# Patient Record
Sex: MALE | Race: WHITE | NOT HISPANIC OR LATINO | Employment: OTHER | URBAN - METROPOLITAN AREA
[De-identification: names, ages, dates, MRNs, and addresses within clinical notes are randomized per-mention and may not be internally consistent; named-entity substitution may affect disease eponyms.]

---

## 2017-01-03 ENCOUNTER — APPOINTMENT (OUTPATIENT)
Dept: PHYSICAL THERAPY | Facility: REHABILITATION | Age: 77
End: 2017-01-03
Payer: MEDICARE

## 2017-01-03 PROCEDURE — 97140 MANUAL THERAPY 1/> REGIONS: CPT

## 2017-01-03 PROCEDURE — 97110 THERAPEUTIC EXERCISES: CPT

## 2017-01-05 ENCOUNTER — APPOINTMENT (OUTPATIENT)
Dept: PHYSICAL THERAPY | Facility: REHABILITATION | Age: 77
End: 2017-01-05
Payer: MEDICARE

## 2017-01-05 PROCEDURE — 97110 THERAPEUTIC EXERCISES: CPT

## 2017-01-05 PROCEDURE — 97140 MANUAL THERAPY 1/> REGIONS: CPT

## 2017-01-10 ENCOUNTER — APPOINTMENT (OUTPATIENT)
Dept: PHYSICAL THERAPY | Facility: REHABILITATION | Age: 77
End: 2017-01-10
Payer: MEDICARE

## 2017-01-10 PROCEDURE — 97110 THERAPEUTIC EXERCISES: CPT

## 2017-01-10 PROCEDURE — 97140 MANUAL THERAPY 1/> REGIONS: CPT

## 2017-01-12 ENCOUNTER — APPOINTMENT (OUTPATIENT)
Dept: PHYSICAL THERAPY | Facility: REHABILITATION | Age: 77
End: 2017-01-12
Payer: MEDICARE

## 2017-01-12 PROCEDURE — 97110 THERAPEUTIC EXERCISES: CPT

## 2017-01-12 PROCEDURE — 97140 MANUAL THERAPY 1/> REGIONS: CPT

## 2017-01-17 ENCOUNTER — APPOINTMENT (OUTPATIENT)
Dept: PHYSICAL THERAPY | Facility: REHABILITATION | Age: 77
End: 2017-01-17
Payer: MEDICARE

## 2017-01-17 PROCEDURE — 97140 MANUAL THERAPY 1/> REGIONS: CPT

## 2017-01-17 PROCEDURE — 97110 THERAPEUTIC EXERCISES: CPT

## 2017-01-19 ENCOUNTER — APPOINTMENT (OUTPATIENT)
Dept: PHYSICAL THERAPY | Facility: REHABILITATION | Age: 77
End: 2017-01-19
Payer: MEDICARE

## 2017-01-19 PROCEDURE — 97140 MANUAL THERAPY 1/> REGIONS: CPT

## 2017-01-19 PROCEDURE — 97110 THERAPEUTIC EXERCISES: CPT

## 2017-01-23 ENCOUNTER — ALLSCRIPTS OFFICE VISIT (OUTPATIENT)
Dept: OTHER | Facility: OTHER | Age: 77
End: 2017-01-23

## 2017-01-23 LAB
CLARITY UR: NORMAL
COLOR UR: YELLOW
GLUCOSE (HISTORICAL): NORMAL
HGB UR QL STRIP.AUTO: NORMAL
KETONES UR STRIP-MCNC: NORMAL MG/DL
LEUKOCYTE ESTERASE UR QL STRIP: NORMAL
NITRITE UR QL STRIP: NORMAL
PH UR STRIP.AUTO: 5 [PH]
PROT UR STRIP-MCNC: NORMAL MG/DL
SP GR UR STRIP.AUTO: 1.03

## 2017-01-24 ENCOUNTER — GENERIC CONVERSION - ENCOUNTER (OUTPATIENT)
Dept: OTHER | Facility: OTHER | Age: 77
End: 2017-01-24

## 2017-01-24 ENCOUNTER — APPOINTMENT (OUTPATIENT)
Dept: PHYSICAL THERAPY | Facility: REHABILITATION | Age: 77
End: 2017-01-24
Payer: MEDICARE

## 2017-01-24 PROCEDURE — G8978 MOBILITY CURRENT STATUS: HCPCS

## 2017-01-24 PROCEDURE — G8979 MOBILITY GOAL STATUS: HCPCS

## 2017-01-24 PROCEDURE — 97140 MANUAL THERAPY 1/> REGIONS: CPT

## 2017-01-24 PROCEDURE — 97110 THERAPEUTIC EXERCISES: CPT

## 2017-01-26 ENCOUNTER — APPOINTMENT (OUTPATIENT)
Dept: PHYSICAL THERAPY | Facility: REHABILITATION | Age: 77
End: 2017-01-26
Payer: MEDICARE

## 2017-01-26 ENCOUNTER — ALLSCRIPTS OFFICE VISIT (OUTPATIENT)
Dept: OTHER | Facility: OTHER | Age: 77
End: 2017-01-26

## 2017-01-26 ENCOUNTER — HOSPITAL ENCOUNTER (OUTPATIENT)
Dept: RADIOLOGY | Facility: HOSPITAL | Age: 77
Discharge: HOME | End: 2017-01-26
Attending: ORTHOPAEDIC SURGERY
Payer: MEDICARE

## 2017-01-26 DIAGNOSIS — M54.50 LOW BACK PAIN: ICD-10-CM

## 2017-01-26 DIAGNOSIS — Z98.1 ARTHRODESIS STATUS: ICD-10-CM

## 2017-01-26 PROCEDURE — 72100 X-RAY EXAM L-S SPINE 2/3 VWS: CPT

## 2017-01-26 PROCEDURE — 97110 THERAPEUTIC EXERCISES: CPT

## 2017-01-26 PROCEDURE — 97140 MANUAL THERAPY 1/> REGIONS: CPT

## 2017-01-31 ENCOUNTER — APPOINTMENT (OUTPATIENT)
Dept: PHYSICAL THERAPY | Facility: REHABILITATION | Age: 77
End: 2017-01-31
Payer: MEDICARE

## 2017-01-31 ENCOUNTER — GENERIC CONVERSION - ENCOUNTER (OUTPATIENT)
Dept: OTHER | Facility: OTHER | Age: 77
End: 2017-01-31

## 2017-01-31 PROCEDURE — 97140 MANUAL THERAPY 1/> REGIONS: CPT

## 2017-01-31 PROCEDURE — 97110 THERAPEUTIC EXERCISES: CPT

## 2017-02-28 ENCOUNTER — GENERIC CONVERSION - ENCOUNTER (OUTPATIENT)
Dept: OTHER | Facility: OTHER | Age: 77
End: 2017-02-28

## 2017-03-21 ENCOUNTER — ALLSCRIPTS OFFICE VISIT (OUTPATIENT)
Dept: OTHER | Facility: OTHER | Age: 77
End: 2017-03-21

## 2017-04-24 ENCOUNTER — HOSPITAL ENCOUNTER (OUTPATIENT)
Dept: RADIOLOGY | Facility: HOSPITAL | Age: 77
Discharge: HOME | End: 2017-04-24
Attending: ORTHOPAEDIC SURGERY
Payer: MEDICARE

## 2017-04-24 ENCOUNTER — ALLSCRIPTS OFFICE VISIT (OUTPATIENT)
Dept: OTHER | Facility: OTHER | Age: 77
End: 2017-04-24

## 2017-04-24 DIAGNOSIS — M62.838 OTHER MUSCLE SPASM: ICD-10-CM

## 2017-04-24 DIAGNOSIS — M54.50 LOW BACK PAIN: ICD-10-CM

## 2017-04-24 DIAGNOSIS — Z98.1 ARTHRODESIS STATUS: ICD-10-CM

## 2017-04-24 PROCEDURE — 72100 X-RAY EXAM L-S SPINE 2/3 VWS: CPT

## 2017-05-05 ENCOUNTER — ALLSCRIPTS OFFICE VISIT (OUTPATIENT)
Dept: OTHER | Facility: OTHER | Age: 77
End: 2017-05-05

## 2017-05-06 ENCOUNTER — TRANSCRIBE ORDERS (OUTPATIENT)
Dept: LAB | Facility: CLINIC | Age: 77
End: 2017-05-06

## 2017-05-06 ENCOUNTER — APPOINTMENT (OUTPATIENT)
Dept: LAB | Facility: CLINIC | Age: 77
End: 2017-05-06
Payer: MEDICARE

## 2017-05-06 DIAGNOSIS — M62.838 OTHER MUSCLE SPASM: ICD-10-CM

## 2017-05-06 LAB
ALBUMIN SERPL BCP-MCNC: 3.3 G/DL (ref 3.5–5)
ALP SERPL-CCNC: 99 U/L (ref 46–116)
ALT SERPL W P-5'-P-CCNC: 25 U/L (ref 12–78)
ANION GAP SERPL CALCULATED.3IONS-SCNC: 9 MMOL/L (ref 4–13)
AST SERPL W P-5'-P-CCNC: 24 U/L (ref 5–45)
BILIRUB SERPL-MCNC: 0.4 MG/DL (ref 0.2–1)
BUN SERPL-MCNC: 15 MG/DL (ref 5–25)
CALCIUM SERPL-MCNC: 8.6 MG/DL (ref 8.3–10.1)
CHLORIDE SERPL-SCNC: 104 MMOL/L (ref 100–108)
CK SERPL-CCNC: 68 U/L (ref 39–308)
CO2 SERPL-SCNC: 29 MMOL/L (ref 21–32)
CREAT SERPL-MCNC: 1.06 MG/DL (ref 0.6–1.3)
GFR SERPL CREATININE-BSD FRML MDRD: >60 ML/MIN/1.73SQ M
GLUCOSE SERPL-MCNC: 94 MG/DL (ref 65–140)
POTASSIUM SERPL-SCNC: 4.7 MMOL/L (ref 3.5–5.3)
PROT SERPL-MCNC: 7.4 G/DL (ref 6.4–8.2)
SODIUM SERPL-SCNC: 142 MMOL/L (ref 136–145)
TSH SERPL DL<=0.05 MIU/L-ACNC: 1.74 UIU/ML (ref 0.36–3.74)
VIT B12 SERPL-MCNC: 433 PG/ML (ref 100–900)

## 2017-05-06 PROCEDURE — 84443 ASSAY THYROID STIM HORMONE: CPT

## 2017-05-06 PROCEDURE — 82607 VITAMIN B-12: CPT

## 2017-05-06 PROCEDURE — 80053 COMPREHEN METABOLIC PANEL: CPT

## 2017-05-06 PROCEDURE — 82550 ASSAY OF CK (CPK): CPT

## 2017-05-06 PROCEDURE — 36415 COLL VENOUS BLD VENIPUNCTURE: CPT

## 2017-05-09 ENCOUNTER — GENERIC CONVERSION - ENCOUNTER (OUTPATIENT)
Dept: OTHER | Facility: OTHER | Age: 77
End: 2017-05-09

## 2017-06-07 ENCOUNTER — ALLSCRIPTS OFFICE VISIT (OUTPATIENT)
Dept: OTHER | Facility: OTHER | Age: 77
End: 2017-06-07

## 2017-06-07 LAB — S PYO AG THROAT QL: NEGATIVE

## 2017-06-26 ENCOUNTER — ALLSCRIPTS OFFICE VISIT (OUTPATIENT)
Dept: OTHER | Facility: OTHER | Age: 77
End: 2017-06-26

## 2017-09-11 ENCOUNTER — ALLSCRIPTS OFFICE VISIT (OUTPATIENT)
Dept: OTHER | Facility: OTHER | Age: 77
End: 2017-09-11

## 2017-09-11 DIAGNOSIS — Z82.49 FAMILY HISTORY OF ISCHEMIC HEART DISEASE AND OTHER DISEASES OF THE CIRCULATORY SYSTEM: ICD-10-CM

## 2017-09-11 DIAGNOSIS — R03.0 ELEVATED BLOOD PRESSURE READING WITHOUT DIAGNOSIS OF HYPERTENSION: ICD-10-CM

## 2017-09-11 DIAGNOSIS — R25.2 CRAMP AND SPASM: ICD-10-CM

## 2017-09-11 DIAGNOSIS — Z13.1 ENCOUNTER FOR SCREENING FOR DIABETES MELLITUS: ICD-10-CM

## 2017-09-11 DIAGNOSIS — R79.89 OTHER SPECIFIED ABNORMAL FINDINGS OF BLOOD CHEMISTRY: ICD-10-CM

## 2017-09-11 DIAGNOSIS — Z13.220 ENCOUNTER FOR SCREENING FOR LIPOID DISORDERS: ICD-10-CM

## 2017-09-13 ENCOUNTER — GENERIC CONVERSION - ENCOUNTER (OUTPATIENT)
Dept: OTHER | Facility: OTHER | Age: 77
End: 2017-09-13

## 2017-09-14 ENCOUNTER — APPOINTMENT (OUTPATIENT)
Dept: LAB | Facility: CLINIC | Age: 77
End: 2017-09-14
Payer: MEDICARE

## 2017-09-14 ENCOUNTER — TRANSCRIBE ORDERS (OUTPATIENT)
Dept: LAB | Facility: CLINIC | Age: 77
End: 2017-09-14

## 2017-09-14 DIAGNOSIS — R25.2 CRAMP AND SPASM: ICD-10-CM

## 2017-09-14 DIAGNOSIS — Z13.220 ENCOUNTER FOR SCREENING FOR LIPOID DISORDERS: ICD-10-CM

## 2017-09-14 DIAGNOSIS — R03.0 ELEVATED BLOOD PRESSURE READING WITHOUT DIAGNOSIS OF HYPERTENSION: ICD-10-CM

## 2017-09-14 DIAGNOSIS — R79.89 OTHER SPECIFIED ABNORMAL FINDINGS OF BLOOD CHEMISTRY: ICD-10-CM

## 2017-09-14 DIAGNOSIS — Z13.1 ENCOUNTER FOR SCREENING FOR DIABETES MELLITUS: ICD-10-CM

## 2017-09-14 LAB
ALBUMIN SERPL BCP-MCNC: 3.2 G/DL (ref 3.5–5)
ALP SERPL-CCNC: 93 U/L (ref 46–116)
ALT SERPL W P-5'-P-CCNC: 38 U/L (ref 12–78)
ANION GAP SERPL CALCULATED.3IONS-SCNC: 12 MMOL/L (ref 4–13)
AST SERPL W P-5'-P-CCNC: 27 U/L (ref 5–45)
BASOPHILS # BLD AUTO: 0.03 THOUSANDS/ΜL (ref 0–0.1)
BASOPHILS NFR BLD AUTO: 1 % (ref 0–1)
BILIRUB SERPL-MCNC: 0.3 MG/DL (ref 0.2–1)
BUN SERPL-MCNC: 26 MG/DL (ref 5–25)
CALCIUM SERPL-MCNC: 8.5 MG/DL (ref 8.3–10.1)
CHLORIDE SERPL-SCNC: 106 MMOL/L (ref 100–108)
CHOLEST SERPL-MCNC: 211 MG/DL (ref 50–200)
CO2 SERPL-SCNC: 25 MMOL/L (ref 21–32)
CREAT SERPL-MCNC: 1.06 MG/DL (ref 0.6–1.3)
EOSINOPHIL # BLD AUTO: 0.19 THOUSAND/ΜL (ref 0–0.61)
EOSINOPHIL NFR BLD AUTO: 4 % (ref 0–6)
ERYTHROCYTE [DISTWIDTH] IN BLOOD BY AUTOMATED COUNT: 14.8 % (ref 11.6–15.1)
EST. AVERAGE GLUCOSE BLD GHB EST-MCNC: 105 MG/DL
GFR SERPL CREATININE-BSD FRML MDRD: 67 ML/MIN/1.73SQ M
GLUCOSE P FAST SERPL-MCNC: 91 MG/DL (ref 65–99)
HBA1C MFR BLD: 5.3 % (ref 4.2–6.3)
HCT VFR BLD AUTO: 35.2 % (ref 36.5–49.3)
HDLC SERPL-MCNC: 89 MG/DL (ref 40–60)
HGB BLD-MCNC: 11.5 G/DL (ref 12–17)
LDLC SERPL CALC-MCNC: 105 MG/DL (ref 0–100)
LYMPHOCYTES # BLD AUTO: 2.04 THOUSANDS/ΜL (ref 0.6–4.47)
LYMPHOCYTES NFR BLD AUTO: 39 % (ref 14–44)
MCH RBC QN AUTO: 31.7 PG (ref 26.8–34.3)
MCHC RBC AUTO-ENTMCNC: 32.7 G/DL (ref 31.4–37.4)
MCV RBC AUTO: 97 FL (ref 82–98)
MONOCYTES # BLD AUTO: 0.53 THOUSAND/ΜL (ref 0.17–1.22)
MONOCYTES NFR BLD AUTO: 10 % (ref 4–12)
NEUTROPHILS # BLD AUTO: 2.43 THOUSANDS/ΜL (ref 1.85–7.62)
NEUTS SEG NFR BLD AUTO: 46 % (ref 43–75)
PLATELET # BLD AUTO: 180 THOUSANDS/UL (ref 149–390)
PMV BLD AUTO: 9.9 FL (ref 8.9–12.7)
POTASSIUM SERPL-SCNC: 4.2 MMOL/L (ref 3.5–5.3)
PROT SERPL-MCNC: 7.8 G/DL (ref 6.4–8.2)
RBC # BLD AUTO: 3.63 MILLION/UL (ref 3.88–5.62)
SODIUM SERPL-SCNC: 143 MMOL/L (ref 136–145)
TRIGL SERPL-MCNC: 85 MG/DL
TSH SERPL DL<=0.05 MIU/L-ACNC: 1.23 UIU/ML (ref 0.36–3.74)
WBC # BLD AUTO: 5.22 THOUSAND/UL (ref 4.31–10.16)

## 2017-09-14 PROCEDURE — 85025 COMPLETE CBC W/AUTO DIFF WBC: CPT

## 2017-09-14 PROCEDURE — 80061 LIPID PANEL: CPT

## 2017-09-14 PROCEDURE — 80053 COMPREHEN METABOLIC PANEL: CPT

## 2017-09-14 PROCEDURE — 84443 ASSAY THYROID STIM HORMONE: CPT

## 2017-09-14 PROCEDURE — 83036 HEMOGLOBIN GLYCOSYLATED A1C: CPT

## 2017-09-14 PROCEDURE — 36415 COLL VENOUS BLD VENIPUNCTURE: CPT

## 2017-09-15 ENCOUNTER — GENERIC CONVERSION - ENCOUNTER (OUTPATIENT)
Dept: OTHER | Facility: OTHER | Age: 77
End: 2017-09-15

## 2017-09-19 ENCOUNTER — GENERIC CONVERSION - ENCOUNTER (OUTPATIENT)
Dept: OTHER | Facility: OTHER | Age: 77
End: 2017-09-19

## 2017-09-20 ENCOUNTER — HOSPITAL ENCOUNTER (OUTPATIENT)
Dept: NON INVASIVE DIAGNOSTICS | Facility: CLINIC | Age: 77
Discharge: HOME | End: 2017-09-20
Payer: MEDICARE

## 2017-09-20 DIAGNOSIS — R25.2 CRAMP AND SPASM: ICD-10-CM

## 2017-09-20 PROCEDURE — 93923 UPR/LXTR ART STDY 3+ LVLS: CPT

## 2017-09-20 PROCEDURE — 93925 LOWER EXTREMITY STUDY: CPT

## 2017-09-21 ENCOUNTER — LAB CONVERSION - ENCOUNTER (OUTPATIENT)
Dept: OTHER | Facility: OTHER | Age: 77
End: 2017-09-21

## 2017-10-05 ENCOUNTER — GENERIC CONVERSION - ENCOUNTER (OUTPATIENT)
Dept: OTHER | Facility: OTHER | Age: 77
End: 2017-10-05

## 2017-10-11 ENCOUNTER — HOSPITAL ENCOUNTER (OUTPATIENT)
Dept: NON INVASIVE DIAGNOSTICS | Facility: CLINIC | Age: 77
Discharge: HOME | End: 2017-10-11
Payer: MEDICARE

## 2017-10-11 DIAGNOSIS — Z82.49 FAMILY HISTORY OF ISCHEMIC HEART DISEASE AND OTHER DISEASES OF THE CIRCULATORY SYSTEM: ICD-10-CM

## 2017-10-11 LAB
CHEST PAIN STATEMENT: NORMAL
MAX DIASTOLIC BP: 62 MMHG
MAX HEART RATE: 134 BPM
MAX PREDICTED HEART RATE: 143 BPM
MAX. SYSTOLIC BP: 146 MMHG
PROTOCOL NAME: NORMAL
TARGET HR FORMULA: NORMAL
TEST INDICATION: NORMAL
TIME IN EXERCISE PHASE: 64 S

## 2017-10-11 PROCEDURE — 93017 CV STRESS TEST TRACING ONLY: CPT

## 2017-10-11 PROCEDURE — 93306 TTE W/DOPPLER COMPLETE: CPT

## 2017-10-13 ENCOUNTER — GENERIC CONVERSION - ENCOUNTER (OUTPATIENT)
Dept: OTHER | Facility: OTHER | Age: 77
End: 2017-10-13

## 2017-10-19 ENCOUNTER — ALLSCRIPTS OFFICE VISIT (OUTPATIENT)
Dept: OTHER | Facility: OTHER | Age: 77
End: 2017-10-19

## 2017-10-20 NOTE — CONSULTS
Assessment  1  Diarrhea (787 91) (R19 7)   2  Anemia, mild (285 9) (D64 9)   3  GERD (gastroesophageal reflux disease) (530 81) (K21 9)    Plan  Anemia, mild, Diarrhea, GERD (gastroesophageal reflux disease)    · Suprep Bowel Prep Kit 17 5-3 13-1 6 GM/180ML Oral Solution; USE AS DIRECTED   Rx By: Nancy Hawley; Dispense: 0 Days ; #:1 X 177 ML Bottle (2 Bottles); Refill: 0;For: Anemia, mild, Diarrhea, GERD (gastroesophageal reflux disease); HARRIET = N; Verified Transmission to [id number] Last Updated By: System, [employer]; 10/19/2017 9:49:38 AM   · COLONOSCOPY (GI, SURG); Status:Hold For - Scheduling; Requested OTL:[id number];    Perform:[facility]; Due:19Oct2018; Ordered; For:Anemia, mild, Diarrhea, GERD (gastroesophageal reflux disease); Ordered By:Mariano Godinez;  GERD (gastroesophageal reflux disease)    · EGD; Status:Hold For - Scheduling; Requested BEE:[id number];    Perform:[facility]; TFW:68SOH2560;QRRCGXG; For:GERD (gastroesophageal reflux disease); Ordered By:Mariano Godinez;    Discussion/Summary  Discussion Summary:   Pleasant 75-year-old gentleman who had major back surgery 1 year ago, since then he has had some change in bowel movements with associated diarrhea, found to have some anemia also noted to have reflux symptoms and occasional pill dysphagia  Exercise-induced reflux and occasional pill dysphagia: Appears to be reflux mediated, the patient to PPI therapy but has stopped taking this and with dietary modification is doing betterhis history of anemia new onset of symptoms we will proceed with an upper endoscopy  Change in bowel movements, urgency and loose stools at timeshas never had a colonoscopyin light of his anemia we will proceed with colonoscopy the same time to exclude underlying GI malignancy  discussed with him the risks of the procedures including bleeding, surgery, perforation, missed polyp detection rate        Chief Complaint  Chief Complaint Free Text Note Form: Evaluation for upper endoscopy and colonoscopy      History of Present Illness  HPI: As you know this is a very pleasant 27-year-old gentleman with no significant cardiac history, last November he had major back surgery with fusion and rods placed, he reports that since then he has had progressive weakness in lower extremities in particular  He has tried to go to physical therapy and rehab he notes that if he eats about an hour before that he will have heartburn symptoms  He has stopped eating and does symptoms have been improved, he is able to tolerate his exercise  He is able to walk about 1 to 2 flights of stairs before he becomes somewhat short of breath, he had a stress test which she failed because of his lower extremity weakness, he had an echocardiogram which was relatively unremarkable  denies any chest pain per se  He is a long-time smoker but quit many years ago  notes that over the past 1 year after his surgeries had change in bowel movements with occasional loose stools, occasional urgency as well  His weight has been stable  Denies any romario melena or rectal bleeding  He has never had a colonoscopy  Recent laboratory studies demonstrated mild anemia with a hemoglobin of 11 5, the remainder of his laboratory studies were normal   any abdominal pain, nausea, vomiting, typical heartburn symptoms  Reports occasional pill associated dysphagia  is retired from [employer], no family history of GI or associated malignancies  Review of Systems  Complete-Male GI Adult: Other Symptoms: The remainder of the ten ROS was negative  Active Problems  1  Abdominal pain (789 00) (R10 9)   2  Allergic rhinitis (477 9) (J30 9)   3  Anemia, mild (285 9) (D64 9)   4  Benign non-nodular prostatic hyperplasia with lower urinary tract symptoms (600 91)   (N40 1)   5  Degenerative lumbar spinal stenosis (724 02) (M48 061)   6  Diabetes mellitus screening (V77 1) (Z13 1)   7  Diarrhea (787 91) (R19 7)   8   Dyspnea on exertion (786 09) (R06 09)   9  Elevated BP without diagnosis of hypertension (796 2) (R03 0)   10  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)   11  GERD (gastroesophageal reflux disease) (530 81) (K21 9)   12  GERD without esophagitis (530 81) (K21 9)   13  History of Hand paresthesia (782 0) (R20 2)   14  History of dysuria (V13 00) (Z87 898)   15  History of urinary frequency (V13 09) (Z87 898)   16  Leg cramps (729 82) (R25 2)   17  Lipid screening (V77 91) (Z13 220)   18  Medicare annual wellness visit, subsequent (V70 0) (Z00 00)   19  Muscle spasticity (728 85) (M62 838)   20  Need for influenza vaccination (V04 81) (Z23)   21  Need for vaccination with 13-polyvalent pneumococcal conjugate vaccine (V03 82) (Z23)   22  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) (L30 0)   23  Peripheral arterial disease (443 9) (I73 9)   24  S/P lumbar spinal fusion (V45 4) (Z98 1)   25  Screen for colon cancer (V76 51) (Z12 11)   26  Screening for depression (V79 0) (Z13 89)   27  Screening for genitourinary condition (V81 6) (Z13 89)   28  Screening for neurological condition (V80 09) (Z13 89)   29  Spondylolisthesis of lumbar region (738 4) (M43 16)   30  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 3) (M47 816)   31  History of Testicular pain, right (608 9) (N50 811)   32  Trouble swallowing (787 20) (R13 10)    Past Medical History  1  History of Abnormal liver function test (790 6) (R79 89)   2  History of Acute maxillary sinusitis, recurrence not specified (461 0) (J01 00)   3  History of Acute non-recurrent frontal sinusitis (461 1) (J01 10)   4  Acute sinusitis (461 9) (J01 90)   5  History of Acute upper respiratory infection (465 9) (J06 9)   6  Anemia, mild (285 9) (D64 9)   7  History of Cellulitis of trunk, unspecified site of trunk (682 2) (L03 319)   8  History of Change in bowel habits (787 99) (R19 4)   9  Diabetes mellitus screening (V77 1) (Z13 1)   10  Dyspnea on exertion (786 09) (R06 09)   11  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)   12  History of Greater trochanteric bursitis, right (726 5) (M70 61)   13  History of Hand paresthesia (782 0) (R20 2)   14  History of Hip pain, right (719 45) (M25 551)   15  History of acute sinusitis (V12 69) (Z87 09)   16  History of dysuria (V13 00) (Z87 898)   17  History of inflammation of sacroiliac joint (V13 4) (Z87 39)   18  History of low back pain (V13 59) (Z87 39)   19  History of tinea corporis (V12 09) (Z86 19)   20  History of urinary frequency (V13 09) (Z87 898)   21  History of urticaria (V13 3) (Z87 2)   22  Leg cramps (729 82) (R25 2)   23  Lipid screening (V77 91) (Z13 220)   24  Muscle spasticity (728 85) (M62 838)   25  Need for vaccination with 13-polyvalent pneumococcal conjugate vaccine (V03 82) (Z23)   26  Peripheral arterial disease (443 9) (I73 9)   27  History of Preoperative examination (V72 84) (Z01 818)   28  History of Testicular pain, right (608 9) (N50 811)  Active Problems And Past Medical History Reviewed: The active problems and past medical history were reviewed and updated today  Surgical History  1  History of Back Surgery   2  History of Septoplasty   3  History of Tonsillectomy  Surgical History Reviewed: The surgical history was reviewed and updated today  Family History  Mother    1  Denied: Family history of Crohn's disease without complication, unspecified   gastrointestinal tract location   2  Denied: Family history of colon cancer   3  Family history of emphysema (V17 6) (Z82 5)   4  Denied: Family history of liver disease  Father    11  Denied: Family history of Crohn's disease without complication, unspecified   gastrointestinal tract location   6  Family history of cerebrovascular accident (V17 1) (Z82 3)   7  Denied: Family history of colon cancer   8  Family history of coronary artery disease (V17 3) (Z82 49)   9  Family history of hypertension (V17 49) (Z82 49)   10   Denied: Family history of liver disease  Brother    6  Family history of coronary artery disease (V17 3) (Z82 49)  Family History    12  Family history of Back problem   13  Family history of Cerebrovascular accident (CVA), unspecified   15  Family history of emphysema (V17 6) (Z82 5)   15  Family history of Hypertension, benign  Family History Reviewed: The family history was reviewed and updated today  Social History   · Alcohol use (V49 89) (Z78 9)   · Daily Coffee Consumption (___ Cups/Day)   · Education Level: Some college   · Former smoker (X61 05) (L47 636)   · No drug use   · Occupation   · Retired   · Social alcohol use (Z78 9)   ·   Social History Reviewed: The social history was reviewed and updated today  Current Meds   1  Align Oral Capsule; USE AS DIRECTED; Therapy: 77YSO0654 to (Last SO:59LCC7322)  Requested for: 26Jun2017 Ordered   2  Aspirin 81 MG CAPS; Therapy: (Recorded:05Oct2017) to Recorded   3  Atorvastatin Calcium 20 MG Oral Tablet; TAKE 1 TABLET AT BEDTIME; Therapy: 41WMF0692 to (Evaluate:90Nws6306)  Requested for: 71YJY3440; Last   Rx:05Oct2017 Ordered   4  Daily Multiple Vitamins TABS; Therapy: (Ayesha Lemme) to Recorded   5  Vitamin B Complex CAPS; Therapy: (Recorded:05Oct2017) to Recorded   6  Vitamin D 1000 UNIT CAPS; Therapy: (Recorded:05Oct2017) to Recorded  Medication List Reviewed: The medication list was reviewed and updated today  Allergies  1   Penicillins    Vitals  Vital Signs    Recorded: 37JZE7130 09:30AM   Temperature 96 4 F   Heart Rate 78   Respiration 16   Systolic 671   Diastolic 82   Height 5 ft 7 in   Weight 175 lb 2 oz   BMI Calculated 27 43   BSA Calculated 1 91   O2 Saturation 98     Physical Exam  Gen: wn/wd, NAD  HEENT: anicteric, MMM, no cervical LAD  CVS: RRR, no m/r/g  CHEST: CTA b/l  ABD: +BS, soft, NT,ND, no hepatosplenomegaly  EXT: no c/c/e, weak lower extremity pulses but present  NEURO: aaox3  SKIN: NO rashes         Future Appointments    Date/Time Provider Specialty Site   03/13/2018 10:00 AM Karina Matson, [facility]   10/23/2017 10:20 AM LOS King   Orthopedic Surgery [address]     Signatures   Electronically signed by : LOS Hardin ; Oct 19 2017  9:53AM EST                       (Author)

## 2017-10-23 ENCOUNTER — HOSPITAL ENCOUNTER (OUTPATIENT)
Dept: RADIOLOGY | Facility: HOSPITAL | Age: 77
Discharge: HOME | End: 2017-10-23
Attending: ORTHOPAEDIC SURGERY
Payer: MEDICARE

## 2017-10-23 ENCOUNTER — ALLSCRIPTS OFFICE VISIT (OUTPATIENT)
Dept: OTHER | Facility: OTHER | Age: 77
End: 2017-10-23

## 2017-10-23 DIAGNOSIS — Z98.1 ARTHRODESIS STATUS: ICD-10-CM

## 2017-10-23 DIAGNOSIS — R06.02 SHORTNESS OF BREATH: ICD-10-CM

## 2017-10-23 DIAGNOSIS — J30.9 ALLERGIC RHINITIS: ICD-10-CM

## 2017-10-23 DIAGNOSIS — I73.9 PERIPHERAL VASCULAR DISEASE (HCC): ICD-10-CM

## 2017-10-23 PROCEDURE — 72100 X-RAY EXAM L-S SPINE 2/3 VWS: CPT

## 2017-10-24 NOTE — PROGRESS NOTES
Assessment  1  S/P lumbar spinal fusion (V45 4) (Z98 1)    Plan  S/P lumbar spinal fusion    · Follow-up PRN Evaluation and Treatment  Follow-up  Status: Complete  Done:  80FRW6171    Discussion/Summary    Patient seen and examined by Dr Al Segura and myself  He is about 1 year s/p posterior lumbar decompression and fusion L2-S1  He is doing well with minimal LBP and improving paresthesias of both great toes  X-rays in office appear stable  From an orthopedic standpoint he is doing very well  He can follow-up with us now PRN  Of note he is seeing a cardiologist this week and states he also follows with a vascular team for peripheral vascular disease  Chief Complaint  1  Back Pain  follow-up LBP      History of Present Illness  HPI: The patient is a 68year old male who presents for a follow-up appointment  He is approximately 1 year s/p L2-S1 posterior lumbar fusion and decompression for spinal stenosis/radiculopathy/spondylolisthesis  Today he states he has been having difficulty walking long distances as he experiences leg pain and ROBLES  He states he follows with a vascular surgeon and cardiologist  He reports minimal LBP and improving numbness/tingling of both great toes  No new LE weakness, paresthesias, denies bowel or bladder incontinence  Back Pain: Alisha Marques presents with complaints of back pain  Associated symptoms include stiffness,-- leg numbness-- and-- leg weakness, but-- no fever,-- no abdominal pain,-- no arm weakness,-- no urinary incontinence,-- no fecal incontinence-- and-- no urinary retention  Review of Systems    Constitutional: as noted in HPI,-- no fever-- and-- no chills  Cardiovascular: intermittent leg claudication, but-- as noted in HPI,-- no chest pain-- and-- no lower extremity edema  Respiratory: as noted in HPI  Gastrointestinal: No complaints of abdominal pain, no constipation, no nausea or vomiting, no diarrhea or bloody stools     Genitourinary: as noted in HPI-- and-- no incontinence  Musculoskeletal: arthralgias, but-- as noted in HPI-- and-- no limb pain  Integumentary: No complaints of skin rash or lesion, no itching or dry skin, no skin wounds  Neurological: numbness-- and-- tingling, but-- as noted in HPI  ROS reviewed  Active Problems  1  Abdominal pain (789 00) (R10 9)   2  Allergic rhinitis (477 9) (J30 9)   3  Anemia, mild (285 9) (D64 9)   4  Benign non-nodular prostatic hyperplasia with lower urinary tract symptoms (600 91)   (N40 1)   5  Degenerative lumbar spinal stenosis (724 02) (M48 061)   6  Diabetes mellitus screening (V77 1) (Z13 1)   7  Diarrhea (787 91) (R19 7)   8  Dyspnea on exertion (786 09) (R06 09)   9  Elevated BP without diagnosis of hypertension (796 2) (R03 0)   10  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)   11  GERD (gastroesophageal reflux disease) (530 81) (K21 9)   12  GERD without esophagitis (530 81) (K21 9)   13  History of Hand paresthesia (782 0) (R20 2)   14  History of dysuria (V13 00) (Z87 898)   15  History of urinary frequency (V13 09) (Z87 898)   16  Leg cramps (729 82) (R25 2)   17  Lipid screening (V77 91) (Z13 220)   18  Medicare annual wellness visit, subsequent (V70 0) (Z00 00)   19  Muscle spasticity (728 85) (M62 838)   20  Need for influenza vaccination (V04 81) (Z23)   21  Need for vaccination with 13-polyvalent pneumococcal conjugate vaccine (V03 82) (Z23)   22  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) (L30 0)   23  Peripheral arterial disease (443 9) (I73 9)   24  S/P lumbar spinal fusion (V45 4) (Z98 1)   25  Screen for colon cancer (V76 51) (Z12 11)   26  Screening for depression (V79 0) (Z13 89)   27  Screening for genitourinary condition (V81 6) (Z13 89)   28  Screening for neurological condition (V80 09) (Z13 89)   29  Spondylolisthesis of lumbar region (738 4) (M43 16)   30  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 3) (M47 816)   31   History of Testicular pain, right (608 9) (N50 811)   32  Trouble swallowing (787 20) (R13 10)    Past Medical History   · History of Abnormal liver function test (790 6) (R79 89)   · History of Acute maxillary sinusitis, recurrence not specified (461 0) (J01 00)   · History of Acute non-recurrent frontal sinusitis (461 1) (J01 10)   · Acute sinusitis (461 9) (J01 90)   · History of Acute upper respiratory infection (465 9) (J06 9)   · Anemia, mild (285 9) (D64 9)   · History of Cellulitis of trunk, unspecified site of trunk (682 2) (L03 319)   · History of Change in bowel habits (787 99) (R19 4)   · Diabetes mellitus screening (V77 1) (Z13 1)   · Dyspnea on exertion (786 09) (R06 09)   · 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)   · History of Greater trochanteric bursitis, right (726 5) (M70 61)   · History of Hand paresthesia (782 0) (R20 2)   · History of Hip pain, right (719 45) (M25 551)   · History of acute sinusitis (V12 69) (Z87 09)   · History of dysuria (V13 00) (O61 584)   · History of inflammation of sacroiliac joint (V13 4) (Z87 39)   · History of low back pain (V13 59) (Z87 39)   · History of tinea corporis (V12 09) (Z86 19)   · History of urinary frequency (V13 09) (Z87 898)   · History of urticaria (V13 3) (Z87 2)   · Leg cramps (729 82) (R25 2)   · Lipid screening (V77 91) (Z13 220)   · Muscle spasticity (728 85) (C26 616)   · Need for vaccination with 13-polyvalent pneumococcal conjugate vaccine (V03 82) (Z23)   · Peripheral arterial disease (443 9) (I73 9)   · History of Preoperative examination (V72 84) (Z01 818)   · History of Testicular pain, right (608 9) (N50 811)    The active problems and past medical history were reviewed and updated today  Surgical History   · History of Back Surgery   · History of Septoplasty   · History of Tonsillectomy    The surgical history was reviewed and updated today         Family History  Mother    · Denied: Family history of Crohn's disease without complication, unspecified  gastrointestinal tract location   · Denied: Family history of colon cancer   · Family history of emphysema (V17 6) (Z82 5)   · Denied: Family history of liver disease  Father    · Denied: Family history of Crohn's disease without complication, unspecified  gastrointestinal tract location   · Family history of cerebrovascular accident (V17 1) (Z82 3)   · Denied: Family history of colon cancer   · Family history of coronary artery disease (V17 3) (Z82 49)   · Family history of hypertension (V17 49) (Z82 49)   · Denied: Family history of liver disease  Brother    · Family history of coronary artery disease (V17 3) (Z82 49)  Family History    · Family history of Back problem   · Family history of Cerebrovascular accident (CVA), unspecified   · Family history of emphysema (V17 6) (Z82 5)   · Family history of Hypertension, benign    The family history was reviewed and updated today  Social History   · Alcohol use (V49 89) (Z78 9)   · Daily Coffee Consumption (___ Cups/Day)   · Education Level: Some college   · Former smoker (E75 43) (I46 503)   · No drug use   · Occupation   · Retired   [employer]   · Social alcohol use (Z78 9)   ·   The social history was reviewed and updated today  Current Meds   1  Align Oral Capsule; USE AS DIRECTED; Therapy: 02FZT7577 to (Last JA:48YCQ1183)  Requested for: 26Jun2017 Ordered   2  Aspirin 81 MG CAPS; Therapy: (Recorded:05Oct2017) to Recorded   3  Atorvastatin Calcium 20 MG Oral Tablet; TAKE 1 TABLET AT BEDTIME; Therapy: 12HPW2668 to (Evaluate:28Aiv2637)  Requested for: 64ANM2037; Last   Rx:05Oct2017 Ordered   4  Daily Multiple Vitamins TABS; Therapy: (Mihai Ojeda) to Recorded   5  Suprep Bowel Prep Kit 17 5-3 13-1 6 GM/180ML Oral Solution; USE AS DIRECTED; Therapy: 82IWJ3507 to (Last Rx:19Oct2017)  Requested for: 19Oct2017 Ordered   6  Vitamin B Complex CAPS; Therapy: (Recorded:05Oct2017) to Recorded   7  Vitamin D 1000 UNIT CAPS;    Therapy: (Recorded:05Oct2017) to Recorded    The medication list was reviewed and updated today  Allergies  1  Penicillins    Vitals   Recorded: 39LYH7133 10:06AM   Heart Rate 514   Systolic 973   Diastolic 77   Weight 454 lb    BMI Calculated 27 25   BSA Calculated 1 91     Physical Exam    Constitutional - General appearance: Normal    Musculoskeletal - Gait and station: Normal -- Inspection/palpation of joints, bones, and muscles: Normal -- Muscle strength/tone: Normal -- Lower extremity compartments: Normal    Cardiovascular - Pulses: Normal    Respiratory - Lungs - Clear to auscultation bilaterally, no rales, no rhonci, no wheezes  Abdomen - Abdomen - Soft, nontender, nondistended  Skin - Skin and subcutaneous tissue: Normal    Neurologic - Lower extremity peripheral neuro exam: Normal    Psychiatric - Orientation to person, place, and time: Normal    Free Text - Overall NAD  Gait is normal  No obvious ROBLES or SOB  Inspection reveals well healed lumbar incision, no dehiscence  Negative straight leg raise bilaterally  Lumbosacral region is NTTP  Strength 5/5 BL LE, L2-S1  Sensation is equal and intact  No pitting edema, no calf tenderness  Feet are warm and well perfused  Results/Data  I personally reviewed the films/images/results in the office today  My interpretation follows  X-ray Review Lumbar spine x-ray demonstrates stable instrumentation/fusion, L2-S1  Attending Note  Collaborating Physician Note: Collaborating Physician: I interviewed and examined the patient,-- I discussed the case with the Advanced Practitioner and reviewed the note,-- I supervised the Advanced Practitioner-- and-- I agree with the Advanced Practitioner note  Attending Note [facility]: Level of Participation: I was present in clinic and examined the patient  Patient's History: Patient is one year status post lumbar decompression and fusion, L2-S1, history of severe spinal stenosis   On today's visit, he reports he fatigability, particularly with ambulation  He also has been found to have dyspnea on exertion, as well as peripheral arterial disease, and partial occlusion of the right femoral artery  Key Parts of the Exam: Awake alert, and oriented x3is appropriate  He does have independently without assistive devices  In a seated position he demonstrates 5 out of 5 strength to to S1 bilaterally  spine x-ray demonstrates stable instrumentation L2-S1  Diagnosis and Plan: Status post lumbar decompression and fusion, L2-S1  Continue with conservative management  Followup when necessary  Future Appointments    Date/Time Provider Specialty Site   03/13/2018 10:00 AM Efrain Medina, [address] [place] [address]   10/27/2017 03:20 PM LOS Malik  Cardiology [facility] CARDIOLOG[facility]   11/15/2017 01:30 PM LOS Gould   Gastroenterology Adult [address]     Signatures   Electronically signed by : Geoffrey Whitaker, [facility]; Oct 23 2017 10:46AM EST                       (Author)    Electronically signed by : LOS Graham ; Oct 23 2017  1:01PM EST                       (Author)

## 2017-10-25 ENCOUNTER — ANESTHESIA EVENT (OUTPATIENT)
Dept: PERIOP | Facility: AMBULARY SURGERY CENTER | Age: 77
End: 2017-10-25
Payer: MEDICARE

## 2017-10-27 ENCOUNTER — ALLSCRIPTS OFFICE VISIT (OUTPATIENT)
Dept: OTHER | Facility: OTHER | Age: 77
End: 2017-10-27

## 2017-10-28 NOTE — CONSULTS
Assessment  1  Shortness of breath on exertion (786 05) (R06 02)   2  Peripheral arterial disease (443 9) (I73 9)   3  Hyperlipidemia (272 4) (E78 5)    Plan  Dyspnea on exertion, Health Maintenance    · EKG/ECG- POC; Status:Complete;   Done: [id number]   Perform: In Office; 69 [phone] 83 42; Last Updated [place]; 10/27/2017 3:15:57 PM;Ordered; For:Dyspnea on exertion, Health Maintenance; Ordered By:Nile Nash;  Peripheral arterial disease, Shortness of breath on exertion    · Follow-up visit in 6 months Evaluation and Treatment  Follow-up  Status: Complete   Done: 27Oct2017   Ordered; For: Peripheral arterial disease, Shortness of breath on exertion; Ordered By: Keegan Nieves Performed:  Due: 25OQL9921; Last Updated By: Jack Osorio; 10/27/2017 4:04:26 PM   · NM MYOCARDIAL PERFUSION SPECT (RX STRESS AND/OR REST); Status:Active; Requested IOA:[id number];    Perform:[facility]; MQP:[id number]; Last Updated Rylan Albert; 10/27/2017 4:04:26 PM;Ordered; For:Peripheral arterial disease, Shortness of breath on exertion; Ordered By:Nile Nash;    Discussion/Summary    I had the pleasure of seeing Elza Huff for further evaluation of shortness of breath  He is a pleasant 70-year-old gentleman with a history of hypertension, mild dyslipidemia, peripheral vascular disease-right superficial femoral artery stenosis for which he is being medically managed for questionable claudication symptoms  He also has a 50 pack year smoking history-quit about 7 years ago  He presents for further evaluation of increasing dyspnea with exertion for the last few months, mostly succeeding his lumbar spine surgery  Functional capacity has slowly been improving after HR the gym but not to his satisfaction  ECG demonstrates normal sinus rhythm   A recent exercise stress test showed markedly decreased functional capacity - 1 minutes on the Joo protocol only -limited by shortness of breath and leg fatigue  Plan:    Shortness of breath: Risk factors include mild dyslipidemia, significant tobacco use in the past  And hypertension  Check Lexiscan nuclear perfusion study  Hypertension: Home log shows good blood pressure control on average    dyslipidemia: High HDL-close to 90, I do not think it could be considered to be protective and likely nonfunctional  LDL mildly elevated at 120 and non HDL in the 140-150 range  Agree with need for statin considering his peripheral vascular disease  await results of the [facility] nuclear perfusion study  If negative, follow-up in 6 months  The patient was counseled regarding diagnostic results,-- instructions for management,-- risk factor reductions,-- prognosis,-- patient and family education,-- impressions,-- risks and benefits of treatment options,-- importance of compliance with treatment  total time of encounter was 42 minutes-- and-- 30 minutes was spent counseling  Chief Complaint  Patient is here today for Cardiac consult  Patient c/o SOb with exertion and 2x fainting spells  EKG today  History of Present Illness  Cardiology HPI Free Text Note Form [facility]: I had the pleasure of seeing Nitesh Timmons for further evaluation of shortness of breath  He is a pleasant 63-year-old gentleman with a history of hypertension, mild dyslipidemia, peripheral vascular disease-right superficial femoral artery stenosis for which he is being medically managed for questionable claudication symptoms  He also has a 50 pack year smoking history-quit about 7 years ago  He presents for further evaluation of increasing dyspnea with exertion for the last few months, mostly succeeding his lumbar spine surgery  Functional capacity has slowly been improving after HR the gym but not to his satisfaction  ECG demonstrates normal sinus rhythm   A recent exercise stress test showed markedly decreased functional capacity - 1 minutes on the Joo protocol only -limited by shortness of breath and leg fatigue  Review of Systems      Cardiac: no chest pain,-- no rhythm problems,-- no fainting/blackouts,-- no heart murmur present,-- no signs of swelling-- and-- no palpitations present  Skin: No complaints of nonhealing sores or skin rash  Genitourinary: No complaints of recurrent urinary tract infections, frequent urination at night, difficult urination, blood in urine, kidney stones, loss of bladder control, no kidney or prostate problems, no erectile dysfunction  Psychological: No complaints of feeling depressed, anxiety, panic attacks, or difficulty concentrating  General: No complaints of trouble sleeping, lack of energy, fatigue, appetite changes, weight changes, fever, frequent infections, or night sweats  Respiratory: shortness of breath, but-- no cough/sputum,-- no wheezing,-- no phlegm-- and-- no hemoptysis  HEENT: No complaints of serious problems, hearing problems, nose problems, throat problems, or snoring  Gastrointestinal: No complaints of liver problems, nausea, vomiting, heartburn, constipation, bloody stools, diarrhea, problems swallowing, adbominal pain, or rectal bleeding  Hematologic: No complaints of bleeding disorders, anemia, blood clots, or excessive brusing  Neurological: No complaints of numbness, tingling, dizziness, weakness, seizures, headaches, syncope or fainting, AM fatigue, daytime sleepiness, no witnessed apnea episodes  Musculoskeletal: arthritis-- and-- back pain, but-- no swelling/pain     ROS reviewed  Active Problems  1  Abdominal pain (789 00) (R10 9)   2  Allergic rhinitis (477 9) (J30 9)   3  Anemia, mild (285 9) (D64 9)   4  Benign non-nodular prostatic hyperplasia with lower urinary tract symptoms (600 91)   (N40 1)   5  Degenerative lumbar spinal stenosis (724 02) (M48 061)   6  Diabetes mellitus screening (V77 1) (Z13 1)   7  Diarrhea (787 91) (R19 7)   8  Dyspnea on exertion (786 09) (R06 09)   9   Elevated BP without diagnosis of hypertension (796 2) (R03 0)   10  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)   11  GERD (gastroesophageal reflux disease) (530 81) (K21 9)   12  GERD without esophagitis (530 81) (K21 9)   13  History of Hand paresthesia (782 0) (R20 2)   14  History of dysuria (V13 00) (Z87 898)   15  History of urinary frequency (V13 09) (Z87 898)   16  Leg cramps (729 82) (R25 2)   17  Lipid screening (V77 91) (Z13 220)   18  Medicare annual wellness visit, subsequent (V70 0) (Z00 00)   19  Muscle spasticity (728 85) (M62 838)   20  Need for influenza vaccination (V04 81) (Z23)   21  Need for vaccination with 13-polyvalent pneumococcal conjugate vaccine (V03 82) (Z23)   22  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) (L30 0)   23  Peripheral arterial disease (443 9) (I73 9)   24  S/P lumbar spinal fusion (V45 4) (Z98 1)   25  Screen for colon cancer (V76 51) (Z12 11)   26  Screening for depression (V79 0) (Z13 89)   27  Screening for genitourinary condition (V81 6) (Z13 89)   28  Screening for neurological condition (V80 09) (Z13 89)   29  Spondylolisthesis of lumbar region (738 4) (M43 16)   30  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 3) (M47 816)   31  History of Testicular pain, right (608 9) (N50 811)   32   Trouble swallowing (787 20) (R13 10)    Past Medical History   · History of Abnormal liver function test (790 6) (R79 89)   · History of Acute maxillary sinusitis, recurrence not specified (461 0) (J01 00)   · History of Acute non-recurrent frontal sinusitis (461 1) (J01 10)   · Acute sinusitis (461 9) (J01 90)   · History of Acute upper respiratory infection (465 9) (J06 9)   · Anemia, mild (285 9) (D64 9)   · History of Cellulitis of trunk, unspecified site of trunk (682 2) (L03 319)   · History of Change in bowel habits (787 99) (R19 4)   · Diabetes mellitus screening (V77 1) (Z13 1)   · Dyspnea on exertion (786 09) (R06 09)   · 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)   · History of Greater trochanteric bursitis, right (726 5) (M70 61)   · History of Hand paresthesia (782 0) (R20 2)   · History of Hip pain, right (719 45) (M25 551)   · History of acute sinusitis (V12 69) (Z87 09)   · History of dysuria (V13 00) (S08 348)   · History of inflammation of sacroiliac joint (V13 4) (Z87 39)   · History of low back pain (V13 59) (Z87 39)   · History of tinea corporis (V12 09) (Z86 19)   · History of urinary frequency (V13 09) (N66 323)   · History of urticaria (V13 3) (Z87 2)   · Leg cramps (729 82) (R25 2)   · Lipid screening (V77 91) (Z13 220)   · Muscle spasticity (728 85) (E44 080)   · Need for vaccination with 13-polyvalent pneumococcal conjugate vaccine (V03 82) (Z23)   · Peripheral arterial disease (443 9) (I73 9)   · History of Preoperative examination (V72 84) (Z01 818)   · History of Testicular pain, right (608 9) (N50 811)    The active problems and past medical history were reviewed and updated today  Surgical History   · History of Back Surgery   · History of Septoplasty   · History of Tonsillectomy    The surgical history was reviewed and updated today         Family History  Mother    · Denied: Family history of Crohn's disease without complication, unspecified  gastrointestinal tract location   · Denied: Family history of colon cancer   · Family history of emphysema (V17 6) (Z82 5)   · Denied: Family history of liver disease  Father    · Denied: Family history of Crohn's disease without complication, unspecified  gastrointestinal tract location   · Family history of cerebrovascular accident (V17 1) (Z82 3)   · Denied: Family history of colon cancer   · Family history of coronary artery disease (V17 3) (Z82 49)   · Family history of hypertension (V17 49) (Z82 49)   · Denied: Family history of liver disease  Brother    · Family history of coronary artery disease (V17 3) (Z82 49)  Family History    · Family history of Back problem   · Family history of Cerebrovascular accident (CVA), unspecified   · Family history of emphysema (V17 6) (Z82 5)   · Family history of Hypertension, benign  Family History Reviewed: The family history was reviewed and updated today  Social History   · Alcohol use (V49 89) (Z78 9)   · Daily Coffee Consumption (___ Cups/Day)   · Education Level: Some college   · Former smoker (G33 09) (J21 973)   · No drug use   · Occupation   · Retired   [email or web address]   · Social alcohol use (Z78 9)   ·   The social history was reviewed and updated today  The social history was reviewed and is unchanged  Current Meds   1  Align Oral Capsule; USE AS DIRECTED; Therapy: 56FDH5831 to (Last GR:84XJB0681)  Requested for: 26Jun2017 Ordered   2  Aspirin 81 MG CAPS; Therapy: (Recorded:05Oct2017) to Recorded   3  Atorvastatin Calcium 20 MG Oral Tablet; TAKE 1 TABLET AT BEDTIME; Therapy: 50TJW9995 to (Evaluate:76Ado9307)  Requested for: 21OFJ4624; Last   Rx:05Oct2017 Ordered   4  Daily Multiple Vitamins TABS; Therapy: (Aysha Dash) to Recorded   5  Probiotic CAPS; Therapy: (HQGEARTK:01FZD6690) to Recorded   6  Suprep Bowel Prep Kit 17 5-3 13-1 6 GM/180ML Oral Solution; USE AS DIRECTED; Therapy: 74NEA0094 to (Last Rx:19Oct2017)  Requested for: 19Oct2017 Ordered   7  Vitamin B Complex CAPS; Therapy: (Recorded:05Oct2017) to Recorded   8  Vitamin C CHEW;   Therapy: (SLNAIHCX:46QYL3773) to Recorded   9  Vitamin D 1000 UNIT CAPS; Therapy: (Recorded:05Oct2017) to Recorded    The medication list was reviewed and updated today  Allergies  1  Penicillins    Vitals  Signs   Systolic: 588  Diastolic: 80  Heart Rate: 90, Apical  Systolic: 646, LUE, Sitting  Diastolic: 66, LUE, Sitting  Height: 5 ft 7 in  Weight: 173 lb 5 oz  BMI Calculated: 27 14  BSA Calculated: 1 9  O2 Saturation: 97    Physical Exam    Constitutional   General appearance: No acute distress, well appearing and well nourished     Eyes   Conjunctiva and Sclera examination: Conjunctiva pink, sclera anicteric  Ears, Nose, Mouth, and Throat - Oropharynx: Clear, nares are clear, mucous membranes are moist    Neck   Neck and thyroid: Normal, supple, trachea midline, no thyromegaly  Pulmonary   Respiratory effort: No increased work of breathing or signs of respiratory distress  Auscultation of lungs: Clear to auscultation, no rales, no rhonchi, no wheezing, good air movement  Cardiovascular   Auscultation of heart: Normal rate and rhythm, normal S1 and S2, no murmurs  Carotid pulses: Normal, 2+ bilaterally  Peripheral vascular exam: Normal pulses throughout, no tenderness, erythema or swelling  Pedal pulses: Normal, 2+ bilaterally  Examination of extremities for edema and/or varicosities: Normal     Abdomen   Abdomen: Non-tender and no distention  Liver and spleen: No hepatomegaly or splenomegaly  Musculoskeletal Gait and station: Normal gait  -- Digits and nails: Normal without clubbing or cyanosis  -- Inspection/palpation of joints, bones, and muscles: Normal, ROM normal     Skin - Skin and subcutaneous tissue: Normal without rashes or lesions  Skin is warm and well perfused, normal turgor  Neurologic - Cranial nerves: II - XII intact  -- Speech: Normal     Psychiatric - Orientation to person, place, and time: Normal -- Mood and affect: Normal       Results/Data  A 12 lead ECG was performed and was normal    Rhythm and rate:  normal sinus rhythm  Future Appointments    Date/Time Provider Specialty Site   03/13/2018 10:00 AM Teresa Speaker, [address] [facility]   11/15/2017 01:30 PM [facility]  [facility]     End of Encounter Meds  1  Suprep Bowel Prep Kit 17 5-3 13-1 6 GM/180ML Oral Solution; USE AS DIRECTED; Therapy: 97VEW7317 to (Last Rx:19Oct2017)  Requested for: 19Oct2017 Ordered  2  Atorvastatin Calcium 20 MG Oral Tablet; TAKE 1 TABLET AT BEDTIME;    Therapy: 72YRH1364 to (Evaluate:42Ffd1562) Requested for: 60ZZI3489; Last   Rx:05Oct2017 Ordered  3  Align Oral Capsule; USE AS DIRECTED; Therapy: 30NWQ1234 to (Last HK:45MMI8665)  Requested for: 26Jun2017 Ordered  4  Daily Multiple Vitamins TABS; Therapy: (Lilia Eliza) to Recorded  5  Aspirin 81 MG CAPS; Therapy: (Recorded:05Oct2017) to Recorded   6  Probiotic CAPS; Therapy: (NJVHHTBW:78AZG5628) to Recorded   7  Vitamin B Complex CAPS; Therapy: (Recorded:05Oct2017) to Recorded   8  Vitamin C CHEW;   Therapy: (MOPERQUH:59AYI7538) to Recorded   9  Vitamin D 1000 UNIT CAPS; Therapy: ([phone] 15) to Recorded    Signatures   Electronically signed by :  LOS Dong ; Oct 27 2017  4:06PM EST                       (Author)

## 2017-11-15 ENCOUNTER — HOSPITAL ENCOUNTER (OUTPATIENT)
Facility: AMBULARY SURGERY CENTER | Age: 77
Setting detail: OUTPATIENT SURGERY
Discharge: HOME | End: 2017-11-15
Attending: INTERNAL MEDICINE | Admitting: INTERNAL MEDICINE
Payer: MEDICARE

## 2017-11-15 ENCOUNTER — GENERIC CONVERSION - ENCOUNTER (OUTPATIENT)
Dept: OTHER | Facility: OTHER | Age: 77
End: 2017-11-15

## 2017-11-15 ENCOUNTER — ANESTHESIA (OUTPATIENT)
Dept: PERIOP | Facility: AMBULARY SURGERY CENTER | Age: 77
End: 2017-11-15
Payer: MEDICARE

## 2017-11-15 ENCOUNTER — GENERIC CONVERSION - ENCOUNTER (OUTPATIENT)
Dept: GASTROENTEROLOGY | Facility: CLINIC | Age: 77
End: 2017-11-15

## 2017-11-15 VITALS
HEIGHT: 67 IN | TEMPERATURE: 97.6 F | WEIGHT: 166 LBS | HEART RATE: 87 BPM | SYSTOLIC BLOOD PRESSURE: 115 MMHG | DIASTOLIC BLOOD PRESSURE: 58 MMHG | OXYGEN SATURATION: 94 % | BODY MASS INDEX: 26.06 KG/M2 | RESPIRATION RATE: 18 BRPM

## 2017-11-15 DIAGNOSIS — K21.9 GERD (GASTROESOPHAGEAL REFLUX DISEASE): ICD-10-CM

## 2017-11-15 DIAGNOSIS — D64.9 ANEMIA, MILD: ICD-10-CM

## 2017-11-15 DIAGNOSIS — R19.7 DIARRHEA: ICD-10-CM

## 2017-11-15 PROCEDURE — 88342 IMHCHEM/IMCYTCHM 1ST ANTB: CPT | Performed by: INTERNAL MEDICINE

## 2017-11-15 PROCEDURE — 88305 TISSUE EXAM BY PATHOLOGIST: CPT | Performed by: INTERNAL MEDICINE

## 2017-11-15 RX ORDER — ONDANSETRON 2 MG/ML
4 INJECTION INTRAMUSCULAR; INTRAVENOUS ONCE AS NEEDED
Status: CANCELLED | OUTPATIENT
Start: 2017-11-15

## 2017-11-15 RX ORDER — MULTIVITAMIN
1 CAPSULE ORAL DAILY
COMMUNITY

## 2017-11-15 RX ORDER — ATORVASTATIN CALCIUM 20 MG/1
20 TABLET, FILM COATED ORAL DAILY
COMMUNITY
End: 2017-12-20

## 2017-11-15 RX ORDER — PROPOFOL 10 MG/ML
INJECTION, EMULSION INTRAVENOUS AS NEEDED
Status: DISCONTINUED | OUTPATIENT
Start: 2017-11-15 | End: 2017-11-15 | Stop reason: SURG

## 2017-11-15 RX ORDER — ASPIRIN 81 MG/1
81 TABLET ORAL DAILY
COMMUNITY
End: 2018-01-23 | Stop reason: HOSPADM

## 2017-11-15 RX ORDER — MELATONIN
2000 DAILY
COMMUNITY

## 2017-11-15 RX ORDER — MEPERIDINE HYDROCHLORIDE 25 MG/ML
12.5 INJECTION INTRAMUSCULAR; INTRAVENOUS; SUBCUTANEOUS AS NEEDED
Status: CANCELLED | OUTPATIENT
Start: 2017-11-15

## 2017-11-15 RX ORDER — SODIUM CHLORIDE, SODIUM LACTATE, POTASSIUM CHLORIDE, CALCIUM CHLORIDE 600; 310; 30; 20 MG/100ML; MG/100ML; MG/100ML; MG/100ML
125 INJECTION, SOLUTION INTRAVENOUS CONTINUOUS
Status: DISCONTINUED | OUTPATIENT
Start: 2017-11-15 | End: 2017-11-15 | Stop reason: HOSPADM

## 2017-11-15 RX ORDER — VITAMIN B COMPLEX
1 CAPSULE ORAL DAILY
COMMUNITY
End: 2018-01-16 | Stop reason: ALTCHOICE

## 2017-11-15 RX ORDER — ALBUTEROL SULFATE 2.5 MG/3ML
2.5 SOLUTION RESPIRATORY (INHALATION) ONCE AS NEEDED
Status: CANCELLED | OUTPATIENT
Start: 2017-11-15

## 2017-11-15 RX ADMIN — PROPOFOL 50 MG: 10 INJECTION, EMULSION INTRAVENOUS at 10:32

## 2017-11-15 RX ADMIN — PROPOFOL 100 MG: 10 INJECTION, EMULSION INTRAVENOUS at 10:17

## 2017-11-15 RX ADMIN — SODIUM CHLORIDE, SODIUM LACTATE, POTASSIUM CHLORIDE, AND CALCIUM CHLORIDE: .6; .31; .03; .02 INJECTION, SOLUTION INTRAVENOUS at 10:10

## 2017-11-15 RX ADMIN — PROPOFOL 50 MG: 10 INJECTION, EMULSION INTRAVENOUS at 10:27

## 2017-11-15 RX ADMIN — PROPOFOL 50 MG: 10 INJECTION, EMULSION INTRAVENOUS at 10:24

## 2017-11-15 RX ADMIN — PROPOFOL 50 MG: 10 INJECTION, EMULSION INTRAVENOUS at 10:36

## 2017-11-15 RX ADMIN — PROPOFOL 50 MG: 10 INJECTION, EMULSION INTRAVENOUS at 10:20

## 2017-11-15 NOTE — OP NOTE
COLONOSCOPY    PROCEDURE: Colonoscopy/ Biopsy    INDICATIONS: Anemia, Iron Deficiency, Diarrhea, Chronic    POST-OP DIAGNOSIS: See the impression below    SEDATION: Monitored anesthesia care, check anesthesia records    PHYSICAL EXAM:    /63   Pulse 81   Temp (!) 96 7 °F (35 9 °C) (Temporal)   Resp 20   Ht 5' 7" (1 702 m)   Wt 75 3 kg (166 lb)   SpO2 100%   BMI 26 00 kg/m²   Body mass index is 26 kg/m²  General: NAD  Heart: S1 & S2 normal, RRR  Lungs: CTA, No rales or rhonchi  Abdomen: Soft, nontender, nondistended, good bowel sounds    CONSENT:  Informed consent was obtained for the procedure, including sedation after explaining the risks and benefits of the procedure  Risks including but not limited to bleeding, perforation, infection, aspiration were discussed in detail  Also explained about less than 100%$ sensitivity with the exam and other alternatives  PREPARATION:   EKG tracing, pulse oximetry, blood pressure were monitored throughout the procedure  Patient was identified by myself both verbally and by visual inspection of ID band  DESCRIPTION:   Patient was placed in the left lateral decubitus position and was sedated with the above medication  Digital rectal examination was performed  The colonoscope was introduced in to the anal canal and advanced up to terminal ileum  A careful inspection was made as the colonoscope was withdrawn, including a retroflexed view of the rectum; findings and interventions are described below  Appropriate photodocumentation was obtained  The quality of the colonic preparation was fair      FINDINGS:    Normal terminal ileum  Normal colonoscopy with a fair prep, no large polyps or lesions was seen, no obvious inflammatory changes  Descending and sigmoid diverticulosis  Random biopsies taken  Moderate internal hemorrhoids on retroflexion         IMPRESSIONS:      Normal examination to the terminal ileum  Random biopsies taken  Diverticulosis  Internal external hemorrhoids    RECOMMENDATIONS:    Discharge home  Resume regular diet  Resume home medications  Follow up biopsy results  Call with any abdominal pain, bleeding, fevers  Repeat colonoscopy as clinically indicated    COMPLICATIONS:  None; patient tolerated the procedure well      DISPOSITION: PACU           CONDITION: Stable

## 2017-11-15 NOTE — H&P
History and Physical - [facility]  Leatha Altman 68 y o  male MRN: [id number]    HPI: Leatha Altman is a 68y o  year old male who presents with anemia, diarrhea, and dysphagia  Review of Systems    Historical Information   Past Medical History:   Diagnosis Date    Arthritis     Chronic pain     back    Hyperlipidemia      Past Surgical History:   Procedure Laterality Date    BACK SURGERY      COSMETIC SURGERY      injury to the nose/ fracture with surgical repair    [place] ARTHRODESIS POSTERIOR/POSTEROLATERAL LUMBAR N/A 11/3/2016    Procedure: L2-S1 POSTERIOR LUMBAR FUSION AND DECOMPRESSION (Impulse), Posterior lateral fixation; dural repair ;  Surgeon: Kayla Tong MD;  Location: [facility];  Service: Orthopedics    TONSILECTOMY AND ADNOIDECTOMY      TONSILLECTOMY       Social History   History   Alcohol Use    Yes     Comment: beer, wine, scotch every other day     History   Drug Use No     History   Smoking Status    Former Smoker    Quit date: 2011   Smokeless Tobacco    Never Used     History reviewed  No pertinent family history  Meds/Allergies     Prescriptions Prior to Admission   Medication    Ascorbic Acid (VITAMIN C) 1000 MG tablet    aspirin (ECOTRIN LOW STRENGTH) 81 mg EC tablet    atorvastatin (LIPITOR) 20 mg tablet    b complex vitamins capsule    cholecalciferol (VITAMIN D3) 1,000 units tablet    Multiple Vitamin (MULTIVITAMIN) capsule    Probiotic Product (ALIGN PO)    mometasone (ELOCON) 0 1 % cream       Allergies   Allergen Reactions    Penicillins Other (See Comments)     Hallucinations; Patient reported that he was seeing visual disturbances         Objective     Blood pressure 150/73, pulse 101, temperature (!) 96 7 °F (35 9 °C), temperature source Temporal, resp  rate 18, height 5' 7" (1 702 m), weight 75 3 kg (166 lb), SpO2 100 %        PHYSICAL EXAM    Gen: NAD  CV: RRR  CHEST: Clear  ABD: soft, NT/ND  EXT: no edema  Neuro: AAO      ASSESSMENT/PLAN:  This is a 68y o  year old male here for anemia, diarrhea, and dysphagia       PLAN:   Procedure: egd/colonosocpy

## 2017-11-15 NOTE — ANESTHESIA POSTPROCEDURE EVALUATION
Post-Op Assessment Note      CV Status:  Stable    Mental Status:  Alert and awake    Hydration Status:  Stable and euvolemic    PONV Controlled:  Controlled    Airway Patency:  Patent and adequate    Post Op Vitals Reviewed: Yes          Staff: CRNA           /63 (11/15/17 1038)    Temp     Pulse 81 (11/15/17 1038)   Resp 20 (11/15/17 1038)    SpO2 100 % (11/15/17 1038)

## 2017-11-15 NOTE — DISCHARGE INSTRUCTIONS
Discharge home  Resume regular diet  Resume home medications  Avoid aspirin and NSAIDs  Daily PPI therapy  Follow up biopsy results  Call with any abdominal pain, bleeding, fevers

## 2017-11-15 NOTE — ANESTHESIA PREPROCEDURE EVALUATION
Review of Systems/Medical History  Patient summary reviewed        Cardiovascular  Negative cardio ROS Hyperlipidemia,    Pulmonary  Negative pulmonary ROS ,        GI/Hepatic  Negative GI/hepatic ROS          Negative  ROS        Endo/Other  Negative endo/other ROS      GYN  Negative gynecology ROS          Hematology  Negative hematology ROS      Musculoskeletal  Negative musculoskeletal ROS        Neurology  Negative neurology ROS      Psychology   Negative psychology ROS            Physical Exam    Airway    Mallampati score: II  TM Distance: >3 FB  Neck ROM: full     Dental   No notable dental hx     Cardiovascular  Comment: Negative ROS,     Pulmonary      Other Findings        Anesthesia Plan  ASA Score- 2       Anesthesia Type- IV sedation with anesthesia with ASA Monitors  Additional Monitors:   Airway Plan:     Comment: I have seen the patient and reviewed the history  Patient to receive IV sedation with full ASA monitors  Risks discussed with the patient, consent signed          Induction- intravenous  Informed Consent- Anesthetic plan and risks discussed with patient  I personally reviewed this patient with the CRNA  Discussed and agreed on the Anesthesia Plan with the CRNA  Anthony Rausch

## 2017-11-15 NOTE — OP NOTE
ESOPHAGOGASTRODUODENOSCOPY    PROCEDURE: EGD/ Biopsy    INDICATIONS: Diarrhea and GERD    POST-OP DIAGNOSIS: See the impression below    SEDATION: Monitored anesthesia care, check anesthesia records    PHYSICAL EXAM:    Vitals:    11/15/17 1038   BP: 112/63   Pulse: 81   Resp: 20   Temp:    SpO2: 100%    Body mass index is 26 kg/m²  General: NAD  Heart: S1 & S2 normal, RRR  Lungs: CTA, No rales or rhonchi  Abdomen: Soft, nontender, nondistended, good bowel sounds    CONSENT:  Informed consent was obtained for the procedure, including sedation after explaining the risks and benefits of the procedure  Risks including but not limited to bleeding, perforation, infection, aspiration were discussed in detail  Also explained about less than 100% sensitivity with the exam and other alternatives  PREPARATION:   EKG tracing, pulse oximetry, blood pressure were monitored throughout the procedure  Patient was identified by myself both verbally and by visual inspection of ID band  DESCRIPTION:   Patient was placed in the left lateral decubitus position and was sedated with the above medication  The gastroscope was introduced in to the oropharynx and the esophagus was intubated under direct visualization  Scope was passed down the esophagus up to 2nd part of the duodenum  A careful inspection was made as the gastroscope was withdrawn, including a retroflexed view of the stomach; findings and interventions are described below  FINDINGS:    #1  Esophagus and GEJ-mild grade a esophagitis    #2  Stomach-mild gastritis, biopsies taken  Normal retroflexion  There was some bleeding after biopsy, 1 clip was placed with good hemostasis    #3   Duodenum-moderate duodenitis with several duodenal ulcers in the bulb  Normal 2nd portion of the duodenum         IMPRESSIONS:      Duodenitis with duodenal ulcers  Gastritis, biopsied  Normal retroflexion  Mild esophagitis otherwise normal examination    RECOMMENDATIONS: Discharge home  Resume regular diet  Resume home medications  Avoid aspirin and NSAIDs  Daily PPI therapy  Follow up biopsy results  Call with any abdominal pain, bleeding, fevers      COMPLICATIONS:  None; patient tolerated the procedure well            DISPOSITION: PACU           CONDITION: Stable

## 2017-11-20 DIAGNOSIS — R09.89 OTHER SPECIFIED SYMPTOMS AND SIGNS INVOLVING THE CIRCULATORY AND RESPIRATORY SYSTEMS: ICD-10-CM

## 2017-11-20 DIAGNOSIS — D64.9 ANEMIA: ICD-10-CM

## 2017-11-21 ENCOUNTER — APPOINTMENT (OUTPATIENT)
Dept: LAB | Facility: CLINIC | Age: 77
End: 2017-11-21
Payer: MEDICARE

## 2017-11-21 DIAGNOSIS — D64.9 ANEMIA: ICD-10-CM

## 2017-11-21 LAB
BASOPHILS # BLD AUTO: 0.03 THOUSANDS/ΜL (ref 0–0.1)
BASOPHILS NFR BLD AUTO: 1 % (ref 0–1)
EOSINOPHIL # BLD AUTO: 0.26 THOUSAND/ΜL (ref 0–0.61)
EOSINOPHIL NFR BLD AUTO: 5 % (ref 0–6)
ERYTHROCYTE [DISTWIDTH] IN BLOOD BY AUTOMATED COUNT: 13.8 % (ref 11.6–15.1)
FERRITIN SERPL-MCNC: 312 NG/ML (ref 8–388)
FOLATE SERPL-MCNC: >20 NG/ML (ref 3.1–17.5)
HCT VFR BLD AUTO: 34.2 % (ref 36.5–49.3)
HGB BLD-MCNC: 11.2 G/DL (ref 12–17)
HGB RETIC QN AUTO: 35.3 PG (ref 30–38.3)
IMM RETICS NFR: 8.4 % (ref 0–14)
IRON SATN MFR SERPL: 24 %
IRON SERPL-MCNC: 65 UG/DL (ref 65–175)
LYMPHOCYTES # BLD AUTO: 2 THOUSANDS/ΜL (ref 0.6–4.47)
LYMPHOCYTES NFR BLD AUTO: 40 % (ref 14–44)
MCH RBC QN AUTO: 32.1 PG (ref 26.8–34.3)
MCHC RBC AUTO-ENTMCNC: 32.7 G/DL (ref 31.4–37.4)
MCV RBC AUTO: 98 FL (ref 82–98)
MONOCYTES # BLD AUTO: 0.45 THOUSAND/ΜL (ref 0.17–1.22)
MONOCYTES NFR BLD AUTO: 9 % (ref 4–12)
NEUTROPHILS # BLD AUTO: 2.27 THOUSANDS/ΜL (ref 1.85–7.62)
NEUTS SEG NFR BLD AUTO: 45 % (ref 43–75)
PLATELET # BLD AUTO: 178 THOUSANDS/UL (ref 149–390)
PMV BLD AUTO: 10.4 FL (ref 8.9–12.7)
RBC # BLD AUTO: 3.49 MILLION/UL (ref 3.88–5.62)
RETICS # AUTO: NORMAL 10*3/UL (ref 14356–105094)
RETICS # CALC: 0.85 % (ref 0.37–1.87)
TIBC SERPL-MCNC: 266 UG/DL (ref 250–450)
VIT B12 SERPL-MCNC: 695 PG/ML (ref 100–900)
WBC # BLD AUTO: 5.01 THOUSAND/UL (ref 4.31–10.16)

## 2017-11-21 PROCEDURE — 85046 RETICYTE/HGB CONCENTRATE: CPT

## 2017-11-21 PROCEDURE — 82746 ASSAY OF FOLIC ACID SERUM: CPT

## 2017-11-21 PROCEDURE — 83550 IRON BINDING TEST: CPT

## 2017-11-21 PROCEDURE — 82607 VITAMIN B-12: CPT

## 2017-11-21 PROCEDURE — 82728 ASSAY OF FERRITIN: CPT

## 2017-11-21 PROCEDURE — 85025 COMPLETE CBC W/AUTO DIFF WBC: CPT

## 2017-11-21 PROCEDURE — 83540 ASSAY OF IRON: CPT

## 2017-11-21 PROCEDURE — 36415 COLL VENOUS BLD VENIPUNCTURE: CPT

## 2017-11-24 ENCOUNTER — LAB CONVERSION - ENCOUNTER (OUTPATIENT)
Dept: OTHER | Facility: OTHER | Age: 77
End: 2017-11-24

## 2017-11-26 ENCOUNTER — GENERIC CONVERSION - ENCOUNTER (OUTPATIENT)
Dept: OTHER | Facility: OTHER | Age: 77
End: 2017-11-26

## 2017-12-01 ENCOUNTER — HOSPITAL ENCOUNTER (OUTPATIENT)
Dept: NON INVASIVE DIAGNOSTICS | Facility: CLINIC | Age: 77
Discharge: HOME | End: 2017-12-01
Payer: MEDICARE

## 2017-12-01 ENCOUNTER — GENERIC CONVERSION - ENCOUNTER (OUTPATIENT)
Dept: CARDIOLOGY CLINIC | Facility: HOSPITAL | Age: 77
End: 2017-12-01

## 2017-12-01 DIAGNOSIS — R06.02 SHORTNESS OF BREATH: ICD-10-CM

## 2017-12-01 DIAGNOSIS — I73.9 PERIPHERAL VASCULAR DISEASE (HCC): ICD-10-CM

## 2017-12-01 PROCEDURE — 93017 CV STRESS TEST TRACING ONLY: CPT

## 2017-12-01 PROCEDURE — A9502 TC99M TETROFOSMIN: HCPCS

## 2017-12-01 PROCEDURE — 78452 HT MUSCLE IMAGE SPECT MULT: CPT

## 2017-12-01 RX ADMIN — REGADENOSON 0.4 MG: 0.08 INJECTION, SOLUTION INTRAVENOUS at 13:01

## 2017-12-04 LAB
CHEST PAIN STATEMENT: NORMAL
ECG INTERP DURING EX: NORMAL
MAX DIASTOLIC BP: 92 MMHG
MAX HEART RATE: 133 BPM
MAX PREDICTED HEART RATE: 143 BPM
MAX. SYSTOLIC BP: 178 MMHG
PROTOCOL NAME: NORMAL
REASON FOR TERMINATION: NORMAL
TARGET HR FORMULA: NORMAL
TEST INDICATION: NORMAL
TIME IN EXERCISE PHASE: NORMAL

## 2017-12-05 ENCOUNTER — GENERIC CONVERSION - ENCOUNTER (OUTPATIENT)
Dept: OTHER | Facility: OTHER | Age: 77
End: 2017-12-05

## 2017-12-12 ENCOUNTER — ALLSCRIPTS OFFICE VISIT (OUTPATIENT)
Dept: OTHER | Facility: OTHER | Age: 77
End: 2017-12-12

## 2017-12-13 ENCOUNTER — APPOINTMENT (OUTPATIENT)
Dept: RADIOLOGY | Facility: CLINIC | Age: 77
End: 2017-12-13
Payer: MEDICARE

## 2017-12-13 DIAGNOSIS — R09.89 OTHER SPECIFIED SYMPTOMS AND SIGNS INVOLVING THE CIRCULATORY AND RESPIRATORY SYSTEMS: ICD-10-CM

## 2017-12-13 PROCEDURE — 71020 HB CHEST X-RAY 2VW FRONTAL&LATL: CPT

## 2017-12-13 NOTE — PROGRESS NOTES
Assessment    1  Acute upper respiratory infection (465 9) (J06 9)   2  Lung crackles (786 7) (R09 89)    Plan  Acute upper respiratory infection    · Call [phone] if: The cough is not gone in 10 days ; Status:Complete;   Done:19Enl9960   · Call [phone] if: The symptoms are not better in 7 days ; Status:Complete;   Done:16Zpx2289   · Call [phone] if: The symptoms seem worse ; Status:Complete;   Done:17Oco1284   · Call [phone] if: You have pain in your ear ; Status:Complete;   Done: 69ZCE8643   · Call [phone] if: Your temperature is higher than 101F ; Status:Complete;   Done:96Rvz7731   · Seek Immediate Medical Attention if: After using a fever reducing medication for 24hours the temperature is still higher than 103 5 degrees Fahrenheit ; Status:Complete;  Done: 82TWB8128   · Seek Immediate Medical Attention if: Breathing starts to have a wheeze or whistlingsound ; Status:Complete;   Done: 08WRD4768   · Seek Immediate Medical Attention if: You have pain in the chest that gets worse withdeep breathing or coughing ; Status:Complete;   Done: 92TSG0512   · Avoid exposure to cigarette smoke ; Status:Complete;   Done: 21TJE4308   · Drink at least 6 glasses of water or juice a day ; Status:Complete;   Done: 36CYE0539   · Good hand washing is one of the best ways to control the spread of germs  ;Status:Complete;   Done: 81GDS9251   · Use a cough medicine to help you get adequate rest ; Status:Complete;   Done:14Xkl5594  Lung crackles    · * XR CHEST PA & LATERAL; Status:Active; Requested for:44Ewh9625;     Discussion/Summary    treatment advised: rest, fluids, OTC expectorant, saline nasal spray, steroid nasal spray  Given faint RLL crackles on exam + some increase in subjective dyspnea, will obtain CXR r/o early PNA  Antibiotic if needed, pending results  The treatment plan was reviewed with the patient/guardian   The patient/guardian understands and agrees with the treatment plan Chief Complaint  Pt here c/o cough and chest congestion      History of Present Illness  HPI: x 1 week  Nasal/head congestion, rhinorrhea, mildly productive cough  Mild sweats/chills at times, no fever  No sore throat  Ears feel congested  Somewhat more winded than normal  No wheezing, CP  Normal appetite  No N/V/D  Grandson recently sick with similar  Had flu shot + pneumonia vaccine  Taking [place] Plus  Review of Systems   Constitutional: as noted in HPI   ENT: as noted in HPI  Cardiovascular: as noted in HPI  Respiratory: as noted in HPI  Gastrointestinal: as noted in HPI  Musculoskeletal: as noted in HPI  Neurological: as noted in HPI  Active Problems    1  Abdominal pain (789 00) (R10 9)   2  Abnormal stress test (794 39) (R94 39)   3  Allergic rhinitis (477 9) (J30 9)   4  Anemia, mild (285 9) (D64 9)   5  Benign non-nodular prostatic hyperplasia with lower urinary tract symptoms (600 91) (N40 1)   6  Degenerative lumbar spinal stenosis (724 02) (M48 061)   7  Diabetes mellitus screening (V77 1) (Z13 1)   8  Diarrhea (787 91) (R19 7)   9  Dyspnea on exertion (786 09) (R06 09)   10  Elevated BP without diagnosis of hypertension (796 2) (R03 0)   11  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)   12  GERD (gastroesophageal reflux disease) (530 81) (K21 9)   13  GERD without esophagitis (530 81) (K21 9)   14  History of Hand paresthesia (782 0) (R20 2)   15  History of dysuria (V13 00) (Z87 898)   16  History of urinary frequency (V13 09) (Z87 898)   17  Hyperlipidemia (272 4) (E78 5)   18  Leg cramps (729 82) (R25 2)   19  Lipid screening (V77 91) (Z13 220)   20  Medicare annual wellness visit, subsequent (V70 0) (Z00 00)   21  Muscle spasticity (728 85) (M62 838)   22  Need for influenza vaccination (V04 81) (Z23)   23  Need for vaccination with 13-polyvalent pneumococcal conjugate vaccine (V03 82) (Z23)   24  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) (L30 0)   25   Peripheral arterial disease (443 9) (I73 9)   26  S/P lumbar spinal fusion (V45 4) (Z98 1)   27  Screen for colon cancer (V76 51) (Z12 11)   28  Screening for depression (V79 0) (Z13 89)   29  Screening for genitourinary condition (V81 6) (Z13 89)   30  Screening for neurological condition (V80 09) (Z13 89)   31  Shortness of breath on exertion (786 05) (R06 02)   32  Spondylolisthesis of lumbar region (738 4) (M43 16)   33  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 3) (M47 816)   34  History of Testicular pain, right (608 9) (N50 811)   35  Trouble swallowing (787 20) (R13 10)    Past Medical History    1  History of Abnormal liver function test (790 6) (R79 89)   2  History of Acute maxillary sinusitis, recurrence not specified (461 0) (J01 00)   3  History of Acute non-recurrent frontal sinusitis (461 1) (J01 10)   4  Acute sinusitis (461 9) (J01 90)   5  History of Acute upper respiratory infection (465 9) (J06 9)   6  Anemia, mild (285 9) (D64 9)   7  History of Cellulitis of trunk, unspecified site of trunk (682 2) (L03 319)   8  History of Change in bowel habits (787 99) (R19 4)   9  Diabetes mellitus screening (V77 1) (Z13 1)   10  Dyspnea on exertion (786 09) (R06 09)   11  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)   12  History of Greater trochanteric bursitis, right (726 5) (M70 61)   13  History of Hand paresthesia (782 0) (R20 2)   14  History of Hip pain, right (719 45) (M25 551)   15  History of acute sinusitis (V12 69) (Z87 09)   16  History of dysuria (V13 00) (Z87 898)   17  History of inflammation of sacroiliac joint (V13 4) (Z87 39)   18  History of low back pain (V13 59) (Z87 39)   19  History of tinea corporis (V12 09) (Z86 19)   20  History of urinary frequency (V13 09) (Z87 898)   21  History of urticaria (V13 3) (Z87 2)   22  Leg cramps (729 82) (R25 2)   23  Lipid screening (V77 91) (Z13 220)   24  Lung crackles (786 7) (R09 89)   25  Muscle spasticity (728 85) (M62 838)   26   Need for vaccination with 13-polyvalent pneumococcal conjugate vaccine (V03 82) (Z23)   27  Peripheral arterial disease (443 9) (I73 9)   28  History of Preoperative examination (V72 84) (Z01 818)   29  History of Testicular pain, right (608 9) (N50 811)  Active Problems And Past Medical History Reviewed: The active problems and past medical history were reviewed and updated today  Family History  Mother    1  Denied: Family history of Crohn's disease without complication, unspecified gastrointestinal tract location   2  Denied: Family history of colon cancer   3  Family history of emphysema (V17 6) (Z82 5)   4  Denied: Family history of liver disease  Father    11  Denied: Family history of Crohn's disease without complication, unspecified gastrointestinal tract location   6  Family history of cerebrovascular accident (V17 1) (Z82 3)   7  Denied: Family history of colon cancer   8  Family history of coronary artery disease (V17 3) (Z82 49)   9  Family history of hypertension (V17 49) (Z82 49)   10  Denied: Family history of liver disease  Brother    6  Family history of coronary artery disease (V17 3) (Z82 49)  Family History    12  Family history of Back problem   13  Family history of Cerebrovascular accident (CVA), unspecified   15  Family history of emphysema (V17 6) (Z82 5)   15  Family history of Hypertension, benign    Social History   · Alcohol use (V49 89) (Z78 9)   · Daily Coffee Consumption (___ Cups/Day)   · Education Level: Some college   · Former smoker (V15 82) (M52 289)   · No drug use   · Occupation   · Retired   [employer]   · Social alcohol use (Z78 9)   ·     Surgical History    1  History of Back Surgery   2  History of Septoplasty   3  History of Tonsillectomy    Current Meds   1  Aspirin 81 MG CAPS; Therapy: (Recorded:05Oct2017) to Recorded   2  Atorvastatin Calcium 20 MG Oral Tablet; TAKE 1 TABLET AT BEDTIME;  Therapy: 97IZT7209 to 076 2193)  Requested for: 29JQK3415; Last [phone] Ordered   3  Clopidogrel Bisulfate 300 MG Oral Tablet; TAKE 1 TABLET ONCE; Therapy: 63MGU8901 to (Evaluate:64Gsi2224)  Requested for: 12LIV0381; Last Rx:91Tmr8659 Ordered   4  Daily Multiple Vitamins TABS; Therapy: (Nikolai Goncalves) to Recorded   5  Omega 3 CAPS; Therapy: (Recorded:96Wjb8774) to Recorded   6  Pantoprazole Sodium 40 MG Oral Tablet Delayed Release; take 1 tablet 30 minutes before breakfast daily; Therapy: 92NOD4262 to (Evaluate:20Ebh6378)  Requested for: 66JAP0284; Last Rx:70Roy2181 Ordered   7  Probiotic CAPS; Therapy: (WDHSUONF:08FAM7233) to Recorded   8  Suprep Bowel Prep Kit 17 5-3 13-1 6 GM/180ML Oral Solution; USE AS DIRECTED; Therapy: 08PVD0914 to (Last Rx:93Vaa4575)  Requested for: 37Cet9171 Ordered   9  Vitamin B Complex CAPS; Therapy: (Recorded:38Kiw7544) to Recorded   10  Vitamin C CHEW;  Therapy: (Recorded:62Fhy5233) to Recorded   11  Vitamin D 1000 UNIT CAPS; Therapy: (Recorded:04Ejs7996) to Recorded    The medication list was reviewed and updated today  Allergies  1  Penicillins    Vitals   Recorded: 12Dec2017 04:33PM Recorded: 12Dec2017 03:53PM   Temperature  97 2 F, Tympanic   Heart Rate  98   Respiration 18    Systolic  528   Diastolic  82   Weight  740 lb 4 oz   BMI Calculated  27 45   BSA Calculated  1 91   O2 Saturation  98       Physical Exam   Constitutional  General appearance: No acute distress, well appearing and well nourished  -- Appears comfortable  Head and Face  Head and face: Normal    Eyes  Conjunctiva and lids: No erythema, swelling or discharge  Pupils and irises: Equal, round, reactive to light  Ears, Nose, Mouth, and Throat  External inspection of ears and nose: Normal    Otoscopic examination: Tympanic membranes translucent with normal light reflex  Canals patent without erythema  Nasal mucosa, septum, and turbinates: Abnormal  -- Turbinates swollen, erythematous  Oropharynx: Normal with no erythema, edema, exudate or lesions     Neck  Neck: Supple, symmetric, trachea midline, no masses  Pulmonary  Respiratory effort: No increased work of breathing or signs of respiratory distress  -- Breathing non-labored at rest  RR=18  No cough noted at this time  Auscultation of lungs: Abnormal  -- Faint RLL crackles on exam   Cardiovascular  Auscultation of heart: Normal rate and rhythm, normal S1 and S2, no murmurs  Lymphatic  Palpation of lymph nodes in neck: No lymphadenopathy     Musculoskeletal  Gait and station: Normal    Psychiatric  Orientation to person, place and time: Normal        Future Appointments    Date/Time Provider Specialty Site   03/13/2018 10:00 AM Shadi Counter, [facility]       Signatures   Electronically signed by : KEVIN Jesus; Dec 12 2017  4:36PM EST                       (Author)    Electronically signed by : Amanda Marquez DO; Dec 12 2017  5:26PM EST                       (Author)

## 2017-12-14 ENCOUNTER — LAB CONVERSION - ENCOUNTER (OUTPATIENT)
Dept: OTHER | Facility: OTHER | Age: 77
End: 2017-12-14

## 2017-12-19 DIAGNOSIS — I25.10 ATHEROSCLEROTIC HEART DISEASE OF NATIVE CORONARY ARTERY WITHOUT ANGINA PECTORIS: ICD-10-CM

## 2017-12-19 DIAGNOSIS — R06.02 SHORTNESS OF BREATH: ICD-10-CM

## 2017-12-19 RX ORDER — ASPIRIN 81 MG/1
324 TABLET, CHEWABLE ORAL ONCE
Status: CANCELLED | OUTPATIENT
Start: 2017-12-19 | End: 2017-12-19

## 2017-12-19 RX ORDER — SODIUM CHLORIDE 9 MG/ML
100 INJECTION, SOLUTION INTRAVENOUS CONTINUOUS
Status: CANCELLED | OUTPATIENT
Start: 2017-12-20

## 2017-12-20 ENCOUNTER — HOSPITAL ENCOUNTER (OUTPATIENT)
Dept: NON INVASIVE DIAGNOSTICS | Facility: HOSPITAL | Age: 77
Discharge: HOME | End: 2017-12-20
Attending: INTERNAL MEDICINE
Payer: MEDICARE

## 2017-12-20 ENCOUNTER — GENERIC CONVERSION - ENCOUNTER (OUTPATIENT)
Dept: CARDIOLOGY CLINIC | Facility: CLINIC | Age: 77
End: 2017-12-20

## 2017-12-20 ENCOUNTER — GENERIC CONVERSION - ENCOUNTER (OUTPATIENT)
Dept: OTHER | Facility: OTHER | Age: 77
End: 2017-12-20

## 2017-12-20 VITALS
BODY MASS INDEX: 26.52 KG/M2 | DIASTOLIC BLOOD PRESSURE: 77 MMHG | SYSTOLIC BLOOD PRESSURE: 147 MMHG | HEIGHT: 68 IN | OXYGEN SATURATION: 99 % | HEART RATE: 88 BPM | RESPIRATION RATE: 18 BRPM | WEIGHT: 175 LBS | TEMPERATURE: 97.5 F

## 2017-12-20 DIAGNOSIS — I25.118 CORONARY ARTERY DISEASE OF NATIVE ARTERY OF NATIVE HEART WITH STABLE ANGINA PECTORIS (HCC): Primary | ICD-10-CM

## 2017-12-20 DIAGNOSIS — R94.39 ABNORMAL STRESS TEST: ICD-10-CM

## 2017-12-20 PROBLEM — I25.10 CAD (CORONARY ARTERY DISEASE): Status: ACTIVE | Noted: 2017-12-20

## 2017-12-20 LAB
ANION GAP SERPL CALCULATED.3IONS-SCNC: 11 MMOL/L (ref 4–13)
ATRIAL RATE: 88 BPM
BASOPHILS # BLD AUTO: 0.02 THOUSANDS/ΜL (ref 0–0.1)
BASOPHILS NFR BLD AUTO: 0 % (ref 0–1)
BUN SERPL-MCNC: 27 MG/DL (ref 5–25)
CALCIUM SERPL-MCNC: 9.3 MG/DL (ref 8.3–10.1)
CHLORIDE SERPL-SCNC: 100 MMOL/L (ref 100–108)
CO2 SERPL-SCNC: 29 MMOL/L (ref 21–32)
CREAT SERPL-MCNC: 1.04 MG/DL (ref 0.6–1.3)
EOSINOPHIL # BLD AUTO: 0.29 THOUSAND/ΜL (ref 0–0.61)
EOSINOPHIL NFR BLD AUTO: 4 % (ref 0–6)
ERYTHROCYTE [DISTWIDTH] IN BLOOD BY AUTOMATED COUNT: 14.8 % (ref 11.6–15.1)
GFR SERPL CREATININE-BSD FRML MDRD: 69 ML/MIN/1.73SQ M
GLUCOSE P FAST SERPL-MCNC: 93 MG/DL (ref 65–99)
GLUCOSE SERPL-MCNC: 93 MG/DL (ref 65–140)
HCT VFR BLD AUTO: 36 % (ref 36.5–49.3)
HGB BLD-MCNC: 11.8 G/DL (ref 12–17)
INR PPP: 0.88 (ref 0.86–1.16)
LYMPHOCYTES # BLD AUTO: 1.84 THOUSANDS/ΜL (ref 0.6–4.47)
LYMPHOCYTES NFR BLD AUTO: 24 % (ref 14–44)
MCH RBC QN AUTO: 32.2 PG (ref 26.8–34.3)
MCHC RBC AUTO-ENTMCNC: 32.8 G/DL (ref 31.4–37.4)
MCV RBC AUTO: 98 FL (ref 82–98)
MONOCYTES # BLD AUTO: 0.88 THOUSAND/ΜL (ref 0.17–1.22)
MONOCYTES NFR BLD AUTO: 12 % (ref 4–12)
NEUTROPHILS # BLD AUTO: 4.56 THOUSANDS/ΜL (ref 1.85–7.62)
NEUTS SEG NFR BLD AUTO: 60 % (ref 43–75)
P AXIS: 63 DEGREES
PLATELET # BLD AUTO: 174 THOUSANDS/UL (ref 149–390)
PMV BLD AUTO: 9.8 FL (ref 8.9–12.7)
POTASSIUM SERPL-SCNC: 4.1 MMOL/L (ref 3.5–5.3)
PR INTERVAL: 148 MS
PROTHROMBIN TIME: 12.2 SECONDS (ref 12.1–14.4)
QRS AXIS: 41 DEGREES
QRSD INTERVAL: 92 MS
QT INTERVAL: 390 MS
QTC INTERVAL: 467 MS
RBC # BLD AUTO: 3.67 MILLION/UL (ref 3.88–5.62)
SODIUM SERPL-SCNC: 140 MMOL/L (ref 136–145)
T WAVE AXIS: 56 DEGREES
VENTRICULAR RATE: 86 BPM
WBC # BLD AUTO: 7.59 THOUSAND/UL (ref 4.31–10.16)

## 2017-12-20 PROCEDURE — 85025 COMPLETE CBC W/AUTO DIFF WBC: CPT | Performed by: NURSE PRACTITIONER

## 2017-12-20 PROCEDURE — 93005 ELECTROCARDIOGRAM TRACING: CPT | Performed by: NURSE PRACTITIONER

## 2017-12-20 PROCEDURE — 99152 MOD SED SAME PHYS/QHP 5/>YRS: CPT | Performed by: INTERNAL MEDICINE

## 2017-12-20 PROCEDURE — 80048 BASIC METABOLIC PNL TOTAL CA: CPT | Performed by: NURSE PRACTITIONER

## 2017-12-20 PROCEDURE — C1769 GUIDE WIRE: HCPCS | Performed by: INTERNAL MEDICINE

## 2017-12-20 PROCEDURE — 93458 L HRT ARTERY/VENTRICLE ANGIO: CPT | Performed by: INTERNAL MEDICINE

## 2017-12-20 PROCEDURE — 99153 MOD SED SAME PHYS/QHP EA: CPT | Performed by: INTERNAL MEDICINE

## 2017-12-20 PROCEDURE — C1894 INTRO/SHEATH, NON-LASER: HCPCS | Performed by: INTERNAL MEDICINE

## 2017-12-20 PROCEDURE — 85610 PROTHROMBIN TIME: CPT | Performed by: NURSE PRACTITIONER

## 2017-12-20 RX ORDER — ATORVASTATIN CALCIUM 20 MG/1
40 TABLET, FILM COATED ORAL DAILY
Qty: 30 TABLET | Refills: 0 | Status: SHIPPED | OUTPATIENT
Start: 2017-12-20 | End: 2018-01-16 | Stop reason: ALTCHOICE

## 2017-12-20 RX ORDER — MIDAZOLAM HYDROCHLORIDE 1 MG/ML
INJECTION INTRAMUSCULAR; INTRAVENOUS CODE/TRAUMA/SEDATION MEDICATION
Status: COMPLETED | OUTPATIENT
Start: 2017-12-20 | End: 2017-12-20

## 2017-12-20 RX ORDER — PANTOPRAZOLE SODIUM 40 MG/1
40 TABLET, DELAYED RELEASE ORAL DAILY
Status: ON HOLD | COMMUNITY
End: 2018-01-23

## 2017-12-20 RX ORDER — ASPIRIN 81 MG/1
324 TABLET, CHEWABLE ORAL ONCE
Status: COMPLETED | OUTPATIENT
Start: 2017-12-20 | End: 2017-12-20

## 2017-12-20 RX ORDER — OMEGA-3-ACID ETHYL ESTERS 1 G/1
2 CAPSULE, LIQUID FILLED ORAL 2 TIMES DAILY
COMMUNITY
End: 2017-12-20 | Stop reason: HOSPADM

## 2017-12-20 RX ORDER — SODIUM CHLORIDE 9 MG/ML
125 INJECTION, SOLUTION INTRAVENOUS CONTINUOUS
Status: DISPENSED | OUTPATIENT
Start: 2017-12-20 | End: 2017-12-20

## 2017-12-20 RX ORDER — NITROGLYCERIN 20 MG/100ML
INJECTION INTRAVENOUS CODE/TRAUMA/SEDATION MEDICATION
Status: COMPLETED | OUTPATIENT
Start: 2017-12-20 | End: 2017-12-20

## 2017-12-20 RX ORDER — VERAPAMIL HYDROCHLORIDE 2.5 MG/ML
INJECTION, SOLUTION INTRAVENOUS CODE/TRAUMA/SEDATION MEDICATION
Status: COMPLETED | OUTPATIENT
Start: 2017-12-20 | End: 2017-12-20

## 2017-12-20 RX ORDER — CLOPIDOGREL 300 MG/1
300 TABLET, FILM COATED ORAL ONCE
COMMUNITY
End: 2017-12-20 | Stop reason: HOSPADM

## 2017-12-20 RX ORDER — LANOLIN ALCOHOL/MO/W.PET/CERES
1000 CREAM (GRAM) TOPICAL DAILY
COMMUNITY

## 2017-12-20 RX ORDER — GABAPENTIN 400 MG/1
100 CAPSULE ORAL 3 TIMES DAILY
COMMUNITY
End: 2018-01-16 | Stop reason: ALTCHOICE

## 2017-12-20 RX ORDER — METOPROLOL TARTRATE 50 MG/1
25 TABLET, FILM COATED ORAL EVERY 12 HOURS SCHEDULED
Status: DISCONTINUED | OUTPATIENT
Start: 2017-12-20 | End: 2017-12-21 | Stop reason: HOSPADM

## 2017-12-20 RX ORDER — FENTANYL CITRATE 50 UG/ML
INJECTION, SOLUTION INTRAMUSCULAR; INTRAVENOUS CODE/TRAUMA/SEDATION MEDICATION
Status: COMPLETED | OUTPATIENT
Start: 2017-12-20 | End: 2017-12-20

## 2017-12-20 RX ORDER — SODIUM CHLORIDE 9 MG/ML
100 INJECTION, SOLUTION INTRAVENOUS CONTINUOUS
Status: DISCONTINUED | OUTPATIENT
Start: 2017-12-20 | End: 2017-12-21 | Stop reason: HOSPADM

## 2017-12-20 RX ORDER — HEPARIN SODIUM 1000 [USP'U]/ML
INJECTION, SOLUTION INTRAVENOUS; SUBCUTANEOUS CODE/TRAUMA/SEDATION MEDICATION
Status: COMPLETED | OUTPATIENT
Start: 2017-12-20 | End: 2017-12-20

## 2017-12-20 RX ORDER — ISOSORBIDE MONONITRATE 30 MG/1
30 TABLET, EXTENDED RELEASE ORAL DAILY
Qty: 30 TABLET | Refills: 0 | Status: SHIPPED | OUTPATIENT
Start: 2017-12-20 | End: 2018-01-23 | Stop reason: HOSPADM

## 2017-12-20 RX ADMIN — FENTANYL CITRATE 50 MCG: 50 INJECTION INTRAMUSCULAR; INTRAVENOUS at 09:34

## 2017-12-20 RX ADMIN — SODIUM CHLORIDE 100 ML/HR: 0.9 INJECTION, SOLUTION INTRAVENOUS at 08:42

## 2017-12-20 RX ADMIN — MIDAZOLAM HYDROCHLORIDE 1 MG: 1 INJECTION, SOLUTION INTRAMUSCULAR; INTRAVENOUS at 09:35

## 2017-12-20 RX ADMIN — ASPIRIN 81 MG 324 MG: 81 TABLET ORAL at 08:44

## 2017-12-20 RX ADMIN — HEPARIN SODIUM 4000 UNITS: 1000 INJECTION INTRAVENOUS; SUBCUTANEOUS at 09:32

## 2017-12-20 RX ADMIN — FENTANYL CITRATE 50 MCG: 50 INJECTION INTRAMUSCULAR; INTRAVENOUS at 09:30

## 2017-12-20 RX ADMIN — VERAPAMIL HYDROCHLORIDE 2.5 MG: 2.5 INJECTION, SOLUTION INTRAVENOUS at 09:32

## 2017-12-20 RX ADMIN — IOHEXOL 126 ML: 350 INJECTION, SOLUTION INTRAVENOUS at 09:59

## 2017-12-20 RX ADMIN — MIDAZOLAM HYDROCHLORIDE 2 MG: 1 INJECTION, SOLUTION INTRAMUSCULAR; INTRAVENOUS at 09:30

## 2017-12-20 RX ADMIN — NITROGLYCERIN 200 MCG: 20 INJECTION INTRAVENOUS at 09:33

## 2017-12-20 NOTE — DISCHARGE INSTRUCTIONS
1  Please see the post cardiac catheterization/ angioseal closure device instructions  No heavy lifting, greater than 10 lbs  or strenuous  activity for 48 hrs  See the post cardiac catheterization/ angioseal closure device instructions  2 Remove band aid tomorrow  Shower and wash area- writs gently with soap and water- beginning tomorrow  Rinse and pat dry  Apply new water seal band aid  Repeat this process for 5 days  No powders, creams lotions or antibiotic ointments  for 5 days  No tub baths, hot tubs or swimming for 5 days  3 No driving for 3 days            Coronary Artery Disease   WHAT YOU NEED TO KNOW:   What is coronary artery disease? Coronary artery disease (CAD) is narrowing of the arteries to your heart caused by a buildup of plaque  Plaque is made up of cholesterol and other substances  The narrowing in your arteries decreases the amount of blood that can flow to your heart  This causes your heart to get less oxygen  What increases my risk for CAD? · Age 36 years or older     · A family history of CAD     · Smoking or regular exposure to secondhand smoke     · A medical condition, such as high blood pressure, high cholesterol, or diabetes     · Obesity or lack of exercise  What are the signs and symptoms of CAD? You may not have any symptoms of CAD  The most common symptom is chest pain, also called angina  Angina may feel like burning, squeezing, or crushing tightness in your chest  The pain may spread to your neck, jaw, or shoulder blade  You may have other symptoms along with chest pain  These include nausea, vomiting, sweating, fainting, and hands and feet that are cold to the touch  How is CAD diagnosed? Your healthcare provider will ask you if you have a family history of CAD  He will also ask about your symptoms and about the medicines you are taking   You may need any of the following:  · Blood tests  will check for high cholesterol or other medical conditions that may have led to CAD  · An EKG  records the electrical activity of your heart  It is used to check your heart rhythm, and it may show if there is damage to your heart  · An echocardiogram  is a type of ultrasound  Sound waves are used to show the structure, movement, and blood vessels of your heart  · An exercise stress test  helps healthcare providers see the changes that take place in your heart during exercise  An EKG is done while you ride an exercise bike or walk on a treadmill  Healthcare providers will ask if you have chest pain or trouble breathing during the test  An exercise test may be combined with an echocardiogram or nuclear isotope imaging  Nuclear isotopes are very small amounts of radioactive material that are injected into your bloodstream  Images show areas of your heart that have decreased blood flow  · A stress test with medicine  may be done if you cannot do the exercise stress test  You are given medicine that causes your heart to work harder  You will be connected to a stress test machine  This test is combined with the echocardiogram or nuclear isotope imaging  · An angiography, CT scan, or MRI  may be done to take pictures of your blood vessels and arteries  The pictures may show how narrow or blocked your blood vessels are  You may be given contrast liquid to help healthcare providers see the pictures better  Tell the healthcare provider if you have ever had an allergic reaction to contrast liquid  Which medicines are used to treat CAD? · Blood pressure medicines  are given to lower your blood pressure  These medicines may include ACE inhibitors and beta-blockers  ACE inhibitors help keep your blood vessels relaxed and open  This helps keep blood flowing into your heart  Beta-blockers keep your heart pumping strongly and regularly  This helps keep your heart from working too hard to get oxygen       · Cholesterol medicines  help lower blood cholesterol levels  · Nitrates , such as nitroglycerin, relax the arteries of your heart so it gets more oxygen  They help to relieve your chest pain  · Antiplatelets , such as aspirin, help prevent blood clots  Take your antiplatelet medicine exactly as directed  These medicines make it more likely for you to bleed or bruise  If you are told to take aspirin, do not take acetaminophen or ibuprofen instead  · Blood thinners  keep clots from forming in your blood  Clots may cause heart attacks, strokes, or death  This medicine makes it more likely for you to bleed or bruise  · Do not take certain medicines without asking your healthcare provider first   These include NSAIDs, herbal or vitamin supplements, or hormones (estrogen or progestin)  Which procedures are used to treat CAD? · An angioplasty  may be done to open an artery blocked by plaque  A tube with a balloon on the end is threaded into the blocked artery  Once the tube is in the artery, the balloon is inflated  As the balloon inflates, it presses the plaque against the artery wall to open the artery  A stent may be placed in your artery to keep it open  · Coronary artery bypass surgery (CABG)  is open heart surgery  Healthcare providers take arteries or veins from other areas in your body and use them to bypass or go around the blocked arteries of your heart  What is cardiac rehabilitation? Your healthcare provider may recommend that you attend cardiac rehabilitation (rehab)  This is a program run by specialists who will help you safely strengthen your heart and reduce the risk for more heart disease  The plan includes exercise, relaxation, stress management, and heart-healthy nutrition  Healthcare providers will also check to make sure any medicines you are taking are working  What can I do to manage CAD? · Do not smoke  Nicotine and other chemicals in cigarettes and cigars can cause heart and lung damage   Ask your healthcare provider for information if you currently smoke and need help to quit  E-cigarettes or smokeless tobacco still contain nicotine  Talk to your healthcare provider before you use these products  · Exercise regularly  Exercise at least 30 minutes each day, on most days of the week  Exercise helps to lower high cholesterol and high blood pressure  It can also help you maintain a healthy weight  Ask your healthcare provider about the kind of exercise you should do and how to get started  · Maintain a healthy weight  If you are overweight, talk to your healthcare provider about how to lose weight  A weight loss of 10% can improve your heart health  · Eat heart-healthy foods  Include fresh fruits and vegetables in your meal plan  Choose low-fat foods, such as skim or 1% fat milk, low-fat cheese and yogurt, fish, chicken (without skin), and lean meats  Eat two 4-ounce servings of fish high in omega-3 fats each week, such as salmon, fresh tuna, and herring  Do not eat foods that are high in sodium, such as canned foods, potato chips, salty snacks, and cold cuts  Put less table salt on your food  · Limit or do not drink alcohol  A drink of alcohol is 12 ounces of beer, 5 ounces of wine, or 1½ ounces of liquor  · Manage other health conditions  Follow your healthcare provider's advice on how to manage other conditions that can affect your heart health  These include diabetes, high blood pressure, and high cholesterol  You may need to take medicines for these conditions and make other lifestyle changes  · Ask if you should have a flu vaccine  The flu can be dangerous for a person who has CAD  The flu vaccine is available every year in the fall         Call 911 for any of the following:   · You have any of the following signs of a heart attack:      ¨ Squeezing, pressure, or pain in your chest that lasts longer than 5 minutes or returns    ¨ Discomfort or pain in your back, neck, jaw, stomach, or arm     ¨ Trouble breathing    ¨ Nausea or vomiting    ¨ Lightheadedness or a sudden cold sweat, especially with chest pain or trouble breathing    When should I contact my healthcare provider? · You have chest pain that is more frequent, or you have chest pain at rest      · You have questions or concerns about your condition or care  CARE AGREEMENT:   You have the right to help plan your care  Learn about your health condition and how it may be treated  Discuss treatment options with your caregivers to decide what care you want to receive  You always have the right to refuse treatment  The above information is an  only  It is not intended as medical advice for individual conditions or treatments  Talk to your doctor, nurse or pharmacist before following any medical regimen to see if it is safe and effective for you  © 2017 [address] [facility] Information is for End User's use only and may not be sold, redistributed or otherwise used for commercial purposes  All illustrations and images included in CareNotes® are the copyrighted property of [employer]  or [employer]ainer Cul[employer]  Before Coronary Artery Bypass Graft   AMBULATORY CARE:   Coronary artery bypass graft (CABG)  is open heart surgery to clear blocked arteries in your heart  CABG surgery improves blood flow to your heart by bypassing (sending blood around) the blocked part of an artery  This restores blood flow to your heart and helps prevent a heart attack  Contact your healthcare provider if:   · You have a fever or symptoms of the flu the day before, or the day of, your surgery  · You have questions or concerns about your condition or care  Tests  may be needed several days before or on the day of surgery   You may need blood tests, an electrocardiogram (EKG), or an echocardiogram  You may also need a cardiac catheterization or a coronary angiogram  These tests will help your healthcare provider see where the blood flow is blocked and help him plan your surgery  Eating and drinking before surgery: Your healthcare provider may tell you not to eat or drink anything after midnight on the day of your surgery  Medicines before surgery: Your healthcare provider will tell you what medicines to take or not take on the day of your surgery  Talk to your healthcare provider several days before your surgery about any medicines that you take regularly:  · You may need to stop taking blood thinner, aspirin, or NSAID medicine several days before the surgery  This may prevent bleeding during and after your surgery  · You may need to stop taking certain vitamins or herbal supplements several days before the surgery  Some vitamins and herbal supplements may increase your risk for bleeding and other complications  · If you have diabetes, ask about your insulin  On the morning of your surgery, you may need to skip your dose or take a smaller dose  This will prevent your blood sugar level from going too low  Do not take your oral diabetic medicine on the morning of your surgery  · Ask your healthcare provider if you should take your blood pressure or heart medicine before your surgery  Do not stop your medicine without talking to your healthcare provider  · Take any medicine you were told to take with a sip of water on the morning of your surgery  Blood donation before surgery: You may be able to donate your own blood before surgery  This is called autologous blood donation  You may also ask a family member or friend with the same blood type to donate blood for you  This is called directed blood donation  Weight loss before surgery: You may need to lose weight before your surgery  This may decrease the stress on your heart and your risk for complications during or after surgery  Talk to your healthcare provider if you need help to lose weight     Quit smoking before surgery:  Nicotine and other chemicals in cigarettes and cigars can damage your heart and lungs  Smoking may prevent healing and increase your risk for complications during or after your surgery  Ask your healthcare provider for information if you currently smoke and need help to quit  E-cigarettes or smokeless tobacco still contain nicotine  Talk to your healthcare provider before you use these products  What to expect after surgery:   · You may spend 1 to 3 days in an intensive care unit (ICU)  Healthcare providers will closely monitor your breathing, blood pressure, oxygen levels, and heart rate  They will also watch for any complications  You may need several medicines to control your blood pressure and heart rate, and to prevent blood clots  You may also need a blood transfusion  After you leave the ICU, you may need to spend 3 to 5 days in the hospital before you can go home  · You may wake up from surgery with an endotracheal tube (ET tube)  An ET tube is inserted through your mouth and into your lungs  It is attached to a ventilator  A ventilator is a machine that gives you oxygen and breathes for you when you cannot breathe well on your own  Healthcare providers will remove the ET tube when you are awake, you can breathe on your own, and your heart is working correctly  · When your ET tube is removed, it is important to cough, and deep breathe  This will decrease your risk for a lung infection  It is also important to walk around as soon as healthcare providers tell you it is okay  This will help prevent blood clots  · You may have a drain in your bladder to empty your urine, and a drain in your chest to prevent blood from forming around your heart  You may have several IVs in your arm, neck, or chest  You may also have a temporary pacemaker to control your heartbeat  · You may have pain in your chest after surgery  Healthcare providers will give you pain medicine  They will also show you how to use a splint when you need to cough and turn in bed   A splint is a pillow or soft object that you can press lightly against your chest  This will decrease pain when you move or cough  · You may not be able to eat right away  You will be given ice chips and then clear liquids  If you do okay with those, you will be able to eat solid foods  Cardiac rehabilitation (rehab): You will need to go to cardiac rehab after surgery  Rehab is a program run by specialists who will help you safely strengthen your heart and prevent more heart disease  Follow up with your healthcare provider as directed:  Write down your questions so you remember to ask them during your visits  © 2017 [address] [employer]  Information is for End User's use only and may not be sold, redistributed or otherwise used for commercial purposes  All illustrations and images included in CareNotes® are the copyrighted property of A [employer] M , Inc  or Rainer Culver  The above information is an  only  It is not intended as medical advice for individual conditions or treatments  Talk to your doctor, nurse or pharmacist before following any medical regimen to see if it is safe and effective for you

## 2018-01-03 ENCOUNTER — TRANSCRIBE ORDERS (OUTPATIENT)
Dept: ADMINISTRATIVE | Facility: HOSPITAL | Age: 78
End: 2018-01-03

## 2018-01-03 ENCOUNTER — GENERIC CONVERSION - ENCOUNTER (OUTPATIENT)
Dept: OTHER | Facility: OTHER | Age: 78
End: 2018-01-03

## 2018-01-03 DIAGNOSIS — I25.10 ATHEROSCLEROSIS OF NATIVE CORONARY ARTERY OF NATIVE HEART WITHOUT ANGINA PECTORIS: Primary | ICD-10-CM

## 2018-01-09 NOTE — RESULT NOTES
Discussion/Summary      SUMMARY OF RESULTS:   --Pretty good cholesterol numbers, improved from previous  Keep up the good work!   --Only issue is new onset MILD anemia (decreased red blood concentration)  In addition to getting the colonoscopy and upper endoscopy done (you are due anyhow), we will want to recheck this in 2-3 months to make sure it is not dropping any further  Will mail lab slip  Let me know if you have any questions--Bennie      Verified Results  (1) CBC/PLT/DIFF [id number] 07:52AM Rito Morejon   TW Order Number: [id number]     Test Name Result Flag Reference   WBC COUNT 5 22 Thousand/uL  4 31-10 16   RBC COUNT 3 63 Million/uL L 3 88-5 62   HEMOGLOBIN 11 5 g/dL L 12 0-17 0   HEMATOCRIT 35 2 % L 36 5-49 3   MCV 97 fL  82-98   MCH 31 7 pg  26 8-34 3   MCHC 32 7 g/dL  31 4-37 4   RDW 14 8 %  11 6-15 1   MPV 9 9 fL  8 9-12 7   PLATELET COUNT 741 Thousands/uL  149-390   NEUTROPHILS RELATIVE PERCENT 46 %  43-75   LYMPHOCYTES RELATIVE PERCENT 39 %  14-44   MONOCYTES RELATIVE PERCENT 10 %  4-12   EOSINOPHILS RELATIVE PERCENT 4 %  0-6   BASOPHILS RELATIVE PERCENT 1 %  0-1   NEUTROPHILS ABSOLUTE COUNT 2 43 Thousands/? ??L  1 85-7 62   LYMPHOCYTES ABSOLUTE COUNT 2 04 Thousands/? ??L  0 60-4 47   MONOCYTES ABSOLUTE COUNT 0 53 Thousand/? ??L  0 17-1 22   EOSINOPHILS ABSOLUTE COUNT 0 19 Thousand/? ??L  0 00-0 61   BASOPHILS ABSOLUTE COUNT 0 03 Thousands/? ??L  0 00-0 10     (1) COMPREHENSIVE METABOLIC PANEL [id number] 49:45YL Rito Morejon    Order Number: [id number]     Test Name Result Flag Reference   SODIUM 143 mmol/L  136-145   POTASSIUM 4 2 mmol/L  3 5-5 3   CHLORIDE 106 mmol/L  100-108   CARBON DIOXIDE 25 mmol/L  21-32   ANION GAP (CALC) 12 mmol/L  4-13   BLOOD UREA NITROGEN 26 mg/dL H 5-25   CREATININE 1 06 mg/dL  0 60-1 30   Standardized to IDMS reference method   CALCIUM 8 5 mg/dL  8 3-10 1   BILI, TOTAL 0 30 mg/dL  0 20-1 00   ALK PHOSPHATAS 93 U/L     ALT (SGPT) 38 U/L 12-78   Specimen collection should occur prior to Sulfasalazine administration due to the potential for falsely depressed results  AST(SGOT) 27 U/L  5-45   Specimen collection should occur prior to Sulfasalazine administration due to the potential for falsely depressed results  ALBUMIN 3 2 g/dL L 3 5-5 0   TOTAL PROTEIN 7 8 g/dL  6 4-8 2   eGFR 67 ml/min/1 73sq m     [facility] Disease Education Program recommendations are as follows:  GFR calculation is accurate only with a steady state creatinine  Chronic Kidney disease less than 60 ml/min/1 73 sq  meters  Kidney failure less than 15 ml/min/1 73 sq  meters  GLUCOSE FASTING 91 mg/dL  65-99   Specimen collection should occur prior to Sulfasalazine administration due to the potential for falsely depressed results  Specimen collection should occur prior to Sulfapyridine administration due to the potential for falsely elevated results  (1) HEMOGLOBIN [facility]1C 14Sep2017 07:52AM [facility]   TW Order Number: [id number]     Test Name Result Flag Reference   HEMOGLOBIN A1C 5 3 %  4 2-6 3   EST  AVG  GLUCOSE 105 mg/dl       (1) LIPID PANEL FASTING W DIRECT LDL REFLEX 14Sep2017 07:52AM [facility]    Order Number: [id number]     Test Name Result Flag Reference   CHOLESTEROL 211 mg/dL H    LDL CHOLESTEROL CALCULATED 105 mg/dL H 0-100   Triglyceride:        Normal <150 mg/dl   Borderline High 150-199 mg/dl   High 200-499 mg/dl   Very High >499 mg/dl      Cholesterol:       Desirable <200 mg/dl    Borderline High 200-239 mg/dl    High >239 mg/dl      HDL Cholesterol:       High>59 mg/dL    Low <41 mg/dL      HDL Cholesterol:       High>59 mg/dL    Low <41 mg/dL      This screening LDL is a calculated result  It does not have the accuracy of the Direct Measured LDL in the monitoring of patients with hyperlipidemia and/or statin therapy  Direct Measure LDL (VEP990) must be ordered separately in these patients     TRIGLYCERIDES 85 mg/dL <=150   Specimen collection should occur prior to N-Acetylcysteine or Metamizole administration due to the potential for falsely depressed results  HDL,DIRECT 89 mg/dL H 40-60   Specimen collection should occur prior to Metamizole administration due to the potential for falsley depressed results  (1) TSH WITH FT4 REFLEX 14Sep2017 07:52AM Brittany Diaz   TW Order Number: [id number]     Test Name Result Flag Reference   TSH 1 233 uIU/mL  0 358-3 740   Patients undergoing fluorescein dye angiography may retain small amounts of fluorescein in the body for 48-72 hours post procedure  Samples containing fluorescein can produce falsely depressed TSH values  If the patient had this procedure,a specimen should be resubmitted post fluorescein clearance  Plan  Anemia, mild    · (1) CBC/PLT/DIFF; Status:Active; Requested for:20Nov2017;    · (1) FERRITIN; Status:Active; Requested for:20Nov2017;    · (1) FOLATE; Status:Active; Requested for:20Nov2017;    · (1) IRON PANEL; Status:Active; Requested for:20Nov2017;    · (1) RETIC WITH RETICULOCYTE HGB; Status:Active; Requested for:20Nov2017;    · (1) VITAMIN B12; Status:Active;  Requested for:20Nov2017;

## 2018-01-09 NOTE — RESULT NOTES
Discussion/Summary   SUMMARY OF RESULTS: Still showing mild anemia, with red count decresed just slightly from previous, other labs including iron stores, B12, etc, normal   We'll see what your colonoscopy and upper endoscopy results show, to make sure there is no cause of the anemia there  Will mail slip to recheck your red count in 2 months or so  Call if questions--Bennie      Verified Results  (1) CBC/PLT/DIFF [id number] 08:10AM [facility]     Test Name Result Flag Reference   WBC COUNT 5 01 Thousand/uL  4 31-10 16   RBC COUNT 3 49 Million/uL L 3 88-5 62   HEMOGLOBIN 11 2 g/dL L 12 0-17 0   HEMATOCRIT 34 2 % L 36 5-49 3   MCV 98 fL  82-98   MCH 32 1 pg  26 8-34 3   MCHC 32 7 g/dL  31 4-37 4   RDW 13 8 %  11 6-15 1   MPV 10 4 fL  8 9-12 7   PLATELET COUNT 867 Thousands/uL  149-390   NEUTROPHILS RELATIVE PERCENT 45 %  43-75   LYMPHOCYTES RELATIVE PERCENT 40 %  14-44   MONOCYTES RELATIVE PERCENT 9 %  4-12   EOSINOPHILS RELATIVE PERCENT 5 %  0-6   BASOPHILS RELATIVE PERCENT 1 %  0-1   NEUTROPHILS ABSOLUTE COUNT 2 27 Thousands/? ??L  1 85-7 62   LYMPHOCYTES ABSOLUTE COUNT 2 00 Thousands/? ??L  0 60-4 47   MONOCYTES ABSOLUTE COUNT 0 45 Thousand/? ??L  0 17-1 22   EOSINOPHILS ABSOLUTE COUNT 0 26 Thousand/? ??L  0 00-0 61   BASOPHILS ABSOLUTE COUNT 0 03 Thousands/? ??L  0 00-0 10     (1) IRON PANEL [id number] 08:10AM [facility]     Test Name Result Flag Reference   IRON 65 ug/dL     Patients treated with metal-binding drugs (ie  Deferoxamine) may have depressed iron values     FERRITIN 312 ng/mL  8-388   TOTAL IRON BINDING CAPACITY 266 ug/dL  250-450   IRON SATURATION 24 %       (1) VITAMIN B12 [id number] 08:10AM [facility]     Test Name Result Flag Reference   VITAMIN B12 695 pg/mL  100-900     (1) FOLATE [id number] 08:10AM [facility]     Test Name Result Flag Reference   FOLATE >20 0 ng/mL H 3 1-17 5     (1) RETIC WITH RETICULOCYTE HGB [id number] 08:10AM [facility]     Test Name Result Flag Reference   IRF 8 4 %  0 0-14 0   RETIC % 0 85 %  0 37-1 87   RETIC ABS # [id number]   RETHE 35 3 pg  30 0-38 3       Plan  Anemia, mild    · (1) CBC/PLT/DIFF; Status:Active;  Requested TOMMY:[id number];

## 2018-01-09 NOTE — RESULT NOTES
Discussion/Summary      Patient notified of test results: 1) non-diagnostic stress test d/t inability to achieve max exercise rate; 2) echo showing mild MR, DD, low normal EF  Recommend seeing cardiologist for consultation to determine if additional testing (nuclear stress, stress echo, etc) would be helpful at this point  Referral information provided  Verified Results  STRESS TEST ONLY, EXERCISE [id number] 09:42AM Pat Ramirez    Order Number: [id number]    - Patient Instructions: To schedule this appointment, please contact Central Scheduling at [phone]  Test Name Result Flag Reference   STRESS TEST ONLY, EXERCISE (Report)     Katja [address] [place] [address]   [phone]     Stress Electrocardiography during Exercise     Name: Deana Milton   MR #: [id number]   Account #: [de-identified]   Study date: 10/11/2017   : 1940   Age: 68 years   Gender: Male   Height: 67 in   Weight: 173 lb   BSA: 1 9 m squared     Allergies: PENICILLINS     Diagnosis: R06 00 - Dyspnea, unspecified, V17 4 - FAM HX-CARDIOVAS DIS NEC     Technician: Aníbal Paget   GROUP: [facility]   Interpreting Physician: Vimal Goldsmith DO   Referring Physician: KEVIN Galvin   Primary Physician: KEVIN Galvin   RN: Trevin Hunter RN   Report Prepared By[de-identified] Trevin Hunter RN     CLINICAL QUESTION: Detection of coronary artery disease  HISTORY: Lumbar fusion  The patient is a 68year old  male  Chest pain status: no chest pain  Other symptoms: dyspnea  Coronary artery disease risk factors: dyslipidemia, hypertension,former smoker, and family history of   premature coronary artery disease  Cardiovascular history: peripheral vascular occlusive disease-RIGHT SFA high grade stenosis Medications: a lipid lowering agent  PHYSICAL EXAM: Baseline physical exam screening: no wheezes audible  REST ECG: Normal sinus rhythm  Nonspecific ST and T wave abnormalities were present  PROCEDURE: Treadmill exercise testing was performed, using the Aiden protocol  Stress electrocardiographic evaluation was performed  Attempted to walk pt on treadmill,within one minute,pt c/o leg fatigue,dyspnea and unable to continue   walking as legs feel they willgive out  AIDEN PROTOCOL:   HR bpm SBP mmHg DBP mmHg Symptoms   Baseline 92 138 68 none   Stage 1 131 -- -- mild dyspnea, moderate fatigue, leg pain   Recovery 1 127 146 62 subsiding   Recovery 2 90 122 68 none   No medications or fluids given  STRESS SUMMARY: Resting po 99%,peak exercise po 96%  Duration of exercise was 1 min and 4 sec  The patient exercised to protocol stage 1  Maximal work rate was 3 2 METs  Maximal heart rate during stress was 134 bpm ( 94 % of maximal   predicted heart rate)  The heart rate response to stress was exaggerated  There was normal resting blood pressure with an appropriate response to stress  The rate-pressure product for the peak heart rate and blood pressure was 66718  There   was no chest pain during stress  The stress test was terminated due to leg pain, mild dyspnea, and moderate fatigue  The stress ECG was negative for ischemia  Arrhythmia during stress: isolated premature ventricular beats  SUMMARY:   - Stress results: Duration of exercise was 1 min and 4 sec  Target heart rate was achieved  There was no chest pain during stress  - ECG conclusions: The stress ECG was negative for ischemia    - Impressions and recommendations: Non-diagnostic study after maximal exercise with reproduction of symptoms  No ECG evidence of ischemia at very low workload  Recommend repeat pharmacologic nuclear stress test to fully assess for   presence of ischemia if clinically indicated  IMPRESSIONS: Non-diagnostic study after maximal exercise with reproduction of symptoms  No ECG evidence of ischemia at very low workload   Recommend repeat pharmacologic nuclear stress test to fully assess for presence of ischemia if   clinically indicated  Prepared and signed by     Raad Marte DO   Signed 10/11/2017 12:13:53     ECHO COMPLETE WITH CONTRAST IF INDICATED 2017 09:40AM Shadi Counter   TW Order Number: [id number]    - Patient Instructions: To schedule this appointment, please contact Central Scheduling at [phone]  Test Name Result Flag Reference   ECHO COMPLETE WITH CONTRAST IF INDICATED (Report)     [facility]ja [address]   [phone]     Transthoracic Echocardiogram   2D, M-mode, Doppler, and Color Doppler     Study date: 11-Oct-2017     Patient: Madison Bee   MR number: [id number]   Account number: [de-identified]   : 1940   Age: 68 years   Gender: Male   Status: Outpatient   Location: [facility]   Height: 67 in   Weight: 166 5 lb   BP: 142/ 88 mmHg     Indications: Family history of ischemic heart disease     Diagnoses: Z82 49 - Family history of ischemic heart disease and other diseases of the circulatory system     Sonographer: SHERRY Ferrer, RDCS   Referring Physician: KEVIN Jesus   Group: [facility]   Interpreting Physician: Raad Marte DO     SUMMARY     LEFT VENTRICLE:   Systolic function was at the lower limits of normal  Ejection fraction was estimated to be 50 %  There were no regional wall motion abnormalities  Wall thickness was at the upper limits of normal    Doppler parameters were consistent with abnormal left ventricular relaxation (grade 1 diastolic dysfunction)  RIGHT VENTRICLE:   The size was normal    Systolic function was normal      MITRAL VALVE:   There was mild regurgitation  HISTORY: PRIOR HISTORY: Dyspnea on exertion; Elevated BP; GERD; Former smoker     PROCEDURE: The study was performed in the [facility] and [facility]  This was a routine study  The transthoracic approach was used   The study included complete 2D imaging, M-mode, complete spectral Doppler, and color Doppler  The   heart rate was 74 bpm, at the start of the study  Images were obtained from the parasternal, apical, subcostal, and suprasternal notch acoustic windows  Image quality was adequate  LEFT VENTRICLE: Size was normal  Systolic function was at the lower limits of normal  Ejection fraction was estimated to be 50 %  There were no regional wall motion abnormalities  Wall thickness was at the upper limits of normal  DOPPLER:   Doppler parameters were consistent with abnormal left ventricular relaxation (grade 1 diastolic dysfunction)  There was no evidence of elevated ventricular filling pressure by Doppler parameters  RIGHT VENTRICLE: The size was normal  Systolic function was normal  Wall thickness was normal      LEFT ATRIUM: Size was normal      RIGHT ATRIUM: Size was normal      MITRAL VALVE: Valve structure was normal  There was normal leaflet separation  DOPPLER: The transmitral velocity was within the normal range  There was no evidence for stenosis  There was mild regurgitation  AORTIC VALVE: The valve was trileaflet  Leaflets exhibited normal cuspal separation and sclerosis  DOPPLER: Transaortic velocity was within the normal range  There was no evidence for stenosis  There was no regurgitation  TRICUSPID VALVE: The valve structure was normal  There was normal leaflet separation  DOPPLER: The transtricuspid velocity was within the normal range  There was no evidence for stenosis  There was trace regurgitation  The tricuspid jet   envelope definition was inadequate for estimation of RV systolic pressure  There are no indirect findings (abnormal RV volume or geometry, altered pulmonary flow velocity profile, or leftward septal displacement) which would suggest   moderate or severe pulmonary hypertension  PULMONIC VALVE: Leaflets exhibited normal thickness, no calcification, and normal cuspal separation  DOPPLER: The transpulmonic velocity was within the normal range  There was trace regurgitation  PERICARDIUM: There was no pericardial effusion  The pericardium was normal in appearance  AORTA: The root exhibited normal size  SYSTEMIC VEINS: IVC: The inferior vena cava was normal in size and course   Respirophasic changes were normal      SYSTEM MEASUREMENT TABLES     2D   %FS: 22 03 %   AV Diam: 3 27 cm   EDV(Teich): 102 94 ml   EF Biplane: 43 88 %   EF(Cube): 52 6 %   EF(Teich): 44 5 %   ESV(Cube): 49 58 ml   ESV(Teich): 57 14 ml   IVSd: 0 99 cm   LA Area: 10 03 cm2   LA Diam: 3 17 cm   LVEDV MOD A2C: 81 38 ml   LVEDV MOD A4C: 78 19 ml   LVEDV MOD BP: 83 88 ml   LVEF MOD A2C: 44 41 %   LVEF MOD A4C: 40 63 %   LVESV MOD A2C: 45 24 ml   LVESV MOD A4C: 46 42 ml   LVESV MOD BP: 47 07 ml   LVIDd: 4 71 cm   LVIDs: 3 67 cm   LVLd A2C: 8 17 cm   LVLd A4C: 7 29 cm   LVLs A2C: 6 69 cm   LVLs A4C: 6 32 cm   LVPWd: 1 02 cm   RA Area: 11 57 cm2   RV Diam: 3 23 cm   SI(Cube): 29 41 ml/m2   SI(Teich): 24 49 ml/m2   SV MOD A2C: 36 14 ml   SV MOD A4C: 31 77 ml   SV(Cube): 55 ml   SV(Teich): 45 8 ml     MM   TAPSE: 1 53 cm     PW   AVC: 356 55 ms   E': 0 05 m/s   E/E': 8 7   MV A Tim: 0 68 m/s   MV Dec [place]: 1 52 m/s2   MV DecT: 266 78 ms   MV E Tim: 0 41 m/s   MV E/A Ratio: 0 6     [employer] Commission Accredited Echocardiography Laboratory     Prepared and electronically signed by     Loree Gunter DO   Signed 11-Oct-2017 11:58:41       Plan  Dyspnea on exertion    · *1 - [facility] (CARDIOLOGY ) Co-Management  *  Status: Active   Requested for: [id number]  Care Summary provided  : Yes

## 2018-01-10 NOTE — MISCELLANEOUS
Message   Recorded as Task   Date: 01/18/2016 07:29 AM, Created By: Terri Anguiano   Task Name: Follow Up   Assigned To: Jannet Bowen procedure,Team   Regarding Patient: Chandu Thao, Status: Active   Comment:    Linda Aviles - 18 Jan 2016 7:29 AM     TASK CREATED  Pt is S/P RIGHT L5-S1 TFESI on 1/12/16 by Dr Brianda Lamb at [facility]  POVS 2/15/16 w/ FQ  Linda Aviles - 20 Jan 2016 12:34 PM     TASK EDITED  Spoke with pt who reports no relief from injection  Current level of pain 4-5/10, not constant  Pt was adivsed to give steroid more time and that we would f/u at [id number] [place] [address] - 20 Jan 2016 1:53 PM     TASK REPLIED TO: Previously Assigned To [employer]  md aware        Active Problems    1  Degenerative lumbar spinal stenosis (724 02) (M48 06)   2  Elevated blood pressure reading without diagnosis of hypertension (796 2) (R03 0)   3  Encounter for screening for other nervous system disorder (V80 09) (Z13 858)   4  Encounter for special screening examination for genitourinary disorder (V81 6) (Z13 89)   5  Hip pain, right (719 45) (M25 551)   6  Low back pain (724 2) (M54 5)   7  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) (L30 0)   8  Sacroiliitis (720 2) (M46 1)   9  Screen for colon cancer (V76 51) (Z12 11)   10  Screening for prostate cancer (V76 44) (Z12 5)   11  Spondylolisthesis of lumbar region (738 4) (M43 16)   12  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 3) (M47 816)   13  Weight gain (783 1) (R63 5)    Current Meds   1  Triamcinolone Acetonide 0 1 % External Cream; APPLY 2-3 TIMES DAILY TO   AFFECTED AREA(S)  as needed; Therapy: 86YUR5205 to (Evaluate:36Xsj8608)  Requested for: 53WFV6970 Recorded    Allergies    1   Penicillins    Signatures   Electronically signed by : Mina Urbina, ; Jan 20 2016  2:12PM EST                       (Author)

## 2018-01-10 NOTE — RESULT NOTES
Verified Results  (1) CBC/PLT/DIFF [id number] 07:22AM Diana Crain Order Number: [id number]   Order Number: [id number]     Test Name Result Flag Reference   WBC COUNT 7 58 Thousand/uL  4 31-10 16   RBC COUNT 3 87 Million/uL L 3 88-5 62   HEMOGLOBIN 13 0 g/dL  12 0-17 0   HEMATOCRIT 39 4 %  36 5-49 3    fL H 82-98   MCH 33 6 pg  26 8-34 3   MCHC 33 0 g/dL  31 4-37 4   RDW 13 6 %  11 6-15 1   MPV 10 7 fL  8 9-12 7   PLATELET COUNT 449 Thousands/uL  149-390   NEUTROPHILS RELATIVE PERCENT 63 %  43-75   LYMPHOCYTES RELATIVE PERCENT 24 %  14-44   MONOCYTES RELATIVE PERCENT 10 %  4-12   EOSINOPHILS RELATIVE PERCENT 3 %  0-6   BASOPHILS RELATIVE PERCENT 0 %  0-1   NEUTROPHILS ABSOLUTE COUNT 4 81 Thousands/?L  1 85-7 62   LYMPHOCYTES ABSOLUTE COUNT 1 78 Thousands/?L  0 60-4 47   MONOCYTES ABSOLUTE COUNT 0 72 Thousand/?L  0 17-1 22   EOSINOPHILS ABSOLUTE COUNT 0 25 Thousand/?L  0 00-0 61   BASOPHILS ABSOLUTE COUNT 0 02 Thousands/?L  0 00-0 10     (1) COMPREHENSIVE METABOLIC PANEL [id number] 36:43YV iDana Crain Order Number: [id number]   Order Number: [id number]     Test Name Result Flag Reference   GLUCOSE,RANDM 86 mg/dL     If the patient is fasting, the ADA then defines impaired fasting glucose as > 100 mg/dL and diabetes as > or equal to 123 mg/dL     SODIUM 136 mmol/L  136-145   POTASSIUM 4 1 mmol/L  3 5-5 3   CHLORIDE 100 mmol/L  100-108   CARBON DIOXIDE 28 mmol/L  21-32   ANION GAP (CALC) 8 mmol/L  4-13   BLOOD UREA NITROGEN 19 mg/dL  5-25   CREATININE 1 10 mg/dL  0 60-1 30   Standardized to IDMS reference method   CALCIUM 8 3 mg/dL  8 3-10 1   BILI, TOTAL 0 60 mg/dL  0 20-1 00   ALK PHOSPHATAS 86 U/L     ALT (SGPT) 71 U/L  12-78   AST(SGOT) 91 U/L H 5-45   ALBUMIN 3 4 g/dL L 3 5-5 0   TOTAL PROTEIN 7 5 g/dL  6 4-8 2   eGFR Non-African American      >60 0 ml/min/1 [address] [facility] [facility] [employer] Disease Education Program recommendations are as follows:  GFR calculation is accurate only with a steady state creatinine  Chronic Kidney disease less than 60 ml/min/1 73 sq  meters  Kidney failure less than 15 ml/min/1 73 sq  meters  (1) URINALYSIS (will reflex a microscopy if leukocytes, occult blood, protein or nitrites are not within normal limits) [id number] 07:22AM Shruthi Steven Order Number: [id number]     Test Name Result Flag Reference   COLOR Yellow     CLARITY Clear     SPECIFIC GRAVITY UA 1 025  1 003-1 030   PH UA 5 5  4 5-8 0   LEUKOCYTE ESTERASE UA Negative  Negative   NITRITE UA Negative  Negative   PROTEIN UA Negative mg/dl  Negative   GLUCOSE UA Negative mg/dl  Negative   KETONES UA Negative mg/dl  Negative   UROBILINOGEN UA 0 2 E U /dl  0 2, 1 0 E U /dl   BILIRUBIN UA Negative  Negative   BLOOD UA Negative  Negative       Plan  Abnormal liver function test    · (1) HEPATIC FUNCTION PANEL; Status:Active; Requested for:22Cle8001;     Discussion/Summary   Here is a copy of the labs  As we discussed on the phone you need to cut back on the alcohol use  I have enclosed a lab order for repeat liver tests to do in 2 months at [place] [address]   Dr Sisto Brunner

## 2018-01-11 NOTE — RESULT NOTES
Verified Results  (1) URINE CULTURE 02Byw0929 03:22PM Juliana Go   TW Order Number: [id number]     Test Name Result Flag Reference   CLINICAL REPORT (Report)     Test:        Urine culture  Specimen Type:   Urine  Specimen Date:   12/12/2016 3:22 PM  Result Date:    12/13/2016 2:16 PM  Result Status:   Final result  Resulting Lab:   [address] [address]            Tel: [phone]      CULTURE                                       ------------------                                   No Growth <1000 cfu/mL       Discussion/Summary   Patient notified of test results (left message on voicemail)  Informed that he still may have prostate infection despite negative urine culture results, and recommended staying on antibiotic for at least a week, while waiting to get in to urologist, to see if this brings about any improvement

## 2018-01-11 NOTE — RESULT NOTES
Discussion/Summary   Patient notified of test results significant for bilateral LE PAD, >75% stenosis right distal femoral artery  Recommend f/u with [facility]  Referral information given  Verified Results  VAS LOWER LIMB ARTERIAL DUPLEX, COMPLETE BILATERAL/GRAFTS [id number] 08:41AM Shadi Counter   TW Order Number: [id number]    - Patient Instructions: To schedule this appointment, please contact Central Scheduling at [phone]  Test Name Result Flag Reference   VAS LOWER LIMB ARTERIAL DUPLEX, COMPLETE BILATERAL/GRAFTS (Report)     [facility] REPORT   CLINICAL:   Indications: Patient complains of pain in both legs when walking  Operative History   Back Surgery   Brachial Blood Pressure: Right Brachial Pressure: 182/ mm Hg, Left Brachial   Pressure: 188/ mm Hg  FINDINGS:      Segment        Right      Left                        Impression PSV PSV    Common Femoral Artery       139  85    Prox Profunda           132 136    Prox SFA              98 102    Mid SFA              124 156    Dist SFA        >75%    527  69    Proximal Pop            46  80    Distal Pop             40  70    Dist Post Tibial          17  38    Dist  Ant  Tibial          74  99    Dist Peroneal            24  86             CONCLUSION:   Impression:   RIGHT LOWER LIMB:   Diffuse atherosclerotic disease throughout the femoral and popliteal arteries   with a > 75% stenosis in the distal superficial femoral artery  There is tibioperoneal disease  Ankle/Brachial index is unreliable due to non compressible vessels   PVR/ PPG tracings are normal    Metatarsal pressure 171 mm Hg   Great toe pressure 97 mm Hg, within the healing range      LEFT LOWER LIMB:   Diffuse atherosclerotic disease throughout the femoral and popliteal arteries   with no evidence of a significant stenosis  There is tibioperoneal disease     Ankle/Brachial index: 1 06, normal range   PVR/ PPG tracings are normal    Metatarsal pressure 156 mm Hg   Great toe pressure 100 mm Hg, within the healing range      SIGNATURE:   Electronically Signed by: Mera Merida on 2017-09-20 03:38:44 PM       Plan  Femoral artery stenosis, Peripheral arterial disease    · *1 -  [facility]  *  Status: Active  Requested for:  [id number]  Care Summary provided  : Yes

## 2018-01-11 NOTE — RESULT NOTES
Discussion/Summary   Patient notified of normal blood work results  Verified Results  (1) COMPREHENSIVE METABOLIC PANEL [id number] 11:31VP [facility] Order Number: [id number]     Test Name Result Flag Reference   GLUCOSE,RANDM 94 mg/dL     If the patient is fasting, the ADA then defines impaired fasting glucose as > 100 mg/dL and diabetes as > or equal to 123 mg/dL  SODIUM 142 mmol/L  136-145   POTASSIUM 4 7 mmol/L  3 5-5 3   CHLORIDE 104 mmol/L  100-108   CARBON DIOXIDE 29 mmol/L  21-32   ANION GAP (CALC) 9 mmol/L  4-13   BLOOD UREA NITROGEN 15 mg/dL  5-25   CREATININE 1 06 mg/dL  0 60-1 30   Standardized to IDMS reference method   CALCIUM 8 6 mg/dL  8 3-10 1   BILI, TOTAL 0 40 mg/dL  0 20-1 00   ALK PHOSPHATAS 99 U/L     ALT (SGPT) 25 U/L  12-78   AST(SGOT) 24 U/L  5-45   ALBUMIN 3 3 g/dL L 3 5-5 0   TOTAL PROTEIN 7 4 g/dL  6 4-8 2   eGFR Non-African American      >60 0 ml/min/1 73sq [facility] Disease Education Program recommendations are as follows:  GFR calculation is accurate only with a steady state creatinine  Chronic Kidney disease less than 60 ml/min/1 73 sq  meters  Kidney failure less than 15 ml/min/1 73 sq  meters  (1) CK (CPK) [id number] 07:05AM [facility] Order Number: [id number]     Test Name Result Flag Reference   CK (CPK) 68 U/L       (1) TSH WITH FT4 REFLEX [id number] 07:05AM [facility] Order Number: [id number]     Test Name Result Flag Reference   TSH 1 739 uIU/mL  0 358-3 740   Patients undergoing fluorescein dye angiography may retain small amounts of fluorescein in the body for 48-72 hours post procedure  Samples containing fluorescein can produce falsely depressed TSH values  If the patient had this procedure,a specimen should be resubmitted post fluorescein clearance       (1) VITAMIN B12 [id number] 07:05AM [facility] Order Number: [id number]     Test Name Result Flag Reference   VITAMIN B12 433 pg/mL  100-900       Plan  Low back pain, Muscle spasticity    · Gabapentin 300 MG Oral Capsule; TAKE 1 CAPSULE DAILY at bedtime

## 2018-01-11 NOTE — PROGRESS NOTES
Assessment    1  Encounter for preventive health examination (V70 0) (Z00 00)    Plan  Elevated blood pressure reading without diagnosis of hypertension, Health Maintenance,  Low back pain    · (1) CBC/PLT/DIFF; Status:Active; Requested for:10May2016;    · (1) COMPREHENSIVE METABOLIC PANEL; Status:Active; Requested for:10May2016;    · (1) URINALYSIS (will reflex a microscopy if leukocytes, occult blood, protein or nitrites  are not within normal limits); Status:Active; Requested for:10May2016; Health Maintenance    · Alcohol misuse can be a problem with advancing age ; Status:Complete;   Done:  29NOB7942   · Begin a limited exercise program ; Status:Complete;   Done: 98FKM5843   · Eat a normal well-balanced diet ; Status:Complete;   Done: 27RET7932   · Stretch and warm up your muscles during the first 10 minutes , then cool down your  muscles for the last 10 minutes of exercise ; Status:Complete;   Done: 15UXW3488   · There are many exercise options for seniors ; Status:Complete;   Done: 64XNH2552   · There ways to avoid falling ; Status:Complete;   Done: 10BAI6291   · These are things you can do to prevent falls in and around the home ; Status:Complete;    Done: 93KKR1209   · Use a sun block product with an SPF of 15 or more ; Status:Complete;   Done:  71XPU4866   · Call [phone] if: You have any warning signs of skin cancer ; Status:Complete;    Done: 08EEL4580   · Seek Immediate Medical Attention if: You experience a new kind of chest pain (angina)  or pressure ; Status:Complete;   Done: 83MAX1410           Derm list given  Check with insurance on Prevnar-13, Adacel, Zostavax  Refuses a colonoscopy  Discussion/Summary  Impression: Initial Annual Wellness Visit  Cardiovascular screening and counseling: the risks and benefits of screening were discussed, screening is current, counseling was given on maintaining a healthy diet and counseling was given on maintaining a healthy weight     Diabetes screening and counseling: the risks and benefits of screening were discussed, screening is current, counseling was given on maintaining a healthy diet and counseling was given on maintaining a healthy weight  Colorectal cancer screening and counseling: the patient declines screening  Prostate cancer screening and counseling: the patient declines screening and The last two years have both been 0 3  Osteoporosis screening and counseling: the patient declines screening  Abdominal aortic aneurysm screening and counseling: the risks and benefits of screening were discussed  Glaucoma screening and counseling: screening is current  HIV screening and counseling: the patient declines screening  Immunizations: influenza vaccine is up to date this year, the lifetime pneumococcal vaccine has been completed, Discussed Prevnar-13 , hepatitis B vaccination series is not indicated at this time due to the patient's low risk of jacek the disease, the patient declines the Zostavax vaccine and the risks and benefits of the Tdap vaccine were discussed with the patient  Advance Directive Planning: complete and up to date  Advice and education were given regarding alcohol use, fall risk reduction, seat belt use, skin self-exam and sunscreen use  He was referred to dermatology  Medical Equipment/Suppliers: None used  Patient Discussion: plan discussed with the patient  Chief Complaint  Medicare physical      History of Present Illness  HPI: He is here for his Medicare physical  He sees Dr Jose Ramon Kahn for his chronic low back pain and trochanteric bursitis  He also had three epidurals without much help  He plans to see Dr Shekhar Howell again to discuss surgery  He would like to see a dermatologist for his eczema  He doesn't want Zostavax  He checks his BP at home and usually his BP is often in the 110-120/70s  Occasionally in the 130-140/70s  Rarely (about three times a month) in the 160s/   It was elevated this morning at home before coming here  In the past he was on medication which dropped his BP significantly and made him dizzy so he is not interested in Rx  His meter has good correlation here in the past   He wants lab tests  Last year his HDL was 99 so won't check lipids this year  His PSA was 0 3 in the last two years  Welcome to [employer] and Wellness Visits: The patient is being seen for the initial annual wellness visit  Medicare Screening and Risk Factors   Hospitalizations: no previous hospitalizations  Medicare Screening Tests Risk Questions   Abdominal aortic aneurysm risk assessment: over 72years of age  Osteoporosis risk assessment: none indicated  HIV risk assessment: none indicated  Drug and Alcohol Use: The patient is a former cigarette smoker  The patient reports frequent alcohol use and drinking 2 drinks per day  He has never used illicit drugs  Diet and Physical Activity: Current diet includes well balanced meals, low salt food choices, 1 servings of fruit per day, 1 servings of meat per day, 1 servings of whole grains per day and 1 cups of coffee per day  He exercises 3 times per week  Exercise: strength training 45 minutes per week  Mood Disorder and Cognitive Impairment Screening: He denies feeling down, depressed, or hopeless over the past two weeks  He denies feeling little interest or pleasure in doing things over the past two weeks  Cognitive impairment screening: denies difficulty learning/retaining new information, denies difficulty handling complex tasks, denies difficulty with reasoning, denies difficulty with spatial ability and orientation, denies difficulty with language and denies difficulty with behavior  Functional Ability/Level of Safety: Hearing is slightly decreased and a hearing aid is not used   The patient is currently able to do activities of daily living without limitations, able to participate in social activities without limitations and able to drive without limitations  Activities of daily living details: does not need help using the phone, no transportation help needed, does not need help shopping, no meal preparation help needed, does not need help doing housework, does not need help doing laundry, does not need help managing medications and does not need help managing money  Fall risk factors:  alcohol use, but The patient fell 0 times in the past 12 months , no polypharmacy, no mobility impairment, no antidepressant use, no deconditioning, no postural hypotension, no sedative use, no visual impairment, no urinary incontinence, no antihypertensive use, no cognitive impairment and no previous fall  Home safety risk factors:  no unfamiliar surroundings, no loose rugs, no poor household lighting, no uneven floors, no household clutter, grab bars in the bathroom and handrails on the stairs  Advance Directives: Advance directives: living will and advance directives  Co-Managers and Medical Equipment/Suppliers: See Patient Care Team      Patient Care Team    Care Team Member Role Specialty Office Number   Jennifer Taylor MD  Orthopedic Surgery [phone]   Agueda Malin MD  Pain Management [phone]   Claus CONNELL  Family Medicine [phone]     Review of Systems    Constitutional: no malaise and no fatigue  Eyes: new glasses  ENT: no nasal congestion and no nasal discharge  Cardiovascular: no chest pain, no palpitations and no lower extremity edema  Respiratory: no shortness of breath, no wheezing and not sleeping upright or with extra pillows  Gastrointestinal: no abdominal pain, no vomiting and no diarrhea  Genitourinary: no dysuria and no urinary frequency    The patient presents with complaints of nocturia (0-2)  Musculoskeletal: as noted in HPI  Integumentary and Breasts: as noted in HPI  Psychiatric: no anxiety and no depression     Hematologic and Lymphatic: no tendency for easy bleeding and no tendency for easy bruising  Active Problems    1  Degenerative lumbar spinal stenosis (724 02) (M48 06)   2  Elevated blood pressure reading without diagnosis of hypertension (796 2) (R03 0)   3  Encounter for screening for other nervous system disorder (V80 09) (Z13 858)   4  Encounter for special screening examination for genitourinary disorder (V81 6) (Z13 89)   5  Greater trochanteric bursitis, right (726 5) (M70 61)   6  Hip pain, right (719 45) (M25 551)   7  Low back pain (724 2) (M54 5)   8  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) (L30 0)   9  Sacroiliitis (720 2) (M46 1)   10  Screen for colon cancer (V76 51) (Z12 11)   11  Screening for prostate cancer (V76 44) (Z12 5)   12  Spondylolisthesis of lumbar region (738 4) (M43 16)   13  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 3) (M47 816)   14   Weight gain (783 1) (R63 5)    Past Medical History    · History of Acute maxillary sinusitis, recurrence not specified (461 0) (J01 00)   · History of acute sinusitis (V12 69) (Z87 09)   · History of tinea corporis (V12 09) (Z86 19)   · History of urticaria (V13 3) (Z87 2)    Surgical History    · History of Septoplasty   · History of Tonsillectomy    Family History  Mother    · Family history of emphysema (V17 6) (Z82 5)  Father    · Family history of cerebrovascular accident (V17 1) (Z82 3)   · Family history of coronary artery disease (V17 3) (Z82 49)   · Family history of hypertension (V17 49) (Z82 49)  Brother    · Family history of coronary artery disease (V17 3) (Z82 49)  Family History    · Family history of Back problem   · Family history of Cerebrovascular accident (CVA), unspecified   · Family history of emphysema (V17 6) (Z82 5)   · Family history of Hypertension, benign    Social History    · Alcohol use (V49 89) (Z78 9)   · Daily Coffee Consumption (___ Cups/Day)   · Education Level: Some college   · Former smoker (V15 82) (D69 875)   · No drug use   · Occupation   · Retired   [employer]   · Social alcohol use (Z78 9)   ·     Current Meds   1  Triamcinolone Acetonide 0 1 % External Cream; APPLY 2-3 TIMES DAILY TO   AFFECTED AREA(S)  as needed; Therapy: 01HMH0642 to (Evaluate:39Ghu4011)  Requested for: 58GAO0231 Recorded    Allergies    1  Penicillins    Immunizations   1 2 3    Influenza  89VTI1280 2014 [facility] 71Mmv5951    Pneumococcal  4/7/11       Vitals  Signs [Data Includes: Current Encounter]    Systolic: 498, LUE, Sitting  Diastolic: 86, LUE, Sitting   Temperature: 97 6 F  Heart Rate: 88  Respiration: 16  Systolic: 131  Diastolic: 98  Height: 5 ft 7 5 in  Weight: 179 lb 8 96 oz  BMI Calculated: 27 71  BSA Calculated: 1 94  O2 Saturation: 99    Physical Exam    Constitutional   General appearance: No acute distress, well appearing and well nourished  Head and Face   Head and face: Normal     Eyes   Conjunctiva and lids: No erythema, swelling or discharge  Pupils and irises: Equal, round, reactive to light  Ears, Nose, Mouth, and Throat   External inspection of ears and nose: Normal     Otoscopic examination: Tympanic membranes translucent with normal light reflex  Canals patent without erythema  Hearing: Normal     Nasal mucosa, septum, and turbinates: Normal without edema or erythema  Lips, teeth, and gums: Normal, good dentition  Oropharynx: Normal with no erythema, edema, exudate or lesions  Neck   Neck: Supple, symmetric, trachea midline, no masses  Thyroid: Normal, no thyromegaly  Pulmonary   Respiratory effort: No increased work of breathing or signs of respiratory distress  Auscultation of lungs: Clear to auscultation  Cardiovascular   Palpation of heart: Normal PMI, no thrills  Auscultation of heart: Normal rate and rhythm, normal S1 and S2, no murmurs  Carotid pulses: 2+ bilaterally  no bruit heard over the right carotid and no bruit heard over the left carotid  Abdominal aorta: Normal     Femoral pulses: 2+ bilaterally      Examination of extremities for edema and/or varicosities: Normal     Abdomen   Abdomen: Non-tender, no masses  Liver and spleen: No hepatomegaly or splenomegaly  Examination for hernias: No hernias appreciated  Anus, perineum, and rectum: Normal sphincter tone, no masses, no prolapse  Genitourinary   Scrotal contents: Normal testes, no masses  Penis: Normal, no lesions  Examination of the uncircumcised penis showed  Digital rectal exam of prostate: Normal size, no masses  Lymphatic   Palpation of lymph nodes in neck: No lymphadenopathy  Palpation of lymph nodes in groin: No lymphadenopathy  Musculoskeletal   Gait and station: Normal     Inspection/palpation of digits and nails: Normal without clubbing or cyanosis  Inspection/palpation of joints, bones, and muscles: Normal     Neurologic   Cranial nerves: Cranial nerves 2-12 intact  Cortical function: Normal mental status  Psychiatric   Judgment and insight: Normal     Orientation to person, place and time: Normal     Recent and remote memory: Intact  Mood and affect: Normal        Procedure    Procedure: Visual Acuity Test    Indication: routine screening  Results: 20/30 in both eyes with corrective device, 20/30 in the right eye with corrective device, 20/25 in the left eye with corrective device      Future Appointments    Date/Time Provider Specialty Site   05/10/2016 09:00 AM LOS Ghosh   Family Medicine [facility]     Signatures   Electronically signed by : LOS Neal ; May 10 2016  9:41AM EST                       (Author)

## 2018-01-12 VITALS
DIASTOLIC BLOOD PRESSURE: 80 MMHG | TEMPERATURE: 98.1 F | HEART RATE: 87 BPM | OXYGEN SATURATION: 97 % | SYSTOLIC BLOOD PRESSURE: 134 MMHG | RESPIRATION RATE: 16 BRPM | BODY MASS INDEX: 26.84 KG/M2 | HEIGHT: 67 IN | WEIGHT: 171 LBS

## 2018-01-12 VITALS
WEIGHT: 171.5 LBS | BODY MASS INDEX: 26.92 KG/M2 | HEIGHT: 67 IN | SYSTOLIC BLOOD PRESSURE: 148 MMHG | HEART RATE: 90 BPM | DIASTOLIC BLOOD PRESSURE: 90 MMHG

## 2018-01-12 NOTE — MISCELLANEOUS
Message  Return to work or school:   Frida Ny is under my professional care  He was seen in my office on 01/26/2017       Patient has history of a lumbar fusion surgery performed on 11/03/2016  This type of surgery DOES NOT require any pre-procedural antibiotics  You may call our office with any questions or concerns          Signatures   Electronically signed by : Raymundo Deluca, [facility]; Feb 28 2017  4:24PM EST                       (Author)

## 2018-01-13 VITALS
DIASTOLIC BLOOD PRESSURE: 80 MMHG | WEIGHT: 173.31 LBS | HEIGHT: 67 IN | BODY MASS INDEX: 27.2 KG/M2 | SYSTOLIC BLOOD PRESSURE: 160 MMHG | OXYGEN SATURATION: 97 % | HEART RATE: 90 BPM

## 2018-01-14 VITALS
TEMPERATURE: 96.4 F | HEIGHT: 67 IN | SYSTOLIC BLOOD PRESSURE: 148 MMHG | HEART RATE: 78 BPM | BODY MASS INDEX: 27.49 KG/M2 | RESPIRATION RATE: 16 BRPM | OXYGEN SATURATION: 98 % | WEIGHT: 175.13 LBS | DIASTOLIC BLOOD PRESSURE: 82 MMHG

## 2018-01-14 VITALS
BODY MASS INDEX: 26.37 KG/M2 | WEIGHT: 168 LBS | DIASTOLIC BLOOD PRESSURE: 80 MMHG | SYSTOLIC BLOOD PRESSURE: 124 MMHG | HEART RATE: 88 BPM | HEIGHT: 67 IN

## 2018-01-14 VITALS
HEART RATE: 96 BPM | BODY MASS INDEX: 26.86 KG/M2 | DIASTOLIC BLOOD PRESSURE: 88 MMHG | SYSTOLIC BLOOD PRESSURE: 138 MMHG | WEIGHT: 171.5 LBS

## 2018-01-14 VITALS
OXYGEN SATURATION: 98 % | DIASTOLIC BLOOD PRESSURE: 78 MMHG | HEART RATE: 85 BPM | SYSTOLIC BLOOD PRESSURE: 132 MMHG | BODY MASS INDEX: 27.25 KG/M2 | TEMPERATURE: 97.7 F | WEIGHT: 174 LBS

## 2018-01-14 VITALS
OXYGEN SATURATION: 98 % | TEMPERATURE: 98.2 F | SYSTOLIC BLOOD PRESSURE: 142 MMHG | HEART RATE: 74 BPM | HEIGHT: 67 IN | DIASTOLIC BLOOD PRESSURE: 88 MMHG | WEIGHT: 173 LBS | BODY MASS INDEX: 27.15 KG/M2

## 2018-01-14 VITALS
BODY MASS INDEX: 27 KG/M2 | WEIGHT: 172 LBS | SYSTOLIC BLOOD PRESSURE: 144 MMHG | HEIGHT: 67 IN | HEART RATE: 83 BPM | DIASTOLIC BLOOD PRESSURE: 81 MMHG

## 2018-01-14 VITALS
SYSTOLIC BLOOD PRESSURE: 110 MMHG | TEMPERATURE: 98.5 F | OXYGEN SATURATION: 98 % | HEART RATE: 68 BPM | DIASTOLIC BLOOD PRESSURE: 68 MMHG | BODY MASS INDEX: 26.94 KG/M2 | WEIGHT: 172 LBS

## 2018-01-14 VITALS
SYSTOLIC BLOOD PRESSURE: 126 MMHG | WEIGHT: 174 LBS | BODY MASS INDEX: 27.25 KG/M2 | DIASTOLIC BLOOD PRESSURE: 77 MMHG | HEART RATE: 106 BPM

## 2018-01-14 NOTE — PROGRESS NOTES
Assessment    1  Abnormal liver function test (790 6) (R79 89)   2  Medicare annual wellness visit, subsequent (V70 0) (Z00 00)   3  Allergic rhinitis (477 9) (J30 9)   4  Muscle spasticity (728 85) (M62 838)   5  GERD without esophagitis (530 81) (K21 9)   6  S/P lumbar spinal fusion (V45 4) (Z98 1)   7  Elevated BP without diagnosis of hypertension (796 2) (R03 0)   8  Dyspnea on exertion (786 09) (R06 09)   9  Leg cramps (729 82) (R25 2)   10  Lipid screening (V77 91) (Z13 220)   11  Diabetes mellitus screening (V77 1) (Z13 1)   12  Need for vaccination with 13-polyvalent pneumococcal conjugate vaccine (V03 82) (Z23)   13  Need for influenza vaccination (V04 81) (Z23)   14  Benign non-nodular prostatic hyperplasia with lower urinary tract symptoms (600 91)    (N40 1)    Plan  Abnormal liver function test, Diabetes mellitus screening, Elevated BP without diagnosis  of hypertension, Leg cramps, Lipid screening    · (1) CBC/PLT/DIFF; Status:Active; Requested for:99Anx4965;    · (1) COMPREHENSIVE METABOLIC PANEL; Status:Active; Requested for:31Wzv0379;    · (1) HEMOGLOBIN A1C; Status:Active; Requested for:41Opd6658;    · (1) LIPID PANEL FASTING W DIRECT LDL REFLEX; Status:Active; Requested  for:21Gck3112;    · (1) TSH WITH FT4 REFLEX; Status:Active; Requested for:49Ebu0086;   FamHx: Family history of coronary artery disease    · ECHO COMPLETE WITH CONTRAST IF INDICATED; Status:Active; Requested  for:41Jho1701;    · STRESS TEST ONLY, EXERCISE; Status:Active; Requested for:80Tvv0183;   Leg cramps    · VAS LOWER LIMB ARTERIAL DUPLEX, COMPLETE BILATERAL/GRAFTS;  SIDE:Bilateral; Status:Hold For - Scheduling; Requested for:56Tsj6069;   Medicare annual wellness visit, subsequent    · Medicare Annual Wellness Visit; Status:Complete - Retrospective By Protocol  Authorization;   Done: 41ELL9889 09:50AM   · Call [phone] if:  You have any warning signs of skin cancer ; Status:Complete;    Done: 73XKO2382   · Seek Immediate Medical Attention if: You experience a new kind of chest pain (angina)  or pressure ; Status:Complete;   Done: 32ZPA0153   · Alcohol misuse can be a problem with advancing age ; Status:Complete;   Done:  45Fee3838   · Always use a seat belt and shoulder strap when riding or driving a motor vehicle ;  Status:Complete;   Done: 83AYK0958   · Brush your teeth {freq1} and floss at least once a day ; Status:Complete;   Done:  46HMY0604   · Keep a diary of when and what you eat ; Status:Complete;   Done: 02NOC0626   · Regular aerobic exercise can help reduce stress ; Status:Complete;   Done: 31NUR9112   · Stretch and warm up your muscles during the first 10 minutes , then cool down your  muscles for the last 10 minutes of exercise ; Status:Complete;   Done: 21ZBE5874   · There are many exercise options for seniors ; Status:Complete;   Done: 52ZFX6750   · There ways to avoid falling ; Status:Complete;   Done: 20BNO4738   · These are things you can do to prevent falls in and around the home ; Status:Complete;    Done: 58YHK5831   · Use a sun block product with an SPF of 15 or more ; Status:Complete;   Done:  83NAX0634   · We recommend routine visits to a dentist ; Status:Complete;   Done: 06YWQ7675   · We recommend that you follow these steps to lower your risk of osteoporosis  ;  Status:Complete;   Done: 36IUJ1206  Need for influenza vaccination    · Fluzone High-Dose 0 5 ML Intramuscular Suspension Prefilled Syringe  Need for vaccination with 13-polyvalent pneumococcal conjugate vaccine    · Prevnar 13 Intramuscular Suspension  PMH: History of low back pain, Muscle spasticity    · Gabapentin 400 MG Oral Capsule; TAKE 1 CAPSULE DAILY at bedtime  Screening for depression    · *VB-Depression Screening; Status:Complete - Retrospective By Protocol Authorization;    Done: 53JFG5120 09:54AM  Screening for genitourinary condition    · *VB - Urinary Incontinence Screen (Dx Z13 89 Screen for UI);  Status:Complete -  Retrospective By Protocol Authorization;   Done: 03NEL1462 09:53AM  Screening for neurological condition    · *VB - Fall Risk Assessment  (Dx Z13 89 Screen for Neurologic Disorder);  Status:Complete - Retrospective By Protocol Authorization;   Done: 05ASF5016 09:52AM    Discussion/Summary      SEE SEPARATE FOLLOW-UP NOTE for details of preventative care + problem list    Impression: Initial Annual Wellness Visit, with preventive exam as well as age and risk appropriate counseling completed  Cardiovascular screening and counseling: screening is current, counseling was given on maintaining a healthy diet and counseling was given on maintaining a healthy weight  Diabetes screening and counseling: counseling was given on maintaining a healthy diet and counseling was given on maintaining a healthy weight  Colorectal cancer screening and counseling: the risks and benefits of screening were discussed and due for a colonoscopy (low risk)  Osteoporosis screening and counseling: screening not indicated  Abdominal aortic aneurysm screening and counseling: screening not indicated  Glaucoma screening and counseling: screening is current  HIV screening and counseling: screening not indicated  Advance Directive Planning: complete and up to date  Patient Discussion: follow-up visit needed in 6 months  Chief Complaint  Medicare wellness visit      History of Present Illness  HPI:   Here for routine follow-up + Medicare AWV  SEE SEPARATE FOLLOW-UP NOTE for details of preventative care + problem list      Quit smoking 6 years ago  Welcome to [employer] and Wellness Visits: The patient is being seen for the subsequent annual wellness visit  Medicare Screening and Risk Factors   Hospitalizations: he has been previously hospitalizied and 11/2016  Medicare Screening Tests Risk Questions   Drug and Alcohol Use: The patient has never smoked cigarettes   The patient reports drinking 3 drinks per day and drinking 20 drinks per week  Alcohol concern:   The patient has no concerns about alcohol abuse  He has never used illicit drugs  Diet and Physical Activity: Current diet includes well balanced meals, 1 cups of coffee per day, 0 cups of tea per day and 0 cans of regular soda per day  He exercises 3 times per week  Exercise: strength training 3 hours per week  Mood Disorder and Cognitive Impairment Screening: He denies feeling down, depressed, or hopeless over the past two weeks  He denies feeling little interest or pleasure in doing things over the past two weeks  Cognitive impairment screening: denies difficulty learning/retaining new information, denies difficulty handling complex tasks, denies difficulty with reasoning and denies difficulty with language  Functional Ability/Level of Safety: He reports hearing difficulties  He does not use a hearing aid  Activities of daily living details: does not need help using the phone, no transportation help needed, does not need help shopping, no meal preparation help needed, does not need help doing housework, does not need help doing laundry, does not need help managing medications and does not need help managing money  Home safety risk factors:  no loose rugs, no poor household lighting, no uneven floors and no household clutter  Advance Directives: Advance directives: living will and advance directives  Co-Managers and Medical Equipment/Suppliers: See Patient Care Team   Preventive Quality Program 65 and Older: Falls Risk: The patient fell 0 times in the past 12 months   Urinary Incontinence Symptoms includes: urinary incontinence        Patient Care Team    Care Team Member Role Specialty Office Number   Gunjan Ballard MD Specialist Orthopedic Surgery [phone]   Анна MORALES MD  Pain Management [phone], [address] [facility],[address] DO  [facility] Medicine [phone]   Yony Piper   [phone]   Carlita Carrizales MD  Urology [phone]     Review of Systems    Constitutional: no fever  Gastrointestinal: no abdominal pain  Neurological: no headache and no dizziness  Active Problems    1  Abnormal liver function test (790 6) (R79 89)   2  Allergic rhinitis (477 9) (J30 9)   3  Benign non-nodular prostatic hyperplasia with lower urinary tract symptoms (600 91)   (N40 1)   4  Degenerative lumbar spinal stenosis (724 02) (M48 06)   5  Elevated BP without diagnosis of hypertension (796 2) (R03 0)   6  GERD without esophagitis (530 81) (K21 9)   7  History of Hand paresthesia (782 0) (R20 2)   8  History of dysuria (V13 00) (Z87 898)   9  History of urinary frequency (V13 09) (Z87 898)   10  Muscle spasticity (728 85) (M62 838)   11  Need for influenza vaccination (V04 81) (Z23)   12  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) (L30 0)   13  S/P lumbar spinal fusion (V45 4) (Z98 1)   14  Screen for colon cancer (V76 51) (Z12 11)   15  Spondylolisthesis of lumbar region (738 4) (M43 16)   16  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 3) (M47 816)   17  History of Testicular pain, right (608 9) (N50 811)    Past Medical History    · History of Acute maxillary sinusitis, recurrence not specified (461 0) (J01 00)   · Acute sinusitis (461 9) (J01 90)   · History of Hand paresthesia (782 0) (R20 2)   · History of acute sinusitis (V12 69) (Z87 09)   · History of dysuria (V13 00) (X36 004)   · History of tinea corporis (V12 09) (Z86 19)   · History of urinary frequency (V13 09) (Y69 362)   · History of urticaria (V13 3) (Z87 2)   · Muscle spasticity (728 85) (H98 155)   · History of Testicular pain, right (608 9) (N50 811)    The active problems and past medical history were reviewed and updated today  Surgical History    · History of Back Surgery   · History of Septoplasty   · History of Tonsillectomy    The surgical history was reviewed and updated today         Family History  Mother    · Family history of emphysema (V17 6) (Z82 5)  Father    · Family history of cerebrovascular accident (V17 1) (Z82 3)   · Family history of coronary artery disease (V17 3) (Z82 49)   · Family history of hypertension (V17 49) (Z82 49)  Brother    · Family history of coronary artery disease (V17 3) (Z82 49)  Family History    · Family history of Back problem   · Family history of Cerebrovascular accident (CVA), unspecified   · Family history of emphysema (V17 6) (Z82 5)   · Family history of Hypertension, benign    The family history was reviewed and updated today  Social History    · Alcohol use (V49 89) (Z78 9)   · Daily Coffee Consumption (___ Cups/Day)   · Education Level: Some college   · Former smoker (A28 68) (L16 359)   · No drug use   · Occupation   · Retired   [employer]   · Social alcohol use (Z78 9)   ·   The social history was reviewed and updated today  The social history was reviewed and is unchanged  Current Meds   1  Align Oral Capsule; USE AS DIRECTED; Therapy: 34FDX9031 to (Last AO:34HMV4850)  Requested for: 26Jun2017 Ordered   2  Daily Multiple Vitamins TABS; Therapy: (Recorded:21Mar2017) to Recorded   3  Gabapentin 400 MG Oral Capsule; TAKE 1 CAPSULE DAILY at bedtime; Therapy: 37UFI3649 to (Last Rx:05Jun2017)  Requested for: 02BVW7446 Ordered   4  Omeprazole 20 MG Oral Capsule Delayed Release; take 1 capsule by mouth once daily; Therapy: 78QAU9011 to (03 17 74 30 53)  Requested for: 26Jun2017; Last   Rx:26Jun2017 Ordered    The medication list was reviewed and updated today  Allergies    1   Penicillins    Immunizations   1 2 3 4    Influenza  09-Oct-2013 2014 [facility] 27-Oct-2015 22-Sep-2016    PPSV  07-Apr-2011        Vitals  Signs    Systolic: 934  Diastolic: 88   Temperature: 98 2 F, Tympanic  Heart Rate: 74  Systolic: 302  Diastolic: 357  Height: 5 ft 7 in  Weight: 173 lb   BMI Calculated: 27 1  BSA Calculated: 1 9  O2 Saturation: 98    Physical Exam    Constitutional   General appearance: No acute distress, well appearing and well nourished  Ears, Nose, Mouth, and Throat   Oropharynx: Normal with no erythema, edema, exudate or lesions  Pulmonary   Respiratory effort: No increased work of breathing or signs of respiratory distress  Auscultation of lungs: Clear to auscultation  Cardiovascular   Auscultation of heart: Normal rate and rhythm, normal S1 and S2, no murmurs  Examination of extremities for edema and/or varicosities: Normal     Abdomen   Abdomen: Non-tender, no masses  Musculoskeletal   Gait and station: Normal     Psychiatric   Orientation to person, place and time: Normal        Results/Data  *VB-Depression Screening 75Czn7723 09:54AM Saurav Earthly     Test Name Result Flag Reference   Depression Scale Result      Depression Screen - Negative For Symptoms     *VB - Urinary Incontinence Screen (Dx Z13 89 Screen for UI) 62GPO7957 09:53AM Saurav Earthly     Test Name Result Flag Reference   Urinary Incontinence Assessment 46Ihx2658       *VB - Fall Risk Assessment  (Dx Z13 89 Screen for Neurologic Disorder) 69Grm5898 09:52AM Saurav Earthly     Test Name Result Flag Reference   Falls Risk      No falls in the past year     Medicare Annual Wellness Visit 63XVH0865 09:50AM Saurav Earthly     Test Name Result Flag Reference   MEDICARE [place] VISIT [id number]         Future Appointments    Date/Time Provider Specialty Site   03/13/2018 10:00 AM Saurva Haro, [address] [facility]   10/19/2017 09:00 AM LOS Sanchez  Gastroenterology Adult [facility] GASTROENTEROLOGY [place]   03/22/2018 09:15 AM Supa [facility] Jake Song   10/23/2017 10:20 AM LOS Bridges   Orthopedic Surgery [facility]     Signatures   Electronically signed by : Graciela Byrd; Sep 11 2017 11:04AM EST                       (Author)    Electronically signed by : Jena Naranjo DO; Sep 11 2017 11:52AM EST                       (Author)

## 2018-01-14 NOTE — RESULT NOTES
Discussion/Summary   Please inform the patient that biopsies from the stomach were negative for H pylori  Biopsies from colon were normal as well  Please have the patient follow up in the office as needed, call with any questions or concerns  Please make sure that he takes daily PPI therapy  Schedule follow-up in 6 months  Verified Results  (1) TISSUE EXAM 28ADY9984 10:19AM Aishwarya Alvarez     Test Name Result Flag Reference   LAB AP CASE REPORT (Report)     Surgical Pathology Report             Case: [id number]                   Authorizing Provider: Tamia Abreu MD      Collected:      11/15/2017 1019        Ordering Location:   [facility]    Received:      11/15/2017 [facility]                            Pathologist:      Zoey Kaba MD                                Specimens:  A) - Stomach, stomach body - gastritis - r/o h pylori                         B) - Colon, random - r/o microscopic colitis   LAB AP FINAL DIAGNOSIS (Report)     A  Stomach, stomach body - gastritis - r/o h pylori biopsy:   -Benign gastric-oxyntoantral type mucosa with mild chronic inflammation   and reactive surface foveolar epithelial changes consistent with reactive/   chemical gastritis   -No evidence of ulceration   -No evidence of dysplasia or malignancy   -No H Pylori organisms identified on immunohistochemical stained slide  Control reacted appropriately  B  Colon, random - r/o microscopic colitis Biopsy:  -Benign colonic mucosa with no significant histopathological changes   -No evidence of active colitis   -No evidence of lymphocytic or collagenous colitis   -No evidence of dysplasia or malignancy seen      Electronically signed by Zoey Kaba MD on 11/17/2017 at 2:08 PM   LAB AP NOTE      Interpretation performed at [facility], [facility]   LAB AP SURGICAL ADDITIONAL INFORMATION (Report)     All controls performed with the immunohistochemical stains reported above   reacted appropriately  These tests were developed and their performance   characteristics determined by [facility] or   [facility]  They may not be cleared or approved by the [place]  Food and Drug Administration  The FDA has determined that such clearance   or approval is not necessary  These tests are used for clinical purposes  They should not be regarded as investigational or for research  This   laboratory has been approved by [place] [address], designated as a high-complexity   laboratory and is qualified to perform these tests  LAB AP GROSS DESCRIPTION (Report)     A  The specimen is received in formalin, labeled with the patient's name   and hospital number, and is designated stomach body  The specimen   consists of 5 tan-pink soft tissue fragments ranging from 0 2 cm to 0 5 cm   in greatest dimension  Entirely submitted  One cassette  B  The specimen is received in formalin, labeled with the patient's name   and hospital number, and is designated Random colon biopsy  The   specimen consists of 5 tan-pink soft tissue fragments ranging from 0 3 cm   to 0 5 cm in greatest dimension  Entirely submitted  One cassette  Note: The estimated total formalin fixation time based upon information   provided by the submitting clinician and the standard processing schedule   is under 72 hours    Agnes Martins

## 2018-01-15 ENCOUNTER — APPOINTMENT (OUTPATIENT)
Dept: LAB | Facility: CLINIC | Age: 78
End: 2018-01-15
Payer: MEDICARE

## 2018-01-15 ENCOUNTER — LAB REQUISITION (OUTPATIENT)
Dept: LAB | Facility: HOSPITAL | Age: 78
End: 2018-01-15
Payer: MEDICARE

## 2018-01-15 ENCOUNTER — TRANSCRIBE ORDERS (OUTPATIENT)
Dept: LAB | Facility: CLINIC | Age: 78
End: 2018-01-15

## 2018-01-15 DIAGNOSIS — I25.10 ATHEROSCLEROTIC HEART DISEASE OF NATIVE CORONARY ARTERY WITHOUT ANGINA PECTORIS: ICD-10-CM

## 2018-01-15 DIAGNOSIS — I25.10 ATHEROSCLEROSIS OF NATIVE CORONARY ARTERY, ANGINA PRESENCE UNSPECIFIED, UNSPECIFIED WHETHER NATIVE OR TRANSPLANTED HEART: Primary | ICD-10-CM

## 2018-01-15 DIAGNOSIS — I25.10 ATHEROSCLEROSIS OF NATIVE CORONARY ARTERY, ANGINA PRESENCE UNSPECIFIED, UNSPECIFIED WHETHER NATIVE OR TRANSPLANTED HEART: ICD-10-CM

## 2018-01-15 LAB
ABO GROUP BLD: NORMAL
BACTERIA UR QL AUTO: ABNORMAL /HPF
BILIRUB UR QL STRIP: NEGATIVE
BLD GP AB SCN SERPL QL: NEGATIVE
CHOLEST SERPL-MCNC: 144 MG/DL (ref 50–200)
CLARITY UR: CLEAR
COLOR UR: YELLOW
GLUCOSE UR STRIP-MCNC: NEGATIVE MG/DL
HDLC SERPL-MCNC: 83 MG/DL (ref 40–60)
HGB UR QL STRIP.AUTO: NEGATIVE
KETONES UR STRIP-MCNC: NEGATIVE MG/DL
LDLC SERPL CALC-MCNC: 52 MG/DL (ref 0–100)
LEUKOCYTE ESTERASE UR QL STRIP: ABNORMAL
NITRITE UR QL STRIP: NEGATIVE
NON-SQ EPI CELLS URNS QL MICRO: ABNORMAL /HPF
PH UR STRIP.AUTO: 5.5 [PH] (ref 4.5–8)
PROT UR STRIP-MCNC: NEGATIVE MG/DL
RBC #/AREA URNS AUTO: ABNORMAL /HPF
RH BLD: POSITIVE
SP GR UR STRIP.AUTO: 1.02 (ref 1–1.03)
SPECIMEN EXPIRATION DATE: NORMAL
TRIGL SERPL-MCNC: 47 MG/DL
UROBILINOGEN UR QL STRIP.AUTO: 0.2 E.U./DL
WBC #/AREA URNS AUTO: ABNORMAL /HPF

## 2018-01-15 PROCEDURE — 80061 LIPID PANEL: CPT

## 2018-01-15 PROCEDURE — 86900 BLOOD TYPING SEROLOGIC ABO: CPT | Performed by: PHYSICIAN ASSISTANT

## 2018-01-15 PROCEDURE — 86850 RBC ANTIBODY SCREEN: CPT | Performed by: PHYSICIAN ASSISTANT

## 2018-01-15 PROCEDURE — 81001 URINALYSIS AUTO W/SCOPE: CPT

## 2018-01-15 PROCEDURE — 36415 COLL VENOUS BLD VENIPUNCTURE: CPT

## 2018-01-15 PROCEDURE — 86901 BLOOD TYPING SEROLOGIC RH(D): CPT | Performed by: PHYSICIAN ASSISTANT

## 2018-01-15 PROCEDURE — 87081 CULTURE SCREEN ONLY: CPT

## 2018-01-15 NOTE — RESULT NOTES
Verified Results  (1) HEPATIC FUNCTION PANEL 71Kau5721 07:47AM Nova Segura Order Number: [id number]     Test Name Result Flag Reference   ALBUMIN 3 7 g/dL  3 5-5 0   ALK PHOSPHATAS 87 U/L     ALT (SGPT) 41 U/L  12-78   AST(SGOT) 37 U/L  5-45   BILI, DIRECT 0 17 mg/dL  0 00-0 20   BILI, TOTAL 0 60 mg/dL  0 20-1 00   TOTAL PROTEIN 7 7 g/dL  6 4-8 2       Discussion/Summary   Liver tests are all normal now  Watch the alcohol use   Dr Wallace Organ

## 2018-01-15 NOTE — MISCELLANEOUS
History of Present Illness  TCM Communication [facility]: The patient is being contacted for follow-up after hospitalization and pt will call back to schedule  He was hospitalized at [facility]  The dates of hospitalization: 11/7/2016, date of discharge: 11/19/2017  Diagnosis: spondylolisthesis  He was discharged to home  He did not schedule a follow up appointment  The patient is currently asymptomatic  Counseling was provided to patient's family  spioke with jovanny  Communication performed and completed by kj      Active Problems     1  Abnormal liver function test (790 6) (R79 89)   2  Acute non-recurrent frontal sinusitis (461 1) (J01 10)   3  Allergic rhinitis (477 9) (J30 9)   4  Degenerative lumbar spinal stenosis (724 02) (M48 06)   5  Elevated blood pressure reading without diagnosis of hypertension (796 2) (R03 0)   6  Greater trochanteric bursitis, right (726 5) (M70 61)   7  Hand paresthesia (782 0) (R20 2)   8  Hip pain, right (719 45) (M25 551)   9  Need for influenza vaccination (V04 81) (Z23)   10  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) (L30 0)   11  Preoperative examination (V72 84) (Z01 818)   12  Sacroiliitis (720 2) (M46 1)   13  Screen for colon cancer (V76 51) (Z12 11)   14  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 3) (M47 816)   15  Weight gain (783 1) (R63 5)    Low back pain (724 2) (M54 5)       Spondylolisthesis of lumbar region (738 4) (M43 16)          Past Medical History    1  History of Acute maxillary sinusitis, recurrence not specified (461 0) (J01 00)   2  Acute sinusitis (461 9) (J01 90)   3  Hand paresthesia (782 0) (R20 2)   4  History of acute sinusitis (V12 69) (Z87 09)   5  History of tinea corporis (V12 09) (Z86 19)   6  History of urticaria (V13 3) (Z87 2)    Surgical History    1  History of Septoplasty   2  History of Tonsillectomy    Family History  Mother    1  Family history of emphysema (V17 6) (Z82 5)  Father    2   Family history of cerebrovascular accident (V17 1) (Z82 3)   3  Family history of coronary artery disease (V17 3) (Z82 49)   4  Family history of hypertension (V17 49) (Z82 49)  Brother    5  Family history of coronary artery disease (V17 3) (Z82 49)  Family History    6  Family history of Back problem   7  Family history of Cerebrovascular accident (CVA), unspecified   8  Family history of emphysema (V17 6) (Z82 5)   9  Family history of Hypertension, benign    Social History    · Alcohol use (V49 89) (Z78 9)   · Daily Coffee Consumption (___ Cups/Day)   · Education Level: Some college   · Former smoker (V15 82) (I68 209)   · No drug use   · Occupation   · Retired   · Social alcohol use (Z78 9)   ·     Current Meds   1  Mometasone Furoate 0 1 % External Cream; APPLY TOPICALLY BID AS NEEDED; Therapy: 09CAL8367 to Recorded    Allergies    1  Penicillins    Message   Recorded as Task   Date: 11/19/2016 03:22 PM, Created By: System   Task Name: Hospital [facility]   Assigned To: Mikaela Duenas   Regarding Patient: Sapphire Francisco, Status: Active   Comment:    System - 19 Nov 2016 3:22 PM     Patient discharged from hospital   Patient Name: Debbie Sloan  Patient YOB: 1940  Discharge Date: 11/19/2016  Facility: [facility]     [facility] Appointments    Date/Time Provider Specialty Site   09/05/2017 09:00 AM Lionel Aguilar, [address] [place] [address]   03/22/2018 09:15 AM Bev Cowart Urology [facility] FOR UROLOGY [address] [place] [address]   10/23/2017 10:20 AM Sharyle Schlichter, M D   Orthopedic Surgery [address]     Signatures   Electronically signed by : KEVIN Allen; May 25 2017  7:33PM EST                       (Author)    Electronically signed by : Dedra Lou DO; May 26 2017  7:46AM EST                       (Author)

## 2018-01-16 LAB — MRSA NOSE QL CULT: NORMAL

## 2018-01-16 RX ORDER — ATORVASTATIN CALCIUM 20 MG/1
20 TABLET, FILM COATED ORAL 2 TIMES DAILY
COMMUNITY
End: 2018-01-23 | Stop reason: HOSPADM

## 2018-01-16 NOTE — PRE-PROCEDURE INSTRUCTIONS
Pre-Surgery Instructions:   Medication Instructions    Ascorbic Acid (VITAMIN C) 1000 MG tablet Instructed patient per Anesthesia Guidelines   aspirin (ECOTRIN LOW STRENGTH) 81 mg EC tablet Instructed patient per Anesthesia Guidelines   atorvastatin (LIPITOR) 20 mg tablet Instructed patient per Anesthesia Guidelines   cholecalciferol (VITAMIN D3) 1,000 units tablet Instructed patient per Anesthesia Guidelines   cyanocobalamin (VITAMIN B-12) 1,000 mcg tablet Instructed patient per Anesthesia Guidelines   isosorbide mononitrate (IMDUR) 30 mg 24 hr tablet Instructed patient per Anesthesia Guidelines   metoprolol tartrate (LOPRESSOR) 25 mg tablet Instructed patient per Anesthesia Guidelines   mometasone (ELOCON) 0 1 % cream Instructed patient per Anesthesia Guidelines   Multiple Vitamin (MULTIVITAMIN) capsule Instructed patient per Anesthesia Guidelines   pantoprazole (PROTONIX) 40 mg tablet Instructed patient per Anesthesia Guidelines   Probiotic Product (ALIGN PO) Instructed patient per Anesthesia Guidelines

## 2018-01-16 NOTE — MISCELLANEOUS
Message   Recorded as Task   Date: 03/07/2016 08:45 AM, Created By: Berhane Sung   Task Name: Follow Up   Assigned To: Kierra Fernandez procedure,Team   Regarding Patient: Sol Ravi, Status: Active   Comment:    Linda Aviles - 07 Mar 2016 8:45 AM     TASK CREATED  Pt is S/P (R) TROCHANTERIC BURSA INJ/US GUIDANCE on 3/1/16 by Dr Laura Dai  No pain diary scanned in  No f/u scheduled   Tara Vides - 08 Mar 2016 1:44 PM     TASK EDITED  1st attempt-lmom for f/u after injection   Linda Aviles - 08 Mar 2016 3:42 PM     TASK EDITED  Pt LM on procedure line to say his inj was sucessful and the has disappeared  Emma Carreon - 08 Mar 2016 3:47 PM     TASK REPLIED TO: Previously Assigned To Emma Carreon md aware        Active Problems    1  Degenerative lumbar spinal stenosis (724 02) (M48 06)   2  Elevated blood pressure reading without diagnosis of hypertension (796 2) (R03 0)   3  Encounter for screening for other nervous system disorder (V80 09) (Z13 858)   4  Encounter for special screening examination for genitourinary disorder (V81 6) (Z13 89)   5  Greater trochanteric bursitis, right (726 5) (M70 61)   6  Hip pain, right (719 45) (M25 551)   7  Low back pain (724 2) (M54 5)   8  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) (L30 0)   9  Sacroiliitis (720 2) (M46 1)   10  Screen for colon cancer (V76 51) (Z12 11)   11  Screening for prostate cancer (V76 44) (Z12 5)   12  Spondylolisthesis of lumbar region (738 4) (M43 16)   13  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 3) (M47 816)   14  Weight gain (783 1) (R63 5)    Current Meds   1  Triamcinolone Acetonide 0 1 % External Cream; APPLY 2-3 TIMES DAILY TO   AFFECTED AREA(S)  as needed; Therapy: 26BRE0073 to (Evaluate:57Tfg3817)  Requested for: 75URR4524 Recorded    Allergies    1   Penicillins    Signatures   Electronically signed by : Rashid Cobos, ; Mar  8 2016  4:11PM EST                       (Author)

## 2018-01-16 NOTE — CONSULTS
Assessment    1  Urinary frequency (788 41) (R35 0)   2  Testicular pain, right (608 9) (N50 811)   3  Benign non-nodular prostatic hyperplasia with lower urinary tract symptoms (600 91)   (N40 1)    Plan  Benign non-nodular prostatic hyperplasia with lower urinary tract symptoms    · Tamsulosin HCl - 0 4 MG Oral Capsule; TAKE 1 CAPSULE Daily   Rx By: Ruthie Hancock; Dispense: 90 Days ; #:90 Capsule; Refill: 3; For: Benign non-nodular prostatic hyperplasia with lower urinary tract symptoms; HARRIET = Y; Verified Transmission to [facility] Last Updated By: System, Rewarder; 1/23/2017 10:22:07 AM   · Uroflow w/ Post Void Residual - POC; Status:Active - Perform Order; Requested  JKD:[id number];    Perform: In Office; XIZ:[id number]; Ordered; For:Benign non-nodular prostatic hyperplasia with lower urinary tract symptoms; Ordered By:Mariel Metcalf;   · Follow-up visit in 2 months Evaluation and Treatment  Follow-up  Status: Hold For -  Scheduling  Requested for: 00AAM7370   Ordered; For: Benign non-nodular prostatic hyperplasia with lower urinary tract symptoms; Ordered By: Ruthie Hancock Performed:  Due: 78FOL9575  Testicular pain, right    · Urine Dip Non-Automated- POC; Status:Complete - Retrospective By Protocol  Authorization;   Done: 60JSI6360 09:29AM   Performed: In Office; TZP:90AMX6275; Last Updated By:Vika Castillo; 1/23/2017 9:30:26 AM;Ordered; For:Testicular pain, right; Ordered By:Marile Metcalf; Discussion/Summary  Discussion Summary:   My impression is lower urinary tract symptoms, nocturia him of BPH  Although his prostate feels relatively normal in size on examination, his symptoms are most consistent with BPH  I recommended trial of tamsulosin 0 4 mg daily at bedtime  Follow-up will be in the next 8 weeks with a post void residual assessment and uroflow evaluation to assess the efficacy of the tamsulosin  I would hold on any additional antibiotics as there are no signs of infection   I would discontinue prostate cancer screening based on the patient's most recent PSA of 0 3 from 2015 and a prostate examination which shows no evidence of mass or nodularity  Chief Complaint  Chief Complaint Free Text Note Form: Urinary frequency; right testicular pain      History of Present Illness  HPI: Bryan Bojorquez is a 69-year-old male who recently underwent lumbar spine surgery with [facility] orthopedics  Postoperatively he complains of urgency and frequency along with a weak urinary stream as well as nocturia Ã3+ episodes per night  He was seen by his family physician and prescribed 2 rounds of antibiotics  First he was given Keflex and then was given Levaquin for possible prostatitis  With a negative urine culture and no signs of fever, I feel the prostatitis is a very unlikely diagnosis  His symptoms are most consistent with BPH  He also complains of mild right-sided testicular pain which has improved  Past medical and surgical history is remarkable for spine surgery  Medical, surgical, family, and social histories were reviewed in [employer]  Review of Systems  Complete-Male Urology:   Constitutional: No fever or chills, feels well, no tiredness, no recent weight gain or weight loss  Respiratory: No complaints of shortness of breath, no wheezing, no cough, no SOB on exertion, no orthopnea or PND  Cardiovascular: No complaints of slow heart rate, no fast heart rate, no chest pain, no palpitations, no leg claudication, no lower extremity  Gastrointestinal: No complaints of abdominal pain, no constipation, no nausea or vomiting, no diarrhea or bloody stools  Genitourinary: Empty sensation, incontinence (post dribble/no pads), stream quality poor, urinary stream starts and stops, but as noted in HPI, no hematuria and no feelings of urinary urgency    The patient presents with complaints of dysuria (small  )     The patient presents with complaints of urinary hesitancy (occ)   The patient presents with complaints of nocturia (3x)   Musculoskeletal: No complaints of arthralgia, no myalgias, no joint swelling or stiffness, no limb pain or swelling  Integumentary: No complaints of skin rash or skin lesions, no itching, no skin wound, no dry skin  Hematologic/Lymphatic: No complaints of swollen glands, no swollen glands in the neck, does not bleed easily, no easy bruising  Neurological: No compliants of headache, no confusion, no convulsions, no numbness or tingling, no dizziness or fainting, no limb weakness, no difficulty walking  ROS Reviewed:   ROS reviewed  Active Problems    1  Abnormal liver function test (790 6) (R79 89)   2  Acute non-recurrent frontal sinusitis (461 1) (J01 10)   3  Allergic rhinitis (477 9) (J30 9)   4  Cellulitis of trunk, unspecified site of trunk (682 2) (L03 319)   5  Degenerative lumbar spinal stenosis (724 02) (M48 06)   6  Dysuria (788 1) (R30 0)   7  Elevated blood pressure reading without diagnosis of hypertension (796 2) (R03 0)   8  Greater trochanteric bursitis, right (726 5) (M70 61)   9  Hand paresthesia (782 0) (R20 2)   10  Hip pain, right (719 45) (M25 551)   11  Low back pain (724 2) (M54 5)   12  Need for influenza vaccination (V04 81) (Z23)   13  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) (L30 0)   14  Preoperative examination (V72 84) (Z01 818)   15  S/P lumbar spinal fusion (V45 4) (Z98 1)   16  Sacroiliitis (720 2) (M46 1)   17  Screen for colon cancer (V76 51) (Z12 11)   18  Spondylolisthesis of lumbar region (738 4) (M43 16)   19  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 3) (M47 816)   20  Testicular pain, right (608 9) (N50 811)   21  Urinary frequency (788 41) (R35 0)   22  Weight gain (783 1) (R63 5)    Past Medical History    1  History of Acute maxillary sinusitis, recurrence not specified (461 0) (J01 00)   2  Acute sinusitis (461 9) (J01 90)   3  Dysuria (788 1) (R30 0)   4  Hand paresthesia (782 0) (R20 2)   5  History of acute sinusitis (V12 69) (Z87 09)   6  History of tinea corporis (V12 09) (Z86 19)   7  History of urticaria (V13 3) (Z87 2)   8  Testicular pain, right (608 9) (N50 811)   9  Urinary frequency (788 41) (R35 0)  Active Problems And Past Medical History Reviewed: The active problems and past medical history were reviewed and updated today  Surgical History    1  History of Septoplasty   2  History of Tonsillectomy  Surgical History Reviewed: The surgical history was reviewed and updated today  Family History  Mother    1  Family history of emphysema (V17 6) (Z82 5)  Father    2  Family history of cerebrovascular accident (V17 1) (Z82 3)   3  Family history of coronary artery disease (V17 3) (Z82 49)   4  Family history of hypertension (V17 49) (Z82 49)  Brother    5  Family history of coronary artery disease (V17 3) (Z82 49)  Family History    6  Family history of Back problem   7  Family history of Cerebrovascular accident (CVA), unspecified   8  Family history of emphysema (V17 6) (Z82 5)   9  Family history of Hypertension, benign  Family History Reviewed: The family history was reviewed and updated today  Social History    · Alcohol use (V49 89) (Z78 9)   · Daily Coffee Consumption (___ Cups/Day)   · Education Level: Some college   · Former smoker (H46 35) (U39 285)   · No drug use   · Occupation   · Retired   · Social alcohol use (Z78 9)   ·   Social History Reviewed: The social history was reviewed and updated today  The social history was reviewed and is unchanged  Current Meds   1  LevoFLOXacin 500 MG Oral Tablet; TAKE 1 TABLET DAILY X 7 DAYS; Therapy: 13Nwa5834 to (Evaluate:43Oag3422)  Requested for: 40Yfw4084; Last   Rx:17Rsu9328 Ordered   2  Methocarbamol 750 MG Oral Tablet; TAKE 1 TABLET AT BEDTIME AS NEEDED; Therapy: 76Tbp8535 to (Evaluate:11Jan2017)  Requested for: 87Sqr4715; Last   Rx:09Vse6580 Ordered   3   Methocarbamol 750 MG Oral Tablet; TAKE 1 TABLET EVERY 8 HOURS AS NEEDED   MUSCLE SPASMS; Therapy: 97FLQ2711 to (Last Rx:22Nov2016)  Requested for: 22Nov2016 Ordered   4  Mometasone Furoate 0 1 % External Cream; APPLY TOPICALLY BID AS NEEDED; Therapy: 12FJV0080 to Recorded   5  OxyCODONE HCl - 5 MG Oral Capsule; TAKE 1 CAPSULE EVERY 3 TO 4 HOURS AS   NEEDED FOR PAIN;   Therapy: 19AXT8102 to (Evaluate:08Jhb1971); Last Rx:12Dec2016 Ordered   6  OxyCODONE HCl - 5 MG Oral Tablet; TAKE 1 TABLET EVERY 4 HOURS AS NEEDED   FOR PAIN;   Therapy: 80TXL0764 to (Evaluate:62Kee7973); Last Rx:22Nov2016 Ordered   7  Pantoprazole Sodium 40 MG Oral Tablet Delayed Release; take 1 tablet daily before   breakfast;   Therapy: 86KOO2354 to (Evaluate:22Dec2016)  Requested for: 22Nov2016; Last   Rx:22Nov2016 Ordered    Allergies    1  Penicillins    Vitals  Vital Signs    Recorded: 95EQD9820 09:21AM   Heart Rate 90   Systolic 921   Diastolic 90   Height 5 ft 7 in   Weight 171 lb 8 0 oz   BMI Calculated 26 86   BSA Calculated 1 89     Physical Exam    Additional Exam:  On examination he is in no acute distress  His abdomen is soft nontender and nondistended   examination reveals a normal uncircumcised phallus  Both testes are normal and properly descended into the scrotum without mass  There is no inguinal hernia  Digital rectal examination reveals a 30-35 g prostate which is smooth, firm, and flat  Skin is warm  Extremities without edema  Neurologic is grossly intact and nonfocal  Gait is slow with the assistance of a cane   Affect normal       Results/Data  Urine Dip Non-Automated- POC 55MEW8316 09:29AM Ruthie Hancock     Test Name Result Flag Reference   Color Yellow     Clarity Transparent     Leukocytes -     Nitrite -     Blood -     Protein -     Ph 5 0     Specific Gravity 1 030     Ketone -     Glucose -       (1) PSA (SCREEN) (Dx V76 44 Screen for Prostate Cancer) 89YBL0754 08:49AM Stevphen Tremont Order Number: [id number]     Test Name Result Flag Reference PSA 0 3 ng/mL   0-4 0   PSA values from one laboratory may not be comparable to those from  another because of the various testing technologies employed  Additionally, PSA results can not be interpreted as absolute evidence of  the presence or absence of disease  This PSA value was obtained by [email or web address]  Future Appointments    Date/Time Provider Specialty Site   01/26/2017 08:40 AM [facility]   Orthopedic [facility]     Signatures   Electronically signed by : Kevin Mcguire MD; Jan 23 2017 10:24AM EST                       (Author)

## 2018-01-17 ENCOUNTER — GENERIC CONVERSION - ENCOUNTER (OUTPATIENT)
Dept: OTHER | Facility: OTHER | Age: 78
End: 2018-01-17

## 2018-01-17 ENCOUNTER — HOSPITAL ENCOUNTER (OUTPATIENT)
Dept: PULMONOLOGY | Facility: HOSPITAL | Age: 78
Discharge: HOME | End: 2018-01-17
Payer: MEDICARE

## 2018-01-17 ENCOUNTER — HOSPITAL ENCOUNTER (OUTPATIENT)
Dept: NON INVASIVE DIAGNOSTICS | Facility: CLINIC | Age: 78
Discharge: HOME | End: 2018-01-17
Payer: MEDICARE

## 2018-01-17 DIAGNOSIS — R06.02 SHORTNESS OF BREATH: ICD-10-CM

## 2018-01-17 DIAGNOSIS — I25.10 ATHEROSCLEROTIC HEART DISEASE OF NATIVE CORONARY ARTERY WITHOUT ANGINA PECTORIS: ICD-10-CM

## 2018-01-17 DIAGNOSIS — I25.10 ATHEROSCLEROSIS OF NATIVE CORONARY ARTERY OF NATIVE HEART WITHOUT ANGINA PECTORIS: ICD-10-CM

## 2018-01-17 PROCEDURE — 93880 EXTRACRANIAL BILAT STUDY: CPT

## 2018-01-17 PROCEDURE — 94060 EVALUATION OF WHEEZING: CPT

## 2018-01-17 PROCEDURE — 93971 EXTREMITY STUDY: CPT

## 2018-01-17 PROCEDURE — 94726 PLETHYSMOGRAPHY LUNG VOLUMES: CPT

## 2018-01-17 PROCEDURE — 94760 N-INVAS EAR/PLS OXIMETRY 1: CPT

## 2018-01-17 PROCEDURE — 94729 DIFFUSING CAPACITY: CPT

## 2018-01-17 RX ORDER — ALBUTEROL SULFATE 2.5 MG/3ML
2.5 SOLUTION RESPIRATORY (INHALATION) ONCE
Status: COMPLETED | OUTPATIENT
Start: 2018-01-17 | End: 2018-01-17

## 2018-01-17 RX ADMIN — ALBUTEROL SULFATE 2.5 MG: 2.5 SOLUTION RESPIRATORY (INHALATION) at 14:41

## 2018-01-18 ENCOUNTER — ANESTHESIA EVENT (OUTPATIENT)
Dept: PERIOP | Facility: HOSPITAL | Age: 78
DRG: 236 | End: 2018-01-18
Payer: MEDICARE

## 2018-01-18 NOTE — MISCELLANEOUS
Assessment    1  Hip pain, right (719 45) (M25 551)   2  Low back pain (724 2) (M54 5)   3  Sacroiliitis (720 2) (M46 1)   4  Spondylolisthesis of lumbar region (738 4) (M43 16)   5  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 3) (M47 816)   6  Weight gain (783 1) (R63 5)   7  Abnormal liver function test (790 6) (R79 89)   8  Greater trochanteric bursitis, right (726 5) (M70 61)   9  Hand paresthesia (782 0) (R20 2)    History of Present Illness  TCM Communication [facility]: The patient is being contacted for follow-up after hospitalization and [facility] [address]  He was hospitalized at [facility]  The dates of hospitalization: 4, date of admission: 11/3/2016, date of discharge: 11/4/2016  Diagnosis: spondylolisthesis of lumbar region, spinal stenosis of lumbar region, spondylosis of lumbar region without myelopathy or radiculopathy  He was discharged to a rehabilitation center, [facility]  Medications were not reviewed today  He did not schedule a follow up appointment  Follow-up appointments with other specialists: Alma Delia Palma ([facility]) STATES THEY WILL CALL AFTER HER DAD IS DISCHARGED FROM REHAB  Counseling was provided to patient's family  Jean Pierre Figueroa  Communication performed and completed by CORNTEY COY      Active Problems    1  Abnormal liver function test (790 6) (R79 89)   2  Acute non-recurrent frontal sinusitis (461 1) (J01 10)   3  Allergic rhinitis (477 9) (J30 9)   4  Degenerative lumbar spinal stenosis (724 02) (M48 06)   5  Elevated blood pressure reading without diagnosis of hypertension (796 2) (R03 0)   6  Greater trochanteric bursitis, right (726 5) (M70 61)   7  Hand paresthesia (782 0) (R20 2)   8  Hip pain, right (719 45) (M25 551)   9  Low back pain (724 2) (M54 5)   10  Need for influenza vaccination (V04 81) (Z23)   11  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) (L30 0)   12   Preoperative examination (V72 84) (Z01 818)   13  Sacroiliitis (720 2) (M46 1)   14  Screen for colon cancer (V76 51) (Z12 11)   15  Spondylolisthesis of lumbar region (738 4) (M43 16)   16  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 3) (M47 816)   17  Weight gain (783 1) (R63 5)    Past Medical History    1  History of Acute maxillary sinusitis, recurrence not specified (461 0) (J01 00)   2  Acute sinusitis (461 9) (J01 90)   3  Hand paresthesia (782 0) (R20 2)   4  History of acute sinusitis (V12 69) (Z87 09)   5  History of tinea corporis (V12 09) (Z86 19)   6  History of urticaria (V13 3) (Z87 2)    Surgical History    1  History of Septoplasty   2  History of Tonsillectomy    Family History  Mother    1  Family history of emphysema (V17 6) (Z82 5)  Father    2  Family history of cerebrovascular accident (V17 1) (Z82 3)   3  Family history of coronary artery disease (V17 3) (Z82 49)   4  Family history of hypertension (V17 49) (Z82 49)  Brother    5  Family history of coronary artery disease (V17 3) (Z82 49)  Family History    6  Family history of Back problem   7  Family history of Cerebrovascular accident (CVA), unspecified   8  Family history of emphysema (V17 6) (Z82 5)   9  Family history of Hypertension, benign    Social History    · Alcohol use (V49 89) (Z78 9)   · Daily Coffee Consumption (___ Cups/Day)   · Education Level: Some college   · Former smoker (V15 82) (W29 641)   · No drug use   · Occupation   · Retired   · Social alcohol use (Z78 9)   ·     Current Meds   1  Mometasone Furoate 0 1 % External Cream; APPLY TOPICALLY BID AS NEEDED; Therapy: 86DMX7653 to Recorded    Allergies    1  Penicillins    Message   Recorded as Task   Date: 11/07/2016 12:02 PM, Created By: System   Task Name: Hospital [facility]RISTINE   Assigned To:  Mikaela Duenas   Regarding Patient: Berenice Haas, Status: Active   Comment:    System - 07 Nov 2016 12:02 PM     Patient discharged from hospital   Patient Name: Bandar Berman [facility]  Patient YOB: 1940  Discharge Date: 11/7/2016  Facility: [facility] - 08 Nov 2016 2:38 PM     TASK EDITED       one attempt made to contact miguelina sainz to leave message will try again later     Future Appointments    Date/Time Provider Specialty Site   11/21/2016 10:20 AM [facility]   Orthopedic Surgery [address]     Signatures   Electronically signed by : Rexine Gaucher, DO; May  4 2017  2:52PM EST                       (Author)

## 2018-01-19 ENCOUNTER — APPOINTMENT (INPATIENT)
Dept: RADIOLOGY | Facility: HOSPITAL | Age: 78
DRG: 236 | End: 2018-01-19
Payer: MEDICARE

## 2018-01-19 ENCOUNTER — HOSPITAL ENCOUNTER (INPATIENT)
Facility: HOSPITAL | Age: 78
LOS: 4 days | Discharge: HOME HEALTH | DRG: 236 | End: 2018-01-23
Attending: THORACIC SURGERY (CARDIOTHORACIC VASCULAR SURGERY) | Admitting: THORACIC SURGERY (CARDIOTHORACIC VASCULAR SURGERY)
Payer: MEDICARE

## 2018-01-19 ENCOUNTER — APPOINTMENT (OUTPATIENT)
Dept: NON INVASIVE DIAGNOSTICS | Facility: HOSPITAL | Age: 78
DRG: 236 | End: 2018-01-19
Attending: THORACIC SURGERY (CARDIOTHORACIC VASCULAR SURGERY)
Payer: MEDICARE

## 2018-01-19 ENCOUNTER — ANESTHESIA (OUTPATIENT)
Dept: PERIOP | Facility: HOSPITAL | Age: 78
DRG: 236 | End: 2018-01-19
Payer: MEDICARE

## 2018-01-19 DIAGNOSIS — I25.10 CORONARY ARTERIOSCLEROSIS: ICD-10-CM

## 2018-01-19 DIAGNOSIS — I25.10 CORONARY ARTERY DISEASE INVOLVING NATIVE CORONARY ARTERY OF NATIVE HEART WITHOUT ANGINA PECTORIS: ICD-10-CM

## 2018-01-19 DIAGNOSIS — Z95.1 S/P CABG X 4: Primary | ICD-10-CM

## 2018-01-19 LAB
ANION GAP SERPL CALCULATED.3IONS-SCNC: 7 MMOL/L (ref 4–13)
ATRIAL RATE: 70 BPM
BASE EXCESS BLDA CALC-SCNC: -1 MMOL/L (ref -2–3)
BASE EXCESS BLDA CALC-SCNC: -1 MMOL/L (ref -2–3)
BASE EXCESS BLDA CALC-SCNC: -2 MMOL/L (ref -2–3)
BASE EXCESS BLDA CALC-SCNC: -5 MMOL/L (ref -2–3)
BASE EXCESS BLDA CALC-SCNC: 0 MMOL/L (ref -2–3)
BASE EXCESS BLDA CALC-SCNC: 1 MMOL/L (ref -2–3)
BUN SERPL-MCNC: 18 MG/DL (ref 5–25)
CA-I BLD-SCNC: 0.98 MMOL/L (ref 1.12–1.32)
CA-I BLD-SCNC: 1.04 MMOL/L (ref 1.12–1.32)
CA-I BLD-SCNC: 1.04 MMOL/L (ref 1.12–1.32)
CA-I BLD-SCNC: 1.1 MMOL/L (ref 1.12–1.32)
CA-I BLD-SCNC: 1.13 MMOL/L (ref 1.12–1.32)
CA-I BLD-SCNC: 1.23 MMOL/L (ref 1.12–1.32)
CALCIUM SERPL-MCNC: 7.8 MG/DL (ref 8.3–10.1)
CHLORIDE SERPL-SCNC: 109 MMOL/L (ref 100–108)
CO2 SERPL-SCNC: 24 MMOL/L (ref 21–32)
CREAT SERPL-MCNC: 0.88 MG/DL (ref 0.6–1.3)
DS:DELIVERY SYSTEM: ABNORMAL
FIO2 GAS DIL.REBREATH: 44 L
GFR SERPL CREATININE-BSD FRML MDRD: 83 ML/MIN/1.73SQ M
GLUCOSE SERPL-MCNC: 110 MG/DL (ref 65–140)
GLUCOSE SERPL-MCNC: 110 MG/DL (ref 65–140)
GLUCOSE SERPL-MCNC: 111 MG/DL (ref 65–140)
GLUCOSE SERPL-MCNC: 111 MG/DL (ref 65–140)
GLUCOSE SERPL-MCNC: 115 MG/DL (ref 65–140)
GLUCOSE SERPL-MCNC: 115 MG/DL (ref 65–140)
GLUCOSE SERPL-MCNC: 119 MG/DL (ref 65–140)
GLUCOSE SERPL-MCNC: 129 MG/DL (ref 65–140)
GLUCOSE SERPL-MCNC: 133 MG/DL (ref 65–140)
GLUCOSE SERPL-MCNC: 140 MG/DL (ref 65–140)
GLUCOSE SERPL-MCNC: 152 MG/DL (ref 65–140)
GLUCOSE SERPL-MCNC: 91 MG/DL (ref 65–140)
GLUCOSE SERPL-MCNC: 99 MG/DL (ref 65–140)
HCO3 BLDA-SCNC: 22.7 MMOL/L (ref 22–28)
HCO3 BLDA-SCNC: 23 MMOL/L (ref 22–28)
HCO3 BLDA-SCNC: 23.7 MMOL/L (ref 24–30)
HCO3 BLDA-SCNC: 24.4 MMOL/L (ref 22–28)
HCO3 BLDA-SCNC: 24.9 MMOL/L (ref 22–28)
HCO3 BLDA-SCNC: 25.6 MMOL/L (ref 22–28)
HCT VFR BLD AUTO: 30.2 % (ref 36.5–49.3)
HCT VFR BLD CALC: 22 % (ref 36.5–49.3)
HCT VFR BLD CALC: 23 % (ref 36.5–49.3)
HCT VFR BLD CALC: 23 % (ref 36.5–49.3)
HCT VFR BLD CALC: 25 % (ref 36.5–49.3)
HCT VFR BLD CALC: 27 % (ref 36.5–49.3)
HCT VFR BLD CALC: 32 % (ref 36.5–49.3)
HGB BLD-MCNC: 10 G/DL (ref 12–17)
HGB BLD-MCNC: 8.3 G/DL (ref 12–17)
HGB BLDA-MCNC: 10.9 G/DL (ref 12–17)
HGB BLDA-MCNC: 7.5 G/DL (ref 12–17)
HGB BLDA-MCNC: 7.8 G/DL (ref 12–17)
HGB BLDA-MCNC: 7.8 G/DL (ref 12–17)
HGB BLDA-MCNC: 8.5 G/DL (ref 12–17)
HGB BLDA-MCNC: 9.2 G/DL (ref 12–17)
KCT BLD-ACNC: 132 SEC (ref 89–137)
KCT BLD-ACNC: 143 SEC (ref 89–137)
KCT BLD-ACNC: 496 SEC (ref 89–137)
KCT BLD-ACNC: 571 SEC (ref 89–137)
KCT BLD-ACNC: 577 SEC (ref 89–137)
KCT BLD-ACNC: 749 SEC (ref 89–137)
P AXIS: 54 DEGREES
PCO2 BLD: 24 MMOL/L (ref 21–32)
PCO2 BLD: 24 MMOL/L (ref 21–32)
PCO2 BLD: 25 MMOL/L (ref 21–32)
PCO2 BLD: 25 MMOL/L (ref 21–32)
PCO2 BLD: 26 MMOL/L (ref 21–32)
PCO2 BLD: 27 MMOL/L (ref 21–32)
PCO2 BLD: 31.9 MM HG (ref 36–44)
PCO2 BLD: 33.8 MM HG (ref 36–44)
PCO2 BLD: 44.3 MM HG (ref 36–44)
PCO2 BLD: 44.7 MM HG (ref 42–50)
PCO2 BLD: 45.3 MM HG (ref 36–44)
PCO2 BLD: 51.6 MM HG (ref 36–44)
PH BLD: 7.25 [PH] (ref 7.35–7.45)
PH BLD: 7.33 [PH] (ref 7.3–7.4)
PH BLD: 7.35 [PH] (ref 7.35–7.45)
PH BLD: 7.37 [PH] (ref 7.35–7.45)
PH BLD: 7.44 [PH] (ref 7.35–7.45)
PH BLD: 7.49 [PH] (ref 7.35–7.45)
PLATELET # BLD AUTO: 123 THOUSANDS/UL (ref 149–390)
PMV BLD AUTO: 10.3 FL (ref 8.9–12.7)
PO2 BLD: 104 MM HG (ref 75–129)
PO2 BLD: 274 MM HG (ref 75–129)
PO2 BLD: 326 MM HG (ref 75–129)
PO2 BLD: 41 MM HG (ref 35–45)
PO2 BLD: 411 MM HG (ref 75–129)
PO2 BLD: 48 MM HG (ref 75–129)
POTASSIUM BLD-SCNC: 3.7 MMOL/L (ref 3.5–5.3)
POTASSIUM BLD-SCNC: 3.8 MMOL/L (ref 3.5–5.3)
POTASSIUM BLD-SCNC: 4.3 MMOL/L (ref 3.5–5.3)
POTASSIUM BLD-SCNC: 4.5 MMOL/L (ref 3.5–5.3)
POTASSIUM BLD-SCNC: 4.6 MMOL/L (ref 3.5–5.3)
POTASSIUM BLD-SCNC: 4.6 MMOL/L (ref 3.5–5.3)
POTASSIUM SERPL-SCNC: 4.1 MMOL/L (ref 3.5–5.3)
POTASSIUM SERPL-SCNC: 4.2 MMOL/L (ref 3.5–5.3)
POTASSIUM SERPL-SCNC: 4.4 MMOL/L (ref 3.5–5.3)
PR INTERVAL: 217 MS
QRS AXIS: 64 DEGREES
QRSD INTERVAL: 88 MS
QT INTERVAL: 438 MS
QTC INTERVAL: 473 MS
SAO2 % BLD FROM PO2: 100 % (ref 95–98)
SAO2 % BLD FROM PO2: 73 % (ref 95–98)
SAO2 % BLD FROM PO2: 82 % (ref 95–98)
SAO2 % BLD FROM PO2: 97 % (ref 95–98)
SODIUM BLD-SCNC: 137 MMOL/L (ref 136–145)
SODIUM BLD-SCNC: 137 MMOL/L (ref 136–145)
SODIUM BLD-SCNC: 138 MMOL/L (ref 136–145)
SODIUM BLD-SCNC: 140 MMOL/L (ref 136–145)
SODIUM BLD-SCNC: 141 MMOL/L (ref 136–145)
SODIUM BLD-SCNC: 141 MMOL/L (ref 136–145)
SODIUM SERPL-SCNC: 140 MMOL/L (ref 136–145)
SPECIMEN SOURCE: ABNORMAL
SPECIMEN SOURCE: NORMAL
T WAVE AXIS: 73 DEGREES
VENTRICULAR RATE: 70 BPM

## 2018-01-19 PROCEDURE — 85018 HEMOGLOBIN: CPT | Performed by: PHYSICIAN ASSISTANT

## 2018-01-19 PROCEDURE — 93005 ELECTROCARDIOGRAM TRACING: CPT | Performed by: PHYSICIAN ASSISTANT

## 2018-01-19 PROCEDURE — 93312 ECHO TRANSESOPHAGEAL: CPT

## 2018-01-19 PROCEDURE — 84132 ASSAY OF SERUM POTASSIUM: CPT

## 2018-01-19 PROCEDURE — 06BQ4ZZ EXCISION OF LEFT SAPHENOUS VEIN, PERCUTANEOUS ENDOSCOPIC APPROACH: ICD-10-PCS | Performed by: THORACIC SURGERY (CARDIOTHORACIC VASCULAR SURGERY)

## 2018-01-19 PROCEDURE — 02HV33Z INSERTION OF INFUSION DEVICE INTO SUPERIOR VENA CAVA, PERCUTANEOUS APPROACH: ICD-10-PCS | Performed by: THORACIC SURGERY (CARDIOTHORACIC VASCULAR SURGERY)

## 2018-01-19 PROCEDURE — 85014 HEMATOCRIT: CPT

## 2018-01-19 PROCEDURE — 82803 BLOOD GASES ANY COMBINATION: CPT

## 2018-01-19 PROCEDURE — 86923 COMPATIBILITY TEST ELECTRIC: CPT

## 2018-01-19 PROCEDURE — 82330 ASSAY OF CALCIUM: CPT

## 2018-01-19 PROCEDURE — 021209W BYPASS CORONARY ARTERY, THREE ARTERIES FROM AORTA WITH AUTOLOGOUS VENOUS TISSUE, OPEN APPROACH: ICD-10-PCS | Performed by: THORACIC SURGERY (CARDIOTHORACIC VASCULAR SURGERY)

## 2018-01-19 PROCEDURE — 71045 X-RAY EXAM CHEST 1 VIEW: CPT

## 2018-01-19 PROCEDURE — 84132 ASSAY OF SERUM POTASSIUM: CPT | Performed by: PHYSICIAN ASSISTANT

## 2018-01-19 PROCEDURE — C1768 GRAFT, VASCULAR: HCPCS | Performed by: THORACIC SURGERY (CARDIOTHORACIC VASCULAR SURGERY)

## 2018-01-19 PROCEDURE — 85347 COAGULATION TIME ACTIVATED: CPT

## 2018-01-19 PROCEDURE — 5A1223Z PERFORMANCE OF CARDIAC PACING, CONTINUOUS: ICD-10-PCS | Performed by: THORACIC SURGERY (CARDIOTHORACIC VASCULAR SURGERY)

## 2018-01-19 PROCEDURE — 84295 ASSAY OF SERUM SODIUM: CPT

## 2018-01-19 PROCEDURE — 30233N0 TRANSFUSION OF AUTOLOGOUS RED BLOOD CELLS INTO PERIPHERAL VEIN, PERCUTANEOUS APPROACH: ICD-10-PCS | Performed by: THORACIC SURGERY (CARDIOTHORACIC VASCULAR SURGERY)

## 2018-01-19 PROCEDURE — 80048 BASIC METABOLIC PNL TOTAL CA: CPT | Performed by: PHYSICIAN ASSISTANT

## 2018-01-19 PROCEDURE — 82947 ASSAY GLUCOSE BLOOD QUANT: CPT

## 2018-01-19 PROCEDURE — 5A1221Z PERFORMANCE OF CARDIAC OUTPUT, CONTINUOUS: ICD-10-PCS | Performed by: THORACIC SURGERY (CARDIOTHORACIC VASCULAR SURGERY)

## 2018-01-19 PROCEDURE — 85014 HEMATOCRIT: CPT | Performed by: PHYSICIAN ASSISTANT

## 2018-01-19 PROCEDURE — 85049 AUTOMATED PLATELET COUNT: CPT | Performed by: PHYSICIAN ASSISTANT

## 2018-01-19 PROCEDURE — 02100Z9 BYPASS CORONARY ARTERY, ONE ARTERY FROM LEFT INTERNAL MAMMARY, OPEN APPROACH: ICD-10-PCS | Performed by: THORACIC SURGERY (CARDIOTHORACIC VASCULAR SURGERY)

## 2018-01-19 PROCEDURE — 82948 REAGENT STRIP/BLOOD GLUCOSE: CPT

## 2018-01-19 PROCEDURE — 85018 HEMOGLOBIN: CPT | Performed by: THORACIC SURGERY (CARDIOTHORACIC VASCULAR SURGERY)

## 2018-01-19 DEVICE — MARKER CORONARY BYPASS VOSS GRAFT: Type: IMPLANTABLE DEVICE | Site: AORTA | Status: FUNCTIONAL

## 2018-01-19 RX ORDER — HEPARIN SODIUM 1000 [USP'U]/ML
INJECTION, SOLUTION INTRAVENOUS; SUBCUTANEOUS AS NEEDED
Status: DISCONTINUED | OUTPATIENT
Start: 2018-01-19 | End: 2018-01-19 | Stop reason: SURG

## 2018-01-19 RX ORDER — POTASSIUM CHLORIDE 14.9 MG/ML
20 INJECTION INTRAVENOUS
Status: DISCONTINUED | OUTPATIENT
Start: 2018-01-19 | End: 2018-01-21

## 2018-01-19 RX ORDER — POLYETHYLENE GLYCOL 3350 17 G/17G
17 POWDER, FOR SOLUTION ORAL DAILY
Status: DISCONTINUED | OUTPATIENT
Start: 2018-01-20 | End: 2018-01-23 | Stop reason: HOSPADM

## 2018-01-19 RX ORDER — ROCURONIUM BROMIDE 10 MG/ML
INJECTION, SOLUTION INTRAVENOUS AS NEEDED
Status: DISCONTINUED | OUTPATIENT
Start: 2018-01-19 | End: 2018-01-19 | Stop reason: SURG

## 2018-01-19 RX ORDER — ASPIRIN 325 MG
325 TABLET ORAL DAILY
Status: DISCONTINUED | OUTPATIENT
Start: 2018-01-19 | End: 2018-01-23 | Stop reason: HOSPADM

## 2018-01-19 RX ORDER — ALBUMIN, HUMAN INJ 5% 5 %
SOLUTION INTRAVENOUS
Status: COMPLETED
Start: 2018-01-19 | End: 2018-01-19

## 2018-01-19 RX ORDER — OXYCODONE HYDROCHLORIDE AND ACETAMINOPHEN 5; 325 MG/1; MG/1
2 TABLET ORAL EVERY 6 HOURS PRN
Status: DISCONTINUED | OUTPATIENT
Start: 2018-01-19 | End: 2018-01-23 | Stop reason: HOSPADM

## 2018-01-19 RX ORDER — ALBUMIN, HUMAN INJ 5% 5 %
SOLUTION INTRAVENOUS CONTINUOUS PRN
Status: DISCONTINUED | OUTPATIENT
Start: 2018-01-19 | End: 2018-01-19 | Stop reason: SURG

## 2018-01-19 RX ORDER — METOPROLOL TARTRATE 5 MG/5ML
2.5 INJECTION INTRAVENOUS EVERY 6 HOURS PRN
Status: DISCONTINUED | OUTPATIENT
Start: 2018-01-19 | End: 2018-01-21

## 2018-01-19 RX ORDER — MAGNESIUM HYDROXIDE 1200 MG/15ML
LIQUID ORAL AS NEEDED
Status: DISCONTINUED | OUTPATIENT
Start: 2018-01-19 | End: 2018-01-19 | Stop reason: HOSPADM

## 2018-01-19 RX ORDER — AMIODARONE HYDROCHLORIDE 200 MG/1
200 TABLET ORAL EVERY 8 HOURS SCHEDULED
Status: DISCONTINUED | OUTPATIENT
Start: 2018-01-19 | End: 2018-01-23 | Stop reason: HOSPADM

## 2018-01-19 RX ORDER — CHLORHEXIDINE GLUCONATE 0.12 MG/ML
15 RINSE ORAL ONCE
Status: COMPLETED | OUTPATIENT
Start: 2018-01-19 | End: 2018-01-19

## 2018-01-19 RX ORDER — FENTANYL CITRATE 50 UG/ML
INJECTION, SOLUTION INTRAMUSCULAR; INTRAVENOUS AS NEEDED
Status: DISCONTINUED | OUTPATIENT
Start: 2018-01-19 | End: 2018-01-19 | Stop reason: SURG

## 2018-01-19 RX ORDER — BISACODYL 10 MG
10 SUPPOSITORY, RECTAL RECTAL DAILY PRN
Status: DISCONTINUED | OUTPATIENT
Start: 2018-01-19 | End: 2018-01-23 | Stop reason: HOSPADM

## 2018-01-19 RX ORDER — PANTOPRAZOLE SODIUM 40 MG/1
40 TABLET, DELAYED RELEASE ORAL
Status: DISCONTINUED | OUTPATIENT
Start: 2018-01-20 | End: 2018-01-21

## 2018-01-19 RX ORDER — CHLORHEXIDINE GLUCONATE 0.12 MG/ML
15 RINSE ORAL EVERY 12 HOURS SCHEDULED
Status: DISCONTINUED | OUTPATIENT
Start: 2018-01-19 | End: 2018-01-21

## 2018-01-19 RX ORDER — GLYCOPYRROLATE 0.2 MG/ML
INJECTION INTRAMUSCULAR; INTRAVENOUS AS NEEDED
Status: DISCONTINUED | OUTPATIENT
Start: 2018-01-19 | End: 2018-01-19 | Stop reason: SURG

## 2018-01-19 RX ORDER — MAGNESIUM SULFATE HEPTAHYDRATE 40 MG/ML
2 INJECTION, SOLUTION INTRAVENOUS ONCE
Status: COMPLETED | OUTPATIENT
Start: 2018-01-19 | End: 2018-01-19

## 2018-01-19 RX ORDER — PANTOPRAZOLE SODIUM 40 MG/1
40 TABLET, DELAYED RELEASE ORAL
Status: DISCONTINUED | OUTPATIENT
Start: 2018-01-19 | End: 2018-01-19

## 2018-01-19 RX ORDER — FENTANYL CITRATE 50 UG/ML
50 INJECTION, SOLUTION INTRAMUSCULAR; INTRAVENOUS
Status: DISCONTINUED | OUTPATIENT
Start: 2018-01-19 | End: 2018-01-21

## 2018-01-19 RX ORDER — FUROSEMIDE 10 MG/ML
40 INJECTION INTRAMUSCULAR; INTRAVENOUS EVERY 6 HOURS PRN
Status: DISCONTINUED | OUTPATIENT
Start: 2018-01-19 | End: 2018-01-21

## 2018-01-19 RX ORDER — ACETAMINOPHEN 325 MG/1
650 TABLET ORAL EVERY 4 HOURS PRN
Status: DISCONTINUED | OUTPATIENT
Start: 2018-01-19 | End: 2018-01-21

## 2018-01-19 RX ORDER — FENTANYL CITRATE 50 UG/ML
50 INJECTION, SOLUTION INTRAMUSCULAR; INTRAVENOUS ONCE
Status: COMPLETED | OUTPATIENT
Start: 2018-01-19 | End: 2018-01-19

## 2018-01-19 RX ORDER — CALCIUM CHLORIDE 100 MG/ML
1 INJECTION INTRAVENOUS; INTRAVENTRICULAR ONCE
Status: DISCONTINUED | OUTPATIENT
Start: 2018-01-19 | End: 2018-01-21

## 2018-01-19 RX ORDER — KETAMINE HYDROCHLORIDE 100 MG/ML
INJECTION, SOLUTION INTRAMUSCULAR; INTRAVENOUS AS NEEDED
Status: DISCONTINUED | OUTPATIENT
Start: 2018-01-19 | End: 2018-01-19 | Stop reason: SURG

## 2018-01-19 RX ORDER — FONDAPARINUX SODIUM 2.5 MG/.5ML
2.5 INJECTION SUBCUTANEOUS DAILY
Status: DISCONTINUED | OUTPATIENT
Start: 2018-01-20 | End: 2018-01-21

## 2018-01-19 RX ORDER — ALBUMIN, HUMAN INJ 5% 5 %
12.5 SOLUTION INTRAVENOUS ONCE
Status: COMPLETED | OUTPATIENT
Start: 2018-01-19 | End: 2018-01-19

## 2018-01-19 RX ORDER — ESMOLOL HYDROCHLORIDE 10 MG/ML
INJECTION INTRAVENOUS AS NEEDED
Status: DISCONTINUED | OUTPATIENT
Start: 2018-01-19 | End: 2018-01-19 | Stop reason: SURG

## 2018-01-19 RX ORDER — SODIUM CHLORIDE 450 MG/100ML
20 INJECTION, SOLUTION INTRAVENOUS CONTINUOUS
Status: DISCONTINUED | OUTPATIENT
Start: 2018-01-19 | End: 2018-01-21

## 2018-01-19 RX ORDER — ASCORBIC ACID 500 MG
500 TABLET ORAL 2 TIMES DAILY
Status: DISCONTINUED | OUTPATIENT
Start: 2018-01-19 | End: 2018-01-23 | Stop reason: HOSPADM

## 2018-01-19 RX ORDER — PROTAMINE SULFATE 10 MG/ML
INJECTION, SOLUTION INTRAVENOUS AS NEEDED
Status: DISCONTINUED | OUTPATIENT
Start: 2018-01-19 | End: 2018-01-19 | Stop reason: SURG

## 2018-01-19 RX ORDER — ATORVASTATIN CALCIUM 80 MG/1
80 TABLET, FILM COATED ORAL
Status: DISCONTINUED | OUTPATIENT
Start: 2018-01-19 | End: 2018-01-23 | Stop reason: HOSPADM

## 2018-01-19 RX ORDER — PROPOFOL 10 MG/ML
INJECTION, EMULSION INTRAVENOUS AS NEEDED
Status: DISCONTINUED | OUTPATIENT
Start: 2018-01-19 | End: 2018-01-19 | Stop reason: SURG

## 2018-01-19 RX ORDER — MELATONIN
1000 DAILY
Status: DISCONTINUED | OUTPATIENT
Start: 2018-01-20 | End: 2018-01-23 | Stop reason: HOSPADM

## 2018-01-19 RX ORDER — ONDANSETRON 2 MG/ML
4 INJECTION INTRAMUSCULAR; INTRAVENOUS EVERY 6 HOURS PRN
Status: DISCONTINUED | OUTPATIENT
Start: 2018-01-19 | End: 2018-01-23 | Stop reason: HOSPADM

## 2018-01-19 RX ORDER — SODIUM CHLORIDE 9 MG/ML
INJECTION, SOLUTION INTRAVENOUS CONTINUOUS PRN
Status: DISCONTINUED | OUTPATIENT
Start: 2018-01-19 | End: 2018-01-19 | Stop reason: SURG

## 2018-01-19 RX ORDER — SODIUM CHLORIDE, SODIUM LACTATE, POTASSIUM CHLORIDE, CALCIUM CHLORIDE 600; 310; 30; 20 MG/100ML; MG/100ML; MG/100ML; MG/100ML
INJECTION, SOLUTION INTRAVENOUS CONTINUOUS PRN
Status: DISCONTINUED | OUTPATIENT
Start: 2018-01-19 | End: 2018-01-19 | Stop reason: SURG

## 2018-01-19 RX ORDER — POTASSIUM CHLORIDE 14.9 MG/ML
20 INJECTION INTRAVENOUS ONCE AS NEEDED
Status: DISCONTINUED | OUTPATIENT
Start: 2018-01-19 | End: 2018-01-21

## 2018-01-19 RX ORDER — OXYCODONE HYDROCHLORIDE AND ACETAMINOPHEN 5; 325 MG/1; MG/1
1 TABLET ORAL EVERY 4 HOURS PRN
Status: DISCONTINUED | OUTPATIENT
Start: 2018-01-19 | End: 2018-01-23 | Stop reason: HOSPADM

## 2018-01-19 RX ADMIN — HEPARIN SODIUM 5000 UNITS: 1000 INJECTION INTRAVENOUS; SUBCUTANEOUS at 08:37

## 2018-01-19 RX ADMIN — AMINOCAPROIC ACID 1 MG/HR: 250 INJECTION, SOLUTION INTRAVENOUS at 07:55

## 2018-01-19 RX ADMIN — CHLORHEXIDINE GLUCONATE 15 ML: 1.2 RINSE ORAL at 06:21

## 2018-01-19 RX ADMIN — METOPROLOL TARTRATE 12.5 MG: 25 TABLET ORAL at 06:20

## 2018-01-19 RX ADMIN — Medication 1 UNITS/HR: at 13:20

## 2018-01-19 RX ADMIN — CEFAZOLIN SODIUM 2000 MG: 2 SOLUTION INTRAVENOUS at 10:33

## 2018-01-19 RX ADMIN — OXYCODONE HYDROCHLORIDE AND ACETAMINOPHEN 500 MG: 500 TABLET ORAL at 17:57

## 2018-01-19 RX ADMIN — NEOSTIGMINE METHYLSULFATE 5 MG: 1 INJECTION, SOLUTION INTRAMUSCULAR; INTRAVENOUS; SUBCUTANEOUS at 10:45

## 2018-01-19 RX ADMIN — MUPIROCIN 1 APPLICATION: 20 OINTMENT TOPICAL at 06:20

## 2018-01-19 RX ADMIN — SODIUM CHLORIDE, SODIUM LACTATE, POTASSIUM CHLORIDE, AND CALCIUM CHLORIDE 1000 ML: .6; .31; .03; .02 INJECTION, SOLUTION INTRAVENOUS at 12:15

## 2018-01-19 RX ADMIN — Medication 0.7 MCG/KG/HR: at 09:00

## 2018-01-19 RX ADMIN — HYDROMORPHONE HYDROCHLORIDE 0.5 MG: 1 INJECTION, SOLUTION INTRAMUSCULAR; INTRAVENOUS; SUBCUTANEOUS at 17:56

## 2018-01-19 RX ADMIN — ALBUMIN HUMAN 12.5 G: 0.05 INJECTION, SOLUTION INTRAVENOUS at 12:45

## 2018-01-19 RX ADMIN — SODIUM CHLORIDE 20 ML/HR: 0.45 INJECTION, SOLUTION INTRAVENOUS at 12:50

## 2018-01-19 RX ADMIN — PROTAMINE SULFATE 240 MG: 10 INJECTION, SOLUTION INTRAVENOUS at 10:24

## 2018-01-19 RX ADMIN — SODIUM CHLORIDE, SODIUM LACTATE, POTASSIUM CHLORIDE, AND CALCIUM CHLORIDE: .6; .31; .03; .02 INJECTION, SOLUTION INTRAVENOUS at 07:51

## 2018-01-19 RX ADMIN — AMIODARONE HYDROCHLORIDE 200 MG: 200 TABLET ORAL at 17:57

## 2018-01-19 RX ADMIN — LIDOCAINE HYDROCHLORIDE 100 MG: 20 INJECTION, SOLUTION INTRAVENOUS at 07:30

## 2018-01-19 RX ADMIN — Medication 5 G: at 07:55

## 2018-01-19 RX ADMIN — GLYCOPYRROLATE 0.8 MG: 0.2 INJECTION, SOLUTION INTRAMUSCULAR; INTRAVENOUS at 10:45

## 2018-01-19 RX ADMIN — OXYCODONE HYDROCHLORIDE AND ACETAMINOPHEN 2 TABLET: 5; 325 TABLET ORAL at 16:26

## 2018-01-19 RX ADMIN — ALBUMIN, HUMAN INJ 5% 12.5 G: 5 SOLUTION at 19:14

## 2018-01-19 RX ADMIN — ALBUMIN HUMAN: 0.05 INJECTION, SOLUTION INTRAVENOUS at 10:33

## 2018-01-19 RX ADMIN — ALBUMIN HUMAN: 0.05 INJECTION, SOLUTION INTRAVENOUS at 10:39

## 2018-01-19 RX ADMIN — FENTANYL CITRATE 1000 MCG: 50 INJECTION, SOLUTION INTRAMUSCULAR; INTRAVENOUS at 08:19

## 2018-01-19 RX ADMIN — MAGNESIUM SULFATE HEPTAHYDRATE 2 G: 40 INJECTION, SOLUTION INTRAVENOUS at 12:37

## 2018-01-19 RX ADMIN — ESMOLOL HYDROCHLORIDE 100 MG: 100 INJECTION, SOLUTION INTRAVENOUS at 07:35

## 2018-01-19 RX ADMIN — ALBUMIN, HUMAN INJ 5% 12.5 G: 5 SOLUTION at 12:22

## 2018-01-19 RX ADMIN — ONDANSETRON 4 MG: 2 INJECTION INTRAMUSCULAR; INTRAVENOUS at 21:58

## 2018-01-19 RX ADMIN — KETAMINE HYDROCHLORIDE 100 MG: 100 INJECTION, SOLUTION INTRAMUSCULAR; INTRAVENOUS at 08:19

## 2018-01-19 RX ADMIN — FENTANYL CITRATE 50 MCG: 50 INJECTION INTRAMUSCULAR; INTRAVENOUS at 12:39

## 2018-01-19 RX ADMIN — PROTAMINE SULFATE 10 MG: 10 INJECTION, SOLUTION INTRAVENOUS at 10:23

## 2018-01-19 RX ADMIN — ROCURONIUM BROMIDE 80 MG: 10 INJECTION INTRAVENOUS at 07:30

## 2018-01-19 RX ADMIN — CEFAZOLIN SODIUM 2000 MG: 2 SOLUTION INTRAVENOUS at 17:57

## 2018-01-19 RX ADMIN — PROPOFOL 150 MG: 10 INJECTION, EMULSION INTRAVENOUS at 07:30

## 2018-01-19 RX ADMIN — ALBUMIN HUMAN 12.5 G: 0.05 INJECTION, SOLUTION INTRAVENOUS at 12:22

## 2018-01-19 RX ADMIN — ASPIRIN 325 MG: 325 TABLET ORAL at 17:57

## 2018-01-19 RX ADMIN — AMIODARONE HYDROCHLORIDE 200 MG: 200 TABLET ORAL at 21:33

## 2018-01-19 RX ADMIN — ALBUMIN, HUMAN INJ 5% 12.5 G: 5 SOLUTION at 12:45

## 2018-01-19 RX ADMIN — HEPARIN SODIUM 26000 UNITS: 1000 INJECTION INTRAVENOUS; SUBCUTANEOUS at 08:53

## 2018-01-19 RX ADMIN — MUPIROCIN 1 APPLICATION: 20 OINTMENT TOPICAL at 21:32

## 2018-01-19 RX ADMIN — ALBUMIN HUMAN 12.5 G: 0.05 INJECTION, SOLUTION INTRAVENOUS at 19:14

## 2018-01-19 RX ADMIN — HYDROMORPHONE HYDROCHLORIDE 0.5 MG: 1 INJECTION, SOLUTION INTRAMUSCULAR; INTRAVENOUS; SUBCUTANEOUS at 13:21

## 2018-01-19 RX ADMIN — ATORVASTATIN CALCIUM 80 MG: 80 TABLET, FILM COATED ORAL at 17:57

## 2018-01-19 RX ADMIN — SODIUM CHLORIDE: 0.9 INJECTION, SOLUTION INTRAVENOUS at 07:23

## 2018-01-19 RX ADMIN — ALBUMIN HUMAN 12.5 G: 0.05 INJECTION, SOLUTION INTRAVENOUS at 16:00

## 2018-01-19 RX ADMIN — CEFAZOLIN SODIUM 2000 MG: 2 SOLUTION INTRAVENOUS at 07:55

## 2018-01-19 RX ADMIN — CHLORHEXIDINE GLUCONATE 15 ML: 1.2 RINSE ORAL at 21:32

## 2018-01-19 RX ADMIN — PHENYLEPHRINE HYDROCHLORIDE 20 MCG/MIN: 10 INJECTION INTRAVENOUS at 10:40

## 2018-01-19 NOTE — ANESTHESIA PROCEDURE NOTES
Procedure Performed: MATTHEW Anesthesia  Start Time:  1/19/2018 8:05 AM  End Time:   1/19/2018 10:52 AM      Preanesthesia Checklist    Patient identified, IV assessed, risks and benefits discussed, monitors and equipment assessed, procedure being performed at surgeon's request and anesthesia consent obtained  Procedure    Diagnostic Indications for MATTHEW:  hemodynamic monitoring  Type of MATTHEW: complete MATTHEW with interpretation  Images Saved: ultrasound permanent image saved  Physician Requesting Echo: Lucas Garcia  Location performed: OR  Intubated  Bite block not placed  Heart visualized  Insertion of MATTHEW Probe:  Atraumatic  Probe Type:  Epiaortic and multiplane  Modalities:  2D only, color flow mapping, continuous wave Doppler and pulse wave Doppler  Echocardiographic and Doppler Measurements    PREPROCEDURE    LEFT VENTRICLE:  Systolic Function: normal  Ejection Fraction: 65%  Cavity size: normal      RIGHT VENTRICLE:  Systolic Function: normal   Cavity size normal  No hypertrophy              AORTIC VALVE:  Leaflets: normal  Leaflet motions normal and normal  Stenosis: none  Regurgitation: none  MITRAL VALVE:  Leaflets: normal  Leaflet Motions: normal  Regurgitation: trace  Stenosis: none  TRICUSPID VALVE:  Leaflets: normal  Leaflet Motions: normal  Stenosis: none  Regurgitation: mild  PULMONIC VALVE:  Leaflets: normal  Regurgitation: none  ASCENDING AORTA:  Size:  normal      AORTIC ARCH:  Size:  normal  Grade 1: normal to mild intimal thickening  DESCENDING AORTA:  Size: normal  Dissection not present  Grade 4: atheroma protruding => 0 5 cm into lumen  RIGHT ATRIUM:  Size:  normal      LEFT ATRIUM:  Size: normal      LEFT ATRIAL APPENDAGE:  Size: normal          ATRIAL SEPTUM:  Intra-atrial septal morphology: normal          VENTRICULAR SEPTUM:  Intra-ventricular septum morphology: normal          EPIAORTIC:  Plaque Thickness: 0-5 mm      OTHER FINDINGS:  Pericardium: normal  Pleural Effusion:  none  POSTPROCEDURE    LEFT VENTRICLE: Unchanged   RIGHT VENTRICLE: Unchanged   AORTIC VALVE: Unchanged   MITRAL VALVE: Unchanged   TRICUSPID VALVE: Unchanged   PULMONIC VALVE: Unchanged           ATRIA: Unchanged   AORTA: Unchanged   REMOVAL:  Probe Removal: atraumatic

## 2018-01-19 NOTE — ANESTHESIA PROCEDURE NOTES
Central Line Insertion  Performed by: Francisca Tompkins by: Josafat Green   Date/Time: 1/19/2018 7:51 AM  Catheter Type:  triple lumen  Indications: vascular access and central pressure monitoring  Location details: right internal jugular  Patient position: Trendelenburg    Sedation:  Patient sedated: yes  Assessment: blood return through all ports and free fluid flow  Preparation: skin prepped with ChloraPrep  Skin prep agent dried: skin prep agent completely dried prior to procedure  Sterile barriers: all five maximum sterile barriers used - cap, mask, sterile gown, sterile gloves, and large sterile sheet  Hand hygiene: hand hygiene performed prior to central venous catheter insertion  Ultrasound guidance: no  sterile gel and probe cover not used in ultrasound-guided central venous catheter insertionConsent: Written consent obtained  Risks and benefits: risks, benefits and alternatives were discussed  Consent given by: patient  Patient understanding: patient states understanding of the procedure being performed  Patient consent: the patient's understanding of the procedure matches consent given  Procedure consent: procedure consent matches procedure scheduled  Required items: required blood products, implants, devices, and special equipment available  Patient identity confirmed: arm band  Time out: Immediately prior to procedure a "time out" was called to verify the correct patient, procedure, equipment, support staff and site/side marked as required    Pre-procedure: landmarks identified  Number of attempts: 1  Successful placement: yes  Post-procedure: chlorhexidine patch applied,  dressing applied and line sutured  Patient tolerance: Patient tolerated the procedure well with no immediate complications  Comments: Procedure start 0741  Procedure end [id number]

## 2018-01-19 NOTE — ANESTHESIA PROCEDURE NOTES
Arterial Line Insertion  Date/Time: 1/19/2018 7:16 AM  Performed by: Pretty Cano by: Doug Carpenter   Consent: Written consent obtained  Risks and benefits: risks, benefits and alternatives were discussed  Consent given by: patient  Patient understanding: patient states understanding of the procedure being performed  Patient consent: the patient's understanding of the procedure matches consent given  Procedure consent: procedure consent matches procedure scheduled  Required items: required blood products, implants, devices, and special equipment available  Patient identity confirmed: arm band  Time out: Immediately prior to procedure a "time out" was called to verify the correct patient, procedure, equipment, support staff and site/side marked as required  Preparation: Patient was prepped and draped in the usual sterile fashion    Indications: multiple ABGs and hemodynamic monitoring  Orientation:  RightLocation: radial artery  Anesthesia: local infiltration    Sedation:  Patient sedated: no  Needle gauge: 20  Number of attempts: 1  Post-procedure: dressing applied  Post-procedure CNS: normal  Patient tolerance: Patient tolerated the procedure well with no immediate complications  Comments: Procedure start 0713  Procedure end 0716

## 2018-01-19 NOTE — OP NOTE
OPERATIVE REPORT  PATIENT NAME: Heather Rivera    :    MRN: [id number]  Pt Location: [facility] OR ROOM 16    SURGERY DATE: 2018    SURGEON: My Echavarria DO    ASSISTANT: Heath Albrecht PA-C    ADDITIONAL ASSISTANT: Junaid Corcoran PA-C    PREOPERATIVE DIAGNOSIS:  Multivessel coronary artery disease    POSTOPERATIVE DIAGNOSIS:  Multivessel coronary artery disease    NYHA: 2  CCS: 2    PROCEDURE:   Coronary artery bypass grafting x 4 with left internal mammary artery to left anterior descending artery, saphenous vein graft to obtuse marginal 2, saphenous vein graft to right coronary artery and saphenous vein graft to diagonal 1    CARDIOPULMONARY BYPASS TIME: 69 minutes  CROSSCLAMP TIME: 65 minutes  ANESTHESIA: General endotracheal anesthesia with transesophageal echocardiogram  guidance, Dr Alexandru Sebastian    INDICATIONS:  The patient is a 68y o  year-old male diagnosed with Multivessel coronary artery disease  FINDINGS:  1  Intraoperative transesophageal echocardiogram revealed normal biventricular function  2  Epiaortic ultrasound showed No plaque or atheroma  3  Coronary anatomy as described in coronary angiography  4  Final transesophageal echocardiogram demonstrated normal biventricular function  OPERATIVE TECHNIQUE:    The patient was taken to the operating room, properly identified and placed supine on the operating table  Following the satisfactory induction of general anesthesia and placement of monitoring lines, the patient was prepped and draped in the usual sterile fashion  A time-out procedure was performed  The patient underwent median sternotomy, pericardiotomy, left internal mammary artery harvest with the standard technique and endoscopic left greater saphenous vein harvest, systemic heparinization and conduit preparation  Epiaortic ultrasound showed No plaque or atheroma   Cannulation for cardiopulmonary bypass was performed with an arterial dispersion cannula, double stage venous cannula and a vented antegrade cardioplegia cannula  Once the ACT was greater than 480 seconds we placed the patient on cardiopulmonary bypass, the ascending aorta was crossclamped and cardiac arrest was obtained without complications with [place] cardioplegia  The following grafts were completed, left internal mamamry artery to left anterior descending artery with excellent flow, saphenous vein graft to diagonal 1 with flow of 90 ml/min, saphenous vein graft to obtuse marginal 2 with flow of 110 ml/min and saphenous vein graft to right coronary artery with flow of 120 ml/min  All the distal anastomoses were performed in end-to-side fashion with 7-0 Prolene  A total of 3 proximal anastomoses were completed on the ascending aorta in end-to-side fashion using running 6-0 Prolene suture  The patient was placed in the Trendelenburg position, the heart was de-aired, a hotshot was given and the crossclamp was removed  Atrial and ventricular pacing wires were placed  Following a period of reperfusion, the patient was weaned from cardiopulmonary bypass and decannulated  Protamine was administered with normalization of the ACT  Hemostasis was confirmed in all fields  Thoracostomy tubes were placed  The sternum was closed with stainless steel wires  The fascia, subdermis and skin were closed with multiple layers of running absorbable suture  COMPLICATIONS: None  PACKS/TUBES/DRAINS: Chest tubes x 3  EBL: 250mL  TRANSFUSION: None  SPECIMENS: None  As the attending surgeon, I was present and scrubbed for all critical portions of this procedure  There was no qualified surgical resident available  Sponge, needle and instrument counts were reported as correct by the nursing staff   The assistance of a PA was required to complete this case, specifically for assistance with endoscopic saphenous vein harvesting, cannulation, decannulation, and construction of the bypass grafts             SIGNATURE: Talia Garcia DO  DATE: January 19, 2018  TIME: 10:56 AM

## 2018-01-19 NOTE — CASE MANAGEMENT
Initial Clinical Review    Age/Sex: 68 y o  male    Surgery Date: 01/19/2018    Procedure: Coronary artery bypass grafting x 4 with left internal mammary artery to left anterior descending artery, saphenous vein graft to obtuse marginal 2, saphenous vein graft to right coronary artery and saphenous vein graft to diagonal 1    Anesthesia:General endotracheal anesthesia with transesophageal echocardiogram  guidance, Dr Jha [facility]     Admission Orders: Date/Time/Statement: 1/19/18 @ 0656   Orders Placed This Encounter   Procedures    Inpatient Admission     Standing Status:   Standing     Number of Occurrences:   1     Order Specific Question:   Admitting Physician     Answer:   Twan Go [[id number]]     Order Specific Question:   Level of Care     Answer:   Critical Care [15]     Order Specific Question:   Bed request comments     Answer:   sandor     Order Specific Question:   Estimated length of stay     Answer:   More than 2 Midnights     Order Specific Question:   Certification     Answer:   I certify that inpatient services are medically necessary for this patient for a duration of greater than two midnights  See H&P and MD Progress Notes for additional information about the patient's course of treatment  Vital Signs: /71   Pulse 76   Temp 99 2 °F (37 3 °C) (Tympanic Core)   Resp (!) 9   Ht 5' 6 5" (1 689 m)   Wt 78 kg (172 lb)   SpO2 98%   BMI 27 35 kg/m²     Diet:        Diet Orders            Start     Ordered    01/20/18 0000  Diet Clear Liquid  Diet effective 0500     Question Answer Comment   Diet Type Clear Liquid    RD to adjust diet per protocol? Yes        01/19/18 1132    01/19/18 1132  Diet NPO; Sips with meds  Diet effective now     Question Answer Comment   Diet Type NPO    NPO Except: Sips with meds    RD to adjust diet per protocol? Yes        01/19/18 1132      A  Line right radial / Triple CVC / Introducer  Chest Tubes #1,2,3  (-20cm)  Epicardial pacing wires A/V    Mobility: Up with assistance / Up as tolerated / POD#1 OOB to chair and for all meals    DVT Prophylaxis: marcella SCDs    Pain Control:   Pain Medications             aspirin (ECOTRIN LOW STRENGTH) 81 mg EC tablet Take 81 mg by mouth daily        Scheduled Meds:  amiodarone 200 mg Oral Q8H [address]   vitamin C 500 mg Oral BID   aspirin 325 mg Oral Daily   atorvastatin 80 mg Oral Daily With Dinner   calcium chloride 1 g Intravenous Once   cefazolin 2,000 mg Intravenous Q8H   chlorhexidine 15 mL Swish & Spit Q12H [address]   [START ON 1/20/2018] cholecalciferol 1,000 Units Oral Daily   fentanyl citrate (PF) 50 mcg Intravenous Once   [START ON 1/20/2018] fondaparinux 2 5 mg Subcutaneous Daily   lactated ringers 1,000 mL Intravenous Once   magnesium sulfate 2 g Intravenous Once   mupirocin 1 application Nasal P96M [address]   [START ON 1/20/2018] pantoprazole 40 mg Oral Early Morning   [START ON 1/20/2018] polyethylene glycol 17 g Oral Daily     Continuous Infusions:  insulin regular (HumuLIN R,NovoLIN R) infusion 0 3-21 Units/hr   niCARdipine 2 5-15 mg/hr   phenylephine  mcg/min   sodium chloride 20 mL/hr     PRN Meds:   acetaminophen    acetaminophen    bisacodyl    fentanyl citrate (PF)    furosemide    HYDROmorphone    HYDROmorphone    lidocaine (cardiac)    metoprolol    ondansetron    oxyCODONE-acetaminophen    oxyCODONE-acetaminophen    potassium chloride    potassium chloride    potassium chloride

## 2018-01-19 NOTE — ANESTHESIA PREPROCEDURE EVALUATION
Review of Systems/Medical History          Cardiovascular  Hyperlipidemia, Hypertension , CAD, ,    Pulmonary       GI/Hepatic    GERD ,             Endo/Other     GYN       Hematology  Anemia ,     Musculoskeletal    Arthritis     Neurology   Psychology           Physical Exam    Airway    Mallampati score: II         Dental   No notable dental hx     Cardiovascular      Pulmonary      Other Findings        Anesthesia Plan  ASA Score- 4     Anesthesia Type- general with ASA Monitors  Additional Monitors:   Airway Plan: ETT  Comment: I, Dr Perri Archibald, the attending physician, have personally seen and evaluated the patient prior to anesthetic care  I have reviewed the pre-anesthetic record, and other medical records if appropriate to the anesthetic care  If a CRNA is involved in the case, I have reviewed the CRNA assessment, if present, and agree  The patient is in a suitable condition to proceed with my formulated anesthetic plan        Plan Factors-    Induction- intravenous  Postoperative Plan-     Informed Consent- Anesthetic plan and risks discussed with patient  I personally reviewed this patient with the CRNA  Discussed and agreed on the Anesthesia Plan with the JELLY Napier

## 2018-01-19 NOTE — ANESTHESIA PROCEDURE NOTES
Introducer/Rowena-Marshall  Performed by: Pretty Cano by: Doug Carpenter   Date/Time: 1/19/2018 7:51 AM  Indications: vascular access and central pressure monitoring  Location details: right internal jugular  Patient position: Trendelenburg    Sedation:  Patient sedated: yes  Assessment: blood return through all ports and free fluid flow  Preparation: skin prepped with ChloraPrep  Skin prep agent dried: skin prep agent completely dried prior to procedure  Sterile barriers: all five maximum sterile barriers used - cap, mask, sterile gown, sterile gloves, and large sterile sheet  Hand hygiene: hand hygiene performed prior to central venous catheter insertion  Ultrasound guidance: no  Consent: Written consent obtained  Risks and benefits: risks, benefits and alternatives were discussed  Consent given by: patient  Patient understanding: patient states understanding of the procedure being performed  Patient consent: the patient's understanding of the procedure matches consent given  Procedure consent: procedure consent matches procedure scheduled  Required items: required blood products, implants, devices, and special equipment available  Patient identity confirmed: arm band  Time out: Immediately prior to procedure a "time out" was called to verify the correct patient, procedure, equipment, support staff and site/side marked as required    Pre-procedure: landmarks identified  Number of attempts: 1  Successful placement: yes  Post-procedure: line sutured and dressing applied  Patient tolerance: Patient tolerated the procedure well with no immediate complications  Comments: Procedure start 0741  Procedure end [id number]

## 2018-01-19 NOTE — CONSULTS
Consult - [address] [facility] [address] 68 y o  male MRN: [id number]  Unit/Bed#: [facility] 416-01 Encounter: [id number]      Physician Requesting Consult: Hola Norwood DO    Reason for Consult / Principal Problem: Coronary artery disease    HPI: Vaishali Reno is a 68 y o  male who presents to [facility] with MVCAD  Pt now arrives to ICU s/p cabg x 4 -LIMA to LAD, SVG to OM2, SVG to RCA, and SVG to diagonal 1  Hx taken from chart due to pt being sedated  NS 1L  LR 1L  Alb 500ml  Cell saver 500ml  uop 400  ebl 500      PMH:   Past Medical History:   Diagnosis Date    Allergic rhinitis     Anemia     Arthritis     BPH (benign prostatic hyperplasia)     Coronary artery disease     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[address] )     Former tobacco use     GERD (gastroesophageal reflux disease)     Hyperlipidemia     Hypertension     Lumbar stenosis     Peripheral artery disease (HCC)        PSH:   Past Surgical History:   Procedure Laterality Date    COLONOSCOPY      LUMBAR FUSION      NH ARTHRODESIS POSTERIOR/POSTEROLATERAL LUMBAR N/A 11/3/2016    Procedure: L2-S1 POSTERIOR LUMBAR FUSION AND DECOMPRESSION (Impulse), Posterior lateral fixation; dural repair ;  Surgeon: Janna Kennedy MD;  Location: [facility] MAIN OR;  Service: Orthopedics    NH COLONOSCOPY FLX DX W/COLLJ SPEC WHEN PFRMD N/A 11/15/2017    Procedure: EGD AND COLONOSCOPY;  Surgeon: Etelvina Garibay MD;  Location: [facility]  GI LAB; Service: Gastroenterology    SEPTOPLASTY      TONSILECTOMY AND ADNOIDECTOMY         Family History: History reviewed  No pertinent family history  Social History:   History   Smoking Status    Former Smoker    Quit date: 2011   Smokeless Tobacco    Never Used    History   Alcohol Use    0 6 oz/week    1 Shots of liquor per week     Comment: beer, wine, scotch every other day      Marital Status:     ROS: ROS unable to be obtained secondary to intubation and sedation  Allergies:    Allergies   Allergen Reactions    Penicillins Other (See Comments)     Hallucinations; Patient reported that he was seeing visual disturbances         Home Medications:   Prior to Admission medications    Medication Sig Start Date End Date Taking?  Authorizing Provider   Ascorbic Acid (VITAMIN C) 1000 MG tablet Take 500 mg by mouth 2 (two) times a day   Yes Historical Provider, MD   aspirin (ECOTRIN LOW STRENGTH) 81 mg EC tablet Take 81 mg by mouth daily   Yes Historical Provider, MD   atorvastatin (LIPITOR) 20 mg tablet Take 20 mg by mouth 2 (two) times a day   Yes Historical Provider, MD   cholecalciferol (VITAMIN D3) 1,000 units tablet Take 1,000 Units by mouth daily   Yes Historical Provider, MD   cyanocobalamin (VITAMIN B-12) 1,000 mcg tablet Take 1,000 mcg by mouth daily     Yes Historical Provider, MD   isosorbide mononitrate (IMDUR) 30 mg 24 hr tablet Take 1 tablet by mouth daily 12/20/17  Yes KEVIN Webb   metoprolol tartrate (LOPRESSOR) 25 mg tablet Take 1 tablet by mouth every 12 (twelve) hours 12/20/17  Yes KEVIN Webb   mometasone (ELOCON) 0 1 % cream Apply 1 application topically as needed     Yes Historical Provider, MD   Multiple Vitamin (MULTIVITAMIN) capsule Take 1 capsule by mouth daily   Yes Historical Provider, MD   pantoprazole (PROTONIX) 40 mg tablet Take 40 mg by mouth daily   Yes Historical Provider, MD   Probiotic Product (ALIGN PO) Take 1 tablet by mouth daily     Yes Historical Provider, MD       Inpatient Medications:  Scheduled Meds:  albumin human 12 5 g Intravenous Once   amiodarone 200 mg Oral Q8H [address]   vitamin C 500 mg Oral BID   aspirin 325 mg Oral Daily   atorvastatin 80 mg Oral Daily With Dinner   calcium chloride 1 g Intravenous Once   cefazolin 2,000 mg Intravenous Q8H   chlorhexidine 15 mL Swish & Spit Q12H [address]   [START ON 1/20/2018] cholecalciferol 1,000 Units Oral Daily   fentanyl citrate (PF) 50 mcg Intravenous Once   [START ON 1/20/2018] fondaparinux 2 5 mg Subcutaneous Daily lactated ringers 1,000 mL Intravenous Once   magnesium sulfate 2 g Intravenous Once   mupirocin 1 application Nasal C63A [address]   [START ON 1/20/2018] pantoprazole 40 mg Oral Early Morning   [START ON 1/20/2018] polyethylene glycol 17 g Oral Daily     Continuous Infusions:  insulin regular (HumuLIN R,NovoLIN R) infusion 0 3-21 Units/hr   niCARdipine 2 5-15 mg/hr   phenylephine  mcg/min   sodium chloride 20 mL/hr     PRN Meds:    acetaminophen 650 mg Q4H PRN   acetaminophen 650 mg Q4H PRN   bisacodyl 10 mg Daily PRN   fentanyl citrate (PF) 50 mcg Q1H PRN   furosemide 40 mg Q6H PRN   HYDROmorphone 0 5 mg Q1H PRN   HYDROmorphone 1 mg Q1H PRN   lidocaine (cardiac) 100 mg Q30 Min PRN   metoprolol 2 5 mg Q6H PRN   ondansetron 4 mg Q6H PRN   oxyCODONE-acetaminophen 1 tablet Q4H PRN   oxyCODONE-acetaminophen 2 tablet Q6H PRN   potassium chloride 20 mEq Once PRN   potassium chloride 20 mEq Q1H PRN   potassium chloride 20 mEq Q30 Min PRN       VTE Pharmacologic Prophylaxis: Fondaparinux (Arixtra)  VTE Mechanical Prophylaxis: sequential compression device    Invasive lines and devices: Invasive Devices     Central Venous Catheter Line            CVC Central Lines 01/19/18 Triple less than 1 day    Introducer 01/19/18 less than 1 day          Peripheral Intravenous Line            Peripheral IV 01/19/18 Left Hand less than 1 day          Arterial Line            Arterial Line 01/19/18 Right Radial less than 1 day          Line            Pacer Wires less than 1 day    Pacer Wires less than 1 day          Drain            Chest Tube 1 Left Pleural 32 Fr  less than 1 day    Chest Tube 2 Posterior Mediastinal 32 Fr  less than 1 day    Chest Tube 3 Anterior Mediastinal 32 Fr  less than 1 day    NG/OG/Enteral Tube Orogastric 18 Fr Center mouth less than 1 day    Urethral Catheter Non-latex; Temperature probe 16 Fr  less than 1 day                Vitals:   Vitals:    01/16/18 0916 01/19/18 0609 01/19/18 1144   BP:  167/71 Pulse:  84 76   Resp:  16 (!) 9   Temp:  99 2 °F (37 3 °C)    TempSrc:  Tympanic Core    SpO2:  99% 98%   Weight: 79 4 kg (175 lb) 78 kg (172 lb)    Height: 5' 7" (1 702 m) 5' 6 5" (1 689 m)      Arterial Line BP: 130/60  Arterial Line MAP (mmHg): 86 mmHg    Temperature: Temp (24hrs), Av 2 °F (37 3 °C), Min:99 2 °F (37 3 °C), Max:99 2 °F (37 3 °C)  Current: Temperature: 99 2 °F (37 3 °C)    Weights: IBW: 64 95 kg  Body mass index is 27 35 kg/m²  Hemodynamic Monitoring:  PAP: PAP: 30/15, CVP: CVP (mean): 10 mmHg, CO: CO (L/min): 3 5 L/min, CI: CI (L/min/m2): 1 9 L/min/m2, SVR: SVR (dyne*sec)/cm5: 1576 (dyne*sec)/cm5    Pt was extubated pta in ICU  Physical Exam:    Constitutional: Appears comfortable  HENT: Atraumatic  extubated  Face mask in place  Neck: Neck supple  Patient intubated  Cardiovascular:  Reg rate and reg rhythm  no murmur heard  Exam reveals + rub  Pulmonary/Chest: Breath sounds cta bilaterally  Abdominal: Soft  No distension  +bs  Musculoskeletal: 1+ edema  Neurological: Patient is sedated  Skin: Skin is warm and dry  Labs/Imaging:     Results from last 7 days  Lab Units 18  1145 18  1008 18  0943   HEMOGLOBIN g/dL 10 0*  --   --    I STAT HEMOGLOBIN g/dl  --  7 8* 7 8*   HEMATOCRIT % 30 2*  --   --    PLATELETS Thousands/uL 123*  --   --        Results from last 7 days  Lab Units 18  1145 18  1008 18  0943   SODIUM mmol/L 140  --   --    POTASSIUM mmol/L 4 4  --   --    CHLORIDE mmol/L 109*  --   --    CO2 mmol/L 24  --   --    BUN mg/dL 18  --   --    CREATININE mg/dL 0 88  --   --    CALCIUM mg/dL 7 8*  --   --    GLUCOSE RANDOM mg/dL 133  --   --    GLUCOSE, ISTAT mg/dl  --  129 115               Post-op /51 6/104 0/22 7/97%  Post-op CXR: lines and tubes in good position  No ptx noted  I have personally reviewed pertinent films in PACS  Post-op EKG: nsr  This was personally reviewed by myself       Impression:  Principal Problem:    Coronary artery disease  Active Problems:    Former tobacco use    Hyperlipidemia    Hypertension      Plan:    Neuro: D/C sedation  PRN dilaudid and percocet for pain  Trend neuro exam  Regulate sleep/wake cycle  Delirium precautions  CV: MAP goal >65  SBP goal <90  CI>2 2  Post-op medications: Neosynephrine, 50 mcg/min  Wean off as able  Volume resuscitation as needed  Monitor rhythm  Epicardial pacing wires in place  Will pace as needed for bradycardia or heart block  Intra-op MATTHEW LVEF %  65    Lung: Check STAT post-op ABG and CXR  Monitor sats  Encourage IS    GI: GI prophylaxis with PPI  Bowel regimen  FEN: NPO  Replete K >4 0, Mag >2 0 and calcium >7 0  : Check STAT post-op BMP  [place] in place  Monitor UOP with goal >0 5cc/kg/hour  Lasix prn  Volume resuscitate as needed  ID: Prophylactic post-op abx  Maintain normothermia  Heme: Check STAT post-op H/H and platelets  Monitor incision site, invasive lines, and chest tube outputs for bleeding  Send PTT, INR and fibrinogen if CT outputs high  Endo: Insulin gtt for blood sugar <180  Disposition: ICU Care    Counseling / Coordination of Care  Total Critical Care time spent 21 minutes excluding procedures, teaching and family updates          SIGNATURE: Neema Prabhakar PA-C  DATE: January 19, 2018  TIME: 12:26 PM

## 2018-01-19 NOTE — RESPIRATORY THERAPY NOTE
RT Protocol Note  Tahir Merchant 68 y o  male MRN: [id number]  Unit/Bed#: [facility] 416-01 Encounter: [id number]    Assessment    Principal Problem:    Coronary artery disease  Active Problems:    Former tobacco use    Hyperlipidemia    Hypertension      Home Pulmonary Medications:  none    Past Medical History:   Diagnosis Date    Allergic rhinitis     Anemia     Arthritis     BPH (benign prostatic hyperplasia)     Coronary artery disease     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[facility] )     Former tobacco use     GERD (gastroesophageal reflux disease)     Hyperlipidemia     Hypertension     Lumbar stenosis     Peripheral artery disease ([facility] )      Social History     Social History    Marital status:      Spouse name: N/A    Number of children: N/A    Years of education: N/A     Social History Main Topics    Smoking status: Former Smoker     Quit date: 2011    Smokeless tobacco: Never Used    Alcohol use 0 6 oz/week     1 Shots of liquor per week      Comment: beer, wine, scotch every other day    Drug use: No    Sexual activity: Not Currently     Other Topics Concern    None     Social History Narrative    None       Subjective         Objective    Physical Exam:        Vitals:  Blood pressure 167/71, pulse 80, temperature (!) 97 3 °F (36 3 °C), temperature source Probe, resp  rate 15, height 5' 6 5" (1 689 m), weight 78 kg (172 lb), SpO2 100 %  Imaging and other studies: I have personally reviewed pertinent reports  Plan    Respiratory Plan: (P) No distress/Pulmonary history  Airway Clearance Plan: (P) Incentive Spirometer     Resp Comments: (P) Pt  with no pulm  hx  No meds at home  BS are clear  S/P CABG   Will continue IS

## 2018-01-19 NOTE — ANESTHESIA POSTPROCEDURE EVALUATION
Post-Op Assessment Note      CV Status:  Stable    Mental Status:  Lethargic and awake    Hydration Status:  Stable    PONV Controlled:  None    Airway Patency:  Patent    Post Op Vitals Reviewed: Yes          Staff: Anesthesiologist           BP      Temp      Pulse     Resp      SpO2

## 2018-01-20 ENCOUNTER — APPOINTMENT (INPATIENT)
Dept: RADIOLOGY | Facility: HOSPITAL | Age: 78
DRG: 236 | End: 2018-01-20
Payer: MEDICARE

## 2018-01-20 PROBLEM — I95.9 HYPOTENSION: Status: ACTIVE | Noted: 2018-01-20

## 2018-01-20 LAB
ANION GAP SERPL CALCULATED.3IONS-SCNC: 10 MMOL/L (ref 4–13)
BUN SERPL-MCNC: 20 MG/DL (ref 5–25)
CALCIUM SERPL-MCNC: 7.5 MG/DL (ref 8.3–10.1)
CHLORIDE SERPL-SCNC: 108 MMOL/L (ref 100–108)
CO2 SERPL-SCNC: 22 MMOL/L (ref 21–32)
CREAT SERPL-MCNC: 0.99 MG/DL (ref 0.6–1.3)
ERYTHROCYTE [DISTWIDTH] IN BLOOD BY AUTOMATED COUNT: 15.1 % (ref 11.6–15.1)
GFR SERPL CREATININE-BSD FRML MDRD: 73 ML/MIN/1.73SQ M
GLUCOSE SERPL-MCNC: 100 MG/DL (ref 65–140)
GLUCOSE SERPL-MCNC: 104 MG/DL (ref 65–140)
GLUCOSE SERPL-MCNC: 120 MG/DL (ref 65–140)
GLUCOSE SERPL-MCNC: 127 MG/DL (ref 65–140)
GLUCOSE SERPL-MCNC: 128 MG/DL (ref 65–140)
GLUCOSE SERPL-MCNC: 134 MG/DL (ref 65–140)
GLUCOSE SERPL-MCNC: 138 MG/DL (ref 65–140)
GLUCOSE SERPL-MCNC: 146 MG/DL (ref 65–140)
GLUCOSE SERPL-MCNC: 161 MG/DL (ref 65–140)
GLUCOSE SERPL-MCNC: 161 MG/DL (ref 65–140)
GLUCOSE SERPL-MCNC: 90 MG/DL (ref 65–140)
HCT VFR BLD AUTO: 24.8 % (ref 36.5–49.3)
HGB BLD-MCNC: 8.2 G/DL (ref 12–17)
MAGNESIUM SERPL-MCNC: 2.9 MG/DL (ref 1.6–2.6)
MCH RBC QN AUTO: 33.6 PG (ref 26.8–34.3)
MCHC RBC AUTO-ENTMCNC: 33.1 G/DL (ref 31.4–37.4)
MCV RBC AUTO: 102 FL (ref 82–98)
PLATELET # BLD AUTO: 116 THOUSANDS/UL (ref 149–390)
PMV BLD AUTO: 10.7 FL (ref 8.9–12.7)
POTASSIUM SERPL-SCNC: 4 MMOL/L (ref 3.5–5.3)
POTASSIUM SERPL-SCNC: 4.1 MMOL/L (ref 3.5–5.3)
RBC # BLD AUTO: 2.44 MILLION/UL (ref 3.88–5.62)
SODIUM SERPL-SCNC: 140 MMOL/L (ref 136–145)
WBC # BLD AUTO: 9.5 THOUSAND/UL (ref 4.31–10.16)

## 2018-01-20 PROCEDURE — 80048 BASIC METABOLIC PNL TOTAL CA: CPT | Performed by: THORACIC SURGERY (CARDIOTHORACIC VASCULAR SURGERY)

## 2018-01-20 PROCEDURE — 85027 COMPLETE CBC AUTOMATED: CPT | Performed by: THORACIC SURGERY (CARDIOTHORACIC VASCULAR SURGERY)

## 2018-01-20 PROCEDURE — 71045 X-RAY EXAM CHEST 1 VIEW: CPT

## 2018-01-20 PROCEDURE — 84132 ASSAY OF SERUM POTASSIUM: CPT | Performed by: THORACIC SURGERY (CARDIOTHORACIC VASCULAR SURGERY)

## 2018-01-20 PROCEDURE — 93005 ELECTROCARDIOGRAM TRACING: CPT

## 2018-01-20 PROCEDURE — 83735 ASSAY OF MAGNESIUM: CPT | Performed by: THORACIC SURGERY (CARDIOTHORACIC VASCULAR SURGERY)

## 2018-01-20 PROCEDURE — 82948 REAGENT STRIP/BLOOD GLUCOSE: CPT

## 2018-01-20 PROCEDURE — 94760 N-INVAS EAR/PLS OXIMETRY 1: CPT

## 2018-01-20 RX ORDER — ALBUMIN, HUMAN INJ 5% 5 %
12.5 SOLUTION INTRAVENOUS ONCE
Status: COMPLETED | OUTPATIENT
Start: 2018-01-20 | End: 2018-01-20

## 2018-01-20 RX ORDER — ALBUMIN, HUMAN INJ 5% 5 %
12.5 SOLUTION INTRAVENOUS ONCE
Status: DISCONTINUED | OUTPATIENT
Start: 2018-01-20 | End: 2018-01-23 | Stop reason: HOSPADM

## 2018-01-20 RX ORDER — MILRINONE LACTATE 0.2 MG/ML
0.25 INJECTION, SOLUTION INTRAVENOUS CONTINUOUS
Status: DISCONTINUED | OUTPATIENT
Start: 2018-01-20 | End: 2018-01-21

## 2018-01-20 RX ORDER — ALBUMIN, HUMAN INJ 5% 5 %
SOLUTION INTRAVENOUS
Status: COMPLETED
Start: 2018-01-20 | End: 2018-01-20

## 2018-01-20 RX ADMIN — CHLORHEXIDINE GLUCONATE 15 ML: 1.2 RINSE ORAL at 08:12

## 2018-01-20 RX ADMIN — PHENYLEPHRINE HYDROCHLORIDE 50 MCG/MIN: 10 INJECTION INTRAVENOUS at 15:29

## 2018-01-20 RX ADMIN — AMIODARONE HYDROCHLORIDE 200 MG: 200 TABLET ORAL at 05:15

## 2018-01-20 RX ADMIN — AMIODARONE HYDROCHLORIDE 200 MG: 200 TABLET ORAL at 23:08

## 2018-01-20 RX ADMIN — MILRINONE LACTATE IN DEXTROSE 0.13 MCG/KG/MIN: 200 INJECTION, SOLUTION INTRAVENOUS at 15:32

## 2018-01-20 RX ADMIN — VITAMIN D, TAB 1000IU (100/BT) 1000 UNITS: 25 TAB at 08:12

## 2018-01-20 RX ADMIN — CEFAZOLIN SODIUM 2000 MG: 2 SOLUTION INTRAVENOUS at 02:34

## 2018-01-20 RX ADMIN — MUPIROCIN 1 APPLICATION: 20 OINTMENT TOPICAL at 08:11

## 2018-01-20 RX ADMIN — PHENYLEPHRINE HYDROCHLORIDE 70 MCG/MIN: 10 INJECTION INTRAVENOUS at 04:51

## 2018-01-20 RX ADMIN — ASPIRIN 325 MG: 325 TABLET ORAL at 08:11

## 2018-01-20 RX ADMIN — ALBUMIN HUMAN 12.5 G: 0.05 INJECTION, SOLUTION INTRAVENOUS at 08:09

## 2018-01-20 RX ADMIN — FUROSEMIDE 20 MG: 10 INJECTION, SOLUTION INTRAMUSCULAR; INTRAVENOUS at 03:24

## 2018-01-20 RX ADMIN — OXYCODONE HYDROCHLORIDE AND ACETAMINOPHEN 1 TABLET: 5; 325 TABLET ORAL at 18:57

## 2018-01-20 RX ADMIN — POLYETHYLENE GLYCOL 3350 17 G: 17 POWDER, FOR SOLUTION ORAL at 08:12

## 2018-01-20 RX ADMIN — MUPIROCIN 1 APPLICATION: 20 OINTMENT TOPICAL at 20:17

## 2018-01-20 RX ADMIN — OXYCODONE HYDROCHLORIDE AND ACETAMINOPHEN 1 TABLET: 5; 325 TABLET ORAL at 09:56

## 2018-01-20 RX ADMIN — CEFAZOLIN SODIUM 2000 MG: 2 SOLUTION INTRAVENOUS at 09:57

## 2018-01-20 RX ADMIN — FUROSEMIDE 20 MG: 10 INJECTION, SOLUTION INTRAMUSCULAR; INTRAVENOUS at 17:14

## 2018-01-20 RX ADMIN — ALBUMIN (HUMAN) 12.5 G: 12.5 SOLUTION INTRAVENOUS at 15:30

## 2018-01-20 RX ADMIN — ATORVASTATIN CALCIUM 80 MG: 80 TABLET, FILM COATED ORAL at 16:02

## 2018-01-20 RX ADMIN — CHLORHEXIDINE GLUCONATE 15 ML: 1.2 RINSE ORAL at 20:17

## 2018-01-20 RX ADMIN — FONDAPARINUX SODIUM 2.5 MG: 2.5 INJECTION, SOLUTION SUBCUTANEOUS at 08:11

## 2018-01-20 RX ADMIN — ALBUMIN, HUMAN INJ 5% 12.5 G: 5 SOLUTION at 08:09

## 2018-01-20 RX ADMIN — OXYCODONE HYDROCHLORIDE AND ACETAMINOPHEN 1 TABLET: 5; 325 TABLET ORAL at 23:08

## 2018-01-20 RX ADMIN — OXYCODONE HYDROCHLORIDE AND ACETAMINOPHEN 500 MG: 500 TABLET ORAL at 18:57

## 2018-01-20 RX ADMIN — OXYCODONE HYDROCHLORIDE AND ACETAMINOPHEN 2 TABLET: 5; 325 TABLET ORAL at 04:02

## 2018-01-20 RX ADMIN — ALBUMIN HUMAN 12.5 G: 0.05 INJECTION, SOLUTION INTRAVENOUS at 12:19

## 2018-01-20 RX ADMIN — OXYCODONE HYDROCHLORIDE AND ACETAMINOPHEN 500 MG: 500 TABLET ORAL at 08:11

## 2018-01-20 RX ADMIN — MILRINONE LACTATE IN DEXTROSE 0.25 MCG/KG/MIN: 200 INJECTION, SOLUTION INTRAVENOUS at 00:19

## 2018-01-20 RX ADMIN — AMIODARONE HYDROCHLORIDE 200 MG: 200 TABLET ORAL at 16:02

## 2018-01-20 RX ADMIN — PANTOPRAZOLE SODIUM 40 MG: 40 TABLET, DELAYED RELEASE ORAL at 05:15

## 2018-01-20 NOTE — PROGRESS NOTES
Progress Note - Critical Care   Segun Torres 68 y o  male MRN: [id number]  Unit/Bed#: [facility] 416-01 Encounter: [id number]      Attending Physician: Keara Brenner DO    24 Hour Events: POD # 1 s/p cabg x 4  Pt was given 1 L LR and 1L alb post op for low map and filliing pressures  Then milrinone started for low CI and mara increased to 70  Pt given lasix 20 for low uop  Allergies: Allergies   Allergen Reactions    Penicillins Other (See Comments)     Hallucinations;   Patient reported that he was seeing visual disturbances         Medications:   Scheduled Meds:  amiodarone 200 mg Oral Q8H [address]   vitamin C 500 mg Oral BID   aspirin 325 mg Oral Daily   atorvastatin 80 mg Oral Daily With Dinner   calcium chloride 1 g Intravenous Once   cefazolin 2,000 mg Intravenous Q8H   chlorhexidine 15 mL Swish & Spit Q12H [address]   cholecalciferol 1,000 Units Oral Daily   fondaparinux 2 5 mg Subcutaneous Daily   mupirocin 1 application Nasal F46M KAMRAN   pantoprazole 40 mg Oral Early Morning   polyethylene glycol 17 g Oral Daily       VTE Pharmacologic Prophylaxis: Fondaparinux (Arixtra)  VTE Mechanical Prophylaxis: sequential compression device    Continuous Infusions:  insulin regular (HumuLIN R,NovoLIN R) infusion 0 3-21 Units/hr Last Rate: Stopped (01/20/18 0000)   milrinone (PRIMACOR) infusion 0 25 mcg/kg/min Last Rate: 0 25 mcg/kg/min (01/20/18 0019)   niCARdipine 2 5-15 mg/hr    phenylephine  mcg/min Last Rate: 70 mcg/min (01/20/18 0451)   sodium chloride 20 mL/hr Last Rate: 20 mL/hr (01/19/18 1250)     PRN Meds:    acetaminophen 650 mg Q4H PRN   acetaminophen 650 mg Q4H PRN   bisacodyl 10 mg Daily PRN   fentanyl citrate (PF) 50 mcg Q1H PRN   furosemide 40 mg Q6H PRN   HYDROmorphone 0 5 mg Q1H PRN   HYDROmorphone 1 mg Q1H PRN   lidocaine (cardiac) 100 mg Q30 Min PRN   metoprolol 2 5 mg Q6H PRN   ondansetron 4 mg Q6H PRN   oxyCODONE-acetaminophen 1 tablet Q4H PRN   oxyCODONE-acetaminophen 2 tablet Q6H PRN potassium chloride 20 mEq Once PRN   potassium chloride 20 mEq Q1H PRN   potassium chloride 20 mEq Q30 Min PRN       Home Medications:   Prior to Admission medications    Medication Sig Start Date End Date Taking? Authorizing Provider   Ascorbic Acid (VITAMIN C) 1000 MG tablet Take 500 mg by mouth 2 (two) times a day   Yes Historical Provider, MD   aspirin (ECOTRIN LOW STRENGTH) 81 mg EC tablet Take 81 mg by mouth daily   Yes Historical Provider, MD   atorvastatin (LIPITOR) 20 mg tablet Take 20 mg by mouth 2 (two) times a day   Yes Historical Provider, MD   cholecalciferol (VITAMIN D3) 1,000 units tablet Take 1,000 Units by mouth daily   Yes Historical Provider, MD   cyanocobalamin (VITAMIN B-12) 1,000 mcg tablet Take 1,000 mcg by mouth daily     Yes Historical Provider, MD   isosorbide mononitrate (IMDUR) 30 mg 24 hr tablet Take 1 tablet by mouth daily 12/20/17  Yes KEVIN Mayo   metoprolol tartrate (LOPRESSOR) 25 mg tablet Take 1 tablet by mouth every 12 (twelve) hours 12/20/17  Yes KEVIN Mayo   mometasone (ELOCON) 0 1 % cream Apply 1 application topically as needed     Yes Historical Provider, MD   Multiple Vitamin (MULTIVITAMIN) capsule Take 1 capsule by mouth daily   Yes Historical Provider, MD   pantoprazole (PROTONIX) 40 mg tablet Take 40 mg by mouth daily   Yes Historical Provider, MD   Probiotic Product (ALIGN PO) Take 1 tablet by mouth daily     Yes Historical Provider, MD       Vitals:   Vitals:    01/20/18 0200 01/20/18 0300 01/20/18 0400 01/20/18 0500   BP:       Pulse: 84 84 84 84   Resp: 14 13 22 15   Temp: 99 °F (37 2 °C) 99 1 °F (37 3 °C) 99 1 °F (37 3 °C) 99 5 °F (37 5 °C)   TempSrc: Probe Probe Probe Probe   SpO2: 100% 100% 100% 100%   Weight:       Height:         Arterial Line BP: 126/45  Arterial Line MAP (mmHg): 66 mmHg    Tele Rhythm: NSR This was personally reviewed by myself      Respiratory:  SpO2: SpO2: 100 %  O2 Flow Rate (L/min): 2 L/min    Temperature: Temp (24hrs), Av °F (36 7 °C), Min:96 4 °F (35 8 °C), Max:99 5 °F (37 5 °C)  Current: Temperature: 99 5 °F (37 5 °C)    Weights:   Weight (last 2 days)     Date/Time   Weight    18 0609  78 (172)            IBW: 64 95 kg  Body mass index is 27 35 kg/m²  Hemodynamic Monitoring:  CVP: CVP (mean): 13 mmHg, SVR: SVR (dyne*sec)/cm5: 916 (dyne*sec)/cm5  PAP: (27-44)/(13-24) 29/16  CO:  [3 2 L/min-4 6 L/min] 4 1 L/min  CI:  [1 7 L/min/m2-2 4 L/min/m2] 2 2 L/min/m2    Intake and Outputs:  Intake/Output Summary (Last 24 hours) at 18 0631  Last data filed at 18 0500   Gross per 24 hour   Intake          6244 57 ml   Output             2510 ml   Net          3734 57 ml     I/O last 24 hours: In: 6244 6 [P O :240; I V :2854 6; IV TOXWNESIY:2249]  Out: 2510 [Urine:1400; Blood:500; Chest Tube:610]    UOP: 25-65ml/hour     Chest tube Output:  Mediastinal tubes: 100 mL/8 hours  275 mL/24 hours   Pleural tubes: 130 mL/8 hours  335 mL/24 hours     Labs:    Results from last 7 days  Lab Units 18  1256 18  1145   WBC Thousand/uL 9 50  --   --   --    HEMOGLOBIN g/dL 8 2* 8 3*  --  10 0*   I STAT HEMOGLOBIN g/dl  --   --  8 5*  --    HEMATOCRIT % 24 8*  --   --  30 2*   PLATELETS Thousands/uL 116*  --   --  123*       Results from last 7 days  Lab Units 18  03518  0009 18  1256 18  1145   SODIUM mmol/L 140  --   --   --   --  140   POTASSIUM mmol/L 4 0 4 1 4 2  < >  --  4 4   CHLORIDE mmol/L 108  --   --   --   --  109*   CO2 mmol/L 22  --   --   --   --  24   BUN mg/dL 20  --   --   --   --  18   CREATININE mg/dL 0 99  --   --   --   --  0 88   CALCIUM mg/dL 7 5*  --   --   --   --  7 8*   GLUCOSE RANDOM mg/dL 100  --   --   --   --  133   GLUCOSE, ISTAT mg/dl  --   --   --   --  140  --    < > = values in this interval not displayed      Baseline creat 1 0      Results from last 7 days  Lab Units 18  0358   MAGNESIUM mg/dL 2 9*             CXR: 1/20 post op changes  Lines and tubes in good position  This was personally reviewed by myself in PACS  EKG: nsr with 1st degree block  This was personally reviewed by myself  Physical Exam:     Neck:  Supple  Cardiac: reg rate and reg rhythm  no rubs  no murmur  Pulmonary:  Breath sounds cta b/l  Abdomen:  Soft   nondistension   +bs  Incisions: Sternum is stable  Incision dressed with Acticoat  No erythema or drainage  Lower extremities: 1+ edema B/L  Neuro: Alert  Follows commands  Screening neurologic exam reveals no obvious deficits  Skin: Warm and dry  Invasive lines and devices: Invasive Devices     Central Venous Catheter Line            CVC Central Lines 01/19/18 Triple less than 1 day    Introducer 01/19/18 less than 1 day          Peripheral Intravenous Line            Peripheral IV 01/19/18 Left Hand less than 1 day          Arterial Line            Arterial Line 01/19/18 Right Radial less than 1 day          Line            Pacer Wires less than 1 day    Pacer Wires less than 1 day          Drain            Chest Tube 1 Left Pleural 32 Fr  less than 1 day    Chest Tube 2 Posterior Mediastinal 32 Fr  less than 1 day    Chest Tube 3 Anterior Mediastinal 32 Fr  less than 1 day    Urethral Catheter Non-latex; Temperature probe 16 Fr  less than 1 day                Assessment:  Principal Problem:    Coronary artery disease  Active Problems:    Former tobacco use    Hyperlipidemia    Hypertension      Plan:    Cardiac:   Cardiac infusions: Primacor, 0 25 mcg/kg/min, Neosynephrine, 90 mcg/min   Low filling pressures  Will give volume for now and wean primacor    Hemodynamic goals:   CI > 2 2 and MAP >65   continue Alfred-Marshall catheter   continue arterial line  NSR; Hypotensive  Hold lopressor 2/2 hypotension  Continue ASA, PO amiodarone, and statin therapy  Maintain epicardial pacing wires for now  Continue DVT prophylaxis  Consult cardiology for postoperative medical management    Pulmonary:   Extubated  Chest tube output remains persistently high; Continue chest tubes to suction today  Acute post-op respiratory insufficiency:  Continue incentive spirometry/coughing/deep breathing exercises  Wean supplemental oxygen as tolerated  Renal:   Discontinue IV potassium replacement scales  Post-op volume overload:    Hold lasix for now  Potassium replacement prn  cont pillai catheter     Neuro:  Neurologically intact with no active issues  Incisional pain well-controlled  continue IV narcotic therapy and continue with PO pain regimen    GI:  Initiate TLC 2 3 gm sodium diet with 1800 mL fluid restriction  Continue bowel regimen  Continue GI prophylaxis with PPI          Endo: No history of diabetes: Glucose well-controlled  continuous insulin infusion for now    Hematology:   Post-operative acute blood loss anemia:   Trend hemoglobin and platelets  Transfuse as needed  Disposition:   Cont icu care for now        Collaborative bedside rounds performed with cardiac surgery attending and bedside RN      SIGNATURE: Robert Becker PA-C  DATE: January 20, 2018  TIME: 6:31 AM

## 2018-01-20 NOTE — PROGRESS NOTES
Progress Note - Cardiothoracic Surgery   More [place] 68 y o  male MRN: [id number]  Unit/Bed#: [facility] 416-01 Encounter: [id number]      POD # 1 s/p CABG    Pt seen/examined  Interval history and data reviewed with critical care team   Pt doing well  No specific complaints  Milrinone gtt and mara gtt overnight for low CO/CI and hypotension  Low filling pressures this AM       Medications:   Scheduled Meds:  albumin human 12 5 g Intravenous Once   amiodarone 200 mg Oral Q8H [address]   vitamin C 500 mg Oral BID   aspirin 325 mg Oral Daily   atorvastatin 80 mg Oral Daily With Dinner   calcium chloride 1 g Intravenous Once   cefazolin 2,000 mg Intravenous Q8H   chlorhexidine 15 mL Swish & Spit Q12H [address]   cholecalciferol 1,000 Units Oral Daily   fondaparinux 2 5 mg Subcutaneous Daily   mupirocin 1 application Nasal O16X KAMRAN   pantoprazole 40 mg Oral Early Morning   polyethylene glycol 17 g Oral Daily     Continuous Infusions:  insulin regular (HumuLIN R,NovoLIN R) infusion 0 3-21 Units/hr Last Rate: 0 5 Units/hr (01/20/18 0755)   milrinone (PRIMACOR) infusion 0 25 mcg/kg/min Last Rate: 0 25 mcg/kg/min (01/20/18 0019)   niCARdipine 2 5-15 mg/hr    phenylephine  mcg/min Last Rate: 110 mcg/min (01/20/18 0705)   sodium chloride 20 mL/hr Last Rate: 20 mL/hr (01/19/18 1250)     PRN Meds:   acetaminophen    acetaminophen    bisacodyl    fentanyl citrate (PF)    furosemide    HYDROmorphone    HYDROmorphone    lidocaine (cardiac)    metoprolol    ondansetron    oxyCODONE-acetaminophen    oxyCODONE-acetaminophen    potassium chloride    potassium chloride    potassium chloride    Vitals: Blood pressure (!) 102/45, pulse 88, temperature 98 4 °F (36 9 °C), temperature source Probe, resp  rate 20, height 5' 6 5" (1 689 m), weight 76 6 kg (168 lb 14 oz), SpO2 99 %  ,Body mass index is 26 85 kg/m²  I/O last 24 hours: In: 6301 5 [P O :240;  I V :2911 5; IV Piggyback:2650]  Out: 5600 [Urine:1520; Blood:500; Chest Tube:770]  Invasive Devices     Central Venous Catheter Line            CVC Central Lines 01/19/18 Triple 1 day    Introducer 01/19/18 1 day          Peripheral Intravenous Line            Peripheral IV 01/19/18 Left Hand 1 day          Arterial Line            Arterial Line 01/19/18 Right Radial 1 day          Line            Pacer Wires less than 1 day    Pacer Wires less than 1 day          Drain            Urethral Catheter Non-latex; Temperature probe 16 Fr  1 day    Chest Tube 1 Left Pleural 32 Fr  less than 1 day    Chest Tube 2 Posterior Mediastinal 32 Fr  less than 1 day    Chest Tube 3 Anterior Mediastinal 32 Fr  less than 1 day                  Lab, Imaging and other studies:     Results from last 7 days  Lab Units 01/20/18  0358 01/19/18  1953 01/19/18  1256 01/19/18  1145   WBC Thousand/uL 9 50  --   --   --    HEMOGLOBIN g/dL 8 2* 8 3*  --  10 0*   I STAT HEMOGLOBIN g/dl  --   --  8 5*  --    HEMATOCRIT % 24 8*  --   --  30 2*   PLATELETS Thousands/uL 116*  --   --  123*       Results from last 7 days  Lab Units 01/20/18  0358 01/20/18  0009 01/19/18 1953 01/19/18  1145   SODIUM mmol/L 140  --   --   --  140   POTASSIUM mmol/L 4 0 4 1 4 2  < > 4 4   CHLORIDE mmol/L 108  --   --   --  109*   CO2 mmol/L 22  --   --   --  24   BUN mg/dL 20  --   --   --  18   CREATININE mg/dL 0 99  --   --   --  0 88   GLUCOSE RANDOM mg/dL 100  --   --   --  133   GLUCOSE, ISTAT   --   --   --   < >  --    CALCIUM mg/dL 7 5*  --   --   --  7 8*   < > = values in this interval not displayed  No results for input(s): PHART, KPJ3ATT, PO2ART, QMX4RMC, F3BLXULC, BEART in the last 72 hours  Plan:    Volume challenge this morning  Should be able to wean Milrinone and Frank gtts to off  Otherwise routine POD 1 care  Cont [place] until stabilized off vasoactive gtts  Continue chest tubes, pacing wires  Ambulate later today  Incentive spirometry  No diuresis yet    PO ASA/Statin/B blocker          Leana Vidales MD  DATE: January 20, 2018  TIME: 9:57 AM

## 2018-01-20 NOTE — PROGRESS NOTES
Nurse spoke with Joo AMOR regarding pt's hemodynamic status  New order was received to wean primacor to   13 mcgs

## 2018-01-21 LAB
ANION GAP SERPL CALCULATED.3IONS-SCNC: 6 MMOL/L (ref 4–13)
BUN SERPL-MCNC: 25 MG/DL (ref 5–25)
CALCIUM SERPL-MCNC: 7.5 MG/DL (ref 8.3–10.1)
CHLORIDE SERPL-SCNC: 105 MMOL/L (ref 100–108)
CO2 SERPL-SCNC: 26 MMOL/L (ref 21–32)
CREAT SERPL-MCNC: 1.37 MG/DL (ref 0.6–1.3)
ERYTHROCYTE [DISTWIDTH] IN BLOOD BY AUTOMATED COUNT: 15.4 % (ref 11.6–15.1)
GFR SERPL CREATININE-BSD FRML MDRD: 49 ML/MIN/1.73SQ M
GLUCOSE SERPL-MCNC: 112 MG/DL (ref 65–140)
GLUCOSE SERPL-MCNC: 113 MG/DL (ref 65–140)
GLUCOSE SERPL-MCNC: 117 MG/DL (ref 65–140)
GLUCOSE SERPL-MCNC: 120 MG/DL (ref 65–140)
GLUCOSE SERPL-MCNC: 125 MG/DL (ref 65–140)
GLUCOSE SERPL-MCNC: 126 MG/DL (ref 65–140)
GLUCOSE SERPL-MCNC: 127 MG/DL (ref 65–140)
GLUCOSE SERPL-MCNC: 133 MG/DL (ref 65–140)
GLUCOSE SERPL-MCNC: 137 MG/DL (ref 65–140)
GLUCOSE SERPL-MCNC: 138 MG/DL (ref 65–140)
GLUCOSE SERPL-MCNC: 150 MG/DL (ref 65–140)
GLUCOSE SERPL-MCNC: 166 MG/DL (ref 65–140)
HCT VFR BLD AUTO: 21.1 % (ref 36.5–49.3)
HGB BLD-MCNC: 7 G/DL (ref 12–17)
MAGNESIUM SERPL-MCNC: 2.6 MG/DL (ref 1.6–2.6)
MCH RBC QN AUTO: 33.5 PG (ref 26.8–34.3)
MCHC RBC AUTO-ENTMCNC: 33.2 G/DL (ref 31.4–37.4)
MCV RBC AUTO: 101 FL (ref 82–98)
PHOSPHATE SERPL-MCNC: 2.2 MG/DL (ref 2.3–4.1)
PLATELET # BLD AUTO: 86 THOUSANDS/UL (ref 149–390)
PMV BLD AUTO: 10.5 FL (ref 8.9–12.7)
POTASSIUM SERPL-SCNC: 3.4 MMOL/L (ref 3.5–5.3)
RBC # BLD AUTO: 2.09 MILLION/UL (ref 3.88–5.62)
SODIUM SERPL-SCNC: 137 MMOL/L (ref 136–145)
WBC # BLD AUTO: 9.05 THOUSAND/UL (ref 4.31–10.16)

## 2018-01-21 PROCEDURE — 94760 N-INVAS EAR/PLS OXIMETRY 1: CPT

## 2018-01-21 PROCEDURE — 80048 BASIC METABOLIC PNL TOTAL CA: CPT | Performed by: THORACIC SURGERY (CARDIOTHORACIC VASCULAR SURGERY)

## 2018-01-21 PROCEDURE — 84100 ASSAY OF PHOSPHORUS: CPT | Performed by: THORACIC SURGERY (CARDIOTHORACIC VASCULAR SURGERY)

## 2018-01-21 PROCEDURE — 82948 REAGENT STRIP/BLOOD GLUCOSE: CPT

## 2018-01-21 PROCEDURE — 85027 COMPLETE CBC AUTOMATED: CPT | Performed by: THORACIC SURGERY (CARDIOTHORACIC VASCULAR SURGERY)

## 2018-01-21 PROCEDURE — 83735 ASSAY OF MAGNESIUM: CPT | Performed by: THORACIC SURGERY (CARDIOTHORACIC VASCULAR SURGERY)

## 2018-01-21 RX ORDER — FUROSEMIDE 10 MG/ML
40 INJECTION INTRAMUSCULAR; INTRAVENOUS ONCE
Status: COMPLETED | OUTPATIENT
Start: 2018-01-21 | End: 2018-01-21

## 2018-01-21 RX ORDER — ALBUMIN, HUMAN INJ 5% 5 %
25 SOLUTION INTRAVENOUS ONCE
Status: COMPLETED | OUTPATIENT
Start: 2018-01-21 | End: 2018-01-21

## 2018-01-21 RX ORDER — DOCUSATE SODIUM 100 MG/1
100 CAPSULE, LIQUID FILLED ORAL 2 TIMES DAILY
Status: DISCONTINUED | OUTPATIENT
Start: 2018-01-21 | End: 2018-01-23 | Stop reason: HOSPADM

## 2018-01-21 RX ORDER — FUROSEMIDE 10 MG/ML
40 INJECTION INTRAMUSCULAR; INTRAVENOUS ONCE
Status: DISCONTINUED | OUTPATIENT
Start: 2018-01-21 | End: 2018-01-22

## 2018-01-21 RX ORDER — ACETAMINOPHEN 325 MG/1
650 TABLET ORAL EVERY 6 HOURS PRN
Status: DISCONTINUED | OUTPATIENT
Start: 2018-01-21 | End: 2018-01-23 | Stop reason: HOSPADM

## 2018-01-21 RX ORDER — ALBUMIN, HUMAN INJ 5% 5 %
12.5 SOLUTION INTRAVENOUS ONCE
Status: COMPLETED | OUTPATIENT
Start: 2018-01-21 | End: 2018-01-21

## 2018-01-21 RX ORDER — PANTOPRAZOLE SODIUM 40 MG/1
40 TABLET, DELAYED RELEASE ORAL DAILY
Status: DISCONTINUED | OUTPATIENT
Start: 2018-01-22 | End: 2018-01-23 | Stop reason: HOSPADM

## 2018-01-21 RX ORDER — FONDAPARINUX SODIUM 2.5 MG/.5ML
2.5 INJECTION SUBCUTANEOUS DAILY
Status: DISCONTINUED | OUTPATIENT
Start: 2018-01-22 | End: 2018-01-23 | Stop reason: HOSPADM

## 2018-01-21 RX ADMIN — PANTOPRAZOLE SODIUM 40 MG: 40 TABLET, DELAYED RELEASE ORAL at 05:13

## 2018-01-21 RX ADMIN — ASPIRIN 325 MG: 325 TABLET ORAL at 09:00

## 2018-01-21 RX ADMIN — POTASSIUM PHOSPHATE, MONOBASIC AND POTASSIUM PHOSPHATE, DIBASIC 21 MMOL: 224; 236 INJECTION, SOLUTION INTRAVENOUS at 06:30

## 2018-01-21 RX ADMIN — OXYCODONE HYDROCHLORIDE AND ACETAMINOPHEN 1 TABLET: 5; 325 TABLET ORAL at 05:13

## 2018-01-21 RX ADMIN — MUPIROCIN 1 APPLICATION: 20 OINTMENT TOPICAL at 22:10

## 2018-01-21 RX ADMIN — ALBUMIN HUMAN 12.5 G: 0.05 INJECTION, SOLUTION INTRAVENOUS at 01:18

## 2018-01-21 RX ADMIN — OXYCODONE HYDROCHLORIDE AND ACETAMINOPHEN 500 MG: 500 TABLET ORAL at 09:00

## 2018-01-21 RX ADMIN — POLYETHYLENE GLYCOL 3350 17 G: 17 POWDER, FOR SOLUTION ORAL at 09:00

## 2018-01-21 RX ADMIN — OXYCODONE HYDROCHLORIDE AND ACETAMINOPHEN 1 TABLET: 5; 325 TABLET ORAL at 17:25

## 2018-01-21 RX ADMIN — FUROSEMIDE 40 MG: 10 INJECTION, SOLUTION INTRAMUSCULAR; INTRAVENOUS at 11:55

## 2018-01-21 RX ADMIN — AMIODARONE HYDROCHLORIDE 200 MG: 200 TABLET ORAL at 05:13

## 2018-01-21 RX ADMIN — FONDAPARINUX SODIUM 2.5 MG: 2.5 INJECTION, SOLUTION SUBCUTANEOUS at 09:00

## 2018-01-21 RX ADMIN — OXYCODONE HYDROCHLORIDE AND ACETAMINOPHEN 500 MG: 500 TABLET ORAL at 17:24

## 2018-01-21 RX ADMIN — AMIODARONE HYDROCHLORIDE 200 MG: 200 TABLET ORAL at 22:09

## 2018-01-21 RX ADMIN — ALBUMIN HUMAN 12.5 G: 0.05 INJECTION, SOLUTION INTRAVENOUS at 10:47

## 2018-01-21 RX ADMIN — AMIODARONE HYDROCHLORIDE 200 MG: 200 TABLET ORAL at 15:04

## 2018-01-21 RX ADMIN — CHLORHEXIDINE GLUCONATE 15 ML: 1.2 RINSE ORAL at 09:00

## 2018-01-21 RX ADMIN — FUROSEMIDE 40 MG: 10 INJECTION, SOLUTION INTRAMUSCULAR; INTRAVENOUS at 02:14

## 2018-01-21 RX ADMIN — ATORVASTATIN CALCIUM 80 MG: 80 TABLET, FILM COATED ORAL at 17:24

## 2018-01-21 RX ADMIN — MUPIROCIN 1 APPLICATION: 20 OINTMENT TOPICAL at 09:00

## 2018-01-21 RX ADMIN — VITAMIN D, TAB 1000IU (100/BT) 1000 UNITS: 25 TAB at 09:00

## 2018-01-21 NOTE — PROGRESS NOTES
Progress Note - Cardiothoracic Surgery   Simi Talbert 68 y o  male MRN: [id number]  Unit/Bed#: [facility] 416-01 Encounter: [id number]      POD # 2 s/p CABG    Pt seen/examined  Interval history and data reviewed with critical care team   Pt doing well  No specific complaints  Milrinone gtt 0 125    Frank gtt 40      Medications:   Scheduled Meds:    albumin human 12 5 g Intravenous Once   amiodarone 200 mg Oral Q8H [address]   vitamin C 500 mg Oral BID   aspirin 325 mg Oral Daily   atorvastatin 80 mg Oral Daily With Dinner   calcium chloride 1 g Intravenous Once   chlorhexidine 15 mL Swish & Spit Q12H [address]   cholecalciferol 1,000 Units Oral Daily   fondaparinux 2 5 mg Subcutaneous Daily   furosemide 40 mg Intravenous Once   furosemide 40 mg Intravenous Once   mupirocin 1 application Nasal W28M KAMRAN   pantoprazole 40 mg Oral Early Morning   polyethylene glycol 17 g Oral Daily     Continuous Infusions:    insulin regular (HumuLIN R,NovoLIN R) infusion 0 3-21 Units/hr Last Rate: 0 5 Units/hr (01/21/18 0800)   phenylephine  mcg/min Last Rate: 30 mcg/min (01/21/18 1000)   sodium chloride 20 mL/hr Last Rate: 20 mL/hr (01/21/18 0800)     PRN Meds:   acetaminophen    acetaminophen    bisacodyl    fentanyl citrate (PF)    furosemide    HYDROmorphone    HYDROmorphone    lidocaine (cardiac)    metoprolol    ondansetron    oxyCODONE-acetaminophen    oxyCODONE-acetaminophen    potassium chloride    potassium chloride    potassium chloride    Vitals: Blood pressure 102/58, pulse 84, temperature 100 °F (37 8 °C), temperature source Probe, resp  rate 18, height 5' 6 5" (1 689 m), weight 79 1 kg (174 lb 6 1 oz), SpO2 96 %  ,Body mass index is 27 72 kg/m²  I/O last 24 hours: In: 3505 3 [P O :750; I V :1455 3; IV Piggyback:1300]  Out: 8623 [Urine:1160;  Chest Tube:450]  Invasive Devices     Central Venous Catheter Line            CVC Central Lines 01/19/18 Triple 2 days    Introducer 01/19/18 2 days          Peripheral Intravenous Line            Peripheral IV 01/19/18 Left Hand 2 days          Arterial Line            Arterial Line 01/19/18 Right Radial 2 days          Line            Pacer Wires 2 days    Pacer Wires 2 days          Drain            Chest Tube 1 Left Pleural 32 Fr  2 days    Chest Tube 2 Posterior Mediastinal 32 Fr  2 days    Chest Tube 3 Anterior Mediastinal 32 Fr  2 days    Urethral Catheter Non-latex; Temperature probe 16 Fr  2 days                  Lab, Imaging and other studies:     Results from last 7 days  Lab Units 01/21/18  0414 01/20/18  0358 01/19/18  1953  01/19/18  1145   WBC Thousand/uL 9 05 9 50  --   --   --    HEMOGLOBIN g/dL 7 0* 8 2* 8 3*  --  10 0*   I STAT HEMOGLOBIN   --   --   --   < >  --    HEMATOCRIT % 21 1* 24 8*  --   --  30 2*   PLATELETS Thousands/uL 86* 116*  --   --  123*   < > = values in this interval not displayed  Results from last 7 days  Lab Units 01/21/18  0414 01/20/18  0358 01/20/18  0009  01/19/18  1145   SODIUM mmol/L 137 140  --   --  140   POTASSIUM mmol/L 3 4* 4 0 4 1  < > 4 4   CHLORIDE mmol/L 105 108  --   --  109*   CO2 mmol/L 26 22  --   --  24   BUN mg/dL 25 20  --   --  18   CREATININE mg/dL 1 37* 0 99  --   --  0 88   GLUCOSE RANDOM mg/dL 113 100  --   --  133   GLUCOSE, ISTAT   --   --   --   < >  --    CALCIUM mg/dL 7 5* 7 5*  --   --  7 8*   < > = values in this interval not displayed  No results for input(s): PHART, LKR2CXT, PO2ART, ZDT6TFL, N7SKHQUN, BEART in the last 72 hours  Plan:    DC Milrinone  DC [place]ordis/[place]  Wean Frank to off  DC chest tubes, pacing wires  Ambulate later today  Incentive spirometry  Gentle diuresis  PO ASA/Statin/B blocker  Tx to floor        Ligia Haider MD  DATE: January 21, 2018  TIME: 11:34 AM

## 2018-01-21 NOTE — PROGRESS NOTES
Progress Note - Critical Care   Tahir Merchant 68 y o  male MRN: [id number]  Unit/Bed#: [facility] 416-01 Encounter: [id number]    Attending Physician: Pa Manzo DO    24 Hour Events: POD # 2 s/p cabg x4  Pt was given 500 alb for low uop  Then 40 lasix with good response  bp dropped when attempted to wean mara  Allergies: Allergies   Allergen Reactions    Penicillins Other (See Comments)     Hallucinations;   Patient reported that he was seeing visual disturbances         Medications:   Scheduled Meds:  albumin human 12 5 g Intravenous Once   amiodarone 200 mg Oral Q8H [facility]   vitamin C 500 mg Oral BID   aspirin 325 mg Oral Daily   atorvastatin 80 mg Oral Daily With Dinner   calcium chloride 1 g Intravenous Once   chlorhexidine 15 mL Swish & Spit Q12H [facility]   cholecalciferol 1,000 Units Oral Daily   fondaparinux 2 5 mg Subcutaneous Daily   furosemide 40 mg Intravenous Once   mupirocin 1 application Nasal H24F KAMRAN   pantoprazole 40 mg Oral Early Morning   polyethylene glycol 17 g Oral Daily   potassium phosphate 21 mmol Intravenous Once       VTE Pharmacologic Prophylaxis: Fondaparinux (Arixtra)  VTE Mechanical Prophylaxis: sequential compression device    Continuous Infusions:  insulin regular (HumuLIN R,NovoLIN R) infusion 0 3-21 Units/hr Last Rate: 0 5 Units/hr (01/21/18 0400)   milrinone (PRIMACOR) infusion 0 25 mcg/kg/min Last Rate: 0 13 mcg/kg/min (01/21/18 0400)   phenylephine  mcg/min Last Rate: 60 mcg/min (01/21/18 0400)   sodium chloride 20 mL/hr Last Rate: 20 mL/hr (01/21/18 0400)     PRN Meds:    acetaminophen 650 mg Q4H PRN   acetaminophen 650 mg Q4H PRN   bisacodyl 10 mg Daily PRN   fentanyl citrate (PF) 50 mcg Q1H PRN   furosemide 40 mg Q6H PRN   HYDROmorphone 0 5 mg Q1H PRN   HYDROmorphone 1 mg Q1H PRN   lidocaine (cardiac) 100 mg Q30 Min PRN   metoprolol 2 5 mg Q6H PRN   ondansetron 4 mg Q6H PRN   oxyCODONE-acetaminophen 1 tablet Q4H PRN   oxyCODONE-acetaminophen 2 tablet Q6H PRN potassium chloride 20 mEq Once PRN   potassium chloride 20 mEq Q1H PRN   potassium chloride 20 mEq Q30 Min PRN       Home Medications:   Prior to Admission medications    Medication Sig Start Date End Date Taking? Authorizing Provider   Ascorbic Acid (VITAMIN C) 1000 MG tablet Take 500 mg by mouth 2 (two) times a day   Yes Historical Provider, MD   aspirin (ECOTRIN LOW STRENGTH) 81 mg EC tablet Take 81 mg by mouth daily   Yes Historical Provider, MD   atorvastatin (LIPITOR) 20 mg tablet Take 20 mg by mouth 2 (two) times a day   Yes Historical Provider, MD   cholecalciferol (VITAMIN D3) 1,000 units tablet Take 1,000 Units by mouth daily   Yes Historical Provider, MD   cyanocobalamin (VITAMIN B-12) 1,000 mcg tablet Take 1,000 mcg by mouth daily     Yes Historical Provider, MD   isosorbide mononitrate (IMDUR) 30 mg 24 hr tablet Take 1 tablet by mouth daily 12/20/17  Yes KEVIN Graves   metoprolol tartrate (LOPRESSOR) 25 mg tablet Take 1 tablet by mouth every 12 (twelve) hours 12/20/17  Yes KEVIN Graves   mometasone (ELOCON) 0 1 % cream Apply 1 application topically as needed     Yes Historical Provider, MD   Multiple Vitamin (MULTIVITAMIN) capsule Take 1 capsule by mouth daily   Yes Historical Provider, MD   pantoprazole (PROTONIX) 40 mg tablet Take 40 mg by mouth daily   Yes Historical Provider, MD   Probiotic Product (ALIGN PO) Take 1 tablet by mouth daily     Yes Historical Provider, MD       Vitals:   Vitals:    01/21/18 0200 01/21/18 0400 01/21/18 0600 01/21/18 0750   BP: 124/52 124/50 129/54    Pulse: 80 82 82    Resp: 18 16 16    Temp: 100 °F (37 8 °C) 100 2 °F (37 9 °C) 100 4 °F (38 °C)    TempSrc: Probe Probe Probe    SpO2: 98% 95% 100% 97%   Weight:   79 1 kg (174 lb 6 1 oz)    Height:         Arterial Line BP: 154/50  Arterial Line MAP (mmHg): 78 mmHg    Tele Rhythm: NSR This was personally reviewed by myself      Respiratory:  SpO2: SpO2: 97 %  O2 Flow Rate (L/min): 1 L/min    Temperature: Temp (24hrs), Av 1 °F (37 8 °C), Min:99 3 °F (37 4 °C), Max:100 6 °F (38 1 °C)  Current: Temperature: 100 4 °F (38 °C)    Weights:   Weight (last 2 days)     Date/Time   Weight    18 0600  79 1 (174 38)    18 0600  76 6 (168 87)    18 0609  78 (172)            IBW: 64 95 kg  Body mass index is 27 72 kg/m²  Hemodynamic Monitoring:  CVP: CVP (mean): 10 mmHg, SVR: SVR (dyne*sec)/cm5: 1042 (dyne*sec)/cm5  PAP: (18-45)/(10-23) 27/13  CO:  [3 7 L/min-5 3 L/min] 4 6 L/min  CI:  [2 L/min/m2-2 8 L/min/m2] 2 4 L/min/m2    Intake and Outputs:  Intake/Output Summary (Last 24 hours) at 18 0809  Last data filed at 18 0600   Gross per 24 hour   Intake          2902 33 ml   Output             1325 ml   Net          1577 33 ml     I/O last 24 hours: In: 3152 3 [P O :500; I V :1352 3; IV Piggyback:1300]  Out: 1505 [Urine:1085; Chest Tube:420]    25ml/hr most of night and now  UOP: 110ml/hour     Chest tube Output:  Mediastinal tubes: 60 mL/8 hours  220 mL/24 hours   Pleural tubes: 70 mL/8 hours  200 mL/24 hours       Labs:    Results from last 7 days  Lab Units 18  1145   WBC Thousand/uL 9 05 9 50  --   --   --    HEMOGLOBIN g/dL 7 0* 8 2* 8 3*  --  10 0*   I STAT HEMOGLOBIN   --   --   --   < >  --    HEMATOCRIT % 21 1* 24 8*  --   --  30 2*   PLATELETS Thousands/uL 86* 116*  --   --  123*   < > = values in this interval not displayed      Results from last 7 days  Lab Units 18 18  0009  18  1256 18  1145   SODIUM mmol/L 137 140  --   --   --  140   POTASSIUM mmol/L 3 4* 4 0 4 1  < >  --  4 4   CHLORIDE mmol/L 105 108  --   --   --  109*   CO2 mmol/L 26 22  --   --   --  24   BUN mg/dL 25 20  --   --   --  18   CREATININE mg/dL 1 37* 0 99  --   --   --  0 88   CALCIUM mg/dL 7 5* 7 5*  --   --   --  7 8*   GLUCOSE RANDOM mg/dL 113 100  --   --   --  133   GLUCOSE, ISTAT mg/dl  -- --   --   --  140  --    < > = values in this interval not displayed  Results from last 7 days  Lab Units 01/21/18  0414 01/20/18  0358   MAGNESIUM mg/dL 2 6 2 9*       Results from last 7 days  Lab Units 01/21/18  0414   PHOSPHORUS mg/dL 2 2*                        Micro:   Blood Culture: No results found for: BLOODCX  Urine Culture:   Lab Results   Component Value Date    URINECX No Growth <1000 cfu/mL 12/12/2016     Sputum Culture: No components found for: SPUTUMCX  Wound Culure: No results found for: WOUNDCULT    Results from last 7 days  Lab Units 01/15/18  0750   MRSA CULTURE ONLY  No Methicillin Resistant Staphlyococcus aureus (MRSA) isolated       Imaging  CXR: 1/20 cxr - normal post op changes This was personally reviewed by myself in PACS  EKG: nsr  ECHO: ef normal    Nutrition:        Diet Orders            Start     Ordered    01/20/18 1033  Diet Cardiac; Cardiac TLC 2 3 GM NA; Fluid Restriction 1800 ML  Diet effective now     Question Answer Comment   Diet Type Cardiac    Cardiac Cardiac TLC 2 3 GM NA    Other Restriction(s): Fluid Restriction 1800 ML    RD to adjust diet per protocol? Yes        01/20/18 1033          Physical Exam:     General: nad, sitting in chair     Neck:  Supple  Cardiac: reg rate and reg rhythm  no murmur  no rub  Pulmonary:  Breath sounds with b/l wheeze L>R     Abdomen:  Soft  non distended  +bs     Incisions: Sternum is stable  Incision is clean, dry, and intact           Lower extremities:  Trace edema B/L  Neuro: intact, no gross deficit     Skin: warm dry, no rash    Invasive lines and devices:   Invasive Devices     Central Venous Catheter Line            CVC Central Lines 01/19/18 Triple 2 days    Introducer 01/19/18 2 days          Peripheral Intravenous Line            Peripheral IV 01/19/18 Left Hand 2 days          Arterial Line            Arterial Line 01/19/18 Right Radial 2 days          Line            Pacer Wires 1 day    Pacer Wires 1 day          Drain            Urethral Catheter Non-latex; Temperature probe 16 Fr  2 days    Chest Tube 1 Left Pleural 32 Fr  1 day    Chest Tube 2 Posterior Mediastinal 32 Fr  1 day    Chest Tube 3 Anterior Mediastinal 32 Fr  1 day                Assessment:  Principal Problem:    Coronary artery disease  Active Problems:    Former tobacco use    Hyperlipidemia    Hypertension    Hypotension      Plan:    Neuro:   · Pain controlled with: percocet  · Regulate sleep/wake cycle  · Trend neuro exam  CV:   · Cardiac infusions: Primacor, 0 13 mcg/kg/min, Neosynephrine, 40 mcg/min  · Wean primacor off and then mara as able  · MAP goal > 65  · D/c [facility] catheter when off mara  · D/c Arterial line  · Rhythm: NSR  · Follow rhythm on telemetry  · Hold lopressor for now 2/2 mara  · Epicardial pacing wires no longer required  Remove today  · Continue PO amio, statin, and ASA therapy  Lung:   · Pulmonary toileting and IS  · Encourage deep breathing, coughing, and incentive spirometry  · Wean NC as tolerated  · SpO2 goal >92%  · Chest tube drainage diminished; D/C today  GI:   · Continue PPI for stress ulcer prophylaxis  · Continue bowel regimen  · Tolerates po diet  FEN:   · Diuretic plan: Lasix 40 mg IV prn  · K-dur 20 mEQ PO q prn  · Goal 24 hour fluid balance: negative  · Replete electrolytes with goals: K >4 0, Mag >2 0, and Phos >3 0  :   · Indwelling Ramsey present: yes   · D/c Ramsey  · Trend UOP and BUN/creat  · Strict I and O  Heme:   · Trend hgb and plts  · Transfuse as needed for goal hgb >7  · T/c check hgb  If needs volume would give blood  Endo:   · Glycemic control plan: No history of diabetes: Glucose well-controlled   Discontinue continuous insulin infusion and add Insulin sliding scale coverage  MSK/Skin:  · Mobility goal: oob walking  · PT consult: no  · OT consult: no  · Frequent turning and pressure off-loading  · Local wound care as needed  Disposition:  Transfer to telemetry    Code Status: Level 1 - Full Code    Collaborative bedside rounds performed with cardiac surgery attending and bedside RN      SIGNATURE: Deisy Nicholson PA-C  DATE: January 21, 2018  TIME: 8:09 AM

## 2018-01-21 NOTE — PROGRESS NOTES
01/21/18    Procedure: Chest tube removal    Chest tubes removed in routine fashion without incident  Insertion site dressed with Acticoat  Marcell Neri tolerated the procedure well  Nurse notified  Procedure: Epicardial Pacing Wire removal    Marcell Neri was returned to bed and informed of mandatory one hour post-procedure bed rest   The assigned nurse was notified  Epicardial pacing wires removed in routine fashion, without incident  The patient tolerated the procedure well  Vital signs ordered  q 15 minutes for one hour, as per protocol      SIGNATURE: Issac Simms PA-C  DATE: January 21, 2018  TIME: 12:24 PM

## 2018-01-22 VITALS
RESPIRATION RATE: 18 BRPM | SYSTOLIC BLOOD PRESSURE: 134 MMHG | DIASTOLIC BLOOD PRESSURE: 80 MMHG | HEART RATE: 84 BPM | HEIGHT: 67 IN

## 2018-01-22 PROBLEM — I95.9 HYPOTENSION: Status: RESOLVED | Noted: 2018-01-20 | Resolved: 2018-01-22

## 2018-01-22 PROBLEM — Z95.1 S/P CABG X 4: Status: ACTIVE | Noted: 2018-01-22

## 2018-01-22 PROBLEM — J95.89 ACUTE RESPIRATORY INSUFFICIENCY, POSTOPERATIVE: Status: ACTIVE | Noted: 2018-01-22

## 2018-01-22 LAB
ABO GROUP BLD BPU: NORMAL
ANION GAP SERPL CALCULATED.3IONS-SCNC: 6 MMOL/L (ref 4–13)
ATRIAL RATE: 84 BPM
BASE EXCESS BLDA CALC-SCNC: -2 MMOL/L (ref -2–3)
BPU ID: NORMAL
BUN SERPL-MCNC: 23 MG/DL (ref 5–25)
CA-I BLD-SCNC: 1.17 MMOL/L (ref 1.12–1.32)
CALCIUM SERPL-MCNC: 7.7 MG/DL (ref 8.3–10.1)
CHLORIDE SERPL-SCNC: 105 MMOL/L (ref 100–108)
CO2 SERPL-SCNC: 27 MMOL/L (ref 21–32)
CREAT SERPL-MCNC: 1.23 MG/DL (ref 0.6–1.3)
ERYTHROCYTE [DISTWIDTH] IN BLOOD BY AUTOMATED COUNT: 15.5 % (ref 11.6–15.1)
GFR SERPL CREATININE-BSD FRML MDRD: 56 ML/MIN/1.73SQ M
GLUCOSE SERPL-MCNC: 109 MG/DL (ref 65–140)
GLUCOSE SERPL-MCNC: 113 MG/DL (ref 65–140)
GLUCOSE SERPL-MCNC: 116 MG/DL (ref 65–140)
GLUCOSE SERPL-MCNC: 122 MG/DL (ref 65–140)
GLUCOSE SERPL-MCNC: 124 MG/DL (ref 65–140)
GLUCOSE SERPL-MCNC: 138 MG/DL (ref 65–140)
GLUCOSE SERPL-MCNC: 138 MG/DL (ref 65–140)
GLUCOSE SERPL-MCNC: 144 MG/DL (ref 65–140)
HCO3 BLDA-SCNC: 23.2 MMOL/L (ref 22–28)
HCT VFR BLD AUTO: 21.5 % (ref 36.5–49.3)
HCT VFR BLD CALC: 25 % (ref 36.5–49.3)
HGB BLD-MCNC: 7 G/DL (ref 12–17)
HGB BLDA-MCNC: 8.5 G/DL (ref 12–17)
MAGNESIUM SERPL-MCNC: 2.3 MG/DL (ref 1.6–2.6)
MCH RBC QN AUTO: 32.9 PG (ref 26.8–34.3)
MCHC RBC AUTO-ENTMCNC: 32.6 G/DL (ref 31.4–37.4)
MCV RBC AUTO: 101 FL (ref 82–98)
P AXIS: 52 DEGREES
PCO2 BLD: 24 MMOL/L (ref 21–32)
PCO2 BLD: 40 MM HG (ref 36–44)
PH BLD: 7.37 [PH] (ref 7.35–7.45)
PHOSPHATE SERPL-MCNC: 2 MG/DL (ref 2.3–4.1)
PLATELET # BLD AUTO: 101 THOUSANDS/UL (ref 149–390)
PMV BLD AUTO: 11.2 FL (ref 8.9–12.7)
PO2 BLD: 440 MM HG (ref 75–129)
POTASSIUM BLD-SCNC: 4.2 MMOL/L (ref 3.5–5.3)
POTASSIUM SERPL-SCNC: 3.6 MMOL/L (ref 3.5–5.3)
PR INTERVAL: 138 MS
QRS AXIS: 57 DEGREES
QRSD INTERVAL: 88 MS
QT INTERVAL: 400 MS
QTC INTERVAL: 473 MS
RBC # BLD AUTO: 2.13 MILLION/UL (ref 3.88–5.62)
SAO2 % BLD FROM PO2: 100 % (ref 95–98)
SODIUM BLD-SCNC: 139 MMOL/L (ref 136–145)
SODIUM SERPL-SCNC: 138 MMOL/L (ref 136–145)
SPECIMEN SOURCE: ABNORMAL
T WAVE AXIS: 34 DEGREES
UNIT DISPENSE STATUS: NORMAL
UNIT PRODUCT CODE: NORMAL
UNIT RH: NORMAL
VENTRICULAR RATE: 84 BPM
WBC # BLD AUTO: 11.03 THOUSAND/UL (ref 4.31–10.16)

## 2018-01-22 PROCEDURE — 84100 ASSAY OF PHOSPHORUS: CPT | Performed by: PHYSICIAN ASSISTANT

## 2018-01-22 PROCEDURE — 97530 THERAPEUTIC ACTIVITIES: CPT

## 2018-01-22 PROCEDURE — 85027 COMPLETE CBC AUTOMATED: CPT | Performed by: PHYSICIAN ASSISTANT

## 2018-01-22 PROCEDURE — 80048 BASIC METABOLIC PNL TOTAL CA: CPT | Performed by: PHYSICIAN ASSISTANT

## 2018-01-22 PROCEDURE — G8987 SELF CARE CURRENT STATUS: HCPCS

## 2018-01-22 PROCEDURE — 97166 OT EVAL MOD COMPLEX 45 MIN: CPT

## 2018-01-22 PROCEDURE — G8988 SELF CARE GOAL STATUS: HCPCS

## 2018-01-22 PROCEDURE — 82948 REAGENT STRIP/BLOOD GLUCOSE: CPT

## 2018-01-22 PROCEDURE — 94760 N-INVAS EAR/PLS OXIMETRY 1: CPT

## 2018-01-22 PROCEDURE — 83735 ASSAY OF MAGNESIUM: CPT | Performed by: PHYSICIAN ASSISTANT

## 2018-01-22 RX ORDER — FUROSEMIDE 10 MG/ML
40 INJECTION INTRAMUSCULAR; INTRAVENOUS
Status: DISCONTINUED | OUTPATIENT
Start: 2018-01-22 | End: 2018-01-22

## 2018-01-22 RX ORDER — POTASSIUM CHLORIDE 20 MEQ/1
20 TABLET, EXTENDED RELEASE ORAL ONCE
Status: COMPLETED | OUTPATIENT
Start: 2018-01-22 | End: 2018-01-22

## 2018-01-22 RX ORDER — FUROSEMIDE 10 MG/ML
40 INJECTION INTRAMUSCULAR; INTRAVENOUS
Status: DISCONTINUED | OUTPATIENT
Start: 2018-01-22 | End: 2018-01-23 | Stop reason: HOSPADM

## 2018-01-22 RX ORDER — POTASSIUM CHLORIDE 20 MEQ/1
20 TABLET, EXTENDED RELEASE ORAL 2 TIMES DAILY
Status: DISCONTINUED | OUTPATIENT
Start: 2018-01-22 | End: 2018-01-23 | Stop reason: HOSPADM

## 2018-01-22 RX ORDER — FERROUS SULFATE 325(65) MG
325 TABLET ORAL
Status: DISCONTINUED | OUTPATIENT
Start: 2018-01-22 | End: 2018-01-23 | Stop reason: HOSPADM

## 2018-01-22 RX ORDER — FUROSEMIDE 10 MG/ML
40 INJECTION INTRAMUSCULAR; INTRAVENOUS ONCE
Status: COMPLETED | OUTPATIENT
Start: 2018-01-22 | End: 2018-01-22

## 2018-01-22 RX ADMIN — ASPIRIN 325 MG: 325 TABLET ORAL at 09:07

## 2018-01-22 RX ADMIN — OXYCODONE HYDROCHLORIDE AND ACETAMINOPHEN 500 MG: 500 TABLET ORAL at 17:17

## 2018-01-22 RX ADMIN — POTASSIUM CHLORIDE 20 MEQ: 1500 TABLET, EXTENDED RELEASE ORAL at 09:09

## 2018-01-22 RX ADMIN — BISACODYL 10 MG: 10 SUPPOSITORY RECTAL at 04:47

## 2018-01-22 RX ADMIN — MUPIROCIN 1 APPLICATION: 20 OINTMENT TOPICAL at 09:07

## 2018-01-22 RX ADMIN — FUROSEMIDE 40 MG: 10 INJECTION, SOLUTION INTRAMUSCULAR; INTRAVENOUS at 04:47

## 2018-01-22 RX ADMIN — ATORVASTATIN CALCIUM 80 MG: 80 TABLET, FILM COATED ORAL at 16:49

## 2018-01-22 RX ADMIN — AMIODARONE HYDROCHLORIDE 200 MG: 200 TABLET ORAL at 14:51

## 2018-01-22 RX ADMIN — PANTOPRAZOLE SODIUM 40 MG: 40 TABLET, DELAYED RELEASE ORAL at 09:48

## 2018-01-22 RX ADMIN — OXYCODONE HYDROCHLORIDE AND ACETAMINOPHEN 500 MG: 500 TABLET ORAL at 09:07

## 2018-01-22 RX ADMIN — POTASSIUM CHLORIDE 20 MEQ: 1500 TABLET, EXTENDED RELEASE ORAL at 07:07

## 2018-01-22 RX ADMIN — POTASSIUM PHOSPHATE, MONOBASIC AND POTASSIUM PHOSPHATE, DIBASIC 21 MMOL: 224; 236 INJECTION, SOLUTION INTRAVENOUS at 03:20

## 2018-01-22 RX ADMIN — FUROSEMIDE 40 MG: 10 INJECTION, SOLUTION INTRAMUSCULAR; INTRAVENOUS at 16:46

## 2018-01-22 RX ADMIN — MUPIROCIN 1 APPLICATION: 20 OINTMENT TOPICAL at 21:36

## 2018-01-22 RX ADMIN — AMIODARONE HYDROCHLORIDE 200 MG: 200 TABLET ORAL at 06:53

## 2018-01-22 RX ADMIN — VITAMIN D, TAB 1000IU (100/BT) 1000 UNITS: 25 TAB at 09:07

## 2018-01-22 RX ADMIN — AMIODARONE HYDROCHLORIDE 200 MG: 200 TABLET ORAL at 21:35

## 2018-01-22 RX ADMIN — POTASSIUM CHLORIDE 20 MEQ: 1500 TABLET, EXTENDED RELEASE ORAL at 17:17

## 2018-01-22 RX ADMIN — FONDAPARINUX SODIUM 2.5 MG: 2.5 INJECTION, SOLUTION SUBCUTANEOUS at 09:46

## 2018-01-22 RX ADMIN — DOCUSATE SODIUM 100 MG: 100 CAPSULE, LIQUID FILLED ORAL at 09:08

## 2018-01-22 RX ADMIN — Medication 325 MG: at 09:25

## 2018-01-22 RX ADMIN — DOCUSATE SODIUM 100 MG: 100 CAPSULE, LIQUID FILLED ORAL at 17:17

## 2018-01-22 NOTE — RESTORATIVE TECHNICIAN NOTE
Restorative Specialist Mobility Note       Activity: Ambulate in [place], Chair     Assistive Device: Front wheel walker

## 2018-01-22 NOTE — SOCIAL WORK
68yo male is s/p CABG x4  He is alert and oriented, independent ADLs, drives  He is recently   [place] [facility] [place] in 3 story home with 13 steps in from garage and 13 steps to bedroom  He has bathroom on each floor  He has 3 daughters who live locally  Contact is daughter Bran Snider at [phone]  Also has daughter Marya Kiser, [phone], and a younger daughter Eloise Irving who is a nurse  He had back surgery one year ago and has cane, RW and shower chair at home  He also had inpt rehab at [facility]  He uses [facility] on [address]  [place] [facility] [place]  Pt  [facility]ose  [facility] but they are not accepting new patients at this time  Pt  Chose [facility] and referral was sent  Daughter will transport pt home  Tentative discharge home tomorrow  CM reviewed d/c planning process including the following: identifying help at home, patient preference for d/c planning needs, Discharge Lounge, [facility] Meds to Bed program, availability of treatment team to discuss questions or concerns patient and/or family may have regarding understanding medications and recognizing signs and symptoms once discharged  CM also encouraged patient to follow up with all recommended appointments after discharge  Patient advised of importance for patient and family to participate in managing patients medical well being

## 2018-01-22 NOTE — OCCUPATIONAL THERAPY NOTE
[address] Evaluation     Patient Name: Cy SAUCEDA Date: 1/22/2018  Problem List  Patient Active Problem List   Diagnosis    Abnormal liver function tests    Acute frontal sinusitis    Acute sinusitis    Degenerative lumbar spinal stenosis    Atopic rhinitis    Spondylolisthesis of lumbar region    CAD (coronary artery disease)    Coronary artery disease    Former tobacco use    Hyperlipidemia    Hypertension    S/P CABG x 4    Acute respiratory insufficiency, postoperative     Past Medical History  Past Medical History:   Diagnosis Date    Allergic rhinitis     Anemia     Arthritis     BPH (benign prostatic hyperplasia)     Coronary artery disease     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 [address] [facility]     Former tobacco use     GERD (gastroesophageal reflux disease)     Hyperlipidemia     Hypertension     Lumbar stenosis     Peripheral artery disease ([facility] [facility] )      Past Surgical History  Past Surgical History:   Procedure Laterality Date    COLONOSCOPY      LUMBAR FUSION      FL ARTHRODESIS POSTERIOR/POSTEROLATERAL LUMBAR N/A 11/3/2016    Procedure: L2-S1 POSTERIOR LUMBAR FUSION AND DECOMPRESSION (Impulse), Posterior lateral fixation; dural repair ;  Surgeon: Henrietta Lyons MD;  [facility]: [facility];  Service: Orthopedics    FL CABG, ARTERIAL, SINGLE N/A 1/19/2018    Procedure: CABG X4 with LIMA - LAD, SVG - RCA, OM2, & Diagonal ; Left Leg EVH; MATTHEW;  Surgeon: Kylah Bolivar DO;  [facility];  Service: Cardiac Surgery    FL COLONOSCOPY FLX DX W/COLLJ SPEC WHEN PFRMD N/A 11/15/2017    Procedure: EGD AND COLONOSCOPY;  Surgeon: Kristin Richmond MD;  Location: [facility]; Service: Gastroenterology    SEPTOPLASTY      TONSILECTOMY AND ADNOIDECTOMY             01/22/18 1005   Note Type   Note type Eval/Treat   Restrictions/Precautions   Weight Bearing Precautions Per Order No   Other Precautions Cardiac/sternal;Multiple lines;Telemetry; Fall Risk;Pain   Pain Assessment   Pain Assessment No/denies pain   Pain Score No Pain   Home Living   Type of Home House   Home Layout Two level; Able to live on main level with bedroom/bathroom; Bed/bath upstairs   Bathroom Shower/Tub Walk-in shower   Bathroom Toilet Raised   Bathroom Equipment Grab bars in shower; Shower chair;Commode   Bathroom Accessibility Accessible   Home Equipment Walker;Cane;Reacher;Sock aid;Long-handled shoehorn   Additional Comments Pt reports living in 2 story home w/ ramp in front  Through garage, pt can enter into basement and has flight of stairs to 1st level  Pt can live on main level w/ half bath but primary bedroom / bathroom are upstairs on 2nd level w/ 13 steps up  Pt lives alone  Prior Function   Level of [place] Independent with ADLs and functional mobility   Lives With Alone   Receives Help From Family   ADL Assistance Independent   IADLs Needs assistance   Vocational Retired   Comments Pt reports being I w/ ADLS, A for some IADLS (cleaning lady comes every 2 week), I w/ Transfer and functional mobility PTA   Lifestyle   Autonomy Pt reports being I w/ ADLS, A for some IADLS (cleaning lady comes every 2 week), I w/ Transfer and functional mobility PTA   Reciprocal Relationships Pt lives alone  Pt has 3 daughters that live nearby and are supportive  Daughters plan to help pt w/ cooking and household chores after d/c   Service to Others Pt is a retired worker, previously doing IT  Pt now reports he cares for his grandson after school, picks him up and babysits for approx 3 hrs daily   Intrinsic Gratification Pt enjoys golfing and would like to get back to it   Pt also enjoys crosswords   Psychosocial   Psychosocial (WDL) WDL   ADL   Where Assessed Chair   Eating Assistance 5  Supervision/Setup   Grooming Assistance 5  Supervision/Setup   UB Bathing Assistance 4  Minimal Assistance   LB Bathing Assistance 4  Minimal Assistance   [address] 4  Minimal Assistance   Toileting Assistance  5  Supervision/Setup   Functional Assistance 5  Supervision/Setup   Bed Mobility   Additional Comments Unable to assess, pt seated in chair prior to and end of session   Transfers   Sit to Stand 5  Supervision   Additional items Verbal cues; Increased time required;Armrests;Assist x 1   Stand to Sit 5  Supervision   Additional items Verbal cues; Assist x 1; Armrests   Stand pivot 5  Supervision   Additional items Verbal cues; Increased time required;Assist x 1   Toilet transfer 5  Supervision   Additional items Verbal cues; Commode; Increased time required;Assist x 1   Additional Comments Pt completed sit/stand transfer from chair, then SPT to commode  Pt required VC for correct hand placement and standing technique to abide by cardiac precautions  Pt educated and demonstrated proper technique when standing from commode and performing stand to sit back to chair  S for transfers for safety and use of RW   Functional Mobility   Functional Mobility 5  Supervision   Additional Comments Pt took few small steps in room w/ S for safety   Additional items Rolling walker   Balance   Static Sitting Fair +   Dynamic Sitting Fair -   Static Standing Fair   Ambulatory Fair   Activity Tolerance   Activity Tolerance Patient tolerated treatment well   Nurse Made Aware Iris, yes   RUE Assessment   RUE Assessment WFL   LUE Assessment   LUE Assessment WFL   Hand Function   Gross Motor Coordination Functional   Fine Motor Coordination Functional   Cognition   Overall Cognitive Status WFL   Arousal/Participation Alert; Responsive; Cooperative   Attention Within functional limits   Orientation Level Oriented X4   Memory Within functional limits   Following Commands Follows one step commands without difficulty   Comments Pt is pleasant and cooperative w/ therapy   Assessment   Limitation Decreased ADL status; Decreased UE strength;Decreased Safe judgement during ADL;Decreased endurance;Decreased self-care trans;Decreased high-level ADLs   Prognosis Fair   Assessment Pt is a 69 y/o male seen for OT eval s/p adm to [facility] w/ [facility]AD, pt is now s/p CABG x4 dx'd w/ CAD  Comorbidities include a h/o allergic rhinitis, anemia, arthritis, BPH, CAD, femoral artery stenosis, GERD, HLD, HTN, lumbar stenosis, and PAD  Pt with active OT orders and ambulate pt  Pt lives alone in 2 story home w/ ramp entrance in front and full flight of stairs to 1st floor if enter through garage at basement level  Pt was I w/  ADLS, required A for IADLS (cleaning lady comes 1 every 2 weeks), drove, & required use of DME PTA, including shower chair, commode, and LHAE  Pt also owns walker and cane but was not using PTA  Pt is currently demonstrating the following occupational deficits: Min A UB and LB ADLS, S for transfers and functional mobility w/ RW  Pt with deficits and limitations in all baseline areas of occupation 2* cardiac precautions, decreased endurance, fatigue, decreased activity tolerance, decreased functional mobility, generalized weakness, and decreased I w/ ADLS and IADLS compared to previous level of functioning  The following Occupational Performance Areas to address include: grooming, bathing/shower, toilet hygiene, dressing, medication management, socialization, health maintenance, functional mobility, community mobility, clothing management, money management, household maintenance and social participation  Pt scored overall 60/100 on the Barthel Index  Based on the aforementioned OT evaluation, functional performance deficits, and assessments, pt has been identified as a moderate complexity evaluation  Recommend pt d/c home w/ increased family support once medically stable   Pt to continue to benefit from acute immediate OT services to address the following goals 3-5x/wk to  w/in 7-10 days:   Goals   LTG Time Frame 7-10   Long Term Goal #1 see below listed goals   Plan   Treatment Interventions ADL retraining;Functional transfer training; Endurance training;Patient/family training;Equipment evaluation/education; Compensatory technique education;Continued evaluation;Cardiac education; Energy conservation; Activityengagement   Goal Expiration Date 02/01/18   OT Frequency 3-5x/wk   Additional Treatment Session   Start Time 0945   End Time 1005   Treatment Assessment Pt participated in additional OT session focusing on cardiac education  Provided pt with Recovering After Cardiac Surgery packet and educated pt regarding; sternal precautions, cardiac precautions, lifting restrictions, safe activity engagement, energy conservation, lifestyle modifications, stress management and cardiac rehabilitation programs  Pt's questions were addressed after discussion of the packet  Provided Pt with contact information for OT department if questions arrise  Pt continues to benefit from inpatient skilled OT services  Will continue to follow and address previously stated goals     Additional Treatment Day 1   Recommendation   OT Discharge Recommendation Home with family support   OT - OK to Discharge Yes  (when medically stable)   Barthel Index   Feeding 10   Bathing 0   Grooming Score 5   Dressing Score 5   Bladder Score 10   Bowels Score 10   Toilet Use Score 10   Transfers (Bed/Chair) Score 10   Mobility (Level Surface) Score 0   Stairs Score 0   Barthel Index Score 60   Modified [place] Scale   Modified [place] Scale 3     GOALS    1) Pt will improve activity tolerance to G for min 30 min txment sessions  2) Pt will complete ADLs/self care w/ mod I using adaptive device and DME as needed  3) Pt will complete toileting w/ mod I w/ G hygiene/thoroughness using DME as needed  4) Pt will improve functional transfers on/off all surfaces using DME as needed w/ G balance/safety including w/ mod I  5) Pt will improve functional mobility during ADL/IADL/leisure tasks using DME as needed w/ G balance/safety w/ mod I  6) Pt will engage in ongoing cognitive assessment w/ G participation w/ mod I to assist w/ safe d/c planning/recommendations  7) Pt will demonstrate G carryover of pt/caregiver education and training as appropriate w/ mod I w/o cues w/ good tolerance  8) Pt will demonstrate 100% carryover of energy conservation techniques w/ mod I t/o functional I/ADL/leisure tasks w/o cues s/p skilled education  9) Pt will engage in cardiac education using the Recovering After Cardiac Rehabilitation packet w/ G participation and G carryover    10) Pt will demonstrate 100% carryover of precautions s/p review w/o cues w/ mod I w/ G tolerance/participation t/o functional ADL/IADL/leisure tasks      [facility], OTR/L

## 2018-01-22 NOTE — PLAN OF CARE
Present in room with Dr Diana Guzmán to discuss plan of care for today to wean off oxygen, use incentive spirometer hourly when awake, and discharge home tomorrow  Questions and concerns addressed from patient at this time

## 2018-01-22 NOTE — PLAN OF CARE
Problem: OCCUPATIONAL THERAPY ADULT  Goal: Performs self-care activities at highest level of function for planned discharge setting  See evaluation for individualized goals  Treatment Interventions: ADL retraining, Functional transfer training, Endurance training, Patient/family training, Equipment evaluation/education, Compensatory technique education, Continued evaluation, Cardiac education, Energy conservation, Activityengagement          See flowsheet documentation for full assessment, interventions and recommendations  Limitation: Decreased ADL status, Decreased UE strength, Decreased Safe judgement during ADL, Decreased endurance, Decreased self-care trans, Decreased high-level ADLs  Prognosis: Fair  Assessment: Pt is a 69 y/o male seen for OT eval s/p adm to [facility] w/ MVCAD, pt is now s/p CABG x4 dx'd w/ CAD  Comorbidities include a h/o allergic rhinitis, anemia, arthritis, BPH, CAD, femoral artery stenosis, GERD, HLD, HTN, lumbar stenosis, and PAD  Pt with active OT orders and ambulate pt  Pt lives alone in 2 story home w/ ramp entrance in front and full flight of stairs to 1st floor if enter through garage at basement level  Pt was I w/  ADLS, required A for IADLS (cleaning lady comes 1 every 2 weeks), drove, & required use of DME PTA, including shower chair, commode, and LHAE  Pt also owns walker and cane but was not using PTA  Pt is currently demonstrating the following occupational deficits: Min A UB and LB ADLS, S for transfers and functional mobility w/ RW  Pt with deficits and limitations in all baseline areas of occupation 2* cardiac precautions, decreased endurance, fatigue, decreased activity tolerance, decreased functional mobility, generalized weakness, and decreased I w/ ADLS and IADLS compared to previous level of functioning   The following Occupational Performance Areas to address include: grooming, bathing/shower, toilet hygiene, dressing, medication management, socialization, health maintenance, functional mobility, community mobility, clothing management, money management, household maintenance and social participation  Pt scored overall 60/100 on the Barthel Index  Based on the aforementioned OT evaluation, functional performance deficits, and assessments, pt has been identified as a moderate complexity evaluation  Recommend pt d/c home w/ increased family support once medically stable   Pt to continue to benefit from acute immediate OT services to address the following goals 3-5x/wk to  w/in 7-10 days:     OT Discharge Recommendation: Home with family support  OT - OK to Discharge: Yes (when medically stable)      Comments: Jayne MEJIA, OTR/L

## 2018-01-22 NOTE — CONSULTS
Consultation - Cardiology Team One  Grant Melton 68 y o  male MRN: [id number]  Unit/Bed#: [facility] 416-01 Encounter: [id number]    Inpatient consult to Cardiology  Consult performed by: Spencer Roa ordered by: Waldo Perez      Physician Requesting Consult: Mia Gunn DO  Reason for Consult / Principal Problem: CAD s/p CABG x 4     History of Present Illness   HPI: Grant Melton is a 68y o  year old male who has a history of hypertension, hyperlipidemia, peripheral vascular disease, former smoker and GERD  He follows with cardiologist Dr Stephan Yap  He presents to [facility]/19[facility]18 for elective CABG  He originally presented to Dr Stephan Yap with complaint of dyspnea with exertion  Cardiac workup revealed multivessel CAD  Cardiac cath 12/20/17 showed proximal LAD 60% stenosis, mid LAD 90% stenosis, 1st diagonal discrete 90% stenosis, proximal circumflex 90% stenosis, OM2 50% stenosis and mid RCA 75% stenosis  He had a CABG x4 by Dr Katharina Kolb, LIMA top LAD, SVG to OM2, SVG to RCA and SVG to D1  No complications  POD #3, patient is OOB in chair  He reports pain overall well controlled  Echocardiogram 10/11/2017 showed EF 50%, no regional wall motion abnormalities, grade 1 diastolic dysfunction or significant valvular disease  Final transesophageal echocardiogram postoperatively demonstrated normal biventricular function  Review of Systems   Constitution: Positive for malaise/fatigue  Negative for chills and fever  Cardiovascular: Positive for dyspnea on exertion  Negative for chest pain, leg swelling, orthopnea and palpitations  Respiratory: Negative for shortness of breath  Musculoskeletal: Negative for falls  Gastrointestinal: Negative for nausea  Neurological: Negative for dizziness and light-headedness  Psychiatric/Behavioral: Negative for altered mental status       Historical Information   Past Medical History:   Diagnosis Date    Allergic rhinitis     Anemia     Arthritis     BPH (benign prostatic hyperplasia)     Coronary artery disease     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[address] )     Former tobacco use     GERD (gastroesophageal reflux disease)     Hyperlipidemia     Hypertension     Lumbar stenosis     Peripheral artery disease [address] [address]      Past Surgical History:   Procedure Laterality Date    COLONOSCOPY      LUMBAR FUSION      NM ARTHRODESIS POSTERIOR/POSTEROLATERAL LUMBAR N/A 11/3/2016    Procedure: L2-S1 POSTERIOR LUMBAR FUSION AND DECOMPRESSION (Impulse), Posterior lateral fixation; dural repair ;  Surgeon: Linh Grove MD;  Location: [facility];  Service: Orthopedics    NM COLONOSCOPY FLX DX W/COLLJ SPEC WHEN PFRMD N/A 11/15/2017    Procedure: EGD AND COLONOSCOPY;  Surgeon: Michele Aleman MD;  Location: [facility]; Service: Gastroenterology    SEPTOPLASTY      TONSILECTOMY AND ADNOIDECTOMY       History   Alcohol Use    0 6 oz/week    1 Shots of liquor per week     Comment: beer, wine, scotch every other day     History   Drug Use No     History   Smoking Status    Former Smoker    Quit date: 2011   Smokeless Tobacco    Never Used     Family History: History reviewed  No pertinent family history      Meds/Allergies   all current active meds have been reviewed and current meds:   Current Facility-Administered Medications   Medication Dose Route Frequency    acetaminophen (TYLENOL) tablet 650 mg  650 mg Oral Q6H PRN    albumin human (FLEXBUMIN) 5 % injection 12 5 g  12 5 g Intravenous Once    amiodarone tablet 200 mg  200 mg Oral Q8H [facility]    ascorbic acid (VITAMIN C) tablet 500 mg  500 mg Oral BID    aspirin tablet 325 mg  325 mg Oral Daily    atorvastatin (LIPITOR) tablet 80 mg  80 mg Oral Daily With Dinner    bisacodyl (DULCOLAX) rectal suppository 10 mg  10 mg Rectal Daily PRN    cholecalciferol (VITAMIN D3) tablet 1,000 Units  1,000 Units Oral Daily    docusate sodium (COLACE) capsule 100 mg  100 mg Oral BID    ferrous sulfate tablet 325 mg  325 mg Oral Daily With Breakfast    fondaparinux (ARIXTRA) subcutaneous injection 2 5 mg  2 5 mg Subcutaneous Daily    furosemide (LASIX) injection 40 mg  40 mg Intravenous BID (diuretic)    insulin lispro (HumaLOG) 100 units/mL subcutaneous injection 1-6 Units  1-6 Units Subcutaneous TID AC    mupirocin (BACTROBAN) 2 % nasal ointment 1 application  1 application Nasal B87S [facility]    ondansetron (ZOFRAN) injection 4 mg  4 mg Intravenous Q6H PRN    oxyCODONE-acetaminophen (PERCOCET) 5-325 mg per tablet 1 tablet  1 tablet Oral Q4H PRN    oxyCODONE-acetaminophen (PERCOCET) 5-325 mg per tablet 2 tablet  2 tablet Oral Q6H PRN    pantoprazole (PROTONIX) EC tablet 40 mg  40 mg Oral Daily    polyethylene glycol (MIRALAX) packet 17 g  17 g Oral Daily    potassium chloride (K-DUR,KLOR-CON) CR tablet 20 mEq  20 mEq Oral BID          Allergies   Allergen Reactions    Penicillins Other (See Comments)     Hallucinations; Patient reported that he was seeing visual disturbances         Objective   Vitals: Blood pressure 127/56, pulse 82, temperature 98 3 °F (36 8 °C), temperature source Oral, resp  rate 21, height 5' 6 5" (1 689 m), weight 86 1 kg (189 lb 13 1 oz), SpO2 100 %  ,     Body mass index is 30 18 kg/m²  ,     Systolic (57GEX), MCZ:556 , Min:96 , THAIS:022     Diastolic (66QAG), CXQ:79, Min:41, Max:61      Intake/Output Summary (Last 24 hours) at 01/22/18 0843  Last data filed at 01/22/18 0640   Gross per 24 hour   Intake           986 92 ml   Output              750 ml   Net           236 92 ml     Weight (last 2 days)     Date/Time   Weight    01/22/18 0600  86 1 (189 82)    01/21/18 0600  79 1 (174 38)    01/20/18 0600  76 6 (168 87)            Invasive Devices     Central Venous Catheter Line            CVC Central Lines 01/19/18 Triple Right Internal jugular 3 days              Physical Exam   Constitutional: He is oriented to person, place, and time  No distress  Neck: Neck supple  Cardiovascular: Normal rate, regular rhythm, normal heart sounds and intact distal pulses  Pulmonary/Chest: Effort normal  No respiratory distress  He has no wheezes  Decreased  No crackles  RA    Abdominal: Soft  Bowel sounds are normal    Musculoskeletal: He exhibits no edema  Neurological: He is alert and oriented to person, place, and time  Skin: Skin is warm and dry  He is not diaphoretic  Psychiatric: He has a normal mood and affect   His behavior is normal  Judgment and thought content normal          LABORATORY RESULTS:      CBC with diff:   Results from last 7 days  Lab Units 01/22/18  0437 01/21/18  0414 01/20/18  0358 01/19/18  1953 01/19/18  1256 01/19/18  1145 01/19/18  1008   WBC Thousand/uL 11 03* 9 05 9 50  --   --   --   --    HEMOGLOBIN g/dL 7 0* 7 0* 8 2* 8 3*  --  10 0*  --    I STAT HEMOGLOBIN g/dl  --   --   --   --  8 5*  --  7 8*   HEMATOCRIT % 21 5* 21 1* 24 8*  --   --  30 2*  --    MCV fL 101* 101* 102*  --   --   --   --    PLATELETS Thousands/uL 101* 86* 116*  --   --  123*  --    MCH pg 32 9 33 5 33 6  --   --   --   --    MCHC g/dL 32 6 33 2 33 1  --   --   --   --    RDW % 15 5* 15 4* 15 1  --   --   --   --    MPV fL 11 2 10 5 10 7  --   --  10 3  --        CMP:  Results from last 7 days  Lab Units 01/22/18  0301 01/21/18  0414 01/20/18  0358 01/20/18  0009 01/19/18  1953 01/19/18  1609 01/19/18  1256 01/19/18  1145 01/19/18  1008 01/19/18  0943   SODIUM mmol/L 138 137 140  --   --   --   --  140  --   --    POTASSIUM mmol/L 3 6 3 4* 4 0 4 1 4 2 4 1  --  4 4  --   --    CHLORIDE mmol/L 105 105 108  --   --   --   --  109*  --   --    CO2 mmol/L 27 26 22  --   --   --   --  24  --   --    ANION GAP mmol/L 6 6 10  --   --   --   --  7  --   --    BUN mg/dL 23 25 20  --   --   --   --  18  --   --    CREATININE mg/dL 1 23 1 37* 0 99  --   --   --   --  0 88  --   --    GLUCOSE RANDOM mg/dL 113 113 100  --   --   --   --  133  --   --    GLUCOSE, ISTAT mg/dl  --   --   -- --   --   --  140  --  129 115   CALCIUM mg/dL 7 7* 7 5* 7 5*  --   --   --   --  7 8*  --   --    EGFR ml/min/1 73sq m 56 49 73  --   --   --   --  83  --   --        BMP:  Results from last 7 days  Lab Units 18  0301 18  0414 18  0358 18  0009 18  1953 18  1609  18  1145   SODIUM mmol/L 138 137 140  --   --   --   --  140   POTASSIUM mmol/L 3 6 3 4* 4 0 4 1 4 2 4 1  --  4 4   CHLORIDE mmol/L 105 105 108  --   --   --   --  109*   CO2 mmol/L 27 26 22  --   --   --   --  24   BUN mg/dL 23 25 20  --   --   --   --  18   CREATININE mg/dL 1 23 1 37* 0 99  --   --   --   --  0 88   GLUCOSE RANDOM mg/dL 113 113 100  --   --   --   --  133   GLUCOSE, ISTAT   --   --   --   --   --   --   < >  --    CALCIUM mg/dL 7 7* 7 5* 7 5*  --   --   --   --  7 8*   < > = values in this interval not displayed         No results found for: NTBNP         Results from last 7 days  Lab Units 18  0301 18  0414 18  0358   MAGNESIUM mg/dL 2 3 2 6 2 9*           Lipid Profile:   Lab Results   Component Value Date    CHOL 144 01/15/2018    CHOL 211 (H) 2017    CHOL 238 06/10/2015     Lab Results   Component Value Date    HDL 83 (H) 01/15/2018    HDL 89 (H) 2017    HDL 99 06/10/2015     Lab Results   Component Value Date    LDLCALC 52 01/15/2018    LDLCALC 105 (H) 2017    LDLCALC 128 (H) 06/10/2015     Lab Results   Component Value Date    TRIG 47 01/15/2018    TRIG 85 2017    TRIG 56 06/10/2015     Cardiac testing:   Results for orders placed during the hospital encounter of 10/11/17   Echo complete with contrast if indicated    Narrative [address] [place] [address], [address] [place] [address]  [phone]    Transthoracic Echocardiogram  2D, M-mode, Doppler, and Color Doppler    Study date:  11-Oct-2017    Patient: Fauzia Arias  MR number: [id number]  Account number: [de-identified]  : 1940  Age: 68 years  Gender: Male  Status: Outpatient  [facility]: [facility]  Height: 67 in  Weight: 166 5 lb  BP: 142/ 88 mmHg    Indications: Family history of ischemic heart disease    Diagnoses: Z82 49 - Family history of ischemic heart disease and other diseases of the circulatory system    Sonographer:  SHERRY Ferrera, RDCS  Referring Physician:  KEVIN Palumbo  Group:  [facility]  Interpreting Physician:  DO MANNIE Gee    LEFT VENTRICLE:  Systolic function was at the lower limits of normal  Ejection fraction was estimated to be 50 %  There were no regional wall motion abnormalities  Wall thickness was at the upper limits of normal   Doppler parameters were consistent with abnormal left ventricular relaxation (grade 1 diastolic dysfunction)  RIGHT VENTRICLE:  The size was normal   Systolic function was normal     MITRAL VALVE:  There was mild regurgitation  HISTORY: PRIOR HISTORY: Dyspnea on exertion; Elevated BP; GERD; Former smoker    PROCEDURE: The study was performed in the [facility] and [facility]  This was a routine study  The transthoracic approach was used  The study included complete 2D imaging, M-mode, complete spectral Doppler, and color Doppler  The  heart rate was 74 bpm, at the start of the study  Images were obtained from the parasternal, apical, subcostal, and suprasternal notch acoustic windows  Image quality was adequate  LEFT VENTRICLE: Size was normal  Systolic function was at the lower limits of normal  Ejection fraction was estimated to be 50 %  There were no regional wall motion abnormalities  Wall thickness was at the upper limits of normal  DOPPLER:  Doppler parameters were consistent with abnormal left ventricular relaxation (grade 1 diastolic dysfunction)  There was no evidence of elevated ventricular filling pressure by Doppler parameters      RIGHT VENTRICLE: The size was normal  Systolic function was normal  Wall thickness was normal     LEFT ATRIUM: Size was normal     RIGHT ATRIUM: Size was normal     MITRAL VALVE: Valve structure was normal  There was normal leaflet separation  DOPPLER: The transmitral velocity was within the normal range  There was no evidence for stenosis  There was mild regurgitation  AORTIC VALVE: The valve was trileaflet  Leaflets exhibited normal cuspal separation and sclerosis  DOPPLER: Transaortic velocity was within the normal range  There was no evidence for stenosis  There was no regurgitation  TRICUSPID VALVE: The valve structure was normal  There was normal leaflet separation  DOPPLER: The transtricuspid velocity was within the normal range  There was no evidence for stenosis  There was trace regurgitation  The tricuspid jet  envelope definition was inadequate for estimation of RV systolic pressure  There are no indirect findings (abnormal RV volume or geometry, altered pulmonary flow velocity profile, or leftward septal displacement) which would suggest  moderate or severe pulmonary hypertension  PULMONIC VALVE: Leaflets exhibited normal thickness, no calcification, and normal cuspal separation  DOPPLER: The transpulmonic velocity was within the normal range  There was trace regurgitation  PERICARDIUM: There was no pericardial effusion  The pericardium was normal in appearance  AORTA: The root exhibited normal size  SYSTEMIC VEINS: IVC: The inferior vena cava was normal in size and course   Respirophasic changes were normal     SYSTEM MEASUREMENT TABLES    2D  %FS: 22 03 %  AV Diam: 3 27 cm  EDV(Teich): 102 94 ml  EF Biplane: 43 88 %  EF(Cube): 52 6 %  EF(Teich): 44 5 %  ESV(Cube): 49 58 ml  ESV(Teich): 57 14 ml  IVSd: 0 99 cm  LA Area: 10 03 cm2  LA Diam: 3 17 cm  LVEDV MOD A2C: 81 38 ml  LVEDV MOD A4C: 78 19 ml  LVEDV MOD BP: 83 88 ml  LVEF MOD A2C: 44 41 %  LVEF MOD A4C: 40 63 %  LVESV MOD A2C: 45 24 ml  LVESV MOD A4C: 46 42 ml  LVESV MOD BP: 47 07 ml  LVIDd: 4 71 cm  LVIDs: 3 67 cm  LVLd A2C: 8 17 cm  LVLd A4C: 7 29 cm  LVLs A2C: 6 69 cm  LVLs A4C: 6 32 cm  LVPWd: 1 02 cm  RA Area: 11 57 cm2  RV Diam: 3 23 cm  SI(Cube): 29 41 ml/m2  SI(Teich): 24 49 ml/m2  SV MOD A2C: 36 14 ml  SV MOD A4C: 31 77 ml  SV(Cube): 55 ml  SV(Teich): 45 8 ml    MM  TAPSE: 1 53 cm    PW  AVC: 356 55 ms  E': 0 05 m/s  E/E': 8 7  MV A Tim: 0 68 m/s  MV Dec [place]: 1 52 m/s2  MV DecT: 266 78 ms  MV E Tim: 0 41 m/s  MV E/A Ratio: 0 6    [employer] Accredited Echocardiography Laboratory    Prepared and electronically signed by    Lencho Zuleta DO  Signed 11-Oct-2017 11:58:41       Imaging: I have personally reviewed pertinent reports  Xr Chest Portable    Result Date: 1/20/2018  Narrative: CHEST INDICATION:  Status post open heart surgery  COMPARISON:  1 day prior VIEWS:   AP frontal IMAGES:  1 FINDINGS:  Status post median sternotomy with right IJ catheter, right IJ Doyle, mediastinal drains in place from recent CABG  Cardiomediastinal silhouette appears unremarkable  No pneumothorax  Mild interstitial prominence  Visualized osseous structures appear within normal limits for the patient's age  Impression: No pneumothorax status post median sternotomy  Mild interstitial prominence noted  Workstation performed: [id number]     EKG reviewed personally:  Normal sinus rhythm with 1st degree AV block  Ventricular rate 70 bpm  IN interval 217 ms  QRSD interval 88 ms  QT interval 438 ms  QTc interval 473 ms    Telemetry reviewed personally:   Normal sinus rhythm HR 70-80s    Assessment    1  CAD s/p CABG x 4  2  Hypertension   3  Hyperlipidemia LDL 52  4  PVD  5  Former tobacco abuse   6  Acute blood loss anemia hemoglobin 7 0    Plan    POD # 3 s/p CABG x 4 LIMA top LAD, SVG to OM2, SVG to RCA and SVG to D1  Reviewed ECG and telemetry showing NSR   Continue aspirin and statin   On amiodarone  Recommend adding BB  BP average: 113/52   Diuretics per primary team  I/Os + 5 8 L   Currently on furosemide 40 mg IV BID    Will continue to monitor     Thank you for allowing us to participate in this patient's care  This pt will follow up with Dr Kate Thomas once discharged  Counseling / Coordination of Care  Total floor / unit time spent today 45 minutes  Greater than 50% of total time was spent with the patient and / or family counseling and / or coordination of care  A description of the counseling / coordination of care: Review of history, current assessment, development of a plan  Code Status: Level 1 - Full Code    ** Please Note: Dragon 360 Dictation voice to text software may have been used in the creation of this document   **

## 2018-01-22 NOTE — PROGRESS NOTES
Progress Note - Critical Care   Jigar Lind 68 y o  male MRN: [id number]  Unit/Bed#: [facility] 416-01 Encounter: [id number]      Attending Physician: Myrna Castro DO    24 Hour Events: POD # 3 s/p CABG x 4  Given 1 extra dose of lasix overnight  Allergies: Allergies   Allergen Reactions    Penicillins Other (See Comments)     Hallucinations; Patient reported that he was seeing visual disturbances         Medications:   Scheduled Meds:  albumin human 12 5 g Intravenous Once   amiodarone 200 mg Oral Q8H [address]   vitamin C 500 mg Oral BID   aspirin 325 mg Oral Daily   atorvastatin 80 mg Oral Daily With Dinner   cholecalciferol 1,000 Units Oral Daily   docusate sodium 100 mg Oral BID   fondaparinux 2 5 mg Subcutaneous Daily   furosemide 40 mg Intravenous Once   insulin lispro 1-6 Units Subcutaneous TID AC   mupirocin 1 application Nasal M97Y KAMRAN   pantoprazole 40 mg Oral Daily   polyethylene glycol 17 g Oral Daily   potassium phosphate 21 mmol Intravenous Once       VTE Pharmacologic Prophylaxis: Fondaparinux (Arixtra)  VTE Mechanical Prophylaxis: sequential compression device    Continuous Infusions:   PRN Meds:    acetaminophen 650 mg Q6H PRN   bisacodyl 10 mg Daily PRN   ondansetron 4 mg Q6H PRN   oxyCODONE-acetaminophen 1 tablet Q4H PRN   oxyCODONE-acetaminophen 2 tablet Q6H PRN       Home Medications:   Prior to Admission medications    Medication Sig Start Date End Date Taking?  Authorizing Provider   Ascorbic Acid (VITAMIN C) 1000 MG tablet Take 500 mg by mouth 2 (two) times a day   Yes Historical Provider, MD   aspirin (ECOTRIN LOW STRENGTH) 81 mg EC tablet Take 81 mg by mouth daily   Yes Historical Provider, MD   atorvastatin (LIPITOR) 20 mg tablet Take 20 mg by mouth 2 (two) times a day   Yes Historical Provider, MD   cholecalciferol (VITAMIN D3) 1,000 units tablet Take 1,000 Units by mouth daily   Yes Historical Provider, MD   cyanocobalamin (VITAMIN B-12) 1,000 mcg tablet Take 1,000 mcg by mouth daily     Yes Historical Provider, MD   isosorbide mononitrate (IMDUR) 30 mg 24 hr tablet Take 1 tablet by mouth daily 17  Yes Saima MagnoliaKEVIN ryan   metoprolol tartrate (LOPRESSOR) 25 mg tablet Take 1 tablet by mouth every 12 (twelve) hours 17  Yes Saima MagnoliaKEVIN   mometasone (ELOCON) 0 1 % cream Apply 1 application topically as needed     Yes Historical Provider, MD   Multiple Vitamin (MULTIVITAMIN) capsule Take 1 capsule by mouth daily   Yes Historical Provider, MD   pantoprazole (PROTONIX) 40 mg tablet Take 40 mg by mouth daily   Yes Historical Provider, MD   Probiotic Product (ALIGN PO) Take 1 tablet by mouth daily     Yes Historical Provider, MD       Vitals:   Vitals:    18 0100 18 0200 18 0400 18 0500   BP: 129/56 117/55 117/53 127/56   Pulse: 80 76 80 82   Resp: (!)    Temp:   98 3 °F (36 8 °C)    TempSrc:   Oral    SpO2: 100% 100% 100% 100%   Weight:       Height:         Arterial Line BP: 134/38  Arterial Line MAP (mmHg): 62 mmHg    Tele Rhythm: NSR This was personally reviewed by myself  Respiratory:  SpO2: SpO2: 100 %, SpO2 Activity: SpO2 Activity: At Rest, SpO2 Device: O2 Device: Nasal cannula  O2 Flow Rate (L/min): 1 L/min    Temperature: Temp (24hrs), Av 4 °F (37 4 °C), Min:98 3 °F (36 8 °C), Max:100 °F (37 8 °C)  Current: Temperature: 98 3 °F (36 8 °C)    Weights:   Weight (last 2 days)     Date/Time   Weight    18 0600  79 1 (174 38)    18 0600  76 6 (168 87)            IBW: 64 95 kg  Body mass index is 27 72 kg/m²  Hemodynamic Monitoring:  N/A    Intake and Outputs:  Intake/Output Summary (Last 24 hours) at 18 0646  Last data filed at 18 0640   Gross per 24 hour   Intake          1339 92 ml   Output              855 ml   Net           484 92 ml     I/O last 24 hours: In: 2177 3 [P O :350; I V :1077 3; IV Piggyback:750]  Out: 6238 [Urine:1610;  Chest Tube:230]    UOP: 67/hour     Labs:    Results from last 7 days  Lab Units 01/21/18  0414 01/20/18  0358 01/19/18  1953  01/19/18  1145   WBC Thousand/uL 9 05 9 50  --   --   --    HEMOGLOBIN g/dL 7 0* 8 2* 8 3*  --  10 0*   I STAT HEMOGLOBIN   --   --   --   < >  --    HEMATOCRIT % 21 1* 24 8*  --   --  30 2*   PLATELETS Thousands/uL 86* 116*  --   --  123*   < > = values in this interval not displayed  Results from last 7 days  Lab Units 01/22/18  0301 01/21/18  0414 01/20/18  0358   SODIUM mmol/L 138 137 140   POTASSIUM mmol/L 3 6 3 4* 4 0   CHLORIDE mmol/L 105 105 108   CO2 mmol/L 27 26 22   BUN mg/dL 23 25 20   CREATININE mg/dL 1 23 1 37* 0 99   CALCIUM mg/dL 7 7* 7 5* 7 5*   GLUCOSE RANDOM mg/dL 113 113 100       Baseline creat 0 9      Results from last 7 days  Lab Units 01/22/18  0301 01/21/18 0414 01/20/18  0358   MAGNESIUM mg/dL 2 3 2 6 2 9*         No new imaging    Physical Exam:     Neck:  Supple  Cardiac: Regular rate and rhythm  No rubs  No murmur  Pulmonary:  Breath sounds clear b/l  Abdomen:  Soft  No distension  Incisions: Sternum is stable  Incision dressed with Acticoat  No erythema or drainage  Lower extremities: Trace edema B/L  Neuro: Alert  Follows commands  Screening neurologic exam reveals no obvious deficits  Skin: Warm and dry  Invasive lines and devices:   Invasive Devices     Central Venous Catheter Line            CVC Central Lines 01/19/18 Triple Right Internal jugular 2 days                Assessment:  Principal Problem:    Coronary artery disease  Active Problems:    S/P CABG x 4    Acute respiratory insufficiency, postoperative    Hyperlipidemia    Hypertension    Former tobacco use      Plan:    Cardiac:   Cardiac infusions: None  Hemodynamic goals: MAP >65  NSR; HR/BP well-controlled  Lopressor 12 5 BID  Continue ASA, PO amiodarone, and statin therapy  Epicardial pacing wires out  Continue DVT prophylaxis  Consult cardiology for postoperative medical management    Pulmonary:   Extubated  Chest tubes have been discontinued  Acute post-op respiratory insufficiency:  Continue incentive spirometry/coughing/deep breathing exercises  Wean supplemental oxygen as tolerated  Renal:   Discontinue IV potassium replacement scales  Post-op volume overload: Add Lasix 40 mg IV q 12 hours  Add K-dur 20 mEq q hours  No pillai catheter     Neuro:  Neurologically intact with no active issues  Incisional pain well-controlled  Discontinue IV narcotic therapy and continue with PO pain regimen    GI:  Initiate TLC 2 3 gm sodium diet with 1800 mL fluid restriction  Continue bowel regimen  Continue GI prophylaxis with PPI          Endo: No history of diabetes: Glucose well-controlled  Insulin sliding scale coverage  Hematology:   Post-operative acute blood loss anemia:   Trend hemoglobin and platelets  Transfuse as needed  Consider 1 unit PRBC? Disposition:   Telemetry today    Collaborative bedside rounds performed with cardiac surgery attending and bedside RN      SIGNATURE: Sukhwinder Thornton PA-C  DATE: January 22, 2018  TIME: 6:46 AM

## 2018-01-22 NOTE — PROGRESS NOTES
Progress Note - Cardiothoracic Surgery   Simi Talbert 68 y o  male MRN: [id number]  Unit/Bed#: [facility] 416-01 Encounter: [id number]      POD # 3 s/p CABG    Pt seen/examined  Interval history and data reviewed with critical care team   Pt doing well  No specific complaints  Milrinone and Frank weaned off      Medications:   Scheduled Meds:    albumin human 12 5 g Intravenous Once   amiodarone 200 mg Oral Q8H [facility] & half-way   vitamin C 500 mg Oral BID   aspirin 325 mg Oral Daily   atorvastatin 80 mg Oral Daily With Dinner   cholecalciferol 1,000 Units Oral Daily   docusate sodium 100 mg Oral BID   ferrous sulfate 325 mg Oral Daily With Breakfast   fondaparinux 2 5 mg Subcutaneous Daily   furosemide 40 mg Intravenous BID (diuretic)   insulin lispro 1-6 Units Subcutaneous TID AC   mupirocin 1 application Nasal C36R KAMRAN   pantoprazole 40 mg Oral Daily   polyethylene glycol 17 g Oral Daily   potassium chloride 20 mEq Oral BID     Continuous Infusions:     PRN Meds:   acetaminophen    bisacodyl    ondansetron    oxyCODONE-acetaminophen    oxyCODONE-acetaminophen    Vitals: Blood pressure 127/56, pulse 82, temperature 98 3 °F (36 8 °C), temperature source Oral, resp  rate 21, height 5' 6 5" (1 689 m), weight 86 1 kg (189 lb 13 1 oz), SpO2 100 %  ,Body mass index is 30 18 kg/m²  I/O last 24 hours: In: 1339 9 [P O :350;  I V :489 9; IV Piggyback:500]  Out: 855 [Urine:785; Chest Tube:70]  Invasive Devices     Central Venous Catheter Line            CVC Central Lines 01/19/18 Triple Right Internal jugular 2 days                  Lab, Imaging and other studies:     Results from last 7 days  Lab Units 01/22/18  0437 01/21/18  0414 01/20/18  0358   WBC Thousand/uL 11 03* 9 05 9 50   HEMOGLOBIN g/dL 7 0* 7 0* 8 2*   HEMATOCRIT % 21 5* 21 1* 24 8*   PLATELETS Thousands/uL 101* 86* 116*       Results from last 7 days  Lab Units 01/22/18  0301 01/21/18  0414 01/20/18  0358   SODIUM mmol/L 138 137 140   POTASSIUM mmol/L 3 6 3 4* 4 0 CHLORIDE mmol/L 105 105 108   CO2 mmol/L 27 26 22   BUN mg/dL 23 25 20   CREATININE mg/dL 1 23 1 37* 0 99   GLUCOSE RANDOM mg/dL 113 113 100   CALCIUM mg/dL 7 7* 7 5* 7 5*         No results for input(s): PHART, JQK3TRX, PO2ART, FGV8WYC, L3XGTFIO, BEART in the last 72 hours  Plan:    Ambulate  Incentive spirometry  PO ASA/Statin/B blocker  Tx to floor    Home in AM    SIGNATURE: Gwendolyn Mock DO  DATE: January 22, 2018  TIME: 7:44 AM

## 2018-01-23 VITALS
RESPIRATION RATE: 18 BRPM | TEMPERATURE: 98.4 F | SYSTOLIC BLOOD PRESSURE: 108 MMHG | BODY MASS INDEX: 29.41 KG/M2 | OXYGEN SATURATION: 97 % | HEIGHT: 67 IN | HEART RATE: 85 BPM | WEIGHT: 187.39 LBS | DIASTOLIC BLOOD PRESSURE: 53 MMHG

## 2018-01-23 VITALS
BODY MASS INDEX: 27.45 KG/M2 | TEMPERATURE: 97.2 F | RESPIRATION RATE: 18 BRPM | WEIGHT: 175.25 LBS | OXYGEN SATURATION: 98 % | HEART RATE: 98 BPM | SYSTOLIC BLOOD PRESSURE: 138 MMHG | DIASTOLIC BLOOD PRESSURE: 82 MMHG

## 2018-01-23 PROBLEM — D62 ACUTE BLOOD LOSS AS CAUSE OF POSTOPERATIVE ANEMIA: Status: ACTIVE | Noted: 2018-01-23

## 2018-01-23 PROBLEM — D64.9 CHRONIC ANEMIA: Chronic | Status: ACTIVE | Noted: 2018-01-23

## 2018-01-23 LAB
ANION GAP SERPL CALCULATED.3IONS-SCNC: 7 MMOL/L (ref 4–13)
BUN SERPL-MCNC: 24 MG/DL (ref 5–25)
CALCIUM SERPL-MCNC: 8.2 MG/DL (ref 8.3–10.1)
CHLORIDE SERPL-SCNC: 106 MMOL/L (ref 100–108)
CO2 SERPL-SCNC: 28 MMOL/L (ref 21–32)
CREAT SERPL-MCNC: 1.17 MG/DL (ref 0.6–1.3)
ERYTHROCYTE [DISTWIDTH] IN BLOOD BY AUTOMATED COUNT: 15.5 % (ref 11.6–15.1)
GFR SERPL CREATININE-BSD FRML MDRD: 60 ML/MIN/1.73SQ M
GLUCOSE SERPL-MCNC: 103 MG/DL (ref 65–140)
GLUCOSE SERPL-MCNC: 108 MG/DL (ref 65–140)
GLUCOSE SERPL-MCNC: 142 MG/DL (ref 65–140)
HCT VFR BLD AUTO: 20.6 % (ref 36.5–49.3)
HGB BLD-MCNC: 6.8 G/DL (ref 12–17)
MCH RBC QN AUTO: 33.3 PG (ref 26.8–34.3)
MCHC RBC AUTO-ENTMCNC: 33 G/DL (ref 31.4–37.4)
MCV RBC AUTO: 101 FL (ref 82–98)
PLATELET # BLD AUTO: 127 THOUSANDS/UL (ref 149–390)
PMV BLD AUTO: 10.6 FL (ref 8.9–12.7)
POTASSIUM SERPL-SCNC: 4.1 MMOL/L (ref 3.5–5.3)
RBC # BLD AUTO: 2.04 MILLION/UL (ref 3.88–5.62)
SODIUM SERPL-SCNC: 141 MMOL/L (ref 136–145)
WBC # BLD AUTO: 8.13 THOUSAND/UL (ref 4.31–10.16)

## 2018-01-23 PROCEDURE — 80048 BASIC METABOLIC PNL TOTAL CA: CPT | Performed by: PHYSICIAN ASSISTANT

## 2018-01-23 PROCEDURE — G8978 MOBILITY CURRENT STATUS: HCPCS

## 2018-01-23 PROCEDURE — 97163 PT EVAL HIGH COMPLEX 45 MIN: CPT

## 2018-01-23 PROCEDURE — 85027 COMPLETE CBC AUTOMATED: CPT | Performed by: PHYSICIAN ASSISTANT

## 2018-01-23 PROCEDURE — G8979 MOBILITY GOAL STATUS: HCPCS

## 2018-01-23 PROCEDURE — 82948 REAGENT STRIP/BLOOD GLUCOSE: CPT

## 2018-01-23 RX ORDER — DOCUSATE SODIUM 100 MG/1
100 CAPSULE, LIQUID FILLED ORAL 2 TIMES DAILY
Qty: 60 CAPSULE | Refills: 0 | Status: SHIPPED | OUTPATIENT
Start: 2018-01-23 | End: 2021-07-09

## 2018-01-23 RX ORDER — MULTIVIT WITH MINERALS/LUTEIN
500 TABLET ORAL 2 TIMES DAILY
Qty: 180 TABLET | Refills: 0 | Status: SHIPPED | OUTPATIENT
Start: 2018-01-23 | End: 2018-09-07 | Stop reason: SDUPTHER

## 2018-01-23 RX ORDER — MULTIVIT WITH MINERALS/LUTEIN
500 TABLET ORAL 2 TIMES DAILY
Qty: 60 TABLET | Refills: 0 | Status: SHIPPED | OUTPATIENT
Start: 2018-01-23 | End: 2018-01-23

## 2018-01-23 RX ORDER — OXYCODONE HYDROCHLORIDE AND ACETAMINOPHEN 5; 325 MG/1; MG/1
TABLET ORAL
Qty: 60 TABLET | Refills: 0
Start: 2018-01-23 | End: 2018-02-19 | Stop reason: ALTCHOICE

## 2018-01-23 RX ORDER — POLYETHYLENE GLYCOL 3350 17 G/17G
17 POWDER, FOR SOLUTION ORAL DAILY PRN
Qty: 14 EACH | Refills: 0 | Status: SHIPPED | OUTPATIENT
Start: 2018-01-23 | End: 2018-02-19 | Stop reason: ALTCHOICE

## 2018-01-23 RX ORDER — ACETAMINOPHEN 325 MG/1
650 TABLET ORAL EVERY 6 HOURS PRN
Qty: 30 TABLET | Refills: 0 | COMMUNITY
Start: 2018-01-23 | End: 2018-02-19 | Stop reason: ALTCHOICE

## 2018-01-23 RX ORDER — POTASSIUM CHLORIDE 20 MEQ/1
20 TABLET, EXTENDED RELEASE ORAL DAILY
Qty: 14 TABLET | Refills: 1 | Status: SHIPPED | OUTPATIENT
Start: 2018-01-23 | End: 2018-02-19 | Stop reason: ALTCHOICE

## 2018-01-23 RX ORDER — FERROUS SULFATE 325(65) MG
325 TABLET ORAL 2 TIMES DAILY WITH MEALS
Qty: 60 TABLET | Refills: 0 | Status: SHIPPED | OUTPATIENT
Start: 2018-01-23 | End: 2018-01-23

## 2018-01-23 RX ORDER — FERROUS SULFATE 325(65) MG
325 TABLET ORAL 2 TIMES DAILY WITH MEALS
Qty: 180 TABLET | Refills: 0 | Status: SHIPPED | OUTPATIENT
Start: 2018-01-23 | End: 2018-03-16 | Stop reason: ALTCHOICE

## 2018-01-23 RX ORDER — ATORVASTATIN CALCIUM 80 MG/1
80 TABLET, FILM COATED ORAL
Qty: 30 TABLET | Refills: 2 | Status: SHIPPED | OUTPATIENT
Start: 2018-01-23 | End: 2018-05-14 | Stop reason: SDUPTHER

## 2018-01-23 RX ORDER — ASPIRIN 325 MG
325 TABLET ORAL DAILY
Qty: 100 TABLET | Refills: 0 | Status: SHIPPED | OUTPATIENT
Start: 2018-01-24 | End: 2018-09-07 | Stop reason: SDUPTHER

## 2018-01-23 RX ORDER — PANTOPRAZOLE SODIUM 40 MG/1
40 TABLET, DELAYED RELEASE ORAL DAILY
Qty: 30 TABLET | Refills: 0 | Status: SHIPPED | OUTPATIENT
Start: 2018-01-23 | End: 2018-08-13 | Stop reason: SDUPTHER

## 2018-01-23 RX ORDER — TORSEMIDE 20 MG/1
20 TABLET ORAL DAILY
Qty: 14 TABLET | Refills: 1 | Status: SHIPPED | OUTPATIENT
Start: 2018-01-23 | End: 2018-02-19 | Stop reason: ALTCHOICE

## 2018-01-23 RX ADMIN — OXYCODONE HYDROCHLORIDE AND ACETAMINOPHEN 500 MG: 500 TABLET ORAL at 08:18

## 2018-01-23 RX ADMIN — PANTOPRAZOLE SODIUM 40 MG: 40 TABLET, DELAYED RELEASE ORAL at 08:17

## 2018-01-23 RX ADMIN — AMIODARONE HYDROCHLORIDE 200 MG: 200 TABLET ORAL at 06:06

## 2018-01-23 RX ADMIN — Medication 325 MG: at 08:18

## 2018-01-23 RX ADMIN — DOCUSATE SODIUM 100 MG: 100 CAPSULE, LIQUID FILLED ORAL at 08:18

## 2018-01-23 RX ADMIN — METOPROLOL TARTRATE 12.5 MG: 25 TABLET ORAL at 11:50

## 2018-01-23 RX ADMIN — POTASSIUM CHLORIDE 20 MEQ: 1500 TABLET, EXTENDED RELEASE ORAL at 08:18

## 2018-01-23 RX ADMIN — ASPIRIN 325 MG: 325 TABLET ORAL at 08:18

## 2018-01-23 RX ADMIN — POLYETHYLENE GLYCOL 3350 17 G: 17 POWDER, FOR SOLUTION ORAL at 08:16

## 2018-01-23 RX ADMIN — VITAMIN D, TAB 1000IU (100/BT) 1000 UNITS: 25 TAB at 08:18

## 2018-01-23 RX ADMIN — MUPIROCIN 1 APPLICATION: 20 OINTMENT TOPICAL at 08:17

## 2018-01-23 RX ADMIN — FONDAPARINUX SODIUM 2.5 MG: 2.5 INJECTION, SOLUTION SUBCUTANEOUS at 08:17

## 2018-01-23 RX ADMIN — FUROSEMIDE 40 MG: 10 INJECTION, SOLUTION INTRAMUSCULAR; INTRAVENOUS at 08:18

## 2018-01-23 NOTE — PHYSICAL THERAPY NOTE
Physical Therapy Evaluation    Patient's Name: Rigo Magaña    Admitting Diagnosis  Atherosclerotic heart disease of native coronary artery without angina pectoris [I25 10]    Problem List  Patient Active Problem List   Diagnosis    Abnormal liver function tests    Acute frontal sinusitis    Acute sinusitis    Degenerative lumbar spinal stenosis    Atopic rhinitis    Spondylolisthesis of lumbar region    CAD (coronary artery disease)    Coronary artery disease    Former tobacco use    Hyperlipidemia    Hypertension    S/P CABG x 4    Acute respiratory insufficiency, postoperative    Acute blood loss as cause of postoperative anemia    Chronic anemia       Past Medical History  Past Medical History:   Diagnosis Date    Allergic rhinitis     Anemia     Arthritis     BPH (benign prostatic hyperplasia)     Coronary artery disease     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[facility] [address] )     Former tobacco use     GERD (gastroesophageal reflux disease)     Hyperlipidemia     Hypertension     Lumbar stenosis     Peripheral artery disease ([facility] [address] )        Past Surgical History  Past Surgical History:   Procedure Laterality Date    COLONOSCOPY      LUMBAR FUSION      MA ARTHRODESIS POSTERIOR/POSTEROLATERAL LUMBAR N/A 11/3/2016    Procedure: L2-S1 POSTERIOR LUMBAR FUSION AND DECOMPRESSION (Impulse), Posterior lateral fixation; dural repair ;  Surgeon: Jose Cruz Brennan MD;  [facility];  Service: Orthopedics    MA CABG, ARTERIAL, SINGLE N/A 1/19/2018    Procedure: CABG X4 with LIMA - LAD, SVG - RCA, OM2, & Diagonal ; Left Leg EVH; MATTHEW;  Surgeon: Megha John DO;  Location[facility];  Service: Cardiac Surgery    MA COLONOSCOPY FLX DX W/COLLJ SPEC WHEN PFRMD N/A 11/15/2017    Procedure: EGD AND COLONOSCOPY;  Surgeon: Tamia Abreu MD;  [facility];   Service: Gastroenterology    SEPTOPLASTY      TONSILECTOMY AND ADNOIDECTOMY            01/23/18 1100   Note Type   Note type Eval only   Pain Assessment   Pain Assessment No/denies pain   Home Living   Type of Home House   Home Layout Multi-level; Able to live on main level with bedroom/bathroom  (13 EL w/ hand rail; 13 steps to the 2nd floor)   Home Equipment Walker;Cane;Other (Comment)  (BSC; shower chair)   Prior Function   Level of Patrick Independent with ADLs and functional mobility  (amb w/o AD)   Lives With Alone  (reports family will stay w/ him at night)   Receives Help From Family   Restrictions/Precautions   Braces or Orthoses (denies at baseline)   Other Precautions Cardiac/sternal   General   Additional Pertinent History cleared for assessment (spoke to nsg); pt is reportedly for D/C home today   Cognition   Overall Cognitive Status WFL   Arousal/Participation Alert   Attention Within functional limits   Orientation Level Oriented to person;Oriented to place;Oriented to situation   Memory Within functional limits   Following Commands Follows one step commands without difficulty   Comments Pt is observed in the chair; agreeable to demonstrate mobility skills; reports he will be leaving home today   RUE Assessment   RUE Assessment WFL  (AROM w/in sternal precautions)   LUE Assessment   LUE Assessment WFL  (AROM w/in sternal precautions)   RLE Assessment   RLE Assessment WFL  (AROM)   Strength RLE   RLE Overall Strength 4-/5   LLE Assessment   LLE Assessment WFL  (AROM)   Strength LLE   LLE Overall Strength 3+/5   Bed Mobility   Sit to Supine 6  Modified independent   Transfers   Sit to Stand 6  Modified independent  (3 trials)   Additional items Verbal cues  (reviewed appropriate technique)   Stand to Sit 6  Modified independent  (3 trials)   Additional items (reviewed appropriate technique)   Ambulation/Elevation   Gait pattern Short stride; Inconsistent karen  (No overt uncorrected LOB, gross knee buckling, or swaying )   Gait Assistance 6  Modified independent   Assistive Device Rolling walker   Distance 70 ft + 2 x 15 ft + 70 ft w/ sitted rest periods and steps negotiation in between; post amb and stairs, O2 sat = 96 %   Stair Management Assistance 5  Supervision   Additional items Assist x 1;Verbal cues; Tactile cues   Stair Management Technique One rail L;Step to pattern;Nonreciprocal   Number of Stairs 7  (reviewed rw set-up w/caregiver (A) prior to steps at home)   Balance   Static Sitting Fair +   Static Standing Fair  (supported)   Ambulatory Fair  (w/ rw)   Activity Tolerance   Activity Tolerance Patient tolerated treatment well   Nurse Made Aware spoke to Tresa Peraza, [place]   Assessment   Prognosis Good   Problem List Decreased endurance   Assessment Pt is 68 y o  admitted with Dx of Coronary artery disease and underwent CABG x 4 on 1/19/2018  Pt 's comorbidities affecting POC include: anemia, arthritis, femoral artery stenosis, HTN, PAD, L-spine stenosis s/p sx 11/3/2016 and personal factors of: living alone, EL and steps in the house  Pt's clinical presentation is currently unpredictable which is evident in ongoing telem monitoring and abn lab values earlier (Hgb 6 8 in AM)  Pt presents w/ min generalized weakness, decreased functional endurance, inconsistent gait patterns, and inconsistent carryover of sequencing instructions on the steps  Overall, pt was able to demonstrate mod (I)/(I) level w/ mobility skills on level surfaces and (S)/close guarding on the steps --> anticipate pt will return home w/ available family support upon D/C from PT/mobility stand point; home PT follow up is recommended; will cont to follow until then to max endurance and overall tolerance to mobilization  Goals   Patient Goals to go home   STG Expiration Date 01/28/18   Short Term Goal #1 3-5 days  Pt will amb 300 ft w/ rw, mod (I)  Pt will negotiate 13 steps w/ hand rail, mod (I)  Pt will demonstrate good standing supported balance  Plan   Treatment/Interventions LE strengthening/ROM; Elevations; Therapeutic exercise; Endurance training;Spoke to nursing;Spoke to case management;OT   PT Frequency 5x/wk   Recommendation   Recommendation Home PT; Home with family support  (1st floor set-up at least initially upon D/C; )   Equipment Recommended Walker  ((S) on the steps (pt is aware))   Modified Mani Scale   Modified Burnside Scale 3   Barthel Index   Feeding 10   Bathing 0   Grooming Score 5   Dressing Score 5   Bladder Score 10   Bowels Score 10   Toilet Use Score 10   Transfers (Bed/Chair) Score 15   Mobility (Level Surface) Score 15   Stairs Score 5   Barthel Index Score 85       David Roberts, PT

## 2018-01-23 NOTE — RESULT NOTES
Verified Results  NM MYOCARDIAL PERFUSION SPECT (RX STRESS AND/OR REST) [id number] 11:35AM Ana Alvarez Order Number: [id number]    - Patient Instructions: To schedule this appointment, please contact Central Scheduling at [phone]  Test Name Result Flag Reference   NM MYOCARDIAL PERFUSION SPECT (RX STRESS AND/OR REST) (Report)     [facility]   [address] [place] [address], [address] [place] [address]   [phone]     Rest/Stress Gated SPECT Myocardial Perfusion Imaging After Regadenoson     Patient: Amber Pérez   MR number: [id number]   Account number: [de-identified]   : 1940   Age: 68 years   Gender: Male   Status: Outpatient   Location: [address] [place] [address]   Height: 67 in   Weight: 166 lb   BP: 148/ 96 mmHg     Allergies: PENICILLINS     Diagnosis: I73 9 - Peripheral vascular disease, unspecified, R06 00 - Dyspnea, unspecified, R53 83 - Other fatigue     Primary Physician: KEVIN Lowe   RN: West Marshall RN BSN   Referring Physician: Barber Butts MD   Technician: Ale Neal   Group: [facility]   Report Prepared By[de-identified] West Marshall RN BSN   Interpreting Physician: Barber Butts MD     INDICATIONS: eval for CAD dyspnea     HISTORY: Back surgery/hip pain   prev stress i min Joo   ROBLES and leg fatigue   Quit smoking 6 years ago The patient is a 68year old  male  Chest pain status: no chest pain  Other symptoms: dyspnea and decreased effort tolerance  Coronary artery disease risk factors: dyslipidemia, hypertension, and family   history of premature coronary artery disease  Cardiovascular history: peripheral vascular occlusive disease  Co-morbidity: history of lung disease  PHYSICAL EXAM: Baseline physical exam screening: no wheezes audible  REST ECG: Normal sinus rhythm  PROCEDURE: The study was performed at the [address] [place] [address]  A regadenoson infusion pharmacologic stress test was performed   Gated SPECT myocardial perfusion imaging was performed after stress and at rest  Systolic blood   pressure was 148 mmHg, at the start of the study  Diastolic blood pressure was 96 mmHg, at the start of the study  The heart rate was 81 bpm, at the start of the study  IV double checked  Regadenoson protocol:   HR bpm SBP mmHg DBP mmHg Symptoms ST change   Baseline 80 148 96 none --   Immediate -- -- -- leg pain --   2 min 133 128 84 dizziness downsloping depression   3 min -- -- -- same as above --   4 min -- -- -- same as above same as above   5 min 111 178 92 subsiding --   7 min 104 168 90 none --   Walked three minutes post Regadenoson The patient also performed low level exercise  STRESS SUMMARY: Duration of pharmacologic stress was Functional capacity could not be adequately assessed  Maximal heart rate during stress was 133 bpm  The heart rate response to stress was normal  There was normal resting blood pressure   with an appropriate response to stress  The rate-pressure product for the peak heart rate and blood pressure was 54382  There was no chest pain during stress  The stress test was terminated due to protocol completion  Pre oxygen   saturation: 98 %  Peak oxygen saturation: 98 %  The stress ECG was consistent with myocardial ischemia with downsloping ST depression in the Inferior leads  There were no stress arrhythmias or conduction abnormalities  ISOTOPE ADMINISTRATION:   Resting isotope administration Stress isotope administration   Agent Tetrofosmin Tetrofosmin   Dose 10 4 mCi 32 4 mCi   Date 12/01/2017 12/01/2017   Injection time 11:50 12:53   Injection-image interval 30 min 40 min     The radiopharmaceutical was injected at the peak effect of pharmacologic stress  MYOCARDIAL PERFUSION IMAGING:   The image quality was poor especially in the inferior wall  Rotating projection images reveal marked subdiaphragmatic activity on rest images   Left ventricular size was normal  The TID ratio was 1 13      PERFUSION DEFECTS:   - There was a moderate-sized, moderately severe, partially reversible myocardial perfusion defect of the basal to mid inferior wall  This likely represents a prior Inferior MI with superimposed moderate amount of ischemia  GATED SPECT:   The calculated left ventricular ejection fraction was 38 % (decreased)  There was moderately reduced myocardial thickening and motion of the inferior wall of the left ventricle  SUMMARY:   - Stress results: There was no chest pain during stress  - ECG conclusions: The stress ECG was consistent with myocardial ischemia with downsloping ST depression in the Inferior leads    - Perfusion imaging: There was a moderate-sized, moderately severe, partially reversible myocardial perfusion defect of the basal to mid inferior wall  This likely represents a prior Inferior MI with superimposed moderate amount of   ischemia    - Gated SPECT: The calculated left ventricular ejection fraction was 38 % (decreased)  There was moderately reduced myocardial thickening and motion of the inferior wall of the left ventricle  - Impressions and recommendations: There was mild to moderate amount of ischemia in the inferior region  The quality of the study was sub-optimal      IMPRESSIONS: Abnormal study after pharmacologic vasodilation without reproduction of symptoms due to the likely presence of an inferior MI with ischemia as well as LV systolic dysfunction  There was mild to moderate amount of ischemia in   the inferior region     The quality of the study was sub-optimal      Prepared and signed by     Glenny Brown MD   Signed 12/01/2017 17:55:45       Plan  Abnormal stress test    · Clopidogrel Bisulfate 300 MG Oral Tablet (Plavix); TAKE 1 TABLET ONCE

## 2018-01-23 NOTE — PLAN OF CARE
Problem: PHYSICAL THERAPY ADULT  Goal: Performs mobility at highest level of function for planned discharge setting  See evaluation for individualized goals  Treatment/Interventions: LE strengthening/ROM, Elevations, Therapeutic exercise, Endurance training, Spoke to nursing, Spoke to case management, OT  Equipment Recommended: You Montiel ((S) on the steps (pt is aware))       See flowsheet documentation for full assessment, interventions and recommendations  Prognosis: Good  Problem List: Decreased endurance  Assessment: Pt is 68 y o  admitted with Dx of Coronary artery disease and underwent CABG x 4 on 1/19/2018  Pt 's comorbidities affecting POC include: anemia, arthritis, femoral artery stenosis, HTN, PAD, L-spine stenosis s/p sx 11/3/2016 and personal factors of: living alone, EL and steps in the house  Pt's clinical presentation is currently unpredictable which is evident in ongoing telem monitoring and abn lab values earlier (Hgb 6 8 in AM)  Pt presents w/ min generalized weakness, decreased functional endurance, inconsistent gait patterns, and inconsistent carryover of sequencing instructions on the steps  Overall, pt was able to demonstrate mod (I)/(I) level w/ mobility skills on level surfaces and (S)/close guarding on the steps --> anticipate pt will return home w/ available family support upon D/C from PT/mobility stand point; home PT follow up is recommended; will cont to follow until then to max endurance and overall tolerance to mobilization  Recommendation: Home PT, Home with family support (1st floor set-up at least initially upon D/C; )          See flowsheet documentation for full assessment

## 2018-01-23 NOTE — PROGRESS NOTES
Pt  For d/c today  Pt  And family given d/c instructions and they were reviewed  Aware how to properly cleanse and dress incisions  CHG  Soap/gauze/tape was provided to pt  IJ removed  CT sites dressed this AM, pt   Bathed this AM

## 2018-01-23 NOTE — SOCIAL WORK
Pt is cleared for d/c  Pt is accepted for services by [facility] for his aftercare  The pt and his daughter Alberto Smith were both informed of d/c  Daughter will transport pt home later this day, pickup time TBD  IMM signed  Medicare Bundle Letter given  No chart copy required  CM to follow

## 2018-01-23 NOTE — PROGRESS NOTES
Pt  Hgb 6 8  Vs stable  BP-123/58  HR-83  Pt asymptomatoc  Notified CC Pa  No interventions ordered at this time  Day team will evaluate  Will continue to monitor

## 2018-01-23 NOTE — PROGRESS NOTES
Progress Note - Cardiothoracic Surgery   Devon Kebede 68 y o  male MRN: [id number]  Unit/Bed#: [facility] 407-01 Encounter: [id number]  Coronary artery disease  S/P coronary artery bypass grafting; POD # 4      24 Hour Events: Transferred to telemetry yesterday  Feels tired, but denies CP or SOB  Wants to go home today  Hg 6 8 this morning - denies fatigue, lightheadedness, dizziness  Pt reports history of chronic anemia preop  Medications:   Scheduled Meds:  albumin human 12 5 g Intravenous Once   amiodarone 200 mg Oral Q8H [facility]   vitamin C 500 mg Oral BID   aspirin 325 mg Oral Daily   atorvastatin 80 mg Oral Daily With Dinner   cholecalciferol 1,000 Units Oral Daily   docusate sodium 100 mg Oral BID   ferrous sulfate 325 mg Oral Daily With Breakfast   fondaparinux 2 5 mg Subcutaneous Daily   furosemide 40 mg Intravenous BID (diuretic)   insulin lispro 1-6 Units Subcutaneous TID AC   mupirocin 1 application Nasal R20G [facility]   pantoprazole 40 mg Oral Daily   polyethylene glycol 17 g Oral Daily   potassium chloride 20 mEq Oral BID     Continuous Infusions:   PRN Meds:   acetaminophen    bisacodyl    ondansetron    oxyCODONE-acetaminophen    oxyCODONE-acetaminophen    Vitals:   Vitals:    01/23/18 0040 01/23/18 0304 01/23/18 0552 01/23/18 0700   BP: 118/67 126/59 123/58    BP Location: Right arm Right arm Right arm    Pulse: 80 80 83    Resp: 18 18     Temp: 99 6 °F (37 6 °C) 98 6 °F (37 °C)     TempSrc: Oral Oral     SpO2: 95% 93%     Weight:    85 kg (187 lb 6 3 oz)   Height:           Telemetry: NSR; Heart Rate: 87    Respiratory:   SpO2: SpO2: 93 %; Room Air    Intake/Output:   I/O       01/21 0701 - 01/22 0700 01/22 0701 - 01/23 0700 01/23 0701 - 01/24 0700    P  O  350 620     I V  (mL/kg) 489 9 (5 7)      IV Piggyback 500 250     Total Intake(mL/kg) 1339 9 (15 6) 870 (10 2)     Urine (mL/kg/hr) 785 (0 4) 1800 (0 9)     Stool 0 (0)      Chest Tube 70 (0)      Total Output 855 1800      Net +484 9 -930 Unmeasured Stool Occurrence 1 x              Weights:   Weight (last 2 days)     Date/Time   Weight    01/23/18 0700  85 (187 39)    01/22/18 0600  86 1 (189 82)    01/21/18 0600  79 1 (174 38)            Admit weight: 79 4 kg    Results:     Results from last 7 days  Lab Units 01/23/18  0446 01/22/18  0437 01/21/18  0414   WBC Thousand/uL 8 13 11 03* 9 05   HEMOGLOBIN g/dL 6 8* 7 0* 7 0*   HEMATOCRIT % 20 6* 21 5* 21 1*   PLATELETS Thousands/uL 127* 101* 86*       Results from last 7 days  Lab Units 01/23/18  0446 01/22/18  0301 01/21/18  0414   SODIUM mmol/L 141 138 137   POTASSIUM mmol/L 4 1 3 6 3 4*   CHLORIDE mmol/L 106 105 105   CO2 mmol/L 28 27 26   BUN mg/dL 24 23 25   CREATININE mg/dL 1 17 1 23 1 37*   GLUCOSE RANDOM mg/dL 103 113 113   CALCIUM mg/dL 8 2* 7 7* 7 5*         Point of care glucose:  - 124    Studies:  No studies past 24 hrs    Invasive Lines/Tubes:  Invasive Devices     Central Venous Catheter Line            CVC Central Lines 01/19/18 Triple Right Internal jugular 3 days                Physical Exam:    HEENT/NECK:  PERRLA  No jugular venous distention  Cardiac: Regular rate and rhythm  No rubs/murmurs/gallops  Pulmonary:  Breath sounds slightly diminished at the bases bilaterally  Abdomen:  Non-tender, Non-distended  Positive bowel sounds  Incisions: Sternum is stable  Incision is clean, dry, and intact  Saphenectomy incision is clean, dry, and intact  Lower extremities: Extremities warm/dry  Radial/PT/DP pulses 2+ bilaterally  1+ edema B/L  Neuro: Alert and oriented X 3  Sensation is grossly intact  No focal deficits  Skin: Warm/Dry, without rashes or lesions      Assessment:  Patient Active Problem List   Diagnosis    Abnormal liver function tests    Acute frontal sinusitis    Acute sinusitis    Degenerative lumbar spinal stenosis    Atopic rhinitis    Spondylolisthesis of lumbar region    CAD (coronary artery disease)    Coronary artery disease    Former tobacco use    Hyperlipidemia    Hypertension    S/P CABG x 4    Acute respiratory insufficiency, postoperative       Coronary artery disease  S/P coronary artery bypass grafting; POD # 4    Plan:    1  Cardiac:   NSR; HR/BP well-controlled  Start Lopressor 12 5 mg PO BID  Continue ASA and Statin therapy  Epicardial pacing wires out  Central IV access no longer required; Remove central venous catheter today  Continue DVT prophylaxis    2  Pulmonary:   Good Room air oxygen saturation: Continue incentive spirometry/Coughing/Deep breathing exercises  Chest tubes have been discontinued    3  Renal:   Intake/Output net: -930 mL/24 hours  Continue diuresis   Lasix 40 mg IV BID  Potassium Chloride 20 mEq PO BID  Post op Creatinine stable; Follow up labs prn    4  Neuro:  Neurologically intact; No active issues  Incisional pain well-controlled; Continue prn Percocet    5  GI:  Tolerating TLC 2 3 gm sodium diet  Maintain 1800 mL daily fluid restriction   Continue stool softeners and prn suppository  Continue GI prophylaxis    6  Endo:    No history of diabetes; Glucose well-controlled with sliding scale coverage    7  Hematology:   Post-operative acute blood loss anemia; Hemoglobin and hematocrit stable; trend prn  On Iron and Vit C  No transfusion  PO Leukocytosis - improving  Continue to monitor  8  Disposition:  Anticipate discharge to home today or tomorrow  OT recommends home with family support       VTE Pharmacologic Prophylaxis: Fondaparinux (Arixtra)  VTE Mechanical Prophylaxis: sequential compression device    Collaborative rounds completed with KO Harkins , and Abel Jean RN    SIGNATURE: Yeni Guzman PA-C  DATE: January 23, 2018  TIME: 7:44 AM

## 2018-01-23 NOTE — DISCHARGE INSTRUCTIONS
Coronary Artery Bypass Graft   WHAT YOU SHOULD KNOW:   A coronary artery bypass graft (CABG) is open heart surgery to clear blocked arteries in your heart  CABG surgery improves blood flow to your heart by bypassing (sending blood around) the blocked part of an artery  This restores blood flow to your heart and helps prevent a heart attack  AFTER YOU LEAVE:   Medicines: You may need any of the following:  · Prescription pain medicine  may be given  Ask your primary healthcare provider [facility]) how to take this medicine safely  · Antiplatelets , such as aspirin, help prevent blood clots  Take your antiplatelet medicine exactly as directed  These medicines make it more likely for you to bleed or bruise  If you are told to take aspirin, do not take acetaminophen or ibuprofen instead  · Antibiotics  help prevent an infection caused by bacteria  · Heart medicine  helps strengthen and regulate your heartbeat  · Stool softeners  make it easier for you to have a bowel movement and help prevent constipation  · Take your medicine as directed  Contact your PHP if you think your medicine is not helping or if you have side effects  Tell him if you are allergic to any medicine  Keep a list of the medicines, vitamins, and herbs you take  Include the amounts, and when and why you take them  Bring the list or the pill bottles to follow-up visits  Carry your medicine list with you in case of an emergency  Follow up with your PHP or cardiologist as directed:  Write down your questions so you remember to ask them during your visits  Cardiac rehabilitation:  Cardiac rehabilitation (rehab) is a program run by specialists who will help you safely strengthen your heart and prevent more heart disease  This plan includes exercise, relaxation, stress management, and heart-healthy nutrition  Caregivers will also check to make sure any medicines you are taking are working   The plan may also include instructions for when you can drive, return to work, and do other normal daily activities  Wound care:  Care for your wound as directed  Carefully wash the wound with soap and water  If you do not have a bandage, gently pat the incision dry with a clean towel  If you have a bandage, dry the area and put on a new, clean bandage  Change your bandage if it gets wet or dirty  Prevent another blocked artery:   · Eat heart healthy foods  You may need to eat foods that are low in salt, fat, or cholesterol  Ask your PHP for more information about a heart healthy diet  · Drink liquids as directed  Ask your PHP how much liquid to drink each day and which liquids are best for you  Liquids will help soften your bowel movements and prevent constipation  · Do not smoke  If you smoke, it is never too late to quit  Smoking will damage your heart  Ask your PHP for information if you need help quitting  · Maintain a healthy weight  Ask your PHP how much you should weigh  Ask him to help you create a weight loss plan if you are overweight  Extra weight can increase the stress on your heart  Contact your PHP or cardiologist if:   · You have a fever higher than 101°F (38 4°C)  · Your signs and symptoms return  · You have gained 2 pounds in 24 hours  · Your wound is red, swollen, or draining pus  · You feel depressed  · You have questions or concerns about your condition or care  Seek care immediately or call 911 if:   · You have a severe headache  · You have a fast heartbeat that flutters  · You have numbness or tingling in your arms or legs  · You feel like you are going to faint  · Your arm or leg feels warm, tender, and painful  It may look swollen and red  · You feel lightheaded, short of breath, and have chest pain  · You cough up blood    © 2014 [address] [employer] is for End User's use only and may not be sold, redistributed or otherwise used for commercial purposes  All illustrations and images included in CareNotes® are the copyrighted property of [employer]W MAD[employer]  or [facility]  The above information is an  only  It is not intended as medical advice for individual conditions or treatments  Talk to your doctor, nurse or pharmacist before following any medical regimen to see if it is safe and effective for you  Sternal Precautions   WHAT YOU NEED TO KNOW:   What are sternal precautions? Sternal precautions are used to help protect your sternum (breastbone) after open chest surgery  Wires are placed during surgery to hold the sternum together as it heals  Sternal precautions help prevent the wires from cutting through the sternum  The precautions also help prevent the sternum from coming apart from an injury, and prevent pain and bleeding  You may need to use the precautions for up to 12 weeks after surgery  Your surgeon will give you specific instructions based on the type of surgery you had  It is important to follow the instructions carefully  An injury to the healing sternum can be life-threatening  What are some general sternal precautions? Start slowly and do more as you get stronger  Pain medicine might make it harder for you to know when to slow down or be careful  Stop immediately if you hear a crunch or pop in your sternum  · Protect your sternum  Hug a pillow to your chest or cross your arms over your chest when you laugh, sneeze, or cough  · Be careful when you get into or out of a chair or bed  Hug a pillow or cross your arms when you stand or sit  Do not twist as you move  Use only your legs to sit and stand  You may need to use a raised toilet seat if you have trouble standing up without using your arms  Your healthcare provider may teach you to use your elbow for support as you move from lying to sitting  · Ask when you may take a bath or shower    You may need to use a bath chair if you have trouble getting into or out of the tub  Do not use a grab bar  · Do not lift or carry anything heavier than 10 pounds  For example, a gallon of milk weighs 8 pounds  · Do not push or pull anything  Examples include a car door or a vacuum   · Do not drive while you are healing  Your surgeon will tell you when it is safe for you to start driving again  How do I care for my surgery wound? Always wash your hands before you care for your wound  Wash your wound as directed  Do not rub the wound as you wash or dry the area  Pat the area dry with a clean towel  Change the bandages as directed and when they get wet or dirty  Do not smoke:  Nicotine can damage blood vessels and make it more difficult to heal  Do not use e-cigarettes or smokeless tobacco in place of cigarettes or to help you quit  They still contain nicotine  Ask your healthcare provider for information if you currently smoke and need help quitting  Call 911 for any of the following:   · You have sudden pain in your sternum and hear a crunch or pop  · You have bleeding that does not stop even after you apply pressure for 5 minutes  When should I seek immediate care? · You hear crunching or grinding in your sternum  · You have signs of an infection, such as a fever, red or warm skin, or pus in the surgery wound  When should I contact my healthcare provider? · You continue to feel pain, even after you take your pain medicine  · You have new or worsening pain, or any pain with movement  · You have questions or concerns about your condition or care  CARE AGREEMENT:   You have the right to help plan your care  Learn about your health condition and how it may be treated  Discuss treatment options with your caregivers to decide what care you want to receive  You always have the right to refuse treatment  The above information is an  only  It is not intended as medical advice for individual conditions or treatments   Talk to your doctor, nurse or pharmacist before following any medical regimen to see if it is safe and effective for you  © 2017 [address] [employer] Information is for End User's use only and may not be sold, redistributed or otherwise used for commercial purposes  All illustrations and images included in CareNotes® are the copyrighted property of [employer]  or [facility]  Heart Healthy Diet   WHAT YOU NEED TO KNOW:   A heart healthy diet is an eating plan low in total fat, unhealthy fats, and sodium (salt)  A heart healthy diet helps decrease your risk for heart disease and stroke  Limit the amount of fat you eat to 25% to 35% of your total daily calories  Limit sodium to less than 2,300 mg each day  DISCHARGE INSTRUCTIONS:   Healthy fats:  Healthy fats can help improve cholesterol levels  The risk for heart disease is decreased when cholesterol levels are normal  Choose healthy fats, such as the following:  · Unsaturated fat  is found in foods such as soybean, canola, olive, corn, and safflower oils  It is also found in soft tub margarine that is made with liquid vegetable oil  · Omega-3 fat  is found in certain fish, such as salmon, tuna, and trout, and in walnuts and flaxseed  Unhealthy fats:  Unhealthy fats can cause unhealthy cholesterol levels in your blood and increase your risk of heart disease  Limit unhealthy fats, such as the following:  · Cholesterol  is found in animal foods, such as eggs and lobster, and in dairy products made from whole milk  Limit cholesterol to less than 300 milligrams (mg) each day  You may need to limit cholesterol to 200 mg each day if you have heart disease  · Saturated fat  is found in meats, such as cruz and hamburger  It is also found in chicken or turkey skin, whole milk, and butter  Limit saturated fat to less than 7% of your total daily calories   Limit saturated fat to less than 6% if you have heart disease or are at increased risk for it      · Trans fat  is found in packaged foods, such as potato chips and cookies  It is also in hard margarine, some fried foods, and shortening  Avoid trans fats as much as possible    Heart healthy foods and drinks to include:  Ask your dietitian or healthcare provider how many servings to have from each of the following food groups:  · Grains:      ¨ Whole-wheat breads, cereals, and pastas, and brown rice    ¨ Low-fat, low-sodium crackers and chips    · Vegetables:      ¨ Broccoli, green beans, green peas, and spinach    ¨ Collards, kale, and lima beans    ¨ Carrots, sweet potatoes, tomatoes, and peppers    ¨ Canned vegetables with no salt added    · Fruits:      ¨ Bananas, peaches, pears, and pineapple    ¨ Grapes, raisins, and dates    ¨ Oranges, tangerines, grapefruit, orange juice, and grapefruit juice    ¨ Apricots, mangoes, melons, and papaya    ¨ Raspberries and strawberries    ¨ Canned fruit with no added sugar    · Low-fat dairy products:      ¨ Nonfat (skim) milk, 1% milk, and low-fat almond, cashew, or soy milks fortified with calcium    ¨ Low-fat cheese, regular or frozen yogurt, and cottage cheese    · Meats and proteins , such as lean cuts of beef and pork (loin, leg, round), skinless chicken and turkey, legumes, soy products, egg whites, and nuts  Foods and drinks to limit or avoid:  Ask your dietitian or healthcare provider about these and other foods that are high in unhealthy fat, sodium, and sugar:  · Snack or packaged foods , such as frozen dinners, cookies, macaroni and cheese, and cereals with more than 300 mg of sodium per serving    · Canned or dry mixes  for cakes, soups, sauces, or gravies    · Vegetables with added sodium , such as instant potatoes, vegetables with added sauces, or regular canned vegetables    · Other foods high in sodium , such as ketchup, barbecue sauce, salad dressing, pickles, olives, soy sauce, and miso    · High-fat dairy foods  such as whole or 2% milk, cream cheese, or sour cream, and cheeses     · High-fat protein foods  such as high-fat cuts of beef (T-bone steaks, ribs), chicken or turkey with skin, and organ meats, such as liver    · Cured or smoked meats , such as hot dogs, cruz, and sausage    · Unhealthy fats and oils , such as butter, stick margarine, shortening, and cooking oils such as coconut or palm oil    · Food and drinks high in sugar , such as soft drinks (soda), sports drinks, sweetened tea, candy, cake, cookies, pies, and doughnuts  Other diet guidelines to follow:   · Eat more foods containing omega-3 fats  Eat fish high in omega-3 fats at least 2 times a week  · Limit alcohol  Too much alcohol can damage your heart and raise your blood pressure  Women should limit alcohol to 1 drink a day  Men should limit alcohol to 2 drinks a day  A drink of alcohol is 12 ounces of beer, 5 ounces of wine, or 1½ ounces of liquor  · Choose low-sodium foods  High-sodium foods can lead to high blood pressure  Add little or no salt to food you prepare  Use herbs and spices in place of salt  · Eat more fiber  to help lower cholesterol levels  Eat at least 5 servings of fruits and vegetables each day  Eat 3 ounces of whole-grain foods each day  Legumes (beans) are also a good source of fiber  Lifestyle guidelines:   · Do not smoke  Nicotine and other chemicals in cigarettes and cigars can cause lung and heart damage  Ask your healthcare provider for information if you currently smoke and need help to quit  E-cigarettes or smokeless tobacco still contain nicotine  Talk to your healthcare provider before you use these products  · Exercise regularly  to help you maintain a healthy weight and improve your blood pressure and cholesterol levels  Ask your healthcare provider about the best exercise plan for you  Do not start an exercise program without asking your healthcare provider     Follow up with your healthcare provider as directed:  Write down your questions so you remember to ask them during your visits  © 2017 [address] [employer] Information is for End User's use only and may not be sold, redistributed or otherwise used for commercial purposes  All illustrations and images included in CareNotes® are the copyrighted property of [employer]  or Rainer Culver  The above information is an  only  It is not intended as medical advice for individual conditions or treatments  Talk to your doctor, nurse or pharmacist before following any medical regimen to see if it is safe and effective for you

## 2018-01-23 NOTE — RESULT NOTES
Discussion/Summary   Patient notified of normal CXR results  He notes some improvement in symptoms  No antibiotic warranted at this time  Verified Results  * XR CHEST PA & LATERAL [id number] 11:20AM Neema Masimoto    Order Number: [id number]     Test Name Result Flag Reference   XR CHEST PA & LATERAL (Report)     CHEST - DUAL ENERGY     INDICATION: 49-year-old male, abnormal auscultation   COMPARISON: 10/19/2016 chest x-ray     VIEWS: PA (including soft tissue/bone algorithms) and lateral projections; 4 images     FINDINGS:     The lungs are clear  No pleural effusions  The cardiomediastinal silhouette is unremarkable  Bony thorax is unremarkable     Partially visualized lumbar pedicle screw and brenna posterior fixation        IMPRESSION:     No acute cardiopulmonary disease, stable except for multilevel lumbar fixation        Workstation performed: [id number]     Signed by:   Venus Augustin MD   12/13/17

## 2018-01-24 ENCOUNTER — TRANSITIONAL CARE MANAGEMENT (OUTPATIENT)
Dept: FAMILY MEDICINE CLINIC | Facility: OTHER | Age: 78
End: 2018-01-24

## 2018-01-24 VITALS
DIASTOLIC BLOOD PRESSURE: 72 MMHG | OXYGEN SATURATION: 97 % | RESPIRATION RATE: 14 BRPM | HEART RATE: 72 BPM | BODY MASS INDEX: 27.94 KG/M2 | SYSTOLIC BLOOD PRESSURE: 134 MMHG | WEIGHT: 178 LBS | TEMPERATURE: 97.7 F | HEIGHT: 67 IN

## 2018-01-24 VITALS — SYSTOLIC BLOOD PRESSURE: 130 MMHG | DIASTOLIC BLOOD PRESSURE: 64 MMHG

## 2018-01-26 ENCOUNTER — TELEPHONE (OUTPATIENT)
Dept: CARDIAC SURGERY | Facility: CLINIC | Age: 78
End: 2018-01-26

## 2018-01-29 DIAGNOSIS — D64.9 ANEMIA: ICD-10-CM

## 2018-01-30 ENCOUNTER — TELEPHONE (OUTPATIENT)
Dept: CARDIAC SURGERY | Facility: CLINIC | Age: 78
End: 2018-01-30

## 2018-01-30 NOTE — TELEPHONE ENCOUNTER
Call placed to patient to follow-up after discharge from hospital, post-op  Spoke to: Patient   Procedure & Date: CABG x 4 on 01/19/2018  Surgeon: Vincent Coyle    Temp  BP Stable    HR  O2 Sat    Pre-op wt: 172 lb   D/C wt:    187 lb   Current wt:        172 lb    Fever/chills: No     Lightheadedness/dizziness: No    Angina : No    SOB : Only with activity  Pain : Well controlled    Edema : Resolved since hospital d/c    Incision : Incisions stable    GI : Appetite stable, BM stable    Activity : Tolerating activity within the home     Follow-up APPTs    Comments: Patient states he is doing well at this time  Education:  1  Daily AM weight, call for weight gain of 2 lbs or more in 24 hours  2  Take frequent short walks, increase activity as tolerated  3  Maintain sternal precautions: No strenuous activity; no lifting more than 10 lbs;  no driving  4  Continue to use Incentive Spirometer frequently throughout the day  5  Shower daily; Cleanse incisions with antibacterial soap and water  6  No ointments, powder or lotions on surgical incisions  7   Call [address] [facility] office with questions or concerns

## 2018-02-05 ENCOUNTER — TELEPHONE (OUTPATIENT)
Dept: FAMILY MEDICINE CLINIC | Facility: OTHER | Age: 78
End: 2018-02-05

## 2018-02-05 NOTE — TELEPHONE ENCOUNTER
According to his discharge instructions, they sent him home on high dose (80 mg) atorvastatin, which makes sense, based on the procedure he had done  The discharge instructions recommended following up with the cardiologist in 2 weeks, at which point he can address this further, if he still has questions about dosage    Thanks

## 2018-02-05 NOTE — TELEPHONE ENCOUNTER
Pt just wanted to make sure he was taking the right one  He stated he has an appointment with Cardio the ending of this month  He cannot make it before because he can drive

## 2018-02-19 ENCOUNTER — OFFICE VISIT (OUTPATIENT)
Dept: CARDIOLOGY CLINIC | Facility: CLINIC | Age: 78
End: 2018-02-19
Payer: MEDICARE

## 2018-02-19 VITALS
SYSTOLIC BLOOD PRESSURE: 160 MMHG | HEIGHT: 68 IN | HEART RATE: 84 BPM | OXYGEN SATURATION: 98 % | WEIGHT: 172 LBS | BODY MASS INDEX: 26.07 KG/M2 | DIASTOLIC BLOOD PRESSURE: 78 MMHG

## 2018-02-19 DIAGNOSIS — Z95.1 S/P CABG X 4: ICD-10-CM

## 2018-02-19 DIAGNOSIS — I10 HYPERTENSION, UNSPECIFIED TYPE: ICD-10-CM

## 2018-02-19 DIAGNOSIS — I25.10 CORONARY ARTERY DISEASE INVOLVING NATIVE CORONARY ARTERY OF NATIVE HEART WITHOUT ANGINA PECTORIS: Primary | ICD-10-CM

## 2018-02-19 DIAGNOSIS — M79.605 LEG PAIN, POSTERIOR, LEFT: ICD-10-CM

## 2018-02-19 DIAGNOSIS — E78.5 HYPERLIPIDEMIA, UNSPECIFIED HYPERLIPIDEMIA TYPE: ICD-10-CM

## 2018-02-19 PROCEDURE — 99214 OFFICE O/P EST MOD 30 MIN: CPT | Performed by: INTERNAL MEDICINE

## 2018-02-19 NOTE — PROGRESS NOTES
Cardiology Follow Up    Nathen Almaraz  [birth year]  [id number]  [address] [facility]  [address]    1  Coronary artery disease involving native coronary artery of native heart without angina pectoris     2  Hypertension, unspecified type     3  Hyperlipidemia, unspecified hyperlipidemia type     4  S/P CABG x 4         I had the pleasure of seeing Nathen Almaraz for a follow up visit  Interval History: Status post CABG    History of the presenting illness, Discussion/Summary and My Plan are as follows:            I had the pleasure of seeing Nathen Almaraz for a follow up visit       He is a pleasant 29-year-old gentleman with a history of hypertension, mild dyslipidemia, peripheral vascular disease-right superficial femoral artery stenosis for which he is being medically managed for questionable claudication symptoms  He also has a 50 pack year smoking history-quit about 7 years ago      He presents for further evaluation of increasing dyspnea with exertion for the last few months, mostly succeeding his lumbar spine surgery  Functional capacity has slowly been improving after HR the gym but not to his satisfaction  ECG demonstrates normal sinus rhythm   A recent exercise stress test showed markedly decreased functional capacity - 1 minutes on the Joo protocol only -limited by shortness of breath and leg fatigue      Plan:     CAD:  Positive stress test-triple-vessel coronary artery disease, status post Coronary artery bypass grafting x 4 with left internal mammary artery to left anterior descending artery, saphenous vein graft to obtuse marginal 2, saphenous vein graft to right coronary artery and saphenous vein graft to diagonal 1  Start cardiac rehabilitation     Hypertension:  controlled, 120/70     dyslipidemia: Jan 2018  , TG 47, HDL 83, LDL 52 -now better  High HDL-close to 90, I do not think it could be considered to be protective and likely nonfunctional  LDL mildly elevated at 120 and non HDL in the 140-150 range  Continue statin     Left leg pain with tenderness on palpation:  Rule out DVT, check Doppler    Follow-up in 4 months    Follow up in     Patient Active Problem List   Diagnosis    Abnormal liver function tests    Acute frontal sinusitis    Acute sinusitis    Degenerative lumbar spinal stenosis    Atopic rhinitis    Spondylolisthesis of lumbar region    CAD (coronary artery disease)    Coronary artery disease    Former tobacco use    Hyperlipidemia    Hypertension    S/P CABG x 4    Acute respiratory insufficiency, postoperative    Acute blood loss as cause of postoperative anemia    Chronic anemia    Leg pain, posterior, left     Past Medical History:   Diagnosis Date    Allergic rhinitis     Anemia     Arthritis     BPH (benign prostatic hyperplasia)     CAD (coronary artery disease)     Coronary artery disease     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[address] )     Former tobacco use     GERD (gastroesophageal reflux disease)     Hyperlipidemia     Hypertension     Lumbar stenosis     Peripheral artery disease (HCC)      Social History     Social History    Marital status:      Spouse name: N/A    Number of children: N/A    Years of education: N/A     Occupational History    Not on file       Social History Main Topics    Smoking status: Former Smoker     Quit date: 2011    Smokeless tobacco: Never Used    Alcohol use 0 6 oz/week     1 Shots of liquor per week      Comment: beer, wine, scotch every other day    Drug use: No    Sexual activity: Not Currently     Other Topics Concern    Not on file     Social History Narrative    No narrative on file      Family History   Problem Relation Age of Onset    Colon cancer Mother     Emphysema Mother     Liver disease Mother     Colon cancer Father     Coronary artery disease Father     Hypertension Father     Liver disease Father     Heart failure Father     Coronary artery disease Brother     Heart disease Brother      younger brother by pass and other brother 4 stents placed     Past Surgical History:   Procedure Laterality Date    COLONOSCOPY      LUMBAR FUSION      AZ ARTHRODESIS POSTERIOR/POSTEROLATERAL LUMBAR N/A 11/3/2016    Procedure: L2-S1 POSTERIOR LUMBAR FUSION AND DECOMPRESSION (Impulse), Posterior lateral fixation; dural repair ;  Surgeon: Ralph Arrington MD;  Location: [facility];  Service: Orthopedics    AZ CABG, ARTERIAL, SINGLE N/A 1/19/2018    Procedure: CABG X4 with LIMA - LAD, SVG - RCA, OM2, & Diagonal ; Left Leg EVH; MATTHEW;  Surgeon: Marvin Oakley DO;  Location[facility];  Service: Cardiac Surgery    AZ COLONOSCOPY FLX DX W/COLLJ SPEC WHEN PFRMD N/A 11/15/2017    Procedure: EGD AND COLONOSCOPY;  Surgeon: Garrett Delvalle MD;  [facility]; Service: Gastroenterology    SEPTOPLASTY      TONSILECTOMY AND ADNOIDECTOMY         Current Outpatient Prescriptions:     Ascorbic Acid (VITAMIN C) 1000 MG tablet, Take 0 5 tablets by mouth 2 (two) times a day for 90 days, Disp: 180 tablet, Rfl: 0    aspirin 325 mg tablet, Take 1 tablet by mouth daily, Disp: 100 tablet, Rfl: 0    atorvastatin (LIPITOR) 80 mg tablet, Take 1 tablet by mouth daily with dinner, Disp: 30 tablet, Rfl: 2    cholecalciferol (VITAMIN D3) 1,000 units tablet, Take 1,000 Units by mouth daily, Disp: , Rfl:     cyanocobalamin (VITAMIN B-12) 1,000 mcg tablet, Take 1,000 mcg by mouth daily  , Disp: , Rfl:     docusate sodium (COLACE) 100 mg capsule, Take 1 capsule by mouth 2 (two) times a day for 30 days Hold for loose stools  , Disp: 60 capsule, Rfl: 0    ferrous sulfate 325 (65 Fe) mg tablet, Take 1 tablet by mouth 2 (two) times a day with meals for 90 days, Disp: 180 tablet, Rfl: 0    metoprolol tartrate (LOPRESSOR) 25 mg tablet, Take 0 5 tablets by mouth every 12 (twelve) hours, Disp: 60 tablet, Rfl: 2    mometasone (ELOCON) 0 1 % cream, Apply 1 application topically as needed  , Disp: , Rfl:     Multiple Vitamin (MULTIVITAMIN) capsule, Take 1 capsule by mouth daily, Disp: , Rfl:     pantoprazole (PROTONIX) 40 mg tablet, Take 1 tablet by mouth daily, Disp: 30 tablet, Rfl: 0    Probiotic Product (ALIGN PO), Take 1 tablet by mouth daily  , Disp: , Rfl:   Allergies   Allergen Reactions    Penicillins Other (See Comments)     Hallucinations; Patient reported that he was seeing visual disturbances         Labs:  Admission on 01/19/2018, Discharged on 01/23/2018   No results displayed because visit has over 200 results        Hospital Outpatient Visit on 01/17/2018   Component Date Value    Ventricular Rate 01/20/2018 84     Atrial Rate 01/20/2018 84     MD Interval 01/20/2018 138     QRSD Interval 01/20/2018 88     QT Interval 01/20/2018 400     QTC Interval 01/20/2018 473     P Axis 01/20/2018 52     QRS Axis 01/20/2018 57     T Wave Axis 01/20/2018 34    Lab Requisition on 01/15/2018   Component Date Value    ABO Grouping 01/15/2018 O     Rh Factor 01/15/2018 Positive     Antibody Screen 01/15/2018 Negative     Specimen Expiration Date 01/15/2018 [id number]    Appointment on 01/15/2018   Component Date Value    Cholesterol 01/15/2018 144     Triglycerides 01/15/2018 47     HDL, Direct 01/15/2018 83*    LDL Calculated 01/15/2018 52     MRSA Culture Only 01/15/2018 No Methicillin Resistant Staphlyococcus aureus (MRSA) isolated     Color, UA 01/15/2018 Yellow     Clarity, UA 01/15/2018 Clear     Specific Gravity, UA 01/15/2018 1 025     pH, UA 01/15/2018 5 5     Leukocytes, UA 01/15/2018 Moderate*    Nitrite, UA 01/15/2018 Negative     Protein, UA 01/15/2018 Negative     Glucose, UA 01/15/2018 Negative     Ketones, UA 01/15/2018 Negative     Urobilinogen, UA 01/15/2018 0 2     Bilirubin, UA 01/15/2018 Negative     Blood, UA 01/15/2018 Negative     RBC, UA 01/15/2018 None Seen     WBC, UA 01/15/2018 4-10*    Epithelial Cells 01/15/2018 Occasional     Bacteria, UA 01/15/2018 None Seen    Hospital Outpatient Visit on 12/20/2017   Component Date Value    Ventricular Rate 12/20/2017 86     Atrial Rate 12/20/2017 88     CO Interval 12/20/2017 148     QRSD Interval 12/20/2017 92     QT Interval 12/20/2017 390     QTC Interval 12/20/2017 467     P Axis 12/20/2017 63     QRS Axis 12/20/2017 41     T Wave Axis 12/20/2017 56     Sodium 12/20/2017 140     Potassium 12/20/2017 4 1     Chloride 12/20/2017 100     CO2 12/20/2017 29     Anion Gap 12/20/2017 11     BUN 12/20/2017 27*    Creatinine 12/20/2017 1 04     Glucose 12/20/2017 93     Glucose, Fasting 12/20/2017 93     Calcium 12/20/2017 9 3     eGFR 12/20/2017 69     WBC 12/20/2017 7 59     RBC 12/20/2017 3 67*    Hemoglobin 12/20/2017 11 8*    Hematocrit 12/20/2017 36 0*    MCV 12/20/2017 98     MCH 12/20/2017 32 2     MCHC 12/20/2017 32 8     RDW 12/20/2017 14 8     MPV 12/20/2017 9 8     Platelets 71/21/5289 174     Neutrophils Relative 12/20/2017 60     Lymphocytes Relative 12/20/2017 24     Monocytes Relative 12/20/2017 12     Eosinophils Relative 12/20/2017 4     Basophils Relative 12/20/2017 0     Neutrophils Absolute 12/20/2017 4 56     Lymphocytes Absolute 12/20/2017 1 84     Monocytes Absolute 12/20/2017 0 88     Eosinophils Absolute 12/20/2017 0 29     Basophils Absolute 12/20/2017 0 02     Protime 12/20/2017 12 2     INR 12/20/2017 0 88      Imaging: No results found  Review of Systems:  Review of Systems   Constitutional: Positive for fatigue  Negative for activity change, appetite change, chills, diaphoresis and fever  HENT: Negative  Eyes: Negative  Respiratory: Negative  Cardiovascular: Negative  Gastrointestinal: Negative  Endocrine: Negative  Genitourinary: Negative  Musculoskeletal: Positive for arthralgias, back pain and gait problem   Negative for joint swelling, myalgias and neck pain  Allergic/Immunologic: Negative  Hematological: Negative  Psychiatric/Behavioral: Negative  Physical Exam:  Physical Exam   Constitutional: He is oriented to person, place, and time  He appears well-developed  HENT:   Head: Normocephalic and atraumatic  Right Ear: External ear normal    Left Ear: External ear normal    Eyes: Conjunctivae and EOM are normal  Pupils are equal, round, and reactive to light  Neck: Neck supple  No JVD present  No tracheal deviation present  No thyromegaly present  Cardiovascular: Normal rate, regular rhythm and normal heart sounds  Exam reveals no friction rub  No murmur heard  Pulmonary/Chest: Breath sounds normal  No stridor  No respiratory distress  He has no wheezes  He has no rales  Abdominal: Soft  He exhibits no distension  There is no tenderness  There is no rebound  Musculoskeletal: Normal range of motion  He exhibits no edema, tenderness or deformity  Chest incisions have healed well   Neurological: He is alert and oriented to person, place, and time  He has normal reflexes  No cranial nerve deficit  Coordination normal    Skin: Skin is warm  No rash noted  No erythema  No pallor

## 2018-02-20 NOTE — PROGRESS NOTES
Procedure: S/P coronary artery bypass grafting x4 [LIMA-LAD, SVG-RCA, SVG-OM2, SVG-Diag], performed on 1/19/2018    History: Remigio Dixon is a 68y o  year old male who presents to our office today for routine follow up care from coronary artery bypass grafting  He states he is still experiencing some ROBLES  He denies weight gain, edema, PND or orthopnea  His discharge H & H was 6 8 & 20 6 and he was discharged on Ferrous Sulfate and Vitamin C  He was given a script for a repeat CBC which he did not get drawn  He is active, walking and denies lightheadedness or angina  He denies fever or chills and reports his incisions have healed well  He is experiencing left calf pain without redness, swelling, warmth or inability to weight bear  Vital Signs:   Vitals:    02/21/18 1300 02/21/18 1321   BP: 128/68 116/62   BP Location: Left arm Right arm   Patient Position:  Sitting   Cuff Size: Adult Adult   Pulse: 98    Resp: 14    Temp: 97 9 °F (36 6 °C)    TempSrc: Oral    SpO2: 98%    Weight: 77 6 kg (171 lb)    Height: 5' 7 5" (1 715 m)        Home Medications:   Prior to Admission medications    Medication Sig Start Date End Date Taking? Authorizing Provider   Ascorbic Acid (VITAMIN C) 1000 MG tablet Take 0 5 tablets by mouth 2 (two) times a day for 90 days 1/23/18 4/23/18  Garfield Schmitz DO   aspirin 325 mg tablet Take 1 tablet by mouth daily 1/24/18   Garfield Schmitz DO   atorvastatin (LIPITOR) 80 mg tablet Take 1 tablet by mouth daily with dinner 1/23/18   Garfield Schmitz DO   cholecalciferol (VITAMIN D3) 1,000 units tablet Take 1,000 Units by mouth daily    Historical Provider, MD   cyanocobalamin (VITAMIN B-12) 1,000 mcg tablet Take 1,000 mcg by mouth daily      Historical Provider, MD   docusate sodium (COLACE) 100 mg capsule Take 1 capsule by mouth 2 (two) times a day for 30 days Hold for loose stools   1/23/18 2/22/18  Garfield Schmitz DO   ferrous sulfate 325 (65 Fe) mg tablet Take 1 tablet by mouth 2 (two) times a day with meals for 90 days 1/23/18 4/23/18  Vincent Bradley DO   metoprolol tartrate (LOPRESSOR) 25 mg tablet Take 0 5 tablets by mouth every 12 (twelve) hours 1/23/18   Vincent Bradley DO   mometasone (ELOCON) 0 1 % cream Apply 1 application topically as needed      Historical Provider, MD   Multiple Vitamin (MULTIVITAMIN) capsule Take 1 capsule by mouth daily    Historical Provider, MD   pantoprazole (PROTONIX) 40 mg tablet Take 1 tablet by mouth daily 1/23/18   Vincent Bradley DO   Probiotic Product (ALIGN PO) Take 1 tablet by mouth daily      Historical Provider, MD       Physical Exam:  General: well developed, no acute distress  HEENT/NECK:  PERRLA  No jugular venous distention  Cardiac:Regular rate and rhythm  No murmurs, rubs or gallops  Pulmonary:Breath sounds clear bilaterally  Abdomen:  Non-tender, Non-distended  Positive bowel sounds  Lower extremities: Extremities warm/dry  Radial/PT/DP pules 2+ bilaterally  No edema B/L  Incisions: Sternum is stable  Incision is clean, dry, and intact  Saphenectomy incision is clean, dry, and intact  Neuro: Alert and oriented X 3  Sensation is grossly intact  No focal deficits  Skin: Warm/Dry, without rashes or lesions  Lab Results:                 Lab Results   Component Value Date    HGBA1C 5 3 09/14/2017     Lab Results   Component Value Date    CKTOTAL 68 05/06/2017       I have personally reviewed pertinent reports  Assessment: Coronary artery disease  S/P coronary artery bypass grafting;    Plan:     Keenan Dixon continues to make good progress in his recovery following coronary artery bypass grafting  He is now 5 weeks post op  Incisions are well-healed and his sternum is stable  Weight and VS are stable  His SOB is most likely related to post op acute blood loss anemia and deconditioning  I asked him to go for a CBC and his PCP can address if he needs continued Iron supplementation   In regards to the calf pain, what he describes is a common post op discomfort  His cardiologist gave him a script for LE venous duplex  I discussed the benefits of participating in cardiac rehab and have cleared him to begin the outpatient  program  He may resume driving and I reviewed his lifting restriction of no more than 25 lbs for an additional 2 months, until he reaches 12 weeks post-op  Sabina Godoy has already been evaluated by his primary care physician & cardiologist for ongoing medical care  At this point I have not scheduled him for routine followup care with our office  I have advised him to call with any new concerns that may arise  Sabina Godoy was comfortable with our recommendations and his questions were answered to his satisfaction      Cayla Jackson, KEVIN  @[facility]@

## 2018-02-21 ENCOUNTER — OFFICE VISIT (OUTPATIENT)
Dept: CARDIAC SURGERY | Facility: CLINIC | Age: 78
End: 2018-02-21

## 2018-02-21 VITALS
SYSTOLIC BLOOD PRESSURE: 116 MMHG | HEART RATE: 98 BPM | BODY MASS INDEX: 25.91 KG/M2 | TEMPERATURE: 97.9 F | HEIGHT: 68 IN | OXYGEN SATURATION: 98 % | WEIGHT: 171 LBS | RESPIRATION RATE: 14 BRPM | DIASTOLIC BLOOD PRESSURE: 62 MMHG

## 2018-02-21 DIAGNOSIS — Z48.89 ENCOUNTER FOR POSTOPERATIVE CARE: ICD-10-CM

## 2018-02-21 DIAGNOSIS — I25.10 CORONARY ARTERY DISEASE INVOLVING NATIVE CORONARY ARTERY OF NATIVE HEART WITHOUT ANGINA PECTORIS: Primary | ICD-10-CM

## 2018-02-21 DIAGNOSIS — Z95.1 S/P CABG X 4: ICD-10-CM

## 2018-02-21 PROCEDURE — 99024 POSTOP FOLLOW-UP VISIT: CPT | Performed by: THORACIC SURGERY (CARDIOTHORACIC VASCULAR SURGERY)

## 2018-02-21 NOTE — LETTER
February 21, 2018     Maik Tirado MD  [facility]  [address] [address]    Patient: Lauri Dorman   YOB: 1940   Date of Visit: 2/21/2018       Dear Dr Audra Bonner: Thank you for referring Lauri Dorman to me for evaluation  Below are my notes for this consultation  If you have questions, please do not hesitate to call me  I look forward to following your patient along with you  Sincerely,        Dorothy Devries DO        CC: DO Valentino Mcnamara CRNP  2/21/2018  2:07 PM  Attested  Procedure: S/P coronary artery bypass grafting x4 [LIMA-LAD, SVG-RCA, SVG-OM2, SVG-Diag], performed on 1/19/2018    History: Lauri Dorman is a 68y o  year old male who presents to our office today for routine follow up care from coronary artery bypass grafting  He states he is still experiencing some ROBLES  He denies weight gain, edema, PND or orthopnea  His discharge H & H was 6 8 & 20 6 and he was discharged on Ferrous Sulfate and Vitamin C  He was given a script for a repeat CBC which he did not get drawn  He is active, walking and denies lightheadedness or angina  He denies fever or chills and reports his incisions have healed well  He is experiencing left calf pain without redness, swelling, warmth or inability to weight bear  Vital Signs:   Vitals:    02/21/18 1300 02/21/18 1321   BP: 128/68 116/62   BP Location: Left arm Right arm   Patient Position:  Sitting   Cuff Size: Adult Adult   Pulse: 98    Resp: 14    Temp: 97 9 °F (36 6 °C)    TempSrc: Oral    SpO2: 98%    Weight: 77 6 kg (171 lb)    Height: 5' 7 5" (1 715 m)        Home Medications:   Prior to Admission medications    Medication Sig Start Date End Date Taking?  Authorizing Provider   Ascorbic Acid (VITAMIN C) 1000 MG tablet Take 0 5 tablets by mouth 2 (two) times a day for 90 days 1/23/18 4/23/18  Dorothy Devries DO   aspirin 325 mg tablet Take 1 tablet by mouth daily 1/24/18   Dorothy Devries DO   atorvastatin (LIPITOR) 80 mg tablet Take 1 tablet by mouth daily with dinner 1/23/18   Mihai Rose DO   cholecalciferol (VITAMIN D3) 1,000 units tablet Take 1,000 Units by mouth daily    Historical Provider, MD   cyanocobalamin (VITAMIN B-12) 1,000 mcg tablet Take 1,000 mcg by mouth daily      Historical Provider, MD   docusate sodium (COLACE) 100 mg capsule Take 1 capsule by mouth 2 (two) times a day for 30 days Hold for loose stools  1/23/18 2/22/18  Mihai Rose,    ferrous sulfate 325 (65 Fe) mg tablet Take 1 tablet by mouth 2 (two) times a day with meals for 90 days 1/23/18 4/23/18  Mihai Rose, DO   metoprolol tartrate (LOPRESSOR) 25 mg tablet Take 0 5 tablets by mouth every 12 (twelve) hours 1/23/18   Mihai Rose, DO   mometasone (ELOCON) 0 1 % cream Apply 1 application topically as needed      Historical Provider, MD   Multiple Vitamin (MULTIVITAMIN) capsule Take 1 capsule by mouth daily    Historical Provider, MD   pantoprazole (PROTONIX) 40 mg tablet Take 1 tablet by mouth daily 1/23/18   Mihai Rose DO   Probiotic Product (ALIGN PO) Take 1 tablet by mouth daily      Historical Provider, MD       Physical Exam:  General: well developed, no acute distress  HEENT/NECK:  PERRLA  No jugular venous distention  Cardiac:Regular rate and rhythm  No murmurs, rubs or gallops  Pulmonary:Breath sounds clear bilaterally  Abdomen:  Non-tender, Non-distended  Positive bowel sounds  Lower extremities: Extremities warm/dry  Radial/PT/DP pules 2+ bilaterally  No edema B/L  Incisions: Sternum is stable  Incision is clean, dry, and intact  Saphenectomy incision is clean, dry, and intact  Neuro: Alert and oriented X 3  Sensation is grossly intact  No focal deficits  Skin: Warm/Dry, without rashes or lesions      Lab Results:                 Lab Results   Component Value Date    HGBA1C 5 3 09/14/2017     Lab Results   Component Value Date    CKTOTAL 68 05/06/2017       I have personally reviewed pertinent reports  Assessment: Coronary artery disease  S/P coronary artery bypass grafting;    Plan:     Domonique Perez continues to make good progress in his recovery following coronary artery bypass grafting  He is now 5 weeks post op  Incisions are well-healed and his sternum is stable  Weight and VS are stable  His SOB is most likely related to post op acute blood loss anemia and deconditioning  I asked him to go for a CBC and his PCP can address if he needs continued Iron supplementation  In regards to the calf pain, what he describes is a common post op discomfort  His cardiologist gave him a script for LE venous duplex  I discussed the benefits of participating in cardiac rehab and have cleared him to begin the outpatient  program  He may resume driving and I reviewed his lifting restriction of no more than 25 lbs for an additional 2 months, until he reaches 12 weeks post-op  Domonique Perez has already been evaluated by his primary care physician & cardiologist for ongoing medical care  At this point I have not scheduled him for routine followup care with our office  I have advised him to call with any new concerns that may arise  Domonique Perez was comfortable with our recommendations and his questions were answered to his satisfaction      KEVIN Valenzuela  [unfilled]  Attestation signed by Talia Garcia DO at 2/21/2018  2:13 PM:  The patient was seen and examined, and I agree with the midlevel's history, physical exam, assessment and plan

## 2018-02-23 ENCOUNTER — APPOINTMENT (OUTPATIENT)
Dept: LAB | Facility: CLINIC | Age: 78
End: 2018-02-23
Payer: MEDICARE

## 2018-02-23 ENCOUNTER — TRANSCRIBE ORDERS (OUTPATIENT)
Dept: LAB | Facility: CLINIC | Age: 78
End: 2018-02-23

## 2018-02-23 DIAGNOSIS — D64.9 CHRONIC ANEMIA: ICD-10-CM

## 2018-02-23 DIAGNOSIS — D62 ACUTE BLOOD LOSS ANEMIA: ICD-10-CM

## 2018-02-23 DIAGNOSIS — D64.9 CHRONIC ANEMIA: Primary | ICD-10-CM

## 2018-02-23 LAB
ERYTHROCYTE [DISTWIDTH] IN BLOOD BY AUTOMATED COUNT: 15.2 % (ref 11.6–15.1)
HCT VFR BLD AUTO: 32.3 % (ref 36.5–49.3)
HGB BLD-MCNC: 10.2 G/DL (ref 12–17)
MCH RBC QN AUTO: 32.1 PG (ref 26.8–34.3)
MCHC RBC AUTO-ENTMCNC: 31.6 G/DL (ref 31.4–37.4)
MCV RBC AUTO: 102 FL (ref 82–98)
PLATELET # BLD AUTO: 210 THOUSANDS/UL (ref 149–390)
PMV BLD AUTO: 9.8 FL (ref 8.9–12.7)
RBC # BLD AUTO: 3.18 MILLION/UL (ref 3.88–5.62)
WBC # BLD AUTO: 8.22 THOUSAND/UL (ref 4.31–10.16)

## 2018-02-23 PROCEDURE — 36415 COLL VENOUS BLD VENIPUNCTURE: CPT

## 2018-02-23 PROCEDURE — 85027 COMPLETE CBC AUTOMATED: CPT

## 2018-03-01 ENCOUNTER — HOSPITAL ENCOUNTER (OUTPATIENT)
Dept: NON INVASIVE DIAGNOSTICS | Facility: CLINIC | Age: 78
Discharge: HOME | End: 2018-03-01
Payer: MEDICARE

## 2018-03-01 DIAGNOSIS — M79.605 LEG PAIN, POSTERIOR, LEFT: ICD-10-CM

## 2018-03-01 PROCEDURE — 93971 EXTREMITY STUDY: CPT

## 2018-03-01 PROCEDURE — 93971 EXTREMITY STUDY: CPT | Performed by: SURGERY

## 2018-03-05 RX ORDER — FERROUS SULFATE 325(65) MG
325 TABLET ORAL 2 TIMES DAILY WITH MEALS
Qty: 60 TABLET | Refills: 0 | OUTPATIENT
Start: 2018-03-05 | End: 2018-04-04

## 2018-03-16 ENCOUNTER — OFFICE VISIT (OUTPATIENT)
Dept: FAMILY MEDICINE CLINIC | Facility: OTHER | Age: 78
End: 2018-03-16
Payer: MEDICARE

## 2018-03-16 VITALS
OXYGEN SATURATION: 98 % | HEART RATE: 98 BPM | TEMPERATURE: 98.3 F | DIASTOLIC BLOOD PRESSURE: 78 MMHG | BODY MASS INDEX: 26.6 KG/M2 | SYSTOLIC BLOOD PRESSURE: 132 MMHG | WEIGHT: 172.4 LBS

## 2018-03-16 DIAGNOSIS — D64.9 CHRONIC ANEMIA: Chronic | ICD-10-CM

## 2018-03-16 DIAGNOSIS — Z95.1 S/P CABG X 4: Primary | ICD-10-CM

## 2018-03-16 DIAGNOSIS — K21.9 GASTROESOPHAGEAL REFLUX DISEASE, ESOPHAGITIS PRESENCE NOT SPECIFIED: ICD-10-CM

## 2018-03-16 DIAGNOSIS — R79.89 ABNORMAL LIVER FUNCTION TESTS: ICD-10-CM

## 2018-03-16 DIAGNOSIS — M48.061 DEGENERATIVE LUMBAR SPINAL STENOSIS: ICD-10-CM

## 2018-03-16 DIAGNOSIS — I10 ESSENTIAL HYPERTENSION: ICD-10-CM

## 2018-03-16 DIAGNOSIS — J30.0 CHRONIC VASOMOTOR RHINITIS: ICD-10-CM

## 2018-03-16 DIAGNOSIS — I25.10 CORONARY ARTERY DISEASE INVOLVING NATIVE CORONARY ARTERY OF NATIVE HEART WITHOUT ANGINA PECTORIS: ICD-10-CM

## 2018-03-16 DIAGNOSIS — I73.9 PERIPHERAL ARTERY DISEASE (HCC): ICD-10-CM

## 2018-03-16 DIAGNOSIS — E78.2 MIXED HYPERLIPIDEMIA: ICD-10-CM

## 2018-03-16 PROCEDURE — 99215 OFFICE O/P EST HI 40 MIN: CPT | Performed by: NURSE PRACTITIONER

## 2018-03-16 RX ORDER — IPRATROPIUM BROMIDE 21 UG/1
2 SPRAY, METERED NASAL EVERY 12 HOURS
Qty: 30 ML | Refills: 0 | Status: SHIPPED | OUTPATIENT
Start: 2018-03-16 | End: 2018-04-13 | Stop reason: SDUPTHER

## 2018-03-16 NOTE — PROGRESS NOTES
Assessment/Plan:       Diagnoses and all orders for this visit:    Coronary artery disease + S/P CABG x 4  --Gradually improving stamina  Remains on aspirin, B-blocker, statin  --Slated to start cardiac rehab soon  --F/u with cardiology + cardiothoracic as scheduled  Mixed hyperlipidemia  --On high dose statin per cardiology    Essential hypertension  --Acceptable control on B-blocker  HR a bit high today  --Comprehensive metabolic panel; Future    Peripheral artery disease (HCC)  --VAS doppler last fall  No recent worsening of symptoms  On aspirin, statin  Degenerative lumbar spinal stenosis  --Tylenol prn  Call for continued worsening    Chronic anemia  --Had EGD + colonoscopy last fall  --Repeat CBC and differential prior to next visit  Gastroesophageal reflux disease + GIB prophylaxis  --Largely asymptomatic at this time, on Protonix  Seen by GI last fall  UTD with EGD, colonoscopy  Abnormal liver function tests  --Monitor  Repeat CMP prior to next visit  Chronic vasomotor rhinitis  --Ipratropium (ATROVENT) 0 03 % nasal spray; 2 sprays into each nostril every 12 (twelve) hours  Potential AE's discussed  Advise to call if no better  RTO 6 months    Time oriented visit: Greater than 50% of total time 40 min spent face-to-face        Subjective:      Patient ID: Claudean Clas is a 68 y o  male  Here for routine follow-up  Slowly recovering from his CABG 2 months ago  Breathing improved  Incision sites (chest, abdomen, left leg) well healed  No recent CP, palpitations, dizziness  Not walking much, but when he does, his stamina seems a bit better than it was  Slated to start cardiac rehab 3/16  Some lower back discomfort over the past 2-3 weeks, but otherwise no significant pain issues  Has oxycodone at home, but hasn't needed to take this  Decent appetite  Moods OK  Energy slowly improving  Still tends to sleep a lot--12 hours per night       No recent fevers, cold symptoms other than occasional cough  No recent heartburn, abdominal pain on Protonix  No further exertional reflux  Had EGD + colonoscopy last fall  Left leg cramps about the same--no worse  No recent balance issues, falls  Baseline nocturia (3 times a night)  No PND, orthopnea, edema  Normal bowel movements--no recent constipation  Takes stool softener prn  Nose continues to run chronically  Tried nasal steroid in the past, but didn't seem to help much  The following portions of the patient's history were reviewed and updated as appropriate: allergies, current medications, past family history, past medical history, past social history, past surgical history and problem list     Review of Systems   Constitutional: Negative for fever  HENT: Negative for sore throat  Respiratory:        Per HPI   Cardiovascular: Negative for chest pain and palpitations  Gastrointestinal: Negative for abdominal pain, blood in stool, constipation, diarrhea, nausea and vomiting  Genitourinary: Negative for difficulty urinating and dysuria  Musculoskeletal:        Per HPI   Skin:        Per HPI   Neurological: Negative for dizziness and headaches  Psychiatric/Behavioral: Negative  Objective:      /78 (BP Location: Right arm, Patient Position: Sitting, Cuff Size: Adult)   Pulse 98   Temp 98 3 °F (36 8 °C) (Tympanic)   Wt 78 2 kg (172 lb 6 4 oz)   SpO2 98%   BMI 26 60 kg/m²          Physical Exam   Constitutional: He is oriented to person, place, and time  He appears well-developed and well-nourished  HENT:   Head: Normocephalic and atraumatic  Right Ear: External ear normal    Left Ear: External ear normal    Mouth/Throat: Oropharynx is clear and moist    Turbinates pink, minimally swollen, with scant clear rhinorrhea  Eyes: Conjunctivae are normal  Pupils are equal, round, and reactive to light  Neck: Normal range of motion  Neck supple   No thyromegaly present  Cardiovascular: Normal rate, regular rhythm and normal heart sounds  Pulmonary/Chest: Effort normal and breath sounds normal  No respiratory distress  He has no rales  He exhibits no tenderness  Well healed, benign appearing, midsternal incision  Abdominal: Soft  Bowel sounds are normal  There is no tenderness  Well healed, benign appearing laparoscopic incisions   Musculoskeletal: He exhibits no edema  Left leg with well healed, benign appearing incisions   Neurological: He is alert and oriented to person, place, and time  He has normal reflexes  Coordination normal    Skin: Skin is warm and dry  Psychiatric: He has a normal mood and affect

## 2018-03-26 ENCOUNTER — CLINICAL SUPPORT (OUTPATIENT)
Dept: CARDIAC REHAB | Facility: CLINIC | Age: 78
End: 2018-03-26
Payer: MEDICARE

## 2018-03-26 VITALS — WEIGHT: 172 LBS | BODY MASS INDEX: 26.07 KG/M2 | HEIGHT: 68 IN

## 2018-03-26 DIAGNOSIS — I25.10 CORONARY ARTERY DISEASE INVOLVING NATIVE CORONARY ARTERY OF NATIVE HEART WITHOUT ANGINA PECTORIS: ICD-10-CM

## 2018-03-26 DIAGNOSIS — Z95.1 S/P CABG X 4: ICD-10-CM

## 2018-03-26 PROCEDURE — 93797 PHYS/QHP OP CAR RHAB WO ECG: CPT

## 2018-03-26 NOTE — PROGRESS NOTES
Cardiac/Pulmonary Rehabilitation Plan of Care   Care Plan       Today's date: 3/26/2018   Visits: 1  Patient name: Vaishali Reno      :   Age: 68 y o  MRN: [id number]  Referring Physician: Piper Lindsay MD  Provider:   Clinician: Felicitas Velasquez MS, CEP     Dx:   Encounter Diagnoses   Name Primary?  Coronary artery disease involving native coronary artery of native heart without angina pectoris     S/P CABG x 4      Date of onset: 18    COMMENTS:  Today was Suhail's initial interview with Cardiac Rehab  [facility] completed a 6 minute walk test walking 900 ft at 2 3 METs  [facility] reports to have no cardiac complications during exercise  [facility] will start cardiac rehab on 3/29  Medication compliance: Yes   Comments:   Fall Risk: Moderate   Comments:     EXERCISE/ACTIVITY    Cardiovascular:   Min: 30-40   METS: 2-4   HR:    RPE: 5-6   O2 sat: >90%    Modalities: Treadmill, UBE, Lifecycle, Eliptical , NuStep and Recumbent bike  Strength training:  Will be added following at least 8 weeks post surgery and 8-10 monitored sessions   Modalities: Leg Press, Chest Press, Pull Downs, Arm Curl and Seated Row  EKG changes: NSR  Home activity: Currently not doing any exercise at home  Goals: 10% improvement in functional capacity, home exercise 2 days opposite CR, improved DASI score and Improved 6MWT distance  Education: RPE scale and class: Risk Factors for Heart Disease   Plan:home exercise 30+ mins 2 days opposite CR  Readiness to change: Contemplation    NUTRITION    Weight control:    Starting weight: 172 lbs   Current weight: Weight - Scale: 78 kg (172 lb)   Waist circumference:    Startin 5 in   Current: Waist circumference (inches): 39 5 Inches  Diabetes: N/A  Lipid management: 1/15/18 CHOL: 144 TR HDL: 83 LDL: 52 A1C%: 5 3 Fasting BG : 103  Goals:follow a 20% low fat diet, BMI <25, LDL <100, HDL >40, TRG <150, CHOL <200 and Improved Rate Your Plate score  Education:class: Heart Healthy Eating  class:  Label Reading  Plan: Follow the 20% Low-FAt diet  Readiness to change: Contemplation    PSYCHOSOCIAL    Emotional: PHQ-9 Total Score: 1  Self-reported stress level: 1   Social support: Reports to have excellent social support from friends and family  Goals: Manage/reduce stress  Education: class:  Stress and Your Health  and class:  Relaxation  Plan: Continue to have social support  Readiness to change: Contemplation    OTHER CORE COMPONENTS     Tobacco:   History   Smoking Status    Former Smoker    Quit date:    Smokeless Tobacco    Former User     Blood pressure:    Restin/70   Exercise: 194/80  Goals: consistent BP < 130/80, reduced dietary sodium <2000mg and consistent exercise   Education:  Education class: Understanding Heart Disease and Education class:  Common Heart Medications  Plan: Monitor BP daily  Readiness to change: Contemplation

## 2018-03-26 NOTE — PROGRESS NOTES
[facility]   1940     Risk: Moderate     Pre Post % Change Goal   Date:  3/26/18      Physical       Sub Max ETT (mets)    10% increase   6MWT (feet) 900 Total Distance (feet): 900 feet  10% increase   UCSD Dyspnea Score     5 pt decrease   Supplemental O2 use (L)        [facility] (est peak O2) 16 2 Total Score: 16 2     Peak exercise CR/SC (mets)    40% increase   Emotional       PHQ9 (> 10 refer to MD) 1 PHQ-9 Total Score: 1    4 pt decrease   [place] (lower score = improvement)       Total 27 Total: 27  < 27   Feelings 2 Feelings: Slightly  < 3   Physical Fitness 4 Physical Fitness: Light  < 3   Social Support 1 Social Support: Yes, as much as I needed  < 3   Daily Activities 4 Daily Activity: Much difficulty  < 3   Social Activities 4 Social Activities: Quite a bit  < 3   Pain 4 Pain: oderate pain  < 3   Overall Health 4 Overall Health: Fair  < 3   Quality of Life 2 Quality of Life: Pretty good  < 3   Change in Health 2 Change in Health: A little better  < 3   Dietary       Rate your plate 58    > 58   Measurements       Weight 172 Weight - Scale: 78 kg (172 lb)    2 5 - 5%   BMI 26 5    19 - 25   Waist Circ  39 5 Waist circumference (inches): 39 5 Inches  < 40 M / < 35 F   % Body fat 33 9 Body Fat %: 33 9 %  < 25 M / < 33 F   BP left arm               (systolic) 090   < 393   (diastolic) 70   < 90   Smoking #/day  (if applicable) Quit   0   Lipids/Glucose (Date) 1/15/18      Total cholesterol 144   50 - 200   Triglycerides 47   < 150   HDL 83   40 - 60   LDL 52   < 100   A1C 5 3   4 0 - 5 6%   Fasting

## 2018-03-26 NOTE — PROGRESS NOTES
CARDIAC/PULMONARY REHAB ASSESSMENT    Today's date: 3/26/2018   Patient name: Cy Wade      :        MRN: [id number]  PCP: Ayaka Munoz DO  Cardiologist: Phi Posadas   Surgeon: Kylah Bolivar  Dx:   Encounter Diagnoses   Name Primary?  Coronary artery disease involving native coronary artery of native heart without angina pectoris     S/P CABG x 4      Date of onset: 18  Cultural needs:     Height: Height: 5' 7 5" (171 5 cm)   Weight:  Weight - Scale: 78 kg (172 lb)   Medical History:   Past Medical History:   Diagnosis Date    Abnormal liver function test     RESOLVED: [id number]    Allergic rhinitis     Anemia     LAST ASSESSED: [id number]    Arthritis     BPH (benign prostatic hyperplasia)     CAD (coronary artery disease)     Coronary artery disease     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HCC)     LAST ASSESSED: [id number]    Former tobacco use     GERD (gastroesophageal reflux disease)     Hand paresthesia     RESOLVED: [id number]    Hyperlipidemia     Hypertension     Lumbar stenosis     Peripheral artery disease (HCC)     LAST ASSESSED: 2017       Physical Limitations: back pain (lower)    Risk Factors   Cholesterol: yes  Smoking: Quit 7 years ago 1ppd  HTN: yes  DM: no  Obesity: yes   Inactivity: yes  Family History:   Family History   Problem Relation Age of Onset    Colon cancer Mother     Emphysema Mother     Liver disease Mother     Colon cancer Father     Coronary artery disease Father     Hypertension Father     Liver disease Father     Heart failure Father     Stroke Father      CVA     Coronary artery disease Brother     Heart disease Brother      younger brother by pass and other brother 4 stents placed    Other Family      BACK PROBLEM     Stroke Family      CVA    Emphysema Family     Hypertension Family      BENIGN     Allergies: Allergies   Allergen Reactions    Penicillins Other (See Comments)     Hallucinations;   Patient reported that he was seeing visual disturbances       Other:     Current Medications:   Current Outpatient Prescriptions   Medication Sig Dispense Refill    Ascorbic Acid (VITAMIN C) 1000 MG tablet Take 0 5 tablets by mouth 2 (two) times a day for 90 days 180 tablet 0    aspirin 325 mg tablet Take 1 tablet by mouth daily 100 tablet 0    atorvastatin (LIPITOR) 80 mg tablet Take 1 tablet by mouth daily with dinner 30 tablet 2    cholecalciferol (VITAMIN D3) 1,000 units tablet Take 1,000 Units by mouth daily      cyanocobalamin (VITAMIN B-12) 1,000 mcg tablet Take 1,000 mcg by mouth daily        docusate sodium (COLACE) 100 mg capsule Take 1 capsule by mouth 2 (two) times a day for 30 days Hold for loose stools  (Patient taking differently: Take 100 mg by mouth Hold for loose stools  ) 60 capsule 0    ipratropium (ATROVENT) 0 03 % nasal spray 2 sprays into each nostril every 12 (twelve) hours 30 mL 0    metoprolol tartrate (LOPRESSOR) 25 mg tablet Take 0 5 tablets by mouth every 12 (twelve) hours (Patient taking differently: Take 25 mg by mouth every 12 (twelve) hours Take half tablet twice daily ) 60 tablet 2    mometasone (ELOCON) 0 1 % cream Apply 1 application topically as needed        Multiple Vitamin (MULTIVITAMIN) capsule Take 1 capsule by mouth daily      pantoprazole (PROTONIX) 40 mg tablet Take 1 tablet by mouth daily 30 tablet 0    Probiotic Product (ALIGN PO) Take 1 tablet by mouth daily         No current facility-administered medications for this visit          Functional Status Prior to Diagnosis for Treatment   Occupation: retired 16 years ago  Recreation: golf 2x/wk up to 4 years ago  ADLs: cooking, laundry   [place]: yes  Exercise: none  Other:     Current Functional Status  Occupation: retired  Recreation: wants to get back to [employer]  ADLs: cooking, laundry  [place]: yes  Exercise: none  Other:     Short Term Program Goals: get back to golf by the spring time, be able to walk a mile by the end of 12wks, be able to do his own yard work     Long Term Goals: Increase Functional Capacity by 10% by the end of 12wks, Decrease SOB by being able to walk a mile at the end of 12wks    Ability to reach goals/rehabilitation potential: good    Projected return to function: 12wks  Objective tests: 6 minutes walk test        Nutritional   Fats/Oils: smart balance, olive oil  Sodium: salt replacement  Sweets/ETOH/Caffeine: 1 cup black, water with wyatt everyday, 2 pints of beer at firehouse once a week, no sweets   Dairy/Eggs: 2% milk, 1egg/wk  Meats:    Beef: 2-3 x/wk   Fish: 2x/wk  Chicken/Turkey: 4x/wk  Pork/Ham: couple of slices of ham a week  Processed Meats: cruz 2-3x/wk, skyler jenniffer sausage 2x/wk  Fruits: canned fruit daily  Vegetables: no not a lot  Grains/Beans: rye, wheat  Supplements: light hearn  Social: Cook ex/wk and Dining out 1x/ weel    Clinical Implications: Decrease processed meats, Increase whole foods and whole grains    Goals: Decrease milk percentage    Emotional/Social  Marital status:   Rate 1-5:    Marriage: 1 Values/Choices no added stress   Family: 1 Values/Choices no added stress   Financial: 1 Values/Choices no added stress   Relationships: 1 Values/Choices no added stress   Spirituality: 1 Values/Choices no added stress   Intellectual: 1 Values/Choices no added stress    Life Stressors: no major life stressors    Goals:  Increase sense of well-being    Domestic Violence Screening: No    Comments:

## 2018-03-29 ENCOUNTER — CLINICAL SUPPORT (OUTPATIENT)
Dept: CARDIAC REHAB | Facility: CLINIC | Age: 78
End: 2018-03-29
Payer: MEDICARE

## 2018-03-29 DIAGNOSIS — Z95.1 S/P CABG X 4: ICD-10-CM

## 2018-03-29 PROCEDURE — 93798 PHYS/QHP OP CAR RHAB W/ECG: CPT

## 2018-03-30 ENCOUNTER — CLINICAL SUPPORT (OUTPATIENT)
Dept: CARDIAC REHAB | Facility: CLINIC | Age: 78
End: 2018-03-30
Payer: MEDICARE

## 2018-03-30 VITALS — WEIGHT: 173.4 LBS | BODY MASS INDEX: 26.76 KG/M2

## 2018-03-30 DIAGNOSIS — Z95.1 S/P CABG X 4: ICD-10-CM

## 2018-03-30 PROCEDURE — 93798 PHYS/QHP OP CAR RHAB W/ECG: CPT

## 2018-04-02 ENCOUNTER — PATIENT OUTREACH (OUTPATIENT)
Dept: FAMILY MEDICINE CLINIC | Facility: OTHER | Age: 78
End: 2018-04-02

## 2018-04-03 ENCOUNTER — CLINICAL SUPPORT (OUTPATIENT)
Dept: CARDIAC REHAB | Facility: CLINIC | Age: 78
End: 2018-04-03
Payer: MEDICARE

## 2018-04-03 ENCOUNTER — PATIENT OUTREACH (OUTPATIENT)
Dept: FAMILY MEDICINE CLINIC | Facility: OTHER | Age: 78
End: 2018-04-03

## 2018-04-03 DIAGNOSIS — Z95.1 S/P CABG X 4: ICD-10-CM

## 2018-04-03 PROCEDURE — 93798 PHYS/QHP OP CAR RHAB W/ECG: CPT

## 2018-04-03 NOTE — PROGRESS NOTES
Doing good  Lives alone, however daughter comes daily  Denies any current home services  Going to cardiac rehab three times weekly  Denies complaints of dizziness, cough, fatigue or pain  Complains of occasional dyspnea on exertion and weakness, but feels it is slowly improving  Incisions mostly healed with exception of scattered scabs  Denies any falls  All prescriptions filled  Denies any needs or concerns  Verified patient has my contact information and encouraged to call if needed

## 2018-04-05 ENCOUNTER — CLINICAL SUPPORT (OUTPATIENT)
Dept: CARDIAC REHAB | Facility: CLINIC | Age: 78
End: 2018-04-05
Payer: MEDICARE

## 2018-04-05 DIAGNOSIS — I70.209 ATHEROSCLEROSIS OF NATIVE ARTERY OF EXTREMITY (HCC): ICD-10-CM

## 2018-04-05 DIAGNOSIS — Z95.1 S/P CABG X 4: ICD-10-CM

## 2018-04-05 PROCEDURE — 93798 PHYS/QHP OP CAR RHAB W/ECG: CPT

## 2018-04-06 ENCOUNTER — CLINICAL SUPPORT (OUTPATIENT)
Dept: CARDIAC REHAB | Facility: CLINIC | Age: 78
End: 2018-04-06
Payer: MEDICARE

## 2018-04-06 VITALS — WEIGHT: 174 LBS | BODY MASS INDEX: 26.85 KG/M2

## 2018-04-06 DIAGNOSIS — Z95.1 S/P CABG X 4: ICD-10-CM

## 2018-04-06 PROCEDURE — 93798 PHYS/QHP OP CAR RHAB W/ECG: CPT

## 2018-04-10 ENCOUNTER — CLINICAL SUPPORT (OUTPATIENT)
Dept: CARDIAC REHAB | Facility: CLINIC | Age: 78
End: 2018-04-10
Payer: MEDICARE

## 2018-04-10 DIAGNOSIS — Z95.1 S/P CABG X 4: ICD-10-CM

## 2018-04-10 PROCEDURE — 93798 PHYS/QHP OP CAR RHAB W/ECG: CPT

## 2018-04-12 ENCOUNTER — CLINICAL SUPPORT (OUTPATIENT)
Dept: CARDIAC REHAB | Facility: CLINIC | Age: 78
End: 2018-04-12
Payer: MEDICARE

## 2018-04-12 DIAGNOSIS — Z95.1 S/P CABG X 4: ICD-10-CM

## 2018-04-12 PROCEDURE — 93798 PHYS/QHP OP CAR RHAB W/ECG: CPT

## 2018-04-13 ENCOUNTER — CLINICAL SUPPORT (OUTPATIENT)
Dept: CARDIAC REHAB | Facility: CLINIC | Age: 78
End: 2018-04-13
Payer: MEDICARE

## 2018-04-13 VITALS — BODY MASS INDEX: 26.63 KG/M2 | WEIGHT: 172.6 LBS

## 2018-04-13 DIAGNOSIS — J30.0 CHRONIC VASOMOTOR RHINITIS: ICD-10-CM

## 2018-04-13 DIAGNOSIS — Z95.1 S/P CABG X 4: ICD-10-CM

## 2018-04-13 PROCEDURE — 93798 PHYS/QHP OP CAR RHAB W/ECG: CPT

## 2018-04-13 RX ORDER — IPRATROPIUM BROMIDE 21 UG/1
SPRAY, METERED NASAL
Qty: 5 ML | Refills: 3 | Status: SHIPPED | OUTPATIENT
Start: 2018-04-13 | End: 2021-07-09

## 2018-04-13 RX ORDER — ATORVASTATIN CALCIUM 80 MG/1
80 TABLET, FILM COATED ORAL
Qty: 30 TABLET | Refills: 2 | OUTPATIENT
Start: 2018-04-13

## 2018-04-15 RX ORDER — ASPIRIN 325 MG
325 TABLET ORAL DAILY
Qty: 100 TABLET | Refills: 0 | OUTPATIENT
Start: 2018-04-15

## 2018-04-17 ENCOUNTER — HOSPITAL ENCOUNTER (OUTPATIENT)
Dept: NON INVASIVE DIAGNOSTICS | Facility: CLINIC | Age: 78
Discharge: HOME | End: 2018-04-17
Payer: MEDICARE

## 2018-04-17 ENCOUNTER — CLINICAL SUPPORT (OUTPATIENT)
Dept: CARDIAC REHAB | Facility: CLINIC | Age: 78
End: 2018-04-17
Payer: MEDICARE

## 2018-04-17 DIAGNOSIS — I70.209 ATHEROSCLEROSIS OF NATIVE ARTERY OF EXTREMITY (HCC): ICD-10-CM

## 2018-04-17 DIAGNOSIS — Z95.1 S/P CABG X 4: ICD-10-CM

## 2018-04-17 PROCEDURE — 93923 UPR/LXTR ART STDY 3+ LVLS: CPT

## 2018-04-17 PROCEDURE — 93925 LOWER EXTREMITY STUDY: CPT | Performed by: SURGERY

## 2018-04-17 PROCEDURE — 93798 PHYS/QHP OP CAR RHAB W/ECG: CPT

## 2018-04-17 PROCEDURE — 93925 LOWER EXTREMITY STUDY: CPT

## 2018-04-17 PROCEDURE — 93922 UPR/L XTREMITY ART 2 LEVELS: CPT | Performed by: SURGERY

## 2018-04-19 ENCOUNTER — CLINICAL SUPPORT (OUTPATIENT)
Dept: CARDIAC REHAB | Facility: CLINIC | Age: 78
End: 2018-04-19
Payer: MEDICARE

## 2018-04-19 DIAGNOSIS — Z95.1 S/P CABG X 4: ICD-10-CM

## 2018-04-19 PROCEDURE — 93798 PHYS/QHP OP CAR RHAB W/ECG: CPT

## 2018-04-20 ENCOUNTER — CLINICAL SUPPORT (OUTPATIENT)
Dept: CARDIAC REHAB | Facility: CLINIC | Age: 78
End: 2018-04-20
Payer: MEDICARE

## 2018-04-20 VITALS — BODY MASS INDEX: 26.94 KG/M2 | WEIGHT: 174.6 LBS

## 2018-04-20 DIAGNOSIS — Z95.1 S/P CABG X 4: ICD-10-CM

## 2018-04-20 PROCEDURE — 93798 PHYS/QHP OP CAR RHAB W/ECG: CPT

## 2018-04-24 ENCOUNTER — CLINICAL SUPPORT (OUTPATIENT)
Dept: CARDIAC REHAB | Facility: CLINIC | Age: 78
End: 2018-04-24
Payer: MEDICARE

## 2018-04-24 DIAGNOSIS — Z95.1 S/P CABG X 4: ICD-10-CM

## 2018-04-24 PROCEDURE — 93798 PHYS/QHP OP CAR RHAB W/ECG: CPT

## 2018-04-26 ENCOUNTER — CLINICAL SUPPORT (OUTPATIENT)
Dept: CARDIAC REHAB | Facility: CLINIC | Age: 78
End: 2018-04-26
Payer: MEDICARE

## 2018-04-26 DIAGNOSIS — Z95.1 S/P CABG X 4: ICD-10-CM

## 2018-04-26 PROCEDURE — 93798 PHYS/QHP OP CAR RHAB W/ECG: CPT

## 2018-04-27 ENCOUNTER — CLINICAL SUPPORT (OUTPATIENT)
Dept: CARDIAC REHAB | Facility: CLINIC | Age: 78
End: 2018-04-27
Payer: MEDICARE

## 2018-04-27 ENCOUNTER — TELEPHONE (OUTPATIENT)
Dept: CARDIOLOGY CLINIC | Facility: CLINIC | Age: 78
End: 2018-04-27

## 2018-04-27 VITALS — BODY MASS INDEX: 26.66 KG/M2 | WEIGHT: 172.8 LBS

## 2018-04-27 DIAGNOSIS — Z95.1 S/P CABG X 4: ICD-10-CM

## 2018-04-27 PROCEDURE — 93798 PHYS/QHP OP CAR RHAB W/ECG: CPT

## 2018-04-27 NOTE — TELEPHONE ENCOUNTER
Patient would like to know if he should continue taking Aspirin at all? and if so at what dose?   He is currently taking 325m,  but thinks that he was supposed to be put back on the low dose after last hospitalization

## 2018-04-27 NOTE — PROGRESS NOTES
Cardiac/Pulmonary Rehabilitation Plan of Care   30 day       Today's date: 2018   Visits: 15  Patient name: Kalyan Leon      :   Age: 68 y o  MRN: [id number]  Referring Physician: Fredy Mason MD  Provider:   Clinician: Em Schaeffer MS, CEP     Dx: CABG x4  Date of onset: 18    COMMENTS:  Today is Suhail's 30 day note for cardiac rehab  Josephine Aguero has progressed to 40 minutes of cardio at 2-3 METs plus weight training  Jodytravis Aguero states that it is extremely difficult to make food for one person so he tends to find himself at [facility] [address], [facility] a lot  I gave Josephine Aguero some suggestions of how making food for one person can be pretty easy  Josephine Aguero still has a lot of improvements to make with his diet  Josephine Aguero is also not exercising on opposite days of Rehab, I expressed to him that he is more then welcome to walk on opposite days  Jodytravis Aguero states his understanding  Overall, Josephine Ageuro states to be feeling so much better and hopes to keep progressing in rehab  Medication compliance: Yes   Comments:   Fall Risk: Low   Comments:     EXERCISE/ACTIVITY    Cardiovascular:   Min: 30-40   METS: 2-3   HR:    RPE: 5-6   O2 sat: >90%    Modalities: Treadmill, UBE and Recumbent bike  Strength trainin-3 days / week, 12-15 repitations  and 1-2 sets per modality    Modalities: Leg Press, Chest Press, Pull Downs, Arm Curl and Seated Row  EKG changes: NSR  Home activity: Currently not doing any exercise at home     Goals: 10% improvement in functional capacity, home exercise 2 days opposite CR, improved DASI score, Exercise >150 mins/wk and Improved 6MWT distance  Education: Benefit of exercise for CAD risk factors, home exercise guidelines, signs and sxs, RPE scale and class: Risk Factors for Heart Disease   Plan:home exercise 30+ mins 2 days opposite CR  Readiness to change: Preparation    NUTRITION    Weight control:    Starting weight: 172 lbs   Current weight: Weight - Scale: 78 4 kg (172 lb 12 8 oz)   Waist circumference:    Startin 5   Current:    Diabetes: N/A  Lipid management: N/A  Goals:follow a 20% low fat diet, BMI <25, LDL <100, HDL >40, TRG <150, CHOL <200, decreased body fat% and Improved Rate Your Plate score  Education:low sodium diet  20% low fat diet/DASH diet  wt  loss  healthy choices while dining out  portion control  class: Heart Healthy Eating  class:  Label Reading  Plan: Follow the 20% Low-Fat diet  Readiness to change: Preparation    PSYCHOSOCIAL    Emotional: denies any depression and anxiety    Self-reported stress level: 1   Social support: Reports to have excellent social support from friends and family   Goals: Manage/reduce stress  Education: class:  Stress and Your Health  and class:  Relaxation  Plan: Continue to have social interaction with friends and family  Readiness to change: Preparation    OTHER CORE COMPONENTS     Tobacco:   History   Smoking Status    Former Smoker    Quit date:    Smokeless Tobacco    Former User     Blood pressure:    Resting: Resting BP: 122/80   Exercise: 172/62  Goals: consistent BP < 130/80, reduced dietary sodium <2000mg and consistent exercise   Education:  Education class: Understanding Heart Disease and Education class:  Common Heart Medications  Plan: Monitor BP daily  Readiness to change: Preparation

## 2018-04-30 RX ORDER — ATORVASTATIN CALCIUM 80 MG/1
80 TABLET, FILM COATED ORAL
Qty: 30 TABLET | Refills: 2 | OUTPATIENT
Start: 2018-04-30

## 2018-05-01 ENCOUNTER — CLINICAL SUPPORT (OUTPATIENT)
Dept: CARDIAC REHAB | Facility: CLINIC | Age: 78
End: 2018-05-01
Payer: MEDICARE

## 2018-05-01 DIAGNOSIS — Z95.1 S/P CABG X 4: ICD-10-CM

## 2018-05-01 PROCEDURE — 93798 PHYS/QHP OP CAR RHAB W/ECG: CPT

## 2018-05-03 ENCOUNTER — CLINICAL SUPPORT (OUTPATIENT)
Dept: CARDIAC REHAB | Facility: CLINIC | Age: 78
End: 2018-05-03
Payer: MEDICARE

## 2018-05-03 DIAGNOSIS — Z95.1 S/P CABG X 4: ICD-10-CM

## 2018-05-03 PROCEDURE — 93798 PHYS/QHP OP CAR RHAB W/ECG: CPT

## 2018-05-04 ENCOUNTER — CLINICAL SUPPORT (OUTPATIENT)
Dept: CARDIAC REHAB | Facility: CLINIC | Age: 78
End: 2018-05-04
Payer: MEDICARE

## 2018-05-04 VITALS — BODY MASS INDEX: 26.42 KG/M2 | WEIGHT: 171.2 LBS

## 2018-05-04 DIAGNOSIS — Z95.1 S/P CABG X 4: ICD-10-CM

## 2018-05-04 PROCEDURE — 93798 PHYS/QHP OP CAR RHAB W/ECG: CPT

## 2018-05-07 RX ORDER — ATORVASTATIN CALCIUM 80 MG/1
80 TABLET, FILM COATED ORAL
Qty: 30 TABLET | Refills: 2 | OUTPATIENT
Start: 2018-05-07

## 2018-05-08 ENCOUNTER — CLINICAL SUPPORT (OUTPATIENT)
Dept: CARDIAC REHAB | Facility: CLINIC | Age: 78
End: 2018-05-08
Payer: MEDICARE

## 2018-05-08 DIAGNOSIS — Z95.1 S/P CABG X 4: ICD-10-CM

## 2018-05-08 PROCEDURE — 93798 PHYS/QHP OP CAR RHAB W/ECG: CPT

## 2018-05-10 ENCOUNTER — CLINICAL SUPPORT (OUTPATIENT)
Dept: CARDIAC REHAB | Facility: CLINIC | Age: 78
End: 2018-05-10
Payer: MEDICARE

## 2018-05-10 DIAGNOSIS — Z95.1 S/P CABG X 4: ICD-10-CM

## 2018-05-10 PROCEDURE — 93798 PHYS/QHP OP CAR RHAB W/ECG: CPT

## 2018-05-11 ENCOUNTER — CLINICAL SUPPORT (OUTPATIENT)
Dept: CARDIAC REHAB | Facility: CLINIC | Age: 78
End: 2018-05-11
Payer: MEDICARE

## 2018-05-11 DIAGNOSIS — Z95.1 S/P CABG X 4: ICD-10-CM

## 2018-05-11 PROCEDURE — 93798 PHYS/QHP OP CAR RHAB W/ECG: CPT

## 2018-05-13 RX ORDER — ATORVASTATIN CALCIUM 80 MG/1
80 TABLET, FILM COATED ORAL
Qty: 30 TABLET | Refills: 2 | OUTPATIENT
Start: 2018-05-13

## 2018-05-14 DIAGNOSIS — E78.5 HYPERLIPIDEMIA, UNSPECIFIED HYPERLIPIDEMIA TYPE: ICD-10-CM

## 2018-05-14 DIAGNOSIS — E83.52 HYPERCALCEMIA: Primary | ICD-10-CM

## 2018-05-15 ENCOUNTER — CLINICAL SUPPORT (OUTPATIENT)
Dept: CARDIAC REHAB | Facility: CLINIC | Age: 78
End: 2018-05-15
Payer: MEDICARE

## 2018-05-15 DIAGNOSIS — Z95.1 S/P CABG X 4: ICD-10-CM

## 2018-05-15 PROCEDURE — 93798 PHYS/QHP OP CAR RHAB W/ECG: CPT

## 2018-05-15 RX ORDER — ATORVASTATIN CALCIUM 80 MG/1
80 TABLET, FILM COATED ORAL
Qty: 90 TABLET | Refills: 0 | Status: SHIPPED | OUTPATIENT
Start: 2018-05-15 | End: 2018-08-13 | Stop reason: SDUPTHER

## 2018-05-17 ENCOUNTER — CLINICAL SUPPORT (OUTPATIENT)
Dept: CARDIAC REHAB | Facility: CLINIC | Age: 78
End: 2018-05-17
Payer: MEDICARE

## 2018-05-17 DIAGNOSIS — Z95.1 S/P CABG X 4: ICD-10-CM

## 2018-05-17 PROCEDURE — 93798 PHYS/QHP OP CAR RHAB W/ECG: CPT

## 2018-05-18 ENCOUNTER — CLINICAL SUPPORT (OUTPATIENT)
Dept: CARDIAC REHAB | Facility: CLINIC | Age: 78
End: 2018-05-18
Payer: MEDICARE

## 2018-05-18 VITALS — WEIGHT: 173 LBS | BODY MASS INDEX: 26.7 KG/M2

## 2018-05-18 DIAGNOSIS — Z95.1 S/P CABG X 4: ICD-10-CM

## 2018-05-18 PROCEDURE — 93798 PHYS/QHP OP CAR RHAB W/ECG: CPT

## 2018-05-22 ENCOUNTER — CLINICAL SUPPORT (OUTPATIENT)
Dept: CARDIAC REHAB | Facility: CLINIC | Age: 78
End: 2018-05-22
Payer: MEDICARE

## 2018-05-22 DIAGNOSIS — Z95.1 S/P CABG X 4: Primary | ICD-10-CM

## 2018-05-22 PROCEDURE — 93798 PHYS/QHP OP CAR RHAB W/ECG: CPT

## 2018-05-22 NOTE — PROGRESS NOTES
Cardiac/Pulmonary Rehabilitation Plan of Care   60 day      Today's date: 2018   Visits: 25  Patient name: Brandon Dorantes      :   Age: 68 y o  MRN: [id number]  Referring Physician: Ivan Saravia MD  Provider: [address] [place] [address]  Clinician: Berl Claude [place], MS, CEP     Dx:   Encounter Diagnosis   Name Primary?  S/P CABG x 4 Yes     Date of onset: 2018    COMMENTS:  Today is Suhail's 60 day note for cardiac rehab  Solis Andrews has progressed to 40 minutes at 2-3 METs plus weight training  Solis Andrews still goes out to eat several times a week, mostly takes his grandchildren out after baseball  I expressed the importance of making healthier choices when he goes out to eat  Solis Andrews states his understanding  Solis Andrews is starting to experience lower back pain again, I expressed the importance of trying to stand up straight when he walks because he tends to hunch over  I also told him to try heat before exercise and ice after exercise to see if that will help  Solis Andrews still is not doing any exercise at home, I expressed the importance of getting into a routine with exercise  Solis Andrews states his understanding and is excited to keep exercising         Medication compliance: Yes   Comments:   Fall Risk: Low   Comments:     EXERCISE/ACTIVITY    Cardiovascular:   Min: 30-40   METS: 2-3   HR:    RPE: 5-6   O2 sat: >90%    Modalities: Treadmill, UBE, NuStep and Recumbent bike  Strength trainin-3 days / week, 12-15 repitations  and 1-2 sets per modality    Modalities: Leg Press, Chest Press, Arm Curl and Seated Row  EKG changes: NSR  Home activity: Currently not doing any exercise at home  Goals: 10% improvement in functional capacity, improved DASI score by 10%, Increase in peak CR METs by 40%, Improved 6MWT distance by 10% and Exercise 5 days/wk, >150mins/wk  Education: Benefit of exercise for CAD risk factors, home exercise guidelines, signs and sxs, RPE scale and class: Risk Factors for Heart Disease   Plan:home exercise 30+ mins 2 days opposite CR  Readiness to change: Action    NUTRITION    Weight control:    Starting weight: 172     Current weight: 173   Waist circumference:    Startin 5   Current:    Diabetes: N/A  Lipid management: Discussed diet and lipid management  Goals:reduced BMI to < 25, LDL <100, HDL >40, TRG <150, CHOL <200, decreased body fat % <33%, reduced waist circumference <40 inches, Improved Rate Your Plate score  >93 and 2 5-5%  wt loss  Education:low sodium diet  20% low fat diet/DASH diet  wt  loss  healthy choices while dining out  portion control  class: Heart Healthy Eating  class:  Label Reading  Plan: Follow a 20% Low Fat Diet, Reduce added sugars <25g/day, Increase whole grains, increase fruits/vegs, eliminate processed meats, reduce red meat 1x/wk and swtich to low fat dairy  Readiness to change: Action    PSYCHOSOCIAL    Emotional: denies any depression or anxiety   Self-reported stress level: 1   Social support: Excellent  Goals: Reduce perceived stress to 1-3/10, improved [facility] QOL < 27 and PHQ-9 - 4 point decrease  Education: class:  Stress and Your Health  and class:  Relaxation  Plan: Practice relaxation techniques  Readiness to change: Action    OTHER CORE COMPONENTS     Tobacco:   History   Smoking Status    Former Smoker    Quit date:    Smokeless Tobacco    Former User     Blood pressure:    Restin/90   Exercise: 122/56  Goals: consistent BP < 130/80, reduced dietary sodium <2300mg and consistent exercise >150 mins/wk  Education:  understanding HTN and CAD, low sodium diet and HTN, Education class:  Common Heart Medications and Education class: Understanding Heart Disease  Plan: Monitor BP daily  Readiness to change: Action

## 2018-05-24 ENCOUNTER — CLINICAL SUPPORT (OUTPATIENT)
Dept: CARDIAC REHAB | Facility: CLINIC | Age: 78
End: 2018-05-24
Payer: MEDICARE

## 2018-05-24 DIAGNOSIS — Z95.1 S/P CABG X 4: ICD-10-CM

## 2018-05-24 PROCEDURE — 93798 PHYS/QHP OP CAR RHAB W/ECG: CPT

## 2018-05-25 ENCOUNTER — CLINICAL SUPPORT (OUTPATIENT)
Dept: CARDIAC REHAB | Facility: CLINIC | Age: 78
End: 2018-05-25
Payer: MEDICARE

## 2018-05-25 DIAGNOSIS — Z95.1 S/P CABG X 4: ICD-10-CM

## 2018-05-25 PROCEDURE — 93798 PHYS/QHP OP CAR RHAB W/ECG: CPT

## 2018-05-29 ENCOUNTER — CLINICAL SUPPORT (OUTPATIENT)
Dept: CARDIAC REHAB | Facility: CLINIC | Age: 78
End: 2018-05-29
Payer: MEDICARE

## 2018-05-29 DIAGNOSIS — Z95.1 S/P CABG X 4: ICD-10-CM

## 2018-05-29 PROCEDURE — 93798 PHYS/QHP OP CAR RHAB W/ECG: CPT

## 2018-05-31 ENCOUNTER — CLINICAL SUPPORT (OUTPATIENT)
Dept: CARDIAC REHAB | Facility: CLINIC | Age: 78
End: 2018-05-31
Payer: MEDICARE

## 2018-05-31 DIAGNOSIS — Z95.1 S/P CABG X 4: ICD-10-CM

## 2018-05-31 PROCEDURE — 93798 PHYS/QHP OP CAR RHAB W/ECG: CPT

## 2018-06-01 ENCOUNTER — CLINICAL SUPPORT (OUTPATIENT)
Dept: CARDIAC REHAB | Facility: CLINIC | Age: 78
End: 2018-06-01
Payer: MEDICARE

## 2018-06-01 VITALS — WEIGHT: 170 LBS | BODY MASS INDEX: 26.23 KG/M2

## 2018-06-01 DIAGNOSIS — Z95.1 S/P CABG X 4: ICD-10-CM

## 2018-06-01 PROCEDURE — 93798 PHYS/QHP OP CAR RHAB W/ECG: CPT

## 2018-06-05 ENCOUNTER — CLINICAL SUPPORT (OUTPATIENT)
Dept: CARDIAC REHAB | Facility: CLINIC | Age: 78
End: 2018-06-05
Payer: MEDICARE

## 2018-06-05 DIAGNOSIS — Z95.1 S/P CABG X 4: ICD-10-CM

## 2018-06-05 PROCEDURE — 93798 PHYS/QHP OP CAR RHAB W/ECG: CPT

## 2018-06-07 ENCOUNTER — CLINICAL SUPPORT (OUTPATIENT)
Dept: CARDIAC REHAB | Facility: CLINIC | Age: 78
End: 2018-06-07
Payer: MEDICARE

## 2018-06-07 DIAGNOSIS — Z95.1 S/P CABG X 4: ICD-10-CM

## 2018-06-07 PROCEDURE — 93798 PHYS/QHP OP CAR RHAB W/ECG: CPT

## 2018-06-08 ENCOUNTER — CLINICAL SUPPORT (OUTPATIENT)
Dept: CARDIAC REHAB | Facility: CLINIC | Age: 78
End: 2018-06-08
Payer: MEDICARE

## 2018-06-08 VITALS — WEIGHT: 169.4 LBS | BODY MASS INDEX: 26.14 KG/M2

## 2018-06-08 DIAGNOSIS — Z95.1 S/P CABG X 4: ICD-10-CM

## 2018-06-08 PROCEDURE — 93798 PHYS/QHP OP CAR RHAB W/ECG: CPT

## 2018-06-11 DIAGNOSIS — I10 HYPERTENSION, UNSPECIFIED TYPE: Primary | ICD-10-CM

## 2018-06-12 ENCOUNTER — CLINICAL SUPPORT (OUTPATIENT)
Dept: CARDIAC REHAB | Facility: CLINIC | Age: 78
End: 2018-06-12
Payer: MEDICARE

## 2018-06-12 DIAGNOSIS — Z95.1 S/P CABG X 4: ICD-10-CM

## 2018-06-12 PROCEDURE — 93798 PHYS/QHP OP CAR RHAB W/ECG: CPT

## 2018-06-14 ENCOUNTER — CLINICAL SUPPORT (OUTPATIENT)
Dept: CARDIAC REHAB | Facility: CLINIC | Age: 78
End: 2018-06-14
Payer: MEDICARE

## 2018-06-14 VITALS — WEIGHT: 171.4 LBS | BODY MASS INDEX: 25.98 KG/M2 | HEIGHT: 68 IN

## 2018-06-14 DIAGNOSIS — Z95.1 S/P CABG X 4: Primary | ICD-10-CM

## 2018-06-14 PROCEDURE — 93798 PHYS/QHP OP CAR RHAB W/ECG: CPT

## 2018-06-14 NOTE — PROGRESS NOTES
Berry Brain   1940     Risk: Moderate     Pre Post % Change Goal   Date:  3/26/18  6/14/2018     Physical       Sub Max ETT (mets)    10% increase   6MWT (feet) 900 Total Distance (feet): 885 feet -1 7% decrease 10% increase   UCSD Dyspnea Score     5 pt decrease   Supplemental O2 use (L)        [facility] (est peak O2) 16 2 Total Score: 16 2 0%    Peak exercise CR/TX (mets)    40% increase   Emotional       PHQ9 (> 10 refer to MD) 1 PHQ-9 Total Score: 2   100% increase 4 pt decrease   [place] (lower score = improvement)       Total 27 Total: 22 -18 5% decrease < 27   Feelings 2 Feelings: Not at all -50% decrease < 3   Physical Fitness 4 Physical Fitness:  Moderate -25% decrease < 3   Social Support 1 Social Support: Yes, as much as I needed 0% < 3   Daily Activities 4 Daily Activity: Much difficulty 0% < 3   Social Activities 4 Social Activities: Moderately -25% decrease < 3   Pain 4 Pain: Mild pain -25% decrease < 3   Overall Health 4 Overall Health: Good -25% decrease < 3   Quality of Life 2 Quality of Life: Pretty good 0%  < 3   Change in Health 2 Change in Health: A little better 0% < 3   Dietary       Rate your plate 58 55 -9 7% decrease > 58   Measurements       Weight 172 Weight - Scale: 77 7 kg (171 lb 6 4 oz)   -0 3% decrease 2 5 - 5%   BMI 26 5  26 4 -0 4% decrease 19 - 25   Waist Circ  39 5 Waist circumference (inches): 40 Inches 1 3% increase < 40 M / < 35 F   % Body fat 33 9 Body Fat %: 34 9 % 2 9% incredase < 25 M / < 33 F   BP left arm               (systolic) 458 529 -9 1% dcrease < 503   (diastolic) 70 78 40 7% increase < 90   Smoking #/day  (if applicable) Quit Quit  0   Lipids/Glucose (Date) 1/15/18 N/A     Total cholesterol 144   50 - 200   Triglycerides 47   < 150   HDL 83   40 - 60   LDL 52   < 100   A1C 5 3   4 0 - 5 6%   Fasting

## 2018-06-14 NOTE — PROGRESS NOTES
Cardiac/Pulmonary Rehabilitation Plan of Care   Discharge Note      Today's date: 2018   Visits: 36  Patient name: Markell Dennis      :   Age: 68 y o  MRN: [id number]  Referring Physician: Topher Quiros MD  Provider: [address] [place] [address] Cardiopulmonary Rehab   Clinician: Mohan Rivas MS, CEP     Dx:   Encounter Diagnosis   Name Primary?  S/P CABG x 4 Yes     Date of onset: 2018    COMMENTS:  Today is Suhail's final note for cardiac rehab  Malka Adorno completed the same 6 minutes walk test as the first day and had a 1 7% decrease in Functional Capacity  Malka Adorno is experiencing severe hip and back pain and is not able to walk very long but states that when he does the recumbent bike his hip and back do not hurt  Malka Adorno has progressed to 40 minutes at 2-3 METs plus weight training  Malka Adorno is still struggling with changing his diet and still goes out to eat several times a week, mostly takes his grandchildren out after baseball  I expressed the importance of making healthier choices when he goes out to eat  Malka Adorno states his understanding  Malka Adorno states to have decrease his wyatt from 4 glass a day to 2 glasses a day  Malka Adorno still is not doing any exercise at home, I expressed the importance of getting into a routine with exercise  Malka Adorno states that he is going to start going to the gym at [facility] as an outpatient when he is done here  At first Malka Adorno was going to wait until July to start but I expressed to him that once he stops it will be harder to get back  Malka Adorno expressed his understanding and states that he will start next week          Medication compliance: Yes   Comments:   Fall Risk: Low   Comments:     EXERCISE/ACTIVITY    Cardiovascular:   Min: 30-40   METS: 2-3   HR:    RPE: 5-6   O2 sat: >90%    Modalities: Treadmill, UBE, NuStep and Recumbent bike  Strength trainin-3 days / week, 12-15 repitations  and 1-2 sets per modality Modalities: Leg Press, Chest Press, Arm Curl and Seated Row  EKG changes: NSR  Home activity: Currently not doing any exercise at home  Goals: Exercise 5 days/wk, >150mins/wk  Education: Benefit of exercise for CAD risk factors, home exercise guidelines, signs and sxs, RPE scale, class: Risk Factors for Heart Disease and Exercise instructions/guidelines for discharge    Plan:Go back to the gym 3-5x/wk exercing >150 mins/wk of Cardio plus weight training  Readiness to change: Maintenance    NUTRITION    Weight control:    Starting weight: 172     Current weight: 171 4;bs   Waist circumference:    Startin 5   Current: Waist circumference (inches): 40 Inches  Diabetes: N/A  Lipid management: Discussed diet and lipid management  Goals:reduced BMI to < 25, LDL <100, HDL >40, TRG <150, CHOL <200, decreased body fat % <33%, reduced waist circumference <40 inches, Improved Rate Your Plate score  >12 and 2 5-5%  wt loss  Education:low sodium diet  20% low fat diet/DASH diet  wt  loss  healthy choices while dining out  portion control  class: Heart Healthy Eating  class:  Label Reading  Plan: Follow a 20% Low Fat Diet, Reduce added sugars <25g/day, Increase whole grains, increase fruits/vegs, eliminate processed meats, reduce red meat 1x/wk and swtich to low fat dairy  Readiness to change: Maintenance    PSYCHOSOCIAL    Emotional: denies any depression or anxiety   Self-reported stress level: 1   Social support: Excellent  Goals: Reduce perceived stress to 1-3/10, improved [facility] QOL < 27 and PHQ-9 - 4 point decrease  Education: class:  Stress and Your Health  and class:  Relaxation  Plan: Practice relaxation techniques  Readiness to change: Maintenance    OTHER CORE COMPONENTS     Tobacco:   History   Smoking Status    Former Smoker    Quit date:    Smokeless Tobacco    Former User     Blood pressure:    Restin/78   Exercise: 158/80  Goals: consistent BP < 130/80, reduced dietary sodium <2300mg and consistent exercise >150 mins/wk  Education:  understanding HTN and CAD, low sodium diet and HTN, Education class:  Common Heart Medications and Education class: Understanding Heart Disease  Plan: Monitor BP daily  Readiness to change: Maintenance

## 2018-06-15 ENCOUNTER — CLINICAL SUPPORT (OUTPATIENT)
Dept: CARDIAC REHAB | Facility: CLINIC | Age: 78
End: 2018-06-15
Payer: MEDICARE

## 2018-06-15 VITALS — BODY MASS INDEX: 26.33 KG/M2 | WEIGHT: 170.6 LBS

## 2018-06-15 DIAGNOSIS — Z95.1 S/P CABG X 4: ICD-10-CM

## 2018-06-15 PROCEDURE — 93798 PHYS/QHP OP CAR RHAB W/ECG: CPT

## 2018-08-13 DIAGNOSIS — K21.9 GASTROESOPHAGEAL REFLUX DISEASE WITHOUT ESOPHAGITIS: Primary | ICD-10-CM

## 2018-08-13 DIAGNOSIS — E78.5 HYPERLIPIDEMIA, UNSPECIFIED HYPERLIPIDEMIA TYPE: ICD-10-CM

## 2018-08-13 RX ORDER — ATORVASTATIN CALCIUM 80 MG/1
80 TABLET, FILM COATED ORAL
Qty: 90 TABLET | Refills: 0 | Status: SHIPPED | OUTPATIENT
Start: 2018-08-13 | End: 2018-11-10 | Stop reason: SDUPTHER

## 2018-08-13 NOTE — TELEPHONE ENCOUNTER
Patient called in for a refill for Protonix explained his ulcer is acting up?  Aneudy Gonzáles for refill

## 2018-08-14 RX ORDER — PANTOPRAZOLE SODIUM 40 MG/1
40 TABLET, DELAYED RELEASE ORAL DAILY
Qty: 30 TABLET | Refills: 10 | Status: SHIPPED | OUTPATIENT
Start: 2018-08-14 | End: 2018-11-06 | Stop reason: SDUPTHER

## 2018-09-07 ENCOUNTER — OFFICE VISIT (OUTPATIENT)
Dept: CARDIOLOGY CLINIC | Facility: CLINIC | Age: 78
End: 2018-09-07
Payer: MEDICARE

## 2018-09-07 VITALS
HEART RATE: 104 BPM | DIASTOLIC BLOOD PRESSURE: 80 MMHG | HEIGHT: 68 IN | OXYGEN SATURATION: 96 % | SYSTOLIC BLOOD PRESSURE: 144 MMHG | BODY MASS INDEX: 26.4 KG/M2 | WEIGHT: 174.2 LBS

## 2018-09-07 DIAGNOSIS — Z95.1 S/P CABG X 4: ICD-10-CM

## 2018-09-07 DIAGNOSIS — E78.2 MIXED HYPERLIPIDEMIA: ICD-10-CM

## 2018-09-07 DIAGNOSIS — I10 ESSENTIAL HYPERTENSION: ICD-10-CM

## 2018-09-07 DIAGNOSIS — I25.119 CORONARY ARTERY DISEASE WITH ANGINA PECTORIS, UNSPECIFIED VESSEL OR LESION TYPE, UNSPECIFIED WHETHER NATIVE OR TRANSPLANTED HEART (HCC): Primary | ICD-10-CM

## 2018-09-07 DIAGNOSIS — I25.10 CORONARY ARTERY DISEASE INVOLVING NATIVE CORONARY ARTERY OF NATIVE HEART WITHOUT ANGINA PECTORIS: ICD-10-CM

## 2018-09-07 DIAGNOSIS — I73.9 PERIPHERAL ARTERY DISEASE (HCC): ICD-10-CM

## 2018-09-07 PROCEDURE — 93000 ELECTROCARDIOGRAM COMPLETE: CPT | Performed by: INTERNAL MEDICINE

## 2018-09-07 PROCEDURE — 99214 OFFICE O/P EST MOD 30 MIN: CPT | Performed by: INTERNAL MEDICINE

## 2018-09-07 RX ORDER — ASPIRIN 81 MG/1
81 TABLET ORAL DAILY
COMMUNITY

## 2018-09-07 RX ORDER — ASCORBIC ACID 500 MG
500 POWDER IN PACKET (EA) ORAL DAILY
COMMUNITY

## 2018-09-07 NOTE — PROGRESS NOTES
Cardiology Follow Up    Nicci López  [birth year]  [id number]  [address] [facility]  [address]    1  Coronary artery disease with angina pectoris, unspecified vessel or lesion type, unspecified whether native or transplanted heart [facility])  POCT ECG       I had the pleasure of seeing Nicci López for a follow up visit  Interval History: Status post CABG    History of the presenting illness, Discussion/Summary and My Plan are as follows:    He is a pleasant 41-year-old gentleman with a history of hypertension, mild dyslipidemia, CAD, CABG, peripheral vascular disease-right superficial femoral artery stenosis for which he is being medically managed for questionable claudication symptoms  He also has a 50 pack year smoking history-quit about 7 years ago      He presented for further evaluation of increasing dyspnea with exertion, with evaluation demonstrated multivessel CAD, underwent LIMA-LAD, SVG-RCA, SVG-OM2, SVG-Diag in January 2018  ECG demonstrates normal sinus rhythm       Plan:     CAD:  Positive stress test-triple-vessel coronary artery disease, status post Coronary artery bypass grafting x 4 with left internal mammary artery to left anterior descending artery, saphenous vein graft to obtuse marginal 2, saphenous vein graft to right coronary artery and saphenous vein graft to diagonal 1  Completed cardiac rehabilitation  Making slow but steady progress with exercising by himself now     Hypertension: increase Metoprolol to 25 bid     Dyslipidemia: Jan 2018  , TG 47, HDL 83, LDL 52 -now better  High HDL-close to 90, I do not think it could be considered to be protective and likely nonfunctional  LDL mildly elevated at 120 and non HDL in the 140-150 range    Continue statin     Left leg pain with tenderness on palpation:  Rule out DVT, check Doppler    Peripheral vascular disease: 50-75% distal SFA stenosis  No change from September 2017    Follow-up in 6 months       Patient Active Problem List   Diagnosis    Abnormal liver function tests    Acute sinusitis    Degenerative lumbar spinal stenosis    Spondylolisthesis of lumbar region    CAD (coronary artery disease)    Coronary artery disease    Former tobacco use    Hyperlipidemia    Hypertension    S/P CABG x 4    Acute respiratory insufficiency, postoperative    Acute blood loss as cause of postoperative anemia    Chronic anemia    Leg pain, posterior, left    Peripheral artery disease (HCC)    GERD (gastroesophageal reflux disease)    Vasomotor rhinitis     Past Medical History:   Diagnosis Date    Abnormal liver function test     RESOLVED: [id number]    Allergic rhinitis     Anemia     LAST ASSESSED: [id number]    Arthritis     BPH (benign prostatic hyperplasia)     CAD (coronary artery disease)     Coronary artery disease     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HCC)     LAST ASSESSED: [id number]    Former tobacco use     GERD (gastroesophageal reflux disease)     Hand paresthesia     RESOLVED: [id number]    Hyperlipidemia     Hypertension     Lumbar stenosis     Peripheral artery disease ([address] )     LAST ASSESSED: [id number]     Social History     Social History    Marital status:       Spouse name: N/A    Number of children: N/A    Years of education: some college      Occupational History    Insurance      Retired      Social History Main Topics    Smoking status: Former Smoker     Quit date: 2011    Smokeless tobacco: Never Used    Alcohol use 0 6 oz/week     1 Shots of liquor per week      Comment: beer, wine, scotch every other day; SOCIAL AS PER ALL SCRIPTS     Drug use: No    Sexual activity: Not Currently     Other Topics Concern    Not on file     Social History Narrative    Daily coffee consumption       Family History   Problem Relation Age of Onset    Colon cancer Mother     Emphysema Mother  Liver disease Mother     Colon cancer Father     Coronary artery disease Father     Hypertension Father     Liver disease Father     Heart failure Father     Stroke Father         CVA     Heart attack Father     Coronary artery disease Brother     Heart disease Brother         younger brother by pass and other brother 3 stents placed    Other Family         BACK PROBLEM     Stroke Family         CVA    Emphysema Family     Hypertension Family         BENIGN     Past Surgical History:   Procedure Laterality Date    BACK SURGERY      COLONOSCOPY      LUMBAR FUSION      CA ARTHRODESIS POSTERIOR/POSTEROLATERAL LUMBAR N/A 11/3/2016    Procedure: L2-S1 POSTERIOR LUMBAR FUSION AND DECOMPRESSION (Impulse), Posterior lateral fixation; dural repair ;  Surgeon: Ekta Steward MD;  [facility];  Service: Orthopedics    CA CABG, ARTERIAL, SINGLE N/A 1/19/2018    Procedure: CABG X4 with LIMA - LAD, SVG - RCA, OM2, & Diagonal ; Left Leg EVH; MATTHEW;  Surgeon: Alessandro Lopez DO;  [facility];  Service: Cardiac Surgery    CA COLONOSCOPY FLX DX W/COLLJ SPEC WHEN PFRMD N/A 11/15/2017    Procedure: EGD AND COLONOSCOPY;  Surgeon: Chela Pandya MD;  [facility];   Service: Gastroenterology    SEPTOPLASTY      LAST ASSESSED; [id number]    TONSILECTOMY AND ADNOIDECTOMY      LAST ASSESSED: [id number]       Current Outpatient Prescriptions:     ascorbic acid (VITAMIN C) 500 mg tablet, Take 500 mg by mouth daily, Disp: , Rfl:     aspirin (ECOTRIN LOW STRENGTH) 81 mg EC tablet, Take 81 mg by mouth daily, Disp: , Rfl:     atorvastatin (LIPITOR) 80 mg tablet, TAKE 1 TABLET (80 MG TOTAL) BY MOUTH DAILY WITH DINNER, Disp: 90 tablet, Rfl: 0    cholecalciferol (VITAMIN D3) 1,000 units tablet, Take 1,000 Units by mouth daily, Disp: , Rfl:     cyanocobalamin (VITAMIN B-12) 1,000 mcg tablet, Take 1,000 mcg by mouth daily  , Disp: , Rfl:     docusate sodium (COLACE) 100 mg capsule, Take 1 capsule by mouth 2 (two) times a day for 30 days Hold for loose stools  (Patient taking differently: Take 100 mg by mouth daily as needed Hold for loose stools  ), Disp: 60 capsule, Rfl: 0    ipratropium (ATROVENT) 0 03 % nasal spray, INHALE 2 SPRAYS INTO EACH NOSTRILS EVERY 12 HRS (Patient taking differently: INHALE 2 SPRAYS INTO EACH NOSTRILS PRN), Disp: 5 mL, Rfl: 3    Multiple Vitamin (MULTIVITAMIN) capsule, Take 1 capsule by mouth daily, Disp: , Rfl:     Omega-3 Fatty Acids (OMEGA-3 FISH OIL PO), Take 3 tablets by mouth daily, Disp: , Rfl:     pantoprazole (PROTONIX) 40 mg tablet, Take 1 tablet (40 mg total) by mouth daily, Disp: 30 tablet, Rfl: 10    Probiotic Product (ALIGN PO), Take 1 tablet by mouth daily  , Disp: , Rfl:     metoprolol tartrate (LOPRESSOR) 25 mg tablet, Take 1 tablet (25 mg total) by mouth every 12 (twelve) hours Take half tablet twice daily (Patient taking differently: Take 25 mg by mouth daily Take half tablet twice daily ), Disp: 180 tablet, Rfl: 3  Allergies   Allergen Reactions    Penicillins Other (See Comments)     Hallucinations; Patient reported that he was seeing visual disturbances         Labs:  No visits with results within 2 Month(s) from this visit  Latest known visit with results is:   Appointment on 02/23/2018   Component Date Value    WBC 02/23/2018 8 22     RBC 02/23/2018 3 18*    Hemoglobin 02/23/2018 10 2*    Hematocrit 02/23/2018 32 3*    MCV 02/23/2018 102*    MCH 02/23/2018 32 1     MCHC 02/23/2018 31 6     RDW 02/23/2018 15 2*    Platelets 75/52/2520 210     MPV 02/23/2018 9 8      Imaging: No results found  Review of Systems:  Review of Systems   Constitutional: Positive for fatigue  Negative for activity change, appetite change, chills, diaphoresis and fever  HENT: Negative  Eyes: Negative  Respiratory: Negative  Cardiovascular: Negative  Gastrointestinal: Negative  Endocrine: Negative  Genitourinary: Negative  Musculoskeletal: Positive for arthralgias, back pain and gait problem  Negative for joint swelling, myalgias and neck pain  Allergic/Immunologic: Negative  Hematological: Negative  Psychiatric/Behavioral: Negative  Physical Exam:  Physical Exam   Constitutional: He is oriented to person, place, and time  He appears well-developed  HENT:   Head: Normocephalic and atraumatic  Right Ear: External ear normal    Left Ear: External ear normal    Eyes: Conjunctivae and EOM are normal  Pupils are equal, round, and reactive to light  Neck: Neck supple  No JVD present  No tracheal deviation present  No thyromegaly present  Cardiovascular: Normal rate, regular rhythm and normal heart sounds  Exam reveals no friction rub  No murmur heard  Pulmonary/Chest: Breath sounds normal  No stridor  No respiratory distress  He has no wheezes  He has no rales  Abdominal: Soft  He exhibits no distension  There is no tenderness  There is no rebound  Musculoskeletal: Normal range of motion  He exhibits no edema, tenderness or deformity  Chest incisions have healed well   Neurological: He is alert and oriented to person, place, and time  He has normal reflexes  No cranial nerve deficit  Coordination normal    Skin: Skin is warm  No rash noted  No erythema  No pallor

## 2018-09-14 ENCOUNTER — APPOINTMENT (OUTPATIENT)
Dept: LAB | Facility: CLINIC | Age: 78
End: 2018-09-14
Payer: MEDICARE

## 2018-09-14 ENCOUNTER — TRANSCRIBE ORDERS (OUTPATIENT)
Dept: LAB | Facility: CLINIC | Age: 78
End: 2018-09-14

## 2018-09-14 ENCOUNTER — TELEPHONE (OUTPATIENT)
Dept: FAMILY MEDICINE CLINIC | Facility: OTHER | Age: 78
End: 2018-09-14

## 2018-09-14 DIAGNOSIS — I10 ESSENTIAL HYPERTENSION: ICD-10-CM

## 2018-09-14 DIAGNOSIS — D64.9 CHRONIC ANEMIA: Chronic | ICD-10-CM

## 2018-09-14 LAB
ALBUMIN SERPL BCP-MCNC: 3.8 G/DL (ref 3.5–5)
ALP SERPL-CCNC: 94 U/L (ref 46–116)
ALT SERPL W P-5'-P-CCNC: 37 U/L (ref 12–78)
ANION GAP SERPL CALCULATED.3IONS-SCNC: 9 MMOL/L (ref 4–13)
AST SERPL W P-5'-P-CCNC: 37 U/L (ref 5–45)
BASOPHILS # BLD AUTO: 0.04 THOUSANDS/ΜL (ref 0–0.1)
BASOPHILS NFR BLD AUTO: 1 % (ref 0–1)
BILIRUB SERPL-MCNC: 0.8 MG/DL (ref 0.2–1)
BUN SERPL-MCNC: 26 MG/DL (ref 5–25)
CALCIUM SERPL-MCNC: 9.3 MG/DL (ref 8.3–10.1)
CHLORIDE SERPL-SCNC: 105 MMOL/L (ref 100–108)
CO2 SERPL-SCNC: 29 MMOL/L (ref 21–32)
CREAT SERPL-MCNC: 1.27 MG/DL (ref 0.6–1.3)
EOSINOPHIL # BLD AUTO: 0.14 THOUSAND/ΜL (ref 0–0.61)
EOSINOPHIL NFR BLD AUTO: 2 % (ref 0–6)
ERYTHROCYTE [DISTWIDTH] IN BLOOD BY AUTOMATED COUNT: 14.6 % (ref 11.6–15.1)
GFR SERPL CREATININE-BSD FRML MDRD: 54 ML/MIN/1.73SQ M
GLUCOSE SERPL-MCNC: 106 MG/DL (ref 65–140)
HCT VFR BLD AUTO: 37 % (ref 36.5–49.3)
HGB BLD-MCNC: 12 G/DL (ref 12–17)
IMM GRANULOCYTES # BLD AUTO: 0.03 THOUSAND/UL (ref 0–0.2)
IMM GRANULOCYTES NFR BLD AUTO: 0 % (ref 0–2)
LYMPHOCYTES # BLD AUTO: 1.86 THOUSANDS/ΜL (ref 0.6–4.47)
LYMPHOCYTES NFR BLD AUTO: 23 % (ref 14–44)
MCH RBC QN AUTO: 32.5 PG (ref 26.8–34.3)
MCHC RBC AUTO-ENTMCNC: 32.4 G/DL (ref 31.4–37.4)
MCV RBC AUTO: 100 FL (ref 82–98)
MONOCYTES # BLD AUTO: 0.65 THOUSAND/ΜL (ref 0.17–1.22)
MONOCYTES NFR BLD AUTO: 8 % (ref 4–12)
NEUTROPHILS # BLD AUTO: 5.38 THOUSANDS/ΜL (ref 1.85–7.62)
NEUTS SEG NFR BLD AUTO: 66 % (ref 43–75)
NRBC BLD AUTO-RTO: 0 /100 WBCS
PLATELET # BLD AUTO: 167 THOUSANDS/UL (ref 149–390)
PMV BLD AUTO: 10.2 FL (ref 8.9–12.7)
POTASSIUM SERPL-SCNC: 4.9 MMOL/L (ref 3.5–5.3)
PROT SERPL-MCNC: 8.4 G/DL (ref 6.4–8.2)
RBC # BLD AUTO: 3.69 MILLION/UL (ref 3.88–5.62)
SODIUM SERPL-SCNC: 143 MMOL/L (ref 136–145)
WBC # BLD AUTO: 8.1 THOUSAND/UL (ref 4.31–10.16)

## 2018-09-14 PROCEDURE — 85025 COMPLETE CBC W/AUTO DIFF WBC: CPT

## 2018-09-14 PROCEDURE — 36415 COLL VENOUS BLD VENIPUNCTURE: CPT

## 2018-09-14 PROCEDURE — 80053 COMPREHEN METABOLIC PANEL: CPT

## 2018-09-14 NOTE — TELEPHONE ENCOUNTER
Pt notified   ----- Message from Nivia Layne, [address] sent at 9/14/2018  3:08 PM EDT -----  Can we let Krissy Bravo know that his blood work results came back normal   Thanks

## 2018-09-20 ENCOUNTER — OFFICE VISIT (OUTPATIENT)
Dept: FAMILY MEDICINE CLINIC | Facility: OTHER | Age: 78
End: 2018-09-20
Payer: MEDICARE

## 2018-09-20 VITALS
BODY MASS INDEX: 26.12 KG/M2 | DIASTOLIC BLOOD PRESSURE: 84 MMHG | OXYGEN SATURATION: 96 % | SYSTOLIC BLOOD PRESSURE: 130 MMHG | HEART RATE: 102 BPM | HEIGHT: 68 IN | WEIGHT: 172.31 LBS | TEMPERATURE: 97.6 F

## 2018-09-20 DIAGNOSIS — R10.31 RIGHT INGUINAL PAIN: ICD-10-CM

## 2018-09-20 DIAGNOSIS — M43.16 SPONDYLOLISTHESIS OF LUMBAR REGION: ICD-10-CM

## 2018-09-20 DIAGNOSIS — M79.651 RIGHT THIGH PAIN: Primary | ICD-10-CM

## 2018-09-20 DIAGNOSIS — M48.061 SPINAL STENOSIS OF LUMBAR REGION, UNSPECIFIED WHETHER NEUROGENIC CLAUDICATION PRESENT: ICD-10-CM

## 2018-09-20 DIAGNOSIS — I73.9 PERIPHERAL ARTERY DISEASE (HCC): ICD-10-CM

## 2018-09-20 PROCEDURE — 99214 OFFICE O/P EST MOD 30 MIN: CPT | Performed by: NURSE PRACTITIONER

## 2018-09-20 NOTE — PROGRESS NOTES
Assessment/Plan:         Problem List Items Addressed This Visit     Chronic intermittent right inguinal + thigh pain in patient with history of lumbar spondylolisthesis/stenosis/DDD/fusion + PAD  --Symptoms most consistent with meralgia parasthetica  Less likely causes including worsening of lumbar radiculopathy, LE peripheral arterial disease, hip pathology etc   No evidence of groin strain, hernia on exam    --Handout given  Advise moist heat, avoidance of potentially exacerbating activities    --PT/ortho referral     --Will hold on imaging at this time, but may warrant repeat lumbar, LE, hip imaging if symptoms continue or worsening-->patient advised to call  Relevant Orders    Ambulatory referral to Orthopedic Surgery    Ambulatory referral to Physical Therapy            Subjective:      Patient ID: Nicci López is a 66 y o  male  Appointment was scheduled as a routine follow-up, but he instead wants to address a specific issues  He notes chronic (3-6 months) intermittent right groin pain radiating to right anterior thigh  Felt mainly with bending forward, not with straining/exertion, ambulation  Pain described as sharp, shooting  No radiation below knee  No associated fever/chills, rash, bulge, mass  No known precipitating injury  Seems to be worse when he is wearing tight underwear  No increase in his baseline chronic bilateral lower back pain or left leg cramping  Does note some increase in baseline leg weakness over the past few months  No numbness/tingling, swelling  No bowel/bladder changes  Lumbar films 2017, MRI 2016  Hx spinal fusion  LE arterial doppler 4/2018-->showed continued diffuse atherosclerotic disease of both LE's throughout femoral and popliteal arteries, with 50-75% stenosis of right distal superficial femoral artery-->no change from previous  Rates overall pain 1/10 at present  No analgesics, ice/heat  Completed cardiac rehab in June   Does exercises, including lower extremity, on his own  Vick Feather a week ago  Abrasion right forehead, bruised right anterior rib, now better  No LOC  No falls since  No regular use of cane, other assistive device  The following portions of the patient's history were reviewed and updated as appropriate: allergies, current medications, past family history, past medical history, past social history, past surgical history and problem list     Review of Systems   Constitutional: Negative for fever  Cardiovascular: Negative for chest pain and leg swelling  Gastrointestinal: Negative for abdominal pain, nausea and vomiting  Genitourinary: Negative for difficulty urinating and dysuria  Musculoskeletal:        Per HPI   Skin: Negative for rash  Neurological: Positive for weakness  Negative for dizziness and numbness  Psychiatric/Behavioral: Negative for confusion  Objective:      /84 (BP Location: Left arm, Patient Position: Sitting, Cuff Size: Adult)   Pulse 102   Temp 97 6 °F (36 4 °C) (Tympanic)   Ht 5' 7 5" (1 715 m)   Wt 78 2 kg (172 lb 5 oz)   SpO2 96%   BMI 26 59 kg/m²          Physical Exam   Constitutional: He is oriented to person, place, and time  He appears well-developed  No distress  HENT:   Head: Normocephalic  Superficial abrasion covered by small bandaid, right forehead  Cardiovascular: Normal rate and normal heart sounds  Pulmonary/Chest: Effort normal and breath sounds normal    Right lower anterior chest with small (3cm) area of superficial bruising without underlying tenderness or palpable abnormality  Remainder of ribs non-tender, with normal appearance  Abdominal: Hernia confirmed negative in the right inguinal area and confirmed negative in the left inguinal area  Genitourinary: Testes normal    Musculoskeletal: Normal range of motion  He exhibits tenderness and deformity  He exhibits no edema     Area of symptomatology (right mid/lower inguinal crease + anterior thigh) with mild tenderness of crease only, no visualized or palpable abnormality  No mass/bulge felt at rest or with Valsalva  Largely normal hip ROM, with mild reproduction of symptoms at limits of internal rotation  No reproduction of pain with resisted left leg flexion  Lumbar spine with benign appearing scar, non-tender  Negative SLR  Neurological: He is alert and oriented to person, place, and time  He has normal reflexes  He displays normal reflexes  He exhibits normal muscle tone  Coordination normal    5/5 :E strength bilateral (quad, plantar, great toe)  Skin: Rash noted  He is not diaphoretic  Psychiatric: He has a normal mood and affect

## 2018-10-03 ENCOUNTER — EVALUATION (OUTPATIENT)
Dept: PHYSICAL THERAPY | Facility: REHABILITATION | Age: 78
End: 2018-10-03
Payer: MEDICARE

## 2018-10-03 DIAGNOSIS — M79.651 RIGHT THIGH PAIN: Primary | ICD-10-CM

## 2018-10-03 DIAGNOSIS — R10.31 RIGHT INGUINAL PAIN: ICD-10-CM

## 2018-10-03 PROCEDURE — G8978 MOBILITY CURRENT STATUS: HCPCS | Performed by: PHYSICAL THERAPIST

## 2018-10-03 PROCEDURE — 97110 THERAPEUTIC EXERCISES: CPT | Performed by: PHYSICAL THERAPIST

## 2018-10-03 PROCEDURE — G8979 MOBILITY GOAL STATUS: HCPCS | Performed by: PHYSICAL THERAPIST

## 2018-10-03 PROCEDURE — 97161 PT EVAL LOW COMPLEX 20 MIN: CPT | Performed by: PHYSICAL THERAPIST

## 2018-10-03 NOTE — PROGRESS NOTES
PT Evaluation     Today's date: 10/3/2018  Patient name: Milagro Cloud  : [birth year]  MRN: [id number]  Referring provider: KEVIN Valdez  Dx:   Encounter Diagnosis     ICD-10-CM    1  Right thigh pain M79 651    2  Right inguinal pain R10 31        Start Time: 815  Stop Time: 445  Total time in clinic (min): 50 minutes    Assessment  Impairments: abnormal muscle firing, abnormal or restricted ROM, activity intolerance, impaired physical strength, lacks appropriate home exercise program and pain with function    Assessment details: Milagro Cloud is a 66 y o  male present with:   Right thigh pain  (primary encounter diagnosis)  Right inguinal pain    Suhail's presentation consistent with femoral N impingement under inguinal ligament, given provocation of symptoms as well as current presentation  Will benefit from stretching of surrounding musculature to reduce pressure upon the nerve, after today's session had pt repeat his seated forward flexion, reported no pain with motion  Will benefit from additional therapy to address the above listed impairments and will benefit from skilled Physical Therapist management to improve deficits to return to prior level of function     Understanding of Dx/Px/POC: good   Prognosis: good    Goals  Impairment Goals  - Decrease pain 0/10  - Improve ROM to 100 degrees knee flexion from 90* flexion during ely's test  - Increase strength to 5/5 throughout    Functional Goals  - Return to Prior Level of Function  - Increase Functional Status Measure to: 64  - Patient will be independent with HEP  -Patient will be able to return to regular gym routine without pain    Plan  Patient would benefit from: skilled PT  Planned therapy interventions: joint mobilization, manual therapy, patient education, postural training, activity modification, abdominal trunk stabilization, body mechanics training, flexibility, functional ROM exercises, graded exercise, home exercise program, neuromuscular re-education, strengthening, stretching, therapeutic activities and therapeutic exercise  Frequency: 2x week  Duration in weeks: 6  Treatment plan discussed with: patient  Plan details: Thank you for opportunity to participate in the care of More Matos  Subjective Evaluation    History of Present Illness  Mechanism of injury: More Maots is a 66 y o  male presenting with right thigh pain starting 2 years ago after his back surgery notes 75% occlusion femoral artery noted on right "there not doing anything for it other than medications"  Pt reports pain whenever bending forward while sitting  Notes the pain comes and goes "instantaneously"  Notes when his briefs are tight "the leg hole" can cause some discomfort in groin, rarely in the thigh, "only if I bend forward to tie my shoes"  In the groin it's a 5, in the thigh it's an 11  L2-S1 fused, one of them was protruding 75% able to correct to 50% anteriorlisthesis  Pain  Current pain ratin  At worst pain rating: 10  Location: R groin and anteriolateral thigh  Quality: burning  Relieving factors: rest  Progression: no change    Social Support    Employment status: not working  Patient Goals  Patient goals for therapy: decreased pain, increased motion and increased strength  Patient goal: I want to walk and stand longer than I have  Objective     Special Questions  Negative for bladder dysfunction, bowel dysfunction and saddle (S4) numbness    Palpation   Left   Hypertonic in the external abdominal oblique  Tenderness of the external abdominal oblique  Right   Hypertonic in the external abdominal oblique  Tenderness of the external abdominal oblique       Additional Palpation Details  Exquisite tenderness noted over bilateral internal oblique ligaments    Active Range of Motion     Additional Active Range of Motion Details  50% rotation L and R    Forward flexion 5" with in touch ground, majority of motion from hips, no curve reversal within lumbar spine due to fusion  Strength/Myotome Testing     Left Hip   Planes of Motion   Flexion: 4+  Adduction: 4    Right Hip   Planes of Motion   Flexion: 5  Adduction: 4    Left Knee   Extension: 5    Right Knee   Extension: 5    Left Ankle/Foot   Plantar flexion: 5    Right Ankle/Foot   Plantar flexion: 5    Tests     Lumbar     Left   Positive femoral stretch  Negative sural bias and tibial bias  Right   Positive femoral stretch  Negative sural bias and tibial bias  Left Hip   Positive Ely's, JUSTO and femoral nerve tension  90/90 SLR: Negative  Right Hip   Positive Ely's, JUSTO and femoral nerve tension  90/90 SLR: Negative        Flowsheet Rows      Most Recent Value   PT/OT G-Codes   Current Score  50   Projected Score  61   FOTO information reviewed  Yes   Assessment Type  Evaluation   G code set  Mobility: Walking & Moving Around   Mobility: Walking and Moving Around Current Status ()  CK   Mobility: Walking and Moving Around Goal Status ()  CJ          Precautions: CAD, HTN, PAD, 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    Daily Treatment Diary     Manual  10/3            TPR external oblique 8'            Passive QS nv            Passive Adductor stretch nv                                          Exercise Diary  10/3            SL quad stretch 5x10"            Standing adductor stretch 5x10"            Standing hip flexor stretch 5x10"                                                                                                                                     Modalities

## 2018-10-05 ENCOUNTER — OFFICE VISIT (OUTPATIENT)
Dept: PHYSICAL THERAPY | Facility: REHABILITATION | Age: 78
End: 2018-10-05
Payer: MEDICARE

## 2018-10-05 DIAGNOSIS — R10.31 RIGHT INGUINAL PAIN: ICD-10-CM

## 2018-10-05 DIAGNOSIS — M79.651 RIGHT THIGH PAIN: Primary | ICD-10-CM

## 2018-10-05 PROCEDURE — 97140 MANUAL THERAPY 1/> REGIONS: CPT | Performed by: PHYSICAL THERAPIST

## 2018-10-05 PROCEDURE — 97110 THERAPEUTIC EXERCISES: CPT | Performed by: PHYSICAL THERAPIST

## 2018-10-05 NOTE — PROGRESS NOTES
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    Daily Treatment Diary     Manual  10/3 10/5           TPR external oblique 8' 12' total           Passive QS nv Done           Passive Adductor stretch nv Done                                         Exercise Diary  10/3 10/5           SL quad stretch 5x10" 5x10''           Standing adductor stretch 5x10" 5x10''           Standing hip flexor stretch 5x10" 5x10''           Bridges with PPT  3x10, :05                                                                                                                       Modalities                                                             Assessment: Tolerated treatment well  Patient demonstrated fatigue post treatment, exhibited good technique with therapeutic exercises and would benefit from continued PT Patient denies any increased pain with any TE performed with patient stating "the gentle stretching helps " LOB noted 1x after completion of standing hip flexor stretch, assistance from therapist required  Patient denies increased dizziness with patient stating "i keep losing my balance, it's my darn legs " Patient reports falling three weeks ago secondary to tripping over something  Plan: Continue per plan of care  Progress treatment as tolerated

## 2018-10-08 ENCOUNTER — OFFICE VISIT (OUTPATIENT)
Dept: PHYSICAL THERAPY | Facility: REHABILITATION | Age: 78
End: 2018-10-08
Payer: MEDICARE

## 2018-10-08 DIAGNOSIS — M79.651 RIGHT THIGH PAIN: Primary | ICD-10-CM

## 2018-10-08 DIAGNOSIS — R10.31 RIGHT INGUINAL PAIN: ICD-10-CM

## 2018-10-08 PROCEDURE — 97140 MANUAL THERAPY 1/> REGIONS: CPT

## 2018-10-08 PROCEDURE — 97110 THERAPEUTIC EXERCISES: CPT

## 2018-10-08 NOTE — PROGRESS NOTES
Daily Note     Today's date: 10/8/2018  Patient name: Segun Torres  :   MRN: [id number]  Referring provider: KEVIN Tyson  Dx:   Encounter Diagnosis     ICD-10-CM    1  Right thigh pain M79 651    2  Right inguinal pain R10 31        Start Time: 845  Stop Time: 920  Total time in clinic (min): 35 minutes    Subjective: Patient reports that no pain since last session  Patient reports occasional pain when bending over to tie his shoe  Patient states, "My lower back tends to hurt because of all the hardware in it and the 5 vertebrae fused "    Objective: See treatment diary below  Patient did require a standing break after table stretches and half way through manuals secondary to LBP  Precautions: CAD, HTN, PAD, 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    Daily Treatment Diary     Manual  10/3 10/5 10/8          TPR external oblique 8' 12' total 12'          Passive QS nv Done Done          Passive Adductor stretch nv Done Done                                      Exercise Diary  10/3 10/5 10/8          SL quad stretch 5x10" 5x10'' 5x10"          Standing adductor stretch 5x10" 5x10'' 5x10''          Standing hip flexor stretch 5x10" 5x10'' 5x10"          Bridges with PPT  3x10, :05 3x10  :05                                                                                                                      Modalities                                                       Assessment: Tolerated treatment well  Patient demonstrated fatigue post treatment, exhibited good technique with therapeutic exercises and would benefit from continued PT    Plan: Continue per plan of care  Progress treatment as tolerated

## 2018-10-09 ENCOUNTER — APPOINTMENT (OUTPATIENT)
Dept: PHYSICAL THERAPY | Facility: REHABILITATION | Age: 78
End: 2018-10-09
Payer: MEDICARE

## 2018-10-11 ENCOUNTER — OFFICE VISIT (OUTPATIENT)
Dept: PHYSICAL THERAPY | Facility: REHABILITATION | Age: 78
End: 2018-10-11
Payer: MEDICARE

## 2018-10-11 DIAGNOSIS — M79.651 RIGHT THIGH PAIN: Primary | ICD-10-CM

## 2018-10-11 DIAGNOSIS — R10.31 RIGHT INGUINAL PAIN: ICD-10-CM

## 2018-10-11 PROCEDURE — 97110 THERAPEUTIC EXERCISES: CPT

## 2018-10-11 PROCEDURE — 97140 MANUAL THERAPY 1/> REGIONS: CPT

## 2018-10-11 NOTE — PROGRESS NOTES
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    Daily Treatment Diary     Manual  10/3 10/5 10/8 10/11         TPR external oblique 8' 12' total 12' 12' total         Passive QS nv Done Done Done         Passive Adductor stretch nv Done Done Done                                     Exercise Diary  10/3 10/5 10/8 10/11         SL quad stretch 5x10" 5x10'' 5x10" 5x10''         Standing adductor stretch 5x10" 5x10'' 5x10'' 5x10''         Standing hip flexor stretch 5x10" 5x10'' 5x10" 5x10''         Bridges with PPT  3x10, :05 3x10  :05 3x10 :05         TG squats     L16 3x10                                                                                                        Modalities                                                           Assessment: Tolerated treatment well  Patient demonstrated fatigue post treatment, exhibited good technique with therapeutic exercises and would benefit from continued PT Patient reports "feeling better" after session today  Occasional cues required for "gentle" stretching  Instability noted with completion of hip flexor stretch, cues required for UE for support for balance  Plan: Continue per plan of care  Progress treatment as tolerated

## 2018-10-16 ENCOUNTER — OFFICE VISIT (OUTPATIENT)
Dept: PHYSICAL THERAPY | Facility: REHABILITATION | Age: 78
End: 2018-10-16
Payer: MEDICARE

## 2018-10-16 DIAGNOSIS — R10.31 RIGHT INGUINAL PAIN: ICD-10-CM

## 2018-10-16 DIAGNOSIS — M79.651 RIGHT THIGH PAIN: Primary | ICD-10-CM

## 2018-10-16 PROCEDURE — 97110 THERAPEUTIC EXERCISES: CPT | Performed by: PHYSICAL THERAPIST

## 2018-10-16 PROCEDURE — 97140 MANUAL THERAPY 1/> REGIONS: CPT | Performed by: PHYSICAL THERAPIST

## 2018-10-16 NOTE — PROGRESS NOTES
Daily Note     Today's date: 10/16/2018  Patient name: Lois Bone  :   MRN: [id number]  Referring provider: KEVIN Almaguer  Dx:   Encounter Diagnosis     ICD-10-CM    1  Right thigh pain M79 651    2  Right inguinal pain R10 31                   Subjective: Sole Barrientos reports that his hip feels about the same  Objective: See treatment diary below      Assessment: Tolerated treatment well  Patient demonstrated fatigue post treatment, exhibited good technique with therapeutic exercises and would benefit from continued PT  Patient educated on importance of purchasing a different style/brand of underwear to avoid compression from waste band  Instructed to avoid forward ball roll stretches at home  Plan: Continue per plan of care        Precautions: CAD, HTN, PAD, 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    Daily Treatment Diary     Manual  10/3 10/5 10/8 10/11 10/16        TPR external oblique 8' 12' total 12' 12' total 12'        Passive QS nv Done Done Done done        Passive Adductor stretch nv Done Done Done done                                    Exercise Diary  10/3 10/5 10/8 10/11 10/16        Bike      5'        SL quad stretch 5x10" 5x10'' 5x10" 5x10''         Standing adductor stretch 5x10" 5x10'' 5x10'' 5x10'' 5x10"        Standing hip flexor stretch 5x10" 5x10'' 5x10" 5x10'' 5x10"        Bridges with PPT  3x10, :05 3x10  :05 3x10 :05 3x15        TG squats     L16 3x10 L 18 3x12                                                                                                       Modalities

## 2018-10-19 ENCOUNTER — OFFICE VISIT (OUTPATIENT)
Dept: PHYSICAL THERAPY | Facility: REHABILITATION | Age: 78
End: 2018-10-19
Payer: MEDICARE

## 2018-10-19 DIAGNOSIS — M79.651 RIGHT THIGH PAIN: Primary | ICD-10-CM

## 2018-10-19 DIAGNOSIS — R10.31 RIGHT INGUINAL PAIN: ICD-10-CM

## 2018-10-19 PROCEDURE — 97140 MANUAL THERAPY 1/> REGIONS: CPT | Performed by: PHYSICAL THERAPIST

## 2018-10-19 PROCEDURE — G8979 MOBILITY GOAL STATUS: HCPCS | Performed by: PHYSICAL THERAPIST

## 2018-10-19 PROCEDURE — 97112 NEUROMUSCULAR REEDUCATION: CPT | Performed by: PHYSICAL THERAPIST

## 2018-10-19 PROCEDURE — G8980 MOBILITY D/C STATUS: HCPCS | Performed by: PHYSICAL THERAPIST

## 2018-10-19 PROCEDURE — 97110 THERAPEUTIC EXERCISES: CPT | Performed by: PHYSICAL THERAPIST

## 2018-10-19 NOTE — PROGRESS NOTES
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    Daily Treatment Diary     Manual  10/3 10/5 10/8 10/11 10/16 10/19       TPR external oblique 8' 12' total 12' 12' total 12'        Passive QS nv Done Done Done done        Passive Adductor stretch nv Done Done Done done                                    Exercise Diary  10/3 10/5 10/8 10/11 10/16 10/19       Bike      5' 5'       SL quad stretch 5x10" 5x10'' 5x10" 5x10''         Standing adductor stretch 5x10" 5x10'' 5x10'' 5x10'' 5x10" 5x10''       Standing hip flexor stretch 5x10" 5x10'' 5x10" 5x10'' 5x10" 5x10''       Bridges with PPT  3x10, :05 3x10  :05 3x10 :05 3x15 3x15       TG squats     L16 3x10 L 18 3x12 L18 3x10                                                                                                      Modalities                                                           Assessment: Tolerated treatment {Tolerated treatment :}   Patient {assessment:}      Plan: {PLAN:[id number]}

## 2018-10-19 NOTE — PROGRESS NOTES
PT Discharge    Today's date: 10/19/2018  Patient name: Edinson Bautista  :   MRN: [id number]  Referring provider: KEVIN Vidales  Dx:   Encounter Diagnosis     ICD-10-CM    1  Right thigh pain M79 651    2  Right inguinal pain R10 31        Start Time: 830  Stop Time: 912  Total time in clinic (min): 42 minutes    Assessment  Impairments: abnormal muscle firing, abnormal or restricted ROM, activity intolerance, impaired physical strength, lacks appropriate home exercise program and pain with function    Assessment details: Edinson Bautista is a 66 y o  male present with:   Right thigh pain  (primary encounter diagnosis)  Right inguinal pain    Suhail's overall presentation is still the same  He has made improvements in objective testing and subjective reporting  However he still continues to be able to reproduce the same intensity of pain with forward flexion  Patient has just ordered new underwear with a lower pressure from elastic band on his waist   At this time patient is independent in his home exercise programing and feels confident in independent progression, therefore, Bryan Bray will be discharged from PT at this time       Understanding of Dx/Px/POC: good   Prognosis: good    Goals  Impairment Goals  - Decrease pain 0/10 met at rest  - Improve ROM to 100 degrees knee flexion from 90* flexion during ely's test not met 95* currently  - Increase strength to 5/5 throughout not met    Functional Goals  - Return to Prior Level of Function  - Increase Functional Status Measure to: 64 not met  - Patient will be independent with HEP met  -Patient will be able to return to regular gym routine without pain met    Plan  Patient would benefit from: skilled PT  Planned therapy interventions: joint mobilization, manual therapy, patient education, postural training, activity modification, abdominal trunk stabilization, body mechanics training, flexibility, functional ROM exercises, graded exercise, home exercise program, neuromuscular re-education, strengthening, stretching, therapeutic activities and therapeutic exercise  Treatment plan discussed with: patient  Plan details: Thank you for opportunity to participate in the care of Grant Melton  Subjective Evaluation    History of Present Illness  Mechanism of injury: Bryan Bojorquez has been seen for total of 5 visits for OP P   Patient rates overall improvement since beginning PT 25%  Patient's global rating of change is " A little bit better (2) " Patient reports improvements with stretching  Patient reports most difficulty with bending forward to  Tie shoes, reports the pain is instantaneous with the motion  "goes away right away too" the stretching has definitely helped  I have ordered new underwear that should be here today "   Pain  Current pain ratin  At worst pain rating: 10  Location: R groin and anteriolateral thigh  Quality: burning  Relieving factors: rest  Progression: no change    Social Support    Employment status: not working  Patient Goals  Patient goals for therapy: decreased pain, increased motion and increased strength  Patient goal: I want to walk and stand longer than I have  Objective     Special Questions  Negative for bladder dysfunction, bowel dysfunction and saddle (S4) numbness    Palpation   Left   Hypertonic in the external abdominal oblique  Right   Hypertonic in the external abdominal oblique  Additional Palpation Details  Exquisite tenderness noted over bilateral external oblique ligaments    No tenderness noted over either oblique ligament this session  10/19/18     Active Range of Motion     Additional Active Range of Motion Details  50% rotation L and R    Forward flexion 5" with in touch ground, majority of motion from hips, no curve reversal within lumbar spine due to fusion       Strength/Myotome Testing     Left Hip   Planes of Motion   Flexion: 5  Adduction: 4    Right Hip   Planes of Motion   Flexion: 5  Adduction: 4    Left Knee   Extension: 5    Right Knee   Extension: 5    Left Ankle/Foot   Plantar flexion: 5    Right Ankle/Foot   Plantar flexion: 5    Tests     Lumbar     Left   Positive femoral stretch  Negative sural bias and tibial bias  Right   Positive femoral stretch  Negative sural bias and tibial bias  Left Hip   Positive Ely's and JUSTO  Negative femoral nerve tension  90/90 SLR: Negative  Right Hip   Positive Ely's and JUSTO  Negative femoral nerve tension  90/90 SLR: Negative      Additional Tests Details  No pain with femoral tensioning 10/19/18       Flowsheet Rows      Most Recent Value   PT/OT G-Codes   Current Score  48   Projected Score  61   FOTO information reviewed  Yes   Assessment Type  Discharge   G code set  Mobility: Walking & Moving Around   Mobility: Walking and Moving Around Goal Status ()  CJ   Mobility: Walking and Moving Around Discharge Status ()  CK        Precautions: CAD, HTN, PAD, 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     Daily Treatment Diary      Manual  10/3 10/5 10/8 10/11 10/16 10/19           TPR external oblique 8' 12' total 12' 12' total 12'  8'           Passive QS nv Done Done Done done  done           Passive Adductor stretch nv Done Done Done done  done                                                              Exercise Diary  10/3 10/5 10/8 10/11 10/16 10/19           Bike          5' 5'           SL quad stretch 5x10" 5x10'' 5x10" 5x10''               Standing adductor stretch 5x10" 5x10'' 5x10'' 5x10'' 5x10" 5x10''           Standing hip flexor stretch 5x10" 5x10'' 5x10" 5x10'' 5x10" 5x10''           Bridges with PPT   3x10, :05 3x10  :05 3x10 :05 3x15 3x15           TG squats        L16 3x10 L 18 3x12 L18 3x10                                                                                                                                                                                         Modalities

## 2018-10-25 ENCOUNTER — OFFICE VISIT (OUTPATIENT)
Dept: FAMILY MEDICINE CLINIC | Facility: OTHER | Age: 78
End: 2018-10-25
Payer: MEDICARE

## 2018-10-25 VITALS
DIASTOLIC BLOOD PRESSURE: 82 MMHG | TEMPERATURE: 97.7 F | HEIGHT: 67 IN | HEART RATE: 84 BPM | OXYGEN SATURATION: 97 % | WEIGHT: 174 LBS | SYSTOLIC BLOOD PRESSURE: 122 MMHG | BODY MASS INDEX: 27.31 KG/M2

## 2018-10-25 DIAGNOSIS — Z23 NEED FOR VACCINATION: ICD-10-CM

## 2018-10-25 DIAGNOSIS — Z95.1 S/P CABG X 4: ICD-10-CM

## 2018-10-25 DIAGNOSIS — M48.061 SPINAL STENOSIS OF LUMBAR REGION, UNSPECIFIED WHETHER NEUROGENIC CLAUDICATION PRESENT: ICD-10-CM

## 2018-10-25 DIAGNOSIS — M43.16 SPONDYLOLISTHESIS OF LUMBAR REGION: ICD-10-CM

## 2018-10-25 DIAGNOSIS — I73.9 PERIPHERAL ARTERY DISEASE (HCC): ICD-10-CM

## 2018-10-25 DIAGNOSIS — K21.9 GASTROESOPHAGEAL REFLUX DISEASE, ESOPHAGITIS PRESENCE NOT SPECIFIED: ICD-10-CM

## 2018-10-25 DIAGNOSIS — I10 ESSENTIAL HYPERTENSION: ICD-10-CM

## 2018-10-25 DIAGNOSIS — Z00.00 MEDICARE ANNUAL WELLNESS VISIT, SUBSEQUENT: Primary | ICD-10-CM

## 2018-10-25 DIAGNOSIS — J30.0 VASOMOTOR RHINITIS: ICD-10-CM

## 2018-10-25 DIAGNOSIS — E78.2 MIXED HYPERLIPIDEMIA: ICD-10-CM

## 2018-10-25 DIAGNOSIS — Z13.1 SCREENING FOR DIABETES MELLITUS: ICD-10-CM

## 2018-10-25 DIAGNOSIS — I25.10 CORONARY ARTERY DISEASE INVOLVING NATIVE CORONARY ARTERY OF NATIVE HEART WITHOUT ANGINA PECTORIS: ICD-10-CM

## 2018-10-25 DIAGNOSIS — M48.061 DEGENERATIVE LUMBAR SPINAL STENOSIS: ICD-10-CM

## 2018-10-25 DIAGNOSIS — M79.651 RIGHT THIGH PAIN: ICD-10-CM

## 2018-10-25 DIAGNOSIS — Z23 IMMUNIZATION DUE: ICD-10-CM

## 2018-10-25 DIAGNOSIS — Z23 NEED FOR TDAP VACCINATION: ICD-10-CM

## 2018-10-25 PROBLEM — D62 ACUTE BLOOD LOSS AS CAUSE OF POSTOPERATIVE ANEMIA: Status: RESOLVED | Noted: 2018-01-23 | Resolved: 2018-10-25

## 2018-10-25 PROBLEM — J95.89 ACUTE RESPIRATORY INSUFFICIENCY, POSTOPERATIVE: Status: RESOLVED | Noted: 2018-01-22 | Resolved: 2018-10-25

## 2018-10-25 PROBLEM — D64.9 CHRONIC ANEMIA: Chronic | Status: RESOLVED | Noted: 2018-01-23 | Resolved: 2018-10-25

## 2018-10-25 PROCEDURE — 90715 TDAP VACCINE 7 YRS/> IM: CPT

## 2018-10-25 PROCEDURE — 99214 OFFICE O/P EST MOD 30 MIN: CPT | Performed by: NURSE PRACTITIONER

## 2018-10-25 PROCEDURE — G0439 PPPS, SUBSEQ VISIT: HCPCS | Performed by: NURSE PRACTITIONER

## 2018-10-25 PROCEDURE — 90471 IMMUNIZATION ADMIN: CPT

## 2018-10-25 PROCEDURE — G0008 ADMIN INFLUENZA VIRUS VAC: HCPCS

## 2018-10-25 PROCEDURE — 90662 IIV NO PRSV INCREASED AG IM: CPT

## 2018-10-25 NOTE — PATIENT INSTRUCTIONS
Wellness Visit for Adults   WHAT YOU NEED TO KNOW:   What is a wellness visit? A wellness visit is when you see your healthcare provider to get screened for health problems  You can also get advice on how to stay healthy  Write down your questions so you remember to ask them  Ask your healthcare provider how often you should have a wellness visit  What happens at a wellness visit? Your healthcare provider will ask about your health, and your family history of health problems  This includes high blood pressure, heart disease, and cancer  He or she will ask if you have symptoms that concern you, if you smoke, and about your mood  You may also be asked about your intake of medicines, supplements, food, and alcohol  Any of the following may be done:  · Your weight  will be checked  Your height may also be checked so your body mass index (BMI) can be calculated  Your BMI shows if you are at a healthy weight  · Your blood pressure  and heart rate will be checked  Your temperature may also be checked  · Blood and urine tests  may be done  Blood tests may be done to check your cholesterol levels  Abnormal cholesterol levels increase your risk for heart disease and stroke  You may also need a blood or urine test to check for diabetes if you are at increased risk  Urine tests may be done to look for signs of an infection or kidney disease  · A physical exam  includes checking your heartbeat and lungs with a stethoscope  Your healthcare provider may also check your skin to look for sun damage  · Screening tests  may be recommended  A screening test is done to check for diseases that may not cause symptoms  The screening tests you may need depend on your age, gender, family history, and lifestyle habits  For example, colorectal screening may be recommended if you are 48years old or older  What screening tests do I need if I am a woman? · A Pap smear  is used to screen for cervical cancer   Pap smears are usually done every 3 to 5 years depending on your age  You may need them more often if you have had abnormal Pap smear test results in the past  Ask your healthcare provider how often you should have a Pap smear  · A mammogram  is an x-ray of your breasts to screen for breast cancer  Experts recommend mammograms every 2 years starting at age 48 years  You may need a mammogram at age 52 years or younger if you have an increased risk for breast cancer  Talk to your healthcare provider about when you should start having mammograms and how often you need them  What vaccines might I need? · Get an influenza vaccine  every year  The influenza vaccine protects you from the flu  Several types of viruses cause the flu  The viruses change over time, so new vaccines are made each year  · Get a tetanus-diphtheria (Td) booster vaccine  every 10 years  This vaccine protects you against tetanus and diphtheria  Tetanus is a severe infection that may cause painful muscle spasms and lockjaw  Diphtheria is a severe bacterial infection that causes a thick covering in the back of your mouth and throat  · Get a human papillomavirus (HPV) vaccine  if you are female and aged 23 to 32 or male 23 to 24 and never received it  This vaccine protects you from HPV infection  HPV is the most common infection spread by sexual contact  HPV may also cause vaginal, penile, and anal cancers  · Get a pneumococcal vaccine  if you are aged 72 years or older  The pneumococcal vaccine is an injection given to protect you from pneumococcal disease  Pneumococcal disease is an infection caused by pneumococcal bacteria  The infection may cause pneumonia, meningitis, or an ear infection  · Get a shingles vaccine  if you are aged 61 or older, even if you have had shingles before  The shingles vaccine is an injection to protect you from the varicella-zoster virus  This is the same virus that causes chickenpox   Shingles is a painful rash that develops in people who had chickenpox or have been exposed to the virus  How can I eat healthy? My Plate is a model for planning healthy meals  It shows the types and amounts of foods that should go on your plate  Fruits and vegetables make up about half of your plate, and grains and protein make up the other half  A serving of dairy is included on the side of your plate  The amount of calories and serving sizes you need depends on your age, gender, weight, and height  Examples of healthy foods are listed below:  · Eat a variety of vegetables  such as dark green, red, and orange vegetables  You can also include canned vegetables low in sodium (salt) and frozen vegetables without added butter or sauces  · Eat a variety of fresh fruits , canned fruit in 100% juice, frozen fruit, and dried fruit  · Include whole grains  At least half of the grains you eat should be whole grains  Examples include whole-wheat bread, wheat pasta, brown rice, and whole-grain cereals such as oatmeal     · Eat a variety of protein foods such as seafood (fish and shellfish), lean meat, and poultry without skin (turkey and chicken)  Examples of lean meats include pork leg, shoulder, or tenderloin, and beef round, sirloin, tenderloin, and extra lean ground beef  Other protein foods include eggs and egg substitutes, beans, peas, soy products, nuts, and seeds  · Choose low-fat dairy products such as skim or 1% milk or low-fat yogurt, cheese, and cottage cheese  · Limit unhealthy fats  such as butter, hard margarine, and shortening  How much exercise do I need? Exercise at least 30 minutes per day on most days of the week  Some examples of exercise include walking, biking, dancing, and swimming  You can also fit in more physical activity by taking the stairs instead of the elevator or parking farther away from stores  Include muscle strengthening activities 2 days each week  Regular exercise provides many health benefits  It helps you manage your weight, and decreases your risk for type 2 diabetes, heart disease, stroke, and high blood pressure  Exercise can also help improve your mood  Ask your healthcare provider about the best exercise plan for you  What are some general health and safety guidelines I should follow? · Do not smoke  Nicotine and other chemicals in cigarettes and cigars can cause lung damage  Ask your healthcare provider for information if you currently smoke and need help to quit  E-cigarettes or smokeless tobacco still contain nicotine  Talk to your healthcare provider before you use these products  · Limit alcohol  A drink of alcohol is 12 ounces of beer, 5 ounces of wine, or 1½ ounces of liquor  · Lose weight, if needed  Being overweight increases your risk of certain health conditions  These include heart disease, high blood pressure, type 2 diabetes, and certain types of cancer  · Protect your skin  Do not sunbathe or use tanning beds  Use sunscreen with a SPF 15 or higher  Apply sunscreen at least 15 minutes before you go outside  Reapply sunscreen every 2 hours  Wear protective clothing, hats, and sunglasses when you are outside  · Drive safely  Always wear your seatbelt  Make sure everyone in your car wears a seatbelt  A seatbelt can save your life if you are in an accident  Do not use your cell phone when you are driving  This could distract you and cause an accident  Pull over if you need to make a call or send a text message  · Practice safe sex  Use latex condoms if are sexually active and have more than one partner  Your healthcare provider may recommend screening tests for sexually transmitted infections (STIs)  · Wear helmets, lifejackets, and protective gear  Always wear a helmet when you ride a bike or motorcycle, go skiing, or play sports that could cause a head injury  Wear protective equipment when you play sports   Wear a lifejacket when you are on a boat or doing water sports  CARE AGREEMENT:   You have the right to help plan your care  Learn about your health condition and how it may be treated  Discuss treatment options with your caregivers to decide what care you want to receive  You always have the right to refuse treatment  The above information is an  only  It is not intended as medical advice for individual conditions or treatments  Talk to your doctor, nurse or pharmacist before following any medical regimen to see if it is safe and effective for you  © 2017 [address] [employer] Information is for End User's use only and may not be sold, redistributed or otherwise used for commercial purposes  All illustrations and images included in CareNotes® are the copyrighted property of A D A M [employer] Inc  or Rainer Culver

## 2018-10-25 NOTE — PROGRESS NOTES
Assessment and Plan:    Problem List Items Addressed This Visit     None        Health Maintenance Due   Topic Date Due    Medicare Annual Wellness Visit (AWV)  1940    DTaP,Tdap,and Td Vaccines (1 - Tdap) 08/16/1961    INFLUENZA VACCINE  07/01/2018         HPI:  Edinson Bautista is a 66 y o  male here for his Subsequent Wellness Visit            Patient Active Problem List   Diagnosis    Abnormal liver function tests    Acute sinusitis    Degenerative lumbar spinal stenosis    Spondylolisthesis of lumbar region    CAD (coronary artery disease)    Coronary artery disease    Former tobacco use    Hyperlipidemia    Hypertension    S/P CABG x 4    Acute respiratory insufficiency, postoperative    Acute blood loss as cause of postoperative anemia    Chronic anemia    Leg pain, posterior, left    Peripheral artery disease (HCC)    GERD (gastroesophageal reflux disease)    Vasomotor rhinitis    Lumbar stenosis    Right inguinal pain    Right thigh pain     Past Medical History:   Diagnosis Date    Abnormal liver function test     RESOLVED: [id number]    Allergic rhinitis     Anemia     LAST ASSESSED: [id number]    Arthritis     BPH (benign prostatic hyperplasia)     CAD (coronary artery disease)     Coronary artery disease     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HCC)     LAST ASSESSED: [id number]    Former tobacco use     GERD (gastroesophageal reflux disease)     Hand paresthesia     RESOLVED: [id number]    Hyperlipidemia     Hypertension     Lumbar stenosis     Peripheral artery disease (HCC)     LAST ASSESSED: [id number]     Past Surgical History:   Procedure Laterality Date    BACK SURGERY      COLONOSCOPY      LUMBAR FUSION      NM ARTHRODESIS POSTERIOR/POSTEROLATERAL LUMBAR N/A 11/3/2016    Procedure: L2-S1 POSTERIOR LUMBAR FUSION AND DECOMPRESSION (Impulse), Posterior lateral fixation; dural repair ;  Surgeon: Camden Villanueva MD;  [facility];  Service: Orthopedics    NM CABG, ARTERIAL, SINGLE N/A 1/19/2018    Procedure: CABG X4 with LIMA - LAD, SVG - RCA, OM2, & Diagonal ; Left Leg EVH; MATTHEW;  Surgeon: Maranda Song DO;  [facility];  Service: Cardiac Surgery    MS COLONOSCOPY FLX DX W/COLLJ SPEC WHEN PFRMD N/A 11/15/2017    Procedure: EGD AND COLONOSCOPY;  Surgeon: Keegan Parish MD;  [facility];   Service: Gastroenterology    SEPTOPLASTY      LAST ASSESSED; [id number]    TONSILECTOMY AND ADNOIDECTOMY      LAST ASSESSED: [id number]     Family History   Problem Relation Age of Onset    Colon cancer Mother     Emphysema Mother     Liver disease Mother     Colon cancer Father     Coronary artery disease Father     Hypertension Father     Liver disease Father     Heart failure Father     Stroke Father         CVA     Heart attack Father     Coronary artery disease Brother     Heart disease Brother         younger brother by pass and other brother 3 stents placed    Other Family         BACK PROBLEM     Stroke Family         CVA    Emphysema Family     Hypertension Family         BENIGN     History   Smoking Status    Former Smoker    Quit date: 2011   Smokeless Tobacco    Never Used     History   Alcohol Use    0 6 oz/week    1 Shots of liquor per week     Comment: beer, wine, scotch every other day; SOCIAL AS PER ALL SCRIPTS       History   Drug Use No       Current Outpatient Prescriptions   Medication Sig Dispense Refill    ascorbic acid (VITAMIN C) 500 mg tablet Take 500 mg by mouth daily      aspirin (ECOTRIN LOW STRENGTH) 81 mg EC tablet Take 81 mg by mouth daily      atorvastatin (LIPITOR) 80 mg tablet TAKE 1 TABLET (80 MG TOTAL) BY MOUTH DAILY WITH DINNER 90 tablet 0    cholecalciferol (VITAMIN D3) 1,000 units tablet Take 1,000 Units by mouth daily      cyanocobalamin (VITAMIN B-12) 1,000 mcg tablet Take 1,000 mcg by mouth daily        docusate sodium (COLACE) 100 mg capsule Take 1 capsule by mouth 2 (two) times a day for 30 days Hold for loose stools  (Patient taking differently: Take 100 mg by mouth daily as needed Hold for loose stools  ) 60 capsule 0    ipratropium (ATROVENT) 0 03 % nasal spray INHALE 2 SPRAYS INTO EACH NOSTRILS EVERY 12 HRS (Patient taking differently: INHALE 2 SPRAYS INTO EACH NOSTRILS PRN) 5 mL 3    metoprolol tartrate (LOPRESSOR) 25 mg tablet Take 1 tablet (25 mg total) by mouth every 12 (twelve) hours Take half tablet twice daily (Patient taking differently: Take 25 mg by mouth daily Take half tablet twice daily ) 180 tablet 3    Multiple Vitamin (MULTIVITAMIN) capsule Take 1 capsule by mouth daily      Omega-3 Fatty Acids (OMEGA-3 FISH OIL PO) Take 3 tablets by mouth daily      pantoprazole (PROTONIX) 40 mg tablet Take 1 tablet (40 mg total) by mouth daily 30 tablet 10    Probiotic Product (ALIGN PO) Take 1 tablet by mouth daily         No current facility-administered medications for this visit  Allergies   Allergen Reactions    Penicillins Other (See Comments)     Hallucinations; Patient reported that he was seeing visual disturbances       Immunization History   Administered Date(s) Administered    Influenza 12/20/2006    Influenza Split High Dose Preservative Free IM 10/09/2013, 10/27/2015, 09/22/2016, 09/11/2017    Influenza TIV (IM) 01/01/2014    Pneumococcal Conjugate 13-Valent 09/11/2017    Pneumococcal Polysaccharide PPV23 04/07/2011       Patient Care Team:  Biju Adam DO as PCP - General  MD Megha Rae, MD Ruby Dickson MD Jerral Aas, SEYMOUR Brennan MD    Medicare Screening Tests and Risk Assessments:  Karoline Alex is here for his Subsequent Wellness visit  Last Medicare Wellness visit information reviewed, patient interviewed and updates made to the record today  Health Risk Assessment:  Patient rates overall health as fair  Patient feels that their physical health rating is Same   Eyesight was rated as Same  Hearing was rated as Same  Patient feels that their emotional and mental health rating is Same  Pain experienced by patient in the last 7 days has been Some  Patient's pain rating has been 4/10  Emotional/Mental Health:  Patient has been feeling nervous/anxious  PHQ-9 Depression Screening:    Frequency of the following problems over the past two weeks:      2  Feeling down, depressed, or hopeless: 0 - not at all  PHQ-2 Score: 0          Broken Bones/Falls: Fall Risk Assessment:    In the past year, patient has experienced: History of falling in past year     Number of falls: 1          Bladder/Bowel:  Patient has not leaked urine accidently in the last six months  Patient reports no loss of bowel control  Immunizations:  Patient has not had a flu vaccination within the last year  Patient has received a pneumonia shot  Patient has not received a shingles shot  Patient has not received tetanus/diphtheria shot  Home Safety:  Patient has trouble with stairs inside or outside of their home  Patient currently reports that there are no safety hazards present in home, working smoke alarms, working carbon monoxide detectors  Preventative Screenings:   no colon cancer screen completed, no cholesterol screen completed, glaucoma eye exam completed, 10/25/2015      Nutrition:  Current diet: Regular and No Added Salt with servings of the following:    Medications:  Patient is currently taking over-the-counter supplements  Patient is able to manage medications  Lifestyle Choices:  Patient reports no tobacco use  Patient has not smoked or used tobacco in the past   Patient reports alcohol use  Alcohol use per week: 14  Patient drives a vehicle  Patient wears seat belt          Activities of Daily Living:  Can get out of bed by his or her self, able to dress self, able to make own meals, able to do own shopping, able to bathe self, unable to do laundry/housekeeping, can manage own money, pay bills and track expenses    Previous Hospitalizations:  Hospitalization or ED visit in past 12 months  Number of hospitalizations within the last year: 1-2  Additional Comments: Bypass surgery    Advanced Directives:  Patient has decided on a power of   Patient has not spoken to designated power of   Patient has completed advanced directive  Social Support: Shari Hernadez    Preventative Screening/Counseling:      Cardiovascular:      General: Screening Current      Counseling: Healthy Diet and Healthy Weight     Due for Labs/Analytes/Optional EKG: Lipid Panel          Diabetes:      General: Screening Current      Counseling: Healthy Diet and Healthy Weight          Colorectal Cancer:      General: Screening Current      Counseling: high fiber diet          Prostate Cancer:      General: Screening Not Indicated and Risks and Benefits Discussed          Osteoporosis:      General: Screening Not Indicated      Counseling: Regular Weightbearing Exercise          AAA:      General: Screening Not Indicated          Glaucoma:      Referrals: Ophthalmology          HIV:      General: Screening Not Indicated          Hepatitis C:      General: Screening Current        Advanced Directives:   Patient has living will for healthcare, patient has an advanced directive  Information on ACP and/or AD provided  End of life assessment reviewed with patient       Immunizations:      Influenza: Risks & Benefits Discussed and Influenza Due Today      Shingrix: Risks & Benefits Discussed and Shingrix Vaccine Needed Today      TDAP: Tdap Vaccine Needed Today and Risks & Benefits Discussed

## 2018-10-25 NOTE — PROGRESS NOTES
Assessment/Plan:         Problem List Items Addressed This Visit     CAD (coronary artery disease) + S/P CABG (1/2018) + hyperlipidemia    --Remains with mild ROBLES, other asymptomatic on aspirin, B-blocker, statin  --Followed by cardiology with recent visit    Relevant Orders    Lipid Panel with Direct LDL reflex    Hypertension  --Controlled on current regimen    Relevant Orders    CBC and differential    Comprehensive metabolic panel    TSH, 3rd generation with Free T4 reflex    Magnesium, RBC    Degenerative lumbar spinal stenosis + spondylolisthesis + DDD + hx fusion  --Baseline mild pain, LE weakness      Peripheral artery disease ([facility] [address] )  --LE doppler earlier this year showing no change from previous  --Continue aspirin, statin, B-blocker, good BP control per above      GERD (gastroesophageal reflux disease)  --Remains on PPI for this along with GIB prophylaxis  Followed by GI with EGD last fall  Vasomotor rhinitis  --Controlled on Atrovent nasal spray      BPH  --No recent worsening of symptoms  Previously received minimal benefit from Flomax  No further PSA's needed per urology      Right thigh pain  --Suspected meralgia paresthetica  See previous visit for details/interventions  Minimal benefit from PT  Advised f/u with ortho (previously referred)    Relevant Orders    Magnesium, RBC    Vitamin D 25 hydroxy      Other Visit Diagnoses     Medicare annual wellness visit, subsequent    -  Primary  --Had advanced directive/health care proxy  --UTD with eye exam, colonoscopy  --Continued fall precautions including use cane PRN, discussed/encouraged  No problems with ADL's, driving  --Flu shot, Tdap, Shingrix per below  UTD with Prevnar, Pneumovax         Need for vaccination        Relevant Orders    influenza vaccine, [id number], high-dose, PF 0 5 mL, for patients 65 yr+ (FLUZONE HIGH-DOSE) (Completed)      Immunization due        Relevant Medications    Zoster Vac Recomb Adjuvanted ([address]) 50 MCG SUSR    Screening for diabetes mellitus        Relevant Orders    Hemoglobin A1C    Need for Tdap vaccination        Relevant Orders    TDAP VACCINE GREATER THAN OR EQUAL TO 8YO IM (Completed)          RTO 6 months, with labs a week prior    Subjective:      Patient ID: Vaishali Reno is a 66 y o  male  Here for Medicare AWV  See separate note for details  Tolerating meds without AE's  Right thigh pain about the same (see previous note for details)  Couple sessions of PT  Not sure that it has helped  Baseline LE weakness + lower back pain, no worse  Maidna Locust a month ago (lost balance)  No LOC or significant injury  No falls since  Home is safe  Uses cane on occasional    Feels like he is still able to safely drive  Baseline ROBLES, nocturia (no worse)  No recent CP, palpitations, edema  Just saw cardiologist      [employer] [employer] to go to gym 3 times a week on average  Mix of weights, mild cardio  Tries to eat healthy  Adequate fiber  ETOH: 3 wyatt's per week  Quit smoking 7 years ago  Previous 50 pack years  Normal CBC, CMP last month  Favorable lipids 1/2018  Colonoscopy + EGD fall 2017  No recent GI issues  Remains on Protonix  Eye exam last fall  Baseline mild hearing loss (certain tones only)  Denies need for aides at this time  Had advanced directive/health care proxy  The following portions of the patient's history were reviewed and updated as appropriate: current medications, past family history, past medical history, past social history, past surgical history and problem list     Review of Systems   Constitutional: Negative for fatigue and fever  HENT: Negative for sore throat  Eyes: Negative for visual disturbance  Respiratory: Negative for cough  Cardiovascular: Negative for chest pain and palpitations  Gastrointestinal: Negative for abdominal pain, constipation, diarrhea and vomiting     Genitourinary: Negative for difficulty urinating and dysuria  Musculoskeletal: Positive for back pain and myalgias  Negative for arthralgias  Skin: Negative for rash  Neurological: Negative for dizziness and headaches  Psychiatric/Behavioral: Negative for dysphoric mood  Objective:      /82 (BP Location: Left arm, Patient Position: Sitting, Cuff Size: Adult)   Pulse 84   Temp 97 7 °F (36 5 °C) (Tympanic)   Ht 5' 7" (1 702 m)   Wt 78 9 kg (174 lb)   SpO2 97%   BMI 27 25 kg/m²          Physical Exam   Constitutional: He is oriented to person, place, and time  He appears well-developed and well-nourished  HENT:   Head: Normocephalic and atraumatic  Right Ear: External ear normal    Left Ear: External ear normal    Nose: Nose normal    Mouth/Throat: Oropharynx is clear and moist    Eyes: Pupils are equal, round, and reactive to light  Conjunctivae are normal    Neck: Normal range of motion  Neck supple  No thyromegaly present  Cardiovascular: Normal rate, regular rhythm, normal heart sounds and intact distal pulses  Pulmonary/Chest: Effort normal and breath sounds normal    Abdominal: Soft  Bowel sounds are normal  There is no tenderness  Musculoskeletal: He exhibits no edema  Lymphadenopathy:     He has cervical adenopathy  Neurological: He is alert and oriented to person, place, and time  He has normal reflexes  Skin: Skin is warm and dry  Psychiatric: He has a normal mood and affect   His behavior is normal  Judgment and thought content normal

## 2018-11-06 DIAGNOSIS — K21.9 GASTROESOPHAGEAL REFLUX DISEASE WITHOUT ESOPHAGITIS: ICD-10-CM

## 2018-11-07 RX ORDER — PANTOPRAZOLE SODIUM 40 MG/1
40 TABLET, DELAYED RELEASE ORAL DAILY
Qty: 90 TABLET | Refills: 1 | Status: SHIPPED | OUTPATIENT
Start: 2018-11-07 | End: 2019-10-27 | Stop reason: SDUPTHER

## 2018-11-10 DIAGNOSIS — E78.5 HYPERLIPIDEMIA, UNSPECIFIED HYPERLIPIDEMIA TYPE: ICD-10-CM

## 2018-11-15 RX ORDER — ATORVASTATIN CALCIUM 80 MG/1
80 TABLET, FILM COATED ORAL
Qty: 90 TABLET | Refills: 0 | Status: SHIPPED | OUTPATIENT
Start: 2018-11-15 | End: 2019-02-13 | Stop reason: SDUPTHER

## 2018-12-26 ENCOUNTER — OFFICE VISIT (OUTPATIENT)
Dept: OBGYN CLINIC | Facility: CLINIC | Age: 78
End: 2018-12-26
Payer: MEDICARE

## 2018-12-26 ENCOUNTER — APPOINTMENT (OUTPATIENT)
Dept: RADIOLOGY | Facility: CLINIC | Age: 78
End: 2018-12-26
Payer: MEDICARE

## 2018-12-26 ENCOUNTER — TELEPHONE (OUTPATIENT)
Dept: OBGYN CLINIC | Facility: HOSPITAL | Age: 78
End: 2018-12-26

## 2018-12-26 VITALS
HEART RATE: 101 BPM | BODY MASS INDEX: 27.31 KG/M2 | WEIGHT: 174 LBS | SYSTOLIC BLOOD PRESSURE: 133 MMHG | DIASTOLIC BLOOD PRESSURE: 77 MMHG | HEIGHT: 67 IN

## 2018-12-26 DIAGNOSIS — Z98.1 S/P LUMBAR FUSION: ICD-10-CM

## 2018-12-26 DIAGNOSIS — M48.061 DEGENERATIVE LUMBAR SPINAL STENOSIS: ICD-10-CM

## 2018-12-26 DIAGNOSIS — S22.080A T12 COMPRESSION FRACTURE (HCC): ICD-10-CM

## 2018-12-26 DIAGNOSIS — M54.40 LOW BACK PAIN WITH SCIATICA, SCIATICA LATERALITY UNSPECIFIED, UNSPECIFIED BACK PAIN LATERALITY, UNSPECIFIED CHRONICITY: Primary | ICD-10-CM

## 2018-12-26 DIAGNOSIS — M54.40 LOW BACK PAIN WITH SCIATICA, SCIATICA LATERALITY UNSPECIFIED, UNSPECIFIED BACK PAIN LATERALITY, UNSPECIFIED CHRONICITY: ICD-10-CM

## 2018-12-26 PROCEDURE — 99214 OFFICE O/P EST MOD 30 MIN: CPT | Performed by: ORTHOPAEDIC SURGERY

## 2018-12-26 PROCEDURE — 72100 X-RAY EXAM L-S SPINE 2/3 VWS: CPT

## 2018-12-26 NOTE — TELEPHONE ENCOUNTER
Please be aware Radiology called with significant findings on xray please see results in EPIC asap  I tried calling office with results no answer

## 2018-12-26 NOTE — PROGRESS NOTES
Assessment/Plan:        Patient seen and examined with Dr Joe Garcia  He is over 2 years s/p lumbar decompression and fusion, L2-S1  and presents today for four week history sharp shooting low back pain without radiation distally to the lower extremities  X-rays in the office today demonstrates stable hardware and good alignment  There is a compression deformity at T12  Which was not apparent last year on x-ray  Given this,  We will send the patient for nuclear medicine bone scan to evaluate acuity  Follow-up in one week for imaging review  Further treatment options will be reviewed at that time  Problem List Items Addressed This Visit     Degenerative lumbar spinal stenosis    S/P lumbar fusion      Other Visit Diagnoses     Low back pain with sciatica, sciatica laterality unspecified, unspecified back pain laterality, unspecified chronicity    -  Primary    Relevant Orders    XR spine lumbar 2 or 3 views injury            Subjective:      Patient ID: Cy Wade is a 66 y o  male  HPI     Patient is a 72-year-old male presents for follow-up appointment  He is over two years status post lumbar decompression fusion L2-S1  Patient was doing well till about four weeks ago when he noticed sharp shooting pain in his lower back without radiation distally to lower extremities  He does state that he fell face forward about 6 to 8 weeks ago however he had no lower back pain at that time  He states pain has been severe especially when he does activities around the house  The following portions of the patient's history were reviewed and updated as appropriate: allergies, current medications, past family history, past medical history, past social history, past surgical history and problem list     Review of Systems   Constitutional: Negative for chills, diaphoresis, fatigue and fever  Respiratory: Negative  Cardiovascular: Negative      Genitourinary: Negative for decreased urine volume and difficulty urinating  Musculoskeletal: Positive for arthralgias and back pain  Skin: Negative  Neurological: Negative for weakness and numbness  Objective:      /77   Pulse 101   Ht 5' 7" (1 702 m)   Wt 78 9 kg (174 lb)   BMI 27 25 kg/m²          Physical Exam   Constitutional: He is oriented to person, place, and time  He appears well-developed and well-nourished  No distress  HENT:   Head: Normocephalic and atraumatic  Pulmonary/Chest: Effort normal    Musculoskeletal: He exhibits tenderness  Neurological: He is alert and oriented to person, place, and time  Skin: Skin is warm and dry  He is not diaphoretic  Psychiatric: He has a normal mood and affect  Nursing note and vitals reviewed  No acute distress  Gait is assisted with a walker  Lumbar incision is well healed  There is mild tenderness to palpation thoracolumbar region  Negative modified straight leg raise bilaterally  Strength is 5/5 L2-S1 bilaterally, sensation is equal intact  Feet are warm well perfused there is no calf tenderness or pitting edema

## 2018-12-31 ENCOUNTER — HOSPITAL ENCOUNTER (OUTPATIENT)
Dept: NON INVASIVE DIAGNOSTICS | Facility: CLINIC | Age: 78
Discharge: HOME | End: 2018-12-31
Payer: MEDICARE

## 2018-12-31 DIAGNOSIS — S22.080A T12 COMPRESSION FRACTURE (HCC): ICD-10-CM

## 2018-12-31 PROCEDURE — A9503 TC99M MEDRONATE: HCPCS

## 2018-12-31 PROCEDURE — 78320 HB BONE IMAGING (3D): CPT

## 2019-01-02 ENCOUNTER — OFFICE VISIT (OUTPATIENT)
Dept: OBGYN CLINIC | Facility: CLINIC | Age: 79
End: 2019-01-02
Payer: MEDICARE

## 2019-01-02 VITALS
SYSTOLIC BLOOD PRESSURE: 145 MMHG | HEIGHT: 67 IN | HEART RATE: 93 BPM | BODY MASS INDEX: 27.31 KG/M2 | DIASTOLIC BLOOD PRESSURE: 84 MMHG | WEIGHT: 174 LBS

## 2019-01-02 DIAGNOSIS — S22.080A T12 COMPRESSION FRACTURE (HCC): ICD-10-CM

## 2019-01-02 DIAGNOSIS — Z98.1 S/P LUMBAR FUSION: ICD-10-CM

## 2019-01-02 DIAGNOSIS — M54.40 LOW BACK PAIN WITH SCIATICA, SCIATICA LATERALITY UNSPECIFIED, UNSPECIFIED BACK PAIN LATERALITY, UNSPECIFIED CHRONICITY: Primary | ICD-10-CM

## 2019-01-02 PROCEDURE — 99213 OFFICE O/P EST LOW 20 MIN: CPT | Performed by: ORTHOPAEDIC SURGERY

## 2019-01-02 NOTE — ASSESSMENT & PLAN NOTE
Patient is seen in follow-up with complaints of improving lower back pain status post a fall  Imaging studies demonstrated T12 compression deformity  He obtained a bone scan to confirm acuity  He is here to review the results  Minimal tenderness to palpation lumbosacral spine  Neurologically intact L2-S1 bilaterally  Nuclear medicine SPECT scan demonstrates increased uptake in the region of T12 suggesting acuity    Assessment and plan  Subacute T12 compression fracture  LSO brace is recommended  Avoidance of high impact activities is also recommended including lifting twisting the repetitive bending  Follow-up in 4-6 weeks for re-evaluation    If no improvement we will consider kyphoplasty

## 2019-01-02 NOTE — PROGRESS NOTES
Assessment/Plan:    T12 compression fracture ([facility] )  Patient is seen in follow-up with complaints of improving lower back pain status post a fall  Imaging studies demonstrated T12 compression deformity  He obtained a bone scan to confirm acuity  He is here to review the results  Minimal tenderness to palpation lumbosacral spine  Neurologically intact L2-S1 bilaterally  Nuclear medicine SPECT scan demonstrates increased uptake in the region of T12 suggesting acuity    Assessment and plan  Subacute T12 compression fracture  LSO brace is recommended  Avoidance of high impact activities is also recommended including lifting twisting the repetitive bending  Follow-up in 4-6 weeks for re-evaluation  If no improvement we will consider kyphoplasty    · T12 compression fracture confirmed with Bone scan of 12/31/18  · Patient fell 5 weeks ago  · Kyphoplasty discussed in detail with the patient today  · Conservative treatment preferred by patient   · Patient allowed to walk for exercise  · Avoid strenuous exercise at the moment  · Continue to wear LSO brace  · Follow up in 4-6 weeks  · Patient can cancel if he has no pain     Problem List Items Addressed This Visit     S/P lumbar fusion    T12 compression fracture ([facility] )     Patient is seen in follow-up with complaints of improving lower back pain status post a fall  Imaging studies demonstrated T12 compression deformity  He obtained a bone scan to confirm acuity  He is here to review the results  Minimal tenderness to palpation lumbosacral spine  Neurologically intact L2-S1 bilaterally  Nuclear medicine SPECT scan demonstrates increased uptake in the region of T12 suggesting acuity    Assessment and plan  Subacute T12 compression fracture  LSO brace is recommended  Avoidance of high impact activities is also recommended including lifting twisting the repetitive bending  Follow-up in 4-6 weeks for re-evaluation    If no improvement we will consider kyphoplasty           Other Visit Diagnoses     Low back pain with sciatica, sciatica laterality unspecified, unspecified back pain laterality, unspecified chronicity    -  Primary            Subjective:      Patient ID: Stephanie Nichole is a 66 y o  male  HPI   The patient presents for follow up of suspected T12 compression fracture  He had fallen about 5 weeks ago  He is s/p L2-S1 decompression fusion, 2016  Today he complains of improving thoracic pain  He did have positive NM bone scan spect for T12 compression fracture  He continues to wer an LSO brace  The following portions of the patient's history were reviewed and updated as appropriate: current medications, past family history, past medical history, past social history, past surgical history and problem list     Review of Systems   Constitutional: Negative for chills, fever and unexpected weight change  HENT: Negative for hearing loss, nosebleeds and sore throat  Eyes: Negative for pain, redness and visual disturbance  Respiratory: Negative for cough, shortness of breath and wheezing  Cardiovascular: Negative for chest pain, palpitations and leg swelling  Gastrointestinal: Negative for abdominal pain, nausea and vomiting  Endocrine: Negative for polydipsia and polyuria  Genitourinary: Negative for difficulty urinating and hematuria  Skin: Negative for rash and wound  Psychiatric/Behavioral: Negative for decreased concentration and suicidal ideas  The patient is not nervous/anxious  Objective:      /84   Pulse 93   Ht 5' 7" (1 702 m)   Wt 78 9 kg (174 lb)   BMI 27 25 kg/m²          Physical Exam   Constitutional: He is oriented to person, place, and time  He appears well-developed and well-nourished  HENT:   Right Ear: External ear normal    Left Ear: External ear normal    Nose: Nose normal    Eyes: Pupils are equal, round, and reactive to light   Conjunctivae and EOM are normal    Neck: Normal range of motion  Pulmonary/Chest: Effort normal    Neurological: He is alert and oriented to person, place, and time  Skin: Skin is warm and dry  Psychiatric: He has a normal mood and affect  His behavior is normal  Judgment and thought content normal        Patient ambulates with use of cane  Tender to palpation thoracolumbar region  Modified straight leg raise negative bilaterally  Strength L2-S1 5/5 bilaterally  Sensation L2-S1 intact bilaterally      Imagin18 NM bone scan spect reviewed with patient today  Radiologist's  IMPRESSION:  Increased activity at T12 which corresponds to the compression deformity seen on recent x-rays  This is consistent with a recent fracture      Scribe Attestation    I,:   Ying Murphy am acting as a scribe while in the presence of the attending physician :        I,:   Sergio Benjamin MD personally performed the services described in this documentation    as scribed in my presence :

## 2019-02-10 DIAGNOSIS — E78.5 HYPERLIPIDEMIA, UNSPECIFIED HYPERLIPIDEMIA TYPE: ICD-10-CM

## 2019-02-13 DIAGNOSIS — E78.5 HYPERLIPIDEMIA, UNSPECIFIED HYPERLIPIDEMIA TYPE: ICD-10-CM

## 2019-02-14 RX ORDER — ATORVASTATIN CALCIUM 80 MG/1
80 TABLET, FILM COATED ORAL
Qty: 90 TABLET | Refills: 0 | Status: SHIPPED | OUTPATIENT
Start: 2019-02-14 | End: 2019-10-27 | Stop reason: SDUPTHER

## 2019-02-14 RX ORDER — ATORVASTATIN CALCIUM 80 MG/1
80 TABLET, FILM COATED ORAL
Qty: 90 TABLET | Refills: 2 | OUTPATIENT
Start: 2019-02-14 | End: 2019-02-14 | Stop reason: SDUPTHER

## 2019-03-08 ENCOUNTER — OFFICE VISIT (OUTPATIENT)
Dept: CARDIOLOGY CLINIC | Facility: CLINIC | Age: 79
End: 2019-03-08
Payer: MEDICARE

## 2019-03-08 VITALS
WEIGHT: 173.4 LBS | BODY MASS INDEX: 27.21 KG/M2 | SYSTOLIC BLOOD PRESSURE: 130 MMHG | HEIGHT: 67 IN | DIASTOLIC BLOOD PRESSURE: 64 MMHG | HEART RATE: 88 BPM | OXYGEN SATURATION: 98 %

## 2019-03-08 DIAGNOSIS — I25.10 CORONARY ARTERY DISEASE INVOLVING NATIVE CORONARY ARTERY OF NATIVE HEART WITHOUT ANGINA PECTORIS: Primary | ICD-10-CM

## 2019-03-08 DIAGNOSIS — I73.9 PERIPHERAL ARTERY DISEASE (HCC): ICD-10-CM

## 2019-03-08 DIAGNOSIS — E78.2 MIXED HYPERLIPIDEMIA: ICD-10-CM

## 2019-03-08 DIAGNOSIS — I10 ESSENTIAL HYPERTENSION: ICD-10-CM

## 2019-03-08 PROCEDURE — 99214 OFFICE O/P EST MOD 30 MIN: CPT | Performed by: INTERNAL MEDICINE

## 2019-03-08 RX ORDER — FOLIC ACID 0.8 MG
500 TABLET ORAL DAILY
COMMUNITY
End: 2021-08-30 | Stop reason: ALTCHOICE

## 2019-03-08 NOTE — PROGRESS NOTES
Cardiology Follow Up    Marcell Neri  [birth year]  [id number]  [address] [facility] Celeste Garcia [address] [facility]  [phone]    1  Coronary artery disease involving native coronary artery of native heart without angina pectoris     2  Peripheral artery disease ([address] )     3  Mixed hyperlipidemia     4  Essential hypertension         Diagnoses and all orders for this visit:    Coronary artery disease involving native coronary artery of native heart without angina pectoris    Peripheral artery disease ([address] )    Mixed hyperlipidemia    Essential hypertension    Other orders  -     Magnesium 500 MG CAPS; Take 500 mg by mouth daily      I had the pleasure of seeing Marcell Neri for a follow up visit  Interval History: none    History of the presenting illness, Discussion/Summary and My Plan are as follows:    He is a pleasant 59-year-old gentleman with a history of hypertension, mild dyslipidemia, CAD, CABG, peripheral vascular disease-right superficial femoral artery stenosis for which he is being medically managed for questionable claudication symptoms  He also has a 50 pack year smoking history-quit about 7 years ago      He presented for further evaluation of increasing dyspnea with exertion, with evaluation demonstrated multivessel CAD, underwent LIMA-LAD, SVG-RCA, SVG-OM2, SVG-Diag in January 2018      From a cardiac standpoint he is asymptomatic, occasionally has a twinge of chest pain that is noncardiac  He does have claudication symptoms that are stable      Recently had a spine fracture discovered and is currently quite inactive      Plan:     CAD:  Positive stress test-triple-vessel coronary artery disease, status post Coronary artery bypass grafting x 4 with left internal mammary artery to left anterior descending artery, saphenous vein graft to obtuse marginal 2, saphenous vein graft to right coronary artery and saphenous vein graft to diagonal 1-January 2018  Completed cardiac rehabilitation  Was making slow progress with exercising although now limited by back pain  Will increase when cleared to exercise more     Hypertension:  Controlled     Dyslipidemia: Jan 2018  , TG 47, HDL 83, LDL 52   High HDL-close to 90, I do not think it could be considered to be protective and likely nonfunctional   Continue statin  Will recheck prior to the next visit     Peripheral vascular disease:  50-75% distal SFA stenosis-April 2018  No change from September 2017, continue medical management, encouraged walking as much as possible      Follow-up in 6 months    Results for Vero Torres (MRN [id number]) as of 3/8/2019 09:21   Ref  Range 9/14/2017 07:52 1/15/2018 07:50   Cholesterol Latest Ref Range: 50 - 200 mg/dL 211 (H) 144   Triglycerides Latest Ref Range: <=150 mg/dL 85 47   HDL Latest Ref Range: 40 - 60 mg/dL 89 (H) 83 (H)   LDL Direct Latest Ref Range: 0 - 100 mg/dL 105 (H) 52     Results for Vero Torres (MRN [id number]) as of 3/8/2019 09:21   Ref   Range 9/14/2017 07:52   Hemoglobin A1C Latest Ref Range: 4 2 - 6 3 % 5 3       Patient Active Problem List   Diagnosis    Degenerative lumbar spinal stenosis    Spondylolisthesis of lumbar region    CAD (coronary artery disease)    Coronary artery disease    Former tobacco use    Hyperlipidemia    Hypertension    S/P CABG x 4    Leg pain, posterior, left    Peripheral artery disease (HCC)    GERD (gastroesophageal reflux disease)    Vasomotor rhinitis    Lumbar stenosis    Right inguinal pain    Right thigh pain    S/P lumbar fusion    T12 compression fracture (HCC)     Past Medical History:   Diagnosis Date    Abnormal liver function test     RESOLVED: [id number]    Allergic rhinitis     Anemia     LAST ASSESSED: [id number]    Arthritis     BPH (benign prostatic hyperplasia)     CAD (coronary artery disease)     Coronary artery disease  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HCC)     LAST ASSESSED:     Former tobacco use     GERD (gastroesophageal reflux disease)     Hand paresthesia     RESOLVED: [id number]    Hyperlipidemia     Hypertension     Lumbar stenosis     Peripheral artery disease ([facility] )     LAST ASSESSED: 2017     Social History     Socioeconomic History    Marital status:      Spouse name: Not on file    Number of children: Not on file    Years of education: some college     Highest education level: Not on file   Occupational History    Occupation: Insurance     Comment: Retired    Social Needs    Financial resource strain: Not on file    Food insecurity:     Worry: Not on file     Inability: Not on file   Ziliko needs:     Medical: Not on file     Non-medical: Not on file   Tobacco Use    Smoking status: Former Smoker     Last attempt to quit: 2011     Years since quittin 1    Smokeless tobacco: Never Used   Substance and Sexual Activity    Alcohol use:  Yes     Alcohol/week: 0 6 oz     Types: 1 Shots of liquor per week     Comment: beer, wine, scotch every other day; SOCIAL AS PER ALL SCRIPTS     Drug use: No    Sexual activity: Not Currently   Lifestyle    Physical activity:     Days per week: Not on file     Minutes per session: Not on file    Stress: Not on file   Relationships    Social connections:     Talks on phone: Not on file     Gets together: Not on file     Attends Yazidi service: Not on file     Active member of club or organization: Not on file     Attends meetings of clubs or organizations: Not on file     Relationship status: Not on file    Intimate partner violence:     Fear of current or ex partner: Not on file     Emotionally abused: Not on file     Physically abused: Not on file     Forced sexual activity: Not on file   Other Topics Concern    Not on file   Social History Narrative    Daily coffee consumption       Family History   Problem Relation Age of Onset    Colon cancer Mother     Emphysema Mother     Liver disease Mother     Colon cancer Father     Coronary artery disease Father     Hypertension Father     Liver disease Father     Heart failure Father     Stroke Father         CVA     Heart attack Father     Coronary artery disease Brother     Heart disease Brother         younger brother by pass and other brother 3 stents placed    Other Family         BACK PROBLEM     Stroke Family         CVA    Emphysema Family     Hypertension Family         BENIGN     Past Surgical History:   Procedure Laterality Date    BACK SURGERY      COLONOSCOPY      LUMBAR FUSION      OK ARTHRODESIS POSTERIOR/POSTEROLATERAL LUMBAR N/A 11/3/2016    Procedure: L2-S1 POSTERIOR LUMBAR FUSION AND DECOMPRESSION (Impulse), Posterior lateral fixation; dural repair ;  Surgeon: Gareth Khan MD;  [facility]: [facility];  Service: Orthopedics    OK CABG, ARTERIAL, SINGLE N/A 1/19/2018    Procedure: CABG X4 with LIMA - LAD, SVG - RCA, OM2, & Diagonal ; Left Leg EVH; MATTHEW;  Surgeon: Rafael Earl DO;  [facility];  Service: Cardiac Surgery    OK COLONOSCOPY FLX DX W/COLLJ SPEC WHEN PFRMD N/A 11/15/2017    Procedure: EGD AND COLONOSCOPY;  Surgeon: Terrell Dinero MD;  [facility];   Service: Gastroenterology    SEPTOPLASTY      LAST ASSESSED; [id number]    TONSILECTOMY AND ADNOIDECTOMY      LAST ASSESSED: [id number]       Current Outpatient Medications:     Ascorbic Acid (VITAMIN C) 1000 MG tablet, Take 1,000 mg by mouth daily , Disp: , Rfl:     aspirin (ECOTRIN LOW STRENGTH) 81 mg EC tablet, Take 81 mg by mouth daily, Disp: , Rfl:     atorvastatin (LIPITOR) 80 mg tablet, TAKE 1 TABLET (80 MG TOTAL) BY MOUTH DAILY WITH DINNER, Disp: 90 tablet, Rfl: 0    cholecalciferol (VITAMIN D3) 1,000 units tablet, Take 1,000 Units by mouth daily, Disp: , Rfl:     cyanocobalamin (VITAMIN B-12) 1,000 mcg tablet, Take 1,000 mcg by mouth daily  , Disp: , Rfl:     docusate sodium (COLACE) 100 mg capsule, Take 1 capsule by mouth 2 (two) times a day for 30 days Hold for loose stools  (Patient taking differently: Take 100 mg by mouth daily as needed Hold for loose stools  ), Disp: 60 capsule, Rfl: 0    Magnesium 500 MG CAPS, Take 500 mg by mouth daily, Disp: , Rfl:     metoprolol tartrate (LOPRESSOR) 25 mg tablet, Take 1 tablet (25 mg total) by mouth every 12 (twelve) hours Take half tablet twice daily, Disp: 180 tablet, Rfl: 3    Multiple Vitamin (MULTIVITAMIN) capsule, Take 1 capsule by mouth daily, Disp: , Rfl:     Omega-3 Fatty Acids (OMEGA-3 FISH OIL PO), Take 3 tablets by mouth daily Relief factor daily, Disp: , Rfl:     pantoprazole (PROTONIX) 40 mg tablet, Take 1 tablet (40 mg total) by mouth daily, Disp: 90 tablet, Rfl: 1    Probiotic Product (ALIGN PO), Take 1 tablet by mouth daily  , Disp: , Rfl:     ipratropium (ATROVENT) 0 03 % nasal spray, INHALE 2 SPRAYS INTO EACH NOSTRILS EVERY 12 HRS (Patient not taking: Reported on 3/8/2019), Disp: 5 mL, Rfl: 3  Allergies   Allergen Reactions    Penicillins Other (See Comments)     Hallucinations; Patient reported that he was seeing visual disturbances         Labs:not applicable  Imaging: No results found  Review of Systems:  Review of Systems   Constitutional: Negative  HENT: Negative  Eyes: Negative  Respiratory: Negative  Cardiovascular: Negative  Gastrointestinal: Negative  Endocrine: Negative  Genitourinary: Negative  Musculoskeletal: Positive for arthralgias and back pain  Negative for gait problem, joint swelling and myalgias  Allergic/Immunologic: Negative  Neurological: Positive for numbness  Negative for dizziness, tremors, seizures, syncope, facial asymmetry, speech difficulty, weakness, light-headedness and headaches  Hematological: Negative          Physical Exam:  /64 (BP Location: Left arm, Patient Position: Sitting, Cuff Size: Adult)   Pulse 88   Ht 5' 7" (1 702 m)   Wt 78 7 kg (173 lb 6 4 oz)   SpO2 98%   BMI 27 16 kg/m²   Physical Exam   Constitutional: He appears well-developed and well-nourished  Non-toxic appearance  He does not appear ill  HENT:   Head: Normocephalic  Neck: Normal range of motion  Neck supple  No JVD present  No tracheal deviation present  No thyromegaly present  Cardiovascular: Normal rate and regular rhythm  Exam reveals no S3, no S4 and no distant heart sounds  No murmur heard  Pulmonary/Chest: Effort normal  No accessory muscle usage or stridor  No tachypnea  No respiratory distress  He has no decreased breath sounds  He has no wheezes  He has no rhonchi  He has no rales  Abdominal: Soft  He exhibits no distension  There is no tenderness  Musculoskeletal:        Right lower leg: Normal  He exhibits no tenderness and no edema  Left lower leg: He exhibits no tenderness and no edema  Neurological: He is alert  Skin: Skin is warm  No rash noted  No erythema

## 2019-03-14 ENCOUNTER — TELEPHONE (OUTPATIENT)
Dept: OBGYN CLINIC | Facility: HOSPITAL | Age: 79
End: 2019-03-14

## 2019-03-14 DIAGNOSIS — S22.080A T12 COMPRESSION FRACTURE (HCC): Primary | ICD-10-CM

## 2019-03-14 NOTE — TELEPHONE ENCOUNTER
Caller: Patient   C/B # [phone]  Dr Analy Guevara    Patient is having difficulty with climbing his steps and is asking if we can right up a script for a chair lift  He also is asking if we can mail this to him and would like a call once this is done    Thanks

## 2019-03-29 DIAGNOSIS — S22.080A T12 COMPRESSION FRACTURE (HCC): Primary | ICD-10-CM

## 2019-03-29 NOTE — TELEPHONE ENCOUNTER
Gosia Watkins needs the prescription requested to specifically state it is for a "stair lift"  Please call him once ready so he can pick it up at the [address]  Best contact # [phone]    Thank you

## 2019-04-08 ENCOUNTER — APPOINTMENT (OUTPATIENT)
Dept: LAB | Facility: CLINIC | Age: 79
End: 2019-04-08
Payer: MEDICARE

## 2019-04-08 ENCOUNTER — TRANSCRIBE ORDERS (OUTPATIENT)
Dept: LAB | Facility: CLINIC | Age: 79
End: 2019-04-08

## 2019-04-08 DIAGNOSIS — I10 ESSENTIAL HYPERTENSION: Primary | ICD-10-CM

## 2019-04-08 DIAGNOSIS — Z13.1 SCREENING FOR DIABETES MELLITUS: ICD-10-CM

## 2019-04-08 DIAGNOSIS — M79.651 RIGHT THIGH PAIN: ICD-10-CM

## 2019-04-08 DIAGNOSIS — E78.2 MIXED HYPERLIPIDEMIA: ICD-10-CM

## 2019-04-08 DIAGNOSIS — I10 ESSENTIAL HYPERTENSION: ICD-10-CM

## 2019-04-08 LAB
25(OH)D3 SERPL-MCNC: 31.4 NG/ML (ref 30–100)
ALBUMIN SERPL BCP-MCNC: 3.6 G/DL (ref 3.5–5)
ALP SERPL-CCNC: 86 U/L (ref 46–116)
ALT SERPL W P-5'-P-CCNC: 38 U/L (ref 12–78)
ANION GAP SERPL CALCULATED.3IONS-SCNC: 10 MMOL/L (ref 4–13)
AST SERPL W P-5'-P-CCNC: 37 U/L (ref 5–45)
BASOPHILS # BLD AUTO: 0.04 THOUSANDS/ΜL (ref 0–0.1)
BASOPHILS NFR BLD AUTO: 1 % (ref 0–1)
BILIRUB SERPL-MCNC: 0.5 MG/DL (ref 0.2–1)
BUN SERPL-MCNC: 23 MG/DL (ref 5–25)
CALCIUM SERPL-MCNC: 9.3 MG/DL (ref 8.3–10.1)
CHLORIDE SERPL-SCNC: 106 MMOL/L (ref 100–108)
CHOLEST SERPL-MCNC: 157 MG/DL (ref 50–200)
CO2 SERPL-SCNC: 29 MMOL/L (ref 21–32)
CREAT SERPL-MCNC: 1.29 MG/DL (ref 0.6–1.3)
EOSINOPHIL # BLD AUTO: 0.18 THOUSAND/ΜL (ref 0–0.61)
EOSINOPHIL NFR BLD AUTO: 2 % (ref 0–6)
ERYTHROCYTE [DISTWIDTH] IN BLOOD BY AUTOMATED COUNT: 13.9 % (ref 11.6–15.1)
EST. AVERAGE GLUCOSE BLD GHB EST-MCNC: 105 MG/DL
GFR SERPL CREATININE-BSD FRML MDRD: 53 ML/MIN/1.73SQ M
GLUCOSE P FAST SERPL-MCNC: 94 MG/DL (ref 65–99)
HBA1C MFR BLD: 5.3 % (ref 4.2–6.3)
HCT VFR BLD AUTO: 35 % (ref 36.5–49.3)
HDLC SERPL-MCNC: 92 MG/DL (ref 40–60)
HGB BLD-MCNC: 11.8 G/DL (ref 12–17)
IMM GRANULOCYTES # BLD AUTO: 0.03 THOUSAND/UL (ref 0–0.2)
IMM GRANULOCYTES NFR BLD AUTO: 0 % (ref 0–2)
LDLC SERPL CALC-MCNC: 48 MG/DL (ref 0–100)
LYMPHOCYTES # BLD AUTO: 1.94 THOUSANDS/ΜL (ref 0.6–4.47)
LYMPHOCYTES NFR BLD AUTO: 25 % (ref 14–44)
MCH RBC QN AUTO: 33.5 PG (ref 26.8–34.3)
MCHC RBC AUTO-ENTMCNC: 33.7 G/DL (ref 31.4–37.4)
MCV RBC AUTO: 99 FL (ref 82–98)
MONOCYTES # BLD AUTO: 0.5 THOUSAND/ΜL (ref 0.17–1.22)
MONOCYTES NFR BLD AUTO: 7 % (ref 4–12)
NEUTROPHILS # BLD AUTO: 4.96 THOUSANDS/ΜL (ref 1.85–7.62)
NEUTS SEG NFR BLD AUTO: 65 % (ref 43–75)
NRBC BLD AUTO-RTO: 0 /100 WBCS
PLATELET # BLD AUTO: 163 THOUSANDS/UL (ref 149–390)
PMV BLD AUTO: 10.4 FL (ref 8.9–12.7)
POTASSIUM SERPL-SCNC: 4.8 MMOL/L (ref 3.5–5.3)
PROT SERPL-MCNC: 7.9 G/DL (ref 6.4–8.2)
RBC # BLD AUTO: 3.52 MILLION/UL (ref 3.88–5.62)
SODIUM SERPL-SCNC: 145 MMOL/L (ref 136–145)
TRIGL SERPL-MCNC: 83 MG/DL
TSH SERPL DL<=0.05 MIU/L-ACNC: 2.16 UIU/ML (ref 0.36–3.74)
WBC # BLD AUTO: 7.65 THOUSAND/UL (ref 4.31–10.16)

## 2019-04-08 PROCEDURE — 84443 ASSAY THYROID STIM HORMONE: CPT

## 2019-04-08 PROCEDURE — 36415 COLL VENOUS BLD VENIPUNCTURE: CPT

## 2019-04-08 PROCEDURE — 82306 VITAMIN D 25 HYDROXY: CPT

## 2019-04-08 PROCEDURE — 85025 COMPLETE CBC W/AUTO DIFF WBC: CPT

## 2019-04-08 PROCEDURE — 80061 LIPID PANEL: CPT

## 2019-04-08 PROCEDURE — 80053 COMPREHEN METABOLIC PANEL: CPT

## 2019-04-08 PROCEDURE — 83036 HEMOGLOBIN GLYCOSYLATED A1C: CPT

## 2019-04-10 ENCOUNTER — TELEPHONE (OUTPATIENT)
Dept: FAMILY MEDICINE CLINIC | Facility: OTHER | Age: 79
End: 2019-04-10

## 2019-04-11 ENCOUNTER — APPOINTMENT (OUTPATIENT)
Dept: LAB | Facility: CLINIC | Age: 79
End: 2019-04-11
Payer: MEDICARE

## 2019-04-11 DIAGNOSIS — I10 ESSENTIAL HYPERTENSION: ICD-10-CM

## 2019-04-11 DIAGNOSIS — M79.651 RIGHT THIGH PAIN: ICD-10-CM

## 2019-04-11 PROCEDURE — 36415 COLL VENOUS BLD VENIPUNCTURE: CPT

## 2019-04-11 PROCEDURE — 83735 ASSAY OF MAGNESIUM: CPT

## 2019-04-13 LAB — MAGNESIUM RBC-MCNC: 6.5 MG/DL (ref 4.2–6.8)

## 2019-04-16 ENCOUNTER — OFFICE VISIT (OUTPATIENT)
Dept: FAMILY MEDICINE CLINIC | Facility: OTHER | Age: 79
End: 2019-04-16
Payer: MEDICARE

## 2019-04-16 VITALS
WEIGHT: 170.5 LBS | SYSTOLIC BLOOD PRESSURE: 124 MMHG | HEART RATE: 101 BPM | OXYGEN SATURATION: 99 % | HEIGHT: 67 IN | BODY MASS INDEX: 26.76 KG/M2 | TEMPERATURE: 97.5 F | DIASTOLIC BLOOD PRESSURE: 86 MMHG

## 2019-04-16 DIAGNOSIS — M48.061 DEGENERATIVE LUMBAR SPINAL STENOSIS: ICD-10-CM

## 2019-04-16 DIAGNOSIS — Z98.1 S/P LUMBAR FUSION: ICD-10-CM

## 2019-04-16 DIAGNOSIS — M43.16 SPONDYLOLISTHESIS OF LUMBAR REGION: ICD-10-CM

## 2019-04-16 DIAGNOSIS — M48.061 SPINAL STENOSIS OF LUMBAR REGION, UNSPECIFIED WHETHER NEUROGENIC CLAUDICATION PRESENT: ICD-10-CM

## 2019-04-16 DIAGNOSIS — I73.9 PERIPHERAL ARTERY DISEASE (HCC): ICD-10-CM

## 2019-04-16 DIAGNOSIS — K21.9 GASTROESOPHAGEAL REFLUX DISEASE, ESOPHAGITIS PRESENCE NOT SPECIFIED: Primary | ICD-10-CM

## 2019-04-16 DIAGNOSIS — E78.2 MIXED HYPERLIPIDEMIA: ICD-10-CM

## 2019-04-16 DIAGNOSIS — I25.10 CORONARY ARTERY DISEASE INVOLVING NATIVE CORONARY ARTERY OF NATIVE HEART WITHOUT ANGINA PECTORIS: ICD-10-CM

## 2019-04-16 DIAGNOSIS — S22.080A T12 COMPRESSION FRACTURE (HCC): ICD-10-CM

## 2019-04-16 DIAGNOSIS — I10 ESSENTIAL HYPERTENSION: ICD-10-CM

## 2019-04-16 PROBLEM — M79.651 RIGHT THIGH PAIN: Status: RESOLVED | Noted: 2018-09-20 | Resolved: 2019-04-16

## 2019-04-16 PROBLEM — R10.31 RIGHT INGUINAL PAIN: Status: RESOLVED | Noted: 2018-09-20 | Resolved: 2019-04-16

## 2019-04-16 PROCEDURE — 99214 OFFICE O/P EST MOD 30 MIN: CPT | Performed by: NURSE PRACTITIONER

## 2019-04-16 RX ORDER — PREDNISONE 10 MG/1
TABLET ORAL
Qty: 24 TABLET | Refills: 0 | Status: SHIPPED | OUTPATIENT
Start: 2019-04-16 | End: 2019-06-10

## 2019-05-06 ENCOUNTER — CONSULT (OUTPATIENT)
Dept: PAIN MEDICINE | Facility: CLINIC | Age: 79
End: 2019-05-06
Payer: MEDICARE

## 2019-05-06 VITALS — TEMPERATURE: 98.4 F | HEART RATE: 86 BPM | SYSTOLIC BLOOD PRESSURE: 128 MMHG | DIASTOLIC BLOOD PRESSURE: 72 MMHG

## 2019-05-06 DIAGNOSIS — S22.080A T12 COMPRESSION FRACTURE (HCC): ICD-10-CM

## 2019-05-06 DIAGNOSIS — M48.061 DEGENERATIVE LUMBAR SPINAL STENOSIS: ICD-10-CM

## 2019-05-06 DIAGNOSIS — G89.4 CHRONIC PAIN SYNDROME: Primary | ICD-10-CM

## 2019-05-06 DIAGNOSIS — Z98.1 S/P LUMBAR FUSION: ICD-10-CM

## 2019-05-06 DIAGNOSIS — M96.1 POSTLAMINECTOMY SYNDROME OF LUMBAR REGION: ICD-10-CM

## 2019-05-06 PROCEDURE — 99204 OFFICE O/P NEW MOD 45 MIN: CPT | Performed by: ANESTHESIOLOGY

## 2019-05-15 ENCOUNTER — HOSPITAL ENCOUNTER (OUTPATIENT)
Dept: NON INVASIVE DIAGNOSTICS | Facility: CLINIC | Age: 79
Discharge: HOME | End: 2019-05-15
Payer: MEDICARE

## 2019-05-15 DIAGNOSIS — I73.9 PERIPHERAL ARTERY DISEASE (HCC): ICD-10-CM

## 2019-05-15 PROCEDURE — 93923 UPR/LXTR ART STDY 3+ LVLS: CPT

## 2019-05-15 PROCEDURE — 93925 LOWER EXTREMITY STUDY: CPT

## 2019-05-16 PROCEDURE — 93922 UPR/L XTREMITY ART 2 LEVELS: CPT | Performed by: SURGERY

## 2019-05-16 PROCEDURE — 93925 LOWER EXTREMITY STUDY: CPT | Performed by: SURGERY

## 2019-05-30 ENCOUNTER — HOSPITAL ENCOUNTER (OUTPATIENT)
Dept: MRI IMAGING | Facility: HOSPITAL | Age: 79
Discharge: HOME | End: 2019-05-30
Attending: ANESTHESIOLOGY
Payer: MEDICARE

## 2019-05-30 DIAGNOSIS — G89.4 CHRONIC PAIN SYNDROME: ICD-10-CM

## 2019-05-30 DIAGNOSIS — M96.1 POSTLAMINECTOMY SYNDROME OF LUMBAR REGION: ICD-10-CM

## 2019-05-30 PROCEDURE — A9585 GADOBUTROL INJECTION: HCPCS | Performed by: ANESTHESIOLOGY

## 2019-05-30 PROCEDURE — 72158 MRI LUMBAR SPINE W/O & W/DYE: CPT

## 2019-05-30 RX ADMIN — GADOBUTROL 7 ML: 604.72 INJECTION INTRAVENOUS at 08:23

## 2019-06-06 ENCOUNTER — TELEPHONE (OUTPATIENT)
Dept: RADIOLOGY | Facility: CLINIC | Age: 79
End: 2019-06-06

## 2019-06-10 ENCOUNTER — OFFICE VISIT (OUTPATIENT)
Dept: PAIN MEDICINE | Facility: CLINIC | Age: 79
End: 2019-06-10
Payer: MEDICARE

## 2019-06-10 VITALS — DIASTOLIC BLOOD PRESSURE: 76 MMHG | TEMPERATURE: 98.1 F | HEART RATE: 84 BPM | SYSTOLIC BLOOD PRESSURE: 132 MMHG

## 2019-06-10 DIAGNOSIS — M48.061 DEGENERATIVE LUMBAR SPINAL STENOSIS: ICD-10-CM

## 2019-06-10 DIAGNOSIS — I73.9 PERIPHERAL ARTERY DISEASE (HCC): ICD-10-CM

## 2019-06-10 DIAGNOSIS — M48.07 FORAMINAL STENOSIS OF LUMBOSACRAL REGION: ICD-10-CM

## 2019-06-10 DIAGNOSIS — G89.4 CHRONIC PAIN SYNDROME: Primary | ICD-10-CM

## 2019-06-10 DIAGNOSIS — S22.080A T12 COMPRESSION FRACTURE (HCC): ICD-10-CM

## 2019-06-10 PROCEDURE — 99214 OFFICE O/P EST MOD 30 MIN: CPT | Performed by: ANESTHESIOLOGY

## 2019-06-26 ENCOUNTER — CONSULT (OUTPATIENT)
Dept: VASCULAR SURGERY | Facility: CLINIC | Age: 79
End: 2019-06-26
Payer: MEDICARE

## 2019-06-26 VITALS
HEART RATE: 82 BPM | WEIGHT: 165 LBS | DIASTOLIC BLOOD PRESSURE: 64 MMHG | TEMPERATURE: 97.3 F | HEIGHT: 67 IN | BODY MASS INDEX: 25.9 KG/M2 | SYSTOLIC BLOOD PRESSURE: 108 MMHG

## 2019-06-26 DIAGNOSIS — M43.16 SPONDYLOLISTHESIS OF LUMBAR REGION: Primary | ICD-10-CM

## 2019-06-26 DIAGNOSIS — Z87.891 FORMER TOBACCO USE: ICD-10-CM

## 2019-06-26 DIAGNOSIS — I73.9 PERIPHERAL ARTERY DISEASE (HCC): ICD-10-CM

## 2019-06-26 DIAGNOSIS — I10 ESSENTIAL HYPERTENSION: ICD-10-CM

## 2019-06-26 DIAGNOSIS — E78.2 MIXED HYPERLIPIDEMIA: ICD-10-CM

## 2019-06-26 PROBLEM — I70.213 ATHEROSCLEROSIS OF NATIVE ARTERIES OF EXTREMITIES WITH INTERMITTENT CLAUDICATION, BILATERAL LEGS (HCC): Status: ACTIVE | Noted: 2019-06-26

## 2019-06-26 PROCEDURE — 99214 OFFICE O/P EST MOD 30 MIN: CPT | Performed by: NURSE PRACTITIONER

## 2019-07-02 ENCOUNTER — TELEPHONE (OUTPATIENT)
Dept: PAIN MEDICINE | Facility: MEDICAL CENTER | Age: 79
End: 2019-07-02

## 2019-07-02 NOTE — TELEPHONE ENCOUNTER
Pt left a vm today requesting a call back  No additional info was provided   Pt can be reached at  [phone]

## 2019-07-02 NOTE — TELEPHONE ENCOUNTER
Pt is calling stating he had his CON with Vascular and have decided to not to do anything so he would like to move forward with an injection      Pt can be reached at [phone]

## 2019-07-03 DIAGNOSIS — M54.16 LUMBAR RADICULOPATHY: ICD-10-CM

## 2019-07-03 DIAGNOSIS — M96.1 POSTLAMINECTOMY SYNDROME OF LUMBAR REGION: Primary | ICD-10-CM

## 2019-07-25 ENCOUNTER — HOSPITAL ENCOUNTER (OUTPATIENT)
Dept: RADIOLOGY | Facility: CLINIC | Age: 79
Discharge: HOME | End: 2019-07-25
Attending: ANESTHESIOLOGY
Payer: MEDICARE

## 2019-07-25 VITALS
TEMPERATURE: 97.9 F | HEART RATE: 77 BPM | OXYGEN SATURATION: 100 % | RESPIRATION RATE: 18 BRPM | DIASTOLIC BLOOD PRESSURE: 82 MMHG | SYSTOLIC BLOOD PRESSURE: 167 MMHG

## 2019-07-25 DIAGNOSIS — M54.16 LUMBAR RADICULOPATHY: ICD-10-CM

## 2019-07-25 DIAGNOSIS — M96.1 POSTLAMINECTOMY SYNDROME OF LUMBAR REGION: ICD-10-CM

## 2019-07-25 PROCEDURE — 62323 NJX INTERLAMINAR LMBR/SAC: CPT | Performed by: ANESTHESIOLOGY

## 2019-07-25 RX ORDER — BUPIVACAINE HCL/PF 2.5 MG/ML
10 VIAL (ML) INJECTION ONCE
Status: COMPLETED | OUTPATIENT
Start: 2019-07-25 | End: 2019-07-25

## 2019-07-25 RX ORDER — METHYLPREDNISOLONE ACETATE 80 MG/ML
80 INJECTION, SUSPENSION INTRA-ARTICULAR; INTRALESIONAL; INTRAMUSCULAR; PARENTERAL; SOFT TISSUE ONCE
Status: COMPLETED | OUTPATIENT
Start: 2019-07-25 | End: 2019-07-25

## 2019-07-25 RX ORDER — LIDOCAINE HYDROCHLORIDE 10 MG/ML
5 INJECTION, SOLUTION EPIDURAL; INFILTRATION; INTRACAUDAL; PERINEURAL ONCE
Status: COMPLETED | OUTPATIENT
Start: 2019-07-25 | End: 2019-07-25

## 2019-07-25 RX ADMIN — LIDOCAINE HYDROCHLORIDE 5 ML: 10 INJECTION, SOLUTION EPIDURAL; INFILTRATION; INTRACAUDAL; PERINEURAL at 14:30

## 2019-07-25 RX ADMIN — BUPIVACAINE HYDROCHLORIDE 2 ML: 2.5 INJECTION, SOLUTION EPIDURAL; INFILTRATION; INTRACAUDAL at 14:33

## 2019-07-25 RX ADMIN — METHYLPREDNISOLONE ACETATE 80 MG: 80 INJECTION, SUSPENSION INTRA-ARTICULAR; INTRALESIONAL; INTRAMUSCULAR; PARENTERAL; SOFT TISSUE at 14:33

## 2019-07-25 RX ADMIN — IOHEXOL 1 ML: 300 INJECTION, SOLUTION INTRAVENOUS at 14:33

## 2019-07-25 NOTE — H&P
History of Present Illness: The patient is a 66 y o  male who presents with complaints of lower back and leg pain secondary to lumbar post-laminectomy syndrome and is here today for caudal epidural steroid injection      Patient Active Problem List   Diagnosis    Degenerative lumbar spinal stenosis    Spondylolisthesis of lumbar region    CAD (coronary artery disease)    Coronary artery disease    Former tobacco use    Hyperlipidemia    Hypertension    S/P CABG x 4    Leg pain, posterior, left    Peripheral artery disease (HCC)    GERD (gastroesophageal reflux disease)    Vasomotor rhinitis    Lumbar stenosis    S/P lumbar fusion    T12 compression fracture ([address] )    Atherosclerosis of native arteries of extremities with intermittent claudication, bilateral legs (HCC)       Past Medical History:   Diagnosis Date    Abnormal liver function test     RESOLVED: [id number]    Allergic rhinitis     Anemia     LAST ASSESSED: [id number]    Arthritis     BPH (benign prostatic hyperplasia)     CAD (coronary artery disease)     Coronary artery disease     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HCC)     LAST ASSESSED: [id number]    Former tobacco use     GERD (gastroesophageal reflux disease)     Hand paresthesia     RESOLVED: [id number]    Hyperlipidemia     Hypertension     Lumbar stenosis     Peripheral artery disease ([facility] [address] )     LAST ASSESSED: [id number]       Past Surgical History:   Procedure Laterality Date    BACK SURGERY      COLONOSCOPY      LUMBAR FUSION      [place] ARTHRODESIS POSTERIOR/POSTEROLATERAL LUMBAR N/A 11/3/2016    Procedure: L2-S1 POSTERIOR LUMBAR FUSION AND DECOMPRESSION (Impulse), Posterior lateral fixation; dural repair ;  Surgeon: Vannessa Tran MD;  [facility];  Service: Orthopedics    [place] CABG, ARTERIAL, SINGLE N/A 1/19/2018    Procedure: CABG X4 with LIMA - LAD, SVG - RCA, OM2, & Diagonal ; Left Leg EVH; MATTHEW;  Surgeon: Anselmo Schreiber DO;  [facility];  Service: Cardiac Surgery    [place] COLONOSCOPY FLX DX W/COLLJ SPEC WHEN PFRMD N/A 11/15/2017    Procedure: EGD AND COLONOSCOPY;  Surgeon: Marco Nguyen MD;  [facility]; Service: Gastroenterology    SEPTOPLASTY      LAST ASSESSED; [id number]    TONSILECTOMY AND ADNOIDECTOMY      LAST ASSESSED: [id number]         Current Outpatient Medications:     Ascorbic Acid (VITAMIN C) 1000 MG tablet, Take 1,000 mg by mouth daily , Disp: , Rfl:     aspirin (ECOTRIN LOW STRENGTH) 81 mg EC tablet, Take 81 mg by mouth daily, Disp: , Rfl:     atorvastatin (LIPITOR) 80 mg tablet, TAKE 1 TABLET (80 MG TOTAL) BY MOUTH DAILY WITH DINNER, Disp: 90 tablet, Rfl: 0    cholecalciferol (VITAMIN D3) 1,000 units tablet, Take 1,000 Units by mouth daily, Disp: , Rfl:     cyanocobalamin (VITAMIN B-12) 1,000 mcg tablet, Take 1,000 mcg by mouth daily  , Disp: , Rfl:     docusate sodium (COLACE) 100 mg capsule, Take 1 capsule by mouth 2 (two) times a day for 30 days Hold for loose stools   (Patient taking differently: Take 100 mg by mouth daily as needed Hold for loose stools  ), Disp: 60 capsule, Rfl: 0    ipratropium (ATROVENT) 0 03 % nasal spray, INHALE 2 SPRAYS INTO EACH NOSTRILS EVERY 12 HRS, Disp: 5 mL, Rfl: 3    Magnesium 500 MG CAPS, Take 500 mg by mouth daily, Disp: , Rfl:     metoprolol tartrate (LOPRESSOR) 25 mg tablet, Take 1 tablet (25 mg total) by mouth every 12 (twelve) hours Take half tablet twice daily, Disp: 180 tablet, Rfl: 3    Multiple Vitamin (MULTIVITAMIN) capsule, Take 1 capsule by mouth daily, Disp: , Rfl:     Omega-3 Fatty Acids (OMEGA-3 FISH OIL PO), Take 3 tablets by mouth daily Relief factor daily, Disp: , Rfl:     pantoprazole (PROTONIX) 40 mg tablet, Take 1 tablet (40 mg total) by mouth daily, Disp: 90 tablet, Rfl: 1    Probiotic Product (ALIGN PO), Take 1 tablet by mouth daily  , Disp: , Rfl:     Current Facility-Administered Medications:     bupivacaine (PF) (MARCAINE) 0 25 % injection 10 mL, 10 mL, Epidural, Once, Duc Thomas MD    iohexol (OMNIPAQUE) 300 mg/mL injection 50 mL, 50 mL, Epidural, Once, Duc Thomas MD    lidocaine (PF) (XYLOCAINE-MPF) 1 % injection 5 mL, 5 mL, Epidural, Once, Duc Thomas MD    methylPREDNISolone acetate (DEPO-MEDROL) injection 80 mg, 80 mg, Epidural, Once, Duc Thomas MD    Allergies   Allergen Reactions    Penicillins Other (See Comments)     Hallucinations; Patient reported that he was seeing visual disturbances         Physical Exam:   Vitals:    07/25/19 1417   BP: 162/77   Pulse: 73   Resp: 18   Temp: 97 9 °F (36 6 °C)   SpO2: 100%     General: Awake, Alert, Oriented x 3, Mood and affect appropriate  Respiratory: Respirations even and unlabored  Cardiovascular: Peripheral pulses intact; no edema  Musculoskeletal Exam:   Lower back tenderness    ASA Score: 3    Patient/Chart Verification  Patient ID Verified: Verbal  Consents Confirmed: To be obtained in the Pre-Procedure area  H&P( within 30 days) Verified: To be obtained in the Pre-Procedure area  Allergies Reviewed: Yes  Anticoag/NSAID held?: No(per FQ no need to hold ASA)  Currently on antibiotics?: No    Assessment:   1  Postlaminectomy syndrome of lumbar region    2   Lumbar radiculopathy        Plan: Caudal GEORGE

## 2019-07-25 NOTE — DISCHARGE INSTR - LAB
Epidural Steroid Injection   WHAT YOU NEED TO KNOW:   An epidural steroid injection (GEORGE) is a procedure to inject steroid medicine into the epidural space  The epidural space is between your spinal cord and vertebrae  Steroids reduce inflammation and fluid buildup in your spine that may be causing pain  You may be given pain medicine along with the steroids  ACTIVITY  · Do not drive or operate machinery today  · No strenuous activity today - bending, lifting, etc   · You may resume normal activites starting tomorrow - start slowly and as tolerated  · You may shower today, but no tub baths or hot tubs  · You may have numbness for several hours from the local anesthetic  Please use caution and common sense, especially with weight-bearing activities  CARE OF THE INJECTION SITE  · If you have soreness or pain, apply ice to the area today (20 minutes on/20 minutes off)  · Starting tomorrow, you may use warm, moist heat or ice if needed  · You may have an increase or change in your discomfort for 36-48 hours after your treatment  · Apply ice and continue with any pain medication you have been prescribed  · Notify the [facility] if you have any of the following: redness, drainage, swelling, headache, stiff neck or fever above 100°F     SPECIAL INSTRUCTIONS  · Our office will contact you in approximately 7 days for a progress report  MEDICATIONS  · Continue to take all routine medications  · Our office may have instructed you to hold some medications  If you have a problem specifically related to your procedure, please call our office at [phone]  Problems not related to your procedure should be directed to your primary care physician

## 2019-08-02 ENCOUNTER — TELEPHONE (OUTPATIENT)
Dept: RADIOLOGY | Facility: CLINIC | Age: 79
End: 2019-08-02

## 2019-08-02 NOTE — TELEPHONE ENCOUNTER
Patient states that he has no improvement  Patient stands that the pain is worse when he stands or walks for long period of time       Pain scale - 10/10 with 5% relief

## 2019-08-03 DIAGNOSIS — I10 HYPERTENSION, UNSPECIFIED TYPE: ICD-10-CM

## 2019-08-14 NOTE — TELEPHONE ENCOUNTER
Is he interested in moving forward with spinal cord stimulation, which was discussed with him and Dr Sumner Greek last office visit (he was given a brochure)  If yes, let me know and I can start process

## 2019-09-10 ENCOUNTER — TRANSCRIBE ORDERS (OUTPATIENT)
Dept: LAB | Facility: CLINIC | Age: 79
End: 2019-09-10

## 2019-09-10 ENCOUNTER — APPOINTMENT (OUTPATIENT)
Dept: LAB | Facility: CLINIC | Age: 79
End: 2019-09-10
Payer: MEDICARE

## 2019-09-10 DIAGNOSIS — E78.2 MIXED HYPERLIPIDEMIA: ICD-10-CM

## 2019-09-10 LAB
CHOLEST SERPL-MCNC: 185 MG/DL (ref 50–200)
HDLC SERPL-MCNC: 91 MG/DL (ref 40–60)
LDLC SERPL CALC-MCNC: 76 MG/DL (ref 0–100)
TRIGL SERPL-MCNC: 89 MG/DL

## 2019-09-10 PROCEDURE — 36415 COLL VENOUS BLD VENIPUNCTURE: CPT

## 2019-09-10 PROCEDURE — 80061 LIPID PANEL: CPT

## 2019-09-13 ENCOUNTER — TELEPHONE (OUTPATIENT)
Dept: CARDIOLOGY CLINIC | Facility: CLINIC | Age: 79
End: 2019-09-13

## 2019-09-13 NOTE — TELEPHONE ENCOUNTER
----- Message from Leslye Rao MD sent at 9/12/2019  2:05 PM EDT -----  Please let the pt know that cholesterol levels were good, no changes at this time

## 2019-10-08 ENCOUNTER — OFFICE VISIT (OUTPATIENT)
Dept: CARDIOLOGY CLINIC | Facility: CLINIC | Age: 79
End: 2019-10-08
Payer: MEDICARE

## 2019-10-08 VITALS
DIASTOLIC BLOOD PRESSURE: 58 MMHG | OXYGEN SATURATION: 94 % | BODY MASS INDEX: 27 KG/M2 | SYSTOLIC BLOOD PRESSURE: 106 MMHG | HEIGHT: 67 IN | HEART RATE: 98 BPM | WEIGHT: 172 LBS

## 2019-10-08 DIAGNOSIS — I73.9 PERIPHERAL ARTERY DISEASE (HCC): ICD-10-CM

## 2019-10-08 DIAGNOSIS — I25.10 CORONARY ARTERY DISEASE INVOLVING NATIVE CORONARY ARTERY OF NATIVE HEART WITHOUT ANGINA PECTORIS: Primary | ICD-10-CM

## 2019-10-08 DIAGNOSIS — I10 ESSENTIAL HYPERTENSION: ICD-10-CM

## 2019-10-08 PROCEDURE — 93000 ELECTROCARDIOGRAM COMPLETE: CPT | Performed by: INTERNAL MEDICINE

## 2019-10-08 PROCEDURE — 99214 OFFICE O/P EST MOD 30 MIN: CPT | Performed by: INTERNAL MEDICINE

## 2019-10-08 NOTE — PROGRESS NOTES
Cardiology Follow Up    Sabrina Vuong  1940  [id number]  [address]  [place] [address]  [phone]    1  Coronary artery disease involving native coronary artery of native heart without angina pectoris  POCT ECG   2  Essential hypertension  POCT ECG   3  Peripheral artery disease ([address] )         Diagnoses and all orders for this visit:    Coronary artery disease involving native coronary artery of native heart without angina pectoris  -     POCT ECG    Essential hypertension  -     POCT ECG    Peripheral artery disease ([address] )      I had the pleasure of seeing Sabrina Vuong for a follow up visit  INTERVAL HISTORY: none    History of the presenting illness, Discussion/Summary and My Plan are as follows:::    He is a pleasant 70-year-old gentleman with a history of hypertension, mild dyslipidemia, CAD, CABG, peripheral vascular disease-right superficial femoral artery stenosis-medically managed  He also has a 50 pack year smoking history-quit around 2012      He presented for further evaluation of increasing dyspnea with exertion, with evaluation demonstrated multivessel CAD, underwent LIMA-LAD, SVG-RCA, SVG-OM2, SVG-Diag in January 2018      From a cardiac standpoint he is asymptomatic, occasionally has a twinge of chest pain that is noncardiac and also a point sized tenderness in the left chest that is reproducible to palpation, neither of which is precipitated by physical exertion  [place]   He does have claudication symptoms that are stable      Also has chronic back pain from a T12 fracture that limits activity      Plan:     CAD:  Positive stress test-triple-vessel coronary artery disease, status post Coronary artery bypass grafting x 4 with left internal mammary artery to left anterior descending artery, saphenous vein graft to obtuse marginal 2, saphenous vein graft to right coronary artery and saphenous vein graft to diagonal 1-January 2018  Completed cardiac rehabilitation  Going to the gym-45 minutes each session -3 times a week without any cardiac symptoms      Hypertension:  Controlled    Short carotid bruit:  Bilateral less than 50% disease -2018  Medical management alone at this time     Dyslipidemia:  September 2019:   Total cholesterol 185, triglycerides 89, HDL 91, LDL 76  High HDL- I do not think it could be considered to be protective and likely nonfunctional   Continue statin     Peripheral vascular disease:  50-75% distal SFA stenosis-May 2019-No change since September 2017, continue medical management, encouraged walking as much as possible      Follow-up in 6 months    Patient Active Problem List   Diagnosis    Degenerative lumbar spinal stenosis    Spondylolisthesis of lumbar region    CAD (coronary artery disease)    Coronary artery disease    Former tobacco use    Hyperlipidemia    Hypertension    S/P CABG x 4    Leg pain, posterior, left    Peripheral artery disease (HCC)    GERD (gastroesophageal reflux disease)    Vasomotor rhinitis    Lumbar stenosis    S/P lumbar fusion    T12 compression fracture (HCC)    Atherosclerosis of native arteries of extremities with intermittent claudication, bilateral legs (HCC)    Postlaminectomy syndrome of lumbar region    Lumbar radiculopathy     Past Medical History:   Diagnosis Date    Abnormal liver function test     RESOLVED: [id number]    Allergic rhinitis     Anemia     LAST ASSESSED: [id number]    Arthritis     BPH (benign prostatic hyperplasia)     CAD (coronary artery disease)     Coronary artery disease     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HCC)     LAST ASSESSED: [id number]    Former tobacco use     GERD (gastroesophageal reflux disease)     Hand paresthesia     RESOLVED: [id number]    Hyperlipidemia     Hypertension     Lumbar stenosis     Peripheral artery disease ([facility] [address] )     LAST ASSESSED: [id number]     Social History     Socioeconomic History    Marital status:      Spouse name: Not on file    Number of children: Not on file    Years of education: some college     Highest education level: Not on file   Occupational History    Occupation: Insurance     Comment: Retired    Social Needs    Financial resource strain: Not on file    Food insecurity:     Worry: Not on file     Inability: Not on file   Arch[employer] needs:     Medical: Not on file     Non-medical: Not on file   Tobacco Use    Smoking status: Former Smoker     Last attempt to quit:      Years since quittin 7    Smokeless tobacco: Never Used   Substance and Sexual Activity    Alcohol use:  Yes     Alcohol/week: 1 0 standard drinks     Types: 1 Shots of liquor per week     Comment: beer, wine, scotch every other day; SOCIAL AS PER ALL SCRIPTS     Drug use: No    Sexual activity: Not Currently   Lifestyle    Physical activity:     Days per week: Not on file     Minutes per session: Not on file    Stress: Not on file   Relationships    Social connections:     Talks on phone: Not on file     Gets together: Not on file     Attends [employer] service: Not on file     Active member of club or organization: Not on file     Attends meetings of clubs or organizations: Not on file     Relationship status: Not on file    Intimate partner violence:     Fear of current or ex partner: Not on file     Emotionally abused: Not on file     Physically abused: Not on file     Forced sexual activity: Not on file   Other Topics Concern    Not on file   Social History Narrative    Daily coffee consumption       Family History   Problem Relation Age of Onset    Colon cancer Mother     Emphysema Mother     Liver disease Mother     Colon cancer Father     Coronary artery disease Father     Hypertension Father     Liver disease Father     Heart failure Father     Stroke Father         CVA     Heart attack Father     Coronary artery disease Brother  Heart disease Brother         younger brother by pass and other brother 4 stents placed    Other Family         BACK PROBLEM     Stroke Family         CVA    Emphysema Family     Hypertension Family         BENIGN     Past Surgical History:   Procedure Laterality Date    BACK SURGERY      COLONOSCOPY      LUMBAR FUSION      FL ARTHRODESIS POSTERIOR/POSTEROLATERAL LUMBAR N/A 11/3/2016    Procedure: L2-S1 POSTERIOR LUMBAR FUSION AND DECOMPRESSION (Impulse), Posterior lateral fixation; dural repair ;  Surgeon: Ca Harmon MD;  [facility];  Service: Orthopedics    FL CABG, ARTERIAL, SINGLE N/A 1/19/2018    Procedure: CABG X4 with LIMA - LAD, SVG - RCA, OM2, & Diagonal ; Left Leg EVH; MATTHEW;  Surgeon: Liliya Stroud DO;  [facility];  Service: Cardiac Surgery    FL COLONOSCOPY FLX DX W/COLLJ SPEC WHEN PFRMD N/A 11/15/2017    Procedure: EGD AND COLONOSCOPY;  Surgeon: Bucky Posada MD;  [facility]; Service: Gastroenterology    SEPTOPLASTY      LAST ASSESSED; [id number]    TONSILECTOMY AND ADNOIDECTOMY      LAST ASSESSED: [id number]       Current Outpatient Medications:     Ascorbic Acid (VITAMIN C) 1000 MG tablet, Take 1,000 mg by mouth daily , Disp: , Rfl:     aspirin (ECOTRIN LOW STRENGTH) 81 mg EC tablet, Take 81 mg by mouth daily, Disp: , Rfl:     atorvastatin (LIPITOR) 80 mg tablet, TAKE 1 TABLET (80 MG TOTAL) BY MOUTH DAILY WITH DINNER, Disp: 90 tablet, Rfl: 0    cholecalciferol (VITAMIN D3) 1,000 units tablet, Take 1,000 Units by mouth daily , Disp: , Rfl:     cyanocobalamin (VITAMIN B-12) 1,000 mcg tablet, Take 1,000 mcg by mouth daily  , Disp: , Rfl:     docusate sodium (COLACE) 100 mg capsule, Take 1 capsule by mouth 2 (two) times a day for 30 days Hold for loose stools   (Patient taking differently: Take 100 mg by mouth daily as needed Hold for loose stools  ), Disp: 60 capsule, Rfl: 0    ipratropium (ATROVENT) 0 03 % nasal spray, INHALE 2 SPRAYS INTO EACH NOSTRILS EVERY 12 HRS, Disp: 5 mL, Rfl: 3    Magnesium 500 MG CAPS, Take 500 mg by mouth daily, Disp: , Rfl:     metoprolol tartrate (LOPRESSOR) 25 mg tablet, Take 1 tablet (25 mg total) by mouth every 12 (twelve) hours, Disp: 180 tablet, Rfl: 2    Multiple Vitamin (MULTIVITAMIN) capsule, Take 1 capsule by mouth daily, Disp: , Rfl:     Omega-3 Fatty Acids (OMEGA-3 FISH OIL PO), Take 3 tablets by mouth daily Relief factor daily, Disp: , Rfl:     pantoprazole (PROTONIX) 40 mg tablet, Take 1 tablet (40 mg total) by mouth daily, Disp: 90 tablet, Rfl: 1    Probiotic Product (ALIGN PO), Take 1 tablet by mouth daily  , Disp: , Rfl:   Allergies   Allergen Reactions    Penicillins Other (See Comments)     Hallucinations; Patient reported that he was seeing visual disturbances         Imaging: No results found  Review of Systems:  Review of Systems   Constitutional: Negative  HENT: Negative  Eyes: Negative  Respiratory: Negative  Cardiovascular: Negative  Musculoskeletal: Positive for back pain  Negative for arthralgias, gait problem and joint swelling  Neurological: Negative  Physical Exam:  /58 (BP Location: Left arm, Patient Position: Sitting, Cuff Size: Standard)   Pulse 98   Ht 5' 7" (1 702 m)   Wt 78 kg (172 lb)   SpO2 94%   BMI 26 94 kg/m²   Physical Exam   Constitutional: He appears well-developed and well-nourished  Non-toxic appearance  He does not appear ill  HENT:   Head: Normocephalic  Soft, short carotid bruit bilaterally   Eyes: Pupils are equal, round, and reactive to light  Neck: JVD present  No hepatojugular reflux present  No tracheal deviation present  No thyromegaly present  Cardiovascular: Normal pulses  An irregularly irregular rhythm present  Bradycardia present  Exam reveals no S3 and no S4    Pulmonary/Chest: Effort normal and breath sounds normal  No accessory muscle usage or stridor  No tachypnea  No respiratory distress   He has no decreased breath sounds  He has no wheezes  He has no rales  Abdominal: Soft  Musculoskeletal: Normal range of motion  Right lower leg: Normal  He exhibits no edema  Left lower leg: He exhibits no edema  Skin: Skin is warm  No rash noted  He is not diaphoretic  No erythema  No pallor

## 2019-10-27 DIAGNOSIS — E78.5 HYPERLIPIDEMIA, UNSPECIFIED HYPERLIPIDEMIA TYPE: ICD-10-CM

## 2019-10-27 DIAGNOSIS — K21.9 GASTROESOPHAGEAL REFLUX DISEASE WITHOUT ESOPHAGITIS: ICD-10-CM

## 2019-10-29 RX ORDER — PANTOPRAZOLE SODIUM 40 MG/1
TABLET, DELAYED RELEASE ORAL
Qty: 90 TABLET | Refills: 1 | Status: SHIPPED | OUTPATIENT
Start: 2019-10-29 | End: 2020-07-15

## 2019-10-29 RX ORDER — ATORVASTATIN CALCIUM 80 MG/1
80 TABLET, FILM COATED ORAL
Qty: 90 TABLET | Refills: 2 | Status: SHIPPED | OUTPATIENT
Start: 2019-10-29 | End: 2020-08-12

## 2019-10-31 ENCOUNTER — OFFICE VISIT (OUTPATIENT)
Dept: FAMILY MEDICINE CLINIC | Facility: OTHER | Age: 79
End: 2019-10-31
Payer: MEDICARE

## 2019-10-31 VITALS
DIASTOLIC BLOOD PRESSURE: 68 MMHG | WEIGHT: 172.5 LBS | HEIGHT: 67 IN | HEART RATE: 89 BPM | SYSTOLIC BLOOD PRESSURE: 118 MMHG | TEMPERATURE: 97.9 F | BODY MASS INDEX: 27.07 KG/M2 | OXYGEN SATURATION: 96 %

## 2019-10-31 DIAGNOSIS — Z23 ENCOUNTER FOR IMMUNIZATION: ICD-10-CM

## 2019-10-31 DIAGNOSIS — I10 ESSENTIAL HYPERTENSION: ICD-10-CM

## 2019-10-31 DIAGNOSIS — L98.9 SKIN LESION OF LEFT LEG: ICD-10-CM

## 2019-10-31 DIAGNOSIS — M48.061 DEGENERATIVE LUMBAR SPINAL STENOSIS: ICD-10-CM

## 2019-10-31 DIAGNOSIS — Z13.1 SCREENING FOR DIABETES MELLITUS: ICD-10-CM

## 2019-10-31 DIAGNOSIS — K21.9 GASTROESOPHAGEAL REFLUX DISEASE, ESOPHAGITIS PRESENCE NOT SPECIFIED: ICD-10-CM

## 2019-10-31 DIAGNOSIS — Z12.5 SCREENING FOR PROSTATE CANCER: ICD-10-CM

## 2019-10-31 DIAGNOSIS — I25.10 CORONARY ARTERY DISEASE INVOLVING NATIVE CORONARY ARTERY OF NATIVE HEART WITHOUT ANGINA PECTORIS: ICD-10-CM

## 2019-10-31 DIAGNOSIS — Z13.6 SCREENING FOR AAA (ABDOMINAL AORTIC ANEURYSM): ICD-10-CM

## 2019-10-31 DIAGNOSIS — Z12.11 SCREENING FOR COLON CANCER: ICD-10-CM

## 2019-10-31 DIAGNOSIS — E78.2 MIXED HYPERLIPIDEMIA: ICD-10-CM

## 2019-10-31 DIAGNOSIS — Z00.00 MEDICARE ANNUAL WELLNESS VISIT, SUBSEQUENT: Primary | ICD-10-CM

## 2019-10-31 DIAGNOSIS — I73.9 PERIPHERAL ARTERY DISEASE (HCC): ICD-10-CM

## 2019-10-31 DIAGNOSIS — Z95.1 S/P CABG X 4: ICD-10-CM

## 2019-10-31 PROCEDURE — G0439 PPPS, SUBSEQ VISIT: HCPCS

## 2019-10-31 PROCEDURE — 99214 OFFICE O/P EST MOD 30 MIN: CPT

## 2019-10-31 PROCEDURE — 90662 IIV NO PRSV INCREASED AG IM: CPT

## 2019-10-31 PROCEDURE — G0008 ADMIN INFLUENZA VIRUS VAC: HCPCS

## 2019-10-31 NOTE — PROGRESS NOTES
Assessment/Plan:         Problem List Items Addressed This Visit     Coronary artery disease, S/P CABG x 4  Hyperlipidemia       Hypertension  --Asymptomatic, normotensive on aspirin, B-blocker, statin  --Followed by cardiology with recent favorable lipid profile    Relevant Orders    CBC and differential    Comprehensive metabolic panel    TSH, 3rd generation with Free T4 reflex    Urinalysis with reflex to microscopic    Peripheral artery disease ([facility] [address] )  --Largely asymptomatic with this  Followed by  vascular  UTD with imaging  --AAA screen ordered per below (none found in records)    Degenerative lumbar spinal stenosis  Hx T12 compression fractures  --Ongoing pain with ambulation primarily  Some relief from Tylenol, CBD oil  --Followed by  pain management  No improvement with GEORGE x 2  --Continued physical therapy encouraged  Fall precautions  Uses cane on occasion  GERD (gastroesophageal reflux disease)  --Asymptomatic on daily PPI  Potential long-term AE's discussed  Suggested trial of tapering to qod or PRN use  Instructions given  --Continue dietary, lifestyle measures  --UTD with EGD (2017)       Skin lesion of left leg    Relevant Orders    Ambulatory referral to Dermatology      Other Visit Diagnoses     Medicare annual wellness visit, subsequent    -  Primary  --UTD with eye exam  --UTD with colonoscopy  Yearly FIT test given  --Flu shot given  UTD with other immunizations except Shingrix, which he declines  --Has advanced directives         Encounter for immunization        Relevant Orders    influenza vaccine, [id number], high-dose, PF 0 5 mL (FLUZONE HIGH-DOSE)    Screening for AAA (abdominal aortic aneurysm)        Relevant Orders     abdominal aorta    Screening for colon cancer        Relevant Orders    Occult Blood, Fecal Immunochemical    Screening for prostate cancer        Relevant Orders    PSA, Total Screen    Screening for diabetes mellitus        Relevant Orders Hemoglobin A1C          RTO 6 months with labs a week prior      Subjective:      Patient ID: Dulce Lazaro is a 78 y o  male  Here for AWV  See separate note for details  Ongoing lower back pain, mainly when he walks  Has seen pain management  No relief from GEORGE x 2  Tylenol and CBD oil somewhat helpful  Continues to go to PT and do exercises 3 times a week  Baseline LE weakness, intermittent left leg pains which he attributes to scarring from his CABG  Saw vascular 6/2019, with doppler U/S 5/2019 showing stenosis of right leg only  No recent falls  Uses cane on occasion  Home safety measures reviewed and adequate  Able to drive without issues  Occasional left chest "pinching", with associated tenderness to touch  Mentioned to cardiologist  Vitor Garcia to be non-cardiac in nature (musculo-cartiliginous)  Baseline ROBLES  No palpitations, dizziness, edema  Fairly healthy diet  Admits to occasional indulgences  Tries to avoid sodium  Weight up 10 pounds over the past year  Recent (9/2019) favorable lipids  2 glasses of wine per day  No other alcohol  Quit smoking 10 years ago  Denies depression  Getting over a cold the past 2 weeks  Improved cough, nasal congestion  No fever  Remains on daily PPI  Hasn't tried tapering down/off  No recent heartburn, dysphagia, stool changes including C/D, melena, hematochezia  Colonoscopy and EGD 2017  Persistent patch on left knee x 2 years  Minimally itchy, sore  Has tried numerous OTC creams without relief  Saw dermatologist years ago (no recent)    No prior AAA screen  Prevnar 2017, PVX 2011, Tdap 2018  Needs flu shot  Declines Shingrix  Eye exam in August   Glasses UTD  No hearing issues  Has advanced directive         The following portions of the patient's history were reviewed and updated as appropriate: current medications, past family history, past medical history, past social history, past surgical history and problem list     Review of Systems   Constitutional: Negative for fever  HENT: Negative for sore throat  Respiratory:        Per HPI   Cardiovascular: Negative for palpitations  Gastrointestinal: Negative for abdominal pain, blood in stool, constipation, nausea and vomiting  Genitourinary: Negative for difficulty urinating  Musculoskeletal:        Per HPI   Skin:        Per HPI   Neurological: Negative for dizziness  Psychiatric/Behavioral: Negative for dysphoric mood  Objective:      /68 (BP Location: Left arm, Patient Position: Sitting, Cuff Size: Large)   Pulse 89   Temp 97 9 °F (36 6 °C) (Tympanic)   Ht 5' 7" (1 702 m)   Wt 78 2 kg (172 lb 8 oz)   SpO2 96%   BMI 27 02 kg/m²          Physical Exam   Constitutional: He is oriented to person, place, and time  He appears well-developed and well-nourished  HENT:   Head: Normocephalic and atraumatic  Right Ear: External ear normal    Left Ear: External ear normal    Nose: Nose normal    Mouth/Throat: Oropharynx is clear and moist    Eyes: Pupils are equal, round, and reactive to light  Conjunctivae are normal    Neck: Normal range of motion  Neck supple  Cardiovascular: Normal rate, regular rhythm, normal heart sounds and intact distal pulses  Pulmonary/Chest: Effort normal and breath sounds normal    Abdominal: Soft  Bowel sounds are normal  There is no tenderness  Lymphadenopathy:     He has no cervical adenopathy  Neurological: He is alert and oriented to person, place, and time  He has normal reflexes  Skin: Skin is warm and dry  Left anterolateral knee with 1 5 cm faintly erythematous, scaly, round patch with indistinct borders  Psychiatric: He has a normal mood and affect

## 2019-10-31 NOTE — PATIENT INSTRUCTIONS
Medicare Preventive Visit Patient Instructions  Thank you for completing your Welcome to Medicare Visit or Medicare Annual Wellness Visit today  Your next wellness visit will be due in one year (10/31/2020)  The screening/preventive services that you may require over the next 5-10 years are detailed below  Some tests may not apply to you based off risk factors and/or age  Screening tests ordered at today's visit but not completed yet may show as past due  Also, please note that scanned in results may not display below  Preventive Screenings:  Service Recommendations Previous Testing/Comments   Colorectal Cancer Screening  · Colonoscopy    · Fecal Occult Blood Test (FOBT)/Fecal Immunochemical Test (FIT)  · Fecal DNA/Cologuard Test  · Flexible Sigmoidoscopy Age: 54-65 years old   Colonoscopy: every 10 years (May be performed more frequently if at higher risk)  OR  FOBT/FIT: every 1 year  OR  Cologuard: every 3 years  OR  Sigmoidoscopy: every 5 years  Screening may be recommended earlier than age 48 if at higher risk for colorectal cancer  Also, an individualized decision between you and your healthcare provider will decide whether screening between the ages of 74-80 would be appropriate   Colonoscopy: 11/15/2017  FOBT/FIT: Not on file  Cologuard: Not on file  Sigmoidoscopy: Not on file    Screening Current     Prostate Cancer Screening Individualized decision between patient and health care provider in men between ages of 53-78   Medicare will cover every 12 months beginning on the day after your 50th birthday PSA: 0 3 ng/mL     Screening Not Indicated     Hepatitis C Screening Once for adults born between 1945 and 1965  More frequently in patients at high risk for Hepatitis C Hep C Antibody: Not on file       Diabetes Screening 1-2 times per year if you're at risk for diabetes or have pre-diabetes Fasting glucose: 94 mg/dL   A1C: 5 3 %    Screening Current   Cholesterol Screening Once every 5 years if you don't have a lipid disorder  May order more often based on risk factors  Lipid panel: 09/10/2019    Screening Not Indicated  History Lipid Disorder      Other Preventive Screenings Covered by Medicare:  1  Abdominal Aortic Aneurysm (AAA) Screening: covered once if your at risk  You're considered to be at risk if you have a family history of AAA or a male between the age of 73-68 who smoking at least 100 cigarettes in your lifetime  2  Lung Cancer Screening: covers low dose CT scan once per year if you meet all of the following conditions: (1) Age 50-69; (2) No signs or symptoms of lung cancer; (3) Current smoker or have quit smoking within the last 15 years; (4) You have a tobacco smoking history of at least 30 pack years (packs per day x number of years you smoked); (5) You get a written order from a healthcare provider  3  Glaucoma Screening: covered annually if you're considered high risk: (1) You have diabetes OR (2) Family history of glaucoma OR (3)  aged 48 and older OR (3)  American aged 72 and older  3  Osteoporosis Screening: covered every 2 years if you meet one of the following conditions: (1) Have a vertebral abnormality; (2) On glucocorticoid therapy for more than 3 months; (3) Have primary hyperparathyroidism; (4) On osteoporosis medications and need to assess response to drug therapy  5  HIV Screening: covered annually if you're between the age of 12-76  Also covered annually if you are younger than 13 and older than 72 with risk factors for HIV infection  For pregnant patients, it is covered up to 3 times per pregnancy      Immunizations:  Immunization Recommendations   Influenza Vaccine Annual influenza vaccination during flu season is recommended for all persons aged >= 6 months who do not have contraindications   Pneumococcal Vaccine (Prevnar and Pneumovax)  * Prevnar = PCV13  * Pneumovax = PPSV23 Adults 25-60 years old: 1-3 doses may be recommended based on certain risk factors  Adults 72 years old: Prevnar (PCV13) vaccine recommended followed by Pneumovax (PPSV23) vaccine  If already received PPSV23 since turning 65, then PCV13 recommended at least one year after PPSV23 dose  Hepatitis B Vaccine 3 dose series if at intermediate or high risk (ex: diabetes, end stage renal disease, liver disease)   Tetanus (Td) Vaccine - COST NOT COVERED BY MEDICARE PART B Following completion of primary series, a booster dose should be given every 10 years to maintain immunity against tetanus  Td may also be given as tetanus wound prophylaxis  Tdap Vaccine - COST NOT COVERED BY MEDICARE PART B Recommended at least once for all adults  For pregnant patients, recommended with each pregnancy  Shingles Vaccine (Shingrix) - COST NOT COVERED BY MEDICARE PART B  2 shot series recommended in those aged 48 and above     Health Maintenance Due:      Topic Date Due    CRC Screening: Colonoscopy  11/15/2027     Immunizations Due:      Topic Date Due    INFLUENZA VACCINE  07/01/2019     Advance Directives   What are advance directives? Advance directives are legal documents that state your wishes and plans for medical care  These plans are made ahead of time in case you lose your ability to make decisions for yourself  Advance directives can apply to any medical decision, such as the treatments you want, and if you want to donate organs  What are the types of advance directives? There are many types of advance directives, and each state has rules about how to use them  You may choose a combination of any of the following:  · Living will: This is a written record of the treatment you want  You can also choose which treatments you do not want, which to limit, and which to stop at a certain time  This includes surgery, medicine, IV fluid, and tube feedings  · Durable power of  for healthcare [facility]):   This is a written record that states who you want to make healthcare choices for you when you are unable to make them for yourself  This person, called a proxy, is usually a family member or a friend  You may choose more than 1 proxy  · Do not resuscitate (DNR) order:  A DNR order is used in case your heart stops beating or you stop breathing  It is a request not to have certain forms of treatment, such as CPR  A DNR order may be included in other types of advance directives  · Medical directive: This covers the care that you want if you are in a coma, near death, or unable to make decisions for yourself  You can list the treatments you want for each condition  Treatment may include pain medicine, surgery, blood transfusions, dialysis, IV or tube feedings, and a ventilator (breathing machine)  · Values history: This document has questions about your views, beliefs, and how you feel and think about life  This information can help others choose the care that you would choose  Why are advance directives important? An advance directive helps you control your care  Although spoken wishes may be used, it is better to have your wishes written down  Spoken wishes can be misunderstood, or not followed  Treatments may be given even if you do not want them  An advance directive may make it easier for your family to make difficult choices about your care  Fall Prevention    Fall prevention  includes ways to make your home and other areas safer  It also includes ways you can move more carefully to prevent a fall  Health conditions that cause changes in your blood pressure, vision, or muscle strength and coordination may increase your risk for falls  Medicines may also increase your risk for falls if they make you dizzy, weak, or sleepy  Fall prevention tips:   · Stand or sit up slowly  · Use assistive devices as directed  · Wear shoes that fit well and have soles that   · Wear a personal alarm  · Stay active  · Manage your medical conditions      Home Safety Tips:  · Add items to prevent falls in the bathroom  · Keep paths clear  · Install bright lights in your home  · Keep items you use often on shelves within reach  · Paint or place reflective tape on the edges of your stairs  Weight Management   Why it is important to manage your weight:  Being overweight increases your risk of health conditions such as heart disease, high blood pressure, type 2 diabetes, and certain types of cancer  It can also increase your risk for osteoarthritis, sleep apnea, and other respiratory problems  Aim for a slow, steady weight loss  Even a small amount of weight loss can lower your risk of health problems  How to lose weight safely:  A safe and healthy way to lose weight is to eat fewer calories and get regular exercise  You can lose up about 1 pound a week by decreasing the number of calories you eat by 500 calories each day  Healthy meal plan for weight management:  A healthy meal plan includes a variety of foods, contains fewer calories, and helps you stay healthy  A healthy meal plan includes the following:  · Eat whole-grain foods more often  A healthy meal plan should contain fiber  Fiber is the part of grains, fruits, and vegetables that is not broken down by your body  Whole-grain foods are healthy and provide extra fiber in your diet  Some examples of whole-grain foods are whole-wheat breads and pastas, oatmeal, brown rice, and bulgur  · Eat a variety of vegetables every day  Include dark, leafy greens such as spinach, kale, grzegorz greens, and mustard greens  Eat yellow and orange vegetables such as carrots, sweet potatoes, and winter squash  · Eat a variety of fruits every day  Choose fresh or canned fruit (canned in its own juice or light syrup) instead of juice  Fruit juice has very little or no fiber  · Eat low-fat dairy foods  Drink fat-free (skim) milk or 1% milk  Eat fat-free yogurt and low-fat cottage cheese   Try low-fat cheeses such as mozzarella and other reduced-fat cheeses  · Choose meat and other protein foods that are low in fat  Choose beans or other legumes such as split peas or lentils  Choose fish, skinless poultry (chicken or turkey), or lean cuts of red meat (beef or pork)  Before you cook meat or poultry, cut off any visible fat  · Use less fat and oil  Try baking foods instead of frying them  Add less fat, such as margarine, sour cream, regular salad dressing and mayonnaise to foods  Eat fewer high-fat foods  Some examples of high-fat foods include french fries, doughnuts, ice cream, and cakes  · Eat fewer sweets  Limit foods and drinks that are high in sugar  This includes candy, cookies, regular soda, and sweetened drinks  Exercise:  Exercise at least 30 minutes per day on most days of the week  Some examples of exercise include walking, biking, dancing, and swimming  You can also fit in more physical activity by taking the stairs instead of the elevator or parking farther away from stores  Ask your healthcare provider about the best exercise plan for you  © Copyright [employer] 2018 Information is for End User's use only and may not be sold, redistributed or otherwise used for commercial purposes   All illustrations and images included in CareNotes® are the copyrighted property of A D A M , Inc  or [address] [place] [address] [employer]

## 2019-10-31 NOTE — PROGRESS NOTES
Assessment and Plan:     Problem List Items Addressed This Visit     None          Falls Plan of Care: referral to physical therapy  Medications that increase falls were reviewed  Home safety education provided  Preventive health issues were discussed with patient, and age appropriate screening tests were ordered as noted in patient's After Visit Summary  Personalized health advice and appropriate referrals for health education or preventive services given if needed, as noted in patient's After Visit Summary  History of Present Illness:     Patient presents for Medicare Annual Wellness visit    See separate problem visit note for details of chronic conditions, preventative care       Patient Care Team:  Kathryn Peres DO as PCP - General  MD Samira Bran DO Edwena Lapidus, MD Grier Fitz, MD Ravi Powell, MD Marcelo Edouard MD as Endoscopist  Madalynn Cranker Fritz Lone (Vascular Surgery)     Problem List:     Patient Active Problem List   Diagnosis    Degenerative lumbar spinal stenosis    Spondylolisthesis of lumbar region    CAD (coronary artery disease)    Coronary artery disease    Former tobacco use    Hyperlipidemia    Hypertension    S/P CABG x 4    Leg pain, posterior, left    Peripheral artery disease ([address] )    GERD (gastroesophageal reflux disease)    Vasomotor rhinitis    Lumbar stenosis    S/P lumbar fusion    T12 compression fracture ([address] )    Atherosclerosis of native arteries of extremities with intermittent claudication, bilateral legs ([address] )    Postlaminectomy syndrome of lumbar region    Lumbar radiculopathy      Past Medical and Surgical History:     Past Medical History:   Diagnosis Date    Abnormal liver function test     RESOLVED: [id number]    Allergic rhinitis     Anemia     LAST ASSESSED: [id number]    Arthritis     BPH (benign prostatic hyperplasia)     CAD (coronary artery disease)     Coronary artery disease     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HCC)     LAST ASSESSED: [id number]    Former tobacco use     GERD (gastroesophageal reflux disease)     Hand paresthesia     RESOLVED: [id number]    Hyperlipidemia     Hypertension     Lumbar stenosis     Peripheral artery disease ([facility] [address] )     LAST ASSESSED: [id number]     Past Surgical History:   Procedure Laterality Date    BACK SURGERY      COLONOSCOPY      LUMBAR FUSION      IN ARTHRODESIS POSTERIOR/POSTEROLATERAL LUMBAR N/A 11/3/2016    Procedure: L2-S1 POSTERIOR LUMBAR FUSION AND DECOMPRESSION (Impulse), Posterior lateral fixation; dural repair ;  Surgeon: Sloan Todd MD;  [facility];  Service: Orthopedics    IN CABG, ARTERIAL, SINGLE N/A 1/19/2018    Procedure: CABG X4 with LIMA - LAD, SVG - RCA, OM2, & Diagonal ; Left Leg EVH; MATTHEW;  Surgeon: Ca Smith DO;  [facility];  Service: Cardiac Surgery    IN COLONOSCOPY FLX DX W/COLLJ SPEC WHEN PFRMD N/A 11/15/2017    Procedure: EGD AND COLONOSCOPY;  Surgeon: Stefania Singh MD;  [facility]; Service: Gastroenterology    SEPTOPLASTY      LAST ASSESSED; [id number]    TONSILECTOMY AND ADNOIDECTOMY      LAST ASSESSED: [id number]      Family History:     Family History   Problem Relation Age of Onset    Colon cancer Mother     Emphysema Mother     Liver disease Mother     Colon cancer Father     Coronary artery disease Father     Hypertension Father     Liver disease Father     Heart failure Father     Stroke Father         CVA     Heart attack Father     Coronary artery disease Brother     Heart disease Brother         younger brother by pass and other brother 4 stents placed    Other Family         BACK PROBLEM     Stroke Family         CVA    Emphysema Family     Hypertension Family         BENIGN      Social History:     Social History     Socioeconomic History    Marital status:       Spouse name: Not on file    Number of children: Not on file    Years of education: some college     Highest education level: Not on file   Occupational History    Occupation: Insurance     Comment: Retired    Social Needs    Financial resource strain: Not on file    Food insecurity:     Worry: Not on file     Inability: Not on file   [place] needs:     Medical: Not on file     Non-medical: Not on file   Tobacco Use    Smoking status: Former Smoker     Last attempt to quit:      Years since quittin 8    Smokeless tobacco: Never Used   Substance and Sexual Activity    Alcohol use:  Yes     Alcohol/week: 1 0 standard drinks     Types: 1 Shots of liquor per week     Comment: beer, wine, scotch every other day; SOCIAL AS PER ALL SCRIPTS     Drug use: No    Sexual activity: Not Currently   Lifestyle    Physical activity:     Days per week: Not on file     Minutes per session: Not on file    Stress: Not on file   Relationships    Social connections:     Talks on phone: Not on file     Gets together: Not on file     Attends Denominational service: Not on file     Active member of club or organization: Not on file     Attends meetings of clubs or organizations: Not on file     Relationship status: Not on file    Intimate partner violence:     Fear of current or ex partner: Not on file     Emotionally abused: Not on file     Physically abused: Not on file     Forced sexual activity: Not on file   Other Topics Concern    Not on file   Social History Narrative    Daily coffee consumption        Medications and Allergies:     Current Outpatient Medications   Medication Sig Dispense Refill    Ascorbic Acid (VITAMIN C) 1000 MG tablet Take 1,000 mg by mouth daily       aspirin (ECOTRIN LOW STRENGTH) 81 mg EC tablet Take 81 mg by mouth daily      atorvastatin (LIPITOR) 80 mg tablet TAKE 1 TABLET (80 MG TOTAL) BY MOUTH DAILY WITH DINNER 90 tablet 2    cholecalciferol (VITAMIN D3) 1,000 units tablet Take 1,000 Units by mouth daily       cyanocobalamin (VITAMIN B-12) 1,000 mcg tablet Take 1,000 mcg by mouth daily        docusate sodium (COLACE) 100 mg capsule Take 1 capsule by mouth 2 (two) times a day for 30 days Hold for loose stools  (Patient taking differently: Take 100 mg by mouth daily as needed Hold for loose stools  ) 60 capsule 0    ipratropium (ATROVENT) 0 03 % nasal spray INHALE 2 SPRAYS INTO EACH NOSTRILS EVERY 12 HRS 5 mL 3    Magnesium 500 MG CAPS Take 500 mg by mouth daily      metoprolol tartrate (LOPRESSOR) 25 mg tablet Take 1 tablet (25 mg total) by mouth every 12 (twelve) hours 180 tablet 2    Multiple Vitamin (MULTIVITAMIN) capsule Take 1 capsule by mouth daily      Omega-3 Fatty Acids (OMEGA-3 FISH OIL PO) Take 3 tablets by mouth daily Relief factor daily      pantoprazole (PROTONIX) 40 mg tablet TAKE 1 TABLET BY MOUTH EVERY DAY 90 tablet 1    Probiotic Product (ALIGN PO) Take 1 tablet by mouth daily         No current facility-administered medications for this visit  Allergies   Allergen Reactions    Penicillins Other (See Comments)     Hallucinations; Patient reported that he was seeing visual disturbances        Immunizations:     Immunization History   Administered Date(s) Administered    INFLUENZA 12/20/2006    Influenza Split High Dose Preservative Free IM 10/09/2013, 10/27/2015, 09/22/2016, 09/11/2017    Influenza TIV (IM) 01/01/2014    Influenza, high dose seasonal 0 5 mL 10/25/2018    Pneumococcal Conjugate 13-Valent 09/11/2017    Pneumococcal Polysaccharide PPV23 04/07/2011    Tdap 10/25/2018      Health Maintenance:         Topic Date Due    CRC Screening: Colonoscopy  11/15/2027         Topic Date Due    INFLUENZA VACCINE  07/01/2019      Medicare Health Risk Assessment:     There were no vitals taken for this visit  Zohaib Guzman is here for his Subsequent Wellness visit  Last Medicare Wellness visit information reviewed, patient interviewed and updates made to the record today        Health Risk Assessment: Patient rates overall health as fair  Patient feels that their physical health rating is same  Eyesight was rated as slightly worse  Hearing was rated as same  Patient feels that their emotional and mental health rating is same  Pain experienced in the last 7 days has been some  Patient's pain rating has been 1/10  Depression Screening:   PHQ-2 Score: 0      Fall Risk Screening: In the past year, patient has experienced: history of falling in past year    Number of falls: 2 or more    Home Safety:  Patient has trouble with stairs inside or outside of their home  Patient has working smoke alarms and has working carbon monoxide detector  Nutrition:   Current diet is Regular and Limited junk food  Medications:   Patient is currently taking over-the-counter supplements  OTC medications include: see medication list  Patient is able to manage medications  Activities of Daily Living (ADLs)/Instrumental Activities of Daily Living (IADLs):   Walk and transfer into and out of bed and chair?: Yes  Dress and groom yourself?: Yes    Bathe or shower yourself?: Yes    Feed yourself?  Yes  Do your laundry/housekeeping?: Yes  Manage your money, pay your bills and track your expenses?: Yes  Make your own meals?: Yes    Do your own shopping?: Yes    Previous Hospitalizations:   Any hospitalizations or ED visits within the last 12 months?: No      Advance Care Planning:   Living will: Yes    Advanced directive: Yes      PREVENTIVE SCREENINGS      Cardiovascular Screening:    General: Screening Not Indicated and History Lipid Disorder      Diabetes Screening:     General: Screening Current      Colorectal Cancer Screening:     General: Screening Current      Prostate Cancer Screening:    General: Screening Not Indicated      Abdominal Aortic Aneurysm (AAA) Screening:    Risk factors include: tobacco use        KEVIN Overton

## 2019-11-05 ENCOUNTER — APPOINTMENT (OUTPATIENT)
Dept: LAB | Facility: CLINIC | Age: 79
End: 2019-11-05
Payer: MEDICARE

## 2019-11-05 DIAGNOSIS — Z12.11 SCREENING FOR COLON CANCER: ICD-10-CM

## 2019-11-05 LAB — HEMOCCULT STL QL IA: NEGATIVE

## 2019-11-05 PROCEDURE — G0328 FECAL BLOOD SCRN IMMUNOASSAY: HCPCS

## 2019-11-07 ENCOUNTER — TELEPHONE (OUTPATIENT)
Dept: FAMILY MEDICINE CLINIC | Facility: OTHER | Age: 79
End: 2019-11-07

## 2019-11-07 NOTE — TELEPHONE ENCOUNTER
Pt is aware    ----- Message from [id number] sent at 11/7/2019  2:00 PM EST -----  Can we let Shilpi Carroll know that his stool test came back negative for blood    Thanks

## 2019-11-14 ENCOUNTER — TELEPHONE (OUTPATIENT)
Dept: FAMILY MEDICINE CLINIC | Facility: OTHER | Age: 79
End: 2019-11-14

## 2019-11-14 ENCOUNTER — HOSPITAL ENCOUNTER (OUTPATIENT)
Dept: ULTRASOUND IMAGING | Facility: HOSPITAL | Age: 79
Discharge: HOME | End: 2019-11-14
Attending: NURSE PRACTITIONER
Payer: MEDICARE

## 2019-11-14 DIAGNOSIS — Z13.6 SCREENING FOR AAA (ABDOMINAL AORTIC ANEURYSM): ICD-10-CM

## 2019-11-14 PROCEDURE — 76775 US EXAM ABDO BACK WALL LIM: CPT

## 2019-11-14 NOTE — TELEPHONE ENCOUNTER
Pt aware        ----- Message from Loretta Hendrickson, [address] sent at 11/14/2019  3:00 PM EST -----  Can we let Baylee Spain know that his abdominal ultrasound came back negative for an aneurysm     Thanks

## 2019-11-29 ENCOUNTER — OFFICE VISIT (OUTPATIENT)
Dept: FAMILY MEDICINE CLINIC | Facility: OTHER | Age: 79
End: 2019-11-29
Payer: MEDICARE

## 2019-11-29 VITALS
BODY MASS INDEX: 27.31 KG/M2 | WEIGHT: 174 LBS | OXYGEN SATURATION: 98 % | SYSTOLIC BLOOD PRESSURE: 122 MMHG | TEMPERATURE: 97.6 F | HEART RATE: 76 BPM | HEIGHT: 67 IN | DIASTOLIC BLOOD PRESSURE: 78 MMHG

## 2019-11-29 DIAGNOSIS — J30.9 ALLERGIC RHINITIS, UNSPECIFIED SEASONALITY, UNSPECIFIED TRIGGER: ICD-10-CM

## 2019-11-29 DIAGNOSIS — J20.9 ACUTE BRONCHITIS, UNSPECIFIED ORGANISM: Primary | ICD-10-CM

## 2019-11-29 PROCEDURE — 99214 OFFICE O/P EST MOD 30 MIN: CPT | Performed by: NURSE PRACTITIONER

## 2019-11-29 RX ORDER — AZITHROMYCIN 250 MG/1
TABLET, FILM COATED ORAL
Qty: 6 TABLET | Refills: 0 | Status: SHIPPED | OUTPATIENT
Start: 2019-11-29 | End: 2019-12-04

## 2019-11-29 NOTE — PROGRESS NOTES
Assessment/Plan:         Problem List Items Addressed This Visit     None      Visit Diagnoses     Acute bronchitis  Allergic rhinitis  --OTC Claritin/Allegra, nasal steroid, expectorant  --Requesting Z-pack which has been helpful in the past    --Faint RLL crackles on exam, former smoker-->slip given for CXR which he was advised to get done if symptoms no better/worse over the next 48-72 hours  --UTD with immunizations    Relevant Medications    azithromycin (ZITHROMAX) 250 mg tablet    Other Relevant Orders    XR chest pa & lateral            Subjective:      Patient ID: Sabrina Vuong is a 78 y o  male  Here with complaints of persistent nasal congestion, cough, sneezing over the past two months  Cough mildly productive of clear/yellow sputum at times  Increased in the past couple days  No associated wheezing, chest tightness  Some ear and sinus congestion/headaches  No eye drainage, itching  No fevers/sweats  Normal appetite  No N/V/D, abdominal pain  No increased heartburn above baseline  No OTC meds taken  No known sick contacts  Had flu shot and PVX  Nots history of spring allergies, but not typically this time of year  No known environmental triggers, pets, etc    Former smoker  The following portions of the patient's history were reviewed and updated as appropriate: current medications, past family history, past medical history, past social history, past surgical history and problem list     Review of Systems   Constitutional: Negative for fever  HENT: Positive for postnasal drip, rhinorrhea and sinus pressure  Negative for sore throat  Eyes: Negative for discharge and visual disturbance  Respiratory: Positive for cough  Negative for wheezing  Cardiovascular: Negative for chest pain  Gastrointestinal: Negative for abdominal pain, nausea and vomiting  Musculoskeletal: Negative for myalgias  Neurological: Positive for headaches  Objective:      /78 (BP Location: Left arm, Patient Position: Sitting, Cuff Size: Large)   Pulse 76   Temp 97 6 °F (36 4 °C) (Tympanic)   Ht 5' 7" (1 702 m)   Wt 78 9 kg (174 lb)   SpO2 98%   BMI 27 25 kg/m²          Physical Exam   Constitutional: He is oriented to person, place, and time  He appears well-developed and well-nourished  No distress  HENT:   Head: Normocephalic and atraumatic  Right Ear: External ear normal    Left Ear: External ear normal    Mouth/Throat: Oropharynx is clear and moist  No oropharyngeal exudate  Nasal turbinates pink/pale swollen, with deviated septum noted on left  No sinus tenderness  Eyes: Pupils are equal, round, and reactive to light  Conjunctivae are normal  Right eye exhibits no discharge  Left eye exhibits no discharge  Neck: Normal range of motion  Neck supple  Cardiovascular: Normal rate, regular rhythm and normal heart sounds  Pulmonary/Chest: Effort normal  No respiratory distress  He has no wheezes  Breathing easy, RR=16, with no coughing noted at this time  Faint RLL crackles  BS clear otherwise  Abdominal: Soft  Bowel sounds are normal  There is no tenderness  Lymphadenopathy:     He has no cervical adenopathy  Neurological: He is alert and oriented to person, place, and time  Skin: Skin is warm and dry  No rash noted  He is not diaphoretic  Psychiatric: He has a normal mood and affect

## 2019-11-29 NOTE — PATIENT INSTRUCTIONS
Acute Bronchitis   WHAT YOU NEED TO KNOW:   Acute bronchitis is swelling and irritation in the air passages of your lungs  This irritation may cause you to cough or have other breathing problems  Acute bronchitis often starts because of another illness, such as a cold or the flu  The illness spreads from your nose and throat to your windpipe and airways  Bronchitis is often called a chest cold  Acute bronchitis lasts about 3 to 6 weeks and is usually not a serious illness  Your cough can last for several weeks  DISCHARGE INSTRUCTIONS:   Return to the emergency department if:   · You cough up blood  · Your lips or fingernails turn blue  · You feel like you are not getting enough air when you breathe  Contact your healthcare provider if:   · You have a fever  · Your breathing problems do not go away or get worse  · Your cough does not get better within 4 weeks  · You have questions or concerns about your condition or care  Self-care:   · Get more rest   Rest helps your body to heal  Slowly start to do more each day  Rest when you feel it is needed  · Avoid irritants in the air  Avoid chemicals, fumes, and dust  Wear a face mask if you must work around dust or fumes  Stay inside on days when air pollution levels are high  If you have allergies, stay inside when pollen counts are high  Do not use aerosol products, such as spray-on deodorant, bug spray, and hair spray  · Do not smoke or be around others who smoke  Nicotine and other chemicals in cigarettes and cigars damages the cilia that move mucus out of your lungs  Ask your healthcare provider for information if you currently smoke and need help to quit  E-cigarettes or smokeless tobacco still contain nicotine  Talk to your healthcare provider before you use these products  · Drink liquids as directed  Liquids help keep your air passages moist and help you cough up mucus   You may need to drink more liquids when you have acute bronchitis  Ask how much liquid to drink each day and which liquids are best for you  · Use a humidifier or vaporizer  Use a cool mist humidifier or a vaporizer to increase air moisture in your home  This may make it easier for you to breathe and help decrease your cough  Decrease risk for acute bronchitis:   · Get the vaccinations you need  Ask your healthcare provider if you should get vaccinated against the flu or pneumonia  · Prevent the spread of germs  You can decrease your risk of acute bronchitis and other illnesses by doing the following:     [facility] your hands often with soap and water  Carry germ-killing hand lotion or gel with you  You can use the lotion or gel to clean your hands when soap and water are not available  ¨ Do not touch your eyes, nose, or mouth unless you have washed your hands first     ¨ Always cover your mouth when you cough to prevent the spread of germs  It is best to cough into a tissue or your shirt sleeve instead of into your hand  Ask those around you cover their mouths when they cough  ¨ Try to avoid people who have a cold or the flu  If you are sick, stay away from others as much as possible  Medicines: Your healthcare provider may  give you any of the following:  · Ibuprofen or acetaminophen  are medicines that help lower your fever  They are available without a doctor's order  Ask your healthcare provider which medicine is right for you  Ask how much to take and how often to take it  Follow directions  These medicines can cause stomach bleeding if not taken correctly  Ibuprofen can cause kidney damage  Do not take ibuprofen if you have kidney disease, an ulcer, or allergies to aspirin  Acetaminophen can cause liver damage  Do not take more than 4,000 milligrams in 24 hours  · Decongestants  help loosen mucus in your lungs and make it easier to cough up  This can help you breathe easier  · Cough suppressants  decrease your urge to cough   If your cough produces mucus, do not take a cough suppressant unless your healthcare provider tells you to  Your healthcare provider may suggest that you take a cough suppressant at night so you can rest     · Inhalers  may be given  Your healthcare provider may give you one or more inhalers to help you breathe easier and cough less  An inhaler gives your medicine to open your airways  Ask your healthcare provider to show you how to use your inhaler correctly  · Take your medicine as directed  Contact your healthcare provider if you think your medicine is not helping or if you have side effects  Tell him of her if you are allergic to any medicine  Keep a list of the medicines, vitamins, and herbs you take  Include the amounts, and when and why you take them  Bring the list or the pill bottles to follow-up visits  Carry your medicine list with you in case of an emergency  Follow up with your healthcare provider as directed:  Write down questions you have so you will remember to ask them during your follow-up visits  © 2017 [employer] [employer] Information is for End User's use only and may not be sold, redistributed or otherwise used for commercial purposes  All illustrations and images included in CareNotes® are the copyrighted property of [employer]  or [employer]  The above information is an  only  It is not intended as medical advice for individual conditions or treatments  Talk to your doctor, nurse or pharmacist before following any medical regimen to see if it is safe and effective for you

## 2020-05-19 DIAGNOSIS — I10 HYPERTENSION, UNSPECIFIED TYPE: ICD-10-CM

## 2020-05-30 ENCOUNTER — TELEPHONE (OUTPATIENT)
Dept: OTHER | Facility: OTHER | Age: 80
End: 2020-05-30

## 2020-06-05 ENCOUNTER — OFFICE VISIT (OUTPATIENT)
Dept: UROLOGY | Facility: CLINIC | Age: 80
End: 2020-06-05
Payer: MEDICARE

## 2020-06-05 ENCOUNTER — TRANSCRIBE ORDERS (OUTPATIENT)
Dept: ADMINISTRATIVE | Facility: HOSPITAL | Age: 80
End: 2020-06-05

## 2020-06-05 VITALS
WEIGHT: 176 LBS | BODY MASS INDEX: 27.62 KG/M2 | DIASTOLIC BLOOD PRESSURE: 80 MMHG | HEIGHT: 67 IN | HEART RATE: 92 BPM | TEMPERATURE: 97.5 F | SYSTOLIC BLOOD PRESSURE: 150 MMHG

## 2020-06-05 DIAGNOSIS — R30.0 DYSURIA: Primary | ICD-10-CM

## 2020-06-05 DIAGNOSIS — B37.42 CANDIDAL BALANITIS: ICD-10-CM

## 2020-06-05 LAB — POST-VOID RESIDUAL VOLUME, ML POC: 93 ML

## 2020-06-05 PROCEDURE — 3008F BODY MASS INDEX DOCD: CPT | Performed by: PHYSICIAN ASSISTANT

## 2020-06-05 PROCEDURE — 3079F DIAST BP 80-89 MM HG: CPT | Performed by: PHYSICIAN ASSISTANT

## 2020-06-05 PROCEDURE — 51798 US URINE CAPACITY MEASURE: CPT | Performed by: PHYSICIAN ASSISTANT

## 2020-06-05 PROCEDURE — 1160F RVW MEDS BY RX/DR IN RCRD: CPT | Performed by: PHYSICIAN ASSISTANT

## 2020-06-05 PROCEDURE — 99203 OFFICE O/P NEW LOW 30 MIN: CPT | Performed by: PHYSICIAN ASSISTANT

## 2020-06-05 PROCEDURE — 4040F PNEUMOC VAC/ADMIN/RCVD: CPT | Performed by: PHYSICIAN ASSISTANT

## 2020-06-05 PROCEDURE — 1036F TOBACCO NON-USER: CPT | Performed by: PHYSICIAN ASSISTANT

## 2020-06-05 PROCEDURE — 3077F SYST BP >= 140 MM HG: CPT | Performed by: PHYSICIAN ASSISTANT

## 2020-06-05 RX ORDER — CLOTRIMAZOLE AND BETAMETHASONE DIPROPIONATE 10; .64 MG/G; MG/G
CREAM TOPICAL 2 TIMES DAILY
Qty: 30 G | Refills: 0 | Status: SHIPPED | OUTPATIENT
Start: 2020-06-05 | End: 2020-08-13 | Stop reason: ALTCHOICE

## 2020-06-05 RX ORDER — FLUCONAZOLE 150 MG/1
150 TABLET ORAL ONCE
Qty: 1 TABLET | Refills: 0 | Status: SHIPPED | OUTPATIENT
Start: 2020-06-05 | End: 2020-06-05

## 2020-06-08 ENCOUNTER — APPOINTMENT (OUTPATIENT)
Dept: LAB | Facility: CLINIC | Age: 80
End: 2020-06-08
Payer: MEDICARE

## 2020-06-08 DIAGNOSIS — R30.0 DYSURIA: ICD-10-CM

## 2020-06-08 LAB
BACTERIA UR QL AUTO: ABNORMAL /HPF
BILIRUB UR QL STRIP: NEGATIVE
CLARITY UR: CLEAR
COLOR UR: YELLOW
GLUCOSE UR STRIP-MCNC: NEGATIVE MG/DL
HGB UR QL STRIP.AUTO: ABNORMAL
KETONES UR STRIP-MCNC: NEGATIVE MG/DL
LEUKOCYTE ESTERASE UR QL STRIP: ABNORMAL
NITRITE UR QL STRIP: NEGATIVE
NON-SQ EPI CELLS URNS QL MICRO: ABNORMAL /HPF
PH UR STRIP.AUTO: 5.5 [PH]
PROT UR STRIP-MCNC: NEGATIVE MG/DL
RBC #/AREA URNS AUTO: ABNORMAL /HPF
SP GR UR STRIP.AUTO: 1.02 (ref 1–1.03)
UROBILINOGEN UR QL STRIP.AUTO: 0.2 E.U./DL
WBC #/AREA URNS AUTO: ABNORMAL /HPF

## 2020-06-08 PROCEDURE — 87086 URINE CULTURE/COLONY COUNT: CPT

## 2020-06-08 PROCEDURE — 81001 URINALYSIS AUTO W/SCOPE: CPT | Performed by: PHYSICIAN ASSISTANT

## 2020-06-10 LAB — BACTERIA UR CULT: NORMAL

## 2020-06-22 ENCOUNTER — HOSPITAL ENCOUNTER (OUTPATIENT)
Dept: NON INVASIVE DIAGNOSTICS | Facility: CLINIC | Age: 80
Discharge: HOME | End: 2020-06-22
Payer: MEDICARE

## 2020-06-22 DIAGNOSIS — I73.9 PERIPHERAL ARTERY DISEASE (HCC): ICD-10-CM

## 2020-06-22 PROCEDURE — 93923 UPR/LXTR ART STDY 3+ LVLS: CPT

## 2020-06-22 PROCEDURE — 93925 LOWER EXTREMITY STUDY: CPT | Performed by: SURGERY

## 2020-06-22 PROCEDURE — 93922 UPR/L XTREMITY ART 2 LEVELS: CPT | Performed by: SURGERY

## 2020-06-22 PROCEDURE — 93925 LOWER EXTREMITY STUDY: CPT

## 2020-06-29 ENCOUNTER — TELEPHONE (OUTPATIENT)
Dept: ADMINISTRATIVE | Facility: HOSPITAL | Age: 80
End: 2020-06-29

## 2020-06-29 ENCOUNTER — APPOINTMENT (OUTPATIENT)
Dept: LAB | Facility: CLINIC | Age: 80
End: 2020-06-29
Payer: MEDICARE

## 2020-06-29 ENCOUNTER — TRANSCRIBE ORDERS (OUTPATIENT)
Dept: LAB | Facility: CLINIC | Age: 80
End: 2020-06-29

## 2020-06-29 DIAGNOSIS — Z12.5 SCREENING FOR PROSTATE CANCER: ICD-10-CM

## 2020-06-29 DIAGNOSIS — Z13.1 SCREENING FOR DIABETES MELLITUS: ICD-10-CM

## 2020-06-29 DIAGNOSIS — I10 ESSENTIAL HYPERTENSION: ICD-10-CM

## 2020-06-29 LAB
ALBUMIN SERPL BCP-MCNC: 3.5 G/DL (ref 3.5–5)
ALP SERPL-CCNC: 108 U/L (ref 46–116)
ALT SERPL W P-5'-P-CCNC: 41 U/L (ref 12–78)
ANION GAP SERPL CALCULATED.3IONS-SCNC: 12 MMOL/L (ref 4–13)
AST SERPL W P-5'-P-CCNC: 34 U/L (ref 5–45)
BACTERIA UR QL AUTO: ABNORMAL /HPF
BASOPHILS # BLD AUTO: 0.03 THOUSANDS/ΜL (ref 0–0.1)
BASOPHILS NFR BLD AUTO: 0 % (ref 0–1)
BILIRUB SERPL-MCNC: 0.47 MG/DL (ref 0.2–1)
BILIRUB UR QL STRIP: NEGATIVE
BUN SERPL-MCNC: 25 MG/DL (ref 5–25)
CALCIUM SERPL-MCNC: 8.8 MG/DL (ref 8.3–10.1)
CHLORIDE SERPL-SCNC: 103 MMOL/L (ref 100–108)
CLARITY UR: ABNORMAL
CO2 SERPL-SCNC: 24 MMOL/L (ref 21–32)
COLOR UR: YELLOW
CREAT SERPL-MCNC: 1.24 MG/DL (ref 0.6–1.3)
EOSINOPHIL # BLD AUTO: 0.13 THOUSAND/ΜL (ref 0–0.61)
EOSINOPHIL NFR BLD AUTO: 2 % (ref 0–6)
ERYTHROCYTE [DISTWIDTH] IN BLOOD BY AUTOMATED COUNT: 13.5 % (ref 11.6–15.1)
EST. AVERAGE GLUCOSE BLD GHB EST-MCNC: 103 MG/DL
GFR SERPL CREATININE-BSD FRML MDRD: 55 ML/MIN/1.73SQ M
GLUCOSE SERPL-MCNC: 99 MG/DL (ref 65–140)
GLUCOSE UR STRIP-MCNC: NEGATIVE MG/DL
HBA1C MFR BLD: 5.2 %
HCT VFR BLD AUTO: 35.1 % (ref 36.5–49.3)
HGB BLD-MCNC: 11.4 G/DL (ref 12–17)
HGB UR QL STRIP.AUTO: ABNORMAL
IMM GRANULOCYTES # BLD AUTO: 0.03 THOUSAND/UL (ref 0–0.2)
IMM GRANULOCYTES NFR BLD AUTO: 0 % (ref 0–2)
KETONES UR STRIP-MCNC: NEGATIVE MG/DL
LEUKOCYTE ESTERASE UR QL STRIP: ABNORMAL
LYMPHOCYTES # BLD AUTO: 2.17 THOUSANDS/ΜL (ref 0.6–4.47)
LYMPHOCYTES NFR BLD AUTO: 26 % (ref 14–44)
MCH RBC QN AUTO: 34.1 PG (ref 26.8–34.3)
MCHC RBC AUTO-ENTMCNC: 32.5 G/DL (ref 31.4–37.4)
MCV RBC AUTO: 105 FL (ref 82–98)
MONOCYTES # BLD AUTO: 0.69 THOUSAND/ΜL (ref 0.17–1.22)
MONOCYTES NFR BLD AUTO: 8 % (ref 4–12)
NEUTROPHILS # BLD AUTO: 5.38 THOUSANDS/ΜL (ref 1.85–7.62)
NEUTS SEG NFR BLD AUTO: 64 % (ref 43–75)
NITRITE UR QL STRIP: NEGATIVE
NON-SQ EPI CELLS URNS QL MICRO: ABNORMAL /HPF
NRBC BLD AUTO-RTO: 0 /100 WBCS
PH UR STRIP.AUTO: 6 [PH]
PLATELET # BLD AUTO: 185 THOUSANDS/UL (ref 149–390)
PMV BLD AUTO: 10.3 FL (ref 8.9–12.7)
POTASSIUM SERPL-SCNC: 4.3 MMOL/L (ref 3.5–5.3)
PROT SERPL-MCNC: 7.5 G/DL (ref 6.4–8.2)
PROT UR STRIP-MCNC: ABNORMAL MG/DL
PSA SERPL-MCNC: <0.1 NG/ML (ref 0–4)
RBC # BLD AUTO: 3.34 MILLION/UL (ref 3.88–5.62)
RBC #/AREA URNS AUTO: ABNORMAL /HPF
SODIUM SERPL-SCNC: 139 MMOL/L (ref 136–145)
SP GR UR STRIP.AUTO: >=1.03 (ref 1–1.03)
TSH SERPL DL<=0.05 MIU/L-ACNC: 1.39 UIU/ML (ref 0.36–3.74)
UROBILINOGEN UR QL STRIP.AUTO: 0.2 E.U./DL
WBC # BLD AUTO: 8.43 THOUSAND/UL (ref 4.31–10.16)
WBC #/AREA URNS AUTO: ABNORMAL /HPF

## 2020-06-29 PROCEDURE — 84443 ASSAY THYROID STIM HORMONE: CPT

## 2020-06-29 PROCEDURE — 81001 URINALYSIS AUTO W/SCOPE: CPT

## 2020-06-29 PROCEDURE — G0103 PSA SCREENING: HCPCS

## 2020-06-29 PROCEDURE — 85025 COMPLETE CBC W/AUTO DIFF WBC: CPT

## 2020-06-29 PROCEDURE — 36415 COLL VENOUS BLD VENIPUNCTURE: CPT

## 2020-06-29 PROCEDURE — 83036 HEMOGLOBIN GLYCOSYLATED A1C: CPT

## 2020-06-29 PROCEDURE — 80053 COMPREHEN METABOLIC PANEL: CPT

## 2020-06-30 ENCOUNTER — OFFICE VISIT (OUTPATIENT)
Dept: VASCULAR SURGERY | Facility: CLINIC | Age: 80
End: 2020-06-30
Payer: MEDICARE

## 2020-06-30 VITALS
WEIGHT: 177.4 LBS | SYSTOLIC BLOOD PRESSURE: 128 MMHG | TEMPERATURE: 97.7 F | HEIGHT: 67 IN | HEART RATE: 75 BPM | BODY MASS INDEX: 27.84 KG/M2 | DIASTOLIC BLOOD PRESSURE: 68 MMHG

## 2020-06-30 DIAGNOSIS — I25.10 CORONARY ARTERY DISEASE INVOLVING NATIVE CORONARY ARTERY OF NATIVE HEART WITHOUT ANGINA PECTORIS: ICD-10-CM

## 2020-06-30 DIAGNOSIS — I73.9 PERIPHERAL ARTERY DISEASE (HCC): Primary | ICD-10-CM

## 2020-06-30 DIAGNOSIS — Z87.891 FORMER TOBACCO USE: ICD-10-CM

## 2020-06-30 DIAGNOSIS — I10 ESSENTIAL HYPERTENSION: ICD-10-CM

## 2020-06-30 DIAGNOSIS — R09.89 BRUIT OF LEFT CAROTID ARTERY: ICD-10-CM

## 2020-06-30 DIAGNOSIS — E78.2 MIXED HYPERLIPIDEMIA: ICD-10-CM

## 2020-06-30 PROCEDURE — 99214 OFFICE O/P EST MOD 30 MIN: CPT | Performed by: NURSE PRACTITIONER

## 2020-06-30 PROCEDURE — 3078F DIAST BP <80 MM HG: CPT | Performed by: NURSE PRACTITIONER

## 2020-06-30 PROCEDURE — 4040F PNEUMOC VAC/ADMIN/RCVD: CPT | Performed by: NURSE PRACTITIONER

## 2020-06-30 PROCEDURE — 3074F SYST BP LT 130 MM HG: CPT | Performed by: NURSE PRACTITIONER

## 2020-06-30 PROCEDURE — 3008F BODY MASS INDEX DOCD: CPT | Performed by: NURSE PRACTITIONER

## 2020-06-30 PROCEDURE — 1036F TOBACCO NON-USER: CPT | Performed by: NURSE PRACTITIONER

## 2020-06-30 PROCEDURE — 1160F RVW MEDS BY RX/DR IN RCRD: CPT | Performed by: NURSE PRACTITIONER

## 2020-07-13 ENCOUNTER — OFFICE VISIT (OUTPATIENT)
Dept: FAMILY MEDICINE CLINIC | Facility: OTHER | Age: 80
End: 2020-07-13
Payer: MEDICARE

## 2020-07-13 VITALS
WEIGHT: 174.38 LBS | TEMPERATURE: 97.6 F | OXYGEN SATURATION: 94 % | BODY MASS INDEX: 27.37 KG/M2 | HEIGHT: 67 IN | HEART RATE: 81 BPM | DIASTOLIC BLOOD PRESSURE: 64 MMHG | SYSTOLIC BLOOD PRESSURE: 112 MMHG

## 2020-07-13 DIAGNOSIS — I10 ESSENTIAL HYPERTENSION: Primary | ICD-10-CM

## 2020-07-13 DIAGNOSIS — K21.9 GASTROESOPHAGEAL REFLUX DISEASE, ESOPHAGITIS PRESENCE NOT SPECIFIED: ICD-10-CM

## 2020-07-13 DIAGNOSIS — I25.10 CORONARY ARTERY DISEASE INVOLVING NATIVE CORONARY ARTERY OF NATIVE HEART WITHOUT ANGINA PECTORIS: ICD-10-CM

## 2020-07-13 DIAGNOSIS — E78.2 MIXED HYPERLIPIDEMIA: ICD-10-CM

## 2020-07-13 DIAGNOSIS — M79.605 LEG PAIN, POSTERIOR, LEFT: ICD-10-CM

## 2020-07-13 DIAGNOSIS — S22.080S COMPRESSION FRACTURE OF T12 VERTEBRA, SEQUELA: ICD-10-CM

## 2020-07-13 DIAGNOSIS — Z95.1 S/P CABG X 4: ICD-10-CM

## 2020-07-13 DIAGNOSIS — D64.9 CHRONIC ANEMIA: Chronic | ICD-10-CM

## 2020-07-13 DIAGNOSIS — I73.9 PERIPHERAL ARTERY DISEASE (HCC): ICD-10-CM

## 2020-07-13 PROCEDURE — 4040F PNEUMOC VAC/ADMIN/RCVD: CPT | Performed by: NURSE PRACTITIONER

## 2020-07-13 PROCEDURE — 1160F RVW MEDS BY RX/DR IN RCRD: CPT | Performed by: NURSE PRACTITIONER

## 2020-07-13 PROCEDURE — 1036F TOBACCO NON-USER: CPT | Performed by: NURSE PRACTITIONER

## 2020-07-13 PROCEDURE — 3008F BODY MASS INDEX DOCD: CPT | Performed by: NURSE PRACTITIONER

## 2020-07-13 PROCEDURE — 99214 OFFICE O/P EST MOD 30 MIN: CPT | Performed by: NURSE PRACTITIONER

## 2020-07-13 PROCEDURE — 3074F SYST BP LT 130 MM HG: CPT | Performed by: NURSE PRACTITIONER

## 2020-07-13 PROCEDURE — 3078F DIAST BP <80 MM HG: CPT | Performed by: NURSE PRACTITIONER

## 2020-07-13 NOTE — PROGRESS NOTES
Assessment/Plan:         Problem List Items Addressed This Visit     CAD (coronary artery disease)  S/P CABG x 4  Hyperlipidemia    Hypertension   --Asymptomatic, normotensive on aspirin, B-blocker, statin  --Followed by cardiology with recent favorable lipid profile      Peripheral artery disease ([facility] [address] )  --Largely asymptomatic with this  Followed by  vascular  UTD with imaging       Degenerative lumbar spinal stenosis  T12 compression fracture (HCC)  --Ongoing pain with ambulation primarily  Some relief from Tylenol   --Followed by  pain management  No improvement with GEORGE x 3  --Continued physical therapy encouraged  Fall precautions  Uses cane on occasion  GERD (gastroesophageal reflux disease)  --Asymptomatic on daily PPI  Potential long-term AE's discussed  Suggested trial of tapering to qod or PRN use  Instructions given  --Continue dietary, lifestyle measures  --UTD with EGD (2017)      Chronic anemia (mild)  --Repeat labs in 2 months to ensure stability   --UTD with colonoscopy, FIT test    Relevant Orders    CBC and differential    Vitamin B12    Ferritin    Folate          RTO 6 months        Subjective:      Patient ID: Nathen Almaraz is a 78 y o  male  Here for routine follow-up       Ongoing lower back pain, mainly when he walks  Has seen pain management  Had GEORGE a couple months ago, but didn't help  Awaiting for PT to reopen  Doing home exercises as much as he can  Takes Tylenol three times a day which helps some  Baseline LE weakness, intermittent left leg pains which he attributes to scarring from his CABG  Saw vascular 6/2019, with doppler U/S 5/2019 showing stenosis of right leg only  No recent falls  Uses cane on occasion  Recent (6/29/20) lab results  Tolerating meds without AE's  Gets occasional sharp sensation in chest which he has had on/off x years  Mentioned to cardiologist    [place]    No palpitations, dizziness, edema       Recent (6/29/20) labs reviewed with patient  Normal except for continued mild macrocytic anemia (Hgb 11 4)  Denies recent melena, hematochezia  Negative FIT test last fall  Favorable lipids 9/2019      Remains on daily PPI  Tried tapering down/off, but wasn't successful  No recent heartburn, dysphagia, stool changes including C/D, melena, hematochezia  Colonoscopy and EGD 2017              The following portions of the patient's history were reviewed and updated as appropriate: current medications, past family history, past medical history, past social history, past surgical history and problem list     Review of Systems   Constitutional: Negative for fever  HENT: Negative for sore throat  Respiratory: Negative for cough and shortness of breath  Cardiovascular: Negative for palpitations  Gastrointestinal: Negative for abdominal pain  Musculoskeletal:        Per HPI   Neurological: Negative for dizziness and headaches  Psychiatric/Behavioral: Negative for confusion  Objective: There were no vitals taken for this visit  Physical Exam   Constitutional: He is oriented to person, place, and time  He appears well-developed and well-nourished  HENT:   Head: Normocephalic and atraumatic  Right Ear: External ear normal    Left Ear: External ear normal    Neck: Neck supple  Cardiovascular: Normal rate, regular rhythm, normal heart sounds and intact distal pulses  Pulmonary/Chest: Effort normal and breath sounds normal    Abdominal: Soft  Bowel sounds are normal  There is no tenderness  Lymphadenopathy:     He has no cervical adenopathy  Neurological: He is alert and oriented to person, place, and time  He has normal reflexes  Skin: Skin is warm and dry  Psychiatric: He has a normal mood and affect

## 2020-07-13 NOTE — PROGRESS NOTES
BMI Counseling: Body mass index is 27 31 kg/m²  The BMI is above normal  Nutrition recommendations include reducing portion sizes, decreasing overall calorie intake, 3-5 servings of fruits/vegetables daily, reducing fast food intake, consuming healthier snacks, decreasing soda and/or juice intake and moderation in carbohydrate intake  Exercise recommendations include exercising 3-5 times per week

## 2020-07-15 DIAGNOSIS — K21.9 GASTROESOPHAGEAL REFLUX DISEASE WITHOUT ESOPHAGITIS: ICD-10-CM

## 2020-07-15 RX ORDER — PANTOPRAZOLE SODIUM 40 MG/1
TABLET, DELAYED RELEASE ORAL
Qty: 90 TABLET | Refills: 1 | Status: SHIPPED | OUTPATIENT
Start: 2020-07-15 | End: 2021-09-19

## 2020-07-17 ENCOUNTER — OFFICE VISIT (OUTPATIENT)
Dept: UROLOGY | Facility: CLINIC | Age: 80
End: 2020-07-17
Payer: MEDICARE

## 2020-07-17 VITALS
WEIGHT: 172 LBS | HEIGHT: 67 IN | HEART RATE: 87 BPM | TEMPERATURE: 97.2 F | DIASTOLIC BLOOD PRESSURE: 78 MMHG | BODY MASS INDEX: 27 KG/M2 | SYSTOLIC BLOOD PRESSURE: 132 MMHG

## 2020-07-17 DIAGNOSIS — N47.1 PHIMOSIS: Primary | ICD-10-CM

## 2020-07-17 PROCEDURE — 3078F DIAST BP <80 MM HG: CPT | Performed by: PHYSICIAN ASSISTANT

## 2020-07-17 PROCEDURE — 4040F PNEUMOC VAC/ADMIN/RCVD: CPT | Performed by: PHYSICIAN ASSISTANT

## 2020-07-17 PROCEDURE — 1160F RVW MEDS BY RX/DR IN RCRD: CPT | Performed by: PHYSICIAN ASSISTANT

## 2020-07-17 PROCEDURE — 3075F SYST BP GE 130 - 139MM HG: CPT | Performed by: PHYSICIAN ASSISTANT

## 2020-07-17 PROCEDURE — 99213 OFFICE O/P EST LOW 20 MIN: CPT | Performed by: PHYSICIAN ASSISTANT

## 2020-07-17 PROCEDURE — 3008F BODY MASS INDEX DOCD: CPT | Performed by: PHYSICIAN ASSISTANT

## 2020-07-17 PROCEDURE — 1036F TOBACCO NON-USER: CPT | Performed by: PHYSICIAN ASSISTANT

## 2020-07-17 NOTE — PROGRESS NOTES
1  Phimosis         Assessment and plan:       1  Phimosis    Patient had improvement of candidal balanitis with the Lotrisone cream   We reviewed that due to his tight phimosis, this may recur  We had a long discussion about dorsal slit and circumcision  Patient is very opposed any surgical in her mention at this time wishes to continue with conservative measures  We will follow with Urology on as-needed, however encouraged to contact us in the future should he have any issues  All questions answered  Jossy Tadeo PA-C      Chief Complaint     Phimosis    History of Present Illness     Marcell Neri is a 78 y o  male presenting today as a new patient for lower urinary tract symptoms  Patient recently seen in our office for phimosis and candidal balanitis  His last appointment patient was started on Lotrisone cream and also provided with Diflucan given the difficulty of applying the ointment  He has had significant improvement over the past 4 weeks  Unfortunately still unable to expose glans penis, however the infection has resolved and he has no pain at this time  Patient denies any concern for sexually transmitted diseases as he states he has not had intercourse over many years  Denies any previous urologic history  Denies any  surgical manipulation  Patient's last PSA was <0 1 (2019)    Laboratory     Lab Results   Component Value Date    CREATININE 1 24 06/29/2020       Lab Results   Component Value Date    PSA <0 1 06/29/2020    PSA 0 3 06/10/2015    PSA 0 3 05/27/2014       Review of Systems     Review of Systems   Constitutional: Negative for activity change, appetite change, chills, diaphoresis, fatigue, fever and unexpected weight change  Respiratory: Negative for chest tightness and shortness of breath  Cardiovascular: Negative for chest pain, palpitations and leg swelling     Gastrointestinal: Negative for abdominal distention, abdominal pain, constipation, diarrhea, nausea and vomiting  Genitourinary: Negative for decreased urine volume, difficulty urinating, dysuria, enuresis, flank pain, frequency, genital sores, hematuria and urgency  Musculoskeletal: Negative for back pain, gait problem and myalgias  Skin: Negative for color change, pallor, rash and wound  Psychiatric/Behavioral: Negative for behavioral problems  The patient is not nervous/anxious  Allergies     Allergies   Allergen Reactions    Penicillins Other (See Comments)     Hallucinations; Patient reported that he was seeing visual disturbances         Physical Exam     Physical Exam   Constitutional: He is oriented to person, place, and time  He appears well-developed and well-nourished  No distress  HENT:   Head: Normocephalic and atraumatic  Right Ear: External ear normal    Left Ear: External ear normal    Nose: Nose normal    Eyes: Conjunctivae are normal  Right eye exhibits no discharge  Left eye exhibits no discharge  Neck: Normal range of motion  No tracheal deviation present  Pulmonary/Chest: Effort normal  No respiratory distress  Genitourinary: Right testis shows no mass, no swelling and no tenderness  Left testis shows no mass, no swelling and no tenderness  Phimosis present  Genitourinary Comments: Tight phimosis unable to retract  No erythema or drainage noted   Musculoskeletal: He exhibits no edema or deformity  Ambulates with cane assistance   Neurological: He is alert and oriented to person, place, and time  Skin: No rash noted  He is not diaphoretic  No erythema  Psychiatric: He has a normal mood and affect   His behavior is normal          Vital Signs     Vitals:    07/17/20 1018   BP: 132/78   BP Location: Left arm   Patient Position: Sitting   Cuff Size: Standard   Pulse: 87   Temp: (!) 97 2 °F (36 2 °C)   Weight: 78 kg (172 lb)   Height: 5' 7" (1 702 m)         Current Medications       Current Outpatient Medications:     Ascorbic Acid (VITAMIN C) 1000 MG tablet, Take 1,000 mg by mouth daily , Disp: , Rfl:     aspirin (ECOTRIN LOW STRENGTH) 81 mg EC tablet, Take 81 mg by mouth daily, Disp: , Rfl:     atorvastatin (LIPITOR) 80 mg tablet, TAKE 1 TABLET (80 MG TOTAL) BY MOUTH DAILY WITH DINNER, Disp: 90 tablet, Rfl: 2    cholecalciferol (VITAMIN D3) 1,000 units tablet, Take 1,000 Units by mouth daily , Disp: , Rfl:     clotrimazole-betamethasone (LOTRISONE) 1-0 05 % cream, Apply topically 2 (two) times a day, Disp: 30 g, Rfl: 0    cyanocobalamin (VITAMIN B-12) 1,000 mcg tablet, Take 1,000 mcg by mouth daily  , Disp: , Rfl:     Magnesium 500 MG CAPS, Take 500 mg by mouth daily, Disp: , Rfl:     metoprolol tartrate (LOPRESSOR) 25 mg tablet, TAKE 1 TABLET BY MOUTH TWICE A DAY, Disp: 180 tablet, Rfl: 2    Multiple Vitamin (MULTIVITAMIN) capsule, Take 1 capsule by mouth daily, Disp: , Rfl:     Omega-3 Fatty Acids (OMEGA-3 FISH OIL PO), Take 3 tablets by mouth daily Relief factor daily, Disp: , Rfl:     pantoprazole (PROTONIX) 40 mg tablet, TAKE 1 TABLET BY MOUTH EVERY DAY, Disp: 90 tablet, Rfl: 1    Probiotic Product (ALIGN PO), Take 1 tablet by mouth daily  , Disp: , Rfl:     docusate sodium (COLACE) 100 mg capsule, Take 1 capsule by mouth 2 (two) times a day for 30 days Hold for loose stools   (Patient taking differently: Take 100 mg by mouth daily as needed Hold for loose stools  ), Disp: 60 capsule, Rfl: 0    ipratropium (ATROVENT) 0 03 % nasal spray, INHALE 2 SPRAYS INTO EACH NOSTRILS EVERY 12 HRS (Patient not taking: Reported on 11/29/2019), Disp: 5 mL, Rfl: 3      Active Problems     Patient Active Problem List   Diagnosis    Degenerative lumbar spinal stenosis    Spondylolisthesis of lumbar region    CAD (coronary artery disease)    Coronary artery disease    Former tobacco use    Hyperlipidemia    Hypertension    S/P CABG x 4    Chronic anemia    Leg pain, posterior, left    Peripheral artery disease (HCC)    GERD (gastroesophageal reflux disease)    Vasomotor rhinitis    Lumbar stenosis    S/P lumbar fusion    T12 compression fracture (HCC)    Atherosclerosis of native arteries of extremities with intermittent claudication, bilateral legs (HCC)    Postlaminectomy syndrome of lumbar region    Lumbar radiculopathy    Skin lesion of left leg    Bruit of left carotid artery    Phimosis         Past Medical History     Past Medical History:   Diagnosis Date    Abnormal liver function test     RESOLVED: [id number]    Allergic rhinitis     Anemia     LAST ASSESSED: [id number]    Arthritis     BPH (benign prostatic hyperplasia)     CAD (coronary artery disease)     Coronary artery disease     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HCC)     LAST ASSESSED: [id number]    Former tobacco use     GERD (gastroesophageal reflux disease)     Hand paresthesia     RESOLVED: [id number]    Hyperlipidemia     Hypertension     Lumbar stenosis     Peripheral artery disease (HCC)     LAST ASSESSED: [id number]         Surgical History     Past Surgical History:   Procedure Laterality Date    BACK SURGERY      COLONOSCOPY      LUMBAR FUSION      WA ARTHRODESIS POSTERIOR/POSTEROLATERAL LUMBAR N/A 11/3/2016    Procedure: L2-S1 POSTERIOR LUMBAR FUSION AND DECOMPRESSION (Impulse), Posterior lateral fixation; dural repair ;  Surgeon: Fidelia Mccain MD;  Location: [facility];  Service: Orthopedics    WA CABG, ARTERIAL, SINGLE N/A 1/19/2018    Procedure: CABG X4 with LIMA - LAD, SVG - RCA, OM2, & Diagonal ; Left Leg EVH; MATTHEW;  Surgeon: Mely Núñez DO;  Location: [facility];  Service: Cardiac Surgery    WA COLONOSCOPY FLX DX W/COLLJ SPEC WHEN PFRMD N/A 11/15/2017    Procedure: EGD AND COLONOSCOPY;  Surgeon: Godfrey Duarte MD;  [facility];   Service: Gastroenterology    SEPTOPLASTY      LAST ASSESSED; [id number]    TONSILECTOMY AND ADNOIDECTOMY      LAST ASSESSED: [id number]         Family History     Family History   Problem Relation Age of Onset    Colon cancer Mother     Emphysema Mother     Liver disease Mother     Colon cancer Father     Coronary artery disease Father     Hypertension Father     Liver disease Father     Heart failure Father     Stroke Father         CVA     Heart attack Father     Coronary artery disease Brother     Heart disease Brother         younger brother by pass and other brother 3 stents placed    Other Family         BACK PROBLEM     Stroke Family         CVA    Emphysema Family     Hypertension Family         BENIGN         Social History     Social History       Radiology

## 2020-07-27 ENCOUNTER — HOSPITAL ENCOUNTER (OUTPATIENT)
Dept: NON INVASIVE DIAGNOSTICS | Facility: CLINIC | Age: 80
Discharge: HOME | End: 2020-07-27
Payer: MEDICARE

## 2020-07-27 DIAGNOSIS — R09.89 BRUIT OF LEFT CAROTID ARTERY: ICD-10-CM

## 2020-07-27 PROCEDURE — 93880 EXTRACRANIAL BILAT STUDY: CPT

## 2020-07-27 PROCEDURE — 93880 EXTRACRANIAL BILAT STUDY: CPT | Performed by: SURGERY

## 2020-07-29 ENCOUNTER — TELEPHONE (OUTPATIENT)
Dept: UROLOGY | Facility: MEDICAL CENTER | Age: 80
End: 2020-07-29

## 2020-07-29 NOTE — TELEPHONE ENCOUNTER
Patient called and had questions about appointments and I saw he had none scheduled with us  I told him if he has any problems to please call us and we can schedule him

## 2020-08-12 ENCOUNTER — TELEPHONE (OUTPATIENT)
Dept: VASCULAR SURGERY | Facility: CLINIC | Age: 80
End: 2020-08-12

## 2020-08-12 DIAGNOSIS — E78.5 HYPERLIPIDEMIA, UNSPECIFIED HYPERLIPIDEMIA TYPE: ICD-10-CM

## 2020-08-12 RX ORDER — ATORVASTATIN CALCIUM 80 MG/1
80 TABLET, FILM COATED ORAL
Qty: 90 TABLET | Refills: 2 | Status: SHIPPED | OUTPATIENT
Start: 2020-08-12 | End: 2021-06-01

## 2020-08-13 ENCOUNTER — OFFICE VISIT (OUTPATIENT)
Dept: VASCULAR SURGERY | Facility: CLINIC | Age: 80
End: 2020-08-13
Payer: MEDICARE

## 2020-08-13 VITALS
BODY MASS INDEX: 27.47 KG/M2 | DIASTOLIC BLOOD PRESSURE: 78 MMHG | HEIGHT: 67 IN | WEIGHT: 175 LBS | HEART RATE: 91 BPM | TEMPERATURE: 97.8 F | RESPIRATION RATE: 18 BRPM | SYSTOLIC BLOOD PRESSURE: 140 MMHG

## 2020-08-13 DIAGNOSIS — I73.9 PERIPHERAL ARTERY DISEASE (HCC): ICD-10-CM

## 2020-08-13 DIAGNOSIS — Z87.891 FORMER TOBACCO USE: ICD-10-CM

## 2020-08-13 DIAGNOSIS — M43.16 SPONDYLOLISTHESIS OF LUMBAR REGION: ICD-10-CM

## 2020-08-13 DIAGNOSIS — I65.23 CAROTID STENOSIS, ASYMPTOMATIC, BILATERAL: ICD-10-CM

## 2020-08-13 DIAGNOSIS — E78.2 MIXED HYPERLIPIDEMIA: ICD-10-CM

## 2020-08-13 DIAGNOSIS — M54.16 LUMBAR RADICULOPATHY: ICD-10-CM

## 2020-08-13 DIAGNOSIS — I65.23 ASYMPTOMATIC BILATERAL CAROTID ARTERY STENOSIS: Primary | ICD-10-CM

## 2020-08-13 PROCEDURE — 1036F TOBACCO NON-USER: CPT | Performed by: SURGERY

## 2020-08-13 PROCEDURE — 3077F SYST BP >= 140 MM HG: CPT | Performed by: SURGERY

## 2020-08-13 PROCEDURE — 1160F RVW MEDS BY RX/DR IN RCRD: CPT | Performed by: SURGERY

## 2020-08-13 PROCEDURE — 99214 OFFICE O/P EST MOD 30 MIN: CPT | Performed by: SURGERY

## 2020-08-13 PROCEDURE — 3078F DIAST BP <80 MM HG: CPT | Performed by: SURGERY

## 2020-08-13 PROCEDURE — 4040F PNEUMOC VAC/ADMIN/RCVD: CPT | Performed by: SURGERY

## 2020-08-13 PROCEDURE — 3008F BODY MASS INDEX DOCD: CPT | Performed by: SURGERY

## 2020-08-13 NOTE — PATIENT INSTRUCTIONS
Peripheral artery disease (HCC)  Peripheral arterial disease with non-compressible vessels, >75% right SFA stenosis with MTP 92/GTP64, left MTP93/GTP50  Denies significant claudication  Has bilateral lower extremity fatigue ever since he had back surgery and cannot walk long distances  He does not noticed any significant difference between extremities  Likely limited more by neurogenic claudication then arterial insufficiency  Continue routine surveillance, repeat lower extremity arterial duplex study already ordered at previous visit      Carotid stenosis, asymptomatic, bilateral  Asymptomatic bilateral carotid artery stenosis, last study 2018, now with repeat study which reveals bilateral <50% ICA stenosis with left CCA stenosis PSV//62, which the study characterizes as a >75% stenosis  He has no TIA/CVA symptoms       -We discussed the pathophysiology of carotid atherosclerotic disease, available treatment options and indications for treatment  Discussed the carotid duplex study in detail  Significant increase in PSV between 2018 and recent study in a focal distal common carotid artery segment with normal EDV  Unable to adequately characterize degree of stenosis anatomically, would need CTA head/neck   -Discussed further imaging with CTA head/neck, which would require pre-hydration due to CKD with latest Cr 1 24  He is not interested in obtaining a CTA at this time  He also states that he would unlikely want to proceed with any carotid surgery in the future    -Will continue surveillance with repeat carotid duplex in 6 months  -Continue medical management with ASA and lipitor        Lumbar radiculopathy  Severe lumbar radiculopathy with history of lumbar spinal fusion and ongoing back pain with bilateral lower extremity weakness  Limits his ability to ambulate far due to neurogenic claudication  Has had steroid injections in the past  Follows with pain management      Hyperlipidemia  HLD on lipitor  Continue current management

## 2020-08-13 NOTE — ASSESSMENT & PLAN NOTE
Severe lumbar radiculopathy with history of lumbar spinal fusion and ongoing back pain with bilateral lower extremity weakness  Limits his ability to ambulate far due to neurogenic claudication  Has had steroid injections in the past  Follows with pain management

## 2020-08-13 NOTE — LETTER
August 13, 2020     Nikita Vásquez,   [id number]    Patient: More Matos   YOB: 1940   Date of Visit: 8/13/2020       Dear Dr Ulises Smith:    Thank you for referring More Matos to me for evaluation  Below are the relevant portions of my assessment and plan of care  Diagnoses and all orders for this visit:    Peripheral artery disease ([address] )  Peripheral arterial disease with non-compressible vessels, >75% right SFA stenosis with MTP 92/GTP64, left MTP93/GTP50  Denies significant claudication  Has bilateral lower extremity fatigue ever since he had back surgery and cannot walk long distances  He does not noticed any significant difference between extremities  Likely limited more by neurogenic claudication then arterial insufficiency  Continue routine surveillance, repeat lower extremity arterial duplex study already ordered at previous visit      Carotid stenosis, asymptomatic, bilateral  Asymptomatic bilateral carotid artery stenosis, last study 2018, now with repeat study which reveals bilateral <50% ICA stenosis with left CCA stenosis PSV//62, which the study characterizes as a >75% stenosis  He has no TIA/CVA symptoms       -We discussed the pathophysiology of carotid atherosclerotic disease, available treatment options and indications for treatment  Discussed the carotid duplex study in detail  Significant increase in PSV between 2018 and recent study in a focal distal common carotid artery segment with normal EDV  Unable to adequately characterize degree of stenosis anatomically, would need CTA head/neck   -Discussed further imaging with CTA head/neck, which would require pre-hydration due to CKD with latest Cr 1 24  He is not interested in obtaining a CTA at this time   He also states that he would unlikely want to proceed with any carotid surgery in the future    -Will continue surveillance with repeat carotid duplex in 6 months  -Continue medical management with ASA and lipitor        Lumbar radiculopathy  Severe lumbar radiculopathy with history of lumbar spinal fusion and ongoing back pain with bilateral lower extremity weakness  Limits his ability to ambulate far due to neurogenic claudication  Has had steroid injections in the past  Follows with pain management      Hyperlipidemia  HLD on lipitor  Continue current management  If you have questions, please do not hesitate to call me  I look forward to following Savannahmaría Theodore along with you           Sincerely,        Radha Leiva MD        CC: No Recipients

## 2020-08-13 NOTE — ASSESSMENT & PLAN NOTE
Asymptomatic bilateral carotid artery stenosis, last study 2018, now with repeat study which reveals bilateral <50% ICA stenosis with left CCA stenosis PSV//62, which the study characterizes as a >75% stenosis  He has no TIA/CVA symptoms      -We discussed the pathophysiology of carotid atherosclerotic disease, available treatment options and indications for treatment  Discussed the carotid duplex study in detail  Significant increase in PSV between 2018 and recent study in a focal distal common carotid artery segment with normal EDV  Unable to adequately characterize degree of stenosis anatomically, would need CTA head/neck   -Discussed further imaging with CTA head/neck, which would require pre-hydration due to CKD with latest Cr 1 24  He is not interested in obtaining a CTA at this time   He also states that he would unlikely want to proceed with any carotid surgery in the future    -Will continue surveillance with repeat carotid duplex in 6 months  -Continue medical management with ASA and lipitor

## 2020-08-13 NOTE — ASSESSMENT & PLAN NOTE
Peripheral arterial disease with non-compressible vessels, >75% right SFA stenosis with MTP 92/GTP64, left MTP93/GTP50  Denies significant claudication  Has bilateral lower extremity fatigue ever since he had back surgery and cannot walk long distances  He does not noticed any significant difference between extremities  Likely limited more by neurogenic claudication then arterial insufficiency  Continue routine surveillance, repeat lower extremity arterial duplex study already ordered at previous visit

## 2020-08-13 NOTE — PROGRESS NOTES
Assessment/Plan:    Peripheral artery disease ([facility] [address] )  Peripheral arterial disease with non-compressible vessels, >75% right SFA stenosis with MTP 92/GTP64, left MTP93/GTP50  Denies significant claudication  Has bilateral lower extremity fatigue ever since he had back surgery and cannot walk long distances  He does not noticed any significant difference between extremities  Likely limited more by neurogenic claudication then arterial insufficiency  Continue routine surveillance, repeat lower extremity arterial duplex study already ordered at previous visit  Carotid stenosis, asymptomatic, bilateral  Asymptomatic bilateral carotid artery stenosis, last study 2018, now with repeat study which reveals bilateral <50% ICA stenosis with left CCA stenosis PSV//62, which the study characterizes as a >75% stenosis  He has no TIA/CVA symptoms      -We discussed the pathophysiology of carotid atherosclerotic disease, available treatment options and indications for treatment  Discussed the carotid duplex study in detail  Significant increase in PSV between 2018 and recent study in a focal distal common carotid artery segment with normal EDV  Unable to adequately characterize degree of stenosis anatomically, would need CTA head/neck   -Discussed further imaging with CTA head/neck, which would require pre-hydration due to CKD with latest Cr 1 24  He is not interested in obtaining a CTA at this time  He also states that he would unlikely want to proceed with any carotid surgery in the future    -Will continue surveillance with repeat carotid duplex in 6 months  -Continue medical management with ASA and lipitor      Lumbar radiculopathy  Severe lumbar radiculopathy with history of lumbar spinal fusion and ongoing back pain with bilateral lower extremity weakness  Limits his ability to ambulate far due to neurogenic claudication  Has had steroid injections in the past  Follows with pain management      Hyperlipidemia  HLD on lipitor  Continue current management  Diagnoses and all orders for this visit:    Asymptomatic bilateral carotid artery stenosis  -     VAS carotid complete study; Future    Spondylolisthesis of lumbar region    Mixed hyperlipidemia    Former tobacco use    Peripheral artery disease (HCC)    Carotid stenosis, asymptomatic, bilateral    Lumbar radiculopathy        I have spent 25 minutes with Patient  today in which greater than 50% of this time was spent in counseling/coordination of care regarding Intructions for management, Importance of tx compliance and Impressions  Subjective:      Patient ID: Blela De La Torre is a 78 y o  male  Patient presents in office to review the results of his CV done on 7/27  Patient denies any TIA/CVA type symptoms  Patient denies any fever or chills  Patient is taking aspirin and atorvastatin  HPI  Mr Samra Cantu is a 71yo male with HTN, HLD, PAD, lumbar radiculopathy, asymptomatic bilateral carotid artery stenosis, GERD who presents for follow-up of PAD and carotid artery stenosis  He denies any history of TIA/CVA symptoms  He has no vision or speech disturbances  He is currently functioning at his baseline  His ambulation is limited by neurogenic claudication and lumbar radiculopathy since he had lumbar spinal fusion surgery  He denies any significant calf claudication, rest pain or tissue loss  The following portions of the patient's history were reviewed and updated as appropriate: allergies, current medications, past family history, past medical history, past social history, past surgical history and problem list     Review of Systems   Constitutional: Negative  Negative for chills and fever  HENT: Negative  Negative for sore throat and trouble swallowing  Eyes: Negative  Negative for visual disturbance  Respiratory: Negative  Negative for chest tightness and shortness of breath  Cardiovascular: Negative  Negative for palpitations  Gastrointestinal: Negative  Negative for diarrhea, nausea and vomiting  Endocrine: Negative  Genitourinary: Negative  Musculoskeletal: Negative  Skin: Negative  Negative for wound  Allergic/Immunologic: Negative  Neurological: Negative  Negative for speech difficulty, weakness and numbness  Hematological: Negative  Psychiatric/Behavioral: Negative  Negative for confusion and self-injury  I have personally reviewed the ROS entered by MA and agree as documented  Objective:      /78 (BP Location: Right arm, Patient Position: Sitting, Cuff Size: Adult)   Pulse 91   Temp 97 8 °F (36 6 °C) (Tympanic)   Resp 18   Ht 5' 7" (1 702 m)   Wt 79 4 kg (175 lb)   BMI 27 41 kg/m²          Physical Exam  Vitals signs and nursing note reviewed  Constitutional:       Appearance: He is well-developed  HENT:      Head: Normocephalic and atraumatic  Nose: Nose normal       Mouth/Throat:      Mouth: Mucous membranes are moist    Neck:      Musculoskeletal: Normal range of motion and neck supple  Cardiovascular:      Rate and Rhythm: Normal rate and regular rhythm  Pulses:           Radial pulses are 1+ on the right side and 1+ on the left side  Dorsalis pedis pulses are 0 on the right side and 0 on the left side  Posterior tibial pulses are 0 on the right side and 0 on the left side  Pulmonary:      Effort: Pulmonary effort is normal    Abdominal:      Palpations: Abdomen is soft  Musculoskeletal: Normal range of motion  Skin:     General: Skin is warm and dry  Capillary Refill: Capillary refill takes less than 2 seconds  Neurological:      Mental Status: He is alert and oriented to person, place, and time  Psychiatric:         Behavior: Behavior normal          Thought Content:  Thought content normal          Judgment: Judgment normal

## 2020-09-18 ENCOUNTER — LAB (OUTPATIENT)
Dept: LAB | Facility: CLINIC | Age: 80
End: 2020-09-18
Payer: MEDICARE

## 2020-09-18 ENCOUNTER — TRANSCRIBE ORDERS (OUTPATIENT)
Dept: LAB | Facility: CLINIC | Age: 80
End: 2020-09-18

## 2020-09-18 DIAGNOSIS — D64.9 CHRONIC ANEMIA: Chronic | ICD-10-CM

## 2020-09-18 LAB
BASOPHILS # BLD AUTO: 0.04 THOUSANDS/ΜL (ref 0–0.1)
BASOPHILS NFR BLD AUTO: 0 % (ref 0–1)
EOSINOPHIL # BLD AUTO: 0.11 THOUSAND/ΜL (ref 0–0.61)
EOSINOPHIL NFR BLD AUTO: 1 % (ref 0–6)
ERYTHROCYTE [DISTWIDTH] IN BLOOD BY AUTOMATED COUNT: 14.6 % (ref 11.6–15.1)
FERRITIN SERPL-MCNC: 393 NG/ML (ref 8–388)
FOLATE SERPL-MCNC: 17.5 NG/ML (ref 3.1–17.5)
HCT VFR BLD AUTO: 37 % (ref 36.5–49.3)
HGB BLD-MCNC: 12.1 G/DL (ref 12–17)
IMM GRANULOCYTES # BLD AUTO: 0.03 THOUSAND/UL (ref 0–0.2)
IMM GRANULOCYTES NFR BLD AUTO: 0 % (ref 0–2)
LYMPHOCYTES # BLD AUTO: 2.79 THOUSANDS/ΜL (ref 0.6–4.47)
LYMPHOCYTES NFR BLD AUTO: 29 % (ref 14–44)
MCH RBC QN AUTO: 34.7 PG (ref 26.8–34.3)
MCHC RBC AUTO-ENTMCNC: 32.7 G/DL (ref 31.4–37.4)
MCV RBC AUTO: 106 FL (ref 82–98)
MONOCYTES # BLD AUTO: 0.73 THOUSAND/ΜL (ref 0.17–1.22)
MONOCYTES NFR BLD AUTO: 8 % (ref 4–12)
NEUTROPHILS # BLD AUTO: 5.95 THOUSANDS/ΜL (ref 1.85–7.62)
NEUTS SEG NFR BLD AUTO: 62 % (ref 43–75)
NRBC BLD AUTO-RTO: 0 /100 WBCS
PLATELET # BLD AUTO: 181 THOUSANDS/UL (ref 149–390)
PMV BLD AUTO: 10.5 FL (ref 8.9–12.7)
RBC # BLD AUTO: 3.49 MILLION/UL (ref 3.88–5.62)
VIT B12 SERPL-MCNC: 1297 PG/ML (ref 100–900)
WBC # BLD AUTO: 9.65 THOUSAND/UL (ref 4.31–10.16)

## 2020-09-18 PROCEDURE — 82728 ASSAY OF FERRITIN: CPT

## 2020-09-18 PROCEDURE — 36415 COLL VENOUS BLD VENIPUNCTURE: CPT

## 2020-09-18 PROCEDURE — 82746 ASSAY OF FOLIC ACID SERUM: CPT

## 2020-09-18 PROCEDURE — 85025 COMPLETE CBC W/AUTO DIFF WBC: CPT

## 2020-09-18 PROCEDURE — 82607 VITAMIN B-12: CPT

## 2020-09-22 NOTE — RESULT ENCOUNTER NOTE
Please inform pt that labs are stable  Continue current medications and continue to engage in healthy lifestyle (diet, exercise)  Thanks!   Justin Uribe, DO

## 2020-10-15 ENCOUNTER — IMMUNIZATIONS (OUTPATIENT)
Dept: FAMILY MEDICINE CLINIC | Facility: OTHER | Age: 80
End: 2020-10-15
Payer: MEDICARE

## 2020-10-15 DIAGNOSIS — Z23 ENCOUNTER FOR IMMUNIZATION: ICD-10-CM

## 2020-10-15 PROCEDURE — 90662 IIV NO PRSV INCREASED AG IM: CPT

## 2020-10-15 PROCEDURE — G0008 ADMIN INFLUENZA VIRUS VAC: HCPCS

## 2021-01-06 ENCOUNTER — OFFICE VISIT (OUTPATIENT)
Dept: FAMILY MEDICINE CLINIC | Facility: OTHER | Age: 81
End: 2021-01-06
Payer: COMMERCIAL

## 2021-01-06 VITALS
HEIGHT: 67 IN | SYSTOLIC BLOOD PRESSURE: 130 MMHG | RESPIRATION RATE: 18 BRPM | BODY MASS INDEX: 27.47 KG/M2 | HEART RATE: 89 BPM | OXYGEN SATURATION: 98 % | TEMPERATURE: 97.8 F | WEIGHT: 175 LBS | DIASTOLIC BLOOD PRESSURE: 90 MMHG

## 2021-01-06 DIAGNOSIS — Z00.00 MEDICARE ANNUAL WELLNESS VISIT, SUBSEQUENT: Primary | ICD-10-CM

## 2021-01-06 DIAGNOSIS — E78.2 MIXED HYPERLIPIDEMIA: ICD-10-CM

## 2021-01-06 DIAGNOSIS — Z11.59 ENCOUNTER FOR HCV SCREENING TEST FOR LOW RISK PATIENT: ICD-10-CM

## 2021-01-06 DIAGNOSIS — I10 ESSENTIAL HYPERTENSION: ICD-10-CM

## 2021-01-06 DIAGNOSIS — D64.9 CHRONIC ANEMIA: Chronic | ICD-10-CM

## 2021-01-06 PROCEDURE — 1170F FXNL STATUS ASSESSED: CPT | Performed by: FAMILY MEDICINE

## 2021-01-06 PROCEDURE — 3288F FALL RISK ASSESSMENT DOCD: CPT | Performed by: FAMILY MEDICINE

## 2021-01-06 PROCEDURE — 1125F AMNT PAIN NOTED PAIN PRSNT: CPT | Performed by: FAMILY MEDICINE

## 2021-01-06 PROCEDURE — 3725F SCREEN DEPRESSION PERFORMED: CPT | Performed by: FAMILY MEDICINE

## 2021-01-06 PROCEDURE — G0439 PPPS, SUBSEQ VISIT: HCPCS | Performed by: FAMILY MEDICINE

## 2021-01-06 RX ORDER — DOCUSATE SODIUM 100 MG/1
100 CAPSULE, LIQUID FILLED ORAL 2 TIMES DAILY
Qty: 60 CAPSULE | Refills: 0 | Status: CANCELLED | OUTPATIENT
Start: 2021-01-06 | End: 2021-02-05

## 2021-01-06 NOTE — PATIENT INSTRUCTIONS

## 2021-01-06 NOTE — PROGRESS NOTES
Assessment and Plan:     Problem List Items Addressed This Visit        Cardiovascular and Mediastinum    Hypertension     Diastolic pressure mildly elevated  Will check labs and reevaluate next visit  Relevant Orders    Comprehensive metabolic panel       Other    Chronic anemia (Chronic)    Relevant Orders    CBC and differential    Hyperlipidemia    Relevant Orders    Lipid panel      Other Visit Diagnoses     Medicare annual wellness visit, subsequent    -  Primary    Encounter for HCV screening test for low risk patient        Relevant Orders    Hepatitis C antibody        BMI Counseling: Body mass index is 27 41 kg/m²  The BMI is above normal  Nutrition recommendations include encouraging healthy choices of fruits and vegetables and limiting drinks that contain sugar  Exercise recommendations include exercising 3-5 times per week  No pharmacotherapy was ordered  Preventive health issues were discussed with patient, and age appropriate screening tests were ordered as noted in patient's After Visit Summary  Personalized health advice and appropriate referrals for health education or preventive services given if needed, as noted in patient's After Visit Summary  History of Present Illness:     Patient presents for Medicare Annual Wellness visit  He reports some LLE shooting pain and cramps since his CABG surgery 4 years ago that improves with Theraworx and certain positions  He denies leg swelling and skin changes  He reports chronic lower back pain with standing/walking that is improved by sitting down  He has seen pain management but did not desire to progress to a nerve stimulator  He states that he takes Tylenol and is able to cope with the pain  He also reports cutting his L arm on a tool that fell in his garage 4 days ago  He is concerned that it may be infected  He endorses some redness but denies any fevers, chills, swelling, or drainage      Patient Care Team:  Porfirio Host, DO as PCP - General  MD Moon Cade, MD Andie Villarreal, MD Ghazala Diaz PA-C Westley Quint, MD Mayme Ade, MD as Endoscopist  [employer] (Vascular Surgery)     Problem List:     Patient Active Problem List   Diagnosis    Degenerative lumbar spinal stenosis    Spondylolisthesis of lumbar region    CAD (coronary artery disease)    Coronary artery disease    Former tobacco use    Hyperlipidemia    Hypertension    S/P CABG x 4    Chronic anemia    Leg pain, posterior, left    Peripheral artery disease ([address] )    GERD (gastroesophageal reflux disease)    Vasomotor rhinitis    Lumbar stenosis    S/P lumbar fusion    T12 compression fracture ([facility] [address] )    Atherosclerosis of native arteries of extremities with intermittent claudication, bilateral legs (HCC)    Postlaminectomy syndrome of lumbar region    Lumbar radiculopathy    Skin lesion of left leg    Bruit of left carotid artery    Phimosis    Carotid stenosis, asymptomatic, bilateral      Past Medical and Surgical History:     Past Medical History:   Diagnosis Date    Abnormal liver function test     RESOLVED: [id number]    Allergic rhinitis     Anemia     LAST ASSESSED: [id number]    Arthritis     BPH (benign prostatic hyperplasia)     CAD (coronary artery disease)     Coronary artery disease     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[facility] [address] )     LAST ASSESSED: [id number]    Former tobacco use     GERD (gastroesophageal reflux disease)     Hand paresthesia     RESOLVED: [id number]    Hyperlipidemia     Hypertension     Lumbar stenosis     Peripheral artery disease ([facility] [address] )     LAST ASSESSED: [id number]     Past Surgical History:   Procedure Laterality Date    BACK SURGERY      COLONOSCOPY      LUMBAR FUSION      GA ARTHRODESIS POSTERIOR/POSTEROLATERAL LUMBAR N/A 11/3/2016    Procedure: L2-S1 POSTERIOR LUMBAR FUSION AND DECOMPRESSION (Impulse), Posterior lateral fixation; dural repair ;  Surgeon: Everardo Jones MD;  [facility]: [facility];  Service: Orthopedics    VA CABG, ARTERIAL, SINGLE N/A 1/19/2018    Procedure: CABG X4 with LIMA - LAD, SVG - RCA, OM2, & Diagonal ; Left Leg EVH; MATTHEW;  Surgeon: Homer Bentley DO;  Location: [facility];  Service: Cardiac Surgery    VA COLONOSCOPY FLX DX W/COLLJ SPEC WHEN PFRMD N/A 11/15/2017    Procedure: EGD AND COLONOSCOPY;  Surgeon: Bettye Canada MD;  [facility]; Service: Gastroenterology    SEPTOPLASTY      LAST ASSESSED; [id number]    TONSILECTOMY AND ADNOIDECTOMY      LAST ASSESSED: [id number]      Family History:     Family History   Problem Relation Age of Onset    Colon cancer Mother     Emphysema Mother     Liver disease Mother     Colon cancer Father     Coronary artery disease Father     Hypertension Father     Liver disease Father     Heart failure Father     Stroke Father         CVA     Heart attack Father     Coronary artery disease Brother     Heart disease Brother         younger brother by pass and other brother 4 stents placed    Other Family         BACK PROBLEM     Stroke Family         CVA    Emphysema Family     Hypertension Family         BENIGN      Social History:     E-Cigarette/Vaping    E-Cigarette Use Never User      E-Cigarette/Vaping Substances    Nicotine No     THC No     CBD No     Flavoring No     Other No     Unknown No      Social History     Socioeconomic History    Marital status:       Spouse name: None    Number of children: None    Years of education: some college     Highest education level: None   Occupational History    Occupation: Insurance     Comment: Retired    Social Needs    Financial resource strain: None    Food insecurity     Worry: None     Inability: None    Transportation needs     Medical: None     Non-medical: None   Tobacco Use    Smoking status: Former Smoker     Quit date: 2011     Years since quitting: 10 0    Smokeless tobacco: Never Used   Substance and Sexual Activity    Alcohol use:  Yes     Alcohol/week: 1 0 standard drinks     Types: 1 Shots of liquor per week     Comment: beer, wine, scotch every other day; SOCIAL AS PER ALL SCRIPTS     Drug use: No    Sexual activity: Not Currently   Lifestyle    Physical activity     Days per week: None     Minutes per session: None    Stress: None   Relationships    Social connections     Talks on phone: None     Gets together: None     Attends Synagogue service: None     Active member of club or organization: None     Attends meetings of clubs or organizations: None     Relationship status: None    Intimate partner violence     Fear of current or ex partner: None     Emotionally abused: None     Physically abused: None     Forced sexual activity: None   Other Topics Concern    None   Social History Narrative    Daily coffee consumption       Medications and Allergies:     Current Outpatient Medications   Medication Sig Dispense Refill    Ascorbic Acid (VITAMIN C) 1000 MG tablet Take 1,000 mg by mouth daily       aspirin (ECOTRIN LOW STRENGTH) 81 mg EC tablet Take 81 mg by mouth daily      atorvastatin (LIPITOR) 80 mg tablet TAKE 1 TABLET (80 MG TOTAL) BY MOUTH DAILY WITH DINNER 90 tablet 2    cholecalciferol (VITAMIN D3) 1,000 units tablet Take 1,000 Units by mouth daily       ipratropium (ATROVENT) 0 03 % nasal spray INHALE 2 SPRAYS INTO EACH NOSTRILS EVERY 12 HRS (Patient taking differently: as needed ) 5 mL 3    metoprolol tartrate (LOPRESSOR) 25 mg tablet TAKE 1 TABLET BY MOUTH TWICE A  tablet 2    Multiple Vitamin (MULTIVITAMIN) capsule Take 1 capsule by mouth daily      pantoprazole (PROTONIX) 40 mg tablet TAKE 1 TABLET BY MOUTH EVERY DAY (Patient taking differently: 3 (three) times a week ) 90 tablet 1    Probiotic Product (ALIGN PO) Take 1 tablet by mouth daily        cyanocobalamin (VITAMIN B-12) 1,000 mcg tablet Take 1,000 mcg by mouth daily        docusate sodium (COLACE) 100 mg capsule Take 1 capsule by mouth 2 (two) times a day for 30 days Hold for loose stools  (Patient taking differently: Take 100 mg by mouth daily as needed Hold for loose stools  ) 60 capsule 0    Magnesium 500 MG CAPS Take 500 mg by mouth daily      Omega-3 Fatty Acids (OMEGA-3 FISH OIL PO) Take 3 tablets by mouth daily Relief factor daily       No current facility-administered medications for this visit  Allergies   Allergen Reactions    Penicillins Other (See Comments)     Hallucinations; Patient reported that he was seeing visual disturbances        Immunizations:     Immunization History   Administered Date(s) Administered    INFLUENZA 12/20/2006    Influenza Split High Dose Preservative Free IM 10/09/2013, 10/27/2015, 09/22/2016, 09/11/2017    Influenza, high dose seasonal 0 7 mL 10/25/2018, 10/31/2019, 10/15/2020    Influenza, seasonal, injectable 01/01/2014    Pneumococcal Conjugate 13-Valent 09/11/2017    Pneumococcal Polysaccharide PPV23 04/07/2011    Tdap 10/25/2018      Health Maintenance: There are no preventive care reminders to display for this patient  There are no preventive care reminders to display for this patient  Medicare Health Risk Assessment:     /90 (BP Location: Left arm, Patient Position: Sitting, Cuff Size: Standard)   Pulse 89   Temp 97 8 °F (36 6 °C) (Temporal)   Resp 18   Ht 5' 7" (1 702 m)   Wt 79 4 kg (175 lb)   SpO2 98%   BMI 27 41 kg/m²      Marimar Real is here for his Subsequent Wellness visit  Health Risk Assessment:   Patient rates overall health as good  Patient feels that their physical health rating is same  Eyesight was rated as same  Hearing was rated as same  Patient feels that their emotional and mental health rating is same  Pain experienced in the last 7 days has been some  Patient's pain rating has been 9/10  Patient states that he has experienced no weight loss or gain in last 6 months       Depression Screening: PHQ-2 Score: 0      Fall Risk Screening: In the past year, patient has experienced: no history of falling in past year      Home Safety:  Patient has trouble with stairs inside or outside of their home  Patient has working smoke alarms and has working carbon monoxide detector  Home safety hazards include: none  Pt reports using a stair lift between 1st and 2nd floors but does not one between the garage and 1st floor  Nutrition:   Current diet is Regular and No Added Salt  Medications:   Patient is currently taking over-the-counter supplements  OTC medications include: see medication list  Patient is able to manage medications  Activities of Daily Living (ADLs)/Instrumental Activities of Daily Living (IADLs):   Walk and transfer into and out of bed and chair?: Yes  Dress and groom yourself?: Yes    Bathe or shower yourself?: Yes    Feed yourself? Yes  Do your laundry/housekeeping?: Yes  Manage your money, pay your bills and track your expenses?: Yes  Make your own meals?: Yes    Do your own shopping?: Yes    Previous Hospitalizations:   Any hospitalizations or ED visits within the last 12 months?: No      Advance Care Planning:   Living will: Yes    Durable POA for healthcare:  Yes    Advanced directive: Yes      Cognitive Screening:   Provider or family/friend/caregiver concerned regarding cognition?: No    PREVENTIVE SCREENINGS      Cardiovascular Screening:    General: Screening Not Indicated and History Lipid Disorder      Diabetes Screening:     General: Screening Current      Colorectal Cancer Screening:     General: Screening Not Indicated      Prostate Cancer Screening:    General: Screening Not Indicated      Osteoporosis Screening:    General: Screening Not Indicated      Abdominal Aortic Aneurysm (AAA) Screening:    Risk factors include: tobacco use        General: Screening Not Indicated      Lung Cancer Screening:     General: Screening Not Indicated      Hepatitis C Screening:      Hep C Screening Accepted: Yes      Physical Exam  Vitals signs reviewed  Constitutional:       General: He is not in acute distress  Appearance: He is well-developed  He is not diaphoretic  HENT:      Head: Normocephalic and atraumatic  Mouth/Throat:      Mouth: Mucous membranes are moist       Pharynx: Oropharynx is clear  Eyes:      General: No scleral icterus  Extraocular Movements: Extraocular movements intact  Pupils: Pupils are equal, round, and reactive to light  Neck:      Musculoskeletal: Normal range of motion and neck supple  Cardiovascular:      Rate and Rhythm: Normal rate and regular rhythm  Pulses: Normal pulses  Heart sounds: Normal heart sounds  No murmur  No friction rub  No gallop  Pulmonary:      Effort: Pulmonary effort is normal  No respiratory distress  Breath sounds: Normal breath sounds  No wheezing, rhonchi or rales  Abdominal:      General: Bowel sounds are normal  There is no distension  Palpations: Abdomen is soft  There is no mass  Tenderness: There is no abdominal tenderness  There is no right CVA tenderness, left CVA tenderness or guarding  Musculoskeletal: Normal range of motion  General: No swelling or tenderness  Right lower leg: No edema  Left lower leg: No edema  Lymphadenopathy:      Cervical: No cervical adenopathy  Skin:     General: Skin is warm and dry  Findings: No rash  Comments: No skin changes to LLE  Healing 3 cm linear laceration to the distal forearm without significant erythema, edema, tenderness, or drainage  Neurological:      General: No focal deficit present  Mental Status: He is alert and oriented to person, place, and time  Sensory: No sensory deficit     Psychiatric:         Mood and Affect: Mood normal          Behavior: Behavior normal          Mariana Santana MD

## 2021-01-18 ENCOUNTER — OFFICE VISIT (OUTPATIENT)
Dept: CARDIOLOGY CLINIC | Facility: CLINIC | Age: 81
End: 2021-01-18
Payer: COMMERCIAL

## 2021-01-18 VITALS
HEART RATE: 103 BPM | BODY MASS INDEX: 26.34 KG/M2 | HEIGHT: 67 IN | SYSTOLIC BLOOD PRESSURE: 160 MMHG | DIASTOLIC BLOOD PRESSURE: 80 MMHG | WEIGHT: 167.8 LBS | OXYGEN SATURATION: 96 %

## 2021-01-18 DIAGNOSIS — I25.10 CORONARY ARTERY DISEASE INVOLVING NATIVE CORONARY ARTERY OF NATIVE HEART WITHOUT ANGINA PECTORIS: ICD-10-CM

## 2021-01-18 DIAGNOSIS — E78.2 MIXED HYPERLIPIDEMIA: ICD-10-CM

## 2021-01-18 DIAGNOSIS — I10 ESSENTIAL HYPERTENSION: Primary | ICD-10-CM

## 2021-01-18 DIAGNOSIS — I73.9 PERIPHERAL ARTERY DISEASE (HCC): ICD-10-CM

## 2021-01-18 PROCEDURE — 1160F RVW MEDS BY RX/DR IN RCRD: CPT | Performed by: INTERNAL MEDICINE

## 2021-01-18 PROCEDURE — 99214 OFFICE O/P EST MOD 30 MIN: CPT | Performed by: INTERNAL MEDICINE

## 2021-01-18 PROCEDURE — 93000 ELECTROCARDIOGRAM COMPLETE: CPT | Performed by: INTERNAL MEDICINE

## 2021-01-18 PROCEDURE — 1036F TOBACCO NON-USER: CPT | Performed by: INTERNAL MEDICINE

## 2021-01-18 PROCEDURE — 3077F SYST BP >= 140 MM HG: CPT | Performed by: INTERNAL MEDICINE

## 2021-01-18 PROCEDURE — 3079F DIAST BP 80-89 MM HG: CPT | Performed by: INTERNAL MEDICINE

## 2021-01-18 NOTE — PROGRESS NOTES
Cardiology Follow Up    Luis Alberto Beatty  1940  [id number]  [address]  [facility] [address] [address]  [phone]    1  Essential hypertension  POCT ECG    Lipid Panel with Direct LDL reflex   2  Coronary artery disease involving native coronary artery of native heart without angina pectoris     3  Mixed hyperlipidemia  Lipid Panel with Direct LDL reflex   4  Peripheral artery disease ([address] )         Diagnoses and all orders for this visit:    Essential hypertension  -     POCT ECG  -     Lipid Panel with Direct LDL reflex; Future    Coronary artery disease involving native coronary artery of native heart without angina pectoris    Mixed hyperlipidemia  -     Lipid Panel with Direct LDL reflex; Future    Peripheral artery disease ([address] )      I had the pleasure of seeing Luis Alberto Beatty for a follow up visit  INTERVAL HISTORY: none    History of the presenting illness, Discussion/Summary and My Plan are as follows:::    He is a pleasant 72-year-old gentleman with a history of hypertension, mild dyslipidemia, CAD, CABG, peripheral vascular disease-right superficial femoral artery stenosis-medically managed  He also has a 50 pack year smoking history-quit around 2012      He presented for further evaluation of increasing dyspnea with exertion, with evaluation demonstrated multivessel CAD, underwent LIMA-LAD, SVG-RCA, SVG-OM2, SVG-Diag in January 2018      From a cardiac standpoint he is asymptomatic, occasionally has a twinge of chest pain that is noncardiac and also a point sized tenderness in the left chest that is reproducible to palpation, neither of which is precipitated by physical exertion - no change from prior  He does have claudication symptoms that are stable      Also has chronic back pain from a T12 fracture that limits activity    Although in squats and pushups, symptoms     Plan:     CAD: Positive stress test done for ROBLES-triple-vessel coronary artery disease, status post Coronary artery bypass grafting x 4 with left internal mammary artery to left anterior descending artery, saphenous vein graft to obtuse marginal 2, saphenous vein graft to right coronary artery and saphenous vein graft to diagonal 1-January 2018    No longer due to COVID but was going to the gym-45 minutes each session -3 times a week without any cardiac symptoms  Reasonably active without symptoms    Hypertension: Elevated even on 2nd reading 160/80, he thinks is better controlled at home, told him to take his blood pressure machine to his next doctor's visit to check accuracy and if accurate, can follow his home readings    Short carotid bruit:  Bilateral less than 50% disease -2020  Medical management alone at this time     Dyslipidemia:  September 2019: Total cholesterol 185, triglycerides 89, HDL 91, LDL 76  High HDL- I do not think it could be considered to be protective and likely nonfunctional   Continue statin, recheck lipids     Peripheral vascular disease:  has distal SFA stenosis-June 2020-, continue medical management, encouraged walking as much as possible  Follows with Dr Haylie Romano     Follow-up in 6 months    Results for Brian Naranjo (MRN [id number]) as of 1/18/2021 10:05   Ref   Range 9/10/2019 07:48   Cholesterol Latest Ref Range: 50 - 200 mg/dL 185   Triglycerides Latest Ref Range: <=150 mg/dL 89   HDL Latest Ref Range: 40 - 60 mg/dL 91 (H)   LDL Calculated Latest Ref Range: 0 - 100 mg/dL 76       Patient Active Problem List   Diagnosis    Degenerative lumbar spinal stenosis    Spondylolisthesis of lumbar region    CAD (coronary artery disease)    Coronary artery disease    Former tobacco use    Hyperlipidemia    Hypertension    S/P CABG x 4    Chronic anemia    Leg pain, posterior, left    Peripheral artery disease (HCC)    GERD (gastroesophageal reflux disease)    Vasomotor rhinitis    Lumbar stenosis    S/P lumbar fusion    T12 compression fracture (HCC)    Atherosclerosis of native arteries of extremities with intermittent claudication, bilateral legs (HCC)    Postlaminectomy syndrome of lumbar region    Lumbar radiculopathy    Skin lesion of left leg    Bruit of left carotid artery    Phimosis    Carotid stenosis, asymptomatic, bilateral     Past Medical History:   Diagnosis Date    Abnormal liver function test     RESOLVED: [id number]    Allergic rhinitis     Anemia     LAST ASSESSED: [id number]    Arthritis     BPH (benign prostatic hyperplasia)     CAD (coronary artery disease)     Coronary artery disease     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[facility])     LAST ASSESSED: [id number]    Former tobacco use     GERD (gastroesophageal reflux disease)     Hand paresthesia     RESOLVED: [id number]    Hyperlipidemia     Hypertension     Lumbar stenosis     Peripheral artery disease [address] [address] )     LAST ASSESSED: 27OCT2017     Social History     Socioeconomic History    Marital status:      Spouse name: Not on file    Number of children: Not on file    Years of education: some college     Highest education level: Not on file   Occupational History    Occupation: Insurance     Comment: Retired    Social Needs    Financial resource strain: Not on file    Food insecurity     Worry: Not on file     Inability: Not on file   [employer] needs     Medical: Not on file     Non-medical: Not on file   Tobacco Use    Smoking status: Former Smoker     Quit date: 2011     Years since quitting: 10 0    Smokeless tobacco: Never Used   Substance and Sexual Activity    Alcohol use:  Yes     Alcohol/week: 1 0 standard drinks     Types: 1 Shots of liquor per week     Comment: beer, wine, scotch every other day; SOCIAL AS PER ALL SCRIPTS     Drug use: No    Sexual activity: Not Currently   Lifestyle    Physical activity     Days per week: Not on file     Minutes per session: Not on file    Stress: Not on file   Relationships    Social connections     Talks on phone: Not on file     Gets together: Not on file     Attends [employer] service: Not on file     Active member of club or organization: Not on file     Attends meetings of clubs or organizations: Not on file     Relationship status: Not on file    Intimate partner violence     Fear of current or ex partner: Not on file     Emotionally abused: Not on file     Physically abused: Not on file     Forced sexual activity: Not on file   Other Topics Concern    Not on file   Social History Narrative    Daily coffee consumption       Family History   Problem Relation Age of Onset    Colon cancer Mother     Emphysema Mother     Liver disease Mother     Colon cancer Father     Coronary artery disease Father     Hypertension Father     Liver disease Father     Heart failure Father     Stroke Father         CVA     Heart attack Father     Coronary artery disease Brother     Heart disease Brother         younger brother by pass and other brother 4 stents placed    Other Family         BACK PROBLEM     Stroke Family         CVA    Emphysema Family     Hypertension Family         BENIGN     Past Surgical History:   Procedure Laterality Date    BACK SURGERY      COLONOSCOPY      LUMBAR FUSION      MI ARTHRODESIS POSTERIOR/POSTEROLATERAL LUMBAR N/A 11/3/2016    Procedure: L2-S1 POSTERIOR LUMBAR FUSION AND DECOMPRESSION (Impulse), Posterior lateral fixation; dural repair ;  Surgeon: Neela Mena MD;  [facility];  Service: Orthopedics    MI CABG, ARTERIAL, SINGLE N/A 1/19/2018    Procedure: CABG X4 with LIMA - LAD, SVG - RCA, OM2, & Diagonal ; Left Leg EVH; MATTHEW;  Surgeon: Oziel Shannon DO;  [facility];  Service: Cardiac Surgery    MI COLONOSCOPY FLX DX W/COLLJ SPEC WHEN PFRMD N/A 11/15/2017    Procedure: EGD AND COLONOSCOPY;  Surgeon: Guy Fox MD;  [facility];   Service: Gastroenterology   [facility] SEPTOPLASTY      LAST ASSESSED; [id number]    TONSILECTOMY AND ADNOIDECTOMY      LAST ASSESSED: [id number]       Current Outpatient Medications:     Ascorbic Acid (VITAMIN C) 1000 MG tablet, Take 1,000 mg by mouth daily , Disp: , Rfl:     aspirin (ECOTRIN LOW STRENGTH) 81 mg EC tablet, Take 81 mg by mouth daily, Disp: , Rfl:     atorvastatin (LIPITOR) 80 mg tablet, TAKE 1 TABLET (80 MG TOTAL) BY MOUTH DAILY WITH DINNER, Disp: 90 tablet, Rfl: 2    cholecalciferol (VITAMIN D3) 1,000 units tablet, Take 1,000 Units by mouth daily , Disp: , Rfl:     cyanocobalamin (VITAMIN B-12) 1,000 mcg tablet, Take 1,000 mcg by mouth daily  , Disp: , Rfl:     metoprolol tartrate (LOPRESSOR) 25 mg tablet, TAKE 1 TABLET BY MOUTH TWICE A DAY, Disp: 180 tablet, Rfl: 2    Multiple Vitamin (MULTIVITAMIN) capsule, Take 1 capsule by mouth daily, Disp: , Rfl:     pantoprazole (PROTONIX) 40 mg tablet, TAKE 1 TABLET BY MOUTH EVERY DAY (Patient taking differently: 3 (three) times a week ), Disp: 90 tablet, Rfl: 1    Probiotic Product (ALIGN PO), Take 1 tablet by mouth daily  , Disp: , Rfl:     docusate sodium (COLACE) 100 mg capsule, Take 1 capsule by mouth 2 (two) times a day for 30 days Hold for loose stools  (Patient taking differently: Take 100 mg by mouth daily as needed Hold for loose stools  ), Disp: 60 capsule, Rfl: 0    ipratropium (ATROVENT) 0 03 % nasal spray, INHALE 2 SPRAYS INTO EACH NOSTRILS EVERY 12 HRS (Patient not taking: No sig reported), Disp: 5 mL, Rfl: 3    Magnesium 500 MG CAPS, Take 500 mg by mouth daily, Disp: , Rfl:     Omega-3 Fatty Acids (OMEGA-3 FISH OIL PO), Take 3 tablets by mouth daily Relief factor daily, Disp: , Rfl:   Allergies   Allergen Reactions    Penicillins Other (See Comments)     Hallucinations; Patient reported that he was seeing visual disturbances         Imaging: No results found  Review of Systems:  Review of Systems   Constitutional: Negative  HENT: Negative  Eyes: Negative  Respiratory: Negative  Cardiovascular: Negative  Musculoskeletal: Positive for back pain  Negative for arthralgias, gait problem and joint swelling  Neurological: Negative  Physical Exam:  /88 (BP Location: Left arm, Patient Position: Sitting, Cuff Size: Large)   Pulse 103   Ht 5' 7" (1 702 m)   Wt 76 1 kg (167 lb 12 8 oz)   SpO2 96%   BMI 26 28 kg/m²   Physical Exam  Constitutional:       Appearance: He is well-developed  He is not ill-appearing, toxic-appearing or diaphoretic  HENT:      Head: Normocephalic  Eyes:      Pupils: Pupils are equal, round, and reactive to light  Neck:      Thyroid: No thyromegaly  Vascular: JVD present  No hepatojugular reflux  Trachea: No tracheal deviation  Cardiovascular:      Rate and Rhythm: Bradycardia present  Rhythm irregularly irregular  Pulses: Normal pulses  Heart sounds: No S3 or S4 sounds  Pulmonary:      Effort: Pulmonary effort is normal  No tachypnea, accessory muscle usage or respiratory distress  Breath sounds: Normal breath sounds  No stridor  No decreased breath sounds, wheezing or rales  Abdominal:      Palpations: Abdomen is soft  Musculoskeletal: Normal range of motion  Right lower leg: Normal  No edema  Left lower leg: No edema  Skin:     General: Skin is warm  Coloration: Skin is not pale  Findings: No erythema or rash

## 2021-01-20 DIAGNOSIS — Z23 ENCOUNTER FOR IMMUNIZATION: ICD-10-CM

## 2021-01-25 ENCOUNTER — TRANSCRIBE ORDERS (OUTPATIENT)
Dept: LAB | Facility: CLINIC | Age: 81
End: 2021-01-25

## 2021-01-25 ENCOUNTER — APPOINTMENT (OUTPATIENT)
Dept: LAB | Facility: CLINIC | Age: 81
End: 2021-01-25
Payer: COMMERCIAL

## 2021-01-25 DIAGNOSIS — E78.2 MIXED HYPERLIPIDEMIA: ICD-10-CM

## 2021-01-25 DIAGNOSIS — I10 ESSENTIAL HYPERTENSION: ICD-10-CM

## 2021-01-25 LAB
CHOLEST SERPL-MCNC: 177 MG/DL (ref 50–200)
HDLC SERPL-MCNC: 100 MG/DL
LDLC SERPL CALC-MCNC: 55 MG/DL (ref 0–100)
TRIGL SERPL-MCNC: 108 MG/DL

## 2021-01-25 PROCEDURE — 36415 COLL VENOUS BLD VENIPUNCTURE: CPT

## 2021-01-25 PROCEDURE — 80061 LIPID PANEL: CPT

## 2021-01-26 ENCOUNTER — TELEPHONE (OUTPATIENT)
Dept: CARDIOLOGY CLINIC | Facility: CLINIC | Age: 81
End: 2021-01-26

## 2021-01-26 NOTE — TELEPHONE ENCOUNTER
----- Message from Luan Chen MD sent at 1/25/2021 10:40 AM EST -----  Cholesterol levels look good, please let him know

## 2021-01-27 ENCOUNTER — IMMUNIZATIONS (OUTPATIENT)
Dept: FAMILY MEDICINE CLINIC | Facility: HOSPITAL | Age: 81
End: 2021-01-27

## 2021-01-27 DIAGNOSIS — Z23 ENCOUNTER FOR IMMUNIZATION: Primary | ICD-10-CM

## 2021-01-27 PROCEDURE — 91301 SARS-COV-2 / COVID-19 MRNA VACCINE (MODERNA) 100 MCG: CPT

## 2021-01-27 PROCEDURE — 0011A SARS-COV-2 / COVID-19 MRNA VACCINE (MODERNA) 100 MCG: CPT

## 2021-02-15 ENCOUNTER — HOSPITAL ENCOUNTER (OUTPATIENT)
Dept: NON INVASIVE DIAGNOSTICS | Facility: CLINIC | Age: 81
Discharge: HOME | End: 2021-02-15
Payer: COMMERCIAL

## 2021-02-15 DIAGNOSIS — I65.23 ASYMPTOMATIC BILATERAL CAROTID ARTERY STENOSIS: ICD-10-CM

## 2021-02-15 PROCEDURE — 93880 EXTRACRANIAL BILAT STUDY: CPT

## 2021-02-15 PROCEDURE — 93880 EXTRACRANIAL BILAT STUDY: CPT | Performed by: SURGERY

## 2021-02-24 ENCOUNTER — IMMUNIZATIONS (OUTPATIENT)
Dept: FAMILY MEDICINE CLINIC | Facility: HOSPITAL | Age: 81
End: 2021-02-24

## 2021-02-24 DIAGNOSIS — Z23 ENCOUNTER FOR IMMUNIZATION: Primary | ICD-10-CM

## 2021-02-24 PROCEDURE — 91301 SARS-COV-2 / COVID-19 MRNA VACCINE (MODERNA) 100 MCG: CPT

## 2021-02-24 PROCEDURE — 0012A SARS-COV-2 / COVID-19 MRNA VACCINE (MODERNA) 100 MCG: CPT

## 2021-02-28 DIAGNOSIS — I10 HYPERTENSION, UNSPECIFIED TYPE: ICD-10-CM

## 2021-05-30 DIAGNOSIS — E78.5 HYPERLIPIDEMIA, UNSPECIFIED HYPERLIPIDEMIA TYPE: ICD-10-CM

## 2021-06-01 RX ORDER — ATORVASTATIN CALCIUM 80 MG/1
80 TABLET, FILM COATED ORAL
Qty: 90 TABLET | Refills: 2 | Status: SHIPPED | OUTPATIENT
Start: 2021-06-01 | End: 2022-02-14

## 2021-06-15 ENCOUNTER — OFFICE VISIT (OUTPATIENT)
Dept: PAIN MEDICINE | Facility: CLINIC | Age: 81
End: 2021-06-15
Payer: COMMERCIAL

## 2021-06-15 ENCOUNTER — TRANSCRIBE ORDERS (OUTPATIENT)
Dept: PAIN MEDICINE | Facility: CLINIC | Age: 81
End: 2021-06-15

## 2021-06-15 VITALS
HEIGHT: 67 IN | SYSTOLIC BLOOD PRESSURE: 183 MMHG | RESPIRATION RATE: 18 BRPM | BODY MASS INDEX: 27.31 KG/M2 | WEIGHT: 174 LBS | HEART RATE: 89 BPM | DIASTOLIC BLOOD PRESSURE: 99 MMHG

## 2021-06-15 DIAGNOSIS — M48.061 DEGENERATIVE LUMBAR SPINAL STENOSIS: ICD-10-CM

## 2021-06-15 DIAGNOSIS — M54.16 LUMBAR RADICULOPATHY: ICD-10-CM

## 2021-06-15 DIAGNOSIS — M43.16 SPONDYLOLISTHESIS OF LUMBAR REGION: ICD-10-CM

## 2021-06-15 DIAGNOSIS — M96.1 POSTLAMINECTOMY SYNDROME OF LUMBAR REGION: ICD-10-CM

## 2021-06-15 DIAGNOSIS — G89.4 CHRONIC PAIN SYNDROME: Primary | ICD-10-CM

## 2021-06-15 PROCEDURE — 3077F SYST BP >= 140 MM HG: CPT | Performed by: NURSE PRACTITIONER

## 2021-06-15 PROCEDURE — 3080F DIAST BP >= 90 MM HG: CPT | Performed by: NURSE PRACTITIONER

## 2021-06-15 PROCEDURE — 1036F TOBACCO NON-USER: CPT | Performed by: NURSE PRACTITIONER

## 2021-06-15 PROCEDURE — 99213 OFFICE O/P EST LOW 20 MIN: CPT | Performed by: NURSE PRACTITIONER

## 2021-06-15 PROCEDURE — 1160F RVW MEDS BY RX/DR IN RCRD: CPT | Performed by: NURSE PRACTITIONER

## 2021-06-15 NOTE — PROGRESS NOTES
Assessment:  1  Chronic pain syndrome    2  Lumbar radiculopathy    3  Spondylolisthesis of lumbar region    4  Degenerative lumbar spinal stenosis    5  Postlaminectomy syndrome of lumbar region        Plan:  Keenan Dixon is a [de-identified] y o  male who was last seen 7/25/2019 presents for a follow up office visit in regards to chronic pain syndrome secondary to lumbar radiculopathy, lumbar spondylolisthesis, lumbar spinal stenosis, and lumbar post-laminectomy syndrome  The patient is interested in pursuing the use of medical marijuana  The patient was provided with information for providers in the area  The patient reports he did not take his medication this morning because he did not eat breakfast  Blood pressure readings were provided for the patient as well as the recommendation to f/u with PCP if BP does not return back to normal range after taking his medication  The patient will follow-up as needed or if symptoms worsen  The patient was agreeable and verbalized understanding  My impressions and treatment recommendations were discussed in detail with the patient who verbalized understanding and had no further questions  Discharge instructions were provided  I personally saw and examined the patient and I agree with the above discussed plan of care  No orders of the defined types were placed in this encounter  No orders of the defined types were placed in this encounter  History of Present Illness:  Keenan Dixon is a [de-identified] y o  male who was last seen 7/25/2019 presents for a follow up office visit in regards to chronic pain syndrome secondary to lumbar radiculopathy, lumbar spondylolisthesis, lumbar spinal stenosis, and lumbar post-laminectomy syndrome  The patients current symptoms include Back Pain  The patient currently reports ongoing low back pain that is worse since the last visit    At the last visit, the patient underwent caudal epidural steroid injection, which he reports provided 5% relief of his pain symptoms  After that time, the patient expressed interest in spinal cord stimulator; however, the patient is no longer interested in this device  The patient is interested in the use of medical marijuana  The patient describes his pain as intermittent, burning pain is worse in the morning and in the evening  He reports difficulty with going up and down stairs and increased pain with prolonged standing and walking  The pain is alleviated with sitting or lying down  Patient also reports he uses 2000 mg of Tylenol daily to help with his pain symptoms  The patient denies muscle weakness reports no bowel or bladder dysfunction, and is able to complete ADLs with minimal difficulty  The patient currently rates his pain as 0-10/10 on the numeric rating scale  Of note: The patient had elevated BP at this visit  The patient reports he did not take his medication this morning because he did not eat breakfast  Blood pressure readings were provided for the patient as well as the recommendation to f/u with PCP if BP does not return back to normal range after taking his medication  I have personally reviewed and/or updated the patient's past medical history, past surgical history, family history, social history, current medications, allergies, and vital signs today  Review of Systems   Respiratory: Positive for shortness of breath  Cardiovascular: Negative for chest pain  Gastrointestinal: Negative for constipation, diarrhea, nausea and vomiting  Musculoskeletal: Positive for back pain and gait problem  Negative for arthralgias, joint swelling and myalgias  Skin: Negative for rash  Neurological: Negative for dizziness, seizures and weakness  All other systems reviewed and are negative        Patient Active Problem List   Diagnosis    Degenerative lumbar spinal stenosis    Spondylolisthesis of lumbar region    CAD (coronary artery disease)    Coronary artery disease    Former tobacco use    Hyperlipidemia    Hypertension    S/P CABG x 4    Chronic anemia    Leg pain, posterior, left    Peripheral artery disease (HCC)    GERD (gastroesophageal reflux disease)    Vasomotor rhinitis    Lumbar stenosis    S/P lumbar fusion    T12 compression fracture (HCC)    Atherosclerosis of native arteries of extremities with intermittent claudication, bilateral legs (HCC)    Postlaminectomy syndrome of lumbar region    Lumbar radiculopathy    Skin lesion of left leg    Bruit of left carotid artery    Phimosis    Carotid stenosis, asymptomatic, bilateral       Past Medical History:   Diagnosis Date    Abnormal liver function test     RESOLVED: [id number]    Allergic rhinitis     Anemia     LAST ASSESSED: [id number]    Arthritis     BPH (benign prostatic hyperplasia)     CAD (coronary artery disease)     Coronary artery disease     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HCC)     LAST ASSESSED: [id number]    Former tobacco use     GERD (gastroesophageal reflux disease)     Hand paresthesia     RESOLVED: [id number]    Hyperlipidemia     Hypertension     Lumbar stenosis     Peripheral artery disease (HCC)     LAST ASSESSED: [id number]       Past Surgical History:   Procedure Laterality Date    BACK SURGERY      COLONOSCOPY      LUMBAR FUSION      GA ARTHRODESIS POSTERIOR/POSTEROLATERAL LUMBAR N/A 11/3/2016    Procedure: L2-S1 POSTERIOR LUMBAR FUSION AND DECOMPRESSION (Impulse), Posterior lateral fixation; dural repair ;  Surgeon: Amanda Ballard MD;  [facility];  Service: Orthopedics    GA CABG, ARTERIAL, SINGLE N/A 1/19/2018    Procedure: CABG X4 with LIMA - LAD, SVG - RCA, OM2, & Diagonal ; Left Leg EVH; MATTHEW;  Surgeon: Leon Gomez DO;  [facility];  Service: Cardiac Surgery    GA COLONOSCOPY FLX DX W/COLLJ SPEC WHEN PFRMD N/A 11/15/2017    Procedure: EGD AND COLONOSCOPY;  Surgeon: Krishna Vuong MD;  [facility];   Service: Gastroenterology   [facility] SEPTOPLASTY      LAST ASSESSED; [id number]    TONSILECTOMY AND ADNOIDECTOMY      LAST ASSESSED: [id number]       Family History   Problem Relation Age of Onset    Colon cancer Mother     Emphysema Mother     Liver disease Mother     Colon cancer Father     Coronary artery disease Father     Hypertension Father     Liver disease Father     Heart failure Father     Stroke Father         CVA     Heart attack Father     Coronary artery disease Brother     Heart disease Brother         younger brother by pass and other brother 3 stents placed    Other Family         BACK PROBLEM     Stroke Family         CVA    Emphysema Family     Hypertension Family         BENIGN       Social History     Occupational History    Occupation: Insurance     Comment: Retired    Tobacco Use    Smoking status: Former Smoker     Quit date: 2011     Years since quitting: 10 4    Smokeless tobacco: Never Used   Vaping Use    Vaping Use: Never used   Substance and Sexual Activity    Alcohol use: Yes     Alcohol/week: 1 0 standard drinks     Types: 1 Shots of liquor per week     Comment: beer, wine, scotch every other day; SOCIAL AS PER ALL SCRIPTS     Drug use: No    Sexual activity: Not Currently       Current Outpatient Medications on File Prior to Visit   Medication Sig    Ascorbic Acid (VITAMIN C) 1000 MG tablet Take 1,000 mg by mouth daily     aspirin (ECOTRIN LOW STRENGTH) 81 mg EC tablet Take 81 mg by mouth daily    atorvastatin (LIPITOR) 80 mg tablet TAKE 1 TABLET (80 MG TOTAL) BY MOUTH DAILY WITH DINNER    cholecalciferol (VITAMIN D3) 1,000 units tablet Take 1,000 Units by mouth daily     cyanocobalamin (VITAMIN B-12) 1,000 mcg tablet Take 1,000 mcg by mouth daily      docusate sodium (COLACE) 100 mg capsule Take 1 capsule by mouth 2 (two) times a day for 30 days Hold for loose stools   (Patient taking differently: Take 100 mg by mouth daily as needed Hold for loose stools  )    metoprolol tartrate (LOPRESSOR) 25 mg tablet TAKE 1 TABLET BY MOUTH TWICE A DAY    Multiple Vitamin (MULTIVITAMIN) capsule Take 1 capsule by mouth daily    Omega-3 Fatty Acids (OMEGA-3 FISH OIL PO) Take 3 tablets by mouth daily Relief factor daily    pantoprazole (PROTONIX) 40 mg tablet TAKE 1 TABLET BY MOUTH EVERY DAY (Patient taking differently: 3 (three) times a week )    Probiotic Product (ALIGN PO) Take 1 tablet by mouth daily      ipratropium (ATROVENT) 0 03 % nasal spray INHALE 2 SPRAYS INTO EACH NOSTRILS EVERY 12 HRS (Patient not taking: No sig reported)    Magnesium 500 MG CAPS Take 500 mg by mouth daily     No current facility-administered medications on file prior to visit  Allergies   Allergen Reactions    Penicillins Other (See Comments)     Hallucinations; Patient reported that he was seeing visual disturbances         Physical Exam:    BP (!) 194/100   Pulse 97   Resp 18   Ht 5' 7" (1 702 m)   Wt 78 9 kg (174 lb)   BMI 27 25 kg/m²     Constitutional:normal, well developed, well nourished, alert, in no distress and non-toxic and no overt pain behavior    Eyes:anicteric  HEENT:grossly intact  Neck:supple, symmetric, trachea midline and no masses   Pulmonary:even and unlabored  Cardiovascular:No edema or pitting edema present  Skin:Normal without rashes or lesions and well hydrated  Psychiatric:Mood and affect appropriate  Neurologic:Cranial Nerves II-XII grossly intact  Musculoskeletal:ambulates with cane    Imaging

## 2021-06-24 ENCOUNTER — HOSPITAL ENCOUNTER (OUTPATIENT)
Dept: NON INVASIVE DIAGNOSTICS | Facility: CLINIC | Age: 81
Discharge: HOME | End: 2021-06-24
Payer: COMMERCIAL

## 2021-06-24 DIAGNOSIS — I73.9 PERIPHERAL ARTERY DISEASE (HCC): ICD-10-CM

## 2021-06-24 PROCEDURE — 93925 LOWER EXTREMITY STUDY: CPT

## 2021-06-24 PROCEDURE — 93923 UPR/LXTR ART STDY 3+ LVLS: CPT

## 2021-06-24 PROCEDURE — 93922 UPR/L XTREMITY ART 2 LEVELS: CPT | Performed by: SURGERY

## 2021-06-24 PROCEDURE — 93925 LOWER EXTREMITY STUDY: CPT | Performed by: SURGERY

## 2021-07-02 ENCOUNTER — APPOINTMENT (OUTPATIENT)
Dept: LAB | Facility: CLINIC | Age: 81
End: 2021-07-02
Payer: COMMERCIAL

## 2021-07-02 DIAGNOSIS — Z11.59 ENCOUNTER FOR HCV SCREENING TEST FOR LOW RISK PATIENT: ICD-10-CM

## 2021-07-02 DIAGNOSIS — D64.9 CHRONIC ANEMIA: Chronic | ICD-10-CM

## 2021-07-02 DIAGNOSIS — I10 ESSENTIAL HYPERTENSION: ICD-10-CM

## 2021-07-02 DIAGNOSIS — E78.2 MIXED HYPERLIPIDEMIA: ICD-10-CM

## 2021-07-02 LAB
ALBUMIN SERPL BCP-MCNC: 3.7 G/DL (ref 3.5–5)
ALP SERPL-CCNC: 129 U/L (ref 46–116)
ALT SERPL W P-5'-P-CCNC: 39 U/L (ref 12–78)
ANION GAP SERPL CALCULATED.3IONS-SCNC: 13 MMOL/L (ref 4–13)
AST SERPL W P-5'-P-CCNC: 43 U/L (ref 5–45)
BASOPHILS # BLD AUTO: 0.04 THOUSANDS/ΜL (ref 0–0.1)
BASOPHILS NFR BLD AUTO: 1 % (ref 0–1)
BILIRUB SERPL-MCNC: 0.37 MG/DL (ref 0.2–1)
BUN SERPL-MCNC: 18 MG/DL (ref 5–25)
CALCIUM SERPL-MCNC: 9 MG/DL (ref 8.3–10.1)
CHLORIDE SERPL-SCNC: 107 MMOL/L (ref 100–108)
CHOLEST SERPL-MCNC: 151 MG/DL (ref 50–200)
CO2 SERPL-SCNC: 25 MMOL/L (ref 21–32)
CREAT SERPL-MCNC: 1.13 MG/DL (ref 0.6–1.3)
EOSINOPHIL # BLD AUTO: 0.21 THOUSAND/ΜL (ref 0–0.61)
EOSINOPHIL NFR BLD AUTO: 3 % (ref 0–6)
ERYTHROCYTE [DISTWIDTH] IN BLOOD BY AUTOMATED COUNT: 13.2 % (ref 11.6–15.1)
GFR SERPL CREATININE-BSD FRML MDRD: 61 ML/MIN/1.73SQ M
GLUCOSE P FAST SERPL-MCNC: 85 MG/DL (ref 65–99)
HCT VFR BLD AUTO: 35.5 % (ref 36.5–49.3)
HCV AB SER QL: NORMAL
HDLC SERPL-MCNC: 96 MG/DL
HGB BLD-MCNC: 11.8 G/DL (ref 12–17)
IMM GRANULOCYTES # BLD AUTO: 0.04 THOUSAND/UL (ref 0–0.2)
IMM GRANULOCYTES NFR BLD AUTO: 1 % (ref 0–2)
LDLC SERPL CALC-MCNC: 37 MG/DL (ref 0–100)
LYMPHOCYTES # BLD AUTO: 2.15 THOUSANDS/ΜL (ref 0.6–4.47)
LYMPHOCYTES NFR BLD AUTO: 27 % (ref 14–44)
MCH RBC QN AUTO: 35.3 PG (ref 26.8–34.3)
MCHC RBC AUTO-ENTMCNC: 33.2 G/DL (ref 31.4–37.4)
MCV RBC AUTO: 106 FL (ref 82–98)
MONOCYTES # BLD AUTO: 0.65 THOUSAND/ΜL (ref 0.17–1.22)
MONOCYTES NFR BLD AUTO: 8 % (ref 4–12)
NEUTROPHILS # BLD AUTO: 4.94 THOUSANDS/ΜL (ref 1.85–7.62)
NEUTS SEG NFR BLD AUTO: 60 % (ref 43–75)
NONHDLC SERPL-MCNC: 55 MG/DL
NRBC BLD AUTO-RTO: 0 /100 WBCS
PLATELET # BLD AUTO: 185 THOUSANDS/UL (ref 149–390)
PMV BLD AUTO: 10.7 FL (ref 8.9–12.7)
POTASSIUM SERPL-SCNC: 4.4 MMOL/L (ref 3.5–5.3)
PROT SERPL-MCNC: 8.2 G/DL (ref 6.4–8.2)
RBC # BLD AUTO: 3.34 MILLION/UL (ref 3.88–5.62)
SODIUM SERPL-SCNC: 145 MMOL/L (ref 136–145)
TRIGL SERPL-MCNC: 89 MG/DL
WBC # BLD AUTO: 8.03 THOUSAND/UL (ref 4.31–10.16)

## 2021-07-02 PROCEDURE — 85025 COMPLETE CBC W/AUTO DIFF WBC: CPT

## 2021-07-02 PROCEDURE — 80061 LIPID PANEL: CPT

## 2021-07-02 PROCEDURE — 36415 COLL VENOUS BLD VENIPUNCTURE: CPT

## 2021-07-02 PROCEDURE — 80053 COMPREHEN METABOLIC PANEL: CPT

## 2021-07-02 PROCEDURE — 86803 HEPATITIS C AB TEST: CPT

## 2021-07-06 ENCOUNTER — RA CDI HCC (OUTPATIENT)
Dept: OTHER | Facility: HOSPITAL | Age: 81
End: 2021-07-06

## 2021-07-07 NOTE — PROGRESS NOTES
Assessment & Plan:     Enhanced Visit       Subjective:     Patient ID: Rigoberto Ball is a [de-identified] y o  male     No chief complaint on file  HPI    Review of Systems    Objective: There were no vitals taken for this visit  Problem List Items Addressed This Visit     None          Physical Exam   No Chains  coding opportunities             Chart reviewed, (number of) suggestions sent to provider: 1     Problem listed updated   Provider Accepted, (number of) suggestions accepted: 1         Number of suggestions actually used: 1         Patients insurance company: [facility] [employer]     Visit status: Patient arrived for their scheduled appointment     Provider never responded to No Chains  coding request     No Chains  coding opportunities        7/9     Chart reviewed, (number of) suggestions sent to provider: 1    [id number]  Chronic kidney disease stage 3a - patient's eGFR values have been in the CKD 3a range going back to 2018                  [facility]

## 2021-07-09 ENCOUNTER — OFFICE VISIT (OUTPATIENT)
Dept: FAMILY MEDICINE CLINIC | Facility: OTHER | Age: 81
End: 2021-07-09
Payer: COMMERCIAL

## 2021-07-09 VITALS
WEIGHT: 170 LBS | RESPIRATION RATE: 16 BRPM | HEIGHT: 67 IN | TEMPERATURE: 97.6 F | OXYGEN SATURATION: 96 % | BODY MASS INDEX: 26.68 KG/M2 | HEART RATE: 89 BPM | SYSTOLIC BLOOD PRESSURE: 130 MMHG | DIASTOLIC BLOOD PRESSURE: 82 MMHG

## 2021-07-09 DIAGNOSIS — M54.2 NECK PAIN ON LEFT SIDE: ICD-10-CM

## 2021-07-09 DIAGNOSIS — R06.02 SOB (SHORTNESS OF BREATH): ICD-10-CM

## 2021-07-09 DIAGNOSIS — N18.31 STAGE 3A CHRONIC KIDNEY DISEASE (HCC): ICD-10-CM

## 2021-07-09 DIAGNOSIS — I10 ESSENTIAL HYPERTENSION: Primary | ICD-10-CM

## 2021-07-09 DIAGNOSIS — K59.00 CONSTIPATION, UNSPECIFIED CONSTIPATION TYPE: ICD-10-CM

## 2021-07-09 DIAGNOSIS — D64.9 CHRONIC ANEMIA: ICD-10-CM

## 2021-07-09 DIAGNOSIS — J30.0 CHRONIC VASOMOTOR RHINITIS: ICD-10-CM

## 2021-07-09 PROCEDURE — 1160F RVW MEDS BY RX/DR IN RCRD: CPT | Performed by: FAMILY MEDICINE

## 2021-07-09 PROCEDURE — 99213 OFFICE O/P EST LOW 20 MIN: CPT | Performed by: FAMILY MEDICINE

## 2021-07-09 PROCEDURE — 3075F SYST BP GE 130 - 139MM HG: CPT | Performed by: FAMILY MEDICINE

## 2021-07-09 PROCEDURE — 3725F SCREEN DEPRESSION PERFORMED: CPT | Performed by: FAMILY MEDICINE

## 2021-07-09 PROCEDURE — 3079F DIAST BP 80-89 MM HG: CPT | Performed by: FAMILY MEDICINE

## 2021-07-09 PROCEDURE — 1036F TOBACCO NON-USER: CPT | Performed by: FAMILY MEDICINE

## 2021-07-09 RX ORDER — DOCUSATE SODIUM 100 MG/1
100 CAPSULE, LIQUID FILLED ORAL 2 TIMES DAILY PRN
Qty: 60 CAPSULE | Refills: 0
Start: 2021-07-09 | End: 2022-03-04

## 2021-07-09 RX ORDER — IPRATROPIUM BROMIDE 21 UG/1
2 SPRAY, METERED NASAL EVERY 12 HOURS
Qty: 5 ML | Refills: 3
Start: 2021-07-09 | End: 2021-12-30

## 2021-07-09 NOTE — PROGRESS NOTES
Assessment/Plan:    Hypertension  BP at goal today  No symptoms or lab abnormalities  Continue current treatment  SOB (shortness of breath)  SOB for last year with reported cough and chest congestion  Faint bibasilar rales on exam  No other signs/sx of CHF  Recent URI  · CXR and PFTs  · Check BNP    Chronic anemia  Mild macrocytic anemic  Pt is on vitamin B-12 supplement  · Will recheck B-12 and folate levels    Neck pain on left side  Appears to be muscular pain  Pt does endorse lifting some heavy groceries prior to pain onset  Advised to continue Advil as needed for now  Diagnoses and all orders for this visit:    Essential hypertension    SOB (shortness of breath)  -     XR chest pa & lateral; Future  -     Complete PFT with post bronchodilator; Future  -     NT-BNP PRO; Future    Chronic anemia  -     Vitamin B12; Future  -     Folate; Future    Neck pain on left side    Stage 3a chronic kidney disease (HCC)    Chronic vasomotor rhinitis  -     ipratropium (ATROVENT) 0 03 % nasal spray; 2 sprays into each nostril every 12 (twelve) hours    Constipation, unspecified constipation type  -     docusate sodium (COLACE) 100 mg capsule; Take 1 capsule (100 mg total) by mouth 2 (two) times a day as needed for constipation Hold for loose stools  Subjective:      Patient ID: Fernando Chowdhury is a [de-identified] y o  male  Pt presents for BP follow up  He denies headaches, vision change, chest pain, and leg swelling but reports some recent L neck soreness improved by Advil, recent chills and diarrhea that are improving and a feeling of mucous in the chest with occasional coughing every since he had bronchitis 2 years ago  He does endorse some SOB over the last year or so but denies orthopnea, PND, and weight gain  Review of Systems   Constitutional: Negative for chills, fever and unexpected weight change  HENT: Positive for rhinorrhea  Negative for congestion and sore throat      Eyes: Negative for visual disturbance  Respiratory: Positive for cough and shortness of breath  Negative for wheezing  Cardiovascular: Positive for palpitations (possibly, "shivering" inside at night)  Negative for chest pain and leg swelling  Gastrointestinal: Positive for diarrhea (improving)  Negative for abdominal pain, blood in stool, constipation, nausea and vomiting  Genitourinary: Positive for enuresis  Negative for dysuria and hematuria  Musculoskeletal: Positive for neck pain  Negative for arthralgias and myalgias  Skin: Negative for rash  Neurological: Negative for dizziness, light-headedness and headaches  Psychiatric/Behavioral: Negative for dysphoric mood  All other systems reviewed and are negative  Objective:      /82   Pulse 89   Temp 97 6 °F (36 4 °C)   Resp 16   Ht 5' 7" (1 702 m)   Wt 77 1 kg (170 lb)   SpO2 96%   BMI 26 63 kg/m²          Physical Exam  Vitals reviewed  Constitutional:       General: He is not in acute distress  Appearance: He is well-developed  He is not diaphoretic  HENT:      Head: Normocephalic and atraumatic  Mouth/Throat:      Mouth: Mucous membranes are moist       Pharynx: Oropharynx is clear  Eyes:      Extraocular Movements: Extraocular movements intact  Conjunctiva/sclera: Conjunctivae normal       Pupils: Pupils are equal, round, and reactive to light  Cardiovascular:      Rate and Rhythm: Normal rate and regular rhythm  Pulses: Normal pulses  Heart sounds: Normal heart sounds  No murmur heard  No friction rub  No gallop  Pulmonary:      Effort: Pulmonary effort is normal  No respiratory distress  Breath sounds: No stridor  Rales (faint, bibasilar) present  No wheezing or rhonchi  Abdominal:      General: Bowel sounds are normal  There is no distension  Palpations: Abdomen is soft  There is no mass  Tenderness: There is no abdominal tenderness   There is no right CVA tenderness, left CVA tenderness or guarding  Musculoskeletal:         General: Normal range of motion  Cervical back: Normal range of motion and neck supple  No tenderness  Right lower leg: No edema  Left lower leg: No edema  Lymphadenopathy:      Cervical: No cervical adenopathy  Skin:     General: Skin is warm and dry  Findings: No rash  Neurological:      General: No focal deficit present  Mental Status: He is alert and oriented to person, place, and time     Psychiatric:         Mood and Affect: Mood normal          Behavior: Behavior normal

## 2021-07-10 PROBLEM — R06.02 SOB (SHORTNESS OF BREATH): Status: ACTIVE | Noted: 2021-07-10

## 2021-07-10 PROBLEM — M54.2 NECK PAIN ON LEFT SIDE: Status: ACTIVE | Noted: 2021-07-10

## 2021-07-10 NOTE — ASSESSMENT & PLAN NOTE
Appears to be muscular pain  Pt does endorse lifting some heavy groceries prior to pain onset  Advised to continue Advil as needed for now

## 2021-07-10 NOTE — ASSESSMENT & PLAN NOTE
SOB for last year with reported cough and chest congestion  Faint bibasilar rales on exam  No other signs/sx of CHF  Recent URI    · CXR and PFTs  · Check BNP

## 2021-07-11 PROBLEM — N18.31 STAGE 3A CHRONIC KIDNEY DISEASE (HCC): Status: ACTIVE | Noted: 2021-07-11

## 2021-07-13 ENCOUNTER — TELEPHONE (OUTPATIENT)
Dept: FAMILY MEDICINE CLINIC | Facility: OTHER | Age: 81
End: 2021-07-13

## 2021-07-13 ENCOUNTER — HOSPITAL ENCOUNTER (OUTPATIENT)
Dept: RADIOLOGY | Facility: HOSPITAL | Age: 81
Discharge: HOME | End: 2021-07-13
Payer: COMMERCIAL

## 2021-07-13 DIAGNOSIS — R06.02 SOB (SHORTNESS OF BREATH): ICD-10-CM

## 2021-07-13 PROCEDURE — 71046 X-RAY EXAM CHEST 2 VIEWS: CPT

## 2021-07-13 NOTE — TELEPHONE ENCOUNTER
Patient daughter left message on office machine wondering if the Vitamin b12 was OTC or if there was a rx he needed to  at pharmacy?    Please advise   Patient would like a call back regarding the answer  Thank you

## 2021-07-14 ENCOUNTER — TELEPHONE (OUTPATIENT)
Dept: FAMILY MEDICINE CLINIC | Facility: OTHER | Age: 81
End: 2021-07-14

## 2021-07-14 NOTE — TELEPHONE ENCOUNTER
Spoke with pt to clarify that the B12 order along with the folate and BNP were for blood work  He expressed understanding and agreed to have this done

## 2021-07-19 ENCOUNTER — LAB (OUTPATIENT)
Dept: LAB | Facility: CLINIC | Age: 81
End: 2021-07-19
Payer: COMMERCIAL

## 2021-07-19 DIAGNOSIS — D64.9 CHRONIC ANEMIA: ICD-10-CM

## 2021-07-19 DIAGNOSIS — R06.02 SOB (SHORTNESS OF BREATH): ICD-10-CM

## 2021-07-19 LAB
FOLATE SERPL-MCNC: >20 NG/ML (ref 3.1–17.5)
NT-PROBNP SERPL-MCNC: 285 PG/ML
VIT B12 SERPL-MCNC: 1598 PG/ML (ref 100–900)

## 2021-07-19 PROCEDURE — 36415 COLL VENOUS BLD VENIPUNCTURE: CPT

## 2021-07-19 PROCEDURE — 83880 ASSAY OF NATRIURETIC PEPTIDE: CPT

## 2021-07-19 PROCEDURE — 82607 VITAMIN B-12: CPT

## 2021-07-19 PROCEDURE — 82746 ASSAY OF FOLIC ACID SERUM: CPT

## 2021-07-27 DIAGNOSIS — R06.02 SOB (SHORTNESS OF BREATH): Primary | ICD-10-CM

## 2021-07-27 NOTE — RESULT ENCOUNTER NOTE
Pt notified of results  Elevated vitamin B-12 and folate show that anemia is not megaloblastic  Normal BNP is evidence against heart failure

## 2021-08-06 ENCOUNTER — APPOINTMENT (EMERGENCY)
Dept: RADIOLOGY | Facility: HOSPITAL | Age: 81
End: 2021-08-06
Payer: COMMERCIAL

## 2021-08-06 ENCOUNTER — HOSPITAL ENCOUNTER (OUTPATIENT)
Facility: HOSPITAL | Age: 81
Setting detail: OBSERVATION
Discharge: HOME | End: 2021-08-07
Attending: EMERGENCY MEDICINE | Admitting: INTERNAL MEDICINE
Payer: COMMERCIAL

## 2021-08-06 DIAGNOSIS — R55 SYNCOPE: Primary | ICD-10-CM

## 2021-08-06 DIAGNOSIS — I65.23 CAROTID STENOSIS, ASYMPTOMATIC, BILATERAL: ICD-10-CM

## 2021-08-06 DIAGNOSIS — I25.10 CORONARY ARTERY DISEASE INVOLVING NATIVE CORONARY ARTERY OF NATIVE HEART WITHOUT ANGINA PECTORIS: ICD-10-CM

## 2021-08-06 LAB
ALBUMIN SERPL BCP-MCNC: 2.9 G/DL (ref 3.5–5)
ALP SERPL-CCNC: 95 U/L (ref 46–116)
ALT SERPL W P-5'-P-CCNC: 28 U/L (ref 12–78)
ANION GAP SERPL CALCULATED.3IONS-SCNC: 11 MMOL/L (ref 4–13)
AST SERPL W P-5'-P-CCNC: 35 U/L (ref 5–45)
BACTERIA UR QL AUTO: ABNORMAL /HPF
BASOPHILS # BLD AUTO: 0.04 THOUSANDS/ΜL (ref 0–0.1)
BASOPHILS NFR BLD AUTO: 0 % (ref 0–1)
BILIRUB SERPL-MCNC: 0.37 MG/DL (ref 0.2–1)
BILIRUB UR QL STRIP: NEGATIVE
BUN SERPL-MCNC: 26 MG/DL (ref 5–25)
CALCIUM ALBUM COR SERPL-MCNC: 9.6 MG/DL (ref 8.3–10.1)
CALCIUM SERPL-MCNC: 8.7 MG/DL (ref 8.3–10.1)
CHLORIDE SERPL-SCNC: 107 MMOL/L (ref 100–108)
CLARITY UR: CLEAR
CO2 SERPL-SCNC: 25 MMOL/L (ref 21–32)
COLOR UR: YELLOW
CREAT SERPL-MCNC: 1.22 MG/DL (ref 0.6–1.3)
EOSINOPHIL # BLD AUTO: 0.3 THOUSAND/ΜL (ref 0–0.61)
EOSINOPHIL NFR BLD AUTO: 3 % (ref 0–6)
ERYTHROCYTE [DISTWIDTH] IN BLOOD BY AUTOMATED COUNT: 12.3 % (ref 11.6–15.1)
GFR SERPL CREATININE-BSD FRML MDRD: 56 ML/MIN/1.73SQ M
GLUCOSE SERPL-MCNC: 90 MG/DL (ref 65–140)
GLUCOSE UR STRIP-MCNC: NEGATIVE MG/DL
HCT VFR BLD AUTO: 32.4 % (ref 36.5–49.3)
HGB BLD-MCNC: 10.4 G/DL (ref 12–17)
HGB UR QL STRIP.AUTO: NEGATIVE
IMM GRANULOCYTES # BLD AUTO: 0.02 THOUSAND/UL (ref 0–0.2)
IMM GRANULOCYTES NFR BLD AUTO: 0 % (ref 0–2)
KETONES UR STRIP-MCNC: NEGATIVE MG/DL
LEUKOCYTE ESTERASE UR QL STRIP: ABNORMAL
LYMPHOCYTES # BLD AUTO: 1.86 THOUSANDS/ΜL (ref 0.6–4.47)
LYMPHOCYTES NFR BLD AUTO: 20 % (ref 14–44)
MCH RBC QN AUTO: 34.3 PG (ref 26.8–34.3)
MCHC RBC AUTO-ENTMCNC: 32.1 G/DL (ref 31.4–37.4)
MCV RBC AUTO: 107 FL (ref 82–98)
MONOCYTES # BLD AUTO: 0.88 THOUSAND/ΜL (ref 0.17–1.22)
MONOCYTES NFR BLD AUTO: 10 % (ref 4–12)
NEUTROPHILS # BLD AUTO: 6.14 THOUSANDS/ΜL (ref 1.85–7.62)
NEUTS SEG NFR BLD AUTO: 67 % (ref 43–75)
NITRITE UR QL STRIP: NEGATIVE
NON-SQ EPI CELLS URNS QL MICRO: ABNORMAL /HPF
NRBC BLD AUTO-RTO: 0 /100 WBCS
PH UR STRIP.AUTO: 6 [PH]
PLATELET # BLD AUTO: 182 THOUSANDS/UL (ref 149–390)
PMV BLD AUTO: 11 FL (ref 8.9–12.7)
POTASSIUM SERPL-SCNC: 4 MMOL/L (ref 3.5–5.3)
PROT SERPL-MCNC: 7 G/DL (ref 6.4–8.2)
PROT UR STRIP-MCNC: NEGATIVE MG/DL
RBC # BLD AUTO: 3.03 MILLION/UL (ref 3.88–5.62)
RBC #/AREA URNS AUTO: ABNORMAL /HPF
SODIUM SERPL-SCNC: 143 MMOL/L (ref 136–145)
SP GR UR STRIP.AUTO: 1.01 (ref 1–1.03)
TROPONIN I SERPL-MCNC: <0.02 NG/ML
TROPONIN I SERPL-MCNC: <0.02 NG/ML
TSH SERPL DL<=0.05 MIU/L-ACNC: 1.07 UIU/ML (ref 0.36–3.74)
UROBILINOGEN UR QL STRIP.AUTO: 0.2 E.U./DL
WBC # BLD AUTO: 9.24 THOUSAND/UL (ref 4.31–10.16)
WBC #/AREA URNS AUTO: ABNORMAL /HPF

## 2021-08-06 PROCEDURE — 81001 URINALYSIS AUTO W/SCOPE: CPT | Performed by: EMERGENCY MEDICINE

## 2021-08-06 PROCEDURE — 99285 EMERGENCY DEPT VISIT HI MDM: CPT

## 2021-08-06 PROCEDURE — 99220 PR INITIAL OBSERVATION CARE/DAY 70 MINUTES: CPT | Performed by: INTERNAL MEDICINE

## 2021-08-06 PROCEDURE — 71046 X-RAY EXAM CHEST 2 VIEWS: CPT

## 2021-08-06 PROCEDURE — 84443 ASSAY THYROID STIM HORMONE: CPT

## 2021-08-06 PROCEDURE — 84484 ASSAY OF TROPONIN QUANT: CPT

## 2021-08-06 PROCEDURE — 85025 COMPLETE CBC W/AUTO DIFF WBC: CPT | Performed by: EMERGENCY MEDICINE

## 2021-08-06 PROCEDURE — 99285 EMERGENCY DEPT VISIT HI MDM: CPT | Performed by: EMERGENCY MEDICINE

## 2021-08-06 PROCEDURE — 80053 COMPREHEN METABOLIC PANEL: CPT | Performed by: EMERGENCY MEDICINE

## 2021-08-06 PROCEDURE — 84484 ASSAY OF TROPONIN QUANT: CPT | Performed by: EMERGENCY MEDICINE

## 2021-08-06 PROCEDURE — 93005 ELECTROCARDIOGRAM TRACING: CPT

## 2021-08-06 PROCEDURE — 36415 COLL VENOUS BLD VENIPUNCTURE: CPT | Performed by: EMERGENCY MEDICINE

## 2021-08-06 RX ORDER — PANTOPRAZOLE SODIUM 40 MG/1
40 TABLET, DELAYED RELEASE ORAL 3 TIMES WEEKLY
Status: DISCONTINUED | OUTPATIENT
Start: 2021-08-09 | End: 2021-08-07 | Stop reason: HOSPADM

## 2021-08-06 RX ORDER — ACETAMINOPHEN 325 MG/1
650 TABLET ORAL EVERY 6 HOURS PRN
Status: DISCONTINUED | OUTPATIENT
Start: 2021-08-06 | End: 2021-08-07 | Stop reason: HOSPADM

## 2021-08-06 RX ORDER — SODIUM CHLORIDE 9 MG/ML
75 INJECTION, SOLUTION INTRAVENOUS CONTINUOUS
Status: DISPENSED | OUTPATIENT
Start: 2021-08-06 | End: 2021-08-07

## 2021-08-06 RX ORDER — DOCUSATE SODIUM 100 MG/1
100 CAPSULE, LIQUID FILLED ORAL 2 TIMES DAILY PRN
Status: DISCONTINUED | OUTPATIENT
Start: 2021-08-06 | End: 2021-08-07 | Stop reason: HOSPADM

## 2021-08-06 RX ORDER — ASPIRIN 81 MG/1
81 TABLET ORAL DAILY
Status: DISCONTINUED | OUTPATIENT
Start: 2021-08-07 | End: 2021-08-07 | Stop reason: HOSPADM

## 2021-08-06 RX ORDER — ATORVASTATIN CALCIUM 40 MG/1
80 TABLET, FILM COATED ORAL
Status: DISCONTINUED | OUTPATIENT
Start: 2021-08-07 | End: 2021-08-07 | Stop reason: HOSPADM

## 2021-08-06 RX ADMIN — SODIUM CHLORIDE 75 ML/HR: 0.9 INJECTION, SOLUTION INTRAVENOUS at 20:36

## 2021-08-06 NOTE — ED PROVIDER NOTES
History  Chief Complaint   Patient presents with    Syncope     PER EMS patient has a syncopal episode at lunch  EMS reports BP 60/40  patient alert and oriented at bedside     Patient is an [de-identified] male with a history of coronary artery disease status post CABG who presents with syncope  Patient states that he was at lunch and developed lightheadedness  He assumes that he was walking when he lost consciousness because he was told that he fell  He only remembers waking up in the ambulance  He reportedly had a blood pressure of about 60/40 when EMS arrived  He received IV fluids pre-hospital and return to baseline according to patient  According to daughter who is now in the room, he still appears a bit pale and not quite as alert as she is used to  Patient has no complaints at this time  He denies any chest pain preceding the episode or chest pain currently  He denies any associated shortness of breath, palpitations, diaphoresis, vomiting or other concerns  He does not have a history of similar episodes  History provided by:  Patient  Syncope  Episode history:  Single  Most recent episode: Today  Timing:  Constant  Progression:  Resolved  Chronicity:  New  Associated symptoms: no chest pain, no diaphoresis, no difficulty breathing, no dizziness, no fever, no focal weakness, no headaches, no nausea, no palpitations, no shortness of breath and no vomiting        Prior to Admission Medications   Prescriptions Last Dose Informant Patient Reported? Taking?    Ascorbic Acid (VITAMIN C) 1000 MG tablet 8/6/2021 at 0700 Self Yes Yes   Sig: Take 1,000 mg by mouth daily    Magnesium 500 MG CAPS  Self Yes No   Sig: Take 500 mg by mouth daily   Multiple Vitamin (MULTIVITAMIN) capsule 8/6/2021 at 1100 Self Yes Yes   Sig: Take 1 capsule by mouth daily   Probiotic Product (ALIGN PO) 8/6/2021 at 1100 Self Yes Yes   Sig: Take 1 tablet by mouth daily     aspirin (ECOTRIN LOW STRENGTH) 81 mg EC tablet 8/6/2021 at 0700 Self Yes Yes   Sig: Take 81 mg by mouth daily   atorvastatin (LIPITOR) 80 mg tablet 2021 at 0700 Self No Yes   Sig: TAKE 1 TABLET (80 MG TOTAL) BY MOUTH DAILY WITH DINNER   cholecalciferol (VITAMIN D3) 1,000 units tablet 2021 at 1100 Self Yes Yes   Sig: Take 1,000 Units by mouth daily    cyanocobalamin (VITAMIN B-12) 1,000 mcg tablet 2021 at 1100 Self Yes Yes   Sig: Take 1,000 mcg by mouth daily     docusate sodium (COLACE) 100 mg capsule Past Week at Unknown time  No Yes   Sig: Take 1 capsule (100 mg total) by mouth 2 (two) times a day as needed for constipation Hold for loose stools     ipratropium (ATROVENT) 0 03 % nasal spray More than a month at Unknown time  No No   Si sprays into each nostril every 12 (twelve) hours   metoprolol tartrate (LOPRESSOR) 25 mg tablet 2021 at 0700 Self No Yes   Sig: TAKE 1 TABLET BY MOUTH TWICE A DAY   pantoprazole (PROTONIX) 40 mg tablet 2021 at 0700 Self No Yes   Sig: TAKE 1 TABLET BY MOUTH EVERY DAY   Patient taking differently: 3 (three) times a week       Facility-Administered Medications: None       Past Medical History:   Diagnosis Date    Abnormal liver function test     RESOLVED:     Allergic rhinitis     Anemia     LAST ASSESSED:     Arthritis     BPH (benign prostatic hyperplasia)     CAD (coronary artery disease)     Coronary artery disease     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HCC)     LAST ASSESSED: [id number]    Former tobacco use     GERD (gastroesophageal reflux disease)     Hand paresthesia     RESOLVED: [id number]    Hyperlipidemia     Hypertension     Lumbar stenosis     Peripheral artery disease (HCC)     LAST ASSESSED: 2017    Stage 3a chronic kidney disease ([facility] [address] ) 2021       Past Surgical History:   Procedure Laterality Date    BACK SURGERY      COLONOSCOPY      LUMBAR FUSION      IN ARTHRODESIS POSTERIOR/POSTEROLATERAL LUMBAR N/A 11/3/2016    Procedure: L2-S1 POSTERIOR LUMBAR FUSION AND DECOMPRESSION (Impulse), Posterior lateral fixation; dural repair ;  Surgeon: Shelby Morgan MD;  [facility];  Service: Orthopedics    [place] CABG, ARTERIAL, SINGLE N/A 1/19/2018    Procedure: CABG X4 with LIMA - LAD, SVG - RCA, OM2, & Diagonal ; Left Leg EVH; MATTHEW;  Surgeon: Yamile Chambers DO;  [facility];  Service: Cardiac Surgery    [place] COLONOSCOPY FLX DX W/COLLJ SPEC WHEN PFRMD N/A 11/15/2017    Procedure: EGD AND COLONOSCOPY;  Surgeon: Allison Elizabeth MD;  [facility]; Service: Gastroenterology    SEPTOPLASTY      LAST ASSESSED; [id number]    TONSILECTOMY AND ADNOIDECTOMY      LAST ASSESSED: [id number]       Family History   Problem Relation Age of Onset    Colon cancer Mother     Emphysema Mother     Liver disease Mother     Colon cancer Father     Coronary artery disease Father     Hypertension Father     Liver disease Father     Heart failure Father     Stroke Father         CVA     Heart attack Father     Coronary artery disease Brother     Heart disease Brother         younger brother by pass and other brother 3 stents placed    Other Family         BACK PROBLEM     Stroke Family         CVA    Emphysema Family     Hypertension Family         BENIGN     I have reviewed and agree with the history as documented  E-Cigarette/Vaping    E-Cigarette Use Never User      E-Cigarette/Vaping Substances    Nicotine No     THC Yes     CBD No     Flavoring No     Other No     Unknown No      Social History     Tobacco Use    Smoking status: Former Smoker     Quit date: 2011     Years since quitting: 10 6    Smokeless tobacco: Never Used   Vaping Use    Vaping Use: Never used   Substance Use Topics    Alcohol use:  Yes     Alcohol/week: 1 0 standard drinks     Types: 1 Shots of liquor per week     Comment: beer, wine, scotch every other day; SOCIAL AS PER ALL SCRIPTS     Drug use: No       Review of Systems   Constitutional: Negative for chills, diaphoresis and fever  HENT: Negative for nosebleeds, sore throat and trouble swallowing  Eyes: Negative for photophobia, pain and visual disturbance  Respiratory: Negative for cough, chest tightness and shortness of breath  Cardiovascular: Positive for syncope  Negative for chest pain, palpitations and leg swelling  Gastrointestinal: Negative for abdominal pain, constipation, diarrhea, nausea and vomiting  Endocrine: Negative for polydipsia and polyuria  Genitourinary: Negative for difficulty urinating, dysuria and hematuria  Musculoskeletal: Negative for back pain, neck pain and neck stiffness  Skin: Negative for pallor and rash  Neurological: Positive for syncope  Negative for dizziness, focal weakness, light-headedness and headaches  All other systems reviewed and are negative  Physical Exam  Physical Exam  Vitals and nursing note reviewed  Constitutional:       General: He is not in acute distress  Appearance: He is well-developed  HENT:      Head: Normocephalic and atraumatic  Eyes:      Pupils: Pupils are equal, round, and reactive to light  Cardiovascular:      Rate and Rhythm: Normal rate and regular rhythm  Pulses: Normal pulses  Heart sounds: Normal heart sounds  Pulmonary:      Effort: Pulmonary effort is normal  No respiratory distress  Breath sounds: Normal breath sounds  Chest:       Abdominal:      General: There is no distension  Palpations: Abdomen is soft  Abdomen is not rigid  Tenderness: There is no abdominal tenderness  There is no guarding or rebound  Musculoskeletal:         General: No tenderness  Normal range of motion  Cervical back: Normal range of motion and neck supple  Lymphadenopathy:      Cervical: No cervical adenopathy  Skin:     General: Skin is warm and dry  Capillary Refill: Capillary refill takes less than 2 seconds  Neurological:      Mental Status: He is alert and oriented to person, place, and time  Cranial Nerves: No cranial nerve deficit  Sensory: No sensory deficit  Motor: Motor function is intact  Comments: Speech fluent  Visual fields intact    Cerebellar exam normal          Vital Signs  ED Triage Vitals   Temperature Pulse Respirations Blood Pressure SpO2   08/06/21 1507 08/06/21 1443 08/06/21 1445 08/06/21 1445 08/06/21 1445   98 6 °F (37 °C) 81 18 122/64 97 %      Temp Source Heart Rate Source Patient Position - Orthostatic VS BP Location FiO2 (%)   08/06/21 1507 08/06/21 1443 08/06/21 1446 08/06/21 1446 --   Oral Monitor Lying Right arm       Pain Score       08/06/21 1446       No Pain           Vitals:    08/07/21 1115 08/07/21 1145 08/07/21 1215 08/07/21 1230   BP: 138/64  125/66 136/58   Pulse: 72 72 70 72   Patient Position - Orthostatic VS:             Visual Acuity  Visual Acuity      Most Recent Value   L Pupil Size (mm)  3   R Pupil Size (mm)  3   L Pupil Shape  Round   R Pupil Shape  Round          ED Medications  Medications   sodium chloride 0 9 % infusion (0 mL/hr Intravenous Stopped 8/7/21 0951)       Diagnostic Studies  Results Reviewed     Procedure Component Value Units Date/Time    Comprehensive metabolic panel [[id number]]  (Abnormal) Collected: 08/07/21 0636    Lab Status: Final result Specimen: Blood from Arm, Left Updated: 08/07/21 0718     Sodium 143 mmol/L      Potassium 4 3 mmol/L      Chloride 108 mmol/L      CO2 28 mmol/L      ANION GAP 7 mmol/L      BUN 23 mg/dL      Creatinine 1 01 mg/dL      Glucose 104 mg/dL      Glucose, Fasting 104 mg/dL      Calcium 8 8 mg/dL      Corrected Calcium 9 6 mg/dL      AST 27 U/L      ALT 27 U/L      Alkaline Phosphatase 101 U/L      Total Protein 7 2 g/dL      Albumin 3 0 g/dL      Total Bilirubin 0 39 mg/dL      eGFR 70 ml/min/1 73sq m     Narrative:      [place] guidelines for Chronic Kidney Disease (CKD):     Stage 1 with normal or high GFR (GFR > 90 mL/min/1 73 square meters)    Stage 2 Mild CKD (GFR = 60-89 mL/min/1 73 square meters)    Stage 3A Moderate CKD (GFR = 45-59 mL/min/1 73 square meters)    Stage 3B Moderate CKD (GFR = 30-44 mL/min/1 73 square meters)    Stage 4 Severe CKD (GFR = 15-29 mL/min/1 73 square meters)    Stage 5 End Stage CKD (GFR <15 mL/min/1 73 square meters)  Note: GFR calculation is accurate only with a steady state creatinine    CBC [[id number]]  (Abnormal) Collected: 08/07/21 0636    Lab Status: Final result Specimen: Blood from Arm, Left Updated: 08/07/21 0658     WBC 10 11 Thousand/uL      RBC 3 27 Million/uL      Hemoglobin 11 1 g/dL      Hematocrit 34 1 %       fL      MCH 33 9 pg      MCHC 32 6 g/dL      RDW 12 1 %      Platelets 544 Thousands/uL      MPV 11 3 fL     Troponin I [[id number]]  (Normal) Collected: 08/06/21 2359    Lab Status: Final result Specimen: Blood from Arm, Left Updated: 08/07/21 0029     Troponin I <0 02 ng/mL     TSH, 3rd generation [[id number]]  (Normal) Collected: 08/06/21 1500    Lab Status: Final result Specimen: Blood from Arm, Left Updated: 08/06/21 2109     TSH 3RD GENERATON 1 068 uIU/mL     Narrative:      Patients undergoing fluorescein dye angiography may retain small amounts of fluorescein in the body for 48-72 hours post procedure  Samples containing fluorescein can produce falsely depressed TSH values  If the patient had this procedure,a specimen should be resubmitted post fluorescein clearance        Troponin I [[id number]]  (Normal) Collected: 08/06/21 2037    Lab Status: Final result Specimen: Blood from Arm, Left Updated: 08/06/21 2104     Troponin I <0 02 ng/mL     Urine Microscopic [[id number]]  (Abnormal) Collected: 08/06/21 1618    Lab Status: Final result Specimen: Urine, Clean Catch Updated: 08/06/21 1655     RBC, UA 0-5 /hpf      WBC, UA 30-50 /hpf      Epithelial Cells Occasional /hpf      Bacteria, UA None Seen /hpf     UA (URINE) with reflex to Scope [[id number]]  (Abnormal) Collected: 08/06/21 1618    Lab Status: Final result Specimen: Urine, Clean Catch Updated: 08/06/21 1639     Color, UA Yellow     Clarity, UA Clear     Specific Gravity, UA 1 015     pH, UA 6 0     Leukocytes, UA Large     Nitrite, UA Negative     Protein, UA Negative mg/dl      Glucose, UA Negative mg/dl      Ketones, UA Negative mg/dl      Urobilinogen, UA 0 2 E U /dl      Bilirubin, UA Negative     Blood, UA Negative    Troponin I [[id number]]  (Normal) Collected: 08/06/21 1500    Lab Status: Final result Specimen: Blood from Arm, Left Updated: 08/06/21 1546     Troponin I <0 02 ng/mL     Comprehensive metabolic panel [[id number]]  (Abnormal) Collected: 08/06/21 1500    Lab Status: Final result Specimen: Blood from Arm, Left Updated: 08/06/21 1543     Sodium 143 mmol/L      Potassium 4 0 mmol/L      Chloride 107 mmol/L      CO2 25 mmol/L      ANION GAP 11 mmol/L      BUN 26 mg/dL      Creatinine 1 22 mg/dL      Glucose 90 mg/dL      Calcium 8 7 mg/dL      Corrected Calcium 9 6 mg/dL      AST 35 U/L      ALT 28 U/L      Alkaline Phosphatase 95 U/L      Total Protein 7 0 g/dL      Albumin 2 9 g/dL      Total Bilirubin 0 37 mg/dL      eGFR 56 ml/min/1 73sq m     Narrative:      [place] guidelines for Chronic Kidney Disease (CKD):     Stage 1 with normal or high GFR (GFR > 90 mL/min/1 73 square meters)    Stage 2 Mild CKD (GFR = 60-89 mL/min/1 73 square meters)    Stage 3A Moderate CKD (GFR = 45-59 mL/min/1 73 square meters)    Stage 3B Moderate CKD (GFR = 30-44 mL/min/1 73 square meters)    Stage 4 Severe CKD (GFR = 15-29 mL/min/1 73 square meters)    Stage 5 End Stage CKD (GFR <15 mL/min/1 73 square meters)  Note: GFR calculation is accurate only with a steady state creatinine    CBC and differential [[id number]]  (Abnormal) Collected: 08/06/21 1500    Lab Status: Final result Specimen: Blood from Arm, Left Updated: 08/06/21 1516     WBC 9 24 Thousand/uL      RBC 3 03 Million/uL      Hemoglobin 10 4 g/dL Hematocrit 32 4 %       fL      MCH 34 3 pg      MCHC 32 1 g/dL      RDW 12 3 %      MPV 11 0 fL      Platelets 054 Thousands/uL      nRBC 0 /100 WBCs      Neutrophils Relative 67 %      Immat GRANS % 0 %      Lymphocytes Relative 20 %      Monocytes Relative 10 %      Eosinophils Relative 3 %      Basophils Relative 0 %      Neutrophils Absolute 6 14 Thousands/µL      Immature Grans Absolute 0 02 Thousand/uL      Lymphocytes Absolute 1 86 Thousands/µL      Monocytes Absolute 0 88 Thousand/µL      Eosinophils Absolute 0 30 Thousand/µL      Basophils Absolute 0 04 Thousands/µL                  XR chest 2 views   ED Interpretation by Brian Barrera DO (08/06 1511)   Right basilar opacity  Final Result by Rom Montanez MD (08/06 1551)   Status post CABG      No acute cardiopulmonary disease  Workstation performed: [id number]         VAS carotid complete study    (Results Pending)              Procedures  ECG 12 Lead Documentation Only    Date/Time: 8/6/2021 2:54 PM  Performed by: Brian Barrera DO  Authorized by: Brian Barrera DO     ECG reviewed by me, the ED Provider: yes    Patient location:  ED  Previous ECG:     Previous ECG:  Compared to current    Similarity:  No change    Comparison to cardiac monitor: Yes    Comments:      Normal sinus rhythm at a rate of 80 beats per minute  Normal intervals  Normal axis  Normal QRS  No ST T wave abnormalities  Similar to previous from 01/20/2018  ED Course                             SBIRT 20yo+      Most Recent Value   SBIRT (24 yo +)   In order to provide better care to our patients, we are screening all of our patients for alcohol and drug use  Would it be okay to ask you these screening questions? No Filed at: 08/06/2021 1451                    MDM  Number of Diagnoses or Management Options  Syncope: new and requires workup  Diagnosis management comments: Patient presents after an episode of syncope    He denies preceding symptoms which raises the concern for cardiac syncope  He also has a history of coronary artery disease  He is medium risk by [place] syncope rules  Will hospitalize for cardiac monitoring and serial troponins  There is no evidence of acute ischemia at this time  Portions of the above record have been created with voice recognition software   Occasional wrong word or "sound alike" substitutions may have occurred due to the inherent limitations of voice recognition software   Read the chart carefully and recognize, using context, where substitutions may have occurred  Amount and/or Complexity of Data Reviewed  Clinical lab tests: ordered and reviewed  Tests in the radiology section of CPT®: ordered and reviewed  Tests in the medicine section of CPT®: ordered and reviewed  Review and summarize past medical records: yes  Discuss the patient with other providers: yes  Independent visualization of images, tracings, or specimens: yes    Risk of Complications, Morbidity, and/or Mortality  Presenting problems: high  Diagnostic procedures: moderate  Management options: moderate    Patient Progress  Patient progress: stable      Disposition  Final diagnoses:   Syncope     Time reflects when diagnosis was documented in both MDM as applicable and the Disposition within this note     Time User Action Codes Description Comment    8/6/2021  5:11 PM [place] Child Add [R55] Syncope     8/7/2021 10:28 AM Jacinto Brunner Add [I65 23] Carotid stenosis, asymptomatic, bilateral     8/7/2021 10:28 AM Jacinto Brunner Add [I25 10] Coronary artery disease involving native coronary artery of native heart without angina pectoris       ED Disposition     ED Disposition Condition Date/Time Comment    Admit Stable Fri Aug 6, 2021  5:11 PM Case was discussed with DIOGENES and the patient's admission status was agreed to be Admission Status: observation status to the service of Dr Analisa Benjamin           Follow-up Information Follow up With Specialties Details Why [address] [place] [address] Drive, DO Family Medicine Schedule an appointment as soon as possible for a visit in 1 week(s)  [address]      Konrad Levine MD Cardiology, Multidisciplinary Schedule an appointment as soon as possible for a visit in 1 week(s)  [address]   [phone]            Discharge Medication List as of 8/7/2021 12:27 PM      CONTINUE these medications which have NOT CHANGED    Details   Ascorbic Acid (VITAMIN C) 1000 MG tablet Take 1,000 mg by mouth daily , Historical Med      aspirin (ECOTRIN LOW STRENGTH) 81 mg EC tablet Take 81 mg by mouth daily, Historical Med      atorvastatin (LIPITOR) 80 mg tablet TAKE 1 TABLET (80 MG TOTAL) BY MOUTH DAILY WITH DINNER, Starting Tue 6/1/2021, Normal      cholecalciferol (VITAMIN D3) 1,000 units tablet Take 1,000 Units by mouth daily , Historical Med      cyanocobalamin (VITAMIN B-12) 1,000 mcg tablet Take 1,000 mcg by mouth daily  , Historical Med      docusate sodium (COLACE) 100 mg capsule Take 1 capsule (100 mg total) by mouth 2 (two) times a day as needed for constipation Hold for loose stools  , Starting Fri 7/9/2021, Until Sun 8/8/2021 at 2359, No Print      ipratropium (ATROVENT) 0 03 % nasal spray 2 sprays into each nostril every 12 (twelve) hours, Starting Fri 7/9/2021, No Print      Magnesium 500 MG CAPS Take 500 mg by mouth daily, Historical Med      metoprolol tartrate (LOPRESSOR) 25 mg tablet TAKE 1 TABLET BY MOUTH TWICE A DAY, Normal      Multiple Vitamin (MULTIVITAMIN) capsule Take 1 capsule by mouth daily, Historical Med      pantoprazole (PROTONIX) 40 mg tablet TAKE 1 TABLET BY MOUTH EVERY DAY, Normal      Probiotic Product (ALIGN PO) Take 1 tablet by mouth daily  , Historical Med           Outpatient Discharge Orders   Ambulatory referral to Cardiology   Standing Status: Future Standing Exp   Date: 08/07/22      Discharge Diet Activity as tolerated       PDMP Review     None          ED Provider  Electronically Signed by           Corbin Carranza DO  08/07/21 5546

## 2021-08-07 ENCOUNTER — APPOINTMENT (OUTPATIENT)
Dept: NON INVASIVE DIAGNOSTICS | Facility: HOSPITAL | Age: 81
End: 2021-08-07
Payer: COMMERCIAL

## 2021-08-07 ENCOUNTER — APPOINTMENT (OUTPATIENT)
Dept: VASCULAR ULTRASOUND | Facility: HOSPITAL | Age: 81
End: 2021-08-07
Payer: COMMERCIAL

## 2021-08-07 VITALS
WEIGHT: 169.97 LBS | OXYGEN SATURATION: 98 % | HEART RATE: 72 BPM | TEMPERATURE: 97.9 F | DIASTOLIC BLOOD PRESSURE: 58 MMHG | BODY MASS INDEX: 26.68 KG/M2 | HEIGHT: 67 IN | RESPIRATION RATE: 16 BRPM | SYSTOLIC BLOOD PRESSURE: 136 MMHG

## 2021-08-07 PROBLEM — R55 SYNCOPE: Status: RESOLVED | Noted: 2021-08-06 | Resolved: 2021-08-07

## 2021-08-07 LAB
ALBUMIN SERPL BCP-MCNC: 3 G/DL (ref 3.5–5)
ALP SERPL-CCNC: 101 U/L (ref 46–116)
ALT SERPL W P-5'-P-CCNC: 27 U/L (ref 12–78)
ANION GAP SERPL CALCULATED.3IONS-SCNC: 7 MMOL/L (ref 4–13)
AST SERPL W P-5'-P-CCNC: 27 U/L (ref 5–45)
BILIRUB SERPL-MCNC: 0.39 MG/DL (ref 0.2–1)
BUN SERPL-MCNC: 23 MG/DL (ref 5–25)
CALCIUM ALBUM COR SERPL-MCNC: 9.6 MG/DL (ref 8.3–10.1)
CALCIUM SERPL-MCNC: 8.8 MG/DL (ref 8.3–10.1)
CHLORIDE SERPL-SCNC: 108 MMOL/L (ref 100–108)
CO2 SERPL-SCNC: 28 MMOL/L (ref 21–32)
CREAT SERPL-MCNC: 1.01 MG/DL (ref 0.6–1.3)
ERYTHROCYTE [DISTWIDTH] IN BLOOD BY AUTOMATED COUNT: 12.1 % (ref 11.6–15.1)
GFR SERPL CREATININE-BSD FRML MDRD: 70 ML/MIN/1.73SQ M
GLUCOSE P FAST SERPL-MCNC: 104 MG/DL (ref 65–99)
GLUCOSE SERPL-MCNC: 104 MG/DL (ref 65–140)
HCT VFR BLD AUTO: 34.1 % (ref 36.5–49.3)
HGB BLD-MCNC: 11.1 G/DL (ref 12–17)
MCH RBC QN AUTO: 33.9 PG (ref 26.8–34.3)
MCHC RBC AUTO-ENTMCNC: 32.6 G/DL (ref 31.4–37.4)
MCV RBC AUTO: 104 FL (ref 82–98)
PLATELET # BLD AUTO: 180 THOUSANDS/UL (ref 149–390)
PMV BLD AUTO: 11.3 FL (ref 8.9–12.7)
POTASSIUM SERPL-SCNC: 4.3 MMOL/L (ref 3.5–5.3)
PROT SERPL-MCNC: 7.2 G/DL (ref 6.4–8.2)
RBC # BLD AUTO: 3.27 MILLION/UL (ref 3.88–5.62)
SODIUM SERPL-SCNC: 143 MMOL/L (ref 136–145)
TROPONIN I SERPL-MCNC: <0.02 NG/ML
WBC # BLD AUTO: 10.11 THOUSAND/UL (ref 4.31–10.16)

## 2021-08-07 PROCEDURE — 93880 EXTRACRANIAL BILAT STUDY: CPT

## 2021-08-07 PROCEDURE — 36415 COLL VENOUS BLD VENIPUNCTURE: CPT

## 2021-08-07 PROCEDURE — 93306 TTE W/DOPPLER COMPLETE: CPT

## 2021-08-07 PROCEDURE — 80053 COMPREHEN METABOLIC PANEL: CPT

## 2021-08-07 PROCEDURE — 99217 PR OBSERVATION CARE DISCHARGE MANAGEMENT: CPT | Performed by: INTERNAL MEDICINE

## 2021-08-07 PROCEDURE — 85027 COMPLETE CBC AUTOMATED: CPT | Performed by: INTERNAL MEDICINE

## 2021-08-07 RX ADMIN — METOPROLOL TARTRATE 25 MG: 25 TABLET, FILM COATED ORAL at 08:33

## 2021-08-07 RX ADMIN — ASPIRIN 81 MG: 81 TABLET, COATED ORAL at 08:33

## 2021-08-07 RX ADMIN — ENOXAPARIN SODIUM 40 MG: 40 INJECTION SUBCUTANEOUS at 08:33

## 2021-08-07 NOTE — ASSESSMENT & PLAN NOTE
History of carotid stenosis which is being followed outpatient with ultrasound every 6 months  Will check bilateral carotid ultrasound due to syncopal episode and patient is due for 6 month check

## 2021-08-07 NOTE — H&P
[facility]  H&P- Sofía Ontiveros 1940, [de-identified] y o  male MRN: [id number]  Unit/Bed#: FT 02 Encounter: [id number]  Primary Care Provider: Malachi Stokes DO   Date and time admitted to hospital: 8/6/2021  2:41 PM    * Syncope  Assessment & Plan  Patient presenting with syncopal episode x1  No prior history of syncope, no history of seizure disorder  Patient was sitting when he lost consciousness  He did not attempt to get up or fall  He was not dizzy and only recalls waking up in the ambulance  Patient admitted to work up syncopal episode due to significant cardiac history and carotid stenosis which is being followed up patient every 6 months  Plan  · Monitor on telemetry  · Serial troponin x3  · Check orthostatic vital signs  · Check echocardiogram  · Check carotid ultrasound - patient with history of bilateral stenosis which is being monitored outpatient every 6 months and could be worsening  Patient is due for six-month checkup  CAD (coronary artery disease)  Assessment & Plan  History of CAD status post CABG  Patient denies chest pain, denies heart palpitations  Troponin negative in the ED  EKG showing normal sinus rhythm  Plan  · Continue home aspirin  · Continue home atorvastatin 80 mg daily  Chronic anemia  Assessment & Plan  History of chronic anemia    Plan  · Hemoglobin at baseline will continue to monitor  Hyperlipidemia  Assessment & Plan  History of hyperlipidemia  Plan  - atorvastatin 80 mg daily  Hypertension  Assessment & Plan  History of hypertension  Patient with syncopal episode noted with hypotension by EMS  B/P stable in ED  Will continue home antihypertensive  S/P lumbar fusion  Assessment & Plan  History of lumbar stenosis with lumbar fusion surgery done  Reports chronic history of low back pain  Plan  · P r n  Tylenol for pain      Carotid stenosis, asymptomatic, bilateral  Assessment & Plan  History of carotid stenosis which is being followed outpatient with ultrasound every 6 months  Will check bilateral carotid ultrasound due to syncopal episode and patient is due for 6 month check  VTE Prophylaxis: Enoxaparin (Lovenox)  / sequential compression device   Code Status:  Level 3  POLST: There is no POLST form on file for this patient (pre-hospital)    Anticipated Length of Stay:  Patient will be admitted on an Observation basis with an anticipated length of stay of  less than 2 midnights  Justification for Hospital Stay:  Syncopal episode significant history of cardiac disease  Chief Complaint:   Syncope    History of Present Illness:    Corrinne Maizes is a [de-identified] y o  male PHM CAD s/p CABG (2018), hypertension, hyperlipidemia, GERD, PAD, anemia, BPH, lumbar stenosis status post lumbar fusion who presents with syncopal episode x1  Patient was out for lunch where he had [place] food, wine and beer before losing consciousness while sitting at the lunch table  He did not have dizziness, and only remembers waking up in the ambulance  Per daughter who was at bedside, patient lost consciousness while sitting, he did not make an attempt to stand and he did not fall  He slumped in the chair which prompted his friends to call EMS  EMS found the patient hypotensive with B/P 60/40  Bolus IV fluids were given enroute to ED  In the ED he had recovered and his blood pressure was within normal range  This is the 1st time patient he has had a syncopal episode  He denies chest pain, denies palpitations, shortness of breath  Patient denies headache, denies visual changes, denies speech difficulties  Of note patient started using medical marijuana about 2 weeks ago for chronic lumbar pain  Initial dose was low and he has been titrating up to get to therapeutic level  He reports using medical marijuana prior to going to the restaurant  ED evaluation with EKG showing normal sinus rhythm without ischemic changes  Negative troponin  Electrolytes within normal range  Urinalysis unremarkable  Review of Systems:    Review of Systems   Constitutional: Negative for activity change and appetite change  HENT: Negative for congestion and nosebleeds  Eyes: Negative for photophobia and visual disturbance  Respiratory: Negative for chest tightness and shortness of breath  Cardiovascular: Negative for chest pain, palpitations and leg swelling  Gastrointestinal: Negative for blood in stool, constipation, diarrhea, nausea and vomiting  Genitourinary: Negative for difficulty urinating and dysuria  Musculoskeletal: Positive for back pain  Negative for gait problem and joint swelling  Neurological: Positive for syncope and speech difficulty  Negative for dizziness  Psychiatric/Behavioral: Negative for agitation and behavioral problems         Past Medical and Surgical History:     Past Medical History:   Diagnosis Date    Abnormal liver function test     RESOLVED: [id number]    Allergic rhinitis     Anemia     LAST ASSESSED: [id number]    Arthritis     BPH (benign prostatic hyperplasia)     CAD (coronary artery disease)     Coronary artery disease     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HCC)     LAST ASSESSED: [id number]    Former tobacco use     GERD (gastroesophageal reflux disease)     Hand paresthesia     RESOLVED: [id number]    Hyperlipidemia     Hypertension     Lumbar stenosis     Peripheral artery disease (HCC)     LAST ASSESSED: [id number]    Stage 3a chronic kidney disease ([facility] [address] ) 7/11/2021       Past Surgical History:   Procedure Laterality Date    BACK SURGERY      COLONOSCOPY      LUMBAR FUSION      WA ARTHRODESIS POSTERIOR/POSTEROLATERAL LUMBAR N/A 11/3/2016    Procedure: L2-S1 POSTERIOR LUMBAR FUSION AND DECOMPRESSION (Impulse), Posterior lateral fixation; dural repair ;  Surgeon: Marlin Recinos MD;  Location: [facility];  Service: Orthopedics    WA CABG, ARTERIAL, SINGLE N/A 1/19/2018    Procedure: CABG X4 with LIMA - LAD, SVG - RCA, OM2, & Diagonal ; Left Leg EVH; MATTHEW;  Surgeon: Alpa Elena DO;  Location: [facility];  Service: Cardiac Surgery    AK COLONOSCOPY FLX DX W/COLLJ SPEC WHEN PFRMD N/A 11/15/2017    Procedure: EGD AND COLONOSCOPY;  Surgeon: Donta Vasquez MD;  [facility]; Service: Gastroenterology    SEPTOPLASTY      Maxine Garcia; [id number]    TONSILECTOMY AND ADNOIDECTOMY      LAST ASSESSED: [id number]       Meds/Allergies:    Prior to Admission medications    Medication Sig Start Date End Date Taking? Authorizing Provider   Ascorbic Acid (VITAMIN C) 1000 MG tablet Take 1,000 mg by mouth daily    Yes Historical Provider, MD   aspirin (ECOTRIN LOW STRENGTH) 81 mg EC tablet Take 81 mg by mouth daily   Yes Historical Provider, MD   atorvastatin (LIPITOR) 80 mg tablet TAKE 1 TABLET (80 MG TOTAL) BY MOUTH DAILY WITH DINNER 6/1/21  Yes Pablo Garcia MD   cholecalciferol (VITAMIN D3) 1,000 units tablet Take 1,000 Units by mouth daily    Yes Historical Provider, MD   cyanocobalamin (VITAMIN B-12) 1,000 mcg tablet Take 1,000 mcg by mouth daily     Yes Historical Provider, MD   docusate sodium (COLACE) 100 mg capsule Take 1 capsule (100 mg total) by mouth 2 (two) times a day as needed for constipation Hold for loose stools   7/9/21 8/8/21 Yes Steve Tirado MD   metoprolol tartrate (LOPRESSOR) 25 mg tablet TAKE 1 TABLET BY MOUTH TWICE A DAY 3/1/21  Yes Pablo Garcia MD   Multiple Vitamin (MULTIVITAMIN) capsule Take 1 capsule by mouth daily   Yes Historical Provider, MD   pantoprazole (PROTONIX) 40 mg tablet TAKE 1 TABLET BY MOUTH EVERY DAY  Patient taking differently: 3 (three) times a week  7/15/20  Yes Pablo Garcia MD   Probiotic Product (ALIGN PO) Take 1 tablet by mouth daily     Yes Historical Provider, MD   ipratropium (ATROVENT) 0 03 % nasal spray 2 sprays into each nostril every 12 (twelve) hours 7/9/21   Steve Tirado MD   Magnesium 500 MG CAPS Take 500 mg by mouth daily    Historical Provider, MD Omega-3 Fatty Acids (OMEGA-3 FISH OIL PO) Take 3 tablets by mouth daily Relief factor daily  Patient not taking: Reported on 7/9/2021 8/6/21  Historical Provider, MD     I have reviewed home medications with patient personally  Allergies: Allergies   Allergen Reactions    Penicillins Other (See Comments)     Hallucinations; Patient reported that he was seeing visual disturbances         Social History:     Marital Status:    Occupation:  Retired    Patient Pre-hospital Living Situation:  Home  Patient Pre-hospital Level of Mobility:  Independent  Patient Pre-hospital Diet Restrictions:  None  Substance Use History:   Social History     Substance and Sexual Activity   Alcohol Use Yes    Alcohol/week: 1 0 standard drinks    Types: 1 Shots of liquor per week    Comment: beer, wine, scotch every other day; SOCIAL AS PER ALL SCRIPTS      Social History     Tobacco Use   Smoking Status Former Smoker    Quit date: 2011    Years since quitting: 10 6   Smokeless Tobacco Never Used     Social History     Substance and Sexual Activity   Drug Use No       Family History:    Family History   Problem Relation Age of Onset    Colon cancer Mother     Emphysema Mother     Liver disease Mother     Colon cancer Father     Coronary artery disease Father     Hypertension Father     Liver disease Father     Heart failure Father     Stroke Father         CVA     Heart attack Father     Coronary artery disease Brother     Heart disease Brother         younger brother by pass and other brother 4 stents placed    Other Family         BACK PROBLEM     Stroke Family         CVA    Emphysema Family     Hypertension Family         BENIGN       Physical Exam:     Vitals:   Blood Pressure: 136/60 (08/06/21 1936)  Pulse: 88 (08/06/21 1936)  Temperature: 98 6 °F (37 °C) (08/06/21 1507)  Temp Source: Oral (08/06/21 1507)  Respirations: 18 (08/06/21 1936)  SpO2: 96 % (08/06/21 1933)    Physical Exam  Constitutional: Appearance: Normal appearance  HENT:      Head: Normocephalic and atraumatic  Eyes:      Extraocular Movements: Extraocular movements intact  Pupils: Pupils are equal, round, and reactive to light  Comments: Visual fields intact   Cardiovascular:      Rate and Rhythm: Normal rate and regular rhythm  Pulses: Normal pulses  Heart sounds: No murmur heard  No gallop  Pulmonary:      Effort: Pulmonary effort is normal       Breath sounds: Normal breath sounds  No wheezing, rhonchi or rales  Abdominal:      General: Bowel sounds are normal       Palpations: Abdomen is soft  Tenderness: There is no abdominal tenderness  There is no guarding or rebound  Musculoskeletal:      Right lower leg: No edema  Left lower leg: No edema  Skin:     General: Skin is warm and dry  Capillary Refill: Capillary refill takes less than 2 seconds  Neurological:      General: No focal deficit present  Mental Status: He is alert and oriented to person, place, and time  Motor: No weakness  Comments: Speech fluent   Psychiatric:         Mood and Affect: Mood normal          Behavior: Behavior normal          Additional Data:     Lab Results: I have personally reviewed pertinent reports  Results from last 7 days   Lab Units 08/06/21  1500   WBC Thousand/uL 9 24   HEMOGLOBIN g/dL 10 4*   HEMATOCRIT % 32 4*   PLATELETS Thousands/uL 182   NEUTROS PCT % 67   LYMPHS PCT % 20   MONOS PCT % 10   EOS PCT % 3     Results from last 7 days   Lab Units 08/06/21  1500   POTASSIUM mmol/L 4 0   CHLORIDE mmol/L 107   CO2 mmol/L 25   BUN mg/dL 26*   CREATININE mg/dL 1 22   CALCIUM mg/dL 8 7   ALK PHOS U/L 95   ALT U/L 28   AST U/L 35           Imaging: I have personally reviewed pertinent reports  XR chest 2 views    Result Date: 8/6/2021  Narrative: CHEST INDICATION:   Syncope   COMPARISON:  7/13/2021 EXAM PERFORMED/VIEWS:  XR CHEST PA & LATERAL Images: 2 FINDINGS: Sternal wires again evident Cardiomediastinal silhouette has become mildly enlarged  The lungs are clear  No pneumothorax or pleural effusion  Osseous structures appear within normal limits for patient age  Partially visualized lumbar spine fusion hardware     Impression: Status post CABG No acute cardiopulmonary disease  Workstation performed: [id number]     XR chest pa & lateral    Result Date: 7/17/2021  Narrative: CHEST INDICATION:   R06 02: Shortness of breath COMPARISON: Chest radiograph from 1/20/2018  EXAM PERFORMED/VIEWS:  XR CHEST PA & LATERAL  DUAL ENERGY SUBTRACTION  FINDINGS: Normal heart size  CABG  Moderate bilateral peripheral reticulation  No consolidation  No effusion or pneumothorax  Clip in the stomach  Osseous structures normal for age  Partially imaged lumbar fusion hardware  Impression: No acute cardiopulmonary disease  Moderate bilateral peripheral reticulation due to pulmonary fibrosis, likely contributing to the patient's shortness of breath  Further evaluation could be obtained with high-resolution chest CT if management would be altered  This study was marked for significant notification and follow-up  Workstation performed: [id number]       EKG, Pathology, and Other Studies Reviewed on Admission:   · EKG:  Normal sinus rhythm, no ischemic changes  Epic / Care Everywhere Records Reviewed: Yes     ** Please Note: This note has been constructed using a voice recognition system   **

## 2021-08-07 NOTE — DISCHARGE INSTRUCTIONS
Syncope   WHAT YOU NEED TO KNOW:   Syncope is also called fainting or passing out  Syncope is a sudden, temporary loss of consciousness, followed by a fall from a standing or sitting position  Syncope ranges from not serious to a sign of a more serious condition that needs to be treated  You can control some health conditions that cause syncope  Your healthcare providers can help you create a plan to manage syncope and prevent episodes  DISCHARGE INSTRUCTIONS:   Seek care immediately if:   · You are bleeding because you hit your head when you fainted  · You suddenly have double vision, difficulty speaking, numbness, and cannot move your arms or legs  · You have chest pain and trouble breathing  · You vomit blood or material that looks like coffee grounds  · You see blood in your bowel movement  Contact your healthcare provider if:   · You have new or worsening symptoms  · You have another syncope episode  · You have a headache, fast heartbeat, or feel too dizzy to stand up  · You have questions or concerns about your condition or care  Medicines:   · Medicines  may be needed to help your heart pump strongly and regularly  Your healthcare provider may also make changes to any medicines that are causing syncope  · Take your medicine as directed  Contact your healthcare provider if you think your medicine is not helping or if you have side effects  Tell him or her if you are allergic to any medicine  Keep a list of the medicines, vitamins, and herbs you take  Include the amounts, and when and why you take them  Bring the list or the pill bottles to follow-up visits  Carry your medicine list with you in case of an emergency  Follow up with your healthcare provider as directed:  Write down your questions so you remember to ask them during your visits  Manage syncope:   · Keep a record of your syncope episodes    Include your symptoms and your activity before and after the episode  The record can help your healthcare provider find the cause of your syncope and help you manage episodes  · Sit or lie down when needed  This includes when you feel dizzy, your throat is getting tight, and your vision changes  Raise your legs above the level of your heart  · Take slow, deep breaths if you start to breathe faster with anxiety or fear  This can help decrease dizziness and the feeling that you might faint  · Check your blood pressure often  This is important if you take medicine to lower your blood pressure  Check your blood pressure when you are lying down and when you are standing  Ask how often to check during the day  Keep a record of your blood pressure numbers  Your healthcare provider may use the record to help plan your treatment  Prevent a syncope episode:   · Move slowly and let yourself get used to one position before you move to another position  This is very important when you change from a lying or sitting position to a standing position  Take some deep breaths before you stand up from a lying position  Stand up slowly  Sudden movements may cause a fainting spell  Sit on the side of the bed or couch for a few minutes before you stand up  If you are on bedrest, try to be upright for about 2 hours each day, or as directed  Do not lock your legs if you are standing for a long period of time  Move your legs and bend your knees to keep blood flowing  · Follow your healthcare provider's recommendations  Your provider may  recommend that you drink more liquids to prevent dehydration  You may also need to have more salt to keep your blood pressure from dropping too low and causing syncope  Your provider will tell you how much liquid and sodium to have each day  He or she will also tell you how much physical activity is safe for you  This will depend on what is causing your syncope  · Watch for signs of low blood sugar    These include hunger, nervousness, sweating, and fast or fluttery heartbeats  Talk with your healthcare provider about ways to keep your blood sugar level steady  · Do not strain if you are constipated  You may faint if you strain to have a bowel movement  Walking is the best way to get your bowels moving  Eat foods high in fiber to make it easier to have a bowel movement  Good examples are high-fiber cereals, beans, vegetables, and whole-grain breads  Prune juice may help make bowel movements softer  · Be careful in hot weather  Heat can cause a syncope episode  Limit activity done outside on hot days  Physical activity in hot weather can lead to dehydration  This can cause an episode  © Copyright "[employer]" 2021 Information is for End User's use only and may not be sold, redistributed or otherwise used for commercial purposes  All illustrations and images included in CareNotes® are the copyrighted property of A D A M , Inc  or [facility] Manuel Weiner   The above information is an  only  It is not intended as medical advice for individual conditions or treatments  Talk to your doctor, nurse or pharmacist before following any medical regimen to see if it is safe and effective for you  Carotid Artery Disease   WHAT YOU NEED TO KNOW:   Carotid artery disease (CAD) means the major blood vessels in your neck are narrowed or becoming blocked  These 2 major blood vessels are called the carotid arteries  They supply your brain with blood  The narrow or blocked blood vessels increase your risk for a stroke  CAD is also called carotid artery stenosis  DISCHARGE INSTRUCTIONS:   Have someone call your local emergency number (911 in the [address] [place] [address],[address]) if:   · You have any of the following signs of a stroke:      ? Numbness or drooping on one side of your face     ? Weakness in an arm or leg    ? Confusion or difficulty speaking    ?  Dizziness, a severe headache, or vision loss    · You have any of the following signs of a heart attack: ? Squeezing, pressure, or pain in your chest    ? You may  also have any of the following:     § Discomfort or pain in your back, neck, jaw, stomach, or arm    § Shortness of breath    § Nausea or vomiting    § Lightheadedness or a sudden cold sweat    Call your doctor if:   · You have questions or concerns about your condition or care  Medicines: You may  need any of the following:  · Aspirin,  or other blood thinner, may be recommended  These will help prevent blood clots from forming in your carotid arteries  If your healthcare provider wants you to take aspirin, do not take acetaminophen or ibuprofen instead  · Cholesterol medicine  lowers your cholesterol level  · Blood pressure medicine  lowers or helps control your blood pressure  · Take your medicine as directed  Contact your healthcare provider if you think your medicine is not helping or if you have side effects  Tell him or her if you are allergic to any medicine  Keep a list of the medicines, vitamins, and herbs you take  Include the amounts, and when and why you take them  Bring the list or the pill bottles to follow-up visits  Carry your medicine list with you in case of an emergency  Warning signs of a stroke: The words BE FAST can help you remember and recognize warning signs of a stroke:  · B = Balance:  Sudden loss of balance    · E = Eyes:  Loss of vision in one or both eyes    · F = Face:  Face droops on one side    · A = Arms:  Arm drops when both arms are raised    · S = Speech:  Speech is slurred or sounds different    · T = Time:  Time to get help immediately     Prevent a stroke:   · Do not smoke, and avoid secondhand smoke  Nicotine and other chemicals in cigarettes and cigars increase your risk for a stroke  Ask your healthcare provider for information if you currently smoke and need help to quit  E-cigarettes or smokeless tobacco still contain nicotine   Talk to your healthcare provider before you use these products  · Eat a variety of healthy foods  Healthy foods include fruit, vegetables, whole-grain breads, low-fat dairy products, chicken, and fish  Choose fish that are high in omega-3 fatty acids, such as salmon and fresh tuna  Ask your healthcare provider for more information on a heart healthy diet and the DASH eating plan  · Limit sodium (salt)  Sodium may increase your blood pressure  Add less table salt to your foods  Read food labels and choose foods that are low in sodium  Your healthcare provider may suggest you follow a low sodium diet  · Reach or maintain a healthy weight  Extra weight makes your heart work harder  Ask your healthcare provider what a healthy weight is for you  He or she can help you create a safe weight loss plan  Even a weight loss of 10% of your extra body weight can help your heart function better  · Exercise regularly  Exercise helps improve heart function and can help you manage your weight  Exercise can also help lower your cholesterol and blood sugar levels  Try to get at least 30 minutes of exercise 5 times each week  Try to be physically active every day  This may include walking, riding a bicycle, or swimming  Your healthcare provider can help you create an exercise plan that works best for you  · Limit alcohol  Alcohol can increase your blood pressure and triglyceride levels  A drink of alcohol is 12 ounces of beer, 5 ounces of wine, or 1½ ounces of liquor  Follow up with your doctor as directed:  Write down your questions so you remember to ask them during your visits  © [employer] 2021 Information is for End User's use only and may not be sold, redistributed or otherwise used for commercial purposes  All illustrations and images included in CareNotes® are the copyrighted property of A D A M , Inc  or [facility] Manuel Weiner   The above information is an  only   It is not intended as medical advice for individual conditions or treatments  Talk to your doctor, nurse or pharmacist before following any medical regimen to see if it is safe and effective for you

## 2021-08-07 NOTE — UTILIZATION REVIEW
Initial Clinical Review    Admission: Date/Time/Statement:   8/6/2021 1711 Observation   Admission Orders (From admission, onward)     Ordered        08/06/21 1711  Place in Observation  Once                   Orders Placed This Encounter   Procedures    Place in Observation     Standing Status:   Standing     Number of Occurrences:   1     Order Specific Question:   Level of Care     Answer:   Med Surg [16]     ED Arrival Information     Expected Arrival Acuity    - 8/6/2021 14:33 Emergent         Means of arrival Escorted by Service Admission type    Ambulance [facility] Emergency         Arrival complaint    AMBULANCE        Chief Complaint   Patient presents with    Syncope     PER EMS patient has a syncopal episode at lunch  EMS reports BP 60/40  patient alert and oriented at bedside       Initial Presentation: this is a [de-identified]year old male from home to ED via ems admitted to observation due to syncope with episode of hypotension  Presented due to lightheadedness, lost consciousness with walking just prior to arrival   Per ems BP 60/40 and given IVF  On arrival per daughter not as alert  On exam alert  Plan is telemetry, IVF, trend troponin  Check echo, carotid US  Continue home medications       ED Triage Vitals   Temperature Pulse Respirations Blood Pressure SpO2   08/06/21 1507 08/06/21 1443 08/06/21 1445 08/06/21 1445 08/06/21 1445   98 6 °F (37 °C) 81 18 122/64 97 %      Temp Source Heart Rate Source Patient Position - Orthostatic VS BP Location FiO2 (%)   08/06/21 1507 08/06/21 1443 08/06/21 1446 08/06/21 1446 --   Oral Monitor Lying Right arm       Pain Score       08/06/21 1446       No Pain          Wt Readings from Last 1 Encounters:   08/07/21 77 1 kg (169 lb 15 6 oz)     Additional Vital Signs:   08/07/21 0630  --  81  16  188/83Abnormal   --  98 %  None (Room air)  Lying   08/07/21 0315  97 9 °F (36 6 °C)  87  16  145/75  --  95 %  None (Room air)  Sitting 08/06/21 2355  --  84  16  152/72  --  98 %  None (Room air)  --   08/06/21 1936  --  88  18  136/60  --  --  --  Standing for 3 minutes - Orthostatic VS   08/06/21 1935  --  90  18  124/54  --  --  --  Standing - Orthostatic VS   08/06/21 1934  --  88  18  142/65  --  --  --  Sitting - Orthostatic VS   08/06/21 1933  --  91  18  162/74  --  96 %  None (Room air)  Lying - Orthostatic VS       Pertinent Labs/Diagnostic Test Results:   8/6/2021 CxR - Status post CABG  No acute cardiopulmonary disease  8/6/2021 ECG  - Normal sinus rhythm at a rate of 80 beats per minute  Normal intervals  Normal axis  Normal QRS  No ST T wave abnormalities  Similar to previous from 01/20/2018    Results from last 7 days   Lab Units 08/07/21 0636 08/06/21  1500   WBC Thousand/uL 10 11 9 24   HEMOGLOBIN g/dL 11 1* 10 4*   HEMATOCRIT % 34 1* 32 4*   PLATELETS Thousands/uL 180 182   NEUTROS ABS Thousands/µL  --  6 14     Results from last 7 days   Lab Units 08/07/21 0636 08/06/21  1500   SODIUM mmol/L 143 143   POTASSIUM mmol/L 4 3 4 0   CHLORIDE mmol/L 108 107   CO2 mmol/L 28 25   ANION GAP mmol/L 7 11   BUN mg/dL 23 26*   CREATININE mg/dL 1 01 1 22   EGFR ml/min/1 73sq m 70 56   CALCIUM mg/dL 8 8 8 7     Results from last 7 days   Lab Units 08/07/21 0636 08/06/21  1500   AST U/L 27 35   ALT U/L 27 28   ALK PHOS U/L 101 95   TOTAL PROTEIN g/dL 7 2 7 0   ALBUMIN g/dL 3 0* 2 9*   TOTAL BILIRUBIN mg/dL 0 39 0 37     Results from last 7 days   Lab Units 08/07/21 0636 08/06/21  1500   GLUCOSE RANDOM mg/dL 104 90     Results from last 7 days   Lab Units 08/06/21 2359 08/06/21 2037 08/06/21  1500   TROPONIN I ng/mL <0 02 <0 02 <0 02     Results from last 7 days   Lab Units 08/06/21  1500   TSH 3RD GENERATON uIU/mL 1 068     Results from last 7 days   Lab Units 08/06/21  1618   CLARITY UA  Clear   COLOR UA  Yellow   SPEC GRAV UA  1 015   PH UA  6 0   GLUCOSE UA mg/dl Negative   KETONES UA mg/dl Negative   BLOOD UA  Negative PROTEIN UA mg/dl Negative   NITRITE UA  Negative   BILIRUBIN UA  Negative   UROBILINOGEN UA E U /dl 0 2   LEUKOCYTES UA  Large*   WBC UA /hpf 30-50*   RBC UA /hpf 0-5   BACTERIA UA /hpf None Seen   EPITHELIAL CELLS WET PREP /hpf Occasional       ED Treatment:   Medication Administration from 08/06/2021 1433 to 08/07/2021 0757       Date/Time Order Dose Route Action Comments     08/06/2021 2036 sodium chloride 0 9 % infusion 75 mL/hr Intravenous New Bag         Past Medical History:   Diagnosis Date    Abnormal liver function test     RESOLVED: [id number]    Allergic rhinitis     Anemia     LAST ASSESSED: [id number]    Arthritis     BPH (benign prostatic hyperplasia)     CAD (coronary artery disease)     Coronary artery disease     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HCC)     LAST ASSESSED: [id number]    Former tobacco use     GERD (gastroesophageal reflux disease)     Hand paresthesia     RESOLVED: [id number]    Hyperlipidemia     Hypertension     Lumbar stenosis     Peripheral artery disease (HCC)     LAST ASSESSED: [id number]    Stage 3a chronic kidney disease ([facility] [address] ) 7/11/2021     Present on Admission:   CAD (coronary artery disease)   Hypertension   Hyperlipidemia   Carotid stenosis, asymptomatic, bilateral   Chronic anemia      Admitting Diagnosis: Syncope [R55]  Age/Sex: [de-identified] y o  male  Admission Orders:  Scheduled Medications:  aspirin, 81 mg, Oral, Daily  atorvastatin, 80 mg, Oral, Daily With Dinner  enoxaparin, 40 mg, Subcutaneous, Daily  metoprolol tartrate, 25 mg, Oral, BID  [START ON 8/9/2021] pantoprazole, 40 mg, Oral, Once per day on Mon Wed Fri      Continuous IV Infusions:  sodium chloride, 75 mL/hr, Intravenous, Continuous      PRN Meds: not used   acetaminophen, 650 mg, Oral, Q6H PRN  docusate sodium, 100 mg, Oral, BID PRN    Telemetry       Network Utilization Review Department  ATTENTION: Please call with any questions or concerns to [phone] and carefully listen to the prompts so that you are directed to the right person  All voicemails are confidential   Monica Jose A all requests for admission clinical reviews, approved or denied determinations and any other requests to dedicated fax number below belonging to the campus where the patient is receiving treatment   List of dedicated fax numbers for the Facilities:  [address] DENIALS (Administrative/Medical Necessity) [phone]   [address] (Maternity/NICU/Pediatrics) [phone] [address] [place] [phone] [place] [address] [address] [facility] [place] [address] [phone]

## 2021-08-07 NOTE — ASSESSMENT & PLAN NOTE
History of hypertension  Patient with syncopal episode noted with hypotension by EMS    We will hold home antihypertensives and monitor B/P

## 2021-08-07 NOTE — ASSESSMENT & PLAN NOTE
History of lumbar stenosis with lumbar fusion surgery done  Reports chronic history of low back pain  Plan  · P r n  Tylenol for pain

## 2021-08-07 NOTE — DISCHARGE SUMMARY
[facility]  Discharge- Laya Majestic 1940, [de-identified] y o  male MRN: [id number]  Unit/Bed#: FT 02 Encounter: [id number]  Primary Care Provider: Carola Morales DO   Date and time admitted to hospital: 8/6/2021  2:41 PM    * Syncope-resolved as of 8/7/2021  Assessment & Plan  Patient presenting with syncopal episode x1  No prior history of syncope, no history of seizure disorder  Patient was sitting when he lost consciousness  He did not attempt to get up or fall  He was not dizzy and only recalls waking up in the ambulance  Patient admitted to work up syncopal episode due to significant cardiac history and carotid stenosis which is being followed up patient every 6 months  Troponin negative x3  Positive for orthostatic hypotension  Carotid ultrasound showing increased stenosis  Plan  · Echocardiogram outpatient with cardiology follow-up  CAD (coronary artery disease)  Assessment & Plan  Continue home statin therapy  Continue low-dose aspirin  Chronic anemia  Assessment & Plan  Hemoglobin appears stable though slightly below baseline  Continue to monitor outpatient  Hyperlipidemia  Assessment & Plan  Continue home statin  Hypertension  Assessment & Plan  Continue home antihypertensives  S/P lumbar fusion  Assessment & Plan  Continue following with pain management  Carotid stenosis, asymptomatic, bilateral  Assessment & Plan  Carotid ultrasound completed today showing mildly increased stenosis  Follow-up outpatient  Discharging Resident Physician: Demetrius Allred DO  Attending: Alexandr Carr MD  PCP: Carola Morales DO  Admission Date: 8/6/2021  Discharge Date: 08/07/21    Disposition:     Home    Reason for Admission:  Transient loss of consciousness/syncope  Consultations During Hospital Stay:  · None  Procedures Performed:     · None  Significant Findings / Test Results:     · Orthostatic hypotension      Incidental Findings: · None  Test Results Pending at Discharge (will require follow up): · None     Outpatient Tests Requested:  · None  Complications:  None  Hospital Course:     Corrinne Maizes is a [de-identified] y o  male patient who originally presented to the hospital on 8/6/2021 due to transient loss of consciousness/syncope  No prior history of syncope, no history of seizure disorder  Patient was sitting in a restaurant after lunch when he lost consciousness and only remembers waking up in the ambulance  He did not attempt to get up and did not fall  EMS was called by his friends  Per EMS patient was hypotensive with a blood pressure of 60/40 IV fluids were administered on route to ED  In ED hypotension had resolved and patient offered no complaints  His review of systems was negative  ED workup significant for orthostatic hypotension and BUN creatinine ratio greater than 20:1  Due to patient's extensive cardiac history he was admitted to rule out other causes of syncope  EKG was unremarkable  Troponin negative x3  Carotid ultrasound showing mild increased stenosis from baseline  Cardiac echo preliminary result unremarkable  Of note patient recently started taking medical marijuana for lumbar pain  Patient has been titrating med, 1 dose to reach therapeutic level and he took 1 dose prior to going to [facility]  Discussed with patient that the syncopal episode likely due to hypotension in the setting dehydration and vasodilation from alcohol/medical marijuana  Discharge plan discussed with patient  Patient agrees with discharge plan and is stable for discharge  Condition at Discharge: stable     Discharge Day Visit / Exam:     Subjective:  No acute events overnight       Vitals: Blood Pressure: 137/64 (08/07/21 1000)  Pulse: 70 (08/07/21 1000)  Temperature: 97 9 °F (36 6 °C) (08/07/21 0315)  Temp Source: Oral (08/07/21 0315)  Respirations: 16 (08/07/21 1000)  Height: 5' 7" (170 2 cm) (08/07/21 0331)  Weight - Scale: 77 1 kg (169 lb 15 6 oz) (08/07/21 0331)  SpO2: 97 % (08/07/21 1000)  Exam:   Physical Exam  Constitutional:       Appearance: Normal appearance  HENT:      Head: Normocephalic and atraumatic  Mouth/Throat:      Mouth: Mucous membranes are moist    Eyes:      Conjunctiva/sclera: Conjunctivae normal    Cardiovascular:      Rate and Rhythm: Regular rhythm  Pulses: Normal pulses  Heart sounds: No murmur heard  No gallop  Pulmonary:      Effort: Pulmonary effort is normal       Breath sounds: Normal breath sounds  No wheezing, rhonchi or rales  Abdominal:      General: Bowel sounds are normal       Palpations: Abdomen is soft  Tenderness: There is no abdominal tenderness  There is no guarding  Hernia: No hernia is present  Musculoskeletal:      Right lower leg: No edema  Left lower leg: No edema  Skin:     General: Skin is warm and dry  Capillary Refill: Capillary refill takes less than 2 seconds  Neurological:      General: No focal deficit present  Mental Status: He is alert and oriented to person, place, and time  Psychiatric:         Mood and Affect: Mood normal          Behavior: Behavior normal            Discharge instructions/Information to patient and family:   See after visit summary for information provided to patient and family  Provisions for Follow-Up Care:  See after visit summary for information related to follow-up care and any pertinent home health orders  Planned Readmission: None  Discharge Medications:  See after visit summary for reconciled discharge medications provided to patient and family        ** Please Note: This note has been constructed using a voice recognition system **

## 2021-08-07 NOTE — ASSESSMENT & PLAN NOTE
Patient presenting with syncopal episode x1  No prior history of syncope, no history of seizure disorder  Patient was sitting when he lost consciousness  He did not attempt to get up or fall  He was not dizzy and only recalls waking up in the ambulance  Patient admitted to work up syncopal episode due to significant cardiac history and carotid stenosis which is being followed up patient every 6 months  Troponin negative x3  Positive for orthostatic hypotension  Carotid ultrasound showing increased stenosis  Plan  · Echocardiogram outpatient with cardiology follow-up

## 2021-08-07 NOTE — ASSESSMENT & PLAN NOTE
Lab Results   Component Value Date    EGFR 70 08/07/2021    EGFR 56 08/06/2021    EGFR 61 07/02/2021    CREATININE 1 01 08/07/2021    CREATININE 1 22 08/06/2021    CREATININE 1 13 07/02/2021     Creatinine stable at baseline  Continue to monitor

## 2021-08-07 NOTE — ED NOTES
Orthostatic VS:     LAYING FLAT: P-72 O2-98 BP-135/64 R-15  SITTING: P-80 O2- 97 BP-125/66 R-15  STANDING P-79 O2 99 /58 R-15     Nhan Goncalves RN  08/07/21 0118

## 2021-08-07 NOTE — ASSESSMENT & PLAN NOTE
History of CAD status post CABG  Patient denies chest pain, denies heart palpitations  Troponin negative in the ED  EKG showing normal sinus rhythm  Plan  · Continue home aspirin  · Continue home atorvastatin 80 mg daily

## 2021-08-07 NOTE — ASSESSMENT & PLAN NOTE
History of hypertension  Patient with syncopal episode noted with hypotension by EMS  B/P stable in ED  Will continue home antihypertensive

## 2021-08-07 NOTE — ASSESSMENT & PLAN NOTE
Patient presenting with syncopal episode x1  No prior history of syncope, no history of seizure disorder  Patient was sitting when he lost consciousness  He did not attempt to get up or fall  He was not dizzy and only recalls waking up in the ambulance  Patient admitted to work up syncopal episode due to significant cardiac history and carotid stenosis which is being followed up patient every 6 months  Plan  · Monitor on telemetry  · Serial troponin x3  · Check orthostatic vital signs  · Check echocardiogram  · Check carotid ultrasound - patient with history of bilateral stenosis which is being monitored outpatient every 6 months and could be worsening  Patient is due for six-month checkup

## 2021-08-07 NOTE — DISCHARGE INSTR - AVS FIRST PAGE
Dear Victoria Aguilar,     It was our pleasure to care for you here at [facility]  It is our hope that we were always able to exceed the expected standards for your care during your stay  You were hospitalized due to transient loss of consciousness(syncope)  You were cared for in the emergency department by Kevin Stinson DO under the service of [address] MD [place] with the [facility] who covers for your primary care physician (PCP), Baylee Taveras DO, while you were hospitalized  If you have any questions or concerns related to this hospitalization, you may contact us at [phone]  For follow up as well as any medication refills, we recommend that you follow up with your primary care physician  A registered nurse will reach out to you by phone within a few days after your discharge to answer any additional questions that you may have after going home  However, at this time we provide for you here, the most important instructions / recommendations at discharge:     · Notable Medication Adjustments -   · None  Continue taking your home routine medications  · Testing Required after Discharge -   · None  · Important follow up information -   · Follow-up with your cardiologist within 1 week  · Follow-up with your family doctor within 1 week  · Other Instructions -   · See complete discharge instructions  · Please review this entire after visit summary as additional general instructions including medication list, appointments, activity, diet, any pertinent wound care, and other additional recommendations from your care team that may be provided for you        Sincerely,     Kevin Stinson DO

## 2021-08-09 ENCOUNTER — TRANSITIONAL CARE MANAGEMENT (OUTPATIENT)
Dept: FAMILY MEDICINE CLINIC | Facility: OTHER | Age: 81
End: 2021-08-09

## 2021-08-09 LAB
ATRIAL RATE: 83 BPM
P AXIS: 64 DEGREES
PR INTERVAL: 168 MS
QRS AXIS: 37 DEGREES
QRSD INTERVAL: 92 MS
QT INTERVAL: 350 MS
QTC INTERVAL: 404 MS
T WAVE AXIS: 68 DEGREES
VENTRICULAR RATE: 80 BPM

## 2021-08-09 PROCEDURE — 93306 TTE W/DOPPLER COMPLETE: CPT | Performed by: INTERNAL MEDICINE

## 2021-08-09 PROCEDURE — 93010 ELECTROCARDIOGRAM REPORT: CPT | Performed by: INTERNAL MEDICINE

## 2021-08-10 ENCOUNTER — TELEPHONE (OUTPATIENT)
Dept: CARDIOLOGY CLINIC | Facility: CLINIC | Age: 81
End: 2021-08-10

## 2021-08-10 NOTE — TELEPHONE ENCOUNTER
Pt went to Er over the weekend  He had syncopal episode and when EMS got to him Bp 60/40  Does have f/u appt for OCT  Did you want to do any other testing? He wants to know if that appt in OCt is necessary?       Please advise

## 2021-08-11 PROCEDURE — 93880 EXTRACRANIAL BILAT STUDY: CPT | Performed by: SURGERY

## 2021-08-12 ENCOUNTER — TELEPHONE (OUTPATIENT)
Dept: VASCULAR SURGERY | Facility: CLINIC | Age: 81
End: 2021-08-12

## 2021-08-24 ENCOUNTER — HOSPITAL ENCOUNTER (OUTPATIENT)
Dept: CT IMAGING | Facility: HOSPITAL | Age: 81
Discharge: HOME | End: 2021-08-24
Payer: COMMERCIAL

## 2021-08-24 DIAGNOSIS — R06.02 SOB (SHORTNESS OF BREATH): ICD-10-CM

## 2021-08-24 PROCEDURE — G1004 CDSM NDSC: HCPCS

## 2021-08-24 PROCEDURE — 71250 CT THORAX DX C-: CPT

## 2021-08-29 NOTE — ASSESSMENT & PLAN NOTE
54-year-old male former smoker with HTN, HLD, GERD, CAD, s/p CABG x 4 w/L GSV harvest, CKD 3, lumbar spinal stenosis/spondylolisthesis w/radiculopathy, s/p L2-S1 fusion '16, asymptomatic bilateral carotid artery stenosis L>R and PAD presents for review of surveillance imaging and risk factor modification  -JAYASHREE 6/24/2021 reviewed and unchanged with >75% distal R SFA stenosis, diffuse L femoropopliteal disease and bilateral tibioperoneal disease  R LIBERTY NC/69/60, L LIBERTY NC/119/64        -ambulation limited secondary to dyspnea and lumbar spinal disease with known neurogenic claudication    No true arterial claudication, rest pain or tissue loss   -continue conservative medical management and surveillance with JAYASHREE in 1 year   -continue ASA and statin   -return to office in 1 year with JAYASHREE and carotid duplex for RFM  -instructed to contact the office with new symptoms, concerns, rest pain or tissue loss

## 2021-08-29 NOTE — ASSESSMENT & PLAN NOTE
Hx lumbar spinal stenosis, s/p L2-S1 fusion '16  -ambulation limited secondary to lumbar spinal disease  -+neurogenic claudication and chronic pain   -started medical marijuana 1 month ago without significant improvement  -continue management per primary medical service

## 2021-08-29 NOTE — ASSESSMENT & PLAN NOTE
-carotid duplex 8/7/2021 reviewed and unchanged with <50% R ICA stenosis, velocity 64/13, ratio 0 65 and >70% L ICA stenosis, velocity 233/77, ratio 2 08   L CCA velocity 293/53 and improved  -asymptomatic    No history TIA/CVA   -continue surveillance with carotid duplex q 6 months   -continue ASA and statin   -educated in signs/symptoms of TIA/CVA and instructed to call 911 immediately   -see plan as outlined above

## 2021-08-29 NOTE — PATIENT INSTRUCTIONS
-lower extremity arterial ultrasound 6/24/2021 reviewed and unchanged with >75% narrowing of the right femoral artery and disease of the arteries below the knee  Despite this, there is adequate blood flow to her feet that remains above healing level if you would sustain a wound    -ambulation limited secondary to your spinal disease  No symptoms related to your arterial blockage    -continue conservative medical management and surveillance with repeat lower extremity arterial ultrasound in 1 year      -carotid ultrasound on 8/7/2021 also reviewed and stable with slight improvement and degree of narrowing of the left carotid artery  No vascular intervention indicated  Recommend continuing surveillance with carotid ultrasound every 6 months   -continue ASA and atorvastatin   -return to office in 1 year with lower extremity arterial ultrasound and carotid duplex for routine follow  -please contact the office with new symptoms, concerns, new leg pain, nonhealing leg wounds, or signs/symptoms of TIA/CVA as discussed today

## 2021-08-30 ENCOUNTER — OFFICE VISIT (OUTPATIENT)
Dept: VASCULAR SURGERY | Facility: CLINIC | Age: 81
End: 2021-08-30
Payer: COMMERCIAL

## 2021-08-30 VITALS
TEMPERATURE: 96.5 F | BODY MASS INDEX: 27.31 KG/M2 | DIASTOLIC BLOOD PRESSURE: 76 MMHG | HEIGHT: 67 IN | SYSTOLIC BLOOD PRESSURE: 130 MMHG | HEART RATE: 76 BPM | WEIGHT: 174 LBS

## 2021-08-30 DIAGNOSIS — I65.23 CAROTID STENOSIS, ASYMPTOMATIC, BILATERAL: ICD-10-CM

## 2021-08-30 DIAGNOSIS — M43.16 SPONDYLOLISTHESIS OF LUMBAR REGION: ICD-10-CM

## 2021-08-30 DIAGNOSIS — E78.2 MIXED HYPERLIPIDEMIA: ICD-10-CM

## 2021-08-30 DIAGNOSIS — I10 ESSENTIAL HYPERTENSION: ICD-10-CM

## 2021-08-30 DIAGNOSIS — I73.9 PERIPHERAL ARTERY DISEASE (HCC): Primary | ICD-10-CM

## 2021-08-30 PROCEDURE — 3075F SYST BP GE 130 - 139MM HG: CPT | Performed by: PHYSICIAN ASSISTANT

## 2021-08-30 PROCEDURE — 99214 OFFICE O/P EST MOD 30 MIN: CPT | Performed by: PHYSICIAN ASSISTANT

## 2021-08-30 PROCEDURE — 1111F DSCHRG MED/CURRENT MED MERGE: CPT | Performed by: PHYSICIAN ASSISTANT

## 2021-08-30 PROCEDURE — 1036F TOBACCO NON-USER: CPT | Performed by: PHYSICIAN ASSISTANT

## 2021-08-30 PROCEDURE — 1160F RVW MEDS BY RX/DR IN RCRD: CPT | Performed by: PHYSICIAN ASSISTANT

## 2021-08-30 PROCEDURE — 3078F DIAST BP <80 MM HG: CPT | Performed by: PHYSICIAN ASSISTANT

## 2021-08-30 NOTE — PROGRESS NOTES
Assessment/Plan:    Peripheral artery disease [facility])  40-year-old male former smoker with HTN, HLD, GERD, CAD, s/p CABG x 4 w/L GSV harvest, CKD 3, lumbar spinal stenosis/spondylolisthesis w/radiculopathy, s/p L2-S1 fusion '16, asymptomatic bilateral carotid artery stenosis L>R and PAD presents for review of surveillance imaging and risk factor modification  -JAYASHREE 6/24/2021 reviewed and unchanged with >75% distal R SFA stenosis, diffuse L femoropopliteal disease and bilateral tibioperoneal disease  R LIBERTY NC/69/60, L LIBERTY NC/119/64        -ambulation limited secondary to dyspnea and lumbar spinal disease with known neurogenic claudication  No true arterial claudication, rest pain or tissue loss   -continue conservative medical management and surveillance with JAYASHREE in 1 year   -continue ASA and statin   -return to office in 1 year with JAYASHREE and carotid duplex for RFM  -instructed to contact the office with new symptoms, concerns, rest pain or tissue loss    Carotid stenosis, asymptomatic, bilateral  -carotid duplex 8/7/2021 reviewed and unchanged with <50% R ICA stenosis, velocity 64/13, ratio 0 65 and >70% L ICA stenosis, velocity 233/77, ratio 2 08   L CCA velocity 293/53 and improved  -asymptomatic    No history TIA/CVA   -continue surveillance with carotid duplex q 6 months   -continue ASA and statin   -educated in signs/symptoms of TIA/CVA and instructed to call 911 immediately   -see plan as outlined above    Spondylolisthesis of lumbar region  Hx lumbar spinal stenosis, s/p L2-S1 fusion '16  -ambulation limited secondary to lumbar spinal disease  -+neurogenic claudication and chronic pain   -started medical marijuana 1 month ago without significant improvement  -continue management per primary medical service    Hypertension  -BP well controlled  -continue current medical regimen  -management per PCP    Hyperlipidemia  -stable  -continue statin therapy  -management per PCP       Diagnoses and all orders for this visit:    Peripheral artery disease (HCC)  -     VAS lower limb arterial duplex, complete bilateral; Future    Carotid stenosis, asymptomatic, bilateral  -     VAS carotid complete study; Future  -     VAS carotid complete study; Future    Essential hypertension    Spondylolisthesis of lumbar region    Mixed hyperlipidemia          Subjective:      Patient ID: Kirti Thurman is a 80 y o  male  Patient is here to review study of JAYASHREE done 6/24/21 and CV done 8/9/21  Patient denies stroke/TIA  Symptoms  Patient is taking medical Kathlee Boop for leg pain and report no relieve so far  Patient is currently taking aspirin and atorvastatin  70-year-old male former smoker with HTN, HLD, GERD, CAD, s/p CABG x 4 w/L GSV harvest, CKD 3, lumbar spinal stenosis/spondylolisthesis w/radiculopathy, s/p L2-S1 fusion '16, asymptomatic bilateral carotid artery stenosis L>R and PAD presents for review of surveillance imaging and risk factor modification  Patient last seen in the office by Dr Clinton Matta on 8/13/2020  CTA head and neck considered at that time but patient refused  Patient's lower extremity symptoms thought to be more neuropathic in nature related to his lumbar spinal disease  Recent JAYASHREE 6/24/2021 reviewed and unchanged with >75% distal R SFA stenosis, diffuse L femoropopliteal disease and bilateral tibioperoneal disease  R LIBERTY NC/69/60, L LIBERTY NC/119/64  Carotid duplex 8/7/2021 reviewed and unchanged with <50% R ICA stenosis, velocity 64/13, ratio 0 65 and >70% L ICA stenosis, velocity 233/77, ratio 2 08   L CCA velocity 293/53 and improved  Patient's lower extremity symptoms remain unchanged and consist of bilateral lower extremity heaviness and fatigue with ambulation  No true claudication, rest pain or tissue loss  Patient also denies aphasia, dysarthria, ataxia, lateralizing extremity weakness or amaurosis fugax  No history TIA/CVA    Patient recently evaluated in the ER for dizziness secondary to dehydration  Symptoms resolved  Patient remains on aspirin and statin  The following portions of the patient's history were reviewed and updated as appropriate: allergies, current medications, past family history, past medical history, past social history, past surgical history and problem list     Review of Systems   Constitutional: Negative  HENT: Negative  Eyes: Negative  Respiratory: Negative  Cardiovascular: Negative  Gastrointestinal: Negative  Endocrine: Negative  Genitourinary: Negative  Musculoskeletal: Negative  Skin: Negative  Allergic/Immunologic: Negative  Neurological: Negative  Hematological: Negative  Psychiatric/Behavioral: Negative  I have reviewed and made appropriate changes to the review of systems input by the medical assistant      Vitals:    08/30/21 1037   BP: 130/76   BP Location: Left arm   Patient Position: Sitting   Cuff Size: Standard   Pulse: 76   Temp: (!) 96 5 °F (35 8 °C)   TempSrc: Tympanic   Weight: 78 9 kg (174 lb)   Height: 5' 7" (1 702 m)       Patient Active Problem List   Diagnosis    Degenerative lumbar spinal stenosis    Spondylolisthesis of lumbar region    CAD (coronary artery disease)    Coronary artery disease    Former tobacco use    Hyperlipidemia    Hypertension    S/P CABG x 4    Chronic anemia    Leg pain, posterior, left    Peripheral artery disease (HCC)    GERD (gastroesophageal reflux disease)    Vasomotor rhinitis    Lumbar stenosis    S/P lumbar fusion    T12 compression fracture (HCC)    Postlaminectomy syndrome of lumbar region    Lumbar radiculopathy    Skin lesion of left leg    Bruit of left carotid artery    Phimosis    Carotid stenosis, asymptomatic, bilateral    SOB (shortness of breath)    Neck pain on left side    Stage 3a chronic kidney disease (HCC)       Past Surgical History:   Procedure Laterality Date    BACK SURGERY      COLONOSCOPY      LUMBAR FUSION      ND ARTHRODESIS POSTERIOR/POSTEROLATERAL LUMBAR N/A 11/3/2016    Procedure: L2-S1 POSTERIOR LUMBAR FUSION AND DECOMPRESSION (Impulse), Posterior lateral fixation; dural repair ;  Surgeon: Mari Jackson MD;  [facility];  Service: Orthopedics    DE CABG, ARTERIAL, SINGLE N/A 1/19/2018    Procedure: CABG X4 with LIMA - LAD, SVG - RCA, OM2, & Diagonal ; Left Leg EVH; MATTHEW;  Surgeon: Ciarra Solano DO;  [facility];  Service: Cardiac Surgery    DE COLONOSCOPY FLX DX W/COLLJ SPEC WHEN PFRMD N/A 11/15/2017    Procedure: EGD AND COLONOSCOPY;  Surgeon: Katarina Peoples MD;  [facility]; Service: Gastroenterology    SEPTOPLASTY      LAST ASSESSED; [id number]    TONSILECTOMY AND ADNOIDECTOMY      LAST ASSESSED: [id number]       Family History   Problem Relation Age of Onset    Colon cancer Mother     Emphysema Mother     Liver disease Mother     Colon cancer Father     Coronary artery disease Father     Hypertension Father     Liver disease Father     Heart failure Father     Stroke Father         CVA     Heart attack Father     Coronary artery disease Brother     Heart disease Brother         younger brother by pass and other brother 3 stents placed    Other Family         BACK PROBLEM     Stroke Family         CVA    Emphysema Family     Hypertension Family         BENIGN       Social History     Socioeconomic History    Marital status:      Spouse name: Not on file    Number of children: Not on file    Years of education: some college     Highest education level: Not on file   Occupational History    Occupation: Insurance     Comment: Retired    Tobacco Use    Smoking status: Former Smoker     Quit date: 2011     Years since quitting: 10 6    Smokeless tobacco: Never Used   Vaping Use    Vaping Use: Never used   Substance and Sexual Activity    Alcohol use:  Yes     Alcohol/week: 1 0 standard drinks     Types: 1 Shots of liquor per week     Comment: beer, wine, scotch every other day; SOCIAL AS PER ALL SCRIPTS     Drug use: No    Sexual activity: Not Currently   Other Topics Concern    Not on file   Social History Narrative    Daily coffee consumption      Social Determinants of Health     Financial Resource Strain:     Difficulty of Paying Living Expenses:    Food Insecurity:     Worried About Running Out of Food in the Last Year:     Ran Out of Food in the Last Year:    Transportation Needs:     Lack of Transportation (Medical):  Lack of Transportation (Non-Medical):    Physical Activity:     Days of Exercise per Week:     Minutes of Exercise per Session:    Stress:     Feeling of Stress :    Social Connections:     Frequency of Communication with Friends and Family:     Frequency of Social Gatherings with Friends and Family:     Attends Taoist Services:     Active Member of Clubs or Organizations:     Attends Club or Organization Meetings:     Marital Status:    Intimate Partner Violence:     Fear of Current or Ex-Partner:     Emotionally Abused:     Physically Abused:     Sexually Abused: Allergies   Allergen Reactions    Penicillins Other (See Comments)     Hallucinations; Patient reported that he was seeing visual disturbances           Current Outpatient Medications:     Ascorbic Acid (VITAMIN C) 1000 MG tablet, Take 1,000 mg by mouth daily , Disp: , Rfl:     aspirin (ECOTRIN LOW STRENGTH) 81 mg EC tablet, Take 81 mg by mouth daily, Disp: , Rfl:     atorvastatin (LIPITOR) 80 mg tablet, TAKE 1 TABLET (80 MG TOTAL) BY MOUTH DAILY WITH DINNER, Disp: 90 tablet, Rfl: 2    cholecalciferol (VITAMIN D3) 1,000 units tablet, Take 1,000 Units by mouth daily , Disp: , Rfl:     cyanocobalamin (VITAMIN B-12) 1,000 mcg tablet, Take 1,000 mcg by mouth daily  , Disp: , Rfl:     docusate sodium (COLACE) 100 mg capsule, Take 1 capsule (100 mg total) by mouth 2 (two) times a day as needed for constipation Hold for loose stools  , Disp: 60 capsule, Rfl: 0    ipratropium (ATROVENT) 0 03 % nasal spray, 2 sprays into each nostril every 12 (twelve) hours, Disp: 5 mL, Rfl: 3    metoprolol tartrate (LOPRESSOR) 25 mg tablet, TAKE 1 TABLET BY MOUTH TWICE A DAY, Disp: 180 tablet, Rfl: 2    Multiple Vitamin (MULTIVITAMIN) capsule, Take 1 capsule by mouth daily, Disp: , Rfl:     pantoprazole (PROTONIX) 40 mg tablet, TAKE 1 TABLET BY MOUTH EVERY DAY (Patient taking differently: 3 (three) times a week ), Disp: 90 tablet, Rfl: 1    Probiotic Product (ALIGN PO), Take 1 tablet by mouth daily  , Disp: , Rfl:     Objective:  Imaging study:  Carotid duplex 8/7/2021:   Imaging study reviewed and as described above  See full report below:  Right        Impression  PSV  EDV (cm/s)  Direction of Flow  Ratio    Dist  ICA                 79          27                      0 81    Mid  ICA                  80          32                      0 82    Prox  ICA    1 - 49%      64          13                      0 65    Dist CCA                 112          28                              Mid CCA                   98          21                      1 20    Prox CCA                  82           8                              Ext Carotid               68           0                      0 69    Prox Vert                 60          18  Antegrade                   Subclavian                57           0                                 Left         Impression  PSV  EDV (cm/s)  Direction of Flow  Ratio    Dist  ICA                 70          28                      0 62    Mid  ICA                 101          28                      0 90    Prox   ICA    70%+        233          77                      2 08    Dist CCA                 293          53                              Mid CCA                  112          27                      1 66    Prox CCA                  67          16                              Ext Carotid               55          12 0 49    Prox Vert                 52          17  Antegrade                   Subclavian               116           8                                       CONCLUSION:     Impression  RIGHT:  There is <50% stenosis noted in the internal carotid artery  Plaque is  heterogenous and irregular  Vertebral artery flow is antegrade  There is no significant subclavian artery  disease  LEFT:  There is >70% stenosis in the internal carotid artery and a moderate stenosis  in the mid common carotid artery  Plaque is heterogenous and irregular  Vertebral artery flow is antegrade  There is no significant subclavian artery  disease  JAYASHREE 6/24/2021:   Imaging study reviewed and as described above  See full report below:   Segment                Right                   Left                                            Impression  PSV (cm/s)  Impression  PSV (cm/s)    Common Femoral Artery                     149                     102    Prox Profunda                              77                     175    Prox SFA                                   84                      79    Mid SFA                                   123                     150    Dist SFA               >75%               433                      37    Proximal Pop                               42                      42    Distal Pop                                 29                      39    Tibioperoneal                              31                      44    Dist Post Tibial       Occluded            23  Stenosis            20    Dist  Ant  Tibial                          50  Stenosis            27    Dist Peroneal                              22                      63             CONCLUSION:  Impression:  RIGHT LOWER LIMB:  Diffuse atherosclerotic disease throughout the femoral and popliteal arteries  with >75% stenosis in the distal superficial femoral artery  There is tibio-peroneal disease    Ankle/Brachial index is unreliable due to non compressible vessels  Prior  unreliable  PVR/ PPG tracings are normal   Metatarsal pressure: 69 mm Hg,  Great toe pressure: 60 mm Hg, within the healing range (Prior 64 mm Hg)     LEFT LOWER LIMB:  Diffuse atherosclerotic disease throughout the femoral and popliteal arteries  with no evidence of a significant stenosis  There is evidence of tibio-peroneal  disease  Ankle/Brachial index is unreliable due to non compressible vessels  Prior  unreliable  PVR/ PPG tracings are normal   Metatarsal pressure: 119 mm Hg,  Great toe pressure: 64 mm Hg, within the healing range (Prior 60 mm Hg)     In comparison to the study of 6/22/2020, there is no significant change in the  disease process  /76 (BP Location: Left arm, Patient Position: Sitting, Cuff Size: Standard)   Pulse 76   Temp (!) 96 5 °F (35 8 °C) (Tympanic)   Ht 5' 7" (1 702 m)   Wt 78 9 kg (174 lb)   BMI 27 25 kg/m²          Physical Exam  Vitals and nursing note reviewed  Constitutional:       General: He is not in acute distress  Appearance: He is well-developed  HENT:      Head: Normocephalic and atraumatic  Eyes:      General: No scleral icterus  Conjunctiva/sclera: Conjunctivae normal       Pupils: Pupils are equal, round, and reactive to light  Neck:      Thyroid: No thyromegaly  Vascular: Carotid bruit (Bilateral) present  No JVD  Trachea: No tracheal deviation  Cardiovascular:      Rate and Rhythm: Normal rate and regular rhythm  Pulses:           Carotid pulses are 2+ on the right side with bruit and 2+ on the left side with bruit  Radial pulses are 2+ on the right side and 2+ on the left side  Dorsalis pedis pulses are detected w/ Doppler on the right side and detected w/ Doppler on the left side  Posterior tibial pulses are detected w/ Doppler on the right side and detected w/ Doppler on the left side  Heart sounds: S1 normal and S2 normal  No murmur heard  No friction rub  No gallop  No S3 sounds  Comments: Bilateral lower extremities warm, pink, motor and sensory intact and well perfused without cyanosis, pallor, rubor or tissue loss  Doppler signals:  Right:  Monophasic PT, peroneal   Biphasic AT, DP  Left:  Biphasic PT, AT, DP and peroneal   Pulmonary:      Effort: No respiratory distress  Breath sounds: Normal breath sounds  No stridor  No wheezing, rhonchi or rales  Abdominal:      General: Bowel sounds are normal  There is no distension or abdominal bruit  Palpations: Abdomen is soft  There is no mass or pulsatile mass  Tenderness: There is no abdominal tenderness  There is no rebound  Musculoskeletal:         General: Normal range of motion  Cervical back: Normal range of motion and neck supple  Right lower leg: No edema  Left lower leg: No edema  Skin:     General: Skin is warm and dry  Coloration: Skin is not pale  Findings: No erythema or lesion  Neurological:      General: No focal deficit present  Mental Status: He is alert and oriented to person, place, and time  Cranial Nerves: No dysarthria or facial asymmetry  Sensory: Sensation is intact  No sensory deficit  Motor: Motor function is intact  No weakness  Comments: Tongue midline  Smile symmetrical   Muscle strength 5/5 bilaterally in upper and lower extremities and equal   Psychiatric:         Mood and Affect: Mood normal          Thought Content:  Thought content normal

## 2021-09-16 ENCOUNTER — OFFICE VISIT (OUTPATIENT)
Dept: FAMILY MEDICINE CLINIC | Facility: OTHER | Age: 81
End: 2021-09-16
Payer: COMMERCIAL

## 2021-09-16 VITALS
TEMPERATURE: 97.8 F | SYSTOLIC BLOOD PRESSURE: 132 MMHG | HEIGHT: 67 IN | WEIGHT: 172.4 LBS | BODY MASS INDEX: 27.06 KG/M2 | HEART RATE: 77 BPM | DIASTOLIC BLOOD PRESSURE: 78 MMHG | RESPIRATION RATE: 18 BRPM | OXYGEN SATURATION: 98 %

## 2021-09-16 DIAGNOSIS — R55 SYNCOPE, UNSPECIFIED SYNCOPE TYPE: Primary | ICD-10-CM

## 2021-09-16 DIAGNOSIS — J84.10 PULMONARY FIBROSIS DETERMINED BY HIGH RESOLUTION COMPUTED TOMOGRAPHY (HCC): ICD-10-CM

## 2021-09-16 DIAGNOSIS — Z23 ENCOUNTER FOR IMMUNIZATION: ICD-10-CM

## 2021-09-16 PROCEDURE — 3075F SYST BP GE 130 - 139MM HG: CPT | Performed by: FAMILY MEDICINE

## 2021-09-16 PROCEDURE — 99213 OFFICE O/P EST LOW 20 MIN: CPT | Performed by: FAMILY MEDICINE

## 2021-09-16 PROCEDURE — 1160F RVW MEDS BY RX/DR IN RCRD: CPT | Performed by: FAMILY MEDICINE

## 2021-09-16 PROCEDURE — 3078F DIAST BP <80 MM HG: CPT | Performed by: FAMILY MEDICINE

## 2021-09-16 PROCEDURE — 1036F TOBACCO NON-USER: CPT | Performed by: FAMILY MEDICINE

## 2021-09-16 PROCEDURE — G0008 ADMIN INFLUENZA VIRUS VAC: HCPCS

## 2021-09-16 PROCEDURE — 90662 IIV NO PRSV INCREASED AG IM: CPT

## 2021-09-16 NOTE — PROGRESS NOTES
Assessment/Plan:    Syncope  · Follow up with cardiology  · Continued periodic monitoring of carotid stenosis  · Encourage increased PO hydration    Pulmonary fibrosis determined by high resolution computed tomography (HCC)  UIP pattern on high res CT chest due to SOB at last visit  · Advised pt to schedule PFTs  · Pulmonology referral for possible anti-fibrotic treatment       Diagnoses and all orders for this visit:    Syncope, unspecified syncope type    Pulmonary fibrosis determined by high resolution computed tomography [facility])  -     Ambulatory referral to Pulmonology; Future    Encounter for immunization  -     influenza vaccine, high-dose, PF 0 7 mL (FLUZONE HIGH-DOSE)          Subjective:      Patient ID: Keegan Sim is a 80 y o  male  The patient presents for follow up after he was taken to the ED for an episode of possible syncope and hypotension with a BP 60s/40s  Workup including EKG and troponins was most consistent with hypovolemia  Pt reports he had not been drinking much  He does note starting use of medical marijuana 1 week prior but states that he has continued to use this without any other events  He states that this is the first time something like this has happened and it has not happened again since discharge  He has been drinking more to stay well hydrated  An echo showed grade 1 diastolic dysfunction and mild mitral/tricuspid regurgitation but no significant abnormalities  The patient continues to report some ROBLES with mild improvement since his last visit  He has not had PFTs performed yet following his CT scan  He also endorses dribbling following urination  He has seen a urologist before for another issue but says he will contact them about this problem  Review of Systems   Constitutional: Negative for fever  Respiratory: Positive for cough and shortness of breath  Negative for wheezing  Cardiovascular: Negative for chest pain and palpitations     Gastrointestinal: Negative for constipation and diarrhea  Genitourinary: Positive for frequency  Neurological: Negative for dizziness, seizures, syncope, light-headedness and headaches  All other systems reviewed and are negative  Objective:      /78   Pulse 77   Temp 97 8 °F (36 6 °C)   Resp 18   Ht 5' 7" (1 702 m)   Wt 78 2 kg (172 lb 6 4 oz)   SpO2 98%   BMI 27 00 kg/m²          Physical Exam  Vitals reviewed  Constitutional:       General: He is not in acute distress  Appearance: Normal appearance  He is well-developed  He is not diaphoretic  HENT:      Head: Normocephalic and atraumatic  Right Ear: External ear normal       Left Ear: External ear normal       Nose: Nose normal       Mouth/Throat:      Mouth: Mucous membranes are moist       Pharynx: Oropharynx is clear  Eyes:      Extraocular Movements: Extraocular movements intact  Conjunctiva/sclera: Conjunctivae normal       Pupils: Pupils are equal, round, and reactive to light  Cardiovascular:      Rate and Rhythm: Normal rate and regular rhythm  Pulses: Normal pulses  Heart sounds: Normal heart sounds  No murmur heard  No friction rub  No gallop  Pulmonary:      Effort: Pulmonary effort is normal  No respiratory distress  Breath sounds: No stridor  No wheezing, rhonchi or rales  Comments: Coarse breath sounds at lung bases  Abdominal:      General: Abdomen is flat  Bowel sounds are normal  There is no distension  Palpations: Abdomen is soft  There is no mass  Tenderness: There is no abdominal tenderness  There is no right CVA tenderness, left CVA tenderness or guarding  Musculoskeletal:         General: Normal range of motion  Cervical back: Normal range of motion and neck supple  Right lower leg: No edema  Left lower leg: No edema  Lymphadenopathy:      Cervical: No cervical adenopathy  Skin:     General: Skin is warm and dry  Findings: No rash     Neurological: General: No focal deficit present  Mental Status: He is alert and oriented to person, place, and time     Psychiatric:         Mood and Affect: Mood normal          Behavior: Behavior normal

## 2021-09-16 NOTE — DISCHARGE SUMMARY
Called and spoke with patient to get more information. Patient is overcoming covid and is having issues with anxiety about work related to dealing with covid patients. 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 Patient is audibly stressed and struggling with work duties. States her manager is not giving her appropriate breaks and not listening to concerns about work. Patient is very overwhelmed and very anxious. States per work she is allowed off until 20th, would need doctor's note to extend. Possibly needing work restriction to not work with covid patients. Please advise. Discharge Summary - Cardiothoracic Surgery   Carlita Langford 68 y o  male MRN: [id number]  Unit/Bed#: [facility] 407-01 Encounter: [id number]    Admission Date: 1/19/2018     Discharge Date: 01/23/18    Admitting Diagnosis: Atherosclerotic heart disease of native coronary artery without angina pectoris [I25 10]    Primary Discharge Diagnosis:   Coronary artery disease  S/P coronary artery bypass grafting;    Secondary Discharge Diagnosis:   allergic rhinitis  anemia, BPH, CAD, lumbar stenosis s/p lumbar fusion, HTN, HL, GERD, PAD, former tobacco use    Attending: KO Bansal  Consulting Physician(s):   Cardiology  Medical/Critical Care      Procedures Performed:   No orders of the defined types were placed in this encounter  Hospital Course:   1/19: CABGx4  Patient tolerated procedure well and was transferred to the ICU hemodynamically stable on Frank at 50  Not bleeding  Extubated in the OR  Gave 1 L Lr and 750 Albumin  1/20: Frank running at 110; Primacor started overnight for low CO at 0 25, with good response  Hold Lasix; Albumin 250 IV now  Wean primacor off  Wean Frank as tolerated  Maintain ICU level of care  1/21: Albumin 500 mL IV for low urine output  Primacor 0 13 and Frank 40; D/C today  Creatinine 1 37  Chest tubes and pacing wires removed  D/C swan, pillai,  and arterial line  Hgb 7; check H/H tomorrow  Transferred from ICU to telemetry  1/22: Weaned off milrinone & frank, maintain TLC today, start Lopressor 12 5mg BID  Given Lasix 40mg IV x1 for low UOP w/ response, increase Lasix to 40mg IV BID  On 1LNC, wean as tolerated  Hb down to 7 0 from 8 2, start iron/vitamin C supplementation  Cr trending down to 1 23 from 1 30  Transfer to telem  1/23: Transferred to telem yesterday  Doing well with no events  Hg 6 8, asymptomatic  Continue to monitor  Start Metoprolol 12 5mg BID this morning  Deemed stable for discharge home today      Condition at Discharge:   good     Discharge Physical Exam:  HEENT/NECK:  PERRLA  No jugular venous distention  Cardiac: Regular rate and rhythm  No rubs/murmurs/gallops  Pulmonary:  Breath sounds slightly diminished at the bases bilaterally  Abdomen:  Non-tender, Non-distended  Positive bowel sounds  Incisions: Sternum is stable  Incision is clean, dry, and intact  Saphenectomy incision is clean, dry, and intact  Lower extremities: Extremities warm/dry  Radial/PT/DP pulses 2+ bilaterally  1+ edema B/L  Neuro: Alert and oriented X 3  Sensation is grossly intact  No focal deficits  Skin: Warm/Dry, without rashes or lesions  Discharge Data:    Results from last 7 days  Lab Units 01/23/18  0446 01/22/18  0437 01/21/18  0414   WBC Thousand/uL 8 13 11 03* 9 05   HEMOGLOBIN g/dL 6 8* 7 0* 7 0*   HEMATOCRIT % 20 6* 21 5* 21 1*   PLATELETS Thousands/uL 127* 101* 86*       Results from last 7 days  Lab Units 01/23/18  0446 01/22/18  0301 01/21/18  0414   SODIUM mmol/L 141 138 137   POTASSIUM mmol/L 4 1 3 6 3 4*   CHLORIDE mmol/L 106 105 105   CO2 mmol/L 28 27 26   BUN mg/dL 24 23 25   CREATININE mg/dL 1 17 1 23 1 37*   GLUCOSE RANDOM mg/dL 103 113 113   CALCIUM mg/dL 8 2* 7 7* 7 5*           Discharge instructions/Information to patient and family:   See after visit summary for information provided to patient and family  Max Blanton was educated on restrictions regarding driving and lifting, and techniques of proper incisional care  They were specifically counselled on signs and symptoms of a sternal wound infection, and advised to contact our service immediately should they develop fevers, sweats, chill, redness or drainage at the site of any incisions  Provisions for Follow-Up Care:  See after visit summary for information related to follow-up care and any pertinent home health orders        Disposition:  Home    Planned Readmission:   No    Discharge Medications:  See after visit summary for reconciled discharge medications provided to patient and family  Gosia Watkins was provided contact information and scheduled a follow up appointment with KO Faria  Additionally, they were advised to schedule follow up appointments to be seen by their primary care physician within 7-10 days, and their cardiologist within 2-3 weeks  Contact information was provided  The patient was discharged on ongoing diuretic therapy with Torsemide 20 mg, PO QD and Potassium Chloride 20 mEq, PO QD  They were advised to continue these medications for 14 days, unless otherwise directed  He has a history of chronic anemia, in addition to postop acute blood loss anemia  His Hg is stable  He has been discharged on Iron and Vit C therapy  He will have a repeat CBC in 2 weeks following his discharge  Narcotic pain medication was prescribed in the form of Percocet  Prior to prescribing, their prescription profile was reviewed on the [facility] of health prescription drug monitoring program     The patient was informed that following their postoperative surgical evaluation, they will be referred to outpatient cardiac rehabilitation  They were counseled that this program is run by specialists who will help them safely strengthen their heart and prevent more heart disease  Cardiac rehabilitation will include exercise, relaxation, stress management, and heart-healthy nutrition  Caregivers will also check to make sure their medication regimen is working  I spent 30 minutes discharging the patient  This time was spent on the day of discharge  I had direct contact with the patient on the day of discharge  Additional documentation is required if more than 30 minutes were spent on discharge       SIGNATURE: Susan Woo PA-C  DATE: January 23, 2018  TIME: 10:19 AM

## 2021-09-16 NOTE — PATIENT INSTRUCTIONS
Syncope   AMBULATORY CARE:   Syncope  is also called fainting or passing out  Syncope is a sudden, temporary loss of consciousness, followed by a fall from a standing or sitting position  Syncope ranges from not serious to a sign of a more serious condition that needs to be treated  You can control some health conditions that cause syncope  Your healthcare providers can help you create a plan to manage syncope and prevent episodes  Signs and symptoms that may occur before syncope include the following:   · Cold, clammy, and sweaty skin     · Fast breathing and a racing, pounding heartbeat     · Feeling more tired than usual     · Nausea, a warm feeling, and sweating    · A headache, or feeling lightheaded or dizzy    · Tingling sensation or numbness     · Spots in front of your eyes, blurred vision, or double vision    Seek care immediately if:   · You are bleeding because you hit your head when you fainted  · You suddenly have double vision, difficulty speaking, numbness, and cannot move your arms or legs  · You have chest pain and trouble breathing  · You vomit blood or material that looks like coffee grounds  · You see blood in your bowel movement  Contact your healthcare provider if:   · You have new or worsening symptoms  · You have another syncope episode  · You have a headache, fast heartbeat, or feel too dizzy to stand up  · You have questions or concerns about your condition or care  Treatment  depends on the cause of your syncope  To prevent syncope from happening again, you may  need any of the following:  · Medicines  may be needed to treat any medical conditions that are causing your syncope  These may include medicines to help your heart pump strongly and regularly  Your healthcare provider may also make changes to any medicines that are causing syncope  · Tilt training  involves training yourself to stand for 10 to 30 minutes each day against a wall   This helps your body decrease the effects of posture changes and reduces the number of fainting spells  Manage syncope:   · Keep a record of your syncope episodes  Include your symptoms and your activity before and after the episode  The record can help your healthcare provider find the cause of your syncope and help you manage episodes  · Sit or lie down when needed  This includes when you feel dizzy, your throat is getting tight, and your vision changes  Raise your legs above the level of your heart  · Take slow, deep breaths if you start to breathe faster with anxiety or fear  This can help decrease dizziness and the feeling that you might faint  · Check your blood pressure often  This is important if you take medicine to lower your blood pressure  Check your blood pressure when you are lying down and when you are standing  Ask how often to check during the day  Keep a record of your blood pressure numbers  Your healthcare provider may use the record to help plan your treatment  Prevent a syncope episode:   · Move slowly and let yourself get used to one position before you move to another position  This is very important when you change from a lying or sitting position to a standing position  Take some deep breaths before you stand up from a lying position  Stand up slowly  Sudden movements may cause a fainting spell  Sit on the side of the bed or couch for a few minutes before you stand up  If you are on bedrest, try to be upright for about 2 hours each day, or as directed  Do not lock your legs if you are standing for a long period of time  Move your legs and bend your knees to keep blood flowing  · Follow your healthcare provider's recommendations  Your provider may  recommend that you drink more liquids to prevent dehydration  You may also need to have more salt to keep your blood pressure from dropping too low and causing syncope   Your provider will tell you how much liquid and sodium to have each day  He or she will also tell you how much physical activity is safe for you  This will depend on what is causing your syncope  · Watch for signs of low blood sugar  These include hunger, nervousness, sweating, and fast or fluttery heartbeats  Talk with your healthcare provider about ways to keep your blood sugar level steady  · Do not strain if you are constipated  You may faint if you strain to have a bowel movement  Walking is the best way to get your bowels moving  Eat foods high in fiber to make it easier to have a bowel movement  Good examples are high-fiber cereals, beans, vegetables, and whole-grain breads  Prune juice may help make bowel movements softer  · Be careful in hot weather  Heat can cause a syncope episode  Limit activity done outside on hot days  Physical activity in hot weather can lead to dehydration  This can cause an episode  Follow up with your healthcare provider as directed:  Write down your questions so you remember to ask them during your visits  © Copyright [employer] 2021 Information is for End User's use only and may not be sold, redistributed or otherwise used for commercial purposes  All illustrations and images included in CareNotes® are the copyrighted property of [employer] A [employer] Inc  or [facility] [place]  The above information is an  only  It is not intended as medical advice for individual conditions or treatments  Talk to your doctor, nurse or pharmacist before following any medical regimen to see if it is safe and effective for you  Pulmonary Fibrosis   AMBULATORY CARE:   Pulmonary fibrosis  is the scarring of your lung tissues over time  It is also called interstitial lung disease  The air sacs and tissues in your lungs swell, scars form, and the tissues become thick and stiff  This affects how much oxygen you get and makes it hard to breathe       Common symptoms include the following:   · Trouble breathing that gets worse over time    · Dry cough    · Fast heartbeat    · Clubbing of fingertips and nails (become large, blunt, and rounded)    · Tiredness and weakness    Call your local emergency number (911 in the [address]) if:   · You have sudden severe trouble breathing  · You have a fast heartbeat or chest pain  · Your shortness of breath gets worse  · You feel so dizzy that you cannot stand up  Call your doctor or pulmonologist if:   · You have a fever  · You cough up bloody mucus  · You are wheezing  · Your lips or fingernails turn blue  · You have swelling in your legs or feet  · You need more oxygen than you used to  · You have questions or concerns about your condition or care  Medicines:   · Medicines  may be given to decrease lung tissue swelling and help prevent scarring  · Take your medicine as directed  Contact your healthcare provider if you think your medicine is not helping or if you have side effects  Tell him of her if you are allergic to any medicine  Keep a list of the medicines, vitamins, and herbs you take  Include the amounts, and when and why you take them  Bring the list or the pill bottles to follow-up visits  Carry your medicine list with you in case of an emergency  Manage pulmonary fibrosis:   · Use oxygen at home as directed  Oxygen is usually given through a nasal cannula  This is a pair of short, thin tubes that rest just inside your nose  Tell your healthcare provider if your nose gets dry or if the skin gets red or sore  Never smoke or let anyone else smoke in the same room while your oxygen is on  · Do breathing exercises as directed  Breathe out with pursed or puckered lips  Use your diaphragm to breathe  Put one hand on your abdomen and breathe in, causing your hand to move outward or upward  This helps make more room so your lungs can take in more air  · Go to pulmonary rehab as directed    Specialists will help you safely strengthen your lungs and prevent more tissue damage  The plan includes education about your condition, exercise, breathing strategies, and ways to conserve energy  Pulmonary rehab may help decrease your breathing problems and help you function better in your daily activities  · Get the flu vaccine  The flu can become serious in anyone who has a lung condition  Get the flu vaccine as soon as recommended each year, usually in September or October  You may also need the pneumococcal vaccine to prevent pneumonia  · Do not smoke  Nicotine and other chemicals in cigarettes and cigars can increase the damage to your lung tissue  Ask your healthcare provider for information if you currently smoke and need help to quit  E-cigarettes or smokeless tobacco still contain nicotine  Talk to your healthcare provider before you use these products  Follow up with your doctor or pulmonologist as directed: You may need to return for more CT scans or pulmonary function tests to monitor your condition  Write down your questions so you remember to ask them during your visits  © [employer] 2021 Information is for End User's use only and may not be sold, redistributed or otherwise used for commercial purposes  All illustrations and images included in CareNotes® are the copyrighted property of [employer]  or [address] [place], [place] [address]   The above information is an  only  It is not intended as medical advice for individual conditions or treatments  Talk to your doctor, nurse or pharmacist before following any medical regimen to see if it is safe and effective for you

## 2021-09-18 DIAGNOSIS — K21.9 GASTROESOPHAGEAL REFLUX DISEASE WITHOUT ESOPHAGITIS: ICD-10-CM

## 2021-09-18 PROBLEM — J84.10 PULMONARY FIBROSIS DETERMINED BY HIGH RESOLUTION COMPUTED TOMOGRAPHY (HCC): Status: ACTIVE | Noted: 2021-09-18

## 2021-09-18 NOTE — ASSESSMENT & PLAN NOTE
UIP pattern on high res CT chest due to SOB at last visit    · Advised pt to schedule PFTs  · Pulmonology referral for possible anti-fibrotic treatment

## 2021-09-18 NOTE — ASSESSMENT & PLAN NOTE
· Follow up with cardiology  · Continued periodic monitoring of carotid stenosis  · Encourage increased PO hydration

## 2021-09-19 RX ORDER — PANTOPRAZOLE SODIUM 40 MG/1
TABLET, DELAYED RELEASE ORAL
Qty: 90 TABLET | Refills: 1 | Status: SHIPPED | OUTPATIENT
Start: 2021-09-19 | End: 2022-01-11

## 2021-10-11 ENCOUNTER — OFFICE VISIT (OUTPATIENT)
Dept: CARDIOLOGY CLINIC | Facility: CLINIC | Age: 81
End: 2021-10-11
Payer: COMMERCIAL

## 2021-10-11 VITALS
HEART RATE: 68 BPM | DIASTOLIC BLOOD PRESSURE: 64 MMHG | BODY MASS INDEX: 26.74 KG/M2 | SYSTOLIC BLOOD PRESSURE: 134 MMHG | WEIGHT: 170.4 LBS | HEIGHT: 67 IN | OXYGEN SATURATION: 97 %

## 2021-10-11 DIAGNOSIS — E78.2 MIXED HYPERLIPIDEMIA: Primary | ICD-10-CM

## 2021-10-11 DIAGNOSIS — R06.02 SOB (SHORTNESS OF BREATH): ICD-10-CM

## 2021-10-11 DIAGNOSIS — I65.23 CAROTID STENOSIS, ASYMPTOMATIC, BILATERAL: ICD-10-CM

## 2021-10-11 DIAGNOSIS — I25.10 CORONARY ARTERY DISEASE INVOLVING NATIVE CORONARY ARTERY OF NATIVE HEART WITHOUT ANGINA PECTORIS: ICD-10-CM

## 2021-10-11 DIAGNOSIS — Z95.1 S/P CABG X 4: ICD-10-CM

## 2021-10-11 DIAGNOSIS — R55 SYNCOPE: ICD-10-CM

## 2021-10-11 PROCEDURE — 99214 OFFICE O/P EST MOD 30 MIN: CPT | Performed by: INTERNAL MEDICINE

## 2021-10-11 PROCEDURE — 1036F TOBACCO NON-USER: CPT | Performed by: INTERNAL MEDICINE

## 2021-10-11 PROCEDURE — 1160F RVW MEDS BY RX/DR IN RCRD: CPT | Performed by: INTERNAL MEDICINE

## 2021-10-13 ENCOUNTER — HOSPITAL ENCOUNTER (OUTPATIENT)
Dept: PULMONOLOGY | Facility: HOSPITAL | Age: 81
Discharge: HOME | End: 2021-10-13
Payer: COMMERCIAL

## 2021-10-13 DIAGNOSIS — R06.02 SOB (SHORTNESS OF BREATH): ICD-10-CM

## 2021-10-13 PROCEDURE — 94060 EVALUATION OF WHEEZING: CPT | Performed by: INTERNAL MEDICINE

## 2021-10-13 PROCEDURE — 94729 DIFFUSING CAPACITY: CPT

## 2021-10-13 PROCEDURE — 94060 EVALUATION OF WHEEZING: CPT

## 2021-10-13 PROCEDURE — 94726 PLETHYSMOGRAPHY LUNG VOLUMES: CPT | Performed by: INTERNAL MEDICINE

## 2021-10-13 PROCEDURE — 94726 PLETHYSMOGRAPHY LUNG VOLUMES: CPT

## 2021-10-13 PROCEDURE — 94760 N-INVAS EAR/PLS OXIMETRY 1: CPT

## 2021-10-13 RX ORDER — ALBUTEROL SULFATE 2.5 MG/3ML
2.5 SOLUTION RESPIRATORY (INHALATION) ONCE
Status: COMPLETED | OUTPATIENT
Start: 2021-10-13 | End: 2021-10-13

## 2021-10-13 RX ADMIN — ALBUTEROL SULFATE 2.5 MG: 2.5 SOLUTION RESPIRATORY (INHALATION) at 08:33

## 2021-10-16 ENCOUNTER — APPOINTMENT (EMERGENCY)
Dept: RADIOLOGY | Facility: HOSPITAL | Age: 81
DRG: 641 | End: 2021-10-16
Payer: COMMERCIAL

## 2021-10-16 ENCOUNTER — HOSPITAL ENCOUNTER (INPATIENT)
Facility: HOSPITAL | Age: 81
LOS: 1 days | Discharge: HOME | DRG: 641 | End: 2021-10-18
Attending: EMERGENCY MEDICINE | Admitting: FAMILY MEDICINE
Payer: COMMERCIAL

## 2021-10-16 ENCOUNTER — APPOINTMENT (EMERGENCY)
Dept: CT IMAGING | Facility: HOSPITAL | Age: 81
DRG: 641 | End: 2021-10-16
Payer: COMMERCIAL

## 2021-10-16 DIAGNOSIS — R07.9 CHEST PAIN: ICD-10-CM

## 2021-10-16 DIAGNOSIS — R26.2 AMBULATORY DYSFUNCTION: ICD-10-CM

## 2021-10-16 DIAGNOSIS — R55 NEAR SYNCOPE: Primary | ICD-10-CM

## 2021-10-16 LAB
ALBUMIN SERPL BCP-MCNC: 3.7 G/DL (ref 3.5–5)
ALP SERPL-CCNC: 111 U/L (ref 46–116)
ALT SERPL W P-5'-P-CCNC: 29 U/L (ref 12–78)
ANION GAP SERPL CALCULATED.3IONS-SCNC: 12 MMOL/L (ref 4–13)
AST SERPL W P-5'-P-CCNC: 33 U/L (ref 5–45)
BACTERIA UR QL AUTO: ABNORMAL /HPF
BASOPHILS # BLD AUTO: 0.03 THOUSANDS/ΜL (ref 0–0.1)
BASOPHILS NFR BLD AUTO: 0 % (ref 0–1)
BILIRUB SERPL-MCNC: 0.58 MG/DL (ref 0.2–1)
BILIRUB UR QL STRIP: NEGATIVE
BUN SERPL-MCNC: 21 MG/DL (ref 5–25)
CALCIUM SERPL-MCNC: 8.4 MG/DL (ref 8.3–10.1)
CHLORIDE SERPL-SCNC: 101 MMOL/L (ref 100–108)
CLARITY UR: CLEAR
CO2 SERPL-SCNC: 28 MMOL/L (ref 21–32)
COLOR UR: YELLOW
CREAT SERPL-MCNC: 1.31 MG/DL (ref 0.6–1.3)
D DIMER PPP FEU-MCNC: 0.84 UG/ML FEU
EOSINOPHIL # BLD AUTO: 0.09 THOUSAND/ΜL (ref 0–0.61)
EOSINOPHIL NFR BLD AUTO: 1 % (ref 0–6)
ERYTHROCYTE [DISTWIDTH] IN BLOOD BY AUTOMATED COUNT: 14.5 % (ref 11.6–15.1)
GFR SERPL CREATININE-BSD FRML MDRD: 51 ML/MIN/1.73SQ M
GLUCOSE SERPL-MCNC: 139 MG/DL (ref 65–140)
GLUCOSE UR STRIP-MCNC: NEGATIVE MG/DL
HCT VFR BLD AUTO: 36.6 % (ref 36.5–49.3)
HGB BLD-MCNC: 11.9 G/DL (ref 12–17)
HGB UR QL STRIP.AUTO: NEGATIVE
IMM GRANULOCYTES # BLD AUTO: 0.05 THOUSAND/UL (ref 0–0.2)
IMM GRANULOCYTES NFR BLD AUTO: 1 % (ref 0–2)
KETONES UR STRIP-MCNC: NEGATIVE MG/DL
LEUKOCYTE ESTERASE UR QL STRIP: ABNORMAL
LYMPHOCYTES # BLD AUTO: 1.2 THOUSANDS/ΜL (ref 0.6–4.47)
LYMPHOCYTES NFR BLD AUTO: 14 % (ref 14–44)
MCH RBC QN AUTO: 33.5 PG (ref 26.8–34.3)
MCHC RBC AUTO-ENTMCNC: 32.5 G/DL (ref 31.4–37.4)
MCV RBC AUTO: 103 FL (ref 82–98)
MONOCYTES # BLD AUTO: 0.44 THOUSAND/ΜL (ref 0.17–1.22)
MONOCYTES NFR BLD AUTO: 5 % (ref 4–12)
NEUTROPHILS # BLD AUTO: 7.05 THOUSANDS/ΜL (ref 1.85–7.62)
NEUTS SEG NFR BLD AUTO: 79 % (ref 43–75)
NITRITE UR QL STRIP: NEGATIVE
NON-SQ EPI CELLS URNS QL MICRO: ABNORMAL /HPF
NRBC BLD AUTO-RTO: 0 /100 WBCS
NT-PROBNP SERPL-MCNC: 516 PG/ML
PH UR STRIP.AUTO: 6 [PH] (ref 4.5–8)
PLATELET # BLD AUTO: 170 THOUSANDS/UL (ref 149–390)
PMV BLD AUTO: 10.5 FL (ref 8.9–12.7)
POTASSIUM SERPL-SCNC: 3.9 MMOL/L (ref 3.5–5.3)
PROT SERPL-MCNC: 8.3 G/DL (ref 6.4–8.2)
PROT UR STRIP-MCNC: NEGATIVE MG/DL
RBC # BLD AUTO: 3.55 MILLION/UL (ref 3.88–5.62)
RBC #/AREA URNS AUTO: ABNORMAL /HPF
SODIUM SERPL-SCNC: 141 MMOL/L (ref 136–145)
SP GR UR STRIP.AUTO: 1.01 (ref 1–1.03)
TROPONIN I SERPL-MCNC: <0.02 NG/ML
UROBILINOGEN UR QL STRIP.AUTO: 0.2 E.U./DL
WBC # BLD AUTO: 8.86 THOUSAND/UL (ref 4.31–10.16)
WBC #/AREA URNS AUTO: ABNORMAL /HPF

## 2021-10-16 PROCEDURE — 80053 COMPREHEN METABOLIC PANEL: CPT | Performed by: EMERGENCY MEDICINE

## 2021-10-16 PROCEDURE — 99285 EMERGENCY DEPT VISIT HI MDM: CPT

## 2021-10-16 PROCEDURE — G1004 CDSM NDSC: HCPCS

## 2021-10-16 PROCEDURE — 83880 ASSAY OF NATRIURETIC PEPTIDE: CPT | Performed by: PHYSICIAN ASSISTANT

## 2021-10-16 PROCEDURE — 71045 X-RAY EXAM CHEST 1 VIEW: CPT

## 2021-10-16 PROCEDURE — 85025 COMPLETE CBC W/AUTO DIFF WBC: CPT | Performed by: EMERGENCY MEDICINE

## 2021-10-16 PROCEDURE — 70450 CT HEAD/BRAIN W/O DYE: CPT

## 2021-10-16 PROCEDURE — 93005 ELECTROCARDIOGRAM TRACING: CPT

## 2021-10-16 PROCEDURE — 96361 HYDRATE IV INFUSION ADD-ON: CPT

## 2021-10-16 PROCEDURE — 36415 COLL VENOUS BLD VENIPUNCTURE: CPT

## 2021-10-16 PROCEDURE — 99285 EMERGENCY DEPT VISIT HI MDM: CPT | Performed by: PHYSICIAN ASSISTANT

## 2021-10-16 PROCEDURE — 71275 CT ANGIOGRAPHY CHEST: CPT

## 2021-10-16 PROCEDURE — 81001 URINALYSIS AUTO W/SCOPE: CPT

## 2021-10-16 PROCEDURE — 96360 HYDRATION IV INFUSION INIT: CPT

## 2021-10-16 PROCEDURE — 85379 FIBRIN DEGRADATION QUANT: CPT | Performed by: PHYSICIAN ASSISTANT

## 2021-10-16 PROCEDURE — 87086 URINE CULTURE/COLONY COUNT: CPT

## 2021-10-16 PROCEDURE — 84484 ASSAY OF TROPONIN QUANT: CPT | Performed by: EMERGENCY MEDICINE

## 2021-10-16 RX ORDER — SODIUM CHLORIDE 9 MG/ML
75 INJECTION, SOLUTION INTRAVENOUS CONTINUOUS
Status: DISCONTINUED | OUTPATIENT
Start: 2021-10-16 | End: 2021-10-18 | Stop reason: HOSPADM

## 2021-10-16 RX ADMIN — IOHEXOL 85 ML: 350 INJECTION, SOLUTION INTRAVENOUS at 20:55

## 2021-10-16 RX ADMIN — SODIUM CHLORIDE 125 ML/HR: 0.9 INJECTION, SOLUTION INTRAVENOUS at 21:11

## 2021-10-16 RX ADMIN — SODIUM CHLORIDE 1000 ML: 0.9 INJECTION, SOLUTION INTRAVENOUS at 19:28

## 2021-10-17 LAB
ANION GAP SERPL CALCULATED.3IONS-SCNC: 10 MMOL/L (ref 4–13)
ATRIAL RATE: 86 BPM
BUN SERPL-MCNC: 17 MG/DL (ref 5–25)
CALCIUM SERPL-MCNC: 8.4 MG/DL (ref 8.3–10.1)
CHLORIDE SERPL-SCNC: 107 MMOL/L (ref 100–108)
CO2 SERPL-SCNC: 25 MMOL/L (ref 21–32)
CREAT SERPL-MCNC: 0.98 MG/DL (ref 0.6–1.3)
ERYTHROCYTE [DISTWIDTH] IN BLOOD BY AUTOMATED COUNT: 14.5 % (ref 11.6–15.1)
GFR SERPL CREATININE-BSD FRML MDRD: 72 ML/MIN/1.73SQ M
GLUCOSE P FAST SERPL-MCNC: 84 MG/DL (ref 65–99)
GLUCOSE SERPL-MCNC: 84 MG/DL (ref 65–140)
HCT VFR BLD AUTO: 35.2 % (ref 36.5–49.3)
HGB BLD-MCNC: 11.5 G/DL (ref 12–17)
MAGNESIUM SERPL-MCNC: 1.9 MG/DL (ref 1.6–2.6)
MCH RBC QN AUTO: 33.1 PG (ref 26.8–34.3)
MCHC RBC AUTO-ENTMCNC: 32.7 G/DL (ref 31.4–37.4)
MCV RBC AUTO: 101 FL (ref 82–98)
P AXIS: 72 DEGREES
PLATELET # BLD AUTO: 161 THOUSANDS/UL (ref 149–390)
PMV BLD AUTO: 11.4 FL (ref 8.9–12.7)
POTASSIUM SERPL-SCNC: 3.8 MMOL/L (ref 3.5–5.3)
PR INTERVAL: 160 MS
QRS AXIS: 19 DEGREES
QRSD INTERVAL: 88 MS
QT INTERVAL: 340 MS
QTC INTERVAL: 402 MS
RBC # BLD AUTO: 3.47 MILLION/UL (ref 3.88–5.62)
SODIUM SERPL-SCNC: 142 MMOL/L (ref 136–145)
T WAVE AXIS: 99 DEGREES
TROPONIN I SERPL-MCNC: <0.02 NG/ML
TSH SERPL DL<=0.05 MIU/L-ACNC: 1.5 UIU/ML (ref 0.36–3.74)
VENTRICULAR RATE: 84 BPM
WBC # BLD AUTO: 8.05 THOUSAND/UL (ref 4.31–10.16)

## 2021-10-17 PROCEDURE — 99221 1ST HOSP IP/OBS SF/LOW 40: CPT | Performed by: FAMILY MEDICINE

## 2021-10-17 PROCEDURE — 80048 BASIC METABOLIC PNL TOTAL CA: CPT | Performed by: INTERNAL MEDICINE

## 2021-10-17 PROCEDURE — 83735 ASSAY OF MAGNESIUM: CPT | Performed by: INTERNAL MEDICINE

## 2021-10-17 PROCEDURE — 36415 COLL VENOUS BLD VENIPUNCTURE: CPT | Performed by: INTERNAL MEDICINE

## 2021-10-17 PROCEDURE — 84484 ASSAY OF TROPONIN QUANT: CPT | Performed by: INTERNAL MEDICINE

## 2021-10-17 PROCEDURE — 99222 1ST HOSP IP/OBS MODERATE 55: CPT | Performed by: SURGERY

## 2021-10-17 PROCEDURE — 97163 PT EVAL HIGH COMPLEX 45 MIN: CPT

## 2021-10-17 PROCEDURE — NC001 PR NO CHARGE: Performed by: FAMILY MEDICINE

## 2021-10-17 PROCEDURE — 84443 ASSAY THYROID STIM HORMONE: CPT | Performed by: INTERNAL MEDICINE

## 2021-10-17 PROCEDURE — 85027 COMPLETE CBC AUTOMATED: CPT | Performed by: INTERNAL MEDICINE

## 2021-10-17 PROCEDURE — 93010 ELECTROCARDIOGRAM REPORT: CPT | Performed by: INTERNAL MEDICINE

## 2021-10-17 RX ORDER — HYDRALAZINE HYDROCHLORIDE 20 MG/ML
5 INJECTION INTRAMUSCULAR; INTRAVENOUS EVERY 6 HOURS PRN
Status: DISCONTINUED | OUTPATIENT
Start: 2021-10-17 | End: 2021-10-18 | Stop reason: HOSPADM

## 2021-10-17 RX ORDER — ASCORBIC ACID 500 MG
1000 TABLET ORAL DAILY
Status: DISCONTINUED | OUTPATIENT
Start: 2021-10-17 | End: 2021-10-18 | Stop reason: HOSPADM

## 2021-10-17 RX ORDER — ASPIRIN 81 MG/1
81 TABLET ORAL DAILY
Status: DISCONTINUED | OUTPATIENT
Start: 2021-10-17 | End: 2021-10-18 | Stop reason: HOSPADM

## 2021-10-17 RX ORDER — HYDROMORPHONE HCL/PF 1 MG/ML
0.2 SYRINGE (ML) INJECTION EVERY 6 HOURS PRN
Status: DISCONTINUED | OUTPATIENT
Start: 2021-10-17 | End: 2021-10-18 | Stop reason: HOSPADM

## 2021-10-17 RX ORDER — PANTOPRAZOLE SODIUM 40 MG/1
40 TABLET, DELAYED RELEASE ORAL DAILY
Status: DISCONTINUED | OUTPATIENT
Start: 2021-10-17 | End: 2021-10-18 | Stop reason: HOSPADM

## 2021-10-17 RX ORDER — ATORVASTATIN CALCIUM 40 MG/1
80 TABLET, FILM COATED ORAL EVERY MORNING
Status: DISCONTINUED | OUTPATIENT
Start: 2021-10-17 | End: 2021-10-18 | Stop reason: HOSPADM

## 2021-10-17 RX ORDER — OXYCODONE HYDROCHLORIDE 5 MG/1
5 TABLET ORAL EVERY 6 HOURS PRN
Status: DISCONTINUED | OUTPATIENT
Start: 2021-10-17 | End: 2021-10-18 | Stop reason: HOSPADM

## 2021-10-17 RX ORDER — IPRATROPIUM BROMIDE 21 UG/1
2 SPRAY, METERED NASAL EVERY 12 HOURS
Status: DISCONTINUED | OUTPATIENT
Start: 2021-10-17 | End: 2021-10-18 | Stop reason: HOSPADM

## 2021-10-17 RX ORDER — MELATONIN
1000 DAILY
Status: DISCONTINUED | OUTPATIENT
Start: 2021-10-17 | End: 2021-10-18 | Stop reason: HOSPADM

## 2021-10-17 RX ORDER — OXYCODONE HYDROCHLORIDE 5 MG/1
2.5 TABLET ORAL EVERY 6 HOURS PRN
Status: DISCONTINUED | OUTPATIENT
Start: 2021-10-17 | End: 2021-10-18 | Stop reason: HOSPADM

## 2021-10-17 RX ORDER — ACETAMINOPHEN 160 MG/5ML
650 SUSPENSION, ORAL (FINAL DOSE FORM) ORAL EVERY 6 HOURS PRN
Status: DISCONTINUED | OUTPATIENT
Start: 2021-10-17 | End: 2021-10-18 | Stop reason: HOSPADM

## 2021-10-17 RX ADMIN — PANTOPRAZOLE SODIUM 40 MG: 40 TABLET, DELAYED RELEASE ORAL at 10:48

## 2021-10-17 RX ADMIN — METOPROLOL TARTRATE 25 MG: 25 TABLET, FILM COATED ORAL at 20:29

## 2021-10-17 RX ADMIN — METOPROLOL TARTRATE 25 MG: 25 TABLET, FILM COATED ORAL at 10:48

## 2021-10-17 RX ADMIN — HYDRALAZINE HYDROCHLORIDE 5 MG: 20 INJECTION, SOLUTION INTRAMUSCULAR; INTRAVENOUS at 22:37

## 2021-10-17 RX ADMIN — DESMOPRESSIN ACETATE 80 MG: 0.2 TABLET ORAL at 10:48

## 2021-10-17 RX ADMIN — CYANOCOBALAMIN TAB 500 MCG 1000 MCG: 500 TAB at 10:47

## 2021-10-17 RX ADMIN — ENOXAPARIN SODIUM 40 MG: 40 INJECTION SUBCUTANEOUS at 10:47

## 2021-10-17 RX ADMIN — ASPIRIN 81 MG: 81 TABLET ORAL at 10:47

## 2021-10-17 RX ADMIN — Medication 1000 UNITS: at 10:48

## 2021-10-17 RX ADMIN — OXYCODONE HYDROCHLORIDE AND ACETAMINOPHEN 1000 MG: 500 TABLET ORAL at 10:46

## 2021-10-18 ENCOUNTER — TELEPHONE (OUTPATIENT)
Dept: VASCULAR SURGERY | Facility: CLINIC | Age: 81
End: 2021-10-18

## 2021-10-18 ENCOUNTER — HOSPITAL ENCOUNTER (INPATIENT)
Dept: VASCULAR ULTRASOUND | Facility: HOSPITAL | Age: 81
Discharge: HOME | DRG: 641 | End: 2021-10-18
Payer: COMMERCIAL

## 2021-10-18 VITALS
TEMPERATURE: 97.9 F | OXYGEN SATURATION: 97 % | RESPIRATION RATE: 18 BRPM | DIASTOLIC BLOOD PRESSURE: 76 MMHG | HEART RATE: 84 BPM | SYSTOLIC BLOOD PRESSURE: 140 MMHG

## 2021-10-18 LAB
ALBUMIN SERPL BCP-MCNC: 3.3 G/DL (ref 3.5–5)
ALP SERPL-CCNC: 102 U/L (ref 46–116)
ALT SERPL W P-5'-P-CCNC: 24 U/L (ref 12–78)
ANION GAP SERPL CALCULATED.3IONS-SCNC: 14 MMOL/L (ref 4–13)
AST SERPL W P-5'-P-CCNC: 28 U/L (ref 5–45)
BACTERIA UR CULT: NORMAL
BASOPHILS # BLD AUTO: 0.04 THOUSANDS/ΜL (ref 0–0.1)
BASOPHILS NFR BLD AUTO: 1 % (ref 0–1)
BILIRUB SERPL-MCNC: 0.57 MG/DL (ref 0.2–1)
BUN SERPL-MCNC: 15 MG/DL (ref 5–25)
CALCIUM ALBUM COR SERPL-MCNC: 8.6 MG/DL (ref 8.3–10.1)
CALCIUM SERPL-MCNC: 8 MG/DL (ref 8.3–10.1)
CHLORIDE SERPL-SCNC: 106 MMOL/L (ref 100–108)
CO2 SERPL-SCNC: 26 MMOL/L (ref 21–32)
CREAT SERPL-MCNC: 1.04 MG/DL (ref 0.6–1.3)
EOSINOPHIL # BLD AUTO: 0.22 THOUSAND/ΜL (ref 0–0.61)
EOSINOPHIL NFR BLD AUTO: 3 % (ref 0–6)
ERYTHROCYTE [DISTWIDTH] IN BLOOD BY AUTOMATED COUNT: 14.6 % (ref 11.6–15.1)
GFR SERPL CREATININE-BSD FRML MDRD: 67 ML/MIN/1.73SQ M
GLUCOSE SERPL-MCNC: 97 MG/DL (ref 65–140)
HCT VFR BLD AUTO: 35.8 % (ref 36.5–49.3)
HGB BLD-MCNC: 11.7 G/DL (ref 12–17)
IMM GRANULOCYTES # BLD AUTO: 0.04 THOUSAND/UL (ref 0–0.2)
IMM GRANULOCYTES NFR BLD AUTO: 1 % (ref 0–2)
LYMPHOCYTES # BLD AUTO: 1.54 THOUSANDS/ΜL (ref 0.6–4.47)
LYMPHOCYTES NFR BLD AUTO: 19 % (ref 14–44)
MCH RBC QN AUTO: 33.5 PG (ref 26.8–34.3)
MCHC RBC AUTO-ENTMCNC: 32.7 G/DL (ref 31.4–37.4)
MCV RBC AUTO: 103 FL (ref 82–98)
MONOCYTES # BLD AUTO: 0.57 THOUSAND/ΜL (ref 0.17–1.22)
MONOCYTES NFR BLD AUTO: 7 % (ref 4–12)
NEUTROPHILS # BLD AUTO: 5.76 THOUSANDS/ΜL (ref 1.85–7.62)
NEUTS SEG NFR BLD AUTO: 69 % (ref 43–75)
NRBC BLD AUTO-RTO: 0 /100 WBCS
PLATELET # BLD AUTO: 166 THOUSANDS/UL (ref 149–390)
PMV BLD AUTO: 10.4 FL (ref 8.9–12.7)
POTASSIUM SERPL-SCNC: 3.4 MMOL/L (ref 3.5–5.3)
PROT SERPL-MCNC: 7.6 G/DL (ref 6.4–8.2)
RBC # BLD AUTO: 3.49 MILLION/UL (ref 3.88–5.62)
SODIUM SERPL-SCNC: 146 MMOL/L (ref 136–145)
WBC # BLD AUTO: 8.17 THOUSAND/UL (ref 4.31–10.16)

## 2021-10-18 PROCEDURE — 93880 EXTRACRANIAL BILAT STUDY: CPT

## 2021-10-18 PROCEDURE — 99238 HOSP IP/OBS DSCHRG MGMT 30/<: CPT | Performed by: FAMILY MEDICINE

## 2021-10-18 PROCEDURE — 93880 EXTRACRANIAL BILAT STUDY: CPT | Performed by: SURGERY

## 2021-10-18 PROCEDURE — 80053 COMPREHEN METABOLIC PANEL: CPT | Performed by: CHIROPRACTOR

## 2021-10-18 PROCEDURE — 85025 COMPLETE CBC W/AUTO DIFF WBC: CPT | Performed by: CHIROPRACTOR

## 2021-10-18 RX ORDER — POTASSIUM CHLORIDE 20 MEQ/1
40 TABLET, EXTENDED RELEASE ORAL ONCE
Status: COMPLETED | OUTPATIENT
Start: 2021-10-18 | End: 2021-10-18

## 2021-10-18 RX ADMIN — ASPIRIN 81 MG: 81 TABLET ORAL at 09:28

## 2021-10-18 RX ADMIN — PANTOPRAZOLE SODIUM 40 MG: 40 TABLET, DELAYED RELEASE ORAL at 09:28

## 2021-10-18 RX ADMIN — METOPROLOL TARTRATE 25 MG: 25 TABLET, FILM COATED ORAL at 09:28

## 2021-10-18 RX ADMIN — DESMOPRESSIN ACETATE 80 MG: 0.2 TABLET ORAL at 09:28

## 2021-10-18 RX ADMIN — OXYCODONE HYDROCHLORIDE AND ACETAMINOPHEN 1000 MG: 500 TABLET ORAL at 09:28

## 2021-10-18 RX ADMIN — SODIUM CHLORIDE 75 ML/HR: 0.9 INJECTION, SOLUTION INTRAVENOUS at 14:31

## 2021-10-18 RX ADMIN — Medication 1000 UNITS: at 09:28

## 2021-10-18 RX ADMIN — ENOXAPARIN SODIUM 40 MG: 40 INJECTION SUBCUTANEOUS at 09:28

## 2021-10-18 RX ADMIN — POTASSIUM CHLORIDE 40 MEQ: 1500 TABLET, EXTENDED RELEASE ORAL at 09:28

## 2021-10-18 RX ADMIN — CYANOCOBALAMIN TAB 500 MCG 1000 MCG: 500 TAB at 09:28

## 2021-10-20 ENCOUNTER — TRANSITIONAL CARE MANAGEMENT (OUTPATIENT)
Dept: FAMILY MEDICINE CLINIC | Facility: OTHER | Age: 81
End: 2021-10-20

## 2021-10-29 ENCOUNTER — OFFICE VISIT (OUTPATIENT)
Dept: FAMILY MEDICINE CLINIC | Facility: OTHER | Age: 81
End: 2021-10-29
Payer: COMMERCIAL

## 2021-10-29 VITALS
WEIGHT: 169.8 LBS | OXYGEN SATURATION: 98 % | HEIGHT: 67 IN | SYSTOLIC BLOOD PRESSURE: 114 MMHG | BODY MASS INDEX: 26.65 KG/M2 | DIASTOLIC BLOOD PRESSURE: 78 MMHG | HEART RATE: 90 BPM | RESPIRATION RATE: 18 BRPM | TEMPERATURE: 97.8 F

## 2021-10-29 DIAGNOSIS — R55 NEAR SYNCOPE: Primary | ICD-10-CM

## 2021-10-29 DIAGNOSIS — G62.9 NEUROPATHY: ICD-10-CM

## 2021-10-29 DIAGNOSIS — K21.9 GASTROESOPHAGEAL REFLUX DISEASE, UNSPECIFIED WHETHER ESOPHAGITIS PRESENT: ICD-10-CM

## 2021-10-29 PROCEDURE — 99495 TRANSJ CARE MGMT MOD F2F 14D: CPT | Performed by: FAMILY MEDICINE

## 2021-10-29 RX ORDER — GABAPENTIN 100 MG/1
100 CAPSULE ORAL
Qty: 30 CAPSULE | Refills: 1 | Status: SHIPPED | OUTPATIENT
Start: 2021-10-29 | End: 2021-12-30

## 2021-11-23 ENCOUNTER — CONSULT (OUTPATIENT)
Dept: PULMONOLOGY | Facility: CLINIC | Age: 81
End: 2021-11-23
Payer: COMMERCIAL

## 2021-11-23 ENCOUNTER — APPOINTMENT (OUTPATIENT)
Dept: LAB | Facility: CLINIC | Age: 81
End: 2021-11-23
Payer: COMMERCIAL

## 2021-11-23 VITALS
TEMPERATURE: 96.3 F | SYSTOLIC BLOOD PRESSURE: 142 MMHG | HEIGHT: 68 IN | OXYGEN SATURATION: 98 % | BODY MASS INDEX: 25.76 KG/M2 | WEIGHT: 170 LBS | HEART RATE: 85 BPM | DIASTOLIC BLOOD PRESSURE: 92 MMHG

## 2021-11-23 DIAGNOSIS — J84.10 PULMONARY FIBROSIS DETERMINED BY HIGH RESOLUTION COMPUTED TOMOGRAPHY (HCC): ICD-10-CM

## 2021-11-23 PROBLEM — N18.31 STAGE 3A CHRONIC KIDNEY DISEASE (HCC): Status: RESOLVED | Noted: 2021-07-11 | Resolved: 2021-11-23

## 2021-11-23 PROCEDURE — 86225 DNA ANTIBODY NATIVE: CPT

## 2021-11-23 PROCEDURE — 86431 RHEUMATOID FACTOR QUANT: CPT

## 2021-11-23 PROCEDURE — 36415 COLL VENOUS BLD VENIPUNCTURE: CPT

## 2021-11-23 PROCEDURE — 86235 NUCLEAR ANTIGEN ANTIBODY: CPT

## 2021-11-23 PROCEDURE — 99214 OFFICE O/P EST MOD 30 MIN: CPT | Performed by: INTERNAL MEDICINE

## 2021-11-23 PROCEDURE — 3080F DIAST BP >= 90 MM HG: CPT | Performed by: INTERNAL MEDICINE

## 2021-11-23 PROCEDURE — 86430 RHEUMATOID FACTOR TEST QUAL: CPT

## 2021-11-23 PROCEDURE — 86200 CCP ANTIBODY: CPT

## 2021-11-23 PROCEDURE — 83516 IMMUNOASSAY NONANTIBODY: CPT

## 2021-11-23 PROCEDURE — 3077F SYST BP >= 140 MM HG: CPT | Performed by: INTERNAL MEDICINE

## 2021-11-23 PROCEDURE — 86038 ANTINUCLEAR ANTIBODIES: CPT

## 2021-11-23 PROCEDURE — 1036F TOBACCO NON-USER: CPT | Performed by: INTERNAL MEDICINE

## 2021-11-24 LAB
CRYOGLOB RF SER-ACNC: ABNORMAL [IU]/ML
ENA SCL70 AB SER-ACNC: <0.2 AI (ref 0–0.9)
ENA SS-A AB SER-ACNC: <0.2 AI (ref 0–0.9)
ENA SS-B AB SER-ACNC: <0.2 AI (ref 0–0.9)
RHEUMATOID FACT SER QL LA: POSITIVE
RYE IGE QN: NEGATIVE

## 2021-11-26 LAB — CCP AB SER IA-ACNC: 5.2

## 2021-11-30 LAB — MISCELLANEOUS LAB TEST RESULT: NORMAL

## 2021-12-10 LAB — MISCELLANEOUS LAB TEST RESULT: NORMAL

## 2021-12-22 ENCOUNTER — OFFICE VISIT (OUTPATIENT)
Dept: CARDIOLOGY CLINIC | Facility: CLINIC | Age: 81
End: 2021-12-22
Payer: COMMERCIAL

## 2021-12-22 VITALS
SYSTOLIC BLOOD PRESSURE: 140 MMHG | DIASTOLIC BLOOD PRESSURE: 72 MMHG | HEIGHT: 67 IN | BODY MASS INDEX: 26.43 KG/M2 | HEART RATE: 77 BPM | OXYGEN SATURATION: 97 % | RESPIRATION RATE: 18 BRPM | WEIGHT: 168.38 LBS

## 2021-12-22 DIAGNOSIS — Z95.1 S/P CABG X 4: ICD-10-CM

## 2021-12-22 DIAGNOSIS — J84.10 PULMONARY FIBROSIS DETERMINED BY HIGH RESOLUTION COMPUTED TOMOGRAPHY (HCC): ICD-10-CM

## 2021-12-22 DIAGNOSIS — I25.10 CORONARY ARTERY DISEASE INVOLVING NATIVE CORONARY ARTERY OF NATIVE HEART WITHOUT ANGINA PECTORIS: ICD-10-CM

## 2021-12-22 DIAGNOSIS — I10 PRIMARY HYPERTENSION: ICD-10-CM

## 2021-12-22 DIAGNOSIS — R06.02 SOB (SHORTNESS OF BREATH): Primary | ICD-10-CM

## 2021-12-22 DIAGNOSIS — E78.2 MIXED HYPERLIPIDEMIA: ICD-10-CM

## 2021-12-22 DIAGNOSIS — I73.9 PERIPHERAL ARTERY DISEASE (HCC): ICD-10-CM

## 2021-12-22 DIAGNOSIS — Z87.891 FORMER TOBACCO USE: ICD-10-CM

## 2021-12-22 PROCEDURE — 99214 OFFICE O/P EST MOD 30 MIN: CPT | Performed by: NURSE PRACTITIONER

## 2021-12-22 PROCEDURE — 1160F RVW MEDS BY RX/DR IN RCRD: CPT | Performed by: NURSE PRACTITIONER

## 2021-12-22 PROCEDURE — 3077F SYST BP >= 140 MM HG: CPT | Performed by: NURSE PRACTITIONER

## 2021-12-22 PROCEDURE — 3078F DIAST BP <80 MM HG: CPT | Performed by: NURSE PRACTITIONER

## 2021-12-22 PROCEDURE — 1036F TOBACCO NON-USER: CPT | Performed by: NURSE PRACTITIONER

## 2021-12-22 RX ORDER — CLOTRIMAZOLE AND BETAMETHASONE DIPROPIONATE 10; .64 MG/G; MG/G
CREAM TOPICAL 2 TIMES DAILY
COMMUNITY
End: 2022-04-11

## 2021-12-22 RX ORDER — TRIAMCINOLONE ACETONIDE 0.25 MG/G
CREAM TOPICAL 2 TIMES DAILY
COMMUNITY
End: 2021-12-30

## 2021-12-22 RX ORDER — SENNOSIDES 8.6 MG
650 CAPSULE ORAL EVERY 8 HOURS PRN
COMMUNITY

## 2021-12-30 ENCOUNTER — OFFICE VISIT (OUTPATIENT)
Dept: PULMONOLOGY | Facility: CLINIC | Age: 81
End: 2021-12-30
Payer: COMMERCIAL

## 2021-12-30 VITALS
BODY MASS INDEX: 25.28 KG/M2 | SYSTOLIC BLOOD PRESSURE: 146 MMHG | WEIGHT: 166.8 LBS | TEMPERATURE: 95.6 F | OXYGEN SATURATION: 93 % | DIASTOLIC BLOOD PRESSURE: 78 MMHG | HEIGHT: 68 IN | HEART RATE: 84 BPM

## 2021-12-30 DIAGNOSIS — J84.10 PULMONARY FIBROSIS DETERMINED BY HIGH RESOLUTION COMPUTED TOMOGRAPHY (HCC): Primary | ICD-10-CM

## 2021-12-30 DIAGNOSIS — J30.0 VASOMOTOR RHINITIS: ICD-10-CM

## 2021-12-30 PROCEDURE — 99214 OFFICE O/P EST MOD 30 MIN: CPT | Performed by: INTERNAL MEDICINE

## 2021-12-31 DIAGNOSIS — I10 HYPERTENSION, UNSPECIFIED TYPE: ICD-10-CM

## 2022-01-11 ENCOUNTER — OFFICE VISIT (OUTPATIENT)
Dept: FAMILY MEDICINE CLINIC | Facility: OTHER | Age: 82
End: 2022-01-11
Payer: COMMERCIAL

## 2022-01-11 VITALS
TEMPERATURE: 98 F | HEART RATE: 78 BPM | DIASTOLIC BLOOD PRESSURE: 80 MMHG | RESPIRATION RATE: 18 BRPM | WEIGHT: 169.4 LBS | OXYGEN SATURATION: 98 % | HEIGHT: 68 IN | BODY MASS INDEX: 25.67 KG/M2 | SYSTOLIC BLOOD PRESSURE: 132 MMHG

## 2022-01-11 DIAGNOSIS — K21.9 GASTROESOPHAGEAL REFLUX DISEASE, UNSPECIFIED WHETHER ESOPHAGITIS PRESENT: ICD-10-CM

## 2022-01-11 DIAGNOSIS — Z00.00 MEDICARE ANNUAL WELLNESS VISIT, SUBSEQUENT: Primary | ICD-10-CM

## 2022-01-11 DIAGNOSIS — I10 PRIMARY HYPERTENSION: ICD-10-CM

## 2022-01-11 PROCEDURE — 3079F DIAST BP 80-89 MM HG: CPT | Performed by: FAMILY MEDICINE

## 2022-01-11 PROCEDURE — G0439 PPPS, SUBSEQ VISIT: HCPCS | Performed by: FAMILY MEDICINE

## 2022-01-11 PROCEDURE — 3288F FALL RISK ASSESSMENT DOCD: CPT | Performed by: FAMILY MEDICINE

## 2022-01-11 PROCEDURE — 1160F RVW MEDS BY RX/DR IN RCRD: CPT | Performed by: FAMILY MEDICINE

## 2022-01-11 PROCEDURE — 1125F AMNT PAIN NOTED PAIN PRSNT: CPT | Performed by: FAMILY MEDICINE

## 2022-01-11 PROCEDURE — 1170F FXNL STATUS ASSESSED: CPT | Performed by: FAMILY MEDICINE

## 2022-01-11 PROCEDURE — 3725F SCREEN DEPRESSION PERFORMED: CPT | Performed by: FAMILY MEDICINE

## 2022-01-11 PROCEDURE — 3075F SYST BP GE 130 - 139MM HG: CPT | Performed by: FAMILY MEDICINE

## 2022-01-11 PROCEDURE — 1036F TOBACCO NON-USER: CPT | Performed by: FAMILY MEDICINE

## 2022-01-11 RX ORDER — FAMOTIDINE 20 MG/1
20 TABLET, FILM COATED ORAL 2 TIMES DAILY
Qty: 60 TABLET | Refills: 0 | Status: SHIPPED | OUTPATIENT
Start: 2022-01-11 | End: 2022-03-07 | Stop reason: SDUPTHER

## 2022-01-11 NOTE — PROGRESS NOTES
Assessment and Plan:     Problem List Items Addressed This Visit        Digestive    GERD (gastroesophageal reflux disease)    Relevant Medications    famotidine (PEPCID) 20 mg tablet       Cardiovascular and Mediastinum    Hypertension    Relevant Orders    Comprehensive metabolic panel      Other Visit Diagnoses     Medicare annual wellness visit, subsequent    -  Primary        BMI Counseling: Body mass index is 26 14 kg/m²  The BMI is above normal  Nutrition recommendations include encouraging healthy choices of fruits and vegetables  No pharmacotherapy was ordered  Rationale for BMI follow-up plan is due to patient being overweight or obese  Depression Screening and Follow-up Plan: Patient was screened for depression during today's encounter  They screened negative with a PHQ-2 score of 0  Emotional and Mental Well-being, Sleep, Connectedness Assessment and Intervention:    Depression and anxiety screening performed and reviewed      Tobacco and Toxic Substance Assessment and Intervention:     Tobacco use screening performed    Alcohol and drug use screening performed    Brief intervention performed for tobacco, alcohol, or drug use      Therapeutic Lifestyle Change Visit:     One-on-one comprehensive counseling, coaching, and health behavior change visit completed        Preventive health issues were discussed with patient, and age appropriate screening tests were ordered as noted in patient's After Visit Summary  Personalized health advice and appropriate referrals for health education or preventive services given if needed, as noted in patient's After Visit Summary       History of Present Illness:     Patient presents for Medicare Annual Wellness visit    Patient Care Team:  Kevin Bateman DO as PCP - General  MD Vincent Chapa DO Theador Heater, MD Elwyn Redder, DO Roark Lied, MD Sedrick Bame, PA-C Harvy Rua, MD Roark Lied, MD as Endoscopist  Ciarra Hardin (Vascular Surgery)     Problem List:     Patient Active Problem List   Diagnosis    Degenerative lumbar spinal stenosis    Spondylolisthesis of lumbar region    CAD (coronary artery disease)    Former tobacco use    Hyperlipidemia    Hypertension    S/P CABG x 4    Chronic anemia    Leg pain, posterior, left    Peripheral artery disease (HCC)    GERD (gastroesophageal reflux disease)    Vasomotor rhinitis    Lumbar stenosis    S/P lumbar fusion    T12 compression fracture (HCC)    Postlaminectomy syndrome of lumbar region    Lumbar radiculopathy    Skin lesion of left leg    Bruit of left carotid artery    Phimosis    Carotid stenosis, asymptomatic, bilateral    SOB (shortness of breath)    Neck pain on left side    Near syncope    Pulmonary fibrosis determined by high resolution computed tomography (HCC)    Neuropathy      Past Medical and Surgical History:     Past Medical History:   Diagnosis Date    Abnormal liver function test     RESOLVED: [id number]    Allergic rhinitis     Anemia     LAST ASSESSED: [id number]    Arthritis     BPH (benign prostatic hyperplasia)     CAD (coronary artery disease)     Coronary artery disease     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HCC)     LAST ASSESSED: [id number]    Former tobacco use     GERD (gastroesophageal reflux disease)     Hand paresthesia     RESOLVED: [id number]    Hyperlipidemia     Hypertension     Lumbar stenosis     Peripheral artery disease (HCC)     LAST ASSESSED: [id number]    Stage 3a chronic kidney disease ([facility] [address] ) 7/11/2021     Past Surgical History:   Procedure Laterality Date    BACK SURGERY      COLONOSCOPY      LUMBAR FUSION      TX ARTHRODESIS POSTERIOR/POSTEROLATERAL LUMBAR N/A 11/3/2016    Procedure: L2-S1 POSTERIOR LUMBAR FUSION AND DECOMPRESSION (Impulse), Posterior lateral fixation; dural repair ;  Surgeon: Jessica Tejada MD;  [facility];  Service: Orthopedics    TX CABG, ARTERIAL, SINGLE N/A 2018    Procedure: CABG X4 with LIMA - LAD, SVG - RCA, OM2, & Diagonal ; Left Leg EVH; MATTHEW;  Surgeon: Chano Goins DO;  Location[facility];  Service: Cardiac Surgery    IL COLONOSCOPY FLX DX W/COLLJ SPEC WHEN PFRMD N/A 11/15/2017    Procedure: EGD AND COLONOSCOPY;  Surgeon: Cherylene Needy, MD;  [facility]; Service: Gastroenterology    SEPTOPLASTY      LAST ASSESSED; [id number]    TONSILECTOMY AND ADNOIDECTOMY      LAST ASSESSED: [id number]      Family History:     Family History   Problem Relation Age of Onset    Emphysema Mother     Liver disease Mother     Coronary artery disease Father     Hypertension Father     Liver disease Father     Heart failure Father     Stroke Father         CVA     Heart attack Father     Coronary artery disease Brother     Heart disease Brother         younger brother by pass and other brother 3 stents placed    Other Family         BACK PROBLEM     Stroke Family         CVA    Emphysema Family     Hypertension Family         BENIGN      Social History:     Social History     Socioeconomic History    Marital status:      Spouse name: None    Number of children: None    Years of education: some college     Highest education level: None   Occupational History    Occupation: Insurance     Comment: Retired    Tobacco Use    Smoking status: Former Smoker     Packs/day: 1 00     Years: 50 00     Pack years: 50 00     Types: Cigarettes     Quit date:      Years since quittin 0    Smokeless tobacco: Never Used   Vaping Use    Vaping Use: Never used   Substance and Sexual Activity    Alcohol use:  Yes     Alcohol/week: 1 0 standard drink     Types: 1 Shots of liquor per week     Comment: beer, wine, scotch every day; SOCIAL AS PER ALL SCRIPTS     Drug use: Not Currently     Types: Marijuana     Comment: medical majiuana    Sexual activity: Not Currently   Other Topics Concern    None   Social History Narrative Daily coffee consumption      Social Determinants of Health     Financial Resource Strain: Not on file   Food Insecurity: Not on file   Transportation Needs: Not on file   Physical Activity: Not on file   Stress: Not on file   Social Connections: Not on file   Intimate Partner Violence: Not on file   Housing Stability: Not on file      Medications and Allergies:     Current Outpatient Medications   Medication Sig Dispense Refill    acetaminophen (Tylenol 8 Hour) 650 mg CR tablet Take 650 mg by mouth every 8 (eight) hours as needed for mild pain      Ascorbic Acid (Vitamin C) 500 MG PACK Take 500 mg by mouth daily        aspirin (ECOTRIN LOW STRENGTH) 81 mg EC tablet Take 81 mg by mouth daily      atorvastatin (LIPITOR) 80 mg tablet TAKE 1 TABLET (80 MG TOTAL) BY MOUTH DAILY WITH DINNER (Patient taking differently: Take 80 mg by mouth every morning ) 90 tablet 2    cholecalciferol (VITAMIN D3) 1,000 units tablet Take 2,000 Units by mouth daily       clotrimazole-betamethasone (LOTRISONE) 1-0 05 % cream Apply topically 2 (two) times a day      cyanocobalamin (VITAMIN B-12) 1,000 mcg tablet Take 1,000 mcg by mouth daily        GABAPENTIN PO Take 100 mg by mouth daily as needed        metoprolol tartrate (LOPRESSOR) 25 mg tablet TAKE 1 TABLET BY MOUTH TWICE A  tablet 2    Multiple Vitamin (MULTIVITAMIN) capsule Take 1 capsule by mouth daily      Probiotic Product (ALIGN PO) Take 1 tablet by mouth daily        docusate sodium (COLACE) 100 mg capsule Take 1 capsule (100 mg total) by mouth 2 (two) times a day as needed for constipation Hold for loose stools  60 capsule 0    famotidine (PEPCID) 20 mg tablet Take 1 tablet (20 mg total) by mouth 2 (two) times a day 60 tablet 0     No current facility-administered medications for this visit  Allergies   Allergen Reactions    Penicillins Other (See Comments)     Hallucinations;   Patient reported that he was seeing visual disturbances Immunizations:     Immunization History   Administered Date(s) Administered    COVID-19 MODERNA VACC 0 5 ML IM 01/27/2021, 02/24/2021    INFLUENZA 12/20/2006    Influenza Split High Dose Preservative Free IM 10/09/2013, 10/27/2015, 09/22/2016, 09/11/2017    Influenza, high dose seasonal 0 7 mL 10/25/2018, 10/31/2019, 10/15/2020, 09/16/2021    Influenza, seasonal, injectable 01/01/2014    Pneumococcal Conjugate 13-Valent 09/11/2017    Pneumococcal Polysaccharide PPV23 04/07/2011    Tdap 10/25/2018      Health Maintenance:         Topic Date Due    Hepatitis C Screening  Completed         Topic Date Due    COVID-19 Vaccine (3 - Booster for Moderna series) 08/24/2021      Medicare Health Risk Assessment:     /80   Pulse 78   Temp 98 °F (36 7 °C)   Resp 18   Ht 5' 7 5" (1 715 m)   Wt 76 8 kg (169 lb 6 4 oz)   SpO2 98%   BMI 26 14 kg/m²      Fran Ramirez is here for his Subsequent Wellness visit  Last Medicare Wellness visit information reviewed, patient interviewed and updates made to the record today  Health Risk Assessment:   Patient rates overall health as good  Patient feels that their physical health rating is same  Patient is very satisfied with their life  Eyesight was rated as same  Hearing was rated as same  Patient feels that their emotional and mental health rating is same  Patients states they are never, rarely angry  Patient states they are sometimes unusually tired/fatigued  Pain experienced in the last 7 days has been some  Patient's pain rating has been 6/10  Patient states that he has experienced no weight loss or gain in last 6 months  Depression Screening:   PHQ-2 Score: 0      Fall Risk Screening: In the past year, patient has experienced: no history of falling in past year      Home Safety:  Patient has trouble with stairs inside or outside of their home  Patient has working smoke alarms and has working carbon monoxide detector  Home safety hazards include: none  Nutrition:   Current diet is Limited junk food  Medications:   Patient is currently taking over-the-counter supplements  OTC medications include: One a Day vitamin  Patient is able to manage medications  Activities of Daily Living (ADLs)/Instrumental Activities of Daily Living (IADLs):   Walk and transfer into and out of bed and chair?: Yes  Dress and groom yourself?: Yes    Bathe or shower yourself?: Yes    Feed yourself? Yes  Do your laundry/housekeeping?: Yes  Manage your money, pay your bills and track your expenses?: Yes  Make your own meals?: Yes    Do your own shopping?: Yes    Durable Medical Equipment Suppliers  None    Previous Hospitalizations:   Any hospitalizations or ED visits within the last 12 months?: Yes    How many hospitalizations have you had in the last year?: 1-2    Advance Care Planning:   Living will: Yes    Durable POA for healthcare: Yes    Advanced directive: Yes      Cognitive Screening:   Provider or family/friend/caregiver concerned regarding cognition?: No    PREVENTIVE SCREENINGS      Cardiovascular Screening:    General: Screening Not Indicated and History Lipid Disorder      Diabetes Screening:     General: Screening Current      Colorectal Cancer Screening:     General: Screening Not Indicated      Prostate Cancer Screening:    General: Screening Not Indicated      Osteoporosis Screening:    General: Screening Not Indicated      Abdominal Aortic Aneurysm (AAA) Screening:    Risk factors include: tobacco use        General: Screening Current      Lung Cancer Screening:     General: Screening Current      Hepatitis C Screening:    General: Screening Current    Screening, Brief Intervention, and Referral to Treatment (SBIRT)    Screening  Typical number of drinks in a day: 3  Typical number of drinks in a week: 21  Interpretation: Low risk drinking behavior      AUDIT-C Screenin) How often did you have a drink containing alcohol in the past year? 4 or more times a week  2) How many drinks did you have on a typical day when you were drinking in the past year? 3 to 4  3) How often did you have 6 or more drinks on one occasion in the past year? never    AUDIT-C Score: 4  Interpretation: Score 4-12 (male): POSITIVE screen for alcohol misuse    AUDIT Screenin) How often during the last year have you found that you were not able to stop drinking once you had started? 0 - never  5) How often during the last year have you failed to do what was normally expected from you because of drinking? 0 - never  6) How often during the last year have you needed a first drink in the morning to get yourself going after a heavy drinking session? 0 - never  7) How often during the last year have you had a feeling of guilt or remorse after drinking? 0 - never  8) How often during the last year have you been unable to remember what happened the night before because you had been drinking? 0 - never  9) Have you or someone else been injured as a result of your drinking? 0 - no  10) Has a relative or friend or a doctor or another health worker been concerned about your drinking or suggested you cut down? 0 - no    AUDIT Score: 4  Interpretation: Low risk alcohol consumption    Single Item Drug Screening:  How often have you used an illegal drug (including marijuana) or a prescription medication for non-medical reasons in the past year? daily or almost daily    Single Item Drug Screen Score: 4  Interpretation: POSITIVE screen for possible drug use disorder    Drug Abuse Screening Test (DAST-10):  1) Have you used drugs other than those required for medical reasons? No  2) Do you abuse more than one drug at a time? No  3) Are you always able to stop using drugs when you want to? Yes  4) Have you had "blackouts" or "flashbacks" as a result of drug use? No  5) Do you ever feel bad or guilty about your drug use? No  6) Does your spouse (or parents) ever complain about your involvement with drugs?  No  7) Have you neglected your family because of your use of drugs? No  8) Have you engaged in illegal activities in order to obtain drugs? No  9) Have you ever experienced withdrawal symptoms (felt sick) when you stopped taking drugs? No  10) Have you had medical problems as a result of your drug use (e g , memory loss, hepatitis, convulsions, bleeding, etc )? No    DAST-10 Score: 0  Interpretation: No problems reported    Brief Intervention  Healthy alcohol use/limits discussed  Other Counseling Topics:   Car/seat belt/driving safety, skin self-exam, sunscreen and regular weightbearing exercise and calcium and vitamin D intake  Physical Exam  Vitals reviewed  Constitutional:       General: He is not in acute distress  Appearance: Normal appearance  He is well-developed  He is not diaphoretic  HENT:      Head: Normocephalic and atraumatic  Right Ear: External ear normal       Left Ear: External ear normal       Nose: Nose normal       Mouth/Throat:      Mouth: Mucous membranes are moist       Pharynx: Oropharynx is clear  Eyes:      Extraocular Movements: Extraocular movements intact  Conjunctiva/sclera: Conjunctivae normal       Pupils: Pupils are equal, round, and reactive to light  Cardiovascular:      Rate and Rhythm: Normal rate and regular rhythm  Pulses: Normal pulses  Heart sounds: Normal heart sounds  No murmur heard  No friction rub  No gallop  Pulmonary:      Effort: Pulmonary effort is normal  No respiratory distress  Breath sounds: Normal breath sounds  No stridor  No wheezing, rhonchi or rales  Abdominal:      General: Abdomen is flat  Bowel sounds are normal  There is no distension  Palpations: Abdomen is soft  There is no mass  Tenderness: There is no abdominal tenderness  There is no right CVA tenderness, left CVA tenderness or guarding  Musculoskeletal:         General: Normal range of motion        Cervical back: Normal range of motion and neck supple  Right lower leg: No edema  Left lower leg: No edema  Lymphadenopathy:      Cervical: No cervical adenopathy  Skin:     General: Skin is warm and dry  Findings: No rash  Neurological:      General: No focal deficit present  Mental Status: He is alert and oriented to person, place, and time     Psychiatric:         Mood and Affect: Mood normal          Behavior: Behavior normal          Alina Greenberg MD

## 2022-01-11 NOTE — PATIENT INSTRUCTIONS
Try to decrease alcohol intake  1 "drink" is about 1 5 ounces of Cecille  Your goal should be no more than 7 drinks in a week  At-Risk Alcohol Use   AMBULATORY CARE:   At-risk alcohol use  means you drink more than recommended daily or weekly limits  For men 21 to 64 years, the limit is 4 drinks in a day or 14 in a week  For women and for men 72 or older, it is 3 drinks in a day or 7 in a week  A drink is 12 ounces of beer, 5 ounces of wine, or 1½ ounces of liquor  Your healthcare provider may recommend lower limits for you if you have a health condition  No amount is safe for a woman who is pregnant  Call your local emergency number (911 in the [address] [place] [address],[address]) for any of the following:   · You feel like hurting yourself or someone else  · You have trouble breathing, chest pain, or a fast heartbeat  · You have a seizure  Call your doctor if:   · You need help to stop drinking alcohol  · You have new symptoms since your last visit  · You have questions or concerns about your condition or care  Problems that at-risk alcohol use can cause:   · Alcohol use disorder (intense, uncontrollable craving for alcohol, and physical or emotional problems without it)    · Accidents at home or work, or while driving    · Health problems, such as high blood pressure, liver or brain damage, cancer, or pancreatitis    · Health problems in newborns whose mothers drank alcohol during pregnancy    · Relationship or mood problems    Screening  means healthcare providers ask about alcohol use during medical appointments  Your healthcare provider will ask you how much and how often you drink  This includes drinking regularly or drinking large amounts in a short period of time (binge drinking)  Your provider may also want to know if alcoholism or other substance abuse disorders run in your family  He or she may ask how you are doing in school or at work   A rating scale tells your healthcare provider how much you drink in a day or week   Treatment:  Your healthcare provider may admit you to the hospital to help you withdraw from alcohol safely  At the hospital, you may need any of the following:  · Detoxification (detox)  is a program used to flush alcohol from your body  During detox, medicines are given to help prevent withdrawal symptoms when you stop drinking alcohol  · In brief intervention therapy,  a healthcare provider helps you think about your alcohol use differently  He or she helps you set goals to decrease the amount of alcohol you drink  Therapy may continue after you leave the hospital      · Vitamin supplements  such as B1 may be needed  Alcohol can make it hard for your body to absorb enough vitamin B1  You may be given vitamin B1 if you have low levels  It is also given to prevent brain damage from alcohol use  What you can do to manage your alcohol use:   · Decrease the amount you drink  This can help prevent health problems such as brain, heart, and liver damage, high blood pressure, diabetes, and cancer  If you cannot stop completely, healthcare providers can help you set goals to decrease the amount you drink  · Plan weekly alcohol use  You will be less likely to drink more than the recommended limit if you plan ahead  · Have food when you drink alcohol  Food will prevent alcohol from getting into your system too quickly  Eat before you have your first alcohol drink  · Time your drinks carefully  Have no more than 1 drink in an hour  Have a liquid such as water, coffee, or a soft drink between alcohol drinks  · Do not drive if you have had alcohol  Make sure someone who has not been drinking can help you get home  · Do not drink alcohol if you are taking medicine  Alcohol is dangerous when you combine it with certain medicines, such as acetaminophen or blood pressure medicine  Talk to your healthcare provider about all the medicines you currently take      Follow up with your doctor as directed: Write down your questions so you remember to ask them during your visits  For support and more information:   · Alcoholics Anonymous  Web Address: http://www vargas [employer]/    · Substance Abuse and [address] , [address] [place]  Web Address: https://[employer]/    © [facility] 2021 Information is for End User's use only and may not be sold, redistributed or otherwise used for commercial purposes  All illustrations and images included in CareNotes® are the copyrighted property of A D A M , Inc  or [facility] Manuel Weiner   The above information is an  only  It is not intended as medical advice for individual conditions or treatments  Talk to your doctor, nurse or pharmacist before following any medical regimen to see if it is safe and effective for you  Medicare Preventive Visit Patient Instructions  Thank you for completing your Welcome to Medicare Visit or Medicare Annual Wellness Visit today  Your next wellness visit will be due in one year (1/12/2023)  The screening/preventive services that you may require over the next 5-10 years are detailed below  Some tests may not apply to you based off risk factors and/or age  Screening tests ordered at today's visit but not completed yet may show as past due  Also, please note that scanned in results may not display below  Preventive Screenings:  Service Recommendations Previous Testing/Comments   Colorectal Cancer Screening  · Colonoscopy    · Fecal Occult Blood Test (FOBT)/Fecal Immunochemical Test (FIT)  · Fecal DNA/Cologuard Test  · Flexible Sigmoidoscopy Age: 54-65 years old   Colonoscopy: every 10 years (May be performed more frequently if at higher risk)  OR  FOBT/FIT: every 1 year  OR  Cologuard: every 3 years  OR  Sigmoidoscopy: every 5 years  Screening may be recommended earlier than age 48 if at higher risk for colorectal cancer   Also, an individualized decision between you and your healthcare provider will decide whether screening between the ages of 74-80 would be appropriate  Colonoscopy: Not on file  FOBT/FIT: 11/05/2019  Cologuard: Not on file  Sigmoidoscopy: Not on file          Prostate Cancer Screening Individualized decision between patient and health care provider in men between ages of 53-78   Medicare will cover every 12 months beginning on the day after your 50th birthday PSA: <0 1 ng/mL     Screening Not Indicated     Hepatitis C Screening Once for adults born between 1945 and 1965  More frequently in patients at high risk for Hepatitis C Hep C Antibody: 07/02/2021    Screening Current   Diabetes Screening 1-2 times per year if you're at risk for diabetes or have pre-diabetes Fasting glucose: 84 mg/dL   A1C: 5 2 %    Screening Current   Cholesterol Screening Once every 5 years if you don't have a lipid disorder  May order more often based on risk factors  Lipid panel: 07/02/2021    Screening Not Indicated  History Lipid Disorder      Other Preventive Screenings Covered by Medicare:  1  Abdominal Aortic Aneurysm (AAA) Screening: covered once if your at risk  You're considered to be at risk if you have a family history of AAA or a male between the age of 73-68 who smoking at least 100 cigarettes in your lifetime  2  Lung Cancer Screening: covers low dose CT scan once per year if you meet all of the following conditions: (1) Age 50-69; (2) No signs or symptoms of lung cancer; (3) Current smoker or have quit smoking within the last 15 years; (4) You have a tobacco smoking history of at least 30 pack years (packs per day x number of years you smoked); (5) You get a written order from a healthcare provider  3  Glaucoma Screening: covered annually if you're considered high risk: (1) You have diabetes OR (2) Family history of glaucoma OR (3)  aged 48 and older OR (3)  American aged 72 and older  3   Osteoporosis Screening: covered every 2 years if you meet one of the following conditions: (1) Have a vertebral abnormality; (2) On glucocorticoid therapy for more than 3 months; (3) Have primary hyperparathyroidism; (4) On osteoporosis medications and need to assess response to drug therapy  5  HIV Screening: covered annually if you're between the age of 12-76  Also covered annually if you are younger than 13 and older than 72 with risk factors for HIV infection  For pregnant patients, it is covered up to 3 times per pregnancy  Immunizations:  Immunization Recommendations   Influenza Vaccine Annual influenza vaccination during flu season is recommended for all persons aged >= 6 months who do not have contraindications   Pneumococcal Vaccine (Prevnar and Pneumovax)  * Prevnar = PCV13  * Pneumovax = PPSV23 Adults 25-60 years old: 1-3 doses may be recommended based on certain risk factors  Adults 72 years old: Prevnar (PCV13) vaccine recommended followed by Pneumovax (PPSV23) vaccine  If already received PPSV23 since turning 65, then PCV13 recommended at least one year after PPSV23 dose  Hepatitis B Vaccine 3 dose series if at intermediate or high risk (ex: diabetes, end stage renal disease, liver disease)   Tetanus (Td) Vaccine - COST NOT COVERED BY MEDICARE PART B Following completion of primary series, a booster dose should be given every 10 years to maintain immunity against tetanus  Td may also be given as tetanus wound prophylaxis  Tdap Vaccine - COST NOT COVERED BY MEDICARE PART B Recommended at least once for all adults  For pregnant patients, recommended with each pregnancy  Shingles Vaccine (Shingrix) - COST NOT COVERED BY MEDICARE PART B  2 shot series recommended in those aged 48 and above     Health Maintenance Due:      Topic Date Due    Hepatitis C Screening  Completed     Immunizations Due:      Topic Date Due    COVID-19 Vaccine (3 - Booster for Moderna series) 08/24/2021     Advance Directives   What are advance directives?   Advance directives are legal documents that state your wishes and plans for medical care  These plans are made ahead of time in case you lose your ability to make decisions for yourself  Advance directives can apply to any medical decision, such as the treatments you want, and if you want to donate organs  What are the types of advance directives? There are many types of advance directives, and each state has rules about how to use them  You may choose a combination of any of the following:  · Living will: This is a written record of the treatment you want  You can also choose which treatments you do not want, which to limit, and which to stop at a certain time  This includes surgery, medicine, IV fluid, and tube feedings  · Durable power of  for healthcare [facility]): This is a written record that states who you want to make healthcare choices for you when you are unable to make them for yourself  This person, called a proxy, is usually a family member or a friend  You may choose more than 1 proxy  · Do not resuscitate (DNR) order:  A DNR order is used in case your heart stops beating or you stop breathing  It is a request not to have certain forms of treatment, such as CPR  A DNR order may be included in other types of advance directives  · Medical directive: This covers the care that you want if you are in a coma, near death, or unable to make decisions for yourself  You can list the treatments you want for each condition  Treatment may include pain medicine, surgery, blood transfusions, dialysis, IV or tube feedings, and a ventilator (breathing machine)  · Values history: This document has questions about your views, beliefs, and how you feel and think about life  This information can help others choose the care that you would choose  Why are advance directives important? An advance directive helps you control your care  Although spoken wishes may be used, it is better to have your wishes written down   Spoken wishes can be misunderstood, or not followed  Treatments may be given even if you do not want them  An advance directive may make it easier for your family to make difficult choices about your care  Weight Management   Why it is important to manage your weight:  Being overweight increases your risk of health conditions such as heart disease, high blood pressure, type 2 diabetes, and certain types of cancer  It can also increase your risk for osteoarthritis, sleep apnea, and other respiratory problems  Aim for a slow, steady weight loss  Even a small amount of weight loss can lower your risk of health problems  How to lose weight safely:  A safe and healthy way to lose weight is to eat fewer calories and get regular exercise  You can lose up about 1 pound a week by decreasing the number of calories you eat by 500 calories each day  Healthy meal plan for weight management:  A healthy meal plan includes a variety of foods, contains fewer calories, and helps you stay healthy  A healthy meal plan includes the following:  · Eat whole-grain foods more often  A healthy meal plan should contain fiber  Fiber is the part of grains, fruits, and vegetables that is not broken down by your body  Whole-grain foods are healthy and provide extra fiber in your diet  Some examples of whole-grain foods are whole-wheat breads and pastas, oatmeal, brown rice, and bulgur  · Eat a variety of vegetables every day  Include dark, leafy greens such as spinach, kale, grzegorz greens, and mustard greens  Eat yellow and orange vegetables such as carrots, sweet potatoes, and winter squash  · Eat a variety of fruits every day  Choose fresh or canned fruit (canned in its own juice or light syrup) instead of juice  Fruit juice has very little or no fiber  · Eat low-fat dairy foods  Drink fat-free (skim) milk or 1% milk  Eat fat-free yogurt and low-fat cottage cheese   Try low-fat cheeses such as mozzarella and other reduced-fat cheeses  · Choose meat and other protein foods that are low in fat  Choose beans or other legumes such as split peas or lentils  Choose fish, skinless poultry (chicken or turkey), or lean cuts of red meat (beef or pork)  Before you cook meat or poultry, cut off any visible fat  · Use less fat and oil  Try baking foods instead of frying them  Add less fat, such as margarine, sour cream, regular salad dressing and mayonnaise to foods  Eat fewer high-fat foods  Some examples of high-fat foods include french fries, doughnuts, ice cream, and cakes  · Eat fewer sweets  Limit foods and drinks that are high in sugar  This includes candy, cookies, regular soda, and sweetened drinks  Exercise:  Exercise at least 30 minutes per day on most days of the week  Some examples of exercise include walking, biking, dancing, and swimming  You can also fit in more physical activity by taking the stairs instead of the elevator or parking farther away from stores  Ask your healthcare provider about the best exercise plan for you  Alcohol Use and Your Health    Drinking too much can harm your health  Excessive alcohol use leads to about 88,000 death in the [place] each year, and shortens the life of those who diet by almost 30 years  Further, excessive drinking cost the economy $249 billion in 2010  Most excessive drinkers are not alcohol dependent  Excessive alcohol use has immediate effects that increase the risk of many harmful health conditions  These are most often the result of binge drinking  Over time, excessive alcohol use can lead to the development of chronic diseases and other series health problems  What is considered a "drink"? Excessive alcohol use includes:  · Binge Drinking: For women, 4 or more drinks consumed on one occasion  For men, 5 or more drinks consumed on one occasion  · Heavy Drinking: For women, 8 or more drinks per week   For men, 15 or more drinks per week  · Any alcohol used by pregnant women  · Any alcohol used by those under the age of 24 years    If you choose to drink, do so in moderation:  · Do not drink at all if you are under the age of 24, or if you are or may be pregnant, or have health problems that could be made worse by drinking  · For women, up to 1 drink per day  · For men, up to 2 drinks a day    No one should begin drinking or drink more frequently based on potential health benefits    Short-Term Health Risks:  · Injuries: motor vehicle crashes, falls, drownings, burns  · Violence: homicide, suicide, sexual assault, intimate partner violence  · Alcohol poisoning  · Reproductive health: risky sexual behaviors, unintended prengnacy, sexually transmitted diseases, miscarriage, stillbirth, fetal alcohol syndrome    Long-Term Health Risks:  · Chronic diseases: high blood pressure, heart disease, stroke, liver disease, digestive problems  · Cancers: breast, mouth and throat, liver, colon  · Learning and memory problems: dementia, poor school performance  · Mental health: depression, anxiety, insomnia  · Social problems: lost productivity, family problems, unemployment  · Alcohol dependence    For support and more information:  · Substance Abuse and [facility] , [address] [place]  Web Address: https://[employer]/    · Alcoholics Anonymous        Web Address: http://www vargas info/    https://www cdc go[employer]/alcohol/fact-sheets/alcohol-use htm     © Copyright [employer] 2018 Information is for End User's use only and may not be sold, redistributed or otherwise used for commercial purposes   All illustrations and images included in CareNotes® are the copyrighted property of A D A M , Inc  or [address] [place], [place] [address]

## 2022-02-13 DIAGNOSIS — E78.5 HYPERLIPIDEMIA, UNSPECIFIED HYPERLIPIDEMIA TYPE: ICD-10-CM

## 2022-02-14 RX ORDER — ATORVASTATIN CALCIUM 80 MG/1
80 TABLET, FILM COATED ORAL EVERY MORNING
Qty: 90 TABLET | Refills: 3 | Status: SHIPPED | OUTPATIENT
Start: 2022-02-14

## 2022-02-18 ENCOUNTER — HOSPITAL ENCOUNTER (OUTPATIENT)
Dept: NON INVASIVE DIAGNOSTICS | Facility: CLINIC | Age: 82
Discharge: HOME | End: 2022-02-18
Payer: COMMERCIAL

## 2022-02-18 DIAGNOSIS — I25.10 CORONARY ARTERY DISEASE INVOLVING NATIVE CORONARY ARTERY OF NATIVE HEART WITHOUT ANGINA PECTORIS: ICD-10-CM

## 2022-02-18 DIAGNOSIS — R06.02 SOB (SHORTNESS OF BREATH): ICD-10-CM

## 2022-02-18 LAB
BASELINE ST DEPRESSION: 0 MM
NUC STRESS EJECTION FRACTION: 44 %
RATE PRESSURE PRODUCT: NORMAL
SL CV REST NUCLEAR ISOTOPE DOSE: 10.6 MCI
SL CV STRESS NUCLEAR ISOTOPE DOSE: 31.3 MCI
SL CV STRESS RECOVERY BP: NORMAL MMHG
SL CV STRESS RECOVERY HR: 89 BPM
STRESS ANGINA INDEX: 0
STRESS BASELINE BP: NORMAL MMHG
STRESS BASELINE HR: 73 BPM
STRESS O2 SAT REST: 100 %
STRESS PEAK HR: 97 BPM
STRESS POST O2 SAT PEAK: 99 %
STRESS POST PEAK BP: 127 MMHG
STRESS ST DEPRESSION: 0 MM
STRESS/REST PERFUSION RATIO: 1.09

## 2022-02-18 PROCEDURE — A9502 TC99M TETROFOSMIN: HCPCS

## 2022-02-18 PROCEDURE — 78452 HT MUSCLE IMAGE SPECT MULT: CPT | Performed by: INTERNAL MEDICINE

## 2022-02-18 PROCEDURE — 78452 HT MUSCLE IMAGE SPECT MULT: CPT

## 2022-02-18 PROCEDURE — 93017 CV STRESS TEST TRACING ONLY: CPT

## 2022-02-18 PROCEDURE — 93016 CV STRESS TEST SUPVJ ONLY: CPT | Performed by: INTERNAL MEDICINE

## 2022-02-18 PROCEDURE — 93018 CV STRESS TEST I&R ONLY: CPT | Performed by: INTERNAL MEDICINE

## 2022-02-18 RX ADMIN — REGADENOSON 0.4 MG: 0.08 INJECTION, SOLUTION INTRAVENOUS at 13:05

## 2022-02-22 DIAGNOSIS — I10 HYPERTENSION, UNSPECIFIED TYPE: ICD-10-CM

## 2022-02-22 RX ORDER — METOPROLOL TARTRATE 50 MG/1
50 TABLET, FILM COATED ORAL 2 TIMES DAILY
Qty: 60 TABLET | Refills: 3 | Status: SHIPPED | OUTPATIENT
Start: 2022-02-22 | End: 2022-03-04 | Stop reason: SDUPTHER

## 2022-02-22 NOTE — PROGRESS NOTES
Will increase metoprolol to 50 b i d -attempting to medically manage his recently positive stress test which showed mild amount of basal to mid inferior ischemia of some of which could be diaphragmatic attenuation, in the setting of his pulmonary fibrosis which could entirely explain his recent shortness of breath without any chest pains, would opt for medical management  Will see him in the office

## 2022-02-24 LAB
CHEST PAIN STATEMENT: NORMAL
MAX DIASTOLIC BP: 79 MMHG
MAX HEART RATE: 97 BPM
MAX PREDICTED HEART RATE: 139 BPM
MAX. SYSTOLIC BP: 139 MMHG
PROTOCOL NAME: NORMAL
REASON FOR TERMINATION: NORMAL
TARGET HR FORMULA: NORMAL
TEST INDICATION: NORMAL
TIME IN EXERCISE PHASE: NORMAL

## 2022-02-28 ENCOUNTER — HOSPITAL ENCOUNTER (OUTPATIENT)
Dept: NON INVASIVE DIAGNOSTICS | Facility: CLINIC | Age: 82
Discharge: HOME | End: 2022-02-28
Payer: COMMERCIAL

## 2022-02-28 DIAGNOSIS — I65.23 CAROTID STENOSIS, ASYMPTOMATIC, BILATERAL: ICD-10-CM

## 2022-02-28 PROCEDURE — 93880 EXTRACRANIAL BILAT STUDY: CPT

## 2022-02-28 PROCEDURE — 93880 EXTRACRANIAL BILAT STUDY: CPT | Performed by: SURGERY

## 2022-03-04 ENCOUNTER — OFFICE VISIT (OUTPATIENT)
Dept: CARDIOLOGY CLINIC | Facility: CLINIC | Age: 82
End: 2022-03-04
Payer: COMMERCIAL

## 2022-03-04 VITALS
DIASTOLIC BLOOD PRESSURE: 74 MMHG | BODY MASS INDEX: 25.69 KG/M2 | SYSTOLIC BLOOD PRESSURE: 146 MMHG | HEIGHT: 68 IN | WEIGHT: 169.5 LBS | HEART RATE: 74 BPM | OXYGEN SATURATION: 98 %

## 2022-03-04 DIAGNOSIS — I25.10 CORONARY ARTERY DISEASE INVOLVING NATIVE CORONARY ARTERY OF NATIVE HEART WITHOUT ANGINA PECTORIS: Primary | ICD-10-CM

## 2022-03-04 DIAGNOSIS — I73.9 PERIPHERAL ARTERY DISEASE (HCC): ICD-10-CM

## 2022-03-04 DIAGNOSIS — I10 HYPERTENSION, UNSPECIFIED TYPE: ICD-10-CM

## 2022-03-04 DIAGNOSIS — Z95.1 S/P CABG X 4: ICD-10-CM

## 2022-03-04 DIAGNOSIS — I65.23 CAROTID STENOSIS, ASYMPTOMATIC, BILATERAL: ICD-10-CM

## 2022-03-04 DIAGNOSIS — I10 PRIMARY HYPERTENSION: ICD-10-CM

## 2022-03-04 DIAGNOSIS — I74.3 EMBOLISM AND THROMBOSIS OF ARTERIES OF THE LOWER EXTREMITIES (HCC): ICD-10-CM

## 2022-03-04 PROCEDURE — 99214 OFFICE O/P EST MOD 30 MIN: CPT | Performed by: INTERNAL MEDICINE

## 2022-03-04 PROCEDURE — 3077F SYST BP >= 140 MM HG: CPT | Performed by: INTERNAL MEDICINE

## 2022-03-04 PROCEDURE — 1160F RVW MEDS BY RX/DR IN RCRD: CPT | Performed by: INTERNAL MEDICINE

## 2022-03-04 PROCEDURE — 3078F DIAST BP <80 MM HG: CPT | Performed by: INTERNAL MEDICINE

## 2022-03-04 PROCEDURE — 1036F TOBACCO NON-USER: CPT | Performed by: INTERNAL MEDICINE

## 2022-03-04 RX ORDER — METOPROLOL TARTRATE 50 MG/1
75 TABLET, FILM COATED ORAL EVERY 12 HOURS SCHEDULED
Qty: 60 TABLET | Refills: 3 | Status: SHIPPED | OUTPATIENT
Start: 2022-03-04 | End: 2022-03-07 | Stop reason: SDUPTHER

## 2022-03-04 NOTE — PROGRESS NOTES
Cardiology Follow Up    Sabina Godoy  1940  [id number]  [facility] [place]  [address] [facility]  [phone]    No diagnosis found  There are no diagnoses linked to this encounter  I had the pleasure of seeing Sabina Godoy for a follow up visit  INTERVAL HISTORY: none    History of the presenting illness, Discussion/Summary and My Plan are as follows:::    He is a pleasant 59-year-old gentleman with a history of hypertension, mild dyslipidemia, CAD, CABG, peripheral vascular disease-right superficial femoral artery stenosis-medically managed  He also has a 50 pack year smoking history-quit around 2012      He presented for further evaluation of increasing dyspnea with exertion, with evaluation demonstrated multivessel CAD, underwent LIMA-LAD, SVG-RCA, SVG-OM2, SVG-Diag in January 2018      From a cardiac standpoint he is asymptomatic, remains physically active exercising at home daily without any chest pains but had noticed that he was more short of breath and subsequently diagnosed with pulmonary fibrosis  He does have claudication symptoms that are stable      Also has chronic back pain from a T12 fracture that limits activity  Although doing squats and pushups, without symptoms      Plan:    Shortness of breath with exertion:  Stable, likely from pulmonary fibrosis, pharmacologic stress test with small amount of basal to mid inferior ischemia which could be from diaphragmatic attenuation  Considering advanced age, comorbidities and alternative diagnosis, will not proceed with any invasive evaluation, metoprolol dose has been increased to the current dose of 50 b i d  Recent episode of syncope in the setting of positive orthostatics, hypotension, negative ECG and enzymes    Likely from dehydration, Creatinine of 1 22     CAD:  Positive stress test done for ROLBES-triple-vessel coronary artery disease, status post Coronary artery bypass grafting x 4 with left internal mammary artery to left anterior descending artery, saphenous vein graft to obtuse marginal 2, saphenous vein graft to right coronary artery and saphenous vein graft to diagonal 1-January 2018  See above for stress testing    Exercises daily without any cardiac symptoms  Reasonably active without symptoms    Hypertension: Remains elevated on 2nd reading, advised to increase metoprolol to 100 b i d , he would like to try 75 b i d  Will need good control in the setting of progressive vascular disease    Carotid bruit:  Last Doppler less than 50 right 70+ left, stable from last check     Dyslipidemia:  High HDL- I do not think it could be considered to be protective and likely nonfunctional   Continue statin, controlled overall     Peripheral vascular disease:  has distal SFA stenosis, otherwise diffuse disease-June 2021, continue medical management, encouraged walking as much as possible  Follows with Dr Haylie Ortega Fearing     Follow-up in 6 months    Results for Bobbi Grant (MRN [id number]) as of 10/11/2021 10:08   Ref  Range 7/2/2021 09:24   Cholesterol Latest Ref Range: 50 - 200 mg/dL 151   Triglycerides Latest Ref Range: <=150 mg/dL 89   HDL Latest Ref Range: >=40 mg/dL 96   Non-HDL Cholesterol Latest Units: mg/dl 55   LDL Calculated Latest Ref Range: 0 - 100 mg/dL 37     Results for Bobbi Grant (MRN [id number]) as of 10/11/2021 10:08   Ref   Range 6/29/2020 10:40   Hemoglobin A1C  5 2         Patient Active Problem List   Diagnosis    Degenerative lumbar spinal stenosis    Spondylolisthesis of lumbar region    CAD (coronary artery disease)    Former tobacco use    Hyperlipidemia    Hypertension    S/P CABG x 4    Chronic anemia    Leg pain, posterior, left    Peripheral artery disease (HCC)    GERD (gastroesophageal reflux disease)    Vasomotor rhinitis    Lumbar stenosis    S/P lumbar fusion    [facility]12 compression fracture ([facility] )    Postlaminectomy syndrome of lumbar region    Lumbar radiculopathy    Skin lesion of left leg    Bruit of left carotid artery    Phimosis    Carotid stenosis, asymptomatic, bilateral    SOB (shortness of breath)    Neck pain on left side    Near syncope    Pulmonary fibrosis determined by high resolution computed tomography (HCC)    Neuropathy     Past Medical History:   Diagnosis Date    Abnormal liver function test     RESOLVED: [id number]    Allergic rhinitis     Anemia     LAST ASSESSED: [id number]    Arthritis     BPH (benign prostatic hyperplasia)     CAD (coronary artery disease)     Coronary artery disease     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[facility])     LAST ASSESSED: [id number]    Former tobacco use     GERD (gastroesophageal reflux disease)     Hand paresthesia     RESOLVED: [id number]    Hyperlipidemia     Hypertension     Lumbar stenosis     Peripheral artery disease ([facility])     LAST ASSESSED: [id number]    Stage 3a chronic kidney disease ([place] [address] ) 2021     Social History     Socioeconomic History    Marital status:      Spouse name: Not on file    Number of children: Not on file    Years of education: some college     Highest education level: Not on file   Occupational History    Occupation: Insurance     Comment: Retired    Tobacco Use    Smoking status: Former Smoker     Packs/day: 1 00     Years: 50 00     Pack years: 50 00     Types: Cigarettes     Quit date:      Years since quittin 1    Smokeless tobacco: Never Used   Vaping Use    Vaping Use: Never used   Substance and Sexual Activity    Alcohol use:  Yes     Alcohol/week: 1 0 standard drink     Types: 1 Shots of liquor per week     Comment: beer, wine, scotch every day; SOCIAL AS PER ALL SCRIPTS     Drug use: Not Currently     Types: Marijuana     Comment: medical majiuana    Sexual activity: Not Currently   Other Topics Concern    Not on file   Social History Narrative    Daily coffee consumption      Social Determinants of Health     Financial Resource Strain: Not on file   Food Insecurity: Not on file   Transportation Needs: Not on file   Physical Activity: Not on file   Stress: Not on file   Social Connections: Not on file   Intimate Partner Violence: Not on file   Housing Stability: Not on file      Family History   Problem Relation Age of Onset    Emphysema Mother     Liver disease Mother     Coronary artery disease Father     Hypertension Father     Liver disease Father     Heart failure Father     Stroke Father         CVA     Heart attack Father     Coronary artery disease Brother     Heart disease Brother         younger brother by pass and other brother 4 stents placed    Other Family         BACK PROBLEM     Stroke Family         CVA    Emphysema Family     Hypertension Family         BENIGN     Past Surgical History:   Procedure Laterality Date    BACK SURGERY      COLONOSCOPY      LUMBAR FUSION      IA ARTHRODESIS POSTERIOR/POSTEROLATERAL LUMBAR N/A 11/3/2016    Procedure: L2-S1 POSTERIOR LUMBAR FUSION AND DECOMPRESSION (Impulse), Posterior lateral fixation; dural repair ;  Surgeon: Jose Cruz Brennan MD;  [facility];  Service: Orthopedics    IA CABG, ARTERIAL, SINGLE N/A 1/19/2018    Procedure: CABG X4 with LIMA - LAD, SVG - RCA, OM2, & Diagonal ; Left Leg EVH; MATTHEW;  Surgeon: Megha John DO;  [facility];  Service: Cardiac Surgery    IA COLONOSCOPY FLX DX W/COLLJ SPEC WHEN PFRMD N/A 11/15/2017    Procedure: EGD AND COLONOSCOPY;  Surgeon: Tamia Abreu MD;  [facility];   Service: Gastroenterology    SEPTOPLASTY      LAST ASSESSED; [id number]    TONSILECTOMY AND ADNOIDECTOMY      LAST ASSESSED: [id number]       Current Outpatient Medications:     acetaminophen (Tylenol 8 Hour) 650 mg CR tablet, Take 650 mg by mouth every 8 (eight) hours as needed for mild pain, Disp: , Rfl:     Ascorbic Acid (Vitamin C) 500 MG PACK, Take 500 mg by mouth daily  , Disp: , Rfl:     aspirin (ECOTRIN LOW STRENGTH) 81 mg EC tablet, Take 81 mg by mouth daily, Disp: , Rfl:     atorvastatin (LIPITOR) 80 mg tablet, Take 1 tablet (80 mg total) by mouth every morning, Disp: 90 tablet, Rfl: 3    cholecalciferol (VITAMIN D3) 1,000 units tablet, Take 2,000 Units by mouth daily , Disp: , Rfl:     cyanocobalamin (VITAMIN B-12) 1,000 mcg tablet, Take 1,000 mcg by mouth daily  , Disp: , Rfl:     docusate sodium (COLACE) 100 mg capsule, Take 1 capsule (100 mg total) by mouth 2 (two) times a day as needed for constipation Hold for loose stools  , Disp: 60 capsule, Rfl: 0    famotidine (PEPCID) 20 mg tablet, Take 1 tablet (20 mg total) by mouth 2 (two) times a day, Disp: 60 tablet, Rfl: 0    metoprolol tartrate (LOPRESSOR) 50 mg tablet, Take 1 tablet (50 mg total) by mouth 2 (two) times a day, Disp: 60 tablet, Rfl: 3    Multiple Vitamin (MULTIVITAMIN) capsule, Take 1 capsule by mouth daily, Disp: , Rfl:     clotrimazole-betamethasone (LOTRISONE) 1-0 05 % cream, Apply topically 2 (two) times a day (Patient not taking: Reported on 3/4/2022 ), Disp: , Rfl:   Allergies   Allergen Reactions    Penicillins Other (See Comments)     Hallucinations; Patient reported that he was seeing visual disturbances         Imaging: No results found  Review of Systems:  Review of Systems   Constitutional: Negative  HENT: Negative  Eyes: Negative  Respiratory: Negative  Cardiovascular: Negative  Musculoskeletal: Positive for back pain  Negative for arthralgias, gait problem and joint swelling  Neurological: Negative  Physical Exam:  /74 (BP Location: Left arm, Patient Position: Sitting, Cuff Size: Large)   Pulse 74   Ht 5' 7 5" (1 715 m)   Wt 76 9 kg (169 lb 8 oz)   SpO2 98%   BMI 26 16 kg/m²   Physical Exam  Constitutional:       Appearance: He is well-developed  He is not ill-appearing, toxic-appearing or diaphoretic     HENT:      Head: Normocephalic  Eyes:      Pupils: Pupils are equal, round, and reactive to light  Neck:      Thyroid: No thyromegaly  Vascular: JVD present  No hepatojugular reflux  Trachea: No tracheal deviation  Cardiovascular:      Rate and Rhythm: Bradycardia present  Rhythm irregularly irregular  Pulses: Normal pulses  Heart sounds: No S3 or S4 sounds  Pulmonary:      Effort: Pulmonary effort is normal  No tachypnea, accessory muscle usage or respiratory distress  Breath sounds: Normal breath sounds  No stridor  No decreased breath sounds, wheezing or rales  Abdominal:      Palpations: Abdomen is soft  Musculoskeletal:         General: Normal range of motion  Right lower leg: Normal  No edema  Left lower leg: No edema  Skin:     General: Skin is warm  Coloration: Skin is not pale  Findings: No erythema or rash

## 2022-03-07 DIAGNOSIS — K21.9 GASTROESOPHAGEAL REFLUX DISEASE, UNSPECIFIED WHETHER ESOPHAGITIS PRESENT: ICD-10-CM

## 2022-03-07 DIAGNOSIS — I10 HYPERTENSION, UNSPECIFIED TYPE: ICD-10-CM

## 2022-03-07 RX ORDER — FAMOTIDINE 20 MG/1
20 TABLET, FILM COATED ORAL 2 TIMES DAILY
Qty: 90 TABLET | Refills: 0 | Status: SHIPPED | OUTPATIENT
Start: 2022-03-07 | End: 2022-04-19 | Stop reason: SDUPTHER

## 2022-03-07 RX ORDER — METOPROLOL TARTRATE 50 MG/1
75 TABLET, FILM COATED ORAL EVERY 12 HOURS SCHEDULED
Qty: 90 TABLET | Refills: 11 | OUTPATIENT
Start: 2022-03-07 | End: 2022-05-24

## 2022-04-01 ENCOUNTER — HOSPITAL ENCOUNTER (OUTPATIENT)
Dept: PULMONOLOGY | Facility: HOSPITAL | Age: 82
Discharge: HOME | End: 2022-04-01
Attending: INTERNAL MEDICINE
Payer: COMMERCIAL

## 2022-04-01 DIAGNOSIS — J84.10 PULMONARY FIBROSIS DETERMINED BY HIGH RESOLUTION COMPUTED TOMOGRAPHY (HCC): ICD-10-CM

## 2022-04-01 PROCEDURE — 94729 DIFFUSING CAPACITY: CPT | Performed by: INTERNAL MEDICINE

## 2022-04-01 PROCEDURE — 94760 N-INVAS EAR/PLS OXIMETRY 1: CPT

## 2022-04-01 PROCEDURE — 94726 PLETHYSMOGRAPHY LUNG VOLUMES: CPT

## 2022-04-01 PROCEDURE — 94729 DIFFUSING CAPACITY: CPT

## 2022-04-01 PROCEDURE — 94726 PLETHYSMOGRAPHY LUNG VOLUMES: CPT | Performed by: INTERNAL MEDICINE

## 2022-04-01 PROCEDURE — 94060 EVALUATION OF WHEEZING: CPT | Performed by: INTERNAL MEDICINE

## 2022-04-01 PROCEDURE — 94060 EVALUATION OF WHEEZING: CPT

## 2022-04-01 RX ORDER — ALBUTEROL SULFATE 2.5 MG/3ML
2.5 SOLUTION RESPIRATORY (INHALATION) ONCE
Status: COMPLETED | OUTPATIENT
Start: 2022-04-01 | End: 2022-04-01

## 2022-04-01 RX ADMIN — ALBUTEROL SULFATE 2.5 MG: 2.5 SOLUTION RESPIRATORY (INHALATION) at 08:53

## 2022-04-11 ENCOUNTER — OFFICE VISIT (OUTPATIENT)
Dept: PULMONOLOGY | Facility: CLINIC | Age: 82
End: 2022-04-11
Payer: COMMERCIAL

## 2022-04-11 VITALS
DIASTOLIC BLOOD PRESSURE: 74 MMHG | WEIGHT: 174.8 LBS | SYSTOLIC BLOOD PRESSURE: 142 MMHG | OXYGEN SATURATION: 95 % | RESPIRATION RATE: 16 BRPM | BODY MASS INDEX: 26.49 KG/M2 | HEART RATE: 66 BPM | TEMPERATURE: 97.1 F | HEIGHT: 68 IN

## 2022-04-11 DIAGNOSIS — J84.10 PULMONARY FIBROSIS DETERMINED BY HIGH RESOLUTION COMPUTED TOMOGRAPHY (HCC): Primary | ICD-10-CM

## 2022-04-11 PROCEDURE — 3077F SYST BP >= 140 MM HG: CPT | Performed by: INTERNAL MEDICINE

## 2022-04-11 PROCEDURE — 1036F TOBACCO NON-USER: CPT | Performed by: INTERNAL MEDICINE

## 2022-04-11 PROCEDURE — 3078F DIAST BP <80 MM HG: CPT | Performed by: INTERNAL MEDICINE

## 2022-04-11 PROCEDURE — 1160F RVW MEDS BY RX/DR IN RCRD: CPT | Performed by: INTERNAL MEDICINE

## 2022-04-11 PROCEDURE — 99213 OFFICE O/P EST LOW 20 MIN: CPT | Performed by: INTERNAL MEDICINE

## 2022-04-11 NOTE — ASSESSMENT & PLAN NOTE
I think the results of Suhail's pulmonary function tests are probably unreliable, and he reports that he had episodes where he could not maintain seal on the mouthpiece during testing which were not reported  Clinically, he remains the same and he exerts himself daily  We will instead watch clinically  Consider CT scan down the line, but there does not appear to be need for that without functional limitation

## 2022-04-11 NOTE — PROGRESS NOTES
Pulmonary Follow Up Note   Walter Borden 80 y o  male MRN: [id number]  4/11/2022    Assessment:    Pulmonary fibrosis determined by high resolution computed tomography [facility])  I think the results of Suhail's pulmonary function tests are probably unreliable, and he reports that he had episodes where he could not maintain seal on the mouthpiece during testing which were not reported  Clinically, he remains the same and he exerts himself daily  We will instead watch clinically  Consider CT scan down the line, but there does not appear to be need for that without functional limitation  Plan:    Diagnoses and all orders for this visit:    Pulmonary fibrosis determined by high resolution computed tomography [facility])    Return in about 6 months (around 10/11/2022)  History of Present Illness   HPI:  Walter Borden is a 80 y o  male who presents for routine follow-up  He reports no acute issues since his last appointment  He states that he continues to do squats and pushups daily and has noticed no change in his breathing with these exercises over the past four months  Repeat pulmonary function tests were performed and showed a relatively dramatic decrease in total lung capacity which appears to be more related to testing air than anything else, as he reports that he lost the seal on his mouth piece during testing  He also reports that he encounter significant cost and performing that test as well as and performing imaging tests, which make him hesitant to perform any additional testing  He has a stable baseline cough, he says he coughs about 4-5 times per day in short spurts  He does have some phlegm production with this  He notes no sleep issues  He notes no subjective acid reflux  Review of Systems   Respiratory: Positive for cough  Negative for shortness of breath  All other systems reviewed and are negative      Historical Information   Past Medical History:   Diagnosis Date    Abnormal liver function test     RESOLVED: [id number]    Allergic rhinitis     Anemia     LAST ASSESSED: [id number]    Arthritis     BPH (benign prostatic hyperplasia)     CAD (coronary artery disease)     Coronary artery disease     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HCC)     LAST ASSESSED: [id number]    Former tobacco use     GERD (gastroesophageal reflux disease)     Hand paresthesia     RESOLVED: [id number]    Hyperlipidemia     Hypertension     Lumbar stenosis     Peripheral artery disease (HCC)     LAST ASSESSED: [id number]    Stage 3a chronic kidney disease ([facility] ) 7/11/2021     Past Surgical History:   Procedure Laterality Date    BACK SURGERY      COLONOSCOPY      LUMBAR FUSION      GA ARTHRODESIS POSTERIOR/POSTEROLATERAL LUMBAR N/A 11/3/2016    Procedure: L2-S1 POSTERIOR LUMBAR FUSION AND DECOMPRESSION (Impulse), Posterior lateral fixation; dural repair ;  Surgeon: Yi Mcfadden MD;  [facility];  Service: Orthopedics    GA CABG, ARTERIAL, SINGLE N/A 1/19/2018    Procedure: CABG X4 with LIMA - LAD, SVG - RCA, OM2, & Diagonal ; Left Leg EVH; MATTHEW;  Surgeon: Stana Kussmaul, DO;  [facility];  Service: Cardiac Surgery    GA COLONOSCOPY FLX DX W/COLLJ SPEC WHEN PFRMD N/A 11/15/2017    Procedure: EGD AND COLONOSCOPY;  Surgeon: Jaime Goff MD;  [facility];   Service: Gastroenterology    SEPTOPLASTY      LAST ASSESSED; [id number]    TONSILECTOMY AND ADNOIDECTOMY      LAST ASSESSED: [id number]     Family History   Problem Relation Age of Onset    Emphysema Mother     Liver disease Mother     Coronary artery disease Father     Hypertension Father     Liver disease Father     Heart failure Father     Stroke Father         CVA     Heart attack Father     Coronary artery disease Brother     Heart disease Brother         younger brother by pass and other brother 3 stents placed    Other Family         BACK PROBLEM     Stroke Family         CVA    Emphysema Family     Hypertension Family         BENIGN       Meds/Allergies     Current Outpatient Medications:     acetaminophen (Tylenol 8 Hour) 650 mg CR tablet, Take 650 mg by mouth every 8 (eight) hours as needed for mild pain, Disp: , Rfl:     Ascorbic Acid (Vitamin C) 500 MG PACK, Take 500 mg by mouth daily  , Disp: , Rfl:     aspirin (ECOTRIN LOW STRENGTH) 81 mg EC tablet, Take 81 mg by mouth daily, Disp: , Rfl:     atorvastatin (LIPITOR) 80 mg tablet, Take 1 tablet (80 mg total) by mouth every morning, Disp: 90 tablet, Rfl: 3    cholecalciferol (VITAMIN D3) 1,000 units tablet, Take 2,000 Units by mouth daily , Disp: , Rfl:     cyanocobalamin (VITAMIN B-12) 1,000 mcg tablet, Take 1,000 mcg by mouth daily  , Disp: , Rfl:     famotidine (PEPCID) 20 mg tablet, Take 1 tablet (20 mg total) by mouth 2 (two) times a day, Disp: 90 tablet, Rfl: 0    metoprolol tartrate (LOPRESSOR) 50 mg tablet, Take 1 5 tablets (75 mg total) by mouth every 12 (twelve) hours, Disp: 90 tablet, Rfl: 11    Multiple Vitamin (MULTIVITAMIN) capsule, Take 1 capsule by mouth daily, Disp: , Rfl:     docusate sodium (COLACE) 100 mg capsule, Take 1 capsule (100 mg total) by mouth 2 (two) times a day as needed for constipation Hold for loose stools  , Disp: 60 capsule, Rfl: 0  Allergies   Allergen Reactions    Penicillins Other (See Comments)     Hallucinations; Patient reported that he was seeing visual disturbances         Vitals: Blood pressure 142/74, pulse 66, temperature (!) 97 1 °F (36 2 °C), temperature source Tympanic, resp  rate 16, height 5' 7 5" (1 715 m), weight 79 3 kg (174 lb 12 8 oz), SpO2 95 %  Body mass index is 26 97 kg/m²  Oxygen Therapy  SpO2: 95 %  Oxygen Therapy: None (Room air)    Physical Exam  Physical Exam  Vitals reviewed  Constitutional:       General: He is not in acute distress  Appearance: Normal appearance  He is well-developed  He is not ill-appearing  HENT:      Head: Normocephalic and atraumatic     Eyes:      General: No scleral icterus  Conjunctiva/sclera: Conjunctivae normal    Neck:      Thyroid: No thyromegaly  Vascular: No JVD  Cardiovascular:      Rate and Rhythm: Normal rate and regular rhythm  Heart sounds: Normal heart sounds  No murmur heard  No friction rub  No gallop  Pulmonary:      Effort: Pulmonary effort is normal  No respiratory distress  Breath sounds: Normal breath sounds  No wheezing or rales  Comments: No crackles heard on today's exam   Musculoskeletal:      Cervical back: Neck supple  Right lower leg: No edema  Left lower leg: No edema  Skin:     General: Skin is warm and dry  Findings: No rash  Neurological:      General: No focal deficit present  Mental Status: He is alert and oriented to person, place, and time  Mental status is at baseline  Psychiatric:         Mood and Affect: Mood normal          Behavior: Behavior normal      Labs: I have personally reviewed pertinent lab results  Lab Results   Component Value Date    WBC 8 17 10/18/2021    HGB 11 7 (L) 10/18/2021    HCT 35 8 (L) 10/18/2021     (H) 10/18/2021     10/18/2021     Lab Results   Component Value Date    GLUCOSE 140 01/19/2018    CALCIUM 8 0 (L) 10/18/2021     06/10/2015    K 3 4 (L) 10/18/2021    CO2 26 10/18/2021     10/18/2021    BUN 15 10/18/2021    CREATININE 1 04 10/18/2021     No results found for: IGE  Lab Results   Component Value Date    ALT 24 10/18/2021    AST 28 10/18/2021    ALKPHOS 102 10/18/2021    BILITOT 0 46 06/10/2015     Imaging and other studies: I have personally reviewed pertinent films in PACS    EKG, Pathology, and Other Studies: I have personally reviewed pertinent reports  [facility]

## 2022-04-19 DIAGNOSIS — K21.9 GASTROESOPHAGEAL REFLUX DISEASE, UNSPECIFIED WHETHER ESOPHAGITIS PRESENT: ICD-10-CM

## 2022-04-19 RX ORDER — FAMOTIDINE 20 MG/1
20 TABLET, FILM COATED ORAL 2 TIMES DAILY
Qty: 90 TABLET | Refills: 0 | Status: SHIPPED | OUTPATIENT
Start: 2022-04-19 | End: 2022-05-31

## 2022-05-24 DIAGNOSIS — I10 HYPERTENSION, UNSPECIFIED TYPE: ICD-10-CM

## 2022-05-24 RX ORDER — METOPROLOL TARTRATE 50 MG/1
TABLET, FILM COATED ORAL
Qty: 90 TABLET | Refills: 11 | Status: SHIPPED | OUTPATIENT
Start: 2022-05-24

## 2022-05-28 DIAGNOSIS — K21.9 GASTROESOPHAGEAL REFLUX DISEASE, UNSPECIFIED WHETHER ESOPHAGITIS PRESENT: ICD-10-CM

## 2022-05-31 RX ORDER — FAMOTIDINE 20 MG/1
TABLET, FILM COATED ORAL
Qty: 180 TABLET | Refills: 1 | Status: SHIPPED | OUTPATIENT
Start: 2022-05-31

## 2022-06-29 ENCOUNTER — LAB (OUTPATIENT)
Dept: LAB | Facility: CLINIC | Age: 82
End: 2022-06-29
Payer: COMMERCIAL

## 2022-06-29 DIAGNOSIS — I10 PRIMARY HYPERTENSION: ICD-10-CM

## 2022-06-29 LAB
ALBUMIN SERPL BCP-MCNC: 4.2 G/DL (ref 3.5–5)
ALP SERPL-CCNC: 87 U/L (ref 34–104)
ALT SERPL W P-5'-P-CCNC: 30 U/L (ref 7–52)
ANION GAP SERPL CALCULATED.3IONS-SCNC: 8 MMOL/L (ref 4–13)
AST SERPL W P-5'-P-CCNC: 45 U/L (ref 13–39)
BILIRUB SERPL-MCNC: 0.56 MG/DL (ref 0.2–1)
BUN SERPL-MCNC: 29 MG/DL (ref 5–25)
CALCIUM SERPL-MCNC: 9.4 MG/DL (ref 8.4–10.2)
CHLORIDE SERPL-SCNC: 103 MMOL/L (ref 96–108)
CO2 SERPL-SCNC: 28 MMOL/L (ref 21–32)
CREAT SERPL-MCNC: 1.12 MG/DL (ref 0.6–1.3)
GFR SERPL CREATININE-BSD FRML MDRD: 61 ML/MIN/1.73SQ M
GLUCOSE P FAST SERPL-MCNC: 90 MG/DL (ref 65–99)
POTASSIUM SERPL-SCNC: 4.9 MMOL/L (ref 3.5–5.3)
PROT SERPL-MCNC: 8.3 G/DL (ref 6.4–8.4)
SODIUM SERPL-SCNC: 139 MMOL/L (ref 135–147)

## 2022-06-29 PROCEDURE — 36415 COLL VENOUS BLD VENIPUNCTURE: CPT

## 2022-06-29 PROCEDURE — 80053 COMPREHEN METABOLIC PANEL: CPT

## 2022-06-30 NOTE — RESULT ENCOUNTER NOTE
Metabolic panel within normal limits other than a slightly elevated BUN and AST which may be due to slight dehydration

## 2022-07-05 ENCOUNTER — RA CDI HCC (OUTPATIENT)
Dept: OTHER | Facility: HOSPITAL | Age: 82
End: 2022-07-05

## 2022-07-05 NOTE — PROGRESS NOTES
[facility]  coding opportunities       Chart reviewed, no opportunity found:   [address]        Patients Insurance     Medicare Insurance: [employer]

## 2022-07-11 ENCOUNTER — OFFICE VISIT (OUTPATIENT)
Dept: FAMILY MEDICINE CLINIC | Facility: OTHER | Age: 82
End: 2022-07-11
Payer: COMMERCIAL

## 2022-07-11 VITALS
DIASTOLIC BLOOD PRESSURE: 86 MMHG | WEIGHT: 168.4 LBS | RESPIRATION RATE: 18 BRPM | SYSTOLIC BLOOD PRESSURE: 132 MMHG | BODY MASS INDEX: 25.52 KG/M2 | HEART RATE: 76 BPM | TEMPERATURE: 98.4 F | HEIGHT: 68 IN | OXYGEN SATURATION: 98 %

## 2022-07-11 DIAGNOSIS — I10 PRIMARY HYPERTENSION: Primary | ICD-10-CM

## 2022-07-11 DIAGNOSIS — E78.2 MIXED HYPERLIPIDEMIA: ICD-10-CM

## 2022-07-11 DIAGNOSIS — D64.9 CHRONIC ANEMIA: Chronic | ICD-10-CM

## 2022-07-11 PROCEDURE — 1160F RVW MEDS BY RX/DR IN RCRD: CPT | Performed by: FAMILY MEDICINE

## 2022-07-11 PROCEDURE — 3079F DIAST BP 80-89 MM HG: CPT | Performed by: FAMILY MEDICINE

## 2022-07-11 PROCEDURE — 99213 OFFICE O/P EST LOW 20 MIN: CPT | Performed by: FAMILY MEDICINE

## 2022-07-11 PROCEDURE — 3725F SCREEN DEPRESSION PERFORMED: CPT | Performed by: FAMILY MEDICINE

## 2022-07-11 PROCEDURE — 3075F SYST BP GE 130 - 139MM HG: CPT | Performed by: FAMILY MEDICINE

## 2022-07-11 NOTE — PROGRESS NOTES
Assessment/Plan:    No problem-specific Assessment & Plan notes found for this encounter  Diagnoses and all orders for this visit:    Primary hypertension  Comments:  BP goal <150/90 (ideally below 140/90)  Followed by Cardiology   Continue BB and LM's  Check renal function  Orders:  -     Comprehensive metabolic panel; Future    Mixed hyperlipidemia  Comments:  LDL goal <70  Check fasting lipids  Continue atorvastatin 80 mg daily  Orders:  -     Lipid Panel with Direct LDL reflex; Future    Chronic anemia  Comments:  Likely 2/2 chronic disease  Check CBC with next lab draw  Orders:  -     CBC and differential; Future      BMI Counseling: Body mass index is 25 99 kg/m²  The BMI is above normal  Nutrition recommendations include decreasing portion sizes, encouraging healthy choices of fruits and vegetables, decreasing fast food intake, consuming healthier snacks, limiting drinks that contain sugar, moderation in carbohydrate intake, increasing intake of lean protein, reducing intake of saturated and trans fat and reducing intake of cholesterol  Exercise recommendations include exercising 3-5 times per week  Rationale for BMI follow-up plan is due to patient being overweight or obese  Depression Screening and Follow-up Plan: Patient was screened for depression during today's encounter  They screened negative with a PHQ-2 score of 0  Return in about 6 months (around 1/11/2023) for AWV  The patient indicates understanding of these issues and agrees with the plan  Subjective:      Patient ID: Devon Kebede is a 80 y o  male  Hypertension  This is a chronic problem  The current episode started more than 1 year ago  The problem has been waxing and waning since onset  The problem is controlled  Pertinent negatives include no anxiety, blurred vision, chest pain, headaches, malaise/fatigue, neck pain, orthopnea, palpitations, peripheral edema, PND, shortness of breath or sweats   There are no associated agents to hypertension  Risk factors for coronary artery disease include dyslipidemia, family history, male gender and sedentary lifestyle  Past treatments include beta blockers and lifestyle changes  The current treatment provides moderate improvement  Compliance problems include diet, exercise and psychosocial issues  Hypertensive end-organ damage includes CAD/MI and PVD  There is no history of kidney disease, CVA, heart failure, left ventricular hypertrophy or retinopathy  There is no history of chronic renal disease, a hypertension causing med or a thyroid problem  Hyperlipidemia  This is a chronic problem  The current episode started more than 1 year ago  He has no history of chronic renal disease, diabetes, hypothyroidism, liver disease, obesity or nephrotic syndrome  There are no known factors aggravating his hyperlipidemia  Pertinent negatives include no chest pain, focal sensory loss, focal weakness, leg pain, myalgias or shortness of breath  Current antihyperlipidemic treatment includes statins  The current treatment provides moderate improvement of lipids  Compliance problems include adherence to diet, adherence to exercise and psychosocial issues  Risk factors for coronary artery disease include a sedentary lifestyle, male sex, hypertension, dyslipidemia and family history         The following portions of the patient's history were reviewed and updated as appropriate: allergies, current medications, past family history, past medical history, past social history, past surgical history and problem list       Current Outpatient Medications:     acetaminophen (TYLENOL) 650 mg CR tablet, Take 650 mg by mouth every 8 (eight) hours as needed for mild pain, Disp: , Rfl:     Ascorbic Acid (Vitamin C) 500 MG PACK, Take 500 mg by mouth daily  , Disp: , Rfl:     aspirin (ECOTRIN LOW STRENGTH) 81 mg EC tablet, Take 81 mg by mouth daily, Disp: , Rfl:     atorvastatin (LIPITOR) 80 mg tablet, Take 1 tablet (80 mg total) by mouth every morning, Disp: 90 tablet, Rfl: 3    cholecalciferol (VITAMIN D3) 1,000 units tablet, Take 2,000 Units by mouth daily , Disp: , Rfl:     cyanocobalamin (VITAMIN B-12) 1,000 mcg tablet, Take 1,000 mcg by mouth daily  , Disp: , Rfl:     famotidine (PEPCID) 20 mg tablet, TAKE 1 TABLET BY MOUTH TWICE A DAY, Disp: 180 tablet, Rfl: 1    metoprolol tartrate (LOPRESSOR) 50 mg tablet, TAKE 1 AND 1/2 TABLETS BY MOUTH EVERY 12 HOURS, Disp: 90 tablet, Rfl: 11    Multiple Vitamin (MULTIVITAMIN) capsule, Take 1 capsule by mouth daily, Disp: , Rfl:     docusate sodium (COLACE) 100 mg capsule, Take 1 capsule (100 mg total) by mouth 2 (two) times a day as needed for constipation Hold for loose stools  , Disp: 60 capsule, Rfl: 0      Review of Systems   Constitutional: Negative for activity change, fatigue, fever and malaise/fatigue  HENT: Negative for congestion, ear pain, sinus pain and sore throat  Eyes: Negative for blurred vision, pain, itching and visual disturbance  Respiratory: Negative for cough and shortness of breath  Cardiovascular: Negative for chest pain, palpitations, orthopnea, leg swelling and PND  Gastrointestinal: Negative for abdominal pain, constipation, diarrhea, nausea and vomiting  Endocrine: Negative for cold intolerance and heat intolerance  Genitourinary: Negative for dysuria  Musculoskeletal: Negative for myalgias and neck pain  Skin: Negative for color change and rash  Neurological: Negative for dizziness, focal weakness, syncope and headaches  Hematological: Negative for adenopathy  Psychiatric/Behavioral: Negative for dysphoric mood  The patient is not nervous/anxious  Objective:      /86   Pulse 76   Temp 98 4 °F (36 9 °C)   Resp 18   Ht 5' 7 5" (1 715 m)   Wt 76 4 kg (168 lb 6 4 oz)   SpO2 98%   BMI 25 99 kg/m²          Physical Exam  Vitals and nursing note reviewed     Constitutional:       General: He is not in acute distress  Appearance: Normal appearance  He is well-developed  He is not ill-appearing  Comments: Body mass index is 25 99 kg/m²  HENT:      Head: Normocephalic and atraumatic  Eyes:      General: No scleral icterus  Conjunctiva/sclera: Conjunctivae normal       Pupils: Pupils are equal, round, and reactive to light  Cardiovascular:      Rate and Rhythm: Normal rate and regular rhythm  Heart sounds: Normal heart sounds  No murmur heard  Pulmonary:      Effort: Pulmonary effort is normal  No respiratory distress  Breath sounds: Normal breath sounds  Abdominal:      General: Bowel sounds are normal  There is no distension  Palpations: Abdomen is soft  Tenderness: There is no abdominal tenderness  Musculoskeletal:         General: Normal range of motion  Cervical back: Normal range of motion and neck supple  Lymphadenopathy:      Cervical: No cervical adenopathy  Skin:     General: Skin is warm and dry  Findings: No rash  Neurological:      Mental Status: He is alert and oriented to person, place, and time     Psychiatric:         Behavior: Behavior normal

## 2022-07-12 PROBLEM — M47.817 OSTEOARTHRITIS OF LUMBOSACRAL SPINE WITHOUT MYELOPATHY: Status: ACTIVE | Noted: 2022-07-12

## 2022-07-12 PROBLEM — M70.60 TROCHANTERIC BURSITIS: Status: ACTIVE | Noted: 2022-07-12

## 2022-08-17 ENCOUNTER — OFFICE VISIT (OUTPATIENT)
Dept: FAMILY MEDICINE CLINIC | Facility: OTHER | Age: 82
End: 2022-08-17
Payer: COMMERCIAL

## 2022-08-17 VITALS
WEIGHT: 168.8 LBS | DIASTOLIC BLOOD PRESSURE: 76 MMHG | SYSTOLIC BLOOD PRESSURE: 132 MMHG | RESPIRATION RATE: 18 BRPM | HEART RATE: 77 BPM | TEMPERATURE: 98 F | BODY MASS INDEX: 25.58 KG/M2 | HEIGHT: 68 IN | OXYGEN SATURATION: 98 %

## 2022-08-17 DIAGNOSIS — M54.50 LEFT-SIDED LOW BACK PAIN WITHOUT SCIATICA, UNSPECIFIED CHRONICITY: Primary | ICD-10-CM

## 2022-08-17 PROCEDURE — 99213 OFFICE O/P EST LOW 20 MIN: CPT

## 2022-08-17 RX ORDER — IBUPROFEN 800 MG/1
800 TABLET ORAL EVERY 6 HOURS PRN
Qty: 60 TABLET | Refills: 0 | Status: SHIPPED | OUTPATIENT
Start: 2022-08-17

## 2022-08-17 NOTE — PROGRESS NOTES
Assessment/Plan:    Patient started having severe back pain 10 days ago which was not relieved with Tylenol and Aleve  He stated he switched to taking ibuprofen 800mg yesterday and had significant relief of pain throughout the day today  He would like to continue trying the ibuprofen for several days at the same strength  If he is still having pain on Monday (in 5 days), he will call the office and we can send a medrol dose pack in for him to try  I believe his pain is likely due to nerve inflammation at this time given the dermatomal distribution without presence of rash or lesions (therefore decreasing the likelihood of shingles)  However, if this pain is muscular, he may also have relief given this course of treatment  I do not believe imaging at this time would change the course of treatment  Diagnoses and all orders for this visit:    Left-sided low back pain without sciatica, unspecified chronicity  -     ibuprofen (MOTRIN) 800 mg tablet; Take 1 tablet (800 mg total) by mouth every 6 (six) hours as needed for moderate pain    Return in about 2 weeks (around 8/31/2022), or if symptoms worsen or fail to improve, for Recheck back pain  Subjective:      Patient ID: Jigar Lind is a 80 y o  male  [facility] states that 10 days ago he started having severe back pain, significant enough to bring him to his knees  He was getting out of bed when he developed pain which is made worse with walking  When he is laying on his back or side in bed, there is no problem  Seated he is having no pain as long as he is able to pad his lower back with a pillow  He has been taking Aleve and Tylenol, then switched to ibuprofen 800mg starting yesterday  He does have a history of two minor duodenal ulcers  The pain is left of center in his back and radiates around to the front in a horizontal plane  He states the pain was an 11 of 10 when he was having it, but he is not currently having any pain   He has a family history of spinal stenosis and spondylolisthesis  Back Pain  This is a new problem  The current episode started 1 to 4 weeks ago (10 days ago)  The problem occurs constantly (from last monday until this morning)  The problem has been waxing and waning since onset  The pain is present in the lumbar spine  Radiates to: anterior abdomen  The pain is at a severity of 10/10  The pain is severe  The pain is worse during the day  The symptoms are aggravated by bending, standing, twisting and position  Associated symptoms include abdominal pain, chest pain (hx of left-sided chest pain which is due to cartilage) and numbness (chronic, started after spinal surgery years ago)  Pertinent negatives include no bladder incontinence, bowel incontinence, dysuria, fever, leg pain, perianal numbness or weakness  He has tried NSAIDs and bed rest for the symptoms  The treatment provided significant relief  The following portions of the patient's history were reviewed and updated as appropriate: allergies, current medications, past family history, past medical history, past social history, past surgical history and problem list     Review of Systems   Constitutional: Negative for fever  HENT: Negative for congestion, rhinorrhea and sore throat  Respiratory: Positive for shortness of breath (chronic)  Cardiovascular: Positive for chest pain (hx of left-sided chest pain which is due to cartilage)  Gastrointestinal: Positive for abdominal pain and blood in stool (some red on toilet paper)  Negative for bowel incontinence, constipation and diarrhea  Genitourinary: Negative for bladder incontinence, difficulty urinating, dysuria, frequency and hematuria  Musculoskeletal: Positive for back pain  Skin: Negative for rash  Neurological: Positive for numbness (chronic, started after spinal surgery years ago)  Negative for weakness           Objective:      /76   Pulse 77   Temp 98 °F (36 7 °C)   Resp 18   Ht 5' 7 5" (1 715 m)   Wt 76 6 kg (168 lb 12 8 oz)   SpO2 98%   BMI 26 05 kg/m²       Current Outpatient Medications:     acetaminophen (TYLENOL) 650 mg CR tablet, Take 650 mg by mouth every 8 (eight) hours as needed for mild pain, Disp: , Rfl:     Ascorbic Acid (Vitamin C) 500 MG PACK, Take 500 mg by mouth daily  , Disp: , Rfl:     aspirin (ECOTRIN LOW STRENGTH) 81 mg EC tablet, Take 81 mg by mouth daily, Disp: , Rfl:     atorvastatin (LIPITOR) 80 mg tablet, Take 1 tablet (80 mg total) by mouth every morning, Disp: 90 tablet, Rfl: 3    cholecalciferol (VITAMIN D3) 1,000 units tablet, Take 2,000 Units by mouth daily , Disp: , Rfl:     cyanocobalamin (VITAMIN B-12) 1,000 mcg tablet, Take 1,000 mcg by mouth daily  , Disp: , Rfl:     famotidine (PEPCID) 20 mg tablet, TAKE 1 TABLET BY MOUTH TWICE A DAY, Disp: 180 tablet, Rfl: 1    ibuprofen (MOTRIN) 800 mg tablet, Take 1 tablet (800 mg total) by mouth every 6 (six) hours as needed for moderate pain, Disp: 60 tablet, Rfl: 0    metoprolol tartrate (LOPRESSOR) 50 mg tablet, TAKE 1 AND 1/2 TABLETS BY MOUTH EVERY 12 HOURS, Disp: 90 tablet, Rfl: 11    Multiple Vitamin (MULTIVITAMIN) capsule, Take 1 capsule by mouth daily, Disp: , Rfl:     docusate sodium (COLACE) 100 mg capsule, Take 1 capsule (100 mg total) by mouth 2 (two) times a day as needed for constipation Hold for loose stools  , Disp: 60 capsule, Rfl: 0       Physical Exam  Vitals reviewed  Constitutional:       General: He is not in acute distress  Appearance: He is not ill-appearing  Comments: Body mass index is 26 05 kg/m²  Eyes:      Extraocular Movements: Extraocular movements intact  Conjunctiva/sclera: Conjunctivae normal    Cardiovascular:      Rate and Rhythm: Normal rate and regular rhythm  Pulses: Normal pulses  Heart sounds: Normal heart sounds  Pulmonary:      Effort: Pulmonary effort is normal  No respiratory distress  Breath sounds: Normal breath sounds  No stridor   No wheezing, rhonchi or rales  Abdominal:      General: Bowel sounds are normal       Palpations: Abdomen is soft  Musculoskeletal:      Right lower leg: No edema  Left lower leg: No edema  Neurological:      General: No focal deficit present  Mental Status: He is alert and oriented to person, place, and time     Psychiatric:         Mood and Affect: Mood normal          Behavior: Behavior normal

## 2022-08-23 ENCOUNTER — OFFICE VISIT (OUTPATIENT)
Dept: GASTROENTEROLOGY | Facility: CLINIC | Age: 82
End: 2022-08-23
Payer: COMMERCIAL

## 2022-08-23 VITALS
HEIGHT: 68 IN | SYSTOLIC BLOOD PRESSURE: 155 MMHG | BODY MASS INDEX: 25.76 KG/M2 | WEIGHT: 170 LBS | HEART RATE: 75 BPM | DIASTOLIC BLOOD PRESSURE: 80 MMHG

## 2022-08-23 DIAGNOSIS — K62.5 RECTAL BLEEDING: Primary | ICD-10-CM

## 2022-08-23 DIAGNOSIS — K21.9 GASTROESOPHAGEAL REFLUX DISEASE, UNSPECIFIED WHETHER ESOPHAGITIS PRESENT: ICD-10-CM

## 2022-08-23 DIAGNOSIS — K64.8 INTERNAL HEMORRHOIDS: ICD-10-CM

## 2022-08-23 DIAGNOSIS — K57.90 DIVERTICULOSIS: ICD-10-CM

## 2022-08-23 DIAGNOSIS — R74.01 ELEVATED AST (SGOT): ICD-10-CM

## 2022-08-23 PROCEDURE — 3079F DIAST BP 80-89 MM HG: CPT | Performed by: NURSE PRACTITIONER

## 2022-08-23 PROCEDURE — 1160F RVW MEDS BY RX/DR IN RCRD: CPT | Performed by: NURSE PRACTITIONER

## 2022-08-23 PROCEDURE — 3077F SYST BP >= 140 MM HG: CPT | Performed by: NURSE PRACTITIONER

## 2022-08-23 PROCEDURE — 99204 OFFICE O/P NEW MOD 45 MIN: CPT | Performed by: NURSE PRACTITIONER

## 2022-08-23 NOTE — PROGRESS NOTES
[facility] - Outpatient Consultation  Simi Talbert 80 y o  male MRN: [id number]  Encounter: [id number]          ASSESSMENT AND PLAN:      1  Rectal bleeding  2  Internal/external hemorrhoids   3  Diverticulosis  Patient reports 1 episode of rectal bleeding noted with wiping 2 weeks ago, no further episodes since  He does have history of internal/external hemorrhoids and diverticula seen on last colonoscopy in 2017  No colon polyps noted at that time  Denies family history of colon cancer, unintended weight loss  He reports recent constipation/bloating but this has now resolved and is taking colace PRN  Hemoglobin has been stable around 10-11 since 2018  Patient and daughter would like to defer colonoscopy for now in favor of conservative approach   -Check CBC, occult stool  -Increase dietary fiber to 25-30g daily or start Metamucil 1 tbsp daily  -Consider colonoscopy if drop in Hgb, positive occult stool, further rectal bleeding  -     Occult Blood, Fecal Immunochemical; Future  -Increase fiber    4  GERD  5  History of duodenal ulcer  Last EGD in November 2017 showed esophagitis, gastritis, duodenitis with duodenal ulcers  Biopsy taken from stomach body was negative for H pylori  He is now maintained on Pepcid 20 mg daily, denies any reflux, nausea/vomiting, abdominal pain  Currently taking NSAIDs for back pain   -Advised patient to reduce NSAID use possible and take Tylenol instead  -If he has to take NSAIDs, take on a full stomach  -Continue Pepcid   ______________________________________________________________________    HPI:  Simi Talbert is a 80 y o  male with history of pulmonary fibrosis, CAD, PAD, carotid stenosis, chronic anemia,  CABG x4 who presents with daughter for evaluation of rectal bleeding/anemia  He noticed blood when he wiped about 2 weeks ago, hasn't noticed any rectal bleeding since that time   He was constipated after noticing the rectal bleeding and took senna and colace which resolved his constipation and now only takes colace PRN  Denies having any rectal pain or discomfort  Last EGD/coloscopy was with Dr Wilfredo Frazier in 2017 which showed duodenitis with duodenal ulcers, gastritis, mild esophagitis, normal terminal ileum, diverticulosis, internal/external hemorrhoids  Reports abdominal bloating and discomfort  No reflux, nausea/vomiting, dysphagia/odynophagia  He reports black stool when he drinks red wine  He's recently been taking Ibuprofen since last week for back pain which has been ongoing for the last couple weeks mostly in lower back and L hip area  Takes Pepcid 20 mg daily  Drinks 2-3 wyatt/water daily  3 ounces of wyatt in each drink  Hasn't had any alcohol in 3 weeks  Former tobacco use (quit 10y ago)  Denies drug use  Denies family history colon cancer  REVIEW OF SYSTEMS:    CONSTITUTIONAL: Denies any fever, chills, rigors, and weight loss  HEENT: No earache or tinnitus  CARDIOVASCULAR: No chest pain or palpitations  RESPIRATORY: Denies any cough, hemoptysis, shortness of breath or dyspnea on exertion  GASTROINTESTINAL: As noted in the History of Present Illness  GENITOURINARY: Denies any hematuria or dysuria  NEUROLOGIC: No dizziness or vertigo  MUSCULOSKELETAL: Denies any joint swellings  SKIN: Denies skin rashes or itching  ENDOCRINE: Denies excessive thirst  Denies intolerance to heat or cold  PSYCHOSOCIAL: Denies depression or anxiety  Denies any recent memory loss         Historical Information   Past Medical History:   Diagnosis Date    Abnormal liver function test     RESOLVED: [id number]    Allergic rhinitis     Anemia     LAST ASSESSED: [id number]    Arthritis     BPH (benign prostatic hyperplasia)     CAD (coronary artery disease)     Coronary artery disease     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HCC)     LAST ASSESSED: [id number]    Former tobacco use     GERD (gastroesophageal reflux disease)     Hand paresthesia RESOLVED: [id number]    Hyperlipidemia     Hypertension     Lumbar stenosis     Peripheral artery disease (HCC)     LAST ASSESSED: 2017    Stage 3a chronic kidney disease ([facility] [address] ) 2021     Past Surgical History:   Procedure Laterality Date    BACK SURGERY      COLONOSCOPY      LUMBAR FUSION      MN ARTHRODESIS POSTERIOR/POSTEROLATERAL LUMBAR N/A 2016    Procedure: L2-S1 POSTERIOR LUMBAR FUSION AND DECOMPRESSION (Impulse), Posterior lateral fixation; dural repair ;  Surgeon: Carlin Hunter MD;  [facility];  Service: Orthopedics    MN CABG, ARTERIAL, SINGLE N/A 2018    Procedure: CABG X4 with LIMA - LAD, SVG - RCA, OM2, & Diagonal ; Left Leg EVH; MATTHEW;  Surgeon: Francies Homans, DO;  [facility];  Service: Cardiac Surgery    MN COLONOSCOPY FLX DX W/COLLJ SPEC WHEN PFRMD N/A 11/15/2017    Procedure: EGD AND COLONOSCOPY;  Surgeon: Tina Ba MD;  [facility];   Service: Gastroenterology    SEPTOPLASTY      LAST ASSESSED; [id number]    TONSILECTOMY AND ADNOIDECTOMY      LAST ASSESSED: [id number]    UPPER GASTROINTESTINAL ENDOSCOPY       Social History   Social History     Substance and Sexual Activity   Alcohol Use Yes    Alcohol/week: 1 0 standard drink    Types: 1 Shots of liquor per week    Comment: beer, wine, scotch every day; SOCIAL AS PER ALL SCRIPTS      Social History     Substance and Sexual Activity   Drug Use Not Currently    Types: Marijuana    Comment: medical clarke     Social History     Tobacco Use   Smoking Status Former Smoker    Packs/day: 1 00    Years: 50 00    Pack years: 50 00    Types: Cigarettes    Quit date:     Years since quittin 6   Smokeless Tobacco Never Used     Family History   Problem Relation Age of Onset    Emphysema Mother     Liver disease Mother     Coronary artery disease Father     Hypertension Father     Liver disease Father     Heart failure Father     Stroke Father         CVA     Heart attack Father     Coronary artery disease Brother     Heart disease Brother         younger brother by pass and other brother 4 stents placed    Other Family         BACK PROBLEM     Stroke Family         CVA    Emphysema Family     Hypertension Family         BENIGN       Meds/Allergies       Current Outpatient Medications:     Ascorbic Acid (Vitamin C) 500 MG PACK    aspirin (ECOTRIN LOW STRENGTH) 81 mg EC tablet    atorvastatin (LIPITOR) 80 mg tablet    cholecalciferol (VITAMIN D3) 1,000 units tablet    cyanocobalamin (VITAMIN B-12) 1,000 mcg tablet    famotidine (PEPCID) 20 mg tablet    ibuprofen (MOTRIN) 800 mg tablet    metoprolol tartrate (LOPRESSOR) 50 mg tablet    Multiple Vitamin (MULTIVITAMIN) capsule    acetaminophen (TYLENOL) 650 mg CR tablet    docusate sodium (COLACE) 100 mg capsule    Allergies   Allergen Reactions    Penicillins Other (See Comments)     Hallucinations; Patient reported that he was seeing visual disturbances             Objective     Blood pressure 155/80, pulse 75, height 5' 7 5" (1 715 m), weight 77 1 kg (170 lb)  Body mass index is 26 23 kg/m²  PHYSICAL EXAM:      General Appearance:   Alert, cooperative, no distress   HEENT:   Normocephalic, atraumatic, anicteric  Neck:  Supple, symmetrical, trachea midline   Lungs:   Clear to auscultation bilaterally; no rales, rhonchi or wheezing; respirations unlabored    Heart[de-identified]   Regular rate and rhythm; no murmur  Abdomen:   Soft, non-tender, non-distended; normal bowel sounds; no masses, no organomegaly    Genitalia:   Deferred    Rectal:   Deferred    Extremities:  No cyanosis, clubbing or edema    Skin:  No jaundice, rashes, or lesions    Lymph nodes:  No palpable cervical lymphadenopathy        Lab Results:   No visits with results within 1 Day(s) from this visit     Latest known visit with results is:   Lab on 06/29/2022   Component Date Value    Sodium 06/29/2022 139     Potassium 06/29/2022 4 9     Chloride 06/29/2022 103     CO2 06/29/2022 28     ANION GAP 06/29/2022 8     BUN 06/29/2022 29 (A)    Creatinine 06/29/2022 1 12     Glucose, Fasting 06/29/2022 90     Calcium 06/29/2022 9 4     AST 06/29/2022 45 (A)    ALT 06/29/2022 30     Alkaline Phosphatase 06/29/2022 87     Total Protein 06/29/2022 8 3     Albumin 06/29/2022 4 2     Total Bilirubin 06/29/2022 0 56     eGFR 06/29/2022 61          Radiology Results:   No results found

## 2022-08-23 NOTE — PATIENT INSTRUCTIONS
High Fiber Diet   WHAT YOU NEED TO KNOW:   A high-fiber diet includes foods that have a high amount of fiber  Fiber is the part of fruits, vegetables, and grains that is not broken down by your body  Fiber keeps your bowel movements regular  Fiber can also help lower your cholesterol level, control blood sugar in people with diabetes, and relieve constipation  Fiber can also help you control your weight because it helps you feel full faster  Most adults should eat 25 to 35 grams of fiber each day  Talk to your dietitian or healthcare provider about the amount of fiber you need  DISCHARGE INSTRUCTIONS:   Good sources of fiber:       Foods with at least 4 grams of fiber per serving:      ? to ½ cup of high-fiber cereal (check the nutrition label on the box)    ½ cup of blackberries or raspberries    4 dried prunes    1 cooked artichoke    ½ cup of cooked legumes, such as lentils, or red, kidney, and isaac beans    Foods with 1 to 3 grams of fiber per servin slice of whole-wheat, pumpernickel, or rye bread    ½ cup of cooked brown rice    4 whole-wheat crackers    1 cup of oatmeal    ½ cup of cereal with 1 to 3 grams of fiber per serving (check the nutrition label on the box)    1 small piece of fruit, such as an apple, banana, pear, kiwi, or orange    3 dates    ½ cup of canned apricots, fruit cocktail, peaches, or pears    ½ cup of raw or cooked vegetables, such as carrots, cauliflower, cabbage, spinach, squash, or corn    Ways that you can increase fiber in your diet:   Choose brown or wild rice instead of white rice  Use whole wheat flour in recipes instead of white or all-purpose flour  Add beans and peas to casseroles or soups  Choose fresh fruit and vegetables with peels or skins on instead of juices  Other diet guidelines to follow: Add fiber to your diet slowly  You may have abdominal discomfort, bloating, and gas if you add fiber to your diet too quickly       Drink plenty of liquids as you add fiber to your diet  You may have nausea or develop constipation if you do not drink enough water  Ask how much liquid to drink each day and which liquids are best for you  © Copyright [employer] 2022 Information is for End User's use only and may not be sold, redistributed or otherwise used for commercial purposes  All illustrations and images included in CareNotes® are the copyrighted property of A D A M , Inc  or [facility] Manuel Weiner   The above information is an  only  It is not intended as medical advice for individual conditions or treatments  Talk to your doctor, nurse or pharmacist before following any medical regimen to see if it is safe and effective for you

## 2022-08-24 ENCOUNTER — LAB (OUTPATIENT)
Dept: LAB | Facility: CLINIC | Age: 82
End: 2022-08-24
Payer: COMMERCIAL

## 2022-08-24 DIAGNOSIS — I10 PRIMARY HYPERTENSION: ICD-10-CM

## 2022-08-24 DIAGNOSIS — D64.9 CHRONIC ANEMIA: Chronic | ICD-10-CM

## 2022-08-24 DIAGNOSIS — K62.5 RECTAL BLEEDING: ICD-10-CM

## 2022-08-24 DIAGNOSIS — E78.2 MIXED HYPERLIPIDEMIA: ICD-10-CM

## 2022-08-24 LAB
ALBUMIN SERPL BCP-MCNC: 3.9 G/DL (ref 3.5–5)
ALP SERPL-CCNC: 97 U/L (ref 34–104)
ALT SERPL W P-5'-P-CCNC: 14 U/L (ref 7–52)
ANION GAP SERPL CALCULATED.3IONS-SCNC: 13 MMOL/L (ref 4–13)
AST SERPL W P-5'-P-CCNC: 20 U/L (ref 13–39)
BASOPHILS # BLD AUTO: 0.06 THOUSANDS/ΜL (ref 0–0.1)
BASOPHILS NFR BLD AUTO: 1 % (ref 0–1)
BILIRUB SERPL-MCNC: 0.32 MG/DL (ref 0.2–1)
BUN SERPL-MCNC: 40 MG/DL (ref 5–25)
CALCIUM SERPL-MCNC: 9.6 MG/DL (ref 8.4–10.2)
CHLORIDE SERPL-SCNC: 109 MMOL/L (ref 96–108)
CHOLEST SERPL-MCNC: 112 MG/DL
CO2 SERPL-SCNC: 22 MMOL/L (ref 21–32)
CREAT SERPL-MCNC: 2.19 MG/DL (ref 0.6–1.3)
EOSINOPHIL # BLD AUTO: 0.29 THOUSAND/ΜL (ref 0–0.61)
EOSINOPHIL NFR BLD AUTO: 3 % (ref 0–6)
ERYTHROCYTE [DISTWIDTH] IN BLOOD BY AUTOMATED COUNT: 13.7 % (ref 11.6–15.1)
GFR SERPL CREATININE-BSD FRML MDRD: 27 ML/MIN/1.73SQ M
GLUCOSE P FAST SERPL-MCNC: 91 MG/DL (ref 65–99)
HCT VFR BLD AUTO: 35.1 % (ref 36.5–49.3)
HDLC SERPL-MCNC: 52 MG/DL
HEMOCCULT STL QL IA: NEGATIVE
HGB BLD-MCNC: 11.6 G/DL (ref 12–17)
IMM GRANULOCYTES # BLD AUTO: 0.05 THOUSAND/UL (ref 0–0.2)
IMM GRANULOCYTES NFR BLD AUTO: 1 % (ref 0–2)
LDLC SERPL CALC-MCNC: 41 MG/DL (ref 0–100)
LYMPHOCYTES # BLD AUTO: 2.65 THOUSANDS/ΜL (ref 0.6–4.47)
LYMPHOCYTES NFR BLD AUTO: 25 % (ref 14–44)
MCH RBC QN AUTO: 34.7 PG (ref 26.8–34.3)
MCHC RBC AUTO-ENTMCNC: 33 G/DL (ref 31.4–37.4)
MCV RBC AUTO: 105 FL (ref 82–98)
MONOCYTES # BLD AUTO: 0.98 THOUSAND/ΜL (ref 0.17–1.22)
MONOCYTES NFR BLD AUTO: 9 % (ref 4–12)
NEUTROPHILS # BLD AUTO: 6.73 THOUSANDS/ΜL (ref 1.85–7.62)
NEUTS SEG NFR BLD AUTO: 61 % (ref 43–75)
NRBC BLD AUTO-RTO: 0 /100 WBCS
PLATELET # BLD AUTO: 225 THOUSANDS/UL (ref 149–390)
PMV BLD AUTO: 11.4 FL (ref 8.9–12.7)
POTASSIUM SERPL-SCNC: 4.2 MMOL/L (ref 3.5–5.3)
PROT SERPL-MCNC: 8 G/DL (ref 6.4–8.4)
RBC # BLD AUTO: 3.34 MILLION/UL (ref 3.88–5.62)
SODIUM SERPL-SCNC: 144 MMOL/L (ref 135–147)
TRIGL SERPL-MCNC: 95 MG/DL
WBC # BLD AUTO: 10.76 THOUSAND/UL (ref 4.31–10.16)

## 2022-08-24 PROCEDURE — 85025 COMPLETE CBC W/AUTO DIFF WBC: CPT

## 2022-08-24 PROCEDURE — 80053 COMPREHEN METABOLIC PANEL: CPT

## 2022-08-24 PROCEDURE — G0328 FECAL BLOOD SCRN IMMUNOASSAY: HCPCS

## 2022-08-24 PROCEDURE — 80061 LIPID PANEL: CPT

## 2022-08-24 PROCEDURE — 36415 COLL VENOUS BLD VENIPUNCTURE: CPT

## 2022-08-29 ENCOUNTER — HOSPITAL ENCOUNTER (OUTPATIENT)
Dept: NON INVASIVE DIAGNOSTICS | Facility: CLINIC | Age: 82
Discharge: HOME | End: 2022-08-29
Payer: COMMERCIAL

## 2022-08-29 DIAGNOSIS — I73.9 PERIPHERAL ARTERY DISEASE (HCC): ICD-10-CM

## 2022-08-29 DIAGNOSIS — I65.23 CAROTID STENOSIS, ASYMPTOMATIC, BILATERAL: ICD-10-CM

## 2022-08-29 PROCEDURE — 93922 UPR/L XTREMITY ART 2 LEVELS: CPT | Performed by: SURGERY

## 2022-08-29 PROCEDURE — 93880 EXTRACRANIAL BILAT STUDY: CPT | Performed by: SURGERY

## 2022-08-29 PROCEDURE — 93923 UPR/LXTR ART STDY 3+ LVLS: CPT

## 2022-08-29 PROCEDURE — 93880 EXTRACRANIAL BILAT STUDY: CPT

## 2022-08-29 PROCEDURE — 93925 LOWER EXTREMITY STUDY: CPT

## 2022-08-29 PROCEDURE — 93925 LOWER EXTREMITY STUDY: CPT | Performed by: SURGERY

## 2022-08-30 ENCOUNTER — TELEPHONE (OUTPATIENT)
Dept: VASCULAR SURGERY | Facility: CLINIC | Age: 82
End: 2022-08-30

## 2022-08-30 ENCOUNTER — TELEPHONE (OUTPATIENT)
Dept: FAMILY MEDICINE CLINIC | Facility: OTHER | Age: 82
End: 2022-08-30

## 2022-08-30 DIAGNOSIS — N17.9 ACUTE RENAL FAILURE, UNSPECIFIED ACUTE RENAL FAILURE TYPE (HCC): Primary | ICD-10-CM

## 2022-08-30 NOTE — TELEPHONE ENCOUNTER
Patient called left message on office phone said he can't find the result that shows his kidney function deteriorating, he was not happy that the PCP did not call him with the results even though we gave patient exactly what PCP advised   He would like a call explaining the results and what the next step is with his pain in his lower back  His number is [phone]  Please advise   Thank you

## 2022-08-30 NOTE — RESULT ENCOUNTER NOTE
Please inform pt that kidney function has deteriorated (it is unclear why at this time)  He should significantly increase his water intake over the next 5-7 days and have his BMP rechecked on 9/6  Thanks!   Biju Adam, DO

## 2022-08-30 NOTE — TELEPHONE ENCOUNTER
OV scheduled 9/14/22 @ 11:30 am     ----- Message from Jamie Dolan sent at 8/30/2022  9:28 AM EDT -----  Call patient for OV

## 2022-08-31 NOTE — TELEPHONE ENCOUNTER
Returned phone call to patient  Message with labs was reiterated -- explained in detail that BUN/Cr and eGFR have changed significantly since June  Explained that this could be due to dehydration, NSAIDs, or combo -- my advice (as outlined by staff 8/30) is to drink more water and recheck labs in 1 wk  He has already decreased his ibuprofen intake secondary to GI issue  He is scheduled to see Dr Amanda Garnica tomorrow (9/1) regarding his back pain  The plan from his last visit 8/17 was to let us know if he still had significant pain the week of 8/22 as we would try a medrol dosepak  Pt says he has been trying to reach us, but the only documented calls from him were in the last 24 hrs      Nahomi Brennan, DO

## 2022-08-31 NOTE — TELEPHONE ENCOUNTER
The patient left another voice message regarding the message below and would like you to call him explaining the results  He is very upset and frustrated    Thank you!

## 2022-09-01 ENCOUNTER — TELEPHONE (OUTPATIENT)
Dept: FAMILY MEDICINE CLINIC | Facility: OTHER | Age: 82
End: 2022-09-01

## 2022-09-01 ENCOUNTER — OFFICE VISIT (OUTPATIENT)
Dept: FAMILY MEDICINE CLINIC | Facility: OTHER | Age: 82
End: 2022-09-01
Payer: COMMERCIAL

## 2022-09-01 VITALS
HEIGHT: 68 IN | TEMPERATURE: 97.8 F | HEART RATE: 64 BPM | RESPIRATION RATE: 18 BRPM | SYSTOLIC BLOOD PRESSURE: 108 MMHG | OXYGEN SATURATION: 98 % | WEIGHT: 169.2 LBS | BODY MASS INDEX: 25.64 KG/M2 | DIASTOLIC BLOOD PRESSURE: 70 MMHG

## 2022-09-01 DIAGNOSIS — M54.50 ACUTE LOW BACK PAIN, UNSPECIFIED BACK PAIN LATERALITY, UNSPECIFIED WHETHER SCIATICA PRESENT: Primary | ICD-10-CM

## 2022-09-01 DIAGNOSIS — R07.81 RIB PAIN ON LEFT SIDE: ICD-10-CM

## 2022-09-01 LAB
SL AMB  POCT GLUCOSE, UA: NORMAL
SL AMB LEUKOCYTE ESTERASE,UA: 70
SL AMB POCT BILIRUBIN,UA: NORMAL
SL AMB POCT BLOOD,UA: NORMAL
SL AMB POCT CLARITY,UA: CLEAR
SL AMB POCT COLOR,UA: YELLOW
SL AMB POCT KETONES,UA: NORMAL
SL AMB POCT NITRITE,UA: NORMAL
SL AMB POCT PH,UA: 6
SL AMB POCT SPECIFIC GRAVITY,UA: 1.02
SL AMB POCT URINE PROTEIN: 15
SL AMB POCT UROBILINOGEN: 0.2

## 2022-09-01 PROCEDURE — 81002 URINALYSIS NONAUTO W/O SCOPE: CPT | Performed by: FAMILY MEDICINE

## 2022-09-01 PROCEDURE — 99214 OFFICE O/P EST MOD 30 MIN: CPT | Performed by: FAMILY MEDICINE

## 2022-09-01 PROCEDURE — 87086 URINE CULTURE/COLONY COUNT: CPT | Performed by: FAMILY MEDICINE

## 2022-09-01 RX ORDER — ACETAMINOPHEN AND CODEINE PHOSPHATE 300; 30 MG/1; MG/1
1 TABLET ORAL DAILY PRN
Qty: 20 TABLET | Refills: 0 | Status: SHIPPED | OUTPATIENT
Start: 2022-09-01 | End: 2022-09-23

## 2022-09-01 NOTE — PROGRESS NOTES
Assessment/Plan:    No problem-specific Assessment & Plan notes found for this encounter  Problem List Items Addressed This Visit    None     Visit Diagnoses     Acute low back pain, unspecified back pain laterality, unspecified whether sciatica present    -  Primary    Relevant Medications    acetaminophen-codeine (TYLENOL #3) 300-30 mg per tablet    Other Relevant Orders    POCT urine dip    XR spine lumbar minimum 4 views non injury    UA w Reflex to Microscopic w Reflex to Culture -Lab Collect    Rib pain on left side        Relevant Medications    acetaminophen-codeine (TYLENOL #3) 300-30 mg per tablet    Other Relevant Orders    XR ribs 2 vw left       Recommend he stop taking NSAID's due to decline in renal function  Repeat Bmet per PCP and ensure adequate hydration  FU with PCP on results  Back/Rib pain:  Take Tylenol with codeine as needed with precautions  Fall precautions advised as well and discussed the Risks associated with the medication intake  Check back and rib xray to rule out acute bone abnormalities  If the imaging is normal may consider medrol dose pack as an option  Urine dip in office shows LE and some protein and negative for Nitrite or blood  Less likely renal calculi as the cause  Will send urine sample for culture  Will be treating according to results  FU visit encouraged however, per daughter it's not convenient for patient and he would like to have call back with results and further recommendations  He is also interested in having virtual visits in future is appropriate  Subjective:      Patient ID: More Matos is a 80 y o  male  Patient presents today for recheck on back/rib pain that started more than 2 weeks ago  He denies having any falls or injury prior to the episode  Denies any fever or chills associated    He was seen on 8/17and advised to take Iburpofen 800 mg as needed for the discomfort as per patient this regimen was helpful  He states the pain has been persistent since  It improves with the pain medication but he notices the pain getting worse in the evening hours  At the time the discomfort is mild to none but worse with pressure on the lower left rib area or when he stands up and walks  He was also evaluated with labs which has shown acute renal function decline as well as a mild increase in wbc  He was advised to have repeat BMET and to stay hydrated with water  Of note he has been continuiing to take iburprofen for pain as tylenol is not helping  Patient and daughter Jose Chambers is here today to address patient symptoms          The following portions of the patient's history were reviewed and updated as appropriate:   Current Outpatient Medications   Medication Sig Dispense Refill    acetaminophen-codeine (TYLENOL #3) 300-30 mg per tablet Take 1 tablet by mouth daily as needed for moderate pain 20 tablet 0    Ascorbic Acid (Vitamin C) 500 MG PACK Take 500 mg by mouth daily        aspirin (ECOTRIN LOW STRENGTH) 81 mg EC tablet Take 81 mg by mouth daily      atorvastatin (LIPITOR) 80 mg tablet Take 1 tablet (80 mg total) by mouth every morning 90 tablet 3    cholecalciferol (VITAMIN D3) 1,000 units tablet Take 2,000 Units by mouth daily       cyanocobalamin (VITAMIN B-12) 1,000 mcg tablet Take 1,000 mcg by mouth daily        famotidine (PEPCID) 20 mg tablet TAKE 1 TABLET BY MOUTH TWICE A  tablet 1    ibuprofen (MOTRIN) 800 mg tablet Take 1 tablet (800 mg total) by mouth every 6 (six) hours as needed for moderate pain 60 tablet 0    metoprolol tartrate (LOPRESSOR) 50 mg tablet TAKE 1 AND 1/2 TABLETS BY MOUTH EVERY 12 HOURS 90 tablet 11    Multiple Vitamin (MULTIVITAMIN) capsule Take 1 capsule by mouth daily      acetaminophen (TYLENOL) 650 mg CR tablet Take 650 mg by mouth every 8 (eight) hours as needed for mild pain (Patient not taking: No sig reported)      docusate sodium (COLACE) 100 mg capsule Take 1 capsule (100 mg total) by mouth 2 (two) times a day as needed for constipation Hold for loose stools  60 capsule 0     No current facility-administered medications for this visit  He is allergic to penicillins       Review of Systems   Constitutional: Negative for appetite change, chills, fatigue, fever and unexpected weight change  HENT: Negative for congestion  Respiratory: Negative for cough and shortness of breath  Cardiovascular: Negative for chest pain and leg swelling  Gastrointestinal: Negative for abdominal pain, nausea and vomiting  Genitourinary: Negative for dysuria and hematuria  Musculoskeletal: Positive for arthralgias, back pain and myalgias  Skin: Negative for pallor  Neurological: Negative for dizziness  Objective:      /70   Pulse 64   Temp 97 8 °F (36 6 °C)   Resp 18   Ht 5' 7 5" (1 715 m)   Wt 76 7 kg (169 lb 3 2 oz)   SpO2 98%   BMI 26 11 kg/m²          Physical Exam  Vitals and nursing note reviewed  Constitutional:       General: He is not in acute distress  Appearance: Normal appearance  He is well-developed  He is not diaphoretic  HENT:      Head: Normocephalic and atraumatic  Eyes:      General: No scleral icterus  Right eye: No discharge  Left eye: No discharge  Cardiovascular:      Rate and Rhythm: Normal rate and regular rhythm  Heart sounds: Normal heart sounds  No murmur heard  Pulmonary:      Effort: Pulmonary effort is normal  No respiratory distress  Breath sounds: Normal breath sounds  No wheezing  Abdominal:      General: Bowel sounds are normal       Palpations: Abdomen is soft  Tenderness: There is no abdominal tenderness  There is no right CVA tenderness or left CVA tenderness  Musculoskeletal:         General: Tenderness present  Cervical back: Normal range of motion and neck supple  No rigidity  Right lower leg: No edema  Left lower leg: No edema        Comments: Tenderness noted on palpation of the left lower lateral ribs with deep palpation  No skin rash or bruise or open wounds noted  Skin:     Coloration: Skin is not pale  Findings: No rash  Neurological:      General: No focal deficit present  Mental Status: He is alert and oriented to person, place, and time  Psychiatric:         Thought Content:  Thought content normal              POCT urine dip      Component 9/1/22  1:28 PM    LEUKOCYTE ESTERASE,UA 70    NITRITE,UA neg    SL AMB POCT UROBILINOGEN 0 2    POCT URINE PROTEIN 15     PH,UA 6 0    BLOOD,UA neg    SPECIFIC GRAVITY,UA 1 020    KETONES,UA neg    BILIRUBIN,UA neg    GLUCOSE, UA neg     COLOR,UA yellow    CLARITY,UA clear               Specimen Collected: 09/01/22  1:28 PM Last Resulted: 09/01/22  1:28 PM              Lab Results   Component Value Date    WBC 10 76 (H) 08/24/2022    HGB 11 6 (L) 08/24/2022    HCT 35 1 (L) 08/24/2022     08/24/2022    CHOL 238 06/10/2015    TRIG 95 08/24/2022    HDL 52 08/24/2022    ALT 14 08/24/2022    AST 20 08/24/2022     06/10/2015    K 4 2 08/24/2022     (H) 08/24/2022    CREATININE 2 19 (H) 08/24/2022    BUN 40 (H) 08/24/2022    CO2 22 08/24/2022    PSA <0 1 06/29/2020    INR 0 88 12/20/2017    GLUF 91 08/24/2022    HGBA1C 5 2 06/29/2020     Urine culture:  Pending results

## 2022-09-01 NOTE — TELEPHONE ENCOUNTER
Daughter Chaitanya Bray called back and left message on office machine she said the dr called about patients UA     Please call daughter back @ [phone]    Thank you

## 2022-09-02 ENCOUNTER — TELEPHONE (OUTPATIENT)
Dept: FAMILY MEDICINE CLINIC | Facility: OTHER | Age: 82
End: 2022-09-02

## 2022-09-02 ENCOUNTER — HOSPITAL ENCOUNTER (OUTPATIENT)
Dept: RADIOLOGY | Facility: HOSPITAL | Age: 82
Discharge: HOME | End: 2022-09-02
Attending: FAMILY MEDICINE
Payer: COMMERCIAL

## 2022-09-02 DIAGNOSIS — R07.81 RIB PAIN ON LEFT SIDE: ICD-10-CM

## 2022-09-02 DIAGNOSIS — S32.010A CLOSED COMPRESSION FRACTURE OF BODY OF L1 VERTEBRA (HCC): Primary | ICD-10-CM

## 2022-09-02 DIAGNOSIS — M54.50 ACUTE LOW BACK PAIN, UNSPECIFIED BACK PAIN LATERALITY, UNSPECIFIED WHETHER SCIATICA PRESENT: ICD-10-CM

## 2022-09-02 LAB — BACTERIA UR CULT: NORMAL

## 2022-09-02 PROCEDURE — 71100 X-RAY EXAM RIBS UNI 2 VIEWS: CPT

## 2022-09-02 PROCEDURE — 72110 X-RAY EXAM L-2 SPINE 4/>VWS: CPT

## 2022-09-02 NOTE — TELEPHONE ENCOUNTER
Spoke to pt's daughter Dillon Minar) via telephone regarding Lumbar XR results showing L1 compression fx  Explained that next step would be visit with IR to determine his eligibility for kyphoplasty vs vertebroplasty  Referral made, will try for appointment ASAP  She states that pain is controlled with Tylenol #3 and no other pain medication is needed/requested at this time      Anita Betancourt,

## 2022-09-04 RX ORDER — SENNOSIDES 8.6 MG
650 CAPSULE ORAL EVERY 8 HOURS PRN
Qty: 30 TABLET | Refills: 0 | Status: CANCELLED | OUTPATIENT
Start: 2022-09-04

## 2022-09-04 NOTE — TELEPHONE ENCOUNTER
Medication Refill Request     acetaminophen-codeine (TYLENOL #3) 300-30 mg per tablet 1 tablet, Daily PRN 0 ordered          Summary: Take 1 tablet by mouth daily as needed for moderate pain, Starting u 9/1/2022          Verified pharmacy   [x]  Verified ordering Provider   [x]  Does patient have enough for the next 3 days?  Yes [] No [x]

## 2022-09-06 ENCOUNTER — TELEMEDICINE (OUTPATIENT)
Dept: INTERVENTIONAL RADIOLOGY/VASCULAR | Facility: CLINIC | Age: 82
End: 2022-09-06
Payer: COMMERCIAL

## 2022-09-06 ENCOUNTER — TELEPHONE (OUTPATIENT)
Dept: VASCULAR SURGERY | Facility: CLINIC | Age: 82
End: 2022-09-06

## 2022-09-06 ENCOUNTER — TELEPHONE (OUTPATIENT)
Dept: FAMILY MEDICINE CLINIC | Facility: OTHER | Age: 82
End: 2022-09-06

## 2022-09-06 DIAGNOSIS — M80.08XA AGE-RELATED OSTEOPOROSIS WITH CURRENT PATHOLOGICAL FRACTURE, VERTEBRA(E), INITIAL ENCOUNTER FOR FRACTURE (HCC): Primary | ICD-10-CM

## 2022-09-06 DIAGNOSIS — S32.010A CLOSED COMPRESSION FRACTURE OF BODY OF L1 VERTEBRA (HCC): ICD-10-CM

## 2022-09-06 PROCEDURE — 99204 OFFICE O/P NEW MOD 45 MIN: CPT | Performed by: RADIOLOGY

## 2022-09-06 RX ORDER — SODIUM CHLORIDE 9 MG/ML
75 INJECTION, SOLUTION INTRAVENOUS CONTINUOUS
OUTPATIENT
Start: 2022-09-06

## 2022-09-06 NOTE — PROGRESS NOTES
Virtual Regular Visit    Verification of patient location:    Patient is located in the following state in which I hold an active license [place]      Assessment/Plan:    Problem List Items Addressed This Visit    None     Visit Diagnoses     Closed compression fracture of body of L1 vertebra ([facility] [address] )                   Reason for visit is   Chief Complaint   Patient presents with    Virtual Regular Visit        Encounter provider Axel Coronel MD    Provider located at [address] [place] [address] [id number] [facility] [id number] [phone]      Recent Visits  Date Type Provider Dept   09/02/22 Telephone DO Jesus Tolliver   Showing recent visits within past 7 days and meeting all other requirements  Future Appointments  No visits were found meeting these conditions  Showing future appointments within next 150 days and meeting all other requirements       The patient was identified by name and date of birth  Marcell Neri was informed that this is a telemedicine visit and that the visit is being conducted through [facility] and patient was informed this is a secure, HIPAA-complaint platform  He agrees to proceed     My office door was closed  No one else was in the room  He acknowledged consent and understanding of privacy and security of the video platform  The patient has agreed to participate and understands they can discontinue the visit at any time  Patient is aware this is a billable service  Subjective  Marcell eNri is a 80 y o  male with lower back pain for 6 weeks  He states that the pain started while getting out of bed 5 weeks ago and is severe  He rates is 10/10 and it limits the activities of daily living  The pain has not improved with conservative medical management of rest, ibuprofen or tylenol with codeine  The patient has difficulty getting out of bed, showering or even standing or walking    His lumbar x-ray from 9/2 shows an L1 superior endplate compression deformity with no retropulsion  It was not present on the MRI of 5/30/19  I will order an MRI of the lumbar spine ASAP after pre-authorization  We will then get the patient scheduled ASAP for kyphoplasty due to the severity of his pain  I told the family that I have to schedule him with one of my partners since I will be away for 2 weeks and do not want to delay the procedure  The patient underwent L2-S1 fixation due to spinal stenosis by Dr Yordy Cai 6 years ago  I explained the procedure of kyphoplasty or vertebroplasty to the patient and daughter as well as the risks of benefits of the procedure in great detail and they wish to proceed      HPI     Past Medical History:   Diagnosis Date    Abnormal liver function test     RESOLVED: [id number]    Allergic rhinitis     Anemia     LAST ASSESSED: [id number]    Arthritis     BPH (benign prostatic hyperplasia)     CAD (coronary artery disease)     Coronary artery disease     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HCC)     LAST ASSESSED: [id number]    Former tobacco use     GERD (gastroesophageal reflux disease)     Hand paresthesia     RESOLVED: [id number]    Hyperlipidemia     Hypertension     Lumbar stenosis     Peripheral artery disease (HCC)     LAST ASSESSED: [id number]    Stage 3a chronic kidney disease ([facility] [address] ) 7/11/2021       Past Surgical History:   Procedure Laterality Date    BACK SURGERY      COLONOSCOPY      LUMBAR FUSION      CA ARTHRODESIS POSTERIOR/POSTEROLATERAL LUMBAR N/A 11/03/2016    Procedure: L2-S1 POSTERIOR LUMBAR FUSION AND DECOMPRESSION (Impulse), Posterior lateral fixation; dural repair ;  Surgeon: Carlin Hunter MD;  Location: [facility];  Service: Orthopedics    CA CABG, ARTERIAL, SINGLE N/A 01/19/2018    Procedure: CABG X4 with LIMA - LAD, SVG - RCA, OM2, & Diagonal ; Left Leg EVH; MATTHEW;  Surgeon: Francies Homans, DO;  Location: [facility];  Service: Cardiac Surgery    CA COLONOSCOPY FLX DX W/DAVIE SPEC WHEN PFRMD N/A 11/15/2017    Procedure: EGD AND COLONOSCOPY;  Surgeon: Treasure Archibald MD;  Location: [facility]; Service: Gastroenterology    SEPTOPLASTY      LAST ASSESSED; [id number]    TONSILECTOMY AND ADNOIDECTOMY      LAST ASSESSED: [id number]    UPPER GASTROINTESTINAL ENDOSCOPY         Current Outpatient Medications   Medication Sig Dispense Refill    acetaminophen (TYLENOL) 650 mg CR tablet Take 650 mg by mouth every 8 (eight) hours as needed for mild pain (Patient not taking: No sig reported)      acetaminophen-codeine (TYLENOL #3) 300-30 mg per tablet Take 1 tablet by mouth daily as needed for moderate pain 20 tablet 0    Ascorbic Acid (Vitamin C) 500 MG PACK Take 500 mg by mouth daily        aspirin (ECOTRIN LOW STRENGTH) 81 mg EC tablet Take 81 mg by mouth daily      atorvastatin (LIPITOR) 80 mg tablet Take 1 tablet (80 mg total) by mouth every morning 90 tablet 3    cholecalciferol (VITAMIN D3) 1,000 units tablet Take 2,000 Units by mouth daily       cyanocobalamin (VITAMIN B-12) 1,000 mcg tablet Take 1,000 mcg by mouth daily        docusate sodium (COLACE) 100 mg capsule Take 1 capsule (100 mg total) by mouth 2 (two) times a day as needed for constipation Hold for loose stools  60 capsule 0    famotidine (PEPCID) 20 mg tablet TAKE 1 TABLET BY MOUTH TWICE A  tablet 1    ibuprofen (MOTRIN) 800 mg tablet Take 1 tablet (800 mg total) by mouth every 6 (six) hours as needed for moderate pain 60 tablet 0    metoprolol tartrate (LOPRESSOR) 50 mg tablet TAKE 1 AND 1/2 TABLETS BY MOUTH EVERY 12 HOURS 90 tablet 11    Multiple Vitamin (MULTIVITAMIN) capsule Take 1 capsule by mouth daily       No current facility-administered medications for this visit  Allergies   Allergen Reactions    Penicillins Other (See Comments)     Hallucinations;   Patient reported that he was seeing visual disturbances         Review of Systems    Video Exam    There were no vitals filed for this visit     Physical Exam     I spent 60 minutes with patient today in which greater than 50% of the time was spent in counseling/coordination of care regarding his back pain and L1 compression fracture

## 2022-09-06 NOTE — TELEPHONE ENCOUNTER
Daughter [address] called and was not happy this morning said she has not heard back from anyone re: setting appointment up with IR for her dad (patient)  She said that PCP (dr Елена Morrison was going to call to set up)     I did call IR this morning to try to schedule patient but was put on hold and put on a call back list   When I hear back from IR I will try to get information on scheduling for them or give daughter info on how to go about scheduling

## 2022-09-06 NOTE — TELEPHONE ENCOUNTER
We tried to set up appt with IR on Friday, but they would not allow us to schedule for the patient (they have their own scheduling process that requires at least a virtual visit with IR physician before procedure)  Mary Mayorga spoke with IR and relayed this message to Yordy Sary on Friday afternoon  IR  should be getting in touch with them as soon as they are able  Ann, would you be able to see if the IR  can call family today? Thanks!   Glo Ewing, DO

## 2022-09-06 NOTE — LETTER
September 6, 2022     Patrick [id number]    Patient: Ashu Riddle   YOB: 1940   Date of Visit: 9/6/2022       Dear Dr Ino Ames:    Thank you for referring Ashu Riddle to me for evaluation  Below are my notes for this consultation  If you have questions, please do not hesitate to call me  I look forward to following your patient along with you  Sincerely,        Kimber Mo MD        CC: MD Kimber Arceo MD  9/6/2022  1:01 PM  Sign when Signing Visit  Virtual Regular Visit    Verification of patient location:    Patient is located in the following state in which I hold an active license [place]      Assessment/Plan:    Problem List Items Addressed This Visit    None     Visit Diagnoses     Closed compression fracture of body of L1 vertebra ([facility] [address] )                   Reason for visit is   Chief Complaint   Patient presents with    Virtual Regular Visit        Encounter provider Kimber Mo MD    Provider located at [address] [place], [place] [id number] [place] [id number] [phone]      Recent Visits  Date Type Provider Dept   09/02/22 Telephone DO Jesus Womack   Showing recent visits within past 7 days and meeting all other requirements  Future Appointments  No visits were found meeting these conditions  Showing future appointments within next 150 days and meeting all other requirements       The patient was identified by name and date of birth  Ashu Riddle was informed that this is a telemedicine visit and that the visit is being conducted through [facility] and patient was informed this is a secure, HIPAA-complaint platform  He agrees to proceed     My office door was closed  No one else was in the room  He acknowledged consent and understanding of privacy and security of the video platform   The patient has agreed to participate and understands they can discontinue the visit at any time  Patient is aware this is a billable service  Subjective  Odilia Avendaño is a 80 y o  male with lower back pain for 6 weeks  He states that the pain started while getting out of bed 5 weeks ago and is severe  He rates is 10/10 and it limits the activities of daily living  The pain has not improved with conservative medical management of rest, ibuprofen or tylenol with codeine  The patient has difficulty getting out of bed, showering or even standing or walking  His lumbar x-ray from 9/2 shows an L1 superior endplate compression deformity with no retropulsion  It was not present on the MRI of 5/30/19  I will order an MRI of the lumbar spine ASAP after pre-authorization  We will then get the patient scheduled ASAP due to the severity of his pain  I told the family that I have to schedule him with one of my partners since I will be away for 2 weeks and do not want to delay the procedure  The patient underwent L2-S1 fixation due to spinal stenosis by Dr Osei Parra 6 years ago        HPI     Past Medical History:   Diagnosis Date    Abnormal liver function test     RESOLVED: [id number]    Allergic rhinitis     Anemia     LAST ASSESSED: [id number]    Arthritis     BPH (benign prostatic hyperplasia)     CAD (coronary artery disease)     Coronary artery disease     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HCC)     LAST ASSESSED: [id number]    Former tobacco use     GERD (gastroesophageal reflux disease)     Hand paresthesia     RESOLVED: [id number]    Hyperlipidemia     Hypertension     Lumbar stenosis     Peripheral artery disease (HCC)     LAST ASSESSED: [id number]    Stage 3a chronic kidney disease ([facility] [address] ) 7/11/2021       Past Surgical History:   Procedure Laterality Date    BACK SURGERY      COLONOSCOPY      LUMBAR FUSION      VA ARTHRODESIS POSTERIOR/POSTEROLATERAL LUMBAR N/A 11/03/2016    Procedure: L2-S1 POSTERIOR LUMBAR FUSION AND DECOMPRESSION (Impulse), Posterior lateral fixation; dural repair ;  Surgeon: Gerri Zamarripa MD;  Location: [facility];  Service: Orthopedics    ID CABG, ARTERIAL, SINGLE N/A 01/19/2018    Procedure: CABG X4 with LIMA - LAD, SVG - RCA, OM2, & Diagonal ; Left Leg EVH; MATTHEW;  Surgeon: Mihai Rose DO;  Location: [facility];  Service: Cardiac Surgery    ID COLONOSCOPY FLX DX W/COLLJ SPEC WHEN PFRMD N/A 11/15/2017    Procedure: EGD AND COLONOSCOPY;  Surgeon: Fly Mendez MD;  [facility]; Service: Gastroenterology    SEPTOPLASTY      LAST ASSESSED; [id number]    TONSILECTOMY AND ADNOIDECTOMY      LAST ASSESSED: [id number]    UPPER GASTROINTESTINAL ENDOSCOPY         Current Outpatient Medications   Medication Sig Dispense Refill    acetaminophen (TYLENOL) 650 mg CR tablet Take 650 mg by mouth every 8 (eight) hours as needed for mild pain (Patient not taking: No sig reported)      acetaminophen-codeine (TYLENOL #3) 300-30 mg per tablet Take 1 tablet by mouth daily as needed for moderate pain 20 tablet 0    Ascorbic Acid (Vitamin C) 500 MG PACK Take 500 mg by mouth daily        aspirin (ECOTRIN LOW STRENGTH) 81 mg EC tablet Take 81 mg by mouth daily      atorvastatin (LIPITOR) 80 mg tablet Take 1 tablet (80 mg total) by mouth every morning 90 tablet 3    cholecalciferol (VITAMIN D3) 1,000 units tablet Take 2,000 Units by mouth daily       cyanocobalamin (VITAMIN B-12) 1,000 mcg tablet Take 1,000 mcg by mouth daily        docusate sodium (COLACE) 100 mg capsule Take 1 capsule (100 mg total) by mouth 2 (two) times a day as needed for constipation Hold for loose stools   60 capsule 0    famotidine (PEPCID) 20 mg tablet TAKE 1 TABLET BY MOUTH TWICE A  tablet 1    ibuprofen (MOTRIN) 800 mg tablet Take 1 tablet (800 mg total) by mouth every 6 (six) hours as needed for moderate pain 60 tablet 0    metoprolol tartrate (LOPRESSOR) 50 mg tablet TAKE 1 AND 1/2 TABLETS BY MOUTH EVERY 12 HOURS 90 tablet 11    Multiple Vitamin (MULTIVITAMIN) capsule Take 1 capsule by mouth daily       No current facility-administered medications for this visit  Allergies   Allergen Reactions    Penicillins Other (See Comments)     Hallucinations; Patient reported that he was seeing visual disturbances         Review of Systems    Video Exam    There were no vitals filed for this visit  Physical Exam     I spent 60 minutes with patient today in which greater than 50% of the time was spent in counseling/coordination of care regarding his back pain and L1 compression fracture

## 2022-09-07 ENCOUNTER — TELEPHONE (OUTPATIENT)
Dept: FAMILY MEDICINE CLINIC | Facility: OTHER | Age: 82
End: 2022-09-07

## 2022-09-07 ENCOUNTER — LAB (OUTPATIENT)
Dept: LAB | Facility: CLINIC | Age: 82
DRG: 872 | End: 2022-09-07
Payer: COMMERCIAL

## 2022-09-07 DIAGNOSIS — N17.9 ACUTE RENAL FAILURE, UNSPECIFIED ACUTE RENAL FAILURE TYPE (HCC): Primary | ICD-10-CM

## 2022-09-07 DIAGNOSIS — M54.50 ACUTE LOW BACK PAIN, UNSPECIFIED BACK PAIN LATERALITY, UNSPECIFIED WHETHER SCIATICA PRESENT: ICD-10-CM

## 2022-09-07 DIAGNOSIS — N18.31 STAGE 3A CHRONIC KIDNEY DISEASE (HCC): ICD-10-CM

## 2022-09-07 DIAGNOSIS — N17.9 ACUTE RENAL FAILURE, UNSPECIFIED ACUTE RENAL FAILURE TYPE (HCC): ICD-10-CM

## 2022-09-07 LAB
ANION GAP SERPL CALCULATED.3IONS-SCNC: 6 MMOL/L (ref 4–13)
BUN SERPL-MCNC: 25 MG/DL (ref 5–25)
CALCIUM SERPL-MCNC: 9.5 MG/DL (ref 8.4–10.2)
CHLORIDE SERPL-SCNC: 109 MMOL/L (ref 96–108)
CO2 SERPL-SCNC: 26 MMOL/L (ref 21–32)
CREAT SERPL-MCNC: 1.4 MG/DL (ref 0.6–1.3)
GFR SERPL CREATININE-BSD FRML MDRD: 46 ML/MIN/1.73SQ M
GLUCOSE P FAST SERPL-MCNC: 98 MG/DL (ref 65–99)
POTASSIUM SERPL-SCNC: 4.5 MMOL/L (ref 3.5–5.3)
SODIUM SERPL-SCNC: 141 MMOL/L (ref 135–147)

## 2022-09-07 PROCEDURE — 80048 BASIC METABOLIC PNL TOTAL CA: CPT

## 2022-09-07 PROCEDURE — 36415 COLL VENOUS BLD VENIPUNCTURE: CPT

## 2022-09-07 NOTE — TELEPHONE ENCOUNTER
Please ask patient to disregard the second UA as it was ordered in case he couldn't come to office with his sample    He doesn't need any additional urine test

## 2022-09-07 NOTE — RESULT ENCOUNTER NOTE
Please call pt ASAP to inform him that his kidney function is improving  He should maintain good oral hydration and avoid NSAIDs as much as possible  I have also sent this info in a [employer]  Thanks!   Justin Uribe, DO

## 2022-09-07 NOTE — TELEPHONE ENCOUNTER
The patient left a message this morning asking why you had ordered a 2nd UA? He said he completed one in the office last week and asked if he needs to do this 2nd one? He did not get it done when he went for his blood work    Please advise    Thank you!

## 2022-09-08 ENCOUNTER — HOSPITAL ENCOUNTER (INPATIENT)
Facility: HOSPITAL | Age: 82
LOS: 3 days | Discharge: HOME | DRG: 872 | End: 2022-09-11
Attending: EMERGENCY MEDICINE | Admitting: INTERNAL MEDICINE
Payer: COMMERCIAL

## 2022-09-08 ENCOUNTER — APPOINTMENT (EMERGENCY)
Dept: CT IMAGING | Facility: HOSPITAL | Age: 82
DRG: 872 | End: 2022-09-08
Payer: COMMERCIAL

## 2022-09-08 DIAGNOSIS — N39.0 UTI (URINARY TRACT INFECTION): ICD-10-CM

## 2022-09-08 DIAGNOSIS — E83.42 HYPOMAGNESEMIA: ICD-10-CM

## 2022-09-08 DIAGNOSIS — K57.92 ACUTE DIVERTICULITIS: Primary | ICD-10-CM

## 2022-09-08 DIAGNOSIS — R68.89 RIGORS: ICD-10-CM

## 2022-09-08 DIAGNOSIS — D64.9 ANEMIA: ICD-10-CM

## 2022-09-08 DIAGNOSIS — K59.00 CONSTIPATION: ICD-10-CM

## 2022-09-08 PROBLEM — N18.30 STAGE 3 CHRONIC KIDNEY DISEASE (HCC): Status: ACTIVE | Noted: 2022-09-08

## 2022-09-08 LAB
2HR DELTA HS TROPONIN: -1 NG/L
ALBUMIN SERPL BCP-MCNC: 3.6 G/DL (ref 3.5–5)
ALP SERPL-CCNC: 78 U/L (ref 34–104)
ALT SERPL W P-5'-P-CCNC: 11 U/L (ref 7–52)
ANION GAP SERPL CALCULATED.3IONS-SCNC: 8 MMOL/L (ref 4–13)
APTT PPP: 35 SECONDS (ref 23–37)
AST SERPL W P-5'-P-CCNC: 18 U/L (ref 13–39)
BACTERIA UR QL AUTO: ABNORMAL /HPF
BASOPHILS # BLD AUTO: 0.02 THOUSANDS/ΜL (ref 0–0.1)
BASOPHILS NFR BLD AUTO: 0 % (ref 0–1)
BILIRUB SERPL-MCNC: 0.37 MG/DL (ref 0.2–1)
BILIRUB UR QL STRIP: NEGATIVE
BUN SERPL-MCNC: 20 MG/DL (ref 5–25)
CALCIUM SERPL-MCNC: 9 MG/DL (ref 8.4–10.2)
CARDIAC TROPONIN I PNL SERPL HS: 4 NG/L
CARDIAC TROPONIN I PNL SERPL HS: 5 NG/L
CHLORIDE SERPL-SCNC: 105 MMOL/L (ref 96–108)
CLARITY UR: CLEAR
CO2 SERPL-SCNC: 24 MMOL/L (ref 21–32)
COLOR UR: ABNORMAL
CREAT SERPL-MCNC: 1.37 MG/DL (ref 0.6–1.3)
EOSINOPHIL # BLD AUTO: 0.13 THOUSAND/ΜL (ref 0–0.61)
EOSINOPHIL NFR BLD AUTO: 1 % (ref 0–6)
ERYTHROCYTE [DISTWIDTH] IN BLOOD BY AUTOMATED COUNT: 12.8 % (ref 11.6–15.1)
GFR SERPL CREATININE-BSD FRML MDRD: 47 ML/MIN/1.73SQ M
GLUCOSE SERPL-MCNC: 137 MG/DL (ref 65–140)
GLUCOSE UR STRIP-MCNC: NEGATIVE MG/DL
HCT VFR BLD AUTO: 31.6 % (ref 36.5–49.3)
HGB BLD-MCNC: 10.1 G/DL (ref 12–17)
HGB UR QL STRIP.AUTO: NEGATIVE
IMM GRANULOCYTES # BLD AUTO: 0.07 THOUSAND/UL (ref 0–0.2)
IMM GRANULOCYTES NFR BLD AUTO: 1 % (ref 0–2)
INR PPP: 1.08 (ref 0.84–1.19)
KETONES UR STRIP-MCNC: NEGATIVE MG/DL
LACTATE SERPL-SCNC: 1.8 MMOL/L (ref 0.5–2)
LEUKOCYTE ESTERASE UR QL STRIP: ABNORMAL
LIPASE SERPL-CCNC: 9 U/L (ref 11–82)
LYMPHOCYTES # BLD AUTO: 1.3 THOUSANDS/ΜL (ref 0.6–4.47)
LYMPHOCYTES NFR BLD AUTO: 10 % (ref 14–44)
MAGNESIUM SERPL-MCNC: 1.4 MG/DL (ref 1.9–2.7)
MCH RBC QN AUTO: 33.3 PG (ref 26.8–34.3)
MCHC RBC AUTO-ENTMCNC: 32 G/DL (ref 31.4–37.4)
MCV RBC AUTO: 104 FL (ref 82–98)
MONOCYTES # BLD AUTO: 0.85 THOUSAND/ΜL (ref 0.17–1.22)
MONOCYTES NFR BLD AUTO: 7 % (ref 4–12)
MUCOUS THREADS UR QL AUTO: ABNORMAL
NEUTROPHILS # BLD AUTO: 10.25 THOUSANDS/ΜL (ref 1.85–7.62)
NEUTS SEG NFR BLD AUTO: 81 % (ref 43–75)
NITRITE UR QL STRIP: NEGATIVE
NON-SQ EPI CELLS URNS QL MICRO: ABNORMAL /HPF
NRBC BLD AUTO-RTO: 0 /100 WBCS
PH UR STRIP.AUTO: 5 [PH]
PLATELET # BLD AUTO: 188 THOUSANDS/UL (ref 149–390)
PMV BLD AUTO: 11 FL (ref 8.9–12.7)
POTASSIUM SERPL-SCNC: 3.7 MMOL/L (ref 3.5–5.3)
PROCALCITONIN SERPL-MCNC: 0.08 NG/ML
PROT SERPL-MCNC: 7.3 G/DL (ref 6.4–8.4)
PROT UR STRIP-MCNC: NEGATIVE MG/DL
PROTHROMBIN TIME: 14.2 SECONDS (ref 11.6–14.5)
RBC # BLD AUTO: 3.03 MILLION/UL (ref 3.88–5.62)
RBC #/AREA URNS AUTO: ABNORMAL /HPF
SODIUM SERPL-SCNC: 137 MMOL/L (ref 135–147)
SP GR UR STRIP.AUTO: 1.01 (ref 1–1.03)
UROBILINOGEN UR STRIP-ACNC: <2 MG/DL
WBC # BLD AUTO: 12.62 THOUSAND/UL (ref 4.31–10.16)
WBC #/AREA URNS AUTO: ABNORMAL /HPF

## 2022-09-08 PROCEDURE — 81001 URINALYSIS AUTO W/SCOPE: CPT | Performed by: PHYSICIAN ASSISTANT

## 2022-09-08 PROCEDURE — 85730 THROMBOPLASTIN TIME PARTIAL: CPT | Performed by: PHYSICIAN ASSISTANT

## 2022-09-08 PROCEDURE — 83735 ASSAY OF MAGNESIUM: CPT | Performed by: PHYSICIAN ASSISTANT

## 2022-09-08 PROCEDURE — 93005 ELECTROCARDIOGRAM TRACING: CPT

## 2022-09-08 PROCEDURE — 85610 PROTHROMBIN TIME: CPT | Performed by: PHYSICIAN ASSISTANT

## 2022-09-08 PROCEDURE — 83605 ASSAY OF LACTIC ACID: CPT | Performed by: PHYSICIAN ASSISTANT

## 2022-09-08 PROCEDURE — 87040 BLOOD CULTURE FOR BACTERIA: CPT | Performed by: PHYSICIAN ASSISTANT

## 2022-09-08 PROCEDURE — 85025 COMPLETE CBC W/AUTO DIFF WBC: CPT | Performed by: PHYSICIAN ASSISTANT

## 2022-09-08 PROCEDURE — 99285 EMERGENCY DEPT VISIT HI MDM: CPT

## 2022-09-08 PROCEDURE — 84145 PROCALCITONIN (PCT): CPT | Performed by: PHYSICIAN ASSISTANT

## 2022-09-08 PROCEDURE — 96365 THER/PROPH/DIAG IV INF INIT: CPT

## 2022-09-08 PROCEDURE — 96375 TX/PRO/DX INJ NEW DRUG ADDON: CPT

## 2022-09-08 PROCEDURE — 96361 HYDRATE IV INFUSION ADD-ON: CPT

## 2022-09-08 PROCEDURE — 96366 THER/PROPH/DIAG IV INF ADDON: CPT

## 2022-09-08 PROCEDURE — 74177 CT ABD & PELVIS W/CONTRAST: CPT

## 2022-09-08 PROCEDURE — 99223 1ST HOSP IP/OBS HIGH 75: CPT | Performed by: INTERNAL MEDICINE

## 2022-09-08 PROCEDURE — 84484 ASSAY OF TROPONIN QUANT: CPT | Performed by: PHYSICIAN ASSISTANT

## 2022-09-08 PROCEDURE — 87086 URINE CULTURE/COLONY COUNT: CPT | Performed by: PHYSICIAN ASSISTANT

## 2022-09-08 PROCEDURE — 80053 COMPREHEN METABOLIC PANEL: CPT | Performed by: PHYSICIAN ASSISTANT

## 2022-09-08 PROCEDURE — 99285 EMERGENCY DEPT VISIT HI MDM: CPT | Performed by: PHYSICIAN ASSISTANT

## 2022-09-08 PROCEDURE — 36415 COLL VENOUS BLD VENIPUNCTURE: CPT | Performed by: PHYSICIAN ASSISTANT

## 2022-09-08 PROCEDURE — 83690 ASSAY OF LIPASE: CPT | Performed by: PHYSICIAN ASSISTANT

## 2022-09-08 RX ORDER — HYDROMORPHONE HCL IN WATER/PF 6 MG/30 ML
0.2 PATIENT CONTROLLED ANALGESIA SYRINGE INTRAVENOUS EVERY 4 HOURS PRN
Status: DISCONTINUED | OUTPATIENT
Start: 2022-09-08 | End: 2022-09-11 | Stop reason: HOSPADM

## 2022-09-08 RX ORDER — METHOCARBAMOL 500 MG/1
500 TABLET, FILM COATED ORAL EVERY 6 HOURS PRN
Status: DISCONTINUED | OUTPATIENT
Start: 2022-09-08 | End: 2022-09-11 | Stop reason: HOSPADM

## 2022-09-08 RX ORDER — ASPIRIN 81 MG/1
81 TABLET ORAL DAILY
Status: DISCONTINUED | OUTPATIENT
Start: 2022-09-08 | End: 2022-09-11 | Stop reason: HOSPADM

## 2022-09-08 RX ORDER — ASCORBIC ACID 500 MG
500 TABLET ORAL DAILY
Status: DISCONTINUED | OUTPATIENT
Start: 2022-09-08 | End: 2022-09-09

## 2022-09-08 RX ORDER — IPRATROPIUM BROMIDE 21 UG/1
2 SPRAY, METERED NASAL EVERY 12 HOURS
Status: DISCONTINUED | OUTPATIENT
Start: 2022-09-08 | End: 2022-09-08 | Stop reason: RX

## 2022-09-08 RX ORDER — ACETAMINOPHEN 325 MG/1
975 TABLET ORAL EVERY 8 HOURS SCHEDULED
Status: DISCONTINUED | OUTPATIENT
Start: 2022-09-08 | End: 2022-09-11 | Stop reason: HOSPADM

## 2022-09-08 RX ORDER — MELATONIN
2000 DAILY
Status: DISCONTINUED | OUTPATIENT
Start: 2022-09-08 | End: 2022-09-11 | Stop reason: HOSPADM

## 2022-09-08 RX ORDER — TRIAMCINOLONE ACETONIDE 0.25 MG/G
1 CREAM TOPICAL 2 TIMES DAILY
COMMUNITY
End: 2022-09-23

## 2022-09-08 RX ORDER — MAGNESIUM SULFATE HEPTAHYDRATE 40 MG/ML
2 INJECTION, SOLUTION INTRAVENOUS ONCE
Status: DISCONTINUED | OUTPATIENT
Start: 2022-09-08 | End: 2022-09-08

## 2022-09-08 RX ORDER — GABAPENTIN 100 MG/1
100 CAPSULE ORAL DAILY
Status: DISCONTINUED | OUTPATIENT
Start: 2022-09-08 | End: 2022-09-08

## 2022-09-08 RX ORDER — CEFTRIAXONE 1 G/50ML
1000 INJECTION, SOLUTION INTRAVENOUS EVERY 24 HOURS
Status: DISCONTINUED | OUTPATIENT
Start: 2022-09-09 | End: 2022-09-11 | Stop reason: HOSPADM

## 2022-09-08 RX ORDER — ATORVASTATIN CALCIUM 40 MG/1
80 TABLET, FILM COATED ORAL EVERY MORNING
Status: DISCONTINUED | OUTPATIENT
Start: 2022-09-09 | End: 2022-09-11 | Stop reason: HOSPADM

## 2022-09-08 RX ORDER — CEFTRIAXONE 1 G/50ML
1000 INJECTION, SOLUTION INTRAVENOUS ONCE
Status: COMPLETED | OUTPATIENT
Start: 2022-09-08 | End: 2022-09-08

## 2022-09-08 RX ORDER — GABAPENTIN 100 MG/1
100 CAPSULE ORAL 2 TIMES DAILY
Status: DISCONTINUED | OUTPATIENT
Start: 2022-09-08 | End: 2022-09-11 | Stop reason: HOSPADM

## 2022-09-08 RX ORDER — MAGNESIUM SULFATE HEPTAHYDRATE 40 MG/ML
2 INJECTION, SOLUTION INTRAVENOUS ONCE
Status: COMPLETED | OUTPATIENT
Start: 2022-09-08 | End: 2022-09-08

## 2022-09-08 RX ORDER — ONDANSETRON 2 MG/ML
4 INJECTION INTRAMUSCULAR; INTRAVENOUS EVERY 4 HOURS PRN
Status: DISCONTINUED | OUTPATIENT
Start: 2022-09-08 | End: 2022-09-11 | Stop reason: HOSPADM

## 2022-09-08 RX ORDER — LIDOCAINE 50 MG/G
1 PATCH TOPICAL DAILY
Status: DISCONTINUED | OUTPATIENT
Start: 2022-09-08 | End: 2022-09-11 | Stop reason: HOSPADM

## 2022-09-08 RX ORDER — POLYETHYLENE GLYCOL 3350 17 G/17G
17 POWDER, FOR SOLUTION ORAL 2 TIMES DAILY
Status: DISCONTINUED | OUTPATIENT
Start: 2022-09-08 | End: 2022-09-09

## 2022-09-08 RX ORDER — SODIUM CHLORIDE 9 MG/ML
100 INJECTION, SOLUTION INTRAVENOUS CONTINUOUS
Status: DISCONTINUED | OUTPATIENT
Start: 2022-09-08 | End: 2022-09-11 | Stop reason: HOSPADM

## 2022-09-08 RX ORDER — FAMOTIDINE 20 MG/1
20 TABLET, FILM COATED ORAL DAILY
Status: DISCONTINUED | OUTPATIENT
Start: 2022-09-08 | End: 2022-09-11 | Stop reason: HOSPADM

## 2022-09-08 RX ORDER — METRONIDAZOLE 500 MG/1
500 TABLET ORAL EVERY 8 HOURS SCHEDULED
Status: DISCONTINUED | OUTPATIENT
Start: 2022-09-08 | End: 2022-09-11 | Stop reason: HOSPADM

## 2022-09-08 RX ORDER — GABAPENTIN 100 MG/1
100 CAPSULE ORAL DAILY
COMMUNITY
End: 2022-09-27 | Stop reason: SDUPTHER

## 2022-09-08 RX ORDER — IPRATROPIUM BROMIDE 21 UG/1
2 SPRAY, METERED NASAL EVERY 12 HOURS
COMMUNITY

## 2022-09-08 RX ORDER — METRONIDAZOLE 500 MG/100ML
500 INJECTION, SOLUTION INTRAVENOUS EVERY 8 HOURS
Status: DISCONTINUED | OUTPATIENT
Start: 2022-09-08 | End: 2022-09-08 | Stop reason: SDUPTHER

## 2022-09-08 RX ORDER — LABETALOL HYDROCHLORIDE 5 MG/ML
10 INJECTION, SOLUTION INTRAVENOUS EVERY 6 HOURS PRN
Status: DISCONTINUED | OUTPATIENT
Start: 2022-09-08 | End: 2022-09-11 | Stop reason: HOSPADM

## 2022-09-08 RX ADMIN — METOPROLOL TARTRATE 75 MG: 50 TABLET, FILM COATED ORAL at 15:31

## 2022-09-08 RX ADMIN — Medication 2000 UNITS: at 15:29

## 2022-09-08 RX ADMIN — GABAPENTIN 100 MG: 300 CAPSULE ORAL at 17:56

## 2022-09-08 RX ADMIN — FAMOTIDINE 20 MG: 20 TABLET ORAL at 15:31

## 2022-09-08 RX ADMIN — ACETAMINOPHEN 975 MG: 325 TABLET, FILM COATED ORAL at 21:39

## 2022-09-08 RX ADMIN — METRONIDAZOLE 500 MG: 500 INJECTION, SOLUTION INTRAVENOUS at 13:20

## 2022-09-08 RX ADMIN — OXYCODONE HYDROCHLORIDE AND ACETAMINOPHEN 500 MG: 500 TABLET ORAL at 15:30

## 2022-09-08 RX ADMIN — METRONIDAZOLE 500 MG: 500 TABLET ORAL at 21:39

## 2022-09-08 RX ADMIN — SODIUM CHLORIDE 1000 ML: 0.9 INJECTION, SOLUTION INTRAVENOUS at 12:43

## 2022-09-08 RX ADMIN — CYANOCOBALAMIN TAB 500 MCG 1000 MCG: 500 TAB at 15:30

## 2022-09-08 RX ADMIN — SODIUM CHLORIDE 1000 ML: 0.9 INJECTION, SOLUTION INTRAVENOUS at 09:17

## 2022-09-08 RX ADMIN — MAGNESIUM SULFATE HEPTAHYDRATE 2 G: 40 INJECTION, SOLUTION INTRAVENOUS at 10:15

## 2022-09-08 RX ADMIN — MULTIPLE VITAMINS W/ MINERALS TAB 1 TABLET: TAB ORAL at 15:32

## 2022-09-08 RX ADMIN — IOHEXOL 85 ML: 350 INJECTION, SOLUTION INTRAVENOUS at 11:49

## 2022-09-08 RX ADMIN — ASPIRIN 81 MG: 81 TABLET, COATED ORAL at 15:31

## 2022-09-08 RX ADMIN — SODIUM CHLORIDE 100 ML/HR: 0.9 INJECTION, SOLUTION INTRAVENOUS at 15:20

## 2022-09-08 RX ADMIN — ACETAMINOPHEN 975 MG: 325 TABLET, FILM COATED ORAL at 15:31

## 2022-09-08 RX ADMIN — CEFTRIAXONE 1000 MG: 1 INJECTION, SOLUTION INTRAVENOUS at 12:43

## 2022-09-08 NOTE — ASSESSMENT & PLAN NOTE
Lab Results   Component Value Date    EGFR 47 09/08/2022    EGFR 46 09/07/2022    EGFR 27 08/24/2022    CREATININE 1 37 (H) 09/08/2022    CREATININE 1 40 (H) 09/07/2022    CREATININE 2 19 (H) 08/24/2022   · Creatinine within baseline today 1 0-1 4; of note he recently had ELIZABETH 2 weeks ago with creatinine 2 19  · Trend BMP especially in light of contrast provided for CT scan today  · Avoid nephrotoxic agents (stop further use of NSAIDS)

## 2022-09-08 NOTE — ED PROVIDER NOTES
History  Chief Complaint   Patient presents with    Diarrhea     Pt had a few episodes of diarrhea last night  Also complains of fatigue & abd cramping  Feels dehydrated  Patient is an 80-year-old male with a PMHx of anemia, BPH, CAD, CKD 3, GERD, HTN, HLD and peripheral arterial disease, presenting to the ED for evaluation of abdominal pain and constipation  Patient states that his last bowel movement was about 2 days ago and was normal for him  He has not had a bowel movement since that time which is unusual for him  He took a stool softener last night and passed a small amount of soft stool but this did not provide him significant relief  He also admits to cramping abdominal pain throughout the abdomen  He denies any fevers, nausea, vomiting, melena, hematochezia or urinary symptoms  Patient also admits to feeling very fatigued  This morning patient's daughter came over to see him and found him awake but breathing very heavily and says that he seemed out of it  She took his blood pressure and states that the "bottom number was 47" but does not recall the top number  Patient states he recalls this entire episode and was just feeling very lightheaded and out of it  He says this has occurred in the past when his blood pressure is low  He denies any headaches, dysarthria, numbness/weakness or ataxia  Patient says these symptoms have resolved and family members state he is acting at baseline  He denies any headaches, visual disturbances, dizziness, chest pain, SOB or palpitations  He denies a history of abdominal surgeries  He had a colonoscopy 7 years ago that was reported to be normal            Prior to Admission Medications   Prescriptions Last Dose Informant Patient Reported? Taking?    Ascorbic Acid (Vitamin C) 500 MG PACK 9/7/2022 at Unknown time Self Yes Yes   Sig: Take 500 mg by mouth daily     Multiple Vitamin (MULTIVITAMIN) capsule 9/7/2022 at Unknown time Self Yes Yes   Sig: Take 1 capsule by mouth daily   acetaminophen (TYLENOL) 650 mg CR tablet Not Taking at Unknown time  Yes No   Sig: Take 650 mg by mouth every 8 (eight) hours as needed for mild pain   Patient not taking: No sig reported   acetaminophen-codeine (TYLENOL #3) 300-30 mg per tablet Past Month at Unknown time  No Yes   Sig: Take 1 tablet by mouth daily as needed for moderate pain   aspirin (ECOTRIN LOW STRENGTH) 81 mg EC tablet 2022 at Unknown time Self Yes Yes   Sig: Take 81 mg by mouth daily   atorvastatin (LIPITOR) 80 mg tablet 2022 at Unknown time Self No Yes   Sig: Take 1 tablet (80 mg total) by mouth every morning   cholecalciferol (VITAMIN D3) 1,000 units tablet Not Taking at Unknown time Self Yes No   Sig: Take 2,000 Units by mouth daily    Patient not taking: Reported on 2022   cyanocobalamin (VITAMIN B-12) 1,000 mcg tablet 2022 at Unknown time Self Yes Yes   Sig: Take 1,000 mcg by mouth daily     docusate sodium (COLACE) 100 mg capsule  Self No No   Sig: Take 1 capsule (100 mg total) by mouth 2 (two) times a day as needed for constipation Hold for loose stools     famotidine (PEPCID) 20 mg tablet 2022 at Unknown time Self No Yes   Sig: TAKE 1 TABLET BY MOUTH TWICE A DAY   gabapentin (NEURONTIN) 100 mg capsule 2022 at Unknown time  Yes Yes   Sig: Take 100 mg by mouth daily   ibuprofen (MOTRIN) 800 mg tablet Past Month at Unknown time Self No Yes   Sig: Take 1 tablet (800 mg total) by mouth every 6 (six) hours as needed for moderate pain   ipratropium (ATROVENT) 0 03 % nasal spray Past Month at Unknown time  Yes Yes   Si sprays into each nostril every 12 (twelve) hours   metoprolol tartrate (LOPRESSOR) 50 mg tablet 2022 at Unknown time Self No Yes   Sig: TAKE 1 AND 1/2 TABLETS BY MOUTH EVERY 12 HOURS   Patient taking differently: 75 mg every 12 (twelve) hours   triamcinolone (KENALOG) 0 025 % cream Past Month at Unknown time  Yes Yes   Sig: Apply 1 application topically 2 (two) times a day Facility-Administered Medications: None       Past Medical History:   Diagnosis Date    Abnormal liver function test     RESOLVED: [id number]    Allergic rhinitis     Anemia     LAST ASSESSED: [id number]    Arthritis     BPH (benign prostatic hyperplasia)     CAD (coronary artery disease)     Coronary artery disease     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HCC)     LAST ASSESSED: [id number]    Former tobacco use     GERD (gastroesophageal reflux disease)     Hand paresthesia     RESOLVED: [id number]    Hyperlipidemia     Hypertension     Lumbar stenosis     Peripheral artery disease (HCC)     LAST ASSESSED: [id number]    Stage 3a chronic kidney disease ([facility] [address] ) 7/11/2021       Past Surgical History:   Procedure Laterality Date    BACK SURGERY      COLONOSCOPY      LUMBAR FUSION      [place] ARTHRODESIS POSTERIOR/POSTEROLATERAL LUMBAR N/A 11/03/2016    Procedure: L2-S1 POSTERIOR LUMBAR FUSION AND DECOMPRESSION (Impulse), Posterior lateral fixation; dural repair ;  Surgeon: Amanda Ballard MD;  [facility];  Service: Orthopedics    [place] CABG, ARTERIAL, SINGLE N/A 01/19/2018    Procedure: CABG X4 with LIMA - LAD, SVG - RCA, OM2, & Diagonal ; Left Leg EVH; MATTHEW;  Surgeon: Leon Gomez DO;  [facility];  Service: Cardiac Surgery    [place] COLONOSCOPY FLX DX W/COLLJ SPEC WHEN PFRMD N/A 11/15/2017    Procedure: EGD AND COLONOSCOPY;  Surgeon: Krishna Vuong MD;  [facility];   Service: Gastroenterology    SEPTOPLASTY      LAST ASSESSED; [id number]    TONSILECTOMY AND ADNOIDECTOMY      LAST ASSESSED: [id number]    UPPER GASTROINTESTINAL ENDOSCOPY         Family History   Problem Relation Age of Onset    Emphysema Mother     Liver disease Mother     Coronary artery disease Father     Hypertension Father     Liver disease Father     Heart failure Father     Stroke Father         CVA     Heart attack Father     Coronary artery disease Brother     Heart disease Brother         younger brother by pass and other brother 4 stents placed    Other Family         BACK PROBLEM     Stroke Family         CVA    Emphysema Family     Hypertension Family         BENIGN     I have reviewed and agree with the history as documented  E-Cigarette/Vaping    E-Cigarette Use Never User      E-Cigarette/Vaping Substances    Nicotine No     THC Yes     CBD No     Flavoring No     Other No     Unknown No      Social History     Tobacco Use    Smoking status: Former Smoker     Packs/day: 1 00     Years: 50 00     Pack years: 50 00     Types: Cigarettes     Quit date:      Years since quittin     Smokeless tobacco: Never Used   Vaping Use    Vaping Use: Never used   Substance Use Topics    Alcohol use: Yes     Alcohol/week: 1 0 standard drink     Types: 1 Shots of liquor per week     Comment: beer, wine, scotch every day; SOCIAL AS PER ALL SCRIPTS     Drug use: Not Currently     Types: Marijuana     Comment: medical majiuana       Review of Systems   Constitutional: Positive for fatigue  Negative for chills, diaphoresis and fever  HENT: Negative for congestion, ear pain, mouth sores, rhinorrhea, sinus pain, sore throat and trouble swallowing  Eyes: Negative for photophobia and visual disturbance  Respiratory: Negative for cough, chest tightness, shortness of breath and wheezing  Cardiovascular: Negative for chest pain, palpitations and leg swelling  Gastrointestinal: Positive for abdominal pain and constipation  Negative for blood in stool, diarrhea, nausea and vomiting  Genitourinary: Negative for dysuria, flank pain, frequency, hematuria and urgency  Musculoskeletal: Negative for arthralgias, back pain, gait problem, joint swelling, myalgias and neck pain  Skin: Negative for color change, pallor and rash  Neurological: Positive for light-headedness  Negative for dizziness, syncope, speech difficulty, weakness, numbness and headaches     Psychiatric/Behavioral: Negative for confusion and sleep disturbance  All other systems reviewed and are negative  Physical Exam  Physical Exam  Vitals and nursing note reviewed  Constitutional:       General: He is awake  He is not in acute distress  Appearance: Normal appearance  He is well-developed  He is not ill-appearing or diaphoretic  HENT:      Head: Normocephalic and atraumatic  Right Ear: External ear normal       Left Ear: External ear normal       Nose: Nose normal       Mouth/Throat:      Lips: Pink  Mouth: Mucous membranes are moist    Eyes:      General: Lids are normal  No scleral icterus  Conjunctiva/sclera: Conjunctivae normal       Pupils: Pupils are equal, round, and reactive to light  Cardiovascular:      Rate and Rhythm: Normal rate and regular rhythm  Pulses: Normal pulses  Radial pulses are 2+ on the right side and 2+ on the left side  Heart sounds: Normal heart sounds, S1 normal and S2 normal    Pulmonary:      Effort: Pulmonary effort is normal  No accessory muscle usage  Breath sounds: Normal breath sounds  No stridor  No decreased breath sounds, wheezing, rhonchi or rales  Abdominal:      General: Abdomen is flat  Bowel sounds are normal  There is no distension  Palpations: Abdomen is soft  Tenderness: There is abdominal tenderness in the right upper quadrant and left lower quadrant  There is no right CVA tenderness, left CVA tenderness, guarding or rebound  Musculoskeletal:      Cervical back: Full passive range of motion without pain, normal range of motion and neck supple  No signs of trauma  No pain with movement  Normal range of motion  Right lower leg: No edema  Left lower leg: No edema  Lymphadenopathy:      Cervical: No cervical adenopathy  Skin:     General: Skin is warm and dry  Capillary Refill: Capillary refill takes less than 2 seconds  Coloration: Skin is not cyanotic, jaundiced or pale     Neurological:      Mental Status: He is alert and oriented to person, place, and time  GCS: GCS eye subscore is 4  GCS verbal subscore is 5  GCS motor subscore is 6  Cranial Nerves: No dysarthria or facial asymmetry  Motor: No pronator drift  Coordination: Coordination normal       Gait: Gait normal       Comments: Patient is awake, alert and oriented x3  He is answering questions appropriately  No focal neurological deficits  No dysarthria, facial asymmetry or visual field deficits  Sensation equal/intact bilaterally  Strength 5/5 in bilateral upper/lower extremities  Negative pronator drift  Psychiatric:         Attention and Perception: Attention normal          Mood and Affect: Mood normal          Speech: Speech normal          Behavior: Behavior normal  Behavior is cooperative           Vital Signs  ED Triage Vitals [09/08/22 0834]   Temperature Pulse Respirations Blood Pressure SpO2   97 7 °F (36 5 °C) 94 16 128/58 98 %      Temp Source Heart Rate Source Patient Position - Orthostatic VS BP Location FiO2 (%)   Oral Monitor Sitting Right arm --      Pain Score       No Pain           Vitals:    09/08/22 1352 09/08/22 1517 09/08/22 1531 09/08/22 1536   BP: 170/92  169/92 169/92   Pulse: 105 98 95 98   Patient Position - Orthostatic VS:             Visual Acuity      ED Medications  Medications   labetalol (NORMODYNE) injection 10 mg (has no administration in time range)   atorvastatin (LIPITOR) tablet 80 mg (has no administration in time range)   aspirin (ECOTRIN LOW STRENGTH) EC tablet 81 mg (81 mg Oral Given 9/8/22 1531)   cholecalciferol (VITAMIN D3) tablet 2,000 Units (2,000 Units Oral Given 9/8/22 1529)   ascorbic acid (VITAMIN C) tablet 500 mg (500 mg Oral Given 9/8/22 1530)   cyanocobalamin (VITAMIN B-12) tablet 1,000 mcg (1,000 mcg Oral Given 9/8/22 1530)   multivitamin-minerals (CENTRUM) tablet 1 tablet (1 tablet Oral Given 9/8/22 1532)   famotidine (PEPCID) tablet 20 mg (20 mg Oral Given 9/8/22 1531) metoprolol tartrate (LOPRESSOR) tablet 75 mg (75 mg Oral Given 9/8/22 1531)   sodium chloride 0 9 % infusion (100 mL/hr Intravenous New Bag 9/8/22 1520)   ondansetron (ZOFRAN) injection 4 mg (has no administration in time range)   polyethylene glycol (MIRALAX) packet 17 g (17 g Oral Refused 9/8/22 1435)   cefTRIAXone (ROCEPHIN) IVPB (premix in dextrose) 1,000 mg 50 mL (has no administration in time range)   metroNIDAZOLE (FLAGYL) tablet 500 mg (has no administration in time range)   acetaminophen (TYLENOL) tablet 975 mg (975 mg Oral Given 9/8/22 1531)   lidocaine (LIDODERM) 5 % patch 1 patch (1 patch Topical Not Given 9/8/22 1540)   methocarbamol (ROBAXIN) tablet 500 mg (has no administration in time range)   HYDROmorphone HCl (DILAUDID) injection 0 2 mg (has no administration in time range)   gabapentin (NEURONTIN) capsule 100 mg (has no administration in time range)   sodium chloride 0 9 % bolus 1,000 mL (0 mL Intravenous Stopped 9/8/22 1100)   magnesium sulfate 2 g/50 mL IVPB (premix) 2 g (0 g Intravenous Stopped 9/8/22 1230)   iohexol (OMNIPAQUE) 350 MG/ML injection (MULTI-DOSE) 85 mL (85 mL Intravenous Given 9/8/22 1149)   cefTRIAXone (ROCEPHIN) IVPB (premix in dextrose) 1,000 mg 50 mL (0 mg Intravenous Stopped 9/8/22 1315)   sodium chloride 0 9 % bolus 1,000 mL (1,000 mL Intravenous New Bag 9/8/22 1243)       Diagnostic Studies  Results Reviewed     Procedure Component Value Units Date/Time    Blood culture #1 [[id number]] Collected: 09/08/22 1238    Lab Status: Preliminary result Specimen: Blood from Arm, Right Updated: 09/08/22 1603     Blood Culture Received in Microbiology Lab  Culture in Progress  Blood culture #2 [[id number]] Collected: 09/08/22 1238    Lab Status: Preliminary result Specimen: Blood from Arm, Left Updated: 09/08/22 1603     Blood Culture Received in Microbiology Lab  Culture in Progress      Procalcitonin [[id number]]  (Normal) Collected: 09/08/22 5410    Lab Status: Final result Specimen: Blood from Arm, Left Updated: 09/08/22 1321     Procalcitonin 0 08 ng/ml     Protime-INR [[id number]]  (Normal) Collected: 09/08/22 1238    Lab Status: Final result Specimen: Blood from Arm, Left Updated: 09/08/22 1308     Protime 14 2 seconds      INR 1 08    APTT [[id number]]  (Normal) Collected: 09/08/22 1238    Lab Status: Final result Specimen: Blood from Arm, Left Updated: 09/08/22 1308     PTT 35 seconds     HS Troponin I 2hr [[id number]]  (Normal) Collected: 09/08/22 1131    Lab Status: Final result Specimen: Blood from Arm, Right Updated: 09/08/22 1203     hs TnI 2hr 4 ng/L      Delta 2hr hsTnI -1 ng/L     Urine Microscopic [[id number]]  (Abnormal) Collected: 09/08/22 1059    Lab Status: Final result Specimen: Urine, Clean Catch Updated: 09/08/22 1104     RBC, UA 1-2 /hpf      WBC, UA 30-50 /hpf      Epithelial Cells Occasional /hpf      Bacteria, UA None Seen /hpf      MUCUS THREADS Occasional    Urine culture [[id number]] Collected: 09/08/22 1059    Lab Status:  In process Specimen: Urine, Clean Catch Updated: 09/08/22 1104    UA w Reflex to Microscopic w Reflex to Culture [[id number]]  (Abnormal) Collected: 09/08/22 1059    Lab Status: Final result Specimen: Urine, Clean Catch Updated: 09/08/22 1103     Color, UA Light Yellow     Clarity, UA Clear     Specific Gravity, UA 1 009     pH, UA 5 0     Leukocytes, UA Moderate     Nitrite, UA Negative     Protein, UA Negative mg/dl      Glucose, UA Negative mg/dl      Ketones, UA Negative mg/dl      Urobilinogen, UA <2 0 mg/dl      Bilirubin, UA Negative     Occult Blood, UA Negative    HS Troponin 0hr (reflex protocol) [[id number]]  (Normal) Collected: 09/08/22 0916    Lab Status: Final result Specimen: Blood from Arm, Right Updated: 09/08/22 0954     hs TnI 0hr 5 ng/L     Comprehensive metabolic panel [[id number]]  (Abnormal) Collected: 09/08/22 0916    Lab Status: Final result Specimen: Blood from Arm, Right Updated: 09/08/22 0946     Sodium 137 mmol/L Potassium 3 7 mmol/L      Chloride 105 mmol/L      CO2 24 mmol/L      ANION GAP 8 mmol/L      BUN 20 mg/dL      Creatinine 1 37 mg/dL      Glucose 137 mg/dL      Calcium 9 0 mg/dL      AST 18 U/L      ALT 11 U/L      Alkaline Phosphatase 78 U/L      Total Protein 7 3 g/dL      Albumin 3 6 g/dL      Total Bilirubin 0 37 mg/dL      eGFR 47 ml/min/1 73sq m     Narrative:      [place] guidelines for Chronic Kidney Disease (CKD):     Stage 1 with normal or high GFR (GFR > 90 mL/min/1 73 square meters)    Stage 2 Mild CKD (GFR = 60-89 mL/min/1 73 square meters)    Stage 3A Moderate CKD (GFR = 45-59 mL/min/1 73 square meters)    Stage 3B Moderate CKD (GFR = 30-44 mL/min/1 73 square meters)    Stage 4 Severe CKD (GFR = 15-29 mL/min/1 73 square meters)    Stage 5 End Stage CKD (GFR <15 mL/min/1 73 square meters)  Note: GFR calculation is accurate only with a steady state creatinine    Lipase [[id number]]  (Abnormal) Collected: 09/08/22 0916    Lab Status: Final result Specimen: Blood from Arm, Right Updated: 09/08/22 0946     Lipase 9 u/L     Magnesium [[id number]]  (Abnormal) Collected: 09/08/22 0916    Lab Status: Final result Specimen: Blood from Arm, Right Updated: 09/08/22 0946     Magnesium 1 4 mg/dL     Lactic acid [[id number]]  (Normal) Collected: 09/08/22 0916    Lab Status: Final result Specimen: Blood from Arm, Right Updated: 09/08/22 0943     LACTIC ACID 1 8 mmol/L     Narrative:      Result may be elevated if tourniquet was used during collection      CBC and differential [[id number]]  (Abnormal) Collected: 09/08/22 0916    Lab Status: Final result Specimen: Blood from Arm, Right Updated: 09/08/22 0926     WBC 12 62 Thousand/uL      RBC 3 03 Million/uL      Hemoglobin 10 1 g/dL      Hematocrit 31 6 %       fL      MCH 33 3 pg      MCHC 32 0 g/dL      RDW 12 8 %      MPV 11 0 fL      Platelets 804 Thousands/uL      nRBC 0 /100 WBCs      Neutrophils Relative 81 %      Immat GRANS % 1 %      Lymphocytes Relative 10 %      Monocytes Relative 7 %      Eosinophils Relative 1 %      Basophils Relative 0 %      Neutrophils Absolute 10 25 Thousands/µL      Immature Grans Absolute 0 07 Thousand/uL      Lymphocytes Absolute 1 30 Thousands/µL      Monocytes Absolute 0 85 Thousand/µL      Eosinophils Absolute 0 13 Thousand/µL      Basophils Absolute 0 02 Thousands/µL                  CT abdomen pelvis with contrast   Final Result by Diogo Anna MD (09/08 1221)      Possible acute diverticulitis changes in the distal descending colon  Stool-filled rectal region        Workstation performed: [id number]                    Procedures  ECG 12 Lead Documentation Only    Date/Time: 9/8/2022 8:45 AM  Performed by: Lieutenant Thomas PA-C  Authorized by: Lieutenant Thomas PA-C     Indications / Diagnosis:  Fatigue, abdominal pain  ECG reviewed by me, the ED Provider: yes    Patient location:  ED  Previous ECG:     Previous ECG:  Compared to current    Comparison ECG info:  10/16/21    Similarity:  Changes noted (QTc prolonged at 462 (was 402))    Comparison to cardiac monitor: Yes    Interpretation:     Interpretation: non-specific    Rate:     ECG rate:  93    ECG rate assessment: normal    Rhythm:     Rhythm: sinus rhythm    Ectopy:     Ectopy: none    QRS:     QRS axis:  Normal    QRS intervals:  Normal  Conduction:     Conduction: normal    ST segments:     ST segments:  Normal  T waves:     T waves: normal    Other findings:     Other findings: prolonged qTc interval    Comments:      No STEMI  QT/QTc 372/462             ED Course  ED Course as of 09/08/22 1746   Thu Sep 08, 2022   0930 WBC(!): 12 62   0930 Absolute Neutrophils(!): 10 25   0930 Hemoglobin(!): 10 1  11 6 2 weeks ago   0956 Magnesium(!): 1 4   0956 Creatinine(!): 1 37  Improved from priors (1 4 > 2 19)             HEART Risk Score    Flowsheet Row Most Recent Value   Heart Score Risk Calculator    History 0 Filed at: 09/08/2022 1743   ECG 1 Filed at: 09/08/2022 1743   Age 2 Filed at: 09/08/2022 1743   Risk Factors 2 Filed at: 09/08/2022 1743   Troponin 0 Filed at: 09/08/2022 1743   HEART Score 5 Filed at: 09/08/2022 1743                        SBIRT 20yo+    Flowsheet Row Most Recent Value   SBIRT (25 yo +)    In order to provide better care to our patients, we are screening all of our patients for alcohol and drug use  Would it be okay to ask you these screening questions? Unable to answer at this time Filed at: 09/08/2022 0847                    MDM  Number of Diagnoses or Management Options  Acute diverticulitis  Anemia  Constipation  Hypomagnesemia  Rigors  UTI (urinary tract infection)  Diagnosis management comments: Patient is an 80-year-old male with a PMHx of anemia, BPH, CAD, CKD 3, GERD, HTN, HLD and peripheral arterial disease, presenting to the ED for evaluation of abdominal pain and constipation  Labs notable for a mild leukocytosis and low magnesium  Urine also notable for a sterile pyuria  CT shows evidence of acute diverticulitis  Patient has persistent pain, nausea and rigors in the ED  He was given IV antibiotics and admitted to medicine for further workup and evaluation          Amount and/or Complexity of Data Reviewed  Clinical lab tests: ordered and reviewed  Tests in the radiology section of CPT®: ordered and reviewed  Discuss the patient with other providers: yes    Patient Progress  Patient progress: stable      Disposition  Final diagnoses:   Acute diverticulitis   Constipation   UTI (urinary tract infection)   Rigors   Hypomagnesemia   Anemia     Time reflects when diagnosis was documented in both MDM as applicable and the Disposition within this note     Time User Action Codes Description Comment    9/8/2022 12:44 PM Alejandro Baca Add [K57 92] Acute diverticulitis     9/8/2022 12:44 PM Mikaela Bryant Add [K59 00] Constipation     9/8/2022 12:44 PM Mikaela Bryant Add [N39 0] UTI (urinary tract infection)     9/8/2022 12:44 PM Natividad Simpler Add [R68 89] Rigors     9/8/2022 12:44 PM MannyMikaela Add [E83 42] Hypomagnesemia     9/8/2022 12:44 PM Natividad Simpler Add [D64 9] Anemia       ED Disposition     ED Disposition   Admit    Condition   Stable    Date/Time   Thu Sep 8, 2022 12:44 PM    Comment   Case was discussed with DIOGENES and the patient's admission status was agreed to be Admission Status: inpatient status to the service of Dr Rangel Amezquita             Follow-up Information    None         Current Discharge Medication List      CONTINUE these medications which have NOT CHANGED    Details   acetaminophen-codeine (TYLENOL #3) 300-30 mg per tablet Take 1 tablet by mouth daily as needed for moderate pain  Qty: 20 tablet, Refills: 0    Associated Diagnoses: Acute low back pain, unspecified back pain laterality, unspecified whether sciatica present; Rib pain on left side      Ascorbic Acid (Vitamin C) 500 MG PACK Take 500 mg by mouth daily        aspirin (ECOTRIN LOW STRENGTH) 81 mg EC tablet Take 81 mg by mouth daily      atorvastatin (LIPITOR) 80 mg tablet Take 1 tablet (80 mg total) by mouth every morning  Qty: 90 tablet, Refills: 3    Associated Diagnoses: Hyperlipidemia, unspecified hyperlipidemia type      cyanocobalamin (VITAMIN B-12) 1,000 mcg tablet Take 1,000 mcg by mouth daily        famotidine (PEPCID) 20 mg tablet TAKE 1 TABLET BY MOUTH TWICE A DAY  Qty: 180 tablet, Refills: 1    Associated Diagnoses: Gastroesophageal reflux disease, unspecified whether esophagitis present      gabapentin (NEURONTIN) 100 mg capsule Take 100 mg by mouth daily      ibuprofen (MOTRIN) 800 mg tablet Take 1 tablet (800 mg total) by mouth every 6 (six) hours as needed for moderate pain  Qty: 60 tablet, Refills: 0    Associated Diagnoses: Left-sided low back pain without sciatica, unspecified chronicity      ipratropium (ATROVENT) 0 03 % nasal spray 2 sprays into each nostril every 12 (twelve) hours metoprolol tartrate (LOPRESSOR) 50 mg tablet TAKE 1 AND 1/2 TABLETS BY MOUTH EVERY 12 HOURS  Qty: 90 tablet, Refills: 11    Associated Diagnoses: Hypertension, unspecified type      Multiple Vitamin (MULTIVITAMIN) capsule Take 1 capsule by mouth daily      triamcinolone (KENALOG) 0 025 % cream Apply 1 application topically 2 (two) times a day      acetaminophen (TYLENOL) 650 mg CR tablet Take 650 mg by mouth every 8 (eight) hours as needed for mild pain      cholecalciferol (VITAMIN D3) 1,000 units tablet Take 2,000 Units by mouth daily       docusate sodium (COLACE) 100 mg capsule Take 1 capsule (100 mg total) by mouth 2 (two) times a day as needed for constipation Hold for loose stools  Qty: 60 capsule, Refills: 0    Associated Diagnoses: Constipation, unspecified constipation type             No discharge procedures on file      PDMP Review       Value Time User    PDMP Reviewed  Yes 9/6/2022 11:06 AM Albert Siu DO          ED Provider  Electronically Signed by           Ramesh Brumfield PA-C  09/08/22 [id number]

## 2022-09-08 NOTE — ED ATTENDING ATTESTATION
9/8/2022  I, Jorden Chavez DO, saw and evaluated the patient  I have discussed the patient with the resident/non-physician practitioner and agree with the resident's/non-physician practitioner's findings, Plan of Care, and MDM as documented in the resident's/non-physician practitioner's note, except where noted  All available labs and Radiology studies were reviewed  I was present for key portions of any procedure(s) performed by the resident/non-physician practitioner and I was immediately available to provide assistance  At this point I agree with the current assessment done in the Emergency Department  I have conducted an independent evaluation of this patient a history and physical is as follows:          81yo male, weakness, LLQ abd pain on my exam, I was called into room as he having chills  CT personally reviewed    appearts to have diverticultis, now with systemic sxs, will admit for IV abx                ED Course         Critical Care Time  Procedures

## 2022-09-08 NOTE — PLAN OF CARE
Problem: PAIN - ADULT  Goal: Verbalizes/displays adequate comfort level or baseline comfort level  Description: Interventions:  - Encourage patient to monitor pain and request assistance  - Assess pain using appropriate pain scale  - Administer analgesics based on type and severity of pain and evaluate response  - Implement non-pharmacological measures as appropriate and evaluate response  - Consider cultural and social influences on pain and pain management  - Notify physician/advanced practitioner if interventions unsuccessful or patient reports new pain  Outcome: Progressing     Problem: INFECTION - ADULT  Goal: Absence or prevention of progression during hospitalization  Description: INTERVENTIONS:  - Assess and monitor for signs and symptoms of infection  - Monitor lab/diagnostic results  - Monitor all insertion sites, i e  indwelling lines, tubes, and drains  - Monitor endotracheal if appropriate and nasal secretions for changes in amount and color  - [place] appropriate cooling/warming therapies per order  - Administer medications as ordered  - Instruct and encourage patient and family to use good hand hygiene technique  - Identify and instruct in appropriate isolation precautions for identified infection/condition  Outcome: Progressing     Problem: DISCHARGE PLANNING  Goal: Discharge to home or other facility with appropriate resources  Description: INTERVENTIONS:  - Identify barriers to discharge w/patient and caregiver  - Arrange for needed discharge resources and transportation as appropriate  - Identify discharge learning needs (meds, wound care, etc )  - Arrange for interpretive services to assist at discharge as needed  - Refer to Case Management Department for coordinating discharge planning if the patient needs post-hospital services based on physician/advanced practitioner order or complex needs related to functional status, cognitive ability, or social support system  Outcome: Progressing Problem: Knowledge Deficit  Goal: Patient/family/caregiver demonstrates understanding of disease process, treatment plan, medications, and discharge instructions  Description: Complete learning assessment and assess knowledge base    Interventions:  - Provide teaching at level of understanding  - Provide teaching via preferred learning methods  Outcome: Progressing

## 2022-09-08 NOTE — APP STUDENT NOTE
JESS STUDENT  Inpatient H&P Exam for TRAINING ONLY  Not Part of Legal Medical Record       H&P Exam - Max Blanton 80 y o  male MRN: [id number]  Unit/Bed#: W -01 Encounter: [id number]    Diverticulitis  Assessment & Plan  · Patient presented with abdominal cramping and constipation  · CT A/P with contrast--evidence of acute uncomplicated diverticulitis  · Leukocytosis noted on admission  · Continue IV ceftriaxone and metronidazole, IV fluids, pain control  · Start miralax for constipation      Stage 3 chronic kidney disease [facility])  Assessment & Plan  Lab Results   Component Value Date    EGFR 47 09/08/2022    EGFR 46 09/07/2022    EGFR 27 08/24/2022    CREATININE 1 37 (H) 09/08/2022    CREATININE 1 40 (H) 09/07/2022    CREATININE 2 19 (H) 08/24/2022   · Creatinine within baseline today 1 0-1 4; of note he recently had ELIZABETH 2 weeks ago with creatinine 2 19  · Continue to monitor, daily BMP  · Stop NSAIDS, avoid nephrotoxic agents    Hypomagnesemia  Assessment & Plan  · Mag 1 4, given IV magnesium sulfate  · Continue to monitor magnesium    Hypertension  Assessment & Plan  · Blood pressure elevated at 180/89  · Continue lopressor    Lumbar compression fracture  Assessment & Plan  · Low back pain managed outpatient, had plan for MRI  · Give lidocaine patch and tylenol for pain management  · Discontinue NSAIDS         VTE Prophylaxis: VTE: 2   Code Status: Level 3 : DNAR and DNI  POLST: There is no POLST form on file for this patient (pre-hospital)  Discussion with family: discussed with daughter at bedside    Anticipated Length of Stay:  Patient will be admitted on an Inpatient basis with an anticipated length of stay of  > 2 midnights  Justification for Hospital Stay: acute diverticulitis    Total Time for Visit, including Counseling / Coordination of Care: 30 minutes  Greater than 50% of this total time spent on direct patient counseling and coordination of care      Chief Complaint:   Abdominal pain x 7 days    History of Present Illness:    Marcell Neri is a 80 y o  male who presents with abdominal pain for 7 days  Past medical history of CKD, hypertension, and hyperlipidemia  Patient denies nausea, vomiting, fever  He has chills, constipation for the past week, and noticed blood in the stool today  His appetite has been low for the past week and has been dehydrated with poor fluid intake  His constipation has been bothering him and constantly feels like he has to go  He took NSAIDs for the pain  The pain is constant in the lower abdominal area  He has been urinating and sleeping okay  Review of Systems:    Review of Systems   Constitutional: Positive for chills  Negative for fever  HENT: Negative for congestion, postnasal drip, rhinorrhea and sore throat  Eyes: Negative for visual disturbance  Respiratory: Negative for cough, chest tightness, shortness of breath and wheezing  Cardiovascular: Negative for chest pain  Gastrointestinal: Positive for abdominal pain, blood in stool and constipation  Negative for abdominal distention, diarrhea, nausea and vomiting  Endocrine: Negative for polydipsia and polyuria  Genitourinary: Negative for difficulty urinating, dysuria, frequency, hematuria and urgency  Musculoskeletal: Positive for back pain  Neurological: Negative for dizziness and light-headedness         Past Medical and Surgical History:     Past Medical History:   Diagnosis Date    Abnormal liver function test     RESOLVED: [id number]    Allergic rhinitis     Anemia     LAST ASSESSED: [id number]    Arthritis     BPH (benign prostatic hyperplasia)     CAD (coronary artery disease)     Coronary artery disease     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HCC)     LAST ASSESSED: [id number]    Former tobacco use     GERD (gastroesophageal reflux disease)     Hand paresthesia     RESOLVED: [id number]    Hyperlipidemia     Hypertension     Lumbar stenosis     Peripheral artery disease (HCC)     LAST ASSESSED: [id number]    Stage 3a chronic kidney disease ([facility] [address] ) 7/11/2021       Past Surgical History:   Procedure Laterality Date    BACK SURGERY      COLONOSCOPY      LUMBAR FUSION      ID ARTHRODESIS POSTERIOR/POSTEROLATERAL LUMBAR N/A 11/03/2016    Procedure: L2-S1 POSTERIOR LUMBAR FUSION AND DECOMPRESSION (Impulse), Posterior lateral fixation; dural repair ;  Surgeon: Selene Eckert MD;  Location[facility];  Service: Orthopedics    ID CABG, ARTERIAL, SINGLE N/A 01/19/2018    Procedure: CABG X4 with LIMA - LAD, SVG - RCA, OM2, & Diagonal ; Left Leg EVH; MATTHEW;  Surgeon: Brit North DO;  Location: [facility];  Service: Cardiac Surgery    ID COLONOSCOPY FLX DX W/COLLJ SPEC WHEN PFRMD N/A 11/15/2017    Procedure: EGD AND COLONOSCOPY;  Surgeon: Danica Morrissey MD;  [facility]; Service: Gastroenterology    SEPTOPLASTY      LAST ASSESSED; [id number]    TONSILECTOMY AND ADNOIDECTOMY      LAST ASSESSED: [id number]    UPPER GASTROINTESTINAL ENDOSCOPY         Meds/Allergies:    Prior to Admission medications    Medication Sig Start Date End Date Taking?  Authorizing Provider   acetaminophen-codeine (TYLENOL #3) 300-30 mg per tablet Take 1 tablet by mouth daily as needed for moderate pain 9/1/22  Yes Samra Flores MD   Ascorbic Acid (Vitamin C) 500 MG PACK Take 500 mg by mouth daily     Yes Historical Provider, MD   aspirin (ECOTRIN LOW STRENGTH) 81 mg EC tablet Take 81 mg by mouth daily   Yes Historical Provider, MD   atorvastatin (LIPITOR) 80 mg tablet Take 1 tablet (80 mg total) by mouth every morning 2/14/22  Yes Calvin Cyr MD   cyanocobalamin (VITAMIN B-12) 1,000 mcg tablet Take 1,000 mcg by mouth daily     Yes Historical Provider, MD   famotidine (PEPCID) 20 mg tablet TAKE 1 TABLET BY MOUTH TWICE A DAY 5/31/22  Yes Mikaela Duenas DO   gabapentin (NEURONTIN) 100 mg capsule Take 100 mg by mouth daily   Yes Historical Provider, MD   ibuprofen (MOTRIN) 800 mg tablet Take 1 tablet (800 mg total) by mouth every 6 (six) hours as needed for moderate pain 8/17/22  Yes More Mcdermott,    ipratropium (ATROVENT) 0 03 % nasal spray 2 sprays into each nostril every 12 (twelve) hours   Yes Historical Provider, MD   metoprolol tartrate (LOPRESSOR) 50 mg tablet TAKE 1 AND 1/2 TABLETS BY MOUTH EVERY 12 HOURS  Patient taking differently: 75 mg every 12 (twelve) hours 5/24/22  Yes Meryle Peacemaker, MD   Multiple Vitamin (MULTIVITAMIN) capsule Take 1 capsule by mouth daily   Yes Historical Provider, MD   triamcinolone (KENALOG) 0 025 % cream Apply 1 application topically 2 (two) times a day   Yes Historical Provider, MD   acetaminophen (TYLENOL) 650 mg CR tablet Take 650 mg by mouth every 8 (eight) hours as needed for mild pain  Patient not taking: No sig reported    Historical Provider, MD   cholecalciferol (VITAMIN D3) 1,000 units tablet Take 2,000 Units by mouth daily   Patient not taking: Reported on 9/8/2022    Historical Provider, MD   docusate sodium (COLACE) 100 mg capsule Take 1 capsule (100 mg total) by mouth 2 (two) times a day as needed for constipation Hold for loose stools  7/9/21 3/4/22  Lora Doll MD     I have reviewed home medications using allscripts  Allergies: Allergies   Allergen Reactions    Penicillins Other (See Comments)     Hallucinations; Patient reported that he was seeing visual disturbances         Social History:     Marital Status:     Occupation:   Patient Pre-hospital Living Situation: home   Patient Pre-hospital Level of Mobility: able to walk  Patient Pre-hospital Diet Restrictions: none  Substance Use History:   Social History     Substance and Sexual Activity   Alcohol Use Yes    Alcohol/week: 1 0 standard drink    Types: 1 Shots of liquor per week    Comment: beer, wine, scotch every day; SOCIAL AS PER ALL SCRIPTS      Social History     Tobacco Use   Smoking Status Former Smoker    Packs/day: 1 00    Years: 50 00    Pack years: 50 00    Types: Cigarettes    Quit date:     Years since quittin 6   Smokeless Tobacco Never Used     Social History     Substance and Sexual Activity   Drug Use Not Currently    Types: Marijuana    Comment: medical majiyulianaa       Family History:    non-contributory    Physical Exam:     Vitals:   Blood Pressure: 170/92 (22 1352)  Pulse: 105 (22 1352)  Temperature: 97 6 °F (36 4 °C) (22 1352)  Temp Source: Oral (22)  Respirations: 18 (22 1321)  Height: 5' 7" (170 2 cm) (22 08)  Weight - Scale: 79 2 kg (174 lb 9 7 oz) (22)  SpO2: 97 % (22 1435)    Physical Exam  Constitutional:       General: He is not in acute distress  HENT:      Mouth/Throat:      Mouth: Mucous membranes are moist    Cardiovascular:      Rate and Rhythm: Regular rhythm  Tachycardia present  Pulses: Normal pulses  Heart sounds: Normal heart sounds  Pulmonary:      Effort: Pulmonary effort is normal       Breath sounds: Normal breath sounds  Abdominal:      General: Abdomen is flat  Bowel sounds are normal       Tenderness: There is abdominal tenderness  There is no guarding or rebound  Skin:     General: Skin is warm  Neurological:      General: No focal deficit present  Mental Status: He is alert  Psychiatric:         Mood and Affect: Mood normal          Behavior: Behavior normal            Additional Data:     Lab Results: I have personally reviewed pertinent reports        Results from last 7 days   Lab Units 22  0916   WBC Thousand/uL 12 62*   HEMOGLOBIN g/dL 10 1*   HEMATOCRIT % 31 6*   PLATELETS Thousands/uL 188   NEUTROS PCT % 81*   LYMPHS PCT % 10*   MONOS PCT % 7   EOS PCT % 1     Results from last 7 days   Lab Units 22  0916   SODIUM mmol/L 137   POTASSIUM mmol/L 3 7   CHLORIDE mmol/L 105   CO2 mmol/L 24   BUN mg/dL 20   CREATININE mg/dL 1 37*   ANION GAP mmol/L 8   CALCIUM mg/dL 9 0   ALBUMIN g/dL 3 6   TOTAL BILIRUBIN mg/dL 0 37   ALK PHOS U/L 78   ALT U/L 11   AST U/L 18   GLUCOSE RANDOM mg/dL 137     Results from last 7 days   Lab Units 09/08/22  1238   INR  1 08             Results from last 7 days   Lab Units 09/08/22  1238 09/08/22  0916   LACTIC ACID mmol/L  --  1 8   PROCALCITONIN ng/ml 0 08  --        Imaging: I have personally reviewed pertinent reports  CT abdomen pelvis with contrast   Final Result by Vonda Ruvalcaba MD (09/08 1221)      Possible acute diverticulitis changes in the distal descending colon  Stool-filled rectal region  Workstation performed: [id number]             EKG, Pathology, and Other Studies Reviewed on Admission:   · EKG: normal sinus rhythm    Allscripts / Epic Records Reviewed: Yes     ** Please Note: This note has been constructed using a voice recognition system   **

## 2022-09-08 NOTE — ASSESSMENT & PLAN NOTE
· Patient presented with left lower abdominal pain and constipation as well as chills  · CT A/P with contrast--evidence of acute uncomplicated diverticulitis, known history of diverticulosis but first event of diverticulitis  · Leukocytosis and mild tachycardia noted on admission  · Recommend supportive care with IV fluids, pain medication, antibiotics (CTX and flagyl), diet restriction of clears for now  · GI follow up outpatient;  Last colonoscopy was 2017, recommend repeat in 6-8 weeks

## 2022-09-08 NOTE — H&P
[facility]  H&P- Lon Cellar 1940, 80 y o  male MRN: [id number]  Unit/Bed#: W -01 Encounter: [id number]  Primary Care Provider: Nahomi Brennan DO   Date and time admitted to hospital: 9/8/2022  8:31 AM    * Diverticulitis  Assessment & Plan  · Patient presented with left lower abdominal pain and constipation as well as chills  · CT A/P with contrast--evidence of acute uncomplicated diverticulitis, known history of diverticulosis but first event of diverticulitis  · Leukocytosis and mild tachycardia noted on admission  · Recommend supportive care with IV fluids, pain medication, antibiotics (CTX and flagyl), diet restriction of clears for now  · GI follow up outpatient; Last colonoscopy was 2017, recommend repeat in 6-8 weeks    Stage 3 chronic kidney disease [facility])  Assessment & Plan  Lab Results   Component Value Date    EGFR 47 09/08/2022    EGFR 46 09/07/2022    EGFR 27 08/24/2022    CREATININE 1 37 (H) 09/08/2022    CREATININE 1 40 (H) 09/07/2022    CREATININE 2 19 (H) 08/24/2022   · Creatinine within baseline today 1 0-1 4; of note he recently had ELIZABETH 2 weeks ago with creatinine 2 19  · Trend BMP especially in light of contrast provided for CT scan today  · Avoid nephrotoxic agents (stop further use of NSAIDS)    Hypomagnesemia  Assessment & Plan  · Mag 1 4-replete and recheck in am    Hypertension  Assessment & Plan  · Blood pressure elevated at 180/89  · Continue lopressor  · Prn IV labetalol    Lumbar compression fracture (HCC)  Assessment & Plan  · ongoing low back pain being managed as an outpatient with plans for upcoming MRI and  Kyphoplasty   Patient to reschedule  · Would not recommend additional NSAIDs due to chronic kidney disease and would hold off on routine narcotics in the setting of severe constipation  · Can try Tylenol around the clock, muscle relaxants and topical agents    VTE Pharmacologic Prophylaxis: VTE Score: 2 ambulate  Code Status: Level 3 - DNAR and DNI per discussion with patient  Discussion with family: Updated  (daughter) at bedside  Anticipated Length of Stay: Patient will be admitted on an inpatient basis with an anticipated length of stay of greater than 2 midnights secondary to Diverticulitis  Total Time for Visit, including Counseling / Coordination of Care: 60 minutes Greater than 50% of this total time spent on direct patient counseling and coordination of care  Chief Complaint:  Abdominal pain and constipation    History of Present Illness:  Domonique Perez is a 80 y o  male with a PMH of CKD, hypertension, coronary artery disease who presents with left lower quadrant pain, abdominal cramping and constipation for 1 week  He did note small amount of blood in his today  He was also reporting chills and says he feels like he is trying to get a fever"  Patient subsequently took stool softeners and had some loose stool but overall still feels constipated and states that he has been "forcing it " He feels dehydrated and states that his appetite has been decreased but has been making good urine  He does admit that he was placed on ibuprofen and Tylenol with codeine recently for his back pain which is worse with standing  Review of Systems:  Review of Systems   Constitutional: Positive for appetite change and chills  Negative for activity change, diaphoresis, fatigue, fever and unexpected weight change  HENT: Negative for sore throat and trouble swallowing  Eyes: Negative for visual disturbance  Respiratory: Negative for cough and shortness of breath  Cardiovascular: Negative for chest pain, palpitations and leg swelling  Gastrointestinal: Positive for abdominal pain, blood in stool and constipation  Negative for abdominal distention, nausea and vomiting  Genitourinary: Negative for decreased urine volume, difficulty urinating and dysuria  Musculoskeletal: Positive for back pain     Skin: Negative for color change, pallor, rash and wound  Neurological: Negative for dizziness, tremors, weakness, light-headedness and headaches  Psychiatric/Behavioral: Negative for confusion  Past Medical and Surgical History:   Past Medical History:   Diagnosis Date    Abnormal liver function test     RESOLVED: [id number]    Allergic rhinitis     Anemia     LAST ASSESSED: [id number]    Arthritis     BPH (benign prostatic hyperplasia)     CAD (coronary artery disease)     Coronary artery disease     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HCC)     LAST ASSESSED: [id number]    Former tobacco use     GERD (gastroesophageal reflux disease)     Hand paresthesia     RESOLVED: [id number]    Hyperlipidemia     Hypertension     Lumbar stenosis     Peripheral artery disease (HCC)     LAST ASSESSED: 27OCT2017    Stage 3a chronic kidney disease ([facility] [address] ) 7/11/2021   He reports history of both high blood pressure and low blood pressure with an event of syncope last year  No prior history of GI cancer    Past Surgical History:   Procedure Laterality Date    BACK SURGERY      COLONOSCOPY      LUMBAR FUSION      NM ARTHRODESIS POSTERIOR/POSTEROLATERAL LUMBAR N/A 11/03/2016    Procedure: L2-S1 POSTERIOR LUMBAR FUSION AND DECOMPRESSION (Impulse), Posterior lateral fixation; dural repair ;  Surgeon: Gareth Khan MD;  Location[facility];  Service: Orthopedics    NM CABG, ARTERIAL, SINGLE N/A 01/19/2018    Procedure: CABG X4 with LIMA - LAD, SVG - RCA, OM2, & Diagonal ; Left Leg EVH; MATTHEW;  Surgeon: Rafael Earl DO;  Location: [facility];  Service: Cardiac Surgery    NM COLONOSCOPY FLX DX W/COLLJ SPEC WHEN PFRMD N/A 11/15/2017    Procedure: EGD AND COLONOSCOPY;  Surgeon: Terrell Dinero MD;  [facility];   Service: Gastroenterology    SEPTOPLASTY      LAST ASSESSED; [id number]    TONSILECTOMY AND ADNOIDECTOMY      LAST ASSESSED: [id number]    UPPER GASTROINTESTINAL ENDOSCOPY         Meds/Allergies:  Prior to Admission medications    Medication Sig Start Date End Date Taking?  Authorizing Provider   acetaminophen-codeine (TYLENOL #3) 300-30 mg per tablet Take 1 tablet by mouth daily as needed for moderate pain 9/1/22  Yes Shadi Robin MD   Ascorbic Acid (Vitamin C) 500 MG PACK Take 500 mg by mouth daily     Yes Historical Provider, MD   aspirin (ECOTRIN LOW STRENGTH) 81 mg EC tablet Take 81 mg by mouth daily   Yes Historical Provider, MD   atorvastatin (LIPITOR) 80 mg tablet Take 1 tablet (80 mg total) by mouth every morning 2/14/22  Yes Layla Terrell MD   cyanocobalamin (VITAMIN B-12) 1,000 mcg tablet Take 1,000 mcg by mouth daily     Yes Historical Provider, MD   famotidine (PEPCID) 20 mg tablet TAKE 1 TABLET BY MOUTH TWICE A DAY 5/31/22  Yes Mikaela Duenas, DO   gabapentin (NEURONTIN) 100 mg capsule Take 100 mg by mouth daily   Yes Historical Provider, MD   ibuprofen (MOTRIN) 800 mg tablet Take 1 tablet (800 mg total) by mouth every 6 (six) hours as needed for moderate pain 8/17/22  Yes More Mcdermtot, DO   ipratropium (ATROVENT) 0 03 % nasal spray 2 sprays into each nostril every 12 (twelve) hours   Yes Historical Provider, MD   metoprolol tartrate (LOPRESSOR) 50 mg tablet TAKE 1 AND 1/2 TABLETS BY MOUTH EVERY 12 HOURS  Patient taking differently: 75 mg every 12 (twelve) hours 5/24/22  Yes Layla Terrell MD   Multiple Vitamin (MULTIVITAMIN) capsule Take 1 capsule by mouth daily   Yes Historical Provider, MD   triamcinolone (KENALOG) 0 025 % cream Apply 1 application topically 2 (two) times a day   Yes Historical Provider, MD   acetaminophen (TYLENOL) 650 mg CR tablet Take 650 mg by mouth every 8 (eight) hours as needed for mild pain  Patient not taking: No sig reported    Historical Provider, MD   cholecalciferol (VITAMIN D3) 1,000 units tablet Take 2,000 Units by mouth daily   Patient not taking: Reported on 9/8/2022    Historical Provider, MD   docusate sodium (COLACE) 100 mg capsule Take 1 capsule (100 mg total) by mouth 2 (two) times a day as needed for constipation Hold for loose stools  7/9/21 3/4/22  Jamil Tinoco MD     I have reviewed home medications with a medical source (PCP, Pharmacy, other)  Allergies: Allergies   Allergen Reactions    Penicillins Other (See Comments)     Hallucinations; Patient reported that he was seeing visual disturbances         Social History:  Marital Status:    Occupation:   Patient Pre-hospital Living Situation: Home  Patient Pre-hospital Level of Mobility: walks  Patient Pre-hospital Diet Restrictions: none  Substance Use History:   Social History     Substance and Sexual Activity   Alcohol Use Yes    Alcohol/week: 1 0 standard drink    Types: 1 Shots of liquor per week    Comment: beer, wine, scotch every day; SOCIAL AS PER ALL SCRIPTS      Social History     Tobacco Use   Smoking Status Former Smoker    Packs/day: 1 00    Years: 50 00    Pack years: 50 00    Types: Cigarettes    Quit date:     Years since quittin 6   Smokeless Tobacco Never Used     Social History     Substance and Sexual Activity   Drug Use Not Currently    Types: Marijuana    Comment: medical clarke       Family History:  No history of gastric cancer    Physical Exam:     Vitals:   Blood Pressure: 170/92 (22 1352)  Pulse: 105 (22 1352)  Temperature: 97 6 °F (36 4 °C) (22 1352)  Temp Source: Oral (22 0834)  Respirations: 18 (22 1321)  Height: 5' 7" (170 2 cm) (22 0834)  Weight - Scale: 79 2 kg (174 lb 9 7 oz) (22 0834)  SpO2: 97 % (22 1435)    Physical Exam  Vitals reviewed  Constitutional:       General: He is not in acute distress  Appearance: Normal appearance  He is not ill-appearing, toxic-appearing or diaphoretic  Comments: Shaking chills noted initially   HENT:      Head: Normocephalic and atraumatic  Nose: No congestion or rhinorrhea  Eyes:      General: No scleral icterus  Right eye: No discharge  Left eye: No discharge  Conjunctiva/sclera: Conjunctivae normal    Cardiovascular:      Rate and Rhythm: Regular rhythm  Tachycardia present  Heart sounds: No murmur heard  Comments:   Pulmonary:      Effort: No respiratory distress  Abdominal:      General: There is no distension  Palpations: Abdomen is soft  Tenderness: There is no guarding or rebound  Comments: Very mild left lower quadrant tenderness to palpation  Decreased bowel sounds   Musculoskeletal:         General: No swelling, tenderness, deformity or signs of injury  Right lower leg: No edema  Left lower leg: No edema  Skin:     General: Skin is warm and dry  Coloration: Skin is not jaundiced or pale  Findings: No bruising, erythema, lesion or rash  Neurological:      General: No focal deficit present  Mental Status: He is alert  Psychiatric:         Mood and Affect: Mood normal          Thought Content:  Thought content normal           Additional Data:     Lab Results:  Results from last 7 days   Lab Units 09/08/22  0916   WBC Thousand/uL 12 62*   HEMOGLOBIN g/dL 10 1*   HEMATOCRIT % 31 6*   PLATELETS Thousands/uL 188   NEUTROS PCT % 81*   LYMPHS PCT % 10*   MONOS PCT % 7   EOS PCT % 1     Results from last 7 days   Lab Units 09/08/22  0916   SODIUM mmol/L 137   POTASSIUM mmol/L 3 7   CHLORIDE mmol/L 105   CO2 mmol/L 24   BUN mg/dL 20   CREATININE mg/dL 1 37*   ANION GAP mmol/L 8   CALCIUM mg/dL 9 0   ALBUMIN g/dL 3 6   TOTAL BILIRUBIN mg/dL 0 37   ALK PHOS U/L 78   ALT U/L 11   AST U/L 18   GLUCOSE RANDOM mg/dL 137     Results from last 7 days   Lab Units 09/08/22  1238   INR  1 08             Results from last 7 days   Lab Units 09/08/22  1238 09/08/22  0916   LACTIC ACID mmol/L  --  1 8   PROCALCITONIN ng/ml 0 08  --        Imaging: Reviewed radiology reports from this admission including: abdominal/pelvic CT  CT abdomen pelvis with contrast   Final Result by Gillian Haywood Escobar Mccoy MD (09/08 1221)      Possible acute diverticulitis changes in the distal descending colon  Stool-filled rectal region  Workstation performed: [id number]             EKG and Other Studies Reviewed on Admission:   · EKG    ** Please Note: This note has been constructed using a voice recognition system   **

## 2022-09-08 NOTE — ASSESSMENT & PLAN NOTE
· ongoing low back pain being managed as an outpatient with plans for upcoming MRI and  Kyphoplasty   Patient to reschedule  · Would not recommend additional NSAIDs due to chronic kidney disease and would hold off on routine narcotics in the setting of severe constipation  · Can try Tylenol around the clock, muscle relaxants and topical agents

## 2022-09-09 PROBLEM — A41.9 SEPSIS (HCC): Status: ACTIVE | Noted: 2022-09-09

## 2022-09-09 LAB
ANION GAP SERPL CALCULATED.3IONS-SCNC: 8 MMOL/L (ref 4–13)
ATRIAL RATE: 93 BPM
BACTERIA UR CULT: NORMAL
BASOPHILS # BLD AUTO: 0.03 THOUSANDS/ΜL (ref 0–0.1)
BASOPHILS NFR BLD AUTO: 0 % (ref 0–1)
BUN SERPL-MCNC: 13 MG/DL (ref 5–25)
CALCIUM SERPL-MCNC: 8.1 MG/DL (ref 8.4–10.2)
CHLORIDE SERPL-SCNC: 111 MMOL/L (ref 96–108)
CO2 SERPL-SCNC: 23 MMOL/L (ref 21–32)
CREAT SERPL-MCNC: 1.19 MG/DL (ref 0.6–1.3)
EOSINOPHIL # BLD AUTO: 0.36 THOUSAND/ΜL (ref 0–0.61)
EOSINOPHIL NFR BLD AUTO: 4 % (ref 0–6)
ERYTHROCYTE [DISTWIDTH] IN BLOOD BY AUTOMATED COUNT: 12.8 % (ref 11.6–15.1)
GFR SERPL CREATININE-BSD FRML MDRD: 56 ML/MIN/1.73SQ M
GLUCOSE SERPL-MCNC: 110 MG/DL (ref 65–140)
HCT VFR BLD AUTO: 29.5 % (ref 36.5–49.3)
HGB BLD-MCNC: 9.5 G/DL (ref 12–17)
IMM GRANULOCYTES # BLD AUTO: 0.05 THOUSAND/UL (ref 0–0.2)
IMM GRANULOCYTES NFR BLD AUTO: 1 % (ref 0–2)
LYMPHOCYTES # BLD AUTO: 1.58 THOUSANDS/ΜL (ref 0.6–4.47)
LYMPHOCYTES NFR BLD AUTO: 16 % (ref 14–44)
MAGNESIUM SERPL-MCNC: 1.6 MG/DL (ref 1.9–2.7)
MCH RBC QN AUTO: 33.2 PG (ref 26.8–34.3)
MCHC RBC AUTO-ENTMCNC: 32.2 G/DL (ref 31.4–37.4)
MCV RBC AUTO: 103 FL (ref 82–98)
MONOCYTES # BLD AUTO: 0.76 THOUSAND/ΜL (ref 0.17–1.22)
MONOCYTES NFR BLD AUTO: 8 % (ref 4–12)
NEUTROPHILS # BLD AUTO: 7.27 THOUSANDS/ΜL (ref 1.85–7.62)
NEUTS SEG NFR BLD AUTO: 71 % (ref 43–75)
NRBC BLD AUTO-RTO: 0 /100 WBCS
P AXIS: 28 DEGREES
PLATELET # BLD AUTO: 184 THOUSANDS/UL (ref 149–390)
PMV BLD AUTO: 11.1 FL (ref 8.9–12.7)
POTASSIUM SERPL-SCNC: 4 MMOL/L (ref 3.5–5.3)
PR INTERVAL: 156 MS
QRS AXIS: 33 DEGREES
QRSD INTERVAL: 92 MS
QT INTERVAL: 372 MS
QTC INTERVAL: 462 MS
RBC # BLD AUTO: 2.86 MILLION/UL (ref 3.88–5.62)
SODIUM SERPL-SCNC: 142 MMOL/L (ref 135–147)
T WAVE AXIS: 86 DEGREES
VENTRICULAR RATE: 93 BPM
WBC # BLD AUTO: 10.05 THOUSAND/UL (ref 4.31–10.16)

## 2022-09-09 PROCEDURE — 93010 ELECTROCARDIOGRAM REPORT: CPT | Performed by: INTERNAL MEDICINE

## 2022-09-09 PROCEDURE — 85025 COMPLETE CBC W/AUTO DIFF WBC: CPT | Performed by: PHYSICIAN ASSISTANT

## 2022-09-09 PROCEDURE — 99232 SBSQ HOSP IP/OBS MODERATE 35: CPT | Performed by: PHYSICIAN ASSISTANT

## 2022-09-09 PROCEDURE — 83735 ASSAY OF MAGNESIUM: CPT | Performed by: PHYSICIAN ASSISTANT

## 2022-09-09 PROCEDURE — 80048 BASIC METABOLIC PNL TOTAL CA: CPT | Performed by: PHYSICIAN ASSISTANT

## 2022-09-09 RX ORDER — MAGNESIUM SULFATE HEPTAHYDRATE 40 MG/ML
2 INJECTION, SOLUTION INTRAVENOUS ONCE
Status: COMPLETED | OUTPATIENT
Start: 2022-09-09 | End: 2022-09-09

## 2022-09-09 RX ADMIN — LIDOCAINE 5% 1 PATCH: 700 PATCH TOPICAL at 10:00

## 2022-09-09 RX ADMIN — ASPIRIN 81 MG: 81 TABLET, COATED ORAL at 10:13

## 2022-09-09 RX ADMIN — SODIUM CHLORIDE 100 ML/HR: 0.9 INJECTION, SOLUTION INTRAVENOUS at 02:32

## 2022-09-09 RX ADMIN — OXYCODONE HYDROCHLORIDE AND ACETAMINOPHEN 500 MG: 500 TABLET ORAL at 10:00

## 2022-09-09 RX ADMIN — ACETAMINOPHEN 975 MG: 325 TABLET, FILM COATED ORAL at 06:09

## 2022-09-09 RX ADMIN — MULTIPLE VITAMINS W/ MINERALS TAB 1 TABLET: TAB ORAL at 10:00

## 2022-09-09 RX ADMIN — METOPROLOL TARTRATE 75 MG: 50 TABLET, FILM COATED ORAL at 10:12

## 2022-09-09 RX ADMIN — SODIUM CHLORIDE 100 ML/HR: 0.9 INJECTION, SOLUTION INTRAVENOUS at 12:46

## 2022-09-09 RX ADMIN — METRONIDAZOLE 500 MG: 500 TABLET ORAL at 14:44

## 2022-09-09 RX ADMIN — METRONIDAZOLE 500 MG: 500 TABLET ORAL at 21:01

## 2022-09-09 RX ADMIN — CYANOCOBALAMIN TAB 500 MCG 1000 MCG: 500 TAB at 10:07

## 2022-09-09 RX ADMIN — CEFTRIAXONE 1000 MG: 1 INJECTION, SOLUTION INTRAVENOUS at 12:46

## 2022-09-09 RX ADMIN — ACETAMINOPHEN 975 MG: 325 TABLET, FILM COATED ORAL at 21:01

## 2022-09-09 RX ADMIN — Medication 2000 UNITS: at 10:12

## 2022-09-09 RX ADMIN — ATORVASTATIN CALCIUM 80 MG: 40 TABLET, FILM COATED ORAL at 10:13

## 2022-09-09 RX ADMIN — POLYETHYLENE GLYCOL 3350 17 G: 17 POWDER, FOR SOLUTION ORAL at 10:00

## 2022-09-09 RX ADMIN — METOPROLOL TARTRATE 75 MG: 50 TABLET, FILM COATED ORAL at 21:01

## 2022-09-09 RX ADMIN — ACETAMINOPHEN 975 MG: 325 TABLET, FILM COATED ORAL at 14:44

## 2022-09-09 RX ADMIN — FAMOTIDINE 20 MG: 20 TABLET ORAL at 10:13

## 2022-09-09 RX ADMIN — MAGNESIUM SULFATE HEPTAHYDRATE 2 G: 40 INJECTION, SOLUTION INTRAVENOUS at 16:24

## 2022-09-09 RX ADMIN — METRONIDAZOLE 500 MG: 500 TABLET ORAL at 06:09

## 2022-09-09 RX ADMIN — GABAPENTIN 100 MG: 300 CAPSULE ORAL at 18:26

## 2022-09-09 RX ADMIN — GABAPENTIN 100 MG: 300 CAPSULE ORAL at 10:00

## 2022-09-09 NOTE — UTILIZATION REVIEW
Inpatient Admission Authorization Request   NOTIFICATION OF INPATIENT ADMISSION/INPATIENT AUTHORIZATION REQUEST   SERVICING FACILITY:   [facility] [address] [place] [address]  Tax ID: [id number]  NPI: [id number]  Place of Service: Inpatient [address]  Place of Service Code: 24     ATTENDING PROVIDER:  Attending Name and NPI#: Barbara Landon Md [[id number]]  Address: [facility], [address] [place] [address]  Phone: [phone]     UTILIZATION REVIEW CONTACT:  Dalia Eckert Utilization   Network Utilization Review Department  Phone: [phone]  Fax: [phone]  Email: [email or web address]     PHYSICIAN ADVISORY SERVICES:  FOR [facility] REVIEW - MEDICAL NECESSITY DENIAL  Phone: [phone]  Fax: [phone]  Email: [email or web address]     TYPE OF REQUEST:  Inpatient Status     ADMISSION INFORMATION:  ADMISSION DATE/TIME: 9/8/22 12:45 PM  PATIENT DIAGNOSIS CODE/DESCRIPTION:  Diarrhea [R19 7]  Hypomagnesemia [E83 42]  Rigors [R68 89]  UTI (urinary tract infection) [N39 0]  Anemia [D64 9]  Constipation [K59 00]  Acute diverticulitis [K57 92]  DISCHARGE DATE/TIME: No discharge date for patient encounter  IMPORTANT INFORMATION:  Please contact Dalia Eckert directly with any questions or concerns regarding this request  Department voicemails are confidential     Send requests for admission clinical reviews, concurrent reviews, approvals, and administrative denials due to lack of clinical to fax [phone]

## 2022-09-09 NOTE — ASSESSMENT & PLAN NOTE
· Patient presented with left lower abdominal pain and constipation as well as chills  Patient reports he is doing much better at this time (about 80% better)  · CT A/P with contrast--evidence of acute uncomplicated diverticulitis, known history of diverticulosis but first event of diverticulitis  · Recommend supportive care with IV fluids, pain medication, antibiotics (CTX and flagyl day #2)  · Provided clears, can advance diet to low-fiber low residue  · Patient was severely constipated, initially had good bowel movement but now having diarrhea after repeated doses of MiraLax  · GI follow up outpatient;  Last colonoscopy was 2017, recommend repeat in 6-8 weeks

## 2022-09-09 NOTE — ASSESSMENT & PLAN NOTE
· Leukocytosis and tachycardia present on admission was source of diverticulitis  · Await repeat labs  · Blood cultures in process

## 2022-09-09 NOTE — PLAN OF CARE
Problem: PAIN - ADULT  Goal: Verbalizes/displays adequate comfort level or baseline comfort level  Description: Interventions:  - Encourage patient to monitor pain and request assistance  - Assess pain using appropriate pain scale  - Administer analgesics based on type and severity of pain and evaluate response  - Implement non-pharmacological measures as appropriate and evaluate response  - Consider cultural and social influences on pain and pain management  - Notify physician/advanced practitioner if interventions unsuccessful or patient reports new pain  Outcome: Progressing     Problem: Knowledge Deficit  Goal: Patient/family/caregiver demonstrates understanding of disease process, treatment plan, medications, and discharge instructions  Description: Complete learning assessment and assess knowledge base    Interventions:  - Provide teaching at level of understanding  - Provide teaching via preferred learning methods  Outcome: Progressing     Problem: MOBILITY - ADULT  Goal: Maintain or return to baseline ADL function  Description: INTERVENTIONS:  -  Assess patient's ability to carry out ADLs; assess patient's baseline for ADL function and identify physical deficits which impact ability to perform ADLs (bathing, care of mouth/teeth, toileting, grooming, dressing, etc )  - Assess/evaluate cause of self-care deficits   - Assess range of motion  - Assess patient's mobility; develop plan if impaired  - Assess patient's need for assistive devices and provide as appropriate  - Encourage maximum independence but intervene and supervise when necessary  - Involve family in performance of ADLs  - Assess for home care needs following discharge   - Consider OT consult to assist with ADL evaluation and planning for discharge  - Provide patient education as appropriate  Outcome: Progressing

## 2022-09-09 NOTE — UTILIZATION REVIEW
Initial Clinical Review    Admission: Date/Time/Statement:   Admission Orders (From admission, onward)     Ordered        09/08/22 1245  [facility] [facility] [address]  Once                      Orders Placed This Encounter   Procedures    INPATIENT ADMISSION     Standing Status:   Standing     Number of Occurrences:   1     Order Specific Question:   Level of Care     Answer:   Med Surg [16]     Order Specific Question:   Estimated length of stay     Answer:   More than 2 Midnights     Order Specific Question:   Certification     Answer:   I certify that inpatient services are medically necessary for this patient for a duration of greater than two midnights  See H&P and MD Progress Notes for additional information about the patient's course of treatment  ED Arrival Information     Expected   -    Arrival   9/8/2022 08:30    Acuity   Urgent            Means of arrival   Ambulance    Escorted by   [facility]    Service   Hospitalist    Admission type   Urgent            Arrival complaint   lethargic            Chief Complaint   Patient presents with    Diarrhea     Pt had a few episodes of diarrhea last night  Also complains of fatigue & abd cramping  Feels dehydrated  Initial Presentation: 80 y o  male PMH of CKD, HTN, CAD , lumbar compression fx, diverticulosis who presents with LLQ pain, abdominal cramping and constipation for 1 week  He did note small amount of blood in his stool today  Associated chills  Pt took stool softeners at home had some loose stool but overall still feels constipated and states that he has been "forcing it " He feels dehydrated and states that his appetite has been decreased   On exam, mild LLQ tenderness to palpation  Decreased bowel sounds , abdomen soft  Shaking chills noted initially  Labs - WBC 12 62, creat 1 37withy baseline 1 0-1 4, Mag 1 4  CT A/P shows acute uncomplicated diverticulitis, shows stool filled rectal region   Pt given IVF, dual IV abx, IV Mag in ED   Pt admitted as inpatient with diverticulitis(first episode )  Plan - IVF, IV abx- Flagyl and ceftriaxone, CL for now, pain control  Trend BMP -had CT contrast   Mag in am   Monitor BP  PRN IV Labetalol  Date:9/9 Day 2:   Pt feeling better overall, had good bm yesterday and feels cleaned out  Reports a few drops of blood yesterday with straining to have bm   Pt had urgency and additional diarrhea today  Denies abdominal pain today, no N/V  Tolerating CL diet and feels ready to have diet advanced   Diet advanced to lo fiber lo residue   OOB ambulating   BP improved today  Continues po Lopressor   No creat yet today , multiple attempt unsuccessful w/ am labs  Venous access consult placed   Continue IV abx - ceftriaxone, po Flagyl   F/U blood cultures   IVF continues        ED Triage Vitals [09/08/22 0834]   Temperature Pulse Respirations Blood Pressure SpO2   97 7 °F (36 5 °C) 94 16 128/58 98 %      Temp Source Heart Rate Source Patient Position - Orthostatic VS BP Location FiO2 (%)   Oral Monitor Sitting Right arm --      Pain Score       No Pain          Wt Readings from Last 1 Encounters:   09/08/22 79 2 kg (174 lb 9 7 oz)     Additional Vital Signs:   Date/Time Temp Pulse Resp BP MAP (mmHg) SpO2 Patient Position - Orthostatic VS   09/09/22 0721 98 °F (36 7 °C) 67 -- 151/74 100 100 % --   09/08/22 20:54:18 97 8 °F (36 6 °C) 76 16 138/75 96 95 % --   09/08/22 15:36:27 97 3 °F (36 3 °C) Abnormal  98 -- 169/92 118 95 % --   09/08/22 1531 -- 95 -- 169/92 -- -- --   09/08/22 15:17:58 97 6 °F (36 4 °C) 98 -- -- -- 97 % --   09/08/22 1435 -- -- -- -- -- 97 % --   09/08/22 13:52:30 97 6 °F (36 4 °C) 105 -- 170/92 118 96 % --   09/08/22 1321 -- 98 18 180/89 Abnormal  -- 100 % Sitting   09/08/22 1100 -- 87 16 146/68 98 97 % Lying   09/08/22 1029 -- 101 18 141/64 -- -- Standing for 3 minutes - Orthostatic VS   09/08/22 1026 -- 83 18 176/76 Abnormal  -- -- Sitting - Orthostatic VS   09/08/22 1023 -- 82 17 150/70 -- -- Lying - Orthostatic VS       Pertinent Labs/Diagnostic Test Results:   CT abdomen pelvis with contrast   Final Result by Neville Patel MD (09/08 1221)      Possible acute diverticulitis changes in the distal descending colon  Stool-filled rectal region        Workstation performed: [id number]         9/8 ECG-ECG rate:  93     ECG rate assessment: normal     Rhythm:     Rhythm: sinus rhythm     Ectopy:     Ectopy: none     QRS:     QRS axis:  Normal     QRS intervals:  Normal   Conduction:     Conduction: normal     ST segments:     ST segments:  Normal   T waves:     T waves: normal     Other findings:     Other findings: prolonged qTc interval     Comments:      No STEMI   QT/QTc 372/462        Results from last 7 days   Lab Units 09/08/22  0916   WBC Thousand/uL 12 62*   HEMOGLOBIN g/dL 10 1*   HEMATOCRIT % 31 6*   PLATELETS Thousands/uL 188   NEUTROS ABS Thousands/µL 10 25*         Results from last 7 days   Lab Units 09/08/22  0916 09/07/22  0757   SODIUM mmol/L 137 141   POTASSIUM mmol/L 3 7 4 5   CHLORIDE mmol/L 105 109*   CO2 mmol/L 24 26   ANION GAP mmol/L 8 6   BUN mg/dL 20 25   CREATININE mg/dL 1 37* 1 40*   EGFR ml/min/1 73sq m 47 46   CALCIUM mg/dL 9 0 9 5   MAGNESIUM mg/dL 1 4*  --      Results from last 7 days   Lab Units 09/08/22  0916   AST U/L 18   ALT U/L 11   ALK PHOS U/L 78   TOTAL PROTEIN g/dL 7 3   ALBUMIN g/dL 3 6   TOTAL BILIRUBIN mg/dL 0 37         Results from last 7 days   Lab Units 09/08/22  0916   GLUCOSE RANDOM mg/dL 137               Results from last 7 days   Lab Units 09/08/22  1131 09/08/22  0916   HS TNI 0HR ng/L  --  5   HS TNI 2HR ng/L 4  --    HSTNI D2 ng/L -1  --          Results from last 7 days   Lab Units 09/08/22  1238   PROTIME seconds 14 2   INR  1 08   PTT seconds 35         Results from last 7 days   Lab Units 09/08/22  1238   PROCALCITONIN ng/ml 0 08     Results from last 7 days   Lab Units 09/08/22  0916   LACTIC ACID mmol/L 1 8                         Results from last 7 days   Lab Units 09/08/22  0916   LIPASE u/L 9*                 Results from last 7 days   Lab Units 09/08/22  1059   CLARITY UA  Clear   COLOR UA  Light Yellow   SPEC GRAV UA  1 009   PH UA  5 0   GLUCOSE UA mg/dl Negative   KETONES UA mg/dl Negative   BLOOD UA  Negative   PROTEIN UA mg/dl Negative   NITRITE UA  Negative   BILIRUBIN UA  Negative   UROBILINOGEN UA (BE) mg/dl <2 0   LEUKOCYTES UA  Moderate*   WBC UA /hpf 30-50*   RBC UA /hpf 1-2   BACTERIA UA /hpf None Seen   EPITHELIAL CELLS WET PREP /hpf Occasional   MUCUS THREADS  Occasional*                                 Results from last 7 days   Lab Units 09/08/22  1238 09/08/22  1059   BLOOD CULTURE  Received in Microbiology Lab  Culture in Progress  Received in Microbiology Lab  Culture in Progress    --    URINE CULTURE   --  No Growth <1000 cfu/mL               ED Treatment:   Medication Administration from 09/08/2022 0829 to 09/08/2022 1333       Date/Time Order Dose Route Action     09/08/2022 0917 sodium chloride 0 9 % bolus 1,000 mL 1,000 mL Intravenous New Bag     09/08/2022 1015 magnesium sulfate 2 g/50 mL IVPB (premix) 2 g 2 g Intravenous New Bag     09/08/2022 1149 iohexol (OMNIPAQUE) 350 MG/ML injection (MULTI-DOSE) 85 mL 85 mL Intravenous Given     09/08/2022 1243 cefTRIAXone (ROCEPHIN) IVPB (premix in dextrose) 1,000 mg 50 mL 1,000 mg Intravenous New Bag     09/08/2022 1243 sodium chloride 0 9 % bolus 1,000 mL 1,000 mL Intravenous New Bag     09/08/2022 1320 metroNIDAZOLE (FLAGYL) IVPB (premix) 500 mg 100 mL 500 mg Intravenous New Bag        Past Medical History:   Diagnosis Date    Abnormal liver function test     RESOLVED: [id number]    Allergic rhinitis     Anemia     LAST ASSESSED: [id number]    Arthritis     BPH (benign prostatic hyperplasia)     CAD (coronary artery disease)     Coronary artery disease     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HCC)     LAST ASSESSED: [id number]    Former tobacco use     GERD (gastroesophageal reflux disease)     Hand paresthesia     RESOLVED: [id number]    Hyperlipidemia     Hypertension     Lumbar stenosis     Peripheral artery disease (HCC)     LAST ASSESSED: [id number]    Stage 3a chronic kidney disease ([facility] [address] ) 7/11/2021     Present on Admission:   Hypertension   Lumbar compression fracture (HCC)      Admitting Diagnosis: Diarrhea [R19 7]  Hypomagnesemia [E83 42]  Rigors [R68 89]  UTI (urinary tract infection) [N39 0]  Anemia [D64 9]  Constipation [K59 00]  Acute diverticulitis [K57 92]  Age/Sex: 80 y o  male  Admission Orders:  Scheduled Medications:  acetaminophen, 975 mg, Oral, Q8H [facility]  ascorbic acid, 500 mg, Oral, Daily  aspirin, 81 mg, Oral, Daily  atorvastatin, 80 mg, Oral, QAM  cefTRIAXone, 1,000 mg, Intravenous, Q24H  cholecalciferol, 2,000 Units, Oral, Daily  vitamin B-12, 1,000 mcg, Oral, Daily  famotidine, 20 mg, Oral, Daily  gabapentin, 100 mg, Oral, BID  lidocaine, 1 patch, Topical, Daily  metoprolol tartrate, 75 mg, Oral, Q12H KAMRAN  metroNIDAZOLE, 500 mg, Oral, Q8H [facility]  multivitamin-minerals, 1 tablet, Oral, Daily  polyethylene glycol, 17 g, Oral, BID    metroNIDAZOLE (FLAGYL) IVPB (premix) 500 mg 100 mL  Dose: 500 mg  Freq: Every 8 hours Route: IV  Last Dose: 500 mg (09/08/22 1320)  Start: 09/08/22 1245 End: 09/08/22 1449  Continuous IV Infusions:  sodium chloride, 100 mL/hr, Intravenous, Continuous      PRN Meds:  HYDROmorphone, 0 2 mg, Intravenous, Q4H PRN  labetalol, 10 mg, Intravenous, Q6H PRN  methocarbamol, 500 mg, Oral, Q6H PRN  ondansetron, 4 mg, Intravenous, Q4H PRN      GI lo fiber lo residue diet effective 9/9 @1058   ambulate QID  OOb as aron    Network Utilization Review Department  ATTENTION: Please call with any questions or concerns to [phone] and carefully listen to the prompts so that you are directed to the right person   All voicemails are confidential   Ty Limb all requests for admission clinical reviews, approved or denied determinations and any other requests to dedicated fax number below belonging to the campus where the patient is receiving treatment   List of dedicated fax numbers for the Facilities:  [address] [place] [address] DENIALS (Administrative/Medical Necessity) [phone]   [address] (Maternity/NICU/Pediatrics) [phone]   [address] [place] [address] [place] [address] [facility] [place] [address] [place] [address] [address]fa [phone] [facility] [phone]

## 2022-09-09 NOTE — ASSESSMENT & PLAN NOTE
Lab Results   Component Value Date    EGFR 47 09/08/2022    EGFR 46 09/07/2022    EGFR 27 08/24/2022    CREATININE 1 37 (H) 09/08/2022    CREATININE 1 40 (H) 09/07/2022    CREATININE 2 19 (H) 08/24/2022   · Creatinine within baseline today 1 0-1 4; of note he recently had ELIZABETH 2 weeks ago with creatinine 2 19  · Trend BMP especially in light of contrast provided for CT scan today--still awaiting results of labs today  · Avoid nephrotoxic agents (stop further use of NSAIDS)

## 2022-09-09 NOTE — ASSESSMENT & PLAN NOTE
· Blood pressure elevated at 180/89 initially, now improved  · Continue lopressor p o   · Prn IV labetalol

## 2022-09-09 NOTE — PLAN OF CARE
Problem: PAIN - ADULT  Goal: Verbalizes/displays adequate comfort level or baseline comfort level  Description: Interventions:  - Encourage patient to monitor pain and request assistance  - Assess pain using appropriate pain scale  - Administer analgesics based on type and severity of pain and evaluate response  - Implement non-pharmacological measures as appropriate and evaluate response  - Consider cultural and social influences on pain and pain management  - Notify physician/advanced practitioner if interventions unsuccessful or patient reports new pain  Outcome: Progressing     Problem: INFECTION - ADULT  Goal: Absence or prevention of progression during hospitalization  Description: INTERVENTIONS:  - Assess and monitor for signs and symptoms of infection  - Monitor lab/diagnostic results  - Monitor all insertion sites, i e  indwelling lines, tubes, and drains  - Monitor endotracheal if appropriate and nasal secretions for changes in amount and color  - [place] appropriate cooling/warming therapies per order  - Administer medications as ordered  - Instruct and encourage patient and family to use good hand hygiene technique  - Identify and instruct in appropriate isolation precautions for identified infection/condition  Outcome: Progressing     Problem: DISCHARGE PLANNING  Goal: Discharge to home or other facility with appropriate resources  Description: INTERVENTIONS:  - Identify barriers to discharge w/patient and caregiver  - Arrange for needed discharge resources and transportation as appropriate  - Identify discharge learning needs (meds, wound care, etc )  - Arrange for interpretive services to assist at discharge as needed  - Refer to Case Management Department for coordinating discharge planning if the patient needs post-hospital services based on physician/advanced practitioner order or complex needs related to functional status, cognitive ability, or social support system  Outcome: Progressing Problem: Knowledge Deficit  Goal: Patient/family/caregiver demonstrates understanding of disease process, treatment plan, medications, and discharge instructions  Description: Complete learning assessment and assess knowledge base  Interventions:  - Provide teaching at level of understanding  - Provide teaching via preferred learning methods  Outcome: Progressing     Problem: MOBILITY - ADULT  Goal: Maintain or return to baseline ADL function  Description: INTERVENTIONS:  -  Assess patient's ability to carry out ADLs; assess patient's baseline for ADL function and identify physical deficits which impact ability to perform ADLs (bathing, care of mouth/teeth, toileting, grooming, dressing, etc )  - Assess/evaluate cause of self-care deficits   - Assess range of motion  - Assess patient's mobility; develop plan if impaired  - Assess patient's need for assistive devices and provide as appropriate  - Encourage maximum independence but intervene and supervise when necessary  - Involve family in performance of ADLs  - Assess for home care needs following discharge   - Consider OT consult to assist with ADL evaluation and planning for discharge  - Provide patient education as appropriate  Outcome: Progressing  Goal: Maintains/Returns to pre admission functional level  Description: INTERVENTIONS:  - Perform BMAT or MOVE assessment daily    - Set and communicate daily mobility goal to care team and patient/family/caregiver  - Collaborate with rehabilitation services on mobility goals if consulted  - Perform Range of Motion  times a day  - Reposition patient every  hours    - Dangle patient  times a day  - Stand patient  times a day  - Ambulate patient  times a day  - Out of bed to chair  times a day   - Out of bed for meals  times a day  - Out of bed for toileting  - Record patient progress and toleration of activity level   Outcome: Progressing     Problem: Potential for Falls  Goal: Patient will remain free of falls  Description: INTERVENTIONS:  - Educate patient/family on patient safety including physical limitations  - Instruct patient to call for assistance with activity   - Consult OT/PT to assist with strengthening/mobility   - Keep Call bell within reach  - Keep bed low and locked with side rails adjusted as appropriate  - Keep care items and personal belongings within reach  - Initiate and maintain comfort rounds  - Make Fall Risk Sign visible to staff  - Offer Toileting every Hours, in advance of need  - Initiate/Maintain alarm  - Obtain necessary fall risk management equipment:  Apply yellow socks and bracelet for high fall risk patients  - Consider moving patient to room near nurses station  Outcome: Progressing

## 2022-09-09 NOTE — APP STUDENT NOTE
JESS STUDENT  Inpatient Progress Note for TRAINING ONLY  Not Part of Legal Medical Record     Progress Note - Nathen Almaraz 80 y o  male MRN: [id number]  Unit/Bed#: W -01 Encounter: [id number]      Diverticulitis  Assessment & Plan  · Patient presented with abdominal cramping and constipation  · CT A/P with contrast--evidence of acute uncomplicated diverticulitis  · Leukocytosis noted on admission  · Was put on IV ceftriaxone and metronidazole, IV fluids, pain control, clear fluid diet  · Patient on low fiber/low residue diet, ate today and had no complaints   · Discontinue miralax due to diarrhea this morning  · Continue IV antibiotics, fluids, pain control  · GI follow up outpatient, last colonoscopy 2017        Stage 3 chronic kidney disease [facility])  Assessment & Plan        Lab Results   Component Value Date     EGFR 47 09/08/2022     EGFR 46 09/07/2022     EGFR 27 08/24/2022     CREATININE 1 37 (H) 09/08/2022     CREATININE 1 40 (H) 09/07/2022     CREATININE 2 19 (H) 08/24/2022   · Creatinine within baseline 1 0-1 4; of note he recently had ELIZABETH 2 weeks ago with creatinine 2 19  · Continue to monitor, daily BMP, awaiting AM labs  · Stop NSAIDS, avoid nephrotoxic agents     Hypomagnesemia  Assessment & Plan  · Mag 1 4 on admission, given IV magnesium sulfate  · Continue to monitor magnesium, waiting on AM labs     Hypertension  Assessment & Plan  · Blood pressure stable at 151/74, 180/89 on admission  · Continue lopressor     Lumbar compression fracture  Assessment & Plan  · Low back pain managed outpatient, had plan for MRI  · Patient has no complaints of pain, continue pain management  · Discontinued NSAIDS       VTE Pharmacologic Prophylaxis: VTE: 2   Mechanical VTE Prophylaxis in Place: No    Patient Centered Rounds: I have performed bedside rounds with nursing staff today      Discussions with Specialists or Other Care Team Provider: nursing    Education and Discussions with Family / Patient: discussed with patient    Time Spent for Care: 30 minutes  More than 50% of total time spent on counseling and coordination of care as described above  Current Length of Stay: 1 day(s)    Current Patient Status: Inpatient   Certification Statement: The patient will continue to require additional inpatient hospital stay due to diarrhea    Discharge Plan: 24 hours depending on diarrhea  Code Status: Level 3 - DNAR and DNI    Subjective:   Patient feels much better today  Denies pain, nausea, vomiting, fever or chills  Has had diarrhea since this morning, has gone to the bathroom about 4 times today  He was able to eat today and has no pain after eating  Objective:     Vitals:   Temp (24hrs), Av 7 °F (36 5 °C), Min:97 3 °F (36 3 °C), Max:98 °F (36 7 °C)    Temp:  [97 3 °F (36 3 °C)-98 °F (36 7 °C)] 98 °F (36 7 °C)  HR:  [] 67  Resp:  [16] 16  BP: (138-170)/(74-92) 151/74  SpO2:  [95 %-100 %] 100 %  Body mass index is 27 35 kg/m²  Input and Output Summary (last 24 hours): Intake/Output Summary (Last 24 hours) at 2022 1325  Last data filed at 2022 0601  Gross per 24 hour   Intake 1588 34 ml   Output 800 ml   Net 788 34 ml       Physical Exam:     Physical Exam  Constitutional:       General: He is not in acute distress  Appearance: He is not ill-appearing  HENT:      Mouth/Throat:      Mouth: Mucous membranes are moist    Cardiovascular:      Rate and Rhythm: Normal rate and regular rhythm  Pulses: Normal pulses  Heart sounds: Normal heart sounds  Pulmonary:      Effort: Pulmonary effort is normal       Breath sounds: Normal breath sounds  No wheezing, rhonchi or rales  Abdominal:      General: Abdomen is flat  Bowel sounds are increased  Tenderness: There is no abdominal tenderness  There is no guarding or rebound  Skin:     General: Skin is warm  Neurological:      General: No focal deficit present        Mental Status: He is alert and oriented to person, place, and time  Psychiatric:         Mood and Affect: Mood normal          Behavior: Behavior normal            Historical Information   Past Medical History:   Diagnosis Date    Abnormal liver function test     RESOLVED: [id number]    Allergic rhinitis     Anemia     LAST ASSESSED: [id number]    Arthritis     BPH (benign prostatic hyperplasia)     CAD (coronary artery disease)     Coronary artery disease     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HCC)     LAST ASSESSED: [id number]    Former tobacco use     GERD (gastroesophageal reflux disease)     Hand paresthesia     RESOLVED: [id number]    Hyperlipidemia     Hypertension     Lumbar stenosis     Peripheral artery disease (HCC)     LAST ASSESSED: [id number]    Stage 3a chronic kidney disease ([facility] [address] ) 7/11/2021     Past Surgical History:   Procedure Laterality Date    BACK SURGERY      COLONOSCOPY      LUMBAR FUSION      UT ARTHRODESIS POSTERIOR/POSTEROLATERAL LUMBAR N/A 11/03/2016    Procedure: L2-S1 POSTERIOR LUMBAR FUSION AND DECOMPRESSION (Impulse), Posterior lateral fixation; dural repair ;  Surgeon: Camden Villanueva MD;  Location: [facility];  Service: Orthopedics    UT CABG, ARTERIAL, SINGLE N/A 01/19/2018    Procedure: CABG X4 with LIMA - LAD, SVG - RCA, OM2, & Diagonal ; Left Leg EVH; MATTHEW;  Surgeon: Bianca Samuel DO;  Location: [facility];  Service: Cardiac Surgery    UT COLONOSCOPY FLX DX W/COLLJ SPEC WHEN PFRMD N/A 11/15/2017    Procedure: EGD AND COLONOSCOPY;  Surgeon: Eran Way MD;  Location: [facility];   Service: Gastroenterology    SEPTOPLASTY      LAST ASSESSED; [id number]    TONSILECTOMY AND ADNOIDECTOMY      LAST ASSESSED: [id number]    UPPER GASTROINTESTINAL ENDOSCOPY       Social History   Social History     Substance and Sexual Activity   Alcohol Use Yes    Alcohol/week: 1 0 standard drink    Types: 1 Shots of liquor per week    Comment: beer, wine, scotch every day; SOCIAL AS PER ALL SCRIPTS      Social History     Substance and Sexual Activity   Drug Use Not Currently    Types: Marijuana    Comment: medical clarke     Social History     Tobacco Use   Smoking Status Former Smoker    Packs/day: 1 00    Years: 50 00    Pack years: 50 00    Types: Cigarettes    Quit date:     Years since quittin 6   Smokeless Tobacco Never Used     Family History: non-contributory    Meds/Allergies   all medications and allergies reviewed  Allergies   Allergen Reactions    Penicillins Other (See Comments)     Hallucinations; Patient reported that he was seeing visual disturbances         Additional Data:     Labs:    Results from last 7 days   Lab Units 22  0916   WBC Thousand/uL 12 62*   HEMOGLOBIN g/dL 10 1*   HEMATOCRIT % 31 6*   PLATELETS Thousands/uL 188   NEUTROS PCT % 81*   LYMPHS PCT % 10*   MONOS PCT % 7   EOS PCT % 1     Results from last 7 days   Lab Units 22  0916   SODIUM mmol/L 137   POTASSIUM mmol/L 3 7   CHLORIDE mmol/L 105   CO2 mmol/L 24   BUN mg/dL 20   CREATININE mg/dL 1 37*   ANION GAP mmol/L 8   CALCIUM mg/dL 9 0   ALBUMIN g/dL 3 6   TOTAL BILIRUBIN mg/dL 0 37   ALK PHOS U/L 78   ALT U/L 11   AST U/L 18   GLUCOSE RANDOM mg/dL 137     Results from last 7 days   Lab Units 22  1238   INR  1 08             Results from last 7 days   Lab Units 22  1238 22  0916   LACTIC ACID mmol/L  --  1 8   PROCALCITONIN ng/ml 0 08  --          * I Have Reviewed All Lab Data Listed Above  * Additional Pertinent Lab Tests Reviewed: No New Labs Available For Today    Imaging:    Imaging Reports Reviewed Today Include: none    Recent Cultures (last 7 days):     Results from last 7 days   Lab Units 22  1238 22  1059   BLOOD CULTURE  Received in Microbiology Lab  Culture in Progress  Received in Microbiology Lab  Culture in Progress    --    URINE CULTURE   --  No Growth <1000 cfu/mL       Last 24 Hours Medication List:   Current Facility-Administered Medications   Medication Dose Route Frequency Provider Last Rate    acetaminophen  975 mg Oral Q8H [address] [facility], [facility]-C      aspirin  81 mg Oral Daily Mikaela Stoudt, PA-C      atorvastatin  80 mg Oral QAM Mikaela Stocheyenne, PA-C      cefTRIAXone  1,000 mg Intravenous Q24H Mikaela Stoudt, PA-C 1,000 mg (09/09/22 1246)    cholecalciferol  2,000 Units Oral Daily Mikaela Stoudt, PA-C      vitamin B-12  1,000 mcg Oral Daily [facility], [facility]      famotidine  20 mg Oral Daily Mikaela Stoudt, PA-C      gabapentin  100 mg Oral BID [facility], [facility]      HYDROmorphone  0 2 mg Intravenous Q4H PRN Mikaela Stoud, PA-C      labetalol  10 mg Intravenous Q6H PRN Mikaela Stoudt, PA-C      lidocaine  1 patch Topical Daily [facility], [facility]C      methocarbamol  500 mg Oral Q6H PRN Mikaela Stoudt, PA-C      metoprolol tartrate  75 mg Oral Q12H [address] [facility], [facility]-C      metroNIDAZOLE  500 mg Oral Q8H [address] [facility], PA-C      multivitamin-minerals  1 tablet Oral Daily [facility], PA-C      ondansetron  4 mg Intravenous Q4H PRN [facility], PA-C      sodium chloride  100 mL/hr Intravenous Continuous Mikaela Stokaushikt, PA-C 100 mL/hr (09/09/22 1246)        Today, Patient Was Seen By: Cb Triplett    ** Please Note: Dictation voice to text software may have been used in the creation of this document   **

## 2022-09-10 PROBLEM — A41.9 SEPSIS (HCC): Status: RESOLVED | Noted: 2022-09-09 | Resolved: 2022-09-10

## 2022-09-10 PROCEDURE — 99232 SBSQ HOSP IP/OBS MODERATE 35: CPT | Performed by: PHYSICIAN ASSISTANT

## 2022-09-10 RX ORDER — MAGNESIUM SULFATE HEPTAHYDRATE 40 MG/ML
2 INJECTION, SOLUTION INTRAVENOUS ONCE
Status: DISCONTINUED | OUTPATIENT
Start: 2022-09-10 | End: 2022-09-10

## 2022-09-10 RX ADMIN — METOPROLOL TARTRATE 75 MG: 50 TABLET, FILM COATED ORAL at 09:54

## 2022-09-10 RX ADMIN — METOPROLOL TARTRATE 75 MG: 50 TABLET, FILM COATED ORAL at 21:55

## 2022-09-10 RX ADMIN — FAMOTIDINE 20 MG: 20 TABLET ORAL at 09:54

## 2022-09-10 RX ADMIN — METRONIDAZOLE 500 MG: 500 TABLET ORAL at 21:55

## 2022-09-10 RX ADMIN — GABAPENTIN 100 MG: 300 CAPSULE ORAL at 18:54

## 2022-09-10 RX ADMIN — ACETAMINOPHEN 975 MG: 325 TABLET, FILM COATED ORAL at 05:30

## 2022-09-10 RX ADMIN — LIDOCAINE 5% 1 PATCH: 700 PATCH TOPICAL at 09:53

## 2022-09-10 RX ADMIN — METRONIDAZOLE 500 MG: 500 TABLET ORAL at 05:31

## 2022-09-10 RX ADMIN — MULTIPLE VITAMINS W/ MINERALS TAB 1 TABLET: TAB ORAL at 09:54

## 2022-09-10 RX ADMIN — Medication 2000 UNITS: at 09:54

## 2022-09-10 RX ADMIN — CEFTRIAXONE 1000 MG: 1 INJECTION, SOLUTION INTRAVENOUS at 15:38

## 2022-09-10 RX ADMIN — METRONIDAZOLE 500 MG: 500 TABLET ORAL at 15:38

## 2022-09-10 RX ADMIN — ACETAMINOPHEN 975 MG: 325 TABLET, FILM COATED ORAL at 15:38

## 2022-09-10 RX ADMIN — GABAPENTIN 100 MG: 300 CAPSULE ORAL at 09:54

## 2022-09-10 RX ADMIN — ASPIRIN 81 MG: 81 TABLET, COATED ORAL at 09:54

## 2022-09-10 RX ADMIN — ATORVASTATIN CALCIUM 80 MG: 40 TABLET, FILM COATED ORAL at 09:59

## 2022-09-10 RX ADMIN — SODIUM CHLORIDE 100 ML/HR: 0.9 INJECTION, SOLUTION INTRAVENOUS at 01:11

## 2022-09-10 RX ADMIN — ACETAMINOPHEN 975 MG: 325 TABLET, FILM COATED ORAL at 21:55

## 2022-09-10 RX ADMIN — CYANOCOBALAMIN TAB 500 MCG 1000 MCG: 500 TAB at 09:54

## 2022-09-10 NOTE — PROGRESS NOTES
[facility]  Progress Note - Juliocesar Louise [birth year], 80 y o  male MRN: [id number]  Unit/Bed#: W -01 Encounter: [id number]  Primary Care Provider: Vonda Carey,    Date and time admitted to hospital: 9/8/2022  8:31 AM    * Diverticulitis  Assessment & Plan  · Patient presented with left lower abdominal pain and constipation as well as chills  Patient reports he is doing much better at this time (about 80% better)  · CT evidence of acute uncomplicated diverticulitis, known history of diverticulosis but first event of diverticulitis  · Continue CTX and flagyl day #2  · Continue low-fiber low residue  · Patient was severely constipated, initially had good bowel movement but now having diarrhea after repeated doses of MiraLax  · GI follow up outpatient  · Last colonoscopy was 2017, recommend repeat in 6-8 weeks    Stage 3 chronic kidney disease [facility])  Assessment & Plan  Lab Results   Component Value Date    EGFR 56 09/09/2022    EGFR 47 09/08/2022    EGFR 46 09/07/2022    CREATININE 1 19 09/09/2022    CREATININE 1 37 (H) 09/08/2022    CREATININE 1 40 (H) 09/07/2022   · Creatinine within baseline today 1 0-1 4; of note he recently had ELIZABETH 2 weeks ago with creatinine 2 19  · Creatinine returned to baseline   · Check BMP in AM     Sepsis (HCC)-resolved as of 9/10/2022  Assessment & Plan  · Leukocytosis and tachycardia present on admission was source of diverticulitis, now resolved   · Blood cultures negative   · Antibiotics as above     Hypomagnesemia  Assessment & Plan  · Mag 1 6, given 2 g yesterday   · Check again tomorrow given diarrhea     Lumbar compression fracture (HCC)  Assessment & Plan  · Ongoing low back pain being managed as an outpatient with plans for upcoming MRI and Kyphoplasty   Patient to reschedule  · Would not recommend additional NSAIDs due to chronic kidney disease and would hold off on routine narcotics in the setting of severe constipation  · Can try Tylenol around the clock, muscle relaxants and topical agents    Hypertension  Assessment & Plan  · Blood pressure improved  · Continue metoprolol  · PRN IV labetalol        VTE Pharmacologic Prophylaxis: VTE Score: 2 Low Risk (Score 0-2) - Encourage Ambulation  Patient Centered Rounds: I performed bedside rounds with nursing staff today  Discussions with Specialists or Other Care Team Provider: Discussed with RNJASON    Education and Discussions with Family / Patient: Updated  (daughter) at bedside  Time Spent for Care: 30 minutes  More than 50% of total time spent on counseling and coordination of care as described above  Current Length of Stay: 2 day(s)  Current Patient Status: Inpatient   Certification Statement: The patient will continue to require additional inpatient hospital stay due to further diarrhea  Discharge Plan: Anticipate discharge tomorrow to home  Code Status: Level 3 - DNAR and DNI    Subjective:   Patient reports still having diarrhea, feels like he needs 1 more day  Denies fevers or chills  Objective:     Vitals:   Temp (24hrs), Av 2 °F (36 8 °C), Min:97 6 °F (36 4 °C), Max:98 5 °F (36 9 °C)    Temp:  [97 6 °F (36 4 °C)-98 5 °F (36 9 °C)] 97 6 °F (36 4 °C)  HR:  [71-80] 80  Resp:  [16-18] 16  BP: (142-150)/(68-78) 150/78  SpO2:  [96 %-99 %] 96 %  Body mass index is 27 35 kg/m²  Input and Output Summary (last 24 hours): Intake/Output Summary (Last 24 hours) at 9/10/2022 1336  Last data filed at 9/10/2022 1002  Gross per 24 hour   Intake 480 ml   Output 1825 ml   Net -1345 ml       Physical Exam:   Physical Exam  Constitutional:       General: He is not in acute distress  Appearance: Normal appearance  He is normal weight  He is not ill-appearing or diaphoretic  HENT:      Head: Normocephalic and atraumatic  Mouth/Throat:      Mouth: Mucous membranes are moist    Eyes:      General: No scleral icterus       Pupils: Pupils are equal, round, and reactive to light  Cardiovascular:      Rate and Rhythm: Normal rate and regular rhythm  Pulses: Normal pulses  Heart sounds: Normal heart sounds, S1 normal and S2 normal  No murmur heard  No systolic murmur is present  No diastolic murmur is present  No gallop  No S3 or S4 sounds  Pulmonary:      Effort: Pulmonary effort is normal  No accessory muscle usage or respiratory distress  Breath sounds: Normal breath sounds  No stridor  No decreased breath sounds, wheezing, rhonchi or rales  Chest:      Chest wall: No tenderness  Abdominal:      General: Bowel sounds are normal  There is no distension  Palpations: Abdomen is soft  Tenderness: There is no abdominal tenderness  There is no guarding  Musculoskeletal:      Right lower leg: No edema  Left lower leg: No edema  Skin:     General: Skin is warm and dry  Coloration: Skin is not jaundiced  Neurological:      General: No focal deficit present  Mental Status: He is alert  Mental status is at baseline  Motor: No tremor or seizure activity  Psychiatric:         Behavior: Behavior is cooperative            Additional Data:     Labs:  Results from last 7 days   Lab Units 09/09/22  1328   WBC Thousand/uL 10 05   HEMOGLOBIN g/dL 9 5*   HEMATOCRIT % 29 5*   PLATELETS Thousands/uL 184   NEUTROS PCT % 71   LYMPHS PCT % 16   MONOS PCT % 8   EOS PCT % 4     Results from last 7 days   Lab Units 09/09/22  1328 09/08/22  0916   SODIUM mmol/L 142 137   POTASSIUM mmol/L 4 0 3 7   CHLORIDE mmol/L 111* 105   CO2 mmol/L 23 24   BUN mg/dL 13 20   CREATININE mg/dL 1 19 1 37*   ANION GAP mmol/L 8 8   CALCIUM mg/dL 8 1* 9 0   ALBUMIN g/dL  --  3 6   TOTAL BILIRUBIN mg/dL  --  0 37   ALK PHOS U/L  --  78   ALT U/L  --  11   AST U/L  --  18   GLUCOSE RANDOM mg/dL 110 137     Results from last 7 days   Lab Units 09/08/22  1238   INR  1 08             Results from last 7 days   Lab Units 09/08/22  1238 09/08/22  0916   LACTIC ACID mmol/L --  1 8   PROCALCITONIN ng/ml 0 08  --        Lines/Drains:  Invasive Devices  Report    Peripheral Intravenous Line  Duration           Peripheral IV 09/08/22 Right Antecubital 2 days                      Imaging: No pertinent imaging reviewed  Recent Cultures (last 7 days):   Results from last 7 days   Lab Units 09/08/22  1238 09/08/22  1059   BLOOD CULTURE  No Growth at 24 hrs  No Growth at 24 hrs   --    URINE CULTURE   --  No Growth <1000 cfu/mL       Last 24 Hours Medication List:   Current Facility-Administered Medications   Medication Dose Route Frequency Provider Last Rate    acetaminophen  975 mg Oral Q8H [address] Mikaela Stoudt, PA-C      aspirin  81 mg Oral Daily Mikaela Stoudt, PA-C      atorvastatin  80 mg Oral QAM Mikaela Stoudt, PA-C      cefTRIAXone  1,000 mg Intravenous Q24H Mikaela Stoudt, PA-C 1,000 mg (09/09/22 1246)    cholecalciferol  2,000 Units Oral Daily Mikaela Stoudt, PA-C      vitamin B-12  1,000 mcg Oral Daily Mikaela Stoudt, PA-C      famotidine  20 mg Oral Daily Mikaela Stoudt, PA-C      gabapentin  100 mg Oral BID Mikaela Stoudt, PA-C      HYDROmorphone  0 2 mg Intravenous Q4H PRN Mikaela Stoudt, PA-C      labetalol  10 mg Intravenous Q6H PRN Mikaela Stoudt, PA-C      lidocaine  1 patch Topical Daily Mikaela Stoudt, PA-C      methocarbamol  500 mg Oral Q6H PRN Mikaela Stoudt, PA-C      metoprolol tartrate  75 mg Oral Q12H [address] Mikaela Stoudt, PA-C      metroNIDAZOLE  500 mg Oral Q8H [address] Mikaela Stoudt, PA-C      multivitamin-minerals  1 tablet Oral Daily Mikaela Stoudt, PA-C      ondansetron  4 mg Intravenous Q4H PRN Mikaela Stoudt, PA-C      sodium chloride  100 mL/hr Intravenous Continuous Mikaela Stoudt, PA-C 100 mL/hr (09/10/22 0111)        Today, Patient Was Seen By: Warren Marquez PA-C    **Please Note: This note may have been constructed using a voice recognition system  **

## 2022-09-10 NOTE — PLAN OF CARE
Problem: PAIN - ADULT  Goal: Verbalizes/displays adequate comfort level or baseline comfort level  Description: Interventions:  - Encourage patient to monitor pain and request assistance  - Assess pain using appropriate pain scale  - Administer analgesics based on type and severity of pain and evaluate response  - Implement non-pharmacological measures as appropriate and evaluate response  - Consider cultural and social influences on pain and pain management  - Notify physician/advanced practitioner if interventions unsuccessful or patient reports new pain  Outcome: Progressing     Problem: INFECTION - ADULT  Goal: Absence or prevention of progression during hospitalization  Description: INTERVENTIONS:  - Assess and monitor for signs and symptoms of infection  - Monitor lab/diagnostic results  - Monitor all insertion sites, i e  indwelling lines, tubes, and drains  - Monitor endotracheal if appropriate and nasal secretions for changes in amount and color  - Winter appropriate cooling/warming therapies per order  - Administer medications as ordered  - Instruct and encourage patient and family to use good hand hygiene technique  - Identify and instruct in appropriate isolation precautions for identified infection/condition  Outcome: Progressing     Problem: DISCHARGE PLANNING  Goal: Discharge to home or other facility with appropriate resources  Description: INTERVENTIONS:  - Identify barriers to discharge w/patient and caregiver  - Arrange for needed discharge resources and transportation as appropriate  - Identify discharge learning needs (meds, wound care, etc )  - Arrange for interpretive services to assist at discharge as needed  - Refer to Case Management Department for coordinating discharge planning if the patient needs post-hospital services based on physician/advanced practitioner order or complex needs related to functional status, cognitive ability, or social support system  Outcome: Progressing Problem: Knowledge Deficit  Goal: Patient/family/caregiver demonstrates understanding of disease process, treatment plan, medications, and discharge instructions  Description: Complete learning assessment and assess knowledge base  Interventions:  - Provide teaching at level of understanding  - Provide teaching via preferred learning methods  Outcome: Progressing     Problem: MOBILITY - ADULT  Goal: Maintain or return to baseline ADL function  Description: INTERVENTIONS:  -  Assess patient's ability to carry out ADLs; assess patient's baseline for ADL function and identify physical deficits which impact ability to perform ADLs (bathing, care of mouth/teeth, toileting, grooming, dressing, etc )  - Assess/evaluate cause of self-care deficits   - Assess range of motion  - Assess patient's mobility; develop plan if impaired  - Assess patient's need for assistive devices and provide as appropriate  - Encourage maximum independence but intervene and supervise when necessary  - Involve family in performance of ADLs  - Assess for home care needs following discharge   - Consider OT consult to assist with ADL evaluation and planning for discharge  - Provide patient education as appropriate  Outcome: Progressing  Goal: Maintains/Returns to pre admission functional level  Description: INTERVENTIONS:  - Perform BMAT or MOVE assessment daily    - Set and communicate daily mobility goal to care team and patient/family/caregiver  - Collaborate with rehabilitation services on mobility goals if consulted  - Perform Range of Motion  times a day  - Reposition patient every  hours    - Dangle patient  times a day  - Stand patient  times a day  - Ambulate patient  times a day  - Out of bed to chair  times a day   - Out of bed for meals  times a day  - Out of bed for toileting  - Record patient progress and toleration of activity level   Outcome: Progressing     Problem: Potential for Falls  Goal: Patient will remain free of falls  Description: INTERVENTIONS:  - Educate patient/family on patient safety including physical limitations  - Instruct patient to call for assistance with activity   - Consult OT/PT to assist with strengthening/mobility   - Keep Call bell within reach  - Keep bed low and locked with side rails adjusted as appropriate  - Keep care items and personal belongings within reach  - Initiate and maintain comfort rounds  - Make Fall Risk Sign visible to staff  - Offer Toileting every  Hours, in advance of need  - Initiate/Maintain alarm  - Obtain necessary fall risk management equipment:  - Apply yellow socks and bracelet for high fall risk patients  - Consider moving patient to room near nurses station  Outcome: Progressing     Problem: Prexisting or High Potential for Compromised Skin Integrity  Goal: Skin integrity is maintained or improved  Description: INTERVENTIONS:  - Identify patients at risk for skin breakdown  - Assess and monitor skin integrity  - Assess and monitor nutrition and hydration status  - Monitor labs   - Assess for incontinence   - Turn and reposition patient  - Assist with mobility/ambulation  - Relieve pressure over bony prominences  - Avoid friction and shearing  - Provide appropriate hygiene as needed including keeping skin clean and dry  - Evaluate need for skin moisturizer/barrier cream  - Collaborate with interdisciplinary team   - Patient/family teaching  - Consider wound care consult   Outcome: Progressing

## 2022-09-10 NOTE — ASSESSMENT & PLAN NOTE
· Leukocytosis and tachycardia present on admission was source of diverticulitis, now resolved   · Blood cultures negative   · Antibiotics as above

## 2022-09-10 NOTE — ASSESSMENT & PLAN NOTE
Lab Results   Component Value Date    EGFR 56 09/09/2022    EGFR 47 09/08/2022    EGFR 46 09/07/2022    CREATININE 1 19 09/09/2022    CREATININE 1 37 (H) 09/08/2022    CREATININE 1 40 (H) 09/07/2022   · Creatinine within baseline today 1 0-1 4; of note he recently had ELIZABETH 2 weeks ago with creatinine 2 19  · Creatinine returned to baseline   · Check BMP in AM

## 2022-09-11 VITALS
TEMPERATURE: 97.7 F | SYSTOLIC BLOOD PRESSURE: 140 MMHG | BODY MASS INDEX: 27.4 KG/M2 | RESPIRATION RATE: 18 BRPM | WEIGHT: 174.6 LBS | HEART RATE: 74 BPM | DIASTOLIC BLOOD PRESSURE: 63 MMHG | OXYGEN SATURATION: 98 % | HEIGHT: 67 IN

## 2022-09-11 PROBLEM — E83.42 HYPOMAGNESEMIA: Status: RESOLVED | Noted: 2022-09-08 | Resolved: 2022-09-11

## 2022-09-11 LAB
ANION GAP SERPL CALCULATED.3IONS-SCNC: 5 MMOL/L (ref 4–13)
BUN SERPL-MCNC: 11 MG/DL (ref 5–25)
CALCIUM SERPL-MCNC: 7.9 MG/DL (ref 8.4–10.2)
CHLORIDE SERPL-SCNC: 114 MMOL/L (ref 96–108)
CO2 SERPL-SCNC: 26 MMOL/L (ref 21–32)
CREAT SERPL-MCNC: 0.96 MG/DL (ref 0.6–1.3)
GFR SERPL CREATININE-BSD FRML MDRD: 73 ML/MIN/1.73SQ M
GLUCOSE SERPL-MCNC: 93 MG/DL (ref 65–140)
MAGNESIUM SERPL-MCNC: 1.5 MG/DL (ref 1.9–2.7)
POTASSIUM SERPL-SCNC: 3.3 MMOL/L (ref 3.5–5.3)
SODIUM SERPL-SCNC: 145 MMOL/L (ref 135–147)

## 2022-09-11 PROCEDURE — 99239 HOSP IP/OBS DSCHRG MGMT >30: CPT | Performed by: PHYSICIAN ASSISTANT

## 2022-09-11 PROCEDURE — 83735 ASSAY OF MAGNESIUM: CPT | Performed by: INTERNAL MEDICINE

## 2022-09-11 PROCEDURE — 80048 BASIC METABOLIC PNL TOTAL CA: CPT | Performed by: INTERNAL MEDICINE

## 2022-09-11 RX ORDER — CEFDINIR 300 MG/1
300 CAPSULE ORAL EVERY 12 HOURS SCHEDULED
Qty: 6 CAPSULE | Refills: 0 | Status: SHIPPED | OUTPATIENT
Start: 2022-09-12 | End: 2022-09-15

## 2022-09-11 RX ORDER — MAGNESIUM SULFATE HEPTAHYDRATE 40 MG/ML
4 INJECTION, SOLUTION INTRAVENOUS ONCE
Status: COMPLETED | OUTPATIENT
Start: 2022-09-11 | End: 2022-09-11

## 2022-09-11 RX ORDER — METRONIDAZOLE 500 MG/1
500 TABLET ORAL EVERY 8 HOURS SCHEDULED
Qty: 9 TABLET | Refills: 0 | Status: SHIPPED | OUTPATIENT
Start: 2022-09-12 | End: 2022-09-15

## 2022-09-11 RX ORDER — POTASSIUM CHLORIDE 20 MEQ/1
40 TABLET, EXTENDED RELEASE ORAL ONCE
Status: COMPLETED | OUTPATIENT
Start: 2022-09-11 | End: 2022-09-11

## 2022-09-11 RX ADMIN — POTASSIUM CHLORIDE 40 MEQ: 1500 TABLET, EXTENDED RELEASE ORAL at 08:16

## 2022-09-11 RX ADMIN — MULTIPLE VITAMINS W/ MINERALS TAB 1 TABLET: TAB ORAL at 08:16

## 2022-09-11 RX ADMIN — Medication 2000 UNITS: at 08:15

## 2022-09-11 RX ADMIN — FAMOTIDINE 20 MG: 20 TABLET ORAL at 08:15

## 2022-09-11 RX ADMIN — MAGNESIUM SULFATE HEPTAHYDRATE 4 G: 40 INJECTION, SOLUTION INTRAVENOUS at 08:37

## 2022-09-11 RX ADMIN — METRONIDAZOLE 500 MG: 500 TABLET ORAL at 05:06

## 2022-09-11 RX ADMIN — SODIUM CHLORIDE 100 ML/HR: 0.9 INJECTION, SOLUTION INTRAVENOUS at 01:51

## 2022-09-11 RX ADMIN — GABAPENTIN 100 MG: 300 CAPSULE ORAL at 08:16

## 2022-09-11 RX ADMIN — ACETAMINOPHEN 975 MG: 325 TABLET, FILM COATED ORAL at 05:06

## 2022-09-11 RX ADMIN — ASPIRIN 81 MG: 81 TABLET, COATED ORAL at 08:16

## 2022-09-11 RX ADMIN — CYANOCOBALAMIN TAB 500 MCG 1000 MCG: 500 TAB at 08:16

## 2022-09-11 RX ADMIN — ATORVASTATIN CALCIUM 80 MG: 40 TABLET, FILM COATED ORAL at 08:15

## 2022-09-11 RX ADMIN — METOPROLOL TARTRATE 75 MG: 50 TABLET, FILM COATED ORAL at 08:16

## 2022-09-11 NOTE — DISCHARGE INSTR - AVS FIRST PAGE
Dear Segun Torres,     It was our pleasure to care for you here at [facility]  It is our hope that we were always able to exceed the expected standards for your care during your stay  You were hospitalized due to diverticulitis  You were cared for on the [address] [place] [address] 4th floor by Alison Painting PA-C under the service of Bandar Nelson MD with the [facility] who covers for your primary care physician (PCP), Carol Rosales DO, while you were hospitalized  If you have any questions or concerns related to this hospitalization, you may contact us at [phone]  For follow up as well as any medication refills, we recommend that you follow up with your primary care physician  A registered nurse will reach out to you by phone within a few days after your discharge to answer any additional questions that you may have after going home  However, at this time we provide for you here, the most important instructions / recommendations at discharge:     Notable Medication Adjustments -   START the following medications tomorrow:  Cefdinir (Omnicef) 300 mg capsules - Take 1 capsule by mouth twice daily until gone  Metronidazole (Flagyl) 500 mg tablets - Take 1 tablet by mouth three time daily until gone  Continue the rest of your home medications  Testing Required after Discharge -   Colonoscopy 6-8 weeks   Important follow up information -   Follow up with family doctor in 1-2 weeks   Other Instructions -   None  Please review this entire after visit summary as additional general instructions including medication list, appointments, activity, diet, any pertinent wound care, and other additional recommendations from your care team that may be provided for you        Sincerely,     Alison Painting PA-C

## 2022-09-11 NOTE — CASE MANAGEMENT
Case Management Assessment & Discharge Planning Note    Patient name Ross Litter  Location W /W -28 MRN [id number]  : 1940 Date 2022       Current Admission Date: 2022  Current Admission Diagnosis:Diverticulitis   Patient Active Problem List    Diagnosis Date Noted    Diverticulitis 2022    Stage 3 chronic kidney disease (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 [address] ) 2022    Neuropathy 10/29/2021    Pulmonary fibrosis determined by high resolution computed tomography ([facility] ) 2021    Near syncope 2021    SOB (shortness of breath) 07/10/2021    Neck pain on left side 07/10/2021    Carotid stenosis, asymptomatic, bilateral 2020    Phimosis 2020    Bruit of left carotid artery 2020    Skin lesion of left leg 10/31/2019    Postlaminectomy syndrome of lumbar region     Lumbar radiculopathy     S/P lumbar fusion 2018    Lumbar stenosis 2018    Peripheral artery disease ([facility] [address] ) 2018    GERD (gastroesophageal reflux disease) 2018    Vasomotor rhinitis 2018    Leg pain, posterior, left 2018    Chronic anemia 2018    S/P CABG x 4 2018    Former tobacco use     Hyperlipidemia     Hypertension     CAD (coronary artery disease) 2017    Spondylolisthesis of lumbar region 2016    Lumbar compression fracture ([facility] [address] ) 2016      LOS (days): 3  Geometric Mean LOS (GMLOS) (days): 3 50  Days to GMLOS:0 6     OBJECTIVE:    Risk of Unplanned Readmission Score: 11 46         Current admission status: Inpatient       Preferred Pharmacy:   [facility] [phone] [facility]  [address] [facility]  [address]  Phone: [phone] Fax: [phone]    Primary Care Provider: DO Charito Lancaster Insurance: Sunita Spann [facility] REP  Secondary Insurance:     ASSESSMENT:  Eva Osuna Proxies    There are no active Health Care Proxies on file  DISCHARGE DETAILS:                                                                                        IMM Given (Date):: 09/11/22  IMM Given to[de-identified] Patient     Additional Comments: CM saw pt bedside to discuss discharge plan and review the IMM with the pt  Pt agrees with discharge plan and has no questions or concerns at this time

## 2022-09-11 NOTE — NURSING NOTE
Reviewed discharge instructions with pt & gave instructions re: diverticulitis diet  Pt transported home by daughter

## 2022-09-11 NOTE — ASSESSMENT & PLAN NOTE
· Patient presented with left lower abdominal pain and constipation as well as chills    Patient reports he is doing much better at this time (about 80% better)  · CT evidence of acute uncomplicated diverticulitis, known history of diverticulosis but first event of diverticulitis  · Stable for discharge   · Continue CTX and flagyl day #4 today   · Change to Omnicef/Flagyl at discharge for 7 days total   · GI follow up outpatient  · Last colonoscopy was 2017, recommend repeat in 6-8 weeks

## 2022-09-11 NOTE — ASSESSMENT & PLAN NOTE
Lab Results   Component Value Date    EGFR 73 09/11/2022    EGFR 56 09/09/2022    EGFR 47 09/08/2022    CREATININE 0 96 09/11/2022    CREATININE 1 19 09/09/2022    CREATININE 1 37 (H) 09/08/2022   · Creatinine within baseline today 1 0-1 4; of note he recently had ELIZABETH 2 weeks ago with creatinine 2 19  · Creatinine returned to baseline

## 2022-09-11 NOTE — PLAN OF CARE
Problem: PAIN - ADULT  Goal: Verbalizes/displays adequate comfort level or baseline comfort level  Description: Interventions:  - Encourage patient to monitor pain and request assistance  - Assess pain using appropriate pain scale  - Administer analgesics based on type and severity of pain and evaluate response  - Implement non-pharmacological measures as appropriate and evaluate response  - Consider cultural and social influences on pain and pain management  - Notify physician/advanced practitioner if interventions unsuccessful or patient reports new pain  9/11/2022 1246 by Scarlet Coffey RN  Outcome: Progressing  9/11/2022 1243 by Scarlet Coffey RN  Outcome: Progressing

## 2022-09-11 NOTE — PLAN OF CARE
Problem: PAIN - ADULT  Goal: Verbalizes/displays adequate comfort level or baseline comfort level  Description: Interventions:  - Encourage patient to monitor pain and request assistance  - Assess pain using appropriate pain scale  - Administer analgesics based on type and severity of pain and evaluate response  - Implement non-pharmacological measures as appropriate and evaluate response  - Consider cultural and social influences on pain and pain management  - Notify physician/advanced practitioner if interventions unsuccessful or patient reports new pain  Outcome: Progressing     Problem: INFECTION - ADULT  Goal: Absence or prevention of progression during hospitalization  Description: INTERVENTIONS:  - Assess and monitor for signs and symptoms of infection  - Monitor lab/diagnostic results  - Monitor all insertion sites, i e  indwelling lines, tubes, and drains  - Monitor endotracheal if appropriate and nasal secretions for changes in amount and color  - Donie appropriate cooling/warming therapies per order  - Administer medications as ordered  - Instruct and encourage patient and family to use good hand hygiene technique  - Identify and instruct in appropriate isolation precautions for identified infection/condition  Outcome: Progressing     Problem: DISCHARGE PLANNING  Goal: Discharge to home or other facility with appropriate resources  Description: INTERVENTIONS:  - Identify barriers to discharge w/patient and caregiver  - Arrange for needed discharge resources and transportation as appropriate  - Identify discharge learning needs (meds, wound care, etc )  - Arrange for interpretive services to assist at discharge as needed  - Refer to Case Management Department for coordinating discharge planning if the patient needs post-hospital services based on physician/advanced practitioner order or complex needs related to functional status, cognitive ability, or social support system  Outcome: Progressing Problem: Knowledge Deficit  Goal: Patient/family/caregiver demonstrates understanding of disease process, treatment plan, medications, and discharge instructions  Description: Complete learning assessment and assess knowledge base  Interventions:  - Provide teaching at level of understanding  - Provide teaching via preferred learning methods  Outcome: Progressing     Problem: MOBILITY - ADULT  Goal: Maintain or return to baseline ADL function  Description: INTERVENTIONS:  -  Assess patient's ability to carry out ADLs; assess patient's baseline for ADL function and identify physical deficits which impact ability to perform ADLs (bathing, care of mouth/teeth, toileting, grooming, dressing, etc )  - Assess/evaluate cause of self-care deficits   - Assess range of motion  - Assess patient's mobility; develop plan if impaired  - Assess patient's need for assistive devices and provide as appropriate  - Encourage maximum independence but intervene and supervise when necessary  - Involve family in performance of ADLs  - Assess for home care needs following discharge   - Consider OT consult to assist with ADL evaluation and planning for discharge  - Provide patient education as appropriate  Outcome: Progressing  Goal: Maintains/Returns to pre admission functional level  Description: INTERVENTIONS:  - Perform BMAT or MOVE assessment daily    - Set and communicate daily mobility goal to care team and patient/family/caregiver  - Collaborate with rehabilitation services on mobility goals if consulted  - Perform Range of Motion  times a day  - Reposition patient every  hours    - Dangle patient  times a day  - Stand patient  times a day  - Ambulate patient  times a day  - Out of bed to chair  times a day   - Out of bed for meals  times a day  - Out of bed for toileting  - Record patient progress and toleration of activity level   Outcome: Progressing     Problem: Potential for Falls  Goal: Patient will remain free of falls  Description: INTERVENTIONS:  - Educate patient/family on patient safety including physical limitations  - Instruct patient to call for assistance with activity   - Consult OT/PT to assist with strengthening/mobility   - Keep Call bell within reach  - Keep bed low and locked with side rails adjusted as appropriate  - Keep care items and personal belongings within reach  - Initiate and maintain comfort rounds  - Make Fall Risk Sign visible to staff  - Offer Toileting every  Hours, in advance of need  - Initiate/Maintain alarm  - Obtain necessary fall risk management equipment  - Apply yellow socks and bracelet for high fall risk patients  - Consider moving patient to room near nurses station  Outcome: Progressing     Problem: Prexisting or High Potential for Compromised Skin Integrity  Goal: Skin integrity is maintained or improved  Description: INTERVENTIONS:  - Identify patients at risk for skin breakdown  - Assess and monitor skin integrity  - Assess and monitor nutrition and hydration status  - Monitor labs   - Assess for incontinence   - Turn and reposition patient  - Assist with mobility/ambulation  - Relieve pressure over bony prominences  - Avoid friction and shearing  - Provide appropriate hygiene as needed including keeping skin clean and dry  - Evaluate need for skin moisturizer/barrier cream  - Collaborate with interdisciplinary team   - Patient/family teaching  - Consider wound care consult   Outcome: Progressing

## 2022-09-11 NOTE — PLAN OF CARE
Problem: PAIN - ADULT  Goal: Verbalizes/displays adequate comfort level or baseline comfort level  Description: Interventions:  - Encourage patient to monitor pain and request assistance  - Assess pain using appropriate pain scale  - Administer analgesics based on type and severity of pain and evaluate response  - Implement non-pharmacological measures as appropriate and evaluate response  - Consider cultural and social influences on pain and pain management  - Notify physician/advanced practitioner if interventions unsuccessful or patient reports new pain  9/11/2022 1246 by Vimal Gibbons RN  Outcome: Adequate for Discharge  9/11/2022 1246 by Vimal Gibbons RN  Outcome: Progressing  9/11/2022 1243 by Vimal Gibbons RN  Outcome: Progressing

## 2022-09-11 NOTE — DISCHARGE SUMMARY
[facility]  Discharge- Rigo Magaña 1940, 80 y o  male MRN: [id number]  Unit/Bed#: W -01 Encounter: [id number]  Primary Care Provider: Biju Adam DO   Date and time admitted to hospital: 9/8/2022  8:31 AM    * Diverticulitis  Assessment & Plan  · Patient presented with left lower abdominal pain and constipation as well as chills  Patient reports he is doing much better at this time (about 80% better)  · CT evidence of acute uncomplicated diverticulitis, known history of diverticulosis but first event of diverticulitis  · Stable for discharge   · Continue CTX and flagyl day #4 today   · Change to Omnicef/Flagyl at discharge for 7 days total   · GI follow up outpatient  · Last colonoscopy was 2017, recommend repeat in 6-8 weeks    Stage 3 chronic kidney disease [facility])  Assessment & Plan  Lab Results   Component Value Date    EGFR 73 09/11/2022    EGFR 56 09/09/2022    EGFR 47 09/08/2022    CREATININE 0 96 09/11/2022    CREATININE 1 19 09/09/2022    CREATININE 1 37 (H) 09/08/2022   · Creatinine within baseline today 1 0-1 4; of note he recently had ELIZABETH 2 weeks ago with creatinine 2 19  · Creatinine returned to baseline       Hypomagnesemia-resolved as of 9/11/2022  Assessment & Plan  · Mag 1 5   · Give 4 g IV MgSO4  · Stable for discharge after     Lumbar compression fracture ([address] )  Assessment & Plan  · Ongoing low back pain being managed as an outpatient with plans for upcoming MRI and Kyphoplasty   Patient to reschedule  · Would not recommend additional NSAIDs due to chronic kidney disease and would hold off on routine narcotics in the setting of severe constipation  · Can try Tylenol around the clock, muscle relaxants and topical agents    Hypertension  Assessment & Plan  · Blood pressure improved  · Continue metoprolol        Medical Problems             Resolved Problems  Date Reviewed: 9/11/2022          Resolved    Hypomagnesemia 9/11/2022     Resolved by  Jeff BENOSN Josselin Ramirez PA-C    Sepsis [facility]) 9/10/2022     Resolved by  Trixie Gonzalez PA-C              Discharging Physician / Practitioner: Trixie Gonzalez PA-C  PCP: Panchito Baldwin DO  Admission Date:   Admission Orders (From admission, onward)     Ordered        09/08/22 1245  1 [facility],[address]  Once                      Discharge Date: 09/11/22    Consultations During Hospital Stay:  · None    Procedures Performed:   · CT abdomen pelvis    Significant Findings / Test Results:   · CT Abdomen Pelvis: Possible acute diverticulitis changes in the distal descending colon  Stool-filled rectal region  Incidental Findings:   · None     Test Results Pending at Discharge (will require follow up): · None     Outpatient Tests Requested:  · Colonoscopy 6-8 weeks     Complications:  None    Reason for Admission: Diverticulitis    Hospital Course:   Max Blanton is a 80 y o  male patient who originally presented to the hospital on 9/8/2022 due to abdominal pain  He was found to have suspected diverticulitis on admission  He was started on IV Rocephin and PO Flagyl  He was also given laxatives/bowel regimen for constipation  His diverticulitis improved on antibiotics, but after his constipation resolved he had frequent diarrhea  With holding bowel regimen this improved  He did require intermittent electrolyte replenishment  He was deemed stable for discharge and will follow up with PCP in 1-2 weeks and have a colonoscopy in 6-8 weeks  Please see above list of diagnoses and related plan for additional information  Condition at Discharge: stable    Discharge Day Visit / Exam:   Subjective:  Patient reports diarrhea improved and he wants to go home  He was appreciative of the care received here     Vitals: Blood Pressure: 140/63 (09/11/22 0726)  Pulse: 74 (09/11/22 0726)  Temperature: 97 7 °F (36 5 °C) (09/11/22 0726)  Temp Source: Oral (09/11/22 0726)  Respirations: 18 (09/11/22 0726)  Height: 5' 7" (170 2 cm) (09/08/22 6352)  Weight - Scale: 79 2 kg (174 lb 9 7 oz) (09/08/22 0834)  SpO2: 98 % (09/11/22 0726)  Exam:   Physical Exam  Constitutional:       General: He is not in acute distress  Appearance: Normal appearance  He is normal weight  He is not ill-appearing or diaphoretic  HENT:      Head: Normocephalic and atraumatic  Mouth/Throat:      Mouth: Mucous membranes are moist    Eyes:      General: No scleral icterus  Pupils: Pupils are equal, round, and reactive to light  Cardiovascular:      Rate and Rhythm: Normal rate and regular rhythm  Pulses: Normal pulses  Heart sounds: Normal heart sounds, S1 normal and S2 normal  No murmur heard  No systolic murmur is present  No diastolic murmur is present  No gallop  No S3 or S4 sounds  Pulmonary:      Effort: Pulmonary effort is normal  No accessory muscle usage or respiratory distress  Breath sounds: Normal breath sounds  No stridor  No decreased breath sounds, wheezing, rhonchi or rales  Chest:      Chest wall: No tenderness  Abdominal:      General: Bowel sounds are normal  There is no distension  Palpations: Abdomen is soft  Tenderness: There is no abdominal tenderness  There is no guarding  Musculoskeletal:      Right lower leg: No edema  Left lower leg: No edema  Skin:     General: Skin is warm and dry  Coloration: Skin is not jaundiced  Neurological:      General: No focal deficit present  Mental Status: He is alert  Mental status is at baseline  Motor: No tremor or seizure activity  Psychiatric:         Behavior: Behavior is cooperative  Discussion with Family: Attempted to update  (daughter) via phone  Left voicemail  Discharge instructions/Information to patient and family:   See after visit summary for information provided to patient and family        Provisions for Follow-Up Care:  See after visit summary for information related to follow-up care and any pertinent home health orders  Disposition:   Home    Planned Readmission: None     Discharge Statement:  I spent 45 minutes discharging the patient  This time was spent on the day of discharge  I had direct contact with the patient on the day of discharge  Greater than 50% of the total time was spent examining patient, answering all patient questions, arranging and discussing plan of care with patient as well as directly providing post-discharge instructions  Additional time then spent on discharge activities  Discharge Medications:  See after visit summary for reconciled discharge medications provided to patient and/or family        **Please Note: This note may have been constructed using a voice recognition system**

## 2022-09-13 ENCOUNTER — TRANSITIONAL CARE MANAGEMENT (OUTPATIENT)
Dept: FAMILY MEDICINE CLINIC | Facility: OTHER | Age: 82
End: 2022-09-13

## 2022-09-13 LAB
BACTERIA BLD CULT: NORMAL
BACTERIA BLD CULT: NORMAL

## 2022-09-13 NOTE — UTILIZATION REVIEW
Notification of Discharge   This is a Notification of Discharge from our facility [address]  Please be advised that this patient has been discharge from our facility  Below you will find the admission and discharge date and time including the patients disposition  UTILIZATION REVIEW CONTACT:  Mahi Garay MA  Utilization   Network Utilization Review Department  Phone: [phone] x carefully listen to the prompts  All voicemails are confidential   Email: [email or web address]     PHYSICIAN ADVISORY SERVICES:  FOR [id number] REVIEW - MEDICAL NECESSITY DENIAL  Phone: [phone]  Fax: [phone]  Email: [email or web address]     PRESENTATION DATE: 9/8/2022  8:31 AM  OBERVATION ADMISSION DATE:   INPATIENT ADMISSION DATE: 9/8/22 12:45 PM   DISCHARGE DATE: 9/11/2022  3:26 PM  DISPOSITION: Home/Self Care Home/Self Care      IMPORTANT INFORMATION:  Send all requests for admission clinical reviews, approved or denied determinations and any other requests to dedicated fax number below belonging to the campus where the patient is receiving treatment   List of dedicated fax numbers:  [address] DENIALS (Administrative/Medical Necessity) [phone]   [address] [place] [address] (Maternity/NICU/Pediatrics) [phone]   [facility] [phone]   [address] [facility] [phone]   [address] [place] [address] [phone]   [address] [place] [address],[address] [phone]   [facility]  [phone]   [address] [place] [address], [address]  [address] [facility] [address] [facility] [phone]

## 2022-09-16 NOTE — PROGRESS NOTES
Patient called left message on office phone to schedule for an appointment  I called the number on file and the phone just rang no machine     Patient should be scheduled for a TCM (9/8-9/11) he would like the appt  With Dr Nicolle Win or Dr Kinsey Ruffin

## 2022-09-23 ENCOUNTER — OFFICE VISIT (OUTPATIENT)
Dept: FAMILY MEDICINE CLINIC | Facility: OTHER | Age: 82
End: 2022-09-23
Payer: COMMERCIAL

## 2022-09-23 VITALS
BODY MASS INDEX: 25.96 KG/M2 | HEIGHT: 67 IN | SYSTOLIC BLOOD PRESSURE: 128 MMHG | WEIGHT: 165.4 LBS | RESPIRATION RATE: 18 BRPM | OXYGEN SATURATION: 100 % | HEART RATE: 70 BPM | TEMPERATURE: 97.3 F | DIASTOLIC BLOOD PRESSURE: 82 MMHG

## 2022-09-23 DIAGNOSIS — Z23 ENCOUNTER FOR IMMUNIZATION: ICD-10-CM

## 2022-09-23 DIAGNOSIS — K57.92 DIVERTICULITIS: Primary | ICD-10-CM

## 2022-09-23 PROCEDURE — 99495 TRANSJ CARE MGMT MOD F2F 14D: CPT | Performed by: FAMILY MEDICINE

## 2022-09-23 PROCEDURE — 1111F DSCHRG MED/CURRENT MED MERGE: CPT | Performed by: FAMILY MEDICINE

## 2022-09-23 PROCEDURE — G0008 ADMIN INFLUENZA VIRUS VAC: HCPCS

## 2022-09-23 PROCEDURE — 90662 IIV NO PRSV INCREASED AG IM: CPT

## 2022-09-23 NOTE — PATIENT INSTRUCTIONS
Diverticulitis   AMBULATORY CARE:   Diverticulitis  is a condition that causes small pockets along your intestine called diverticula to become inflamed or infected  This is caused by hard bowel movement, food, or bacteria that get stuck in the pockets  Signs and symptoms include the following:   Pain in the lower left side of your abdomen    Fever and chills    Nausea or vomiting    Constipation or diarrhea    An urge to urinate or have a bowel movement more often than usual    Bloody bowel movements    Bloating and gas    Seek care immediately if:   You have bowel movement or foul-smelling discharge leaking from your vagina or in your urine  You have severe diarrhea  You urinate less than usual or not at all  You are not able to have a bowel movement  You cannot stop vomiting  You have cramps or severe abdominal pain and a fever  You have new or increased blood in your bowel movements  Call your doctor if:   You have pain when you urinate  Your symptoms get worse or do not go away  You have questions or concerns about your condition or care  Treatment:  Mild diverticulitis can be treated at home  You may need to rest and follow a clear liquid diet until your diverticulitis gets better  You will be admitted to the hospital if you have severe diverticulitis  You may need any of the following:  Antibiotics  help treat or prevent a bacterial infection  Prescription pain medicine  may be given  Ask your healthcare provider how to take this medicine safely  Some prescription pain medicines contain acetaminophen  Do not take other medicines that contain acetaminophen without talking to your healthcare provider  Too much acetaminophen may cause liver damage  Prescription pain medicine may cause constipation  Ask your healthcare provider how to prevent or treat constipation  An IV  may be used to give you liquids and nutrition   You may not be able to eat or drink anything until your healthcare provider says it is okay  Drainage  may be done to reduce inflammation or treat infection  Your healthcare provider may insert a small tube through an incision in your abdomen to drain pus from infected diverticula  Surgery  may be needed if there is a hole in your bowel or a large amount of swelling  A healthcare provider will remove the infected or inflamed areas of your colon  Clear liquid diet:  A clear liquid diet includes any liquids that you can see through  Examples include water, ginger-kristal, cranberry or apple juice, frozen fruit ice, or broth  Stay on a clear liquid diet until your symptoms are gone, or as directed  Follow up with your doctor as directed: You may need to return for a colonoscopy  When your symptoms are gone, you may need a low-fat, high-fiber diet to prevent diverticulitis from developing again  Your healthcare provider or dietitian can help you create meal plans  Write down your questions so you remember to ask them during your visits  © Copyright [employer] 2022 Information is for End User's use only and may not be sold, redistributed or otherwise used for commercial purposes  All illustrations and images included in CareNotes® are the copyrighted property of [employer]  or [facility] Manuel Weiner   The above information is an  only  It is not intended as medical advice for individual conditions or treatments  Talk to your doctor, nurse or pharmacist before following any medical regimen to see if it is safe and effective for you

## 2022-09-23 NOTE — PROGRESS NOTES
Assessment & Plan     1  Diverticulitis  -     Ambulatory referral for colonoscopy; Future; Expected date: 10/23/2022    2  Encounter for immunization  -     influenza vaccine, high-dose, PF 0 7 mL (FLUZONE HIGH-DOSE)  Patient completed course of antibiotics and is no longer symptomatic due to diverticulitis  Will refer for follow-up colonoscopy as recommended at discharge  Patient declined physical therapy  Subjective     Transitional Care Management Review:   Bella De La Torre is a 80 y o  male here for TCM follow up  Patient was admitted to the hospital on 9/0822 for diverticulitis after experiencing severe left lower quadrant pain and constipation  He completed a 7 day course of antibiotics including oral Flagyl/omnicef following discharge, and he tolerated treatment well  He denies abdominal pain, diarrhea, constipation, blood in stool, fevers, dizziness, lightheadedness  Patient believes his bowel habits have returned to normal  He has had a colonoscopy 7 years ago and was recommended to get another colonoscopy 6-8 weeks after discharge  He has not set up an appointment for this procedure  He would prefer Dr Chente Mac at [address] burning with urination that has been present for a years and an increase in urinary frequency  During the TCM phone call patient stated:  TCM Call     Date and time call was made  9/19/2022 11:47 AM    Hospital care reviewed  Records reviewed    Patient was hospitial[facility] at  [facility]    Date of Admission  09/08/22    Date of discharge  09/11/22    Diagnosis  Diverticulitis    Disposition  Home    Were the patients medications reviewed and updated  Yes    Current Symptoms  None      TCM Call     Post hospital issues  None    Should patient be enrolled in anticoag monitoring? No    Scheduled for follow up? Yes    Patient refusal reason  Patient would like to be seen after the tcm 2 week period after CT scan on 8/24/21   Scheduled as ER f/u on 9/16 (only wants to see Dr Drummond)  Did you obtain your prescribed medications  Yes    Do you need help managing your prescriptions or medications  No    Is transportation to your appointment needed  No    I have advised the patient to call PCP with any new or worsening symptoms  Patrick Dasilva MA 9/19/22        HPI  Review of Systems   Constitutional: Positive for activity change (limited since hospitalization  Unable to work out  )  Negative for chills and fever  Respiratory: Negative for shortness of breath  Cardiovascular: Negative for chest pain  Gastrointestinal: Negative for abdominal distention, abdominal pain, blood in stool, constipation, diarrhea, nausea and vomiting  Genitourinary: Positive for frequency  Negative for dysuria, hematuria and urgency  Musculoskeletal: Positive for back pain (left sided low back pain, not new)  Neurological: Negative for dizziness, syncope and light-headedness  Objective     /82 (BP Location: Left arm, Patient Position: Sitting, Cuff Size: Adult)   Pulse 70   Temp (!) 97 3 °F (36 3 °C) (Tympanic)   Resp 18   Ht 5' 7" (1 702 m)   Wt 75 kg (165 lb 6 4 oz)   SpO2 100%   BMI 25 91 kg/m²      Physical Exam  Vitals reviewed  Constitutional:       General: He is not in acute distress  Appearance: He is not diaphoretic  HENT:      Head: Normocephalic and atraumatic  Right Ear: External ear normal       Left Ear: External ear normal       Nose: Nose normal       Mouth/Throat:      Mouth: Mucous membranes are moist       Pharynx: Oropharynx is clear  Eyes:      Extraocular Movements: Extraocular movements intact  Conjunctiva/sclera: Conjunctivae normal       Pupils: Pupils are equal, round, and reactive to light  Cardiovascular:      Rate and Rhythm: Normal rate and regular rhythm  Pulses: Normal pulses  Heart sounds: Normal heart sounds  No murmur heard  No friction rub  No gallop     Pulmonary:      Effort: Pulmonary effort is normal  No respiratory distress  Breath sounds: Normal breath sounds  No stridor  No wheezing, rhonchi or rales  Abdominal:      General: Bowel sounds are normal  There is no distension  Palpations: Abdomen is soft  Tenderness: There is no abdominal tenderness  There is no right CVA tenderness, left CVA tenderness or guarding  Musculoskeletal:         General: Normal range of motion  Cervical back: Normal range of motion and neck supple  Thoracic back: No bony tenderness  Right lower leg: No edema  Left lower leg: No edema  Comments: Back pain with lying flat  Lymphadenopathy:      Cervical: No cervical adenopathy  Skin:     General: Skin is warm and dry  Neurological:      General: No focal deficit present  Mental Status: He is alert and oriented to person, place, and time  Gait: Gait abnormal (requires cane)     Psychiatric:         Mood and Affect: Mood normal          Behavior: Behavior normal         Mel Marcial MD

## 2022-09-27 DIAGNOSIS — G62.9 NEUROPATHY: Primary | ICD-10-CM

## 2022-09-28 ENCOUNTER — OFFICE VISIT (OUTPATIENT)
Dept: CARDIOLOGY CLINIC | Facility: CLINIC | Age: 82
End: 2022-09-28
Payer: COMMERCIAL

## 2022-09-28 VITALS
SYSTOLIC BLOOD PRESSURE: 122 MMHG | DIASTOLIC BLOOD PRESSURE: 74 MMHG | HEART RATE: 63 BPM | OXYGEN SATURATION: 99 % | HEIGHT: 67 IN | BODY MASS INDEX: 25.9 KG/M2 | WEIGHT: 165 LBS

## 2022-09-28 DIAGNOSIS — I65.23 CAROTID STENOSIS, ASYMPTOMATIC, BILATERAL: ICD-10-CM

## 2022-09-28 DIAGNOSIS — I10 PRIMARY HYPERTENSION: ICD-10-CM

## 2022-09-28 DIAGNOSIS — R06.02 SOB (SHORTNESS OF BREATH): ICD-10-CM

## 2022-09-28 DIAGNOSIS — Z95.1 S/P CABG X 4: ICD-10-CM

## 2022-09-28 DIAGNOSIS — I25.10 CORONARY ARTERY DISEASE INVOLVING NATIVE CORONARY ARTERY OF NATIVE HEART WITHOUT ANGINA PECTORIS: Primary | ICD-10-CM

## 2022-09-28 DIAGNOSIS — I73.9 PERIPHERAL ARTERY DISEASE (HCC): ICD-10-CM

## 2022-09-28 PROCEDURE — 1160F RVW MEDS BY RX/DR IN RCRD: CPT | Performed by: INTERNAL MEDICINE

## 2022-09-28 PROCEDURE — 99214 OFFICE O/P EST MOD 30 MIN: CPT | Performed by: INTERNAL MEDICINE

## 2022-09-28 RX ORDER — GABAPENTIN 100 MG/1
100 CAPSULE ORAL DAILY
Qty: 90 CAPSULE | Refills: 0 | Status: SHIPPED | OUTPATIENT
Start: 2022-09-28 | End: 2022-09-28 | Stop reason: SDUPTHER

## 2022-09-28 NOTE — PROGRESS NOTES
Cardiology Follow Up    Ashu Riddle  1940  [id number]  [address]  [facility] [address]  [phone]    1  Coronary artery disease involving native coronary artery of native heart without angina pectoris     2  Carotid stenosis, asymptomatic, bilateral     3  Primary hypertension     4  Peripheral artery disease ([address] )     5  S/P CABG x 4     6  SOB (shortness of breath)         Diagnoses and all orders for this visit:    Coronary artery disease involving native coronary artery of native heart without angina pectoris    Carotid stenosis, asymptomatic, bilateral    Primary hypertension    Peripheral artery disease (HCC)    S/P CABG x 4    SOB (shortness of breath)      I had the pleasure of seeing Ashu Riddle for a follow up visit  INTERVAL HISTORY: none    History of the presenting illness, Discussion/Summary and My Plan are as follows:::    He is a pleasant 80-year-old gentleman with a history of hypertension, mild dyslipidemia, CAD, CABG, peripheral vascular disease-right superficial femoral artery stenosis-medically managed  He also has a 50 pack year smoking history-quit around 2012      He presented for further evaluation of increasing dyspnea with exertion, with evaluation demonstrated multivessel CAD, underwent LIMA-LAD, SVG-RCA, SVG-OM2, SVG-Diag in January 2018      From a cardiac standpoint he is asymptomatic, remains physically active exercising at home daily without any chest pains but had noticed that he was more short of breath and subsequently diagnosed with pulmonary fibrosis   He does have claudication symptoms that might be worse      Admission for acute diverticulitis-September 2022, treated with antibiotics, also found to have a new lumbar-L1 compression fracture, already has a chronic T12 compression fracture, for which kyphoplasty might be performed     Plan:    Shortness of breath with exertion:  Stable, likely from pulmonary fibrosis, pharmacologic stress test with small amount of basal to mid inferior ischemia which could be from diaphragmatic attenuation  Considering advanced age, comorbidities and alternative diagnosis, no need for any invasive evaluation    Recent episode of syncope in the setting of positive orthostatics, hypotension, negative ECG and enzymes  Likely from dehydration, Creatinine of 1 22     CAD:  Positive stress test done for ROBLES-triple-vessel coronary artery disease, status post Coronary artery bypass grafting x 4 with left internal mammary artery to left anterior descending artery, saphenous vein graft to obtuse marginal 2, saphenous vein graft to right coronary artery and saphenous vein graft to diagonal 1-January 2018  See above for stress testing    Exercises daily without any cardiac symptoms  Took a break while being treated for diverticulitis but now started exercising  Reasonably active without symptoms  Should he need kyphoplasty for his back pain and compression fractures, he can proceed without further cardiac testing    Hypertension: Controlled, currently on metoprolol 75 b i d  Will need good control in the setting of progressive vascular disease    Carotid bruit:  Last Doppler less than 50 right , 70-and 9% ICA, more than 70% distal CCA, with progression in the left proximal ICA since February 2022  Has and an upcoming appointment on 10/7/2022     Dyslipidemia:  High HDL in the past,- I do not think it could be considered to be protective and likely nonfunctional   Continue statin, controlled overall, see below     Peripheral vascular disease:  has bilateral lower extremities the vascular disease-high-grade stenosis/occlusion-distal SFA, 50-75%-proximal popliteal-right lower extremity and 50-75% proximal profundus femoral and mid superficial femoral on the left last LEAD-August 2022   Follows with Dr Stallings Chanda-upcoming appointment     Follow-up in 6 months     Latest Reference Range & Units Most Recent   Cholesterol See Comment mg/dL 112  08/24/22 08:28   Triglycerides See Comment mg/dL 95  08/24/22 08:28   HDL >=40 mg/dL 52  08/24/22 08:28   LDL Calculated 0 - 100 mg/dL 41  08/24/22 08:28       Results for Hieu Oropeza (MRN [id number]) as of 10/11/2021 10:08   Ref  Range 7/2/2021 09:24   Cholesterol Latest Ref Range: 50 - 200 mg/dL 151   Triglycerides Latest Ref Range: <=150 mg/dL 89   HDL Latest Ref Range: >=40 mg/dL 96   Non-HDL Cholesterol Latest Units: mg/dl 55   LDL Calculated Latest Ref Range: 0 - 100 mg/dL 37     Results for Hieu Oropeza (MRN [id number]) as of 10/11/2021 10:08   Ref   Range 6/29/2020 10:40   Hemoglobin A1C  5 2         Patient Active Problem List   Diagnosis    Lumbar compression fracture (HCC)    Spondylolisthesis of lumbar region    CAD (coronary artery disease)    Former tobacco use    Hyperlipidemia    Hypertension    S/P CABG x 4    Chronic anemia    Leg pain, posterior, left    Peripheral artery disease (HCC)    GERD (gastroesophageal reflux disease)    Vasomotor rhinitis    Lumbar stenosis    S/P lumbar fusion    Postlaminectomy syndrome of lumbar region    Lumbar radiculopathy    Skin lesion of left leg    Bruit of left carotid artery    Phimosis    Carotid stenosis, asymptomatic, bilateral    SOB (shortness of breath)    Neck pain on left side    Near syncope    Pulmonary fibrosis determined by high resolution computed tomography (HCC)    Neuropathy    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HCC)    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    Trochanteric bursitis    Diverticulitis    Stage 3 chronic kidney disease (HCC)     Past Medical History:   Diagnosis Date    Abnormal liver function test     RESOLVED: [id number]    Allergic rhinitis     Anemia     LAST ASSESSED: [id number]    Arthritis     BPH (benign prostatic hyperplasia)     CAD (coronary artery disease)     Coronary artery disease     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HCC)     LAST ASSESSED: [id number]    Former tobacco use     GERD (gastroesophageal reflux disease)     Hand paresthesia     RESOLVED: [id number]    Hyperlipidemia     Hypertension     Lumbar stenosis     Peripheral artery disease (HCC)     LAST ASSESSED: [id number]    Stage 3a chronic kidney disease ([facility] [address] ) 2021     Social History     Socioeconomic History    Marital status:      Spouse name: Not on file    Number of children: Not on file    Years of education: some college     Highest education level: Not on file   Occupational History    Occupation: Insurance     Comment: Retired    Tobacco Use    Smoking status: Former Smoker     Packs/day: 1 00     Years: 50 00     Pack years: 50 00     Types: Cigarettes     Quit date:      Years since quittin 7    Smokeless tobacco: Never Used   Vaping Use    Vaping Use: Never used   Substance and Sexual Activity    Alcohol use: Not Currently     Alcohol/week: 1 0 standard drink     Types: 1 Shots of liquor per week     Comment: beer, wine, scotch every day; SOCIAL AS PER ALL SCRIPTS     Drug use: Not Currently     Types: Marijuana     Comment: medical majianais    Sexual activity: Not Currently   Other Topics Concern    Not on file   Social History Narrative    Daily coffee consumption      Social Determinants of Health     Financial Resource Strain: Not on file   Food Insecurity: No Food Insecurity    Worried About Running Out of Food in the Last Year: Never true    Tomas of Food in the Last Year: Never true   Transportation Needs: No Transportation Needs    Lack of Transportation (Medical): No    Lack of Transportation (Non-Medical):  No   Physical Activity: Not on file   Stress: Not on file   Social Connections: Not on file   Intimate Partner Violence: Not on file   Housing Stability: Low Risk     Unable to Pay for Housing in the Last Year: No    Number of Places Lived in the Last Year: 1    Unstable Housing in the Last Year: No      Family History   Problem Relation Age of Onset    Emphysema Mother     Liver disease Mother     Coronary artery disease Father     Hypertension Father     Liver disease Father     Heart failure Father     Stroke Father         CVA     Heart attack Father     Coronary artery disease Brother     Heart disease Brother         younger brother by pass and other brother 3 stents placed    Other Family         BACK PROBLEM     Stroke Family         CVA    Emphysema Family     Hypertension Family         BENIGN     Past Surgical History:   Procedure Laterality Date    BACK SURGERY      COLONOSCOPY      LUMBAR FUSION      LA ARTHRODESIS POSTERIOR/POSTEROLATERAL LUMBAR N/A 11/03/2016    Procedure: L2-S1 POSTERIOR LUMBAR FUSION AND DECOMPRESSION (Impulse), Posterior lateral fixation; dural repair ;  Surgeon: Annalisa Morales MD;  Location: [facility];  Service: Orthopedics    LA CABG, ARTERIAL, SINGLE N/A 01/19/2018    Procedure: CABG X4 with LIMA - LAD, SVG - RCA, OM2, & Diagonal ; Left Leg EVH; MATTHEW;  Surgeon: Talia Garcia DO;  Location: [facility];  Service: Cardiac Surgery    LA COLONOSCOPY FLX DX W/COLLJ SPEC WHEN PFRMD N/A 11/15/2017    Procedure: EGD AND COLONOSCOPY;  Surgeon: Corbin Guerra MD;  [facility];   Service: Gastroenterology    SEPTOPLASTY      LAST ASSESSED; [id number]    TONSILECTOMY AND ADNOIDECTOMY      LAST ASSESSED: [id number]    UPPER GASTROINTESTINAL ENDOSCOPY         Current Outpatient Medications:     acetaminophen (TYLENOL) 650 mg CR tablet, Take 650 mg by mouth every 8 (eight) hours as needed for mild pain, Disp: , Rfl:     Ascorbic Acid (Vitamin C) 500 MG PACK, Take 500 mg by mouth daily  , Disp: , Rfl:     aspirin (ECOTRIN LOW STRENGTH) 81 mg EC tablet, Take 81 mg by mouth daily, Disp: , Rfl:     atorvastatin (LIPITOR) 80 mg tablet, Take 1 tablet (80 mg total) by mouth every morning, Disp: 90 tablet, Rfl: 3    cholecalciferol (VITAMIN D3) 1,000 units tablet, Take 2,000 Units by mouth daily, Disp: , Rfl:     cyanocobalamin (VITAMIN B-12) 1,000 mcg tablet, Take 1,000 mcg by mouth daily  , Disp: , Rfl:     docusate sodium (COLACE) 100 mg capsule, Take 1 capsule (100 mg total) by mouth 2 (two) times a day as needed for constipation Hold for loose stools  , Disp: 60 capsule, Rfl: 0    famotidine (PEPCID) 20 mg tablet, TAKE 1 TABLET BY MOUTH TWICE A DAY, Disp: 180 tablet, Rfl: 1    ipratropium (ATROVENT) 0 03 % nasal spray, 2 sprays into each nostril every 12 (twelve) hours, Disp: , Rfl:     metoprolol tartrate (LOPRESSOR) 50 mg tablet, TAKE 1 AND 1/2 TABLETS BY MOUTH EVERY 12 HOURS (Patient taking differently: 75 mg every 12 (twelve) hours), Disp: 90 tablet, Rfl: 11    Multiple Vitamin (MULTIVITAMIN) capsule, Take 1 capsule by mouth daily, Disp: , Rfl:     gabapentin (NEURONTIN) 100 mg capsule, Take 1 capsule (100 mg total) by mouth daily (Patient not taking: No sig reported), Disp: 90 capsule, Rfl: 0    ibuprofen (MOTRIN) 800 mg tablet, Take 1 tablet (800 mg total) by mouth every 6 (six) hours as needed for moderate pain (Patient not taking: Reported on 9/28/2022), Disp: 60 tablet, Rfl: 0  Allergies   Allergen Reactions    Penicillins Other (See Comments)     Hallucinations; Patient reported that he was seeing visual disturbances         Imaging: No results found  Review of Systems:  Review of Systems   Constitutional: Negative  HENT: Negative  Eyes: Negative  Respiratory: Negative  Cardiovascular: Negative  Musculoskeletal: Positive for back pain  Negative for arthralgias, gait problem and joint swelling  Neurological: Negative          Physical Exam:  /74   Pulse 63   Ht 5' 7" (1 702 m)   Wt 74 8 kg (165 lb)   SpO2 99%   BMI 25 84 kg/m²   Physical Exam  Constitutional:       Appearance: He is well-developed  He is not ill-appearing, toxic-appearing or diaphoretic  HENT:      Head: Normocephalic  Eyes:      Pupils: Pupils are equal, round, and reactive to light  Neck:      Thyroid: No thyromegaly  Vascular: JVD present  No hepatojugular reflux  Trachea: No tracheal deviation  Cardiovascular:      Rate and Rhythm: Bradycardia present  Rhythm irregularly irregular  Pulses: Normal pulses  Heart sounds:     No S3 or S4 sounds  Pulmonary:      Effort: Pulmonary effort is normal  No tachypnea, accessory muscle usage or respiratory distress  Breath sounds: Normal breath sounds  No stridor  No decreased breath sounds, wheezing or rales (dry crackles at the bases)  Abdominal:      Palpations: Abdomen is soft  Musculoskeletal:         General: Normal range of motion  Right lower leg: Normal  No edema  Left lower leg: No edema  Skin:     General: Skin is warm  Coloration: Skin is not pale  Findings: No erythema or rash

## 2022-09-28 NOTE — TELEPHONE ENCOUNTER
Medication Refill Request     Name  gabapentin (NEURONTIN) 100 mg capsule  Dose/Frequency Take 1 capsule (100 mg total) by mouth daily  Quantity 30  Verified pharmacy   [x]  Verified ordering Provider   [x]  Does patient have enough for the next 3 days?  Yes [] No [x]

## 2022-09-29 RX ORDER — GABAPENTIN 100 MG/1
100 CAPSULE ORAL DAILY
Qty: 30 CAPSULE | Refills: 3 | Status: SHIPPED | OUTPATIENT
Start: 2022-09-29 | End: 2022-10-29

## 2022-10-06 ENCOUNTER — OFFICE VISIT (OUTPATIENT)
Dept: VASCULAR SURGERY | Facility: CLINIC | Age: 82
End: 2022-10-06
Payer: COMMERCIAL

## 2022-10-06 VITALS
BODY MASS INDEX: 25.74 KG/M2 | DIASTOLIC BLOOD PRESSURE: 70 MMHG | HEIGHT: 67 IN | WEIGHT: 164 LBS | HEART RATE: 76 BPM | SYSTOLIC BLOOD PRESSURE: 116 MMHG | TEMPERATURE: 97.4 F

## 2022-10-06 DIAGNOSIS — I73.9 PERIPHERAL ARTERY DISEASE (HCC): ICD-10-CM

## 2022-10-06 DIAGNOSIS — I65.23 CAROTID STENOSIS, ASYMPTOMATIC, BILATERAL: Primary | ICD-10-CM

## 2022-10-06 PROCEDURE — 99213 OFFICE O/P EST LOW 20 MIN: CPT | Performed by: SURGERY

## 2022-10-06 RX ORDER — CLOPIDOGREL BISULFATE 75 MG/1
75 TABLET ORAL DAILY
Qty: 90 TABLET | Refills: 3 | Status: SHIPPED | OUTPATIENT
Start: 2022-10-06

## 2022-10-06 NOTE — PROGRESS NOTES
Assessment/Plan:    Peripheral artery disease ([facility] [address] )  Overall patient does have progression of disease in bilateral lower extremities however he is relatively asymptomatic at this time and has critical collateralization  Patient is attempting to be as active as possible however he is limited by his back pain  Patient is currently on aspirin and atorvastatin blood pressure is well controlled  He is currently on the appropriate medical therapy  I encouraged patient continues tries to stay active at this time will hold off on repeating a lower extremity duplex was he has changes in symptoms    Carotid stenosis, asymptomatic, bilateral  Patient has had progression of disease in his left carotid  Right side still remained stable  Carotid appears to be a greater than 70% stenosed at this time  No history of TIA stroke amaurosis fugax  The patient is meeting criteria for surgical treatment of asymptomatic carotid stenosis  He is relatively high risk given his pulmonary fibrosis and cardiac history however could be a good candidate for TCAR or transfemoral stenting  We talked about the risk of stroke in both medical therapy versus undergoing 1 of the above procedures  The patient this time would like to continue medical therapy at this time  With progression of his disease I decided to start him on Plavix and continue his aspirin and his atorvastatin  Will repeat duplex in 6 months and see patient back in 6 months as well  Diagnoses and all orders for this visit:    Carotid stenosis, asymptomatic, bilateral  -     clopidogrel (Plavix) 75 mg tablet; Take 1 tablet (75 mg total) by mouth daily  -     VAS carotid complete study; Future    Peripheral artery disease (HCC)        Subjective:      Patient ID: More Matos is a 80 y o  male  Patients presents today to review JAYASHREE and carotid duplex done 8/29  Denies any s/s of CVA  Denies claudication symptoms   States that he has chronic back pain at baseline that affects his legs/walking  He also has h/o back surgery ~ 6 years ago  HPI   This is an 80-year-old male past medical history of CAD status post CABG, pulmonary fibrosis, hypertension, lumbar radiculopathy with associated neuropathy, osteoarthritis, PAD, asymptomatic bilateral carotid stenosis    Patient presenting today for follow-up regarding his lower extremity arterial duplex and his carotid duplex  Overall patient does have some difficulty with ambulation however seems to be related to his lower back, denies any cramping in his bilateral calves or foot pain  No nonhealing wounds  Patient's recent lower extremity arterial duplex showed RLE NC/50/32 (prior NC/69/60) LLE NC/92/40 (prior NC/119/64)  There has been progression of disease in bilateral lower extremities with the right distal SFA showing occlusion with distal reconstitution and the left proximal profunda and mid SFA showing 50-75% stenosis as well as tibioperoneal disease bilaterally    His carotid artery duplex has shown his right internal carotid with mild stenosis less than 50% but the left has progressed showing greater than 70% stenosis in distal CCA and proximal ICA (//Ratio 5 19)      The following portions of the patient's history were reviewed and updated as appropriate: allergies, current medications, past family history, past medical history, past social history, past surgical history and problem list     I have spent 30 minutes with Patient  today in which greater than 50% of this time was spent in counseling/coordination of care regarding Diagnostic results, Prognosis, Risks and benefits of tx options, Intructions for management, Patient and family education, Importance of tx compliance, Risk factor reductions and Impressions  Review of Systems   Constitutional: Negative  HENT: Negative  Eyes: Negative  Respiratory: Negative  Cardiovascular: Negative  Gastrointestinal: Negative  Endocrine: Negative  Genitourinary: Negative  Musculoskeletal: Positive for back pain and gait problem  Skin: Negative  Allergic/Immunologic: Negative  Neurological: Positive for weakness and numbness  Hematological: Negative  Psychiatric/Behavioral: Negative  Objective:      /70 (BP Location: Right arm, Patient Position: Sitting)   Pulse 76   Temp (!) 97 4 °F (36 3 °C) (Tympanic)   Ht 5' 7" (1 702 m)   Wt 74 4 kg (164 lb)   BMI 25 69 kg/m²          Physical Exam  Constitutional:       Appearance: Normal appearance  HENT:      Head: Normocephalic and atraumatic  Eyes:      Extraocular Movements: Extraocular movements intact  Pupils: Pupils are equal, round, and reactive to light  Cardiovascular:      Rate and Rhythm: Normal rate and regular rhythm  Pulmonary:      Effort: Pulmonary effort is normal       Breath sounds: Normal breath sounds  Abdominal:      Palpations: Abdomen is soft  Tenderness: There is no abdominal tenderness  Musculoskeletal:         General: Normal range of motion  Cervical back: Normal range of motion  Neurological:      General: No focal deficit present  Mental Status: He is oriented to person, place, and time     Psychiatric:         Mood and Affect: Mood normal

## 2022-10-06 NOTE — ASSESSMENT & PLAN NOTE
Overall patient does have progression of disease in bilateral lower extremities however he is relatively asymptomatic at this time and has critical collateralization  Patient is attempting to be as active as possible however he is limited by his back pain  Patient is currently on aspirin and atorvastatin blood pressure is well controlled  He is currently on the appropriate medical therapy      I encouraged patient continues tries to stay active at this time will hold off on repeating a lower extremity duplex was he has changes in symptoms

## 2022-10-06 NOTE — PATIENT INSTRUCTIONS
Please follow up with me in 6 months after your carotid duplex  Start taking Plavix 75mg daily and continue your aspirin and atorvastatin  Continue to stay active

## 2022-10-06 NOTE — ASSESSMENT & PLAN NOTE
Patient has had progression of disease in his left carotid  Right side still remained stable  Carotid appears to be a greater than 70% stenosed at this time  No history of TIA stroke amaurosis fugax  The patient is meeting criteria for surgical treatment of asymptomatic carotid stenosis  He is relatively high risk given his pulmonary fibrosis and cardiac history however could be a good candidate for TCAR or transfemoral stenting  We talked about the risk of stroke in both medical therapy versus undergoing 1 of the above procedures  The patient this time would like to continue medical therapy at this time  With progression of his disease I decided to start him on Plavix and continue his aspirin and his atorvastatin  Will repeat duplex in 6 months and see patient back in 6 months as well

## 2022-10-08 ENCOUNTER — APPOINTMENT (EMERGENCY)
Dept: CT IMAGING | Facility: HOSPITAL | Age: 82
DRG: 812 | End: 2022-10-08
Payer: COMMERCIAL

## 2022-10-08 ENCOUNTER — HOSPITAL ENCOUNTER (INPATIENT)
Facility: HOSPITAL | Age: 82
LOS: 5 days | Discharge: HOME | DRG: 812 | End: 2022-10-13
Attending: EMERGENCY MEDICINE | Admitting: INTERNAL MEDICINE
Payer: COMMERCIAL

## 2022-10-08 DIAGNOSIS — R10.32 LEFT LOWER QUADRANT ABDOMINAL PAIN: ICD-10-CM

## 2022-10-08 DIAGNOSIS — K59.00 CONSTIPATION: ICD-10-CM

## 2022-10-08 DIAGNOSIS — K57.92 DIVERTICULITIS: ICD-10-CM

## 2022-10-08 DIAGNOSIS — K29.70 GASTRITIS: ICD-10-CM

## 2022-10-08 DIAGNOSIS — R26.2 AMBULATORY DYSFUNCTION: Primary | ICD-10-CM

## 2022-10-08 DIAGNOSIS — M54.42 ACUTE LEFT-SIDED LOW BACK PAIN WITH LEFT-SIDED SCIATICA: ICD-10-CM

## 2022-10-08 DIAGNOSIS — D63.1 ANEMIA DUE TO STAGE 3 CHRONIC KIDNEY DISEASE, UNSPECIFIED WHETHER STAGE 3A OR 3B CKD (HCC): ICD-10-CM

## 2022-10-08 DIAGNOSIS — N18.30 ANEMIA DUE TO STAGE 3 CHRONIC KIDNEY DISEASE, UNSPECIFIED WHETHER STAGE 3A OR 3B CKD (HCC): ICD-10-CM

## 2022-10-08 DIAGNOSIS — M53.86 SCIATICA ASSOCIATED WITH DISORDER OF LUMBAR SPINE: ICD-10-CM

## 2022-10-08 PROBLEM — R10.9 ABDOMINAL PAIN: Status: ACTIVE | Noted: 2022-10-08

## 2022-10-08 PROBLEM — D64.9 ANEMIA: Status: ACTIVE | Noted: 2018-01-23

## 2022-10-08 LAB
ALBUMIN SERPL BCP-MCNC: 3.9 G/DL (ref 3.5–5)
ALP SERPL-CCNC: 91 U/L (ref 34–104)
ALT SERPL W P-5'-P-CCNC: 20 U/L (ref 7–52)
ANION GAP SERPL CALCULATED.3IONS-SCNC: 7 MMOL/L (ref 4–13)
APTT PPP: 32 SECONDS (ref 23–37)
AST SERPL W P-5'-P-CCNC: 21 U/L (ref 13–39)
ATRIAL RATE: 79 BPM
BACTERIA UR QL AUTO: ABNORMAL /HPF
BASOPHILS # BLD AUTO: 0.05 THOUSANDS/ΜL (ref 0–0.1)
BASOPHILS NFR BLD AUTO: 0 % (ref 0–1)
BILIRUB SERPL-MCNC: 0.7 MG/DL (ref 0.2–1)
BILIRUB UR QL STRIP: NEGATIVE
BUN SERPL-MCNC: 27 MG/DL (ref 5–25)
CALCIUM SERPL-MCNC: 9 MG/DL (ref 8.4–10.2)
CARDIAC TROPONIN I PNL SERPL HS: <2 NG/L
CHLORIDE SERPL-SCNC: 105 MMOL/L (ref 96–108)
CLARITY UR: CLEAR
CO2 SERPL-SCNC: 28 MMOL/L (ref 21–32)
COLOR UR: ABNORMAL
CREAT SERPL-MCNC: 1.19 MG/DL (ref 0.6–1.3)
EOSINOPHIL # BLD AUTO: 0.17 THOUSAND/ΜL (ref 0–0.61)
EOSINOPHIL NFR BLD AUTO: 2 % (ref 0–6)
ERYTHROCYTE [DISTWIDTH] IN BLOOD BY AUTOMATED COUNT: 13.5 % (ref 11.6–15.1)
GFR SERPL CREATININE-BSD FRML MDRD: 56 ML/MIN/1.73SQ M
GLUCOSE SERPL-MCNC: 100 MG/DL (ref 65–140)
GLUCOSE SERPL-MCNC: 104 MG/DL (ref 65–140)
GLUCOSE UR STRIP-MCNC: NEGATIVE MG/DL
HCT VFR BLD AUTO: 35.1 % (ref 36.5–49.3)
HGB BLD-MCNC: 11.1 G/DL (ref 12–17)
HGB UR QL STRIP.AUTO: NEGATIVE
IMM GRANULOCYTES # BLD AUTO: 0.04 THOUSAND/UL (ref 0–0.2)
IMM GRANULOCYTES NFR BLD AUTO: 0 % (ref 0–2)
INR PPP: 1.02 (ref 0.84–1.19)
KETONES UR STRIP-MCNC: NEGATIVE MG/DL
LEUKOCYTE ESTERASE UR QL STRIP: ABNORMAL
LIPASE SERPL-CCNC: 26 U/L (ref 11–82)
LYMPHOCYTES # BLD AUTO: 2.23 THOUSANDS/ΜL (ref 0.6–4.47)
LYMPHOCYTES NFR BLD AUTO: 19 % (ref 14–44)
MCH RBC QN AUTO: 33.1 PG (ref 26.8–34.3)
MCHC RBC AUTO-ENTMCNC: 31.6 G/DL (ref 31.4–37.4)
MCV RBC AUTO: 105 FL (ref 82–98)
MONOCYTES # BLD AUTO: 0.91 THOUSAND/ΜL (ref 0.17–1.22)
MONOCYTES NFR BLD AUTO: 8 % (ref 4–12)
NEUTROPHILS # BLD AUTO: 8.21 THOUSANDS/ΜL (ref 1.85–7.62)
NEUTS SEG NFR BLD AUTO: 71 % (ref 43–75)
NITRITE UR QL STRIP: NEGATIVE
NON-SQ EPI CELLS URNS QL MICRO: ABNORMAL /HPF
NRBC BLD AUTO-RTO: 0 /100 WBCS
P AXIS: 45 DEGREES
PH UR STRIP.AUTO: 5.5 [PH]
PLATELET # BLD AUTO: 166 THOUSANDS/UL (ref 149–390)
PMV BLD AUTO: 11.1 FL (ref 8.9–12.7)
POTASSIUM SERPL-SCNC: 4.4 MMOL/L (ref 3.5–5.3)
PR INTERVAL: 154 MS
PROT SERPL-MCNC: 7.7 G/DL (ref 6.4–8.4)
PROT UR STRIP-MCNC: NEGATIVE MG/DL
PROTHROMBIN TIME: 13.7 SECONDS (ref 11.6–14.5)
QRS AXIS: 10 DEGREES
QRSD INTERVAL: 86 MS
QT INTERVAL: 396 MS
QTC INTERVAL: 454 MS
RBC # BLD AUTO: 3.35 MILLION/UL (ref 3.88–5.62)
RBC #/AREA URNS AUTO: ABNORMAL /HPF
SODIUM SERPL-SCNC: 140 MMOL/L (ref 135–147)
SP GR UR STRIP.AUTO: 1.01 (ref 1–1.03)
T WAVE AXIS: 62 DEGREES
UROBILINOGEN UR STRIP-ACNC: <2 MG/DL
VENTRICULAR RATE: 79 BPM
WBC # BLD AUTO: 11.61 THOUSAND/UL (ref 4.31–10.16)
WBC #/AREA URNS AUTO: ABNORMAL /HPF

## 2022-10-08 PROCEDURE — 83690 ASSAY OF LIPASE: CPT

## 2022-10-08 PROCEDURE — 85730 THROMBOPLASTIN TIME PARTIAL: CPT

## 2022-10-08 PROCEDURE — 93010 ELECTROCARDIOGRAM REPORT: CPT | Performed by: INTERNAL MEDICINE

## 2022-10-08 PROCEDURE — 81001 URINALYSIS AUTO W/SCOPE: CPT

## 2022-10-08 PROCEDURE — 74177 CT ABD & PELVIS W/CONTRAST: CPT

## 2022-10-08 PROCEDURE — 36415 COLL VENOUS BLD VENIPUNCTURE: CPT

## 2022-10-08 PROCEDURE — 93005 ELECTROCARDIOGRAM TRACING: CPT

## 2022-10-08 PROCEDURE — 99223 1ST HOSP IP/OBS HIGH 75: CPT | Performed by: INTERNAL MEDICINE

## 2022-10-08 PROCEDURE — 84484 ASSAY OF TROPONIN QUANT: CPT

## 2022-10-08 PROCEDURE — 99285 EMERGENCY DEPT VISIT HI MDM: CPT | Performed by: EMERGENCY MEDICINE

## 2022-10-08 PROCEDURE — 82948 REAGENT STRIP/BLOOD GLUCOSE: CPT

## 2022-10-08 PROCEDURE — 99285 EMERGENCY DEPT VISIT HI MDM: CPT

## 2022-10-08 PROCEDURE — 80053 COMPREHEN METABOLIC PANEL: CPT

## 2022-10-08 PROCEDURE — 85025 COMPLETE CBC W/AUTO DIFF WBC: CPT

## 2022-10-08 PROCEDURE — 87086 URINE CULTURE/COLONY COUNT: CPT | Performed by: INTERNAL MEDICINE

## 2022-10-08 PROCEDURE — G1004 CDSM NDSC: HCPCS

## 2022-10-08 PROCEDURE — 85610 PROTHROMBIN TIME: CPT

## 2022-10-08 RX ORDER — PANTOPRAZOLE SODIUM 40 MG/1
40 TABLET, DELAYED RELEASE ORAL
Status: DISCONTINUED | OUTPATIENT
Start: 2022-10-09 | End: 2022-10-09

## 2022-10-08 RX ORDER — ASCORBIC ACID 500 MG
500 TABLET ORAL DAILY
Status: DISCONTINUED | OUTPATIENT
Start: 2022-10-09 | End: 2022-10-09

## 2022-10-08 RX ORDER — ACETAMINOPHEN 325 MG/1
650 TABLET ORAL EVERY 6 HOURS PRN
Status: DISCONTINUED | OUTPATIENT
Start: 2022-10-08 | End: 2022-10-13 | Stop reason: HOSPADM

## 2022-10-08 RX ORDER — DOCUSATE SODIUM 100 MG/1
100 CAPSULE, LIQUID FILLED ORAL 2 TIMES DAILY PRN
Status: DISCONTINUED | OUTPATIENT
Start: 2022-10-08 | End: 2022-10-12

## 2022-10-08 RX ORDER — OXYCODONE HYDROCHLORIDE 5 MG/1
2.5 TABLET ORAL EVERY 4 HOURS PRN
Status: CANCELLED | OUTPATIENT
Start: 2022-10-08

## 2022-10-08 RX ORDER — CEFTRIAXONE 1 G/50ML
1000 INJECTION, SOLUTION INTRAVENOUS EVERY 24 HOURS
Status: DISCONTINUED | OUTPATIENT
Start: 2022-10-09 | End: 2022-10-11

## 2022-10-08 RX ORDER — OXYCODONE HYDROCHLORIDE AND ACETAMINOPHEN 5; 325 MG/1; MG/1
1 TABLET ORAL ONCE
Status: COMPLETED | OUTPATIENT
Start: 2022-10-08 | End: 2022-10-08

## 2022-10-08 RX ORDER — METRONIDAZOLE 500 MG/1
500 TABLET ORAL EVERY 8 HOURS SCHEDULED
Status: DISCONTINUED | OUTPATIENT
Start: 2022-10-08 | End: 2022-10-08

## 2022-10-08 RX ORDER — LIDOCAINE 50 MG/G
1 PATCH TOPICAL ONCE
Status: COMPLETED | OUTPATIENT
Start: 2022-10-08 | End: 2022-10-08

## 2022-10-08 RX ORDER — HYDROMORPHONE HCL IN WATER/PF 6 MG/30 ML
0.2 PATIENT CONTROLLED ANALGESIA SYRINGE INTRAVENOUS EVERY 4 HOURS PRN
Status: CANCELLED | OUTPATIENT
Start: 2022-10-08

## 2022-10-08 RX ORDER — CEFTRIAXONE 1 G/50ML
1000 INJECTION, SOLUTION INTRAVENOUS ONCE
Status: DISCONTINUED | OUTPATIENT
Start: 2022-10-08 | End: 2022-10-08

## 2022-10-08 RX ORDER — OXYCODONE HYDROCHLORIDE 5 MG/1
2.5 TABLET ORAL EVERY 4 HOURS PRN
Status: DISCONTINUED | OUTPATIENT
Start: 2022-10-08 | End: 2022-10-13 | Stop reason: HOSPADM

## 2022-10-08 RX ORDER — MELATONIN
2000 DAILY
Status: DISCONTINUED | OUTPATIENT
Start: 2022-10-09 | End: 2022-10-13 | Stop reason: HOSPADM

## 2022-10-08 RX ORDER — HYDROMORPHONE HCL IN WATER/PF 6 MG/30 ML
0.2 PATIENT CONTROLLED ANALGESIA SYRINGE INTRAVENOUS EVERY 4 HOURS PRN
Status: DISCONTINUED | OUTPATIENT
Start: 2022-10-08 | End: 2022-10-13 | Stop reason: HOSPADM

## 2022-10-08 RX ORDER — GABAPENTIN 100 MG/1
100 CAPSULE ORAL DAILY
Status: DISCONTINUED | OUTPATIENT
Start: 2022-10-09 | End: 2022-10-13 | Stop reason: HOSPADM

## 2022-10-08 RX ORDER — ONDANSETRON 2 MG/ML
1 INJECTION INTRAMUSCULAR; INTRAVENOUS ONCE
Status: COMPLETED | OUTPATIENT
Start: 2022-10-08 | End: 2022-10-08

## 2022-10-08 RX ORDER — CLOPIDOGREL BISULFATE 75 MG/1
75 TABLET ORAL DAILY
Status: DISCONTINUED | OUTPATIENT
Start: 2022-10-09 | End: 2022-10-09

## 2022-10-08 RX ORDER — SODIUM CHLORIDE, SODIUM GLUCONATE, SODIUM ACETATE, POTASSIUM CHLORIDE, MAGNESIUM CHLORIDE, SODIUM PHOSPHATE, DIBASIC, AND POTASSIUM PHOSPHATE .53; .5; .37; .037; .03; .012; .00082 G/100ML; G/100ML; G/100ML; G/100ML; G/100ML; G/100ML; G/100ML
100 INJECTION, SOLUTION INTRAVENOUS CONTINUOUS
Status: DISCONTINUED | OUTPATIENT
Start: 2022-10-08 | End: 2022-10-12

## 2022-10-08 RX ORDER — ENOXAPARIN SODIUM 100 MG/ML
40 INJECTION SUBCUTANEOUS DAILY
Status: DISCONTINUED | OUTPATIENT
Start: 2022-10-09 | End: 2022-10-09

## 2022-10-08 RX ORDER — FENTANYL CITRATE 50 UG/ML
1 INJECTION, SOLUTION INTRAMUSCULAR; INTRAVENOUS ONCE
Status: COMPLETED | OUTPATIENT
Start: 2022-10-08 | End: 2022-10-08

## 2022-10-08 RX ORDER — METRONIDAZOLE 500 MG/1
500 TABLET ORAL EVERY 8 HOURS SCHEDULED
Status: DISCONTINUED | OUTPATIENT
Start: 2022-10-08 | End: 2022-10-11

## 2022-10-08 RX ORDER — ATORVASTATIN CALCIUM 40 MG/1
80 TABLET, FILM COATED ORAL EVERY MORNING
Status: DISCONTINUED | OUTPATIENT
Start: 2022-10-09 | End: 2022-10-13 | Stop reason: HOSPADM

## 2022-10-08 RX ORDER — ASPIRIN 81 MG/1
81 TABLET ORAL DAILY
Status: DISCONTINUED | OUTPATIENT
Start: 2022-10-09 | End: 2022-10-09

## 2022-10-08 RX ORDER — FAMOTIDINE 20 MG/1
20 TABLET, FILM COATED ORAL 2 TIMES DAILY
Status: DISCONTINUED | OUTPATIENT
Start: 2022-10-08 | End: 2022-10-09

## 2022-10-08 RX ADMIN — OXYCODONE HYDROCHLORIDE AND ACETAMINOPHEN 1 TABLET: 5; 325 TABLET ORAL at 11:48

## 2022-10-08 RX ADMIN — METRONIDAZOLE 500 MG: 500 TABLET ORAL at 20:07

## 2022-10-08 RX ADMIN — METOPROLOL TARTRATE 75 MG: 50 TABLET, FILM COATED ORAL at 20:07

## 2022-10-08 RX ADMIN — SODIUM CHLORIDE, SODIUM GLUCONATE, SODIUM ACETATE, POTASSIUM CHLORIDE AND MAGNESIUM CHLORIDE 75 ML/HR: 526; 502; 368; 37; 30 INJECTION, SOLUTION INTRAVENOUS at 20:07

## 2022-10-08 RX ADMIN — LIDOCAINE 5% 1 PATCH: 700 PATCH TOPICAL at 11:49

## 2022-10-08 RX ADMIN — IOHEXOL 100 ML: 300 INJECTION, SOLUTION INTRAVENOUS at 12:31

## 2022-10-08 RX ADMIN — FAMOTIDINE 20 MG: 20 TABLET ORAL at 20:07

## 2022-10-08 NOTE — ASSESSMENT & PLAN NOTE
Plan:  · Continue aspirin 81 mg and plavix 75 mg daily  · Continue to monitor for signs of active bleeding

## 2022-10-08 NOTE — ASSESSMENT & PLAN NOTE
· S/p CABG x4 in 2018    Plan:  · Aspirin Plavix initially continued  · Due to drop in hemoglobin overnight will hold for now

## 2022-10-08 NOTE — ASSESSMENT & PLAN NOTE
· Presented initially with left lower back pain with radiation to the left lower quadrant in a band-like distribution  · States the pain is sharp in quality and 11/10 at max and 0/10 at rest, exacerbated by standing and movement  · Reports a single episode of diarrhea today after eating high fiber food, states the stool was non-watery and non-mucoid  · He was admitted and treated three weeks ago for a diverticulitis flare and treated with IV ceftriaxone and Flagyl  · The pain he is experiencing now is described as very similar to his diverticulitis flare three weeks ago  However during that occurrence, he had more consistent and frequent watery stools  · He describes dark appearing stools and endorsed taking Ibuprofen prior to last admission for pain relief  · FOBT done in the ED was said to be slightly positive today  · POA leukocytosis of 11 61  · Lipase wnl  · UA unremarkable  · CT abdomen reported diverticulosis with no evidence of diverticulitis, no evidence of nephrolithiasis  · DDx includes but is not limited to diverticulitis flare vs exacerbation of chronic low back pain with radiation vs C   Diff colitis vs food poisoning vs viral gastroenteritis vs constipation versus GI bleed    Plan:  · Check stool enteric panel  · Check C diff toxin  · Monitor I/Os  · Continue clear liquid diet with NPO after midnight for likely EGD and colonoscopy on 10/10  · Inpatient consult to gastroenterology  · Will continue IV ceftriaxone 1g q 24 hrs and Flagyl 500 mg q 8 hrs  · Pain management with oxycodone 2 5 mg for moderate and severe pain, IV Dilaudid 0 2 mg for breakthrough pain

## 2022-10-08 NOTE — ASSESSMENT & PLAN NOTE
· Presented initially with left lower back pain with radiation to the left lower quadrant in a band-like distribution  · States the pain is sharp in quality and 11/10 at max and 0/10 at rest, exacerbated by standing and movement  · Reports a single episode of diarrhea today after eating high fiber food, states the stool was non-watery and non-mucoid  · He was admitted and treated three weeks ago for a diverticulitis flare and treated with IV ceftriaxone and Flagyl  · The pain he is experiencing now is described as very similar to his diverticulitis flare three weeks ago  However during that occurrence, he had more consistent and frequent watery stools  · He describes dark appearing stools and endorsed taking Ibuprofen prior to last admission for pain relief  · FOBT done in the ED was said to be slightly positive today  · POA leukocytosis of 11 61  · Lipase wnl  · UA unremarkable  · CT abdomen reported diverticulosis with no evidence of diverticulitis, no evidence of nephrolitiasis  · DDx includes but is not limited to diverticulitis flare vs exacerbation of chronic low back pain with radiation vs C   Diff colitis vs food poisoning vs viral gastroenteritis vs constipation    Plan:  · Check stool enteric panel  · Check C diff toxin  · Monitor I/Os  · Continue clear liquid diet  · Inpatient consult to gastroenterology  · Will start IV ceftriaxone 1g q 24 hrs and Flagyl 500 mg q 8 hrs  · Pain management with oxycodone 2 5 mg for moderate and severe pain, IV Dilaudid 0 2 mg for breakthrough pain

## 2022-10-08 NOTE — H&P
[facility]  H&P- Lauri Dorman 1940, 80 y o  male MRN: [id number]  Unit/Bed#: ED-37 Encounter: [id number]  Primary Care Provider: Jessica Alcaraz DO   Date and time admitted to hospital: 10/8/2022 10:04 AM    * Left lower quadrant abdominal pain  Assessment & Plan  · Presented initially with left lower back pain with radiation to the left lower quadrant in a band-like distribution  · States the pain is sharp in quality and 11/10 at max and 0/10 at rest, exacerbated by standing and movement  · Reports a single episode of diarrhea today after eating high fiber food, states the stool was non-watery and non-mucoid  · He was admitted and treated three weeks ago for a diverticulitis flare and treated with IV ceftriaxone and Flagyl  · The pain he is experiencing now is described as very similar to his diverticulitis flare three weeks ago  However during that occurrence, he had more consistent and frequent watery stools  · He describes dark appearing stools and endorsed taking Ibuprofen prior to last admission for pain relief  · FOBT done in the ED was said to be slightly positive today  · POA leukocytosis of 11 61  · Lipase wnl  · UA unremarkable  · CT abdomen reported diverticulosis with no evidence of diverticulitis, no evidence of nephrolithiasis  · DDx includes but is not limited to diverticulitis flare vs exacerbation of chronic low back pain with radiation vs C   Diff colitis vs food poisoning vs viral gastroenteritis vs constipation    Plan:  · Check stool enteric panel  · Check C diff toxin  · Monitor I/Os  · Continue clear liquid diet  · Inpatient consult to gastroenterology  · Will start IV ceftriaxone 1g q 24 hrs and Flagyl 500 mg q 8 hrs  · Pain management with oxycodone 2 5 mg for moderate and severe pain, IV Dilaudid 0 2 mg for breakthrough pain    Anemia  Assessment & Plan  · POA hemoglobin of 11 1 within patient's baseline  · Likely chronic anemia in the setting of CKD stage 3  · Patient did endorse using Ibuprofen prior to last admission for pain relief of diverticulitis flare  · Colonoscopy in 2017 showed internal/external hemorrhoids  · FOBT was reported to be slightly positive this admission  · Suspicion for PUD vs gastritis in the setting of reported dark stools vs chronic hemorrhoids    Plan:  · Continue to monitor CBC and differential in the morning   · Inpatient consult to gastroenterology for possible EGD and colonoscopy  · Patient advised to avoid NSAIDs   · PO Protonix 40 mg daily    Hypertension  Assessment & Plan  · POA blood pressure of 145/64    Plan:  · Continue metoprolol tartrate 75 mg q 12 hrs  · Continue to monitor routine vital signs    GERD (gastroesophageal reflux disease)  Assessment & Plan  · Takes famotidine 20 mg daily at home    Plan:  · Start Protonix 40 mg daily  · Continue home famotidine 20 mg daily    Hyperlipidemia  Assessment & Plan  Plan:  · Continue atorvastatin 80 mg daily    CAD (coronary artery disease)  Assessment & Plan  · S/p CABG x4    Plan:  · Continue aspirin 81 mg and plavix 75 mg daily      VTE Pharmacologic Prophylaxis: VTE Score: 5 High Risk (Score >/= 5) - Pharmacological DVT Prophylaxis Ordered: enoxaparin (Lovenox)  Sequential Compression Devices Ordered  Code Status: Level 3 - DNAR and DNI   Discussion with family: Updated  (daughter) at bedside  Anticipated Length of Stay: Patient will be admitted on an inpatient basis with an anticipated length of stay of greater than 2 midnights secondary to abdominal pain  Chief Complaint: Abdominal pain    History of Present Illness:  Lon Swift is a 80 y o  male with a PMH of CAD s/p CABG, lumbar back fractures with L2-S1 fusion, CKD stage 3, hypertension, and hyperlipidemia who presents with a chief complaint of abdominal pain   Patient states that the pain started initially as left lower back pain with radiation to the left lower quadrant of his abdomen in a band-like distribution  The pain is described as sharp in quality with 11/10 pain at max and 0/10 pain at rest  The pain is exacerbated with standing and any movement  This incident of abdominal pain was very similar to what he felt three weeks ago when he was admitted and treated for a diverticulitis flare  This current episode was only associated with a single episode of diarrhea after eating a high fiber meal and was formed, non-watery, and non-mucoid  He denies any fevers but endorses having chills all the time  He denies heavy lifting and any activity that may have exacerbated his chronic lower back pain  Denies a history of kidney stones  He did report that his stools are dark in appearance and states that he did take Ibuprofen prior to last admission and Aleve prior to this admission for pain relief  Review of Systems:  Review of Systems   Constitutional: Negative for chills and fever  HENT: Negative for rhinorrhea and voice change  Eyes: Negative for visual disturbance  Respiratory: Negative for shortness of breath and wheezing  Cardiovascular: Negative for chest pain, palpitations and leg swelling  Gastrointestinal: Positive for abdominal pain and diarrhea  Negative for blood in stool, constipation, nausea and vomiting  Endocrine: Negative for cold intolerance  Genitourinary: Negative for dysuria and hematuria  Musculoskeletal: Positive for back pain  Negative for myalgias  Skin: Negative for color change and rash  Neurological: Negative for facial asymmetry, weakness, light-headedness and headaches  Psychiatric/Behavioral: Negative for confusion  The patient is not nervous/anxious          Past Medical and Surgical History:   Past Medical History:   Diagnosis Date   • Abnormal liver function test     RESOLVED: [id number]   • Allergic rhinitis    • Anemia     LAST ASSESSED: [id number]   • Arthritis    • BPH (benign prostatic hyperplasia)    • CAD (coronary artery disease)    • Coronary artery disease    • 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HCC)     LAST ASSESSED: [id number]   • Former tobacco use    • GERD (gastroesophageal reflux disease)    • Hand paresthesia     RESOLVED: [id number]   • Hyperlipidemia    • Hypertension    • Lumbar stenosis    • Peripheral artery disease (HCC)     LAST ASSESSED: [id number]   • Stage 3a chronic kidney disease ([facility] [address] ) 7/11/2021       Past Surgical History:   Procedure Laterality Date   • BACK SURGERY     • COLONOSCOPY     • LUMBAR FUSION     • RI ARTHRODESIS POSTERIOR/POSTEROLATERAL LUMBAR N/A 11/03/2016    Procedure: L2-S1 POSTERIOR LUMBAR FUSION AND DECOMPRESSION (Impulse), Posterior lateral fixation; dural repair ;  Surgeon: Carlin Hunter MD;  Location: [facility];  Service: Orthopedics   • RI CABG, ARTERIAL, SINGLE N/A 01/19/2018    Procedure: CABG X4 with LIMA - LAD, SVG - RCA, OM2, & Diagonal ; Left Leg EVH; MATTHEW;  Surgeon: Francies Homans, DO;  Location: [facility];  Service: Cardiac Surgery   • RI COLONOSCOPY FLX DX W/COLLJ SPEC WHEN PFRMD N/A 11/15/2017    Procedure: EGD AND COLONOSCOPY;  Surgeon: Tina Ba MD;  Location: [facility]; Service: Gastroenterology   • SEPTOPLASTY      LAST ASSESSED; [id number]   • TONSILECTOMY AND ADNOIDECTOMY      LAST ASSESSED: [id number]   • UPPER GASTROINTESTINAL ENDOSCOPY         Meds/Allergies:  Prior to Admission medications    Medication Sig Start Date End Date Taking?  Authorizing Provider   acetaminophen (TYLENOL) 650 mg CR tablet Take 650 mg by mouth every 8 (eight) hours as needed for mild pain   Yes Historical Provider, MD   Ascorbic Acid (Vitamin C) 500 MG PACK Take 500 mg by mouth daily     Yes Historical Provider, MD   aspirin (ECOTRIN LOW STRENGTH) 81 mg EC tablet Take 81 mg by mouth daily   Yes Historical Provider, MD   atorvastatin (LIPITOR) 80 mg tablet Take 1 tablet (80 mg total) by mouth every morning 2/14/22  Yes Art Lanes, MD   cholecalciferol (VITAMIN D3) 1,000 units tablet Take 2,000 Units by mouth daily   Yes Historical Provider, MD   clopidogrel (Plavix) 75 mg tablet Take 1 tablet (75 mg total) by mouth daily 10/6/22  Yes Donna Haines MD   cyanocobalamin (VITAMIN B-12) 1,000 mcg tablet Take 1,000 mcg by mouth daily     Yes Historical Provider, MD   famotidine (PEPCID) 20 mg tablet TAKE 1 TABLET BY MOUTH TWICE A DAY 5/31/22  Yes Mikaela Duenas DO   gabapentin (NEURONTIN) 100 mg capsule Take 1 capsule (100 mg total) by mouth daily 9/29/22 10/29/22 Yes Mikaela Duenas DO   ibuprofen (MOTRIN) 800 mg tablet Take 1 tablet (800 mg total) by mouth every 6 (six) hours as needed for moderate pain 8/17/22  Yes More Mcdermott,    metoprolol tartrate (LOPRESSOR) 50 mg tablet TAKE 1 AND 1/2 TABLETS BY MOUTH EVERY 12 HOURS  Patient taking differently: 75 mg every 12 (twelve) hours 5/24/22  Yes Jarrod Vang MD   Multiple Vitamin (MULTIVITAMIN) capsule Take 1 capsule by mouth daily   Yes Historical Provider, MD   docusate sodium (COLACE) 100 mg capsule Take 1 capsule (100 mg total) by mouth 2 (two) times a day as needed for constipation Hold for loose stools  Patient not taking: Reported on 10/8/2022 7/9/21 10/6/22  Nasrin Reis MD   ipratropium (ATROVENT) 0 03 % nasal spray 2 sprays into each nostril every 12 (twelve) hours    Historical Provider, MD     I have reviewed home medications with patient personally  Allergies: Allergies   Allergen Reactions   • Penicillins Other (See Comments)     Hallucinations; Patient reported that he was seeing visual disturbances         Social History:  Marital Status:     Occupation: N/A  Patient Pre-hospital Living Situation: Home  Patient Pre-hospital Level of Mobility: walks  Patient Pre-hospital Diet Restrictions: N/A  Substance Use History:   Social History     Substance and Sexual Activity   Alcohol Use Not Currently   • Alcohol/week: 1 0 standard drink   • Types: 1 Shots of liquor per week    Comment: beer, wine, scotch every day; SOCIAL AS PER ALL SCRIPTS      Social History     Tobacco Use   Smoking Status Former Smoker   • Packs/day: 1 00   • Years: 50 00   • Pack years: 50 00   • Types: Cigarettes   • Quit date:    • Years since quittin 7   Smokeless Tobacco Never Used     Social History     Substance and Sexual Activity   Drug Use Not Currently   • Types: Marijuana    Comment: medical clarke       Family History:  Family History   Problem Relation Age of Onset   • Emphysema Mother    • Liver disease Mother    • Coronary artery disease Father    • Hypertension Father    • Liver disease Father    • Heart failure Father    • Stroke Father         CVA    • Heart attack Father    • Coronary artery disease Brother    • Heart disease Brother         younger brother by pass and other brother 4 stents placed   • Other Family         BACK PROBLEM    • Stroke Family         CVA   • Emphysema Family    • Hypertension Family         BENIGN       Physical Exam:     Vitals:   Blood Pressure: 138/64 (10/08/22 1730)  Pulse: 94 (10/08/22 1730)  Temperature: 97 7 °F (36 5 °C) (10/08/22 1010)  Temp Source: Oral (10/08/22 1010)  Respirations: 18 (10/08/22 1730)  Height: 5' 7" (170 2 cm) (10/08/22 1010)  Weight - Scale: 76 6 kg (168 lb 14 oz) (10/08/22 1010)  SpO2: 94 % (10/08/22 173)    Physical Exam  Constitutional:       General: He is not in acute distress  Appearance: Normal appearance  He is overweight  He is not ill-appearing or diaphoretic  HENT:      Head: Normocephalic and atraumatic  Right Ear: External ear normal       Left Ear: External ear normal       Nose: Nose normal       Mouth/Throat:      Mouth: Mucous membranes are moist    Cardiovascular:      Rate and Rhythm: Normal rate and regular rhythm  Pulses: Normal pulses  Heart sounds: Normal heart sounds, S1 normal and S2 normal  No murmur heard  Pulmonary:      Effort: Pulmonary effort is normal  No respiratory distress  Breath sounds: Normal breath sounds   No decreased breath sounds, wheezing, rhonchi or rales  Abdominal:      General: Abdomen is flat  Bowel sounds are normal  There is no distension  Palpations: Abdomen is rigid  Tenderness: There is abdominal tenderness in the left lower quadrant  There is no right CVA tenderness, left CVA tenderness, guarding or rebound  Musculoskeletal:      Cervical back: Neck supple  Right lower leg: No tenderness  No edema  Left lower leg: No tenderness  No edema  Skin:     General: Skin is warm and dry  Neurological:      General: No focal deficit present  Mental Status: He is alert and oriented to person, place, and time  Sensory: Sensation is intact  Motor: Motor function is intact  Psychiatric:         Speech: Speech normal          Behavior: Behavior is cooperative  Additional Data:     Lab Results:  Results from last 7 days   Lab Units 10/08/22  1046   WBC Thousand/uL 11 61*   HEMOGLOBIN g/dL 11 1*   HEMATOCRIT % 35 1*   PLATELETS Thousands/uL 166   NEUTROS PCT % 71   LYMPHS PCT % 19   MONOS PCT % 8   EOS PCT % 2     Results from last 7 days   Lab Units 10/08/22  1046   SODIUM mmol/L 140   POTASSIUM mmol/L 4 4   CHLORIDE mmol/L 105   CO2 mmol/L 28   BUN mg/dL 27*   CREATININE mg/dL 1 19   ANION GAP mmol/L 7   CALCIUM mg/dL 9 0   ALBUMIN g/dL 3 9   TOTAL BILIRUBIN mg/dL 0 70   ALK PHOS U/L 91   ALT U/L 20   AST U/L 21   GLUCOSE RANDOM mg/dL 104     Results from last 7 days   Lab Units 10/08/22  1046   INR  1 02     Results from last 7 days   Lab Units 10/08/22  1050   POC GLUCOSE mg/dl 100               Imaging: Reviewed radiology reports from this admission including: abdominal/pelvic CT  CT abdomen pelvis with contrast   Final Result by Trevor Fitzgerald DO (10/08 1305)      1  No acute intra-abdominal abnormality  2  Suggestion of minimal asymmetric left lateral bladder wall thickening measuring only a few millimeters   Correlate with urinalysis and if relevant, cystoscopy if not recently performed  3  Diffuse colonic diverticulosis without evidence of diverticulitis  Workstation performed: [id number]         CT recon only lumbar spine   Final Result by Rosalind Vieira DO (10/08 1348)      No acute fracture or significant change in spinal alignment from prior study  Status post L2-S1 bilateral pedicle screw fixation with stable grade 2-3 L5-S1 anterolisthesis  Severe bilateral foraminal narrowing L5-S1  Adequate decompression of the central canal at the postsurgical levels  Multilevel degenerative changes as above  If relevant, degenerative changes in the postoperative setting are best evaluated with nonemergent CT myelography  Workstation performed: [id number]             EKG and Other Studies Reviewed on Admission:   · EKG: NSR  HR 79     ** Please Note: This note has been constructed using a voice recognition system   **

## 2022-10-08 NOTE — LETTER
Thank you for allowing us to participate in the care of your patient, Claudean Clas, who was hospitalized from [unfilled] through 10/11/2022 with the admitting diagnosis of left lower quadrant abdominal pain  CT abdomen and pelvis showed diverticulosis  There were concerns for acute GI bleed therefore patient had a diagnostic EGD and colonoscopy with unremarkable findings  GI has recommended outpatient capsule endoscop however afternoon of discharge patient was still contemplating this decision  If you have any additional questions or would like to discuss further, please feel free to contact me      [id number] [facility], Hospitalist  [phone]

## 2022-10-08 NOTE — ASSESSMENT & PLAN NOTE
· POA blood pressure of 145/64    Plan:  · Continue metoprolol tartrate 75 mg q 12 hrs  · Continue to monitor routine vital signs

## 2022-10-08 NOTE — ASSESSMENT & PLAN NOTE
· Takes famotidine 20 mg daily at home    Plan:  · Start Protonix 40 mg daily  · Continue home famotidine 20 mg daily

## 2022-10-08 NOTE — ASSESSMENT & PLAN NOTE
· POA hemoglobin of 11 1 within patient's baseline  · Likely chronic anemia in the setting of CKD stage 3  · Patient did endorse using Ibuprofen prior to last admission for pain relief of diverticulitis flare  · Colonoscopy in 2017 showed internal/external hemorrhoids  · FOBT was reported to be slightly positive this admission  · Suspicion for PUD vs gastritis in the setting of reported dark stools vs chronic hemorrhoids    Plan:  · Continue to monitor CBC and differential in the morning   · Inpatient consult to gastroenterology for possible EGD and colonoscopy  · Patient advised to avoid NSAIDs   · PO Protonix 40 mg daily

## 2022-10-08 NOTE — ASSESSMENT & PLAN NOTE
· POA hemoglobin of 11 1 within patient's baseline  · Likely chronic anemia in the setting of CKD stage 3 verses possible GI bleed  · Patient did endorse using Ibuprofen prior to last admission for pain relief of diverticulitis flare  · Colonoscopy in 2017 showed internal/external hemorrhoids  · FOBT was reported to be slightly positive this admission  · Suspicion for PUD vs gastritis in the setting of reported dark stools vs chronic hemorrhoids  · Hemoglobin decreased from 11 1-9 3    Plan:  · Continue to monitor CBC and differential in the morning   · Inpatient consult to gastroenterology for possible EGD and colonoscopy  · Will avoid NSAIDs  · Protonix transition to IV b i d   · Transfuse for less than 7

## 2022-10-08 NOTE — ED PROVIDER NOTES
History  Chief Complaint   Patient presents with   • Back Pain     Chronic left sided lower back pain, progressively worsen over past 24 hours  Tried tylenol with minimal relief  Received fentanyl and zofran per EMS  H/o diverticulitis, believes it's related  80-year-old male history of lumbar back fracture and fusion, diverticulitis, CABG, CKD 3 brought in by EMS with acute worsening of left lower quadrant/left lower back pain  Patient states he was at his normal baseline health yesterday when he had a meal that was high in fiber for lunch, which is not supposed to because of the diverticulitis, around 6:00 p m  last night had large amount of diarrhea and onset of left lower back pain radiating in a bandlike fashion around to left lower quadrant of abdomen  Pain ranges from 2/10 to 11/10, increases with standing up and other sudden movements  Diarrhea was dark colored which might be normal for him  No other episodes of diarrhea  This pain feels exactly similar to his flare of diverticulitis 3 weeks ago  Was admitted here and treated with IV ceftriaxone and Flagyl  Has also been having urinary urgency for the past few months, past week maybe slight amount of dysuria  Received 100 mcg of fentanyl and 4 mg of Zofran from EMS which reduced pain from 11/10 to a 1/10  Prior to Admission Medications   Prescriptions Last Dose Informant Patient Reported? Taking?    Ascorbic Acid (Vitamin C) 500 MG PACK 10/7/2022 at Unknown time Self Yes Yes   Sig: Take 500 mg by mouth daily     Multiple Vitamin (MULTIVITAMIN) capsule 10/7/2022 at Unknown time Self Yes Yes   Sig: Take 1 capsule by mouth daily   acetaminophen (TYLENOL) 650 mg CR tablet 10/7/2022 at Unknown time Self Yes Yes   Sig: Take 650 mg by mouth every 8 (eight) hours as needed for mild pain   aspirin (ECOTRIN LOW STRENGTH) 81 mg EC tablet 10/7/2022 at Unknown time Self Yes Yes   Sig: Take 81 mg by mouth daily   atorvastatin (LIPITOR) 80 mg tablet 10/7/2022 at Unknown time Self No Yes   Sig: Take 1 tablet (80 mg total) by mouth every morning   cholecalciferol (VITAMIN D3) 1,000 units tablet 10/7/2022 at Unknown time Self Yes Yes   Sig: Take 2,000 Units by mouth daily   clopidogrel (Plavix) 75 mg tablet 10/7/2022 at Unknown time  No Yes   Sig: Take 1 tablet (75 mg total) by mouth daily   cyanocobalamin (VITAMIN B-12) 1,000 mcg tablet 10/7/2022 at Unknown time Self Yes Yes   Sig: Take 1,000 mcg by mouth daily     docusate sodium (COLACE) 100 mg capsule More than a month at Unknown time Self No No   Sig: Take 1 capsule (100 mg total) by mouth 2 (two) times a day as needed for constipation Hold for loose stools     Patient not taking: Reported on 10/8/2022   famotidine (PEPCID) 20 mg tablet 10/7/2022 at Unknown time Self No Yes   Sig: TAKE 1 TABLET BY MOUTH TWICE A DAY   gabapentin (NEURONTIN) 100 mg capsule 10/7/2022 at Unknown time Self No Yes   Sig: Take 1 capsule (100 mg total) by mouth daily   ibuprofen (MOTRIN) 800 mg tablet 10/8/2022 at Unknown time Self No Yes   Sig: Take 1 tablet (800 mg total) by mouth every 6 (six) hours as needed for moderate pain   ipratropium (ATROVENT) 0 03 % nasal spray More than a month at Unknown time Self Yes No   Si sprays into each nostril every 12 (twelve) hours   metoprolol tartrate (LOPRESSOR) 50 mg tablet 10/7/2022 at Unknown time  No Yes   Sig: TAKE 1 AND 1/2 TABLETS BY MOUTH EVERY 12 HOURS   Patient taking differently: 75 mg every 12 (twelve) hours      Facility-Administered Medications: None       Past Medical History:   Diagnosis Date   • Abnormal liver function test     RESOLVED: [id number]   • Allergic rhinitis    • Anemia     LAST ASSESSED:    • Arthritis    • BPH (benign prostatic hyperplasia)    • CAD (coronary artery disease)    • Coronary artery disease    • 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HCC)     LAST ASSESSED: [id number]   • Former tobacco use    • GERD (gastroesophageal reflux disease)    • Hand paresthesia     RESOLVED: [id number]   • Hyperlipidemia    • Hypertension    • Lumbar stenosis    • Peripheral artery disease (HCC)     LAST ASSESSED: [id number]   • Stage 3a chronic kidney disease ([facility] [address] ) 7/11/2021       Past Surgical History:   Procedure Laterality Date   • BACK SURGERY     • COLONOSCOPY     • LUMBAR FUSION     • FL ARTHRODESIS POSTERIOR/POSTEROLATERAL LUMBAR N/A 11/03/2016    Procedure: L2-S1 POSTERIOR LUMBAR FUSION AND DECOMPRESSION (Impulse), Posterior lateral fixation; dural repair ;  Surgeon: Jessica Tejada MD;  Location: [facility];  Service: Orthopedics   • FL CABG, ARTERIAL, SINGLE N/A 01/19/2018    Procedure: CABG X4 with LIMA - LAD, SVG - RCA, OM2, & Diagonal ; Left Leg EVH; MATTHEW;  Surgeon: Adriane Mcgovern DO;  Location: [facility];  Service: Cardiac Surgery   • FL COLONOSCOPY FLX DX W/COLLJ SPEC WHEN PFRMD N/A 11/15/2017    Procedure: EGD AND COLONOSCOPY;  Surgeon: Mirian Cardenas MD;  Location: [facility]; Service: Gastroenterology   • SEPTOPLASTY      LAST ASSESSED; [id number]   • TONSILECTOMY AND ADNOIDECTOMY      LAST ASSESSED: [id number]   • UPPER GASTROINTESTINAL ENDOSCOPY         Family History   Problem Relation Age of Onset   • Emphysema Mother    • Liver disease Mother    • Coronary artery disease Father    • Hypertension Father    • Liver disease Father    • Heart failure Father    • Stroke Father         CVA    • Heart attack Father    • Coronary artery disease Brother    • Heart disease Brother         younger brother by pass and other brother 4 stents placed   • Other Family         BACK PROBLEM    • Stroke Family         CVA   • Emphysema Family    • Hypertension Family         BENIGN     I have reviewed and agree with the history as documented      E-Cigarette/Vaping   • E-Cigarette Use Never User      E-Cigarette/Vaping Substances   • Nicotine No    • THC Yes    • CBD No    • Flavoring No    • Other No    • Unknown No      Social History     Tobacco Use   • Smoking status: Former Smoker     Packs/day: 1 00     Years: 50 00     Pack years: 50 00     Types: Cigarettes     Quit date:      Years since quittin 7   • Smokeless tobacco: Never Used   Vaping Use   • Vaping Use: Never used   Substance Use Topics   • Alcohol use: Not Currently     Alcohol/week: 1 0 standard drink     Types: 1 Shots of liquor per week     Comment: beer, wine, scotch every day; SOCIAL AS PER ALL SCRIPTS    • Drug use: Not Currently     Types: Marijuana     Comment: medical majianais        Review of Systems   Constitutional: Negative for activity change, fever and unexpected weight change  HENT: Negative for postnasal drip and rhinorrhea  Eyes: Negative for visual disturbance  Respiratory: Negative for cough, chest tightness and shortness of breath  Cardiovascular: Negative for chest pain and palpitations  Gastrointestinal: Positive for abdominal pain and diarrhea  Negative for blood in stool, constipation, nausea and vomiting  Genitourinary: Positive for dysuria and urgency  Negative for hematuria  Musculoskeletal: Positive for back pain  Skin: Negative for color change and wound  Allergic/Immunologic: Negative for immunocompromised state  Neurological: Negative for dizziness and syncope  Psychiatric/Behavioral: Negative for dysphoric mood  The patient is not nervous/anxious  All other systems reviewed and are negative  Physical Exam  ED Triage Vitals [10/08/22 1010]   Temperature Pulse Respirations Blood Pressure SpO2   97 7 °F (36 5 °C) 85 18 145/64 98 %      Temp Source Heart Rate Source Patient Position - Orthostatic VS BP Location FiO2 (%)   Oral Monitor Lying Right arm --      Pain Score       No Pain             Orthostatic Vital Signs  Vitals:    10/08/22 1430 10/08/22 1715 10/08/22 1730 10/08/22 1910   BP:  154/70 138/64 120/59   Pulse: 82 88 94 99   Patient Position - Orthostatic VS:  Lying Lying        Physical Exam  Vitals and nursing note reviewed   Exam conducted with a chaperone present (Male chaperone present for BREANN)  Constitutional:       Appearance: He is not toxic-appearing or diaphoretic  HENT:      Head: Normocephalic and atraumatic  Right Ear: External ear normal       Left Ear: External ear normal       Nose: Nose normal       Mouth/Throat:      Mouth: Mucous membranes are moist    Eyes:      General: No scleral icterus  Extraocular Movements: Extraocular movements intact  Conjunctiva/sclera: Conjunctivae normal    Cardiovascular:      Rate and Rhythm: Normal rate and regular rhythm  Pulses: Normal pulses  Radial pulses are 2+ on the right side  Heart sounds: Normal heart sounds, S1 normal and S2 normal  No murmur heard  Pulmonary:      Effort: Pulmonary effort is normal  No respiratory distress  Breath sounds: Normal breath sounds  No stridor  No wheezing  Abdominal:      General: Bowel sounds are normal       Palpations: Abdomen is soft  Tenderness: There is abdominal tenderness in the left lower quadrant  There is no right CVA tenderness, left CVA tenderness, guarding or rebound  Negative signs include Aguillon's sign, Rovsing's sign and McBurney's sign  Hernia: No hernia is present  Genitourinary:     Rectum: Guaiac result positive (trace positive)  Musculoskeletal:         General: Normal range of motion  Cervical back: Normal range of motion  Back:       Right lower leg: No edema  Left lower leg: No edema  Skin:     General: Skin is warm and dry  Coloration: Skin is pale  Skin is not jaundiced  Neurological:      General: No focal deficit present  Mental Status: He is alert and oriented to person, place, and time     Psychiatric:         Mood and Affect: Mood normal          ED Medications  Medications   lidocaine (LIDODERM) 5 % patch 1 patch (1 patch Topical Medication Applied 10/8/22 0757)   ascorbic acid (VITAMIN C) tablet 500 mg (has no administration in time range)   aspirin (ECOTRIN LOW STRENGTH) EC tablet 81 mg (has no administration in time range)   atorvastatin (LIPITOR) tablet 80 mg (has no administration in time range)   cholecalciferol (VITAMIN D3) tablet 2,000 Units (has no administration in time range)   clopidogrel (PLAVIX) tablet 75 mg (has no administration in time range)   cyanocobalamin (VITAMIN B-12) tablet 1,000 mcg (has no administration in time range)   docusate sodium (COLACE) capsule 100 mg (has no administration in time range)   famotidine (PEPCID) tablet 20 mg (20 mg Oral Given 10/8/22 2007)   gabapentin (NEURONTIN) capsule 100 mg (has no administration in time range)   ipratropium (ATROVENT) 0 02 % inhalation solution 0 5 mg (has no administration in time range)   metoprolol tartrate (LOPRESSOR) tablet 75 mg (75 mg Oral Given 10/8/22 2007)   multivitamin stress formula tablet 1 tablet (has no administration in time range)   enoxaparin (LOVENOX) subcutaneous injection 40 mg (has no administration in time range)   pantoprazole (PROTONIX) EC tablet 40 mg (has no administration in time range)   acetaminophen (TYLENOL) tablet 650 mg (has no administration in time range)   oxyCODONE (ROXICODONE) IR tablet 2 5 mg (has no administration in time range)   HYDROmorphone HCl (DILAUDID) injection 0 2 mg (has no administration in time range)   metroNIDAZOLE (FLAGYL) tablet 500 mg (500 mg Oral Given 10/8/22 2007)   cefTRIAXone (ROCEPHIN) IVPB (premix in dextrose) 1,000 mg 50 mL (has no administration in time range)   multi-electrolyte (PLASMALYTE-A/ISOLYTE-S PH 7 4) IV solution (75 mL/hr Intravenous New Bag 10/8/22 2007)   fentanyl citrate (PF) (FOR EMS ONLY) 100 mcg/2 mL injection 100 mcg (0 mcg Does not apply Given to EMS 10/8/22 1026)   ondansetron (FOR EMS ONLY) (ZOFRAN) 4 mg/2 mL injection 4 mg (0 mg Does not apply Given to EMS 10/8/22 1026)   oxyCODONE-acetaminophen (PERCOCET) 5-325 mg per tablet 1 tablet (1 tablet Oral Given 10/8/22 1148)   iohexol (OMNIPAQUE) 300 mg/mL injection 100 mL (100 mL Intravenous Given 10/8/22 1231)       Diagnostic Studies  Results Reviewed     Procedure Component Value Units Date/Time    Stool Enteric Bacterial Panel by PCR [[id number]]     Lab Status: No result Specimen: Stool from Per Rectum     Clostridium difficile toxin by PCR with EIA [[id number]]     Lab Status: No result Specimen: Stool from Per Rectum     Urine Microscopic [[id number]]  (Abnormal) Collected: 10/08/22 1251    Lab Status: Final result Specimen: Urine, Clean Catch Updated: 10/08/22 1302     RBC, UA 1-2 /hpf      WBC, UA 2-4 /hpf      Epithelial Cells Occasional /hpf      Bacteria, UA None Seen /hpf     UA w Reflex to Microscopic w Reflex to Culture [[id number]]  (Abnormal) Collected: 10/08/22 1251    Lab Status: Final result Specimen: Urine, Clean Catch Updated: 10/08/22 1300     Color, UA Light Yellow     Clarity, UA Clear     Specific Gravity, UA 1 013     pH, UA 5 5     Leukocytes, UA Trace     Nitrite, UA Negative     Protein, UA Negative mg/dl      Glucose, UA Negative mg/dl      Ketones, UA Negative mg/dl      Urobilinogen, UA <2 0 mg/dl      Bilirubin, UA Negative     Occult Blood, UA Negative    HS Troponin 0hr (reflex protocol) [[id number]]  (Normal) Collected: 10/08/22 1046    Lab Status: Final result Specimen: Blood from Arm, Left Updated: 10/08/22 1139     hs TnI 0hr <2 ng/L     Comprehensive metabolic panel [[id number]]  (Abnormal) Collected: 10/08/22 1046    Lab Status: Final result Specimen: Blood from Arm, Left Updated: 10/08/22 1134     Sodium 140 mmol/L      Potassium 4 4 mmol/L      Chloride 105 mmol/L      CO2 28 mmol/L      ANION GAP 7 mmol/L      BUN 27 mg/dL      Creatinine 1 19 mg/dL      Glucose 104 mg/dL      Calcium 9 0 mg/dL      AST 21 U/L      ALT 20 U/L      Alkaline Phosphatase 91 U/L      Total Protein 7 7 g/dL      Albumin 3 9 g/dL      Total Bilirubin 0 70 mg/dL      eGFR 56 ml/min/1 73sq m     Narrative:      National Kidney Disease Foundation guidelines for Chronic Kidney Disease (CKD):   •  Stage 1 with normal or high GFR (GFR > 90 mL/min/1 73 square meters)  •  Stage 2 Mild CKD (GFR = 60-89 mL/min/1 73 square meters)  •  Stage 3A Moderate CKD (GFR = 45-59 mL/min/1 73 square meters)  •  Stage 3B Moderate CKD (GFR = 30-44 mL/min/1 73 square meters)  •  Stage 4 Severe CKD (GFR = 15-29 mL/min/1 73 square meters)  •  Stage 5 End Stage CKD (GFR <15 mL/min/1 73 square meters)  Note: GFR calculation is accurate only with a steady state creatinine    Lipase [[id number]]  (Normal) Collected: 10/08/22 1046    Lab Status: Final result Specimen: Blood from Arm, Left Updated: 10/08/22 1134     Lipase 26 u/L     Protime-INR [[id number]]  (Normal) Collected: 10/08/22 1046    Lab Status: Final result Specimen: Blood from Arm, Left Updated: 10/08/22 1125     Protime 13 7 seconds      INR 1 02    APTT [[id number]]  (Normal) Collected: 10/08/22 1046    Lab Status: Final result Specimen: Blood from Arm, Left Updated: 10/08/22 1125     PTT 32 seconds     CBC and differential [[id number]]  (Abnormal) Collected: 10/08/22 1046    Lab Status: Final result Specimen: Blood from Arm, Left Updated: 10/08/22 1110     WBC 11 61 Thousand/uL      RBC 3 35 Million/uL      Hemoglobin 11 1 g/dL      Hematocrit 35 1 %       fL      MCH 33 1 pg      MCHC 31 6 g/dL      RDW 13 5 %      MPV 11 1 fL      Platelets 674 Thousands/uL      nRBC 0 /100 WBCs      Neutrophils Relative 71 %      Immat GRANS % 0 %      Lymphocytes Relative 19 %      Monocytes Relative 8 %      Eosinophils Relative 2 %      Basophils Relative 0 %      Neutrophils Absolute 8 21 Thousands/µL      Immature Grans Absolute 0 04 Thousand/uL      Lymphocytes Absolute 2 23 Thousands/µL      Monocytes Absolute 0 91 Thousand/µL      Eosinophils Absolute 0 17 Thousand/µL      Basophils Absolute 0 05 Thousands/µL     Fingerstick Glucose (POCT) [[id number]]  (Normal) Collected: 10/08/22 1050    Lab Status: Final result Updated: 10/08/22 1103     POC Glucose 100 mg/dl                  CT abdomen pelvis with contrast   Final Result by Heide Bravo DO (10/08 1307)      1  No acute intra-abdominal abnormality  2  Suggestion of minimal asymmetric left lateral bladder wall thickening measuring only a few millimeters  Correlate with urinalysis and if relevant, cystoscopy if not recently performed  3  Diffuse colonic diverticulosis without evidence of diverticulitis  Workstation performed: [id number]         CT recon only lumbar spine   Final Result by Heide Bravo DO (10/08 6269)      No acute fracture or significant change in spinal alignment from prior study  Status post L2-S1 bilateral pedicle screw fixation with stable grade 2-3 L5-S1 anterolisthesis  Severe bilateral foraminal narrowing L5-S1  Adequate decompression of the central canal at the postsurgical levels  Multilevel degenerative changes as above  If relevant, degenerative changes in the postoperative setting are best evaluated with nonemergent CT myelography           Workstation performed: [id number]               Procedures  ECG 12 Lead Documentation Only    Date/Time: 10/8/2022 10:55 AM  Performed by: Kayla Hurd MD  Authorized by: Kayla Hurd MD     ECG reviewed by me, the ED Provider: yes    Patient location:  ED  Previous ECG:     Previous ECG:  Compared to current    Comparison ECG info:  Improved    Similarity:  Changes noted    Comparison to cardiac monitor: Yes    Interpretation:     Interpretation: normal    Rate:     ECG rate:  79    ECG rate assessment: normal    Rhythm:     Rhythm: sinus rhythm    Ectopy:     Ectopy: none    QRS:     QRS axis:  Normal    QRS intervals:  Normal  Conduction:     Conduction: normal    ST segments:     ST segments:  Normal  T waves:     T waves: normal            ED Course  ED Course as of 10/08/22 2055   Sat Oct 08, 2022   1106 POC Glucose: 100   1139 BUN(!): 27  11 2 weeks ago   1140 hs TnI 0hr: <2   5278 Urine Microscopic(!)  No UTI   1308 CT abdomen pelvis with contrast  1  No acute intra-abdominal abnormality      2  Suggestion of minimal asymmetric left lateral bladder wall thickening measuring only a few millimeters  Correlate with urinalysis and if relevant, cystoscopy if not recently performed      3  Diffuse colonic diverticulosis without evidence of diverticulitis  HEART Risk Score    Flowsheet Row Most Recent Value   Heart Score Risk Calculator    History 0 Filed at: 10/08/2022 1140   ECG 0 Filed at: 10/08/2022 1140   Age 2 Filed at: 10/08/2022 1140   Risk Factors 1 Filed at: 10/08/2022 1140   Troponin 0 Filed at: 10/08/2022 1140   HEART Score 3 Filed at: 10/08/2022 1140                      SBIRT 20yo+    Flowsheet Row Most Recent Value   SBIRT (25 yo +)    In order to provide better care to our patients, we are screening all of our patients for alcohol and drug use  Would it be okay to ask you these screening questions? Yes Filed at: 10/08/2022 1013   Initial Alcohol Screen: US AUDIT-C     1  How often do you have a drink containing alcohol? 0 Filed at: 10/08/2022 1013   2  How many drinks containing alcohol do you have on a typical day you are drinking? 0 Filed at: 10/08/2022 1013   3a  Male UNDER 65: How often do you have five or more drinks on one occasion? 0 Filed at: 10/08/2022 1013   3b  FEMALE Any Age, or MALE 65+: How often do you have 4 or more drinks on one occassion? 0 Filed at: 10/08/2022 1013   Audit-C Score 0 Filed at: 10/08/2022 1013   SHYANN: How many times in the past year have you    Used an illegal drug or used a prescription medication for non-medical reasons?  Never Filed at: 10/08/2022 1013                MDM  Number of Diagnoses or Management Options  Ambulatory dysfunction: new and requires workup  Left lower quadrant abdominal pain: new and requires workup  Diagnosis management comments: Initial impression:  Most likely patient is having another flare of his diverticulitis  Could also be from his lumbar back issues  With urgency and dysuria, possibility of UTI/pyelo/kidney stone  Although less likely given his description of the pain, will check his EKG and troponin given his significant cardiac history    Initial work up:  UA, CT abdomen pelvis with contrast pending GFR, EKG/troponin, labs    Final impression:  CT did not show any acute pathology  Possible this is exacerbation of his diverticulosis even though there is any diverticulitis  Did try giving patient some Percocet and lidocaine to address his pain however still having too much pain to be able to get up and walk  Based on this, will have patient admitted for ambulatory dysfunction         Amount and/or Complexity of Data Reviewed  Clinical lab tests: ordered and reviewed  Tests in the radiology section of CPT®: ordered and reviewed  Tests in the medicine section of CPT®: reviewed and ordered  Decide to obtain previous medical records or to obtain history from someone other than the patient: yes  Obtain history from someone other than the patient: yes  Review and summarize past medical records: yes  Independent visualization of images, tracings, or specimens: yes        Disposition  Final diagnoses:   Ambulatory dysfunction   Left lower quadrant abdominal pain     Time reflects when diagnosis was documented in both MDM as applicable and the Disposition within this note     Time User Action Codes Description Comment    10/8/2022  1:20 PM Sloane Meadows Add [R26 2] Ambulatory dysfunction     10/8/2022  1:21 PM Jorden Dailey Add [R10 32] Left lower quadrant abdominal pain     10/8/2022  5:36 PM Virginia Gay Add [K57 92] Diverticulitis       ED Disposition     ED Disposition   Admit    Condition   Stable    Date/Time   Sat Oct 8, 2022  1:20 PM    Comment   Case was discussed with Dr Curry Duarte and the patient's admission status was agreed to be Admission Status: observation status to the service of Dr Grisel Ríos              Follow-up Information    None         Current Discharge Medication List      CONTINUE these medications which have NOT CHANGED    Details   acetaminophen (TYLENOL) 650 mg CR tablet Take 650 mg by mouth every 8 (eight) hours as needed for mild pain      Ascorbic Acid (Vitamin C) 500 MG PACK Take 500 mg by mouth daily        aspirin (ECOTRIN LOW STRENGTH) 81 mg EC tablet Take 81 mg by mouth daily      atorvastatin (LIPITOR) 80 mg tablet Take 1 tablet (80 mg total) by mouth every morning  Qty: 90 tablet, Refills: 3    Associated Diagnoses: Hyperlipidemia, unspecified hyperlipidemia type      cholecalciferol (VITAMIN D3) 1,000 units tablet Take 2,000 Units by mouth daily      clopidogrel (Plavix) 75 mg tablet Take 1 tablet (75 mg total) by mouth daily  Qty: 90 tablet, Refills: 3    Associated Diagnoses: Carotid stenosis, asymptomatic, bilateral      cyanocobalamin (VITAMIN B-12) 1,000 mcg tablet Take 1,000 mcg by mouth daily        famotidine (PEPCID) 20 mg tablet TAKE 1 TABLET BY MOUTH TWICE A DAY  Qty: 180 tablet, Refills: 1    Associated Diagnoses: Gastroesophageal reflux disease, unspecified whether esophagitis present      gabapentin (NEURONTIN) 100 mg capsule Take 1 capsule (100 mg total) by mouth daily  Qty: 30 capsule, Refills: 3    Associated Diagnoses: Neuropathy      ibuprofen (MOTRIN) 800 mg tablet Take 1 tablet (800 mg total) by mouth every 6 (six) hours as needed for moderate pain  Qty: 60 tablet, Refills: 0    Associated Diagnoses: Left-sided low back pain without sciatica, unspecified chronicity      metoprolol tartrate (LOPRESSOR) 50 mg tablet TAKE 1 AND 1/2 TABLETS BY MOUTH EVERY 12 HOURS  Qty: 90 tablet, Refills: 11    Associated Diagnoses: Hypertension, unspecified type      Multiple Vitamin (MULTIVITAMIN) capsule Take 1 capsule by mouth daily      docusate sodium (COLACE) 100 mg capsule Take 1 capsule (100 mg total) by mouth 2 (two) times a day as needed for constipation Hold for loose stools  Qty: 60 capsule, Refills: 0    Associated Diagnoses: Constipation, unspecified constipation type      ipratropium (ATROVENT) 0 03 % nasal spray 2 sprays into each nostril every 12 (twelve) hours           No discharge procedures on file  PDMP Review       Value Time User    PDMP Reviewed  Yes 9/6/2022 11:06 AM Vonda Carey, DO           ED Provider  Attending physically available and evaluated Juliocesar Louise I managed the patient along with the ED Attending      Electronically Signed by         Vickey García MD  10/08/22 2056

## 2022-10-08 NOTE — ED ATTENDING ATTESTATION
10/8/2022  IRm DO, saw and evaluated the patient  I have discussed the patient with the resident/non-physician practitioner and agree with the resident's/non-physician practitioner's findings, Plan of Care, and MDM as documented in the resident's/non-physician practitioner's note, except where noted  All available labs and Radiology studies were reviewed  I was present for key portions of any procedure(s) performed by the resident/non-physician practitioner and I was immediately available to provide assistance  At this point I agree with the current assessment done in the Emergency Department  I have conducted an independent evaluation of this patient a history and physical is as follows:        49-year-old male, presents with worsening back pain  , long history of back pain but significant worsening over the past few days, now leading to inability to ambulate  , no falls no injury  Also some lower abdominal pain, mainly left lower quadrant history of diverticulitis which has presented similarly  , describes pain as dull constant nonradiating worse with attempted ambulation or palpation of the area  , better when holding still  No fevers no chills no nausea no vomiting no other bowel changes  , unable to ambulate and perform ADLs as per patient and family members  Review of Systems   Constitutional:  Positive for activity change, negative for chills, diaphoresis and fever  HENT: Negative for congestion, sinus pressure and sore throat  Eyes: Negative for pain and visual disturbance  Respiratory: Negative for cough, chest tightness, shortness of breath, wheezing and stridor  Cardiovascular: Negative for chest pain and palpitations  Gastrointestinal: Negative for abdominal distention, positive for abdominal pain, constipation, negative for diarrhea, nausea and vomiting  Genitourinary: Negative for dysuria and frequency     Musculoskeletal: Negative for neck pain and neck stiffness  Skin: Negative for rash  Neurological: Negative for dizziness, speech difficulty, light-headedness, numbness and headaches  Physical Exam  Vitals reviewed  Constitutional:       General: He is not in acute distress  Appearance: He is well-developed  He is not diaphoretic  HENT:      Head: Normocephalic and atraumatic  Right Ear: External ear normal       Left Ear: External ear normal       Nose: Nose normal    Eyes:      General:         Right eye: No discharge  Left eye: No discharge  Pupils: Pupils are equal, round, and reactive to light  Neck:      Trachea: No tracheal deviation  Cardiovascular:      Rate and Rhythm: Normal rate and regular rhythm  Heart sounds: Normal heart sounds  No murmur heard  Pulmonary:      Effort: Pulmonary effort is normal  No respiratory distress  Breath sounds: Normal breath sounds  No stridor  Abdominal:      General: There is no distension  Palpations: Abdomen is soft  Tenderness: There is abdominal tenderness  There is no guarding or rebound  Musculoskeletal:         General: Normal range of motion  Cervical back: Normal range of motion and neck supple  Comments: Limited exam due to patient's back discomfort, able to slightly roll onto his side, did have some midline tenderness of his lumbar spine  , hypertonicity of paraspinal musculature consistent with chronic muscle spasms  Skin:     General: Skin is warm and dry  Coloration: Skin is not pale  Findings: No erythema  Neurological:      General: No focal deficit present  Mental Status: He is alert and oriented to person, place, and time  CT abdomen pelvis with contrast   Final Result      1  No acute intra-abdominal abnormality  2  Suggestion of minimal asymmetric left lateral bladder wall thickening measuring only a few millimeters   Correlate with urinalysis and if relevant, cystoscopy if not recently performed  3  Diffuse colonic diverticulosis without evidence of diverticulitis  Workstation performed: [id number]         CT recon only lumbar spine   Final Result      No acute fracture or significant change in spinal alignment from prior study  Status post L2-S1 bilateral pedicle screw fixation with stable grade 2-3 L5-S1 anterolisthesis  Severe bilateral foraminal narrowing L5-S1  Adequate decompression of the central canal at the postsurgical levels  Multilevel degenerative changes as above  If relevant, degenerative changes in the postoperative setting are best evaluated with nonemergent CT myelography           Workstation performed: [id number]               Labs Reviewed   CBC AND DIFFERENTIAL - Abnormal       Result Value Ref Range Status    WBC 11 61 (*) 4 31 - 10 16 Thousand/uL Final    RBC 3 35 (*) 3 88 - 5 62 Million/uL Final    Hemoglobin 11 1 (*) 12 0 - 17 0 g/dL Final    Hematocrit 35 1 (*) 36 5 - 49 3 % Final     (*) 82 - 98 fL Final    MCH 33 1  26 8 - 34 3 pg Final    MCHC 31 6  31 4 - 37 4 g/dL Final    RDW 13 5  11 6 - 15 1 % Final    MPV 11 1  8 9 - 12 7 fL Final    Platelets 434  877 - 390 Thousands/uL Final    nRBC 0  /100 WBCs Final    Neutrophils Relative 71  43 - 75 % Final    Immat GRANS % 0  0 - 2 % Final    Lymphocytes Relative 19  14 - 44 % Final    Monocytes Relative 8  4 - 12 % Final    Eosinophils Relative 2  0 - 6 % Final    Basophils Relative 0  0 - 1 % Final    Neutrophils Absolute 8 21 (*) 1 85 - 7 62 Thousands/µL Final    Immature Grans Absolute 0 04  0 00 - 0 20 Thousand/uL Final    Lymphocytes Absolute 2 23  0 60 - 4 47 Thousands/µL Final    Monocytes Absolute 0 91  0 17 - 1 22 Thousand/µL Final    Eosinophils Absolute 0 17  0 00 - 0 61 Thousand/µL Final    Basophils Absolute 0 05  0 00 - 0 10 Thousands/µL Final   COMPREHENSIVE METABOLIC PANEL - Abnormal    Sodium 140  135 - 147 mmol/L Final    Potassium 4 4  3 5 - 5 3 mmol/L Final Chloride 105  96 - 108 mmol/L Final    CO2 28  21 - 32 mmol/L Final    ANION GAP 7  4 - 13 mmol/L Final    BUN 27 (*) 5 - 25 mg/dL Final    Creatinine 1 19  0 60 - 1 30 mg/dL Final    Comment: Standardized to IDMS reference method    Glucose 104  65 - 140 mg/dL Final    Comment: If the patient is fasting, the ADA then defines impaired fasting glucose as > 100 mg/dL and diabetes as > or equal to 123 mg/dL  Specimen collection should occur prior to Sulfasalazine administration due to the potential for falsely depressed results  Specimen collection should occur prior to Sulfapyridine administration due to the potential for falsely elevated results  Calcium 9 0  8 4 - 10 2 mg/dL Final    AST 21  13 - 39 U/L Final    Comment: Specimen collection should occur prior to Sulfasalazine administration due to the potential for falsely depressed results  ALT 20  7 - 52 U/L Final    Comment: Specimen collection should occur prior to Sulfasalazine administration due to the potential for falsely depressed results       Alkaline Phosphatase 91  34 - 104 U/L Final    Total Protein 7 7  6 4 - 8 4 g/dL Final    Albumin 3 9  3 5 - 5 0 g/dL Final    Total Bilirubin 0 70  0 20 - 1 00 mg/dL Final    eGFR 56  ml/min/1 73sq m Final    Narrative:     [place] guidelines for Chronic Kidney Disease (CKD):   •  Stage 1 with normal or high GFR (GFR > 90 mL/min/1 73 square meters)  •  Stage 2 Mild CKD (GFR = 60-89 mL/min/1 73 square meters)  •  Stage 3A Moderate CKD (GFR = 45-59 mL/min/1 73 square meters)  •  Stage 3B Moderate CKD (GFR = 30-44 mL/min/1 73 square meters)  •  Stage 4 Severe CKD (GFR = 15-29 mL/min/1 73 square meters)  •  Stage 5 End Stage CKD (GFR <15 mL/min/1 73 square meters)  Note: GFR calculation is accurate only with a steady state creatinine   UA W REFLEX TO MICROSCOPIC WITH REFLEX TO CULTURE - Abnormal    Color, UA Light Yellow   Final    Clarity, UA Clear   Final    Specific [place], UA 1 013 1 003 - 1 030 Final    pH, UA 5 5  4 5, 5 0, 5 5, 6 0, 6 5, 7 0, 7 5, 8 0 Final    Leukocytes, UA Trace (*) Negative Final    Nitrite, UA Negative  Negative Final    Protein, UA Negative  Negative mg/dl Final    Glucose, UA Negative  Negative mg/dl Final    Ketones, UA Negative  Negative mg/dl Final    Urobilinogen, UA <2 0  <2 0 mg/dl mg/dl Final    Bilirubin, UA Negative  Negative Final    Occult Blood, UA Negative  Negative Final   URINE MICROSCOPIC - Abnormal    RBC, UA 1-2  None Seen, 1-2 /hpf Final    WBC, UA 2-4 (*) None Seen, 1-2 /hpf Final    Epithelial Cells Occasional  None Seen, Occasional /hpf Final    Bacteria, UA None Seen  None Seen, Occasional /hpf Final   LIPASE - Normal    Lipase 26  11 - 82 u/L Final   HS TROPONIN I 0HR - Normal    hs TnI 0hr <2  "Refer to ACS Flowchart"- see link ng/L Final    Comment:                                              Initial (time 0) result  If >=50 ng/L, Myocardial injury suggested ;  Type of myocardial injury and treatment strategy  to be determined  If 5-49 ng/L, a delta result at 2 hours and or 4 hours will be needed to further evaluate  If <4 ng/L, and chest pain has been >3 hours since onset, patient may qualify for discharge based on the HEART score in the ED  If <5 ng/L and <3hours since onset of chest pain, a delta result at 2 hours will be needed to further evaluate  HS Troponin 99th Percentile URL of a Health Population=12 ng/L with a 95% Confidence Interval of 8-18 ng/L  Second Troponin (time 2 hours)  If calculated delta >= 20 ng/L,  Myocardial injury suggested ; Type of myocardial injury and treatment strategy to be determined  If 5-49 ng/L and the calculated delta is 5-19 ng/L, consult medical service for evaluation  Continue evaluation for ischemia on ecg and other possible etiology and repeat hs troponin at 4 hours    If delta is <5 ng/L at 2 hours, consider discharge based on risk stratification via the HEART score (if in ED), or LILLIAM risk score in IP/Observation  HS Troponin 99th Percentile URL of a Health Population=12 ng/L with a 95% Confidence Interval of 8-18 ng/L  PROTIME-INR - Normal    Protime 13 7  11 6 - 14 5 seconds Final    INR 1 02  0 84 - 1 19 Final   APTT - Normal    PTT 32  23 - 37 seconds Final    Comment: Therapeutic Heparin Range =  60-90 seconds   POCT GLUCOSE - Normal    POC Glucose 100  65 - 140 mg/dl Final             ED Course         Critical Care Time  Procedures        MDM  Number of Diagnoses or Management Options  Ambulatory dysfunction: new, needed workup  Left lower quadrant abdominal pain: new, needed workup  Diagnosis management comments:         Patient unable to ambulate, unclear exact etiology possibly acute on chronic lower back pain  , patient admitted to Internal Medicine Service for further workup evaluation  Amount and/or Complexity of Data Reviewed  Clinical lab tests: ordered and reviewed  Tests in the radiology section of CPT®: ordered and reviewed  Review and summarize past medical records: yes  Independent visualization of images, tracings, or specimens: yes          Time reflects when diagnosis was documented in both MDM as applicable and the Disposition within this note     Time User Action Codes Description Comment    10/8/2022  1:20 PM Wayne Kolb Add [R26 2] Ambulatory dysfunction     10/8/2022  1:21 PM Wayne Kolb Add [R10 32] Left lower quadrant abdominal pain     10/8/2022  5:36 PM Kenzie Ahumada Add [K57 92] Diverticulitis       ED Disposition     ED Disposition   Admit    Condition   Stable    Date/Time   Sat Oct 8, 2022  1:20 PM    Comment   Case was discussed with Dr Elsa Lara and the patient's admission status was agreed to be Admission Status: observation status to the service of Dr Elsa Lara              Follow-up Information    None

## 2022-10-08 NOTE — ASSESSMENT & PLAN NOTE
· Takes famotidine 20 mg daily at home, also with history of duodenal ulcers in the past    Plan:  · Protonix transitioned to 40 mg IV b i d   · Continue home famotidine 20 mg daily  · GI consult placed for likely colonoscopy an EGD

## 2022-10-09 LAB
ALBUMIN SERPL BCP-MCNC: 3.2 G/DL (ref 3.5–5)
ALP SERPL-CCNC: 75 U/L (ref 34–104)
ALT SERPL W P-5'-P-CCNC: 14 U/L (ref 7–52)
ANION GAP SERPL CALCULATED.3IONS-SCNC: 5 MMOL/L (ref 4–13)
AST SERPL W P-5'-P-CCNC: 16 U/L (ref 13–39)
BASOPHILS # BLD AUTO: 0.02 THOUSANDS/ΜL (ref 0–0.1)
BASOPHILS NFR BLD AUTO: 0 % (ref 0–1)
BILIRUB SERPL-MCNC: 0.63 MG/DL (ref 0.2–1)
BUN SERPL-MCNC: 21 MG/DL (ref 5–25)
CALCIUM ALBUM COR SERPL-MCNC: 9.1 MG/DL (ref 8.3–10.1)
CALCIUM SERPL-MCNC: 8.5 MG/DL (ref 8.4–10.2)
CHLORIDE SERPL-SCNC: 104 MMOL/L (ref 96–108)
CO2 SERPL-SCNC: 28 MMOL/L (ref 21–32)
CREAT SERPL-MCNC: 1.08 MG/DL (ref 0.6–1.3)
EOSINOPHIL # BLD AUTO: 0.2 THOUSAND/ΜL (ref 0–0.61)
EOSINOPHIL NFR BLD AUTO: 2 % (ref 0–6)
ERYTHROCYTE [DISTWIDTH] IN BLOOD BY AUTOMATED COUNT: 13.5 % (ref 11.6–15.1)
GFR SERPL CREATININE-BSD FRML MDRD: 63 ML/MIN/1.73SQ M
GLUCOSE SERPL-MCNC: 95 MG/DL (ref 65–140)
HCT VFR BLD AUTO: 29.3 % (ref 36.5–49.3)
HGB BLD-MCNC: 9.3 G/DL (ref 12–17)
IMM GRANULOCYTES # BLD AUTO: 0.04 THOUSAND/UL (ref 0–0.2)
IMM GRANULOCYTES NFR BLD AUTO: 0 % (ref 0–2)
LYMPHOCYTES # BLD AUTO: 1.96 THOUSANDS/ΜL (ref 0.6–4.47)
LYMPHOCYTES NFR BLD AUTO: 22 % (ref 14–44)
MCH RBC QN AUTO: 32.6 PG (ref 26.8–34.3)
MCHC RBC AUTO-ENTMCNC: 31.7 G/DL (ref 31.4–37.4)
MCV RBC AUTO: 103 FL (ref 82–98)
MONOCYTES # BLD AUTO: 0.86 THOUSAND/ΜL (ref 0.17–1.22)
MONOCYTES NFR BLD AUTO: 10 % (ref 4–12)
NEUTROPHILS # BLD AUTO: 5.83 THOUSANDS/ΜL (ref 1.85–7.62)
NEUTS SEG NFR BLD AUTO: 66 % (ref 43–75)
NRBC BLD AUTO-RTO: 0 /100 WBCS
PLATELET # BLD AUTO: 153 THOUSANDS/UL (ref 149–390)
PMV BLD AUTO: 11.2 FL (ref 8.9–12.7)
POTASSIUM SERPL-SCNC: 4.5 MMOL/L (ref 3.5–5.3)
PROT SERPL-MCNC: 6.4 G/DL (ref 6.4–8.4)
RBC # BLD AUTO: 2.85 MILLION/UL (ref 3.88–5.62)
SODIUM SERPL-SCNC: 137 MMOL/L (ref 135–147)
WBC # BLD AUTO: 8.91 THOUSAND/UL (ref 4.31–10.16)

## 2022-10-09 PROCEDURE — C9113 INJ PANTOPRAZOLE SODIUM, VIA: HCPCS | Performed by: INTERNAL MEDICINE

## 2022-10-09 PROCEDURE — 99223 1ST HOSP IP/OBS HIGH 75: CPT | Performed by: INTERNAL MEDICINE

## 2022-10-09 PROCEDURE — 85025 COMPLETE CBC W/AUTO DIFF WBC: CPT

## 2022-10-09 PROCEDURE — 80053 COMPREHEN METABOLIC PANEL: CPT

## 2022-10-09 RX ORDER — PANTOPRAZOLE SODIUM 40 MG/10ML
40 INJECTION, POWDER, LYOPHILIZED, FOR SOLUTION INTRAVENOUS EVERY 12 HOURS SCHEDULED
Status: DISCONTINUED | OUTPATIENT
Start: 2022-10-09 | End: 2022-10-12

## 2022-10-09 RX ADMIN — METOPROLOL TARTRATE 75 MG: 50 TABLET, FILM COATED ORAL at 22:00

## 2022-10-09 RX ADMIN — PANTOPRAZOLE SODIUM 40 MG: 40 INJECTION, POWDER, FOR SOLUTION INTRAVENOUS at 22:00

## 2022-10-09 RX ADMIN — FAMOTIDINE 20 MG: 20 TABLET ORAL at 08:09

## 2022-10-09 RX ADMIN — ENOXAPARIN SODIUM 40 MG: 40 INJECTION SUBCUTANEOUS at 08:15

## 2022-10-09 RX ADMIN — ASPIRIN 81 MG: 81 TABLET, COATED ORAL at 08:09

## 2022-10-09 RX ADMIN — METRONIDAZOLE 500 MG: 500 TABLET ORAL at 05:44

## 2022-10-09 RX ADMIN — CEFTRIAXONE 1000 MG: 1 INJECTION, SOLUTION INTRAVENOUS at 17:51

## 2022-10-09 RX ADMIN — METRONIDAZOLE 500 MG: 500 TABLET ORAL at 14:23

## 2022-10-09 RX ADMIN — POLYETHYLENE GLYCOL 3350, SODIUM SULFATE ANHYDROUS, SODIUM BICARBONATE, SODIUM CHLORIDE, POTASSIUM CHLORIDE 4000 ML: 236; 22.74; 6.74; 5.86; 2.97 POWDER, FOR SOLUTION ORAL at 14:32

## 2022-10-09 RX ADMIN — Medication 2000 UNITS: at 08:10

## 2022-10-09 RX ADMIN — METRONIDAZOLE 500 MG: 500 TABLET ORAL at 22:00

## 2022-10-09 RX ADMIN — CYANOCOBALAMIN TAB 500 MCG 1000 MCG: 500 TAB at 08:09

## 2022-10-09 RX ADMIN — CLOPIDOGREL BISULFATE 75 MG: 75 TABLET ORAL at 08:10

## 2022-10-09 RX ADMIN — PANTOPRAZOLE SODIUM 40 MG: 40 TABLET, DELAYED RELEASE ORAL at 05:44

## 2022-10-09 RX ADMIN — GABAPENTIN 100 MG: 100 CAPSULE ORAL at 08:09

## 2022-10-09 RX ADMIN — OXYCODONE HYDROCHLORIDE 2.5 MG: 5 TABLET ORAL at 22:08

## 2022-10-09 RX ADMIN — ATORVASTATIN CALCIUM 80 MG: 40 TABLET, FILM COATED ORAL at 08:09

## 2022-10-09 RX ADMIN — B-COMPLEX W/ C & FOLIC ACID TAB 1 TABLET: TAB at 08:10

## 2022-10-09 RX ADMIN — OXYCODONE HYDROCHLORIDE AND ACETAMINOPHEN 500 MG: 500 TABLET ORAL at 08:10

## 2022-10-09 RX ADMIN — BISACODYL 10 MG: 5 TABLET, COATED ORAL at 16:54

## 2022-10-09 NOTE — CONSULTS
Consultation - [facility] Gastroenterology   Simi Talbert 80 y o  male MRN: [id number]  Unit/Bed#: W -75 Encounter: [id number]        Inpatient consult to gastroenterology  Consult performed by: KEVIN Yang  Consult ordered by: Aylin Hicks DO          Reason for Consult / Principal Problem:     LLQ pain      ASSESSMENT AND PLAN:      This is an 24-year-old male with history of CAD status post CABG, PAD/carotid stenosis on Plavix, lumbar back fractures, with L2-S1 fusion, lumbar radiculopathy with associated neuropathy, osteoarthritis, CKD3, HTN, HLD, and recent treatment of suspected diverticulitis in September who presented due to recurrent left lower back pain with radiation to LLQ similar to prior diverticulitis  Repeat CT abdomen pelvis negative for diverticulitis, did note asymmetric bladder wall thickening & chronic musculoskeletal changes  1  Left lower back pain with radiation to LLQ in the setting of recent treatment of diverticulitis in early September : Patient reports pain is similar to pain he had in early September when he had possible acute diverticulitis in the distal descending colon seen on CT 9/8, at that time pain in the hip area improved after 7 day course of antibiotics and he was able to ambulate again but never fully resolved however he does have chronic back issues as well and has had to miss back injections recently  He notes that he followed a low-fiber diet for about a week after discharge and was having issues with constipation  Last night he noted he had a high-fiber dinner, large amount of diarrhea, and recurrent severe left lower back pain radiating to his left lower quadrant  Unclear etiology for worsening pain, repeat CT with IV contrast showed diverticulosis but no evidence of diverticulitis  Denies any abdominal pain on palpation but does feel slightly bloated, has severe pain with rolling or movement limiting his mobility    Last colonoscopy was in 2017 and no polyps noted at that time     [facility] for clear liquid diet  -Continue supportive care  -Bowel prep today, NPO past midnight  -C diff/enteric stool panel ordered by primary team   Patient has not yet been able to have any bowel movement since admission  Currently on antibiotics for treatment of UTI  -Will plan for diagnostic colonoscopy with EGD tomorrow for further evaluation of left lower quadrant pain and anemia  Patient's last Plavix dose was this morning, now on hold  2  History of GERD,duodenal ulcers, NSAID use and alcohol use  Patient has a history of esophagitis, gastritis, duodenitis with duodenal ulcers on last EGD in 2017  He does take NSAIDs for back pain and does report drinking wyatt on a nightly basis  Is on Pepcid daily at home, denies any reflux, nausea/vomiting, upper abdominal pain  Reported to ED dark stool prior to admission after episode of constipation, prior to that was intermittently having it when he drank red wine    -avoid NSAIDs, alcohol  -will discontinue Pepcid as PPI was ordered b i d   -EGD scheduled with colonoscopy tomorrow for further evaluation    3  Macrocytic anemia  Patient with chronic history of anemia with baseline around 11 in 2021  In September when admitted for suspected diverticulitis, hemoglobin was 9-10  On admission yesterday hemoglobin elevated from prior baseline 11 1, back down to 9 3 this morning  Blood pressure was low this morning with systolic in the 18R and patient was asymptomatic, denied any bowel movements since admission  BP improved later in the afternoon  No evidence of ongoing GI bleeding currently  Anemia may be multifactorial due to CKD, GI source in the setting of Plavix use  -recheck H&H this afternoon  -monitor CBC, transfuse as needed  -continue IV PPI b i d   -hold Plavix as able, we will perform diagnostic EGD/colonoscopy tomorrow  Patient with history of carotid stenosis/PAD and will require anticoagulation        Thank you for the consultation  Patient seen and examined with Dr Jonathan Hall  Case discussed with primary team      ______________________________________________________________________    HPI: Gosia Watkins is a 80 y o  male with a PMH of CAD s/p CABG, lumbar back fractures with L2-S1 fusion, CKD stage 3, hypertension, and hyperlipidemia who presents with a chief complaint of abdominal pain  Patient states that the pain started initially as left lower back pain with radiation to the left lower quadrant of his abdomen in a band-like distribution  The pain is described as sharp in quality with 11/10 pain at max and 0/10 pain at rest  The pain is exacerbated with standing and any movement  This incident of abdominal pain was very similar to what he felt three weeks ago when he was admitted and treated for a diverticulitis flare from 9/8-9/11  CT at that time showed possible acute diverticulitis changes in the distal descending colon and stool-filled rectal region  He was discharged to complete a 7 day course of Omnicef/Flagyl in follow-up for repeat colonoscopy in 6-8 weeks  Patient reports that he had improvement with his pain, but not complete resolution any does typically have some lower back pain due to chronic back issues  He followed a low-fiber diet for about a week before resuming his normal diet  Since hospitalization he has had issues with constipation, but was able to move his bowels yesterday and denied any black or bloody stool  In the ED, he was afebrile, normotensive  WBC elevated 11 61  Hemoglobin trending up from prior (9 5 in September) at 11 1  BUN 27  Lipase and troponin negative  UA showed trace leukocytes and culture is pending  Repeat CT abdomen pelvis with contrast was performed which showed no acute intra-abdominal abnormality    There was suggestion of minimal asymmetric left lateral bladder wall thickening measuring only a few mm correlate with UA and if relevant, cystoscopy if not recently performed  There was diffuse colonic diverticulosis without evidence of diverticulitis  Stool studies were ordered  CT lumbar spine showed no acute fracture or significant change in spinal alignment from prior study; status post L2-S1 pedicle screw fixation with stable grade 2-3 L5-S1 anterolisthesis  Severe bilateral foraminal narrowing L5-S1  Adequate decompression of the central canal at the postsurgical levels  Multilevel degenerative changes     Patient's last EGD was in November 2017 which showed esophagitis, gastritis, duodenitis with duodenal ulcers  Biopsies were negative for H pylori  Last colonoscopy was performed in 2017 and no polyps noted at that time  Drinks 2-3 wyatt drinks daily, former tobacco   Denies any drug use  No family history of colon cancer  + NSAID use for back pain        REVIEW OF SYSTEMS:    CONSTITUTIONAL: Denies any fever, chills, rigors, and weight loss  HEENT: No earache or tinnitus  Denies hearing loss or visual disturbances  CARDIOVASCULAR: No chest pain or palpitations  RESPIRATORY: Denies any cough, hemoptysis, shortness of breath or dyspnea on exertion  GASTROINTESTINAL: As noted in the History of Present Illness  GENITOURINARY: No problems with urination  Denies any hematuria or dysuria  NEUROLOGIC: No dizziness or vertigo, denies headaches  MUSCULOSKELETAL: Denies any muscle or joint pain  SKIN: Denies skin rashes or itching  ENDOCRINE: Denies excessive thirst  Denies intolerance to heat or cold  PSYCHOSOCIAL: Denies depression or anxiety  Denies any recent memory loss         Historical Information   Past Medical History:   Diagnosis Date   • Abnormal liver function test     RESOLVED: [id number]   • Allergic rhinitis    • Anemia     LAST ASSESSED: [id number]   • Arthritis    • BPH (benign prostatic hyperplasia)    • CAD (coronary artery disease)    • Coronary artery disease    • 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[facility] [address] )     LAST ASSESSED: [id number]   • Former tobacco use    • GERD (gastroesophageal reflux disease)    • Hand paresthesia     RESOLVED: [id number]   • Hyperlipidemia    • Hypertension    • Lumbar stenosis    • Peripheral artery disease (HCC)     LAST ASSESSED: 2017   • Stage 3a chronic kidney disease ([facility] [address] ) 2021     Past Surgical History:   Procedure Laterality Date   • BACK SURGERY     • COLONOSCOPY     • LUMBAR FUSION     • MT ARTHRODESIS POSTERIOR/POSTEROLATERAL LUMBAR N/A 2016    Procedure: L2-S1 POSTERIOR LUMBAR FUSION AND DECOMPRESSION (Impulse), Posterior lateral fixation; dural repair ;  Surgeon: Henrietta Lyons MD;  Location: [facility];  Service: Orthopedics   • MT CABG, ARTERIAL, SINGLE N/A 2018    Procedure: CABG X4 with LIMA - LAD, SVG - RCA, OM2, & Diagonal ; Left Leg EVH; MATTHEW;  Surgeon: Kylah Bolivar DO;  Location: [facility];  Service: Cardiac Surgery   • MT COLONOSCOPY FLX DX W/COLLJ SPEC WHEN PFRMD N/A 11/15/2017    Procedure: EGD AND COLONOSCOPY;  Surgeon: Kristin Richmond MD;  Location: [facility];   Service: Gastroenterology   • SEPTOPLASTY      LAST ASSESSED; [id number]   • TONSILECTOMY AND ADNOIDECTOMY      LAST ASSESSED: [id number]   • UPPER GASTROINTESTINAL ENDOSCOPY       Social History   Social History     Substance and Sexual Activity   Alcohol Use Not Currently   • Alcohol/week: 1 0 standard drink   • Types: 1 Shots of liquor per week    Comment: beer, wine, scotch every day; SOCIAL AS PER ALL SCRIPTS      Social History     Substance and Sexual Activity   Drug Use Not Currently   • Types: Marijuana    Comment: medical majianais     Social History     Tobacco Use   Smoking Status Former Smoker   • Packs/day: 1 00   • Years: 50 00   • Pack years: 50 00   • Types: Cigarettes   • Quit date:    • Years since quittin 7   Smokeless Tobacco Never Used     Family History   Problem Relation Age of Onset   • Emphysema Mother    • Liver disease Mother    • Coronary artery disease Father    • Hypertension Father    • Liver disease Father    • Heart failure Father    • Stroke Father         CVA    • Heart attack Father    • Coronary artery disease Brother    • Heart disease Brother         younger brother by pass and other brother 4 stents placed   • Other Family         BACK PROBLEM    • Stroke Family         CVA   • Emphysema Family    • Hypertension Family         BENIGN       Meds/Allergies     Medications Prior to Admission   Medication   • acetaminophen (TYLENOL) 650 mg CR tablet   • Ascorbic Acid (Vitamin C) 500 MG PACK   • aspirin (ECOTRIN LOW STRENGTH) 81 mg EC tablet   • atorvastatin (LIPITOR) 80 mg tablet   • cholecalciferol (VITAMIN D3) 1,000 units tablet   • clopidogrel (Plavix) 75 mg tablet   • cyanocobalamin (VITAMIN B-12) 1,000 mcg tablet   • famotidine (PEPCID) 20 mg tablet   • gabapentin (NEURONTIN) 100 mg capsule   • ibuprofen (MOTRIN) 800 mg tablet   • metoprolol tartrate (LOPRESSOR) 50 mg tablet   • Multiple Vitamin (MULTIVITAMIN) capsule   • docusate sodium (COLACE) 100 mg capsule   • ipratropium (ATROVENT) 0 03 % nasal spray     Current Facility-Administered Medications   Medication Dose Route Frequency   • acetaminophen (TYLENOL) tablet 650 mg  650 mg Oral Q6H PRN   • ascorbic acid (VITAMIN C) tablet 500 mg  500 mg Oral Daily   • aspirin (ECOTRIN LOW STRENGTH) EC tablet 81 mg  81 mg Oral Daily   • atorvastatin (LIPITOR) tablet 80 mg  80 mg Oral QAM   • cefTRIAXone (ROCEPHIN) IVPB (premix in dextrose) 1,000 mg 50 mL  1,000 mg Intravenous Q24H   • cholecalciferol (VITAMIN D3) tablet 2,000 Units  2,000 Units Oral Daily   • clopidogrel (PLAVIX) tablet 75 mg  75 mg Oral Daily   • cyanocobalamin (VITAMIN B-12) tablet 1,000 mcg  1,000 mcg Oral Daily   • docusate sodium (COLACE) capsule 100 mg  100 mg Oral BID PRN   • enoxaparin (LOVENOX) subcutaneous injection 40 mg  40 mg Subcutaneous Daily   • famotidine (PEPCID) tablet 20 mg  20 mg Oral BID   • gabapentin (NEURONTIN) capsule 100 mg  100 mg Oral Daily   • HYDROmorphone HCl (DILAUDID) injection 0 2 mg  0 2 mg Intravenous Q4H PRN   • ipratropium (ATROVENT) 0 02 % inhalation solution 0 5 mg  0 5 mg Nebulization Daily PRN   • metoprolol tartrate (LOPRESSOR) tablet 75 mg  75 mg Oral Q12H KAMRAN   • metroNIDAZOLE (FLAGYL) tablet 500 mg  500 mg Oral Q8H [facility]   • multi-electrolyte (PLASMALYTE-A/ISOLYTE-S PH 7 4) IV solution  75 mL/hr Intravenous Continuous   • multivitamin stress formula tablet 1 tablet  1 tablet Oral Daily   • oxyCODONE (ROXICODONE) IR tablet 2 5 mg  2 5 mg Oral Q4H PRN   • pantoprazole (PROTONIX) EC tablet 40 mg  40 mg Oral Early Morning       Allergies   Allergen Reactions   • Penicillins Other (See Comments)     Hallucinations; Patient reported that he was seeing visual disturbances             Objective     Blood pressure (!) 94/45, pulse 77, temperature 97 6 °F (36 4 °C), temperature source Oral, resp  rate 16, height 5' 7" (1 702 m), weight 76 6 kg (168 lb 14 oz), SpO2 95 %  Body mass index is 26 45 kg/m²  Intake/Output Summary (Last 24 hours) at 10/9/2022 0941  Last data filed at 10/9/2022 0418  Gross per 24 hour   Intake --   Output 500 ml   Net -500 ml         PHYSICAL EXAM:      General Appearance:   Alert, cooperative, no distress   HEENT:   Normocephalic, atraumatic, anicteric  Neck:  Supple, symmetrical, trachea midline   Lungs:   Clear to auscultation bilaterally; no rales, rhonchi or wheezing; respirations unlabored    Heart[de-identified]   Regular rate and rhythm; no murmur, rub, or gallop     Abdomen:   Soft, non-tender, non-distended; normal bowel sounds; no masses, no organomegaly    Genitalia:   Deferred    Rectal:   Deferred    Extremities:  No cyanosis, clubbing or edema    Pulses:  2+ and symmetric all extremities    Skin:  No jaundice, rashes, or lesions    Lymph nodes:  No palpable cervical lymphadenopathy        Lab Results:   Admission on 10/08/2022   Component Date Value   • WBC 10/08/2022 11 61 (A)   • RBC 10/08/2022 3 35 (A) • Hemoglobin 10/08/2022 11 1 (A)   • Hematocrit 10/08/2022 35 1 (A)   • MCV 10/08/2022 105 (A)   • MCH 10/08/2022 33 1    • MCHC 10/08/2022 31 6    • RDW 10/08/2022 13 5    • MPV 10/08/2022 11 1    • Platelets 30/48/7577 166    • nRBC 10/08/2022 0    • Neutrophils Relative 10/08/2022 71    • Immat GRANS % 10/08/2022 0    • Lymphocytes Relative 10/08/2022 19    • Monocytes Relative 10/08/2022 8    • Eosinophils Relative 10/08/2022 2    • Basophils Relative 10/08/2022 0    • Neutrophils Absolute 10/08/2022 8 21 (A)   • Immature Grans Absolute 10/08/2022 0 04    • Lymphocytes Absolute 10/08/2022 2 23    • Monocytes Absolute 10/08/2022 0 91    • Eosinophils Absolute 10/08/2022 0 17    • Basophils Absolute 10/08/2022 0 05    • Sodium 10/08/2022 140    • Potassium 10/08/2022 4 4    • Chloride 10/08/2022 105    • CO2 10/08/2022 28    • ANION GAP 10/08/2022 7    • BUN 10/08/2022 27 (A)   • Creatinine 10/08/2022 1 19    • Glucose 10/08/2022 104    • Calcium 10/08/2022 9 0    • AST 10/08/2022 21    • ALT 10/08/2022 20    • Alkaline Phosphatase 10/08/2022 91    • Total Protein 10/08/2022 7 7    • Albumin 10/08/2022 3 9    • Total Bilirubin 10/08/2022 0 70    • eGFR 10/08/2022 56    • Color, UA 10/08/2022 Light Yellow    • Clarity, UA 10/08/2022 Clear    • Specific [place], UA 10/08/2022 1 013    • pH, UA 10/08/2022 5 5    • Leukocytes, UA 10/08/2022 Trace (A)   • Nitrite, UA 10/08/2022 Negative    • Protein, UA 10/08/2022 Negative    • Glucose, UA 10/08/2022 Negative    • Ketones, UA 10/08/2022 Negative    • Urobilinogen, UA 10/08/2022 <2 0    • Bilirubin, UA 10/08/2022 Negative    • Occult Blood, UA 10/08/2022 Negative    • Lipase 10/08/2022 26    • hs TnI 0hr 10/08/2022 <2    • Protime 10/08/2022 13 7    • INR 10/08/2022 1 02    • PTT 10/08/2022 32    • Ventricular Rate 10/08/2022 79    • Atrial Rate 10/08/2022 79    • [place] Interval 10/08/2022 154    • QRSD Interval 10/08/2022 86    • QT Interval 10/08/2022 396    • QTC Interval 10/08/2022 454    • P Axis 10/08/2022 45    • QRS Axis 10/08/2022 10    • T Wave Axis 10/08/2022 62    • POC Glucose 10/08/2022 100    • RBC, UA 10/08/2022 1-2    • WBC, UA 10/08/2022 2-4 (A)   • Epithelial Cells 10/08/2022 Occasional    • Bacteria, UA 10/08/2022 None Seen    • WBC 10/09/2022 8 91    • RBC 10/09/2022 2 85 (A)   • Hemoglobin 10/09/2022 9 3 (A)   • Hematocrit 10/09/2022 29 3 (A)   • MCV 10/09/2022 103 (A)   • MCH 10/09/2022 32 6    • MCHC 10/09/2022 31 7    • RDW 10/09/2022 13 5    • MPV 10/09/2022 11 2    • Platelets 94/75/5606 153    • nRBC 10/09/2022 0    • Neutrophils Relative 10/09/2022 66    • Immat GRANS % 10/09/2022 0    • Lymphocytes Relative 10/09/2022 22    • Monocytes Relative 10/09/2022 10    • Eosinophils Relative 10/09/2022 2    • Basophils Relative 10/09/2022 0    • Neutrophils Absolute 10/09/2022 5 83    • Immature Grans Absolute 10/09/2022 0 04    • Lymphocytes Absolute 10/09/2022 1 96    • Monocytes Absolute 10/09/2022 0 86    • Eosinophils Absolute 10/09/2022 0 20    • Basophils Absolute 10/09/2022 0 02    • Sodium 10/09/2022 137    • Potassium 10/09/2022 4 5    • Chloride 10/09/2022 104    • CO2 10/09/2022 28    • ANION GAP 10/09/2022 5    • BUN 10/09/2022 21    • Creatinine 10/09/2022 1 08    • Glucose 10/09/2022 95    • Calcium 10/09/2022 8 5    • Corrected Calcium 10/09/2022 9 1    • AST 10/09/2022 16    • ALT 10/09/2022 14    • Alkaline Phosphatase 10/09/2022 75    • Total Protein 10/09/2022 6 4    • Albumin 10/09/2022 3 2 (A)   • Total Bilirubin 10/09/2022 0 63    • eGFR 10/09/2022 63        Imaging Studies: I have personally reviewed pertinent imaging studies

## 2022-10-09 NOTE — PROGRESS NOTES
[facility]  Progress Note - Edinson Bautista [birth year], 80 y o  male MRN: [id number]  Unit/Bed#: W -06 Encounter: [id number]  Primary Care Provider: Екатерина Barnhart DO   Date and time admitted to hospital: 10/8/2022 10:04 AM    * Left lower quadrant abdominal pain  Assessment & Plan  · Presented initially with left lower back pain with radiation to the left lower quadrant in a band-like distribution  · States the pain is sharp in quality and 11/10 at max and 0/10 at rest, exacerbated by standing and movement  · Reports a single episode of diarrhea today after eating high fiber food, states the stool was non-watery and non-mucoid  · He was admitted and treated three weeks ago for a diverticulitis flare and treated with IV ceftriaxone and Flagyl  · The pain he is experiencing now is described as very similar to his diverticulitis flare three weeks ago  However during that occurrence, he had more consistent and frequent watery stools  · He describes dark appearing stools and endorsed taking Ibuprofen prior to last admission for pain relief  · FOBT done in the ED was said to be slightly positive today  · POA leukocytosis of 11 61  · Lipase wnl  · UA unremarkable  · CT abdomen reported diverticulosis with no evidence of diverticulitis, no evidence of nephrolithiasis  · DDx includes but is not limited to diverticulitis flare vs exacerbation of chronic low back pain with radiation vs C   Diff colitis vs food poisoning vs viral gastroenteritis vs constipation versus GI bleed    Plan:  · Check stool enteric panel  · Check C diff toxin  · Monitor I/Os  · Continue clear liquid diet with NPO after midnight for likely EGD and colonoscopy on 10/10  · Inpatient consult to gastroenterology  · Will continue IV ceftriaxone 1g q 24 hrs and Flagyl 500 mg q 8 hrs  · Pain management with oxycodone 2 5 mg for moderate and severe pain, IV Dilaudid 0 2 mg for breakthrough pain    Anemia  Assessment & Plan  · POA hemoglobin of 11 1 within patient's baseline  · Likely chronic anemia in the setting of CKD stage 3 verses possible GI bleed  · Patient did endorse using Ibuprofen prior to last admission for pain relief of diverticulitis flare  · Colonoscopy in 2017 showed internal/external hemorrhoids  · FOBT was reported to be slightly positive this admission  · Suspicion for PUD vs gastritis in the setting of reported dark stools vs chronic hemorrhoids  · Hemoglobin decreased from 11 1-9 3    Plan:  · Continue to monitor CBC and differential in the morning   · Inpatient consult to gastroenterology for possible EGD and colonoscopy  · Will avoid NSAIDs  · Protonix transition to IV b i d   · Transfuse for less than 7    GERD (gastroesophageal reflux disease)  Assessment & Plan  · Takes famotidine 20 mg daily at home, also with history of duodenal ulcers in the past    Plan:  · Protonix transitioned to 40 mg IV b i d   · Continue home famotidine 20 mg daily  · GI consult placed for likely colonoscopy an EGD    Hypertension  Assessment & Plan  · POA blood pressure of 145/64    Plan:  · Continue metoprolol tartrate 75 mg q 12 hrs  · Continue to monitor routine vital signs    Hyperlipidemia  Assessment & Plan  Plan:  · Continue atorvastatin 80 mg daily    CAD (coronary artery disease)  Assessment & Plan  · S/p CABG x4 in 2018    Plan:  · Aspirin Plavix initially continued  · Due to drop in hemoglobin overnight will hold for now        VTE Pharmacologic Prophylaxis: VTE Score: 5 High Risk (Score >/= 5) - Pharmacological DVT Prophylaxis Contraindicated  Sequential Compression Devices Ordered  Patient Centered Rounds: I performed bedside rounds with nursing staff today  Discussions with Specialists or Other Care Team Provider: GI    Education and Discussions with Family / Patient: Updated  (daughter) via phone  Time Spent for Care: 45 minutes   More than 50% of total time spent on counseling and coordination of care as described above  Current Length of Stay: 1 day(s)  Current Patient Status: Inpatient   Certification Statement: The patient will continue to require additional inpatient hospital stay due to LLQ Pain, posisble GI bleed  Discharge Plan: Anticipate discharge in 48 hrs to discharge location to be determined pending rehab evaluations  Code Status: Level 3 - DNAR and DNI    Subjective:   Patient seen in this morning lying comfortably in bed  He reports continued left lower quadrant pain only with movement  He denies any further bowel movements  Denies any fevers, chills, nausea, vomiting, diarrhea  Patient does report he feels he always has dark stools so he is unsure if this is different from normal   He reports no weakness, dizziness, confusion  Objective:     Vitals:   Temp (24hrs), Av 3 °F (36 8 °C), Min:97 6 °F (36 4 °C), Max:98 9 °F (37 2 °C)    Temp:  [97 6 °F (36 4 °C)-98 9 °F (37 2 °C)] 97 6 °F (36 4 °C)  HR:  [75-99] 77  Resp:  [16-18] 16  BP: ()/(45-70) 94/45  SpO2:  [93 %-99 %] 95 %  Body mass index is 26 45 kg/m²  Input and Output Summary (last 24 hours): Intake/Output Summary (Last 24 hours) at 10/9/2022 1252  Last data filed at 10/9/2022 0418  Gross per 24 hour   Intake --   Output 300 ml   Net -300 ml       Physical Exam:   Physical Exam  Vitals and nursing note reviewed  Constitutional:       General: He is not in acute distress  Appearance: Normal appearance  HENT:      Head: Normocephalic and atraumatic  Right Ear: External ear normal       Left Ear: External ear normal       Nose: Nose normal       Mouth/Throat:      Mouth: Mucous membranes are moist       Pharynx: No oropharyngeal exudate  Eyes:      Extraocular Movements: Extraocular movements intact  Conjunctiva/sclera: Conjunctivae normal       Pupils: Pupils are equal, round, and reactive to light  Cardiovascular:      Rate and Rhythm: Normal rate and regular rhythm  Pulses: Normal pulses  Heart sounds: Normal heart sounds  No murmur heard  Pulmonary:      Effort: Pulmonary effort is normal       Breath sounds: No wheezing, rhonchi or rales  Abdominal:      General: Bowel sounds are normal       Palpations: Abdomen is soft  Tenderness: There is no abdominal tenderness  There is no rebound  Comments: LLQ TTP   Musculoskeletal:         General: Normal range of motion  Cervical back: Normal range of motion  Right lower leg: No edema  Left lower leg: No edema  Skin:     General: Skin is warm  Capillary Refill: Capillary refill takes less than 2 seconds  Findings: No bruising, erythema or rash  Neurological:      General: No focal deficit present  Mental Status: He is alert and oriented to person, place, and time  Motor: No weakness     Psychiatric:         Mood and Affect: Mood normal          Behavior: Behavior normal           Additional Data:     Labs:  Results from last 7 days   Lab Units 10/09/22  0501   WBC Thousand/uL 8 91   HEMOGLOBIN g/dL 9 3*   HEMATOCRIT % 29 3*   PLATELETS Thousands/uL 153   NEUTROS PCT % 66   LYMPHS PCT % 22   MONOS PCT % 10   EOS PCT % 2     Results from last 7 days   Lab Units 10/09/22  0501   SODIUM mmol/L 137   POTASSIUM mmol/L 4 5   CHLORIDE mmol/L 104   CO2 mmol/L 28   BUN mg/dL 21   CREATININE mg/dL 1 08   ANION GAP mmol/L 5   CALCIUM mg/dL 8 5   ALBUMIN g/dL 3 2*   TOTAL BILIRUBIN mg/dL 0 63   ALK PHOS U/L 75   ALT U/L 14   AST U/L 16   GLUCOSE RANDOM mg/dL 95     Results from last 7 days   Lab Units 10/08/22  1046   INR  1 02     Results from last 7 days   Lab Units 10/08/22  1050   POC GLUCOSE mg/dl 100               Lines/Drains:  Invasive Devices  Report    Peripheral Intravenous Line  Duration           Peripheral IV 10/08/22 Left Antecubital 1 day    Peripheral IV 10/08/22 Left Hand 1 day                      Imaging: Reviewed radiology reports from this admission including: abdominal/pelvic CT    Recent Cultures (last 7 days):         Last 24 Hours Medication List:   Current Facility-Administered Medications   Medication Dose Route Frequency Provider Last Rate   • acetaminophen  650 mg Oral Q6H PRN Hanxiong Burnard Gain, DO     • atorvastatin  80 mg Oral QAM Hanxiong Burnard Gain, DO     • bisacodyl  10 mg Oral Once KEVIN Jacob     • cefTRIAXone  1,000 mg Intravenous Q24H Hanxiong Burnard Gain, DO     • cholecalciferol  2,000 Units Oral Daily Hanxiong Burnard Gain, DO     • vitamin B-12  1,000 mcg Oral Daily Hanxiong Burnard Gain, DO     • docusate sodium  100 mg Oral BID PRN Hanxiong Burnard Gain, DO     • famotidine  20 mg Oral BID Hanxiong Burnard Gain, DO     • gabapentin  100 mg Oral Daily Hanxiong Burnard Gain, DO     • HYDROmorphone  0 2 mg Intravenous Q4H PRN Hanxiong Burnard Gain, DO     • ipratropium  0 5 mg Nebulization Daily PRN Hanxiong Burnard Gain, DO     • metoprolol tartrate  75 mg Oral Q12H [address] Hanxiong Burnard Gain, DO     • metroNIDAZOLE  500 mg Oral [facility] Hanxiong Burnard Gain, DO     • multi-electrolyte  100 mL/hr Intravenous Continuous Lynne Man  mL/hr (10/09/22 1002)   • multivitamin stress formula  1 tablet Oral Daily Hanxiong Burnard Gain, DO     • oxyCODONE  2 5 mg Oral Q4H PRN Hanxiong Burnard Gain, DO     • pantoprazole  40 mg Intravenous Q12H [address] Lisa Montero MD     • polyethylene glycol  4,000 mL Oral Once KEVIN Chilel          Today, Patient Was Seen By: Lynne Man    **Please Note: This note may have been constructed using a voice recognition system  **

## 2022-10-09 NOTE — ED NOTES
Throughout the entire visit, pt's daughter showed consistent displeasure with stay  Daughter constantly in hallway on cell phone and in waiting room loudly speaking about her displeasure  Went up to registration desk several times asking for the doctor and "what are we waiting for?" Registration followed up with RN and RN followed up with MD who kept patient and family informed throughout the stay  Family member asked nurse multiple times about pt getting food, advised pt and daughter, since the pt was here for abdominal pain we would need to wait for CT results to come back prior to MD putting in a diet order to allow the pt to eat  Offered pt ice chips, but pt refused  Daughter also upset and wanting to know results and "what we are waiting for?" Informed daughter initially we were waiting for CT results and once results available the MD would discuss with patient  Once pt was admitted, informed pt and daughter of admission status and that admitting provider would be in to see patient and then order a diet order  While admitting provider at bedside, went into room to let him know pt and family member were requesting a diet order  Daughter says to nurse, "Yeah that's what we're discussing now, thanks " Once diet order was in notified the daughter that I could order him a tray but his diet was restricted to liquids at this time  Daughter rudely states, "Well, can you do that?! He hasn't eaten all day " Informed daughter that I would, but was just letting her know the tray would be liquids only  Daughter asks for my name at this time and says she was going to report me  Daughter then states, "When are we going to see a real doctor? We've been seeing residents all day " Informed daughter I would notify the overseeing physician that she would like to speak with him  Dr Nery Orr at Bedside and talking with pt and daughter from 200 - 1800  Food and nutrition service called for pt and was on hold from [address]   Order placed for pt to be delivered to room upstairs at 1835  Charge nurse speaking with daughter in hallway around 1815  Pt admitted to room upstairs at 1840  Inpatient nurse notified        Mena Beltran RN  10/09/22 0535

## 2022-10-09 NOTE — PLAN OF CARE
Problem: Potential for Falls  Goal: Patient will remain free of falls  Description: INTERVENTIONS:  - Educate patient/family on patient safety including physical limitations  - Instruct patient to call for assistance with activity   - Consult OT/PT to assist with strengthening/mobility   - Keep Call bell within reach  - Keep bed low and locked with side rails adjusted as appropriate  - Keep care items and personal belongings within reach  - Initiate and maintain comfort rounds  - Make Fall Risk Sign visible to staff  - Offer Toileting every  Hours, in advance of need  - Initiate/Maintain alarm  - Obtain necessary fall risk management equipment:   - Apply yellow socks and bracelet for high fall risk patients  - Consider moving patient to room near nurses station  10/9/2022 1216 by Reno Chen RN  Outcome: Progressing  10/9/2022 1216 by Reno Chen RN  Outcome: Progressing     Problem: MOBILITY - ADULT  Goal: Maintain or return to baseline ADL function  Description: INTERVENTIONS:  -  Assess patient's ability to carry out ADLs; assess patient's baseline for ADL function and identify physical deficits which impact ability to perform ADLs (bathing, care of mouth/teeth, toileting, grooming, dressing, etc )  - Assess/evaluate cause of self-care deficits   - Assess range of motion  - Assess patient's mobility; develop plan if impaired  - Assess patient's need for assistive devices and provide as appropriate  - Encourage maximum independence but intervene and supervise when necessary  - Involve family in performance of ADLs  - Assess for home care needs following discharge   - Consider OT consult to assist with ADL evaluation and planning for discharge  - Provide patient education as appropriate  10/9/2022 1216 by Reno Chen RN  Outcome: Progressing  10/9/2022 1216 by Reno Chen RN  Outcome: Progressing  Goal: Maintains/Returns to pre admission functional level  Description: INTERVENTIONS:  - Perform BMAT or MOVE assessment daily    - Set and communicate daily mobility goal to care team and patient/family/caregiver  - Collaborate with rehabilitation services on mobility goals if consulted  - Perform Range of Motion  times a day  - Reposition patient every  hours    - Dangle patient  times a day  - Stand patient  times a day  - Ambulate patient  times a day  - Out of bed to chair  times a day   - Out of bed for meals times a day  - Out of bed for toileting  - Record patient progress and toleration of activity level   10/9/2022 1216 by Kavita Justin RN  Outcome: Progressing  10/9/2022 1216 by Kavita Justin RN  Outcome: Progressing     Problem: Prexisting or High Potential for Compromised Skin Integrity  Goal: Skin integrity is maintained or improved  Description: INTERVENTIONS:  - Identify patients at risk for skin breakdown  - Assess and monitor skin integrity  - Assess and monitor nutrition and hydration status  - Monitor labs   - Assess for incontinence   - Turn and reposition patient  - Assist with mobility/ambulation  - Relieve pressure over bony prominences  - Avoid friction and shearing  - Provide appropriate hygiene as needed including keeping skin clean and dry  - Evaluate need for skin moisturizer/barrier cream  - Collaborate with interdisciplinary team   - Patient/family teaching  - Consider wound care consult   10/9/2022 1216 by Kavita Justin RN  Outcome: Progressing  10/9/2022 1216 by Kavita Justin RN  Outcome: Progressing     Problem: GASTROINTESTINAL - ADULT  Goal: Maintains or returns to baseline bowel function  Description: INTERVENTIONS:  - Assess bowel function  - Encourage oral fluids to ensure adequate hydration  - Administer IV fluids if ordered to ensure adequate hydration  - Administer ordered medications as needed  - Encourage mobilization and activity  - Consider nutritional services referral to assist patient with adequate nutrition and appropriate food choices  Outcome: Progressing

## 2022-10-10 ENCOUNTER — APPOINTMENT (OUTPATIENT)
Dept: GASTROENTEROLOGY | Facility: HOSPITAL | Age: 82
DRG: 812 | End: 2022-10-10
Payer: COMMERCIAL

## 2022-10-10 ENCOUNTER — ANESTHESIA (INPATIENT)
Dept: GASTROENTEROLOGY | Facility: HOSPITAL | Age: 82
DRG: 812 | End: 2022-10-10
Payer: COMMERCIAL

## 2022-10-10 ENCOUNTER — ANESTHESIA EVENT (INPATIENT)
Dept: GASTROENTEROLOGY | Facility: HOSPITAL | Age: 82
DRG: 812 | End: 2022-10-10
Payer: COMMERCIAL

## 2022-10-10 LAB
ALBUMIN SERPL BCP-MCNC: 3.2 G/DL (ref 3.5–5)
ALP SERPL-CCNC: 72 U/L (ref 34–104)
ALT SERPL W P-5'-P-CCNC: 11 U/L (ref 7–52)
ANION GAP SERPL CALCULATED.3IONS-SCNC: 9 MMOL/L (ref 4–13)
AST SERPL W P-5'-P-CCNC: 16 U/L (ref 13–39)
BACTERIA UR CULT: NORMAL
BASOPHILS # BLD AUTO: 0.03 THOUSANDS/ΜL (ref 0–0.1)
BASOPHILS NFR BLD AUTO: 0 % (ref 0–1)
BILIRUB SERPL-MCNC: 0.37 MG/DL (ref 0.2–1)
BUN SERPL-MCNC: 12 MG/DL (ref 5–25)
CALCIUM ALBUM COR SERPL-MCNC: 9.2 MG/DL (ref 8.3–10.1)
CALCIUM SERPL-MCNC: 8.6 MG/DL (ref 8.4–10.2)
CHLORIDE SERPL-SCNC: 108 MMOL/L (ref 96–108)
CO2 SERPL-SCNC: 27 MMOL/L (ref 21–32)
CREAT SERPL-MCNC: 0.94 MG/DL (ref 0.6–1.3)
EOSINOPHIL # BLD AUTO: 0.28 THOUSAND/ΜL (ref 0–0.61)
EOSINOPHIL NFR BLD AUTO: 3 % (ref 0–6)
ERYTHROCYTE [DISTWIDTH] IN BLOOD BY AUTOMATED COUNT: 13.2 % (ref 11.6–15.1)
GFR SERPL CREATININE-BSD FRML MDRD: 75 ML/MIN/1.73SQ M
GLUCOSE SERPL-MCNC: 104 MG/DL (ref 65–140)
HCT VFR BLD AUTO: 32.1 % (ref 36.5–49.3)
HGB BLD-MCNC: 10.3 G/DL (ref 12–17)
IMM GRANULOCYTES # BLD AUTO: 0.03 THOUSAND/UL (ref 0–0.2)
IMM GRANULOCYTES NFR BLD AUTO: 0 % (ref 0–2)
LYMPHOCYTES # BLD AUTO: 1.87 THOUSANDS/ΜL (ref 0.6–4.47)
LYMPHOCYTES NFR BLD AUTO: 22 % (ref 14–44)
MCH RBC QN AUTO: 32.7 PG (ref 26.8–34.3)
MCHC RBC AUTO-ENTMCNC: 32.1 G/DL (ref 31.4–37.4)
MCV RBC AUTO: 102 FL (ref 82–98)
MONOCYTES # BLD AUTO: 0.89 THOUSAND/ΜL (ref 0.17–1.22)
MONOCYTES NFR BLD AUTO: 10 % (ref 4–12)
NEUTROPHILS # BLD AUTO: 5.42 THOUSANDS/ΜL (ref 1.85–7.62)
NEUTS SEG NFR BLD AUTO: 65 % (ref 43–75)
NRBC BLD AUTO-RTO: 0 /100 WBCS
PLATELET # BLD AUTO: 155 THOUSANDS/UL (ref 149–390)
PMV BLD AUTO: 10.8 FL (ref 8.9–12.7)
POTASSIUM SERPL-SCNC: 3.8 MMOL/L (ref 3.5–5.3)
PROT SERPL-MCNC: 6.5 G/DL (ref 6.4–8.4)
RBC # BLD AUTO: 3.15 MILLION/UL (ref 3.88–5.62)
SODIUM SERPL-SCNC: 144 MMOL/L (ref 135–147)
WBC # BLD AUTO: 8.52 THOUSAND/UL (ref 4.31–10.16)

## 2022-10-10 PROCEDURE — C9113 INJ PANTOPRAZOLE SODIUM, VIA: HCPCS | Performed by: INTERNAL MEDICINE

## 2022-10-10 PROCEDURE — 0DJD8ZZ INSPECTION OF LOWER INTESTINAL TRACT, VIA NATURAL OR ARTIFICIAL OPENING ENDOSCOPIC: ICD-10-PCS | Performed by: INTERNAL MEDICINE

## 2022-10-10 PROCEDURE — 45378 DIAGNOSTIC COLONOSCOPY: CPT | Performed by: INTERNAL MEDICINE

## 2022-10-10 PROCEDURE — 0DJ08ZZ INSPECTION OF UPPER INTESTINAL TRACT, VIA NATURAL OR ARTIFICIAL OPENING ENDOSCOPIC: ICD-10-PCS | Performed by: INTERNAL MEDICINE

## 2022-10-10 PROCEDURE — 80053 COMPREHEN METABOLIC PANEL: CPT | Performed by: FAMILY MEDICINE

## 2022-10-10 PROCEDURE — 43235 EGD DIAGNOSTIC BRUSH WASH: CPT | Performed by: INTERNAL MEDICINE

## 2022-10-10 PROCEDURE — 85025 COMPLETE CBC W/AUTO DIFF WBC: CPT | Performed by: FAMILY MEDICINE

## 2022-10-10 RX ORDER — SODIUM CHLORIDE, SODIUM LACTATE, POTASSIUM CHLORIDE, CALCIUM CHLORIDE 600; 310; 30; 20 MG/100ML; MG/100ML; MG/100ML; MG/100ML
INJECTION, SOLUTION INTRAVENOUS CONTINUOUS PRN
Status: DISCONTINUED | OUTPATIENT
Start: 2022-10-10 | End: 2022-10-10

## 2022-10-10 RX ORDER — CLOPIDOGREL BISULFATE 75 MG/1
75 TABLET ORAL DAILY
Status: DISCONTINUED | OUTPATIENT
Start: 2022-10-11 | End: 2022-10-13 | Stop reason: HOSPADM

## 2022-10-10 RX ORDER — LIDOCAINE HYDROCHLORIDE 20 MG/ML
INJECTION, SOLUTION EPIDURAL; INFILTRATION; INTRACAUDAL; PERINEURAL AS NEEDED
Status: DISCONTINUED | OUTPATIENT
Start: 2022-10-10 | End: 2022-10-10

## 2022-10-10 RX ORDER — PROPOFOL 10 MG/ML
INJECTION, EMULSION INTRAVENOUS CONTINUOUS PRN
Status: DISCONTINUED | OUTPATIENT
Start: 2022-10-10 | End: 2022-10-10

## 2022-10-10 RX ORDER — PROPOFOL 10 MG/ML
INJECTION, EMULSION INTRAVENOUS AS NEEDED
Status: DISCONTINUED | OUTPATIENT
Start: 2022-10-10 | End: 2022-10-10

## 2022-10-10 RX ADMIN — CYANOCOBALAMIN TAB 500 MCG 1000 MCG: 500 TAB at 08:14

## 2022-10-10 RX ADMIN — PROPOFOL 60 MG: 10 INJECTION, EMULSION INTRAVENOUS at 15:28

## 2022-10-10 RX ADMIN — METOPROLOL TARTRATE 75 MG: 50 TABLET, FILM COATED ORAL at 08:14

## 2022-10-10 RX ADMIN — PROPOFOL 50 MCG/KG/MIN: 10 INJECTION, EMULSION INTRAVENOUS at 15:30

## 2022-10-10 RX ADMIN — B-COMPLEX W/ C & FOLIC ACID TAB 1 TABLET: TAB at 08:14

## 2022-10-10 RX ADMIN — GABAPENTIN 100 MG: 100 CAPSULE ORAL at 08:14

## 2022-10-10 RX ADMIN — ATORVASTATIN CALCIUM 80 MG: 40 TABLET, FILM COATED ORAL at 08:14

## 2022-10-10 RX ADMIN — PANTOPRAZOLE SODIUM 40 MG: 40 INJECTION, POWDER, FOR SOLUTION INTRAVENOUS at 08:14

## 2022-10-10 RX ADMIN — METRONIDAZOLE 500 MG: 500 TABLET ORAL at 06:01

## 2022-10-10 RX ADMIN — METOPROLOL TARTRATE 75 MG: 50 TABLET, FILM COATED ORAL at 21:33

## 2022-10-10 RX ADMIN — Medication 2000 UNITS: at 08:14

## 2022-10-10 RX ADMIN — PROPOFOL 20 MG: 10 INJECTION, EMULSION INTRAVENOUS at 15:44

## 2022-10-10 RX ADMIN — SODIUM CHLORIDE, SODIUM GLUCONATE, SODIUM ACETATE, POTASSIUM CHLORIDE AND MAGNESIUM CHLORIDE 100 ML/HR: 526; 502; 368; 37; 30 INJECTION, SOLUTION INTRAVENOUS at 06:03

## 2022-10-10 RX ADMIN — SODIUM CHLORIDE, SODIUM LACTATE, POTASSIUM CHLORIDE, AND CALCIUM CHLORIDE: .6; .31; .03; .02 INJECTION, SOLUTION INTRAVENOUS at 15:23

## 2022-10-10 RX ADMIN — LIDOCAINE HYDROCHLORIDE 100 MG: 20 INJECTION, SOLUTION EPIDURAL; INFILTRATION; INTRACAUDAL; PERINEURAL at 15:28

## 2022-10-10 RX ADMIN — PROPOFOL 20 MG: 10 INJECTION, EMULSION INTRAVENOUS at 15:50

## 2022-10-10 RX ADMIN — PANTOPRAZOLE SODIUM 40 MG: 40 INJECTION, POWDER, FOR SOLUTION INTRAVENOUS at 21:33

## 2022-10-10 RX ADMIN — CEFTRIAXONE 1000 MG: 1 INJECTION, SOLUTION INTRAVENOUS at 19:15

## 2022-10-10 RX ADMIN — METRONIDAZOLE 500 MG: 500 TABLET ORAL at 21:33

## 2022-10-10 NOTE — ASSESSMENT & PLAN NOTE
· POA hemoglobin of 11 1 within patient's baseline  · Likely chronic anemia in the setting of CKD stage 3 verses possible GI bleed  · Patient did endorse using Ibuprofen prior to last admission for pain relief of diverticulitis flare  · Colonoscopy in 2017 showed internal/external hemorrhoids  · FOBT was reported to be slightly positive this admission  · Suspicion for PUD vs gastritis in the setting of reported dark stools vs chronic hemorrhoids  · Hemoglobin decreased from 11 1-9 3    Plan:  · Continue to monitor CBC and differential in the morning   · Inpatient consult to gastroenterology for possible EGD and colonoscopy  · 10/10: PM EGD/Colonoscopy  · Will avoid NSAIDs  · Protonix transition to IV b i d   · Transfuse for less than 7

## 2022-10-10 NOTE — UTILIZATION REVIEW
Initial Clinical Review    Admission: Date/Time/Statement:   Admission Orders (From admission, onward)     Ordered        10/08/22 1342  [facility] [place]  Once            10/08/22 1335  Place in Observation  Once,   Status:  Canceled                      Orders Placed This Encounter   Procedures   • INPATIENT ADMISSION     Standing Status:   Standing     Number of Occurrences:   1     Order Specific Question:   Level of Care     Answer:   Med Surg [16]     Order Specific Question:   Estimated length of stay     Answer:   More than 2 Midnights     Order Specific Question:   Certification     Answer:   I certify that inpatient services are medically necessary for this patient for a duration of greater than two midnights  See H&P and MD Progress Notes for additional information about the patient's course of treatment  ED Arrival Information     Expected   -    Arrival   10/8/2022 10:04    Acuity   Urgent            Means of arrival   Ambulance    Escorted by   [facility]    Service   Hospitalist    Admission type   Emergency            Arrival complaint   back pain             Chief Complaint   Patient presents with   • Back Pain     Chronic left sided lower back pain, progressively worsen over past 24 hours  Tried tylenol with minimal relief  Received fentanyl and zofran per EMS  H/o diverticulitis, believes it's related  Initial Presentation: 80 y o  male PMH of CAD s/p CABG, lumbar back fractures with L2-S1 fusion, CKD stage 3, , Htn, HLD,PAD/carotid stenosis on Plavix admitted to hosp for diverticulitis flare 3 weeks ago who presents to ED via EMS  from home with abdominal pain  Pt received IV analgesic, IV antiemetic en route   Pain started initially as left lower back pain with radiation to the left lower quadrant of his abdomen in a band-like distribution  Pain sharp , exacerbated by standing and movement , feels similar to pain when had diverticulitis flare      Associated single episode diarrhea, chills  Reports stools darker in appearance than usual, using NSAID's for pain   On exam, LLQ abdominal tenderness, abdomen rigid, non distended, normal bowel sounds  Labs -WBC 11 61, Hgb 11  1(was 9 5 in September ), BUN 27, FOBT + in ED   CT A/P shows nothing acute, possibility of UTI with L lateral bladder wall thickeening   Pt given IV abx in ED  Pt admitted as inpatient with LLQ abdominal pain, anemia DDx includes but is not limited to diverticulitis flare vs exacerbation of chronic low back pain with radiation vs C  Diff colitis vs food poisoning vs viral gastroenteritis vs constipation versus GI bleed   Plan - stool studies, CL diet, GI consult, IV Ceftriaxone, po Flagyl, pain control  PPI daily,Avoid NSAID's      Date: 10/9   Day 2: Hgb down to 9 3 this am  LLQ pain only with movement  No weakness, dizziness   WBC down to 8 91 today   Pt afebrile  BP low this am   LLQ tender to palpation   Abdomen soft today   Pt continues IV/po abx   GI consult-recent tx diverticulitis-at that time pain in the hip area improved after 7 day course of antibiotics and  was able to ambulate again but never fully resolved   Pt  does have chronic back issues as well and has had to miss back injections   CT  showed diverticulosis but no evidence of diverticulitis  Denies any abdominal pain on palpation , feel slightly bloated, has severe pain with rolling or movement limiting his mobility  No bm since admission , await collection stool studies   Plan- OK for CL diet, IVF, Bowel prep today for  C scope, ECG tomorrow  NPO after MN  Hold plavix   ASA on hold   D/C pepcid  Ordered Protonix IV    Check H/H later today        ED Triage Vitals [10/08/22 1010]   Temperature Pulse Respirations Blood Pressure SpO2   97 7 °F (36 5 °C) 85 18 145/64 98 %      Temp Source Heart Rate Source Patient Position - Orthostatic VS BP Location FiO2 (%)   Oral Monitor Lying Right arm --      Pain Score       No Pain          Wt Readings from Last 1 Encounters:   10/08/22 76 6 kg (168 lb 14 oz)     Additional Vital Signs:   Date/Time Temp Pulse Resp BP MAP (mmHg) SpO2   10/09/22 21:41:42 99 °F (37 2 °C) 87 18 168/80 109 97 %   10/09/22 15:17:42 98 5 °F (36 9 °C) 79 18 120/58 79 97 %   10/09/22 08:49:50 97 6 °F (36 4 °C) 77 16 94/45 Abnormal  61 Abnormal  95 %   10/09/22 08:11:17 -- 76 -- 92/49 Abnormal  63 Abnormal  93 %   10/09/22 0809 -- 75 -- 92/49 Abnormal  -- --   10/08/22 19:10:31 98 9 °F (37 2 °C) 99 18 120/59 79 94 %   10/08/22 1730 -- 94 18 138/64 92 94 %   10/08/22 1715 -- 88 18 154/70 100 96 %   10/08/22 1430 -- 82 -- -- -- 98 %   10/08/22 1415 -- 84 -- -- -- 97 %   10/08/22 1400 -- 83 -- -- -- 94 %   10/08/22 1345 -- 88 -- -- -- 99 %   10/08/22 1245 -- 90 18 174/78 Abnormal  112 98 %   10/08/22 1015 -- 82 18 145/64 92 99 %       Pertinent Labs/Diagnostic Test Results:   10/8 ECG-NSR  CT abdomen pelvis with contrast   Final Result by [address], DO (10/08 1305)      1  No acute intra-abdominal abnormality  2  Suggestion of minimal asymmetric left lateral bladder wall thickening measuring only a few millimeters  Correlate with urinalysis and if relevant, cystoscopy if not recently performed  3  Diffuse colonic diverticulosis without evidence of diverticulitis  Workstation performed: [id number]         CT recon only lumbar spine   Final Result by [address], DO (10/08 1348)      No acute fracture or significant change in spinal alignment from prior study  Status post L2-S1 bilateral pedicle screw fixation with stable grade 2-3 L5-S1 anterolisthesis  Severe bilateral foraminal narrowing L5-S1  Adequate decompression of the central canal at the postsurgical levels  Multilevel degenerative changes as above  If relevant, degenerative changes in the postoperative setting are best evaluated with nonemergent CT myelography           Workstation performed: [id number]               Results from last 7 days Lab Units 10/09/22  0501 10/08/22  1046   WBC Thousand/uL 8 91 11 61*   HEMOGLOBIN g/dL 9 3* 11 1*   HEMATOCRIT % 29 3* 35 1*   PLATELETS Thousands/uL 153 166   NEUTROS ABS Thousands/µL 5 83 8 21*         Results from last 7 days   Lab Units 10/09/22  0501 10/08/22  1046   SODIUM mmol/L 137 140   POTASSIUM mmol/L 4 5 4 4   CHLORIDE mmol/L 104 105   CO2 mmol/L 28 28   ANION GAP mmol/L 5 7   BUN mg/dL 21 27*   CREATININE mg/dL 1 08 1 19   EGFR ml/min/1 73sq m 63 56   CALCIUM mg/dL 8 5 9 0     Results from last 7 days   Lab Units 10/09/22  0501 10/08/22  1046   AST U/L 16 21   ALT U/L 14 20   ALK PHOS U/L 75 91   TOTAL PROTEIN g/dL 6 4 7 7   ALBUMIN g/dL 3 2* 3 9   TOTAL BILIRUBIN mg/dL 0 63 0 70     Results from last 7 days   Lab Units 10/08/22  1050   POC GLUCOSE mg/dl 100     Results from last 7 days   Lab Units 10/09/22  0501 10/08/22  1046   GLUCOSE RANDOM mg/dL 95 104                   Results from last 7 days   Lab Units 10/08/22  1046   HS TNI 0HR ng/L <2         Results from last 7 days   Lab Units 10/08/22  1046   PROTIME seconds 13 7   INR  1 02   PTT seconds 32           Results from last 7 days   Lab Units 10/08/22  1046   LIPASE u/L 26                 Results from last 7 days   Lab Units 10/08/22  1251   CLARITY UA  Clear   COLOR UA  Light Yellow   SPEC GRAV UA  1 013   PH UA  5 5   GLUCOSE UA mg/dl Negative   KETONES UA mg/dl Negative   BLOOD UA  Negative   PROTEIN UA mg/dl Negative   NITRITE UA  Negative   BILIRUBIN UA  Negative   UROBILINOGEN UA (BE) mg/dl <2 0   LEUKOCYTES UA  Trace*   WBC UA /hpf 2-4*   RBC UA /hpf 1-2   BACTERIA UA /hpf None Seen   EPITHELIAL CELLS WET PREP /hpf Occasional           Results from last 7 days   Lab Units 10/08/22  1251   URINE CULTURE  No Growth <1000 cfu/mL               ED Treatment:   Medication Administration from 10/08/2022 1003 to 10/08/2022 1901       Date/Time Order Dose Route Action     10/08/2022 1026 fentanyl citrate (PF) (FOR EMS ONLY) 100 mcg/2 mL injection 100 mcg 0 mcg Does not apply Given to EMS     10/08/2022 1026 ondansetron (FOR EMS ONLY) (ZOFRAN) 4 mg/2 mL injection 4 mg 0 mg Does not apply Given to EMS     10/08/2022 1148 oxyCODONE-acetaminophen (PERCOCET) 5-325 mg per tablet 1 tablet 1 tablet Oral Given     10/08/2022 1149 lidocaine (LIDODERM) 5 % patch 1 patch 1 patch Topical Medication Applied     10/08/2022 1231 iohexol (OMNIPAQUE) 300 mg/mL injection 100 mL 100 mL Intravenous Given     10/08/2022 1833 cefTRIAXone (ROCEPHIN) IVPB (premix in dextrose) 1,000 mg 50 mL 1,000 mg Intravenous Not Given        Past Medical History:   Diagnosis Date   • Abnormal liver function test     RESOLVED: [id number]   • Allergic rhinitis    • Anemia     LAST ASSESSED: [id number]   • Arthritis    • BPH (benign prostatic hyperplasia)    • CAD (coronary artery disease)    • Coronary artery disease    • 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HCC)     LAST ASSESSED: [id number]   • Former tobacco use    • GERD (gastroesophageal reflux disease)    • Hand paresthesia     RESOLVED: [id number]   • Hyperlipidemia    • Hypertension    • Lumbar stenosis    • Peripheral artery disease (HCC)     LAST ASSESSED: [id number]   • Stage 3a chronic kidney disease ([facility] [address] ) 7/11/2021     Present on Admission:  • Hypertension  • Hyperlipidemia  • GERD (gastroesophageal reflux disease)  • CAD (coronary artery disease)      Admitting Diagnosis: Back pain [M54 9]  Diverticulitis [K57 92]  Ambulatory dysfunction [R26 2]  Left lower quadrant abdominal pain [R10 32]  Age/Sex: 80 y o  male  Admission Orders:  Scheduled Medications:  atorvastatin, 80 mg, Oral, QAM  cefTRIAXone, 1,000 mg, Intravenous, Q24H  cholecalciferol, 2,000 Units, Oral, Daily  vitamin B-12, 1,000 mcg, Oral, Daily  gabapentin, 100 mg, Oral, Daily  metoprolol tartrate, 75 mg, Oral, Q12H KAMRAN  metroNIDAZOLE, 500 mg, Oral, Q8H KAMRAN  multivitamin stress formula, 1 tablet, Oral, Daily  pantoprazole, 40 mg, Intravenous, Q12H KAMRAN    polyethylene glycol (GOLYTELY) bowel prep 4,000 mL  Dose: 4,000 mL  Freq: Once Route: PO  Indications of Use: COLONOSCOPY  Start: 10/09/22 1100 End: 10/09/22 1432  bisacodyl (DULCOLAX) EC tablet 10 mg  Dose: 10 mg  Freq: Once Route: PO  Indications of Use: BOWEL EVACUATION  Start: 10/09/22 1600 End: 10/09/22 1654  pantoprazole (PROTONIX) EC tablet 40 mg  Dose: 40 mg  Freq: Daily (early morning) Route: PO  Start: 10/09/22 0600 End: 10/09/22 1110  famotidine (PEPCID) tablet 20 mg  Dose: 20 mg  Freq: 2 times daily Route: PO  Indications of Use: GASTROESOPHAGEAL REFLUX DISEASE  Start: 10/08/22 1800 End: 10/09/22 1553  enoxaparin (LOVENOX) subcutaneous injection 40 mg  Dose: 40 mg  Freq: Daily Route: SC  Start: 10/09/22 0900 End: 10/09/22 1113  clopidogrel (PLAVIX) tablet 75 mg  Dose: 75 mg  Freq: Daily Route: PO  Start: 10/09/22 0900 End: 10/09/22 1111  aspirin (ECOTRIN LOW STRENGTH) EC tablet 81 mg  Dose: 81 mg  Freq: Daily Route: PO  Start: 10/09/22 0900 End: 10/09/22 1113  ascorbic acid (VITAMIN C) tablet 500 mg  Dose: 500 mg  Freq: Daily Route: PO  Start: 10/09/22 0900 End: 10/09/22 1111        Continuous IV Infusions:  multi-electrolyte, 100 mL/hr, Intravenous, Continuous      PRN Meds:  acetaminophen, 650 mg, Oral, Q6H PRN  docusate sodium, 100 mg, Oral, BID PRN  HYDROmorphone, 0 2 mg, Intravenous, Q4H PRN  ipratropium, 0 5 mg, Nebulization, Daily PRN  oxyCODONE, 2 5 mg, Oral, Q4H PRN x1 10/9    FOBT   NPO   SCD's   OOB as aron      IP CONSULT TO GASTROENTEROLOGY    Network Utilization Review Department  ATTENTION: Please call with any questions or concerns to [phone] and carefully listen to the prompts so that you are directed to the right person  All voicemails are confidential   Terrea Cobia all requests for admission clinical reviews, approved or denied determinations and any other requests to dedicated fax number below belonging to the campus where the patient is receiving treatment   List of dedicated fax numbers for the Facilities:  FACILITY NAME UR FAX NUMBER   ADMISSION DENIALS (Administrative/Medical Necessity) [phone]   [address] (Maternity/NICU/Pediatrics) [phone]   [address] [phone]   [facility] [address] [address] [address] [place] [address] [facility] [place] [address] 815 [facility] [phone]

## 2022-10-10 NOTE — PLAN OF CARE
Problem: Potential for Falls  Goal: Patient will remain free of falls  Description: INTERVENTIONS:  - Educate patient/family on patient safety including physical limitations  - Instruct patient to call for assistance with activity   - Consult OT/PT to assist with strengthening/mobility   - Keep Call bell within reach  - Keep bed low and locked with side rails adjusted as appropriate  - Keep care items and personal belongings within reach  - Initiate and maintain comfort rounds  - Make Fall Risk Sign visible to staff  - Offer Toileting every  Hours, in advance of need  - Initiate/Maintain alarm  - Obtain necessary fall risk management equipment:   - Apply yellow socks and bracelet for high fall risk patients  - Consider moving patient to room near nurses station  Outcome: Progressing     Problem: MOBILITY - ADULT  Goal: Maintain or return to baseline ADL function  Description: INTERVENTIONS:  -  Assess patient's ability to carry out ADLs; assess patient's baseline for ADL function and identify physical deficits which impact ability to perform ADLs (bathing, care of mouth/teeth, toileting, grooming, dressing, etc )  - Assess/evaluate cause of self-care deficits   - Assess range of motion  - Assess patient's mobility; develop plan if impaired  - Assess patient's need for assistive devices and provide as appropriate  - Encourage maximum independence but intervene and supervise when necessary  - Involve family in performance of ADLs  - Assess for home care needs following discharge   - Consider OT consult to assist with ADL evaluation and planning for discharge  - Provide patient education as appropriate  Outcome: Progressing  Goal: Maintains/Returns to pre admission functional level  Description: INTERVENTIONS:  - Perform BMAT or MOVE assessment daily    - Set and communicate daily mobility goal to care team and patient/family/caregiver     - Collaborate with rehabilitation services on mobility goals if consulted  - Perform Range of Motion  times a day  - Reposition patient every  hours    - Dangle patient  times a day  - Stand patient  times a day  - Ambulate patient  times a day  - Out of bed to chair  times a day   - Out of bed for meals times a day  - Out of bed for toileting  - Record patient progress and toleration of activity level   Outcome: Progressing     Problem: Prexisting or High Potential for Compromised Skin Integrity  Goal: Skin integrity is maintained or improved  Description: INTERVENTIONS:  - Identify patients at risk for skin breakdown  - Assess and monitor skin integrity  - Assess and monitor nutrition and hydration status  - Monitor labs   - Assess for incontinence   - Turn and reposition patient  - Assist with mobility/ambulation  - Relieve pressure over bony prominences  - Avoid friction and shearing  - Provide appropriate hygiene as needed including keeping skin clean and dry  - Evaluate need for skin moisturizer/barrier cream  - Collaborate with interdisciplinary team   - Patient/family teaching  - Consider wound care consult   Outcome: Progressing     Problem: GASTROINTESTINAL - ADULT  Goal: Maintains or returns to baseline bowel function  Description: INTERVENTIONS:  - Assess bowel function  - Encourage oral fluids to ensure adequate hydration  - Administer IV fluids if ordered to ensure adequate hydration  - Administer ordered medications as needed  - Encourage mobilization and activity  - Consider nutritional services referral to assist patient with adequate nutrition and appropriate food choices  Outcome: Progressing

## 2022-10-10 NOTE — UTILIZATION REVIEW
Inpatient Admission Authorization Request   NOTIFICATION OF INPATIENT ADMISSION/INPATIENT AUTHORIZATION REQUEST   SERVICING FACILITY:   [address] [facility], [address] [place] [address]  Tax ID: [id number]  NPI: [id number]  Place of Service: Inpatient [address]  Place of Service Code: 24     ATTENDING PROVIDER:  Attending Name and NPI#: Bebeto Flores Md [[id number]]  Address: [address] [place] [address] [address] [place]  [place], [address] [place] [address]  Phone: [phone]     UTILIZATION REVIEW CONTACT:  Sonia Damon Utilization   Network Utilization Review Department  Phone: [phone]  Fax: [phone]  Email: [email or web address]     PHYSICIAN ADVISORY SERVICES:  FOR [id number] REVIEW - MEDICAL NECESSITY DENIAL  Phone: [phone]  Fax: [phone]  Email: [email or web address]     TYPE OF REQUEST:  Inpatient Status     ADMISSION INFORMATION:  ADMISSION DATE/TIME: 10/8/22  1:43 PM  PATIENT DIAGNOSIS CODE/DESCRIPTION:  Back pain [M54 9]  Diverticulitis [K57 92]  Ambulatory dysfunction [R26 2]  Left lower quadrant abdominal pain [R10 32]  DISCHARGE DATE/TIME: No discharge date for patient encounter  IMPORTANT INFORMATION:  Please contact Sonia Damon directly with any questions or concerns regarding this request  Department voicemails are confidential     Send requests for admission clinical reviews, concurrent reviews, approvals, and administrative denials due to lack of clinical to fax [phone]

## 2022-10-10 NOTE — PHYSICAL THERAPY NOTE
PHYSICAL THERAPY CANCELLATION NOTE          Patient Name: Heather Rivera  [id number] Date: 10/10/2022         10/10/22 1449   PT Last Visit   PT Visit Date 10/10/22   Note Type   Note type Evaluation; Cancelled Session   Cancel Reasons Patient off floor/test   Additional Comments PT eval orders received, chart review performed  Attempted to see pt for PT evaluation; however currently off floor at GI for colonoscopy and EGD  PT will continue to follow pt to provide PT intervention and DC recommendation as appropriate and as schedule allows         Halley Mclaughlin, PT, DPT  10/10/22

## 2022-10-10 NOTE — PROGRESS NOTES
[facility]  Progress Note - Remigio Dixon [birth year], 80 y o  male MRN: [id number]  Unit/Bed#: W -70 Encounter: [id number]  Primary Care Provider: Tasha Dumont DO   Date and time admitted to hospital: 10/8/2022 10:04 AM    * Left lower quadrant abdominal pain  Assessment & Plan  · Presented initially with left lower back pain with radiation to the left lower quadrant in a band-like distribution  · States the pain is sharp in quality and 11/10 at max and 0/10 at rest, exacerbated by standing and movement  · Reports a single episode of diarrhea today after eating high fiber food, states the stool was non-watery and non-mucoid  · He was admitted and treated three weeks ago for a diverticulitis flare and treated with IV ceftriaxone and Flagyl  · The pain he is experiencing now is described as very similar to his diverticulitis flare three weeks ago  However during that occurrence, he had more consistent and frequent watery stools  · He describes dark appearing stools and endorsed taking Ibuprofen prior to last admission for pain relief  · FOBT done in the ED was said to be slightly positive today  · POA leukocytosis of 11 61  · Lipase wnl  · UA unremarkable  · CT abdomen reported diverticulosis with no evidence of diverticulitis, no evidence of nephrolithiasis  · DDx includes but is not limited to diverticulitis flare vs exacerbation of chronic low back pain with radiation vs C   Diff colitis vs food poisoning vs viral gastroenteritis vs constipation versus GI bleed    Plan:  · stool enteric panel pending  · C diff toxin pending  · Monitor I/Os  · Inpatient consult to gastroenterology  · 10/10: EGD/Colonoscopy  · Will continue IV ceftriaxone 1g q 24 hrs and Flagyl 500 mg q 8 hrs  · Pain management with oxycodone 2 5 mg for moderate and severe pain, IV Dilaudid 0 2 mg for breakthrough pain    Anemia  Assessment & Plan  · POA hemoglobin of 11 1 within patient's baseline  · Likely chronic anemia in the setting of CKD stage 3 verses possible GI bleed  · Patient did endorse using Ibuprofen prior to last admission for pain relief of diverticulitis flare  · Colonoscopy in 2017 showed internal/external hemorrhoids  · FOBT was reported to be slightly positive this admission  · Suspicion for PUD vs gastritis in the setting of reported dark stools vs chronic hemorrhoids  · Hemoglobin decreased from 11 1-9 3    Plan:  · Continue to monitor CBC and differential in the morning   · Inpatient consult to gastroenterology for possible EGD and colonoscopy  · 10/10: PM EGD/Colonoscopy  · Will avoid NSAIDs  · Protonix transition to IV b i d   · Transfuse for less than 7    GERD (gastroesophageal reflux disease)  Assessment & Plan  · Takes famotidine 20 mg daily at home, also with history of duodenal ulcers in the past    Plan:  · Protonix transitioned to 40 mg IV b i d   · Continue home famotidine 20 mg daily    Hypertension  Assessment & Plan  · POA blood pressure of 145/64    Plan:  · Continue metoprolol tartrate 75 mg q 12 hrs  · Continue to monitor routine vital signs    Hyperlipidemia  Assessment & Plan  Plan:  · Continue atorvastatin 80 mg daily    CAD (coronary artery disease)  Assessment & Plan  · S/p CABG x4 in 2018    Plan:  · Aspirin Plavix initially continued  · Due to drop in hemoglobin overnight will hold for now  · Held, EGD/Colonoscopy this AM      VTE Pharmacologic Prophylaxis: VTE Score: 5 held this AM for procedure     Patient Centered Rounds: I performed bedside rounds with nursing staff today  Discussions with Specialists or Other Care Team Provider: IM, GI    Education and Discussions with Family / Patient: Patient declined call to   Has 3 daughters, says he will update/text them himself  Current Length of Stay: 2 day(s)  Current Patient Status: Inpatient   Discharge Plan: Anticipate discharge tomorrow to home      Code Status: Level 3 - DNAR and DNI    Subjective: Hungry  2 NB BM overnight  LLQ pain continues  Feels stable and slightly better than yesterday  Objective:     Vitals:   Temp (24hrs), Av 6 °F (37 °C), Min:98 3 °F (36 8 °C), Max:99 °F (37 2 °C)    Temp:  [98 3 °F (36 8 °C)-99 °F (37 2 °C)] 98 3 °F (36 8 °C)  HR:  [75-87] 75  Resp:  [18] 18  BP: (116-168)/(58-80) 116/60  SpO2:  [97 %-98 %] 98 %  Body mass index is 26 45 kg/m²  Input and Output Summary (last 24 hours): Intake/Output Summary (Last 24 hours) at 10/10/2022 1002  Last data filed at 10/10/2022 0910  Gross per 24 hour   Intake 6015 ml   Output 975 ml   Net 5040 ml       Physical Exam:   Physical Exam  Vitals and nursing note reviewed  Constitutional:       Appearance: He is well-developed  HENT:      Head: Normocephalic and atraumatic  Right Ear: External ear normal       Left Ear: External ear normal       Nose: Nose normal       Mouth/Throat:      Mouth: Mucous membranes are moist       Pharynx: Oropharynx is clear  Eyes:      Conjunctiva/sclera: Conjunctivae normal    Cardiovascular:      Rate and Rhythm: Normal rate and regular rhythm  Pulses: Normal pulses  Heart sounds: Normal heart sounds  No murmur heard  Pulmonary:      Effort: Pulmonary effort is normal  No respiratory distress  Breath sounds: Normal breath sounds  Abdominal:      General: Abdomen is flat  Bowel sounds are normal  There is no distension  Palpations: Abdomen is soft  Tenderness: There is abdominal tenderness  There is no guarding or rebound  Musculoskeletal:      Cervical back: Neck supple  Right lower leg: No edema  Left lower leg: No edema  Skin:     General: Skin is warm and dry  Capillary Refill: Capillary refill takes less than 2 seconds  Neurological:      General: No focal deficit present  Mental Status: He is alert and oriented to person, place, and time     Psychiatric:         Mood and Affect: Mood normal          Behavior: Behavior normal  Additional Data:     Labs:  Results from last 7 days   Lab Units 10/10/22  0428   WBC Thousand/uL 8 52   HEMOGLOBIN g/dL 10 3*   HEMATOCRIT % 32 1*   PLATELETS Thousands/uL 155   NEUTROS PCT % 65   LYMPHS PCT % 22   MONOS PCT % 10   EOS PCT % 3     Results from last 7 days   Lab Units 10/10/22  0428   SODIUM mmol/L 144   POTASSIUM mmol/L 3 8   CHLORIDE mmol/L 108   CO2 mmol/L 27   BUN mg/dL 12   CREATININE mg/dL 0 94   ANION GAP mmol/L 9   CALCIUM mg/dL 8 6   ALBUMIN g/dL 3 2*   TOTAL BILIRUBIN mg/dL 0 37   ALK PHOS U/L 72   ALT U/L 11   AST U/L 16   GLUCOSE RANDOM mg/dL 104     Results from last 7 days   Lab Units 10/08/22  1046   INR  1 02     Results from last 7 days   Lab Units 10/08/22  1050   POC GLUCOSE mg/dl 100               Lines/Drains:  Invasive Devices  Report    Peripheral Intravenous Line  Duration           Peripheral IV 10/08/22 Left Hand 2 days    Peripheral IV 10/08/22 Left Antecubital 1 day                Imaging: Reviewed radiology reports from this admission including: abdominal/pelvic CT and CT recon only lumbar spine    Recent Cultures (last 7 days):   Results from last 7 days   Lab Units 10/08/22  1251   URINE CULTURE  No Growth <1000 cfu/mL       Last 24 Hours Medication List:   Current Facility-Administered Medications   Medication Dose Route Frequency Provider Last Rate   • acetaminophen  650 mg Oral Q6H PRN [facility] Sherrlyn Crate, DO     • atorvastatin  80 mg Oral QAM [facility] Sherrlyn Crate, DO     • cefTRIAXone  1,000 mg Intravenous Q24H [facility], [facility] Stopped (10/09/22 2024)   • cholecalciferol  2,000 Units Oral Daily [facility] Sherrlyn Crate, DO     • vitamin B-12  1,000 mcg [facility]     • docusate sodium  100 mg Oral BID PRN [facility] Sherrlyn Crate, DO     • gabapentin  100 mg Oral [facility]     • HYDROmorphone  0 2 mg Intravenous Q4H PRN [facility] Sherrlyn Crate, DO     • ipratropium  0 5 mg Nebulization Daily PRN [facility]     • metoprolol tartrate  75 mg Oral Q12H [facility] Lux Moore,      • metroNIDAZOLE  500 mg Oral Pending sale to [facility] Lux Moore DO     • multi-electrolyte  100 mL/hr Intravenous Continuous Fayrene Cullman,  mL/hr (10/10/22 5771)   • multivitamin stress formula  1 tablet Oral Daily Lux Moore DO     • oxyCODONE  2 5 mg Oral Q4H PRN Lux Moore DO     • pantoprazole  40 mg Intravenous Q12H Maranda Ponce MD          Today, Patient Was Seen By: Sherri Deutsch    **Please Note: This note may have been constructed using a voice recognition system  **

## 2022-10-10 NOTE — PLAN OF CARE
Problem: Potential for Falls  Goal: Patient will remain free of falls  Description: INTERVENTIONS:  - Educate patient/family on patient safety including physical limitations  - Instruct patient to call for assistance with activity   - Consult OT/PT to assist with strengthening/mobility   - Keep Call bell within reach  - Keep bed low and locked with side rails adjusted as appropriate  - Keep care items and personal belongings within reach  - Initiate and maintain comfort rounds  - Make Fall Risk Sign visible to staff  - Apply yellow socks and bracelet for high fall risk patients  - Consider moving patient to room near nurses station  Outcome: Progressing     Problem: MOBILITY - ADULT  Goal: Maintain or return to baseline ADL function  Description: INTERVENTIONS:  -  Assess patient's ability to carry out ADLs; assess patient's baseline for ADL function and identify physical deficits which impact ability to perform ADLs (bathing, care of mouth/teeth, toileting, grooming, dressing, etc )  - Assess/evaluate cause of self-care deficits   - Assess range of motion  - Assess patient's mobility; develop plan if impaired  - Assess patient's need for assistive devices and provide as appropriate  - Encourage maximum independence but intervene and supervise when necessary  - Involve family in performance of ADLs  - Assess for home care needs following discharge   - Consider OT consult to assist with ADL evaluation and planning for discharge  - Provide patient education as appropriate  Outcome: Progressing  Goal: Maintains/Returns to pre admission functional level  Description: INTERVENTIONS:  - Perform BMAT or MOVE assessment daily    - Set and communicate daily mobility goal to care team and patient/family/caregiver     - Collaborate with rehabilitation services on mobility goals if consulted  - Out of bed for toileting  - Record patient progress and toleration of activity level   Outcome: Progressing     Problem: Prexisting or High Potential for Compromised Skin Integrity  Goal: Skin integrity is maintained or improved  Description: INTERVENTIONS:  - Identify patients at risk for skin breakdown  - Assess and monitor skin integrity  - Assess and monitor nutrition and hydration status  - Monitor labs   - Assess for incontinence   - Turn and reposition patient  - Assist with mobility/ambulation  - Relieve pressure over bony prominences  - Avoid friction and shearing  - Provide appropriate hygiene as needed including keeping skin clean and dry  - Evaluate need for skin moisturizer/barrier cream  - Collaborate with interdisciplinary team   - Patient/family teaching  - Consider wound care consult   Outcome: Progressing     Problem: GASTROINTESTINAL - ADULT  Goal: Maintains or returns to baseline bowel function  Description: INTERVENTIONS:  - Assess bowel function  - Encourage oral fluids to ensure adequate hydration  - Administer IV fluids if ordered to ensure adequate hydration  - Administer ordered medications as needed  - Encourage mobilization and activity  - Consider nutritional services referral to assist patient with adequate nutrition and appropriate food choices  Outcome: Progressing

## 2022-10-10 NOTE — ANESTHESIA PREPROCEDURE EVALUATION
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      PULMONARY   (+) SOB (shortness of breath)        Physical Exam    Airway    Mallampati score: III  TM Distance: >3 FB  Neck ROM: full     Dental   No notable dental hx     Cardiovascular  Rate: normal,     Pulmonary  Pulmonary exam normal     Other Findings        Anesthesia Plan  ASA Score- 3     Anesthesia Type- IV sedation with anesthesia with ASA Monitors  Additional Monitors:   Airway Plan:           Plan Factors-    Chart reviewed  EKG reviewed  Existing labs reviewed  Patient summary reviewed  Induction- intravenous  Postoperative Plan-     Informed Consent- Anesthetic plan and risks discussed with patient  I personally reviewed this patient with the CRNA  Discussed and agreed on the Anesthesia Plan with the CRNA  Chelsi Brunner

## 2022-10-10 NOTE — ASSESSMENT & PLAN NOTE
· Presented initially with left lower back pain with radiation to the left lower quadrant in a band-like distribution  · States the pain is sharp in quality and 11/10 at max and 0/10 at rest, exacerbated by standing and movement  · Reports a single episode of diarrhea today after eating high fiber food, states the stool was non-watery and non-mucoid  · He was admitted and treated three weeks ago for a diverticulitis flare and treated with IV ceftriaxone and Flagyl  · The pain he is experiencing now is described as very similar to his diverticulitis flare three weeks ago  However during that occurrence, he had more consistent and frequent watery stools  · He describes dark appearing stools and endorsed taking Ibuprofen prior to last admission for pain relief  · FOBT done in the ED was said to be slightly positive today  · POA leukocytosis of 11 61  · Lipase wnl  · UA unremarkable  · CT abdomen reported diverticulosis with no evidence of diverticulitis, no evidence of nephrolithiasis  · DDx includes but is not limited to diverticulitis flare vs exacerbation of chronic low back pain with radiation vs C   Diff colitis vs food poisoning vs viral gastroenteritis vs constipation versus GI bleed    Plan:  · stool enteric panel pending  · C diff toxin pending  · Monitor I/Os  · Inpatient consult to gastroenterology  · 10/10: EGD/Colonoscopy  · Will continue IV ceftriaxone 1g q 24 hrs and Flagyl 500 mg q 8 hrs  · Pain management with oxycodone 2 5 mg for moderate and severe pain, IV Dilaudid 0 2 mg for breakthrough pain

## 2022-10-10 NOTE — ANESTHESIA POSTPROCEDURE EVALUATION
Post-Op Assessment Note    CV Status:  Stable  Pain Score: 0    Pain management: adequate     Mental Status:  Sleepy   Hydration Status:  Euvolemic   PONV Controlled:  Controlled   Airway Patency:  Patent      Post Op Vitals Reviewed: Yes      Staff: CRNA         No complications documented      BP  118/58   Temp (!) 97 3 °F (36 3 °C) (10/10/22 1602)    Pulse 65 (10/10/22 1602)   Resp 16 (10/10/22 1602)    SpO2 99 % (10/10/22 1602)

## 2022-10-10 NOTE — ASSESSMENT & PLAN NOTE
· S/p CABG x4 in 2018    Plan:  · Aspirin Plavix initially continued  · Due to drop in hemoglobin overnight will hold for now  · Held, EGD/Colonoscopy this AM

## 2022-10-10 NOTE — ASSESSMENT & PLAN NOTE
· Takes famotidine 20 mg daily at home, also with history of duodenal ulcers in the past    Plan:  · Protonix transitioned to 40 mg IV b i d   · Continue home famotidine 20 mg daily

## 2022-10-11 LAB
ANION GAP SERPL CALCULATED.3IONS-SCNC: 7 MMOL/L (ref 4–13)
BASOPHILS # BLD AUTO: 0.02 THOUSANDS/ΜL (ref 0–0.1)
BASOPHILS NFR BLD AUTO: 0 % (ref 0–1)
BUN SERPL-MCNC: 11 MG/DL (ref 5–25)
CALCIUM SERPL-MCNC: 8.2 MG/DL (ref 8.4–10.2)
CHLORIDE SERPL-SCNC: 106 MMOL/L (ref 96–108)
CO2 SERPL-SCNC: 27 MMOL/L (ref 21–32)
CREAT SERPL-MCNC: 0.88 MG/DL (ref 0.6–1.3)
EOSINOPHIL # BLD AUTO: 0.32 THOUSAND/ΜL (ref 0–0.61)
EOSINOPHIL NFR BLD AUTO: 4 % (ref 0–6)
ERYTHROCYTE [DISTWIDTH] IN BLOOD BY AUTOMATED COUNT: 13.2 % (ref 11.6–15.1)
GFR SERPL CREATININE-BSD FRML MDRD: 79 ML/MIN/1.73SQ M
GLUCOSE SERPL-MCNC: 101 MG/DL (ref 65–140)
HCT VFR BLD AUTO: 29.3 % (ref 36.5–49.3)
HGB BLD-MCNC: 9.5 G/DL (ref 12–17)
IMM GRANULOCYTES # BLD AUTO: 0.05 THOUSAND/UL (ref 0–0.2)
IMM GRANULOCYTES NFR BLD AUTO: 1 % (ref 0–2)
LYMPHOCYTES # BLD AUTO: 1.94 THOUSANDS/ΜL (ref 0.6–4.47)
LYMPHOCYTES NFR BLD AUTO: 22 % (ref 14–44)
MCH RBC QN AUTO: 32.4 PG (ref 26.8–34.3)
MCHC RBC AUTO-ENTMCNC: 32.4 G/DL (ref 31.4–37.4)
MCV RBC AUTO: 100 FL (ref 82–98)
MONOCYTES # BLD AUTO: 0.88 THOUSAND/ΜL (ref 0.17–1.22)
MONOCYTES NFR BLD AUTO: 10 % (ref 4–12)
NEUTROPHILS # BLD AUTO: 5.46 THOUSANDS/ΜL (ref 1.85–7.62)
NEUTS SEG NFR BLD AUTO: 63 % (ref 43–75)
NRBC BLD AUTO-RTO: 0 /100 WBCS
PLATELET # BLD AUTO: 154 THOUSANDS/UL (ref 149–390)
PMV BLD AUTO: 10.4 FL (ref 8.9–12.7)
POTASSIUM SERPL-SCNC: 3.7 MMOL/L (ref 3.5–5.3)
RBC # BLD AUTO: 2.93 MILLION/UL (ref 3.88–5.62)
SODIUM SERPL-SCNC: 140 MMOL/L (ref 135–147)
WBC # BLD AUTO: 8.67 THOUSAND/UL (ref 4.31–10.16)

## 2022-10-11 PROCEDURE — 99232 SBSQ HOSP IP/OBS MODERATE 35: CPT | Performed by: INTERNAL MEDICINE

## 2022-10-11 PROCEDURE — C9113 INJ PANTOPRAZOLE SODIUM, VIA: HCPCS | Performed by: INTERNAL MEDICINE

## 2022-10-11 PROCEDURE — 97163 PT EVAL HIGH COMPLEX 45 MIN: CPT

## 2022-10-11 PROCEDURE — 97166 OT EVAL MOD COMPLEX 45 MIN: CPT

## 2022-10-11 PROCEDURE — 85025 COMPLETE CBC W/AUTO DIFF WBC: CPT

## 2022-10-11 PROCEDURE — 80048 BASIC METABOLIC PNL TOTAL CA: CPT

## 2022-10-11 PROCEDURE — 99232 SBSQ HOSP IP/OBS MODERATE 35: CPT | Performed by: FAMILY MEDICINE

## 2022-10-11 RX ORDER — PANTOPRAZOLE SODIUM 40 MG/1
40 TABLET, DELAYED RELEASE ORAL 2 TIMES DAILY
Qty: 60 TABLET | Refills: 0 | Status: SHIPPED | OUTPATIENT
Start: 2022-10-11 | End: 2022-11-10

## 2022-10-11 RX ADMIN — METOPROLOL TARTRATE 75 MG: 50 TABLET, FILM COATED ORAL at 08:31

## 2022-10-11 RX ADMIN — CLOPIDOGREL BISULFATE 75 MG: 75 TABLET ORAL at 08:31

## 2022-10-11 RX ADMIN — GABAPENTIN 100 MG: 100 CAPSULE ORAL at 08:31

## 2022-10-11 RX ADMIN — METRONIDAZOLE 500 MG: 500 TABLET ORAL at 12:50

## 2022-10-11 RX ADMIN — ATORVASTATIN CALCIUM 80 MG: 40 TABLET, FILM COATED ORAL at 08:30

## 2022-10-11 RX ADMIN — METRONIDAZOLE 500 MG: 500 TABLET ORAL at 21:56

## 2022-10-11 RX ADMIN — CYANOCOBALAMIN TAB 500 MCG 1000 MCG: 500 TAB at 08:31

## 2022-10-11 RX ADMIN — METRONIDAZOLE 500 MG: 500 TABLET ORAL at 05:44

## 2022-10-11 RX ADMIN — METOPROLOL TARTRATE 75 MG: 50 TABLET, FILM COATED ORAL at 21:56

## 2022-10-11 RX ADMIN — PANTOPRAZOLE SODIUM 40 MG: 40 INJECTION, POWDER, FOR SOLUTION INTRAVENOUS at 08:30

## 2022-10-11 RX ADMIN — Medication 2000 UNITS: at 08:30

## 2022-10-11 RX ADMIN — OXYCODONE HYDROCHLORIDE 2.5 MG: 5 TABLET ORAL at 16:48

## 2022-10-11 RX ADMIN — SODIUM CHLORIDE, SODIUM GLUCONATE, SODIUM ACETATE, POTASSIUM CHLORIDE AND MAGNESIUM CHLORIDE 100 ML/HR: 526; 502; 368; 37; 30 INJECTION, SOLUTION INTRAVENOUS at 05:44

## 2022-10-11 RX ADMIN — B-COMPLEX W/ C & FOLIC ACID TAB 1 TABLET: TAB at 08:30

## 2022-10-11 NOTE — ASSESSMENT & PLAN NOTE
· Presented initially with left lower back pain with radiation to the left lower quadrant in a band-like distribution  · States the pain is sharp in quality and 11/10 at max and 0/10 at rest, exacerbated by standing and movement  · Reports a single episode of diarrhea today after eating high fiber food, states the stool was non-watery and non-mucoid  · He was admitted and treated three weeks ago for a diverticulitis flare and treated with IV ceftriaxone and Flagyl  · The pain he is experiencing now is described as very similar to his diverticulitis flare three weeks ago  However during that occurrence, he had more consistent and frequent watery stools  · He describes dark appearing stools and endorsed taking Ibuprofen prior to last admission for pain relief  · FOBT done in the ED was said to be slightly positive today  · POA leukocytosis of 11 61  · Lipase wnl  · UA unremarkable  · CT abdomen reported diverticulosis with no evidence of diverticulitis, no evidence of nephrolithiasis  · DDx includes but is not limited to diverticulitis flare vs exacerbation of chronic low back pain with radiation vs C  Diff colitis vs food poisoning vs viral gastroenteritis vs constipation versus GI bleed    Plan:  · stool enteric panel pending  · C diff toxin pending  · Monitor I/Os  · Inpatient consult to gastroenterology  · 10/10: EGD/Colonoscopy - largely unremarkable showing some gastritis, old blood, no active signs of bleeding and diverticulosis in the colon    · Will continue IV ceftriaxone 1g q 24 hrs and Flagyl 500 mg q 8 hrs    On discharge:  Stool studies were unable to be obtained and we decided not to pursue them as his clinical picture evolved

## 2022-10-11 NOTE — DISCHARGE SUMMARY
[facility]  Discharge- [facility] 1940, 80 y o  male MRN: [id number]  Unit/Bed#: W -25 Encounter: [id number]  Primary Care Provider: Nikita Vásquez DO   Date and time admitted to hospital: 10/8/2022 10:04 AM    * Left lower quadrant abdominal pain  Assessment & Plan  · Presented initially with left lower back pain with radiation to the left lower quadrant in a band-like distribution  · States the pain is sharp in quality and 11/10 at max and 0/10 at rest, exacerbated by standing and movement  · Reports a single episode of diarrhea today after eating high fiber food, states the stool was non-watery and non-mucoid  · He was admitted and treated three weeks ago for a diverticulitis flare and treated with IV ceftriaxone and Flagyl  · The pain he is experiencing now is described as very similar to his diverticulitis flare three weeks ago  However during that occurrence, he had more consistent and frequent watery stools  · He describes dark appearing stools and endorsed taking Ibuprofen prior to last admission for pain relief  · FOBT done in the ED was said to be slightly positive today  · POA leukocytosis of 11 61  · Lipase wnl  · UA unremarkable  · CT abdomen reported diverticulosis with no evidence of diverticulitis, no evidence of nephrolithiasis  · DDx includes but is not limited to diverticulitis flare vs exacerbation of chronic low back pain with radiation vs C  Diff colitis vs food poisoning vs viral gastroenteritis vs constipation versus GI bleed    Plan:  · stool enteric panel pending  · C diff toxin pending  · Monitor I/Os  · Inpatient consult to gastroenterology  · 10/10: EGD/Colonoscopy - largely unremarkable showing some gastritis, old blood, no active signs of bleeding and diverticulosis in the colon    · Will continue IV ceftriaxone 1g q 24 hrs and Flagyl 500 mg q 8 hrs    On discharge:  Stool studies were unable to be obtained and we decided not to pursue them as his clinical picture evolved    Anemia  Assessment & Plan  · POA hemoglobin of 11 1 within patient's baseline  · Likely chronic anemia in the setting of CKD stage 3 verses possible GI bleed  · Patient did endorse using Ibuprofen prior to last admission for pain relief of diverticulitis flare  · Colonoscopy in 2017 showed internal/external hemorrhoids  · FOBT was reported to be slightly positive this admission  · Suspicion for PUD vs gastritis in the setting of reported dark stools vs chronic hemorrhoids  · Hemoglobin decreased from 11 1-9 3    Plan:  · Continue to monitor CBC and differential in the morning   · Inpatient consult to gastroenterology for possible EGD and colonoscopy  · 10/10: PM EGD/Colonoscopy  · Will avoid NSAIDs  · Protonix transition to IV b i d   · Transfuse for less than 7    GERD (gastroesophageal reflux disease)  Assessment & Plan  · Takes famotidine 20 mg daily at home, also with history of duodenal ulcers in the past    Plan:  · Protonix transitioned to 40 mg IV b i d   · Continue home famotidine 20 mg daily    Hypertension  Assessment & Plan  · POA blood pressure of 145/64    Plan:  · Continue metoprolol tartrate 75 mg q 12 hrs  · Continue to monitor routine vital signs    Hyperlipidemia  Assessment & Plan  Plan:  · Continue atorvastatin 80 mg daily    CAD (coronary artery disease)  Assessment & Plan  · S/p CABG x4 in 2018    Plan:  · Aspirin Plavix initially continued  · Due to drop in hemoglobin overnight will hold for now  · Plavix Briefly held for procedure and resumed        Medical Problems             Resolved Problems  Date Reviewed: 10/9/2022   None               Discharging Resident: Radha Garza [address] [place] [address]  Discharging Attending:  Adama Pritchard MD  PCP: Bhumi Quiñones DO  Admission Date:   Admission Orders (From admission, onward)     Ordered        10/08/22 1342  INPATIENT ADMISSION  Once            10/08/22 1335  Place in Observation  Once,   Status:  Canceled Consultations During Hospital Stay:  · Gastroenterology    Procedures Performed:   · EGD - no recommended follow-up  · Colonoscopy - moderate pandiverticulosis, no evidence of fresh or old blood, small internal hemorrhoids    Significant Findings / Test Results:   · CT a/p:  Diffuse colonic diverticulosis without evidence of diverticulitis  · CT recon lumbar spine:  Ordered in ED for lower back pain  Severe bilateral foraminal narrowing and L5-S1 and multilevel degenerative changes  No acute fracture or significant change in spinal alignment from prior studies  Incidental Findings:   · None     Test Results Pending at Discharge (will require follow up): · None     Outpatient Tests Requested:  · GI to consider outpatient capsule endoscopy per patient preference  · H pylori stool recommended in EGD note    Complications:  None    Reason for Admission:  Left lower quadrant abdominal pain    Hospital Course:   Abbie Mcguire is a 80 y o  male patient who originally presented to the hospital on 10/8/2022 due to acute abdominal pain exacerbated with positional changes  Stated that it felt similar to previous diverticulitis flare  Endorsed a single episode of diarrhea and reported dark stools  There were concerns for acute GI bleeding due to 2 point decrease in Hemoglobin  Plavix was held briefly while patient was prepped for diagnostic EGD and colonoscopy that were largely unremarkable  Patient received empiric antibiotics for UTI and later found to have a urine culture negative  Stool studies were recommended on admission/presentation however they were unable to be obtained due to lack of samples/bowel movements  On day of discharge, we decided not to pursue further stool studies as his clinical picture improved  GI recommended patient take PPI BID for 1 month followed by daily thereafter  Please see above list of diagnoses and related plan for additional information       Condition at Discharge: stable    Discharge Day Visit / Exam:   Subjective:  Patient feels much better today  Near resolution of abdominal pain  However new complaints left lower back pain with presentation consistent with sciatica  Patient is tolerating p o  Without difficulty  Was seen by PT and noted that it was a "cake walk " Would very much like to go home today  Voiced to us that he did not want the capsule study right now but will consider it  Vitals: Blood Pressure: 140/66 (10/11/22 0749)  Pulse: 74 (10/11/22 0749)  Temperature: 97 8 °F (36 6 °C) (10/11/22 0749)  Temp Source: Oral (10/11/22 0749)  Respirations: 17 (10/11/22 0749)  Height: 5' 7" (170 2 cm) (10/08/22 1010)  Weight - Scale: 76 6 kg (168 lb 14 oz) (10/08/22 1010)  SpO2: 98 % (10/11/22 0749)  Exam:   Physical Exam  Vitals and nursing note reviewed  Constitutional:       Appearance: He is well-developed  HENT:      Head: Normocephalic and atraumatic  Right Ear: External ear normal       Left Ear: External ear normal       Nose: Nose normal       Mouth/Throat:      Mouth: Mucous membranes are moist       Pharynx: Oropharynx is clear  Eyes:      Conjunctiva/sclera: Conjunctivae normal    Cardiovascular:      Rate and Rhythm: Normal rate and regular rhythm  Pulses: Normal pulses  Heart sounds: Normal heart sounds  No murmur heard  Pulmonary:      Effort: Pulmonary effort is normal  No respiratory distress  Breath sounds: Normal breath sounds  Abdominal:      General: Abdomen is flat  There is no distension  Palpations: Abdomen is soft  Tenderness: There is no abdominal tenderness  There is no guarding  Musculoskeletal:         General: Tenderness (left lower back) present  Cervical back: Neck supple  Right lower leg: No edema  Left lower leg: No edema  Skin:     General: Skin is warm and dry  Capillary Refill: Capillary refill takes less than 2 seconds     Neurological:      General: No focal deficit present  Mental Status: He is alert and oriented to person, place, and time  Psychiatric:         Mood and Affect: Mood normal          Behavior: Behavior normal           Discussion with Family: Updated  (daughter) at bedside  Discharge instructions/Information to patient and family:   See after visit summary for information provided to patient and family  Provisions for Follow-Up Care:  See after visit summary for information related to follow-up care and any pertinent home health orders  Disposition:   Home    Planned Readmission: No    Discharge Medications:  See after visit summary for reconciled discharge medications provided to patient and/or family        **Please Note: This note may have been constructed using a voice recognition system**

## 2022-10-11 NOTE — PROGRESS NOTES
Progress Note- Domonique Perez 80 y o  male MRN: [id number]    Unit/Bed#: W -01 Encounter: [id number]      Assessment and Plan:    Patient is an 80-year-old male with PMH significant for CAD s/p CABG, carotid artery stenosis on Plavix, multiple lumbar vertebral fractures s/p L2/S1 fusion, lumbar radiculopathy with associated neuropathy, osteoarthritis, CKD 3, hypertension, hyperlipidemia, and recent treatment of suspected diverticulitis in September 2022 admitted on 10/08 for left lower quadrant abdominal pain  Imaging unremarkable for diverticulitis, but patient was noted to have worsening macrocytic anemia  1  Macrocytic anemia  2  History of duodenal ulcers  Patient with a history of esophagitis, gastritis, and duodenitis with duodenal ulcers on previous EGD with risk factors including daily NSAID use and ETOH use  Also reported intermittently dark stools with an approx 1-2g hgb drop since September  Suspected to be multifactorial including anemia of chronic disease and possible GI source in the setting of Plavix use  Underwent EGD and colonoscopy on 10/10/2022, without clear etiology for patient's anemia, for which further evaluation with outpatient capsule endoscopy could be considered  Hgb overall stable today (9 5)  - Nonulcerogenic and high-fiber diet  - Continue PPI BID upon discharge  - Patient aware to avoid all NSAIDs and EtOH use  - May resume anticoagulation with close monitoring of patient's hgb and stools for signs of overt GI bleeding  - Close outpatient GI follow-up and consider outpatient capsule endoscopy for further evaluation of anemia    2  LLQ abdominal pain   No evidence of acute diverticulitis on imaging   Patient with complete resolution of abdominal pain and overall benign abdominal exam  Unclear etiology for patient's left lower quadrant abdominal pain, but clinical picture is most suspicious for constipation vs component of lumbar radiculopathy    - High-fiber diet  - Bowel regimen with MiraLax once daily if needed to prevent constipation      ______________________________________________________________________    Subjective:     Patient found resting comfortably in his bedside chair  No acute overnight events  Tolerating diet without difficulty  Denies fever/chills, nausea/vomiting, abdominal pain, and has not had a bowel movement since his procedures  Patient is aware recommendations regarding consideration of outpatient capsule endoscopy for further evaluation of anemia       Medication Administration - last 24 hours from 10/10/2022 0900 to 10/11/2022 0900       Date/Time Order Dose Route Action Action by     10/11/2022 0830 atorvastatin (LIPITOR) tablet 80 mg 80 mg Oral Given Miranda Pizano RN     10/11/2022 0830 cholecalciferol (VITAMIN D3) tablet 2,000 Units 2,000 Units Oral Given Miranda Pizano RN     10/11/2022 0831 cyanocobalamin (VITAMIN B-12) tablet 1,000 mcg 1,000 mcg Oral Given Miranda Pizano RN     10/11/2022 0831 gabapentin (NEURONTIN) capsule 100 mg 100 mg Oral Given Miranda Pizano RN     10/11/2022 0831 metoprolol tartrate (LOPRESSOR) tablet 75 mg 75 mg Oral Given mina DIANA Pizano     10/10/2022 2133 metoprolol tartrate (LOPRESSOR) tablet 75 mg 75 mg Oral Given Alray Ham     10/11/2022 0830 multivitamin stress formula tablet 1 tablet 1 tablet Oral Given Miranda Pizano RN     10/11/2022 0544 metroNIDAZOLE (FLAGYL) tablet 500 mg 500 mg Oral Given [facility]     10/10/2022 2133 metroNIDAZOLE (FLAGYL) tablet 500 mg 500 mg Oral Given [facility]     10/10/2022 1507 metroNIDAZOLE (FLAGYL) tablet 500 mg 500 mg Oral Not Given Kelsie Lafleur, DIANA     10/10/2022 2051 cefTRIAXone (ROCEPHIN) IVPB (premix in dextrose) 1,000 mg 50 mL 0 mg Intravenous Stopped Alclaudia Ham     10/10/2022 1915 cefTRIAXone (ROCEPHIN) IVPB (premix in dextrose) 1,000 mg 50 mL 1,000 mg Intravenous New Bree Reynoso RN     10/11/2022 0501 multi-electrolyte (PLASMALYTE-A/ISOLYTE-S PH 7 4) IV solution 100 mL/hr Intravenous New Bag Gomez Delgado     10/11/2022 0830 pantoprazole (PROTONIX) injection 40 mg 40 mg Intravenous Given Levi Winkler RN     10/10/2022 2133 pantoprazole (PROTONIX) injection 40 mg 40 mg Intravenous Given Lamona Clubs     10/10/2022 1620 lactated ringers infusion 0 mL/kg/hr Intravenous Stopped Cris Johnson, DIANA     10/10/2022 1559 lactated ringers infusion   Intravenous Anesthesia Volume Adjustment Eudelia Frias, CRNA     10/10/2022 1523 lactated ringers infusion   Intravenous New Bag Olivierdelia Frias, CRNA     10/11/2022 0831 clopidogrel (PLAVIX) tablet 75 mg 75 mg Oral Given Levi Winkler RN          Objective:     Vitals: Blood pressure 140/66, pulse 74, temperature 97 8 °F (36 6 °C), temperature source Oral, resp  rate 17, height 5' 7" (1 702 m), weight 76 6 kg (168 lb 14 oz), SpO2 98 %  ,Body mass index is 26 45 kg/m²  Intake/Output Summary (Last 24 hours) at 10/11/2022 0900  Last data filed at 10/11/2022 0546  Gross per 24 hour   Intake 2818 33 ml   Output 450 ml   Net 2368  33 ml       Physical Exam:   General Appearance: Awake and alert, in no acute distress  Abdomen: Soft, non-tender, non-distended; bowel sounds normal; no masses or no organomegaly    Invasive Devices  Report    Peripheral Intravenous Line  Duration           Peripheral IV 10/08/22 Left Antecubital 2 days    Peripheral IV 10/08/22 Left Hand 2 days                Lab Results:  Admission on 10/08/2022   Component Date Value   • WBC 10/08/2022 11 61 (A)   • RBC 10/08/2022 3 35 (A)   • Hemoglobin 10/08/2022 11 1 (A)   • Hematocrit 10/08/2022 35 1 (A)   • MCV 10/08/2022 105 (A)   • MCH 10/08/2022 33 1    • MCHC 10/08/2022 31 6    • RDW 10/08/2022 13 5    • MPV 10/08/2022 11 1    • Platelets 56/23/8831 166    • nRBC 10/08/2022 0    • Neutrophils Relative 10/08/2022 71    • Immat GRANS % 10/08/2022 0    • Lymphocytes Relative 10/08/2022 19    • Monocytes Relative 10/08/2022 8    • Eosinophils Relative 10/08/2022 2    • Basophils Relative 10/08/2022 0    • Neutrophils Absolute 10/08/2022 8 21 (A)   • Immature Grans Absolute 10/08/2022 0 04    • Lymphocytes Absolute 10/08/2022 2 23    • Monocytes Absolute 10/08/2022 0 91    • Eosinophils Absolute 10/08/2022 0 17    • Basophils Absolute 10/08/2022 0 05    • Sodium 10/08/2022 140    • Potassium 10/08/2022 4 4    • Chloride 10/08/2022 105    • CO2 10/08/2022 28    • ANION GAP 10/08/2022 7    • BUN 10/08/2022 27 (A)   • Creatinine 10/08/2022 1 19    • Glucose 10/08/2022 104    • Calcium 10/08/2022 9 0    • AST 10/08/2022 21    • ALT 10/08/2022 20    • Alkaline Phosphatase 10/08/2022 91    • Total Protein 10/08/2022 7 7    • Albumin 10/08/2022 3 9    • Total Bilirubin 10/08/2022 0 70    • eGFR 10/08/2022 56    • Color, UA 10/08/2022 Light Yellow    • Clarity, UA 10/08/2022 Clear    • Specific [place], UA 10/08/2022 1 013    • pH, UA 10/08/2022 5 5    • Leukocytes, UA 10/08/2022 Trace (A)   • Nitrite, UA 10/08/2022 Negative    • Protein, UA 10/08/2022 Negative    • Glucose, UA 10/08/2022 Negative    • Ketones, UA 10/08/2022 Negative    • Urobilinogen, UA 10/08/2022 <2 0    • Bilirubin, UA 10/08/2022 Negative    • Occult Blood, UA 10/08/2022 Negative    • Lipase 10/08/2022 26    • hs TnI 0hr 10/08/2022 <2    • Protime 10/08/2022 13 7    • INR 10/08/2022 1 02    • PTT 10/08/2022 32    • Ventricular Rate 10/08/2022 79    • Atrial Rate 10/08/2022 79    • TX Interval 10/08/2022 154    • QRSD Interval 10/08/2022 86    • QT Interval 10/08/2022 396    • QTC Interval 10/08/2022 454    • P Axis 10/08/2022 45    • QRS Axis 10/08/2022 10    • T Wave Axis 10/08/2022 62    • POC Glucose 10/08/2022 100    • RBC, UA 10/08/2022 1-2    • WBC, UA 10/08/2022 2-4 (A)   • Epithelial Cells 10/08/2022 Occasional    • Bacteria, UA 10/08/2022 None Seen    • Urine Culture 10/08/2022 No Growth <1000 cfu/mL    • WBC 10/09/2022 8 91    • RBC 10/09/2022 2 85 (A)   • Hemoglobin 10/09/2022 9 3 (A)   • Hematocrit 10/09/2022 29 3 (A)   • MCV 10/09/2022 103 (A)   • MCH 10/09/2022 32 6    • MCHC 10/09/2022 31 7    • RDW 10/09/2022 13 5    • MPV 10/09/2022 11 2    • Platelets 23/17/9705 153    • nRBC 10/09/2022 0    • Neutrophils Relative 10/09/2022 66    • Immat GRANS % 10/09/2022 0    • Lymphocytes Relative 10/09/2022 22    • Monocytes Relative 10/09/2022 10    • Eosinophils Relative 10/09/2022 2    • Basophils Relative 10/09/2022 0    • Neutrophils Absolute 10/09/2022 5 83    • Immature Grans Absolute 10/09/2022 0 04    • Lymphocytes Absolute 10/09/2022 1 96    • Monocytes Absolute 10/09/2022 0 86    • Eosinophils Absolute 10/09/2022 0 20    • Basophils Absolute 10/09/2022 0 02    • Sodium 10/09/2022 137    • Potassium 10/09/2022 4 5    • Chloride 10/09/2022 104    • CO2 10/09/2022 28    • ANION GAP 10/09/2022 5    • BUN 10/09/2022 21    • Creatinine 10/09/2022 1 08    • Glucose 10/09/2022 95    • Calcium 10/09/2022 8 5    • Corrected Calcium 10/09/2022 9 1    • AST 10/09/2022 16    • ALT 10/09/2022 14    • Alkaline Phosphatase 10/09/2022 75    • Total Protein 10/09/2022 6 4    • Albumin 10/09/2022 3 2 (A)   • Total Bilirubin 10/09/2022 0 63    • eGFR 10/09/2022 63    • Sodium 10/10/2022 144    • Potassium 10/10/2022 3 8    • Chloride 10/10/2022 108    • CO2 10/10/2022 27    • ANION GAP 10/10/2022 9    • BUN 10/10/2022 12    • Creatinine 10/10/2022 0 94    • Glucose 10/10/2022 104    • Calcium 10/10/2022 8 6    • Corrected Calcium 10/10/2022 9 2    • AST 10/10/2022 16    • ALT 10/10/2022 11    • Alkaline Phosphatase 10/10/2022 72    • Total Protein 10/10/2022 6 5    • Albumin 10/10/2022 3 2 (A)   • Total Bilirubin 10/10/2022 0 37    • eGFR 10/10/2022 75    • WBC 10/10/2022 8 52    • RBC 10/10/2022 3 15 (A)   • Hemoglobin 10/10/2022 10 3 (A)   • Hematocrit 10/10/2022 32 1 (A)   • MCV 10/10/2022 102 (A)   • MCH 10/10/2022 32 7    • MCHC 10/10/2022 32 1    • RDW 10/10/2022 13 2    • MPV 10/10/2022 10 8    • Platelets 10/10/2022 155    • nRBC 10/10/2022 0    • Neutrophils Relative 10/10/2022 65    • Immat GRANS % 10/10/2022 0    • Lymphocytes Relative 10/10/2022 22    • Monocytes Relative 10/10/2022 10    • Eosinophils Relative 10/10/2022 3    • Basophils Relative 10/10/2022 0    • Neutrophils Absolute 10/10/2022 5 42    • Immature Grans Absolute 10/10/2022 0 03    • Lymphocytes Absolute 10/10/2022 1 87    • Monocytes Absolute 10/10/2022 0 89    • Eosinophils Absolute 10/10/2022 0 28    • Basophils Absolute 10/10/2022 0 03    • WBC 10/11/2022 8 67    • RBC 10/11/2022 2 93 (A)   • Hemoglobin 10/11/2022 9 5 (A)   • Hematocrit 10/11/2022 29 3 (A)   • MCV 10/11/2022 100 (A)   • MCH 10/11/2022 32 4    • MCHC 10/11/2022 32 4    • RDW 10/11/2022 13 2    • MPV 10/11/2022 10 4    • Platelets 89/85/2880 154    • nRBC 10/11/2022 0    • Neutrophils Relative 10/11/2022 63    • Immat GRANS % 10/11/2022 1    • Lymphocytes Relative 10/11/2022 22    • Monocytes Relative 10/11/2022 10    • Eosinophils Relative 10/11/2022 4    • Basophils Relative 10/11/2022 0    • Neutrophils Absolute 10/11/2022 5 46    • Immature Grans Absolute 10/11/2022 0 05    • Lymphocytes Absolute 10/11/2022 1 94    • Monocytes Absolute 10/11/2022 0 88    • Eosinophils Absolute 10/11/2022 0 32    • Basophils Absolute 10/11/2022 0 02    • Sodium 10/11/2022 140    • Potassium 10/11/2022 3 7    • Chloride 10/11/2022 106    • CO2 10/11/2022 27    • ANION GAP 10/11/2022 7    • BUN 10/11/2022 11    • Creatinine 10/11/2022 0 88    • Glucose 10/11/2022 101    • Calcium 10/11/2022 8 2 (A)   • eGFR 10/11/2022 79        Imaging Studies: I have personally reviewed pertinent imaging studies

## 2022-10-11 NOTE — PHYSICAL THERAPY NOTE
PHYSICAL THERAPY EVALUATION NOTE          Patient Name: Segun Torres  XDHAP'W Date: 10/11/2022      AGE:   80 y o  Mrn:   [id number]  ADMIT DX:  Back pain [M54 9]  Diverticulitis [K57 92]  Ambulatory dysfunction [R26 2]  Left lower quadrant abdominal pain [R10 32]    Past Medical History:  Past Medical History:   Diagnosis Date    Abnormal liver function test     RESOLVED: [id number]    Allergic rhinitis     Anemia     LAST ASSESSED: [id number]    Arthritis     BPH (benign prostatic hyperplasia)     CAD (coronary artery disease)     Coronary artery disease     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HCC)     LAST ASSESSED: [id number]    Former tobacco use     GERD (gastroesophageal reflux disease)     Hand paresthesia     RESOLVED: [id number]    Hyperlipidemia     Hypertension     Lumbar stenosis     Peripheral artery disease ([facility] [address] )     LAST ASSESSED: 27OCT2017    Stage 3a chronic kidney disease ([facility] [address] ) 7/11/2021       Past Surgical History:  Past Surgical History:   Procedure Laterality Date    BACK SURGERY      COLONOSCOPY      LUMBAR FUSION      [place] ARTHRODESIS POSTERIOR/POSTEROLATERAL LUMBAR N/A 11/03/2016    Procedure: L2-S1 POSTERIOR LUMBAR FUSION AND DECOMPRESSION (Impulse), Posterior lateral fixation; dural repair ;  Surgeon: Zaynab Bartlett MD;  Location: [facility];  Service: Orthopedics    N[place] CABG, ARTERIAL, SINGLE N/A 01/19/2018    Procedure: CABG X4 with LIMA - LAD, SVG - RCA, OM2, & Diagonal ; Left Leg EVH; MATTHEW;  Surgeon: Keara Brenner DO;  Location: [facility];  Service: Cardiac Surgery    N[place] COLONOSCOPY FLX DX W/COLLJ SPEC WHEN PFRMD N/A 11/15/2017    Procedure: EGD AND COLONOSCOPY;  Surgeon: Alvin Alcantara MD;  Location: [facility]  GI LAB;   Service: Gastroenterology    SEPTOPLASTY      LAST ASSESSED; [id number]    TONSILECTOMY AND ADNOIDECTOMY      LAST ASSESSED: [id number]    UPPER GASTROINTESTINAL ENDOSCOPY       Length Of Stay: 3        PHYSICAL THERAPY EVALUATION:    Patient's identity confirmed via 2 patient identifiers (full name and ) at start of session       10/11/22 0905   PT Last Visit   PT Visit Date 10/11/22   Note Type   Note type Evaluation   Pain Assessment   Pain Assessment Tool 0-10   Pain Score 2   Pain Location/Orientation Orientation: Left;Orientation: Lower; Location: Back   Pain Onset/Description Onset: Ongoing  (sciatic pain)   Hospital Pain Intervention(s) Repositioned; Ambulation/increased activity   Restrictions/Precautions   Weight Bearing Precautions Per Order No   Other Precautions Chair Alarm; Bed Alarm;Multiple lines; Fall Risk;Pain  (IV pole)   Home Living   Type of [address] [place] [address] Two level;Bed/bath upstairs;1/2 bath on main level  (enters through basement/garage, stair glide to main level and stair glide to 2nd floor)   Bathroom Shower/Tub Tub/shower unit   Bathroom Toilet Standard   Bathroom Equipment Grab bars in shower; Shower chair;Commode   Bathroom Accessibility Accessible   Home Equipment Walker;Cane;Stair glide;Grab bars  (RW and rollator walker)   Additional Comments Pt lives alone in a 2 level house w/ stair glides to access all floors   Prior Function   Level of [place] Independent with ADLs; Independent with functional mobility   Lives With Alone   Receives Help From Family  (3 local daughters)   IADLs Independent with driving  (family assisting w/ meals)   Falls in the last 6 months 0   Vocational Retired  (IT support)   Comments At baseline pt ambulates w/ walker vs SPC (for short distances)   General   Family/Caregiver Present No   Cognition   Overall Cognitive Status WFL   Arousal/Participation Alert   Orientation Level Oriented X4   Memory Within functional limits   Following Commands Follows multistep commands without difficulty   Comments Pt ID via name and ; pt agreeable to PT eval and mobility   RLE Assessment   RLE Assessment WFL   LLE Assessment   LLE Assessment WFL   Vision-Basic Assessment Current Vision Wears glasses all the time   Bed Mobility   Supine to Sit Unable to assess   Sit to Supine Unable to assess   Additional Comments pt OOB in recliner chair upon arrival and return to chair at end of session   Transfers   Sit to Stand 5  Supervision   Additional items Assist x 1; Increased time required;Verbal cues   Stand to Sit 5  Supervision   Additional items Assist x 1; Armrests; Increased time required;Verbal cues   Ambulation/Elevation   Gait pattern Wide MEGAN; Decreased foot clearance; Foward flexed   Gait Assistance 5  Supervision   Additional items Assist x 1;Verbal cues  (VC to maintain closer proximity to walker)   Assistive Device Rolling walker   Distance 180'   Ambulation/Elevation Additional Comments Pt ambulated into hallway w/ RW, VC for RW management  Able to negotiate multiple turns w/o LOB   Balance   Static Sitting Fair +   Dynamic Sitting Fair   Static Standing Fair -   Dynamic Standing [address] -  (w/ RW)   Activity Tolerance   Activity Tolerance Patient limited by pain   Medical Staff Made Aware JASON Maravilla   Nurse Made Aware RN Regulo   Assessment   Prognosis Good   Problem List Pain   Assessment Simi Talbert is a 80 y o  Male who [facility] 10[facility]2022 w/ c/o back pain and diagnosis of LLQ abdominal pain  Orders for PT eval and treat received, w/ activity orders of up and out of bed as tolerated  Comorbidities affecting pt at time of eval include: CAD, anemia, GERD, PAD, lumbar radiculopathy  Personal factors affecting pt DC include: ambulating w/ assistive device and limited home support  At baseline, pt mobilizes independently w/ walker vs SPC, w/ 0 falls in the last 6 months  Upon evaluation, pt presents w/ the following deficits: pain  Upon eval, pt currently requires supervision for transfers and supervision w/ RW for gait   Pt's clinical presentation is unstable/unpredictable due to pain impacting overall mobility status, ongoing medical monitoring/management, and recent readmission (within 30 days)  From PT/mobility standpoint, pt appears to be functioning close to or at mobility baseline, therefore no further immediate skilled PT needs are warranted at this time and pt will be DC from IPPT caseload  When medically cleared for DC, recommend pt return to previous living environment w/ continued family support  Please re-consult if further immediate skilled PT needs are warranted  Goals   Patient Goals to get back to his workout routine   PT Treatment Day 0   Plan   Treatment/Interventions   (DC PT)   Recommendation   PT Discharge Recommendation No rehabilitation needs   AM-PAC Basic Mobility Inpatient   Turning in Bed Without Bedrails 4   Lying on Back to Sitting on Edge of Flat Bed 4   Moving Bed to Chair 3   Standing Up From Chair 3   Walk in Room 3   Climb 3-5 Stairs 3   Basic Mobility Inpatient Raw Score 20   Basic Mobility Standardized Score 43 99   Highest Level Of Mobility   JH-HLM Goal 6: Walk 10 steps or more   JH-HLM Achieved 7: Walk 25 feet or more   End of Consult   Patient Position at End of Consult Bedside chair;Bed/Chair alarm activated; All needs within reach       The patient's AM-PAC Basic Mobility Inpatient Short Form Raw Score is 20  A Raw score of greater than 16 suggests the patient may benefit from discharge to home  Please also refer to the recommendation of the Physical Therapist for safe discharge planning  Given the above findings from this evaluation, at this time this patient does not require skilled inpatient PT for the remainder of this admission  Will D/C patient from PT caseload, please reconsult if any changes or needs arise           DC rec: no rehab needs, return to previously living environment w/ continued family support        Manjeet Liu, PT, DPT  10/11/22

## 2022-10-11 NOTE — DISCHARGE INSTR - AVS FIRST PAGE
Dear Devon Kebede,     It was our pleasure to care for you here at [facility]  It is our hope that we were always able to exceed the expected standards for your care during your stay  You were hospitalized due to left lower quadrant abdominal pain  You were cared for on the [facility] 4th floor by Justus Wills under the service of Seema Joseph MD with the [facility] who covers for your primary care physician (PCP), Gonzalez Richmond DO, while you were hospitalized  If you have any questions or concerns related to this hospitalization, you may contact us at [phone]  For follow up as well as any medication refills, we recommend that you follow up with your primary care physician  A registered nurse will reach out to you by phone within a few days after your discharge to answer any additional questions that you may have after going home  However, at this time we provide for you here, the most important instructions / recommendations at discharge:     Notable Medication Adjustments -   Please take Protonix 40 mg twice a day for 1 month as recommended by our gastroenterologist  Recommended to take Protonix 40 mg once a day after this 1 month  We will provide 30 day supply, please follow-up with your primary care doctor and GI doctor  Safe to continue Plavix  Avoid NSAIDs due to your ulcers   If needed, may take Tylenol (acetaminophen)  Testing Required after Discharge -   None  Important follow up information -   Gastroenterology has recommended outpatient capsule endoscopy  Other Instructions -   Recommend continue with high-fiber diet with goal of 25-30 g of fiber daily  Have provided you with a Nutrition consult so follow-up with them  Please review this entire after visit summary as additional general instructions including medication list, appointments, activity, diet, any pertinent wound care, and other additional recommendations from your care team that may be provided for you        Sincerely,     Jammie Nagy

## 2022-10-11 NOTE — OCCUPATIONAL THERAPY NOTE
Occupational Therapy Evaluation      Ashu Rdidle    10/11/2022    Patient Active Problem List   Diagnosis    Lumbar compression fracture (HCC)    Spondylolisthesis of lumbar region    CAD (coronary artery disease)    Former tobacco use    Hyperlipidemia    Hypertension    S/P CABG x 4    Anemia    Leg pain, posterior, left    Peripheral artery disease (HCC)    GERD (gastroesophageal reflux disease)    Vasomotor rhinitis    Lumbar stenosis    S/P lumbar fusion    Postlaminectomy syndrome of lumbar region    Lumbar radiculopathy    Skin lesion of left leg    Bruit of left carotid artery    Phimosis    Carotid stenosis, asymptomatic, bilateral    SOB (shortness of breath)    Neck pain on left side    Near syncope    Pulmonary fibrosis determined by high resolution computed tomography [facility])    Neuropathy    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HCC)    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    Trochanteric bursitis    Diverticulitis    Stage 3 chronic kidney disease (HCC)    Left lower quadrant abdominal pain       Past Medical History:   Diagnosis Date    Abnormal liver function test     RESOLVED: [id number]    Allergic rhinitis     Anemia     LAST ASSESSED: [id number]    Arthritis     BPH (benign prostatic hyperplasia)     CAD (coronary artery disease)     Coronary artery disease     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HCC)     LAST ASSESSED: [id number]    Former tobacco use     GERD (gastroesophageal reflux disease)     Hand paresthesia     RESOLVED: [id number]    Hyperlipidemia     Hypertension     Lumbar stenosis     Peripheral artery disease (HCC)     LAST ASSESSED: [id number]    Stage 3a chronic kidney disease ([address] ) 7/11/2021       Past Surgical History:   Procedure Laterality Date    BACK SURGERY      COLONOSCOPY      LUMBAR FUSION      [place] ARTHRODESIS POSTERIOR/POSTEROLATERAL LUMBAR N/A 11/03/2016    Procedure: L2-S1 POSTERIOR LUMBAR FUSION AND DECOMPRESSION (Impulse), Posterior lateral fixation; dural repair ;  Surgeon: Karen Rivero MD;  [facility];  Service: Orthopedics    [place] CABG, ARTERIAL, SINGLE N/A 01/19/2018    Procedure: CABG X4 with LIMA - LAD, SVG - RCA, OM2, & Diagonal ; Left Leg EVH; MATTHEW;  Surgeon: Vincent Bradley DO;  Location[facility];  Service: Cardiac Surgery    MS COLONOSCOPY FLX DX W/COLLJ SPEC WHEN PFRMD N/A 11/15/2017    Procedure: EGD AND COLONOSCOPY;  Surgeon: Shailesh Ramos MD;  Location: [facility]; Service: Gastroenterology    SEPTOPLASTY      LAST ASSESSED; [id number]    TONSILECTOMY AND ADNOIDECTOMY      LAST ASSESSED: [id number]    UPPER GASTROINTESTINAL ENDOSCOPY          10/11/22 0849   OT Last Visit   OT Visit Date 10/11/22   Note Type   Note type Evaluation   Pain Assessment   Pain Assessment Tool 0-10   Pain Score 2   Pain Location/Orientation Orientation: Left;Orientation: Lower; Location: Back   Pain Onset/Description Onset: Ongoing; Descriptor: Aching   Effect of Pain on Daily Activities limits activity tolerance and comfort   Patient's Stated Pain Goal No pain   Hospital Pain Intervention(s) Repositioned; Ambulation/increased activity; Emotional support   Restrictions/Precautions   Weight Bearing Precautions Per Order No   Other Precautions Chair Alarm; Bed Alarm; Fall Risk   Home Living   Type of [address] [place] [address] Two level;Bed/bath upstairs;1/2 bath on main level  (enters through basement/garage; stair glide to first and second floor)   Bathroom Shower/Tub Tub/shower unit  (being converted to walk-in by December)   [address] bars in shower; Shower chair;Commode   Bathroom Accessibility Accessible   Home Equipment Stair glide;Walker;Cane  (rollator)   Prior Function   Level of [place] Independent with ADLs; Independent with functional mobility; Needs assistance with IADLS   Lives With Alone   Receives Help From Family   IADLs Independent with driving  (family A with meals lately)   Falls in the last 6 months 0   Vocational Retired  (IT support)   Comments Pt (I) with ADLs baseline, supportive daughters for IADL management  SPC short distances; RW vs rollator longer distances  +    Lifestyle   Autonomy Pt (I) with ADLs baseline living alone in a 2 story home   Reciprocal Relationships Supportive daughters who A with IADLs   Service to Others Retired IT support   Intrinsic Gratification Enjoy being active and completing his workout routine; was a part of [employer]   Subjective   Subjective "It's just sciatic pain"   ADL   Eating Assistance 6  Modified independent   Eating Deficit Setup; Beverage management   Grooming Assistance 6  Modified Independent   UB Bathing Assistance 6  Modified Independent   LB Bathing Assistance 5  Supervision/Setup   UB Dressing Assistance 6  Modified independent   LB Dressing Assistance 5  Supervision/Setup   LB Dressing Deficit Setup;Supervision/safety; Increased time to complete; Thread RLE into pants; Thread LLE into pants;Pull up over hips; Fasteners  (sitting in recliner > standing with RW)   Toileting Assistance  5  Supervision/Setup   Bed Mobility   Supine to Sit Unable to assess   Sit to Supine Unable to assess   Additional Comments Pt received in recliner upon arrival and returned at end of sessio; bed mobility not assessed   Transfers   Sit to Stand 5  Supervision   Additional items Assist x 1; Increased time required;Verbal cues   Stand to Sit 5  Supervision   Additional items Assist x 1; Increased time required;Verbal cues   Functional Mobility   Functional Mobility 5  Supervision   Additional Comments Short household distance in room with RW with (S)     Additional items Rolling walker   Balance   Static Sitting Fair +   Dynamic Sitting Fair   Static Standing Fair   Dynamic Standing Fair -   Activity Tolerance   Activity Tolerance Patient limited by pain   Medical Staff Clarence coordinated with PT Federico Maravilla updated   Nurse Made Aware yes DIANA Esquivel ok to see pt   RUE Assessment   RUE Assessment WFL   LUE Assessment   LUE Assessment WFL   Hand Function   Gross Motor Coordination Functional   Fine Motor Coordination Functional   Sensation   Light Touch No apparent deficits   Vision-Basic Assessment   Current Vision Wears glasses all the time   Cognition   Overall Cognitive Status [facility]   Arousal/Participation Alert; Cooperative   Attention Within functional limits   Orientation Level Oriented X4   Memory Within functional limits   Following Commands Follows multistep commands with increased time or repetition   Comments Pt pleasant and agreeable to OT session  Good insight into deficits, good safety awareness  Assessment   Limitation Decreased endurance   Prognosis Good   Assessment Pt is a 80 y o  male seen for OT evaluation s/p admit to [address] [place] [address] on 10/8/2022 w/Left lower quadrant abdominal pain  Orders received for OT evaluation and treatment  Pt identified during session via name and wristband  Comorbidities affecting patient at time of evaluation include: anemia, CAD, hypertension and hyperlipidemia  Personal factors affecting patient at time of evaluation include: difficulty performing IADLs  PTA, patient was independent with functional mobility with RW vs SPC, independent with ADLS, requiring assist for IADLS, living alone in 2 story home with stair glides to both floors and retired  The evaluation identifies the following performance deficits: decreased endurance, decreased IADLS, pain and decreased activity tolerance, that result in activity limitations (as is outlined above in flowsheet)  Based on the OT evaluation outcomes, functional performance deficits, and assessment findings, pt has been identified as moderate complexity, because the patient may present with comorbidities that affect occupational performance and required minimal or moderate modification of tasks or assistance with the consideration of several treatment options   The patient's raw score on the AM-PAC Daily Activity inpatient short form is 24  , standardized score is 57 54  , greater than 39 4  From an OT standpoint, patients at this level may benefit from d/c to No rehabilitation needs  At time of evaluation, pt demonstrated (I) with ADL's and functional mobility w/ RW  Pt with no concerns for safe DC home and reporting feeling at/close to functional baseline  Pt with no acute OT needs at this time and will be DC from inpatient OT caseload  Please reconsult if functional status changes  Goals   Patient Goals to get back to my exercise program   Recommendation   OT Discharge Recommendation No rehabilitation needs   AM-PAC Daily Activity Inpatient   Lower Body Dressing 4   Bathing 4   Toileting 4   Upper Body Dressing 4   Grooming 4   Eating 4   Daily Activity Raw Score 24   Daily Activity Standardized Score (Calc for Raw Score >=11) 57 54   AM-[facility] Applied Cognition Inpatient   Following a Speech/Presentation 4   Understanding Ordinary Conversation 4   Taking Medications 4   Remembering Where Things Are Placed or Put Away 4   Remembering List of 4-5 Errands 3   Taking Care of Complicated Tasks 3   Applied Cognition Raw Score 22   Applied Cognition Standardized Score 47 83   End of Consult   Patient Position at End of Consult Bedside chair   Nurse Communication Nurse aware of consult   End of Consult Comments Pt appreciative for consult and excited to go home       At time of evaluation, pt demonstrated (I) with ADL's and functional mobility w/ RW  Pt with no concerns for safe DC home and reporting feeling at/close to functional baseline  Pt with no acute OT needs at this time and will be DC from inpatient OT caseload  Please reconsult if functional status changes      Roxanna Mcardle

## 2022-10-11 NOTE — ASSESSMENT & PLAN NOTE
· S/p CABG x4 in 2018    Plan:  · Aspirin Plavix initially continued  · Due to drop in hemoglobin overnight will hold for now  · Plavix Briefly held for procedure and resumed

## 2022-10-12 PROBLEM — M54.42 ACUTE LEFT-SIDED LOW BACK PAIN WITH LEFT-SIDED SCIATICA: Status: ACTIVE | Noted: 2022-10-12

## 2022-10-12 PROCEDURE — 97164 PT RE-EVAL EST PLAN CARE: CPT

## 2022-10-12 PROCEDURE — 99232 SBSQ HOSP IP/OBS MODERATE 35: CPT | Performed by: FAMILY MEDICINE

## 2022-10-12 RX ORDER — LIDOCAINE 50 MG/G
1 PATCH TOPICAL ONCE
Status: COMPLETED | OUTPATIENT
Start: 2022-10-12 | End: 2022-10-12

## 2022-10-12 RX ORDER — POLYETHYLENE GLYCOL 3350 17 G/17G
17 POWDER, FOR SOLUTION ORAL DAILY
Status: DISCONTINUED | OUTPATIENT
Start: 2022-10-12 | End: 2022-10-13 | Stop reason: HOSPADM

## 2022-10-12 RX ORDER — PANTOPRAZOLE SODIUM 40 MG/1
40 TABLET, DELAYED RELEASE ORAL
Status: DISCONTINUED | OUTPATIENT
Start: 2022-10-12 | End: 2022-10-13 | Stop reason: HOSPADM

## 2022-10-12 RX ORDER — DOCUSATE SODIUM 100 MG/1
100 CAPSULE, LIQUID FILLED ORAL 2 TIMES DAILY
Status: DISCONTINUED | OUTPATIENT
Start: 2022-10-12 | End: 2022-10-13 | Stop reason: HOSPADM

## 2022-10-12 RX ORDER — ENOXAPARIN SODIUM 100 MG/ML
40 INJECTION SUBCUTANEOUS
Status: DISCONTINUED | OUTPATIENT
Start: 2022-10-12 | End: 2022-10-13 | Stop reason: HOSPADM

## 2022-10-12 RX ORDER — IBUPROFEN 400 MG/1
800 TABLET ORAL EVERY 8 HOURS SCHEDULED
Status: DISCONTINUED | OUTPATIENT
Start: 2022-10-12 | End: 2022-10-13 | Stop reason: HOSPADM

## 2022-10-12 RX ORDER — PREDNISONE 20 MG/1
40 TABLET ORAL DAILY
Status: DISCONTINUED | OUTPATIENT
Start: 2022-10-12 | End: 2022-10-13 | Stop reason: HOSPADM

## 2022-10-12 RX ADMIN — Medication 2000 UNITS: at 08:59

## 2022-10-12 RX ADMIN — IBUPROFEN 800 MG: 400 TABLET, FILM COATED ORAL at 21:41

## 2022-10-12 RX ADMIN — CYANOCOBALAMIN TAB 500 MCG 1000 MCG: 500 TAB at 08:58

## 2022-10-12 RX ADMIN — IBUPROFEN 800 MG: 400 TABLET, FILM COATED ORAL at 15:16

## 2022-10-12 RX ADMIN — ENOXAPARIN SODIUM 40 MG: 40 INJECTION SUBCUTANEOUS at 15:16

## 2022-10-12 RX ADMIN — CLOPIDOGREL BISULFATE 75 MG: 75 TABLET ORAL at 08:58

## 2022-10-12 RX ADMIN — ATORVASTATIN CALCIUM 80 MG: 40 TABLET, FILM COATED ORAL at 08:58

## 2022-10-12 RX ADMIN — LIDOCAINE 5% 1 PATCH: 700 PATCH TOPICAL at 08:58

## 2022-10-12 RX ADMIN — GABAPENTIN 100 MG: 100 CAPSULE ORAL at 08:59

## 2022-10-12 RX ADMIN — IBUPROFEN 800 MG: 400 TABLET, FILM COATED ORAL at 08:59

## 2022-10-12 RX ADMIN — PANTOPRAZOLE SODIUM 40 MG: 40 TABLET, DELAYED RELEASE ORAL at 15:16

## 2022-10-12 RX ADMIN — PANTOPRAZOLE SODIUM 40 MG: 40 TABLET, DELAYED RELEASE ORAL at 08:59

## 2022-10-12 RX ADMIN — DOCUSATE SODIUM 100 MG: 100 CAPSULE, LIQUID FILLED ORAL at 17:38

## 2022-10-12 RX ADMIN — PREDNISONE 40 MG: 20 TABLET ORAL at 08:59

## 2022-10-12 RX ADMIN — B-COMPLEX W/ C & FOLIC ACID TAB 1 TABLET: TAB at 08:59

## 2022-10-12 RX ADMIN — POLYETHYLENE GLYCOL 3350 17 G: 17 POWDER, FOR SOLUTION ORAL at 15:16

## 2022-10-12 NOTE — ASSESSMENT & PLAN NOTE
· Presented initially with left lower back pain with radiation to the left lower quadrant in a band-like distribution  · States the pain is sharp in quality and 11/10 at max and 0/10 at rest, exacerbated by standing and movement  · Reports a single episode of diarrhea today after eating high fiber food, states the stool was non-watery and non-mucoid  · He was admitted and treated three weeks ago for a diverticulitis flare and treated with IV ceftriaxone and Flagyl  · The pain he is experiencing now is described as very similar to his diverticulitis flare three weeks ago  However during that occurrence, he had more consistent and frequent watery stools  · He describes dark appearing stools and endorsed taking Ibuprofen prior to last admission for pain relief  · FOBT done in the ED was said to be slightly positive today  · POA leukocytosis of 11 61  · Lipase wnl  · UA unremarkable  · CT abdomen reported diverticulosis with no evidence of diverticulitis, no evidence of nephrolithiasis  · DDx includes but is not limited to diverticulitis flare vs exacerbation of chronic low back pain with radiation vs C  Diff colitis vs food poisoning vs viral gastroenteritis vs constipation versus GI bleed    Plan:  · stool enteric panel pending  · C diff toxin pending  · Monitor I/Os  · Inpatient consult to gastroenterology  · 10/10: EGD/Colonoscopy - largely unremarkable showing some gastritis, old blood, no active signs of bleeding and diverticulosis in the colon  · discontinued IV ceftriaxone 1g q 24 hrs and Flagyl 500 mg q 8 hrs 10/11 on anticipated day of discharge  Late PM patient unable to get out of bed due to back pain therefore did not safe to leave  IV abx were empirically started and no active signs of infection there will monitor off abx      On discharge:  Stool studies were unable to be obtained and we decided not to pursue them as his clinical picture evolved

## 2022-10-12 NOTE — ASSESSMENT & PLAN NOTE
Patient was medically stable for discharge on October 11th with agreement from the patient to go home  Patient was also cleared by PT without any other further needs at that time  Around evening time, patient was unable to get out of bed due to severe sciatica like pain and therefore did not feel safe enough to go home    10/12:  Patient was observed in the morning feeling better and I personally witnessed patient able to get up out of bed and uses walker around the room without difficulty or debilitating pain  Patient agreeable to start ibuprofen and oral steroids  Was again re-evaluated by Physical therapy and deemed appropriate for home with outpatient PT  However patient continues to have flare ups through this morning

## 2022-10-12 NOTE — PHYSICAL THERAPY NOTE
PHYSICAL THERAPY EVALUATION NOTE          Patient Name: Cy Wade  PXJSX'D Date: 10/12/2022      AGE:   80 y o  Mrn:   [id number]  ADMIT DX:  Back pain [M54 9]  Diverticulitis [K57 92]  Ambulatory dysfunction [R26 2]  Left lower quadrant abdominal pain [R10 32]    Past Medical History:  Past Medical History:   Diagnosis Date    Abnormal liver function test     RESOLVED: [id number]    Allergic rhinitis     Anemia     LAST ASSESSED: [id number]    Arthritis     BPH (benign prostatic hyperplasia)     CAD (coronary artery disease)     Coronary artery disease     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HCC)     LAST ASSESSED: [id number]    Former tobacco use     GERD (gastroesophageal reflux disease)     Hand paresthesia     RESOLVED: [id number]    Hyperlipidemia     Hypertension     Lumbar stenosis     Peripheral artery disease ([facility] [address] )     LAST ASSESSED: [id number]    Stage 3a chronic kidney disease ([facility] [address] ) 7/11/2021       Past Surgical History:  Past Surgical History:   Procedure Laterality Date    BACK SURGERY      COLONOSCOPY      LUMBAR FUSION      MA ARTHRODESIS POSTERIOR/POSTEROLATERAL LUMBAR N/A 11/03/2016    Procedure: L2-S1 POSTERIOR LUMBAR FUSION AND DECOMPRESSION (Impulse), Posterior lateral fixation; dural repair ;  Surgeon: Henrietta Lyons MD;  Location: [facility];  Service: Orthopedics    MA CABG, ARTERIAL, SINGLE N/A 01/19/2018    Procedure: CABG X4 with LIMA - LAD, SVG - RCA, OM2, & Diagonal ; Left Leg EVH; MATTHEW;  Surgeon: Kylah Bolivar DO;  Location: [facility];  Service: Cardiac Surgery    MA COLONOSCOPY FLX DX W/COLLJ SPEC WHEN PFRMD N/A 11/15/2017    Procedure: EGD AND COLONOSCOPY;  Surgeon: Kristin Richmond MD;  Location: [facility];   Service: Gastroenterology    SEPTOPLASTY      LAST ASSESSED; [id number]    TONSILECTOMY AND ADNOIDECTOMY      LAST ASSESSED: [id number]    UPPER GASTROINTESTINAL ENDOSCOPY       Length Of Stay: 4        PHYSICAL THERAPY EVALUATION:    Patient's identity confirmed via 2 patient identifiers (full name and ) at start of session       10/12/22 0758   PT Last Visit   PT Visit Date 10/12/22   Note Type   Note type Re-Evaluation   Additional Comments PT re-eval performed due to pt's DC being held for new onset of back pain  PT evaled the day prior 10/11 and was DC from IPPT caseload   Pain Assessment   Pain Assessment Tool 0-10   Pain Score 5   Pain Location/Orientation Orientation: Left;Orientation: Lower; Location: Back   Pain Radiating Towards L anterior thigh   Effect of Pain on Daily Activities limits ease of mobility   Hospital Pain Intervention(s) Repositioned; Ambulation/increased activity   Restrictions/Precautions   Weight Bearing Precautions Per Order No   Other Precautions Chair Alarm; Bed Alarm; Fall Risk;Pain   Home Living   Type of [address] [place] [address] Two level;1/2 bath on main level;Bed/bath upstairs  (enters through basement/garage; stair glide to access 1st and 2nd floor)   Bathroom Shower/Tub Tub/shower unit   Bathroom Toilet Standard   Bathroom Equipment Grab bars in shower; Shower chair;Commode   Bathroom Accessibility Accessible   Home Equipment Walker;Cane;Stair glide  (RW and rollator walker)   Prior Function   Level of [place] Independent with ADLs; Independent with functional mobility   Lives With Alone   Receives Help From Family  (3 local daughters)   IADLs Family/Friend/Other provides meals; Independent with driving  (daughters assisting w/ meals recently)   Falls in the last 6 months 0   Vocational Retired  (IT support)   Comments At baseline pt ambulates w/ walker vs SPC (for short distances)   General   Family/Caregiver Present No   Cognition   Overall Cognitive Status WFL   Arousal/Participation Alert   Attention Within functional limits   Orientation Level Oriented X4   Memory Within functional limits   Following Commands Follows one step commands without difficulty   Comments Pt ID via name and ; pt agreeable to PT eval and mobility   RLE Assessment   RLE Assessment WFL  (MMT w/ pt sitting OOB in recliner chair)   Strength RLE   R Knee Flexion 3+/5   R Knee Extension 3+/5   R Ankle Dorsiflexion 3+/5   R Ankle Plantar Flexion 3+/5   LLE Assessment   LLE Assessment WFL  (MMT w/ pt sitting OOB in recliner chair)   Strength LLE   L Knee Flexion 3+/5   L Knee Extension 3+/5   L Ankle Dorsiflexion 3+/5   L Ankle Plantar Flexion 3+/5   Vision-Basic Assessment   Current Vision Wears glasses all the time   Light Touch   RLE Light Touch Grossly intact   LLE Light Touch Grossly intact   Bed Mobility   Supine to Sit Unable to assess   Sit to Supine Unable to assess   Additional Comments pt sitting at EOB upon arrival and sitting OOB in recliner chair at end of session   Transfers   Sit to Stand 5  Supervision   Additional items Assist x 1; Increased time required;Verbal cues;Armrests   Stand to Sit 5  Supervision   Additional items Assist x 1; Armrests; Verbal cues   Additional Comments Pt performed 5 sit<>stand transfers w/ supervision and VC for hand placement/technique   Ambulation/Elevation   Gait pattern Wide MEGAN; Decreased foot clearance; Forward Flexion   Gait Assistance 5  Supervision   Additional items Assist x 1;Verbal cues   Assistive Device Rolling walker   Distance 74'+789'+25'   Ambulation/Elevation Additional Comments Pt ambulated wrpund bed and into hallway w/ RW  Able to negotiate multiple turns w/o LOB   Balance   Static Sitting Fair +   Dynamic Sitting Fair   Static Standing [address] -  (w/ RW)   Activity Tolerance   Activity Tolerance Patient limited by pain   Medical Staff Made Aware Resident Timmy Lind updated post via TT   Nurse Made Aware DIANA Iglesias   Assessment   Prognosis Good   Problem List Pain   Assessment Orders for PT eval and treat received   Comorbidities continuing to affect pt include: CAD, anemia, HTN, GERD, PAD, spondylolisthesis of lumbar region, lumbar stenosis, and 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  Personal factors affecting pt DC include: limited home support  At baseline, pt mobilizes independently w/ walker vs SPC, and reports 0 fall(s) in the previous 6 months  Upon re-eval pt presents w/ the following deficits: pain limiting functional mobility  Pt currently requires supervision for transfers and supervision w/ RW for gait  From a PT/mobility standpoint, given the above findings, DC recommendation is for Home w/ OPPT  Pt appears to be functioning close to or at mobility baseline, therefore no further immediate skilled PT needs are warranted at this time and pt will be DC from IPPT caseload  Will DC inpatient PT due to pt without acute PT needs  Please re-consult if further immediate skilled PT needs are warranted  Goals   Patient Goals to go home   PT Treatment Day 0   Plan   Treatment/Interventions   (DC IPPT)   Recommendation   PT Discharge Recommendation Home with outpatient rehabilitation   Equipment Recommended   (pt reports having walkers)   AM-PAC Basic Mobility Inpatient   Turning in Bed Without Bedrails 4   Lying on Back to Sitting on Edge of Flat Bed 4   Moving Bed to Chair 3   Standing Up From Chair 3   Walk in Room 3   Climb 3-5 Stairs 3   Basic Mobility Inpatient Raw Score 20   Basic Mobility Standardized Score 43 99   Highest Level Of Mobility   -HLM Goal 6: Walk 10 steps or more   JH-HLM Achieved 7: Walk 25 feet or more   End of Consult   Patient Position at End of Consult Bedside chair;Bed/Chair alarm activated; All needs within reach       The patient's AM-PAC Basic Mobility Inpatient Short Form Raw Score is 20  A Raw score of greater than 16 suggests the patient may benefit from discharge to home  Please also refer to the recommendation of the Physical Therapist for safe discharge planning      Given the above findings from this evaluation, at this time this patient does not require skilled inpatient PT for the remainder of this admission  Will D/C patient from PT caseload, please reconsult if any changes or needs arise           DC rec: Inna Baez, PT, DPT  10/12/22

## 2022-10-12 NOTE — DISCHARGE SUMMARY
[facility]  Discharge- Gosia Watkins 1940, 80 y o  male MRN: [id number]  Unit/Bed#: W -59 Encounter: [id number]  Primary Care Provider: Bhumi Quiñones DO   Date and time admitted to hospital: 10/8/2022 10:04 AM    Acute left-sided low back pain with left-sided sciatica  Assessment & Plan  Patient was medically stable for discharge on October 11th with agreement from the patient to go home  Patient was also cleared by PT without any other further needs at that time  Around evening time, patient was unable to get out of bed due to severe sciatica like pain and therefore did not feel safe enough to go home    10/12:  Patient was observed in the morning feeling better and I personally witnessed patient able to get up out of bed and uses walker around the room without difficulty or debilitating pain  Patient agreeable to start ibuprofen and oral steroids  Was again re-evaluated by Physical therapy and deemed appropriate for home with outpatient PT  However patient continues to have flare ups through this morning  * Left lower quadrant abdominal pain  Assessment & Plan  · Presented initially with left lower back pain with radiation to the left lower quadrant in a band-like distribution  · States the pain is sharp in quality and 11/10 at max and 0/10 at rest, exacerbated by standing and movement  · Reports a single episode of diarrhea today after eating high fiber food, states the stool was non-watery and non-mucoid  · He was admitted and treated three weeks ago for a diverticulitis flare and treated with IV ceftriaxone and Flagyl  · The pain he is experiencing now is described as very similar to his diverticulitis flare three weeks ago  However during that occurrence, he had more consistent and frequent watery stools  · He describes dark appearing stools and endorsed taking Ibuprofen prior to last admission for pain relief    · FOBT done in the ED was said to be slightly positive today  · POA leukocytosis of 11 61  · Lipase wnl  · UA unremarkable  · CT abdomen reported diverticulosis with no evidence of diverticulitis, no evidence of nephrolithiasis  · DDx includes but is not limited to diverticulitis flare vs exacerbation of chronic low back pain with radiation vs C  Diff colitis vs food poisoning vs viral gastroenteritis vs constipation versus GI bleed    Plan:  · stool enteric panel pending  · C diff toxin pending  · Monitor I/Os  · Inpatient consult to gastroenterology  · 10/10: EGD/Colonoscopy - largely unremarkable showing some gastritis, old blood, no active signs of bleeding and diverticulosis in the colon  · discontinued IV ceftriaxone 1g q 24 hrs and Flagyl 500 mg q 8 hrs 10/11 on anticipated day of discharge  Late PM patient unable to get out of bed due to back pain therefore did not safe to leave  IV abx were empirically started and no active signs of infection there will monitor off abx  On discharge:  Stool studies were unable to be obtained and we decided not to pursue them as his clinical picture evolved    Assessment & Plan  · Presented initially with left lower back pain with radiation to the left lower quadrant in a band-like distribution  · States the pain is sharp in quality and 11/10 at max and 0/10 at rest, exacerbated by standing and movement  · Reports a single episode of diarrhea today after eating high fiber food, states the stool was non-watery and non-mucoid  · He was admitted and treated three weeks ago for a diverticulitis flare and treated with IV ceftriaxone and Flagyl  · The pain he is experiencing now is described as very similar to his diverticulitis flare three weeks ago  However during that occurrence, he had more consistent and frequent watery stools  · He describes dark appearing stools and endorsed taking Ibuprofen prior to last admission for pain relief  · FOBT done in the ED was said to be slightly positive today    · POA leukocytosis of 11 61  · Lipase wnl  · UA unremarkable  · CT abdomen reported diverticulosis with no evidence of diverticulitis, no evidence of nephrolithiasis  · DDx includes but is not limited to diverticulitis flare vs exacerbation of chronic low back pain with radiation vs C  Diff colitis vs food poisoning vs viral gastroenteritis vs constipation versus GI bleed    Plan:  · stool enteric panel pending  · C diff toxin pending  · Monitor I/Os  · Inpatient consult to gastroenterology  · 10/10: EGD/Colonoscopy - largely unremarkable showing some gastritis, old blood, no active signs of bleeding and diverticulosis in the colon    · Will continue IV ceftriaxone 1g q 24 hrs and Flagyl 500 mg q 8 hrs    On discharge:  Stool studies were unable to be obtained and we decided not to pursue them as his clinical picture evolved    Anemia  Assessment & Plan  · POA hemoglobin of 11 1 within patient's baseline  · Likely chronic anemia in the setting of CKD stage 3 verses possible GI bleed  · Patient did endorse using Ibuprofen prior to last admission for pain relief of diverticulitis flare  · Colonoscopy in 2017 showed internal/external hemorrhoids  · FOBT was reported to be slightly positive this admission  · Suspicion for PUD vs gastritis in the setting of reported dark stools vs chronic hemorrhoids  · Hemoglobin decreased from 11 1-9 3    Plan:  · Continue to monitor CBC and differential in the morning   · Inpatient consult to gastroenterology for possible EGD and colonoscopy  · 10/10: PM EGD/Colonoscopy  · Will avoid NSAIDs  · Protonix transition to IV b i d   · Transfuse for less than 7    Assessment & Plan  · POA hemoglobin of 11 1 within patient's baseline  · Likely chronic anemia in the setting of CKD stage 3 verses possible GI bleed  · Patient did endorse using Ibuprofen prior to last admission for pain relief of diverticulitis flare  · Colonoscopy in 2017 showed internal/external hemorrhoids  · FOBT was reported to be slightly positive this admission  · Suspicion for PUD vs gastritis in the setting of reported dark stools vs chronic hemorrhoids  · Hemoglobin decreased from 11 1-9 3    Plan:  · Continue to monitor CBC and differential in the morning   · Inpatient consult to gastroenterology for possible EGD and colonoscopy  · 10/10: PM EGD/Colonoscopy  · Will avoid NSAIDs  · Protonix transition to IV b i d   · Transfuse for less than 7    GERD (gastroesophageal reflux disease)  Assessment & Plan  · Takes famotidine 20 mg daily at home, also with history of duodenal ulcers in the past    Plan:  · Protonix transitioned to 40 mg IV b i d   · Continue home famotidine 20 mg daily    Assessment & Plan  · Takes famotidine 20 mg daily at home, also with history of duodenal ulcers in the past    Plan:  · Protonix transitioned to 40 mg IV b i d   · Continue home famotidine 20 mg daily    Hypertension  Assessment & Plan  · POA blood pressure of 145/64    Plan:  · Continue metoprolol tartrate 75 mg q 12 hrs  · Continue to monitor routine vital signs    Assessment & Plan  · POA blood pressure of 145/64    Plan:  · Continue metoprolol tartrate 75 mg q 12 hrs  · Continue to monitor routine vital signs    Hyperlipidemia  Assessment & Plan  Plan:  · Continue atorvastatin 80 mg daily    Assessment & Plan  Plan:  · Continue atorvastatin 80 mg daily    CAD (coronary artery disease)  Assessment & Plan  · S/p CABG x4 in 2018    Plan:  · Aspirin Plavix initially continued  · Due to drop in hemoglobin overnight will hold for now  · Plavix Briefly held for procedure and resumed      Assessment & Plan  · S/p CABG x4 in 2018    Plan:  · Aspirin Plavix initially continued  · Due to drop in hemoglobin overnight will hold for now  · Plavix Briefly held for procedure and resumed        Medical Problems             Resolved Problems  Date Reviewed: 10/9/2022   None               Discharging Resident: Mago Rajput [address] [place] [address]  Discharging Attending:  Vidya Chatman MD  PCP: Panchito Baldwin DO  Admission Date:   Admission Orders (From admission, onward)     Ordered        10/08/22 1342  [address]  Once            10/08/22 1335  Place in Observation  Once,   Status:  [address]  Stay:  · Gastroenterology    Procedures Performed:   · EGD - no recommended follow-up  · Colonoscopy - moderate pandiverticulosis, no evidence of fresh or old blood, small internal hemorrhoids    Significant Findings / Test Results:   · CT a/p:  Diffuse colonic diverticulosis without evidence of diverticulitis  · CT recon lumbar spine:  Ordered in ED for lower back pain  Severe bilateral foraminal narrowing and L5-S1 and multilevel degenerative changes  No acute fracture or significant change in spinal alignment from prior studies  Incidental Findings:   · None     Test Results Pending at Discharge (will require follow up): · None     Outpatient Tests Requested:  · GI to consider outpatient capsule endoscopy per patient preference  · H pylori stool recommended in EGD note    Complications:  None    Reason for Admission:  Left lower quadrant abdominal pain    Hospital Course:   Max Blanton is a 80 y o  male patient who originally presented to the hospital on 10/8/2022 due to acute abdominal pain exacerbated with positional changes  Stated that it felt similar to previous diverticulitis flare  Endorsed a single episode of diarrhea and reported dark stools  There were concerns for acute GI bleeding due to 2 point decrease in Hemoglobin  Plavix was held briefly while patient was prepped for diagnostic EGD and colonoscopy that were largely unremarkable  Patient received empiric antibiotics for UTI and later found to have a urine culture negative  Stool studies were recommended on admission/presentation however they were unable to be obtained due to lack of samples/bowel movements    On day of discharge, we decided not to pursue further stool studies as his clinical picture improved  GI recommended patient take PPI BID for 1 month followed by daily thereafter  Patient was medically stable for discharge on October 11th with agreement from the patient to go home  Patient was also cleared by PT without any other further needs at that time  Around evening time, patient was unable to get out of bed due to severe sciatica like pain and therefore did not feel safe enough to go home  Patient was started on oral steroids and ibuprofen and will be discharged with these meds with PT recommendation  Please see above list of diagnoses and related plan for additional information  Condition at Discharge: stable    Discharge Day Visit / Exam:   Subjective:  Patient feels great to go home today  Able to ambulate well to the restroom and back to his bed  Sciatic flares not as bad  Does tell me me had 7 small bowel movements overnight  No blood, pain, straining with movements  Received BM regimen last night and this morning  Denies numbness/tingling/incontinence  Vitals: Blood Pressure: 140/66 (10/11/22 0749)  Pulse: 74 (10/11/22 0749)  Temperature: 97 8 °F (36 6 °C) (10/11/22 0749)  Temp Source: Oral (10/11/22 0749)  Respirations: 17 (10/11/22 0749)  Height: 5' 7" (170 2 cm) (10/08/22 1010)  Weight - Scale: 76 6 kg (168 lb 14 oz) (10/08/22 1010)  SpO2: 98 % (10/11/22 0749)  Exam:   Physical Exam  Vitals and nursing note reviewed  Constitutional:       Appearance: He is well-developed  HENT:      Head: Normocephalic and atraumatic  Right Ear: External ear normal       Left Ear: External ear normal       Nose: Nose normal       Mouth/Throat:      Mouth: Mucous membranes are moist       Pharynx: Oropharynx is clear  Eyes:      Conjunctiva/sclera: Conjunctivae normal    Cardiovascular:      Rate and Rhythm: Normal rate and regular rhythm  Pulses: Normal pulses  Heart sounds: Normal heart sounds  No murmur heard    Pulmonary:      Effort: Pulmonary effort is normal  No respiratory distress  Breath sounds: Normal breath sounds  Abdominal:      General: Abdomen is flat  There is no distension  Palpations: Abdomen is soft  Tenderness: There is no abdominal tenderness  There is no guarding  Musculoskeletal:         General: Tenderness (left lower back) present  Cervical back: Neck supple  Right lower leg: No edema  Left lower leg: No edema  Skin:     General: Skin is warm and dry  Capillary Refill: Capillary refill takes less than 2 seconds  Neurological:      General: No focal deficit present  Mental Status: He is alert and oriented to person, place, and time  Psychiatric:         Mood and Affect: Mood normal          Behavior: Behavior normal           Discussion with Family: Updated  (daughter) at bedside  Discharge instructions/Information to patient and family:   See after visit summary for information provided to patient and family  Provisions for Follow-Up Care:  See after visit summary for information related to follow-up care and any pertinent home health orders  Disposition:   Home    Planned Readmission: No    Discharge Medications:  See after visit summary for reconciled discharge medications provided to patient and/or family        **Please Note: This note may have been constructed using a voice recognition system**

## 2022-10-12 NOTE — PROGRESS NOTES
[facility]  Progress Note - Lon Swift [birth year], 80 y o  male MRN: [id number]  Unit/Bed#: W [facility]-49 Encounter: [id number]  Primary Care Provider: Nahomi Brennan DO   Date and time admitted to hospital: 10/8/2022 10:04 AM    Acute left-sided low back pain with left-sided sciatica  Assessment & Plan  Patient was medically stable for discharge on October 11th with agreement from the patient to go home  Patient was also cleared by PT without any other further needs at that time  Around evening time, patient was unable to get out of bed due to severe sciatica like pain and therefore did not feel safe enough to go home    10/12:  Patient was observed in the morning feeling better and I personally witnessed patient able to get up out of bed and uses walker around the room without difficulty or debilitating pain  Patient agreeable to start ibuprofen and oral steroids  Was again re-evaluated by Physical therapy and deemed appropriate for home with outpatient PT  However patient continues to have flare ups through this morning  * Left lower quadrant abdominal pain  Assessment & Plan  · Presented initially with left lower back pain with radiation to the left lower quadrant in a band-like distribution  · States the pain is sharp in quality and 11/10 at max and 0/10 at rest, exacerbated by standing and movement  · Reports a single episode of diarrhea today after eating high fiber food, states the stool was non-watery and non-mucoid  · He was admitted and treated three weeks ago for a diverticulitis flare and treated with IV ceftriaxone and Flagyl  · The pain he is experiencing now is described as very similar to his diverticulitis flare three weeks ago  However during that occurrence, he had more consistent and frequent watery stools  · He describes dark appearing stools and endorsed taking Ibuprofen prior to last admission for pain relief    · FOBT done in the ED was said to be slightly positive today  · POA leukocytosis of 11 61  · Lipase wnl  · UA unremarkable  · CT abdomen reported diverticulosis with no evidence of diverticulitis, no evidence of nephrolithiasis  · DDx includes but is not limited to diverticulitis flare vs exacerbation of chronic low back pain with radiation vs C  Diff colitis vs food poisoning vs viral gastroenteritis vs constipation versus GI bleed    Plan:  · stool enteric panel pending  · C diff toxin pending  · Monitor I/Os  · Inpatient consult to gastroenterology  · 10/10: EGD/Colonoscopy - largely unremarkable showing some gastritis, old blood, no active signs of bleeding and diverticulosis in the colon  · discontinued IV ceftriaxone 1g q 24 hrs and Flagyl 500 mg q 8 hrs 10/11 on anticipated day of discharge  Late PM patient unable to get out of bed due to back pain therefore did not safe to leave  IV abx were empirically started and no active signs of infection there will monitor off abx      On discharge:  Stool studies were unable to be obtained and we decided not to pursue them as his clinical picture evolved    Anemia  Assessment & Plan  · POA hemoglobin of 11 1 within patient's baseline  · Likely chronic anemia in the setting of CKD stage 3 verses possible GI bleed  · Patient did endorse using Ibuprofen prior to last admission for pain relief of diverticulitis flare  · Colonoscopy in 2017 showed internal/external hemorrhoids  · FOBT was reported to be slightly positive this admission  · Suspicion for PUD vs gastritis in the setting of reported dark stools vs chronic hemorrhoids  · Hemoglobin decreased from 11 1-9 3    Plan:  · Continue to monitor CBC and differential in the morning   · Inpatient consult to gastroenterology for possible EGD and colonoscopy  · 10/10: PM EGD/Colonoscopy  · Will avoid NSAIDs  · Protonix transition to IV b i d   · Transfuse for less than 7    GERD (gastroesophageal reflux disease)  Assessment & Plan  · Takes famotidine 20 mg daily at home, also with history of duodenal ulcers in the past    Plan:  · Protonix transitioned to 40 mg IV b i d   · Continue home famotidine 20 mg daily    Hypertension  Assessment & Plan  · POA blood pressure of 145/64    Plan:  · Continue metoprolol tartrate 75 mg q 12 hrs  · Continue to monitor routine vital signs    Hyperlipidemia  Assessment & Plan  Plan:  · Continue atorvastatin 80 mg daily    CAD (coronary artery disease)  Assessment & Plan  · S/p CABG x4 in 2018    Plan:  · Aspirin Plavix initially continued  · Due to drop in hemoglobin overnight will hold for now  · Plavix Briefly held for procedure and resumed      VTE Pharmacologic Prophylaxis: VTE Score: 5 Patient was medically stable for discharge  Upon staying overnight, no DVT ppx reordered  If patient stays today will reorder DVT ppx  Patient Centered Rounds: I performed bedside rounds with nursing staff today  Discussions with Specialists or Other Care Team Provider: WILLIAM    Education and Discussions with Family / Patient: Patient declined call to   Has 3 daughters, says he will update/text them himself  Current Length of Stay: 4 day(s)  Current Patient Status: Inpatient   Discharge Plan: later this evening or tomorrow to home    Code Status: Level 3 - DNAR and DNI    Subjective:   Patient was medically stable for discharge on October 11th with agreement from the patient to go home  Patient was also cleared by PT without any other further needs at that time  Around evening time, patient was unable to get out of bed due to severe sciatica like pain and therefore did not feel safe enough to go home    10/12:  Patient was observed in the morning feeling better and I personally witnessed patient able to get up out of bed and uses walker around the room without difficulty or debilitating pain  Patient agreeable to start ibuprofen and oral steroids    Was again re-evaluated by Physical therapy and deemed appropriate for home with outpatient PT  However patient continues to have flare ups through this morning  Objective:     Vitals:   Temp (24hrs), Av 2 °F (36 8 °C), Min:97 3 °F (36 3 °C), Max:99 1 °F (37 3 °C)    Temp:  [97 3 °F (36 3 °C)-99 1 °F (37 3 °C)] 97 3 °F (36 3 °C)  HR:  [83] 83  Resp:  [17-18] 17  BP: ()/(53-62) 95/53  SpO2:  [99 %] 99 %  Body mass index is 26 45 kg/m²  Input and Output Summary (last 24 hours): Intake/Output Summary (Last 24 hours) at 10/12/2022 1338  Last data filed at 10/12/2022 0852  Gross per 24 hour   Intake 1651 ml   Output 900 ml   Net 751 ml       Physical Exam:   Physical Exam  Vitals and nursing note reviewed  Constitutional:       Appearance: He is well-developed  HENT:      Head: Normocephalic and atraumatic  Right Ear: External ear normal       Left Ear: External ear normal       Nose: Nose normal       Mouth/Throat:      Mouth: Mucous membranes are moist       Pharynx: Oropharynx is clear  Eyes:      Conjunctiva/sclera: Conjunctivae normal    Cardiovascular:      Rate and Rhythm: Normal rate and regular rhythm  Pulses: Normal pulses  Heart sounds: Normal heart sounds  No murmur heard  Pulmonary:      Effort: Pulmonary effort is normal  No respiratory distress  Breath sounds: Normal breath sounds  Abdominal:      General: Abdomen is flat  Bowel sounds are normal  There is no distension  Palpations: Abdomen is soft  Tenderness: There is abdominal tenderness  There is no guarding or rebound  Musculoskeletal:      Cervical back: Neck supple  Right lower leg: No edema  Left lower leg: No edema  Skin:     General: Skin is warm and dry  Capillary Refill: Capillary refill takes less than 2 seconds  Neurological:      General: No focal deficit present  Mental Status: He is alert and oriented to person, place, and time     Psychiatric:         Mood and Affect: Mood normal          Behavior: Behavior normal  Additional Data:     Labs:  Results from last 7 days   Lab Units 10/11/22  0442   WBC Thousand/uL 8 67   HEMOGLOBIN g/dL 9 5*   HEMATOCRIT % 29 3*   PLATELETS Thousands/uL 154   NEUTROS PCT % 63   LYMPHS PCT % 22   MONOS PCT % 10   EOS PCT % 4     Results from last 7 days   Lab Units 10/11/22  0442 10/10/22  0428   SODIUM mmol/L 140 144   POTASSIUM mmol/L 3 7 3 8   CHLORIDE mmol/L 106 108   CO2 mmol/L 27 27   BUN mg/dL 11 12   CREATININE mg/dL 0 88 0 94   ANION GAP mmol/L 7 9   CALCIUM mg/dL 8 2* 8 6   ALBUMIN g/dL  --  3 2*   TOTAL BILIRUBIN mg/dL  --  0 37   ALK PHOS U/L  --  72   ALT U/L  --  11   AST U/L  --  16   GLUCOSE RANDOM mg/dL 101 104     Results from last 7 days   Lab Units 10/08/22  1046   INR  1 02     Results from last 7 days   Lab Units 10/08/22  1050   POC GLUCOSE mg/dl 100               Lines/Drains:  Invasive Devices  Report    None                 Imaging: Reviewed radiology reports from this admission including: abdominal/pelvic CT and CT recon only lumbar spine    Recent Cultures (last 7 days):   Results from last 7 days   Lab Units 10/08/22  1251   URINE CULTURE  No Growth <1000 cfu/mL       Last 24 Hours Medication List:   Current Facility-Administered Medications   Medication Dose Route Frequency Provider Last Rate   • acetaminophen  650 mg Oral Q6H PRN Handmitriyong Melanie Lety, DO     • atorvastatin  80 mg Oral QAM Handmitriyong Melanie Lety, DO     • cholecalciferol  2,000 Units Oral Daily Anthonyong Melanie Lety, DO     • clopidogrel  75 mg Oral Daily Chris Browning MD     • vitamin B-12  1,000 mcg Oral Daily Hanxiong Melanie Lety, DO     • docusate sodium  100 mg Oral BID PRN Handmitriyong Melanie Lety, DO     • gabapentin  100 mg Oral Daily Hanxiong Melanie Lety, DO     • HYDROmorphone  0 2 mg Intravenous Q4H PRN Lux Evansum, DO     • ibuprofen  800 mg Oral [facility] Chirs Browning MD     • ipratropium  0 5 mg Nebulization Daily PRN Lux Evansum, DO     • lidocaine  1 patch Topical Once Moreno Mahan Peyton Mathis MD     • metoprolol tartrate  75 mg Oral Q12H [address] Hanximindy Morrison, DO     • multivitamin stress formula  1 tablet Oral Daily Lux Morrison, DO     • oxyCODONE  2 5 mg Oral Q4H PRN Lux Morrison, DO     • pantoprazole  40 mg Oral BID EDIE Henderson MD     • predniSONE  40 mg Oral Daily Wally Montoya MD          Today, Patient Was Seen By: Wally Montoya    **Please Note: This note may have been constructed using a voice recognition system  **

## 2022-10-12 NOTE — PLAN OF CARE
Problem: Potential for Falls  Goal: Patient will remain free of falls  Description: INTERVENTIONS:  - Educate patient/family on patient safety including physical limitations  - Instruct patient to call for assistance with activity   - Consult OT/PT to assist with strengthening/mobility   - Keep Call bell within reach  - Keep bed low and locked with side rails adjusted as appropriate  - Keep care items and personal belongings within reach  - Initiate and maintain comfort rounds  - Make Fall Risk Sign visible to staff  - Offer Toileting every *** Hours, in advance of need  - Initiate/Maintain ***alarm  - Obtain necessary fall risk management equipment: ***  - Apply yellow socks and bracelet for high fall risk patients  - Consider moving patient to room near nurses station  Outcome: Progressing     Problem: MOBILITY - ADULT  Goal: Maintain or return to baseline ADL function  Description: INTERVENTIONS:  -  Assess patient's ability to carry out ADLs; assess patient's baseline for ADL function and identify physical deficits which impact ability to perform ADLs (bathing, care of mouth/teeth, toileting, grooming, dressing, etc )  - Assess/evaluate cause of self-care deficits   - Assess range of motion  - Assess patient's mobility; develop plan if impaired  - Assess patient's need for assistive devices and provide as appropriate  - Encourage maximum independence but intervene and supervise when necessary  - Involve family in performance of ADLs  - Assess for home care needs following discharge   - Consider OT consult to assist with ADL evaluation and planning for discharge  - Provide patient education as appropriate  Outcome: Progressing  Goal: Maintains/Returns to pre admission functional level  Description: INTERVENTIONS:  - Perform BMAT or MOVE assessment daily    - Set and communicate daily mobility goal to care team and patient/family/caregiver     - Collaborate with rehabilitation services on mobility goals if consulted  - Perform Range of Motion *** times a day  - Reposition patient every *** hours    - Dangle patient *** times a day  - Stand patient *** times a day  - Ambulate patient *** times a day  - Out of bed to chair *** times a day   - Out of bed for meals *** times a day  - Out of bed for toileting  - Record patient progress and toleration of activity level   Outcome: Progressing     Problem: Prexisting or High Potential for Compromised Skin Integrity  Goal: Skin integrity is maintained or improved  Description: INTERVENTIONS:  - Identify patients at risk for skin breakdown  - Assess and monitor skin integrity  - Assess and monitor nutrition and hydration status  - Monitor labs   - Assess for incontinence   - Turn and reposition patient  - Assist with mobility/ambulation  - Relieve pressure over bony prominences  - Avoid friction and shearing  - Provide appropriate hygiene as needed including keeping skin clean and dry  - Evaluate need for skin moisturizer/barrier cream  - Collaborate with interdisciplinary team   - Patient/family teaching  - Consider wound care consult   Outcome: Progressing     Problem: GASTROINTESTINAL - ADULT  Goal: Maintains or returns to baseline bowel function  Description: INTERVENTIONS:  - Assess bowel function  - Encourage oral fluids to ensure adequate hydration  - Administer IV fluids if ordered to ensure adequate hydration  - Administer ordered medications as needed  - Encourage mobilization and activity  - Consider nutritional services referral to assist patient with adequate nutrition and appropriate food choices  Outcome: Progressing

## 2022-10-12 NOTE — PROGRESS NOTES
[facility]  Progress Note - Odilia Avendaño 5/32/1897, 80 y o  male MRN: [id number]  Unit/Bed#: W -77 Encounter: [id number]  Primary Care Provider: Andrea Marshall DO   Date and time admitted to hospital: 10/8/2022 10:04 AM    Acute left-sided low back pain with left-sided sciatica  Assessment & Plan  Patient was medically stable for discharge on October 11th with agreement from the patient to go home  Patient was also cleared by PT without any other further needs at that time  Around evening time, patient was unable to get out of bed due to severe sciatica like pain and therefore did not feel safe enough to go home    10/12:  Patient was observed in the morning feeling better and I personally witnessed patient able to get up out of bed and uses walker around the room without difficulty or debilitating pain  Patient agreeable to start ibuprofen and oral steroids  Was again re-evaluated by Physical therapy and deemed appropriate for home with outpatient PT  However patient continues to have flare ups through this morning  * Left lower quadrant abdominal pain  Assessment & Plan  · Presented initially with left lower back pain with radiation to the left lower quadrant in a band-like distribution  · States the pain is sharp in quality and 11/10 at max and 0/10 at rest, exacerbated by standing and movement  · Reports a single episode of diarrhea today after eating high fiber food, states the stool was non-watery and non-mucoid  · He was admitted and treated three weeks ago for a diverticulitis flare and treated with IV ceftriaxone and Flagyl  · The pain he is experiencing now is described as very similar to his diverticulitis flare three weeks ago  However during that occurrence, he had more consistent and frequent watery stools  · He describes dark appearing stools and endorsed taking Ibuprofen prior to last admission for pain relief    · FOBT done in the ED was said to be slightly positive today  · POA leukocytosis of 11 61  · Lipase wnl  · UA unremarkable  · CT abdomen reported diverticulosis with no evidence of diverticulitis, no evidence of nephrolithiasis  · DDx includes but is not limited to diverticulitis flare vs exacerbation of chronic low back pain with radiation vs C  Diff colitis vs food poisoning vs viral gastroenteritis vs constipation versus GI bleed    Plan:  · stool enteric panel pending  · C diff toxin pending  · Monitor I/Os  · Inpatient consult to gastroenterology  · 10/10: EGD/Colonoscopy - largely unremarkable showing some gastritis, old blood, no active signs of bleeding and diverticulosis in the colon  · discontinued IV ceftriaxone 1g q 24 hrs and Flagyl 500 mg q 8 hrs 10/11 on anticipated day of discharge  Late PM patient unable to get out of bed due to back pain therefore did not safe to leave  IV abx were empirically started and no active signs of infection there will monitor off abx      On discharge:  Stool studies were unable to be obtained and we decided not to pursue them as his clinical picture evolved    Anemia  Assessment & Plan  · POA hemoglobin of 11 1 within patient's baseline  · Likely chronic anemia in the setting of CKD stage 3 verses possible GI bleed  · Patient did endorse using Ibuprofen prior to last admission for pain relief of diverticulitis flare  · Colonoscopy in 2017 showed internal/external hemorrhoids  · FOBT was reported to be slightly positive this admission  · Suspicion for PUD vs gastritis in the setting of reported dark stools vs chronic hemorrhoids  · Hemoglobin decreased from 11 1-9 3    Plan:  · Continue to monitor CBC and differential in the morning   · Inpatient consult to gastroenterology for possible EGD and colonoscopy  · 10/10: PM EGD/Colonoscopy  · Will avoid NSAIDs  · Protonix transition to IV b i d   · Transfuse for less than 7    GERD (gastroesophageal reflux disease)  Assessment & Plan  · Takes famotidine 20 mg daily at home, also with history of duodenal ulcers in the past    Plan:  · Protonix transitioned to 40 mg IV b i d   · Continue home famotidine 20 mg daily    Hypertension  Assessment & Plan  · POA blood pressure of 145/64    Plan:  · Continue metoprolol tartrate 75 mg q 12 hrs  · Continue to monitor routine vital signs    Hyperlipidemia  Assessment & Plan  Plan:  · Continue atorvastatin 80 mg daily    CAD (coronary artery disease)  Assessment & Plan  · S/p CABG x4 in 2018    Plan:  · Aspirin Plavix initially continued  · Due to drop in hemoglobin overnight will hold for now  · Plavix Briefly held for procedure and resumed      VTE Pharmacologic Prophylaxis: VTE Score: 5 High Risk (Score >/= 5) - Pharmacological DVT Prophylaxis Ordered: other medication plavix  Sequential Compression Devices Ordered  Patient Centered Rounds: I performed bedside rounds with nursing staff today  Discussions with Specialists or Other Care Team Provider: WILLIAM    Education and Discussions with Family / Patient: Patient declined call to   Has 3 daughters, says he will update/text them himself  Current Length of Stay: 4 day(s)  Current Patient Status: Inpatient   Discharge Plan: later this evening or tomorrow to home    Code Status: Level 3 - DNAR and DNI    Subjective:   Patient feels much better today  Near resolution of abdominal pain  However new complaints left lower back pain with presentation consistent with sciatica  Patient is tolerating p o  Without difficulty  Was seen by PT and noted that it was a "cake walk " Would very much like to go home today  Voiced to us that he did not want the capsule study right now but will consider it      Objective:     Vitals:   Temp (24hrs), Av 2 °F (36 8 °C), Min:97 3 °F (36 3 °C), Max:99 1 °F (37 3 °C)    Temp:  [97 3 °F (36 3 °C)-99 1 °F (37 3 °C)] 97 3 °F (36 3 °C)  HR:  [83] 83  Resp:  [17-18] 17  BP: ()/(53-62) 95/53  SpO2:  [99 %] 99 %  Body mass index is 26 45 kg/m²  Input and Output Summary (last 24 hours): Intake/Output Summary (Last 24 hours) at 10/12/2022 1340  Last data filed at 10/12/2022 0852  Gross per 24 hour   Intake 1651 ml   Output 900 ml   Net 751 ml       Physical Exam:   Physical Exam  Vitals and nursing note reviewed  Constitutional:       Appearance: He is well-developed  HENT:      Head: Normocephalic and atraumatic  Right Ear: External ear normal       Left Ear: External ear normal       Nose: Nose normal       Mouth/Throat:      Mouth: Mucous membranes are moist       Pharynx: Oropharynx is clear  Eyes:      Conjunctiva/sclera: Conjunctivae normal    Cardiovascular:      Rate and Rhythm: Normal rate and regular rhythm  Pulses: Normal pulses  Heart sounds: Normal heart sounds  No murmur heard  Pulmonary:      Effort: Pulmonary effort is normal  No respiratory distress  Breath sounds: Normal breath sounds  Abdominal:      General: Abdomen is flat  Bowel sounds are normal  There is no distension  Palpations: Abdomen is soft  Tenderness: There is abdominal tenderness  There is no guarding or rebound  Musculoskeletal:      Cervical back: Neck supple  Right lower leg: No edema  Left lower leg: No edema  Skin:     General: Skin is warm and dry  Capillary Refill: Capillary refill takes less than 2 seconds  Neurological:      General: No focal deficit present  Mental Status: He is alert and oriented to person, place, and time     Psychiatric:         Mood and Affect: Mood normal          Behavior: Behavior normal           Additional Data:     Labs:  Results from last 7 days   Lab Units 10/11/22  0442   WBC Thousand/uL 8 67   HEMOGLOBIN g/dL 9 5*   HEMATOCRIT % 29 3*   PLATELETS Thousands/uL 154   NEUTROS PCT % 63   LYMPHS PCT % 22   MONOS PCT % 10   EOS PCT % 4     Results from last 7 days   Lab Units 10/11/22  0442 10/10/22  0428   SODIUM mmol/L 140 144   POTASSIUM mmol/L 3 7 3 8 CHLORIDE mmol/L 106 108   CO2 mmol/L 27 27   BUN mg/dL 11 12   CREATININE mg/dL 0 88 0 94   ANION GAP mmol/L 7 9   CALCIUM mg/dL 8 2* 8 6   ALBUMIN g/dL  --  3 2*   TOTAL BILIRUBIN mg/dL  --  0 37   ALK PHOS U/L  --  72   ALT U/L  --  11   AST U/L  --  16   GLUCOSE RANDOM mg/dL 101 104     Results from last 7 days   Lab Units 10/08/22  1046   INR  1 02     Results from last 7 days   Lab Units 10/08/22  1050   POC GLUCOSE mg/dl 100               Lines/Drains:  Invasive Devices  Report    None                 Imaging: Reviewed radiology reports from this admission including: abdominal/pelvic CT and CT recon only lumbar spine    Recent Cultures (last 7 days):   Results from last 7 days   Lab Units 10/08/22  1251   URINE CULTURE  No Growth <1000 cfu/mL       Last 24 Hours Medication List:   Current Facility-Administered Medications   Medication Dose Route Frequency Provider Last Rate   • acetaminophen  650 mg Oral Q6H PRN Lux Proctor DO     • atorvastatin  80 mg Oral QAM Lux Proctor DO     • cholecalciferol  2,000 Units Oral Daily Lux Proctor DO     • clopidogrel  75 mg Oral Daily Annika Gonzalez MD     • vitamin B-12  1,000 mcg Oral Daily Lux Proctor DO     • docusate sodium  100 mg Oral BID PRN Lux Proctor DO     • gabapentin  100 mg Oral Daily Lux Proctor DO     • HYDROmorphone  0 2 mg Intravenous Q4H PRN Lux Proctor DO     • ibuprofen  800 mg Oral [facility] Annika Gonzalez MD     • ipratropium  0 5 mg Nebulization Daily PRN Lux Proctor DO     • lidocaine  1 patch Topical Once Annika Gonzalez MD     • metoprolol tartrate  75 mg Oral Q12H [address] Lux Proctor DO     • multivitamin stress formula  1 tablet Oral Daily Lux Proctor DO     • oxyCODONE  2 5 mg Oral Q4H PRN Lux Proctor DO     • pantoprazole  40 mg Oral BID AC Gillermo Denver, MD     • predniSONE  40 mg Oral Daily Annika Gonzalez MD          Today, Patient Was Seen By: Annika Gonzalez    **Please Note: This note may have been constructed using a voice recognition system  **

## 2022-10-12 NOTE — PLAN OF CARE
Problem: Potential for Falls  Goal: Patient will remain free of falls  Description: INTERVENTIONS:  - Educate patient/family on patient safety including physical limitations  - Instruct patient to call for assistance with activity   - Consult OT/PT to assist with strengthening/mobility   - Keep Call bell within reach  - Keep bed low and locked with side rails adjusted as appropriate  - Keep care items and personal belongings within reach  - Initiate and maintain comfort rounds  - Make Fall Risk Sign visible to staff  - Offer Toileting every  Hours, in advance of need  - Initiate/Maintain alarm  - Obtain necessary fall risk management equipment:   - Apply yellow socks and bracelet for high fall risk patients  - Consider moving patient to room near nurses station  10/12/2022 1012 by Liz Quezada RN  Outcome: Progressing  10/12/2022 1012 by Liz Quezada RN  Outcome: Progressing     Problem: MOBILITY - ADULT  Goal: Maintain or return to baseline ADL function  Description: INTERVENTIONS:  -  Assess patient's ability to carry out ADLs; assess patient's baseline for ADL function and identify physical deficits which impact ability to perform ADLs (bathing, care of mouth/teeth, toileting, grooming, dressing, etc )  - Assess/evaluate cause of self-care deficits   - Assess range of motion  - Assess patient's mobility; develop plan if impaired  - Assess patient's need for assistive devices and provide as appropriate  - Encourage maximum independence but intervene and supervise when necessary  - Involve family in performance of ADLs  - Assess for home care needs following discharge   - Consider OT consult to assist with ADL evaluation and planning for discharge  - Provide patient education as appropriate  10/12/2022 1012 by Liz Quezada RN  Outcome: Progressing  10/12/2022 1012 by Liz Quezada RN  Outcome: Progressing  Goal: Maintains/Returns to pre admission functional level  Description: INTERVENTIONS:  - Perform BMAT or MOVE assessment daily    - Set and communicate daily mobility goal to care team and patient/family/caregiver  - Collaborate with rehabilitation services on mobility goals if consulted  - Perform Range of Motion  times a day  - Reposition patient every  hours  - Dangle patient  times a day  - Stand patient  times a day  - Ambulate patient  times a day  - Out of bed to chair  times a day   - Out of bed for meals  times a day  - Out of bed for toileting  - Record patient progress and toleration of activity level   10/12/2022 1012 by Kari Norwood RN  Outcome: Progressing  10/12/2022 1012 by Kari Norwood RN  Outcome: Progressing     Problem: MOBILITY - ADULT  Goal: Maintain or return to baseline ADL function  Description: INTERVENTIONS:  -  Assess patient's ability to carry out ADLs; assess patient's baseline for ADL function and identify physical deficits which impact ability to perform ADLs (bathing, care of mouth/teeth, toileting, grooming, dressing, etc )  - Assess/evaluate cause of self-care deficits   - Assess range of motion  - Assess patient's mobility; develop plan if impaired  - Assess patient's need for assistive devices and provide as appropriate  - Encourage maximum independence but intervene and supervise when necessary  - Involve family in performance of ADLs  - Assess for home care needs following discharge   - Consider OT consult to assist with ADL evaluation and planning for discharge  - Provide patient education as appropriate  10/12/2022 1012 by Kari Norwood RN  Outcome: Progressing  10/12/2022 1012 by Kari Norwood RN  Outcome: Progressing  Goal: Maintains/Returns to pre admission functional level  Description: INTERVENTIONS:  - Perform BMAT or MOVE assessment daily    - Set and communicate daily mobility goal to care team and patient/family/caregiver  - Collaborate with rehabilitation services on mobility goals if consulted  - Perform Range of Motion  times a day    - Reposition patient every  hours    - Dangle patient  times a day  - Stand patient  times a day  - Ambulate patient  times a day  - Out of bed to chair  times a day   - Out of bed for meal times a day  - Out of bed for toileting  - Record patient progress and toleration of activity level   10/12/2022 1012 by Liz Quezada RN  Outcome: Progressing  10/12/2022 1012 by Liz Quezada RN  Outcome: Progressing     Problem: Prexisting or High Potential for Compromised Skin Integrity  Goal: Skin integrity is maintained or improved  Description: INTERVENTIONS:  - Identify patients at risk for skin breakdown  - Assess and monitor skin integrity  - Assess and monitor nutrition and hydration status  - Monitor labs   - Assess for incontinence   - Turn and reposition patient  - Assist with mobility/ambulation  - Relieve pressure over bony prominences  - Avoid friction and shearing  - Provide appropriate hygiene as needed including keeping skin clean and dry  - Evaluate need for skin moisturizer/barrier cream  - Collaborate with interdisciplinary team   - Patient/family teaching  - Consider wound care consult   10/12/2022 1012 by Liz Quezada RN  Outcome: Progressing  10/12/2022 1012 by Liz Quezada RN  Outcome: Progressing     Problem: GASTROINTESTINAL - ADULT  Goal: Maintains or returns to baseline bowel function  Description: INTERVENTIONS:  - Assess bowel function  - Encourage oral fluids to ensure adequate hydration  - Administer IV fluids if ordered to ensure adequate hydration  - Administer ordered medications as needed  - Encourage mobilization and activity  - Consider nutritional services referral to assist patient with adequate nutrition and appropriate food choices  10/12/2022 1012 by Liz Quezada RN  Outcome: Progressing  10/12/2022 1012 by Liz Quezada RN  Outcome: Progressing

## 2022-10-13 ENCOUNTER — TELEPHONE (OUTPATIENT)
Dept: OTHER | Facility: OTHER | Age: 82
End: 2022-10-13

## 2022-10-13 VITALS
WEIGHT: 168.87 LBS | TEMPERATURE: 97.7 F | OXYGEN SATURATION: 100 % | BODY MASS INDEX: 26.51 KG/M2 | DIASTOLIC BLOOD PRESSURE: 59 MMHG | SYSTOLIC BLOOD PRESSURE: 126 MMHG | RESPIRATION RATE: 16 BRPM | HEIGHT: 67 IN | HEART RATE: 81 BPM

## 2022-10-13 PROCEDURE — 99238 HOSP IP/OBS DSCHRG MGMT 30/<: CPT | Performed by: FAMILY MEDICINE

## 2022-10-13 RX ORDER — PREDNISONE 20 MG/1
40 TABLET ORAL DAILY
Qty: 6 TABLET | Refills: 0 | Status: SHIPPED | OUTPATIENT
Start: 2022-10-14 | End: 2022-10-17

## 2022-10-13 RX ORDER — POLYETHYLENE GLYCOL 3350 17 G/17G
17 POWDER, FOR SOLUTION ORAL DAILY PRN
Refills: 0
Start: 2022-10-13

## 2022-10-13 RX ADMIN — GABAPENTIN 100 MG: 100 CAPSULE ORAL at 09:40

## 2022-10-13 RX ADMIN — Medication 2000 UNITS: at 09:40

## 2022-10-13 RX ADMIN — CYANOCOBALAMIN TAB 500 MCG 1000 MCG: 500 TAB at 09:40

## 2022-10-13 RX ADMIN — CLOPIDOGREL BISULFATE 75 MG: 75 TABLET ORAL at 09:40

## 2022-10-13 RX ADMIN — PREDNISONE 40 MG: 20 TABLET ORAL at 09:39

## 2022-10-13 RX ADMIN — METOPROLOL TARTRATE 75 MG: 50 TABLET, FILM COATED ORAL at 09:40

## 2022-10-13 RX ADMIN — B-COMPLEX W/ C & FOLIC ACID TAB 1 TABLET: TAB at 09:40

## 2022-10-13 RX ADMIN — IBUPROFEN 800 MG: 400 TABLET, FILM COATED ORAL at 06:08

## 2022-10-13 RX ADMIN — ATORVASTATIN CALCIUM 80 MG: 40 TABLET, FILM COATED ORAL at 09:40

## 2022-10-13 RX ADMIN — DOCUSATE SODIUM 100 MG: 100 CAPSULE, LIQUID FILLED ORAL at 09:40

## 2022-10-13 RX ADMIN — PANTOPRAZOLE SODIUM 40 MG: 40 TABLET, DELAYED RELEASE ORAL at 06:08

## 2022-10-13 RX ADMIN — ENOXAPARIN SODIUM 40 MG: 40 INJECTION SUBCUTANEOUS at 09:39

## 2022-10-13 NOTE — PLAN OF CARE
Problem: Potential for Falls  Goal: Patient will remain free of falls  Description: INTERVENTIONS:  - Educate patient/family on patient safety including physical limitations  - Instruct patient to call for assistance with activity   - Consult OT/PT to assist with strengthening/mobility   - Keep Call bell within reach  - Keep bed low and locked with side rails adjusted as appropriate  - Keep care items and personal belongings within reach  - Initiate and maintain comfort rounds  - Make Fall Risk Sign visible to staff  - Offer Toileting every 2 Hours, in advance of need  - Initiate/Maintain alarm  - Obtain necessary fall risk management equipment  - Apply yellow socks and bracelet for high fall risk patients  - Consider moving patient to room near nurses station  Outcome: Progressing     Problem: MOBILITY - ADULT  Goal: Maintain or return to baseline ADL function  Description: INTERVENTIONS:  -  Assess patient's ability to carry out ADLs; assess patient's baseline for ADL function and identify physical deficits which impact ability to perform ADLs (bathing, care of mouth/teeth, toileting, grooming, dressing, etc )  - Assess/evaluate cause of self-care deficits   - Assess range of motion  - Assess patient's mobility; develop plan if impaired  - Assess patient's need for assistive devices and provide as appropriate  - Encourage maximum independence but intervene and supervise when necessary  - Involve family in performance of ADLs  - Assess for home care needs following discharge   - Consider OT consult to assist with ADL evaluation and planning for discharge  - Provide patient education as appropriate  Outcome: Progressing  Goal: Maintains/Returns to pre admission functional level  Description: INTERVENTIONS:  - Perform BMAT or MOVE assessment daily    - Set and communicate daily mobility goal to care team and patient/family/caregiver     - Collaborate with rehabilitation services on mobility goals if consulted  - Perform Range of Motion 3 times a day  - Reposition patient every 2 hours    - Dangle patient 3 times a day  - Stand patient 3 times a day  - Ambulate patient 3 times a day  - Out of bed to chair 3 times a day   - Out of bed for meals 3 times a day  - Out of bed for toileting  - Record patient progress and toleration of activity level   Outcome: Progressing     Problem: Prexisting or High Potential for Compromised Skin Integrity  Goal: Skin integrity is maintained or improved  Description: INTERVENTIONS:  - Identify patients at risk for skin breakdown  - Assess and monitor skin integrity  - Assess and monitor nutrition and hydration status  - Monitor labs   - Assess for incontinence   - Turn and reposition patient  - Assist with mobility/ambulation  - Relieve pressure over bony prominences  - Avoid friction and shearing  - Provide appropriate hygiene as needed including keeping skin clean and dry  - Evaluate need for skin moisturizer/barrier cream  - Collaborate with interdisciplinary team   - Patient/family teaching  - Consider wound care consult   Outcome: Progressing     Problem: GASTROINTESTINAL - ADULT  Goal: Maintains or returns to baseline bowel function  Description: INTERVENTIONS:  - Assess bowel function  - Encourage oral fluids to ensure adequate hydration  - Administer IV fluids if ordered to ensure adequate hydration  - Administer ordered medications as needed  - Encourage mobilization and activity  - Consider nutritional services referral to assist patient with adequate nutrition and appropriate food choices  Outcome: Progressing

## 2022-10-14 ENCOUNTER — TRANSITIONAL CARE MANAGEMENT (OUTPATIENT)
Dept: FAMILY MEDICINE CLINIC | Facility: OTHER | Age: 82
End: 2022-10-14

## 2022-10-14 NOTE — UTILIZATION REVIEW
Notification of Discharge   This is a Notification of Discharge from our facility [address]  Please be advised that this patient has been discharge from our facility  Below you will find the admission and discharge date and time including the patient’s disposition  UTILIZATION REVIEW CONTACT:  Padmaja Roblero MA  Utilization   Network Utilization Review Department  Phone: [phone] x carefully listen to the prompts  All voicemails are confidential   Email: [email or web address]     PHYSICIAN ADVISORY SERVICES:  FOR KAZF-UC-JGEN REVIEW - MEDICAL NECESSITY DENIAL  Phone: [phone]  Fax: [phone]  Email: [email or web address]     PRESENTATION DATE: 10/8/2022 10:04 AM  OBERVATION ADMISSION DATE:   INPATIENT ADMISSION DATE: 10/8/22  1:43 PM   DISCHARGE DATE: 10/13/2022  4:00 PM  DISPOSITION: Home/Self Care Home/Self Care      IMPORTANT INFORMATION:  Send all requests for admission clinical reviews, approved or denied determinations and any other requests to dedicated fax number below belonging to the campus where the patient is receiving treatment   List of dedicated fax numbers:  [address] [place] [address] DENIALS (Administrative/Medical Necessity) [phone]   [address] [place] [address] (Maternity/NICU/Pediatrics) [phone]   [facility] [phone]   [facility] [phone]   [address] [phone]   [address] [facility] [phone]   90 [facility] [phone]   [address] [place] [address] [phone]   [facility]  [phone]   [address] [facility] [phone]   [address] [facility] [address] [facility] [phone]

## 2022-10-20 ENCOUNTER — OFFICE VISIT (OUTPATIENT)
Dept: FAMILY MEDICINE CLINIC | Facility: OTHER | Age: 82
End: 2022-10-20
Payer: COMMERCIAL

## 2022-10-20 VITALS
BODY MASS INDEX: 26.06 KG/M2 | HEIGHT: 67 IN | TEMPERATURE: 98 F | DIASTOLIC BLOOD PRESSURE: 78 MMHG | WEIGHT: 166 LBS | SYSTOLIC BLOOD PRESSURE: 116 MMHG | RESPIRATION RATE: 18 BRPM | OXYGEN SATURATION: 98 %

## 2022-10-20 DIAGNOSIS — M54.42 LEFT-SIDED LOW BACK PAIN WITH LEFT-SIDED SCIATICA, UNSPECIFIED CHRONICITY: ICD-10-CM

## 2022-10-20 DIAGNOSIS — S32.010S COMPRESSION FRACTURE OF L1 VERTEBRA, SEQUELA: ICD-10-CM

## 2022-10-20 DIAGNOSIS — R10.32 LEFT LOWER QUADRANT ABDOMINAL PAIN: ICD-10-CM

## 2022-10-20 DIAGNOSIS — Z76.89 ENCOUNTER FOR SUPPORT AND COORDINATION OF TRANSITION OF CARE: Primary | ICD-10-CM

## 2022-10-20 PROCEDURE — 99214 OFFICE O/P EST MOD 30 MIN: CPT | Performed by: FAMILY MEDICINE

## 2022-10-20 NOTE — PROGRESS NOTES
Assessment & Plan     1  Encounter for support and coordination of transition of care    2  Left-sided low back pain with left-sided sciatica, unspecified chronicity    3  Compression fracture of L1 vertebra, sequela    4  Left lower quadrant abdominal pain  -     H  pylori antigen, stool; Future    Reviewed and explained medications with patient  Discussed that pain was not due to diverticulitis again but likely related to his compression fracture  H  Pylori stool antigen ordered per GI recommendation based on EGD  Recommended patient move forward with kyphoplasty procedure which was previously scheduled  Also recommended patient try Voltaren gel which he did not get following discharge  Offered lidocaine patches but patient declined  Follow up as needed for pain control  May benefit from increase in gabapentin or short term opioids prior to procedure  Subjective     Transitional Care Management Review:   Segun Torres is a 80 y o  male here for TCM follow up  During the TCM phone call patient stated:  TCM Call     Date and time call was made  9/19/2022 11:47 AM    Hospital care reviewed  Records reviewed    Patient was hospitialized at  [address] [place] [address]    Date of Admission  09/08/22    Date of discharge  09/11/22    Diagnosis  Diverticulitis    Disposition  Home    Were the patients medications reviewed and updated  Yes    Current Symptoms  None      TCM Call     Post hospital issues  None    Should patient be enrolled in anticoag monitoring? No    Scheduled for follow up? Yes    Patient refusal reason  Patient would like to be seen after the tcm 2 week period after CT scan on 8/24/21  Scheduled as ER f/u on 9/16 (only wants to see Dr Drummond)      Did you obtain your prescribed medications  Yes    Do you need help managing your prescriptions or medications  No    Is transportation to your appointment needed  No    I have advised the patient to call PCP with any new or worsening symptoms  Kumar Brown MA 9/19/22        Patient presents with his daughter for hospital follow up after admission from 10/8-10/13 for LLQ/low back pain concerning for recurrent diverticulitis  CT A/P and colonoscopy were performed and showed diverticulosis without diverticulitis or other intraabdominal process  While hospitalized the patient had worsening L sided pain which seemed to be originating from the back so he was discharged with a short course of steroids and referral to PT  The patient denies pain currently  He says the pain has not been severe since he completed the steroid course, and he denies any radiation of pain down his legs  He has follow up scheduled with GI and will also likely move forward with previously planned kyphoplasty for compression fracture of L1  Review of Systems   Constitutional: Negative for fever  Respiratory: Negative for shortness of breath  Cardiovascular: Negative for chest pain  Gastrointestinal: Negative for abdominal pain, blood in stool, diarrhea, nausea and vomiting  Genitourinary: Negative for dysuria  Musculoskeletal: Positive for back pain  Neurological: Negative for headaches  Objective     /78   Temp 98 °F (36 7 °C)   Resp 18   Ht 5' 7" (1 702 m)   Wt 75 3 kg (166 lb)   SpO2 98%   BMI 26 00 kg/m²      Physical Exam  Vitals reviewed  Constitutional:       General: He is not in acute distress  Appearance: He is not diaphoretic  HENT:      Head: Normocephalic and atraumatic  Right Ear: External ear normal       Left Ear: External ear normal       Nose: Nose normal       Mouth/Throat:      Mouth: Mucous membranes are moist       Pharynx: Oropharynx is clear  Eyes:      Extraocular Movements: Extraocular movements intact  Conjunctiva/sclera: Conjunctivae normal       Pupils: Pupils are equal, round, and reactive to light  Cardiovascular:      Rate and Rhythm: Normal rate and regular rhythm  Pulses: Normal pulses        Heart sounds: Normal heart sounds  No murmur heard  No friction rub  No gallop  Pulmonary:      Effort: Pulmonary effort is normal  No respiratory distress  Breath sounds: Normal breath sounds  No stridor  No wheezing, rhonchi or rales  Abdominal:      General: Bowel sounds are normal  There is no distension  Palpations: Abdomen is soft  Tenderness: There is no abdominal tenderness  There is no right CVA tenderness, left CVA tenderness or guarding  Musculoskeletal:         General: Normal range of motion  Cervical back: Normal range of motion and neck supple  Thoracic back: No tenderness or bony tenderness  Lumbar back: No tenderness or bony tenderness  Right lower leg: No edema  Left lower leg: No edema  Lymphadenopathy:      Cervical: No cervical adenopathy  Skin:     General: Skin is warm and dry  Neurological:      General: No focal deficit present  Mental Status: He is alert and oriented to person, place, and time  Gait: Gait abnormal (requires cane)     Psychiatric:         Mood and Affect: Mood normal          Behavior: Behavior normal        Antonina Boxer, MD

## 2022-10-20 NOTE — PATIENT INSTRUCTIONS
Try topical Voltaren gel for pain control  Vertebral Compression Fracture   AMBULATORY CARE:   A vertebral compression fracture (VCF)  is a collapse or breakdown in a bone in your spine  Compression fractures happen when there is too much pressure on the vertebra  VCFs most often occur in the thoracic (middle) and lumbar (lower) areas of your spine  Fractures may be mild to severe  Signs and symptoms: You may not have any signs or symptoms with a mild VCF  You may have any of the following with a more severe fracture:  Sudden, severe, and sharp back pain    Back pain that gets worse when you stand or walk    Muscle spasms in your back    Problems urinating or having bowel movements    Sudden weakness in your arms or legs    Call your local emergency number (911 in the [address] [facility]) if:   You feel lightheaded, short of breath, and have chest pain  You cough up blood  You suddenly cannot feel your legs  You suddenly have trouble moving your arms or legs  Seek care immediately if:   Your arm or leg feels warm, tender, and painful  It may look swollen and red  You have new problems urinating or having bowel movements  You have severe pain in your back after falling, bending forward, sneezing, or coughing strongly  Call your doctor or orthopedist if:   You cannot sleep or rest because of back pain  You have pain or swelling in your back that is getting worse, or does not go away  You have questions or concerns about your condition or care  Treatment  may include any of the following, depending on how severe the fracture is:  Bed rest  may be needed for a mild fracture  A back brace  may be needed for 8 to 12 weeks  A brace may decrease your pain, and help your vertebrae heal     A cane or walker  can help you keep your balance when you walk  This helps prevent a fall that could cause more injury  Medicines  may be given for pain   Bisphosphonates and calcitonin are medicines to help your bones get stronger  They can decrease the pain of a VCF caused by osteoporosis, and decrease your risk for another fracture  Physical or occupational therapy  may be recommended  A physical therapist teaches you exercises to help improve movement and strength, and to decrease pain  An occupational therapist teaches you skills to help with your daily activities  Surgery  may be needed if your pain, weakness, or numbness does not go away with other treatment  Surgery may make your spine more stable, and help decrease pressure on your spinal nerves caused by the fracture  Heat and ice:   Apply ice  on your back for 15 to 20 minutes every hour or as directed  Use an ice pack, or put crushed ice in a plastic bag  Cover it with a towel before you apply it  Ice helps prevent tissue damage and decreases swelling and pain  Apply heat  on your back for 20 to 30 minutes every 2 hours for as many days as directed  Heat helps decrease pain and muscle spasms  Activity:   Avoid activities that may make the pain worse, such as picking up heavy objects  When the pain decreases, begin normal, slow movements as directed by your healthcare provider  Your healthcare provider may have you do weight-bearing exercises such as walking  You may also do non-weight-bearing exercises such as swimming and bicycling  You may need to use a walker or cane  Ask your healthcare provider for more information about how to use a cane or a walker  When you  objects, bend at the hips and knees  Never bend from the waist only  Use bent knees and your leg muscles as you lift the object  While you lift the object, keep it close to your chest  Try not to twist or lift anything above your waist     Physical and occupational therapy:  Your healthcare provider may recommend you start or continue one or both types of therapy  A physical therapist teaches you exercises to help improve movement and strength, and to decrease pain   An occupational therapist teaches you skills to help with your daily activities  Manage pain during sleep:   Do not sleep on a waterbed  Waterbeds do not provide good back support  Sleep on a firm mattress  You may also put a ½ to 1-inch piece of plywood between the mattress and box spring  Sleep on your back with a pillow under your knees  This will decrease pressure on your back  You may also sleep on your side with 1 or both of your knees bent and a pillow between them  It may also be helpful to sleep on your stomach with a pillow under you at waist level  Follow up with your doctor or orthopedist as directed: You may need to return for x-rays or other tests  Write down your questions so you remember to ask them during your visits  © Copyright [employer] 2022 Information is for End User's use only and may not be sold, redistributed or otherwise used for commercial purposes  All illustrations and images included in CareNotes® are the copyrighted property of A D A M , Inc  or [facility] [place][note 2] Manuel Weiner   The above information is an  only  It is not intended as medical advice for individual conditions or treatments  Talk to your doctor, nurse or pharmacist before following any medical regimen to see if it is safe and effective for you

## 2022-10-24 ENCOUNTER — LAB (OUTPATIENT)
Dept: LAB | Facility: CLINIC | Age: 82
End: 2022-10-24
Payer: COMMERCIAL

## 2022-10-24 DIAGNOSIS — R10.32 LEFT LOWER QUADRANT ABDOMINAL PAIN: ICD-10-CM

## 2022-10-24 PROCEDURE — 87338 HPYLORI STOOL AG IA: CPT

## 2022-10-26 ENCOUNTER — CLINICAL SUPPORT (OUTPATIENT)
Dept: NUTRITION | Facility: HOSPITAL | Age: 82
End: 2022-10-26
Payer: COMMERCIAL

## 2022-10-26 VITALS — WEIGHT: 164.4 LBS | BODY MASS INDEX: 25.75 KG/M2

## 2022-10-26 DIAGNOSIS — K57.92 DIVERTICULITIS: ICD-10-CM

## 2022-10-26 DIAGNOSIS — N18.31 STAGE 3A CHRONIC KIDNEY DISEASE (HCC): ICD-10-CM

## 2022-10-26 DIAGNOSIS — R10.32 LEFT LOWER QUADRANT ABDOMINAL PAIN: ICD-10-CM

## 2022-10-26 LAB — H PYLORI AG STL QL IA: NEGATIVE

## 2022-10-26 PROCEDURE — 97802 MEDICAL NUTRITION INDIV IN: CPT

## 2022-10-26 NOTE — PROGRESS NOTES
Initial Nutrition Assessment Form    Patient Name: Iam Gibbons    YOB: 1940    Sex:  Male     Assessment Date: 10/26/2022  Start Time: 2 Stop Time: 3 Total Minutes: 60     Data:  Present at session: Self and daughter   Parent/Patient Concerns: "He has lost some weight with diverticulitis-we want to know what foods work best"   Medical Dx/Reason for Referral: Diverticulitis/CKD st 3a   Past Medical History:   Diagnosis Date   • Abnormal liver function test     RESOLVED: [id number]   • Allergic rhinitis    • Anemia     LAST ASSESSED: [id number]   • Arthritis    • BPH (benign prostatic hyperplasia)    • CAD (coronary artery disease)    • Coronary artery disease    • 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[facility] [address] )     LAST ASSESSED: [id number]   • Former tobacco use    • GERD (gastroesophageal reflux disease)    • Hand paresthesia     RESOLVED: [id number]   • Hyperlipidemia    • Hypertension    • Lumbar stenosis    • Peripheral artery disease (HCC)     LAST ASSESSED: [id number]   • Stage 3a chronic kidney disease ([facility] [address] ) 7/11/2021       Current Outpatient Medications   Medication Sig Dispense Refill   • acetaminophen (TYLENOL) 650 mg CR tablet Take 650 mg by mouth every 8 (eight) hours as needed for mild pain     • Ascorbic Acid (Vitamin C) 500 MG PACK Take 500 mg by mouth daily       • aspirin (ECOTRIN LOW STRENGTH) 81 mg EC tablet Take 81 mg by mouth daily     • atorvastatin (LIPITOR) 80 mg tablet Take 1 tablet (80 mg total) by mouth every morning 90 tablet 3   • cholecalciferol (VITAMIN D3) 1,000 units tablet Take 2,000 Units by mouth daily     • clopidogrel (Plavix) 75 mg tablet Take 1 tablet (75 mg total) by mouth daily 90 tablet 3   • cyanocobalamin (VITAMIN B-12) 1,000 mcg tablet Take 1,000 mcg by mouth daily       • Diclofenac Sodium (VOLTAREN) 1 % Apply 2 g topically 4 (four) times a day 50 g 0   • famotidine (PEPCID) 20 mg tablet TAKE 1 TABLET BY MOUTH TWICE A  tablet 1   • gabapentin (NEURONTIN) 100 mg capsule Take 1 capsule (100 mg total) by mouth daily 30 capsule 3   • ibuprofen (MOTRIN) 800 mg tablet Take 1 tablet (800 mg total) by mouth every 6 (six) hours as needed for moderate pain 60 tablet 0   • ipratropium (ATROVENT) 0 03 % nasal spray 2 sprays into each nostril every 12 (twelve) hours     • metoprolol tartrate (LOPRESSOR) 50 mg tablet TAKE 1 AND 1/2 TABLETS BY MOUTH EVERY 12 HOURS (Patient taking differently: 75 mg every 12 (twelve) hours) 90 tablet 11   • Multiple Vitamin (MULTIVITAMIN) capsule Take 1 capsule by mouth daily     • pantoprazole (PROTONIX) 40 mg tablet Take 1 tablet (40 mg total) by mouth 2 (two) times a day 60 tablet 0   • polyethylene glycol (MIRALAX) 17 g packet Take 17 g by mouth daily as needed (constipation)  0     No current facility-administered medications for this visit  Additional Meds/Supplements: n/a   Special Learning Needs: n/a   Height: HC Readings from Last 5 Encounters:   No data found for [facility]       Weight: Wt Readings from Last 10 Encounters:   10/20/22 75 3 kg (166 lb)   10/08/22 76 6 kg (168 lb 14 oz)   10/06/22 74 4 kg (164 lb)   09/28/22 74 8 kg (165 lb)   09/23/22 75 kg (165 lb 6 4 oz)   09/08/22 79 2 kg (174 lb 9 7 oz)   09/01/22 76 7 kg (169 lb 3 2 oz)   08/23/22 77 1 kg (170 lb)   08/17/22 76 6 kg (168 lb 12 8 oz)   07/11/22 76 4 kg (168 lb 6 4 oz)     Estimated body mass index is 26 kg/m² as calculated from the following:    Height as of 10/20/22: 5' 7" (1 702 m)  Weight as of 10/20/22: 75 3 kg (166 lb)  Recent Weight Change: [x]Yes     []No  Amount: 3-4# x 1 month  3%loss x 1 month      Energy Needs: n/a   Allergies   Allergen Reactions   • Penicillins Other (See Comments)     Hallucinations;   Patient reported that he was seeing visual disturbances      NKFA   Social History     Substance and Sexual Activity   Alcohol Use Not Currently   • Alcohol/week: 1 0 standard drink   • Types: 1 Shots of liquor per week    Comment: beer, wine, scotch every day; SOCIAL AS PER ALL SCRIPTS        Social History     Tobacco Use   Smoking Status Former Smoker   • Packs/day: 1 00   • Years: 50 00   • Pack years: 50 00   • Types: Cigarettes   • Quit date:    • Years since quittin 8   Smokeless Tobacco Never Used       Who shops? patient   Who cooks? patient   And dgt   Exercise: stretching squats and push ups- 25 prior to being hospitalized   Prior Counseling? []Yes     [x]No  When:      Why:         Diet Hx:  30 oz water/gatorade/juice- coffee 4oz (caffeinated)  Breakfast: Diet: 8am  fiber one and granola cereal  And toast with coffee and fruit   Lunch:          Dinner: 3pm dinner chicken noodle soup rotisserie chicken and pasta and carrots sweet potatoes; halibut potatoes mixed vegetables         Snacks: AM -   PM - pretzels or little sweet cakes  HS -         Nutrition Diagnosis:   Altered gastrointestinal function  related to Alteration in gastrointestinal tract structure and/or function as evidenced by  Avoidance or limitation of estimated total intake or intake of specific foods/food groups due to GI symptoms   Altered nutrition related labs r/t CKD3 AEB elevated BUN and creatinine  Medical Nutrition Therapy Intervention:  [x]Individualized Meal Plan-discussed importance of gradually increasing fiber to minimum of 20gms daily, and increasing fluid intake as well to at last 64 oz daily []Understanding Lab Values   []Basic Pathophysiology of Disease []Food/Medication Interactions   []Food Diary [x]Exercise-mobility as able pt to start with PT   []Lifestyle/Behavior Modification Techniques []Medication, Mechanism of Action   []Label Reading []Self Blood Glucose Monitoring   [x]Weight/BMI Goals- gain weight back to 170# as able []Other -    Other Notes/Assessment:  Harinder Carlos is here to day to learn what foods he should avoid a reoccurrence of diverticulitis  He does live by himself but is currently getting help with his dgt with cooking    He does have a hx of lower back pain  He currently gets up every 3 hours at night to use restroom, he is in bed 9 hours but feels he gets 7 hours of sleep, and says he feels well rested when he gets up  He will doze off 15-20 mins 1-2x/day  He wakes up at 630-7  His daughter accompanies him in the office today  Comprehension: []Excellent  []Very Good  [x]Good  []Fair   []Poor    Receptivity: []Excellent  []Very Good  [x]Good  []Fair   []Poor    Expected Compliance: []Excellent  []Very Good  [x]Good  []Fair   []Poor        Goals:  1 5 oz wine daily   2  20 gms fiber daily   3   64oz fluid daily       No follow-ups on file    Labs:  CMP  Lab Results   Component Value Date     06/10/2015    K 3 7 10/11/2022     10/11/2022    CO2 27 10/11/2022    ANIONGAP 8 06/10/2015    BUN 11 10/11/2022    CREATININE 0 88 10/11/2022    GLUCOSE 140 01/19/2018    GLUF 98 09/07/2022    CALCIUM 8 2 (L) 10/11/2022    CORRECTEDCA 9 2 10/10/2022    AST 16 10/10/2022    ALT 11 10/10/2022    ALKPHOS 72 10/10/2022    PROT 8 2 06/10/2015    BILITOT 0 46 06/10/2015    EGFR 79 10/11/2022       BMP  Lab Results   Component Value Date    GLUCOSE 140 01/19/2018    CALCIUM 8 2 (L) 10/11/2022     06/10/2015    K 3 7 10/11/2022    CO2 27 10/11/2022     10/11/2022    BUN 11 10/11/2022    CREATININE 0 88 10/11/2022       Lipids  Lab Results   Component Value Date    CHOL 238 06/10/2015    CHOL 217 10/11/2013     Lab Results   Component Value Date    HDL 52 08/24/2022    HDL 96 07/02/2021     01/25/2021     Lab Results   Component Value Date    LDLCALC 41 08/24/2022    LDLCALC 37 07/02/2021    LDLCALC 55 01/25/2021     Lab Results   Component Value Date    TRIG 95 08/24/2022    TRIG 89 07/02/2021    TRIG 108 01/25/2021     No results found for: CHOLHDL    Hemoglobin A1C  Lab Results   Component Value Date    HGBA1C 5 2 06/29/2020       Fasting Glucose  Lab Results   Component Value Date    GLUF 98 09/07/2022       Insulin Thyroid  No results found for: TSH, E7DRMEN, V7AELHZ, THYROIDAB    Hepatic Function Panel  Lab Results   Component Value Date    ALT 11 10/10/2022    AST 16 10/10/2022    ALKPHOS 72 10/10/2022    BILITOT 0 46 06/10/2015       Celiac Disease Antibody Panel  No results found for: ENDOMYSIAL IGA, GLIADIN IGA, GLIADIN IGG, IGA, TISSUE TRANSGLUT AB, TTG IGA   Iron  Lab Results   Component Value Date    IRON 65 11/21/2017    TIBC 266 11/21/2017    FERRITIN 393 (H) 09/18/2020            [facility], [address] [place] [id number]

## 2022-10-31 ENCOUNTER — EVALUATION (OUTPATIENT)
Dept: PHYSICAL THERAPY | Facility: REHABILITATION | Age: 82
End: 2022-10-31

## 2022-10-31 DIAGNOSIS — M54.42 ACUTE LEFT-SIDED LOW BACK PAIN WITH LEFT-SIDED SCIATICA: Primary | ICD-10-CM

## 2022-10-31 NOTE — PROGRESS NOTES
PT Evaluation     Today's date: 10/31/2022  Patient name: Lesly Mohr  :   MRN: [id number]  Referring provider: Jeannine Blankenship MD  Dx:   Encounter Diagnosis     ICD-10-CM    1  Acute left-sided low back pain with left-sided sciatica  M54 42 Ambulatory Referral to Physical Therapy       Start Time: 1018  Stop Time: 1105  Total time in clinic (min): 47 minutes    Assessment  Assessment details: Lesly Mohr is a 80y o  year old male who presents to [facility] with:   Acute left-sided low back pain with left-sided sciatica    Patient presents with signs and symptoms consistent with lumbar stenosis categorized into flexion syndrome with flexion and lumbar opening improving his symptoms  Reviewed log rolling and body mechanics to assist with ADLs  Will benefit from a program focused on flexion and core stabilization and gait training for improved quality of life  Marcus Cee presents with the impairments as listed above and would benefit from Physical Therapy to address these impairments and to maximize function  Impairments: abnormal or restricted ROM, impaired physical strength, lacks appropriate home exercise program, pain with function and poor posture   Understanding of Dx/Px/POC: good   Prognosis: good    Goals  Short-Term Goals:   1  Patient will report 50% decrease in low back pain  2  Patient will demonstrate improvement in overall standing and walking posture  Long-Term Goals:   1  Patient will be able to walk for at least 15-20 minutes with LRAD to return to social activities for improved QoL  2  Patient will be able to stand for at least 20 minutes without support to cook and clean upon discharge  3  Patient independent with HEP at time of discharge  Plan  Plan details: Thank you for opportunity to participate in the care of Lesly Mohr      Patient would benefit from: skilled physical therapy and PT eval  Planned modality interventions: TENS, unattended electrical stimulation and thermotherapy: hydrocollator packs  Planned therapy interventions: abdominal trunk stabilization, flexibility, home exercise program, therapeutic exercise, therapeutic activities, stretching, strengthening, postural training, patient education, neuromuscular re-education, massage, manual therapy and joint mobilization  Frequency: 2x week  Duration in visits: 10  Plan of Care beginning date: 10/31/2022  Treatment plan discussed with: patient        Subjective Evaluation    History of Present Illness  Mechanism of injury: Patient is a 80 y o  presenting to physical therapy with complaints of acute back pain and issues with his legs feeling weak and heavy  He reports the pain goes across his back and wraps around his left side  He has had two hospital stays in 2 months for diverticulitis and diverticulosis and caused a flare up in his back   He had severe pain and difficulty standing and walking     - N/T/Radicular pain:  None - can have cramping in his left leg   - Bowel/Bladder changes: None  - Imaging: CT scan 10/8/2022  - Prior back surgery for lumbar stenosis and spondylolisthesis fusing       L2-S1 6 years ago  - Functional limitations: doing dishes, cooking, cleaning, lifting food 20 lb bag from porch once/week   - Has become dependent on rollators and RW in his house - feels he is houseridden because he cannot walk   - Presents to PT with [facility]  Pain  Current pain ratin  At best pain ratin  At worst pain rating: 10 (ready to fall due to severe pain)  Location: left low back   Quality: sharp, knife-like and dull ache  Relieving factors: change in position and medications  Aggravating factors: overhead activity and lifting    Social Support  Stairs in house: yes (stair lift from basement to 1st floor and 2nd floor)   Lives in: multiple-level home  Lives with: alone    Treatments  Current treatment: medication  Patient Goals  Patient goals for therapy: decreased pain, improved balance, increased motion, increased strength, independence with ADLs/IADLs and return to sport/leisure activities  Patient goal: reduce back pain and improve walking         Objective     Static Posture     Head  Forward  Shoulders  Rounded  Lumbar Spine   Decreased lordosis  Postural Observations  Seated posture: poor  Standing posture: poor        Active Range of Motion     Additional Active Range of Motion Details  Lumbar AROM screen:  Flexion: 75% pain at end range  Extension: 0% immediate pain in back  L sidebendin%  R sidebendin%  L rotation: 50%  R rotation: 50%      Passive Range of Motion   Left Hip   Normal passive range of motion    Right Hip   Normal passive range of motion    Strength/Myotome Testing     Left Hip   Planes of Motion   Flexion: 4  Abduction: 3-  External rotation: 4  Internal rotation: 4    Right Hip   Planes of Motion   Flexion: 4  External rotation: 4  Internal rotation: 4    Left Knee   Flexion: 4+  Extension: 4+    Right Knee   Flexion: 4+  Extension: 4+    Left Ankle/Foot   Dorsiflexion: 4+    Right Ankle/Foot   Dorsiflexion: 4+    Tests     Lumbar     Left   Positive passive SLR  Right   Negative passive SLR  Ambulation     Observational Gait   Decreased walking speed and stride length  Quality of Movement During Gait   Trunk  Forward lean                     Precautions: FALLS RISK, CAD, HTN, PAD, 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, lumbar stenosis    Daily Treatment Diary  * Indicates part of HEP      10/31          Manual           Lumbar distraction on pball nv                                           Neuro Re-Ed           PPT 10x5"          PPT marches           Lumbar flexion stretch w ball rolls nv                                           Ther Ex           Bike nv                     SKTC 3x30" b/l          Supine sciatic nerve glides nv          SLR flexion           SLR abduction           Clamshells nv                                           Ther Activity           Step ups           Lateral step ups           Sit to stands           Leg press nv                     Log rolling Reviewed                                Gait Training                                             Modalities           MHP                      * = on hep

## 2022-11-02 ENCOUNTER — OFFICE VISIT (OUTPATIENT)
Dept: PHYSICAL THERAPY | Facility: REHABILITATION | Age: 82
End: 2022-11-02

## 2022-11-02 DIAGNOSIS — M54.42 ACUTE LEFT-SIDED LOW BACK PAIN WITH LEFT-SIDED SCIATICA: Primary | ICD-10-CM

## 2022-11-02 NOTE — PROGRESS NOTES
Daily Note     Today's date: 2022  Patient name: Ladarius Aguilar  :   MRN: [id number]  Referring provider: Lou Ramey MD  Dx:   Encounter Diagnosis     ICD-10-CM    1  Acute left-sided low back pain with left-sided sciatica  M54 42        Start Time: 845  Stop Time: 930  Total time in clinic (min): 45 minutes    Subjective: Patient reports feeling ok at start of session  Objective: See treatment diary below      Assessment: Tolerated treatment fair  Patient was able to complete 2-3 minutes on the bike at level 2 resistance prior to significant BLE fatigue with patient requiring 5 minute rest/stretch break afterward before walking  Good tolerance to lumbar stretching and core stabilization, does require cues for proper form and breathing with PPT  Will progress BLE as tolerated  Patient demonstrated fatigue post treatment, exhibited good technique with therapeutic exercises and would benefit from continued PT  Plan: Continue per plan of care  Progress treatment as tolerated              Precautions: FALLS RISK, CAD, HTN, PAD, 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, lumbar stenosis    Daily Treatment Diary  * Indicates part of HEP      10/31 11/2         Manual           Lumbar distraction on pball nv Done                                          Neuro Re-Ed           PPT 10x5" 2x10x5"         PPT marches           Lumbar flexion stretch ball rolls w rocking nv 10                                          Ther Ex           Bike nv 3' lv 2 (hard)                    SKTC 3x30" b/l 3x30" b/l         Supine sciatic nerve glides nv 2x10 b/l         SLR flexion           SLR abduction           Clamshells nv [place] 3x10                                          Ther Activity           Step ups           Lateral step ups           Sit to stands           Leg press nv                     Log rolling Reviewed                                Gait Training Modalities           MHP                      * = on hep

## 2022-11-04 ENCOUNTER — OFFICE VISIT (OUTPATIENT)
Dept: GASTROENTEROLOGY | Facility: CLINIC | Age: 82
End: 2022-11-04

## 2022-11-04 VITALS
WEIGHT: 164.6 LBS | HEART RATE: 67 BPM | HEIGHT: 67 IN | DIASTOLIC BLOOD PRESSURE: 75 MMHG | SYSTOLIC BLOOD PRESSURE: 131 MMHG | BODY MASS INDEX: 25.83 KG/M2

## 2022-11-04 DIAGNOSIS — D64.9 ANEMIA, UNSPECIFIED TYPE: Primary | ICD-10-CM

## 2022-11-04 DIAGNOSIS — R10.32 LEFT LOWER QUADRANT ABDOMINAL PAIN: ICD-10-CM

## 2022-11-04 DIAGNOSIS — K57.90 DIVERTICULAR DISEASE: ICD-10-CM

## 2022-11-04 DIAGNOSIS — Z12.11 COLON CANCER SCREENING: ICD-10-CM

## 2022-11-04 DIAGNOSIS — K57.92 DIVERTICULITIS: ICD-10-CM

## 2022-11-04 DIAGNOSIS — K64.8 INTERNAL HEMORRHOIDS: ICD-10-CM

## 2022-11-04 DIAGNOSIS — K29.70 GASTRITIS: ICD-10-CM

## 2022-11-04 RX ORDER — PANTOPRAZOLE SODIUM 40 MG/1
TABLET, DELAYED RELEASE ORAL
Qty: 180 TABLET | Refills: 1 | Status: SHIPPED | OUTPATIENT
Start: 2022-11-04

## 2022-11-04 NOTE — PROGRESS NOTES
[facility] - Outpatient Follow-up Note  Jhonny Baker 80 y o  male MRN: [id number]  Encounter: [id number]          ASSESSMENT AND PLAN:      1  Anemia, unspecified type  Patient has history of anemia  Anemia most likely multifactorial from anemia of chronic disease and kidney disease  EGD and colonoscopy were done during most recent hospitalization on 10/10/2022 and no signs of active GI bleed were seen  EGD done 10/10/2022 showed irregular Z-line  Moderate erythematous mucosa in fundus of stomach  Him a 2 noted and fundus however no active bleeding noted exam   No ulcers or AVMs seen  The duodenum 1st and 2nd part appeared normal Colonoscopy done 10/10/2022 showed moderate pancolonic diverticula containing no content  Small internal hemorrhoids  No active bleeding seen  Patient is on Plavix daily due to underlying cardiac history and was also taking Motrin for back pain  - CBC and Platelet; Future  -Patient instructed to avoid NSAIDs since he is already on Plavix and aspirin daily by his cardiologist in this can contribute to bleeding    -Hemoglobin 9 5 on 10/11  Offered to do capsule endoscopy for further evaluation of small bowel for signs of GI bleed  Patient would like to recheck hemoglobin in 1-2 months and if hemoglobin continues to drop he will consider capsule endoscopy  2  Internal hemorrhoids  3  Colon cancer screening  Colon cancer screening up-to-date  Colonoscopy done 10/10/2022 showed moderate pancolonic diverticula containing no content  Small internal hemorrhoids  4  Diverticular disease   Patient has history of diverticular disease with last flare up of diverticulitis 9/08/09/2011 which was treated with a 7 day course of antibiotics  Patient was then readmitted to the hospital 10/08/2022 with report of abdominal pain but clinical picture was most suggestive of constipation verses lumbar radiculopathy    CT abdomen and and pelvis done 10/08/2022 showed no acute intra-abdominal abnormality  Diffuse colonic diverticulosis but no signs of diverticulitis  -High-fiber diet  -Avoid straining with bowel movements  -MiraLax as needed to prevent constipation  -Start Metamucil, Benefiber or Citrucel 1 tablespoon daily    Follow-up as needed  ______________________________________________________________________    SUBJECTIVE:  Jhonny Baker is an 51-year-old male with a history of pulmonary fibrosis, CAD, PVD, carotid stenosis, chronic anemia, chronic kidney disease stage IIIA and CABG who presented to office for follow-up for anemia and diverticular disease  Patient does have a history of diverticulitis  Patient was hospitalized in October secondary to abdominal pain  CT of abdomen and pelvis with contrast on 10/08/2022 showed no acute intra-abdominal abnormality  Diffuse colonic diverticulosis but no signs of diverticulitis  Suggestion of minimal a symmetrical left lateral bladder wall thickening  Clinical picture admission was most suspicious for constipation versus component of lumbar radiculopathy  Patient was noted to be anemic in hospital so further workup was done with an EGD and colonoscopy to rule out GI blood losses contributing factor  Colonoscopy done 10/10/2022 showed moderate pancolonic diverticula containing no content  Small internal hemorrhoids  No active bleeding seen  EGD done 10/10/2022 showed irregular Z-line  Moderate erythematous mucosa in fundus of stomach  Him a 2 noted and fundus however no active bleeding noted exam   No ulcers or AVMs seen  The duodenum 1st and 2nd part appeared normal   Patient currently denies any GI issues or signs of GI bleed  Patient denies nausea, vomiting, acid reflux, heartburn, epigastric or abdominal pain  Patient denies blood in stool, blood from rectal area, or black tarry stool  Last hemoglobin 9 5 done 10/11/22  REVIEW OF SYSTEMS IS OTHERWISE NEGATIVE        Historical Information   Past Medical History:   Diagnosis Date   • Abnormal liver function test     RESOLVED: [id number]   • Allergic rhinitis    • Anemia     LAST ASSESSED: [id number]   • Arthritis    • BPH (benign prostatic hyperplasia)    • CAD (coronary artery disease)    • Coronary artery disease    • 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HCC)     LAST ASSESSED: [id number]   • Former tobacco use    • GERD (gastroesophageal reflux disease)    • Hand paresthesia     RESOLVED: [id number]   • Hyperlipidemia    • Hypertension    • Lumbar stenosis    • Peripheral artery disease (HCC)     LAST ASSESSED: [id number]   • Stage 3a chronic kidney disease ([facility] [address] ) 7/11/2021     Past Surgical History:   Procedure Laterality Date   • BACK SURGERY     • COLONOSCOPY     • LUMBAR FUSION     • AR ARTHRODESIS POSTERIOR/POSTEROLATERAL LUMBAR N/A 11/03/2016    Procedure: L2-S1 POSTERIOR LUMBAR FUSION AND DECOMPRESSION (Impulse), Posterior lateral fixation; dural repair ;  Surgeon: Sharita Alex MD;  Location: [facility];  Service: Orthopedics   • AR CABG, ARTERIAL, SINGLE N/A 01/19/2018    Procedure: CABG X4 with LIMA - LAD, SVG - RCA, OM2, & Diagonal ; Left Leg EVH; MATTHEW;  Surgeon: Vivien Mcgill DO;  Location: [facility];  Service: Cardiac Surgery   • AR COLONOSCOPY FLX DX W/COLLJ SPEC WHEN PFRMD N/A 11/15/2017    Procedure: EGD AND COLONOSCOPY;  Surgeon: Mao Jean MD;  Location: [facility];   Service: Gastroenterology   • SEPTOPLASTY      LAST ASSESSED; [id number]   • TONSILECTOMY AND ADNOIDECTOMY      LAST ASSESSED: [id number]   • UPPER GASTROINTESTINAL ENDOSCOPY       Social History   Social History     Substance and Sexual Activity   Alcohol Use Not Currently   • Alcohol/week: 1 0 standard drink   • Types: 1 Shots of liquor per week    Comment: beer, wine, scotch every day; SOCIAL AS PER ALL SCRIPTS      Social History     Substance and Sexual Activity   Drug Use Not Currently   • Types: Marijuana    Comment: medical majiuana     Social History     Tobacco Use Smoking Status Former Smoker   • Packs/day: 1 00   • Years: 50 00   • Pack years: 50 00   • Types: Cigarettes   • Quit date:    • Years since quittin 8   Smokeless Tobacco Never Used     Family History   Problem Relation Age of Onset   • Emphysema Mother    • Liver disease Mother    • Coronary artery disease Father    • Hypertension Father    • Liver disease Father    • Heart failure Father    • Stroke Father         CVA    • Heart attack Father    • Coronary artery disease Brother    • Heart disease Brother         younger brother by pass and other brother 4 stents placed   • Other Family         BACK PROBLEM    • Stroke Family         CVA   • Emphysema Family    • Hypertension Family         BENIGN       Meds/Allergies       Current Outpatient Medications:   •  acetaminophen (TYLENOL) 650 mg CR tablet  •  Ascorbic Acid (Vitamin C) 500 MG PACK  •  aspirin (ECOTRIN LOW STRENGTH) 81 mg EC tablet  •  atorvastatin (LIPITOR) 80 mg tablet  •  cholecalciferol (VITAMIN D3) 1,000 units tablet  •  clopidogrel (Plavix) 75 mg tablet  •  cyanocobalamin (VITAMIN B-12) 1,000 mcg tablet  •  Diclofenac Sodium (VOLTAREN) 1 %  •  famotidine (PEPCID) 20 mg tablet  •  ipratropium (ATROVENT) 0 03 % nasal spray  •  metoprolol tartrate (LOPRESSOR) 50 mg tablet  •  Multiple Vitamin (MULTIVITAMIN) capsule  •  pantoprazole (PROTONIX) 40 mg tablet  •  polyethylene glycol (MIRALAX) 17 g packet  •  gabapentin (NEURONTIN) 100 mg capsule  •  ibuprofen (MOTRIN) 800 mg tablet    Allergies   Allergen Reactions   • Penicillins Other (See Comments)     Hallucinations; Patient reported that he was seeing visual disturbances             Objective     Blood pressure 131/75, pulse 67, height 5' 7" (1 702 m), weight 74 7 kg (164 lb 9 6 oz)  Body mass index is 25 78 kg/m²        PHYSICAL EXAM:      General Appearance:   Alert, cooperative, no distress   HEENT:   Normocephalic, atraumatic, anicteric      Neck:  Supple, symmetrical, trachea midline Lungs:   Clear to auscultation bilaterally; no rales, rhonchi or wheezing; respirations unlabored    Heart[de-identified]   Regular rate and rhythm; no murmur, rub, or gallop  Abdomen:   Soft, non-tender, non-distended; normal bowel sounds; no masses, no organomegaly    Genitalia:   Deferred    Rectal:   Deferred    Extremities:  No cyanosis, clubbing or edema    Pulses:  2+ and symmetric    Skin:  No jaundice, rashes, or lesions    Lymph nodes:  No palpable cervical lymphadenopathy        Lab Results:   No visits with results within 1 Day(s) from this visit  Latest known visit with results is:   Lab on 10/24/2022   Component Date Value   • H pylori Ag, Stl 10/24/2022 Negative          Radiology Results:   EGD    Result Date: 10/10/2022  Narrative: [address] [phone] DATE OF SERVICE: 10/10/22 PHYSICIAN(S): Attending: Maribell Jenkins MD Fellow: No Staff Documented INDICATION: Anemia due to stage 3 chronic kidney disease, unspecified whether stage 3a or 3b CKD ([facility] [address] ) POST-OP DIAGNOSIS: See the impression below  PREPROCEDURE: Informed consent was obtained for the procedure, including sedation  Risks of perforation, hemorrhage, adverse drug reaction and aspiration were discussed  The patient was placed in the left lateral decubitus position  Patient was explained about the risks and benefits of the procedure  Risks including but not limited to bleeding, infection, and perforation were explained in detail  Also explained about less than 100% sensitivity with the exam and other alternatives  DETAILS OF PROCEDURE: Patient was taken to the procedure room where a time out was performed to confirm correct patient and correct procedure   The patient underwent monitored anesthesia care, which was administered by an anesthesia professional  The patient's blood pressure, heart rate, level of consciousness, respirations and oxygen were monitored throughout the procedure  The scope was advanced to the second part of the duodenum  Retroflexion was performed in the fundus  The patient experienced no blood loss  The procedure was not difficult  The patient tolerated the procedure well  There were no apparent complications  ANESTHESIA INFORMATION: ASA: III Anesthesia Type: IV Sedation with Anesthesia MEDICATIONS: No administrations occurring from 1524 to 1535 on 10/10/22 FINDINGS: Irregular Z-line 40 cm from the incisors Moderate erythematous mucosa in the fundus of the stomach  Hematin noted in the fundus however no active bleeding noted on thorough exam   No ulcers or AVMs seen  The duodenal bulb, 1st part of the duodenum and 2nd part of the duodenum appeared normal  SPECIMENS: * No specimens in log *     Impression: 1  Irregular Z-line 40 cm from the incisors Moderate erythematous mucosa in the fundus of the stomach  Hematin noted in the fundus however no active bleeding noted on thorough exam   No ulcers or AVMs seen  The duodenal bulb, 1st part of the duodenum and 2nd part of the duodenum appeared normal  RECOMMENDATION: There is no recommended follow-up for this procedure  Will proceed with colonoscopy  Would recommend checking stool for H pylori  Recommend twice daily PPI for the next 1 month followed by once daily thereafter  George Cobos MD     Colonoscopy    Addendum Date: 10/10/2022 Addendum:   [facility] [address] [phone] DATE OF SERVICE: 10/10/22 PHYSICIAN(S): Attending: George Cobos MD Fellow: No Staff Documented INDICATION: Diverticulitis, Left lower quadrant abdominal pain, Anemia due to stage 3 chronic kidney disease, unspecified whether stage 3a or 3b CKD ([facility] [address] ) POST-OP DIAGNOSIS: See the impression below  HISTORY: Prior colonoscopy: More than 10 years ago   BOWEL PREPARATION: Golytely/Colyte/Trilyte; Enema PREPROCEDURE: Informed consent was obtained for the procedure, including sedation  Risks including but not limited to bleeding, infection, perforation, adverse drug reaction and aspiration were explained in detail  Also explained about less than 100% sensitivity with the exam and other alternatives  The patient was placed in the left lateral decubitus position  DETAILS OF PROCEDURE: Patient was taken to the procedure room where a time out was performed to confirm correct patient and correct procedure  The patient underwent monitored anesthesia care, which was administered by an anesthesia professional  The patient's blood pressure, heart rate, level of consciousness, oxygen and respirations were monitored throughout the procedure  A digital rectal exam was performed  The scope was introduced through the anus and advanced to the terminal ileum  Retroflexion was performed in the rectum  The quality of bowel preparation was evaluated using the [facility] Bowel Preparation Scale with scores of: right colon = 2, transverse colon = 2, left colon = 2  The total BBPS score was 6  Bowel prep was adequate  The patient experienced no blood loss  The procedure was not difficult  The patient tolerated the procedure well  There were no apparent complications  ANESTHESIA INFORMATION: ASA: III Anesthesia Type: IV Sedation with Anesthesia MEDICATIONS: No administrations occurring from 1524 to 1558 on 10/10/22 FINDINGS: Moderate pancolonic diverticula containing no content Small, internal hemorrhoids EVENTS: Procedure Events Event Event Time ENDO CECUM REACHED 10/10/2022  3:45 PM ENDO SCOPE OUT TIME 10/10/2022  3:58 PM SPECIMENS: * No specimens in log * EQUIPMENT: Colonoscope -PCF-H180AL IMPRESSION: 1  Moderate pan-diverticulosis  No evidence of fresh or old blood  2  Small internal hemorrhoids  RECOMMENDATION: No further screening colonoscopies necessary due to age (age = 77 or greater)  High-fiber diet with 25-30 g of fiber on daily basis  No clear etiology of anemia identified on EGD or colonoscopy    Can consider outpatient capsule endoscopy  May resume anticoagulation while closely monitoring the color of the stool  Can resume non ulcerogenic diet  Eze Esquivel MD     Result Date: 10/10/2022  Narrative: [facility], [address] [place] [address] [phone] DATE OF SERVICE: 10/10/22 PHYSICIAN(S): Attending: Eze Esquivel MD Fellow: No Staff Documented INDICATION: Diverticulitis, Left lower quadrant abdominal pain, Anemia due to stage 3 chronic kidney disease, unspecified whether stage 3a or 3b CKD ([facility] [address] ) POST-OP DIAGNOSIS: See the impression below  HISTORY: Prior colonoscopy: More than 10 years ago  BOWEL PREPARATION: Golytely/Colyte/Trilyte; Enema PREPROCEDURE: Informed consent was obtained for the procedure, including sedation  Risks including but not limited to bleeding, infection, perforation, adverse drug reaction and aspiration were explained in detail  Also explained about less than 100% sensitivity with the exam and other alternatives  The patient was placed in the left lateral decubitus position  DETAILS OF PROCEDURE: Patient was taken to the procedure room where a time out was performed to confirm correct patient and correct procedure  The patient underwent monitored anesthesia care, which was administered by an anesthesia professional  The patient's blood pressure, heart rate, level of consciousness, oxygen and respirations were monitored throughout the procedure  A digital rectal exam was performed  The scope was introduced through the anus and advanced to the terminal ileum  Retroflexion was performed in the rectum  The quality of bowel preparation was evaluated using the [facility] Bowel Preparation Scale with scores of: right colon = 2, transverse colon = 2, left colon = 2  The total BBPS score was 6  Bowel prep was adequate  The patient experienced no blood loss  The procedure was not difficult  The patient tolerated the procedure well   There were no apparent complications  ANESTHESIA INFORMATION: ASA: III Anesthesia Type: IV Sedation with Anesthesia MEDICATIONS: No administrations occurring from 1524 to 1558 on 10/10/22 FINDINGS: Moderate pancolonic diverticula containing no content Small, internal hemorrhoids EVENTS: Procedure Events Event Event Time ENDO CECUM REACHED 10/10/2022  3:45 PM ENDO SCOPE OUT TIME 10/10/2022  3:58 PM SPECIMENS: * No specimens in log * EQUIPMENT: Colonoscope -PCF-H180AL     Impression: 1  Moderate pan-diverticulosis  No evidence of fresh or old blood  2  Small internal hemorrhoids  RECOMMENDATION: No further screening colonoscopies necessary due to age (age = 77 or greater)  High-fiber diet with 25-30 g of fiber on daily basis  Carlton Estrada MD     CT recon only lumbar spine    Result Date: 10/8/2022  Narrative: CT LUMBAR SPINE INDICATION:  Lower back pain  COMPARISON:  Lumbar spine radiographs 9/2/2022, CT abdomen and pelvis 9/8/2022 and MR lumbar spine 5/30/2019 TECHNIQUE: Axial CT examination of the lumbar spine was obtained utilizing reconstructed images from CT of the chest, abdomen and pelvis performed the same day  Images were reformatted in the sagittal and coronal planes  Images utilizing MARS protocol also obtained in axial plane  This examination, like all CT scans performed in the [facility], was performed utilizing techniques to minimize radiation dose exposure, including the use of iterative reconstruction and automated exposure control  FINDINGS: Evaluation limited by beam hardening artifact from fixation hardware  Posterior brenna and screw fusion L2-S1 with intact hardware  Multilevel laminectomy defects  ALIGNMENT: Mild levoscoliosis again noted  Grade 2-3 spondylolisthesis L5-S1 secondary to pars defects  Mild retrolisthesis L2-3 and L4-5  Alignment is overall unchanged  VERTEBRAL BODIES: No acute fracture  Mild, chronic superior endplate compression fracture of L1 and T12, similar  DEGENERATIVE CHANGES: L1-L2:  No significant central canal narrowing  Minimal annular bulge with eccentric disc space narrowing and marginal osteophyte formation on the right  Mild to moderate right foraminal stenosis  L2-L3:  Eccentric disc space narrowing on the right with marginal endplate osteophytes bilaterally  Mild to moderate right foraminal stenosis  Mild left foraminal narrowing  No significant central canal narrowing appreciated  Bilateral facet arthropathy  L3-L4: Small marginal endplate osteophytes  Question minimal annular bulge  No significant central canal narrowing  Bilateral facet arthropathy  No significant foraminal narrowing  L4-L5: Moderate disc space narrowing with marginal endplate osteophytes, left greater than right  No significant central canal narrowing  Bilateral facet arthropathy  Mild bilateral foraminal narrowing noted  L5-S1: Spondylotic uncovering of the posterior disc due to spondylolisthesis  No focal protrusion into the canal suggested  No significant central canal stenosis  Severe bilateral foraminal narrowing noted  PREVERTEBRAL AND PARASPINAL SOFT TISSUES: Postoperative changes posterior soft tissues along the L3-4 to L4-5 region, suboptimally evaluated due to artifact  Please see separate report of CT abdomen and pelvis regarding intra-abdominal and pelvic details also on this date  Impression: No acute fracture or significant change in spinal alignment from prior study  Status post L2-S1 bilateral pedicle screw fixation with stable grade 2-3 L5-S1 anterolisthesis  Severe bilateral foraminal narrowing L5-S1  Adequate decompression of the central canal at the postsurgical levels  Multilevel degenerative changes as above  If relevant, degenerative changes in the postoperative setting are best evaluated with nonemergent CT myelography   Workstation performed: [id number]     CT abdomen pelvis with contrast    Result Date: 10/8/2022  Narrative: CT ABDOMEN AND PELVIS WITH IV CONTRAST INDICATION:  LLQ abdominal pain  COMPARISON:  CT abdomen and pelvis 9/8/2022 TECHNIQUE:  CT examination of the abdomen and pelvis was performed  Axial, sagittal, and coronal 2D reformatted images were created from the source data and submitted for interpretation  Radiation dose length product (DLP) for this visit:  462 mGy-cm  This examination, like all CT scans performed in the [place], was performed utilizing techniques to minimize radiation dose exposure, including the use of iterative reconstruction and automated exposure control  IV Contrast:  100 mL of iohexol (OMNIPAQUE) Enteric Contrast:  Enteric contrast was not administered  FINDINGS: ABDOMEN LOWER CHEST:  Mild basilar interstitial changes again noted  LIVER/BILIARY TREE:  Please note the upper liver dome was not fully included on axial images  Liver is decreased in density consistent with fatty change  No CT evidence of suspicious hepatic mass  Normal hepatic contours  No biliary dilatation  GALLBLADDER:  No calcified gallstones  No pericholecystic inflammatory change  SPLEEN:  Unremarkable  PANCREAS:  Mild fatty replacement of the pancreas without acute findings  Focal absence or marked paucity of parenchyma along the neck  ADRENAL GLANDS:  Unremarkable  KIDNEYS/URETERS:  Small left renal cyst   Cortical scarring/thinning bilaterally  No hydronephrosis  Bilateral atherosclerotic calcifications  STOMACH AND BOWEL:  Evaluation of the stomach is limited by underdistention  No focal pathology seen within limitations  Small bowel is normal caliber  Diffuse colonic diverticulosis without evidence of diverticulitis  APPENDIX:  No findings to suggest appendicitis  ABDOMINOPELVIC CAVITY:  No ascites  No pneumoperitoneum within limitations  No enlarged lymphadenopathy  VESSELS: Atherosclerotic changes abdominal aorta without evidence of aneurysm   PELVIS REPRODUCTIVE ORGANS:  The prostate is atrophic if present  URINARY BLADDER:  Suggestion of minimal asymmetric left lateral bladder wall thickening measuring only a few millimeters  No mural nodule or perivesical inflammatory changes  ABDOMINAL WALL/INGUINAL REGIONS:  Unremarkable  OSSEOUS STRUCTURES:  Mild superior endplate compression fractures of T12 and L1, unchanged  Posterior fusion L2-S1 with multilevel laminectomy defects  Grade 2 spondylolisthesis L5-S1, similar to prior study  Multilevel degenerative changes of the spine  Impression: 1  No acute intra-abdominal abnormality  2  Suggestion of minimal asymmetric left lateral bladder wall thickening measuring only a few millimeters  Correlate with urinalysis and if relevant, cystoscopy if not recently performed  3  Diffuse colonic diverticulosis without evidence of diverticulitis   Workstation performed: [id number]

## 2022-11-07 ENCOUNTER — OFFICE VISIT (OUTPATIENT)
Dept: PHYSICAL THERAPY | Facility: REHABILITATION | Age: 82
End: 2022-11-07

## 2022-11-07 DIAGNOSIS — M54.42 ACUTE LEFT-SIDED LOW BACK PAIN WITH LEFT-SIDED SCIATICA: Primary | ICD-10-CM

## 2022-11-07 NOTE — PROGRESS NOTES
Daily Note     Today's date: 2022  Patient name: Vivek Landon  :   MRN: [id number]  Referring provider: Yajaira Atkins MD  Dx:   Encounter Diagnosis     ICD-10-CM    1  Acute left-sided low back pain with left-sided sciatica  M54 42        Start Time: 930  Stop Time: 1025  Total time in clinic (min): 55 minutes    Subjective: Patient reporting back as "about the same" at start of session, reporting no complaints after previous session  Objective: See treatment diary below      Assessment: Tolerated treatment well  Patient demonstrated fatigue post treatment, exhibited good technique with therapeutic exercises and would benefit from continued PT No pain noted with any TE performed, cues required initially for proper breathing with PPT  Patient reporting relief with use of heat at end of session  Plan: Continue per plan of care  Progress treatment as tolerated              Precautions: FALLS RISK, CAD, HTN, PAD, 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, lumbar stenosis    Daily Treatment Diary  * Indicates part of HEP      10/31 11/2 11/7        Manual           Lumbar distraction on pball nv Done Done                                          Neuro Re-Ed           PPT 10x5" 2x10x5" 2x10x5''        PPT marches           Lumbar flexion stretch ball rolls w rocking nv 10 10                                         Ther Ex           Bike nv 3' lv 2 (hard) 5' no res                   SKTC 3x30" b/l 3x30" b/l 3x30'' b/l        Supine sciatic nerve glides nv 2x10 b/l 2x10 b/l        SLR flexion           SLR abduction           Clamshells nv [place] TB 3x10 [place] TB 3x10                                          Ther Activity           Step ups           Lateral step ups           Sit to stands           Leg press nv                     Log rolling Reviewed                                Gait Training                                             Modalities           [facility]   10'                   * = on hep

## 2022-11-10 ENCOUNTER — OFFICE VISIT (OUTPATIENT)
Dept: PHYSICAL THERAPY | Facility: REHABILITATION | Age: 82
End: 2022-11-10

## 2022-11-10 DIAGNOSIS — M54.42 ACUTE LEFT-SIDED LOW BACK PAIN WITH LEFT-SIDED SCIATICA: Primary | ICD-10-CM

## 2022-11-10 NOTE — PROGRESS NOTES
Daily Note     Today's date: 11/10/2022  Patient name: Elton Casarez  :   MRN: [id number]  Referring provider: Tammy Murray MD  Dx:   Encounter Diagnosis     ICD-10-CM    1  Acute left-sided low back pain with left-sided sciatica  M54 42        Start Time: 930  Stop Time: 1012  Total time in clinic (min): 42 minutes    Subjective: Patient reporting back soreness at start of session today  He reports "soreness might still be from last session "       Objective: See treatment diary below      Assessment: Tolerated treatment well  Patient demonstrated fatigue post treatment, exhibited good technique with therapeutic exercises and would benefit from continued PT RLE cramping/discomfort noted after bike today, weakness/buckling noted  Plan: Continue per plan of care  Progress treatment as tolerated              Precautions: FALLS RISK, CAD, HTN, PAD, 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, lumbar stenosis    Daily Treatment Diary  * Indicates part of HEP      10/31 11/2 11/7 11/10       Manual           Lumbar distraction on pball nv Done Done  Done                                         Neuro Re-Ed           PPT 10x5" 2x10x5" 2x10x5'' 2x10x5''       PPT marches           Lumbar flexion stretch ball rolls w rocking nv 10 10 10x5''                                         Ther Ex           Bike nv 3' lv 2 (hard) 5' no res 5' lvl 1                  SKTC 3x30" b/l 3x30" b/l 3x30'' b/l 3x30''       Supine sciatic nerve glides nv 2x10 b/l 2x10 b/l 2x10 b/l       SLR flexion           SLR abduction           Clamshells nv [place] TB 3x10 [place] TB 3x10  [place]ink TB 3x10                                        Ther Activity           Step ups           Lateral step ups           Sit to stands           Leg press nv   nv                  Log rolling Reviewed                                Gait Training                                             Modalities           [facility]   10                   * = on hep

## 2022-11-14 ENCOUNTER — OFFICE VISIT (OUTPATIENT)
Dept: PHYSICAL THERAPY | Facility: REHABILITATION | Age: 82
End: 2022-11-14

## 2022-11-14 DIAGNOSIS — M54.42 ACUTE LEFT-SIDED LOW BACK PAIN WITH LEFT-SIDED SCIATICA: Primary | ICD-10-CM

## 2022-11-14 NOTE — PROGRESS NOTES
Daily Note     Today's date: 2022  Patient name: Carlita Langford  : [birth year]  MRN: [id number]  Referring provider: Wilmer Vallejo MD  Dx:   Encounter Diagnosis     ICD-10-CM    1  Acute left-sided low back pain with left-sided sciatica  M54 42        Start Time: 930  Stop Time: 1020  Total time in clinic (min): 50 minutes    Subjective: Patient states that he is doing "pretty good" prior to today's session  He states that he continues with b/l LE weakness 2* to L-S  Objective: See treatment diary below  SLR for LE  added this visit which was well tolerated  Assessment: Patient responded well to treatment  Tactile and VCs utilized in SL to increase trunk stability during clamshells  Weakness and discomfort continues when transfering from sit to stand off of bike  Patient would benefit from continued PT for core stability and to increase LE flexibility  Plan: Continue per plan of care  Progress treatment as tolerated              Precautions: FALLS RISK, CAD, HTN, PAD, 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, lumbar stenosis    Daily Treatment Diary  * Indicates part of HEP      10/31 11/2 11/7 11/10 11/14      Manual           Lumbar distraction on pball nv Done Done  Done  Done                                       Neuro Re-Ed           PPT 10x5" 2x10x5" 2x10x5'' 2x10x5'' 2x10x5''      PPT marches           Lumbar flexion stretch ball rolls w rocking nv 10 10 10x5''  10x5''                                        Ther Ex           Bike nv 3' lv 2 (hard) 5' no res 5' lvl 1 5' lvl 1                 SKTC 3x30" b/l 3x30" b/l 3x30'' b/l 3x30'' 3x30''      Supine sciatic nerve glides nv 2x10 b/l 2x10 b/l 2x10 b/l 2x10 b/l      SLR flexion     3x10, B/L      SLR abduction           Clamshells nv [place] TB 3x10 [place] TB 3x10  Pink TB 3x10 Pink TB 3x10                                       Ther Activity           Step ups           Lateral step ups           Sit to stands           Leg press nv   nv Log rolling Reviewed                                Gait Training                                             Modalities           P   10'  Declined                  * = on hep

## 2022-11-15 ENCOUNTER — TELEPHONE (OUTPATIENT)
Dept: PULMONOLOGY | Facility: CLINIC | Age: 82
End: 2022-11-15

## 2022-11-15 NOTE — TELEPHONE ENCOUNTER
LVM for patient to schedule a follow up appointment with Dr Tex Jenkins or an AP at our [facility]

## 2022-11-17 ENCOUNTER — OFFICE VISIT (OUTPATIENT)
Dept: PHYSICAL THERAPY | Facility: REHABILITATION | Age: 82
End: 2022-11-17

## 2022-11-17 DIAGNOSIS — M54.42 ACUTE LEFT-SIDED LOW BACK PAIN WITH LEFT-SIDED SCIATICA: Primary | ICD-10-CM

## 2022-11-17 NOTE — PROGRESS NOTES
Daily Note     Today's date: 2022  Patient name: Vaishali Reno  :   MRN: [id number]  Referring provider: Mireille Nieves MD  Dx:   Encounter Diagnosis     ICD-10-CM    1  Acute left-sided low back pain with left-sided sciatica  M54 42           Start Time: 930  Stop Time: 1015  Total time in clinic (min): 45 minutes    Subjective: Patient reports feeling "so-so" at start of session, reporting back discomfort  Objective: See treatment diary below      Assessment: Tolerated treatment well  Patient demonstrated fatigue post treatment, exhibited good technique with therapeutic exercises and would benefit from continued PT Patient continues to report RLE discomfort at bike and upon standing, buckling noted  Plan: Continue per plan of care  Progress treatment as tolerated             Precautions: FALLS RISK, CAD, HTN, PAD, 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, lumbar stenosis    Daily Treatment Diary  * Indicates part of HEP      10/31 11/2 11/7 11/10 11/14 11/17     Manual           Lumbar distraction on pball nv Done Done  Done  Done Done                                       Neuro Re-Ed           PPT 10x5" 2x10x5" 2x10x5'' 2x10x5'' 2x10x5'' 2x10x5''     PPT marches           Lumbar flexion stretch ball rolls w rocking nv 10 10 10x5''  10x5''  10x5''                                      Ther Ex           Bike nv 3' lv 2 (hard) 5' no res 5' lvl 1 5' lvl 1 5' lvl 1                SKTC 3x30" b/l 3x30" b/l 3x30'' b/l 3x30'' 3x30'' 3x30''      Supine sciatic nerve glides nv 2x10 b/l 2x10 b/l 2x10 b/l 2x10 b/l 2x10 b/l     SLR flexion     3x10, B/L      SLR abduction           Clamshells nv [place] TB 3x10 [place] TB 3x10  Pink TB 3x10 Pink TB 3x10 Pink TB 3x12                                      Ther Activity           Step ups           Lateral step ups           Sit to stands           Leg press nv   nv  nv                Log rolling Reviewed                                Gait Training Modalities           [facility]   10'  Declined                  * = on hep

## 2022-11-21 ENCOUNTER — APPOINTMENT (OUTPATIENT)
Dept: PHYSICAL THERAPY | Facility: REHABILITATION | Age: 82
End: 2022-11-21

## 2022-11-23 ENCOUNTER — OFFICE VISIT (OUTPATIENT)
Dept: PHYSICAL THERAPY | Facility: REHABILITATION | Age: 82
End: 2022-11-23

## 2022-11-23 DIAGNOSIS — M54.42 ACUTE LEFT-SIDED LOW BACK PAIN WITH LEFT-SIDED SCIATICA: Primary | ICD-10-CM

## 2022-11-23 NOTE — PROGRESS NOTES
Daily Note     Today's date: 2022  Patient name: Alejandro Winter  : [birth year]  MRN: [id number]  Referring provider: Manuel Douglas MD  Dx:   Encounter Diagnosis     ICD-10-CM    1  Acute left-sided low back pain with left-sided sciatica  M54 42           Start Time: 926  Stop Time: 1013  Total time in clinic (min): 47 minutes    Subjective: Patient states that the lower back feels the same as LV  Objective: See treatment diary below  LP added this visit with no adverse effects  Assessment: Post active bike warm up patient demonstrated moderate b/l leg weakness, and fatigue, RLE> LLE  Post 1 min rest break in seated symptoms resolved  Patient demonstrates good pelvic stability during clam shells with minimal posterior ROT compensation demonstrated  Patient continues with good response to manuals  Patient would benefit from continued PT to improve LE and core strength for ADLs and safe community ambulation  Plan: Continue per plan of care  Progress treatment as tolerated             Precautions: FALLS RISK, CAD, HTN, PAD, 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, lumbar stenosis    Daily Treatment Diary  * Indicates part of HEP      10/31 11/2 11/7 11/10 11/14 11/17 11/23    Manual           Lumbar distraction on pball nv Done Done  Done  Done Done  Done                                     Neuro Re-Ed           PPT 10x5" 2x10x5" 2x10x5'' 2x10x5'' 2x10x5'' 2x10x5'' 2x10x5''    PPT marches           Lumbar flexion stretch ball rolls w rocking nv 10 10 10x5''  10x5''  10x5'' 10x5''                                     Ther Ex           Bike nv 3' lv 2 (hard) 5' no res 5' lvl 1 5' lvl 1 5' lvl 1 5' lvl 1               SKTC 3x30" b/l 3x30" b/l 3x30'' b/l 3x30'' 3x30'' 3x30''  3x30''     Supine sciatic nerve glides nv 2x10 b/l 2x10 b/l 2x10 b/l 2x10 b/l 2x10 b/l 3x10 b/l    SLR flexion     3x10, B/L      SLR abduction           Clamshells nv [place] TB 3x10 [place] TB 3x10  Pink TB 3x10 Pink TB 3x10 Pink TB 3x12 Pink TB 3x12 ea                                     Ther Activity           Step ups           Lateral step ups           Sit to stands           Leg press nv   nv  nv 55# DL, 2x10 NV 75# DL, 2x10              Log rolling Reviewed                                Gait Training                                             Modalities           [facility]   10'  Declined                  * = on hep

## 2022-11-28 ENCOUNTER — OFFICE VISIT (OUTPATIENT)
Dept: PHYSICAL THERAPY | Facility: REHABILITATION | Age: 82
End: 2022-11-28

## 2022-11-28 DIAGNOSIS — M54.42 ACUTE LEFT-SIDED LOW BACK PAIN WITH LEFT-SIDED SCIATICA: Primary | ICD-10-CM

## 2022-11-28 NOTE — PROGRESS NOTES
Daily Note     Today's date: 2022  Patient name: Gabby Gomez  : [birth year]  MRN: [id number]  Referring provider: Artem Obrien MD  Dx:   Encounter Diagnosis     ICD-10-CM    1  Acute left-sided low back pain with left-sided sciatica  M54 42           Start Time: 933  Stop Time: 1025  Total time in clinic (min): 52 minutes    Subjective: Patient reporting things as "about the same" at start of session  Objective: See treatment diary below      Assessment: Tolerated treatment well  Patient demonstrated fatigue post treatment, exhibited good technique with therapeutic exercises and would benefit from continued PT Trialed LTR this session with patient reporting relief  Tolerated increased weight with leg press well  Patient continues to report LE weakness throughout session, especially after bike  Plan: Continue per plan of care  Progress treatment as tolerated         Precautions: FALLS RISK, CAD, HTN, PAD, 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, lumbar stenosis    Daily Treatment Diary  * Indicates part of HEP      10/31 11/2 11/7 11/10 11/14 11/17 11/23 11/28   Manual           Lumbar distraction on pball nv Done Done  Done  Done Done  Done Done                                     Neuro Re-Ed           PPT 10x5" 2x10x5" 2x10x5'' 2x10x5'' 2x10x5'' 2x10x5'' 2x10x5'' 2x10x5''   PPT marches           Lumbar flexion stretch ball rolls w rocking nv 10 10 10x5''  10x5''  10x5'' 10x5'' 20x5''   LTR        10x5'' ea                         Ther Ex           Bike (Cardiovascular Endurance) nv 3' lv 2 (hard) 5' no res 5' lvl 1 5' lvl 1 5' lvl 1 5' lvl 1 5' no res              SKTC 3x30" b/l 3x30" b/l 3x30'' b/l 3x30'' 3x30'' 3x30''  3x30''  3x30''    Supine sciatic nerve glides nv 2x10 b/l 2x10 b/l 2x10 b/l 2x10 b/l 2x10 b/l 3x10 b/l 2x10 b/l   SLR flexion     3x10, B/L      SLR abduction           Clamshells nv [place] TB 3x10 [place] TB 3x10  Pink TB 3x10 Pink TB 3x10 Pink TB 3x12 Pink TB 3x12 ea Pink TB 3x12 ea                                    Ther Activity           Step ups           Lateral step ups           Sit to stands           Leg press nv   nv  nv 55# DL, 2x10 65# 3x10              Log rolling Reviewed                                Gait Training                                             Modalities           [facility]   10'  Declined                  * = on hep

## 2022-12-01 ENCOUNTER — OFFICE VISIT (OUTPATIENT)
Dept: PHYSICAL THERAPY | Facility: REHABILITATION | Age: 82
End: 2022-12-01

## 2022-12-01 DIAGNOSIS — M54.42 ACUTE LEFT-SIDED LOW BACK PAIN WITH LEFT-SIDED SCIATICA: Primary | ICD-10-CM

## 2022-12-01 NOTE — PROGRESS NOTES
Daily Note     Today's date: 2022  Patient name: Eligio Barrientos  :   MRN: [id number]  Referring provider: Michoacano Rey MD  Dx:   Encounter Diagnosis     ICD-10-CM    1  Acute left-sided low back pain with left-sided sciatica  M54 42                      Subjective: Patient offers no new complaints today  Patient reports back pain a 5/10  Objective: See treatment diary below      Assessment: Tolerated treatment well  Patient demonstrated fatigue post treatment, exhibited good technique with therapeutic exercises and would benefit from continued PT  Patient continues to have difficulty with transitioning supine to sit with increased back pain  Patient continues to use [facility] for gait safety  Patient continues to report LE weakness throughout session, especially after bike  Plan: Continue per plan of care  Progress treatment as tolerated         Precautions: FALLS RISK, CAD, HTN, PAD, 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, lumbar stenosis    Daily Treatment Diary  * Indicates part of HEP      12/1 11/2 11/7 11/10 11/14 11/17 11/23 11/28   Manual           Lumbar distraction on pball done Done Done  Done  Done Done  Done Done                                     Neuro Re-Ed           PPT 2x10x5" 2x10x5" 2x10x5'' 2x10x5'' 2x10x5'' 2x10x5'' 2x10x5'' 2x10x5''   PPT marches           Lumbar flexion stretch ball rolls w rocking 20x5" 10 10 10x5''  10x5''  10x5'' 10x5'' 20x5''   LTR 10x5"       10x5'' ea                         Ther Ex           Bike (Cardiovascular Endurance) 4' 3' lv 2 (hard) 5' no res 5' lvl 1 5' lvl 1 5' lvl 1 5' lvl 1 5' no res              SKTC 3x30" b/l 3x30" b/l 3x30'' b/l 3x30'' 3x30'' 3x30''  3x30''  3x30''    Supine sciatic nerve glides 2x10 B/l 2x10 b/l 2x10 b/l 2x10 b/l 2x10 b/l 2x10 b/l 3x10 b/l 2x10 b/l   SLR flexion     3x10, B/L      SLR abduction           Clamshells Pink  3x12 ea Pink TB 3x10 Pink TB 3x10  Pink TB 3x10 Pink TB 3x10 Pink TB 3x12 Pink TB 3x12 ea Pink TB 3x12 ea                                    Ther Activity           Step ups           Lateral step ups           Sit to stands           Leg press 65#  3x10   nv  nv 55# DL, 2x10 65# 3x10              Log rolling                                 Gait Training                                             Modalities           [facility]   10'  Declined                  * = on hep

## 2022-12-05 ENCOUNTER — OFFICE VISIT (OUTPATIENT)
Dept: PHYSICAL THERAPY | Facility: REHABILITATION | Age: 82
End: 2022-12-05

## 2022-12-05 DIAGNOSIS — M54.42 ACUTE LEFT-SIDED LOW BACK PAIN WITH LEFT-SIDED SCIATICA: Primary | ICD-10-CM

## 2022-12-05 NOTE — PROGRESS NOTES
Daily Note     Today's date: 2022  Patient name: Juliocesar Louise  :   MRN: [id number]  Referring provider: Jono Stewart MD  Dx:   Encounter Diagnosis     ICD-10-CM    1  Acute left-sided low back pain with left-sided sciatica  M54 42           Start Time: 930  Stop Time: 1015  Total time in clinic (min): 45 minutes    Subjective:  Patient reports back feeling "alright" at start of session, he reports things as "about the same"  He reports no complaints after previous session  Objective: See treatment diary below      Assessment: Tolerated treatment well  Patient demonstrated fatigue post treatment, exhibited good technique with therapeutic exercises and would benefit from continued PT LE weakness noted throughout session especially after bike, with most discomfort noted with supine to sit transfers  Plan: Continue per plan of care  Progress treatment as tolerated         Precautions: FALLS RISK, CAD, HTN, PAD, 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, lumbar stenosis    Daily Treatment Diary  * Indicates part of HEP      12/1 12/5 11/7 11/10 11/14 11/17 11/23 11/28   Manual           Lumbar distraction on pball done Done  Done  Done  Done Done  Done Done                                     Neuro Re-Ed           PPT 2x10x5" 2x10x5'' 2x10x5'' 2x10x5'' 2x10x5'' 2x10x5'' 2x10x5'' 2x10x5''   PPT marches           Lumbar flexion stretch ball rolls w rocking 20x5" 20x5'' 10 10x5''  10x5''  10x5'' 10x5'' 20x5''   LTR 10x5" 10x5'' ea      10x5'' ea                         Ther Ex           Bike (Cardiovascular Endurance) 4' 5' 5' no res 5' lvl 1 5' lvl 1 5' lvl 1 5' lvl 1 5' no res              SKTC 3x30" b/l 3x30'' b/l 3x30'' b/l 3x30'' 3x30'' 3x30''  3x30''  3x30''    Supine sciatic nerve glides 2x10 B/l 2x10 b/l 2x10 b/l 2x10 b/l 2x10 b/l 2x10 b/l 3x10 b/l 2x10 b/l   SLR flexion     3x10, B/L      SLR abduction           Clamshells Pink  3x12 ea Pink 3x12 ea Pink TB 3x10  Pink TB 3x10 Pink TB 3x10 Pink TB 3x12 Pink TB 3x12 ea Pink TB 3x12 ea                                    Ther Activity           Step ups           Lateral step ups           Sit to stands           Leg press 65#  3x10 75# 3x10  nv  nv 55# DL, 2x10 65# 3x10              Log rolling                                 Gait Training                                             Modalities           [facility]   10'  Declined                  * = on hep

## 2022-12-07 ENCOUNTER — OFFICE VISIT (OUTPATIENT)
Dept: PHYSICAL THERAPY | Facility: REHABILITATION | Age: 82
End: 2022-12-07

## 2022-12-07 DIAGNOSIS — M54.42 ACUTE LEFT-SIDED LOW BACK PAIN WITH LEFT-SIDED SCIATICA: Primary | ICD-10-CM

## 2022-12-07 NOTE — PROGRESS NOTES
Daily Note     Today's date: 2022  Patient name: Abbie Mcguire  :   MRN: [id number]  Referring provider: En Pitts MD  Dx:   Encounter Diagnosis     ICD-10-CM    1  Acute left-sided low back pain with left-sided sciatica  M54 42           Start Time: 930  Stop Time: 1015  Total time in clinic (min): 45 minutes    Subjective: Patient reporting things as "the same" at start of session  He reports he would like to transition to HEP  Objective: See treatment diary below       Assessment: Tolerated treatment well  Patient demonstrated fatigue post treatment and exhibited good technique with therapeutic exercises      Plan: Patient given updated HEP this session with patient reporting understanding  Patient to transition to HEP and will continue to follow up with patient as appropriate        Precautions: FALLS RISK, CAD, HTN, PAD, 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, lumbar stenosis    Daily Treatment Diary  * Indicates part of HEP         Manual           Lumbar distraction on pball done Done  Done   Done Done  Done Done                                     Neuro Re-Ed           PPT 2x10x5" 2x10x5'' 2x10x5''  2x10x5'' 2x10x5'' 2x10x5'' 2x10x5''   PPT marches           Lumbar flexion stretch ball rolls w rocking 20x5" 20x5'' 20x5''  10x5''  10x5'' 10x5'' 20x5''   LTR 10x5" 10x5'' ea 10x5' ea     10x5'' ea                         Ther Ex           Bike (Cardiovascular Endurance) 4' 5' 5' lvl 1  5' lvl 1 5' lvl 1 5' lvl 1 5' no res              SKTC 3x30" b/l 3x30'' b/l 3x30'' b/l  3x30'' 3x30''  3x30''  3x30''    Supine sciatic nerve glides 2x10 B/l 2x10 b/l 2x10 b/l  2x10 b/l 2x10 b/l 3x10 b/l 2x10 b/l   SLR flexion     3x10, B/L      SLR abduction           Clamshells Pink  3x12 ea Pink 3x12 ea Pink TB 3x12 ea  Pink TB 3x10 Pink TB 3x12 Pink TB 3x12 ea Pink TB 3x12 ea                                    Ther Activity           Step ups Lateral step ups           Sit to stands           Leg press 65#  3x10 75# 3x10 75# 3x12   nv 55# DL, 2x10 65# 3x10              Log rolling                                 Gait Training                                             Modalities           [facility]     Declined                  * = on hep

## 2022-12-15 NOTE — PROGRESS NOTES
PT Discharge    Today's date: 12/15/2022  Patient name: Gosia Watkins  :   MRN: [id number]  Referring provider: Aisha Aquino MD  Dx:   Encounter Diagnosis     ICD-10-CM    1  Acute left-sided low back pain with left-sided sciatica  M54 42           Start Time: 930  Stop Time: 1015  Total time in clinic (min): 45 minutes    Assessment  Assessment details: Christiano Gomes has made the following improvements since beginning PT: decreased pain, increased postural control and awareness and increased tolerance to activities  Christiano Gomes and PT mutually agree to transition to HEP with patient participating in [facility] fitness program  He has been given updated HEP with verbalized understanding and compliance  Christiano Gomes is encouraged to contact PT with any questions or concerns in the future             Subjective    Objective

## 2022-12-30 DIAGNOSIS — G62.9 NEUROPATHY: ICD-10-CM

## 2022-12-30 RX ORDER — GABAPENTIN 100 MG/1
100 CAPSULE ORAL DAILY
Qty: 30 CAPSULE | Refills: 3 | Status: SHIPPED | OUTPATIENT
Start: 2022-12-30 | End: 2023-01-29

## 2023-01-03 PROBLEM — Z12.11 COLON CANCER SCREENING: Status: RESOLVED | Noted: 2022-11-04 | Resolved: 2023-01-03

## 2023-01-05 DIAGNOSIS — K21.9 GASTROESOPHAGEAL REFLUX DISEASE, UNSPECIFIED WHETHER ESOPHAGITIS PRESENT: ICD-10-CM

## 2023-01-05 RX ORDER — FAMOTIDINE 20 MG/1
TABLET, FILM COATED ORAL
Qty: 180 TABLET | Refills: 1 | Status: SHIPPED | OUTPATIENT
Start: 2023-01-05

## 2023-01-16 ENCOUNTER — OFFICE VISIT (OUTPATIENT)
Dept: FAMILY MEDICINE CLINIC | Facility: OTHER | Age: 83
End: 2023-01-16

## 2023-01-16 VITALS
BODY MASS INDEX: 25.08 KG/M2 | WEIGHT: 159.8 LBS | DIASTOLIC BLOOD PRESSURE: 58 MMHG | SYSTOLIC BLOOD PRESSURE: 115 MMHG | HEART RATE: 69 BPM | HEIGHT: 67 IN | TEMPERATURE: 97.5 F | OXYGEN SATURATION: 98 % | RESPIRATION RATE: 15 BRPM

## 2023-01-16 DIAGNOSIS — Z00.00 MEDICARE ANNUAL WELLNESS VISIT, SUBSEQUENT: Primary | ICD-10-CM

## 2023-01-16 DIAGNOSIS — G62.9 NEUROPATHY: ICD-10-CM

## 2023-01-16 RX ORDER — GABAPENTIN 100 MG/1
100 CAPSULE ORAL 4 TIMES DAILY
Qty: 120 CAPSULE | Refills: 0 | Status: SHIPPED | OUTPATIENT
Start: 2023-01-16 | End: 2023-02-15

## 2023-01-16 NOTE — PATIENT INSTRUCTIONS
Medicare Preventive Visit Patient Instructions  Thank you for completing your Welcome to Medicare Visit or Medicare Annual Wellness Visit today  Your next wellness visit will be due in one year (1/17/2024)  The screening/preventive services that you may require over the next 5-10 years are detailed below  Some tests may not apply to you based off risk factors and/or age  Screening tests ordered at today's visit but not completed yet may show as past due  Also, please note that scanned in results may not display below  Preventive Screenings:  Service Recommendations Previous Testing/Comments   Colorectal Cancer Screening  · Colonoscopy    · Fecal Occult Blood Test (FOBT)/Fecal Immunochemical Test (FIT)  · Fecal DNA/Cologuard Test  · Flexible Sigmoidoscopy Age: 39-70 years old   Colonoscopy: every 10 years (May be performed more frequently if at higher risk)  OR  FOBT/FIT: every 1 year  OR  Cologuard: every 3 years  OR  Sigmoidoscopy: every 5 years  Screening may be recommended earlier than age 39 if at higher risk for colorectal cancer  Also, an individualized decision between you and your healthcare provider will decide whether screening between the ages of 74-80 would be appropriate   Colonoscopy: 10/10/2022  FOBT/FIT: 08/24/2022  Cologuard: Not on file  Sigmoidoscopy: Not on file          Prostate Cancer Screening Individualized decision between patient and health care provider in men between ages of 53-78   Medicare will cover every 12 months beginning on the day after your 50th birthday PSA: <0 1 ng/mL     Screening Not Indicated     Hepatitis C Screening Once for adults born between 1945 and 1965  More frequently in patients at high risk for Hepatitis C Hep C Antibody: 07/02/2021    Screening Current   Diabetes Screening 1-2 times per year if you're at risk for diabetes or have pre-diabetes Fasting glucose: 98 mg/dL (9/7/2022)  A1C: 5 2 % (6/29/2020)  Screening Current   Cholesterol Screening Once every 5 years if you don't have a lipid disorder  May order more often based on risk factors  Lipid panel: 08/24/2022  Screening Not Indicated  History Lipid Disorder      Other Preventive Screenings Covered by Medicare:  1  Abdominal Aortic Aneurysm (AAA) Screening: covered once if your at risk  You're considered to be at risk if you have a family history of AAA or a male between the age of 73-68 who smoking at least 100 cigarettes in your lifetime  2  Lung Cancer Screening: covers low dose CT scan once per year if you meet all of the following conditions: (1) Age 50-69; (2) No signs or symptoms of lung cancer; (3) Current smoker or have quit smoking within the last 15 years; (4) You have a tobacco smoking history of at least 20 pack years (packs per day x number of years you smoked); (5) You get a written order from a healthcare provider  3  Glaucoma Screening: covered annually if you're considered high risk: (1) You have diabetes OR (2) Family history of glaucoma OR (3)  aged 48 and older OR (3)  American aged 72 and older  3  Osteoporosis Screening: covered every 2 years if you meet one of the following conditions: (1) Have a vertebral abnormality; (2) On glucocorticoid therapy for more than 3 months; (3) Have primary hyperparathyroidism; (4) On osteoporosis medications and need to assess response to drug therapy  5  HIV Screening: covered annually if you're between the age of 12-76  Also covered annually if you are younger than 13 and older than 72 with risk factors for HIV infection  For pregnant patients, it is covered up to 3 times per pregnancy      Immunizations:  Immunization Recommendations   Influenza Vaccine Annual influenza vaccination during flu season is recommended for all persons aged >= 6 months who do not have contraindications   Pneumococcal Vaccine   * Pneumococcal conjugate vaccine = PCV13 (Prevnar 13), PCV15 (Vaxneuvance), PCV20 (Prevnar 20)  * Pneumococcal polysaccharide vaccine = PPSV23 (Pneumovax) Adults 2364 years old: 1-3 doses may be recommended based on certain risk factors  Adults 72 years old: 1-2 doses may be recommended based off what pneumonia vaccine you previously received   Hepatitis B Vaccine 3 dose series if at intermediate or high risk (ex: diabetes, end stage renal disease, liver disease)   Tetanus (Td) Vaccine - COST NOT COVERED BY MEDICARE PART B Following completion of primary series, a booster dose should be given every 10 years to maintain immunity against tetanus  Td may also be given as tetanus wound prophylaxis  Tdap Vaccine - COST NOT COVERED BY MEDICARE PART B Recommended at least once for all adults  For pregnant patients, recommended with each pregnancy  Shingles Vaccine (Shingrix) - COST NOT COVERED BY MEDICARE PART B  2 shot series recommended in those aged 48 and above     Health Maintenance Due:      Topic Date Due   • Hepatitis C Screening  Completed     Immunizations Due:      Topic Date Due   • COVID-19 Vaccine (3 - Booster for Moderna series) 04/21/2021     Advance Directives   What are advance directives? Advance directives are legal documents that state your wishes and plans for medical care  These plans are made ahead of time in case you lose your ability to make decisions for yourself  Advance directives can apply to any medical decision, such as the treatments you want, and if you want to donate organs  What are the types of advance directives? There are many types of advance directives, and each state has rules about how to use them  You may choose a combination of any of the following:  · Living will: This is a written record of the treatment you want  You can also choose which treatments you do not want, which to limit, and which to stop at a certain time  This includes surgery, medicine, IV fluid, and tube feedings  · Durable power of  for healthcare [place] [facility]):   This is a written record that states who you want to make healthcare choices for you when you are unable to make them for yourself  This person, called a proxy, is usually a family member or a friend  You may choose more than 1 proxy  · Do not resuscitate (DNR) order:  A DNR order is used in case your heart stops beating or you stop breathing  It is a request not to have certain forms of treatment, such as CPR  A DNR order may be included in other types of advance directives  · Medical directive: This covers the care that you want if you are in a coma, near death, or unable to make decisions for yourself  You can list the treatments you want for each condition  Treatment may include pain medicine, surgery, blood transfusions, dialysis, IV or tube feedings, and a ventilator (breathing machine)  · Values history: This document has questions about your views, beliefs, and how you feel and think about life  This information can help others choose the care that you would choose  Why are advance directives important? An advance directive helps you control your care  Although spoken wishes may be used, it is better to have your wishes written down  Spoken wishes can be misunderstood, or not followed  Treatments may be given even if you do not want them  An advance directive may make it easier for your family to make difficult choices about your care  Weight Management   Why it is important to manage your weight:  Being overweight increases your risk of health conditions such as heart disease, high blood pressure, type 2 diabetes, and certain types of cancer  It can also increase your risk for osteoarthritis, sleep apnea, and other respiratory problems  Aim for a slow, steady weight loss  Even a small amount of weight loss can lower your risk of health problems  How to lose weight safely:  A safe and healthy way to lose weight is to eat fewer calories and get regular exercise   You can lose up about 1 pound a week by decreasing the number of calories you eat by 500 calories each day  Healthy meal plan for weight management:  A healthy meal plan includes a variety of foods, contains fewer calories, and helps you stay healthy  A healthy meal plan includes the following:  · Eat whole-grain foods more often  A healthy meal plan should contain fiber  Fiber is the part of grains, fruits, and vegetables that is not broken down by your body  Whole-grain foods are healthy and provide extra fiber in your diet  Some examples of whole-grain foods are whole-wheat breads and pastas, oatmeal, brown rice, and bulgur  · Eat a variety of vegetables every day  Include dark, leafy greens such as spinach, kale, grzegorz greens, and mustard greens  Eat yellow and orange vegetables such as carrots, sweet potatoes, and winter squash  · Eat a variety of fruits every day  Choose fresh or canned fruit (canned in its own juice or light syrup) instead of juice  Fruit juice has very little or no fiber  · Eat low-fat dairy foods  Drink fat-free (skim) milk or 1% milk  Eat fat-free yogurt and low-fat cottage cheese  Try low-fat cheeses such as mozzarella and other reduced-fat cheeses  · Choose meat and other protein foods that are low in fat  Choose beans or other legumes such as split peas or lentils  Choose fish, skinless poultry (chicken or turkey), or lean cuts of red meat (beef or pork)  Before you cook meat or poultry, cut off any visible fat  · Use less fat and oil  Try baking foods instead of frying them  Add less fat, such as margarine, sour cream, regular salad dressing and mayonnaise to foods  Eat fewer high-fat foods  Some examples of high-fat foods include french fries, doughnuts, ice cream, and cakes  · Eat fewer sweets  Limit foods and drinks that are high in sugar  This includes candy, cookies, regular soda, and sweetened drinks  Exercise:  Exercise at least 30 minutes per day on most days of the week  Some examples of exercise include walking, biking, dancing, and swimming  You can also fit in more physical activity by taking the stairs instead of the elevator or parking farther away from stores  Ask your healthcare provider about the best exercise plan for you  © Copyright [employer] 2018 Information is for End User's use only and may not be sold, redistributed or otherwise used for commercial purposes  All illustrations and images included in CareNotes® are the copyrighted property of [employer]  or [facility] Preventive Visit Patient Instructions  Thank you for completing your Welcome to Medicare Visit or Medicare Annual Wellness Visit today  Your next wellness visit will be due in one year (1/17/2024)  The screening/preventive services that you may require over the next 5-10 years are detailed below  Some tests may not apply to you based off risk factors and/or age  Screening tests ordered at today's visit but not completed yet may show as past due  Also, please note that scanned in results may not display below  Preventive Screenings:  Service Recommendations Previous Testing/Comments   Colorectal Cancer Screening  · Colonoscopy    · Fecal Occult Blood Test (FOBT)/Fecal Immunochemical Test (FIT)  · Fecal DNA/Cologuard Test  · Flexible Sigmoidoscopy Age: 39-70 years old   Colonoscopy: every 10 years (May be performed more frequently if at higher risk)  OR  FOBT/FIT: every 1 year  OR  Cologuard: every 3 years  OR  Sigmoidoscopy: every 5 years  Screening may be recommended earlier than age 39 if at higher risk for colorectal cancer  Also, an individualized decision between you and your healthcare provider will decide whether screening between the ages of 74-80 would be appropriate   Colonoscopy: 10/10/2022  FOBT/FIT: 08/24/2022  Cologuard: Not on file  Sigmoidoscopy: Not on file          Prostate Cancer Screening Individualized decision between patient and health care provider in men between ages of 53-78   Medicare will cover every 12 months beginning on the day after your 50th birthday PSA: <0 1 ng/mL     Screening Not Indicated     Hepatitis C Screening Once for adults born between 1945 and 1965  More frequently in patients at high risk for Hepatitis C Hep C Antibody: 07/02/2021    Screening Current   Diabetes Screening 1-2 times per year if you're at risk for diabetes or have pre-diabetes Fasting glucose: 98 mg/dL (9/7/2022)  A1C: 5 2 % (6/29/2020)  Screening Current   Cholesterol Screening Once every 5 years if you don't have a lipid disorder  May order more often based on risk factors  Lipid panel: 08/24/2022  Screening Not Indicated  History Lipid Disorder      Other Preventive Screenings Covered by Medicare:  6  Abdominal Aortic Aneurysm (AAA) Screening: covered once if your at risk  You're considered to be at risk if you have a family history of AAA or a male between the age of 73-68 who smoking at least 100 cigarettes in your lifetime  7  Lung Cancer Screening: covers low dose CT scan once per year if you meet all of the following conditions: (1) Age 50-69; (2) No signs or symptoms of lung cancer; (3) Current smoker or have quit smoking within the last 15 years; (4) You have a tobacco smoking history of at least 20 pack years (packs per day x number of years you smoked); (5) You get a written order from a healthcare provider  8  Glaucoma Screening: covered annually if you're considered high risk: (1) You have diabetes OR (2) Family history of glaucoma OR (3)  aged 48 and older OR (3)  American aged 72 and older  5  Osteoporosis Screening: covered every 2 years if you meet one of the following conditions: (1) Have a vertebral abnormality; (2) On glucocorticoid therapy for more than 3 months; (3) Have primary hyperparathyroidism; (4) On osteoporosis medications and need to assess response to drug therapy  10  HIV Screening: covered annually if you're between the age of 12-76   Also covered annually if you are younger than 13 and older than 72 with risk factors for HIV infection  For pregnant patients, it is covered up to 3 times per pregnancy  Immunizations:  Immunization Recommendations   Influenza Vaccine Annual influenza vaccination during flu season is recommended for all persons aged >= 6 months who do not have contraindications   Pneumococcal Vaccine   * Pneumococcal conjugate vaccine = PCV13 (Prevnar 13), PCV15 (Vaxneuvance), PCV20 (Prevnar 20)  * Pneumococcal polysaccharide vaccine = PPSV23 (Pneumovax) Adults 25-60 years old: 1-3 doses may be recommended based on certain risk factors  Adults 72 years old: 1-2 doses may be recommended based off what pneumonia vaccine you previously received   Hepatitis B Vaccine 3 dose series if at intermediate or high risk (ex: diabetes, end stage renal disease, liver disease)   Tetanus (Td) Vaccine - COST NOT COVERED BY MEDICARE PART B Following completion of primary series, a booster dose should be given every 10 years to maintain immunity against tetanus  Td may also be given as tetanus wound prophylaxis  Tdap Vaccine - COST NOT COVERED BY MEDICARE PART B Recommended at least once for all adults  For pregnant patients, recommended with each pregnancy  Shingles Vaccine (Shingrix) - COST NOT COVERED BY MEDICARE PART B  2 shot series recommended in those aged 48 and above     Health Maintenance Due:      Topic Date Due   • Hepatitis C Screening  Completed     Immunizations Due:      Topic Date Due   • COVID-19 Vaccine (3 - Booster for Moderna series) 04/21/2021     Advance Directives   What are advance directives? Advance directives are legal documents that state your wishes and plans for medical care  These plans are made ahead of time in case you lose your ability to make decisions for yourself  Advance directives can apply to any medical decision, such as the treatments you want, and if you want to donate organs  What are the types of advance directives?   There are many types of advance directives, and each state has rules about how to use them  You may choose a combination of any of the following:  · Living will: This is a written record of the treatment you want  You can also choose which treatments you do not want, which to limit, and which to stop at a certain time  This includes surgery, medicine, IV fluid, and tube feedings  · Durable power of  for healthcare [place] [facility]): This is a written record that states who you want to make healthcare choices for you when you are unable to make them for yourself  This person, called a proxy, is usually a family member or a friend  You may choose more than 1 proxy  · Do not resuscitate (DNR) order:  A DNR order is used in case your heart stops beating or you stop breathing  It is a request not to have certain forms of treatment, such as CPR  A DNR order may be included in other types of advance directives  · Medical directive: This covers the care that you want if you are in a coma, near death, or unable to make decisions for yourself  You can list the treatments you want for each condition  Treatment may include pain medicine, surgery, blood transfusions, dialysis, IV or tube feedings, and a ventilator (breathing machine)  · Values history: This document has questions about your views, beliefs, and how you feel and think about life  This information can help others choose the care that you would choose  Why are advance directives important? An advance directive helps you control your care  Although spoken wishes may be used, it is better to have your wishes written down  Spoken wishes can be misunderstood, or not followed  Treatments may be given even if you do not want them  An advance directive may make it easier for your family to make difficult choices about your care     Weight Management   Why it is important to manage your weight:  Being overweight increases your risk of health conditions such as heart disease, high blood pressure, type 2 diabetes, and certain types of cancer  It can also increase your risk for osteoarthritis, sleep apnea, and other respiratory problems  Aim for a slow, steady weight loss  Even a small amount of weight loss can lower your risk of health problems  How to lose weight safely:  A safe and healthy way to lose weight is to eat fewer calories and get regular exercise  You can lose up about 1 pound a week by decreasing the number of calories you eat by 500 calories each day  Healthy meal plan for weight management:  A healthy meal plan includes a variety of foods, contains fewer calories, and helps you stay healthy  A healthy meal plan includes the following:  · Eat whole-grain foods more often  A healthy meal plan should contain fiber  Fiber is the part of grains, fruits, and vegetables that is not broken down by your body  Whole-grain foods are healthy and provide extra fiber in your diet  Some examples of whole-grain foods are whole-wheat breads and pastas, oatmeal, brown rice, and bulgur  · Eat a variety of vegetables every day  Include dark, leafy greens such as spinach, kale, grzegorz greens, and mustard greens  Eat yellow and orange vegetables such as carrots, sweet potatoes, and winter squash  · Eat a variety of fruits every day  Choose fresh or canned fruit (canned in its own juice or light syrup) instead of juice  Fruit juice has very little or no fiber  · Eat low-fat dairy foods  Drink fat-free (skim) milk or 1% milk  Eat fat-free yogurt and low-fat cottage cheese  Try low-fat cheeses such as mozzarella and other reduced-fat cheeses  · Choose meat and other protein foods that are low in fat  Choose beans or other legumes such as split peas or lentils  Choose fish, skinless poultry (chicken or turkey), or lean cuts of red meat (beef or pork)  Before you cook meat or poultry, cut off any visible fat  · Use less fat and oil  Try baking foods instead of frying them   Add less fat, such as margarine, sour cream, regular salad dressing and mayonnaise to foods  Eat fewer high-fat foods  Some examples of high-fat foods include french fries, doughnuts, ice cream, and cakes  · Eat fewer sweets  Limit foods and drinks that are high in sugar  This includes candy, cookies, regular soda, and sweetened drinks  Exercise:  Exercise at least 30 minutes per day on most days of the week  Some examples of exercise include walking, biking, dancing, and swimming  You can also fit in more physical activity by taking the stairs instead of the elevator or parking farther away from stores  Ask your healthcare provider about the best exercise plan for you  © Copyright [employer] 2018 Information is for End User's use only and may not be sold, redistributed or otherwise used for commercial purposes   All illustrations and images included in CareNotes® are the copyrighted property of [employer]  or [address] [place] [address]

## 2023-01-16 NOTE — PROGRESS NOTES
Assessment and Plan:     Problem List Items Addressed This Visit        Nervous and Auditory    Neuropathy    Relevant Medications    gabapentin (NEURONTIN) 100 mg capsule   Other Visit Diagnoses     Medicare annual wellness visit, subsequent    -  Primary        Will increase gabapentin to 4 times daily due to lumbar radicular pain  Continue Tylenol as needed  Offered DEXA scan due to vertebral fracture but patient declined  BMI Counseling: Body mass index is 25 03 kg/m²  The BMI is above normal  Nutrition recommendations include encouraging healthy choices of fruits and vegetables  Exercise recommendations include obtaining a gym membership  No pharmacotherapy was ordered  Rationale for BMI follow-up plan is due to patient being overweight or obese  Depression Screening and Follow-up Plan: Patient was screened for depression during today's encounter  They screened negative with a PHQ-2 score of 0  Emotional and Mental Well-being, Sleep, Connectedness Assessment and Intervention:    Depression and anxiety screening performed and reviewed      Tobacco and Toxic Substance Assessment and Intervention:     Tobacco use screening performed    Alcohol and drug use screening performed    Brief intervention performed for tobacco, alcohol, or drug use        Preventive health issues were discussed with patient, and age appropriate screening tests were ordered as noted in patient's After Visit Summary  Personalized health advice and appropriate referrals for health education or preventive services given if needed, as noted in patient's After Visit Summary  History of Present Illness:     Patient presents for a Medicare Wellness Visit    Low back pain  No planned surgery  Taking Tylenol       Patient Care Team:  Wood Montanez DO as PCP - General  MD Jerry Neves DO Nilsa London, MD Forest Houseman, Marvie Jim, MD Reba Pacer, PA-C Ron Brawn, MD  [facility] Kali Davis MD as Carmelina Powers MD as Consulting Physician (Pulmonology)     Review of Systems:     Review of Systems   Constitutional: Positive for unexpected weight change (lost a few lbs since hospitalization for diverticulitis)  Negative for chills and fever  HENT: Positive for rhinorrhea (chronic)  Negative for congestion and sore throat  Eyes: Negative for visual disturbance  Respiratory: Positive for cough (occasional, chronic)  Negative for shortness of breath and wheezing  Cardiovascular: Negative for chest pain, palpitations and leg swelling  Gastrointestinal: Negative for abdominal pain, blood in stool, constipation, diarrhea, nausea and vomiting  Genitourinary: Negative for difficulty urinating, dysuria and hematuria  Musculoskeletal: Positive for back pain (shoots into legs)  Negative for arthralgias and myalgias  Skin: Negative for rash  Neurological: Negative for dizziness, light-headedness and headaches  Psychiatric/Behavioral: Negative for dysphoric mood  All other systems reviewed and are negative         Problem List:     Patient Active Problem List   Diagnosis   • Lumbar compression fracture (HCC)   • Spondylolisthesis of lumbar region   • CAD (coronary artery disease)   • Former tobacco use   • Hyperlipidemia   • Hypertension   • S/P CABG x 4   • Anemia   • Leg pain, posterior, left   • Peripheral artery disease (HCC)   • GERD (gastroesophageal reflux disease)   • Vasomotor rhinitis   • Lumbar stenosis   • S/P lumbar fusion   • Postlaminectomy syndrome of lumbar region   • Lumbar radiculopathy   • Skin lesion of left leg   • Bruit of left carotid artery   • Phimosis   • Carotid stenosis, asymptomatic, bilateral   • SOB (shortness of breath)   • Neck pain on left side   • Near syncope   • Pulmonary fibrosis determined by high resolution computed tomography [facility])   • Neuropathy   • 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[facility])   • 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   • Trochanteric bursitis   • Diverticulitis   • Stage 3 chronic kidney disease (HCC)   • Left lower quadrant abdominal pain   • Acute left-sided low back pain with left-sided sciatica   • Internal hemorrhoids   • Diverticular disease      Past Medical and Surgical History:     Past Medical History:   Diagnosis Date   • Abnormal liver function test     RESOLVED: [id number]   • Allergic rhinitis    • Anemia     LAST ASSESSED: [id number]   • Arthritis    • BPH (benign prostatic hyperplasia)    • CAD (coronary artery disease)    • Coronary artery disease    • 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HCC)     LAST ASSESSED: [id number]   • Former tobacco use    • GERD (gastroesophageal reflux disease)    • Hand paresthesia     RESOLVED: [id number]   • Hyperlipidemia    • Hypertension    • Lumbar stenosis    • Peripheral artery disease (HCC)     LAST ASSESSED: 27OCT2017   • Stage 3a chronic kidney disease ([facility] [address] ) 7/11/2021     Past Surgical History:   Procedure Laterality Date   • BACK SURGERY     • COLONOSCOPY     • LUMBAR FUSION     • CO ARTHRODESIS POSTERIOR/PSTLAT TQ 1NTRSPC LUMBAR N/A 11/03/2016    Procedure: L2-S1 POSTERIOR LUMBAR FUSION AND DECOMPRESSION (Impulse), Posterior lateral fixation; dural repair ;  Surgeon: April Colin MD;  Location: [facility];  Service: Orthopedics   • CO CABG W/ARTERIAL GRAFT SINGLE ARTERIAL GRAFT N/A 01/19/2018    Procedure: CABG X4 with LIMA - LAD, SVG - RCA, OM2, & Diagonal ; Left Leg EVH; MATTHEW;  Surgeon: Td Bravo DO;  Location: [facility];  Service: Cardiac Surgery   • CO COLONOSCOPY FLX DX W/COLLJ SPEC WHEN PFRMD N/A 11/15/2017    Procedure: EGD AND COLONOSCOPY;  Surgeon: Clarissa Roper MD;  Location: [facility];   Service: Gastroenterology   • SEPTOPLASTY      LAST ASSESSED; [id number]   • TONSILECTOMY AND ADNOIDECTOMY      LAST ASSESSED: [id number]   • UPPER GASTROINTESTINAL ENDOSCOPY        Family History:     Family History   Problem Relation Age of Onset   • Emphysema Mother    • Liver disease Mother    • Coronary artery disease Father    • Hypertension Father    • Liver disease Father    • Heart failure Father    • Stroke Father         CVA    • Heart attack Father    • Coronary artery disease Brother    • Heart disease Brother         younger brother by pass and other brother 4 stents placed   • Other Family         BACK PROBLEM    • Stroke Family         CVA   • Emphysema Family    • Hypertension Family         BENIGN      Social History:     Social History     Socioeconomic History   • Marital status:      Spouse name: None   • Number of children: None   • Years of education: some college    • Highest education level: None   Occupational History   • Occupation: Insurance     Comment: Retired    Tobacco Use   • Smoking status: Former     Packs/day: 1 00     Years: 50 00     Pack years: 50 00     Types: Cigarettes     Quit date:      Years since quittin 0   • Smokeless tobacco: Never   Vaping Use   • Vaping Use: Never used   Substance and Sexual Activity   • Alcohol use: Not Currently     Alcohol/week: 1 0 standard drink     Types: 1 Shots of liquor per week     Comment: beer, wine, scotch every day; SOCIAL AS PER ALL SCRIPTS    • Drug use: Not Currently     Types: Marijuana     Comment: medical clarke   • Sexual activity: Not Currently   Other Topics Concern   • None   Social History Narrative    Daily coffee consumption      Social Determinants of Health     Financial Resource Strain: Low Risk    • Difficulty of Paying Living Expenses: Not hard at all   Food Insecurity: No Food Insecurity   • Worried About Running Out of Food in the Last Year: Never true   • Ran Out of Food in the Last Year: Never true   Transportation Needs: No Transportation Needs   • Lack of Transportation (Medical): No   • Lack of Transportation (Non-Medical):  No   Physical Activity: Not on file   Stress: Not on file   Social Connections: Not on file Intimate Partner Violence: Not on file   Housing Stability: Low Risk    • Unable to Pay for Housing in the Last Year: No   • Number of Places Lived in the Last Year: 1   • Unstable Housing in the Last Year: No      Medications and Allergies:     Current Outpatient Medications   Medication Sig Dispense Refill   • acetaminophen (TYLENOL) 650 mg CR tablet Take 650 mg by mouth every 8 (eight) hours as needed for mild pain     • Ascorbic Acid (Vitamin C) 500 MG PACK Take 500 mg by mouth daily       • aspirin (ECOTRIN LOW STRENGTH) 81 mg EC tablet Take 81 mg by mouth daily     • atorvastatin (LIPITOR) 80 mg tablet Take 1 tablet (80 mg total) by mouth every morning 90 tablet 3   • cholecalciferol (VITAMIN D3) 1,000 units tablet Take 2,000 Units by mouth daily     • ciclopirox (PENLAC) 8 % solution APPLY TO THE AFFECTED AREA(S) ONCE DAILY AT BEDTIME OR 8 HOURS BEFORE WASHING     • clopidogrel (Plavix) 75 mg tablet Take 1 tablet (75 mg total) by mouth daily 90 tablet 3   • cyanocobalamin (VITAMIN B-12) 1,000 mcg tablet Take 1,000 mcg by mouth daily       • Diclofenac Sodium (VOLTAREN) 1 % Apply 2 g topically 4 (four) times a day 50 g 0   • famotidine (PEPCID) 20 mg tablet TAKE 1 TABLET BY MOUTH TWICE A  tablet 1   • gabapentin (NEURONTIN) 100 mg capsule Take 1 capsule (100 mg total) by mouth 4 (four) times a day 120 capsule 0   • ipratropium (ATROVENT) 0 03 % nasal spray 2 sprays into each nostril every 12 (twelve) hours     • metoprolol tartrate (LOPRESSOR) 50 mg tablet TAKE 1 AND 1/2 TABLETS BY MOUTH EVERY 12 HOURS (Patient taking differently: 75 mg every 12 (twelve) hours) 90 tablet 11   • Multiple Vitamin (MULTIVITAMIN) capsule Take 1 capsule by mouth daily     • pantoprazole (PROTONIX) 40 mg tablet TAKE 1 TABLET BY MOUTH TWICE A  tablet 1   • polyethylene glycol (MIRALAX) 17 g packet Take 17 g by mouth daily as needed (constipation)  0     No current facility-administered medications for this visit  Allergies   Allergen Reactions   • Penicillins Other (See Comments)     Hallucinations; Patient reported that he was seeing visual disturbances        Immunizations:     Immunization History   Administered Date(s) Administered   • COVID-19 MODERNA VACC 0 5 ML IM 01/27/2021, 02/24/2021   • INFLUENZA 12/20/2006, 09/23/2022   • Influenza Split High Dose Preservative Free IM 10/09/2013, 10/27/2015, 09/22/2016, 09/11/2017   • Influenza, high dose seasonal 0 7 mL 10/25/2018, 10/31/2019, 10/15/2020, 09/16/2021, 09/23/2022   • Influenza, seasonal, injectable 01/01/2014   • Pneumococcal Conjugate 13-Valent 09/11/2017   • Pneumococcal Polysaccharide PPV23 04/07/2011   • Tdap 10/25/2018      Health Maintenance:         Topic Date Due   • Hepatitis C Screening  Completed         Topic Date Due   • COVID-19 Vaccine (3 - Booster for Moderna series) 04/21/2021      Medicare Screening Tests and Risk Assessments:     Aline Hamilton is here for his Subsequent Wellness visit  Last Medicare Wellness visit information reviewed, patient interviewed and updates made to the record today  Health Risk Assessment:   Patient rates overall health as good  Patient feels that their physical health rating is same  Patient is satisfied with their life  Eyesight was rated as same  Hearing was rated as same  Patient feels that their emotional and mental health rating is same  Patients states they are never, rarely angry  Patient states they are sometimes unusually tired/fatigued  Pain experienced in the last 7 days has been some  Patient's pain rating has been 5/10  Patient states that he has experienced no weight loss or gain in last 6 months  Low back pain can be severe at times  Also trigger finger, plans to see hand surgeon  Depression Screening:   PHQ-2 Score: 0      Fall Risk Screening:    In the past year, patient has experienced: no history of falling in past year      Home Safety:  Patient has trouble with stairs inside or outside of their home  Patient has working smoke alarms and has working carbon monoxide detector  Home safety hazards include: none  Nutrition:   Current diet is Regular  Medications:   Patient is currently taking over-the-counter supplements  OTC medications include: see medication list  Patient is able to manage medications  Activities of Daily Living (ADLs)/Instrumental Activities of Daily Living (IADLs):   Walk and transfer into and out of bed and chair?: Yes  Dress and groom yourself?: Yes    Bathe or shower yourself?: Yes    Feed yourself? Yes  Do your laundry/housekeeping?: Yes  Manage your money, pay your bills and track your expenses?: Yes  Make your own meals?: Yes    Do your own shopping?: Yes    Previous Hospitalizations:   Any hospitalizations or ED visits within the last 12 months?: Yes    How many hospitalizations have you had in the last year?: 1-2    Hospitalization Comments: Diverticulitis    Advance Care Planning:   Living will: Yes    Durable POA for healthcare: Yes    Advanced directive: Yes      Cognitive Screening:   Provider or family/friend/caregiver concerned regarding cognition?: No    PREVENTIVE SCREENINGS      Cardiovascular Screening:    General: Screening Not Indicated and History Lipid Disorder      Diabetes Screening:     General: Screening Current      Colorectal Cancer Screening:     General: Screening Current      Prostate Cancer Screening:    General: Screening Not Indicated      Osteoporosis Screening:    General: Patient Declines      Abdominal Aortic Aneurysm (AAA) Screening:    Risk factors include: tobacco use        General: Screening Current      Lung Cancer Screening:     General: Screening Not Indicated      Hepatitis C Screening:    General: Screening Current    Screening, Brief Intervention, and Referral to Treatment (SBIRT)    Screening  Typical number of drinks in a day: 2  Typical number of drinks in a week: 14  Interpretation: Low risk drinking behavior      Single Item Drug Screening:  How often have you used an illegal drug (including marijuana) or a prescription medication for non-medical reasons in the past year? never    Single Item Drug Screen Score: 0  Interpretation: Negative screen for possible drug use disorder    Brief Intervention  Healthy alcohol use/limits discussed  Other Counseling Topics:   Car/seat belt/driving safety, sunscreen and calcium and vitamin D intake and regular weightbearing exercise  No results found  Physical Exam:     /58   Pulse 69   Temp 97 5 °F (36 4 °C)   Resp 15   Ht 5' 7" (1 702 m)   Wt 72 5 kg (159 lb 12 8 oz)   SpO2 98%   BMI 25 03 kg/m²     Physical Exam  Vitals reviewed  Constitutional:       General: He is not in acute distress  Appearance: He is not diaphoretic  HENT:      Head: Normocephalic and atraumatic  Right Ear: External ear normal       Left Ear: External ear normal       Nose: Nose normal       Mouth/Throat:      Mouth: Mucous membranes are moist       Pharynx: Oropharynx is clear  Eyes:      Extraocular Movements: Extraocular movements intact  Conjunctiva/sclera: Conjunctivae normal       Pupils: Pupils are equal, round, and reactive to light  Cardiovascular:      Rate and Rhythm: Normal rate and regular rhythm  Pulses: Normal pulses  Heart sounds: Normal heart sounds  No murmur heard  No friction rub  No gallop  Pulmonary:      Effort: Pulmonary effort is normal  No respiratory distress  Breath sounds: Normal breath sounds  No stridor  No wheezing, rhonchi or rales  Abdominal:      General: Bowel sounds are normal  There is no distension  Palpations: Abdomen is soft  Tenderness: There is no abdominal tenderness  There is no right CVA tenderness, left CVA tenderness or guarding  Musculoskeletal:         General: Normal range of motion  Cervical back: Normal range of motion and neck supple        Thoracic back: No tenderness or bony tenderness  Lumbar back: No tenderness or bony tenderness  Right lower leg: No edema  Left lower leg: No edema  Lymphadenopathy:      Cervical: No cervical adenopathy  Skin:     General: Skin is warm and dry  Neurological:      General: No focal deficit present  Mental Status: He is alert and oriented to person, place, and time  Gait: Gait abnormal (requires cane)     Psychiatric:         Mood and Affect: Mood normal          Behavior: Behavior normal          Ness Jalloh MD

## 2023-02-15 DIAGNOSIS — E78.5 HYPERLIPIDEMIA, UNSPECIFIED HYPERLIPIDEMIA TYPE: ICD-10-CM

## 2023-02-15 RX ORDER — ATORVASTATIN CALCIUM 80 MG/1
TABLET, FILM COATED ORAL
Qty: 90 TABLET | Refills: 3 | Status: SHIPPED | OUTPATIENT
Start: 2023-02-15

## 2023-02-28 DIAGNOSIS — G62.9 NEUROPATHY: ICD-10-CM

## 2023-02-28 RX ORDER — GABAPENTIN 100 MG/1
100 CAPSULE ORAL 4 TIMES DAILY
Qty: 120 CAPSULE | Refills: 0 | Status: SHIPPED | OUTPATIENT
Start: 2023-02-28 | End: 2023-03-30

## 2023-03-06 ENCOUNTER — HOSPITAL ENCOUNTER (OUTPATIENT)
Dept: NON INVASIVE DIAGNOSTICS | Facility: CLINIC | Age: 83
Discharge: HOME | End: 2023-03-06

## 2023-03-06 DIAGNOSIS — I65.23 CAROTID STENOSIS, ASYMPTOMATIC, BILATERAL: ICD-10-CM

## 2023-04-03 NOTE — PROGRESS NOTES
Patient is scheduled for appt today for ortho 2:30 please place referral for hand specialist  [facility]  Progress Note - Remigio Dixon [id number], 80 y o  male MRN: [id number]  Unit/Bed#: W -01 Encounter: [id number]  Primary Care Provider: Tasha Dumont DO   Date and time admitted to hospital: 9/8/2022  8:31 AM    * Diverticulitis  Assessment & Plan  · Patient presented with left lower abdominal pain and constipation as well as chills  Patient reports he is doing much better at this time (about 80% better)  · CT A/P with contrast--evidence of acute uncomplicated diverticulitis, known history of diverticulosis but first event of diverticulitis  · Recommend supportive care with IV fluids, pain medication, antibiotics (CTX and flagyl day #2)  · Provided clears, can advance diet to low-fiber low residue  · Patient was severely constipated, initially had good bowel movement but now having diarrhea after repeated doses of MiraLax  · GI follow up outpatient;  Last colonoscopy was 2017, recommend repeat in 6-8 weeks    Sepsis ([facility] [address] )  Assessment & Plan  · Leukocytosis and tachycardia present on admission was source of diverticulitis  · Await repeat labs  · Blood cultures in process    Stage 3 chronic kidney disease [facility])  Assessment & Plan  Lab Results   Component Value Date    EGFR 47 09/08/2022    EGFR 46 09/07/2022    EGFR 27 08/24/2022    CREATININE 1 37 (H) 09/08/2022    CREATININE 1 40 (H) 09/07/2022    CREATININE 2 19 (H) 08/24/2022   · Creatinine within baseline today 1 0-1 4; of note he recently had ELIZABETH 2 weeks ago with creatinine 2 19  · Trend BMP especially in light of contrast provided for CT scan today--still awaiting results of labs today  · Avoid nephrotoxic agents (stop further use of NSAIDS)    Hypomagnesemia  Assessment & Plan  · Mag 1 4-repletion ordered, await repeat labs as well as potassium    Hypertension  Assessment & Plan  · Blood pressure elevated at 180/89 initially, now improved  · Continue lopressor p o   · Prn IV labetalol    Lumbar compression fracture [facility])  Assessment & Plan  · ongoing low back pain being managed as an outpatient with plans for upcoming MRI and  Kyphoplasty  Patient to reschedule  · Would not recommend additional NSAIDs due to chronic kidney disease and would hold off on routine narcotics in the setting of severe constipation  · Can try Tylenol around the clock, muscle relaxants and topical agents      VTE Pharmacologic Prophylaxis: VTE Score: 2 ambulate    Patient Centered Rounds: I performed bedside rounds with nursing staff today  Discussions with Specialists or Other Care Team Provider: case mgmt    Education and Discussions with Family / Patient: Updated  (daughter) at bedside  All questions and concerns were addressed    Time Spent for Care: 30 minutes  More than 50% of total time spent on counseling and coordination of care as described above  Current Length of Stay: 1 day(s)  Current Patient Status: Inpatient   Certification Statement: The patient will continue to require additional inpatient hospital stay due to diarrhea  Discharge Plan: Anticipate discharge tomorrow to home  Code Status: Level 3 - DNAR and DNI    Subjective:   Patient reports feeling much better overall, reports he is currently "8/10" improvement  He had a very good bowel movement yesterday and felt that he was cleaned out  Unfortunately while I was in the room however patient had sudden urgency and had some additional episodes of diarrhea  He reported yesterday only a few small drops of blood but he had been straining prior to his bowel movement  He is no longer reporting any abdominal pain and denies any nausea, vomiting, fever, chills  He had tolerated the clear liquid diet and felt ready to try more food  Patient has been getting out of bed and ambulating without difficulty      Objective:     Vitals:   Temp (24hrs), Av 7 °F (36 5 °C), Min:97 3 °F (36 3 °C), Max:98 °F (36 7 °C)    Temp:  [97 3 °F (36 3 °C)-98 °F (36 7 °C)] 98 °F (36 7 °C)  HR:  [] 67  Resp:  [16-18] 16  BP: (138-180)/(74-92) 151/74  SpO2:  [95 %-100 %] 100 %  Body mass index is 27 35 kg/m²  Input and Output Summary (last 24 hours): Intake/Output Summary (Last 24 hours) at 9/9/2022 1258  Last data filed at 9/9/2022 0601  Gross per 24 hour   Intake 1638 34 ml   Output 800 ml   Net 838 34 ml       Physical Exam:   Physical Exam  Vitals reviewed  Constitutional:       General: He is not in acute distress  Appearance: Normal appearance  He is not ill-appearing, toxic-appearing or diaphoretic  Eyes:      General: No scleral icterus  Right eye: No discharge  Left eye: No discharge  Conjunctiva/sclera: Conjunctivae normal    Cardiovascular:      Rate and Rhythm: Normal rate and regular rhythm  Heart sounds: No murmur heard  Pulmonary:      Effort: No respiratory distress  Breath sounds: No stridor  No wheezing, rhonchi or rales  Abdominal:      General: There is no distension  Palpations: Abdomen is soft  Tenderness: There is no abdominal tenderness  There is no guarding  Musculoskeletal:      Right lower leg: No edema  Left lower leg: No edema  Skin:     General: Skin is warm and dry  Coloration: Skin is not jaundiced or pale  Findings: No bruising, erythema, lesion or rash  Neurological:      General: No focal deficit present  Mental Status: He is alert  Mental status is at baseline  Comments: I was able to watch patient ambulate in the room without difficulty  No confusion noted  Psychiatric:         Mood and Affect: Mood normal          Thought Content:  Thought content normal           Additional Data:     Labs:  Results from last 7 days   Lab Units 09/08/22  0916   WBC Thousand/uL 12 62*   HEMOGLOBIN g/dL 10 1*   HEMATOCRIT % 31 6*   PLATELETS Thousands/uL 188   NEUTROS PCT % 81*   LYMPHS PCT % 10*   MONOS PCT % 7   EOS PCT % 1     Results from last 7 days   Lab Units 09/08/22  0916   SODIUM mmol/L 137   POTASSIUM mmol/L 3 7   CHLORIDE mmol/L 105   CO2 mmol/L 24   BUN mg/dL 20   CREATININE mg/dL 1 37*   ANION GAP mmol/L 8   CALCIUM mg/dL 9 0   ALBUMIN g/dL 3 6   TOTAL BILIRUBIN mg/dL 0 37   ALK PHOS U/L 78   ALT U/L 11   AST U/L 18   GLUCOSE RANDOM mg/dL 137     Results from last 7 days   Lab Units 09/08/22  1238   INR  1 08             Results from last 7 days   Lab Units 09/08/22  1238 09/08/22  0916   LACTIC ACID mmol/L  --  1 8   PROCALCITONIN ng/ml 0 08  --        Lines/Drains:  Invasive Devices  Report    Peripheral Intravenous Line  Duration           Peripheral IV 09/08/22 Right Antecubital 1 day              Imaging:     Recent Cultures (last 7 days):   Results from last 7 days   Lab Units 09/08/22  1238 09/08/22  1059   BLOOD CULTURE  Received in Microbiology Lab  Culture in Progress  Received in Microbiology Lab  Culture in Progress    --    URINE CULTURE   --  No Growth <1000 cfu/mL       Last 24 Hours Medication List:   Current Facility-Administered Medications   Medication Dose Route Frequency Provider Last Rate    acetaminophen  975 mg Oral Q8H [address] Mikaela Jordan, PA-ASHTYN      aspirin  81 mg Oral Daily ZULEYMA Pedersen-ASHTYN      atorvastatin  80 mg Oral QAM ZULEYMA Pedersen-ASHTYN      cefTRIAXone  1,000 mg Intravenous Q24H Mikaela Jordan, PA-C 1,000 mg (09/09/22 1246)    cholecalciferol  2,000 Units Oral Daily ZULEYMA Pedersen-ASHTYN      vitamin B-12  1,000 mcg Oral Daily ZULEYMA Pedersen-ASHTYN      famotidine  20 mg Oral Daily Mikaela Jordan, PA-ASHTYN      gabapentin  100 mg Oral BID ZULEYMA Pedersen-ASHTYN      HYDROmorphone  0 2 mg Intravenous Q4H PRN ZULEYMA Pedersen-ASHTYN      labetalol  10 mg Intravenous Q6H PRN Mikaela Jordan, PA-ASHTYN      lidocaine  1 patch Topical Daily Mikaela Jordan, PA-ASHTYN      methocarbamol  500 mg Oral Q6H PRN Mikaela Jordan, PA-C      metoprolol tartrate  75 mg Oral Q12H [address] Mikaela Jordan, PA-C      metroNIDAZOLE  500 mg Oral Q8H [address] Mikaela Jordan PA-C  multivitamin-minerals  1 tablet Oral Daily Mikaela Jordan PA-C      ondansetron  4 mg Intravenous Q4H PRN Mikaela Jordan PA-C      sodium chloride  100 mL/hr Intravenous Continuous Mikaela Jordan PA-C 100 mL/hr (09/09/22 1246)        Today, Patient Was Seen By: Krystina Platt PA-C    **Please Note: This note may have been constructed using a voice recognition system  **

## 2023-04-26 ENCOUNTER — OFFICE VISIT (OUTPATIENT)
Dept: VASCULAR SURGERY | Facility: CLINIC | Age: 83
End: 2023-04-26

## 2023-04-26 VITALS
BODY MASS INDEX: 25.11 KG/M2 | DIASTOLIC BLOOD PRESSURE: 58 MMHG | WEIGHT: 160 LBS | HEIGHT: 67 IN | SYSTOLIC BLOOD PRESSURE: 90 MMHG | HEART RATE: 78 BPM

## 2023-04-26 DIAGNOSIS — I73.9 PERIPHERAL ARTERY DISEASE (HCC): Primary | ICD-10-CM

## 2023-04-26 DIAGNOSIS — I65.23 CAROTID STENOSIS, ASYMPTOMATIC, BILATERAL: ICD-10-CM

## 2023-04-26 DIAGNOSIS — G62.9 NEUROPATHY: ICD-10-CM

## 2023-04-26 RX ORDER — GABAPENTIN 100 MG/1
100 CAPSULE ORAL 4 TIMES DAILY
Qty: 120 CAPSULE | Refills: 0 | Status: SHIPPED | OUTPATIENT
Start: 2023-04-26 | End: 2023-05-26

## 2023-04-26 NOTE — ASSESSMENT & PLAN NOTE
55-year-old male past medical history of CAD status post CABG, pulmonary fibrosis, hypertension, lumbar radiculopathy with associated neuropathy, osteoarthritis, PAD, asymptomatic bilateral carotid stenosis     Patient's recent duplex does show a greater than 70% stenosis of the left carotid artery and less than 50% stenosis of the right carotid artery  The velocities of the left carotid artery are as follows    Prox  ICA   PSV  536      ICA/CCA ratio   4 84    Discussed with the patient his velocities are indicative of likely greater than 80% stenosis and he does meet criteria for intervention  The patient is hesitant given his comorbidities which include both cardiac disease as well as pulmonary fibrosis  We discussed in detail surgical options which include carotid endarterectomy, TCAR, and transfemoral carotid artery stenting  I feels the TCAR would be best for the patient which can be done using local and avoid general anesthesia and has the least risk of periop complications  The patient understands this and is still interested in pursuing medical therapy and would like some time to think about surgery  I asked him to speak with his PCP, pulmonologist, and cardiologist about their thoughts in terms of his morbidities and undergoing surgery    We will follow-up in 6 months and at that time again discussed whether he is interested in pursuing TCAR or continue with medical management

## 2023-04-26 NOTE — ASSESSMENT & PLAN NOTE
Patient denies any claudication or nonhealing wounds  We will have him return in 6 months and have a duplex done prior to his visit

## 2023-04-26 NOTE — PROGRESS NOTES
Assessment/Plan:    Carotid stenosis, asymptomatic, bilateral  70-year-old male past medical history of CAD status post CABG, pulmonary fibrosis, hypertension, lumbar radiculopathy with associated neuropathy, osteoarthritis, PAD, asymptomatic bilateral carotid stenosis     Patient's recent duplex does show a greater than 70% stenosis of the left carotid artery and less than 50% stenosis of the right carotid artery  The velocities of the left carotid artery are as follows    Prox  ICA   PSV  536      ICA/CCA ratio   4 84    Discussed with the patient his velocities are indicative of likely greater than 80% stenosis and he does meet criteria for intervention  The patient is hesitant given his comorbidities which include both cardiac disease as well as pulmonary fibrosis  We discussed in detail surgical options which include carotid endarterectomy, TCAR, and transfemoral carotid artery stenting  I feels the TCAR would be best for the patient which can be done using local and avoid general anesthesia and has the least risk of periop complications  The patient understands this and is still interested in pursuing medical therapy and would like some time to think about surgery  I asked him to speak with his PCP, pulmonologist, and cardiologist about their thoughts in terms of his morbidities and undergoing surgery  We will follow-up in 6 months and at that time again discussed whether he is interested in pursuing TCAR or continue with medical management    Peripheral artery disease ([facility] [address] )  Patient denies any claudication or nonhealing wounds  We will have him return in 6 months and have a duplex done prior to his visit  Diagnoses and all orders for this visit:    Peripheral artery disease ([address] )  -     VAS lower limb arterial duplex, complete bilateral; Future    Carotid stenosis, asymptomatic, bilateral  -     VAS carotid complete study;  Future        Subjective:      Patient ID: Shira Dorman jillian "a 80 y o  male  Patient present to review CV done on 3/6/23  Patient denies any TIA CVA symptoms  Patient is currently taking ASA, Atorvastatin and Plavix  HPI  71-year-old male past medical history of CAD status post CABG, pulmonary fibrosis, hypertension, lumbar radiculopathy with associated neuropathy, osteoarthritis, PAD, asymptomatic bilateral carotid stenosis    Patient overall doing well  Since we last spoke he started on Plavix in addition to his aspirin and Lipitor  He has had signs or symptoms of TIA or stroke  Denies any claudication-like pain  Neuropathy is well controlled with gabapentin      The following portions of the patient's history were reviewed and updated as appropriate: allergies, current medications, past family history, past medical history, past social history, past surgical history and problem list     I have spent a total time of 30 minutes on 04/26/23 in caring for this patient including Diagnostic results, Prognosis, Risks and benefits of tx options, Instructions for management, Patient and family education, Importance of tx compliance, Risk factor reductions, Impressions, Counseling / Coordination of care, Documenting in the medical record, Reviewing / ordering tests, medicine, procedures   and Obtaining or reviewing history    Review of Systems   Constitutional: Negative  HENT: Negative  Eyes: Negative  Respiratory: Negative  Cardiovascular: Negative  Gastrointestinal: Negative  Endocrine: Negative  Genitourinary: Negative  Musculoskeletal: Negative  Skin: Negative  Allergic/Immunologic: Negative  Neurological: Negative  Hematological: Negative  Psychiatric/Behavioral: Negative            Objective:      BP 90/58 (BP Location: Right arm, Patient Position: Sitting, Cuff Size: Adult)   Pulse 78   Ht 5' 7\" (1 702 m)   Wt 72 6 kg (160 lb)   BMI 25 06 kg/m²          Physical Exam  Constitutional:       Appearance: Normal " appearance  HENT:      Head: Normocephalic and atraumatic  Eyes:      Extraocular Movements: Extraocular movements intact  Pupils: Pupils are equal, round, and reactive to light  Cardiovascular:      Rate and Rhythm: Normal rate and regular rhythm  Pulmonary:      Effort: Pulmonary effort is normal       Breath sounds: Normal breath sounds  Abdominal:      Palpations: Abdomen is soft  Tenderness: There is no abdominal tenderness  Musculoskeletal:         General: Normal range of motion  Cervical back: Normal range of motion  Neurological:      General: No focal deficit present  Mental Status: He is oriented to person, place, and time     Psychiatric:         Mood and Affect: Mood normal

## 2023-06-13 ENCOUNTER — TELEPHONE (OUTPATIENT)
Dept: UROLOGY | Facility: MEDICAL CENTER | Age: 83
End: 2023-06-13

## 2023-06-13 NOTE — TELEPHONE ENCOUNTER
New Patient Triage  Please Triage:   New Patient-   What is the reason for the patients appointment? Pain with urinating,  urgency  Started a few days ago  Pt foreskin is breaking        Insurance: [employer]   Do we accept the [employer] or is the patient Self-Pay? Provider & Plan:    Member ID#: [id number]    History  Has the pt had any previous urologist? Jean Juarez     Have pt records been requested? No    Has the pt had any outside testing done?      Does the pt have a personal history of cancer?:     Pt wants to see Jean Juarez at [facility] can be reached at [phone]

## 2023-06-13 NOTE — TELEPHONE ENCOUNTER
Spoke to patient appointment has been scheduled with AP at the [place] office for tomorrow  Location confirmed

## 2023-06-14 ENCOUNTER — OFFICE VISIT (OUTPATIENT)
Dept: UROLOGY | Facility: CLINIC | Age: 83
End: 2023-06-14
Payer: COMMERCIAL

## 2023-06-14 VITALS
OXYGEN SATURATION: 98 % | HEART RATE: 82 BPM | DIASTOLIC BLOOD PRESSURE: 62 MMHG | HEIGHT: 67 IN | WEIGHT: 162 LBS | BODY MASS INDEX: 25.43 KG/M2 | SYSTOLIC BLOOD PRESSURE: 122 MMHG

## 2023-06-14 DIAGNOSIS — N47.1 PHIMOSIS OF PENIS: Primary | ICD-10-CM

## 2023-06-14 PROCEDURE — 99213 OFFICE O/P EST LOW 20 MIN: CPT | Performed by: PHYSICIAN ASSISTANT

## 2023-06-14 RX ORDER — CLOTRIMAZOLE AND BETAMETHASONE DIPROPIONATE 10; .64 MG/G; MG/G
CREAM TOPICAL 2 TIMES DAILY
Qty: 30 G | Refills: 3 | Status: SHIPPED | OUTPATIENT
Start: 2023-06-14

## 2023-06-14 NOTE — PROGRESS NOTES
UROLOGY PROGRESS NOTE   Patient Identifiers: Alex Alonzo (MRN [id number])  Date of Service: 6/14/2023    Subjective:   70-year-old man seen previously for phimosis  He had some balanitis and was given Lotrisone cream   He comes in complaining of worsening symptoms  He has no hematuria but he is unable to retract the foreskin      Reason for visit: Phimosis and balanitis    Objective:     VITALS:    Vitals:    06/14/23 1302   BP: 122/62   Pulse: 82   SpO2: 98%           LABS:  Lab Results   Component Value Date    HCT 29 3 (L) 10/11/2022    HGB 9 5 (L) 10/11/2022     10/11/2022    WBC 8 67 10/11/2022   ]    Lab Results   Component Value Date    BUN 11 10/11/2022    CALCIUM 8 2 (L) 10/11/2022     10/11/2022    CO2 27 10/11/2022    CREATININE 0 88 10/11/2022    GLUCOSE 140 01/19/2018    K 3 7 10/11/2022     06/10/2015   ]        INPATIENT MEDS:    Current Outpatient Medications:   •  acetaminophen (TYLENOL) 650 mg CR tablet, Take 650 mg by mouth every 8 (eight) hours as needed for mild pain, Disp: , Rfl:   •  Ascorbic Acid (Vitamin C) 500 MG PACK, Take 1,000 mg by mouth daily, Disp: , Rfl:   •  aspirin (ECOTRIN LOW STRENGTH) 81 mg EC tablet, Take 81 mg by mouth daily, Disp: , Rfl:   •  atorvastatin (LIPITOR) 80 mg tablet, TAKE 1 TABLET BY MOUTH EVERY MORNING , Disp: 90 tablet, Rfl: 3  •  cholecalciferol (VITAMIN D3) 1,000 units tablet, Take 2,000 Units by mouth daily, Disp: , Rfl:   •  ciclopirox (PENLAC) 8 % solution, APPLY TO THE AFFECTED AREA(S) ONCE DAILY AT BEDTIME OR 8 HOURS BEFORE WASHING, Disp: , Rfl:   •  clopidogrel (Plavix) 75 mg tablet, Take 1 tablet (75 mg total) by mouth daily, Disp: 90 tablet, Rfl: 3  •  clotrimazole-betamethasone (LOTRISONE) 1-0 05 % cream, Apply topically 2 (two) times a day, Disp: 30 g, Rfl: 3  •  cyanocobalamin (VITAMIN B-12) 1,000 mcg tablet, Take 1,000 mcg by mouth daily  , Disp: , Rfl:   •  Diclofenac Sodium (VOLTAREN) 1 %, Apply 2 g topically 4 (four) "times a day, Disp: 50 g, Rfl: 0  •  docusate sodium (COLACE) 100 mg capsule, Take 100 mg by mouth 2 (two) times a day, Disp: , Rfl:   •  famotidine (PEPCID) 20 mg tablet, TAKE 1 TABLET BY MOUTH TWICE A DAY, Disp: 180 tablet, Rfl: 1  •  gabapentin (NEURONTIN) 100 mg capsule, Take 1 capsule (100 mg total) by mouth 4 (four) times a day (Patient taking differently: Take 100 mg by mouth 2 (two) times a day), Disp: 120 capsule, Rfl: 0  •  ipratropium (ATROVENT) 0 03 % nasal spray, 2 sprays into each nostril every 12 (twelve) hours, Disp: , Rfl:   •  metoprolol tartrate (LOPRESSOR) 50 mg tablet, TAKE 1 AND 1/2 TABLETS BY MOUTH EVERY 12 HOURS (Patient taking differently: 75 mg every 12 (twelve) hours), Disp: 90 tablet, Rfl: 11  •  Multiple Vitamin (MULTIVITAMIN) capsule, Take 1 capsule by mouth daily, Disp: , Rfl:   •  pantoprazole (PROTONIX) 40 mg tablet, TAKE 1 TABLET BY MOUTH TWICE A DAY, Disp: 180 tablet, Rfl: 1  •  polyethylene glycol (MIRALAX) 17 g packet, Take 17 g by mouth daily as needed (constipation), Disp: , Rfl: 0  •  triamcinolone (KENALOG) 0 1 % cream, Apply topically 2 (two) times a day, Disp: , Rfl:       Physical Exam:   /62 (BP Location: Left arm, Patient Position: Sitting)   Pulse 82   Ht 5' 7\" (1 702 m)   Wt 73 5 kg (162 lb)   SpO2 98%   BMI 25 37 kg/m²   GEN: no acute distress    RESP: breathing comfortably with no accessory muscle use    ABD: soft, non-tender, non-distended      EXT: no significant peripheral edema   (Male): Penis uncircumcised, very tight phimosis  No drainage or erythema  There is no cracking of the skin  RADIOLOGY:   None    Assessment:   #1  Phimosis  #2  Balanitis    Plan:   -I refilled his Lotrisone which she will use with a Q-tip  -Follow-up with me in 4 weeks  -We discussed a dorsal slit in the office versus a formal circumcision    He will decide and we will talk about it again when he comes back  -          "

## 2023-06-16 ENCOUNTER — OFFICE VISIT (OUTPATIENT)
Dept: PULMONOLOGY | Facility: CLINIC | Age: 83
End: 2023-06-16
Payer: COMMERCIAL

## 2023-06-16 VITALS
RESPIRATION RATE: 18 BRPM | HEART RATE: 78 BPM | DIASTOLIC BLOOD PRESSURE: 62 MMHG | SYSTOLIC BLOOD PRESSURE: 128 MMHG | HEIGHT: 67 IN | BODY MASS INDEX: 25.11 KG/M2 | OXYGEN SATURATION: 97 % | WEIGHT: 160 LBS | TEMPERATURE: 97.8 F

## 2023-06-16 DIAGNOSIS — J84.10 PULMONARY FIBROSIS DETERMINED BY HIGH RESOLUTION COMPUTED TOMOGRAPHY (HCC): Primary | ICD-10-CM

## 2023-06-16 PROCEDURE — 99214 OFFICE O/P EST MOD 30 MIN: CPT | Performed by: INTERNAL MEDICINE

## 2023-06-16 NOTE — PROGRESS NOTES
Pulmonary Follow Up Note   Harmony Martinez 80 y o  male MRN: [id number]  6/16/2023    Assessment:    Pulmonary fibrosis determined by high resolution computed tomography [facility])  Mr Nadine Turk continues to be relatively unlimited with respect to the findings of pulmonary fibrosis on his CT scan  His only current symptom is some persistent cough with clear mucus production  He prefers not to go on antifibrotic therapies unless absolutely necessary, and due to the cost of testing, has been hesitant to perform pulmonary function tests or CT scans, which also aggravate his back injury  I advised him to start taking a second-generation over-the-counter antihistamine for this  If this is ineffective, could consider options including cough suppression with Tessalon Perles versus Mucinex versus using an antihistamine with a decongestant and close monitoring of his blood pressure  Plan:    Diagnoses and all orders for this visit:    Pulmonary fibrosis determined by high resolution computed tomography [facility])   - Continue observation off therapy   - Start 2nd generation antihistamine, zyrtec v claritin v allegra    Return in about 1 year (around 6/16/2024)  History of Present Illness   HPI:  Harmony Martinez is a 80 y o  male who presents for routine follow-up  He reports no new issues with his breathing since his last appointment, but his cough persists  Whereas at the time of his last appointment he was only having coughing spells 3-4 times a day, he estimates this is closer to 20 now  His mucus production is probably slightly decreased from prior, and it feels like he has a more difficult time clearing it out of his lungs than he used to  He notes no clear associations with his cough, with the exception of the fact that its not particularly common at night  He does not think it is associated with eating  He still continues to go to the gym 3 times a week and does not cough during exercise    He also performs fairly vigorous exercise without any significant subjective shortness of breath  Review of Systems   Respiratory: Positive for cough  Negative for shortness of breath  Historical Information   Past Medical History:   Diagnosis Date   • Abnormal liver function test     RESOLVED: [id number]   • Allergic rhinitis    • Anemia     LAST ASSESSED: [id number]   • Arthritis    • BPH (benign prostatic hyperplasia)    • CAD (coronary artery disease)    • Coronary artery disease    • 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HCC)     LAST ASSESSED: [id number]   • Former tobacco use    • GERD (gastroesophageal reflux disease)    • Hand paresthesia     RESOLVED: [id number]   • Hyperlipidemia    • Hypertension    • Lumbar stenosis    • Peripheral artery disease (HCC)     LAST ASSESSED: [id number]   • Stage 3a chronic kidney disease ([facility] [address] ) 7/11/2021     Past Surgical History:   Procedure Laterality Date   • BACK SURGERY     • COLONOSCOPY     • LUMBAR FUSION     • NM ARTHRODESIS POSTERIOR/PSTLAT TQ 1NTRSPC LUMBAR N/A 11/03/2016    Procedure: L2-S1 POSTERIOR LUMBAR FUSION AND DECOMPRESSION (Impulse), Posterior lateral fixation; dural repair ;  Surgeon: Ran Kapadia MD;  Location: [facility];  Service: Orthopedics   • NM CABG W/ARTERIAL GRAFT SINGLE ARTERIAL GRAFT N/A 01/19/2018    Procedure: CABG X4 with LIMA - LAD, SVG - RCA, OM2, & Diagonal ; Left Leg EVH; MATTHEW;  Surgeon: Kelvin Cespedes DO;  Location: [facility];  Service: Cardiac Surgery   • NM COLONOSCOPY FLX DX W/COLLJ SPEC WHEN PFRMD N/A 11/15/2017    Procedure: EGD AND COLONOSCOPY;  Surgeon: Steffany Feldman MD;  Location: [facility];   Service: Gastroenterology   • SEPTOPLASTY      LAST ASSESSED; [id number]   • TONSILECTOMY AND ADNOIDECTOMY      LAST ASSESSED: [id number]   • UPPER GASTROINTESTINAL ENDOSCOPY       Family History   Problem Relation Age of Onset   • Emphysema Mother    • Liver disease Mother    • Coronary artery disease Father    • Hypertension Father    • Liver disease Father    • Heart failure Father    • Stroke Father         CVA    • Heart attack Father    • Coronary artery disease Brother    • Heart disease Brother         younger brother by pass and other brother 4 stents placed   • Other Family         BACK PROBLEM    • Stroke Family         CVA   • Emphysema Family    • Hypertension Family         BENIGN       Meds/Allergies     Current Outpatient Medications:   •  acetaminophen (TYLENOL) 650 mg CR tablet, Take 650 mg by mouth every 8 (eight) hours as needed for mild pain, Disp: , Rfl:   •  Ascorbic Acid (Vitamin C) 500 MG PACK, Take 1,000 mg by mouth daily, Disp: , Rfl:   •  aspirin (ECOTRIN LOW STRENGTH) 81 mg EC tablet, Take 81 mg by mouth daily, Disp: , Rfl:   •  atorvastatin (LIPITOR) 80 mg tablet, TAKE 1 TABLET BY MOUTH EVERY MORNING , Disp: 90 tablet, Rfl: 3  •  cholecalciferol (VITAMIN D3) 1,000 units tablet, Take 2,000 Units by mouth daily, Disp: , Rfl:   •  ciclopirox (PENLAC) 8 % solution, APPLY TO THE AFFECTED AREA(S) ONCE DAILY AT BEDTIME OR 8 HOURS BEFORE WASHING, Disp: , Rfl:   •  clopidogrel (Plavix) 75 mg tablet, Take 1 tablet (75 mg total) by mouth daily, Disp: 90 tablet, Rfl: 3  •  clotrimazole-betamethasone (LOTRISONE) 1-0 05 % cream, Apply topically 2 (two) times a day, Disp: 30 g, Rfl: 3  •  cyanocobalamin (VITAMIN B-12) 1,000 mcg tablet, Take 1,000 mcg by mouth daily  , Disp: , Rfl:   •  docusate sodium (COLACE) 100 mg capsule, Take 100 mg by mouth 2 (two) times a day, Disp: , Rfl:   •  famotidine (PEPCID) 20 mg tablet, TAKE 1 TABLET BY MOUTH TWICE A DAY, Disp: 180 tablet, Rfl: 1  •  ipratropium (ATROVENT) 0 03 % nasal spray, 2 sprays into each nostril every 12 (twelve) hours, Disp: , Rfl:   •  Multiple Vitamin (MULTIVITAMIN) capsule, Take 1 capsule by mouth daily, Disp: , Rfl:   •  pantoprazole (PROTONIX) 40 mg tablet, TAKE 1 TABLET BY MOUTH TWICE A DAY, Disp: 180 tablet, Rfl: 1  •  polyethylene glycol (MIRALAX) 17 g packet, Take 17 g by mouth daily "as needed (constipation), Disp: , Rfl: 0  •  triamcinolone (KENALOG) 0 1 % cream, Apply topically 2 (two) times a day, Disp: , Rfl:   •  metoprolol tartrate (LOPRESSOR) 50 mg tablet, TAKE 1 AND 1/2 TABLETS BY MOUTH EVERY 12 HOURS (Patient taking differently: 75 mg every 12 (twelve) hours), Disp: 90 tablet, Rfl: 11  Allergies   Allergen Reactions   • Penicillins Other (See Comments)     Hallucinations; Patient reported that he was seeing visual disturbances         Vitals: Blood pressure 128/62, pulse 78, temperature 97 8 °F (36 6 °C), resp  rate 18, height 5' 7\" (1 702 m), weight 72 6 kg (160 lb), SpO2 97 %  Body mass index is 25 06 kg/m²  Oxygen Therapy  SpO2: 97 %  Oxygen Therapy: None (Room air)    Physical Exam  Physical Exam  Vitals reviewed  Constitutional:       General: He is not in acute distress  Appearance: Normal appearance  He is well-developed  He is not ill-appearing  HENT:      Head: Normocephalic and atraumatic  Eyes:      General: No scleral icterus  Conjunctiva/sclera: Conjunctivae normal    Neck:      Thyroid: No thyromegaly  Vascular: No JVD  Cardiovascular:      Rate and Rhythm: Normal rate and regular rhythm  Heart sounds: Normal heart sounds  No murmur heard  No friction rub  No gallop  Pulmonary:      Effort: Pulmonary effort is normal  No respiratory distress  Breath sounds: Rales present  No wheezing  Comments: Increased crackles and inspiratory squeaks at bilateral bases  Musculoskeletal:      Cervical back: Neck supple  Right lower leg: No edema  Left lower leg: No edema  Skin:     General: Skin is warm and dry  Findings: No rash  Neurological:      General: No focal deficit present  Mental Status: He is alert and oriented to person, place, and time  Mental status is at baseline  Psychiatric:         Mood and Affect: Mood normal          Behavior: Behavior normal      Labs:  I have personally reviewed pertinent lab " "results  Lab Results   Component Value Date    WBC 8 67 10/11/2022    HGB 9 5 (L) 10/11/2022    HCT 29 3 (L) 10/11/2022     (H) 10/11/2022     10/11/2022     Lab Results   Component Value Date    GLUCOSE 140 01/19/2018    CALCIUM 8 2 (L) 10/11/2022     06/10/2015    K 3 7 10/11/2022    CO2 27 10/11/2022     10/11/2022    BUN 11 10/11/2022    CREATININE 0 88 10/11/2022     No results found for: \"IGE\"  Lab Results   Component Value Date    ALT 11 10/10/2022    AST 16 10/10/2022    ALKPHOS 72 10/10/2022    BILITOT 0 46 06/10/2015     Imaging and other studies: I have personally reviewed relevant films in PACS  EKG, Pathology, and Other Studies: I have personally reviewed relevant reports in [facility]    [facility]  "

## 2023-06-16 NOTE — ASSESSMENT & PLAN NOTE
Mr Rikki Yap continues to be relatively unlimited with respect to the findings of pulmonary fibrosis on his CT scan  His only current symptom is some persistent cough with clear mucus production  He prefers not to go on antifibrotic therapies unless absolutely necessary, and due to the cost of testing, has been hesitant to perform pulmonary function tests or CT scans, which also aggravate his back injury  I advised him to start taking a second-generation over-the-counter antihistamine for this  If this is ineffective, could consider options including cough suppression with Tessalon Perles versus Mucinex versus using an antihistamine with a decongestant and close monitoring of his blood pressure

## 2023-07-06 DIAGNOSIS — I10 HYPERTENSION, UNSPECIFIED TYPE: ICD-10-CM

## 2023-07-06 RX ORDER — METOPROLOL TARTRATE 50 MG/1
TABLET, FILM COATED ORAL
Qty: 270 TABLET | Refills: 3 | Status: SHIPPED | OUTPATIENT
Start: 2023-07-06

## 2023-07-20 ENCOUNTER — OFFICE VISIT (OUTPATIENT)
Dept: FAMILY MEDICINE CLINIC | Facility: OTHER | Age: 83
End: 2023-07-20
Payer: COMMERCIAL

## 2023-07-20 ENCOUNTER — RA CDI HCC (OUTPATIENT)
Dept: OTHER | Facility: HOSPITAL | Age: 83
End: 2023-07-20

## 2023-07-20 VITALS
RESPIRATION RATE: 16 BRPM | SYSTOLIC BLOOD PRESSURE: 144 MMHG | TEMPERATURE: 98.4 F | WEIGHT: 160 LBS | OXYGEN SATURATION: 97 % | HEIGHT: 67 IN | HEART RATE: 78 BPM | DIASTOLIC BLOOD PRESSURE: 74 MMHG | BODY MASS INDEX: 25.11 KG/M2

## 2023-07-20 DIAGNOSIS — I10 PRIMARY HYPERTENSION: Primary | ICD-10-CM

## 2023-07-20 DIAGNOSIS — E78.2 MIXED HYPERLIPIDEMIA: ICD-10-CM

## 2023-07-20 DIAGNOSIS — N18.31 STAGE 3A CHRONIC KIDNEY DISEASE (HCC): ICD-10-CM

## 2023-07-20 PROBLEM — M46.1 INFLAMMATION OF SACROILIAC JOINT (HCC): Status: ACTIVE | Noted: 2023-07-20

## 2023-07-20 PROCEDURE — 99213 OFFICE O/P EST LOW 20 MIN: CPT | Performed by: FAMILY MEDICINE

## 2023-07-20 NOTE — ASSESSMENT & PLAN NOTE
Lab Results   Component Value Date    EGFR 79 10/11/2022    EGFR 75 10/10/2022    EGFR 63 10/09/2022    CREATININE 0.88 10/11/2022    CREATININE 0.94 10/10/2022    CREATININE 1.08 10/09/2022     GFR seems to have improved to CKD stage 2 level on most recent labs (Fall 2022)

## 2023-07-20 NOTE — PROGRESS NOTES
[address] [facility] coding opportunities       Chart reviewed, no opportunity found: CHART REVIEWED, [address]     Patients Insurance     Medicare Insurance: [employer]

## 2023-07-20 NOTE — PROGRESS NOTES
Name: Beryle Lovings      :       MRN: [id number]  Encounter Provider: Penelope Gallagher DO  Encounter Date: 2023   Encounter department: [address]     1. Primary hypertension  Assessment & Plan:  BP goal <140/90 due to CKD  Continue metoprolol as this has been working for him      Orders:  -     Comprehensive metabolic panel; Future  -     Lipid Panel with Direct LDL reflex; Future    2. Stage 3a chronic kidney disease [facility])  Assessment & Plan:  Lab Results   Component Value Date    EGFR 79 10/11/2022    EGFR 75 10/10/2022    EGFR 63 10/09/2022    CREATININE 0.88 10/11/2022    CREATININE 0.94 10/10/2022    CREATININE 1.08 10/09/2022     GFR seems to have improved to CKD stage 2 level on most recent labs (2022)      Orders:  -     Comprehensive metabolic panel; Future  -     Lipid Panel with Direct LDL reflex; Future    3. Mixed hyperlipidemia  Assessment & Plan:  LDL goal <70 with CAD  Continue atorvastatin 80 mg as recommended by Cardiology    Orders:  -     Comprehensive metabolic panel; Future  -     Lipid Panel with Direct LDL reflex; Future         Return in about 6 months (around 2024) for AWV. Subjective      Hypertension  This is a chronic problem. The current episode started more than 1 year ago. The problem has been waxing and waning since onset. The problem is controlled. Pertinent negatives include no anxiety, blurred vision, chest pain, headaches, malaise/fatigue, neck pain, orthopnea, palpitations, peripheral edema, PND, shortness of breath or sweats. There are no associated agents to hypertension. Risk factors for coronary artery disease include dyslipidemia, family history, male gender and sedentary lifestyle. Past treatments include beta blockers. The current treatment provides moderate improvement. Compliance problems include diet, exercise and psychosocial issues.   Hypertensive end-organ damage includes kidney disease and CAD/MI. There is no history of angina, CVA, heart failure, left ventricular hypertrophy, PVD or retinopathy. Identifiable causes of hypertension include chronic renal disease. There is no history of a hypertension causing med or a thyroid problem. Hyperlipidemia  This is a chronic problem. The current episode started more than 1 year ago. The problem is controlled. Exacerbating diseases include chronic renal disease. He has no history of diabetes, hypothyroidism, liver disease or obesity. Pertinent negatives include no chest pain, myalgias or shortness of breath. Current antihyperlipidemic treatment includes statins. The current treatment provides moderate improvement of lipids. Compliance problems include psychosocial issues, adherence to exercise and adherence to diet. Risk factors for coronary artery disease include dyslipidemia, family history, hypertension, male sex and a sedentary lifestyle. Review of Systems   Constitutional: Negative for activity change, fatigue, fever and malaise/fatigue. HENT: Negative for congestion, ear pain, sinus pain and sore throat. Eyes: Negative for blurred vision, pain, itching and visual disturbance. Respiratory: Negative for cough and shortness of breath. Cardiovascular: Negative for chest pain, palpitations, orthopnea, leg swelling and PND. Gastrointestinal: Negative for abdominal pain, constipation, diarrhea, nausea and vomiting. Endocrine: Negative for cold intolerance and heat intolerance. Genitourinary: Negative for dysuria. Musculoskeletal: Negative for myalgias and neck pain. Skin: Negative for color change and rash. Neurological: Negative for dizziness, syncope and headaches. Hematological: Negative for adenopathy. Psychiatric/Behavioral: Negative for dysphoric mood. The patient is not nervous/anxious.         Current Outpatient Medications on File Prior to Visit   Medication Sig   • acetaminophen (TYLENOL) 650 mg CR tablet Take 650 mg by mouth every 8 (eight) hours as needed for mild pain   • Ascorbic Acid (Vitamin C) 500 MG PACK Take 1,000 mg by mouth daily   • aspirin (ECOTRIN LOW STRENGTH) 81 mg EC tablet Take 81 mg by mouth daily   • atorvastatin (LIPITOR) 80 mg tablet TAKE 1 TABLET BY MOUTH EVERY MORNING. • cholecalciferol (VITAMIN D3) 1,000 units tablet Take 2,000 Units by mouth daily   • ciclopirox (PENLAC) 8 % solution APPLY TO THE AFFECTED AREA(S) ONCE DAILY AT BEDTIME OR 8 HOURS BEFORE WASHING   • clopidogrel (Plavix) 75 mg tablet Take 1 tablet (75 mg total) by mouth daily   • clotrimazole-betamethasone (LOTRISONE) 1-0.05 % cream Apply topically 2 (two) times a day   • cyanocobalamin (VITAMIN B-12) 1,000 mcg tablet Take 1,000 mcg by mouth daily     • docusate sodium (COLACE) 100 mg capsule Take 100 mg by mouth 2 (two) times a day   • famotidine (PEPCID) 20 mg tablet TAKE 1 TABLET BY MOUTH TWICE A DAY   • gabapentin (NEURONTIN) 100 mg capsule Take 1 capsule (100 mg total) by mouth 2 (two) times a day   • ipratropium (ATROVENT) 0.03 % nasal spray 2 sprays into each nostril every 12 (twelve) hours   • metoprolol tartrate (LOPRESSOR) 50 mg tablet TAKE 1 AND 1/2 TABLETS BY MOUTH EVERY 12 HOURS   • Multiple Vitamin (MULTIVITAMIN) capsule Take 1 capsule by mouth daily   • triamcinolone (KENALOG) 0.1 % cream Apply topically 2 (two) times a day   • [DISCONTINUED] pantoprazole (PROTONIX) 40 mg tablet TAKE 1 TABLET BY MOUTH TWICE A DAY   • [DISCONTINUED] polyethylene glycol (MIRALAX) 17 g packet Take 17 g by mouth daily as needed (constipation)       Objective     /74   Pulse 78   Temp 98.4 °F (36.9 °C)   Resp 16   Ht 5' 7" (1.702 m)   Wt 72.6 kg (160 lb)   SpO2 97%   BMI 25.06 kg/m²     Physical Exam  Vitals and nursing note reviewed. Constitutional:       General: He is not in acute distress. Appearance: Normal appearance. He is well-developed. He is not ill-appearing. Comments: Body mass index is 25.06 kg/m². HENT:      Head: Normocephalic and atraumatic. Eyes:      General: No scleral icterus. Conjunctiva/sclera: Conjunctivae normal.      Pupils: Pupils are equal, round, and reactive to light. Cardiovascular:      Rate and Rhythm: Normal rate and regular rhythm. Heart sounds: Normal heart sounds. No murmur heard. Pulmonary:      Effort: Pulmonary effort is normal. No respiratory distress. Breath sounds: Normal breath sounds. Abdominal:      General: Bowel sounds are normal. There is no distension. Palpations: Abdomen is soft. Tenderness: There is no abdominal tenderness. Musculoskeletal:         General: Normal range of motion. Cervical back: Normal range of motion and neck supple. Right lower leg: No edema. Left lower leg: No edema. Lymphadenopathy:      Cervical: No cervical adenopathy. Skin:     General: Skin is warm and dry. Coloration: Skin is not jaundiced. Findings: No rash. Neurological:      General: No focal deficit present. Mental Status: He is alert and oriented to person, place, and time.    Psychiatric:         Mood and Affect: Mood normal.         Behavior: Behavior normal.       Mikaela Duenas, DO

## 2023-07-21 ENCOUNTER — OFFICE VISIT (OUTPATIENT)
Dept: UROLOGY | Facility: CLINIC | Age: 83
End: 2023-07-21
Payer: COMMERCIAL

## 2023-07-21 VITALS
BODY MASS INDEX: 25.43 KG/M2 | DIASTOLIC BLOOD PRESSURE: 76 MMHG | SYSTOLIC BLOOD PRESSURE: 140 MMHG | HEIGHT: 67 IN | WEIGHT: 162 LBS | HEART RATE: 64 BPM

## 2023-07-21 DIAGNOSIS — N47.1 PHIMOSIS OF PENIS: ICD-10-CM

## 2023-07-21 PROCEDURE — 99213 OFFICE O/P EST LOW 20 MIN: CPT | Performed by: PHYSICIAN ASSISTANT

## 2023-07-21 RX ORDER — CLOTRIMAZOLE AND BETAMETHASONE DIPROPIONATE 10; .64 MG/G; MG/G
CREAM TOPICAL 2 TIMES DAILY
Qty: 30 G | Refills: 3 | Status: SHIPPED | OUTPATIENT
Start: 2023-07-21

## 2023-07-22 DIAGNOSIS — K21.9 GASTROESOPHAGEAL REFLUX DISEASE, UNSPECIFIED WHETHER ESOPHAGITIS PRESENT: ICD-10-CM

## 2023-07-22 NOTE — TELEPHONE ENCOUNTER
Medication Refill Request     Name famotidine (PEPCID)   Dose/Frequency 20 mg/ TAKE 1 TABLET BY MOUTH TWICE A DAY  Quantity 180 tablet  Verified pharmacy   [x]  Verified ordering Provider   [x]  Does patient have enough for the next 3 days?  Yes [x] No []

## 2023-07-24 RX ORDER — FAMOTIDINE 20 MG/1
20 TABLET, FILM COATED ORAL 2 TIMES DAILY
Qty: 180 TABLET | Refills: 0 | Status: SHIPPED | OUTPATIENT
Start: 2023-07-24

## 2023-07-24 NOTE — PROGRESS NOTES
UROLOGY PROGRESS NOTE   Patient Identifiers: Main Oliveira (MRN [id number])  Date of Service: 07/21/2023    Subjective:   45-year-old man history of phimosis and balanitis. I treated him with Lotrisone cream, saw him with worsening symptoms in June. He has no hematuria but has trouble retracting his foreskin.     Reason for visit: Phimosis and balanitis    Objective:     VITALS:    Vitals:    07/21/23 1414   BP: 140/76   Pulse: 64           LABS:  Lab Results   Component Value Date    HGB 9.5 (L) 10/11/2022    HCT 29.3 (L) 10/11/2022    WBC 8.67 10/11/2022     10/11/2022   ]    Lab Results   Component Value Date     06/10/2015    K 3.7 10/11/2022     10/11/2022    CO2 27 10/11/2022    BUN 11 10/11/2022    CREATININE 0.88 10/11/2022    CALCIUM 8.2 (L) 10/11/2022    GLUCOSE 140 01/19/2018   ]        INPATIENT MEDS:    Current Outpatient Medications:   •  acetaminophen (TYLENOL) 650 mg CR tablet, Take 650 mg by mouth every 8 (eight) hours as needed for mild pain, Disp: , Rfl:   •  Ascorbic Acid (Vitamin C) 500 MG PACK, Take 1,000 mg by mouth daily, Disp: , Rfl:   •  aspirin (ECOTRIN LOW STRENGTH) 81 mg EC tablet, Take 81 mg by mouth daily, Disp: , Rfl:   •  atorvastatin (LIPITOR) 80 mg tablet, TAKE 1 TABLET BY MOUTH EVERY MORNING., Disp: 90 tablet, Rfl: 3  •  cholecalciferol (VITAMIN D3) 1,000 units tablet, Take 2,000 Units by mouth daily, Disp: , Rfl:   •  ciclopirox (PENLAC) 8 % solution, APPLY TO THE AFFECTED AREA(S) ONCE DAILY AT BEDTIME OR 8 HOURS BEFORE WASHING, Disp: , Rfl:   •  clopidogrel (Plavix) 75 mg tablet, Take 1 tablet (75 mg total) by mouth daily, Disp: 90 tablet, Rfl: 3  •  clotrimazole-betamethasone (LOTRISONE) 1-0.05 % cream, Apply topically 2 (two) times a day, Disp: 30 g, Rfl: 3  •  cyanocobalamin (VITAMIN B-12) 1,000 mcg tablet, Take 1,000 mcg by mouth daily  , Disp: , Rfl:   •  docusate sodium (COLACE) 100 mg capsule, Take 100 mg by mouth 2 (two) times a day, Disp: , Rfl: •  famotidine (PEPCID) 20 mg tablet, TAKE 1 TABLET BY MOUTH TWICE A DAY, Disp: 180 tablet, Rfl: 1  •  gabapentin (NEURONTIN) 100 mg capsule, Take 1 capsule (100 mg total) by mouth 2 (two) times a day, Disp: 180 capsule, Rfl: 0  •  ipratropium (ATROVENT) 0.03 % nasal spray, 2 sprays into each nostril every 12 (twelve) hours, Disp: , Rfl:   •  metoprolol tartrate (LOPRESSOR) 50 mg tablet, TAKE 1 AND 1/2 TABLETS BY MOUTH EVERY 12 HOURS, Disp: 270 tablet, Rfl: 3  •  Multiple Vitamin (MULTIVITAMIN) capsule, Take 1 capsule by mouth daily, Disp: , Rfl:   •  triamcinolone (KENALOG) 0.1 % cream, Apply topically 2 (two) times a day, Disp: , Rfl:       Physical Exam:   /76 (BP Location: Left arm, Patient Position: Sitting, Cuff Size: Adult)   Pulse 64   Ht 5' 7" (1.702 m)   Wt 73.5 kg (162 lb)   BMI 25.37 kg/m²   GEN: no acute distress    RESP: breathing comfortably with no accessory muscle use    ABD: soft, non-tender, non-distended   INCISION:    EXT: no significant peripheral edema   (Male): Tight phimosis unable to retract the skin at all. No redness or irritation. RADIOLOGY:   None    Assessment:   #1.   Phimosis    Plan:   -We discussed a dorsal slit versus a formal circumcision  -He is still not ready to make a decision on what procedure to do if any  -He should call me with his decision otherwise follow-up in 6 months  -

## 2023-08-30 ENCOUNTER — APPOINTMENT (OUTPATIENT)
Dept: LAB | Facility: CLINIC | Age: 83
End: 2023-08-30
Payer: COMMERCIAL

## 2023-08-30 DIAGNOSIS — E78.2 MIXED HYPERLIPIDEMIA: Primary | ICD-10-CM

## 2023-08-30 DIAGNOSIS — I25.10 CORONARY ARTERY DISEASE INVOLVING NATIVE CORONARY ARTERY OF NATIVE HEART WITHOUT ANGINA PECTORIS: ICD-10-CM

## 2023-08-30 DIAGNOSIS — E78.2 MIXED HYPERLIPIDEMIA: ICD-10-CM

## 2023-08-30 DIAGNOSIS — I10 PRIMARY HYPERTENSION: ICD-10-CM

## 2023-08-30 DIAGNOSIS — N18.31 STAGE 3A CHRONIC KIDNEY DISEASE (HCC): ICD-10-CM

## 2023-08-30 LAB
ALBUMIN SERPL BCP-MCNC: 3.9 G/DL (ref 3.5–5)
ALP SERPL-CCNC: 125 U/L (ref 34–104)
ALT SERPL W P-5'-P-CCNC: 34 U/L (ref 7–52)
ANION GAP SERPL CALCULATED.3IONS-SCNC: 9 MMOL/L
AST SERPL W P-5'-P-CCNC: 48 U/L (ref 13–39)
BILIRUB SERPL-MCNC: 0.61 MG/DL (ref 0.2–1)
BUN SERPL-MCNC: 30 MG/DL (ref 5–25)
CALCIUM SERPL-MCNC: 9.3 MG/DL (ref 8.4–10.2)
CHLORIDE SERPL-SCNC: 104 MMOL/L (ref 96–108)
CHOLEST SERPL-MCNC: 124 MG/DL
CO2 SERPL-SCNC: 26 MMOL/L (ref 21–32)
CREAT SERPL-MCNC: 1.24 MG/DL (ref 0.6–1.3)
GFR SERPL CREATININE-BSD FRML MDRD: 53 ML/MIN/1.73SQ M
GLUCOSE P FAST SERPL-MCNC: 94 MG/DL (ref 65–99)
HDLC SERPL-MCNC: 74 MG/DL
LDLC SERPL CALC-MCNC: 30 MG/DL (ref 0–100)
POTASSIUM SERPL-SCNC: 4.7 MMOL/L (ref 3.5–5.3)
PROT SERPL-MCNC: 8.1 G/DL (ref 6.4–8.4)
SODIUM SERPL-SCNC: 139 MMOL/L (ref 135–147)
TRIGL SERPL-MCNC: 102 MG/DL

## 2023-08-30 PROCEDURE — 80061 LIPID PANEL: CPT

## 2023-08-30 PROCEDURE — 36415 COLL VENOUS BLD VENIPUNCTURE: CPT

## 2023-08-30 PROCEDURE — 80053 COMPREHEN METABOLIC PANEL: CPT

## 2023-09-01 ENCOUNTER — HOSPITAL ENCOUNTER (OUTPATIENT)
Dept: NON INVASIVE DIAGNOSTICS | Facility: CLINIC | Age: 83
Discharge: HOME | End: 2023-09-01
Payer: COMMERCIAL

## 2023-09-01 DIAGNOSIS — I65.23 CAROTID STENOSIS, ASYMPTOMATIC, BILATERAL: ICD-10-CM

## 2023-09-01 DIAGNOSIS — I73.9 PERIPHERAL ARTERY DISEASE (HCC): ICD-10-CM

## 2023-09-01 PROCEDURE — 93880 EXTRACRANIAL BILAT STUDY: CPT

## 2023-09-01 PROCEDURE — 93923 UPR/LXTR ART STDY 3+ LVLS: CPT

## 2023-09-01 PROCEDURE — 93925 LOWER EXTREMITY STUDY: CPT

## 2023-09-03 PROCEDURE — 93880 EXTRACRANIAL BILAT STUDY: CPT | Performed by: SURGERY

## 2023-09-06 ENCOUNTER — TELEPHONE (OUTPATIENT)
Dept: VASCULAR SURGERY | Facility: CLINIC | Age: 83
End: 2023-09-06

## 2023-09-06 ENCOUNTER — OFFICE VISIT (OUTPATIENT)
Dept: VASCULAR SURGERY | Facility: CLINIC | Age: 83
End: 2023-09-06
Payer: COMMERCIAL

## 2023-09-06 VITALS
DIASTOLIC BLOOD PRESSURE: 78 MMHG | HEART RATE: 74 BPM | WEIGHT: 166 LBS | HEIGHT: 67 IN | BODY MASS INDEX: 26.06 KG/M2 | SYSTOLIC BLOOD PRESSURE: 128 MMHG

## 2023-09-06 DIAGNOSIS — I65.23 CAROTID STENOSIS, ASYMPTOMATIC, BILATERAL: ICD-10-CM

## 2023-09-06 DIAGNOSIS — Z95.1 S/P CABG X 4: ICD-10-CM

## 2023-09-06 DIAGNOSIS — I73.9 PERIPHERAL ARTERY DISEASE (HCC): Primary | ICD-10-CM

## 2023-09-06 PROCEDURE — 99215 OFFICE O/P EST HI 40 MIN: CPT | Performed by: SURGERY

## 2023-09-06 NOTE — TELEPHONE ENCOUNTER
23    Re: Cardiology  Clearance    Patient Name: Norah Hrenandez   Patient : [id number]   Patient MRN: [id number]   Patient Phone: [phone]     Dr. Marjean Dakin,    Dr. To Verdin, DO is requesting clearance for above patient, prior to proceeding with carotid endarterectomy. Patient's procedure will be scheduled at the first available [address] [facility] once clearance has been obtained. Patient will be given general anesthesia. We spoke with your office today regarding clearance request.  Fredi Izquierdo informed us that patient was recently seen and may not require an appointment with you. They informed us that they will reach out to the patient to schedule if needed. Please fax your recommendations, including any medication recommendations, to [phone], attention nursing. Or route your recommendations to [facility] pool [facility] Clearance Pool [[id number]]. Please reach out with any questions or concerns.     Sincerely,    [facility]

## 2023-09-06 NOTE — ASSESSMENT & PLAN NOTE
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 Irene Nieves and patient hesitant to pursue surgery  I discussed surgical options with him and explained that with a high grade stenosis carotid intervention is indicated in good surgical risk patients. I will send him for  CTA neck to confirm duplex findings and evaluate his anatomy for CEA vs. TCAR.   Will also request cardiology preop evaluation  RTC to see me once CT and cardiology consult complete  History of stress test in 2022 with reversible defect   Asa/statin

## 2023-09-06 NOTE — PROGRESS NOTES
Assessment/Plan:    Carotid stenosis, asymptomatic, bilateral  Carotid duplex shows severe left ICA stenosis  Less than 50% on the right  Seen previously for Dr. Nasrin Schuster and patient hesitant to pursue surgery  I discussed surgical options with him and explained that with a high grade stenosis carotid intervention is indicated in good surgical risk patients. I will send him for  CTA neck to confirm duplex findings and evaluate his anatomy for CEA vs. TCAR. Will also request cardiology preop evaluation  RTC to see me once CT and cardiology consult complete  History of stress test in 2022 with reversible defect   Asa/statin    Peripheral artery disease ([address])  Bilateral SFA disease  Denies pain with ambulation  He uses a walker at home and is able ambulate for ADL's as needed without limitation  LEAD 1 year  Asa/statin       Diagnoses and all orders for this visit:    Peripheral artery disease ([address])  -     VAS lower limb arterial duplex, complete bilateral; Future    Carotid stenosis, asymptomatic, bilateral  -     CTA head and neck w wo contrast; Future    S/P CABG x 4          Subjective:      Patient ID: Anibal Mock is a 80 y.o. male. Patient presents today to review carotid duplex and JAYASHREE done 9/1/23. Discuss possible L TCAR. HPI    The following portions of the patient's history were reviewed and updated as appropriate: allergies, current medications, past family history, past medical history, past social history, past surgical history and problem list  Not smoking. Denies rest pain and claudication. No neurologic symptoms. Review of Systems   Constitutional: Negative. HENT: Negative. Eyes: Negative. Respiratory: Negative. Cardiovascular: Negative. Gastrointestinal: Negative. Endocrine: Negative. Genitourinary: Negative. Musculoskeletal: Negative. Skin: Negative. Allergic/Immunologic: Negative. Neurological: Negative. Hematological: Negative. Psychiatric/Behavioral: Negative. I have reviewed the ROS above and made changes as needed. Objective:      /78 (BP Location: Left arm, Patient Position: Sitting)   Pulse 74   Ht 5' 7" (1.702 m)   Wt 75.3 kg (166 lb)   BMI 26.00 kg/m²          Physical Exam      General  Exam: alert, awake, oriented, no distress, consistent with stated age    Integumentary  Exam: warm, dry, no gross lesions, no bruises and normal color    Head and Neck  Exam: supple, no bruits, trachea midline, no JVD, no mass or palpable nodes    Eye  Exam: extraoccular movements intact, no scleral icterus, sclera clear, pupils equal round and reactive to light    ENMT  Exam: oral mucosa pink and moist    Chest and Lung  Exam: chest normal without deformity, bilaterally expansive, clear to auscultation    Cardiovascular  Exam: regular rate, regular rhythm, no murmurs, no rubs or gallops    Adbomen  Exam: soft, non-tender, non-distended, no pulsatile abdominal masses, no abdominal bruit    Peripheral Vascular  Exam: no clubbing of the digits of the upper extremity, no cyanosis, no edema, both feet are warm, radial pulses 2+ bilaterally, skin well perfused, without and no varicosities.     No widened popliteal pulse noted bilaterally    Upper Extremity:  Palpation: Radial pulse- Bilateral 2+    Lower Extremity:  Palpation: Femoral pulse- Bilateral 2+          Absent distal pulses    No LE wounds  Neurologic  Exam:alert, non-focal, oriented x 3, cranial nerves II-XII grossly intact

## 2023-09-06 NOTE — ASSESSMENT & PLAN NOTE
Bilateral SFA disease  Denies pain with ambulation  He uses a walker at home and is able ambulate for ADL's as needed without limitation  LEAD 1 year  Asa/statin

## 2023-09-07 ENCOUNTER — TELEPHONE (OUTPATIENT)
Dept: CARDIOLOGY CLINIC | Facility: CLINIC | Age: 83
End: 2023-09-07

## 2023-09-07 NOTE — TELEPHONE ENCOUNTER
----- Message -----  From: Tay Moraes MD  Sent: 8/30/2023   2:38 PM EDT  To: [facility]    Results are normal. Please notify pt.

## 2023-09-20 ENCOUNTER — HOSPITAL ENCOUNTER (OUTPATIENT)
Dept: CT IMAGING | Facility: HOSPITAL | Age: 83
Discharge: HOME | End: 2023-09-20
Attending: SURGERY
Payer: COMMERCIAL

## 2023-09-20 DIAGNOSIS — I65.23 CAROTID STENOSIS, ASYMPTOMATIC, BILATERAL: ICD-10-CM

## 2023-09-20 PROCEDURE — G1004 CDSM NDSC: HCPCS

## 2023-09-20 PROCEDURE — 70498 CT ANGIOGRAPHY NECK: CPT

## 2023-09-20 PROCEDURE — 70496 CT ANGIOGRAPHY HEAD: CPT

## 2023-09-20 RX ADMIN — IOHEXOL 85 ML: 350 INJECTION, SOLUTION INTRAVENOUS at 13:02

## 2023-09-22 ENCOUNTER — ANTICOAG VISIT (OUTPATIENT)
Dept: VASCULAR SURGERY | Facility: CLINIC | Age: 83
End: 2023-09-22

## 2023-09-28 ENCOUNTER — LAB (OUTPATIENT)
Dept: LAB | Facility: CLINIC | Age: 83
End: 2023-09-28
Payer: COMMERCIAL

## 2023-09-28 DIAGNOSIS — E78.2 MIXED HYPERLIPIDEMIA: ICD-10-CM

## 2023-09-28 LAB
ALBUMIN SERPL BCP-MCNC: 3.8 G/DL (ref 3.5–5)
ALP SERPL-CCNC: 81 U/L (ref 34–104)
ALT SERPL W P-5'-P-CCNC: 17 U/L (ref 7–52)
ANION GAP SERPL CALCULATED.3IONS-SCNC: 6 MMOL/L
AST SERPL W P-5'-P-CCNC: 24 U/L (ref 13–39)
BILIRUB SERPL-MCNC: 0.45 MG/DL (ref 0.2–1)
BUN SERPL-MCNC: 26 MG/DL (ref 5–25)
CALCIUM SERPL-MCNC: 9.3 MG/DL (ref 8.4–10.2)
CHLORIDE SERPL-SCNC: 105 MMOL/L (ref 96–108)
CO2 SERPL-SCNC: 26 MMOL/L (ref 21–32)
CREAT SERPL-MCNC: 1.25 MG/DL (ref 0.6–1.3)
GFR SERPL CREATININE-BSD FRML MDRD: 52 ML/MIN/1.73SQ M
GLUCOSE P FAST SERPL-MCNC: 98 MG/DL (ref 65–99)
POTASSIUM SERPL-SCNC: 5 MMOL/L (ref 3.5–5.3)
PROT SERPL-MCNC: 7.5 G/DL (ref 6.4–8.4)
SODIUM SERPL-SCNC: 137 MMOL/L (ref 135–147)

## 2023-09-28 PROCEDURE — 80053 COMPREHEN METABOLIC PANEL: CPT

## 2023-09-28 PROCEDURE — 36415 COLL VENOUS BLD VENIPUNCTURE: CPT

## 2023-09-29 NOTE — RESULT ENCOUNTER NOTE
Please inform pt that labs are stable. Continue current medications and continue to engage in healthy lifestyle (diet, exercise). Thanks!   Sarah Frost, DO

## 2023-10-01 DIAGNOSIS — G62.9 NEUROPATHY: ICD-10-CM

## 2023-10-01 NOTE — TELEPHONE ENCOUNTER
Medication: gabapentin (NEURONTIN) 100 mg capsule       Dose/Frequency: Take 1 capsule (100 mg total) by mouth 2 (two) times a day    Quantity: 180    Pharmacy: [address], [address]     Office:   [x] PCP/Provider -   [] Speciality/Provider -     Does the patient have enough for 3 days?    [x] Yes   [] No - Send as HP to POD

## 2023-10-02 RX ORDER — GABAPENTIN 100 MG/1
100 CAPSULE ORAL 2 TIMES DAILY
Qty: 180 CAPSULE | Refills: 0 | Status: SHIPPED | OUTPATIENT
Start: 2023-10-02

## 2023-10-10 ENCOUNTER — OFFICE VISIT (OUTPATIENT)
Dept: VASCULAR SURGERY | Facility: CLINIC | Age: 83
End: 2023-10-10
Payer: COMMERCIAL

## 2023-10-10 VITALS
BODY MASS INDEX: 26.37 KG/M2 | HEIGHT: 67 IN | HEART RATE: 87 BPM | WEIGHT: 168 LBS | SYSTOLIC BLOOD PRESSURE: 120 MMHG | DIASTOLIC BLOOD PRESSURE: 74 MMHG

## 2023-10-10 DIAGNOSIS — I65.23 CAROTID STENOSIS, ASYMPTOMATIC, BILATERAL: Primary | ICD-10-CM

## 2023-10-10 DIAGNOSIS — I73.9 PERIPHERAL ARTERY DISEASE (HCC): ICD-10-CM

## 2023-10-10 PROCEDURE — 99215 OFFICE O/P EST HI 40 MIN: CPT | Performed by: SURGERY

## 2023-10-10 NOTE — PROGRESS NOTES
Assessment/Plan:    Carotid stenosis, asymptomatic, bilateral  Seen previously by myself 1 month ago for carotid stenosis and PAD his carotid duplex at that time was consistent with high-grade left internal carotid artery stenosis I discussed surgical options with him and recommended a CAT scan he now presents to review his CT scan the CT scan shows a 60% stenosis at the left common carotid artery origin as well as a moderate long segment stenosis in the common carotid the carotid bifurcation demonstrates 65% stenosis. I discussed the CT scan findings with him and explained that the ultrasound was falsely positive and did not recommend surgical intervention at this time we will plan to continue aspirin and statin and follow-up with a carotid duplex in 6 months and office visit at 1 year    Peripheral artery disease ([address])  Plan for surveillance with LEAD as previously described at last visit       Diagnoses and all orders for this visit:    Carotid stenosis, asymptomatic, bilateral  -     VAS carotid complete study; Future    Peripheral artery disease (HCC)          Subjective:      Patient ID: Alexei Ta is a 80 y.o. male. Patient presents to review the CTA head/neck done 9/20/23. Pt denies TIA/CVA symptoms. He si taking ASA81, Plavix and Atorvastatin. He is a former smoker. HPI    The following portions of the patient's history were reviewed and updated as appropriate: allergies, current medications, past family history, past medical history, past social history, past surgical history and problem list.  No new neurologic symptoms. Not currently smoking. Review of Systems   All other systems reviewed and are negative. I have reviewed the ROS above and made changes as needed.       Objective:      /74 (BP Location: Right arm, Patient Position: Sitting, Cuff Size: Standard)   Pulse 87   Ht 5' 7" (1.702 m)   Wt 76.2 kg (168 lb)   BMI 26.31 kg/m²          Physical Exam      General  Exam: alert, awake, oriented, no distress, consistent with stated age    Integumentary  Exam: warm, dry, no gross lesions, no bruises and normal color    Head and Neck  Exam: supple, no bruits, trachea midline, no JVD, no mass or palpable nodes    Eye  Exam: extraoccular movements intact, no scleral icterus, sclera clear, pupils equal round and reactive to light    ENMT  Exam: oral mucosa pink and moist    Chest and Lung  Exam: chest normal without deformity, bilaterally expansive, clear to auscultation    Cardiovascular  Exam: regular rate, regular rhythm, no murmurs, no rubs or gallops    Adbomen  Exam: soft, non-tender, non-distended, no pulsatile abdominal masses, no abdominal bruit    Peripheral Vascular  Exam: no clubbing of the digits of the upper extremity, no cyanosis, no edema, both feet are warm, radial pulses 2+ bilaterally, skin well perfused, without and no varicosities.     No widened popliteal pulse noted bilaterally    Upper Extremity:  Palpation: Radial pulse- Bilateral 2+    L  Neurologic  Exam:alert, non-focal, oriented x 3, cranial nerves II-XII grossly intact

## 2023-10-10 NOTE — ASSESSMENT & PLAN NOTE
Seen previously by myself 1 month ago for carotid stenosis and PAD his carotid duplex at that time was consistent with high-grade left internal carotid artery stenosis I discussed surgical options with him and recommended a CAT scan he now presents to review his CT scan the CT scan shows a 60% stenosis at the left common carotid artery origin as well as a moderate long segment stenosis in the common carotid the carotid bifurcation demonstrates 65% stenosis.   I discussed the CT scan findings with him and explained that the ultrasound was falsely positive and did not recommend surgical intervention at this time we will plan to continue aspirin and statin and follow-up with a carotid duplex in 6 months and office visit at 1 year

## 2023-10-21 DIAGNOSIS — K21.9 GASTROESOPHAGEAL REFLUX DISEASE, UNSPECIFIED WHETHER ESOPHAGITIS PRESENT: ICD-10-CM

## 2023-10-23 RX ORDER — FAMOTIDINE 20 MG/1
20 TABLET, FILM COATED ORAL 2 TIMES DAILY
Qty: 180 TABLET | Refills: 0 | Status: SHIPPED | OUTPATIENT
Start: 2023-10-23

## 2023-10-27 ENCOUNTER — CLINICAL SUPPORT (OUTPATIENT)
Dept: FAMILY MEDICINE CLINIC | Facility: OTHER | Age: 83
End: 2023-10-27
Payer: COMMERCIAL

## 2023-10-27 DIAGNOSIS — Z23 ENCOUNTER FOR IMMUNIZATION: Primary | ICD-10-CM

## 2023-10-27 PROCEDURE — G0008 ADMIN INFLUENZA VIRUS VAC: HCPCS

## 2023-10-27 PROCEDURE — 90662 IIV NO PRSV INCREASED AG IM: CPT

## 2023-10-30 ENCOUNTER — OFFICE VISIT (OUTPATIENT)
Dept: CARDIOLOGY CLINIC | Facility: CLINIC | Age: 83
End: 2023-10-30
Payer: COMMERCIAL

## 2023-10-30 VITALS
DIASTOLIC BLOOD PRESSURE: 60 MMHG | HEIGHT: 67 IN | OXYGEN SATURATION: 98 % | WEIGHT: 166.8 LBS | HEART RATE: 94 BPM | BODY MASS INDEX: 26.18 KG/M2 | SYSTOLIC BLOOD PRESSURE: 130 MMHG

## 2023-10-30 DIAGNOSIS — R06.02 SOB (SHORTNESS OF BREATH): Primary | ICD-10-CM

## 2023-10-30 DIAGNOSIS — I10 PRIMARY HYPERTENSION: ICD-10-CM

## 2023-10-30 DIAGNOSIS — I73.9 PERIPHERAL ARTERY DISEASE (HCC): ICD-10-CM

## 2023-10-30 DIAGNOSIS — I25.10 CORONARY ARTERY DISEASE INVOLVING NATIVE CORONARY ARTERY OF NATIVE HEART WITHOUT ANGINA PECTORIS: ICD-10-CM

## 2023-10-30 DIAGNOSIS — Z95.1 S/P CABG X 4: ICD-10-CM

## 2023-10-30 DIAGNOSIS — E78.2 MIXED HYPERLIPIDEMIA: ICD-10-CM

## 2023-10-30 PROCEDURE — 99215 OFFICE O/P EST HI 40 MIN: CPT | Performed by: INTERNAL MEDICINE

## 2023-10-30 PROCEDURE — 1159F MED LIST DOCD IN RCRD: CPT | Performed by: INTERNAL MEDICINE

## 2023-10-30 PROCEDURE — 93000 ELECTROCARDIOGRAM COMPLETE: CPT | Performed by: INTERNAL MEDICINE

## 2023-10-30 PROCEDURE — 1160F RVW MEDS BY RX/DR IN RCRD: CPT | Performed by: INTERNAL MEDICINE

## 2023-10-30 NOTE — PROGRESS NOTES
Cardiology Follow Up    Sarah Rose  1940  [id number]  [address] [facility]  [address] [facility]   [address] [facility]  [phone]    1. SOB (shortness of breath)  POCT ECG          Diagnoses and all orders for this visit:    SOB (shortness of breath)  -     POCT ECG    I had the pleasure of seeing Sarah Rose for a follow up visit. INTERVAL HISTORY: none    History of the presenting illness, Discussion/Summary and My Plan are as follows:::    He is a pleasant 25-year-old gentleman with a history of hypertension, mild dyslipidemia, CAD, CABG, peripheral vascular disease-right superficial femoral artery stenosis-medically managed. He also has a 50 pack year smoking history-quit around 2012. He presented for further evaluation of increasing dyspnea with exertion, with evaluation demonstrated multivessel CAD, underwent LIMA-LAD, SVG-RCA, SVG-OM2, SVG-Diag in January 2018. From a cardiac standpoint he is asymptomatic, had remained physically active exercising at home and to the gym where he gets physical therapy-does super sets-3 times a week-half an hour each time without any symptoms. His shortness of breath which is chronic has a be contributing from pulmonary fibrosis. He does have claudication symptoms that are stable. Admission for acute diverticulitis-September 2022, treated with antibiotics, also found to have a new lumbar-L1 compression fracture, already has a chronic T12 compression fracture, in Oct 2022 for lumbar pain and constipation. Continues to receive physical therapy. Still drives. Cardiac exam is unremarkable, has dry crackles. Plan:    Shortness of breath with exertion:  Stable, likely from pulmonary fibrosis, pharmacologic stress test-February 2022-with small amount of basal to mid inferior ischemia which could be from diaphragmatic attenuation.   Considering advanced age, comorbidities and alternative diagnosis, no need for any invasive evaluation    Prior episode of syncope in the setting of positive orthostatics, hypotension, negative ECG and enzymes. Likely from dehydration,Has mild neuropathy as well     CAD:  Positive stress test done for ROBLES-triple-vessel coronary artery disease, status post Coronary artery bypass grafting x 4 with left internal mammary artery to left anterior descending artery, saphenous vein graft to obtuse marginal 2, saphenous vein graft to right coronary artery and saphenous vein graft to diagonal 1-January 2018. On dual antiplatelet therapy (for his coexisting vascular disease), statin, beta-blocker  With non-HDL around 50-August 2023, would not change his statin-remains on 80 mg of atorvastatin    See above for stress testing    Receiving regular physical therapy without any cardiac symptoms. At baseline was actively exercising. 2 admissions for diverticulitis and then lumbar pain-September and October 2022. Reasonably active without symptoms  Conservative management for his fractures-    Hypertension: Controlled, currently on metoprolol 75 b.i.d. Will need good control in the setting of progressive vascular disease  In the 1 teens at home, no changes, occasional dizziness    Carotid bruit: Most recently-CTA showed focal left ICA origin elbuskdi-76-61%, Dopplers were disproportionately more severe, no interventions at this time. Has seen vascular. Dyslipidemia:  High HDL in the past,- I do not think it could be considered to be protective and likely nonfunctional.  Continue statin, controlled overall, see below, need to recheck on a routine basis     Peripheral vascular disease:  has bilateral lower extremities the vascular disease-high-grade stenosis/occlusion-distal SFA, 50-75%-proximal popliteal-right lower extremity and 50-75% proximal profundus femoral and mid superficial femoral on the left last LEAD-August 2022.   Recently showed progression on the right as well.   Follows with   On dual antiplatelet therapy more for his vascular disease  With non-HDL around 50, would not change his statin-remains on 80 mg of atorvastatin     Follow-up in 6 months     Latest Reference Range & Units 08/30/23 07:19   Cholesterol See Comment mg/dL 124   Triglycerides See Comment mg/dL 102   HDL >=40 mg/dL 74   LDL Calculated 0 - 100 mg/dL 30      Latest Reference Range & Units 04/08/19 08:06 06/29/20 10:40   Hemoglobin A1C N 5.3 5.2      Latest Reference Range & Units 08/30/23 07:19 09/28/23 08:07   BUN 5 - 25 mg/dL 30 (H) 26 (H)   Creatinine 0.60 - 1.30 mg/dL 1.24 1.25   (H): Data is abnormally high       Latest Reference Range & Units 10/10/22 04:28 10/11/22 04:42   Hemoglobin 12.0 - 17.0 g/dL 10.3 (L) 9.5 (L)   HCT 36.5 - 49.3 % 32.1 (L) 29.3 (L)   (L): Data is abnormally low    Patient Active Problem List   Diagnosis    Lumbar compression fracture (HCC)    Spondylolisthesis of lumbar region    CAD (coronary artery disease)    Former tobacco use    Hyperlipidemia    Hypertension    S/P CABG x 4    Anemia    Leg pain, posterior, left    Peripheral artery disease (HCC)    GERD (gastroesophageal reflux disease)    Vasomotor rhinitis    Lumbar stenosis    S/P lumbar fusion    Postlaminectomy syndrome of lumbar region    Lumbar radiculopathy    Skin lesion of left leg    Bruit of left carotid artery    Phimosis    Carotid stenosis, asymptomatic, bilateral    SOB (shortness of breath)    Neck pain on left side    Near syncope    Pulmonary fibrosis determined by high resolution computed tomography ([facility])    Neuropathy    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[facility])    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    Trochanteric bursitis    Diverticulitis    Stage 3 chronic kidney disease (HCC)    Left lower quadrant abdominal pain    Acute left-sided low back pain with left-sided sciatica    Internal hemorrhoids Diverticular disease    Inflammation of sacroiliac joint ([address] )     Past Medical History:   Diagnosis Date    Abnormal liver function test     RESOLVED: [id number]    Allergic rhinitis     Anemia     LAST ASSESSED: [id number]    Arthritis     BPH (benign prostatic hyperplasia)     CAD (coronary artery disease)     Coronary artery disease     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HCC)     LAST ASSESSED: [id number]    Former tobacco use     GERD (gastroesophageal reflux disease)     Hand paresthesia     RESOLVED: [id number]    Hyperlipidemia     Hypertension     Lumbar stenosis     Peripheral artery disease ([address] [facility])     LAST ASSESSED: [id number]    Stage 3a chronic kidney disease ([address] [facility]) 2021     Social History     Socioeconomic History    Marital status:      Spouse name: Not on file    Number of children: Not on file    Years of education: some college     Highest education level: Not on file   Occupational History    Occupation: Insurance     Comment: Retired    Tobacco Use    Smoking status: Former     Packs/day: 1.00     Years: 50.00     Total pack years: 50.00     Types: Cigarettes     Quit date:      Years since quittin.8    Smokeless tobacco: Never   Vaping Use    Vaping Use: Never used   Substance and Sexual Activity    Alcohol use:  Yes     Alcohol/week: 1.0 standard drink of alcohol     Types: 1 Shots of liquor per week     Comment: beer, wine, scotch every day; SOCIAL AS PER ALL SCRIPTS     Drug use: Not Currently     Types: Marijuana     Comment: medical majiuana    Sexual activity: Not Currently   Other Topics Concern    Not on file   Social History Narrative    Daily coffee consumption      Social Determinants of Health     Financial Resource Strain: Low Risk  (2023)    Overall Financial Resource Strain (CARDIA)     Difficulty of Paying Living Expenses: Not hard at all   Food Insecurity: No Food Insecurity (2022)    Hunger Vital Sign     Worried About Running Out of Food in the Last Year: Never true     Ran Out of Food in the Last Year: Never true   Transportation Needs: No Transportation Needs (1/16/2023)    PRAPARE - Transportation     Lack of Transportation (Medical): No     Lack of Transportation (Non-Medical): No   Physical Activity: Not on file   Stress: Not on file   Social Connections: Not on file   Intimate Partner Violence: Not on file   Housing Stability: Low Risk  (9/9/2022)    Housing Stability Vital Sign     Unable to Pay for Housing in the Last Year: No     Number of Places Lived in the Last Year: 1     Unstable Housing in the Last Year: No      Family History   Problem Relation Age of Onset    Emphysema Mother     Liver disease Mother     Coronary artery disease Father     Hypertension Father     Liver disease Father     Heart failure Father     Stroke Father         CVA     Heart attack Father     Coronary artery disease Brother     Heart disease Brother         younger brother by pass and other brother 4 stents placed    Other Family         BACK PROBLEM     Stroke Family         CVA    Emphysema Family     Hypertension Family         BENIGN     Past Surgical History:   Procedure Laterality Date    BACK SURGERY      COLONOSCOPY      LUMBAR FUSION      MI ARTHRODESIS POSTERIOR/PSTLAT TQ 1NTRSPC LUMBAR N/A 11/03/2016    Procedure: L2-S1 POSTERIOR LUMBAR FUSION AND DECOMPRESSION (Impulse), Posterior lateral fixation; dural repair.;  Surgeon: Rachana Joaquin MD;  Location: [facility];  Service: Orthopedics    MI CABG W/ARTERIAL GRAFT SINGLE ARTERIAL GRAFT N/A 01/19/2018    Procedure: CABG X4 with LIMA - LAD, SVG - RCA, OM2, & Diagonal ; Left Leg EVH; MATTHEW;  Surgeon: Ketan Osorio DO;  Location: [facility];  Service: Cardiac Surgery    MI COLONOSCOPY FLX DX W/COLLJ SPEC WHEN PFRMD N/A 11/15/2017    Procedure: EGD AND COLONOSCOPY;  Surgeon: Mally Palmer MD;  Location: [facility];   Service: Gastroenterology    SEPTOPLASTY      LAST ASSESSED; [id number]    TONSILECTOMY AND ADNOIDECTOMY LAST ASSESSED: [id number]    UPPER GASTROINTESTINAL ENDOSCOPY         Current Outpatient Medications:     acetaminophen (TYLENOL) 650 mg CR tablet, Take 650 mg by mouth every 8 (eight) hours as needed for mild pain, Disp: , Rfl:     Ascorbic Acid (Vitamin C) 500 MG PACK, Take 1,000 mg by mouth daily, Disp: , Rfl:     aspirin (ECOTRIN LOW STRENGTH) 81 mg EC tablet, Take 81 mg by mouth daily, Disp: , Rfl:     atorvastatin (LIPITOR) 80 mg tablet, TAKE 1 TABLET BY MOUTH EVERY MORNING., Disp: 90 tablet, Rfl: 3    cholecalciferol (VITAMIN D3) 1,000 units tablet, Take 2,000 Units by mouth daily, Disp: , Rfl:     clopidogrel (Plavix) 75 mg tablet, Take 1 tablet (75 mg total) by mouth daily, Disp: 90 tablet, Rfl: 3    clotrimazole-betamethasone (LOTRISONE) 1-0.05 % cream, Apply topically 2 (two) times a day, Disp: 30 g, Rfl: 3    cyanocobalamin (VITAMIN B-12) 1,000 mcg tablet, Take 1,000 mcg by mouth daily  , Disp: , Rfl:     docusate sodium (COLACE) 100 mg capsule, Take 100 mg by mouth 2 (two) times a day, Disp: , Rfl:     famotidine (PEPCID) 20 mg tablet, TAKE 1 TABLET BY MOUTH TWICE A DAY, Disp: 180 tablet, Rfl: 0    gabapentin (NEURONTIN) 100 mg capsule, Take 1 capsule (100 mg total) by mouth 2 (two) times a day, Disp: 180 capsule, Rfl: 0    ipratropium (ATROVENT) 0.03 % nasal spray, 2 sprays into each nostril every 12 (twelve) hours, Disp: , Rfl:     metoprolol tartrate (LOPRESSOR) 50 mg tablet, TAKE 1 AND 1/2 TABLETS BY MOUTH EVERY 12 HOURS, Disp: 270 tablet, Rfl: 3    Multiple Vitamin (MULTIVITAMIN) capsule, Take 1 capsule by mouth daily, Disp: , Rfl:     triamcinolone (KENALOG) 0.1 % cream, Apply topically 2 (two) times a day, Disp: , Rfl:     ciclopirox (PENLAC) 8 % solution, APPLY TO THE AFFECTED AREA(S) ONCE DAILY AT BEDTIME OR 8 HOURS BEFORE WASHING (Patient not taking: Reported on 10/30/2023), Disp: , Rfl:   Allergies   Allergen Reactions    Penicillins Other (See Comments)     Hallucinations;   Patient reported that he was seeing visual disturbances         Imaging: No results found. Review of Systems:  Review of Systems   Constitutional: Negative. HENT: Negative. Eyes: Negative. Respiratory: Negative. Cardiovascular: Negative. Musculoskeletal:  Positive for back pain. Negative for arthralgias, gait problem and joint swelling. Neurological: Negative. Physical Exam:  /72 (BP Location: Right arm, Patient Position: Sitting, Cuff Size: Large)   Pulse 94   Ht 5' 7" (1.702 m)   Wt 75.7 kg (166 lb 12.8 oz)   SpO2 98%   BMI 26.12 kg/m²   Physical Exam  Constitutional:       Appearance: He is well-developed. He is not ill-appearing, toxic-appearing or diaphoretic. HENT:      Head: Normocephalic. Eyes:      Pupils: Pupils are equal, round, and reactive to light. Neck:      Thyroid: No thyromegaly. Vascular: JVD present. No hepatojugular reflux. Trachea: No tracheal deviation. Cardiovascular:      Rate and Rhythm: Bradycardia present. Rhythm irregularly irregular. Pulses: Normal pulses. Heart sounds:      No S3 or S4 sounds. Pulmonary:      Effort: Pulmonary effort is normal. No tachypnea, accessory muscle usage or respiratory distress. Breath sounds: Normal breath sounds. No stridor. No decreased breath sounds, wheezing or rales (dry crackles -bilateral infrascapular and bases). Abdominal:      Palpations: Abdomen is soft. Musculoskeletal:         General: Normal range of motion. Right lower leg: Normal. No edema. Left lower leg: No edema. Skin:     General: Skin is warm. Coloration: Skin is not pale. Findings: No erythema or rash.

## 2023-12-20 ENCOUNTER — TELEPHONE (OUTPATIENT)
Dept: VASCULAR SURGERY | Facility: CLINIC | Age: 83
End: 2023-12-20

## 2023-12-20 NOTE — TELEPHONE ENCOUNTER
Patient called for refill on plavix.  It was first ordered by Dr. Mock in 10/22 but more recently has seen dr. Peters who recommended asa and statin.  Patient is asking if he should refill plavix.   Sent to vascular triage to advise.

## 2023-12-20 NOTE — TELEPHONE ENCOUNTER
Lexie Mi PA-C  Vascular Triage; Luis Miguel Peters DO; [id number] minutes ago (2:00 PM)     RD  Patient of Dr. Peters with peripheral arterial and carotid artery disease. One antiplatelet is needed.  Notes indicate that Dr. Peters wants aspirin so Plavix does not have to be refilled.

## 2024-01-12 ENCOUNTER — RA CDI HCC (OUTPATIENT)
Dept: OTHER | Facility: HOSPITAL | Age: 84
End: 2024-01-12

## 2024-01-19 DIAGNOSIS — K21.9 GASTROESOPHAGEAL REFLUX DISEASE, UNSPECIFIED WHETHER ESOPHAGITIS PRESENT: ICD-10-CM

## 2024-01-19 RX ORDER — FAMOTIDINE 20 MG/1
20 TABLET, FILM COATED ORAL 2 TIMES DAILY
Qty: 180 TABLET | Refills: 0 | Status: SHIPPED | OUTPATIENT
Start: 2024-01-19

## 2024-01-22 ENCOUNTER — RA CDI HCC (OUTPATIENT)
Dept: OTHER | Facility: HOSPITAL | Age: 84
End: 2024-01-22

## 2024-01-22 ENCOUNTER — OFFICE VISIT (OUTPATIENT)
Dept: FAMILY MEDICINE CLINIC | Facility: OTHER | Age: 84
End: 2024-01-22
Payer: COMMERCIAL

## 2024-01-22 VITALS
DIASTOLIC BLOOD PRESSURE: 74 MMHG | RESPIRATION RATE: 18 BRPM | OXYGEN SATURATION: 96 % | HEIGHT: 67 IN | TEMPERATURE: 97.8 F | WEIGHT: 166 LBS | HEART RATE: 93 BPM | BODY MASS INDEX: 26.06 KG/M2 | SYSTOLIC BLOOD PRESSURE: 112 MMHG

## 2024-01-22 DIAGNOSIS — N18.31 STAGE 3A CHRONIC KIDNEY DISEASE (HCC): ICD-10-CM

## 2024-01-22 DIAGNOSIS — I74.3 EMBOLISM AND THROMBOSIS OF ARTERIES OF THE LOWER EXTREMITIES (HCC): ICD-10-CM

## 2024-01-22 DIAGNOSIS — J84.10 PULMONARY FIBROSIS DETERMINED BY HIGH RESOLUTION COMPUTED TOMOGRAPHY (HCC): ICD-10-CM

## 2024-01-22 DIAGNOSIS — H91.93 BILATERAL HEARING LOSS, UNSPECIFIED HEARING LOSS TYPE: ICD-10-CM

## 2024-01-22 DIAGNOSIS — Z00.00 MEDICARE ANNUAL WELLNESS VISIT, SUBSEQUENT: Primary | ICD-10-CM

## 2024-01-22 DIAGNOSIS — M46.1 INFLAMMATION OF SACROILIAC JOINT (HCC): ICD-10-CM

## 2024-01-22 PROCEDURE — G0439 PPPS, SUBSEQ VISIT: HCPCS | Performed by: FAMILY MEDICINE

## 2024-01-22 NOTE — PROGRESS NOTES
Assessment and Plan:     Problem List Items Addressed This Visit       Pulmonary fibrosis determined by high resolution computed tomography (HCC)    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HCC)    Stage 3 chronic kidney disease (HCC)    Inflammation of sacroiliac joint (HCC)     Other Visit Diagnoses       Medicare annual wellness visit, subsequent    -  Primary    Bilateral hearing loss, unspecified hearing loss type        Relevant Orders    Ambulatory Referral to Audiology            Depression Screening and Follow-up Plan: Patient was screened for depression during today's encounter. They screened negative with a PHQ-2 score of 1.      Preventive health issues were discussed with patient, and age appropriate screening tests were ordered as noted in patient's After Visit Summary.  Personalized health advice and appropriate referrals for health education or preventive services given if needed, as noted in patient's After Visit Summary.    Return in about 6 months (around 7/22/2024) for Recheck HTN, HLD, pulm fibrosis.        History of Present Illness:     Patient presents for a Medicare Wellness Visit    Patient Care Team:  Mikaela Duenas DO as PCP - General  MD Eric Fabian DO Farooq Qureshi, MD Lauren E Strohm, DO Sinan Kutty, MD Beth Valderrama, PA-C Gbolabo Sokunbi, MD Sinan Kutty, MD as Endoscopist  Marty Nevarez MD as Consulting Physician (Pulmonology)     Review of Systems:     Review of Systems   Constitutional:  Negative for appetite change, fatigue, fever and unexpected weight change.   HENT:  Positive for hearing loss. Negative for congestion, dental problem, ear pain, postnasal drip, sore throat and tinnitus.    Eyes:  Negative for pain, discharge and visual disturbance.   Respiratory:  Positive for shortness of breath (stable). Negative for cough and wheezing.    Cardiovascular:  Negative for chest pain, palpitations and leg  swelling.   Gastrointestinal:  Negative for abdominal pain, constipation, diarrhea, nausea and vomiting.   Endocrine: Negative for cold intolerance and heat intolerance.   Genitourinary:  Negative for difficulty urinating, dysuria, flank pain and urgency.   Musculoskeletal:  Positive for back pain (chronic). Negative for arthralgias, joint swelling and myalgias.   Skin:  Negative for rash and wound.   Allergic/Immunologic: Negative for immunocompromised state.   Neurological:  Negative for dizziness, syncope, speech difficulty, weakness and numbness.   Hematological:  Negative for adenopathy. Does not bruise/bleed easily.   Psychiatric/Behavioral:  Negative for confusion, dysphoric mood and sleep disturbance. The patient is not nervous/anxious.         Problem List:     Patient Active Problem List   Diagnosis    Lumbar compression fracture (HCC)    Spondylolisthesis of lumbar region    CAD (coronary artery disease)    Former tobacco use    Hyperlipidemia    Hypertension    S/P CABG x 4    Anemia    Leg pain, posterior, left    Peripheral artery disease (HCC)    GERD (gastroesophageal reflux disease)    Vasomotor rhinitis    Lumbar stenosis    S/P lumbar fusion    Postlaminectomy syndrome of lumbar region    Lumbar radiculopathy    Bruit of left carotid artery    Phimosis    Carotid stenosis, asymptomatic, bilateral    SOB (shortness of breath)    Neck pain on left side    Near syncope    Pulmonary fibrosis determined by high resolution computed tomography (HCC)    Neuropathy    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[facility])    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    Trochanteric bursitis    Diverticulitis    Stage 3 chronic kidney disease ([facility])    Left lower quadrant abdominal pain    Acute left-sided low back pain with left-sided sciatica    Internal hemorrhoids    Diverticular disease    Inflammation of sacroiliac joint ([facility])      Past Medical and Surgical History:     Past  Medical History:   Diagnosis Date    Abnormal liver function test     RESOLVED: 14SEP2017    Allergic rhinitis     Anemia     LAST ASSESSED: 19OCT2017    Arthritis     BPH (benign prostatic hyperplasia)     CAD (coronary artery disease)     Coronary artery disease     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HCC)     LAST ASSESSED: 05OCT2017    Former tobacco use     GERD (gastroesophageal reflux disease)     Hand paresthesia     RESOLVED: 11SEP2017    Hyperlipidemia     Hypertension     Lumbar stenosis     Peripheral artery disease (HCC)     LAST ASSESSED: 27OCT2017    Stage 3a chronic kidney disease (HCC) 7/11/2021     Past Surgical History:   Procedure Laterality Date    BACK SURGERY      COLONOSCOPY      LUMBAR FUSION      NC ARTHRODESIS POSTERIOR/PSTLAT TQ 1NTRSPC LUMBAR N/A 11/03/2016    Procedure: L2-S1 POSTERIOR LUMBAR FUSION AND DECOMPRESSION (Impulse), Posterior lateral fixation; dural repair.;  Surgeon: Leslie Gil MD;  Location: [facility];  Service: Orthopedics    NC CABG W/ARTERIAL GRAFT SINGLE ARTERIAL GRAFT N/A 01/19/2018    Procedure: CABG X4 with LIMA - LAD, SVG - RCA, OM2, & Diagonal ; Left Leg EVH; MATTHEW;  Surgeon: Eric Bar DO;  Location: [facility];  Service: Cardiac Surgery    NC COLONOSCOPY FLX DX W/COLLJ SPEC WHEN PFRMD N/A 11/15/2017    Procedure: EGD AND COLONOSCOPY;  Surgeon: Mariano Godinez MD;  Location: [facility];  Service: Gastroenterology    SEPTOPLASTY      LAST ASSESSED; 13MAY2014    TONSILECTOMY AND ADNOIDECTOMY      LAST ASSESSED: 13MAY2014    UPPER GASTROINTESTINAL ENDOSCOPY        Family History:     Family History   Problem Relation Age of Onset    Emphysema Mother     Liver disease Mother     Coronary artery disease Father     Hypertension Father     Liver disease Father     Heart failure Father     Stroke Father         CVA     Heart attack Father     Coronary artery disease Brother     Heart disease Brother         younger brother by pass and other brother 4 stents placed     Other Family         BACK PROBLEM     Stroke Family         CVA    Emphysema Family     Hypertension Family         BENIGN      Social History:     Social History     Socioeconomic History    Marital status:      Spouse name: None    Number of children: None    Years of education: some college     Highest education level: None   Occupational History    Occupation: Insurance     Comment: Retired    Tobacco Use    Smoking status: Former     Current packs/day: 0.00     Average packs/day: 1 pack/day for 50.0 years (50.0 ttl pk-yrs)     Types: Cigarettes     Start date:      Quit date:      Years since quittin.0    Smokeless tobacco: Never   Vaping Use    Vaping status: Never Used   Substance and Sexual Activity    Alcohol use: Yes     Alcohol/week: 1.0 standard drink of alcohol     Types: 1 Shots of liquor per week     Comment: beer, wine, scotch every day; SOCIAL AS PER ALL SCRIPTS     Drug use: Not Currently     Types: Marijuana     Comment: medical majiuana    Sexual activity: Not Currently   Other Topics Concern    None   Social History Narrative    Daily coffee consumption      Social Determinants of Health     Financial Resource Strain: Low Risk  (2024)    Overall Financial Resource Strain (CARDIA)     Difficulty of Paying Living Expenses: Not hard at all   Food Insecurity: No Food Insecurity (2022)    Hunger Vital Sign     Worried About Running Out of Food in the Last Year: Never true     Ran Out of Food in the Last Year: Never true   Transportation Needs: No Transportation Needs (2024)    PRAPARE - Transportation     Lack of Transportation (Medical): No     Lack of Transportation (Non-Medical): No   Physical Activity: Not on file   Stress: Not on file   Social Connections: Not on file   Intimate Partner Violence: Not on file   Housing Stability: Low Risk  (2022)    Housing Stability Vital Sign     Unable to Pay for Housing in the Last Year: No     Number of Places Lived in  the Last Year: 1     Unstable Housing in the Last Year: No      Medications and Allergies:     Current Outpatient Medications   Medication Sig Dispense Refill    acetaminophen (TYLENOL) 650 mg CR tablet Take 650 mg by mouth every 8 (eight) hours as needed for mild pain      Ascorbic Acid (Vitamin C) 500 MG PACK Take 1,000 mg by mouth daily      aspirin (ECOTRIN LOW STRENGTH) 81 mg EC tablet Take 81 mg by mouth daily      atorvastatin (LIPITOR) 80 mg tablet TAKE 1 TABLET BY MOUTH EVERY MORNING. 90 tablet 3    cholecalciferol (VITAMIN D3) 1,000 units tablet Take 2,000 Units by mouth daily      clopidogrel (Plavix) 75 mg tablet Take 1 tablet (75 mg total) by mouth daily 90 tablet 3    clotrimazole-betamethasone (LOTRISONE) 1-0.05 % cream Apply topically 2 (two) times a day 30 g 3    cyanocobalamin (VITAMIN B-12) 1,000 mcg tablet Take 1,000 mcg by mouth daily        docusate sodium (COLACE) 100 mg capsule Take 100 mg by mouth 2 (two) times a day      famotidine (PEPCID) 20 mg tablet TAKE 1 TABLET BY MOUTH TWICE A  tablet 0    gabapentin (NEURONTIN) 100 mg capsule Take 1 capsule (100 mg total) by mouth 2 (two) times a day 180 capsule 0    ipratropium (ATROVENT) 0.03 % nasal spray 2 sprays into each nostril every 12 (twelve) hours      metoprolol tartrate (LOPRESSOR) 50 mg tablet TAKE 1 AND 1/2 TABLETS BY MOUTH EVERY 12 HOURS 270 tablet 3    Multiple Vitamin (MULTIVITAMIN) capsule Take 1 capsule by mouth daily      triamcinolone (KENALOG) 0.1 % cream Apply topically 2 (two) times a day      ciclopirox (PENLAC) 8 % solution APPLY TO THE AFFECTED AREA(S) ONCE DAILY AT BEDTIME OR 8 HOURS BEFORE WASHING (Patient not taking: Reported on 10/30/2023)       No current facility-administered medications for this visit.     Allergies   Allergen Reactions    Penicillins Other (See Comments)     Hallucinations;  Patient reported that he was seeing visual disturbances        Immunizations:     Immunization History   Administered  Date(s) Administered    COVID-19 MODERNA VACC 0.5 ML IM 01/27/2021, 02/24/2021, 12/01/2021    INFLUENZA 12/20/2006, 09/23/2022    Influenza Split High Dose Preservative Free IM 10/09/2013, 10/27/2015, 09/22/2016, 09/11/2017    Influenza, high dose seasonal 0.7 mL 10/25/2018, 10/31/2019, 10/15/2020, 09/16/2021, 09/23/2022, 10/27/2023    Influenza, seasonal, injectable 01/01/2014    Pneumococcal Conjugate 13-Valent 09/11/2017    Pneumococcal Polysaccharide PPV23 04/07/2011    Tdap 10/25/2018      Health Maintenance:         Topic Date Due    Hepatitis C Screening  Completed         Topic Date Due    COVID-19 Vaccine (4 - 2023-24 season) 09/01/2023      Medicare Screening Tests and Risk Assessments:     Suhail is here for his Subsequent Wellness visit. Last Medicare Wellness visit information reviewed, patient interviewed and updates made to the record today.      Health Risk Assessment:   Patient rates overall health as good. Patient feels that their physical health rating is same. Patient is satisfied with their life. Eyesight was rated as same. Hearing was rated as slightly worse. Patient feels that their emotional and mental health rating is same. Patients states they are never, rarely angry. Patient states they are sometimes unusually tired/fatigued. Pain experienced in the last 7 days has been some. Patient's pain rating has been 7/10. Patient states that he has experienced no weight loss or gain in last 6 months. Pain ranges from 5-10 on the pain scale on a daily basis.    Depression Screening:   PHQ-2 Score: 1      Fall Risk Screening:   In the past year, patient has experienced: no history of falling in past year      Home Safety:  Patient has trouble with stairs inside or outside of their home. Patient has working smoke alarms and has working carbon monoxide detector. Home safety hazards include: none. Has 2 stair lifts    Nutrition:   Current diet is Regular.     Medications:   Patient is currently taking  over-the-counter supplements. OTC medications include: see medication list. Patient is able to manage medications.     Activities of Daily Living (ADLs)/Instrumental Activities of Daily Living (IADLs):   Walk and transfer into and out of bed and chair?: Yes  Dress and groom yourself?: Yes    Bathe or shower yourself?: Yes    Feed yourself? Yes  Do your laundry/housekeeping?: Yes  Manage your money, pay your bills and track your expenses?: Yes  Make your own meals?: Yes    Do your own shopping?: Yes    ADL comments: Food supplied by [employer]    Previous Hospitalizations:   Any hospitalizations or ED visits within the last 12 months?: No      Advance Care Planning:   Living will: Yes    Durable POA for healthcare: Yes    Advanced directive: Yes    Advanced directive counseling given: Yes    End of Life Decisions reviewed with patient: Yes    Provider agrees with end of life decisions: Yes      Cognitive Screening:   Provider or family/friend/caregiver concerned regarding cognition?: No    PREVENTIVE SCREENINGS      Cardiovascular Screening:    General: Screening Not Indicated and History Lipid Disorder      Diabetes Screening:     General: Screening Current      Colorectal Cancer Screening:     General: Screening Not Indicated      Prostate Cancer Screening:    General: Screening Not Indicated      Osteoporosis Screening:    General: Screening Not Indicated      Abdominal Aortic Aneurysm (AAA) Screening:    Risk factors include: tobacco use        General: Screening Not Indicated      Lung Cancer Screening:     General: Screening Not Indicated      Hepatitis C Screening:    General: Screening Current    Screening, Brief Intervention, and Referral to Treatment (SBIRT)    Screening  Typical number of drinks in a day: 2  Typical number of drinks in a week: 14  Interpretation: Low risk drinking behavior.    Single Item Drug Screening:  How often have you used an illegal drug (including marijuana) or a prescription  "medication for non-medical reasons in the past year? never    Single Item Drug Screen Score: 0  Interpretation: Negative screen for possible drug use disorder    Brief Intervention  Alcohol & drug use screenings were reviewed. No concerns regarding substance use disorder identified. Healthy alcohol use/limits discussed.     Other Counseling Topics:   Car/seat belt/driving safety, skin self-exam, sunscreen and calcium and vitamin D intake and regular weightbearing exercise.     No results found.     Physical Exam:     /74   Pulse 93   Temp 97.8 °F (36.6 °C)   Resp 18   Ht 5' 7\" (1.702 m)   Wt 75.3 kg (166 lb)   SpO2 96%   BMI 26.00 kg/m²     Physical Exam  Vitals and nursing note reviewed.   Constitutional:       General: He is not in acute distress.     Appearance: Normal appearance. He is well-developed. He is not ill-appearing.      Comments: Body mass index is 26 kg/m².    HENT:      Head: Normocephalic and atraumatic.      Right Ear: Hearing, tympanic membrane, ear canal and external ear normal.      Left Ear: Hearing, tympanic membrane, ear canal and external ear normal.      Nose: Nose normal.      Mouth/Throat:      Pharynx: Uvula midline.   Eyes:      General: No scleral icterus.     Conjunctiva/sclera: Conjunctivae normal.      Pupils: Pupils are equal, round, and reactive to light.   Neck:      Thyroid: No thyromegaly.      Vascular: No JVD.   Cardiovascular:      Rate and Rhythm: Normal rate and regular rhythm.      Heart sounds: Normal heart sounds. No murmur heard.  Pulmonary:      Effort: Pulmonary effort is normal. No respiratory distress.      Breath sounds: Normal breath sounds.   Abdominal:      General: Bowel sounds are normal. There is no distension.      Palpations: Abdomen is soft. There is no mass.      Tenderness: There is no abdominal tenderness.   Musculoskeletal:         General: No tenderness. Normal range of motion.      Cervical back: Normal range of motion and neck supple. "      Right lower leg: No edema.      Left lower leg: No edema.   Lymphadenopathy:      Cervical: No cervical adenopathy.   Skin:     General: Skin is warm and dry.      Capillary Refill: Capillary refill takes less than 2 seconds.      Findings: No rash.   Neurological:      General: No focal deficit present.      Mental Status: He is alert and oriented to person, place, and time.      Cranial Nerves: No cranial nerve deficit.   Psychiatric:         Mood and Affect: Mood normal.         Behavior: Behavior normal.          Mikaela Duenas, DO

## 2024-01-22 NOTE — PATIENT INSTRUCTIONS
PLEASE SCHEDULE CARDIOLOGY FOLLOW UP FOR FEBRUARY 2024          Medicare Preventive Visit Patient Instructions  Thank you for completing your Welcome to Medicare Visit or Medicare Annual Wellness Visit today. Your next wellness visit will be due in one year (1/22/2025).  The screening/preventive services that you may require over the next 5-10 years are detailed below. Some tests may not apply to you based off risk factors and/or age. Screening tests ordered at today's visit but not completed yet may show as past due. Also, please note that scanned in results may not display below.  Preventive Screenings:  Service Recommendations Previous Testing/Comments   Colorectal Cancer Screening  Colonoscopy    Fecal Occult Blood Test (FOBT)/Fecal Immunochemical Test (FIT)  Fecal DNA/Cologuard Test  Flexible Sigmoidoscopy Age: 45-75 years old   Colonoscopy: every 10 years (May be performed more frequently if at higher risk)  OR  FOBT/FIT: every 1 year  OR  Cologuard: every 3 years  OR  Sigmoidoscopy: every 5 years  Screening may be recommended earlier than age 45 if at higher risk for colorectal cancer. Also, an individualized decision between you and your healthcare provider will decide whether screening between the ages of 76-85 would be appropriate. Colonoscopy: 10/10/2022  FOBT/FIT: 08/24/2022  Cologuard: Not on file  Sigmoidoscopy: Not on file          Prostate Cancer Screening Individualized decision between patient and health care provider in men between ages of 55-69   Medicare will cover every 12 months beginning on the day after your 50th birthday PSA: <0.1 ng/mL     Screening Not Indicated     Hepatitis C Screening Once for adults born between 1945 and 1965  More frequently in patients at high risk for Hepatitis C Hep C Antibody: 07/02/2021    Screening Current   Diabetes Screening 1-2 times per year if you're at risk for diabetes or have pre-diabetes Fasting glucose: 98 mg/dL (9/28/2023)  A1C: 5.2 %  (6/29/2020)  Screening Current   Cholesterol Screening Once every 5 years if you don't have a lipid disorder. May order more often based on risk factors. Lipid panel: 08/30/2023  Screening Not Indicated  History Lipid Disorder      Other Preventive Screenings Covered by Medicare:  Abdominal Aortic Aneurysm (AAA) Screening: covered once if your at risk. You're considered to be at risk if you have a family history of AAA or a male between the age of 65-75 who smoking at least 100 cigarettes in your lifetime.  Lung Cancer Screening: covers low dose CT scan once per year if you meet all of the following conditions: (1) Age 55-77; (2) No signs or symptoms of lung cancer; (3) Current smoker or have quit smoking within the last 15 years; (4) You have a tobacco smoking history of at least 20 pack years (packs per day x number of years you smoked); (5) You get a written order from a healthcare provider.  Glaucoma Screening: covered annually if you're considered high risk: (1) You have diabetes OR (2) Family history of glaucoma OR (3)  aged 50 and older OR (4)  American aged 65 and older  Osteoporosis Screening: covered every 2 years if you meet one of the following conditions: (1) Have a vertebral abnormality; (2) On glucocorticoid therapy for more than 3 months; (3) Have primary hyperparathyroidism; (4) On osteoporosis medications and need to assess response to drug therapy.  HIV Screening: covered annually if you're between the age of 15-65. Also covered annually if you are younger than 15 and older than 65 with risk factors for HIV infection. For pregnant patients, it is covered up to 3 times per pregnancy.    Immunizations:  Immunization Recommendations   Influenza Vaccine Annual influenza vaccination during flu season is recommended for all persons aged >= 6 months who do not have contraindications   Pneumococcal Vaccine   * Pneumococcal conjugate vaccine = PCV13 (Prevnar 13), PCV15  (Vaxneuvance), PCV20 (Prevnar 20)  * Pneumococcal polysaccharide vaccine = PPSV23 (Pneumovax) Adults 19-65 yo with certain risk factors or if 65+ yo  If never received any pneumonia vaccine: recommend Prevnar 20 (PCV20)  Give PCV20 if previously received 1 dose of PCV13 or PPSV23   Hepatitis B Vaccine 3 dose series if at intermediate or high risk (ex: diabetes, end stage renal disease, liver disease)   Respiratory syncytial virus (RSV) Vaccine - COVERED BY MEDICARE PART D  * RSVPreF3 (Arexvy) CDC recommends that adults 60 years of age and older may receive a single dose of RSV vaccine using shared clinical decision-making (SCDM)   Tetanus (Td) Vaccine - COST NOT COVERED BY MEDICARE PART B Following completion of primary series, a booster dose should be given every 10 years to maintain immunity against tetanus. Td may also be given as tetanus wound prophylaxis.   Tdap Vaccine - COST NOT COVERED BY MEDICARE PART B Recommended at least once for all adults. For pregnant patients, recommended with each pregnancy.   Shingles Vaccine (Shingrix) - COST NOT COVERED BY MEDICARE PART B  2 shot series recommended in those 19 years and older who have or will have weakened immune systems or those 50 years and older     Health Maintenance Due:      Topic Date Due    Hepatitis C Screening  Completed     Immunizations Due:      Topic Date Due    COVID-19 Vaccine (4 - 2023-24 season) 09/01/2023     Advance Directives   What are advance directives?  Advance directives are legal documents that state your wishes and plans for medical care. These plans are made ahead of time in case you lose your ability to make decisions for yourself. Advance directives can apply to any medical decision, such as the treatments you want, and if you want to donate organs.   What are the types of advance directives?  There are many types of advance directives, and each state has rules about how to use them. You may choose a combination of any of the  following:  Living will:  This is a written record of the treatment you want. You can also choose which treatments you do not want, which to limit, and which to stop at a certain time. This includes surgery, medicine, IV fluid, and tube feedings.   Durable power of  for healthcare (DPAHC):  This is a written record that states who you want to make healthcare choices for you when you are unable to make them for yourself. This person, called a proxy, is usually a family member or a friend. You may choose more than 1 proxy.  Do not resuscitate (DNR) order:  A DNR order is used in case your heart stops beating or you stop breathing. It is a request not to have certain forms of treatment, such as CPR. A DNR order may be included in other types of advance directives.  Medical directive:  This covers the care that you want if you are in a coma, near death, or unable to make decisions for yourself. You can list the treatments you want for each condition. Treatment may include pain medicine, surgery, blood transfusions, dialysis, IV or tube feedings, and a ventilator (breathing machine).  Values history:  This document has questions about your views, beliefs, and how you feel and think about life. This information can help others choose the care that you would choose.  Why are advance directives important?  An advance directive helps you control your care. Although spoken wishes may be used, it is better to have your wishes written down. Spoken wishes can be misunderstood, or not followed. Treatments may be given even if you do not want them. An advance directive may make it easier for your family to make difficult choices about your care.   Weight Management   Why it is important to manage your weight:  Being overweight increases your risk of health conditions such as heart disease, high blood pressure, type 2 diabetes, and certain types of cancer. It can also increase your risk for osteoarthritis, sleep apnea, and  other respiratory problems. Aim for a slow, steady weight loss. Even a small amount of weight loss can lower your risk of health problems.  How to lose weight safely:  A safe and healthy way to lose weight is to eat fewer calories and get regular exercise. You can lose up about 1 pound a week by decreasing the number of calories you eat by 500 calories each day.   Healthy meal plan for weight management:  A healthy meal plan includes a variety of foods, contains fewer calories, and helps you stay healthy. A healthy meal plan includes the following:  Eat whole-grain foods more often.  A healthy meal plan should contain fiber. Fiber is the part of grains, fruits, and vegetables that is not broken down by your body. Whole-grain foods are healthy and provide extra fiber in your diet. Some examples of whole-grain foods are whole-wheat breads and pastas, oatmeal, brown rice, and bulgur.  Eat a variety of vegetables every day.  Include dark, leafy greens such as spinach, kale, grzegorz greens, and mustard greens. Eat yellow and orange vegetables such as carrots, sweet potatoes, and winter squash.   Eat a variety of fruits every day.  Choose fresh or canned fruit (canned in its own juice or light syrup) instead of juice. Fruit juice has very little or no fiber.  Eat low-fat dairy foods.  Drink fat-free (skim) milk or 1% milk. Eat fat-free yogurt and low-fat cottage cheese. Try low-fat cheeses such as mozzarella and other reduced-fat cheeses.  Choose meat and other protein foods that are low in fat.  Choose beans or other legumes such as split peas or lentils. Choose fish, skinless poultry (chicken or turkey), or lean cuts of red meat (beef or pork). Before you cook meat or poultry, cut off any visible fat.   Use less fat and oil.  Try baking foods instead of frying them. Add less fat, such as margarine, sour cream, regular salad dressing and mayonnaise to foods. Eat fewer high-fat foods. Some examples of high-fat foods  include french fries, doughnuts, ice cream, and cakes.  Eat fewer sweets.  Limit foods and drinks that are high in sugar. This includes candy, cookies, regular soda, and sweetened drinks.  Exercise:  Exercise at least 30 minutes per day on most days of the week. Some examples of exercise include walking, biking, dancing, and swimming. You can also fit in more physical activity by taking the stairs instead of the elevator or parking farther away from stores. Ask your healthcare provider about the best exercise plan for you.      © Copyright [employer] 2018 Information is for End User's use only and may not be sold, redistributed or otherwise used for commercial purposes. All illustrations and images included in CareNotes® are the copyrighted property of A.[employer].A[employer]M., Inc. or [employer]

## 2024-01-23 ENCOUNTER — OFFICE VISIT (OUTPATIENT)
Dept: UROLOGY | Facility: CLINIC | Age: 84
End: 2024-01-23
Payer: COMMERCIAL

## 2024-01-23 VITALS
OXYGEN SATURATION: 95 % | HEART RATE: 65 BPM | DIASTOLIC BLOOD PRESSURE: 60 MMHG | HEIGHT: 67 IN | WEIGHT: 166 LBS | SYSTOLIC BLOOD PRESSURE: 110 MMHG | BODY MASS INDEX: 26.06 KG/M2

## 2024-01-23 DIAGNOSIS — N47.1 PHIMOSIS OF PENIS: Primary | ICD-10-CM

## 2024-01-23 PROBLEM — L98.9 SKIN LESION OF LEFT LEG: Status: RESOLVED | Noted: 2019-10-31 | Resolved: 2024-01-23

## 2024-01-23 PROCEDURE — 99213 OFFICE O/P EST LOW 20 MIN: CPT | Performed by: PHYSICIAN ASSISTANT

## 2024-01-23 NOTE — PROGRESS NOTES
UROLOGY PROGRESS NOTE   Patient Identifiers: Suhail Borrego (MRN [id number])  Date of Service: 1/23/2024    Subjective:   83-year-old man with a history of severe phimosis.  He has had intermittent balanitis which has improved with Lotrisone cream.  He has not been able to retract the foreskin for some time.  He does not get UTIs and he has been otherwise comfortable.  I offered him circumcision or dorsal slit in the office in the past which he declined.    Reason for  visit: Balanitis and phimosis follow-up    Objective:     VITALS:    Vitals:    01/23/24 0908   BP: 110/60   Pulse: 65   SpO2: 95%     AUA SYMPTOM SCORE      Flowsheet Row Most Recent Value   AUA SYMPTOM SCORE    How often have you had a sensation of not emptying your bladder completely after you finished urinating? 1 (P)    How often have you had to urinate again less than two hours after you finished urinating? 1 (P)    How often have you found you stopped and started again several times when you urinate? 0 (P)    How often have you found it difficult to postpone urination? 1 (P)    How often have you had a weak urinary stream? 2 (P)    How often have you had to push or strain to begin urination? 1 (P)    How many times did you most typically get up to urinate from the time you went to bed at night until the time you got up in the morning? 4 (P)    Quality of Life: If you were to spend the rest of your life with your urinary condition just the way it is now, how would you feel about that? 2 (P)    AUA SYMPTOM SCORE 10 (P)               LABS:  Lab Results   Component Value Date    HGB 9.5 (L) 10/11/2022    HCT 29.3 (L) 10/11/2022    WBC 8.67 10/11/2022     10/11/2022   ]    Lab Results   Component Value Date     06/10/2015    K 5.0 09/28/2023     09/28/2023    CO2 26 09/28/2023    BUN 26 (H) 09/28/2023    CREATININE 1.25 09/28/2023    CALCIUM 9.3 09/28/2023    GLUCOSE 140 01/19/2018   ]        INPATIENT MEDS:    Current  "Outpatient Medications:     acetaminophen (TYLENOL) 650 mg CR tablet, Take 650 mg by mouth every 8 (eight) hours as needed for mild pain, Disp: , Rfl:     Ascorbic Acid (Vitamin C) 500 MG PACK, Take 1,000 mg by mouth daily, Disp: , Rfl:     aspirin (ECOTRIN LOW STRENGTH) 81 mg EC tablet, Take 81 mg by mouth daily, Disp: , Rfl:     atorvastatin (LIPITOR) 80 mg tablet, TAKE 1 TABLET BY MOUTH EVERY MORNING., Disp: 90 tablet, Rfl: 3    cholecalciferol (VITAMIN D3) 1,000 units tablet, Take 2,000 Units by mouth daily, Disp: , Rfl:     clopidogrel (Plavix) 75 mg tablet, Take 1 tablet (75 mg total) by mouth daily, Disp: 90 tablet, Rfl: 3    clotrimazole-betamethasone (LOTRISONE) 1-0.05 % cream, Apply topically 2 (two) times a day, Disp: 30 g, Rfl: 3    cyanocobalamin (VITAMIN B-12) 1,000 mcg tablet, Take 1,000 mcg by mouth daily  , Disp: , Rfl:     docusate sodium (COLACE) 100 mg capsule, Take 100 mg by mouth 2 (two) times a day, Disp: , Rfl:     famotidine (PEPCID) 20 mg tablet, TAKE 1 TABLET BY MOUTH TWICE A DAY, Disp: 180 tablet, Rfl: 0    gabapentin (NEURONTIN) 100 mg capsule, Take 1 capsule (100 mg total) by mouth 2 (two) times a day, Disp: 180 capsule, Rfl: 0    ipratropium (ATROVENT) 0.03 % nasal spray, 2 sprays into each nostril every 12 (twelve) hours, Disp: , Rfl:     metoprolol tartrate (LOPRESSOR) 50 mg tablet, TAKE 1 AND 1/2 TABLETS BY MOUTH EVERY 12 HOURS, Disp: 270 tablet, Rfl: 3    Multiple Vitamin (MULTIVITAMIN) capsule, Take 1 capsule by mouth daily, Disp: , Rfl:     triamcinolone (KENALOG) 0.1 % cream, Apply topically 2 (two) times a day, Disp: , Rfl:     ciclopirox (PENLAC) 8 % solution, APPLY TO THE AFFECTED AREA(S) ONCE DAILY AT BEDTIME OR 8 HOURS BEFORE WASHING (Patient not taking: Reported on 10/30/2023), Disp: , Rfl:       Physical Exam:   /60 (BP Location: Left arm, Patient Position: Sitting, Cuff Size: Standard)   Pulse 65   Ht 5' 7\" (1.702 m)   Wt 75.3 kg (166 lb)   SpO2 95%   BMI " 26.00 kg/m²   GEN: no acute distress    RESP: breathing comfortably with no accessory muscle use    ABD: soft, non-tender, non-distended   INCISION:    EXT: no significant peripheral edema   (Male): Penis uncircumcised, severe tight phimosis  testicles descended into scrotum bilaterally without masses nor tenderness.  No inguinal hernias bilaterally.      RADIOLOGY:   none     Assessment:   Balanitis/phimosis     Plan:   -Reviewed again the use of Lotrisone as needed  -Also discussed circumcision or dorsal slit which I would be happy to do in the office  -He prefers to keep the status quo and call if he has any further problems.  May see urology on an as-needed basis.  -        f

## 2024-02-01 ENCOUNTER — OFFICE VISIT (OUTPATIENT)
Dept: AUDIOLOGY | Age: 84
End: 2024-02-01
Payer: COMMERCIAL

## 2024-02-01 DIAGNOSIS — H90.3 SENSORY HEARING LOSS, BILATERAL: Primary | ICD-10-CM

## 2024-02-01 PROCEDURE — 92567 TYMPANOMETRY: CPT | Performed by: AUDIOLOGIST

## 2024-02-01 PROCEDURE — 92557 COMPREHENSIVE HEARING TEST: CPT | Performed by: AUDIOLOGIST

## 2024-02-01 NOTE — PROGRESS NOTES
HEARING EVALUATION    Name:  Suhail Borrego  :  1940  Age:  83 y.o.   MRN:  [id number]  Date of Evaluation: 24     HISTORY:     Reason for visit: Difficulty Understanding    Suhail Borrego is being seen today at the request of Dr. Duenas for an initial  evaluation of hearing.  Patient reports difficulty understanding speech.  Patient denies otalgia, otorrhea, dizziness, noise exposure, family history of hearing loss, and notes a positive history of fullness and tinnitus.    EVALUATION:    Otoscopic Evaluation:   Right Ear: Unremarkable, canal clear   Left Ear: Non-occluding cerumen, TM view obscured    Tympanometry:   Right Ear: Type A; normal middle ear pressure and static compliance    Left Ear: Type A; normal middle ear pressure and static compliance     Speech Audiometry:  Speech Reception (SRT)   Right Ear: 35 dB HL   Left Ear: 40 dB HL    Word Recognition Scores (WRS):  Right Ear: fair (88 % correct)     Left Ear: fair (76 % correct)   Stimuli: NU-6    Pure Tone Audiometry:  Conventional pure tone audiometry from 250 - 8000 Hz  was obtained with good reliability and revealed the following:     Right Ear: Normal sloping to severe sensorineural hearing loss (SNHL)      Left Ear: Normal sloping to severe sensorineural hearing loss (SNHL)       *see attached audiogram    IMPRESSIONS:   Normal to severe hearing loss bilaterally. Reviewed hearing loss and decreased speech understanding. Discussed the importance of hearing aids.     RECOMMENDATIONS:  Annual hearing eval, Return to [facility]. for F/U, Hearing Aid Evaluation, and Copy to Patient/Caregiver    PATIENT EDUCATION:   The results of today's results and recommendations were reviewed with the patient and his hearing thresholds were explained at length. Treatment options, including amplification and communication strategies, were discussed as appropriate. The patient voiced understanding of his test results. Questions were addressed and the patient  was encouraged to contact our department should concerns arise.      Yesenia Davis, CCC-A  Clinical Audiologist  [facility] AUDIOLOGY  [facility] [address]  [place] [address]

## 2024-02-07 DIAGNOSIS — E78.5 HYPERLIPIDEMIA, UNSPECIFIED HYPERLIPIDEMIA TYPE: ICD-10-CM

## 2024-02-08 ENCOUNTER — OFFICE VISIT (OUTPATIENT)
Dept: AUDIOLOGY | Age: 84
End: 2024-02-08

## 2024-02-08 DIAGNOSIS — H90.3 SENSORY HEARING LOSS, BILATERAL: Primary | ICD-10-CM

## 2024-02-08 RX ORDER — ATORVASTATIN CALCIUM 80 MG/1
80 TABLET, FILM COATED ORAL EVERY MORNING
Qty: 90 TABLET | Refills: 3 | Status: SHIPPED | OUTPATIENT
Start: 2024-02-08

## 2024-02-08 NOTE — PROGRESS NOTES
Hearing Aid Evaluation  Name:  Suhail Borrego  :  1940  Age:  83 y.o.  MRN:  [id number]  Date of Evaluation: 24     HISTORY:    Suhail Borrego was seen today for a hearing aid evaluation following his audiometric testing performed on 24. Suhail was accompanied by his daughter to today's visit.. Suhail was referred by Dr. Duenas.     RESULTS REVIEW:    The audiometric findings were reviewed with the patient and patient' daughter. All of the patient's questions regarding his hearing status were addressed, and the importance of realistic expectations of hearing loss and amplification were discussed.      DEVICE REVIEW & RECOMMENDATION:     Strengths and limitations of amplification, including various hearing aid styles, technology, options, and accessories were discussed at length with patient. Hearing aids are assistive devices and are not designed to restore normal hearing. The patient was counseled on the importance of self-advocacy, motivation, as well as effective communication strategies that can be used to optimize hearing aid success. Based on the degree of his hearing loss, preferences, and lifestyle needs, the following hearing recommendations were made:    The first hearing aid recommendation is Oticon REAL 3 Mini RITE-R.    [facility]'s office policies regarding our hearing aid program were reviewed, including the 45 day trial period, non-refundable return fees, as well as the  warranties and service plan. Hearing aid cost, and payment, as well as insurance benefit (if applicable) were discussed with the patient.     *See attached quote sheet    DEVICE SELECTION:    At this time, patient wishes to defer the purchase of hearing aids.      The patient selected the Oticon 3 Mini RITE-R hearing aids.    Level: Intermediate   Color: Chroma Beige     size: Right  / Left    Dome size: 8Open   Accessories: Desktop      Devices ordered as specified above  (order # [id number]).    Yesenia Davis, CCC-A  Clinical Audiologist  [facility] AUDIOLOGY  [facility] [address]  [place] [address]

## 2024-02-12 NOTE — PROGRESS NOTES
Progress Note    Name:  Suhail Borrego  :  1940  Age:  83 y.o.  MRN:  [id number]  Date of Evaluation: 24     HISTORY:    Patient called and requested his hearing aids that were discussed at his evaluation be purchase. Hearing aids were ordered. Order number [id number]       Yesenia Davis, CCC-A  Clinical Audiologist

## 2024-02-14 NOTE — PROGRESS NOTES
Progress Note    Name:  Suhail Borrego  :  1940  Age:  83 y.o.  MRN:  [id number]  Date of Evaluation: 24     Hearing aids arrived.  Oticon REAL 3 miniRITE R  R# [id number]  L# [id number]  # [id number]  Warranty 3/14/2027  Inbasketed to schedule [facility]        Yesenia Calvo  Clinical Audiologist

## 2024-02-19 ENCOUNTER — OFFICE VISIT (OUTPATIENT)
Dept: AUDIOLOGY | Age: 84
End: 2024-02-19
Payer: COMMERCIAL

## 2024-02-19 DIAGNOSIS — H90.3 SENSORY HEARING LOSS, BILATERAL: Primary | ICD-10-CM

## 2024-02-19 PROCEDURE — V5261 HEARING AID, DIGIT, BIN, BTE: HCPCS | Performed by: AUDIOLOGIST

## 2024-02-19 PROCEDURE — V5160 DISPENSING FEE BINAURAL: HCPCS | Performed by: AUDIOLOGIST

## 2024-02-19 NOTE — PROGRESS NOTES
Hearing Aid Fitting    Name:  Suhail Borrego  :  1940  Age:  83 y.o.  MRN:  [id number]  Date of Evaluation: 24     HISTORY:    Suhail Borrego was seen today for a binaural hearing aid fitting of his Oticon REAL 3   in the canal (KIRK) hearing aid(s). Suhail was unaccompanied to today's visit. Hearing aid purchase is being paid by Private Pay.    DEVICE INFORMATION:     Left Device Right Device   Hearing Aid Make: Oticon  Oticon    Hearing Aid Model: REAL 3 Mini RITE-R REAL 3 Mini RITE-R   Serial Number: [id number]   Repair Warranty Date: 3/14/27 3/14/27   Loss/Damage Warranty Status: Active  Active        Length/Output    Wax System: Pro Wax Mini FIT Pro Wax Mini FIT   Dome Size/Style: 8 Open 8 Open   Battery: Lithium-ion Rechargeable Lithium-ion Rechargeable      [employer] Serial Number: N/A N/A   [employer]old Warranty Date:  N/A N/A    Serial Number: [id number]   Warranty Date:  3/14/27     Accessories: N/A       DEVICE SETTINGS:    Hearing aid(s) were programmed using NAL-NL2 fitting formula and were adjusted based on patient perceived comfort level. Hearing aid(s) were set to experience level 1  per patient subjective listening preference. Patient noted good sound quality, and was happy with the overall sound quality and fit of the hearing aid(s).    DEVICE ORIENTATION:    Patient  was counseled on device components and component function. Proper insertion and removal of the aid(s) were demonstrated. The patient practiced insertion and removal of the devices in the office, they demonstrated good ability to manipulate the hearing aids. Patient was given the devices users manual that reviews aid usage and operation, hearing aid cleaning tools, and hearing aid carrying case.     The hearing aid warranty, including unlimited repair and a one time loss and damage per hearing aid, through the , as well as [facility]'s hearing aid service plan, including  unlimited office visits, and supplies were outlined thoroughly. Patient agreed to the terms of sale listed on the purchase agreement containing device specifications, warranties, pricing information, as well as [facility]'s 45-day trial period timeline. After this period has elapsed, hearing aids cannot be returned.    Patient was fit with rechargeable hearing aids.  serial number [id number], warranty date 3/14/27.  was reviewed with patient. Battery life and charging options were discussed.       DEVICE EXPECTATIONS & USAGE:     Hearing aids are assistive devices and are not designed to restore normal hearing sensitivity. The importance of realistic expectations, especially in the presence of background noise, was emphasized. The need for daily, consistent usage (8-12 hours per day) for proper device adjustment, as well as the importance of self-advocacy and practicing effective communication strategies was outlined.     Effective communication strategies include:  1.) Maintaining face-to-face communication, allowing for speechreading of facial expressions, lips, and gestures.  2.) Reducing background noise and distance between communication partners.  3.) Having communication partners reduce their rate of speech when appropriate.  4.) Beginning conversation by getting communication partner's attention.  5.) Asking for rephrasing of missed aspects of conversation rather than asking for repetition.    RECOMMENDATIONS:  Patient demonstrated understanding of all the topics discussed. Patient is to follow-up in 2-3 weeks for a hearing aid check within the trial period as scheduled.     Yesenia Davis, CCC-A  Clinical Audiologist   [facility] AUDIOLOGY & [facility]ID [facility]  [address]  [place] [address]

## 2024-03-05 ENCOUNTER — OFFICE VISIT (OUTPATIENT)
Dept: AUDIOLOGY | Age: 84
End: 2024-03-05

## 2024-03-05 DIAGNOSIS — H90.3 SENSORY HEARING LOSS, BILATERAL: Primary | ICD-10-CM

## 2024-03-05 NOTE — PROGRESS NOTES
Hearing Aid Visit:    Name:  Suhail Borrego  :  1940  Age:  83 y.o.  MRN:  [id number]  Date of Evaluation: 24     HISTORY:    Suhail Borrego was seen today (3/5/2024) for a(n) in-warranty hearing aid check of his bilateral hearing aids. Today, Suhail no concerns or issues with sounds quality. Patient noted some difficulty with insertion of the devices.     DEVICE INFORMATION:    Left Device Right Device   Hearing Aid Make: OtRevinate  OtRevinate    Hearing Aid Model: REAL 3 Mini RITE-R REAL 3 Mini RITE-R   Serial Number: [id number]   Repair Warranty Date: 3/14/27 3/14/27   Loss/Damage Warranty Status: Active  Active         Length/Output    Wax System: Pro Wax Mini FIT Pro Wax Mini FIT   Dome Size/Style: 8 Open 8 Open   Battery: Lithium-ion Rechargeable Lithium-ion Rechargeable       Earmold Serial Number: N/A N/A   Earmold Warranty Date:  N/A N/A    Serial Number: [id number]   Warranty Date:  3/14/27     Accessories: N/A       ACTION/ADJUSTMENTS:    Visual inspection of the device(s) revealed no noticeable damage or defects.     A listening check revealed good sound quality from the device(s).    The hearing aids were cleaned and checked. The patients wax filters were replaced for the right side.     No adjustments were made at this time.     Added retention tails to help with insertion and retention. Patient practiced getting in his ears today.     The patients hearing aid(s) were connected to their cell phone and Ipad via bluetooth. Patient demonstrated excellent understanding of the geovani and bluetooth capabilities of the hearing aids.       RECOMMENDATIONS:     Follow up in 6 months or PRN.      Yesenia Davis, CCC-A  Clinical Audiologist  [facility]  [facility] [place] [phone]

## 2024-03-15 ENCOUNTER — OFFICE VISIT (OUTPATIENT)
Dept: AUDIOLOGY | Age: 84
End: 2024-03-15

## 2024-03-15 DIAGNOSIS — H90.3 SENSORY HEARING LOSS, BILATERAL: Primary | ICD-10-CM

## 2024-03-15 NOTE — PROGRESS NOTES
Hearing Aid Visit:    Name:  Suhail Borrego  :  1940  Age:  83 y.o.  MRN:  [id number]  Date of Evaluation: 03/15/24     HISTORY:    Suhail Borrego was seen today (3/15/2024) for a(n) in-warranty hearing aid check of his bilateral hearing aids. Today, Suhail noted connection issues.    DEVICE INFORMATION:    Left Device Right Device   Hearing Aid Make: OtK Spine  Oticon    Hearing Aid Model: REAL 3 Mini RITE-R REAL 3 Mini RITE-R   Serial Number: [id number]   Repair Warranty Date: 3/14/27 3/14/27   Loss/Damage Warranty Status: Active  Active         Length/Output    Wax System: Pro Wax Mini FIT Pro Wax Mini FIT   Dome Size/Style: 8 Open 8 Open   Battery: Lithium-ion Rechargeable Lithium-ion Rechargeable       Earmold Serial Number: N/A N/A   Earmold Warranty Date:  N/A N/A    Serial Number: [id number]   Warranty Date:  3/14/27     Accessories: N/A       ACTION/ADJUSTMENTS:    Reconnected to Iphone.     RECOMMENDATIONS:     Follow up PRN.      Yesenia Davis, CCC-A  Clinical Audiologist  [facility]  [address] [place] [address] [address]  [place] [address]

## 2024-03-21 ENCOUNTER — OFFICE VISIT (OUTPATIENT)
Dept: FAMILY MEDICINE CLINIC | Facility: OTHER | Age: 84
End: 2024-03-21
Payer: COMMERCIAL

## 2024-03-21 VITALS
OXYGEN SATURATION: 97 % | WEIGHT: 163 LBS | HEART RATE: 74 BPM | SYSTOLIC BLOOD PRESSURE: 132 MMHG | BODY MASS INDEX: 25.58 KG/M2 | HEIGHT: 67 IN | DIASTOLIC BLOOD PRESSURE: 70 MMHG | TEMPERATURE: 97.1 F | RESPIRATION RATE: 18 BRPM

## 2024-03-21 DIAGNOSIS — M54.50 ACUTE LEFT-SIDED LOW BACK PAIN WITHOUT SCIATICA: Primary | ICD-10-CM

## 2024-03-21 PROCEDURE — 99213 OFFICE O/P EST LOW 20 MIN: CPT

## 2024-03-21 PROCEDURE — G2211 COMPLEX E/M VISIT ADD ON: HCPCS

## 2024-03-21 NOTE — PROGRESS NOTES
Name: Suhail Borrego      : 1940      MRN: [id number]  Encounter Provider: Samara Stubbs DO  Encounter Date: 3/21/2024   Encounter department: [facility]    Assessment & Plan     1. Acute left-sided low back pain without sciatica  -     Diclofenac Sodium (VOLTAREN) 1 %; Apply 2 g topically 4 (four) times a day    Pt presents today with acute worsening of his chronic low back pain. On exam, he has mild tenderness and moderate to severe hypertonicity of the left side of the lumbar region. No red flag symptoms. After discussing options for pain management with him today including steroids as he cannot take oral NSAIDs, topical voltaren gel with low systemic absorption, heat/ ice, rest and PT, lidoderm, he prefers to not take a new oral medication at this time and would prefer the nonpharmacologic options as well as topical voltaren gel. They were counseled on usage, possible effects, and precautions of their new medication. Considered increase in gabapentin, however, no radiation or new neurologic symptoms so less likely nerve pain related at this time and more likely musculoskeletal, however if no improve, this can be considered. Counseled regarding ED precautions and red flag symptoms that would prompt immediate medical care. Discussed role of imaging with patient which is deferred at this time. Declines PT or lidoderm patches. Counseled that if he should find that symptoms are not improving enough with voltaren gel applications, to please call and we can revisit possible short duration steroid burst with prednisone or methylprednisolone.     The patient indicates understanding of these issues and agrees with the plan.    Return in about 2 weeks (around 2024) for recheck back pain.           Subjective      Suhail Borrego with history of lumbosacral fusion surgery who presents due to acute on chronic low back pain. Started about a week. He states that he is having pain that is  sharp and at times is severe. When lying on his back, he has mild pain. Sitting and standing for long periods worsens the pain. He states that it is specifically in the left lower back just above his rear. The pain is not radiating down his legs. He also thinks that eating breakfast may also worsen the discomfort. Sitting with a lumbar pillow that relieves pressure in the area relieves the pain. He reports he eats mostly vegetables. Unclear whether pain worsens with eating breakfast food only or if worse in AM.     He reports he is not currently in pain.       Review of Systems   Constitutional:  Negative for activity change, appetite change, diaphoresis, fatigue, fever and unexpected weight change.   HENT:  Negative for congestion and sore throat.    Cardiovascular:  Negative for chest pain, palpitations and leg swelling.   Gastrointestinal:  Negative for abdominal distention, abdominal pain, blood in stool, constipation, diarrhea, nausea and vomiting.   Musculoskeletal:  Positive for arthralgias, back pain and myalgias. Negative for gait problem and joint swelling.   Neurological:  Negative for tremors, speech difficulty, weakness and numbness.       Current Outpatient Medications on File Prior to Visit   Medication Sig    acetaminophen (TYLENOL) 650 mg CR tablet Take 650 mg by mouth every 8 (eight) hours as needed for mild pain    Ascorbic Acid (Vitamin C) 500 MG PACK Take 1,000 mg by mouth daily    aspirin (ECOTRIN LOW STRENGTH) 81 mg EC tablet Take 81 mg by mouth daily    atorvastatin (LIPITOR) 80 mg tablet TAKE 1 TABLET BY MOUTH EVERY DAY IN THE MORNING    cholecalciferol (VITAMIN D3) 1,000 units tablet Take 2,000 Units by mouth daily    clopidogrel (Plavix) 75 mg tablet Take 1 tablet (75 mg total) by mouth daily    clotrimazole-betamethasone (LOTRISONE) 1-0.05 % cream Apply topically 2 (two) times a day    cyanocobalamin (VITAMIN B-12) 1,000 mcg tablet Take 1,000 mcg by mouth daily      docusate sodium  "(COLACE) 100 mg capsule Take 100 mg by mouth 2 (two) times a day    famotidine (PEPCID) 20 mg tablet TAKE 1 TABLET BY MOUTH TWICE A DAY    gabapentin (NEURONTIN) 100 mg capsule Take 1 capsule (100 mg total) by mouth 2 (two) times a day    ipratropium (ATROVENT) 0.03 % nasal spray 2 sprays into each nostril every 12 (twelve) hours    metoprolol tartrate (LOPRESSOR) 50 mg tablet TAKE 1 AND 1/2 TABLETS BY MOUTH EVERY 12 HOURS    Multiple Vitamin (MULTIVITAMIN) capsule Take 1 capsule by mouth daily    triamcinolone (KENALOG) 0.1 % cream Apply topically 2 (two) times a day    ciclopirox (PENLAC) 8 % solution APPLY TO THE AFFECTED AREA(S) ONCE DAILY AT BEDTIME OR 8 HOURS BEFORE WASHING (Patient not taking: Reported on 10/30/2023)       Objective     /70   Pulse 74   Temp (!) 97.1 °F (36.2 °C)   Resp 18   Ht 5' 7\" (1.702 m)   Wt 73.9 kg (163 lb)   SpO2 97%   BMI 25.53 kg/m²     Physical Exam  Vitals reviewed.   Constitutional:       General: He is not in acute distress.  HENT:      Head: Normocephalic and atraumatic.      Right Ear: External ear normal.      Left Ear: External ear normal.      Nose: No congestion.      Mouth/Throat:      Mouth: Mucous membranes are moist.      Pharynx: Oropharynx is clear. No posterior oropharyngeal erythema.   Eyes:      General: No scleral icterus.     Pupils: Pupils are equal, round, and reactive to light.   Cardiovascular:      Rate and Rhythm: Normal rate and regular rhythm.      Pulses: Normal pulses.      Heart sounds: Normal heart sounds. No murmur heard.  Pulmonary:      Effort: Pulmonary effort is normal. No respiratory distress.      Breath sounds: No rhonchi or rales.   Abdominal:      General: Bowel sounds are normal.      Palpations: Abdomen is soft.      Tenderness: There is no abdominal tenderness.   Musculoskeletal:      Thoracic back: No swelling, spasms or tenderness.      Lumbar back: Spasms and tenderness (mild low back) present. No bony tenderness.      " Right lower leg: No edema.      Left lower leg: No edema.   Skin:     General: Skin is warm and dry.      Capillary Refill: Capillary refill takes less than 2 seconds.   Neurological:      Mental Status: He is alert and oriented to person, place, and time.      Cranial Nerves: No cranial nerve deficit.   Psychiatric:         Mood and Affect: Mood normal.         Behavior: Behavior normal.       Samara Stubbs,

## 2024-03-21 NOTE — PROGRESS NOTES
Name: Suhail Borrego      : 1940      MRN: [id number]  Encounter Provider: Samara Stubbs DO  Encounter Date: 3/21/2024   Encounter department: [facility]    Assessment & Plan     {There are no diagnoses linked to this encounter. (Refresh or delete this SmartLink)}       Subjective      HPI  Review of Systems    Current Outpatient Medications on File Prior to Visit   Medication Sig   • acetaminophen (TYLENOL) 650 mg CR tablet Take 650 mg by mouth every 8 (eight) hours as needed for mild pain   • Ascorbic Acid (Vitamin C) 500 MG PACK Take 1,000 mg by mouth daily   • aspirin (ECOTRIN LOW STRENGTH) 81 mg EC tablet Take 81 mg by mouth daily   • atorvastatin (LIPITOR) 80 mg tablet TAKE 1 TABLET BY MOUTH EVERY DAY IN THE MORNING   • cholecalciferol (VITAMIN D3) 1,000 units tablet Take 2,000 Units by mouth daily   • clopidogrel (Plavix) 75 mg tablet Take 1 tablet (75 mg total) by mouth daily   • clotrimazole-betamethasone (LOTRISONE) 1-0.05 % cream Apply topically 2 (two) times a day   • cyanocobalamin (VITAMIN B-12) 1,000 mcg tablet Take 1,000 mcg by mouth daily     • docusate sodium (COLACE) 100 mg capsule Take 100 mg by mouth 2 (two) times a day   • famotidine (PEPCID) 20 mg tablet TAKE 1 TABLET BY MOUTH TWICE A DAY   • gabapentin (NEURONTIN) 100 mg capsule Take 1 capsule (100 mg total) by mouth 2 (two) times a day   • ipratropium (ATROVENT) 0.03 % nasal spray 2 sprays into each nostril every 12 (twelve) hours   • metoprolol tartrate (LOPRESSOR) 50 mg tablet TAKE 1 AND 1/2 TABLETS BY MOUTH EVERY 12 HOURS   • Multiple Vitamin (MULTIVITAMIN) capsule Take 1 capsule by mouth daily   • triamcinolone (KENALOG) 0.1 % cream Apply topically 2 (two) times a day   • ciclopirox (PENLAC) 8 % solution APPLY TO THE AFFECTED AREA(S) ONCE DAILY AT BEDTIME OR 8 HOURS BEFORE WASHING (Patient not taking: Reported on 10/30/2023)       Objective     /70   Pulse 74   Temp (!) 97.1 °F (36.2 °C)   Resp 18  "  Ht 5' 7\" (1.702 m)   Wt 73.9 kg (163 lb)   SpO2 97%   BMI 25.53 kg/m²     Physical Exam  Samara Stubbs DO    "

## 2024-03-24 ENCOUNTER — NURSE TRIAGE (OUTPATIENT)
Dept: OTHER | Facility: OTHER | Age: 84
End: 2024-03-24

## 2024-03-24 DIAGNOSIS — M54.50 ACUTE LEFT-SIDED LOW BACK PAIN WITHOUT SCIATICA: Primary | ICD-10-CM

## 2024-03-24 RX ORDER — PREDNISONE 20 MG/1
40 TABLET ORAL DAILY
Qty: 10 TABLET | Refills: 0 | Status: SHIPPED | OUTPATIENT
Start: 2024-03-24 | End: 2024-03-29

## 2024-03-24 NOTE — TELEPHONE ENCOUNTER
"Reason for Disposition   Back pain is a chronic symptom (recurrent or ongoing AND present > 4 weeks)    Answer Assessment - Initial Assessment Questions  1. ONSET: \"When did the pain begin?\"    Chronic back pain    2. LOCATION: \"Where does it hurt?\" (upper, mid or lower back)      Left lower back    3. SEVERITY: \"How bad is the pain?\"  (e.g., Scale 1-10; mild, moderate, or severe)    - MILD (1-3): doesn't interfere with normal activities     - MODERATE (4-7): interferes with normal activities or awakens from sleep     - SEVERE (8-10): excruciating pain, unable to do any normal activities   Severe    4. PATTERN: \"Is the pain constant?\" (e.g., yes, no; constant, intermittent)    Intermittent    5. RADIATION: \"Does the pain shoot into your legs or elsewhere?\"  No    6. CAUSE:  \"What do you think is causing the back pain?\"      Unsure    7. BACK OVERUSE:  \"Any recent lifting of heavy objects, strenuous work or exercise?\"  Denies    8. MEDICATIONS: \"What have you taken so far for the pain?\" (e.g., nothing, acetaminophen, NSAIDS)    Using topical cream that doctor prescribed with no relief    9. NEUROLOGIC SYMPTOMS: \"Do you have any weakness, numbness, or problems with bowel/bladder control?\"  No    10. OTHER SYMPTOMS: \"Do you have any other symptoms?\" (e.g., fever, abdominal pain, burning with urination, blood in urine)     Denies    Protocols used: Back Pain-ADULT-      Patient states voltaren gel is not helping with back pain relief. He was told to call back in and can revisit possible duration steroid burst. He is requesting if prednisone can be ordered today. Paged on call provider. Provider stated he will prescribe 5day course of prednisone.Informed patient and he verbalized understanding.   "

## 2024-03-24 NOTE — TELEPHONE ENCOUNTER
"Regarding: medication change  ----- Message from Kathy Bui sent at 3/24/2024 11:07 AM EDT -----  \" My doctor told me if the medication she prescribed me didn't work I could get a steroid instead. \"    "

## 2024-03-28 ENCOUNTER — TELEPHONE (OUTPATIENT)
Dept: FAMILY MEDICINE CLINIC | Facility: OTHER | Age: 84
End: 2024-03-28
Payer: COMMERCIAL

## 2024-03-28 DIAGNOSIS — M54.50 ACUTE LEFT-SIDED LOW BACK PAIN WITHOUT SCIATICA: Primary | ICD-10-CM

## 2024-03-28 PROCEDURE — G2211 COMPLEX E/M VISIT ADD ON: HCPCS

## 2024-03-28 PROCEDURE — 99213 OFFICE O/P EST LOW 20 MIN: CPT

## 2024-03-28 RX ORDER — METHOCARBAMOL 500 MG/1
500 TABLET, FILM COATED ORAL
Qty: 20 TABLET | Refills: 1 | Status: SHIPPED | OUTPATIENT
Start: 2024-03-28

## 2024-03-28 NOTE — TELEPHONE ENCOUNTER
This is Suhail WORKMAN 1940 at [phone]. I was a patient of doctor Leg about a week ago and I have significant lower pain and lower back pain and she had prescribed A steroid for me. OK, which is Prednisone now I've taken it. This is on my 5th day. This is the last day for the description and I haven't seen any effect I have. It has not reduced the pain and I'm wondering whether she could recommend the next step that we could take OK to address the pain. She should also know that I had Ольга pain two years ago that was diverticulosis and also diva trichinellosis litis and I'm just wondering whether it's another occurrence of the same problem. But I'm hoping she has a next step because I'm still in pain and it hasn't decided it all. Have a [phone]. Thank you and have a great day.

## 2024-03-28 NOTE — TELEPHONE ENCOUNTER
My name is Suhail Borrego. I came in to see Doctor Leg about a week ago. We put a plan together to address my lower back problems. OK, And the two prescriptions that she had prescribed have not worked. The last one was Prednisone. OK, this is my last day on Prednisone and it has not relieved the pain. I need to know whether this is the next step, whether she can help me with the next step. I'm at 51 to [phone] and again my name is Suhail Colin and Prednisone was the last drug that was prescribed and I'm on my last two tablets today, the 5th day and I've seen no relief. So please help me. Thank you and have a great day.

## 2024-03-28 NOTE — TELEPHONE ENCOUNTER
Called and spoke with patient. He continues to have low back pain and reports steroid burst did not help. He has also tried voltaren gel. Unable to have NSAIDs as already on blood thinner medications and GFR <60. He reports he took 800mg ibuprofen on own which improved pain. Counseled to avoid NSAIDs if possible. He is not currently in pain as his pain occurs when standing but not if he has lumbar support while sitting. When standing pain is severe at times. No red flags.   He continues to have this pain and has history of compression fractures. Will check imaging with Xray for further evaluation for new fracture or instability. I also recommended that patient follow up with his pain management provider and his neurosurgeon if pain continues.     I discussed options for further management with patient. He would like to try muscle relaxant. I counseled him that this medication can make him very drowsy and can increase fall risk in those >66 yo and that he should not take this medication while alone. He would like to proceed with the treatment.  He reports that one of his daughters can be with him while he is taking this medication to decrease his risk of fall and he will take in evening at bedtime as recently he has been sleeping through the night. He should not drive, operate heavy machinery or be responsible for anyone that cannot otherwise care for themselves such as a child when taking this medication.  They were counseled on usage, possible effects, and precautions of their new medication.      I have also reviewed ED precautions and red flags with patient. He reports that in the past he had similar pain that turned out to be diverticulitis and I counseled patient that if pain becomes more severe or does not improve with treatment, changes location toward localized area of abdomen, if there is saddle anesthesia, loss of bowel or bladder control, fever, chills, stool changes, or any other concerning  signs/symptoms, then he should seek immediate medical care at ED.     The patient indicates understanding of these issues and agrees with the plan.

## 2024-03-29 ENCOUNTER — APPOINTMENT (EMERGENCY)
Dept: CT IMAGING | Facility: HOSPITAL | Age: 84
End: 2024-03-29
Payer: COMMERCIAL

## 2024-03-29 ENCOUNTER — HOSPITAL ENCOUNTER (EMERGENCY)
Facility: HOSPITAL | Age: 84
Discharge: HOME | End: 2024-03-29
Attending: EMERGENCY MEDICINE
Payer: COMMERCIAL

## 2024-03-29 VITALS
RESPIRATION RATE: 18 BRPM | SYSTOLIC BLOOD PRESSURE: 165 MMHG | OXYGEN SATURATION: 99 % | HEART RATE: 60 BPM | TEMPERATURE: 97.4 F | DIASTOLIC BLOOD PRESSURE: 72 MMHG

## 2024-03-29 DIAGNOSIS — G89.29 CHRONIC BILATERAL LOW BACK PAIN WITHOUT SCIATICA: Primary | ICD-10-CM

## 2024-03-29 DIAGNOSIS — M54.50 CHRONIC BILATERAL LOW BACK PAIN WITHOUT SCIATICA: Primary | ICD-10-CM

## 2024-03-29 PROCEDURE — 72128 CT CHEST SPINE W/O DYE: CPT

## 2024-03-29 PROCEDURE — 99284 EMERGENCY DEPT VISIT MOD MDM: CPT | Performed by: EMERGENCY MEDICINE

## 2024-03-29 PROCEDURE — 99285 EMERGENCY DEPT VISIT HI MDM: CPT

## 2024-03-29 PROCEDURE — 72131 CT LUMBAR SPINE W/O DYE: CPT

## 2024-03-29 RX ORDER — LIDOCAINE 50 MG/G
1 PATCH TOPICAL ONCE
Status: DISCONTINUED | OUTPATIENT
Start: 2024-03-29 | End: 2024-03-29 | Stop reason: HOSPADM

## 2024-03-29 RX ORDER — LIDOCAINE 50 MG/G
1 PATCH TOPICAL EVERY 24 HOURS
Qty: 15 PATCH | Refills: 0 | Status: SHIPPED | OUTPATIENT
Start: 2024-03-29

## 2024-03-29 RX ORDER — ACETAMINOPHEN 325 MG/1
975 TABLET ORAL ONCE
Status: COMPLETED | OUTPATIENT
Start: 2024-03-29 | End: 2024-03-29

## 2024-03-29 RX ADMIN — LIDOCAINE 1 PATCH: 50 PATCH TOPICAL at 16:12

## 2024-03-29 RX ADMIN — ACETAMINOPHEN 975 MG: 325 TABLET, FILM COATED ORAL at 16:11

## 2024-03-29 NOTE — ED PROVIDER NOTES
History  Chief Complaint   Patient presents with    Back Pain     L>R low back pain worsening x3 weeks, has hx of chronic back pain. Denies injury. Has been taking a steroid x1 week     83-year-old male with extensive PMH including HTN, HLD, GERD, CAD, and chronic lower back pain who presents to the ED for back pain has been worsening over the past 3 weeks.  Patient states that he has pain in his lower back which is worse on the left side rather than the right.  Patient states that about a week ago he was seen by his PCP and was given Voltaren, Robaxin, and prednisone.  Patient states that yesterday he finished the prednisone and states that his pain is worse this morning.  Patient describes the pain as a sharp pain that comes and goes with certain movements when standing or moving and has difficulty getting around the house.  Patient mentions his history of back surgery with a fusion of L1-S1.  Prior to arrival in the emergency department patient took 800 mg of Motrin this morning around 7 AM.  Patient states that the pain in his left lower back does not radiate down his leg.  Patient also mentions a history of diverticulitis. Denies chest pain, SOB, cough, abdominal pain, n/v/d, fever, chills, dizziness, lightheadedness, HA, dysuria, hematuria, hematochezia, or melena.             Prior to Admission Medications   Prescriptions Last Dose Informant Patient Reported? Taking?   Ascorbic Acid (Vitamin C) 500 MG PACK  Self Yes No   Sig: Take 1,000 mg by mouth daily   Diclofenac Sodium (VOLTAREN) 1 %   No No   Sig: Apply 2 g topically 4 (four) times a day   Multiple Vitamin (MULTIVITAMIN) capsule  Self Yes No   Sig: Take 1 capsule by mouth daily   acetaminophen (TYLENOL) 650 mg CR tablet  Self Yes No   Sig: Take 650 mg by mouth every 8 (eight) hours as needed for mild pain   aspirin (ECOTRIN LOW STRENGTH) 81 mg EC tablet  Self Yes No   Sig: Take 81 mg by mouth daily   atorvastatin (LIPITOR) 80 mg tablet   No No   Sig:  TAKE 1 TABLET BY MOUTH EVERY DAY IN THE MORNING   cholecalciferol (VITAMIN D3) 1,000 units tablet  Self Yes No   Sig: Take 2,000 Units by mouth daily   ciclopirox (PENLAC) 8 % solution  Self Yes No   Sig: APPLY TO THE AFFECTED AREA(S) ONCE DAILY AT BEDTIME OR 8 HOURS BEFORE WASHING   Patient not taking: Reported on 10/30/2023   clopidogrel (Plavix) 75 mg tablet  Self No No   Sig: Take 1 tablet (75 mg total) by mouth daily   clotrimazole-betamethasone (LOTRISONE) 1-0.05 % cream  Self No No   Sig: Apply topically 2 (two) times a day   cyanocobalamin (VITAMIN B-12) 1,000 mcg tablet  Self Yes No   Sig: Take 1,000 mcg by mouth daily     docusate sodium (COLACE) 100 mg capsule  Self Yes No   Sig: Take 100 mg by mouth 2 (two) times a day   famotidine (PEPCID) 20 mg tablet  Self No No   Sig: TAKE 1 TABLET BY MOUTH TWICE A DAY   gabapentin (NEURONTIN) 100 mg capsule  Self No No   Sig: Take 1 capsule (100 mg total) by mouth 2 (two) times a day   ipratropium (ATROVENT) 0.03 % nasal spray  Self Yes No   Si sprays into each nostril every 12 (twelve) hours   methocarbamol (ROBAXIN) 500 mg tablet   No No   Sig: Take 1 tablet (500 mg total) by mouth daily at bedtime as needed for muscle spasms   metoprolol tartrate (LOPRESSOR) 50 mg tablet  Self No No   Sig: TAKE 1 AND 1/2 TABLETS BY MOUTH EVERY 12 HOURS   predniSONE 20 mg tablet   No No   Sig: Take 2 tablets (40 mg total) by mouth daily for 5 days   triamcinolone (KENALOG) 0.1 % cream  Self Yes No   Sig: Apply topically 2 (two) times a day      Facility-Administered Medications: None       Past Medical History:   Diagnosis Date    Abnormal liver function test     RESOLVED: 2017    Allergic rhinitis     Anemia     LAST ASSESSED: 2017    Arthritis     BPH (benign prostatic hyperplasia)     CAD (coronary artery disease)     Coronary artery disease     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HCC)     LAST ASSESSED: 2017    Former tobacco use     GERD (gastroesophageal reflux  disease)     Hand paresthesia     RESOLVED: 11SEP2017    Hyperlipidemia     Hypertension     Lumbar stenosis     Peripheral artery disease (HCC)     LAST ASSESSED: 27OCT2017    Stage 3a chronic kidney disease (HCC) 7/11/2021       Past Surgical History:   Procedure Laterality Date    BACK SURGERY      COLONOSCOPY      LUMBAR FUSION      MO ARTHRODESIS POSTERIOR/PSTLAT TQ 1NTRSPC LUMBAR N/A 11/03/2016    Procedure: L2-S1 POSTERIOR LUMBAR FUSION AND DECOMPRESSION (Impulse), Posterior lateral fixation; dural repair.;  Surgeon: Leslie Gil MD;  Location: [facility];  Service: Orthopedics    MO CABG W/ARTERIAL GRAFT SINGLE ARTERIAL GRAFT N/A 01/19/2018    Procedure: CABG X4 with LIMA - LAD, SVG - RCA, OM2, & Diagonal ; Left Leg EVH; MATTHEW;  Surgeon: Eric Bar DO;  Location[facility];  Service: Cardiac Surgery    MO COLONOSCOPY FLX DX W/COLLJ SPEC WHEN PFRMD N/A 11/15/2017    Procedure: EGD AND COLONOSCOPY;  Surgeon: Mariano Godinez MD;  Location[facility];  Service: Gastroenterology    SEPTOPLASTY      LAST ASSESSED; [id number]    TONSILECTOMY AND ADNOIDECTOMY      LAST ASSESSED: [id number]    UPPER GASTROINTESTINAL ENDOSCOPY         Family History   Problem Relation Age of Onset    Emphysema Mother     Liver disease Mother     Coronary artery disease Father     Hypertension Father     Liver disease Father     Heart failure Father     Stroke Father         CVA     Heart attack Father     Coronary artery disease Brother     Heart disease Brother         younger brother by pass and other brother 4 stents placed    Other Family         BACK PROBLEM     Stroke Family         CVA    Emphysema Family     Hypertension Family         BENIGN     I have reviewed and agree with the history as documented.    E-Cigarette/Vaping    E-Cigarette Use Never User      E-Cigarette/Vaping Substances    Nicotine No     THC Yes     CBD No     Flavoring No     Other No     Unknown No      Social History     Tobacco Use    Smoking  status: Former     Current packs/day: 0.00     Average packs/day: 1 pack/day for 50.0 years (50.0 ttl pk-yrs)     Types: Cigarettes     Start date:      Quit date:      Years since quittin.2    Smokeless tobacco: Never   Vaping Use    Vaping status: Never Used   Substance Use Topics    Alcohol use: Yes     Alcohol/week: 1.0 standard drink of alcohol     Types: 1 Shots of liquor per week     Comment: beer, wine, scotch every day; SOCIAL AS PER ALL SCRIPTS     Drug use: Not Currently     Types: Marijuana     Comment: medical majiuana        Review of Systems   Constitutional:  Negative for appetite change, chills, diaphoresis, fatigue and fever.   HENT: Negative.  Negative for congestion, ear discharge, ear pain, postnasal drip, rhinorrhea, sore throat and trouble swallowing.    Eyes: Negative.  Negative for pain and visual disturbance.   Respiratory: Negative.  Negative for cough and shortness of breath.    Cardiovascular: Negative.  Negative for chest pain.   Gastrointestinal: Negative.  Negative for abdominal pain, blood in stool, constipation, diarrhea, nausea and vomiting.   Genitourinary: Negative.  Negative for decreased urine volume, difficulty urinating, dysuria, flank pain and hematuria.   Musculoskeletal:  Positive for back pain. Negative for arthralgias.   Skin: Negative.  Negative for color change and rash.   Neurological: Negative.  Negative for dizziness, syncope, weakness, light-headedness and headaches.       Physical Exam  ED Triage Vitals   Temperature Pulse Respirations Blood Pressure SpO2   24 1431 24 1431 24 1431 24 1431 24 1431   (!) 97.4 °F (36.3 °C) 75 18 165/72 97 %      Temp Source Heart Rate Source Patient Position - Orthostatic VS BP Location FiO2 (%)   24 1431 24 1431 24 1600 24 1600 --   Oral Monitor Lying Right arm       Pain Score       24 1611       1             Orthostatic Vital Signs  Vitals:    24 1431  03/29/24 1600   BP: 165/72    Pulse: 75 60   Patient Position - Orthostatic VS:  Lying       Physical Exam  Constitutional:       Appearance: Normal appearance. He is normal weight.   HENT:      Head: Normocephalic and atraumatic.      Right Ear: External ear normal.      Left Ear: External ear normal.      Nose: Nose normal.      Mouth/Throat:      Pharynx: Oropharynx is clear.   Eyes:      Conjunctiva/sclera: Conjunctivae normal.   Cardiovascular:      Rate and Rhythm: Normal rate and regular rhythm.      Pulses: Normal pulses.      Heart sounds: Normal heart sounds.      Comments: RRR with +S1 and S2, no murmurs appreciated on exam. Peripheral pulses intact.    Pulmonary:      Effort: Pulmonary effort is normal. No respiratory distress.      Breath sounds: Normal breath sounds. No wheezing, rhonchi or rales.      Comments: CTABL with no abnormal lung sounds such as wheezes or rales appreciated on exam.     Abdominal:      General: Abdomen is flat. Bowel sounds are normal. There is no distension.      Palpations: Abdomen is soft.      Tenderness: There is no abdominal tenderness.      Comments: Soft, non tender, normo-active bowel sounds. Without rigidity, guarding, or distension.     Musculoskeletal:         General: Normal range of motion.      Cervical back: Normal range of motion.      Right lower leg: No edema.      Left lower leg: No edema.   Skin:     General: Skin is warm and dry.   Neurological:      General: No focal deficit present.      Mental Status: He is alert and oriented to person, place, and time. Mental status is at baseline.      Gait: Gait normal.      Comments: CN grossly intact on visualization. No focal neurologic deficits noted on exam.  5/5 strength in all extremities. Neurovascularly intact with normal sensation and motor function.           ED Medications  Medications   lidocaine (LIDODERM) 5 % patch 1 patch (1 patch Topical Medication Applied 3/29/24 1612)   acetaminophen (TYLENOL)  tablet 975 mg (975 mg Oral Given 3/29/24 1611)       Diagnostic Studies  Results Reviewed       None                   CT spine thoracic & lumbar wo contrast   Final Result by Tobi Kelley DO (03/29 1652)      Stable scoliosis of the thoracic and lumbar spine. There are chronic appearing compression fractures of the superior endplates of T12 and L1. With no new compression fracture.      Stable postoperative appearance of the lumbar spine with posterior fusion and decompression. Persistent grade 2 anterior spondylolisthesis of L5 upon S1 with severe foraminal narrowing.      Additional lower thoracic and lumbar degenerative change with mild to moderate canal stenosis and foraminal narrowing, grossly stable.            Workstation performed: [id number]               Procedures  Procedures      ED Course  ED Course as of 03/29/24 1908   Fri Mar 29, 2024   1704 CT spine thoracic & lumbar wo contrast  Stable scoliosis of the thoracic and lumbar spine. There are chronic appearing compression fractures of the superior endplates of T12 and L1. With no new compression fracture.     Stable postoperative appearance of the lumbar spine with posterior fusion and decompression. Persistent grade 2 anterior spondylolisthesis of L5 upon S1 with severe foraminal narrowing.     Additional lower thoracic and lumbar degenerative change with mild to moderate canal stenosis and foraminal narrowing, grossly stable.  As per radiology   1711 Patient was able tolerate ambulation without difficulty prior to discharge.                                       Medical Decision Making  83-year-old male with extensive PMH including HTN, HLD, GERD, CAD, and chronic lower back pain who presented to the ED for back pain that has been worsening over the past 3 weeks.  Patient's imaging was obtained and reviewed by the ED provider.  See ED course for more details about patient's ED stay.  While in the emergency department, patient was given  975 mg of Tylenol along with a lidocaine patch for pain control.  Upon reevaluation at bedside patient was noted to have symptomatic relief in his pain.  Patient's CT thoracic and lumbar spine showed stable scoliosis with chronic appearing compression fractures of T12 and L1 along with postoperative appearance and persistent spondylolisthesis with severe foraminal narrowing as per radiology.  Prior to discharge, patient was ambulated successfully within the emergency department and was able to tolerate with minimal pain.  Through shared decision making between the patient and provider, the patient was planned for discharge.  Patient was advised to follow-up outpatient with his PCP was also provided an ambulatory referral for comprehensive spine program.  Patient was also given a prescription for lidocaine patches as requested and explained proper use at home.  Patient was instructed return to the ED if his symptoms worsen including but not limited to increasing back pain, saddle anesthesia, urinary retention or incontinence, lightheadedness or dizziness, inability to ambulate, or changes in his behavior.    Amount and/or Complexity of Data Reviewed  Radiology: ordered. Decision-making details documented in ED Course.    Risk  OTC drugs.  Prescription drug management.          Disposition  Final diagnoses:   Chronic bilateral low back pain without sciatica     Time reflects when diagnosis was documented in both MDM as applicable and the Disposition within this note       Time User Action Codes Description Comment    3/29/2024  5:11 PM Sukhdev Thomas Add [M54.50,  G89.29] Chronic bilateral low back pain without sciatica           ED Disposition       ED Disposition   Discharge    Condition   Stable    Date/Time   Fri Mar 29, 2024  5:10 PM    Comment   Suhail Borrego discharge to home/self care.                   Follow-up Information       Follow up With Specialties Details Why Contact Info Additional Information     Mikaela Duenas, DO Family Medicine Call  As needed [address]  [facility] [address]  [phone]       [facility] Emergency Medicine Go to  As needed [address] [facility] [address]  [phone] [facility], [address] [place], [place], [address]    [facility] [place] [facility] Call  As needed [address] [facility]  [address]  [facility] [address] [phone] [facility] [address] [facility], [place], [address] [phone]            Discharge Medication List as of 3/29/2024  5:17 PM        START taking these medications    Details   lidocaine (LIDODERM) 5 % Apply 1 patch topically over 12 hours every 24 hours Remove & Discard patch within 12 hours or as directed by MD, Starting Fri 3/29/2024, Normal           CONTINUE these medications which have NOT CHANGED    Details   acetaminophen (TYLENOL) 650 mg CR tablet Take 650 mg by mouth every 8 (eight) hours as needed for mild pain, Historical Med      Ascorbic Acid (Vitamin C) 500 MG PACK Take 1,000 mg by mouth daily, Historical Med      aspirin (ECOTRIN LOW STRENGTH) 81 mg EC tablet Take 81 mg by mouth daily, Historical Med      atorvastatin (LIPITOR) 80 mg tablet TAKE 1 TABLET BY MOUTH EVERY DAY IN THE MORNING, Starting Thu 2/8/2024, Normal      cholecalciferol (VITAMIN D3) 1,000 units tablet Take 2,000 Units by mouth daily, Historical Med      ciclopirox (PENLAC) 8 % solution APPLY TO THE AFFECTED AREA(S) ONCE DAILY AT BEDTIME OR 8 HOURS BEFORE WASHING, Historical Med      clopidogrel (Plavix) 75 mg tablet Take 1 tablet (75 mg total) by mouth daily, Starting Thu 10/6/2022, Normal      clotrimazole-betamethasone (LOTRISONE) 1-0.05 % cream Apply topically 2 (two) times a day, Starting Fri 7/21/2023, Normal      cyanocobalamin (VITAMIN B-12) 1,000 mcg tablet  Take 1,000 mcg by mouth daily  , Historical Med      Diclofenac Sodium (VOLTAREN) 1 % Apply 2 g topically 4 (four) times a day, Starting Thu 3/21/2024, Normal      docusate sodium (COLACE) 100 mg capsule Take 100 mg by mouth 2 (two) times a day, Historical Med      famotidine (PEPCID) 20 mg tablet TAKE 1 TABLET BY MOUTH TWICE A DAY, Starting Fri 1/19/2024, Normal      gabapentin (NEURONTIN) 100 mg capsule Take 1 capsule (100 mg total) by mouth 2 (two) times a day, Starting Mon 10/2/2023, Normal      ipratropium (ATROVENT) 0.03 % nasal spray 2 sprays into each nostril every 12 (twelve) hours, Historical Med      methocarbamol (ROBAXIN) 500 mg tablet Take 1 tablet (500 mg total) by mouth daily at bedtime as needed for muscle spasms, Starting Thu 3/28/2024, Normal      metoprolol tartrate (LOPRESSOR) 50 mg tablet TAKE 1 AND 1/2 TABLETS BY MOUTH EVERY 12 HOURS, Normal      Multiple Vitamin (MULTIVITAMIN) capsule Take 1 capsule by mouth daily, Historical Med      predniSONE 20 mg tablet Take 2 tablets (40 mg total) by mouth daily for 5 days, Starting Sun 3/24/2024, Until Fri 3/29/2024, Normal      triamcinolone (KENALOG) 0.1 % cream Apply topically 2 (two) times a day, Historical Med               PDMP Review         Value Time User    PDMP Reviewed  Yes 9/6/2022 11:06 AM Mikaela Duenas DO             ED Provider  Attending physically available and evaluated Suhail Borrego. I managed the patient along with the ED Attending.    Electronically Signed by           Sukhdev Thomas MD  03/29/24 0503

## 2024-04-01 ENCOUNTER — TELEPHONE (OUTPATIENT)
Dept: VASCULAR SURGERY | Facility: CLINIC | Age: 84
End: 2024-04-01

## 2024-04-01 ENCOUNTER — NURSE TRIAGE (OUTPATIENT)
Dept: PHYSICAL THERAPY | Facility: OTHER | Age: 84
End: 2024-04-01

## 2024-04-01 ENCOUNTER — TELEPHONE (OUTPATIENT)
Dept: FAMILY MEDICINE CLINIC | Facility: OTHER | Age: 84
End: 2024-04-01

## 2024-04-01 DIAGNOSIS — G89.29 CHRONIC BILATERAL LOW BACK PAIN, UNSPECIFIED WHETHER SCIATICA PRESENT: ICD-10-CM

## 2024-04-01 DIAGNOSIS — M54.50 ACUTE EXACERBATION OF CHRONIC LOW BACK PAIN: Primary | ICD-10-CM

## 2024-04-01 DIAGNOSIS — M54.50 CHRONIC BILATERAL LOW BACK PAIN, UNSPECIFIED WHETHER SCIATICA PRESENT: ICD-10-CM

## 2024-04-01 DIAGNOSIS — G89.29 ACUTE EXACERBATION OF CHRONIC LOW BACK PAIN: Primary | ICD-10-CM

## 2024-04-01 NOTE — TELEPHONE ENCOUNTER
Patient is wondering if he could get a rx for motrin to take for his pain. Said nothing is working for him he has been taking his daughters motrin (that is prescribed to her) and that seems to help him     Please advise   Thank you

## 2024-04-01 NOTE — TELEPHONE ENCOUNTER
Additional Information   Negative: Has the patient had unexplained weight loss?   Negative: Does the patient have a fever?   Negative: Is the patient experiencing urine retention?   Negative: Is the patient experiencing blood in sputum?   Negative: Has the patient experienced major trauma? (fall from height, high speed collision, direct blow to spine) and is also experiencing nausea, light-headedness, or loss of consciousness?   Negative: Is the patient experiencing acute drop foot or paralysis?   Negative: Is this a chronic condition?    Protocols used: [facility] Protocol    Nurse completed triage and NO RF s/s present. Referral entered for the [place] site and contact/phone number info given to him as well.   Patients information was sent to the preferred site and pt made aware clerical would be calling to schedule appointment. Nurse encouraged him to call site if he does not hear from clerical beforehand. Patient Agreed.    Patient did not voice any additional questions or concerns at this time.   Patient is aware current complaints, relevant dx, additional referrals and treatment/options will be discussed at the evaluation/consult.   All information regarding plan to be evaluated by the therapist was reviewed. Patient is in agreement with nurse's explanation of intended care path discussed today.   Patient very appreciative of CB and referral placement for the evaluation with an Advanced Spine Therapist.     Nurse wished him well and referral closed.

## 2024-04-01 NOTE — TELEPHONE ENCOUNTER
"Patient called into [facility] today and left v/m @9:14am.    Returned patient's call @9:48am.      Additional Information   Negative: Is this related to a work injury?   Negative: Is this related to an MVA?   Negative: Are you currently recieving homecare services?    Background - Initial Assessment  Clinical complaint: ED visit on 03/29 due to Lower Back pain and SI joint pain on the left side. Hx of spinal fusion at L1-S1 8 years ago and Fracture in L1 vertebrae 2 years ago. New flare up started 4 weeks ago . No radiation down the lower extremities or upper extremities. NKI. No numbness or tingling. Patient states pain is intermittent everyday \"it comes and goes\" . Seen by FM on 03/21 who prescribed medication for the pain and muscle relaxer with no relief. Feels some relief when sitting down. Patient described pain as sharp.  Date of onset:  4 weeks ago (new flare up)  Frequency of pain: intermittent  Quality of pain: sharp.    Protocols used: [facility] Protocol    "

## 2024-04-01 NOTE — TELEPHONE ENCOUNTER
Pt cx cv he is having some health issues w/ pain is unable to r/s at this time he will call us once he is feeling better.

## 2024-04-05 ENCOUNTER — TELEPHONE (OUTPATIENT)
Age: 84
End: 2024-04-05

## 2024-04-11 ENCOUNTER — EVALUATION (OUTPATIENT)
Dept: PHYSICAL THERAPY | Facility: CLINIC | Age: 84
End: 2024-04-11
Payer: COMMERCIAL

## 2024-04-11 VITALS — SYSTOLIC BLOOD PRESSURE: 130 MMHG | HEART RATE: 76 BPM | DIASTOLIC BLOOD PRESSURE: 76 MMHG | TEMPERATURE: 97.3 F

## 2024-04-11 DIAGNOSIS — G89.29 CHRONIC BILATERAL LOW BACK PAIN, UNSPECIFIED WHETHER SCIATICA PRESENT: Primary | ICD-10-CM

## 2024-04-11 DIAGNOSIS — M54.50 CHRONIC BILATERAL LOW BACK PAIN, UNSPECIFIED WHETHER SCIATICA PRESENT: Primary | ICD-10-CM

## 2024-04-11 DIAGNOSIS — M54.50 ACUTE EXACERBATION OF CHRONIC LOW BACK PAIN: ICD-10-CM

## 2024-04-11 DIAGNOSIS — G89.29 ACUTE EXACERBATION OF CHRONIC LOW BACK PAIN: ICD-10-CM

## 2024-04-11 PROCEDURE — 97110 THERAPEUTIC EXERCISES: CPT | Performed by: PHYSICAL THERAPIST

## 2024-04-11 PROCEDURE — 97112 NEUROMUSCULAR REEDUCATION: CPT | Performed by: PHYSICAL THERAPIST

## 2024-04-11 PROCEDURE — 97161 PT EVAL LOW COMPLEX 20 MIN: CPT | Performed by: PHYSICAL THERAPIST

## 2024-04-11 NOTE — PROGRESS NOTES
PT Evaluation     Today's date: 2024  Patient name: Suhail Borrego  : 1940  MRN: [id number]  Referring provider: Catalino Fletcher PT  Dx:   Encounter Diagnosis     ICD-10-CM    1. Chronic bilateral low back pain, unspecified whether sciatica present  M54.50     G89.29       2. Acute exacerbation of chronic low back pain  M54.50     G89.29                      Assessment  Assessment details: Pt presents with signs and symptoms synonymous of admitting diagnosis as well as possible mechanical diagnosis of Ext Derangement with DP of REISit, will continue to monitor and determine other possible provisional diagnoses.   Pt presents with pain, decreased strength, decreased range, flexibility, as well as tolerance to activity and postural awareness.  Pt would benefit skilled PT intervention in order to address these impairments in order to be able to perform all desired activities with minimal to nil symptom exacerbation.  If he fails to find improvement over the next 3-4 weeks appropriate referral will be found.  Will reach out to his family physician Dr. Duenas.       Impairments: abnormal gait, abnormal or restricted ROM, activity intolerance, impaired physical strength, lacks appropriate home exercise program, pain with function, poor posture  and poor body mechanics  Understanding of Dx/Px/POC: good   Prognosis: good    Goals  STG 4 Weeks:  Decrease pain at worst to 6  Improve range to LS range Mod  Improve strength to improve L hip strength to 4/5  Independent with HEP  LTG 8 Weeks:  Decrease pain at worst to 3  Improve range to LS Range to min  Improve strength to L hip strength to 5.    Able to perform all desired activities with minimal to nil symptom exacerbation      Plan  Plan details: Daughter is Madhavi   Patient would benefit from: skilled physical therapy  Planned modality interventions: cryotherapy and thermotherapy: hydrocollator packs  Planned therapy interventions: joint mobilization,  kinesiology taping, neuromuscular re-education, manual therapy, nerve gliding, patient education, postural training, strengthening, stretching, therapeutic activities, therapeutic exercise, therapeutic training, transfer training, IADL retraining, home exercise program, graded motor, graded exercise, graded activity, gait training, functional ROM exercises, flexibility, abdominal trunk stabilization, activity modification, balance, behavior modification and body mechanics training  Frequency: 2x week  Duration in weeks: 10  Treatment plan discussed with: patient and family        Subjective Evaluation    History of Present Illness  Date of onset: 4/11/2024  Mechanism of injury: Pt is an 83 yomale who presents today via PCPT program with his daughter Madhavi stating that he has had low back pain for a long period of time, he did have fusion performed by Dr. Gil in 2017 L1-S1.  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  Pt reports that initially he recovered from surgery, but returned to Dr. Gil with mild pain for discussion, felt there was no no guidance or resolution.  Pt reports that his back pain wasn't severe.  He did have an admission to the hospital 2 years ago due to low back pain and it was considered possible diverticulosis/itis and treated with pain medicine and antibiotics as well as bed rest and it eventually.  Pt reports he had been okay up until 6 weeks ago insidiously and eventually built up in presentation until he could not stand it, went to ER on 3/29/24, CT scan indicated T12 and L1 chronic compression fracture but new new onset.  Pt received pain medication and was sent home and referred to PT via comp spine program and thus presents today.  Pt reports that he does have periods of time without pain, currently sitting makes this most achievable.  Pt reports that there is  pain with rising, unless he prepares.  Bending forward causing  L, pain is L > R, but can be across belt line.  Pain can occur with walking, standing can progressively cause issue as well.  Pt reports that he was sleeping relatively well, but the last week he has been sleeping in a chair for the last week with better outcomes.  Reports pain at worst in the evening.  Pt reports no recent change in bowel or bladder.  Pt reports that he does have periods of time of numbness and warmth in his legs, not a new onset, identifies as neuropathy.  Pt reports that his goals are to reduce pain, improve walking, standing improve ability and get back to be able to sleep in a bed.  Pt has a good rapport with his family physician Dr. Duenas.  Pt uses [facility]  Quality of life: good    Patient Goals  Patient goals for therapy: increased strength, return to sport/leisure activities, increased motion and decreased pain    Pain  Current pain ratin  At best pain ratin  At worst pain ratin  Quality: dull ache and sharp  Relieving factors: change in position, rest and medications  Aggravating factors: standing, walking, stair climbing and lifting  Progression: improved      Diagnostic Tests  CT scan: normal  Treatments  Previous treatment: physical therapy, chiropractic, injection treatment and medication        Objective     Active Range of Motion     Lumbar   Flexion:  WFL and with pain Restriction level: minimal  Extension:  with pain Restriction level: maximal  Left lateral flexion:  with pain Restriction level: minimal  Right lateral flexion:  with pain Restriction level: minimal    Additional Active Range of Motion Details  Forward head, rounded shoulders, Lordosis  B/L Sensation intact to L3,4,5,S1,S2  DTR Patella 2+ B/L Gypsy 1+ B/L  Postural correction low back Initially painful but then NE.    Repeated motion   Flex  Ext BRIAN with UE support P W.  Request to sit.  Firm roll to firm, Traditional roll offered, Better.    REISit Initially Increase, than Better.  Improved L hip strength, pt indicated feeling better with walking.    SB R  SB L  LE Strength  Hip   L Flex 3+ Ext 5 Abd 5 Add 5   R Flex 5 Ext 5 Abd 5 Add 5  LE Screen b/l strong and painless   Joint Mobility  Palpation  STs               Precautions: PCPT Cat 6-8, Fall, HTN, DVT Hx on blood Thinners, LS Fusion L1-S1 2016, CABG x 4 2017       Manuals 4/11                                                                Neuro Re-Ed             Postural Ed and progression 10 min            Restoring Ext, Ext vs Flexion lifestyle and commonality performed            Review Sit<>Stand Pt seemed to have a good awareness                                                                Ther Ex             REISit soft roll D B 5 x5            Progression?                          Return to BRIAN?                                                                 Ther Activity             LS ROM (Ext SGIS B?L pain levels?)             L Hip flexion 3+ to 5/5.             Walking? B                                      Modalities             CP prn.

## 2024-04-15 ENCOUNTER — OFFICE VISIT (OUTPATIENT)
Dept: PHYSICAL THERAPY | Facility: CLINIC | Age: 84
End: 2024-04-15
Payer: COMMERCIAL

## 2024-04-15 DIAGNOSIS — G89.29 ACUTE EXACERBATION OF CHRONIC LOW BACK PAIN: ICD-10-CM

## 2024-04-15 DIAGNOSIS — G89.29 CHRONIC BILATERAL LOW BACK PAIN, UNSPECIFIED WHETHER SCIATICA PRESENT: Primary | ICD-10-CM

## 2024-04-15 DIAGNOSIS — M54.50 CHRONIC BILATERAL LOW BACK PAIN, UNSPECIFIED WHETHER SCIATICA PRESENT: Primary | ICD-10-CM

## 2024-04-15 DIAGNOSIS — M54.50 ACUTE EXACERBATION OF CHRONIC LOW BACK PAIN: ICD-10-CM

## 2024-04-15 PROCEDURE — 97110 THERAPEUTIC EXERCISES: CPT | Performed by: PHYSICAL THERAPIST

## 2024-04-15 PROCEDURE — 97112 NEUROMUSCULAR REEDUCATION: CPT | Performed by: PHYSICAL THERAPIST

## 2024-04-15 NOTE — PROGRESS NOTES
"Daily Note     Today's date: 4/15/2024  Patient name: Suhail Borrego  : 1940  MRN: [id number]  Referring provider: Catalino Fletcher PT  Dx:   Encounter Diagnosis     ICD-10-CM    1. Chronic bilateral low back pain, unspecified whether sciatica present  M54.50     G89.29       2. Acute exacerbation of chronic low back pain  M54.50     G89.29                      Subjective: Pt presents today stating that he is doing okay, the new exercises may not be targeting quite where he feels it needs to be.  Pt reports he has performed his HEP 2-3x's a day.        Objective: See treatment diary below  LS ROM WFL Flex, SGIS Max Ext *.  L Hip Flex 4+    REISit D B, improved walking, improved tolerance to LS Ext.     Assessment: Adjusted posture, use of roll, and encouraged in order to be successful with his presentation to use a paper towel roll for a fulcrum to allow ext to occur.  Pt was in accord, discussed derangement principals and how to perform HEP when he does and does not have pain.       Precautions: PCPT Cat 6-8, Fall, HTN, DVT Hx on blood Thinners, LS Fusion L1-S1 2016, CABG x 4 2017       Manuals 4/11 4/15                                                               Neuro Re-Ed             Postural Ed and progression 10 min 10 min           Restoring Ext, Ext vs Flexion lifestyle and commonality performed            Review Sit<>Stand Pt seemed to have a good awareness            HiP Ext + Abd  (Taps x 10).                                                    Ther Ex             REISit soft roll D B 5 x5 10 x 5 Firm           Progression?                          Return to BRIAN?             Scap Add  3\" x 10           Scaption  2 x 10                                     Ther Activity             LS ROM (Ext SGIS B?L pain levels?)  Ext Better           L Hip flexion 3+ to 5/5.  4+ to 5.            Walking? B 5 laps Better.                                      Modalities             CP prn.                     "

## 2024-04-18 ENCOUNTER — OFFICE VISIT (OUTPATIENT)
Dept: PHYSICAL THERAPY | Facility: CLINIC | Age: 84
End: 2024-04-18
Payer: COMMERCIAL

## 2024-04-18 DIAGNOSIS — M54.50 CHRONIC BILATERAL LOW BACK PAIN, UNSPECIFIED WHETHER SCIATICA PRESENT: Primary | ICD-10-CM

## 2024-04-18 DIAGNOSIS — G89.29 ACUTE EXACERBATION OF CHRONIC LOW BACK PAIN: ICD-10-CM

## 2024-04-18 DIAGNOSIS — G89.29 CHRONIC BILATERAL LOW BACK PAIN, UNSPECIFIED WHETHER SCIATICA PRESENT: Primary | ICD-10-CM

## 2024-04-18 DIAGNOSIS — M54.50 ACUTE EXACERBATION OF CHRONIC LOW BACK PAIN: ICD-10-CM

## 2024-04-18 PROCEDURE — 97110 THERAPEUTIC EXERCISES: CPT | Performed by: PHYSICAL THERAPIST

## 2024-04-18 PROCEDURE — 97112 NEUROMUSCULAR REEDUCATION: CPT | Performed by: PHYSICAL THERAPIST

## 2024-04-18 NOTE — PROGRESS NOTES
"Daily Note     Today's date: 2024  Patient name: Suhail Borrego  : 1940  MRN: [id number]  Referring provider: Catalino Fletcher PT  Dx:   Encounter Diagnosis     ICD-10-CM    1. Chronic bilateral low back pain, unspecified whether sciatica present  M54.50     G89.29       2. Acute exacerbation of chronic low back pain  M54.50     G89.29                      Subjective: Pt presents today stating he is feeling quite a bit better.  He has been compliant with exercise and pain levels are very low at worst.  Improved ability to walk, stand, and get up from chair.  Content with progression thus far.       Objective: See treatment diary below  LS ROM WFL Flex, SGIS Max Ext .  L Hip Flex 5/    Assessment: Strong improvement with overall tolerance to activity, pain levels at a minimal that did not increase t/o entire session.  Pt able to proceed with generalized activity and balance.  Continue to progress as able.        Precautions: PCPT Cat 6-8, Fall, HTN, DVT Hx on blood Thinners, LS Fusion L1-S1 , CABG x 4 2017       Manuals 4/11 4/15 4/18                                                              Neuro Re-Ed             Postural Ed and progression 10 min 10 min           Restoring Ext, Ext vs Flexion lifestyle and commonality performed            Review Sit<>Stand Pt seemed to have a good awareness            HiP Ext + Abd  (Taps x 10).   Taps x 10          NBOS EC   1 min          NBOW EO Foam   1 min          Side Steps   4 laps           Ther Ex             REISit soft roll D B 5 x5 10 x 5 Firm 10 x 5           Progression?                          Return to BRIAN?             Scap Add  3\" x 10 3\" x 10          Scaption  2 x 10 2 x 10                       HR/TR             Ther Activity             LS ROM (Ext SGIS B?L pain levels?)  Ext Better Ext nil pain.           L Hip flexion 3+ to 5/5.  4+ to 5.  5/5          Walking? B 5 laps Better.  5 Laps                                    Modalities  "            CP prn.

## 2024-04-21 DIAGNOSIS — K21.9 GASTROESOPHAGEAL REFLUX DISEASE, UNSPECIFIED WHETHER ESOPHAGITIS PRESENT: ICD-10-CM

## 2024-04-22 RX ORDER — FAMOTIDINE 20 MG/1
20 TABLET, FILM COATED ORAL 2 TIMES DAILY
Qty: 180 TABLET | Refills: 0 | Status: ON HOLD | OUTPATIENT
Start: 2024-04-22 | End: 2024-05-02

## 2024-04-24 ENCOUNTER — OFFICE VISIT (OUTPATIENT)
Dept: PHYSICAL THERAPY | Facility: CLINIC | Age: 84
End: 2024-04-24
Payer: COMMERCIAL

## 2024-04-24 ENCOUNTER — TELEPHONE (OUTPATIENT)
Age: 84
End: 2024-04-24

## 2024-04-24 DIAGNOSIS — M54.50 CHRONIC BILATERAL LOW BACK PAIN, UNSPECIFIED WHETHER SCIATICA PRESENT: Primary | ICD-10-CM

## 2024-04-24 DIAGNOSIS — G89.29 CHRONIC BILATERAL LOW BACK PAIN, UNSPECIFIED WHETHER SCIATICA PRESENT: Primary | ICD-10-CM

## 2024-04-24 DIAGNOSIS — G89.29 ACUTE EXACERBATION OF CHRONIC LOW BACK PAIN: ICD-10-CM

## 2024-04-24 DIAGNOSIS — M54.50 ACUTE EXACERBATION OF CHRONIC LOW BACK PAIN: ICD-10-CM

## 2024-04-24 PROCEDURE — 97112 NEUROMUSCULAR REEDUCATION: CPT | Performed by: PHYSICAL THERAPIST

## 2024-04-24 PROCEDURE — 97110 THERAPEUTIC EXERCISES: CPT | Performed by: PHYSICAL THERAPIST

## 2024-04-24 NOTE — PROGRESS NOTES
"Daily Note     Today's date: 2024  Patient name: Suhail Borrego  : 1940  MRN: [id number]  Referring provider: Catalino Fletcher PT  Dx:   Encounter Diagnosis     ICD-10-CM    1. Chronic bilateral low back pain, unspecified whether sciatica present  M54.50     G89.29       2. Acute exacerbation of chronic low back pain  M54.50     G89.29                      Subjective: Pt presents today stating that mild low back irritation, but otherwise feeling better as a whole.  Mild soreness in legs s/p last visit, recovered within 1-2 days.       Objective: See treatment diary below  LS ROM WFL Flex, SGIS Max Ext.   L Hip Flex /    Assessment:  Improving endurance with enhanced reps of functional activities.  Enhanced resistance based intervention.  Continue to progress as able.       Precautions: PCPT Cat 6-8, Fall, HTN, DVT Hx on blood Thinners, LS Fusion L1-S1 , CABG x 4 2017       Manuals 4/11 4/15 4/18 4/24                                                             Neuro Re-Ed             Postural Ed and progression 10 min 10 min           Restoring Ext, Ext vs Flexion lifestyle and commonality performed            Review Sit<>Stand Pt seemed to have a good awareness            HiP Ext + Abd  (Taps x 10).   Taps x 10 Taps x 15         NBOS EC   1 min 1 min         NBOW EO Foam   1 min 1 min         Side Steps   4 laps  4 laps         Ther Ex             REISit soft roll D B 5 x5 10 x 5 Firm 10 x 5  10 x 5         Progression?                          Return to BRIAN?             Scap Add  3\" x 10 3\" x 10 3\" x 10         Scaption  2 x 10 2 x 10 2 x 10         LP     50# 2 x 10         HR/TR    2 x 10         Ther Activity             LS ROM (Ext SGIS B?L pain levels?)  Ext Better Ext nil pain.  Ext Nil pain         L Hip flexion 3+ to 5/5.  4+ to 5.  5/5 5/5         Walking? B 5 laps Better.  5 Laps 5 laps         Sit to stand high low    Nv?                       Modalities             CP prn.         "

## 2024-04-24 NOTE — TELEPHONE ENCOUNTER
Pt stated that the doctor's office sent his script to the wrong [facility]. Pt just wanted to inform us as to which [facility] he would like his medications to be sent to from now on.

## 2024-04-30 ENCOUNTER — HOSPITAL ENCOUNTER (INPATIENT)
Facility: HOSPITAL | Age: 84
LOS: 1 days | Discharge: HOME HEALTH | DRG: 552 | End: 2024-05-02
Attending: EMERGENCY MEDICINE | Admitting: STUDENT IN AN ORGANIZED HEALTH CARE EDUCATION/TRAINING PROGRAM
Payer: COMMERCIAL

## 2024-04-30 DIAGNOSIS — D53.9 MACROCYTIC ANEMIA: ICD-10-CM

## 2024-04-30 DIAGNOSIS — M54.50 ACUTE MIDLINE LOW BACK PAIN WITHOUT SCIATICA: Primary | ICD-10-CM

## 2024-04-30 DIAGNOSIS — M54.9 INTRACTABLE BACK PAIN: ICD-10-CM

## 2024-04-30 DIAGNOSIS — M54.50 ACUTE LEFT-SIDED LOW BACK PAIN WITHOUT SCIATICA: ICD-10-CM

## 2024-04-30 DIAGNOSIS — K21.9 GASTROESOPHAGEAL REFLUX DISEASE, UNSPECIFIED WHETHER ESOPHAGITIS PRESENT: ICD-10-CM

## 2024-04-30 PROBLEM — L89.90 PRESSURE ULCER: Status: ACTIVE | Noted: 2024-04-30

## 2024-04-30 LAB
ANION GAP SERPL CALCULATED.3IONS-SCNC: 8 MMOL/L (ref 4–13)
BASOPHILS # BLD AUTO: 0.04 THOUSANDS/ÂΜL (ref 0–0.1)
BASOPHILS NFR BLD AUTO: 0 % (ref 0–1)
BUN SERPL-MCNC: 29 MG/DL (ref 5–25)
CALCIUM SERPL-MCNC: 9.2 MG/DL (ref 8.4–10.2)
CHLORIDE SERPL-SCNC: 106 MMOL/L (ref 96–108)
CO2 SERPL-SCNC: 28 MMOL/L (ref 21–32)
CREAT SERPL-MCNC: 1.17 MG/DL (ref 0.6–1.3)
EOSINOPHIL # BLD AUTO: 0.3 THOUSAND/ÂΜL (ref 0–0.61)
EOSINOPHIL NFR BLD AUTO: 3 % (ref 0–6)
ERYTHROCYTE [DISTWIDTH] IN BLOOD BY AUTOMATED COUNT: 13.1 % (ref 11.6–15.1)
GFR SERPL CREATININE-BSD FRML MDRD: 57 ML/MIN/1.73SQ M
GLUCOSE SERPL-MCNC: 103 MG/DL (ref 65–140)
HCT VFR BLD AUTO: 32.3 % (ref 36.5–49.3)
HGB BLD-MCNC: 10.4 G/DL (ref 12–17)
IMM GRANULOCYTES # BLD AUTO: 0.03 THOUSAND/UL (ref 0–0.2)
IMM GRANULOCYTES NFR BLD AUTO: 0 % (ref 0–2)
LYMPHOCYTES # BLD AUTO: 2.14 THOUSANDS/ÂΜL (ref 0.6–4.47)
LYMPHOCYTES NFR BLD AUTO: 21 % (ref 14–44)
MCH RBC QN AUTO: 33.3 PG (ref 26.8–34.3)
MCHC RBC AUTO-ENTMCNC: 32.2 G/DL (ref 31.4–37.4)
MCV RBC AUTO: 104 FL (ref 82–98)
MONOCYTES # BLD AUTO: 1.06 THOUSAND/ÂΜL (ref 0.17–1.22)
MONOCYTES NFR BLD AUTO: 10 % (ref 4–12)
NEUTROPHILS # BLD AUTO: 6.66 THOUSANDS/ÂΜL (ref 1.85–7.62)
NEUTS SEG NFR BLD AUTO: 66 % (ref 43–75)
NRBC BLD AUTO-RTO: 0 /100 WBCS
PLATELET # BLD AUTO: 178 THOUSANDS/UL (ref 149–390)
PMV BLD AUTO: 10 FL (ref 8.9–12.7)
POTASSIUM SERPL-SCNC: 4.5 MMOL/L (ref 3.5–5.3)
RBC # BLD AUTO: 3.12 MILLION/UL (ref 3.88–5.62)
SODIUM SERPL-SCNC: 142 MMOL/L (ref 135–147)
WBC # BLD AUTO: 10.23 THOUSAND/UL (ref 4.31–10.16)

## 2024-04-30 PROCEDURE — 36415 COLL VENOUS BLD VENIPUNCTURE: CPT

## 2024-04-30 PROCEDURE — 99222 1ST HOSP IP/OBS MODERATE 55: CPT | Performed by: NURSE PRACTITIONER

## 2024-04-30 PROCEDURE — 99284 EMERGENCY DEPT VISIT MOD MDM: CPT

## 2024-04-30 PROCEDURE — 80048 BASIC METABOLIC PNL TOTAL CA: CPT

## 2024-04-30 PROCEDURE — 85025 COMPLETE CBC W/AUTO DIFF WBC: CPT

## 2024-04-30 PROCEDURE — 99285 EMERGENCY DEPT VISIT HI MDM: CPT | Performed by: EMERGENCY MEDICINE

## 2024-04-30 RX ORDER — LIDOCAINE 50 MG/G
2 PATCH TOPICAL DAILY
Status: DISCONTINUED | OUTPATIENT
Start: 2024-04-30 | End: 2024-05-02 | Stop reason: HOSPADM

## 2024-04-30 RX ORDER — ASCORBIC ACID 500 MG
500 TABLET ORAL DAILY
Status: DISCONTINUED | OUTPATIENT
Start: 2024-05-01 | End: 2024-05-02 | Stop reason: HOSPADM

## 2024-04-30 RX ORDER — CLOPIDOGREL BISULFATE 75 MG/1
75 TABLET ORAL
Status: DISCONTINUED | OUTPATIENT
Start: 2024-04-30 | End: 2024-05-01

## 2024-04-30 RX ORDER — AMOXICILLIN 250 MG
1 CAPSULE ORAL
Status: DISCONTINUED | OUTPATIENT
Start: 2024-04-30 | End: 2024-05-02 | Stop reason: HOSPADM

## 2024-04-30 RX ORDER — METHOCARBAMOL 500 MG/1
500 TABLET, FILM COATED ORAL 2 TIMES DAILY PRN
Status: DISCONTINUED | OUTPATIENT
Start: 2024-04-30 | End: 2024-05-02 | Stop reason: HOSPADM

## 2024-04-30 RX ORDER — FAMOTIDINE 20 MG/1
10 TABLET, FILM COATED ORAL 2 TIMES DAILY
Status: DISCONTINUED | OUTPATIENT
Start: 2024-04-30 | End: 2024-05-02 | Stop reason: HOSPADM

## 2024-04-30 RX ORDER — ACETAMINOPHEN 325 MG/1
975 TABLET ORAL EVERY 8 HOURS SCHEDULED
Status: DISCONTINUED | OUTPATIENT
Start: 2024-04-30 | End: 2024-05-02 | Stop reason: HOSPADM

## 2024-04-30 RX ORDER — OXYCODONE HYDROCHLORIDE 5 MG/1
5 TABLET ORAL EVERY 6 HOURS PRN
Status: DISCONTINUED | OUTPATIENT
Start: 2024-04-30 | End: 2024-05-02 | Stop reason: HOSPADM

## 2024-04-30 RX ORDER — ENOXAPARIN SODIUM 100 MG/ML
40 INJECTION SUBCUTANEOUS DAILY
Status: DISCONTINUED | OUTPATIENT
Start: 2024-05-01 | End: 2024-05-02 | Stop reason: HOSPADM

## 2024-04-30 RX ORDER — GABAPENTIN 300 MG/1
300 CAPSULE ORAL 2 TIMES DAILY
Status: DISCONTINUED | OUTPATIENT
Start: 2024-04-30 | End: 2024-05-01

## 2024-04-30 RX ORDER — ATORVASTATIN CALCIUM 40 MG/1
80 TABLET, FILM COATED ORAL EVERY MORNING
Status: DISCONTINUED | OUTPATIENT
Start: 2024-05-01 | End: 2024-05-02 | Stop reason: HOSPADM

## 2024-04-30 RX ORDER — POLYETHYLENE GLYCOL 3350 17 G/17G
17 POWDER, FOR SOLUTION ORAL DAILY
Status: DISCONTINUED | OUTPATIENT
Start: 2024-05-01 | End: 2024-05-02 | Stop reason: HOSPADM

## 2024-04-30 RX ADMIN — Medication 1000 MCG: at 22:12

## 2024-04-30 RX ADMIN — GABAPENTIN 300 MG: 300 CAPSULE ORAL at 22:12

## 2024-04-30 RX ADMIN — ACETAMINOPHEN 975 MG: 325 TABLET, FILM COATED ORAL at 22:12

## 2024-04-30 RX ADMIN — Medication 2000 UNITS: at 22:12

## 2024-04-30 RX ADMIN — CLOPIDOGREL BISULFATE 75 MG: 75 TABLET ORAL at 22:12

## 2024-04-30 RX ADMIN — MULTIPLE VITAMINS W/ MINERALS TAB 1 TABLET: TAB ORAL at 22:12

## 2024-04-30 RX ADMIN — FAMOTIDINE 10 MG: 20 TABLET, FILM COATED ORAL at 22:12

## 2024-04-30 RX ADMIN — METOPROLOL TARTRATE 75 MG: 50 TABLET, FILM COATED ORAL at 22:13

## 2024-04-30 NOTE — ED PROVIDER NOTES
History  Chief Complaint   Patient presents with    Back Pain     Pt c/o low back pain for 6 weeks with no know EMIL.  Lucinda for same but reports symptoms have not improved.     Patient is an 83 year old male presenting with a 6 week history of back pain. Patient reports he has a longstanding history of lower back pain but 6 weeks ago he started having an acute flare. He presented to the ER in March where a CT was done and negative for acute pathology. He then went to physical therapy, which helps for one day and then the pain worsens.  He denies recent falls. Patient states he does not walk much at home. He states his pain has not worsened over the six weeks but he has had minimal improvement. He currently takes Robaxin and tylenol for pain and uses lidocaine patches and Voltaren gel. He denies acute worsening weakness, SOB, chest pain, abdominal pain, or issues with urination or bowel function. He does have a history of lumbar spine fusion 8 years ago, says his pain worsened after the surgery.     His daughter, who is accompanying him,  expressed concerns because now he sleeps upright in a chair and has had some skin breakdown on his buttocks.Patient lives alone and is having trouble ambulating because of his pain. He is struggling to perform ADLs as a result.         Prior to Admission Medications   Prescriptions Last Dose Informant Patient Reported? Taking?   Ascorbic Acid (Vitamin C) 500 MG PACK  Self Yes No   Sig: Take 1,000 mg by mouth daily   Diclofenac Sodium (VOLTAREN) 1 %   No No   Sig: Apply 2 g topically 4 (four) times a day   Multiple Vitamin (MULTIVITAMIN) capsule  Self Yes No   Sig: Take 1 capsule by mouth daily   acetaminophen (TYLENOL) 650 mg CR tablet  Self Yes No   Sig: Take 650 mg by mouth every 8 (eight) hours as needed for mild pain   aspirin (ECOTRIN LOW STRENGTH) 81 mg EC tablet  Self Yes No   Sig: Take 81 mg by mouth daily   atorvastatin (LIPITOR) 80 mg tablet   No No   Sig: TAKE 1 TABLET BY  MOUTH EVERY DAY IN THE MORNING   cholecalciferol (VITAMIN D3) 1,000 units tablet  Self Yes No   Sig: Take 2,000 Units by mouth daily   ciclopirox (PENLAC) 8 % solution  Self Yes No   Sig: APPLY TO THE AFFECTED AREA(S) ONCE DAILY AT BEDTIME OR 8 HOURS BEFORE WASHING   Patient not taking: Reported on 10/30/2023   clopidogrel (Plavix) 75 mg tablet  Self No No   Sig: Take 1 tablet (75 mg total) by mouth daily   clotrimazole-betamethasone (LOTRISONE) 1-0.05 % cream  Self No No   Sig: Apply topically 2 (two) times a day   cyanocobalamin (VITAMIN B-12) 1,000 mcg tablet  Self Yes No   Sig: Take 1,000 mcg by mouth daily     docusate sodium (COLACE) 100 mg capsule  Self Yes No   Sig: Take 100 mg by mouth 2 (two) times a day   famotidine (PEPCID) 20 mg tablet   No No   Sig: TAKE 1 TABLET BY MOUTH TWICE A DAY   gabapentin (NEURONTIN) 100 mg capsule  Self No No   Sig: Take 1 capsule (100 mg total) by mouth 2 (two) times a day   ipratropium (ATROVENT) 0.03 % nasal spray  Self Yes No   Si sprays into each nostril every 12 (twelve) hours   lidocaine (LIDODERM) 5 %   No No   Sig: Apply 1 patch topically over 12 hours every 24 hours Remove & Discard patch within 12 hours or as directed by MD   methocarbamol (ROBAXIN) 500 mg tablet   No No   Sig: Take 1 tablet (500 mg total) by mouth daily at bedtime as needed for muscle spasms   metoprolol tartrate (LOPRESSOR) 50 mg tablet  Self No No   Sig: TAKE 1 AND 1/2 TABLETS BY MOUTH EVERY 12 HOURS   triamcinolone (KENALOG) 0.1 % cream  Self Yes No   Sig: Apply topically 2 (two) times a day      Facility-Administered Medications: None       Past Medical History:   Diagnosis Date    Abnormal liver function test     RESOLVED: 2017    Allergic rhinitis     Anemia     LAST ASSESSED: 2017    Arthritis     BPH (benign prostatic hyperplasia)     CAD (coronary artery disease)     Coronary artery disease     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HCC)     LAST ASSESSED: 2017    Former tobacco use      GERD (gastroesophageal reflux disease)     Hand paresthesia     RESOLVED: 11SEP2017    Hyperlipidemia     Hypertension     Lumbar stenosis     Peripheral artery disease (HCC)     LAST ASSESSED: 27OCT2017    Stage 3a chronic kidney disease (HCC) 7/11/2021       Past Surgical History:   Procedure Laterality Date    BACK SURGERY      COLONOSCOPY      LUMBAR FUSION      HI ARTHRODESIS POSTERIOR/PSTLAT TQ 1NTRSPC LUMBAR N/A 11/03/2016    Procedure: L2-S1 POSTERIOR LUMBAR FUSION AND DECOMPRESSION (Impulse), Posterior lateral fixation; dural repair.;  Surgeon: Leslie Gil MD;  Location: [facility];  Service: Orthopedics    HI CABG W/ARTERIAL GRAFT SINGLE ARTERIAL GRAFT N/A 01/19/2018    Procedure: CABG X4 with LIMA - LAD, SVG - RCA, OM2, & Diagonal ; Left Leg EVH; MATTHEW;  Surgeon: Eric Bar DO;  Location: [facility];  Service: Cardiac Surgery    HI COLONOSCOPY FLX DX W/COLLJ SPEC WHEN PFRMD N/A 11/15/2017    Procedure: EGD AND COLONOSCOPY;  Surgeon: Mariano Godinez MD;  Location: [facility];  Service: Gastroenterology    SEPTOPLASTY      LAST ASSESSED; [id number]    TONSILECTOMY AND ADNOIDECTOMY      LAST ASSESSED: [id number]    UPPER GASTROINTESTINAL ENDOSCOPY         Family History   Problem Relation Age of Onset    Emphysema Mother     Liver disease Mother     Coronary artery disease Father     Hypertension Father     Liver disease Father     Heart failure Father     Stroke Father         CVA     Heart attack Father     Coronary artery disease Brother     Heart disease Brother         younger brother by pass and other brother 4 stents placed    Other Family         BACK PROBLEM     Stroke Family         CVA    Emphysema Family     Hypertension Family         BENIGN     I have reviewed and agree with the history as documented.    E-Cigarette/Vaping    E-Cigarette Use Never User      E-Cigarette/Vaping Substances    Nicotine No     THC Yes     CBD No     Flavoring No     Other No     Unknown No      Social  History     Tobacco Use    Smoking status: Former     Current packs/day: 0.00     Average packs/day: 1 pack/day for 50.0 years (50.0 ttl pk-yrs)     Types: Cigarettes     Start date:      Quit date:      Years since quittin.3    Smokeless tobacco: Never   Vaping Use    Vaping status: Never Used   Substance Use Topics    Alcohol use: Not Currently     Alcohol/week: 1.0 standard drink of alcohol     Types: 1 Shots of liquor per week     Comment: beer, wine, scotch every day; SOCIAL AS PER ALL SCRIPTS     Drug use: Not Currently     Types: Marijuana     Comment: medical majiuana        Review of Systems   Constitutional:  Negative for chills and fever.   Respiratory:  Negative for shortness of breath.    Cardiovascular:  Negative for chest pain.   Gastrointestinal:  Negative for abdominal pain.   Musculoskeletal:  Positive for back pain.       Physical Exam  ED Triage Vitals   Temperature Pulse Respirations Blood Pressure SpO2   24 1551 24 1548 24 1548 24 1548 24 1548   97.7 °F (36.5 °C) 81 20 117/56 100 %      Temp Source Heart Rate Source Patient Position - Orthostatic VS BP Location FiO2 (%)   24 1551 -- 24 1548 24 1548 --   Oral  Sitting Left arm       Pain Score       24 1548       1             Orthostatic Vital Signs  Vitals:    24 1548   BP: 117/56   Pulse: 81   Patient Position - Orthostatic VS: Sitting       Physical Exam  Constitutional:       General: He is not in acute distress.     Appearance: He is normal weight.   HENT:      Head: Normocephalic and atraumatic.   Eyes:      Conjunctiva/sclera: Conjunctivae normal.   Cardiovascular:      Rate and Rhythm: Normal rate and regular rhythm.   Pulmonary:      Effort: Pulmonary effort is normal.      Breath sounds: Normal breath sounds.   Abdominal:      Palpations: Abdomen is soft.      Tenderness: There is no abdominal tenderness.   Skin:     General: Skin is warm and dry.   Neurological:       General: No focal deficit present.      Mental Status: He is alert and oriented to person, place, and time.      GCS: GCS eye subscore is 4. GCS verbal subscore is 5. GCS motor subscore is 6.      Motor: Motor function is intact. No weakness.   Psychiatric:         Mood and Affect: Mood normal.         Thought Content: Thought content normal.         ED Medications  Medications - No data to display    Diagnostic Studies  Results Reviewed       Procedure Component Value Units Date/Time    Basic metabolic panel [[id number]]  (Abnormal) Collected: 04/30/24 1702    Lab Status: Final result Specimen: Blood from Arm, Left Updated: 04/30/24 1727     Sodium 142 mmol/L      Potassium 4.5 mmol/L      Chloride 106 mmol/L      CO2 28 mmol/L      ANION GAP 8 mmol/L      BUN 29 mg/dL      Creatinine 1.17 mg/dL      Glucose 103 mg/dL      Calcium 9.2 mg/dL      eGFR 57 ml/min/1.73sq m     Narrative:      National Kidney Disease Foundation guidelines for Chronic Kidney Disease (CKD):     Stage 1 with normal or high GFR (GFR > 90 mL/min/1.73 square meters)    Stage 2 Mild CKD (GFR = 60-89 mL/min/1.73 square meters)    Stage 3A Moderate CKD (GFR = 45-59 mL/min/1.73 square meters)    Stage 3B Moderate CKD (GFR = 30-44 mL/min/1.73 square meters)    Stage 4 Severe CKD (GFR = 15-29 mL/min/1.73 square meters)    Stage 5 End Stage CKD (GFR <15 mL/min/1.73 square meters)  Note: GFR calculation is accurate only with a steady state creatinine    CBC and differential [[id number]]  (Abnormal) Collected: 04/30/24 1702    Lab Status: Final result Specimen: Blood from Arm, Left Updated: 04/30/24 1725     WBC 10.23 Thousand/uL      RBC 3.12 Million/uL      Hemoglobin 10.4 g/dL      Hematocrit 32.3 %       fL      MCH 33.3 pg      MCHC 32.2 g/dL      RDW 13.1 %      MPV 10.0 fL      Platelets 178 Thousands/uL      nRBC 0 /100 WBCs      Segmented % 66 %      Immature Grans % 0 %      Lymphocytes % 21 %      Monocytes % 10 %      Eosinophils  Relative 3 %      Basophils Relative 0 %      Absolute Neutrophils 6.66 Thousands/µL      Absolute Immature Grans 0.03 Thousand/uL      Absolute Lymphocytes 2.14 Thousands/µL      Absolute Monocytes 1.06 Thousand/µL      Eosinophils Absolute 0.30 Thousand/µL      Basophils Absolute 0.04 Thousands/µL                    No orders to display         Procedures  Procedures      ED Course             SBIRT 20yo+      Flowsheet Row Most Recent Value   Initial Alcohol Screen: US AUDIT-C     1. How often do you have a drink containing alcohol? 0 Filed at: 04/30/2024 1741   2. How many drinks containing alcohol do you have on a typical day you are drinking?  0 Filed at: 04/30/2024 1741   3a. Male UNDER 65: How often do you have five or more drinks on one occasion? 0 Filed at: 04/30/2024 1741   3b. FEMALE Any Age, or MALE 65+: How often do you have 4 or more drinks on one occassion? 0 Filed at: 04/30/2024 1741   Audit-C Score 0 Filed at: 04/30/2024 1741   SHYANN: How many times in the past year have you...    Used an illegal drug or used a prescription medication for non-medical reasons? Never Filed at: 04/30/2024 1741                  Medical Decision Making  Patient is an 83 year old male presenting with acute on chronic back pain. Prior workup including CT demonstrated chronic compression fractures, scoliosis, and foramen stenosis. Patient is unable to perform ADLs as a result of the pain. Spoke to patient and daughter who are agreeable to stay for further evaluation. Spoke to [facility] attending Dr. Frias who recommended observation status.     Amount and/or Complexity of Data Reviewed  Independent Historian: caregiver     Details: Daughter   External Data Reviewed: radiology.  Labs: ordered.    Risk  Decision regarding hospitalization.          Disposition  Final diagnoses:   Acute midline low back pain without sciatica     Time reflects when diagnosis was documented in both MDM as applicable and the Disposition within this note        Time User Action Codes Description Comment    4/30/2024  5:55 PM Lizzie Ha Add [M54.50] Acute midline low back pain without sciatica           ED Disposition       ED Disposition   Admit    Condition   Stable    Date/Time   Tue Apr 30, 2024 [id number]    Comment   Case was discussed with Dr. Frias and the patient's admission status was agreed to be Admission Status: observation status to the service of Dr. Frias .               Follow-up Information    None         Patient's Medications   Discharge Prescriptions    No medications on file     No discharge procedures on file.    PDMP Review         Value Time User    PDMP Reviewed  Yes 9/6/2022 11:06 AM Mikaela Duenas DO             ED Provider  Attending physically available and evaluated Suhail Borrego. I managed the patient along with the ED Attending.    Electronically Signed by           Lizzie Ha MD  04/30/24 [id number]

## 2024-04-30 NOTE — ED ATTENDING ATTESTATION
4/30/2024  IAndre MD, saw and evaluated the patient. I have discussed the patient with the resident/non-physician practitioner and agree with the resident's/non-physician practitioner's findings, Plan of Care, and MDM as documented in the resident's/non-physician practitioner's note, except where noted. All available labs and Radiology studies were reviewed.  I was present for key portions of any procedure(s) performed by the resident/non-physician practitioner and I was immediately available to provide assistance.       At this point I agree with the current assessment done in the Emergency Department.  I have conducted an independent evaluation of this patient a history and physical is as follows:    83-year-old male presented for evaluation of low back pain with movement over the last month.  At rest he is comfortable in a seated position.  Not able to lay flat comfortably.  Difficulty getting up from lying down and getting up from seated position.  He has had significant difficulty with ADLs around the home.  Lives alone.  Has a history of low back pain with lumbar fusion.  Recent CT showed stable fusion with severe L5/S1 bilateral foraminal narrowing and moderate lumbar canal stenosis.  No significant disc herniation.  Patient has been following with comprehensive spine program PT and had some improvement in SI joint pain but his primary midline severe pain has not improved at all.  He has developed a small stage I sacral decubitus ulcer likely as a result of sitting for extended periods of time because he can no longer comfortably ambulate.  On exam he has no focal neurologic deficits.  He has a well-healed midline lumbar incision.  No palpable spinal step-offs or line tenderness.  He does have bilateral lumbar muscular tenderness which does not reproduce his severe pain.    ED Course     Plan admission to medical service for further workup, pain control, PT/pain medicine evaluation, probable  acute rehab placement.    Critical Care Time  Procedures

## 2024-05-01 ENCOUNTER — APPOINTMENT (INPATIENT)
Dept: RADIOLOGY | Facility: HOSPITAL | Age: 84
DRG: 552 | End: 2024-05-01
Payer: COMMERCIAL

## 2024-05-01 ENCOUNTER — TELEPHONE (OUTPATIENT)
Dept: VASCULAR SURGERY | Facility: CLINIC | Age: 84
End: 2024-05-01

## 2024-05-01 PROBLEM — D53.9 MACROCYTIC ANEMIA: Status: ACTIVE | Noted: 2018-01-23

## 2024-05-01 LAB
ANION GAP SERPL CALCULATED.3IONS-SCNC: 7 MMOL/L (ref 4–13)
BILIRUB UR QL STRIP: NEGATIVE
BUN SERPL-MCNC: 28 MG/DL (ref 5–25)
CALCIUM SERPL-MCNC: 8.7 MG/DL (ref 8.4–10.2)
CHLORIDE SERPL-SCNC: 109 MMOL/L (ref 96–108)
CLARITY UR: CLEAR
CO2 SERPL-SCNC: 26 MMOL/L (ref 21–32)
COLOR UR: NORMAL
CREAT SERPL-MCNC: 1.18 MG/DL (ref 0.6–1.3)
ERYTHROCYTE [DISTWIDTH] IN BLOOD BY AUTOMATED COUNT: 12.9 % (ref 11.6–15.1)
GFR SERPL CREATININE-BSD FRML MDRD: 56 ML/MIN/1.73SQ M
GLUCOSE P FAST SERPL-MCNC: 97 MG/DL (ref 65–99)
GLUCOSE SERPL-MCNC: 97 MG/DL (ref 65–140)
GLUCOSE UR STRIP-MCNC: NEGATIVE MG/DL
HCT VFR BLD AUTO: 31.5 % (ref 36.5–49.3)
HGB BLD-MCNC: 10.1 G/DL (ref 12–17)
HGB UR QL STRIP.AUTO: NEGATIVE
KETONES UR STRIP-MCNC: NEGATIVE MG/DL
LEUKOCYTE ESTERASE UR QL STRIP: NEGATIVE
MCH RBC QN AUTO: 32.8 PG (ref 26.8–34.3)
MCHC RBC AUTO-ENTMCNC: 32.1 G/DL (ref 31.4–37.4)
MCV RBC AUTO: 102 FL (ref 82–98)
NITRITE UR QL STRIP: NEGATIVE
PH UR STRIP.AUTO: 6.5 [PH]
PLATELET # BLD AUTO: 191 THOUSANDS/UL (ref 149–390)
PMV BLD AUTO: 10.4 FL (ref 8.9–12.7)
POTASSIUM SERPL-SCNC: 4.2 MMOL/L (ref 3.5–5.3)
PROT UR STRIP-MCNC: NEGATIVE MG/DL
RBC # BLD AUTO: 3.08 MILLION/UL (ref 3.88–5.62)
SODIUM SERPL-SCNC: 142 MMOL/L (ref 135–147)
SP GR UR STRIP.AUTO: 1.01 (ref 1–1.03)
UROBILINOGEN UR STRIP-ACNC: <2 MG/DL
WBC # BLD AUTO: 8.6 THOUSAND/UL (ref 4.31–10.16)

## 2024-05-01 PROCEDURE — 81003 URINALYSIS AUTO W/O SCOPE: CPT | Performed by: NURSE PRACTITIONER

## 2024-05-01 PROCEDURE — 80048 BASIC METABOLIC PNL TOTAL CA: CPT | Performed by: NURSE PRACTITIONER

## 2024-05-01 PROCEDURE — 99233 SBSQ HOSP IP/OBS HIGH 50: CPT | Performed by: STUDENT IN AN ORGANIZED HEALTH CARE EDUCATION/TRAINING PROGRAM

## 2024-05-01 PROCEDURE — 99222 1ST HOSP IP/OBS MODERATE 55: CPT | Performed by: PHYSICIAN ASSISTANT

## 2024-05-01 PROCEDURE — 72114 X-RAY EXAM L-S SPINE BENDING: CPT

## 2024-05-01 PROCEDURE — 97163 PT EVAL HIGH COMPLEX 45 MIN: CPT

## 2024-05-01 PROCEDURE — 85027 COMPLETE CBC AUTOMATED: CPT | Performed by: NURSE PRACTITIONER

## 2024-05-01 PROCEDURE — 97167 OT EVAL HIGH COMPLEX 60 MIN: CPT

## 2024-05-01 RX ORDER — GABAPENTIN 100 MG/1
100 CAPSULE ORAL 2 TIMES DAILY
Status: DISCONTINUED | OUTPATIENT
Start: 2024-05-01 | End: 2024-05-02 | Stop reason: HOSPADM

## 2024-05-01 RX ORDER — FOLIC ACID 1 MG/1
1 TABLET ORAL DAILY
Status: DISCONTINUED | OUTPATIENT
Start: 2024-05-01 | End: 2024-05-02 | Stop reason: HOSPADM

## 2024-05-01 RX ADMIN — DICLOFENAC SODIUM TOPICAL GEL, 1% 2 G: 10 GEL TOPICAL at 12:47

## 2024-05-01 RX ADMIN — ACETAMINOPHEN 975 MG: 325 TABLET, FILM COATED ORAL at 21:39

## 2024-05-01 RX ADMIN — ENOXAPARIN SODIUM 40 MG: 40 INJECTION SUBCUTANEOUS at 08:15

## 2024-05-01 RX ADMIN — OXYCODONE HYDROCHLORIDE AND ACETAMINOPHEN 500 MG: 500 TABLET ORAL at 08:18

## 2024-05-01 RX ADMIN — DICLOFENAC SODIUM TOPICAL GEL, 1% 2 G: 10 GEL TOPICAL at 17:23

## 2024-05-01 RX ADMIN — DICLOFENAC SODIUM TOPICAL GEL, 1% 2 G: 10 GEL TOPICAL at 08:24

## 2024-05-01 RX ADMIN — SENNOSIDES AND DOCUSATE SODIUM 1 TABLET: 50; 8.6 TABLET ORAL at 21:38

## 2024-05-01 RX ADMIN — ASPIRIN 81 MG: 81 TABLET, COATED ORAL at 08:18

## 2024-05-01 RX ADMIN — METOPROLOL TARTRATE 75 MG: 50 TABLET, FILM COATED ORAL at 21:38

## 2024-05-01 RX ADMIN — LIDOCAINE 2 PATCH: 50 PATCH CUTANEOUS at 22:04

## 2024-05-01 RX ADMIN — Medication 2000 UNITS: at 21:38

## 2024-05-01 RX ADMIN — ACETAMINOPHEN 975 MG: 325 TABLET, FILM COATED ORAL at 14:09

## 2024-05-01 RX ADMIN — FOLIC ACID 1 MG: 1 TABLET ORAL at 10:48

## 2024-05-01 RX ADMIN — ATORVASTATIN CALCIUM 80 MG: 40 TABLET, FILM COATED ORAL at 08:19

## 2024-05-01 RX ADMIN — GABAPENTIN 100 MG: 100 CAPSULE ORAL at 17:23

## 2024-05-01 RX ADMIN — ACETAMINOPHEN 975 MG: 325 TABLET, FILM COATED ORAL at 05:04

## 2024-05-01 RX ADMIN — GABAPENTIN 300 MG: 300 CAPSULE ORAL at 08:18

## 2024-05-01 RX ADMIN — FAMOTIDINE 10 MG: 20 TABLET, FILM COATED ORAL at 17:23

## 2024-05-01 RX ADMIN — MULTIPLE VITAMINS W/ MINERALS TAB 1 TABLET: TAB ORAL at 21:39

## 2024-05-01 RX ADMIN — Medication 2000 MCG: at 21:39

## 2024-05-01 RX ADMIN — FAMOTIDINE 10 MG: 20 TABLET, FILM COATED ORAL at 08:17

## 2024-05-01 RX ADMIN — METHOCARBAMOL 500 MG: 500 TABLET ORAL at 21:39

## 2024-05-01 NOTE — ASSESSMENT & PLAN NOTE
Presented with 6 weeks of back pain.  Difficulty ambulating  CT 3/29/2024: no acute findings  Chronic compression fracture T12, L1.  No new compression fracture.  Stable post op appearance of lumbar spine with posterior fusion and decompression   Persistent grade anterior spondylolisthesis of L5 upon S1 with severe foraminal narrowing  Degenerative changes with mild to moderate canal stenosis and foraminal narrowing, grossly stable  Pain control   Schedule tylenol, lidoderm  Increase gabapentin from 100 mg bid to 300 mg bid  PRN robaxin  PRN oxycodone 2.5 moderate, 5 mg severe  PT OT

## 2024-05-01 NOTE — PLAN OF CARE
Problem: OCCUPATIONAL THERAPY ADULT  Goal: Performs self-care activities at highest level of function for planned discharge setting.  See evaluation for individualized goals.  Description: Treatment Interventions: ADL retraining, Functional transfer training, Endurance training, Cognitive reorientation, Patient/family training, Equipment evaluation/education, Compensatory technique education, Energy conservation, Activityengagement          See flowsheet documentation for full assessment, interventions and recommendations.   Note: Limitation: Decreased ADL status, Decreased Safe judgement during ADL, Decreased endurance, Decreased cognition, Decreased self-care trans, Decreased high-level ADLs (impaired balance, fxnl mobility, act aron, fxnl reach, trunk control, standing aron, strength, fxnl sitting balance, fxnl sitting aron, attention to task, safety awareness, insight, pacing, learning new tasks)  Prognosis: Good  Assessment: Pt is a 83 y.o. male seen for OT evaluation s/p admission to [facility] on 4/30/[facility]24 due to low back pain. Diagnosed with Intractable back pain. Personal and env factors supporting pt at time of IE include (I) PLOF and accessible home environment. Personal and env factors inhibiting engagement in occupations include advanced age, limited social support, and lives alone . Performance deficits that affect the pt’s occupational performance can be seen above. Due to pt's current functional limitations and medical complications pt is functioning below baseline. Pt would benefit from continued skilled OT treatment in order to maximize safety, independence and overall performance with ADLs, functional mobility, functional transfers, and cognition in order to achieve highest level of function.     Rehab Resource Intensity Level, OT: II (Moderate Resource Intensity) (vs level 3 resources pending increased support from family at home)

## 2024-05-01 NOTE — H&P
[facility]  H&P  Name: Suhail Borrego 83 y.o. male I MRN: [id number]  Unit/Bed#: S -01 I Date of Admission: 4/30/2024   Date of Service: 4/30/2024 I Hospital Day: 0      Assessment/Plan   * Intractable back pain  Assessment & Plan  Presented with 6 weeks of back pain.  Difficulty ambulating  CT 3/29/2024: no acute findings  Chronic compression fracture T12, L1.  No new compression fracture.  Stable post op appearance of lumbar spine with posterior fusion and decompression   Persistent grade anterior spondylolisthesis of L5 upon S1 with severe foraminal narrowing  Degenerative changes with mild to moderate canal stenosis and foraminal narrowing, grossly stable  Pain control   Schedule tylenol, lidoderm  Increase gabapentin from 100 mg bid to 300 mg bid  PRN robaxin  PRN oxycodone 2.5 moderate, 5 mg severe  PT OT    Pressure ulcer  Assessment & Plan  POA  Nursing to obtain imaging and upload to media  Offload, reposition    Peripheral artery disease (HCC)  Assessment & Plan  Follows with vascular  LEADS 2023:   RLE:   50-75% stenosis common femoral  >75% proximal popliteal vs occlusion of the distal superficial femoral artery with trickle flow   Tib/peroneal occlusive disease   Unreliable LIBERTY  Unobtainable toe pressures  LLE  50-75% stenosis proximal profunda femoral artery and mid superficial femoral artery  Tib/peroneal occlusive disease  Unreliable LIBERTY  Great toe pressure within healing range   Maintained on ASA, plavix, statin  Outpt follow up with fascular    Primary hypertension  Assessment & Plan  BP stable  Maintained on metoprolol           VTE Pharmacologic Prophylaxis: VTE Score: 4 Moderate Risk (Score 3-4) - Pharmacological DVT Prophylaxis Ordered: enoxaparin (Lovenox).  Code Status: Level 3 - DNAR and DNI   Discussion with family: Patient declined call to .     Anticipated Length of Stay: Patient will be admitted on an observation basis with an anticipated  length of stay of less than 2 midnights secondary to pain control.    Total Time Spent on Date of Encounter in care of patient:  mins. This time was spent on one or more of the following: performing physical exam; counseling and coordination of care; obtaining or reviewing history; documenting in the medical record; reviewing/ordering tests, medications or procedures; communicating with other healthcare professionals and discussing with patient's family/caregivers.    Chief Complaint: back pain    History of Present Illness:  Suhail Borrego is a 83 y.o. male with a PMH of chronic back pain prior lumbar fusion, PAD, hypertension, CAD sp CABG, CKD,  who presents with back pain x 6 weeks.  Reports pain is worsening and now having diffculty ambulating.  Developed a pressure ulcer a few weeks ago due to immobility 2/2 pain.  Reports pain is worse with twisting motions.  Taking tylenol and gabapentin without improvement.  Attends PT.   No new injuries or trauma.  No urinary symptoms, fever, chills, flank pain, numbness, tingling, urine/bowel incontinence.  Reports chronic constipation.      Review of Systems:  Review of Systems   Gastrointestinal:  Positive for constipation.   Musculoskeletal:  Positive for back pain and gait problem.   Skin:  Positive for wound.   All other systems reviewed and are negative.      Past Medical and Surgical History:   Past Medical History:   Diagnosis Date    Abnormal liver function test     RESOLVED: 14SEP2017    Allergic rhinitis     Anemia     LAST ASSESSED: 19OCT2017    Arthritis     BPH (benign prostatic hyperplasia)     CAD (coronary artery disease)     Coronary artery disease     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HCC)     LAST ASSESSED: 05OCT2017    Former tobacco use     GERD (gastroesophageal reflux disease)     Hand paresthesia     RESOLVED: 11SEP2017    Hyperlipidemia     Hypertension     Lumbar stenosis     Peripheral artery disease (HCC)     LAST ASSESSED: 27OCT2017    Stage 3a  chronic kidney disease (HCC) 7/11/2021       Past Surgical History:   Procedure Laterality Date    BACK SURGERY      COLONOSCOPY      LUMBAR FUSION      RI ARTHRODESIS POSTERIOR/PSTLAT TQ 1NTRSPC LUMBAR N/A 11/03/2016    Procedure: L2-S1 POSTERIOR LUMBAR FUSION AND DECOMPRESSION (Impulse), Posterior lateral fixation; dural repair.;  Surgeon: Leslie Gil MD;  Location: [facility];  Service: Orthopedics    RI CABG W/ARTERIAL GRAFT SINGLE ARTERIAL GRAFT N/A 01/19/2018    Procedure: CABG X4 with LIMA - LAD, SVG - RCA, OM2, & Diagonal ; Left Leg EVH; MATTHEW;  Surgeon: Eric Bar DO;  Location: [facility];  Service: Cardiac Surgery    RI COLONOSCOPY FLX DX W/COLLJ SPEC WHEN PFRMD N/A 11/15/2017    Procedure: EGD AND COLONOSCOPY;  Surgeon: Mariano Godinez MD;  Location: [facility];  Service: Gastroenterology    SEPTOPLASTY      LAST ASSESSED; 13MAY2014    TONSILECTOMY AND ADNOIDECTOMY      LAST ASSESSED: 13MAY2014    UPPER GASTROINTESTINAL ENDOSCOPY         Meds/Allergies:  Prior to Admission medications    Medication Sig Start Date End Date Taking? Authorizing Provider   acetaminophen (TYLENOL) 650 mg CR tablet Take 650 mg by mouth every 8 (eight) hours as needed for mild pain   Yes Historical Provider, MD   Ascorbic Acid (Vitamin C) 500 MG PACK Take 1,000 mg by mouth daily   Yes Historical Provider, MD   aspirin (ECOTRIN LOW STRENGTH) 81 mg EC tablet Take 81 mg by mouth daily   Yes Historical Provider, MD   atorvastatin (LIPITOR) 80 mg tablet TAKE 1 TABLET BY MOUTH EVERY DAY IN THE MORNING 2/8/24  Yes Nile Nash MD   cyanocobalamin (VITAMIN B-12) 1,000 mcg tablet Take 1,000 mcg by mouth daily     Yes Historical Provider, MD   cholecalciferol (VITAMIN D3) 1,000 units tablet Take 2,000 Units by mouth daily    Historical Provider, MD   ciclopirox (PENLAC) 8 % solution APPLY TO THE AFFECTED AREA(S) ONCE DAILY AT BEDTIME OR 8 HOURS BEFORE WASHING  Patient not taking: Reported on 10/30/2023 11/8/22   Historical  Provider, MD   clopidogrel (Plavix) 75 mg tablet Take 1 tablet (75 mg total) by mouth daily 10/6/22   Adalberto Mock MD   clotrimazole-betamethasone (LOTRISONE) 1-0.05 % cream Apply topically 2 (two) times a day  Patient not taking: Reported on 4/30/2024 7/21/23   Aly Ziegler PA-C   Diclofenac Sodium (VOLTAREN) 1 % Apply 2 g topically 4 (four) times a day  Patient not taking: Reported on 4/30/2024 3/21/24   Samara Stubbs DO   docusate sodium (COLACE) 100 mg capsule Take 100 mg by mouth 2 (two) times a day  Patient not taking: Reported on 4/30/2024    Historical Provider, MD   famotidine (PEPCID) 20 mg tablet TAKE 1 TABLET BY MOUTH TWICE A DAY 4/22/24   Mikaela Duenas DO   gabapentin (NEURONTIN) 100 mg capsule Take 1 capsule (100 mg total) by mouth 2 (two) times a day 10/2/23   Mikaela Duenas DO   ipratropium (ATROVENT) 0.03 % nasal spray 2 sprays into each nostril every 12 (twelve) hours  Patient not taking: Reported on 4/30/2024    Historical Provider, MD   lidocaine (LIDODERM) 5 % Apply 1 patch topically over 12 hours every 24 hours Remove & Discard patch within 12 hours or as directed by MD  Patient not taking: Reported on 4/30/2024 3/29/24   Sukhdev Thomas MD   methocarbamol (ROBAXIN) 500 mg tablet Take 1 tablet (500 mg total) by mouth daily at bedtime as needed for muscle spasms  Patient not taking: Reported on 4/30/2024 3/28/24   Samara Stubbs DO   metoprolol tartrate (LOPRESSOR) 50 mg tablet TAKE 1 AND 1/2 TABLETS BY MOUTH EVERY 12 HOURS 7/6/23   Nile Nash MD   Multiple Vitamin (MULTIVITAMIN) capsule Take 1 capsule by mouth daily    Historical Provider, MD   triamcinolone (KENALOG) 0.1 % cream Apply topically 2 (two) times a day  Patient not taking: Reported on 4/30/2024    Historical Provider, MD     I have reviewed home medications with patient personally.    Allergies:   Allergies   Allergen Reactions    Penicillins Other (See Comments)     Hallucinations;  Patient reported that he  "was seeing visual disturbances         Social History:  Marital Status:    Occupation:   Patient Pre-hospital Living Situation: Home  Patient Pre-hospital Level of Mobility: walks with walker  Patient Pre-hospital Diet Restrictions:   Substance Use History:   Social History     Substance and Sexual Activity   Alcohol Use Not Currently    Alcohol/week: 1.0 standard drink of alcohol    Types: 1 Shots of liquor per week    Comment: beer, wine, scotch every day; SOCIAL AS PER ALL SCRIPTS      Social History     Tobacco Use   Smoking Status Former    Current packs/day: 0.00    Average packs/day: 1 pack/day for 50.0 years (50.0 ttl pk-yrs)    Types: Cigarettes    Start date:     Quit date:     Years since quittin.3   Smokeless Tobacco Never     Social History     Substance and Sexual Activity   Drug Use Not Currently    Types: Marijuana    Comment: medical clarke       Family History:  Family History   Problem Relation Age of Onset    Emphysema Mother     Liver disease Mother     Coronary artery disease Father     Hypertension Father     Liver disease Father     Heart failure Father     Stroke Father         CVA     Heart attack Father     Coronary artery disease Brother     Heart disease Brother         younger brother by pass and other brother 4 stents placed    Other Family         BACK PROBLEM     Stroke Family         CVA    Emphysema Family     Hypertension Family         BENIGN       Physical Exam:     Vitals:   Blood Pressure: 127/72 (24)  Pulse: 89 (24)  Temperature: (!) 97.3 °F (36.3 °C) (24)  Temp Source: Oral (24 1551)  Respirations: 18 (24)  Height: 5' 7\" (170.2 cm) (24)  Weight - Scale: 73.6 kg (162 lb 3.2 oz) (24)  SpO2: 97 % (24)    Physical Exam  Constitutional:       General: He is not in acute distress.     Appearance: Normal appearance. He is not ill-appearing.   HENT:      Head: Normocephalic and " atraumatic.      Right Ear: External ear normal.      Left Ear: External ear normal.      Nose: Nose normal.      Mouth/Throat:      Pharynx: Oropharynx is clear.   Eyes:      Extraocular Movements: Extraocular movements intact.      Conjunctiva/sclera: Conjunctivae normal.   Cardiovascular:      Rate and Rhythm: Normal rate and regular rhythm.      Pulses: Normal pulses.      Heart sounds: Normal heart sounds.   Pulmonary:      Effort: Pulmonary effort is normal.      Breath sounds: Normal breath sounds.   Abdominal:      General: Bowel sounds are normal.      Palpations: Abdomen is soft.      Tenderness: There is no right CVA tenderness or left CVA tenderness.   Musculoskeletal:      Cervical back: Normal and normal range of motion.      Thoracic back: Normal.      Lumbar back: Tenderness present. Decreased range of motion.        Back:       Comments: Bilateral low back pain without radiation   Skin:     General: Skin is warm.      Capillary Refill: Capillary refill takes less than 2 seconds.   Neurological:      General: No focal deficit present.      Mental Status: He is alert and oriented to person, place, and time.   Psychiatric:         Mood and Affect: Mood normal.         Behavior: Behavior normal.          Additional Data:     Lab Results:  Results from last 7 days   Lab Units 04/30/24  1702   WBC Thousand/uL 10.23*   HEMOGLOBIN g/dL 10.4*   HEMATOCRIT % 32.3*   PLATELETS Thousands/uL 178   SEGS PCT % 66   LYMPHO PCT % 21   MONO PCT % 10   EOS PCT % 3     Results from last 7 days   Lab Units 04/30/24  1702   SODIUM mmol/L 142   POTASSIUM mmol/L 4.5   CHLORIDE mmol/L 106   CO2 mmol/L 28   BUN mg/dL 29*   CREATININE mg/dL 1.17   ANION GAP mmol/L 8   CALCIUM mg/dL 9.2   GLUCOSE RANDOM mg/dL 103                       Lines/Drains:  Invasive Devices       Peripheral Intravenous Line  Duration             Peripheral IV 04/30/24 Left Antecubital <1 day                        Imaging: Reviewed radiology reports  from this admission including: CT lumbar  No orders to display       EKG and Other Studies Reviewed on Admission:   EKG: No EKG obtained.    ** Please Note: This note has been constructed using a voice recognition system. **

## 2024-05-01 NOTE — PLAN OF CARE
Problem: PHYSICAL THERAPY ADULT  Goal: Performs mobility at highest level of function for planned discharge setting.  See evaluation for individualized goals.  Description: Treatment/Interventions: Functional transfer training, LE strengthening/ROM, Therapeutic exercise, Endurance training, Gait training, Bed mobility, Equipment eval/education, Patient/family training          See flowsheet documentation for full assessment, interventions and recommendations.  Note:    Problem List: Decreased strength, Decreased endurance, Decreased mobility, Impaired balance, Decreased safety awareness, Pain, Decreased skin integrity  Assessment: Pt presents with back pain x 6 weeks. Reports pain is worsening and now having diffculty ambulating. Developed a pressure ulcer a few weeks ago due to immobility. Dx: intractable back pain, chronic T12 and L1 compression fxs, pressure ulcer, PAD, and primary HTN. order placed for PT eval and tx, w/ activity order of up and out of bed as tolerated and fall precautions. pt presents w/ comorbidities of chronic back pain, lumpar fusion, PAD, HTN, CAD, CABG, anemia, arthritis, hyperlipidemia, and CKD and personal factors of advanced age, mobilizing w/ assistive device, and limited home support. pt presents w/ pain, weakness, decreased endurance, impaired balance, gait deviations, decreased safety awareness, fall risk, and impaired skin integrity. these impairments are evident in findings from physical examination (weakness and impaired skin integrity), mobility assessment (need for standby to min assist w/ all phases of mobility when usually mobilizing independently, tolerance to only 20 feet of ambulation, and need for cueing for mobility technique), and Barthel Index: 55/100. pt needed input for task focus and mobility technique. pt is at risk for falls due to physical and safety awareness deficits. pt's clinical presentation is unstable/unpredictable (evident in need for assist w/ all  phases of mobility when usually mobilizing independently, tolerance to only 20 feet of ambulation, pain impacting overall mobility status, and need for input for task focus and mobility technique). pt needs inpatient PT tx to improve mobility deficits and progress mobility training as appropriate. Orthopedics consult would benefit pt to address back pain, which is limiting function and mobility.        Rehab Resource Intensity Level, PT: II (Moderate Resource Intensity)    See flowsheet documentation for full assessment.

## 2024-05-01 NOTE — ASSESSMENT & PLAN NOTE
Follows with vascular  LEADS 2023:   RLE:   50-75% stenosis common femoral  >75% proximal popliteal vs occlusion of the distal superficial femoral artery with trickle flow   Tib/peroneal occlusive disease   Unreliable LIBERTY  Unobtainable toe pressures  LLE  50-75% stenosis proximal profunda femoral artery and mid superficial femoral artery  Tib/peroneal occlusive disease  Unreliable LIBERTY  Great toe pressure within healing range     Maintained on ASA &  statin ; it was clarified and confirmed that vascular surgery recs from 10/2023 was to continue ASA and Statin and dc Plavix. Med Rec updated.   Outpt follow up with vascular

## 2024-05-01 NOTE — OCCUPATIONAL THERAPY NOTE
Occupational Therapy Evaluation     Patient Name: Suhail Borrego  Today's Date: 5/1/2024  Problem List  Principal Problem:    Intractable back pain  Active Problems:    Primary hypertension    Hx of CABG    Macrocytic anemia    Peripheral artery disease (HCC)    Pressure ulcer    Past Medical History  Past Medical History:   Diagnosis Date    Abnormal liver function test     RESOLVED: 14SEP2017    Allergic rhinitis     Anemia     LAST ASSESSED: 19OCT2017    Arthritis     BPH (benign prostatic hyperplasia)     CAD (coronary artery disease)     Coronary artery disease     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HCC)     LAST ASSESSED: 05OCT2017    Former tobacco use     GERD (gastroesophageal reflux disease)     Hand paresthesia     RESOLVED: 11SEP2017    Hyperlipidemia     Hypertension     Lumbar stenosis     Peripheral artery disease (HCC)     LAST ASSESSED: 27OCT2017    Stage 3a chronic kidney disease (HCC) 7/11/2021     Past Surgical History  Past Surgical History:   Procedure Laterality Date    BACK SURGERY      COLONOSCOPY      LUMBAR FUSION      TN ARTHRODESIS POSTERIOR/PSTLAT TQ 1NTRSPC LUMBAR N/A 11/03/2016    Procedure: L2-S1 POSTERIOR LUMBAR FUSION AND DECOMPRESSION (Impulse), Posterior lateral fixation; dural repair.;  Surgeon: Leslie Gil MD;  Location: [facility];  Service: Orthopedics    TN CABG W/ARTERIAL GRAFT SINGLE ARTERIAL GRAFT N/A 01/19/2018    Procedure: CABG X4 with LIMA - LAD, SVG - RCA, OM2, & Diagonal ; Left Leg EVH; MATTHEW;  Surgeon: Eric Bar DO;  Location: [facility];  Service: Cardiac Surgery    TN COLONOSCOPY FLX DX W/COLLJ SPEC WHEN PFRMD N/A 11/15/2017    Procedure: EGD AND COLONOSCOPY;  Surgeon: Mariano Godinez MD;  Location: [facility];  Service: Gastroenterology    SEPTOPLASTY      LAST ASSESSED; 13MAY2014    TONSILECTOMY AND ADNOIDECTOMY      LAST ASSESSED: 13MAY2014    UPPER GASTROINTESTINAL ENDOSCOPY               05/01/24 0848   OT Last Visit   OT Visit Date 05/01/24   Note  "Type   Note type Evaluation   Pain Assessment   Pain Assessment Tool 0-10   Pain Score 7   Pain Location/Orientation Orientation: Lower;Location: Back   Restrictions/Precautions   Weight Bearing Precautions Per Order No   Other Precautions Cognitive;Chair Alarm;Bed Alarm;Multiple lines;Fall Risk;Pain   Home Living   Type of Home House   Home Layout Multi-level;1/2 bath on main level;Bed/bath upstairs  (garage basement with stair glide to 1st floor and stair glide to 2nd floor bed/bathroom)   Bathroom Shower/Tub Walk-in shower   Bathroom Toilet Standard   Bathroom Equipment Grab bars in shower;Shower chair;Commode   Bathroom Accessibility Accessible   Home Equipment Walker;Cane   Additional Comments use of rollator or RW at baseline  (sleeps in recliner chair at baseline)   Prior Function   Level of [place] Independent with ADLs   Lives With (S)  Alone   Receives Help From Family  (2 daughters are local)   IADLs Independent with driving;Independent with meal prep;Independent with medication management  (driving short distances, small meal prep. 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 Chronic compression fracture T12, L1.  No new compression fracture. PMH of chronic back pain prior lumbar fusion, PAD, hypertension, CAD sp CABG, CKD   Family/Caregiver Present No   Subjective   Subjective \"I just think the answer is for me to have surgery\"   ADL "   Eating Assistance 5  Supervision/Setup   Grooming Assistance 5  Supervision/Setup   UB Bathing Assistance 5  Supervision/Setup   LB Bathing Assistance 4  Minimal Assistance   UB Dressing Assistance 5  Supervision/Setup   LB Dressing Assistance 4  Minimal Assistance   LB Dressing Deficit Increased time to complete;Supervision/safety;Verbal cueing;Thread RLE into pants;Thread LLE into pants;Pull up over hips  (A with pants over  socks)   Toileting Assistance  4  Minimal Assistance   Bed Mobility   Supine to Sit 4  Minimal assistance   Additional items Assist x 1;Increased time required;Verbal cues;LE management   Transfers   Sit to Stand 4  Minimal assistance   Additional items Assist x 1;Increased time required;Verbal cues   Stand to Sit 5  Supervision   Additional items Increased time required;Verbal cues   Toilet transfer 5  Supervision   Additional items Increased time required;Verbal cues;Standard toilet   Additional Comments use of RW   Functional Mobility   Functional Mobility 4  Minimal assistance   Additional Comments Ax1, functional household distance   Additional items Rolling walker   Balance   Static Sitting Fair +   Dynamic Sitting Fair   Static Standing Fair -   Dynamic Standing Fair -   Ambulatory Fair -   Activity Tolerance   Activity Tolerance Patient limited by pain;Patient limited by fatigue   Medical Staff Made Aware RN Katherine, PT RJ   RUE Assessment   RUE Assessment WFL   LUE Assessment   LUE Assessment WFL   Hand Function   Gross Motor Coordination Functional   Fine Motor Coordination Functional   Cognition   Overall Cognitive Status Impaired   Arousal/Participation Alert;Cooperative   Attention Attends with cues to redirect   Orientation Level Oriented X4   Memory Decreased short term memory;Decreased recall of recent events;Decreased recall of precautions   Following Commands Follows one step commands with increased time or repetition   Comments poor insight into situation. Limited  "problem solving. Pt hyper fixated regarding wanting surgery to fix back pain   Assessment   Limitation Decreased ADL status;Decreased Safe judgement during ADL;Decreased endurance;Decreased cognition;Decreased self-care trans;Decreased high-level ADLs  (impaired balance, fxnl mobility, act aron, fxnl reach, trunk control, standing aron, strength, fxnl sitting balance, fxnl sitting aron, attention to task, safety awareness, insight, pacing, learning new tasks)   Prognosis Good   Assessment Pt is a 83 y.o. male seen for OT evaluation s/p admission to [facility] on 4/30/2024 due to low back pain. Diagnosed with Intractable back pain. Personal and env factors supporting pt at time of IE include (I) PLOF and accessible home environment. Personal and env factors inhibiting engagement in occupations include advanced age, limited social support, and lives alone . Performance deficits that affect the pt’s occupational performance can be seen above. Due to pt's current functional limitations and medical complications pt is functioning below baseline. Pt would benefit from continued skilled OT treatment in order to maximize safety, independence and overall performance with ADLs, functional mobility, functional transfers, and cognition in order to achieve highest level of function.   Goals   Patient Goals \"to have surgery\"   LTG Time Frame 10-14   Long Term Goal see goals listed below   Plan   Treatment Interventions ADL retraining;Functional transfer training;Endurance training;Cognitive reorientation;Patient/family training;Equipment evaluation/education;Compensatory technique education;Energy conservation;Activityengagement   Goal Expiration Date 05/11/24   OT Treatment Day 0   OT Frequency 3-5x/wk   Discharge Recommendation   Rehab Resource Intensity Level, OT II (Moderate Resource Intensity)  (vs level 3 resources pending increased support from family at home)   AM-PAC Daily Activity Inpatient   Lower Body Dressing 3   Bathing 3 "   Toileting 3   Upper Body Dressing 3   Grooming 3   Eating 4   Daily Activity Raw Score 19   Daily Activity Standardized Score (Calc for Raw Score >=11) 40.22   AM-PAC Applied Cognition Inpatient   Following a Speech/Presentation 3   Understanding Ordinary Conversation 3   Taking Medications 2   Remembering Where Things Are Placed or Put Away 3   Remembering List of 4-5 Errands 2   Taking Care of Complicated Tasks 2   Applied Cognition Raw Score 15   Applied Cognition Standardized Score 33.54   End of Consult   Patient Position at End of Consult Bedside chair;Bed/Chair alarm activated;All needs within reach     GOALS:      -Patient will perform grooming tasks standing at sink with overall mod I  in order to increase overall independence    -Patient will be Mod I with UB dressing using AE and AD as needed in order to increase (I) with ADLs    -Patient will be Mod I with UB bathing using AE and AD as needed in order to increase (I) with ADLs    -Patient will be Mod I with LB dressing with use of AE and AD as needed in order to increase (I) with ADLs    -Patient will be Mod I with LB bathing with use of AE and AD as needed in order to increase (I) with ADLs    -Patient will complete toileting w/ Mod I w/ G hygiene/thoroughness in order to reduce caregiver burden    -Patient will demonstrate Mod I with bed mobility for ability to manage own comfort and initiate OOB tasks.     -Patient will perform functional transfers with Mod I to/from all surfaces using DME as needed in order to increase (I) with functional tasks    -Patient will be Mod I with functional mobility to/from bathroom for increased independence with toileting tasks    -Patient will engage in ongoing cognitive assessment in order to assist with safe discharge planning/recommendations.    -Patient will follow multi-step instructions with no VC in order to safely complete functional tasks         The patient's raw score on the AM-PAC Daily Activity Inpatient  Short Form is 19. A raw score of greater than or equal to 19 suggests the patient may benefit from discharge to home. Please refer to the recommendation of the Occupational Therapist for safe discharge planning.    This session, pt required and most appropriately benefited from skilled OT/PT co-eval due to significant regression from functional baseline, decreased activity tolerance, and unpredictable medical and/or functional status. OT and PT goals were addressed separately as seen in documentation.     Stefania Frost MS, OTR/L

## 2024-05-01 NOTE — PROGRESS NOTES
[facility]  Progress Note  Name: Suhail Borrego I  MRN: [id number]  Unit/Bed#: S -01 I Date of Admission: 4/30/2024   Date of Service: 5/1/2024 I Hospital Day: 0    Assessment/Plan   * Intractable back pain  Assessment & Plan  11/3/2016  L2-S1 POSTERIOR LUMBAR FUSION AND DECOMPRESSION (Impulse), Posterior lateral fixation; dural repair. (Spine Thoracic)     Presented with 6 weeks of severe back pain, acute on chronic, worse when standing from seated position or walking, relative improve when pending forward. Uses Rolator. 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 Leg raise test positive bilaterally.     CT 3/29/2024: no acute findings  Chronic compression fracture T12, L1.  No new compression fracture.  Stable post op appearance of lumbar spine with posterior fusion and decompression   Persistent grade 2 anterior spondylolisthesis of L5 upon S1 with severe foraminal narrowing  Degenerative changes with mild to moderate canal stenosis and foraminal narrowing, grossly stable    Pain control   Schedule tylenol, lidoderm  Continue home Rose 100 mg BID ; avoiding higher dose given age and risk of side effects   PRN robaxin  PRN oxycodone 2.5 moderate, 5 mg severe  PT OT  Neurosurgery consultation , input appreciated: XR spine lumbar complete w bending minimum 6 views  was ordered and inital recs for conservative management.   If no surgical intervention indicated or offered; will refer to pain management on discharge to discuss lead placement vs injections.     Hx of CABG  Assessment & Plan  CABG X4 with LIMA - LAD, SVG - RCA, OM2, & Diagonal ; Left Leg EVH; MATTHWE (Chest)  in 2018     On aspirin  & Statin ; patient is no longer on Plavix since 10/2023. Was ordered on admission 4/30 and discontinued 5/1 ; Med Rec is updated.     Pressure ulcer  Assessment & Plan  POA  Nursing to obtain imaging and upload to media  Offload, reposition  Wound care  consultation.     Peripheral artery disease (HCC)  Assessment & Plan  Follows with vascular  LEADS 2023:   RLE:   50-75% stenosis common femoral  >75% proximal popliteal vs occlusion of the distal superficial femoral artery with trickle flow   Tib/peroneal occlusive disease   Unreliable LIBERTY  Unobtainable toe pressures  LLE  50-75% stenosis proximal profunda femoral artery and mid superficial femoral artery  Tib/peroneal occlusive disease  Unreliable LIBERTY  Great toe pressure within healing range     Maintained on ASA &  statin ; it was clarified and confirmed that vascular surgery recs from 10/2023 was to continue ASA and Statin and dc Plavix. Med Rec updated.   Outpt follow up with vascular    Macrocytic anemia  Assessment & Plan  Already taking B12 2000 units ; advised to add folic acid daily and to follow with pcp.    Primary hypertension  Assessment & Plan  BP stable  Maintained on metoprolol               VTE Pharmacologic Prophylaxis: VTE Score: 4 Moderate Risk (Score 3-4) - Pharmacological DVT Prophylaxis Ordered: enoxaparin (Lovenox).    Mobility:   Basic Mobility Inpatient Raw Score: 16  JH-HLM Goal: 5: Stand one or more mins  JH-HLM Achieved: 7: Walk 25 feet or more  JH-HLM Goal achieved. Continue to encourage appropriate mobility.    Patient Centered Rounds: I performed bedside rounds with nursing staff today.   Discussions with Specialists or Other Care Team Provider: neurosurgery input is noted & appreciated     Education and Discussions with Family / Patient: Updated  (daughter) at bedside.    Total Time Spent on Date of Encounter in care of patient: 50 mins. This time was spent on one or more of the following: performing physical exam; counseling and coordination of care; obtaining or reviewing history; documenting in the medical record; reviewing/ordering tests, medications or procedures; communicating with other healthcare professionals and discussing with patient's  family/caregivers.    Current Length of Stay: 0 day(s)  Current Patient Status: Inpatient   Certification Statement: The patient will continue to require additional inpatient hospital stay due to pain management and neurosurgery evaluation   Discharge Plan: Anticipate discharge in 24-48 hrs to discharge location to be determined pending rehab evaluations.    Code Status: Level 3 - DNAR and DNI    Subjective:   Seen and examined with daughter at bedside   Reports back pain , severe, acute on chronic x 6 weeks , worse when standing or walking , see above for more relevant details  Discussed above plan , they agree  Also explained to confirm if need to be on / still taking DAPT or not as patient unsure if was told to hold off one of them before but per record he is on both ASA and plavix   Also discussed to add folate to B12 daily supplementation and f/u with PCP   Denies fever , chills, diaphoresis, chest pain or SOB. Uses Rolator at home. No incontinence or saddle paresthesia.     Objective:     Vitals:   Temp (24hrs), Av.4 °F (36.3 °C), Min:97.2 °F (36.2 °C), Max:97.6 °F (36.4 °C)    Temp:  [97.2 °F (36.2 °C)-97.6 °F (36.4 °C)] 97.3 °F (36.3 °C)  HR:  [74-89] 75  Resp:  [16-18] 16  BP: (104-127)/(48-72) 121/64  SpO2:  [91 %-98 %] 98 %  Body mass index is 25.4 kg/m².     Input and Output Summary (last 24 hours):     Intake/Output Summary (Last 24 hours) at 2024 1558  Last data filed at 2024 1201  Gross per 24 hour   Intake 300 ml   Output 200 ml   Net 100 ml       Physical Exam:   Physical Exam   - GEN: Appears well, alert and oriented x 3, pleasant and cooperative, in no acute distress  - HEENT: Anicteric, mucous membranes moist, PERRL and EOMI   - NECK: No lymphadenopathy, JVD or carotid bruits   - HEART: RRR, normal S1 and S2, no murmurs, clicks, gallops or rubs   - LUNGS: Clear to auscultation bilaterally; no wheezes, rales, or rhonchi  - ABDOMEN: Normal bowel sounds, soft, no tenderness, no  distention, no organomegaly or masses felt on exam.   - EXTREMITIES: Peripheral pulses normal; no clubbing, cyanosis; 1+ bilateral leg edema below knee level   - NEURO: No focal findings, CN II-XII are grossly intact. Positive bilateral leg raise test   - Musculoskeletal: 5/5 strength, normal ROM, no swollen or erythematous joints.   - SKIN: Normal without suspicious lesions on exposed skin      Additional Data:     Labs:  Results from last 7 days   Lab Units 05/01/24  0437 04/30/24  1702   WBC Thousand/uL 8.60 10.23*   HEMOGLOBIN g/dL 10.1* 10.4*   HEMATOCRIT % 31.5* 32.3*   PLATELETS Thousands/uL 191 178   SEGS PCT %  --  66   LYMPHO PCT %  --  21   MONO PCT %  --  10   EOS PCT %  --  3     Results from last 7 days   Lab Units 05/01/24  0437   SODIUM mmol/L 142   POTASSIUM mmol/L 4.2   CHLORIDE mmol/L 109*   CO2 mmol/L 26   BUN mg/dL 28*   CREATININE mg/dL 1.18   ANION GAP mmol/L 7   CALCIUM mg/dL 8.7   GLUCOSE RANDOM mg/dL 97                       Lines/Drains:  Invasive Devices       Peripheral Intravenous Line  Duration             Peripheral IV 04/30/24 Left Antecubital <1 day                          Imaging: Reviewed radiology reports from this admission including: CT Spine     Recent Cultures (last 7 days):         Last 24 Hours Medication List:   Current Facility-Administered Medications   Medication Dose Route Frequency Provider Last Rate    acetaminophen  975 mg Oral Q8H KAMRAN KEVIN Leal      ascorbic acid  500 mg Oral Daily KEVIN Leal      aspirin  81 mg Oral Daily KEVIN Leal      atorvastatin  80 mg Oral QAM KEVIN Leal      cholecalciferol  2,000 Units Oral HS KEVIN Leal      vitamin B-12  2,000 mcg Oral HS Phill Aiad, DO      Diclofenac Sodium  2 g Topical 4x Daily KEVIN Leal      enoxaparin  40 mg Subcutaneous Daily KEVIN Leal      famotidine  10 mg Oral BID KEVIN Leal      folic acid  1 mg Oral Daily Phill Aiad, DO      gabapentin  100 mg Oral BID Phill  DO Amelie      lidocaine  2 patch Topical Daily KEVIN Leal      methocarbamol  500 mg Oral BID PRN KEVIN Leal      metoprolol tartrate  75 mg Oral Q12H KAMRAN KEVIN Leal      multivitamin-minerals  1 tablet Oral HS KEVIN Leal      oxyCODONE  5 mg Oral Q6H PRN Carlita Smith, KEVIN      oxyCODONE  2.5 mg Oral Q6H PRN KEVIN Leal      polyethylene glycol  17 g Oral Daily KEVIN Leal      senna-docusate sodium  1 tablet Oral HS KEVIN Leal          Today, Patient Was Seen By: Phill Kinsey DO    **Please Note: This note may have been constructed using a voice recognition system.**

## 2024-05-01 NOTE — TELEPHONE ENCOUNTER
Telephone call from patient's daughter regarding her father being in the hospital right now. His doctor in the hospital wanted confirmation that he is still prescribed the Plavix and the Aspirin, just because he is concerned about the thinning of his blood.     His daughter would like a call back so she can get more information on exactly what he is on.     She can be reached at [phone]

## 2024-05-01 NOTE — CONSULTS
Consultation - Neurosurgery   Suhail Borrego 83 y.o. male MRN: [id number]  Unit/Bed#: S -01 Encounter: [id number]      Inpatient consult to Neurosurgery  Consult performed by: Coy Mantilla PA-C  Consult ordered by: Phill Kinsey DO          Assessment/Plan               Assessment:    Acute intractable back pain  Ambulatory Dysfunction  Chronic T12 and L1 compression fracture  Hx of back surgery  Pressure Ulcer      Plan:   Exam: Patient alert and oriented x 3, communicates well, her strength is 5/5 in both upper and lower extremities bilaterally.  Sensation to light touch intact throughout. DTR 2+ no clonus bilaterally.  Healed lumbar surgical wound.  Tenderness noted on palpation of posterior lumbosacral region.  Imaging is reviewed personally and findings as follows:  ET of thoracic and lumbar spine without contrast on 3/29/2024 demonstrates stable scoliosis of the thoracic and lumbar spine.  Chronic appearing compression fracture of the superior endplate of T12 and L1 no new fracture.  Postop appearance of the lumbar spine with posterior fusion & decompression.  Consider flexion-extension lumbar spine x-rays  Pain control: Recommend multimodal pain medication  DVT ppx: SCDs bilateral legs, with pharmacological DVT prophylaxis  Activity: As Tolerated  PT/OT evaluation & treatment  Brace: None   Medical Mx: Per primary team  Neurocheck: Routine  Neurosurgery evaluated the patient this afternoon.  Patient with history of L2 L5 posterior lumbar decompression and fixation fusion by Dr. Sutherland in 2016.  Reported having progressive lumbosacral pain that started 6 weeks ago with difficulty walking, weakness in both legs, chronic bilateral leg peripheral neuropathy, no weakness in the extremities or red flags.  He of the lumbar spine done in March 2024 demonstrate stable hardware, compared with CT done in 2022 and left fusion and L5/S1 anterolisthesis appears stable.  Recommend continue conservative treatment,  patient extension x-rays to study any interval progression of his L5-S1 anterolisthesis.  Continue PT/OT, pain control, local lidocaine patch.  Consider outpatient follow-up with pain management for possible SCS evaluation.  Neurosurgery will review the image once it is done.  Call with question or concern.      History of Present Illness     HPI: Suhail Borrego is a 83 y.o. male with PMHx of anemia, arthritis, BPH, coronary artery disease status post bypass surgery on baby aspirin, femoral artery stenosis, GERD, hyperlipidemia, hypertension, peripheral artery disease, chronic kidney disease stage IIIa and severe LA to S1 lumbar decompression and fusion.  Patient is admitted with acute onset of intractable lumbar back pain of 6 weeks duration.  Reports back pain is severe and usually worse when he tries to get up and do straight and walk, difficulty walking due to pain in the back and also pressure feeling in both legs, has any foot drop, no bowel/bladder dysfunction or saddle anesthesia.  Has history of T12 and L2 compression deformity about a year ago.  Has also bedsore due to immobility.  Tried injections with slight improvement but his lower back pain did not go away.  Patient denies any fever, chills, rigors, cough or chest pain.    He denies history of diabetes mellitus, stroke, seizures, or bleeding disorders.  Remote history of cigarette smoker.       Review of Systems   Constitutional:  Negative for activity change, chills and fatigue.   Eyes:  Negative for visual disturbance.   Respiratory:  Negative for cough, shortness of breath and wheezing.    Musculoskeletal:  Positive for gait problem.   Neurological:  Negative for dizziness, tremors, seizures, syncope, facial asymmetry, speech difficulty, weakness, light-headedness, numbness and headaches.   Psychiatric/Behavioral:  Negative for confusion.      Review of system personally reviewed and updated as follows  Historical Information   Past Medical  History:   Diagnosis Date    Abnormal liver function test     RESOLVED: 14SEP2017    Allergic rhinitis     Anemia     LAST ASSESSED: 19OCT2017    Arthritis     BPH (benign prostatic hyperplasia)     CAD (coronary artery disease)     Coronary artery disease     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HCC)     LAST ASSESSED: 05OCT2017    Former tobacco use     GERD (gastroesophageal reflux disease)     Hand paresthesia     RESOLVED: 11SEP2017    Hyperlipidemia     Hypertension     Lumbar stenosis     Peripheral artery disease (HCC)     LAST ASSESSED: 27OCT2017    Stage 3a chronic kidney disease (HCC) 7/11/2021     Past Surgical History:   Procedure Laterality Date    BACK SURGERY      COLONOSCOPY      LUMBAR FUSION      TX ARTHRODESIS POSTERIOR/PSTLAT TQ 1NTRSPC LUMBAR N/A 11/03/2016    Procedure: L2-S1 POSTERIOR LUMBAR FUSION AND DECOMPRESSION (Impulse), Posterior lateral fixation; dural repair.;  Surgeon: Leslie Gil MD;  Location: [facility];  Service: Orthopedics    TX CABG W/ARTERIAL GRAFT SINGLE ARTERIAL GRAFT N/A 01/19/2018    Procedure: CABG X4 with LIMA - LAD, SVG - RCA, OM2, & Diagonal ; Left Leg EVH; MATTHEW;  Surgeon: Eric Bar DO;  Location: [facility];  Service: Cardiac Surgery    TX COLONOSCOPY FLX DX W/COLLJ SPEC WHEN PFRMD N/A 11/15/2017    Procedure: EGD AND COLONOSCOPY;  Surgeon: Mariano Godinez MD;  Location: [facility];  Service: Gastroenterology    SEPTOPLASTY      LAST ASSESSED; 13MAY2014    TONSILECTOMY AND ADNOIDECTOMY      LAST ASSESSED: 13MAY2014    UPPER GASTROINTESTINAL ENDOSCOPY       Social History     Substance and Sexual Activity   Alcohol Use Not Currently    Alcohol/week: 1.0 standard drink of alcohol    Types: 1 Shots of liquor per week    Comment: beer, wine, scotch every day; SOCIAL AS PER ALL SCRIPTS      Social History     Substance and Sexual Activity   Drug Use Not Currently    Types: Marijuana    Comment: medical majiuana     Social History     Tobacco Use   Smoking Status Former     Current packs/day: 0.00    Average packs/day: 1 pack/day for 50.0 years (50.0 ttl pk-yrs)    Types: Cigarettes    Start date:     Quit date:     Years since quittin.3   Smokeless Tobacco Never     Family History   Problem Relation Age of Onset    Emphysema Mother     Liver disease Mother     Coronary artery disease Father     Hypertension Father     Liver disease Father     Heart failure Father     Stroke Father         CVA     Heart attack Father     Coronary artery disease Brother     Heart disease Brother         younger brother by pass and other brother 4 stents placed    Other Family         BACK PROBLEM     Stroke Family         CVA    Emphysema Family     Hypertension Family         BENIGN       Meds/Allergies   all current active meds have been reviewed and current meds:   Current Facility-Administered Medications   Medication Dose Route Frequency    acetaminophen (TYLENOL) tablet 975 mg  975 mg Oral Q8H KAMRAN    ascorbic acid (VITAMIN C) tablet 500 mg  500 mg Oral Daily    aspirin (ECOTRIN LOW STRENGTH) EC tablet 81 mg  81 mg Oral Daily    atorvastatin (LIPITOR) tablet 80 mg  80 mg Oral QAM    Cholecalciferol (VITAMIN D3) tablet 2,000 Units  2,000 Units Oral HS    clopidogrel (PLAVIX) tablet 75 mg  75 mg Oral HS    cyanocobalamin (VITAMIN B-12) tablet 2,000 mcg  2,000 mcg Oral HS    Diclofenac Sodium (VOLTAREN) 1 % topical gel 2 g  2 g Topical 4x Daily    enoxaparin (LOVENOX) subcutaneous injection 40 mg  40 mg Subcutaneous Daily    famotidine (PEPCID) tablet 10 mg  10 mg Oral BID    folic acid (FOLVITE) tablet 1 mg  1 mg Oral Daily    gabapentin (NEURONTIN) capsule 100 mg  100 mg Oral BID    lidocaine (LIDODERM) 5 % patch 2 patch  2 patch Topical Daily    methocarbamol (ROBAXIN) tablet 500 mg  500 mg Oral BID PRN    metoprolol tartrate (LOPRESSOR) tablet 75 mg  75 mg Oral Q12H KAMRAN    multivitamin-minerals (CENTRUM) tablet 1 tablet  1 tablet Oral HS    oxyCODONE (ROXICODONE) IR tablet 5 mg  5  mg Oral Q6H PRN    oxyCODONE (ROXICODONE) split tablet 2.5 mg  2.5 mg Oral Q6H PRN    polyethylene glycol (MIRALAX) packet 17 g  17 g Oral Daily    senna-docusate sodium (SENOKOT S) 8.6-50 mg per tablet 1 tablet  1 tablet Oral HS     Allergies   Allergen Reactions    Penicillins Other (See Comments)     Hallucinations;  Patient reported that he was seeing visual disturbances         Objective   I/O         04/29 0701 04/30 0700 04/30 0701 05/01 0700 05/01 0701  05/02 0700    P.O.   180    Total Intake(mL/kg)   180 (2.4)    Urine (mL/kg/hr)  200     Stool  0     Total Output  200     Net  -200 +180           Unmeasured Stool Occurrence  1 x             Physical Exam  Constitutional:       Appearance: Normal appearance.   HENT:      Head: Normocephalic and atraumatic.   Pulmonary:      Effort: Pulmonary effort is normal.   Musculoskeletal:         General: Tenderness present.   Neurological:      Mental Status: He is alert and oriented to person, place, and time.      Gait: Gait abnormal.      Deep Tendon Reflexes:      Reflex Scores:       Tricep reflexes are 2+ on the right side and 2+ on the left side.       Bicep reflexes are 2+ on the right side and 2+ on the left side.       Brachioradialis reflexes are 2+ on the right side and 2+ on the left side.       Patellar reflexes are 2+ on the right side and 2+ on the left side.       Achilles reflexes are 2+ on the right side and 2+ on the left side.  Psychiatric:         Speech: Speech normal.       Neurologic Exam     Mental Status   Oriented to person, place, and time.   Speech: speech is normal   Level of consciousness: alert    Cranial Nerves     CN XI   CN XI normal.     Motor Exam   Muscle bulk: normal  Overall muscle tone: normal  Right arm tone: normal  Left arm tone: normal  Right arm pronator drift: absent  Left arm pronator drift: absent  Right leg tone: normal  Left leg tone: normal    Sensory Exam   Light touch normal.     Gait, Coordination, and  "Reflexes     Reflexes   Right brachioradialis: 2+  Left brachioradialis: 2+  Right biceps: 2+  Left biceps: 2+  Right triceps: 2+  Left triceps: 2+  Right patellar: 2+  Left patellar: 2+  Right achilles: 2+  Left achilles: 2+  Right : 2+  Left : 2+  Right Goncalves: absent  Left Goncalves: absent  Right ankle clonus: absent  Left ankle clonus: absent      Vitals:Blood pressure (!) 104/48, pulse 74, temperature (!) 97.2 °F (36.2 °C), temperature source Oral, resp. rate 18, height 5' 7\" (1.702 m), weight 73.6 kg (162 lb 3.2 oz), SpO2 96%.,Body mass index is 25.4 kg/m².     Lab Results:   Results from last 7 days   Lab Units 05/01/24  0437 04/30/24  1702   WBC Thousand/uL 8.60 10.23*   HEMOGLOBIN g/dL 10.1* 10.4*   HEMATOCRIT % 31.5* 32.3*   PLATELETS Thousands/uL 191 178   SEGS PCT %  --  66   MONO PCT %  --  10   EOS PCT %  --  3     Results from last 7 days   Lab Units 05/01/24  0437 04/30/24  1702   POTASSIUM mmol/L 4.2 4.5   CHLORIDE mmol/L 109* 106   CO2 mmol/L 26 28   BUN mg/dL 28* 29*   CREATININE mg/dL 1.18 1.17   CALCIUM mg/dL 8.7 9.2                 No results found for: \"TROPONINT\"  ABG:No results found for: \"PHART\", \"JUJ0SPO\", \"PO2ART\", \"CDU5UHB\", \"C8LSXIOC\", \"BEART\", \"SOURCE\"    Imaging Studies: I have personally reviewed pertinent reports.   and I have personally reviewed pertinent films in PACS    EKG, Pathology, and Other Studies: I have personally reviewed pertinent reports.   and I have personally reviewed pertinent films in PACS    VTE Prophylaxis: Sequential compression device (Venodyne)     Code Status: Level 3 - DNAR and DNI  Advance Directive and Living Will: Yes    Power of :    POLST:      Counseling / Coordination of Care  I spent 20 minutes with the patient.    "

## 2024-05-01 NOTE — PHYSICAL THERAPY NOTE
PHYSICAL THERAPY EVALUATION NOTE    Patient Name: Suhail Borrego  Today's Date: 5/1/2024  AGE:   83 y.o.  Mrn:   [id number]  ADMIT DX:  Back pain [M54.9]  Acute midline low back pain without sciatica [M54.50]    Past Medical History:   Diagnosis Date    Abnormal liver function test     RESOLVED: 14SEP2017    Allergic rhinitis     Anemia     LAST ASSESSED: 19OCT2017    Arthritis     BPH (benign prostatic hyperplasia)     CAD (coronary artery disease)     Coronary artery disease     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HCC)     LAST ASSESSED: 05OCT2017    Former tobacco use     GERD (gastroesophageal reflux disease)     Hand paresthesia     RESOLVED: 11SEP2017    Hyperlipidemia     Hypertension     Lumbar stenosis     Peripheral artery disease (HCC)     LAST ASSESSED: 27OCT2017    Stage 3a chronic kidney disease (HCC) 7/11/2021     Length Of Stay: 0  PHYSICAL THERAPY EVALUATION :    05/01/24 0901   PT Last Visit   PT Visit Date 05/01/24   Pain Assessment   Pain Assessment Tool 0-10   Pain Score 7   Pain Location/Orientation Orientation: Lower;Location: Back   Hospital Pain Intervention(s) Repositioned;Ambulation/increased activity   Restrictions/Precautions   Other Precautions Bed Alarm;Chair Alarm;Fall Risk;Pain  (Masimo)   Home Living   Type of Home House   Home Layout Multi-level;1/2 bath on main level;Bed/bath upstairs;Other (Comment)  (stairglides to enter home and to access 2nd floor)   Additional Comments lives alone. recently required use of rollator (has one on each floor). owns cane and roller walker. independent w/ ADLs. recently needed assitsance from daughter w/ IADLs. no falls in last 6 months. received outpatient PT (noted improvement in back pain but only lasting a day). daughters are able to be supportive but work during the day.   General   Additional Pertinent History room air resting pulse ox 96% and 64 BPM, active 93% and 77  BPM.   Family/Caregiver Present No   Cognition   Arousal/Participation Cooperative   Orientation Level Oriented to person;Other (Comment)  (pt was identified w/ full name, birth date)   Following Commands Follows one step commands with increased time or repetition   Subjective   Subjective pt seen supine in bed. agreed to particiopate in PT eval. reports having lower back pain. pt states wanting to have surgery so he'll feel better. input was needed for task focus.   RUE Assessment   RUE Assessment WFL   LUE Assessment   LUE Assessment WFL   RLE Assessment   RLE Assessment WFL  (3+ to 4-/5)   LLE Assessment   LLE Assessment WFL  (3+ to 4-/5)   Light Touch   RLE Light Touch Grossly intact   LLE Light Touch Grossly intact   Bed Mobility   Rolling R 4  Minimal assistance   Additional items Assist x 1;Bedrails;Increased time required;Verbal cues;LE management  (for LE positioning, bedrail use)   Supine to Sit 4  Minimal assistance  (log roll)   Additional items Assist x 1;Bedrails;Increased time required;Verbal cues;LE management  (for bedrail use, LE positioning)   Transfers   Sit to Stand 4  Minimal assistance   Additional items Assist x 1;Increased time required;Verbal cues  (for hand placement)   Stand to Sit 5  Supervision   Additional items Increased time required;Verbal cues  (for body positioning, hand placement)   Toilet transfer 4  Minimal assistance   Additional items Assist x 1;Standard toilet;Increased time required;Verbal cues  (for body positioning, right grab bar use)   Ambulation/Elevation   Gait pattern Forward Flexion;Short stride;Excessively slow   Gait Assistance 4  Minimal assist   Additional items Assist x 1;Verbal cues;Tactile cues  (for walker positioning, full step length, avoiding twisting)   Assistive Device Rolling walker   Distance 20 feet x2  (additional ambulation not possible due to fatigue, pain)   Balance   Static Sitting Fair +   Static Standing Fair -  (w/ roller walker)   Ambulatory  Fair -  (to poor+, w/ roller walker)   Activity Tolerance   Activity Tolerance Patient limited by pain;Patient limited by fatigue   Nurse Made Aware spoke to Katherine KAUR, Stefania OT, Thomas CM   Assessment   Problem List Decreased strength;Decreased endurance;Decreased mobility;Impaired balance;Decreased safety awareness;Pain;Decreased skin integrity   Assessment Pt presents with back pain x 6 weeks. Reports pain is worsening and now having diffculty ambulating. Developed a pressure ulcer a few weeks ago due to immobility. Dx: intractable back pain, chronic T12 and L1 compression fxs, pressure ulcer, PAD, and primary HTN. order placed for PT eval and tx, w/ activity order of up and out of bed as tolerated and fall precautions. pt presents w/ comorbidities of chronic back pain, lumpar fusion, PAD, HTN, CAD, CABG, anemia, arthritis, hyperlipidemia, and CKD and personal factors of advanced age, mobilizing w/ assistive device, and limited home support. pt presents w/ pain, weakness, decreased endurance, impaired balance, gait deviations, decreased safety awareness, fall risk, and impaired skin integrity. these impairments are evident in findings from physical examination (weakness and impaired skin integrity), mobility assessment (need for standby to min assist w/ all phases of mobility when usually mobilizing independently, tolerance to only 20 feet of ambulation, and need for cueing for mobility technique), and Barthel Index: 55/100. pt needed input for task focus and mobility technique. pt is at risk for falls due to physical and safety awareness deficits. pt's clinical presentation is unstable/unpredictable (evident in need for assist w/ all phases of mobility when usually mobilizing independently, tolerance to only 20 feet of ambulation, pain impacting overall mobility status, and need for input for task focus and mobility technique). pt needs inpatient PT tx to improve mobility deficits and progress mobility  training as appropriate. Orthopedics consult would benefit pt to address back pain, which is limiting function and mobility.   Goals   Patient Goals I want to go home. I want to know why this is happening.   STG Expiration Date 05/11/24   Short Term Goal #1 pt will: Increase bilateral LE strength 1/2 grade to facilitate independent mobility, Perform bed mobility independently to increase level of independence, Perform all transfers independently to improve independence, Ambulate 200 ft. with least restrictive assistive device modified independent w/o LOB to improve functional independence, Increase all balance 1 grade to decrease risk for falls, Complete exercise program independently to increase strength and endurance, Tolerate 3 hr OOB to faciliate upright tolerance, Improve Barthel Index score to 75 or greater to facilitate independence, and Complete Timed Up and Go or Comfortable Gait Speed to further assess mobility and monitor progress   PT Treatment Day 0   Plan   Treatment/Interventions Functional transfer training;LE strengthening/ROM;Therapeutic exercise;Endurance training;Gait training;Bed mobility;Equipment eval/education;Patient/family training   PT Frequency 3-5x/wk   Discharge Recommendation   Rehab Resource Intensity Level, PT II (Moderate Resource Intensity)   Additional Comments (S)  Orthopedics consult would benefit pt to address back pain, which is limiting function and mobility   AM-PAC Basic Mobility Inpatient   Turning in Flat Bed Without Bedrails 3   Lying on Back to Sitting on Edge of Flat Bed Without Bedrails 3   Moving Bed to Chair 3   Standing Up From Chair Using Arms 3   Walk in Room 3   Climb 3-5 Stairs With Railing 1   Basic Mobility Inpatient Raw Score 16   Basic Mobility Standardized Score 38.32   [facility] Highest Level Of Mobility   -HLM Goal 5: Stand one or more mins   -HLM Achieved 7: Walk 25 feet or more   Barthel Index   Feeding 10   Bathing 0   Grooming Score 5    Dressing Score 5   Bladder Score 10   Bowels Score 10   Toilet Use Score 5   Transfers (Bed/Chair) Score 10   Mobility (Level Surface) Score 0   Stairs Score 0   Barthel Index Score 55   End of Consult   Patient Position at End of Consult Bedside chair;All needs within reach;Bed/Chair alarm activated     The patient's AM-PAC Basic Mobility Inpatient Short Form Raw Score is 16. A Raw score of less than or equal to 16 suggests the patient may benefit from discharge to post-acute rehabilitation services. Please also refer to the recommendation of the Physical Therapist for safe discharge planning.    Skilled PT recommended while in hospital and upon DC to progress pt toward treatment goals.     Jonah Judd, PT

## 2024-05-01 NOTE — PLAN OF CARE
Problem: PAIN - ADULT  Goal: Verbalizes/displays adequate comfort level or baseline comfort level  Description: Interventions:  - Encourage patient to monitor pain and request assistance  - Assess pain using appropriate pain scale  - Administer analgesics based on type and severity of pain and evaluate response  - Implement non-pharmacological measures as appropriate and evaluate response  - Consider cultural and social influences on pain and pain management  - Notify physician/advanced practitioner if interventions unsuccessful or patient reports new pain  Outcome: Not Progressing     Problem: INFECTION - ADULT  Goal: Absence or prevention of progression during hospitalization  Description: INTERVENTIONS:  - Assess and monitor for signs and symptoms of infection  - Monitor lab/diagnostic results  - Monitor all insertion sites, i.e. indwelling lines, tubes, and drains  - Monitor endotracheal if appropriate and nasal secretions for changes in amount and color  - [place] appropriate cooling/warming therapies per order  - Administer medications as ordered  - Instruct and encourage patient and family to use good hand hygiene technique  - Identify and instruct in appropriate isolation precautions for identified infection/condition  Outcome: Progressing  Goal: Absence of fever/infection during neutropenic period  Description: INTERVENTIONS:  - Monitor WBC    Outcome: Progressing     Problem: SAFETY ADULT  Goal: Patient will remain free of falls  Description: INTERVENTIONS:  - Educate patient/family on patient safety including physical limitations  - Instruct patient to call for assistance with activity   - Consult OT/PT to assist with strengthening/mobility   - Keep Call bell within reach  - Keep bed low and locked with side rails adjusted as appropriate  - Keep care items and personal belongings within reach  - Initiate and maintain comfort rounds  - Make Fall Risk Sign visible to staff  - Offer Toileting every   Hours, in advance of need  - Initiate/Maintain alarm  - Obtain necessary fall risk management equipment:   - Apply yellow socks and bracelet for high fall risk patients  - Consider moving patient to room near nurses station  Outcome: Progressing  Goal: Maintain or return to baseline ADL function  Description: INTERVENTIONS:  -  Assess patient's ability to carry out ADLs; assess patient's baseline for ADL function and identify physical deficits which impact ability to perform ADLs (bathing, care of mouth/teeth, toileting, grooming, dressing, etc.)  - Assess/evaluate cause of self-care deficits   - Assess range of motion  - Assess patient's mobility; develop plan if impaired  - Assess patient's need for assistive devices and provide as appropriate  - Encourage maximum independence but intervene and supervise when necessary  - Involve family in performance of ADLs  - Assess for home care needs following discharge   - Consider OT consult to assist with ADL evaluation and planning for discharge  - Provide patient education as appropriate  Outcome: Progressing  Goal: Maintains/Returns to pre admission functional level  Description: INTERVENTIONS:  - Perform AM-PAC 6 Click Basic Mobility/ Daily Activity assessment daily.  - Set and communicate daily mobility goal to care team and patient/family/caregiver.   - Collaborate with rehabilitation services on mobility goals if consulted  - Perform Range of Motion  times a day.  - Reposition patient every  hours.  - Dangle patient  times a day  - Stand patient  times a day  - Ambulate patient  times a day  - Out of bed to chair  times a day   - Out of bed for meals  times a day  - Out of bed for toileting  - Record patient progress and toleration of activity level   Outcome: Progressing     Problem: DISCHARGE PLANNING  Goal: Discharge to home or other facility with appropriate resources  Description: INTERVENTIONS:  - Identify barriers to discharge w/patient and caregiver  -  Arrange for needed discharge resources and transportation as appropriate  - Identify discharge learning needs (meds, wound care, etc.)  - Arrange for interpretive services to assist at discharge as needed  - Refer to Case Management Department for coordinating discharge planning if the patient needs post-hospital services based on physician/advanced practitioner order or complex needs related to functional status, cognitive ability, or social support system  Outcome: Progressing     Problem: Knowledge Deficit  Goal: Patient/family/caregiver demonstrates understanding of disease process, treatment plan, medications, and discharge instructions  Description: Complete learning assessment and assess knowledge base.  Interventions:  - Provide teaching at level of understanding  - Provide teaching via preferred learning methods  Outcome: Progressing     Problem: Prexisting or High Potential for Compromised Skin Integrity  Goal: Skin integrity is maintained or improved  Description: INTERVENTIONS:  - Identify patients at risk for skin breakdown  - Assess and monitor skin integrity  - Assess and monitor nutrition and hydration status  - Monitor labs   - Assess for incontinence   - Turn and reposition patient  - Assist with mobility/ambulation  - Relieve pressure over bony prominences  - Avoid friction and shearing  - Provide appropriate hygiene as needed including keeping skin clean and dry  - Evaluate need for skin moisturizer/barrier cream  - Collaborate with interdisciplinary team   - Patient/family teaching  - Consider wound care consult   Outcome: Progressing

## 2024-05-01 NOTE — UTILIZATION REVIEW
Initial Clinical Review  OBSERVATION  4/30/24 @ 1756  CONVERTED TO INPATIENT ADMISSION 5/1/24 @ 1454 DUE TO CONTINUED STAY REQUIRED TO EVALUATE AND TREAT PATIENT WITH INTRACTABLE BACK PAIN ,AMBULATORY DYSFUNCTION WITH ONGOING WORKUP, SAFE D/C PLANNING .    Admission: Date/Time/Statement:   Admission Orders (From admission, onward)       Ordered        05/01/24 1454  INPATIENT ADMISSION  Once            04/30/24 1756  Place in Observation  Once                          Orders Placed This Encounter   Procedures    INPATIENT ADMISSION     Standing Status:   Standing     Number of Occurrences:   1     Order Specific Question:   Level of Care     Answer:   Med Surg [16]     Order Specific Question:   Estimated length of stay     Answer:   More than 2 Midnights     Order Specific Question:   Certification     Answer:   I certify that inpatient services are medically necessary for this patient for a duration of greater than two midnights. See H&P and MD Progress Notes for additional information about the patient's course of treatment.     ED Arrival Information       Expected   -    Arrival   4/30/2024 15:32    Acuity   Less Urgent              Means of arrival   Walk-In    Escorted by   Family Member    Service   Hospitalist    Admission type   Emergency              Arrival complaint   back pain             Chief Complaint   Patient presents with    Back Pain     Pt c/o low back pain for 6 weeks with no know EMIL.  Eval for same but reports symptoms have not improved.       Initial Presentation: 83 y.o. male PMH of chronic back pain prior lumbar fusion, PAD, hypertension, CAD sp CABG, CKD,  who presents to ED from home  with back pain x 6 weeks.  Reports pain is worsening and now having diffculty ambulating.  Developed a pressure ulcer a few weeks ago due to immobility 2/2 pain.  Reports pain is worse with twisting motions.  Taking tylenol and gabapentin without improvement.  Attends PT. CT 3/29/24 showed chronic  compression fracture T12, L1,persistent grade anterior spondylolisthesis of L5 upon S1 with severe foraminal narrowing , degenerative changes. On exam,lumbar tenderness, decreased ROM .  Bilateral low back pain without radiation  . Pt admitted as OBS with intractable back pain, pressure ulcer . Plan- Pain control -Increase gabapentin from 100 mg bid to 300 mg bid . Scheduled Tylenol, lido patch. PRN Robaxin, prn Oxycodone. PT/OT Offloading .     Anticipated Length of Stay/Certification Statement: Patient will be admitted on an observation basis with an anticipated length of stay of less than 2 midnights secondary to pain control.     Date: 5/1 Converted to IP   OT- level II rehab resource vs level  IIIpending increased support from family at home)   PT- level II rehab resource intensity AM-PAC Basic Mobility Inpatient Short Form Raw Score is 16. A Raw score of less than or equal to 16 suggests the patient may benefit from discharge to post-acute rehabilitation services. Orthopedics consult would benefit pt to address back pain, which is limiting function and mobility  . Pt  needs for assist w/ all phases of mobility when usually mobilizing independently, tolerance to only 20 feet of ambulation, pain impacting overall mobility status, and need for input for task focus and mobility technique   Neurosx consult- Acute intractable back pain Ambulatory Dysfunction . Chronic T12, L1 compression fx   Recommend continue conservative treatment,. Ordered  extension x-rays L spine to study any interval progression of his L5-S1 anterolisthesis.  Continue PT/OT, pain control, local lidocaine patch.  Consider outpatient follow-up with pain management for possible SCS evaluation.    Medicine-  Pt reports ongoing back pain .Pain  worse when standing from seated position or walking, relative improve when bending forward. Uses Rolator. No saddle paresthesia , no incontinence . 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 Leg raise test  positive bilaterally.  Gabapentin decreased back to 100 mg BID  from 300 mg - avoiding higher dose given age and risk of side effects . Await XR .     Date: 5/2   Day 3: Has surpassed a 2nd midnight with active treatments and services.  XR L spine completed today , per neurosx,  appears stable with stable lumbar fusion. Anterolisthesis does not increase with flexion/extension. D/C order to home placed today as pt/daughter declining STR referrals at this time, with preference to return home with [facility] .              5/1 buttock/sacrum    ED Triage Vitals   Temperature Pulse Respirations Blood Pressure SpO2   04/30/24 1551 04/30/24 1548 04/30/24 1548 04/30/24 1548 04/30/24 1548   97.7 °F (36.5 °C) 81 20 117/56 100 %      Temp Source Heart Rate Source Patient Position - Orthostatic VS BP Location FiO2 (%)   04/30/24 1551 04/30/24 2214 04/30/24 1548 04/30/24 1548 --   Oral Monitor Sitting Left arm       Pain Score       04/30/24 1548       1          Wt Readings from Last 1 Encounters:   04/30/24 73.6 kg (162 lb 3.2 oz)     Additional Vital Signs:   ate/Time Temp Pulse Resp BP MAP (mmHg) SpO2   05/02/24 07:08:26 97.3 °F (36.3 °C) Abnormal  72 16 101/59 73 98 %   05/01/24 21:31:54 -- 77 -- 132/61 85 98 %   05/01/24 15:16:42 97.3 °F (36.3 °C) Abnormal  75 16 121/64 83 98 %     Date/Time Temp Pulse Resp BP MAP (mmHg) SpO2   05/01/24 0749 97.2 °F (36.2 °C) Abnormal  74 18 104/48 Abnormal  67 96 %   05/01/24 0617 -- -- -- -- -- --   04/30/24 22:14:37 97.6 °F (36.4 °C) 80 18 116/51 73 91 %   04/30/24 18:39:16 97.3 °F (36.3 °C) Abnormal  89 18 127/72 90 97 %       Date and Time R Pedal Pulse L Pedal Pulse   05/01/24 2138 +1 +1   05/01/24 1200 +1 +1   05/01/24 0617 +1 +1   04/30/24 2200 +2 +1     Trauma Secondary Assessment - [place] Coma Scale      Date and Time Eye Opening Best Verbal Response Best Motor Response [place] Coma Scale Score   05/01/24 2138 4 5 6 15   05/01/24 1200 4 5 6 15   05/01/24 0617 4 5 6 15    04/30/24 2200 4 5 6 15   04/30/24 1727 4 5 6 15     Pertinent Labs/Diagnostic Test Results:   XR spine lumbar complete w bending minimum 6 views    -5/1/24  Prelim read by neurosx: 5/2/24   appears stable with stable lumbar fusion. Anterolisthesis does not increase with flexion/extension.               Results from last 7 days   Lab Units 05/02/24  0503 05/01/24  0437 04/30/24  1702   WBC Thousand/uL 8.05 8.60 10.23*   HEMOGLOBIN g/dL 10.3* 10.1* 10.4*   HEMATOCRIT % 32.5* 31.5* 32.3*   PLATELETS Thousands/uL 186 191 178   TOTAL NEUT ABS Thousands/µL 4.78  --  6.66         Results from last 7 days   Lab Units 05/02/24  0503 05/01/24  0437 04/30/24  1702   SODIUM mmol/L 141 142 142   POTASSIUM mmol/L 4.6 4.2 4.5   CHLORIDE mmol/L 109* 109* 106   CO2 mmol/L 25 26 28   ANION GAP mmol/L 7 7 8   BUN mg/dL 27* 28* 29*   CREATININE mg/dL 1.18 1.18 1.17   EGFR ml/min/1.73sq m 56 56 57   CALCIUM mg/dL 9.0 8.7 9.2             Results from last 7 days   Lab Units 05/02/24  0503 05/01/24  0437 04/30/24  1702   GLUCOSE RANDOM mg/dL 91 97 103                   Results from last 7 days   Lab Units 05/01/24  0458   CLARITY UA  Clear   COLOR UA  Light Yellow   SPEC GRAV UA  1.015   PH UA  6.5   GLUCOSE UA mg/dl Negative   KETONES UA mg/dl Negative   BLOOD UA  Negative   PROTEIN UA mg/dl Negative   NITRITE UA  Negative   BILIRUBIN UA  Negative   UROBILINOGEN UA (BE) mg/dl <2.0   LEUKOCYTES UA  Negative                 ED Treatment:   Medication Administration from 04/30/2024 1532 to 04/30/2024 1829       None          Past Medical History:   Diagnosis Date    Abnormal liver function test     RESOLVED: [id number]    Allergic rhinitis     Anemia     LAST ASSESSED: 19OCT2017    Arthritis     BPH (benign prostatic hyperplasia)     CAD (coronary artery disease)     Coronary artery disease     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HCC)     LAST ASSESSED: 05OCT2017    Former tobacco use     GERD (gastroesophageal reflux disease)     Hand paresthesia      RESOLVED: [id number]    Hyperlipidemia     Hypertension     Lumbar stenosis     Peripheral artery disease (HCC)     LAST ASSESSED: 27OCT2017    Stage 3a chronic kidney disease (HCC) 7/11/2021     Present on Admission:   Peripheral artery disease (HCC)   Primary hypertension   Macrocytic anemia      Admitting Diagnosis: Back pain [M54.9]  Acute midline low back pain without sciatica [M54.50]  Age/Sex: 83 y.o. male  Admission Orders:  Scheduled Medications:  acetaminophen, 975 mg, Oral, Q8H KAMRAN  ascorbic acid, 500 mg, Oral, Daily  aspirin, 81 mg, Oral, Daily  atorvastatin, 80 mg, Oral, QAM  cholecalciferol, 2,000 Units, Oral, HS  vitamin B-12, 2,000 mcg, Oral, HS  Diclofenac Sodium, 2 g, Topical, 4x Daily  enoxaparin, 40 mg, Subcutaneous, Daily  famotidine, 10 mg, Oral, BID  folic acid, 1 mg, Oral, Daily  gabapentin, 100 mg, Oral, BID  lidocaine, 2 patch, Topical, Daily  metoprolol tartrate, 75 mg, Oral, Q12H [facility]  multivitamin-minerals, 1 tablet, Oral, HS  polyethylene glycol, 17 g, Oral, Daily  senna-docusate sodium, 1 tablet, Oral, HS      clopidogrel (PLAVIX) tablet 75 mg  Dose: 75 mg  Freq: Daily at bedtime Route: PO  Start: 04/30/24 2200 End: 05/01/24 1555   cyanocobalamin (VITAMIN B-12) tablet 1,000 mcg  Dose: 1,000 mcg  Freq: Daily at bedtime Route: PO  Start: 04/30/24 2200 End: 05/01/24 1013   gabapentin (NEURONTIN) capsule 300 mg  Dose: 300 mg  Freq: 2 times daily Route: PO  Start: 04/30/24 2145 End: 05/01/24 1009   Continuous IV Infusions:     PRN Meds:  methocarbamol, 500 mg, Oral, BID PRN x1 5/1   oxyCODONE, 5 mg, Oral, Q6H PRN  oxyCODONE, 2.5 mg, Oral, Q6H PRN      Cardiac diet   OOB as aron w/ assist   Fall monitoring         Network Utilization Review Department  ATTENTION: Please call with any questions or concerns to [phone] and carefully listen to the prompts so that you are directed to the right person. All voicemails are confidential.   For Discharge needs, contact Care Management DC Support  Team at [phone] opt. 2  Send all requests for admission clinical reviews, approved or denied determinations and any other requests to dedicated fax number below belonging to the campus where the patient is receiving treatment. List of dedicated fax numbers for the Facilities:  FACILITY NAME UR FAX NUMBER   ADMISSION DENIALS (Administrative/Medical Necessity) [phone]   DISCHARGE SUPPORT TEAM (NETWORK) [phone]   PARENT CHILD HEALTH (Maternity/NICU/Pediatrics) [phone]   [facility] [phone]   [facility] [phone]   [facility] [phone]   [facility] [phone]   [facility] [phone]   [facility] [phone]   [facility] [phone]   [facility] [phone]   [facility] [phone]   [facility] [phone]   [facility] [phone]   [facility] [phone]

## 2024-05-01 NOTE — ASSESSMENT & PLAN NOTE
11/3/2016  L2-S1 POSTERIOR LUMBAR FUSION AND DECOMPRESSION (Impulse), Posterior lateral fixation; dural repair. (Spine Thoracic)     Presented with 6 weeks of severe back pain, acute on chronic, worse when standing from seated position or walking, relative improve when pending forward. Uses Rolator. 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 Leg raise test positive bilaterally.     CT 3/29/2024: no acute findings  Chronic compression fracture T12, L1.  No new compression fracture.  Stable post op appearance of lumbar spine with posterior fusion and decompression   Persistent grade 2 anterior spondylolisthesis of L5 upon S1 with severe foraminal narrowing  Degenerative changes with mild to moderate canal stenosis and foraminal narrowing, grossly stable    Pain control   Schedule tylenol, lidoderm  Continue home Rose 100 mg BID ; avoiding higher dose given age and risk of side effects   PRN robaxin  PRN oxycodone 2.5 moderate, 5 mg severe  PT OT  Neurosurgery consultation , input appreciated: XR spine lumbar complete w bending minimum 6 views  was ordered and inital recs for conservative management.   If no surgical intervention indicated or offered; will refer to pain management on discharge to discuss lead placement vs injections.

## 2024-05-01 NOTE — UTILIZATION REVIEW
NOTIFICATION OF OBSERVATION ADMISSION   AUTHORIZATION REQUEST   SERVICING FACILITY:   [place] [address]  Tax ID: [id number]  NPI: [id number]   ATTENDING PROVIDER:  Attending Name and NPI#: Phill Kinsey Do [[id number]]  Address: [address] [place] [address]  Phone: [phone]   ADMISSION INFORMATION:  Place of Service: On [facility]  Place of Service Code: 22 CPT Code:   Admitting Diagnosis Code/Description:  Back pain [M54.9]  Acute midline low back pain without sciatica [M54.50]  Observation Admission Date/Time: 04/30/2024 17:56PM  Discharge Date/Time: No discharge date for patient encounter.     UTILIZATION REVIEW CONTACT:  Romelia Altman Utilization   Network Utilization Review Department  Phone: [phone]  Fax: [phone]  Email: [email or web address]  Contact for approvals/pending authorizations, clinical reviews, and discharge.     PHYSICIAN ADVISORY SERVICES:  Medical Necessity Denial & Bqhi-zc-Qyii Review  Phone: [phone]  Fax: [phone]  Email: [email or web address]     DISCHARGE SUPPORT TEAM:  For Patients Discharge Needs & Updates  Phone: [phone] opt. 2 Fax: [phone]  Email: [email or web address]

## 2024-05-01 NOTE — CASE MANAGEMENT
Case Management Assessment & Discharge Planning Note    Patient name Suhail Borrego  Location S /S -01 MRN [id number]  : 1940 Date 2024       Current Admission Date: 2024  Current Admission Diagnosis:Intractable back pain   Patient Active Problem List    Diagnosis Date Noted    Intractable back pain 2024    Pressure ulcer 2024    Inflammation of sacroiliac joint (HCC) 2023    Internal hemorrhoids 2022    Diverticular disease 2022    Acute left-sided low back pain with left-sided sciatica 10/12/2022    Left lower quadrant abdominal pain 10/08/2022    Diverticulitis 2022    Stage 3 chronic kidney disease (HCC) 2022    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 2022    Trochanteric bursitis 2022    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HCC) 2022    Neuropathy 10/29/2021    Pulmonary fibrosis determined by high resolution computed tomography (HCC) 2021    Near syncope 2021    SOB (shortness of breath) 07/10/2021    Neck pain on left side 07/10/2021    Carotid stenosis, asymptomatic, bilateral 2020    Phimosis 2020    Bruit of left carotid artery 2020    Postlaminectomy syndrome of lumbar region     Lumbar radiculopathy     S/P lumbar fusion 2018    Lumbar stenosis 2018    Peripheral artery disease (HCC) 2018    GERD (gastroesophageal reflux disease) 2018    Vasomotor rhinitis 2018    Leg pain, posterior, left 2018    Macrocytic anemia 2018    Hx of CABG 2018    Former tobacco use     Hyperlipidemia     Primary hypertension     CAD (coronary artery disease) 2017    Spondylolisthesis of lumbar region 2016    Lumbar compression fracture (HCC) 2016      LOS (days): 0  Geometric Mean LOS (GMLOS) (days):   Days to GMLOS:     OBJECTIVE:              Current admission status: Inpatient       Preferred  Pharmacy:   [facility]/pharmacy #[id number] - [place] - [address]  [address] [facility] [address]  Phone: [phone] Fax: [phone]    [facility]/pharmacy #[id number] - [place] - [address] [facility]  [address] [facility] [address]  Phone: [phone] Fax: [phone]    Primary Care Provider: Mikaela Duenas DO    Primary Insurance: [employer]  Secondary Insurance:     ASSESSMENT:  Active Health Care Proxies    There are no active Health Care Proxies on file.                 Readmission Root Cause  30 Day Readmission: No    Patient Information  Admitted from:: Home  Mental Status: Confused  During Assessment patient was accompanied by: Daughter  Assessment information provided by:: Daughter, Patient  Primary Caregiver: Self  Support Systems: Self, Spouse/significant other, Children  County of Residence: [place]  What city do you live in?: [place]  Home entry access options. Select all that apply.: Stairs  Number of steps to enter home.: 3  Type of Current Residence: [facility]  Living Arrangements: Lives w/ Spouse/significant other  Is patient a ?: No    Activities of Daily Living Prior to Admission  Functional Status: Independent  Completes ADLs independently?: Yes  Ambulates independently?: Yes  Does patient use assisted devices?: Yes  Assisted Devices (DME) used: Wheelchair, Walker  Does patient currently own DME?: Yes  What DME does the patient currently own?: Wheelchair, Walker, Shower Chair, Bedside Commode  Does patient have a history of Outpatient Therapy (PT/OT)?: Yes  Does the patient have a history of Short-Term Rehab?: Yes ([facility])  Does patient have a history of HHC?: Yes  Does patient currently have HHC?: Yes    Current Home Health Care  Type of Current Home Care Services: Home PT, Home OT, Nurse visit  Current Home Health Agency:: Other (please enter name in comment) ([facility])  Current Home Health Follow-Up Provider:: PCP    Patient Information Continued  Income  Source: Pension/MCC  Does patient have prescription coverage?: Yes  Does patient receive dialysis treatments?: No  Does patient have a history of substance abuse?: No  Does patient have a history of Mental Health Diagnosis?: No    PHQ 2/9 Screening   Reviewed PHQ 2/9 Depression Screening Score?: No    Means of Transportation  Means of Transport to Appts:: Family transport      Social Determinants of Health (SDOH)      Flowsheet Row Most Recent Value   Housing Stability    In the last 12 months, was there a time when you were not able to pay the mortgage or rent on time? N   In the last 12 months, how many places have you lived? 1   In the last 12 months, was there a time when you did not have a steady place to sleep or slept in a shelter (including now)? N   Transportation Needs    In the past 12 months, has lack of transportation kept you from medical appointments or from getting medications? no   In the past 12 months, has lack of transportation kept you from meetings, work, or from getting things needed for daily living? No   Food Insecurity    Within the past 12 months, you worried that your food would run out before you got the money to buy more. Never true   Within the past 12 months, the food you bought just didn't last and you didn't have money to get more. Never true   Utilities    In the past 12 months has the electric, gas, oil, or water company threatened to shut off services in your home? No            DISCHARGE DETAILS:    Discharge planning discussed with:: Daughter, patient  Freedom of Choice: Yes  Comments - Freedom of Choice: CM reviewed PT rec of level 2, pt/daughter declining STR at this time, reported pt is current with [facility] and preference is to return home with [facility]  CM contacted family/caregiver?: Yes (Daughter present at bedside)  Were Treatment Team discharge recommendations reviewed with patient/caregiver?: Yes  Did patient/caregiver verbalize understanding of patient care  needs?: Yes  Were patient/caregiver advised of the risks associated with not following Treatment Team discharge recommendations?: Yes    Contacts  Patient Contacts: Madhavi  Relationship to Patient:: Family (Daughter)  Contact Method: In Person  Reason/Outcome: Continuity of Care, Emergency Contact, Referral, Discharge Planning    Requested Home Health Care         Is the patient interested in [facility] at discharge?: Yes  Home Health Discipline requested:: Nursing, Occupational Therapy, Physical Therapy  Home Health Agency Name:: Other (Pending confirmation from [facility])  HHA External Referral Reason (only applicable if external HHA name selected): Patient has established relationship with provider  Home Health Follow-Up Provider:: PCP  Home Health Services Needed:: Evaluate Functional Status and Safety, Gait/ADL Training, Strengthening/Theraputic Exercises to Improve Function  Homebound Criteria Met:: Uses an Assist Device (i.e. cane, walker, etc), Requires the Assistance of Another Person for Safe Ambulation or to Leave the Home  Supporting Clincal Findings:: Limited Endurance, Fatigues Easliy in Short Distances    DME Referral Provided  Referral made for DME?: No    Other Referral/Resources/Interventions Provided:  Interventions: Other (Specify), [facility]  Referral Comments: CM spoke with pt and daughter at bedside, introduced self and role with discharge planning. Daughter reported pt lives with his spouse in a ranch style home with 3 EL. Daughter reported there are 3 EL. Daughter reported pt was independent with ADLs and functional mobility. Daughter reported pt was using a RW and w/c. Daughter reported pt has a BSC and shower chair. Daughter reported pt has a hx of outpatient PT, STR at [place] and is current with [facility] for [facility]. Daughter reported spouse is POA. Daughter reported family assists with transportation needs. CM reviewed PT rec of level 2. Pt/daughter declining STR referrals at this time, with  preference to return home with [facility]. CM sent referral to [facility] via [employer]. Daughter expressed interest in CM ordering a hospital bed. Daughter aware CM will order hospital bed and [employer] company will f/u to coordinate delivery. CM will continue to follow.    Would you like to participate in our [facility] service program?  : No - Declined    Treatment Team Recommendation: Home with Home Health Care, Short Term Rehab  Discharge Destination Plan:: Home with Home Health Care

## 2024-05-01 NOTE — ASSESSMENT & PLAN NOTE
POA  Nursing to obtain imaging and upload to media  Offload, reposition  Wound care consultation.

## 2024-05-01 NOTE — ASSESSMENT & PLAN NOTE
CABG X4 with LIMA - LAD, SVG - RCA, OM2, & Diagonal ; Left Leg EVH; MATTHEW (Chest)  in 2018     On aspirin  & Statin ; patient is no longer on Plavix since 10/2023. Was ordered on admission 4/30 and discontinued 5/1 ; Med Rec is updated.

## 2024-05-01 NOTE — ASSESSMENT & PLAN NOTE
Follows with vascular  LEADS 2023:   RLE:   50-75% stenosis common femoral  >75% proximal popliteal vs occlusion of the distal superficial femoral artery with trickle flow   Tib/peroneal occlusive disease   Unreliable LIBERTY  Unobtainable toe pressures  LLE  50-75% stenosis proximal profunda femoral artery and mid superficial femoral artery  Tib/peroneal occlusive disease  Unreliable LIBERTY  Great toe pressure within healing range   Maintained on ASA, plavix, statin  Outpt follow up with [facility]

## 2024-05-01 NOTE — TELEPHONE ENCOUNTER
Chart reviewed. When patient was last seen in vascular office October '23 he was taking ASA and Plavix (and statin). Per office note, it was recommended to continue Asa and statin.    In telephone encounter 12/20/23, plavix refill was requested however given the information from OV in October it was NOT refilled and patient was to continue AsA 81mg and statin.     Other then that I can not speak to any other changes that happened outside vascular office or to what the patient had been taking at home.   We have not seen him back since that time.

## 2024-05-02 VITALS
OXYGEN SATURATION: 98 % | WEIGHT: 162.2 LBS | SYSTOLIC BLOOD PRESSURE: 95 MMHG | DIASTOLIC BLOOD PRESSURE: 54 MMHG | RESPIRATION RATE: 16 BRPM | BODY MASS INDEX: 25.46 KG/M2 | TEMPERATURE: 97.3 F | HEART RATE: 72 BPM | HEIGHT: 67 IN

## 2024-05-02 LAB
ANION GAP SERPL CALCULATED.3IONS-SCNC: 7 MMOL/L (ref 4–13)
BASOPHILS # BLD AUTO: 0.04 THOUSANDS/ÂΜL (ref 0–0.1)
BASOPHILS NFR BLD AUTO: 1 % (ref 0–1)
BUN SERPL-MCNC: 27 MG/DL (ref 5–25)
CALCIUM SERPL-MCNC: 9 MG/DL (ref 8.4–10.2)
CHLORIDE SERPL-SCNC: 109 MMOL/L (ref 96–108)
CO2 SERPL-SCNC: 25 MMOL/L (ref 21–32)
CREAT SERPL-MCNC: 1.18 MG/DL (ref 0.6–1.3)
EOSINOPHIL # BLD AUTO: 0.47 THOUSAND/ÂΜL (ref 0–0.61)
EOSINOPHIL NFR BLD AUTO: 6 % (ref 0–6)
ERYTHROCYTE [DISTWIDTH] IN BLOOD BY AUTOMATED COUNT: 12.8 % (ref 11.6–15.1)
GFR SERPL CREATININE-BSD FRML MDRD: 56 ML/MIN/1.73SQ M
GLUCOSE SERPL-MCNC: 91 MG/DL (ref 65–140)
HCT VFR BLD AUTO: 32.5 % (ref 36.5–49.3)
HGB BLD-MCNC: 10.3 G/DL (ref 12–17)
IMM GRANULOCYTES # BLD AUTO: 0.03 THOUSAND/UL (ref 0–0.2)
IMM GRANULOCYTES NFR BLD AUTO: 0 % (ref 0–2)
LYMPHOCYTES # BLD AUTO: 1.89 THOUSANDS/ÂΜL (ref 0.6–4.47)
LYMPHOCYTES NFR BLD AUTO: 24 % (ref 14–44)
MCH RBC QN AUTO: 32.5 PG (ref 26.8–34.3)
MCHC RBC AUTO-ENTMCNC: 31.7 G/DL (ref 31.4–37.4)
MCV RBC AUTO: 103 FL (ref 82–98)
MONOCYTES # BLD AUTO: 0.84 THOUSAND/ÂΜL (ref 0.17–1.22)
MONOCYTES NFR BLD AUTO: 10 % (ref 4–12)
NEUTROPHILS # BLD AUTO: 4.78 THOUSANDS/ÂΜL (ref 1.85–7.62)
NEUTS SEG NFR BLD AUTO: 59 % (ref 43–75)
NRBC BLD AUTO-RTO: 0 /100 WBCS
PLATELET # BLD AUTO: 186 THOUSANDS/UL (ref 149–390)
PMV BLD AUTO: 10.5 FL (ref 8.9–12.7)
POTASSIUM SERPL-SCNC: 4.6 MMOL/L (ref 3.5–5.3)
RBC # BLD AUTO: 3.17 MILLION/UL (ref 3.88–5.62)
SODIUM SERPL-SCNC: 141 MMOL/L (ref 135–147)
WBC # BLD AUTO: 8.05 THOUSAND/UL (ref 4.31–10.16)

## 2024-05-02 PROCEDURE — 85025 COMPLETE CBC W/AUTO DIFF WBC: CPT | Performed by: STUDENT IN AN ORGANIZED HEALTH CARE EDUCATION/TRAINING PROGRAM

## 2024-05-02 PROCEDURE — 80048 BASIC METABOLIC PNL TOTAL CA: CPT | Performed by: STUDENT IN AN ORGANIZED HEALTH CARE EDUCATION/TRAINING PROGRAM

## 2024-05-02 PROCEDURE — 99239 HOSP IP/OBS DSCHRG MGMT >30: CPT | Performed by: STUDENT IN AN ORGANIZED HEALTH CARE EDUCATION/TRAINING PROGRAM

## 2024-05-02 RX ORDER — FAMOTIDINE 10 MG
20 TABLET ORAL DAILY PRN
Start: 2024-05-02

## 2024-05-02 RX ORDER — FOLIC ACID 1 MG/1
1 TABLET ORAL DAILY
Qty: 90 TABLET | Refills: 0 | Status: SHIPPED | OUTPATIENT
Start: 2024-05-02

## 2024-05-02 RX ADMIN — ACETAMINOPHEN 975 MG: 325 TABLET, FILM COATED ORAL at 05:06

## 2024-05-02 RX ADMIN — FAMOTIDINE 10 MG: 20 TABLET, FILM COATED ORAL at 10:07

## 2024-05-02 RX ADMIN — FOLIC ACID 1 MG: 1 TABLET ORAL at 10:00

## 2024-05-02 RX ADMIN — GABAPENTIN 100 MG: 100 CAPSULE ORAL at 10:00

## 2024-05-02 RX ADMIN — OXYCODONE HYDROCHLORIDE AND ACETAMINOPHEN 500 MG: 500 TABLET ORAL at 10:00

## 2024-05-02 RX ADMIN — ATORVASTATIN CALCIUM 80 MG: 40 TABLET, FILM COATED ORAL at 09:57

## 2024-05-02 RX ADMIN — ASPIRIN 81 MG: 81 TABLET, COATED ORAL at 10:00

## 2024-05-02 RX ADMIN — ENOXAPARIN SODIUM 40 MG: 40 INJECTION SUBCUTANEOUS at 09:57

## 2024-05-02 NOTE — TELEPHONE ENCOUNTER
Relayed message per KEVIN Dinero about continuing ASA and statin. Patient verbalized understanding.

## 2024-05-02 NOTE — PLAN OF CARE
Problem: PAIN - ADULT  Goal: Verbalizes/displays adequate comfort level or baseline comfort level  Description: Interventions:  - Encourage patient to monitor pain and request assistance  - Assess pain using appropriate pain scale  - Administer analgesics based on type and severity of pain and evaluate response  - Implement non-pharmacological measures as appropriate and evaluate response  - Consider cultural and social influences on pain and pain management  - Notify physician/advanced practitioner if interventions unsuccessful or patient reports new pain  Outcome: Progressing     Problem: INFECTION - ADULT  Goal: Absence or prevention of progression during hospitalization  Description: INTERVENTIONS:  - Assess and monitor for signs and symptoms of infection  - Monitor lab/diagnostic results  - Monitor all insertion sites, i.e. indwelling lines, tubes, and drains  - Monitor endotracheal if appropriate and nasal secretions for changes in amount and color  - [place] appropriate cooling/warming therapies per order  - Administer medications as ordered  - Instruct and encourage patient and family to use good hand hygiene technique  - Identify and instruct in appropriate isolation precautions for identified infection/condition  Outcome: Progressing  Goal: Absence of fever/infection during neutropenic period  Description: INTERVENTIONS:  - Monitor WBC    Outcome: Progressing     Problem: SAFETY ADULT  Goal: Patient will remain free of falls  Description: INTERVENTIONS:  - Educate patient/family on patient safety including physical limitations  - Instruct patient to call for assistance with activity   - Consult OT/PT to assist with strengthening/mobility   - Keep Call bell within reach  - Keep bed low and locked with side rails adjusted as appropriate  - Keep care items and personal belongings within reach  - Initiate and maintain comfort rounds  - Make Fall Risk Sign visible to staff  - Offer Toileting every 2 Hours,  in advance of need  - Initiate/Maintain bed/chair alarm  - Obtain necessary fall risk management equipment: yellow socks  - Apply yellow socks and bracelet for high fall risk patients  - Consider moving patient to room near nurses station  Outcome: Progressing  Goal: Maintain or return to baseline ADL function  Description: INTERVENTIONS:  -  Assess patient's ability to carry out ADLs; assess patient's baseline for ADL function and identify physical deficits which impact ability to perform ADLs (bathing, care of mouth/teeth, toileting, grooming, dressing, etc.)  - Assess/evaluate cause of self-care deficits   - Assess range of motion  - Assess patient's mobility; develop plan if impaired  - Assess patient's need for assistive devices and provide as appropriate  - Encourage maximum independence but intervene and supervise when necessary  - Involve family in performance of ADLs  - Assess for home care needs following discharge   - Consider OT consult to assist with ADL evaluation and planning for discharge  - Provide patient education as appropriate  Outcome: Progressing  Goal: Maintains/Returns to pre admission functional level  Description: INTERVENTIONS:  - Perform AM-PAC 6 Click Basic Mobility/ Daily Activity assessment daily.  - Set and communicate daily mobility goal to care team and patient/family/caregiver.   - Collaborate with rehabilitation services on mobility goals if consulted  - Perform Range of Motion   - Reposition patient every 2 hours.  - Ambulate patient 3 times a day  - Out of bed to chair 3 times a day   - Out of bed for meals 3 times a day  - Out of bed for toileting  - Record patient progress and toleration of activity level   Outcome: Progressing     Problem: DISCHARGE PLANNING  Goal: Discharge to home or other facility with appropriate resources  Description: INTERVENTIONS:  - Identify barriers to discharge w/patient and caregiver  - Arrange for needed discharge resources and transportation as  appropriate  - Identify discharge learning needs (meds, wound care, etc.)  - Arrange for interpretive services to assist at discharge as needed  - Refer to Case Management Department for coordinating discharge planning if the patient needs post-hospital services based on physician/advanced practitioner order or complex needs related to functional status, cognitive ability, or social support system  Outcome: Progressing     Problem: Knowledge Deficit  Goal: Patient/family/caregiver demonstrates understanding of disease process, treatment plan, medications, and discharge instructions  Description: Complete learning assessment and assess knowledge base.  Interventions:  - Provide teaching at level of understanding  - Provide teaching via preferred learning methods  Outcome: Progressing     Problem: Prexisting or High Potential for Compromised Skin Integrity  Goal: Skin integrity is maintained or improved  Description: INTERVENTIONS:  - Identify patients at risk for skin breakdown  - Assess and monitor skin integrity  - Assess and monitor nutrition and hydration status  - Monitor labs   - Assess for incontinence   - Turn and reposition patient  - Assist with mobility/ambulation  - Relieve pressure over bony prominences  - Avoid friction and shearing  - Provide appropriate hygiene as needed including keeping skin clean and dry  - Evaluate need for skin moisturizer/barrier cream  - Collaborate with interdisciplinary team   - Patient/family teaching  - Consider wound care consult   Outcome: Progressing

## 2024-05-02 NOTE — DISCHARGE INSTR - OTHER ORDERS
Skin care plans:  1-Wash Sacro Buttocks area with soap and water. Pat Dry. Apply TRIAD paste directly to open areas of buttocks TID and PRN.  2-Hydraguard to bilateral heel, elbows, buttocks, BID and PRN.  3-Elevate heels to offload pressure.  4-Ehob cushion when out of bed.  5-Turn/reposition q2h for pressure re-distribution on skin.  6-Moisturize skin daily with skin nourishing cream.

## 2024-05-02 NOTE — UTILIZATION REVIEW
NOTIFICATION OF INPATIENT ADMISSION   AUTHORIZATION REQUEST   SERVICING FACILITY:   [place] [address]  Tax ID: [id number]  NPI: [id number]   ATTENDING PROVIDER:  Attending Name and NPI#: Phill Kinsey Do [[id number]]  Address: [address] [place] [address]  Phone: [phone]     ADMISSION INFORMATION:  Place of Service: Inpatient [facility]  Place of Service Code: 21  Inpatient Admission Date/Time: 5/1/24  2:54 PM  Discharge Date/Time: No discharge date for patient encounter.  Admitting Diagnosis Code/Description:  Back pain [M54.9]  Acute midline low back pain without sciatica [M54.50]     UTILIZATION REVIEW CONTACT:  Romleia Altman Utilization   Network Utilization Review Department  Phone: [phone]  Fax: [phone]  Email: [email or web address]  Contact for approvals/pending authorizations, clinical reviews, and discharge.     PHYSICIAN ADVISORY SERVICES:  Medical Necessity Denial & Itjp-oj-Ktnq Review  Phone: [phone]  Fax: [phone]  Email: [email or web address]     DISCHARGE SUPPORT TEAM:  For Patients Discharge Needs & Updates  Phone: [phone] opt. 2 Fax: [phone]  Email: [email or web address]

## 2024-05-02 NOTE — DISCHARGE SUMMARY
[facility]  Discharge- Suhail Borrego 1940, 83 y.o. male MRN: [id number]  Unit/Bed#: S -01 Encounter: [id number]  Primary Care Provider: Mikaela Duenas DO   Date and time admitted to hospital: 4/30/2024  4:02 PM    * Intractable back pain  Assessment & Plan  11/3/2016  L2-S1 POSTERIOR LUMBAR FUSION AND DECOMPRESSION (Impulse), Posterior lateral fixation; dural repair. (Spine Thoracic)     Presented with 6 weeks of severe back pain, acute on chronic, worse when standing from seated position or walking, relative improve when pending forward. Uses Rolator. 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 Leg raise test positive bilaterally.     CT 3/29/2024: no acute findings  Chronic compression fracture T12, L1.  No new compression fracture.  Stable post op appearance of lumbar spine with posterior fusion and decompression   Persistent grade 2 anterior spondylolisthesis of L5 upon S1 with severe foraminal narrowing  Degenerative changes with mild to moderate canal stenosis and foraminal narrowing, grossly stable    Pain control   Schedule tylenol, lidoderm  Continue home Rose 100 mg BID ; avoiding higher dose given age and risk of side effects   PRN robaxin  PRN oxycodone 2.5 moderate, 5 mg severe  PT OT  Neurosurgery consultation , input appreciated: XR spine lumbar complete w bending minimum 6 views  was ordered and inital recs for conservative management.   If no surgical intervention indicated or offered; will refer to pain management on discharge to discuss lead placement vs injections.     Neurosurgery cleared for discharge ; the following instructions explained in verbal as well as in written :   Referral to Spine and Pain Management  in place  Referral to Outpatient Physical Therapy in Place  As needed acetaminophen 650-1000 mg every 8 hours as needed for mild pain   As needed Ibuprofen 400 mg every 12 hours as needed for  moderate-severe but no empty stomach (take after food)  Continue to use lidocaine patch on back where it hurts   STOP taking Clopidogrel   May start daily folic acid as prescribed   Continue use walker     Hx of CABG  Assessment & Plan  CABG X4 with LIMA - LAD, SVG - RCA, OM2, & Diagonal ; Left Leg EVH; MATTHEW (Chest)  in 2018     On aspirin  & Statin ; patient is no longer on Plavix since 10/2023. Was ordered on admission 4/30 and discontinued 5/1 ; Med Rec is updated.     Pressure ulcer  Assessment & Plan  POA  Nursing to obtain imaging and upload to media  Offload, reposition  Wound care consultation.     Peripheral artery disease (HCC)  Assessment & Plan  Follows with vascular  LEADS 2023:   RLE:   50-75% stenosis common femoral  >75% proximal popliteal vs occlusion of the distal superficial femoral artery with trickle flow   Tib/peroneal occlusive disease   Unreliable LIBERTY  Unobtainable toe pressures  LLE  50-75% stenosis proximal profunda femoral artery and mid superficial femoral artery  Tib/peroneal occlusive disease  Unreliable LIBERTY  Great toe pressure within healing range     Maintained on ASA &  statin ; it was clarified and confirmed that vascular surgery recs from 10/2023 was to continue ASA and Statin and dc Plavix. Med Rec updated.   Outpt follow up with vascular    Macrocytic anemia  Assessment & Plan  Already taking B12 2000 units ; advised to add folic acid daily and to follow with pcp.    Primary hypertension  Assessment & Plan  BP stable  Maintained on metoprolol        Medical Problems       Resolved Problems  Date Reviewed: 5/2/2024   None       Discharging Physician / Practitioner: Phill Kinsey DO  PCP: Mikaela Duenas DO  Admission Date:   Admission Orders (From admission, onward)       Ordered        05/01/24 1454  INPATIENT ADMISSION  Once            04/30/24 1756  Place in Observation  Once                          Discharge Date: 05/02/24    Consultations During Hospital Stay:  Neurosurgery    PT/OT   Wound care     Procedures Performed:   None     Significant / incidental Findings / Test Results:   3/29/24 CT Spine Thoracic and Lumbar : Stable scoliosis of the thoracic and lumbar spine. There are chronic appearing compression fractures of the superior endplates of T12 and L1. With no new compression fracture.Stable postoperative appearance of the lumbar spine with posterior fusion and decompression. Persistent grade 2 anterior spondylolisthesis of L5 upon S1 with severe foraminal narrowing. Additional lower thoracic and lumbar degenerative change with mild to moderate canal stenosis and foraminal narrowing, grossly stable.      Incidental Findings:       Test Results Pending at Discharge (will require follow up):   Xray Spine Lumbar Complete w bending minium 6 views      Outpatient Tests Requested:  Follow up with PCP , Spine and Pain management clinic and PT/OT     Complications:  none     Reason for Admission: back pain    Hospital Course:   Suhail Borrego is a 83 y.o. male patient who originally presented to the hospital on 4/30/2024 due to acute on chronic back pain - see above for more details ; evaluated by neurosurgery and deemed no indication for intervention. Recs for conservative pain management measures and follow up with pain management for possible spinal cord stimulator vs other modalities of pain control. No red flag symptoms , no saddle paresthesia or incontinence. Above explained to the patient and to his daughter in details . He was prescribed prn low dose oxy/dilaudid during the admission but he did not need or use them. The above instructions provided on discharge verbally and in written as outline in the A&P.     On day of discharge seen and examined; denies any new symptoms ; pain is under control worse only after walking for 1+ minutes. He uses walker/Rolator. Referral to pain management and PT/OT in place.      Please see above list of diagnoses and related plan for additional  "information.     Condition at Discharge: stable    Discharge Day Visit / Exam:   Subjective:  see above .,   Vitals: Blood Pressure: 95/54 (05/02/24 0959)  Pulse: 72 (05/02/24 0708)  Temperature: (!) 97.3 °F (36.3 °C) (05/02/24 0708)  Temp Source: Oral (05/01/24 0749)  Respirations: 16 (05/02/24 0708)  Height: 5' 7\" (170.2 cm) (04/30/24 1830)  Weight - Scale: 73.6 kg (162 lb 3.2 oz) (04/30/24 1830)  SpO2: 98 % (05/02/24 0949)  Exam:   Physical Exam   - GEN: Appears well, alert and oriented x 3, pleasant and cooperative, in no acute distress  - HEENT: Anicteric, mucous membranes moist, PERRL and EOMI   - NECK: No lymphadenopathy, JVD or carotid bruits   - HEART: RRR, normal S1 and S2, no murmurs, clicks, gallops or rubs   - LUNGS: Clear to auscultation bilaterally; no wheezes, rales, or rhonchi  - ABDOMEN: Normal bowel sounds, soft, no tenderness, no distention, no organomegaly or masses felt on exam.   - EXTREMITIES: Peripheral pulses normal; no clubbing, cyanosis; 1+ bilateral leg edema below knee level   - NEURO: No focal findings, CN II-XII are grossly intact. Positive bilateral leg raise test   - Musculoskeletal: 5/5 strength, normal ROM, no swollen or erythematous joints.   - SKIN: Normal without suspicious lesions on exposed skin    Discussion with Family: Updated  (daughter) via phone.    Discharge instructions/Information to patient and family:   See after visit summary for information provided to patient and family.      Provisions for Follow-Up Care:  See after visit summary for information related to follow-up care and any pertinent home health orders.      Mobility at time of Discharge:   Basic Mobility Inpatient Raw Score: 16  JH-HLM Goal: 5: Stand one or more mins  JH-HLM Achieved: 7: Walk 25 feet or more  HLM Goal achieved. Continue to encourage appropriate mobility.     Disposition:   Home    Planned Readmission: no      Discharge Statement:  I spent 50 minutes discharging the patient. " This time was spent on the day of discharge. I had direct contact with the patient on the day of discharge. Greater than 50% of the total time was spent examining patient, answering all patient questions, arranging and discussing plan of care with patient as well as directly providing post-discharge instructions.  Additional time then spent on discharge activities.    Discharge Medications:  See after visit summary for reconciled discharge medications provided to patient and/or family.      **Please Note: This note may have been constructed using a voice recognition system**

## 2024-05-02 NOTE — WOUND OSTOMY CARE
"Progress Note - Wound   Suhail Borrego 83 y.o. male MRN: [id number]  Unit/Bed#: S -01 Encounter: [id number]      Assessment:     Patient is a 84 yo male that was admitted to [facility]  for treatment of intractable back pain. Patient has a PMH of chronic back pain prior lumbar fusion, PAD, hypertension, CAD s/p CABG, CKD. Patient is alert and oriented times four, [place], and agreeable to assessment. Patient is independent for turning and repositioning. Patient is continent of bowel and bladder. On assessment, patient is OOB to recliner with waffle cushion in place and able to stand independently for assessment. Patient is written for discharge, and is fully dressed in street clothing including socks an shoes.     Wound Care was consulted for sacral pressure ulcer.     POA Bilateral Buttocks Stage 2 Pressure Injury - scattered irregular shaped areas of partial thickness skin loss. Wound bed is 100% pink non blanchable tissue. Estelle wound is callused flaky skin. No drainage appreciated. Suspect etiology of pressure and friction component. Patient declines any episodes of incontinence; however patients daughter endorses the patient has been spending the \"last few weeks\" in the recliner.     Sacrum - skin is intact and blanchable with no open aspects or drainage noted.     Assessment of heels declined by patient as he is \"ready to go.\"    Educated patient and daughter on importance of frequent offloading of pressure via turning, repositioning, and weight-shifting.     No induration, fluctuance, odor, warmth, or purulence noted to the above noted wounds. Foam dressing removed from buttocks, Triad paste applied. Patient tolerated well. Primary nurse aware of plan of care. See flow sheets for more detailed assessment findings. Will follow along.     Skin care plans:  1-Wash Sacro Buttocks area with soap and water. Pat Dry. Apply TRIAD paste directly to open areas of buttocks TID and PRN.  2-Hydraguard to bilateral heel, " elbows, buttocks, BID and PRN.  3-Elevate heels to offload pressure.  4-Ehob cushion when out of bed.  5-Turn/reposition q2h for pressure re-distribution on skin.  6-Moisturize skin daily with skin nourishing cream.      Wound 04/30/24 Pressure Injury Buttocks Bilateral (Active)   Wound Image   05/02/24 0949   Wound Description Pink;Fragile 05/02/24 0949   Pressure Injury Stage 2 05/02/24 0949   Estelle-wound Assessment Scaly 05/02/24 0949   Wound Length (cm) 4 cm 05/02/24 0949   Wound Width (cm) 2 cm 05/02/24 0949   Wound Depth (cm) 0.1 cm 05/02/24 0949   Wound Surface Area (cm^2) 8 cm^2 05/02/24 0949   Wound Volume (cm^3) 0.8 cm^3 05/02/24 0949   Calculated Wound Volume (cm^3) 0.8 cm^3 05/02/24 0949   Drainage Amount None 05/02/24 0949   Non-staged Wound Description Partial thickness 05/02/24 0949   Treatments Cleansed;Site care 05/02/24 0949   Dressing Protective barrier 05/02/24 0949   Dressing Changed New 05/02/24 0949   Patient Tolerance Tolerated well 05/02/24 0949   Dressing Status Clean;Dry;Intact 05/02/24 0949       Call or tigertext with any questions  Wound Care will continue to follow while inpatient.      Cassi Gamboa BSN RN CCRN  Wound and Ostomy Care

## 2024-05-02 NOTE — PLAN OF CARE
Problem: PAIN - ADULT  Goal: Verbalizes/displays adequate comfort level or baseline comfort level  Description: Interventions:  - Encourage patient to monitor pain and request assistance  - Assess pain using appropriate pain scale  - Administer analgesics based on type and severity of pain and evaluate response  - Implement non-pharmacological measures as appropriate and evaluate response  - Consider cultural and social influences on pain and pain management  - Notify physician/advanced practitioner if interventions unsuccessful or patient reports new pain  Outcome: Progressing     Problem: Prexisting or High Potential for Compromised Skin Integrity  Goal: Skin integrity is maintained or improved  Description: INTERVENTIONS:  - Identify patients at risk for skin breakdown  - Assess and monitor skin integrity  - Assess and monitor nutrition and hydration status  - Monitor labs   - Assess for incontinence   - Turn and reposition patient  - Assist with mobility/ambulation  - Relieve pressure over bony prominences  - Avoid friction and shearing  - Provide appropriate hygiene as needed including keeping skin clean and dry  - Evaluate need for skin moisturizer/barrier cream  - Collaborate with interdisciplinary team   - Patient/family teaching  - Consider wound care consult   Outcome: Progressing     Problem: SAFETY ADULT  Goal: Maintain or return to baseline ADL function  Description: INTERVENTIONS:  -  Assess patient's ability to carry out ADLs; assess patient's baseline for ADL function and identify physical deficits which impact ability to perform ADLs (bathing, care of mouth/teeth, toileting, grooming, dressing, etc.)  - Assess/evaluate cause of self-care deficits   - Assess range of motion  - Assess patient's mobility; develop plan if impaired  - Assess patient's need for assistive devices and provide as appropriate  - Encourage maximum independence but intervene and supervise when necessary  - Involve family in  performance of ADLs  - Assess for home care needs following discharge   - Consider OT consult to assist with ADL evaluation and planning for discharge  - Provide patient education as appropriate  Outcome: Progressing     Problem: SAFETY ADULT  Goal: Patient will remain free of falls  Description: INTERVENTIONS:  - Educate patient/family on patient safety including physical limitations  - Instruct patient to call for assistance with activity   - Consult OT/PT to assist with strengthening/mobility   - Keep Call bell within reach  - Keep bed low and locked with side rails adjusted as appropriate  - Keep care items and personal belongings within reach  - Initiate and maintain comfort rounds  - Make Fall Risk Sign visible to staff  - Offer Toileting every two hours, in advance of need  - Initiate/Maintain bed and chair alarm  - Obtain necessary fall risk management equipment:   - Apply yellow socks and bracelet for high fall risk patients  - Consider moving patient to room near nurses station  Outcome: Progressing

## 2024-05-02 NOTE — DISCHARGE INSTR - AVS FIRST PAGE
Referral to Spine and Pain Management  in place  Referral to Outpatient Physical Therapy in Place  As needed acetaminophen 650-1000 mg every 8 hours as needed for mild pain   As needed Ibuprofen 400 mg every 12 hours as needed for moderate-severe but no empty stomach (take after food)  Continue to use lidocaine patch on back where it hurts   STOP taking Clopidogrel   May start daily folic acid as prescribed   Continue use walker

## 2024-05-02 NOTE — TREATMENT PLAN
Reviewed flexion/extension imaging of lumbar spine. Appears stable with stable lumbar fusion. Anterolisthesis does not increase with flexion/extension.    Continue multimodal pain regimen.     Follow up as needed. Neurosurgery will sign off.

## 2024-05-02 NOTE — CASE MANAGEMENT
Case Management Discharge Planning Note    Patient name Suhail Borrego  Location S /S -01 MRN [id number]  : 1940 Date 2024       Current Admission Date: 2024  Current Admission Diagnosis:Intractable back pain   Patient Active Problem List    Diagnosis Date Noted    Intractable back pain 2024    Pressure ulcer 2024    Inflammation of sacroiliac joint (HCC) 2023    Internal hemorrhoids 2022    Diverticular disease 2022    Acute left-sided low back pain with left-sided sciatica 10/12/2022    Left lower quadrant abdominal pain 10/08/2022    Diverticulitis 2022    Stage 3 chronic kidney disease (HCC) 2022    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 2022    Trochanteric bursitis 2022    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HCC) 2022    Neuropathy 10/29/2021    Pulmonary fibrosis determined by high resolution computed tomography (HCC) 2021    Near syncope 2021    SOB (shortness of breath) 07/10/2021    Neck pain on left side 07/10/2021    Carotid stenosis, asymptomatic, bilateral 2020    Phimosis 2020    Bruit of left carotid artery 2020    Postlaminectomy syndrome of lumbar region     Lumbar radiculopathy     S/P lumbar fusion 2018    Lumbar stenosis 2018    Peripheral artery disease (HCC) 2018    GERD (gastroesophageal reflux disease) 2018    Vasomotor rhinitis 2018    Leg pain, posterior, left 2018    Macrocytic anemia 2018    Hx of CABG 2018    Former tobacco use     Hyperlipidemia     Primary hypertension     CAD (coronary artery disease) 2017    Spondylolisthesis of lumbar region 2016    Lumbar compression fracture (HCC) 2016      LOS (days): 1  Geometric Mean LOS (GMLOS) (days): 2.9  Days to GMLOS:2.1     OBJECTIVE:  Risk of Unplanned Readmission Score: 14.97         Current admission status:  Inpatient   Preferred Pharmacy:   [facility]/pharmacy #[id number] - [place] - [address]  [address] [facility] [address]  Phone: [phone] Fax: [phone]    [facility]/pharmacy #[id number] - [place] - [address]  [address] [facility] [place] [address]  Phone: [phone] Fax: [phone]    Primary Care Provider: Mikaela Duenas DO    Primary Insurance: [employer] REP  Secondary Insurance:     DISCHARGE DETAILS:  Patient is cleared for discharge to home. Followed-up with referral for [facility] and they declined services due to not being in network with pt's insurance. CM reached out to liaison as per previous CM note pt current with agency. Liaison would check. In meantime, CM reached out to daughter Madhavi, who actually stated pt was not receiving any homecare services prior to admission and not current with [facility] currently. FOC discussed with daughter. [facility] preferred but open to additional referrals in case first preference can not accept. CM placed additional referrals to [facility]. [facility] was the first to accept, [facility] was still pending review and pt is cleared for discharge. Daughter informed and agreeable with [facility]. Agency reserved in [facility]. Also, discussed daughter's request for hospital bed. Daughter stated she never inquired about it. Per parachute, pt does not have enough supporting clinical documentation to be eligible for hospital bed either way. Daughter transporting pt home.                               Requested Home Health Care         Home Health Discipline requested:: Nursing, Physical Therapy, Occupational Therapy  Home Health Agency Name:: [facility]  Home Health Follow-Up Provider:: PCP  Home Health Services Needed:: Evaluate Functional Status and Safety, Gait/ADL Training, Strengthening/Theraputic Exercises to Improve Function  Homebound Criteria Met:: Uses an Assist Device (i.e. cane, walker, etc), Requires the Assistance of Another Person for Safe Ambulation or  to Leave the Home  Supporting Clincal Findings:: Limited Endurance, Fatigues Easliy in Short Distances

## 2024-05-02 NOTE — ASSESSMENT & PLAN NOTE
11/3/2016  L2-S1 POSTERIOR LUMBAR FUSION AND DECOMPRESSION (Impulse), Posterior lateral fixation; dural repair. (Spine Thoracic)     Presented with 6 weeks of severe back pain, acute on chronic, worse when standing from seated position or walking, relative improve when pending forward. Uses Rolator. 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 Leg raise test positive bilaterally.     CT 3/29/2024: no acute findings  Chronic compression fracture T12, L1.  No new compression fracture.  Stable post op appearance of lumbar spine with posterior fusion and decompression   Persistent grade 2 anterior spondylolisthesis of L5 upon S1 with severe foraminal narrowing  Degenerative changes with mild to moderate canal stenosis and foraminal narrowing, grossly stable    Pain control   Schedule tylenol, lidoderm  Continue home Rose 100 mg BID ; avoiding higher dose given age and risk of side effects   PRN robaxin  PRN oxycodone 2.5 moderate, 5 mg severe  PT OT  Neurosurgery consultation , input appreciated: XR spine lumbar complete w bending minimum 6 views  was ordered and inital recs for conservative management.   If no surgical intervention indicated or offered; will refer to pain management on discharge to discuss lead placement vs injections.     Neurosurgery cleared for discharge ; the following instructions explained in verbal as well as in written :   Referral to Spine and Pain Management  in place  Referral to Outpatient Physical Therapy in Place  As needed acetaminophen 650-1000 mg every 8 hours as needed for mild pain   As needed Ibuprofen 400 mg every 12 hours as needed for moderate-severe but no empty stomach (take after food)  Continue to use lidocaine patch on back where it hurts   STOP taking Clopidogrel   May start daily folic acid as prescribed   Continue use walker

## 2024-05-03 ENCOUNTER — TRANSITIONAL CARE MANAGEMENT (OUTPATIENT)
Age: 84
End: 2024-05-03

## 2024-05-03 DIAGNOSIS — Z71.89 COMPLEX CARE COORDINATION: Primary | ICD-10-CM

## 2024-05-03 NOTE — TELEPHONE ENCOUNTER
Patient has not been seen since 6/15/21.  Looks like a lot of his medications were dc'd because he was in the hospital.  What do you think FQ?

## 2024-05-03 NOTE — TELEPHONE ENCOUNTER
Caller: patient daughter Marisol     Doctor: MARCIN    Reason for call: patient would like to cancel the [facility]. Wants to stay with Dr BURCH  Patient is having sever pain and would like to know what he is able to do    Please call daughter Marisol   Would like to know if he can get an MRI order  Call back#:

## 2024-05-03 NOTE — ASSESSMENT & PLAN NOTE
Follows with vascular  LEADS 2023:   RLE:   50-75% stenosis common femoral  >75% proximal popliteal vs occlusion of the distal superficial femoral artery with trickle flow   Tib/peroneal occlusive disease   Unreliable LIBERTY  Unobtainable toe pressures  LLE  50-75% stenosis proximal profunda femoral artery and mid superficial femoral artery  Tib/peroneal occlusive disease  Unreliable LIBERTY  Great toe pressure within healing range     Maintained on ASA &  statin ; it was clarified and confirmed that vascular surgery recs from 10/2023 was to continue ASA and Statin and dc Plavix. Med Rec updated.   Outpt follow up with vascular   No

## 2024-05-03 NOTE — UTILIZATION REVIEW
NOTIFICATION OF ADMISSION DISCHARGE   This is a Notification of Discharge from [facility]. Please be advised that this patient has been discharge from our facility. Below you will find the admission and discharge date and time including the patient’s disposition.   UTILIZATION REVIEW CONTACT:  Romelia Altman  Utilization   Network Utilization Review Department  Phone: [phone] carefully listen to the prompts. All voicemails are confidential.  Email: [email or web address]     ADMISSION INFORMATION  PRESENTATION DATE: 4/30/2024  4:02 PM  OBERVATION ADMISSION DATE:   INPATIENT ADMISSION DATE: 5/1/24  2:54 PM   DISCHARGE DATE: 5/2/2024  2:13 PM   DISPOSITION:Home with Home Health Care    Network Utilization Review Department  ATTENTION: Please call with any questions or concerns to [phone] and carefully listen to the prompts so that you are directed to the right person. All voicemails are confidential.   For Discharge needs, contact Care Management DC Support Team at [phone] opt. [phone]  Send all requests for admission clinical reviews, approved or denied determinations and any other requests to dedicated fax number below belonging to the campus where the patient is receiving treatment. List of dedicated fax numbers for the Facilities:  FACILITY NAME UR FAX NUMBER   ADMISSION DENIALS (Administrative/Medical Necessity) [phone]   DISCHARGE SUPPORT TEAM ([facility]) [phone]   PARENT CHILD HEALTH (Maternity/NICU/Pediatrics) [phone]   [facility] [phone]   [facility] [phone]   [facility] [phone]   [facility] [phone]   [facility] [phone]   [facility] [phone]   [facility] [phone]   [facility]  [phone]   [facility] [phone]   [facility] [phone]   [facility] [phone]   [facility] [phone]

## 2024-05-03 NOTE — TELEPHONE ENCOUNTER
Caller: pts daughter    Doctor: preston    Reason for call: pt is in a lot of pain and daughter is stating that he can not wait until may 31 to be seen. She wants to talk to a rn. She also wants to do a CHRISTINE to [place] to get him in sooner.    Call back#: [phone]

## 2024-05-03 NOTE — TELEPHONE ENCOUNTER
Caller: pts daughter    Doctor/Office: preston    Callback#: [phone]        Patient is requesting a transfer of care for the following reason: needs sooner appt.        Doctor: preston    Would you release patient from your care?      Doctor: alhaji    Would you take patient on as a patient?        Please advise,   Thank you.

## 2024-05-06 ENCOUNTER — PATIENT OUTREACH (OUTPATIENT)
Dept: FAMILY MEDICINE CLINIC | Facility: OTHER | Age: 84
End: 2024-05-06

## 2024-05-06 ENCOUNTER — OFFICE VISIT (OUTPATIENT)
Dept: PAIN MEDICINE | Facility: CLINIC | Age: 84
End: 2024-05-06
Payer: COMMERCIAL

## 2024-05-06 VITALS
DIASTOLIC BLOOD PRESSURE: 56 MMHG | BODY MASS INDEX: 25.69 KG/M2 | WEIGHT: 164 LBS | SYSTOLIC BLOOD PRESSURE: 108 MMHG | HEART RATE: 88 BPM

## 2024-05-06 DIAGNOSIS — G89.4 CHRONIC PAIN SYNDROME: Primary | ICD-10-CM

## 2024-05-06 DIAGNOSIS — M96.1 POSTLAMINECTOMY SYNDROME OF LUMBAR REGION: ICD-10-CM

## 2024-05-06 DIAGNOSIS — M47.816 LUMBAR SPONDYLOSIS: ICD-10-CM

## 2024-05-06 PROCEDURE — G2211 COMPLEX E/M VISIT ADD ON: HCPCS | Performed by: ANESTHESIOLOGY

## 2024-05-06 PROCEDURE — 99214 OFFICE O/P EST MOD 30 MIN: CPT | Performed by: ANESTHESIOLOGY

## 2024-05-06 RX ORDER — TRAMADOL HYDROCHLORIDE 50 MG/1
50 TABLET ORAL 2 TIMES DAILY PRN
Qty: 30 TABLET | Refills: 0 | Status: SHIPPED | OUTPATIENT
Start: 2024-05-06

## 2024-05-06 NOTE — PROGRESS NOTES
In basket message received with a patient referral from the HRR report.Chart reviewed.  Patient was admitted to [facility] with intractable back pain on 4/30. Patient has a history of hypertension, CABG, anemia peripheral artery disease,and a pressure ulcer.He discharged on 5/2.    I spoke with patient who reports he was seen by spine and pain this morning and he feels that their treatment plan will help.  He was started om Ultram 50 mg. His Voltaren was discontinued.   He thanked me for calling but declined further outreach.

## 2024-05-06 NOTE — PROGRESS NOTES
Assessment:  1. Chronic pain syndrome    2. Lumbar spondylosis    3. Postlaminectomy syndrome of lumbar region        Plan:  The patient is experiencing chronic pain in his lower back related to underlying lumbar spondylosis as well as a postlaminectomy syndrome.  At this time I discussed that the majority of his pain is over his lumbar facets at L4-5 and L5-S1 with positive facet loading so I discussed performing bilateral L3-5 medial branch blocks in anticipation of radiofrequency ablation.  If that is not successful, then we discussed spinal cord stimulation as an effective treatment option for chronic back and leg pain related to lumbar postlaminectomy syndrome.    For now, I will start him on tramadol 50 mg twice a day as needed.  He was apprised most common side effects including constipation and sedation.    There are risks associated with opioid medications, including dependence, addiction and tolerance. The patient understands and agrees to use these medications only as prescribed. Potential side effects of the medications include, but are not limited to, constipation, drowsiness, addiction, impaired judgment and risk of fatal overdose if not taken as prescribed. The patient was warned against driving while taking sedation medications.  Sharing medications is a felony. At this point in time, the patient is showing no signs of addiction, abuse, diversion or suicidal ideation.    [place] Prescription Drug Monitoring Program report was reviewed and was appropriate     Complete risks and benefits including bleeding, infection, tissue reaction, nerve injury and allergic reaction were discussed. The approach was demonstrated using models and literature was provided. Verbal and written consent was obtained.    My impressions and treatment recommendations were discussed in detail with the patient who verbalized understanding and had no further questions.  Discharge instructions were provided. I personally saw  and examined the patient and I agree with the above discussed plan of care.    Orders Placed This Encounter   Procedures   • FL spine and pain procedure     Standing Status:   Future     Standing Expiration Date:   5/6/2028     Order Specific Question:   Reason for Exam:     Answer:   Bilateral L3-5 MBB     Order Specific Question:   Anticoagulant hold needed?     Answer:   No     New Medications Ordered This Visit   Medications   • traMADol (Ultram) 50 mg tablet     Sig: Take 1 tablet (50 mg total) by mouth 2 (two) times a day as needed for moderate pain     Dispense:  30 tablet     Refill:  0       History of Present Illness:  Suhail Borrego is a 83 y.o. male who presents for a follow up office visit in regards to Back Pain.   The patient has a history of prior lumbar spinal fusion surgery for spinal stenosis several years ago which unfortunately did not help him.  He was recently hospitalized for severe lower back pain where updated CT imaging of the thoracolumbar spine showed chronic compression fractures above the fusion at T12 and L1.  He reports ongoing severe pain primarily in the lower back that radiates into the buttocks and hips rated 10/10 on numeric rating scale when he is doing any sort of activity.  He is unable to walk distances because of the pain.  He does use a walker to prevent falls due to his balance issues.  He denies any pain in the legs or any weakness.        I have personally reviewed and/or updated the patient's past medical history, past surgical history, family history, social history, current medications, allergies, and vital signs today.     Review of Systems   Respiratory:  Negative for shortness of breath.    Cardiovascular:  Negative for chest pain.   Gastrointestinal:  Negative for constipation, diarrhea, nausea and vomiting.   Musculoskeletal:  Positive for back pain. Negative for arthralgias, gait problem, joint swelling and myalgias.   Skin:  Negative for rash.   Neurological:   Negative for dizziness, seizures and weakness.   All other systems reviewed and are negative.      Patient Active Problem List   Diagnosis   • Lumbar compression fracture (HCC)   • Spondylolisthesis of lumbar region   • CAD (coronary artery disease)   • Former tobacco use   • Hyperlipidemia   • Primary hypertension   • Hx of CABG   • Macrocytic anemia   • Leg pain, posterior, left   • Peripheral artery disease (HCC)   • GERD (gastroesophageal reflux disease)   • Vasomotor rhinitis   • Lumbar stenosis   • S/P lumbar fusion   • Postlaminectomy syndrome of lumbar region   • Lumbar radiculopathy   • Bruit of left carotid artery   • Phimosis   • Carotid stenosis, asymptomatic, bilateral   • SOB (shortness of breath)   • Neck pain on left side   • Near syncope   • Pulmonary fibrosis determined by high resolution computed tomography ([facility])   • Neuropathy   • 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[facility])   • 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   • Trochanteric bursitis   • Diverticulitis   • Stage 3 chronic kidney disease ([facility])   • Left lower quadrant abdominal pain   • Acute left-sided low back pain with left-sided sciatica   • Internal hemorrhoids   • Diverticular disease   • Inflammation of sacroiliac joint ([facility])   • Intractable back pain   • Pressure ulcer       Past Medical History:   Diagnosis Date   • Abnormal liver function test     RESOLVED: 14SEP2017   • Allergic rhinitis    • Anemia     LAST ASSESSED: 19OCT2017   • Arthritis    • BPH (benign prostatic hyperplasia)    • CAD (coronary artery disease)    • Coronary artery disease    • 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[facility])     LAST ASSESSED: 05OCT2017   • Former tobacco use    • GERD (gastroesophageal reflux disease)    • Hand paresthesia     RESOLVED: 11SEP2017   • Hyperlipidemia    • Hypertension    • Lumbar stenosis    • Peripheral artery disease ([facility])     LAST ASSESSED: 27OCT2017   • Stage 3a chronic kidney disease ([facility]) 7/11/2021        Past Surgical History:   Procedure Laterality Date   • BACK SURGERY     • COLONOSCOPY     • LUMBAR FUSION     • OK ARTHRODESIS POSTERIOR/PSTLAT TQ 1NTRSPC LUMBAR N/A 2016    Procedure: L2-S1 POSTERIOR LUMBAR FUSION AND DECOMPRESSION (Impulse), Posterior lateral fixation; dural repair.;  Surgeon: Leslie Gil MD;  Location: [facility];  Service: Orthopedics   • OK CABG W/ARTERIAL GRAFT SINGLE ARTERIAL GRAFT N/A 2018    Procedure: CABG X4 with LIMA - LAD, SVG - RCA, OM2, & Diagonal ; Left Leg EVH; MATTHEW;  Surgeon: Eric Bar DO;  Location: [facility];  Service: Cardiac Surgery   • OK COLONOSCOPY FLX DX W/COLLJ SPEC WHEN PFRMD N/A 11/15/2017    Procedure: EGD AND COLONOSCOPY;  Surgeon: Mariano Godinez MD;  Location: [facility];  Service: Gastroenterology   • SEPTOPLASTY      LAST ASSESSED; 2014   • TONSILECTOMY AND ADNOIDECTOMY      LAST ASSESSED: 2014   • UPPER GASTROINTESTINAL ENDOSCOPY         Family History   Problem Relation Age of Onset   • Emphysema Mother    • Liver disease Mother    • Coronary artery disease Father    • Hypertension Father    • Liver disease Father    • Heart failure Father    • Stroke Father         CVA    • Heart attack Father    • Coronary artery disease Brother    • Heart disease Brother         younger brother by pass and other brother 4 stents placed   • Other Family         BACK PROBLEM    • Stroke Family         CVA   • Emphysema Family    • Hypertension Family         BENIGN       Social History     Occupational History   • Occupation: Insurance     Comment: Retired    Tobacco Use   • Smoking status: Former     Current packs/day: 0.00     Average packs/day: 1 pack/day for 50.0 years (50.0 ttl pk-yrs)     Types: Cigarettes     Start date:      Quit date:      Years since quittin.3   • Smokeless tobacco: Never   Vaping Use   • Vaping status: Never Used   Substance and Sexual Activity   • Alcohol use: Not Currently     Alcohol/week: 1.0  standard drink of alcohol     Types: 1 Shots of liquor per week     Comment: beer, wine, scotch every day; SOCIAL AS PER ALL SCRIPTS    • Drug use: Not Currently     Types: Marijuana     Comment: medical majiyulianaa   • Sexual activity: Not Currently       Current Outpatient Medications on File Prior to Visit   Medication Sig   • acetaminophen (TYLENOL) 650 mg CR tablet Take 650 mg by mouth every 8 (eight) hours as needed for mild pain   • Ascorbic Acid (Vitamin C) 500 MG PACK Take 1,000 mg by mouth daily   • aspirin (ECOTRIN LOW STRENGTH) 81 mg EC tablet Take 81 mg by mouth daily   • atorvastatin (LIPITOR) 80 mg tablet TAKE 1 TABLET BY MOUTH EVERY DAY IN THE MORNING   • cholecalciferol (VITAMIN D3) 1,000 units tablet Take 2,000 Units by mouth daily   • clotrimazole-betamethasone (LOTRISONE) 1-0.05 % cream Apply topically 2 (two) times a day   • cyanocobalamin (VITAMIN B-12) 1,000 mcg tablet Take 1,000 mcg by mouth daily     • docusate sodium (COLACE) 100 mg capsule Take 100 mg by mouth 2 (two) times a day   • folic acid (FOLVITE) 1 mg tablet Take 1 tablet (1 mg total) by mouth daily   • gabapentin (NEURONTIN) 100 mg capsule Take 1 capsule (100 mg total) by mouth 2 (two) times a day   • metoprolol tartrate (LOPRESSOR) 50 mg tablet TAKE 1 AND 1/2 TABLETS BY MOUTH EVERY 12 HOURS   • Multiple Vitamin (MULTIVITAMIN) capsule Take 1 capsule by mouth daily   • famotidine (PEPCID) 10 mg tablet Take 2 tablets (20 mg total) by mouth daily as needed for heartburn (Patient not taking: Reported on 5/6/2024)   • ipratropium (ATROVENT) 0.03 % nasal spray 2 sprays into each nostril every 12 (twelve) hours (Patient not taking: Reported on 4/30/2024)   • lidocaine (LIDODERM) 5 % Apply 1 patch topically over 12 hours every 24 hours Remove & Discard patch within 12 hours or as directed by MD (Patient not taking: Reported on 5/6/2024)     No current facility-administered medications on file prior to visit.       Allergies   Allergen  Reactions   • Penicillins Other (See Comments)     Hallucinations;  Patient reported that he was seeing visual disturbances         Physical Exam:    /56   Pulse 88   Wt 74.4 kg (164 lb)   BMI 25.69 kg/m²     Constitutional:normal, well developed, well nourished, alert, in no distress and non-toxic and no overt pain behavior.  Eyes:anicteric  HEENT:grossly intact  Neck:supple, symmetric, trachea midline and no masses   Pulmonary:even and unlabored  Cardiovascular:No edema or pitting edema present  Skin:Normal without rashes or lesions and well hydrated  Psychiatric:Mood and affect appropriate  Neurologic:Cranial Nerves II-XII grossly intact  Musculoskeletal:antalgic and shuffling    Lumbar Spine Exam  Appearance:  Large incisional scar healed well  Palpation/Tenderness:  left lumbar paraspinal tenderness  right lumbar paraspinal tenderness  Bilateral lumbar facet tenderness L4-5 L5-S1 with positive facet loading  Range of Motion: deferred  Motor Strength:  Left hip flexion:  5/5  Left hip extension:  5/5  Right hip flexion:  5/5  Right hip extension:  5/5  Left knee flexion:  5/5  Left knee extension:  5/5  Right knee flexion:  5/5  Right knee extension:  5/5  Left foot dorsiflexion:  5/5  Left foot plantar flexion:  5/5  Right foot dorsiflexion:  5/5    Imaging    Narrative & Impression   CT THORACIC AND LUMBAR SPINE (3/29/2024)     INDICATION:   lower back pain.     COMPARISON: CT lumbar spine dated 10/8/2022. CT chest dated 10/16/2021     TECHNIQUE:  Contiguous axial images were obtained. Sagittal and coronal reconstructions were performed.     Radiation dose length product (DLP) for this visit:  1197 mGy-cm .  This examination, like all CT scans performed in the [facility], was performed utilizing techniques to minimize radiation dose exposure, including the use of iterative   reconstruction and automated exposure control.     IMAGE QUALITY:  Diagnostic.     FINDINGS:     ALIGNMENT:  There is mild dextroscoliosis of the thoracic spine and moderate levoscoliosis at the thoracolumbar junction. The patient has undergone previous posterior fusion with pedicle screws and spinal rods from L2-S1.     There is no thoracic subluxation. There is grade 2, anterior spondylolisthesis of L5 upon S1 with chronic spondylolysis, similar to prior examination.     VERTEBRAE: Diffuse osteopenia of the bony structures. No thoracic spine acute fracture identified. There is a stable chronic compression deformity of the T12 superior endplate without extension into the pedicles or posterior elements.     There is a moderate L1 compression deformity of the superior endplate with approximately 40 to 50% maximum loss of height anteriorly. There is vacuum phenomenon within the inferior and anterior aspect of the disc space at this level.     DEGENERATIVE CHANGES: No thoracic disc herniation is identified. Mild canal stenosis in the lower thoracic spine at T10-11 due to annular bulging and posterior element hypertrophic change. There is moderate foraminal narrowing present. Mild degenerative   change at T11-12 and T12-L1.     At the L1-2 level there is vacuum phenomenon with loss of disc height and mild diffuse annular bulging with mild endplate and facet hypertrophic degenerative change. Mild canal stenosis and moderate bilateral foraminal narrowing.     At L2-3 there is moderate annular bulging with mild circumferential endplate hypertrophic change and relatively advanced bilateral facet hypertrophic degenerative change with previous laminectomies. Mild canal stenosis. Mild left and moderate right   foraminal narrowing.     L3-4 demonstrates adequate posterior decompression without canal stenosis or foraminal narrowing.     L4-5 demonstrates adequate posterior decompression. Loss of disc height with mild endplate and moderate facet hypertrophic change. No canal stenosis. Mild foraminal narrowing is present.     At the  L5-S1 level there is chronic spondylolysis and grade 250% anterior spondylolisthesis of L5 upon S1. Loss of disc height with diffuse annular bulging and circumferential endplate hypertrophic osteophyte formation and facet degenerative change.   There is no significant canal stenosis but severe bilateral foraminal narrowing again identified.     PARASPINAL SOFT TISSUES: Within the posterior paraspinal soft tissues there is amorphous density posterior to the thecal sac at the L3-4 and L4-5 levels where there has been wide posterior decompression, suggesting postop seroma as seen on prior MRI from   2019. This is grossly unchanged.     Stable extensive vascular calcification of the lower abdominal aorta and iliac arteries.     IMPRESSION:     Stable scoliosis of the thoracic and lumbar spine. There are chronic appearing compression fractures of the superior endplates of T12 and L1. With no new compression fracture.     Stable postoperative appearance of the lumbar spine with posterior fusion and decompression. Persistent grade 2 anterior spondylolisthesis of L5 upon S1 with severe foraminal narrowing.     Additional lower thoracic and lumbar degenerative change with mild to moderate canal stenosis and foraminal narrowing, grossly stable.

## 2024-05-19 ENCOUNTER — NURSE TRIAGE (OUTPATIENT)
Dept: OTHER | Facility: OTHER | Age: 84
End: 2024-05-19

## 2024-05-19 ENCOUNTER — HOSPITAL ENCOUNTER (EMERGENCY)
Facility: HOSPITAL | Age: 84
Discharge: HOME | End: 2024-05-19
Attending: EMERGENCY MEDICINE
Payer: COMMERCIAL

## 2024-05-19 ENCOUNTER — APPOINTMENT (EMERGENCY)
Dept: VASCULAR ULTRASOUND | Facility: HOSPITAL | Age: 84
End: 2024-05-19
Payer: COMMERCIAL

## 2024-05-19 VITALS
HEART RATE: 81 BPM | RESPIRATION RATE: 18 BRPM | DIASTOLIC BLOOD PRESSURE: 70 MMHG | OXYGEN SATURATION: 97 % | TEMPERATURE: 98 F | SYSTOLIC BLOOD PRESSURE: 161 MMHG

## 2024-05-19 DIAGNOSIS — I10 HTN (HYPERTENSION): ICD-10-CM

## 2024-05-19 DIAGNOSIS — I10 HYPERTENSION, UNSPECIFIED TYPE: ICD-10-CM

## 2024-05-19 DIAGNOSIS — M79.89 LEFT LEG SWELLING: ICD-10-CM

## 2024-05-19 DIAGNOSIS — G89.4 CHRONIC PAIN SYNDROME: ICD-10-CM

## 2024-05-19 DIAGNOSIS — I73.9 PERIPHERAL ARTERIAL DISEASE (HCC): Primary | ICD-10-CM

## 2024-05-19 LAB
ALBUMIN SERPL BCP-MCNC: 3.6 G/DL (ref 3.5–5)
ALP SERPL-CCNC: 71 U/L (ref 34–104)
ALT SERPL W P-5'-P-CCNC: 15 U/L (ref 7–52)
ANION GAP SERPL CALCULATED.3IONS-SCNC: 6 MMOL/L (ref 4–13)
APTT PPP: 40 SECONDS (ref 23–37)
AST SERPL W P-5'-P-CCNC: 21 U/L (ref 13–39)
BASOPHILS # BLD AUTO: 0.04 THOUSANDS/ÂΜL (ref 0–0.1)
BASOPHILS NFR BLD AUTO: 0 % (ref 0–1)
BILIRUB SERPL-MCNC: 0.42 MG/DL (ref 0.2–1)
BUN SERPL-MCNC: 25 MG/DL (ref 5–25)
CALCIUM SERPL-MCNC: 9.1 MG/DL (ref 8.4–10.2)
CHLORIDE SERPL-SCNC: 103 MMOL/L (ref 96–108)
CO2 SERPL-SCNC: 30 MMOL/L (ref 21–32)
CREAT SERPL-MCNC: 1.14 MG/DL (ref 0.6–1.3)
EOSINOPHIL # BLD AUTO: 0.41 THOUSAND/ÂΜL (ref 0–0.61)
EOSINOPHIL NFR BLD AUTO: 4 % (ref 0–6)
ERYTHROCYTE [DISTWIDTH] IN BLOOD BY AUTOMATED COUNT: 12.7 % (ref 11.6–15.1)
GFR SERPL CREATININE-BSD FRML MDRD: 59 ML/MIN/1.73SQ M
GLUCOSE SERPL-MCNC: 88 MG/DL (ref 65–140)
HCT VFR BLD AUTO: 33.3 % (ref 36.5–49.3)
HGB BLD-MCNC: 10.5 G/DL (ref 12–17)
IMM GRANULOCYTES # BLD AUTO: 0.02 THOUSAND/UL (ref 0–0.2)
IMM GRANULOCYTES NFR BLD AUTO: 0 % (ref 0–2)
INR PPP: 1.01 (ref 0.84–1.19)
LYMPHOCYTES # BLD AUTO: 2.19 THOUSANDS/ÂΜL (ref 0.6–4.47)
LYMPHOCYTES NFR BLD AUTO: 21 % (ref 14–44)
MCH RBC QN AUTO: 32.4 PG (ref 26.8–34.3)
MCHC RBC AUTO-ENTMCNC: 31.5 G/DL (ref 31.4–37.4)
MCV RBC AUTO: 103 FL (ref 82–98)
MONOCYTES # BLD AUTO: 0.85 THOUSAND/ÂΜL (ref 0.17–1.22)
MONOCYTES NFR BLD AUTO: 8 % (ref 4–12)
NEUTROPHILS # BLD AUTO: 7.16 THOUSANDS/ÂΜL (ref 1.85–7.62)
NEUTS SEG NFR BLD AUTO: 67 % (ref 43–75)
NRBC BLD AUTO-RTO: 0 /100 WBCS
PLATELET # BLD AUTO: 196 THOUSANDS/UL (ref 149–390)
PMV BLD AUTO: 10 FL (ref 8.9–12.7)
POTASSIUM SERPL-SCNC: 4.6 MMOL/L (ref 3.5–5.3)
PROT SERPL-MCNC: 7.4 G/DL (ref 6.4–8.4)
PROTHROMBIN TIME: 13.9 SECONDS (ref 11.6–14.5)
RBC # BLD AUTO: 3.24 MILLION/UL (ref 3.88–5.62)
SODIUM SERPL-SCNC: 139 MMOL/L (ref 135–147)
WBC # BLD AUTO: 10.67 THOUSAND/UL (ref 4.31–10.16)

## 2024-05-19 PROCEDURE — 85610 PROTHROMBIN TIME: CPT

## 2024-05-19 PROCEDURE — 36415 COLL VENOUS BLD VENIPUNCTURE: CPT

## 2024-05-19 PROCEDURE — 85730 THROMBOPLASTIN TIME PARTIAL: CPT

## 2024-05-19 PROCEDURE — 85025 COMPLETE CBC W/AUTO DIFF WBC: CPT

## 2024-05-19 PROCEDURE — 93971 EXTREMITY STUDY: CPT

## 2024-05-19 PROCEDURE — 80053 COMPREHEN METABOLIC PANEL: CPT

## 2024-05-19 PROCEDURE — 99285 EMERGENCY DEPT VISIT HI MDM: CPT

## 2024-05-19 PROCEDURE — 99284 EMERGENCY DEPT VISIT MOD MDM: CPT | Performed by: EMERGENCY MEDICINE

## 2024-05-19 NOTE — TELEPHONE ENCOUNTER
"Answer Assessment - Initial Assessment Questions  1. ONSET: \"When did the swelling start?\" (e.g., minutes, hours, days)      Few days ago     2. LOCATION: \"What part of the leg is swollen?\"  \"Are both legs swollen or just one leg?\"      Left leg       Calf and ankle are swollen     3. SEVERITY: \"How bad is the swelling?\" (e.g., localized; mild, moderate, severe)   - Localized - small area of swelling localized to one leg   - MILD pedal edema - swelling limited to foot and ankle, pitting edema < 1/4 inch (6 mm) deep, rest and elevation eliminate most or all swelling   - MODERATE edema - swelling of lower leg to knee, pitting edema > 1/4 inch (6 mm) deep, rest and elevation only partially reduce swelling   - SEVERE edema - swelling extends above knee, facial or hand swelling present       Moderate     4. REDNESS: \"Does the swelling look red or infected?\"      Redness in the calf     5. PAIN: \"Is the swelling painful to touch?\" If Yes, ask: \"How painful is it?\"   (Scale 1-10; mild, moderate or severe)      Denies any pain     6. FEVER: \"Do you have a fever?\" If Yes, ask: \"What is it, how was it measured, and when did it start?\"       Denies     7. CAUSE: \"What do you think is causing the leg swelling?\"      Concerned for a blood clot     8. MEDICAL HISTORY: \"Do you have a history of heart failure, kidney disease, liver failure, or cancer?\"      Cardiac bypass     9. RECURRENT SYMPTOM: \"Have you had leg swelling before?\" If Yes, ask: \"When was the last time?\" \"What happened that time?\"      Denies     10. OTHER SYMPTOMS: \"Do you have any other symptoms?\" (e.g., chest pain, difficulty breathing)        Denies chest pain, SOB or other symptoms     11. PREGNANCY: \"Is there any chance you are pregnant?\" \"When was your last menstrual period?\"        N/a    Protocols used: Leg Swelling and Edema-ADULT-AH    "

## 2024-05-19 NOTE — TELEPHONE ENCOUNTER
Patient requesting a prescription to having imaging done due to concerns for having a blood clot in his left leg. On call provider contacted, recommended that patient is seen for Doppler studies today in the nearest ED. Provider stated he would call the patient directly to discuss the recommendation with him.  Reason for Disposition  • [1] Thigh, calf, or ankle swelling AND [2] only 1 side    Protocols used: Leg Swelling and Edema-ADULT-AH

## 2024-05-19 NOTE — ED PROVIDER NOTES
History  Chief Complaint   Patient presents with    Leg Swelling     C/o LLE edema and redness x1 week. Denies injury, pain or hx of similar s/s.     Patient is an 83-year-old male with a past medical history significant for raynaud's disease, CAD status post CABG, dyslipidemia, hypertension, CKD 3, PVD w/ 50-75% stenosis in proximal profunda femoral artery and mid superficial femoral artery w/ tibial/peroneal occlusive disease currently maintained on 81 mg ASA alone presented to the emergency department for evaluation of leg swelling.  Patient reports he has some baseline leg swelling however for the last week he has noted significant increase in his left lower extremity swelling.  He initially thought this was related to his vascular graft but notes some recent redness of his LLE that is new for him. He is concerned as his ankle hurts more than normal.  Patient denies any shortness of breath more so than with his normal shortness of breath he has secondary to fibrotic lung disease, no chest pain.        Prior to Admission Medications   Prescriptions Last Dose Informant Patient Reported? Taking?   Ascorbic Acid (Vitamin C) 500 MG PACK  Self Yes No   Sig: Take 1,000 mg by mouth daily   Multiple Vitamin (MULTIVITAMIN) capsule  Self Yes No   Sig: Take 1 capsule by mouth daily   acetaminophen (TYLENOL) 650 mg CR tablet  Self Yes No   Sig: Take 650 mg by mouth every 8 (eight) hours as needed for mild pain   aspirin (ECOTRIN LOW STRENGTH) 81 mg EC tablet  Self Yes No   Sig: Take 81 mg by mouth daily   atorvastatin (LIPITOR) 80 mg tablet  Self No No   Sig: TAKE 1 TABLET BY MOUTH EVERY DAY IN THE MORNING   cholecalciferol (VITAMIN D3) 1,000 units tablet  Self Yes No   Sig: Take 2,000 Units by mouth daily   clotrimazole-betamethasone (LOTRISONE) 1-0.05 % cream  Self No No   Sig: Apply topically 2 (two) times a day   cyanocobalamin (VITAMIN B-12) 1,000 mcg tablet  Self Yes No   Sig: Take 1,000 mcg by mouth daily     docusate  sodium (COLACE) 100 mg capsule  Self Yes No   Sig: Take 100 mg by mouth 2 (two) times a day   folic acid (FOLVITE) 1 mg tablet  Self No No   Sig: Take 1 tablet (1 mg total) by mouth daily      Facility-Administered Medications: None       Past Medical History:   Diagnosis Date    Abnormal liver function test     RESOLVED: 14SEP2017    Allergic rhinitis     Anemia     LAST ASSESSED: 19OCT2017    Arthritis     BPH (benign prostatic hyperplasia)     CAD (coronary artery disease)     Coronary artery disease     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HCC)     LAST ASSESSED: 05OCT2017    Former tobacco use     GERD (gastroesophageal reflux disease)     Hand paresthesia     RESOLVED: 11SEP2017    Hyperlipidemia     Hypertension     Lumbar stenosis     Peripheral artery disease (HCC)     LAST ASSESSED: 27OCT2017    Stage 3a chronic kidney disease (HCC) 7/11/2021       Past Surgical History:   Procedure Laterality Date    BACK SURGERY      COLONOSCOPY      LUMBAR FUSION      ND ARTHRODESIS POSTERIOR/PSTLAT TQ 1NTRSPC LUMBAR N/A 11/03/2016    Procedure: L2-S1 POSTERIOR LUMBAR FUSION AND DECOMPRESSION (Impulse), Posterior lateral fixation; dural repair.;  Surgeon: Leslie Gil MD;  Location: [facility];  Service: Orthopedics    ND CABG W/ARTERIAL GRAFT SINGLE ARTERIAL GRAFT N/A 01/19/2018    Procedure: CABG X4 with LIMA - LAD, SVG - RCA, OM2, & Diagonal ; Left Leg EVH; MATTHEW;  Surgeon: Eric Bar DO;  Location: [facility];  Service: Cardiac Surgery    ND COLONOSCOPY FLX DX W/COLLJ SPEC WHEN PFRMD N/A 11/15/2017    Procedure: EGD AND COLONOSCOPY;  Surgeon: Mariano Godinez MD;  Location: [facility];  Service: Gastroenterology    SEPTOPLASTY      LAST ASSESSED; 13MAY2014    TONSILECTOMY AND ADNOIDECTOMY      LAST ASSESSED: 13MAY2014    UPPER GASTROINTESTINAL ENDOSCOPY         Family History   Problem Relation Age of Onset    Emphysema Mother     Liver disease Mother     Coronary artery disease Father     Hypertension Father     Liver  disease Father     Heart failure Father     Stroke Father         CVA     Heart attack Father     Coronary artery disease Brother     Heart disease Brother         younger brother by pass and other brother 4 stents placed    Other Family         BACK PROBLEM     Stroke Family         CVA    Emphysema Family     Hypertension Family         BENIGN     I have reviewed and agree with the history as documented.    E-Cigarette/Vaping    E-Cigarette Use Never User      E-Cigarette/Vaping Substances    Nicotine No     THC Yes     CBD No     Flavoring No     Other No     Unknown No      Social History     Tobacco Use    Smoking status: Former     Current packs/day: 0.00     Average packs/day: 1 pack/day for 50.0 years (50.0 ttl pk-yrs)     Types: Cigarettes     Start date:      Quit date:      Years since quittin.4    Smokeless tobacco: Never   Vaping Use    Vaping status: Never Used   Substance Use Topics    Alcohol use: Not Currently     Alcohol/week: 1.0 standard drink of alcohol     Types: 1 Shots of liquor per week     Comment: beer, wine, scotch every day; SOCIAL AS PER ALL SCRIPTS     Drug use: Not Currently     Types: Marijuana     Comment: medical majiuana        Review of Systems   Constitutional:  Negative for chills and fever.   HENT:  Negative for ear pain and sore throat.    Eyes:  Negative for pain and visual disturbance.   Respiratory:  Negative for cough and shortness of breath.    Cardiovascular:  Positive for leg swelling. Negative for chest pain and palpitations.   Gastrointestinal:  Negative for abdominal pain and vomiting.   Genitourinary:  Negative for dysuria and hematuria.   Musculoskeletal:  Negative for arthralgias and back pain.   Skin:  Negative for color change and rash.   Neurological:  Negative for seizures and syncope.   All other systems reviewed and are negative.      Physical Exam  ED Triage Vitals [24 0913]   Temperature Pulse Respirations Blood Pressure SpO2   98 °F  (36.7 °C) 81 18 161/70 97 %      Temp src Heart Rate Source Patient Position - Orthostatic VS BP Location FiO2 (%)   -- -- Sitting Right arm --      Pain Score       --             Orthostatic Vital Signs  Vitals:    05/19/24 0913   BP: 161/70   Pulse: 81   Patient Position - Orthostatic VS: Sitting       Physical Exam  Vitals and nursing note reviewed.   Constitutional:       General: He is not in acute distress.     Appearance: Normal appearance. He is not ill-appearing, toxic-appearing or diaphoretic.   HENT:      Head: Normocephalic and atraumatic.   Eyes:      General: No scleral icterus.        Right eye: No discharge.         Left eye: No discharge.      Extraocular Movements: Extraocular movements intact.      Conjunctiva/sclera: Conjunctivae normal.   Cardiovascular:      Rate and Rhythm: Normal rate.      Pulses: Normal pulses.      Heart sounds: Normal heart sounds. No murmur heard.     No friction rub. No gallop.   Pulmonary:      Effort: Pulmonary effort is normal. No respiratory distress.      Breath sounds: Normal breath sounds. No stridor. No wheezing, rhonchi or rales.      Comments: Coarse bibasilar lung sounds  Abdominal:      General: Abdomen is flat. Bowel sounds are normal. There is no distension.      Palpations: Abdomen is soft.      Tenderness: There is no abdominal tenderness. There is no guarding or rebound.   Musculoskeletal:         General: No swelling. Normal range of motion.      Cervical back: Normal range of motion. No rigidity.      Right lower leg: Edema present.      Left lower leg: Edema present.      Comments: 1+ pretibial edema bilaterally, mild L calf tenderness, minimally worse LLE edema. Pain w/ movement of L ankle. <2s capillary refill on affected extremity.   Skin:     General: Skin is warm and dry.      Capillary Refill: Capillary refill takes less than 2 seconds.      Coloration: Skin is not jaundiced.      Findings: No bruising or lesion.      Comments: Peripheral  cyanosis   Neurological:      General: No focal deficit present.      Mental Status: He is alert and oriented to person, place, and time. Mental status is at baseline.   Psychiatric:         Mood and Affect: Mood normal.         Behavior: Behavior normal.         Thought Content: Thought content normal.         Judgment: Judgment normal.         ED Medications  Medications - No data to display    Diagnostic Studies  Results Reviewed       Procedure Component Value Units Date/Time    Comprehensive metabolic panel [[id number]] Collected: 05/19/24 0948    Lab Status: Final result Specimen: Blood from Arm, Left Updated: 05/19/24 1010     Sodium 139 mmol/L      Potassium 4.6 mmol/L      Chloride 103 mmol/L      CO2 30 mmol/L      ANION GAP 6 mmol/L      BUN 25 mg/dL      Creatinine 1.14 mg/dL      Glucose 88 mg/dL      Calcium 9.1 mg/dL      AST 21 U/L      ALT 15 U/L      Alkaline Phosphatase 71 U/L      Total Protein 7.4 g/dL      Albumin 3.6 g/dL      Total Bilirubin 0.42 mg/dL      eGFR 59 ml/min/1.73sq m     Narrative:      National Kid[employer] Disease Foundation guidelines for Chronic Kidney Disease (CKD):     Stage 1 with normal or high GFR (GFR > 90 mL/min/1.73 square meters)    Stage 2 Mild CKD (GFR = 60-89 mL/min/1.73 square meters)    Stage 3A Moderate CKD (GFR = 45-59 mL/min/1.73 square meters)    Stage 3B Moderate CKD (GFR = 30-44 mL/min/1.73 square meters)    Stage 4 Severe CKD (GFR = 15-29 mL/min/1.73 square meters)    Stage 5 End Stage CKD (GFR <15 mL/min/1.73 square meters)  Note: GFR calculation is accurate only with a steady state creatinine    Protime-INR [[id number]]  (Normal) Collected: 05/19/24 0948    Lab Status: Final result Specimen: Blood from Arm, Left Updated: 05/19/24 1007     Protime 13.9 seconds      INR 1.01    APTT [[id number]]  (Abnormal) Collected: 05/19/24 0948    Lab Status: Final result Specimen: Blood from Arm, Left Updated: 05/19/24 1007     PTT 40 seconds     CBC and differential  [[id number]]  (Abnormal) Collected: 05/19/24 0948    Lab Status: Final result Specimen: Blood from Arm, Left Updated: 05/19/24 0959     WBC 10.67 Thousand/uL      RBC 3.24 Million/uL      Hemoglobin 10.5 g/dL      Hematocrit 33.3 %       fL      MCH 32.4 pg      MCHC 31.5 g/dL      RDW 12.7 %      MPV 10.0 fL      Platelets 196 Thousands/uL      nRBC 0 /100 WBCs      Segmented % 67 %      Immature Grans % 0 %      Lymphocytes % 21 %      Monocytes % 8 %      Eosinophils Relative 4 %      Basophils Relative 0 %      Absolute Neutrophils 7.16 Thousands/µL      Absolute Immature Grans 0.02 Thousand/uL      Absolute Lymphocytes 2.19 Thousands/µL      Absolute Monocytes 0.85 Thousand/µL      Eosinophils Absolute 0.41 Thousand/µL      Basophils Absolute 0.04 Thousands/µL                    VAS VENOUS DUPLEX -LOWER LIMB UNILATERAL   Final Result by Herminio Hinds MD (05/20 1304)            Procedures  Procedures      ED Course                                       Medical Decision Making  83y M p/w LLE swelling > RLE swelling    Ddx includes stasis dermatitis, LE edema, PVD, DVT, muscle strain    Plan: bloodwork, duplex    Patient duplex was WNL and labwork did not demonstrate any marked abnormality significantly changed from baseline. Likely LE swelling was secondary to combination of fluid retention vs. Mechanical obstruction. Patient d/c in good condition w/ instructions for follow-up.    Amount and/or Complexity of Data Reviewed  Labs: ordered.          Disposition  Final diagnoses:   Peripheral arterial disease (HCC)   HTN (hypertension)   Left leg swelling     Time reflects when diagnosis was documented in both MDM as applicable and the Disposition within this note       Time User Action Codes Description Comment    5/19/2024 10:45 AM Truman Calero Add [I73.9] Peripheral arterial disease (HCC)     5/19/2024 10:45 AM Truman Calero Add [I10] HTN (hypertension)     5/19/2024 10:45 AM Galilea  Truman Monterroso [M79.89] Left leg swelling           ED Disposition       ED Disposition   Discharge    Condition   Stable    Date/Time   Sun May 19, 2024 10:45 AM    Comment   Suhail Borrego discharge to home/self care.                   Follow-up Information       Follow up With Specialties Details Why Contact Info    Mikaela Duenas DO [facility]   [address] [place] [address]  [phone]              Discharge Medication List as of 5/19/2024 10:46 AM        CONTINUE these medications which have NOT CHANGED    Details   acetaminophen (TYLENOL) 650 mg CR tablet Take 650 mg by mouth every 8 (eight) hours as needed for mild pain, Historical Med      Ascorbic Acid (Vitamin C) 500 MG PACK Take 1,000 mg by mouth daily, Historical Med      aspirin (ECOTRIN LOW STRENGTH) 81 mg EC tablet Take 81 mg by mouth daily, Historical Med      atorvastatin (LIPITOR) 80 mg tablet TAKE 1 TABLET BY MOUTH EVERY DAY IN THE MORNING, Starting Thu 2/8/2024, Normal      cholecalciferol (VITAMIN D3) 1,000 units tablet Take 2,000 Units by mouth daily, Historical Med      clotrimazole-betamethasone (LOTRISONE) 1-0.05 % cream Apply topically 2 (two) times a day, Starting Fri 7/21/2023, Normal      cyanocobalamin (VITAMIN B-12) 1,000 mcg tablet Take 1,000 mcg by mouth daily  , Historical Med      docusate sodium (COLACE) 100 mg capsule Take 100 mg by mouth 2 (two) times a day, Historical Med      folic acid (FOLVITE) 1 mg tablet Take 1 tablet (1 mg total) by mouth daily, Starting Thu 5/2/2024, Normal      Multiple Vitamin (MULTIVITAMIN) capsule Take 1 capsule by mouth daily, Historical Med      famotidine (PEPCID) 10 mg tablet Take 2 tablets (20 mg total) by mouth daily as needed for heartburn, Starting Thu 5/2/2024, No Print      gabapentin (NEURONTIN) 100 mg capsule Take 1 capsule (100 mg total) by mouth 2 (two) times a day, Starting Mon 10/2/2023, Normal      ipratropium (ATROVENT) 0.03 % nasal spray 2 sprays into each nostril  every 12 (twelve) hours, Historical Med      lidocaine (LIDODERM) 5 % Apply 1 patch topically over 12 hours every 24 hours Remove & Discard patch within 12 hours or as directed by MD, Starting Fri 3/29/2024, Normal      metoprolol tartrate (LOPRESSOR) 50 mg tablet TAKE 1 AND 1/2 TABLETS BY MOUTH EVERY 12 HOURS, Normal      traMADol (Ultram) 50 mg tablet Take 1 tablet (50 mg total) by mouth 2 (two) times a day as needed for moderate pain, Starting Mon 5/6/2024, Normal           No discharge procedures on file.    PDMP Review         Value Time User    PDMP Reviewed  Yes 5/20/2024  9:38 AM Eliazar Braxton MD             ED Provider  Attending physically available and evaluated Suhail Borrego. I managed the patient along with the ED Attending.    Electronically Signed by           Truman Calero DO  05/23/24 0252

## 2024-05-19 NOTE — ED ATTENDING ATTESTATION
5/19/2024  I, Gabrielle Daniel MD, saw and evaluated the patient. I have discussed the patient with the resident/non-physician practitioner and agree with the resident's/non-physician practitioner's findings, Plan of Care, and MDM as documented in the resident's/non-physician practitioner's note, except where noted. All available labs and Radiology studies were reviewed.  I was present for key portions of any procedure(s) performed by the resident/non-physician practitioner and I was immediately available to provide assistance.       At this point I agree with the current assessment done in the Emergency Department.  I have conducted an independent evaluation of this patient a history and physical is as follows:    83-year-old male presenting to the ER due to left leg swelling.  Left leg slightly more swollen than the right leg.  Patient with history of bilateral leg swelling chronically.  Patient is mostly immobile.  No falls or trauma.  Minimal pain.  No fevers or chills.  No chest pain or trouble breathing.    Patient with likely venous stasis, will check ultrasound to eval for DVT.      ED Course  ED Course as of 05/19/24 1829   Sun May 19, 2024   1031 ED 26: Suhail Borrego is negative for DVT/SVT in the left LE         Critical Care Time  Procedures

## 2024-05-20 PROCEDURE — 93971 EXTREMITY STUDY: CPT | Performed by: SURGERY

## 2024-05-20 RX ORDER — TRAMADOL HYDROCHLORIDE 50 MG/1
50 TABLET ORAL 2 TIMES DAILY PRN
Qty: 60 TABLET | Refills: 2 | Status: SHIPPED | OUTPATIENT
Start: 2024-05-20

## 2024-05-20 RX ORDER — METOPROLOL TARTRATE 50 MG/1
75 TABLET, FILM COATED ORAL EVERY 12 HOURS
Qty: 270 TABLET | Refills: 1 | Status: SHIPPED | OUTPATIENT
Start: 2024-05-20

## 2024-05-20 NOTE — TELEPHONE ENCOUNTER
Pt said the tramadol is taking the edge off, he has 2 pills left.  Pt was seen 5/6/24 and a 2 wks tramadol Rx was sent to pharmacy.  Pt uses [facility] on file.

## 2024-05-20 NOTE — TELEPHONE ENCOUNTER
I will send prescription for twice daily dosing for 60 tablets with 2 additional refills.  He will need follow-up in the office in 10 to 12 weeks for UDS/contract

## 2024-05-21 ENCOUNTER — OFFICE VISIT (OUTPATIENT)
Age: 84
End: 2024-05-21
Payer: COMMERCIAL

## 2024-05-21 VITALS
OXYGEN SATURATION: 95 % | BODY MASS INDEX: 25.96 KG/M2 | HEIGHT: 67 IN | SYSTOLIC BLOOD PRESSURE: 122 MMHG | TEMPERATURE: 97.8 F | RESPIRATION RATE: 18 BRPM | WEIGHT: 165.4 LBS | DIASTOLIC BLOOD PRESSURE: 66 MMHG | HEART RATE: 74 BPM

## 2024-05-21 DIAGNOSIS — D72.829 LEUKOCYTOSIS, UNSPECIFIED TYPE: ICD-10-CM

## 2024-05-21 DIAGNOSIS — R60.0 EDEMA OF LEFT LOWER EXTREMITY: Primary | ICD-10-CM

## 2024-05-21 DIAGNOSIS — G62.9 NEUROPATHY: ICD-10-CM

## 2024-05-21 PROCEDURE — G2211 COMPLEX E/M VISIT ADD ON: HCPCS

## 2024-05-21 PROCEDURE — 99214 OFFICE O/P EST MOD 30 MIN: CPT

## 2024-05-21 NOTE — PROGRESS NOTES
Ambulatory Visit  Name: Suhail Borrego      : 1940      MRN: [id number]  Encounter Provider: More Mcdermott DO  Encounter Date: 2024   Encounter department: [facility]    Assessment & Plan   1. Edema of left lower extremity  2. Leukocytosis, unspecified type  -     CBC and differential; Future  3. Neuropathy  -     gabapentin (NEURONTIN) 100 mg capsule; Take 1 capsule (100 mg total) by mouth daily at bedtime    Suhail presents to clinic for ER follow up.  He recently presented to ER for evaluation of left leg swelling with tenderness of the medial gastrocnemius.  He was seen in the ED and found to have no acute DVT, but swelling has continued to persist.  Today in office he reports no pain with ROM, but that he is unable to ambulate long distances due to back pain.  He is planned for a procedure with jaime spine and pain on .  Recommend changing to gabapentin 100mg qhs with potential to increase following upcoming procedure.    Believe most likely secondary to venous stasis and chronic edema at this time, but in the setting of leukocytosis on ED labs, cannot r/o cellulitis.  With upcoming spine and pain procedure, will check CBC.  If downtrending, recommend continuing compression socks and monitoring.  Do not recommend initiation of diuretic due to patient's reduced eGFR.  If leukocytosis continues to uptrend, plan to send Rx for abx in the setting of potential infectious etiology underlying LLE edema.        History of Present Illness     Patient reports near-immobility over the past 6-8 weeks due to significant lower back pain and has not been elevating his leg as previously over that time.  Left leg is significantly more swollen than baseline.  He states the swelling started prior to the pain.  Some swelling is present at baseline however he has had significant increase over the past week.  No associated shortness of breath, was seen at ER and     He also  "reports he has \"red-hot pain\" of the left ankle with lesser pain of the right foot at night and states he will have to get out of bed and pound his feet on the floor to relieve the pain.  Requests increasing gabapentin dose for pain neuropathic pain qhs, but states he is currently only taking 100mg qhs and not BID as Rxd at this time.  He is also concerned about potential interaction between Tramadol and gabapentin.      Review of Systems   Constitutional:  Negative for appetite change, chills and fever.   Cardiovascular:  Positive for leg swelling (LLE > RLE).   Musculoskeletal:  Positive for back pain and gait problem (2/2 back pain).        L medial gastrocnemius pain with direct palpation, L ankle/foot pain at night   Skin:  Negative for color change.   Allergic/Immunologic: Negative for environmental allergies and food allergies.     Past Medical History:   Diagnosis Date    Abnormal liver function test     RESOLVED: 14SEP2017    Allergic rhinitis     Anemia     LAST ASSESSED: 19OCT2017    Arthritis     BPH (benign prostatic hyperplasia)     CAD (coronary artery disease)     Coronary artery disease     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HCC)     LAST ASSESSED: 05OCT2017    Former tobacco use     GERD (gastroesophageal reflux disease)     Hand paresthesia     RESOLVED: 11SEP2017    Hyperlipidemia     Hypertension     Lumbar stenosis     Peripheral artery disease (HCC)     LAST ASSESSED: 27OCT2017    Stage 3a chronic kidney disease (HCC) 7/11/2021     Past Surgical History:   Procedure Laterality Date    BACK SURGERY      COLONOSCOPY      LUMBAR FUSION      IA ARTHRODESIS POSTERIOR/PSTLAT TQ 1NTRSPC LUMBAR N/A 11/03/2016    Procedure: L2-S1 POSTERIOR LUMBAR FUSION AND DECOMPRESSION (Impulse), Posterior lateral fixation; dural repair.;  Surgeon: Leslie Gil MD;  [facility];  Service: Orthopedics    IA CABG W/ARTERIAL GRAFT SINGLE ARTERIAL GRAFT N/A 01/19/2018    Procedure: CABG X4 with LIMA - LAD, SVG - RCA, " OM2, & Diagonal ; Left Leg EVH; MATTHEW;  Surgeon: Eric Bar DO;  Location: [facility];  Service: Cardiac Surgery    VA COLONOSCOPY FLX DX W/COLLJ SPEC WHEN PFRMD N/A 11/15/2017    Procedure: EGD AND COLONOSCOPY;  Surgeon: Mariano Godinez MD;  Location: [facility];  Service: Gastroenterology    SEPTOPLASTY      LAST ASSESSED; [id number]    TONSILECTOMY AND ADNOIDECTOMY      LAST ASSESSED: [id number]    UPPER GASTROINTESTINAL ENDOSCOPY       Family History   Problem Relation Age of Onset    Emphysema Mother     Liver disease Mother     Coronary artery disease Father     Hypertension Father     Liver disease Father     Heart failure Father     Stroke Father         CVA     Heart attack Father     Coronary artery disease Brother     Heart disease Brother         younger brother by pass and other brother 4 stents placed    Other Family         BACK PROBLEM     Stroke Family         CVA    Emphysema Family     Hypertension Family         BENIGN     Social History     Tobacco Use    Smoking status: Former     Current packs/day: 0.00     Average packs/day: 1 pack/day for 50.0 years (50.0 ttl pk-yrs)     Types: Cigarettes     Start date:      Quit date:      Years since quittin.3    Smokeless tobacco: Never   Vaping Use    Vaping status: Never Used   Substance and Sexual Activity    Alcohol use: Not Currently     Alcohol/week: 1.0 standard drink of alcohol     Types: 1 Shots of liquor per week     Comment: beer, wine, scotch every day; SOCIAL AS PER ALL SCRIPTS     Drug use: Not Currently     Types: Marijuana     Comment: medical majiuana    Sexual activity: Not Currently     Current Outpatient Medications on File Prior to Visit   Medication Sig    acetaminophen (TYLENOL) 650 mg CR tablet Take 650 mg by mouth every 8 (eight) hours as needed for mild pain    Ascorbic Acid (Vitamin C) 500 MG PACK Take 1,000 mg by mouth daily    aspirin (ECOTRIN LOW STRENGTH) 81 mg EC tablet Take 81 mg by mouth daily     atorvastatin (LIPITOR) 80 mg tablet TAKE 1 TABLET BY MOUTH EVERY DAY IN THE MORNING    cholecalciferol (VITAMIN D3) 1,000 units tablet Take 2,000 Units by mouth daily    clotrimazole-betamethasone (LOTRISONE) 1-0.05 % cream Apply topically 2 (two) times a day    cyanocobalamin (VITAMIN B-12) 1,000 mcg tablet Take 1,000 mcg by mouth daily      docusate sodium (COLACE) 100 mg capsule Take 100 mg by mouth 2 (two) times a day    folic acid (FOLVITE) 1 mg tablet Take 1 tablet (1 mg total) by mouth daily    gabapentin (NEURONTIN) 100 mg capsule Take 1 capsule (100 mg total) by mouth 2 (two) times a day    metoprolol tartrate (LOPRESSOR) 50 mg tablet Take 1.5 tablets (75 mg total) by mouth every 12 (twelve) hours    Multiple Vitamin (MULTIVITAMIN) capsule Take 1 capsule by mouth daily    traMADol (Ultram) 50 mg tablet Take 1 tablet (50 mg total) by mouth 2 (two) times a day as needed for moderate pain    famotidine (PEPCID) 10 mg tablet Take 2 tablets (20 mg total) by mouth daily as needed for heartburn (Patient not taking: Reported on 5/6/2024)    ipratropium (ATROVENT) 0.03 % nasal spray 2 sprays into each nostril every 12 (twelve) hours (Patient not taking: Reported on 4/30/2024)    lidocaine (LIDODERM) 5 % Apply 1 patch topically over 12 hours every 24 hours Remove & Discard patch within 12 hours or as directed by MD (Patient not taking: Reported on 5/6/2024)     Allergies   Allergen Reactions    Penicillins Other (See Comments)     Hallucinations;  Patient reported that he was seeing visual disturbances       Immunization History   Administered Date(s) Administered    COVID-19 MODERNA VACC 0.5 ML IM 01/27/2021, 02/24/2021, 12/01/2021    INFLUENZA 12/20/2006, 09/23/2022    Influenza Split High Dose Preservative Free IM 10/09/2013, 10/27/2015, 09/22/2016, 09/11/2017    Influenza, high dose seasonal 0.7 mL 10/25/2018, 10/31/2019, 10/15/2020, 09/16/2021, 09/23/2022, 10/27/2023    Influenza, seasonal, injectable 01/01/2014  "   Pneumococcal Conjugate 13-Valent 09/11/2017    Pneumococcal Polysaccharide PPV23 04/07/2011    Tdap 10/25/2018     Objective     /66   Pulse 74   Temp 97.8 °F (36.6 °C)   Resp 18   Ht 5' 7\" (1.702 m)   Wt 75 kg (165 lb 6.4 oz)   SpO2 95%   BMI 25.91 kg/m²     Physical Exam  Vitals reviewed.   Constitutional:       General: He is not in acute distress.     Appearance: Normal appearance. He is not ill-appearing, toxic-appearing or diaphoretic.   HENT:      Right Ear: External ear normal.      Left Ear: External ear normal.      Nose: Nose normal.   Eyes:      Extraocular Movements: Extraocular movements intact.      Conjunctiva/sclera: Conjunctivae normal.   Cardiovascular:      Rate and Rhythm: Normal rate and regular rhythm.   Pulmonary:      Effort: Pulmonary effort is normal. No respiratory distress.   Musculoskeletal:         General: Swelling and tenderness (left medial calf) present. No deformity or signs of injury.      Right lower leg: Edema (1+) present.      Left lower leg: Edema (3+) present.   Skin:     General: Skin is warm and dry.      Coloration: Skin is not jaundiced.      Findings: No bruising or erythema.   Neurological:      Mental Status: He is alert and oriented to person, place, and time. Mental status is at baseline.      Gait: Gait abnormal (2/2 back pain).   Psychiatric:         Mood and Affect: Mood normal.         Behavior: Behavior normal.       More Mcdermott, DO  "

## 2024-05-21 NOTE — PATIENT INSTRUCTIONS
Start wearing compression socks  Go to have blood work done within the next few days so we can determine whether we might need to prescribe you an antibiotic based on your white blood cell count.  If you develop a fever or redness of your leg, present to the emergency department for evaluation to see whether you may have an infection in your leg.

## 2024-05-22 ENCOUNTER — APPOINTMENT (OUTPATIENT)
Dept: LAB | Facility: CLINIC | Age: 84
End: 2024-05-22
Payer: COMMERCIAL

## 2024-05-22 DIAGNOSIS — D72.829 LEUKOCYTOSIS, UNSPECIFIED TYPE: ICD-10-CM

## 2024-05-22 LAB
BASOPHILS # BLD AUTO: 0.03 THOUSANDS/ÂΜL (ref 0–0.1)
BASOPHILS NFR BLD AUTO: 0 % (ref 0–1)
EOSINOPHIL # BLD AUTO: 0.42 THOUSAND/ÂΜL (ref 0–0.61)
EOSINOPHIL NFR BLD AUTO: 3 % (ref 0–6)
ERYTHROCYTE [DISTWIDTH] IN BLOOD BY AUTOMATED COUNT: 12.9 % (ref 11.6–15.1)
HCT VFR BLD AUTO: 33.5 % (ref 36.5–49.3)
HGB BLD-MCNC: 10.5 G/DL (ref 12–17)
IMM GRANULOCYTES # BLD AUTO: 0.07 THOUSAND/UL (ref 0–0.2)
IMM GRANULOCYTES NFR BLD AUTO: 1 % (ref 0–2)
LYMPHOCYTES # BLD AUTO: 2.69 THOUSANDS/ÂΜL (ref 0.6–4.47)
LYMPHOCYTES NFR BLD AUTO: 22 % (ref 14–44)
MCH RBC QN AUTO: 32.6 PG (ref 26.8–34.3)
MCHC RBC AUTO-ENTMCNC: 31.3 G/DL (ref 31.4–37.4)
MCV RBC AUTO: 104 FL (ref 82–98)
MONOCYTES # BLD AUTO: 1.2 THOUSAND/ÂΜL (ref 0.17–1.22)
MONOCYTES NFR BLD AUTO: 10 % (ref 4–12)
NEUTROPHILS # BLD AUTO: 8.11 THOUSANDS/ÂΜL (ref 1.85–7.62)
NEUTS SEG NFR BLD AUTO: 64 % (ref 43–75)
NRBC BLD AUTO-RTO: 0 /100 WBCS
PLATELET # BLD AUTO: 211 THOUSANDS/UL (ref 149–390)
PMV BLD AUTO: 10.7 FL (ref 8.9–12.7)
RBC # BLD AUTO: 3.22 MILLION/UL (ref 3.88–5.62)
WBC # BLD AUTO: 12.52 THOUSAND/UL (ref 4.31–10.16)

## 2024-05-22 PROCEDURE — 85025 COMPLETE CBC W/AUTO DIFF WBC: CPT

## 2024-05-22 PROCEDURE — 36415 COLL VENOUS BLD VENIPUNCTURE: CPT

## 2024-05-22 NOTE — TELEPHONE ENCOUNTER
Caller: Suhail    Doctor: preston    Reason for call: pt returning nurses phone call     Call back#: [phone]

## 2024-05-23 ENCOUNTER — TELEPHONE (OUTPATIENT)
Age: 84
End: 2024-05-23

## 2024-05-23 DIAGNOSIS — L03.116 CELLULITIS OF LEFT LOWER EXTREMITY: Primary | ICD-10-CM

## 2024-05-23 RX ORDER — DOXYCYCLINE HYCLATE 100 MG
TABLET ORAL
Qty: 8 TABLET | Refills: 0 | Status: SHIPPED | OUTPATIENT
Start: 2024-05-23 | End: 2024-05-30

## 2024-05-23 RX ORDER — GABAPENTIN 100 MG/1
100 CAPSULE ORAL
Qty: 180 CAPSULE | Refills: 0 | Status: SHIPPED | OUTPATIENT
Start: 2024-05-23

## 2024-05-23 NOTE — PROGRESS NOTES
Patient was seen in office on 5/21 for an ER follow up for left lower extremity swelling.  Review of labs performed at ED notable for leukocytosis.  Patient with some point tenderness of left medial calf, but no systemic symptoms of infection and no significant overlying erythema.  Recommended repeat CBC to trend WBC in the setting of upcoming procedure scheduled on 5/29.    Lab Results   Component Value Date    WBC 12.52 (H) 05/22/2024    WBC 10.67 (H) 05/19/2024     Patient had CBC performed and leukocytosis was noted to be increasing.  Will start patient on PO antibiotics this time to treat for cellulitis of the LLE: doxycycline PO 100mg q12h x 1d, then daily for 6d for mild to moderate level infection.  Renal adjustment of dose not required.  Rx sent to patient's preferred pharmacy.    Recommend following up at clinic if symptoms worsen or fail to improve, or presenting to ED as discussed with patient for symptoms of redness/swelling spreading up leg, significant worsening of pain, or development of systemic fever/chills.    More Mcdermott, DO

## 2024-05-23 NOTE — TELEPHONE ENCOUNTER
Discussed with patient during visit on 5/21 that if his WBC count was increasing on his repeat blood work, we would start him on an antibiotic for potential infection causing his swelling of the left lower extremity.     WBC did increase on recheck lab.  Doxycycline sent to pharmacy.  Please instruct patient to take 100mg (1 tablet) two doses today and then one dose daily until the prescription is completed.     Recommend following up at clinic if symptoms worsen or fail to improve, or presenting to ED as previously discussed with patient for symptoms of redness/swelling spreading up leg, significant worsening of pain, or development of systemic fever/chills.

## 2024-05-29 ENCOUNTER — HOSPITAL ENCOUNTER (OUTPATIENT)
Dept: RADIOLOGY | Facility: CLINIC | Age: 84
Discharge: HOME | End: 2024-05-29
Admitting: ANESTHESIOLOGY
Payer: COMMERCIAL

## 2024-05-29 VITALS
TEMPERATURE: 97.3 F | SYSTOLIC BLOOD PRESSURE: 115 MMHG | OXYGEN SATURATION: 94 % | HEART RATE: 62 BPM | DIASTOLIC BLOOD PRESSURE: 57 MMHG | RESPIRATION RATE: 18 BRPM

## 2024-05-29 DIAGNOSIS — M47.816 LUMBAR SPONDYLOSIS: ICD-10-CM

## 2024-05-29 PROCEDURE — 64493 INJ PARAVERT F JNT L/S 1 LEV: CPT | Performed by: ANESTHESIOLOGY

## 2024-05-29 PROCEDURE — 64494 INJ PARAVERT F JNT L/S 2 LEV: CPT | Performed by: ANESTHESIOLOGY

## 2024-05-29 RX ORDER — BUPIVACAINE HCL/PF 2.5 MG/ML
10 VIAL (ML) INJECTION ONCE
Status: COMPLETED | OUTPATIENT
Start: 2024-05-29 | End: 2024-05-29

## 2024-05-29 RX ADMIN — BUPIVACAINE HYDROCHLORIDE 3 ML: 2.5 INJECTION, SOLUTION EPIDURAL; INFILTRATION; INTRACAUDAL at 10:05

## 2024-05-29 NOTE — H&P
History of Present Illness: The patient is a 83 y.o. male who presents with complaints of lower back pain is here today for bilateral L3-5 medial branch blocks    Past Medical History:   Diagnosis Date    Abnormal liver function test     RESOLVED: 14SEP2017    Allergic rhinitis     Anemia     LAST ASSESSED: 19OCT2017    Arthritis     BPH (benign prostatic hyperplasia)     CAD (coronary artery disease)     Coronary artery disease     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HCC)     LAST ASSESSED: 05OCT2017    Former tobacco use     GERD (gastroesophageal reflux disease)     Hand paresthesia     RESOLVED: 11SEP2017    Hyperlipidemia     Hypertension     Lumbar stenosis     Peripheral artery disease (HCC)     LAST ASSESSED: 27OCT2017    Stage 3a chronic kidney disease (HCC) 7/11/2021       Past Surgical History:   Procedure Laterality Date    BACK SURGERY      COLONOSCOPY      LUMBAR FUSION      AL ARTHRODESIS POSTERIOR/PSTLAT TQ 1NTRSPC LUMBAR N/A 11/03/2016    Procedure: L2-S1 POSTERIOR LUMBAR FUSION AND DECOMPRESSION (Impulse), Posterior lateral fixation; dural repair.;  Surgeon: Leslie Gil MD;  [facility];  Service: Orthopedics    AL CABG W/ARTERIAL GRAFT SINGLE ARTERIAL GRAFT N/A 01/19/2018    Procedure: CABG X4 with LIMA - LAD, SVG - RCA, OM2, & Diagonal ; Left Leg EVH; MATTHEW;  Surgeon: Eric Bar DO;  [facility];  Service: Cardiac Surgery    AL COLONOSCOPY FLX DX W/COLLJ SPEC WHEN PFRMD N/A 11/15/2017    Procedure: EGD AND COLONOSCOPY;  Surgeon: Mariano Godinez MD;  [facility];  Service: Gastroenterology    SEPTOPLASTY      LAST ASSESSED; [id number]    TONSILECTOMY AND ADNOIDECTOMY      LAST ASSESSED: 13MAY2014    UPPER GASTROINTESTINAL ENDOSCOPY           Current Outpatient Medications:     acetaminophen (TYLENOL) 650 mg CR tablet, Take 650 mg by mouth every 8 (eight) hours as needed for mild pain, Disp: , Rfl:     Ascorbic Acid (Vitamin C) 500 MG PACK, Take 1,000 mg by mouth daily,  Disp: , Rfl:     aspirin (ECOTRIN LOW STRENGTH) 81 mg EC tablet, Take 81 mg by mouth daily, Disp: , Rfl:     atorvastatin (LIPITOR) 80 mg tablet, TAKE 1 TABLET BY MOUTH EVERY DAY IN THE MORNING, Disp: 90 tablet, Rfl: 3    cholecalciferol (VITAMIN D3) 1,000 units tablet, Take 2,000 Units by mouth daily, Disp: , Rfl:     clotrimazole-betamethasone (LOTRISONE) 1-0.05 % cream, Apply topically 2 (two) times a day, Disp: 30 g, Rfl: 3    cyanocobalamin (VITAMIN B-12) 1,000 mcg tablet, Take 1,000 mcg by mouth daily  , Disp: , Rfl:     docusate sodium (COLACE) 100 mg capsule, Take 100 mg by mouth 2 (two) times a day, Disp: , Rfl:     doxycycline hyclate (VIBRA-TABS) 100 mg tablet, Take 1 tablet (100 mg total) by mouth 2 (two) times a day for 1 day, THEN 1 tablet (100 mg total) daily for 6 days., Disp: 8 tablet, Rfl: 0    folic acid (FOLVITE) 1 mg tablet, Take 1 tablet (1 mg total) by mouth daily, Disp: 90 tablet, Rfl: 0    gabapentin (NEURONTIN) 100 mg capsule, Take 1 capsule (100 mg total) by mouth daily at bedtime, Disp: 180 capsule, Rfl: 0    metoprolol tartrate (LOPRESSOR) 50 mg tablet, Take 1.5 tablets (75 mg total) by mouth every 12 (twelve) hours, Disp: 270 tablet, Rfl: 1    Multiple Vitamin (MULTIVITAMIN) capsule, Take 1 capsule by mouth daily, Disp: , Rfl:     traMADol (Ultram) 50 mg tablet, Take 1 tablet (50 mg total) by mouth 2 (two) times a day as needed for moderate pain, Disp: 60 tablet, Rfl: 2  No current facility-administered medications for this encounter.    Allergies   Allergen Reactions    Penicillins Other (See Comments)     Hallucinations;  Patient reported that he was seeing visual disturbances         Physical Exam:   Vitals:    05/29/24 0947   BP: 112/68   Pulse: 63   Resp: 18   Temp: (!) 97.3 °F (36.3 °C)   SpO2: 95%     General: Awake, Alert, Oriented x 3, Mood and affect appropriate  Respiratory: Respirations even and unlabored  Cardiovascular: Peripheral pulses intact; no edema  Musculoskeletal  Exam: Lower back pain    ASA Score: 3    Patient/Chart Verification  Patient ID Verified: Verbal  ID Band Applied: No  Consents Confirmed: Procedural, To be obtained in the Pre-Procedure area  H&P( within 30 days) Verified: To be obtained in the Pre-Procedure area  Allergies Reviewed: Yes  Anticoag/NSAID held?: No  Currently on antibiotics?: No    Assessment:   1. Lumbar spondylosis        Plan: Bilateral L3-5 MBB

## 2024-05-29 NOTE — DISCHARGE INSTR - LAB

## 2024-05-30 ENCOUNTER — TELEPHONE (OUTPATIENT)
Age: 84
End: 2024-05-30

## 2024-05-30 NOTE — TELEPHONE ENCOUNTER
Caller: Ashwini, pt's daughter    Doctor: Irais    Reason for call: pt had a MBB yesterday and his pain is really bad.  Pt did not complete pain diary.  Pt's daughter would like to speak with a nurse    Call back#: [phone]  
If the tramadol does help we can keep him on that long-term to help with his symptoms.    The next interventional step would be a spinal cord stimulator trial  
S/w Ashwini, advised of the same. Verbalized understanding, will discuss with pt and cb when they have made a decision regarding SCS trial.   
S/w pt's daughter Ashwini. States pt did not experience any relief with MBB. Requesting recommendations regarding next steps as MBB did not provide relief, states only tramadol seems to help with pain symptoms but is unsure if pt would be able to continue medication long term. Please advise, thank you.    Please cb at [phone], ok to leave detailed vm.  
patient

## 2024-06-06 ENCOUNTER — HOSPITAL ENCOUNTER (OUTPATIENT)
Dept: NON INVASIVE DIAGNOSTICS | Facility: CLINIC | Age: 84
Discharge: HOME | End: 2024-06-06
Payer: COMMERCIAL

## 2024-06-06 DIAGNOSIS — I65.23 CAROTID STENOSIS, ASYMPTOMATIC, BILATERAL: ICD-10-CM

## 2024-06-06 PROCEDURE — 93880 EXTRACRANIAL BILAT STUDY: CPT

## 2024-06-06 PROCEDURE — 93880 EXTRACRANIAL BILAT STUDY: CPT | Performed by: SURGERY

## 2024-06-16 DIAGNOSIS — K21.9 GASTROESOPHAGEAL REFLUX DISEASE, UNSPECIFIED WHETHER ESOPHAGITIS PRESENT: Primary | ICD-10-CM

## 2024-06-16 RX ORDER — FAMOTIDINE 20 MG/1
20 TABLET, FILM COATED ORAL 2 TIMES DAILY
Qty: 180 TABLET | Refills: 0 | Status: ON HOLD | OUTPATIENT
Start: 2024-06-16

## 2024-06-29 ENCOUNTER — HOSPITAL ENCOUNTER (INPATIENT)
Facility: HOSPITAL | Age: 84
LOS: 4 days | Discharge: HOME HEALTH | DRG: 552 | End: 2024-07-03
Attending: EMERGENCY MEDICINE | Admitting: INTERNAL MEDICINE
Payer: COMMERCIAL

## 2024-06-29 ENCOUNTER — APPOINTMENT (INPATIENT)
Dept: VASCULAR ULTRASOUND | Facility: HOSPITAL | Age: 84
DRG: 552 | End: 2024-06-29
Payer: COMMERCIAL

## 2024-06-29 ENCOUNTER — APPOINTMENT (EMERGENCY)
Dept: RADIOLOGY | Facility: HOSPITAL | Age: 84
DRG: 552 | End: 2024-06-29
Payer: COMMERCIAL

## 2024-06-29 ENCOUNTER — APPOINTMENT (EMERGENCY)
Dept: CT IMAGING | Facility: HOSPITAL | Age: 84
DRG: 552 | End: 2024-06-29
Payer: COMMERCIAL

## 2024-06-29 DIAGNOSIS — G45.9 TIA (TRANSIENT ISCHEMIC ATTACK): ICD-10-CM

## 2024-06-29 DIAGNOSIS — I65.22 SYMPTOMATIC STENOSIS OF LEFT CAROTID ARTERY: ICD-10-CM

## 2024-06-29 DIAGNOSIS — M54.9 BACK PAIN: ICD-10-CM

## 2024-06-29 DIAGNOSIS — I10 HYPERTENSION, UNSPECIFIED TYPE: ICD-10-CM

## 2024-06-29 DIAGNOSIS — Z95.1 HX OF CABG: ICD-10-CM

## 2024-06-29 DIAGNOSIS — I65.23 CAROTID STENOSIS, ASYMPTOMATIC, BILATERAL: ICD-10-CM

## 2024-06-29 DIAGNOSIS — I63.9 CEREBROVASCULAR ACCIDENT (CVA), UNSPECIFIED MECHANISM (HCC): ICD-10-CM

## 2024-06-29 DIAGNOSIS — S22.080A COMPRESSION FRACTURE OF T11 VERTEBRA, INITIAL ENCOUNTER (HCC): Primary | ICD-10-CM

## 2024-06-29 PROBLEM — R22.42 LOCALIZED SWELLING OF LEFT LOWER EXTREMITY: Status: ACTIVE | Noted: 2024-06-29

## 2024-06-29 PROBLEM — D72.829 LEUKOCYTOSIS: Status: ACTIVE | Noted: 2024-06-29

## 2024-06-29 LAB
ALBUMIN SERPL BCG-MCNC: 3.5 G/DL (ref 3.5–5)
ALP SERPL-CCNC: 61 U/L (ref 34–104)
ALT SERPL W P-5'-P-CCNC: 14 U/L (ref 7–52)
ANION GAP SERPL CALCULATED.3IONS-SCNC: 5 MMOL/L (ref 4–13)
ANION GAP SERPL CALCULATED.3IONS-SCNC: 5 MMOL/L (ref 4–13)
APTT PPP: 38 SECONDS (ref 23–37)
AST SERPL W P-5'-P-CCNC: 20 U/L (ref 13–39)
ATRIAL RATE: 74 BPM
BACTERIA UR QL AUTO: ABNORMAL /HPF
BASOPHILS # BLD AUTO: 0.04 THOUSANDS/ÂΜL (ref 0–0.1)
BASOPHILS NFR BLD AUTO: 0 % (ref 0–1)
BILIRUB SERPL-MCNC: 0.34 MG/DL (ref 0.2–1)
BILIRUB UR QL STRIP: NEGATIVE
BUN SERPL-MCNC: 26 MG/DL (ref 5–25)
BUN SERPL-MCNC: 31 MG/DL (ref 5–25)
CALCIUM SERPL-MCNC: 8.5 MG/DL (ref 8.4–10.2)
CALCIUM SERPL-MCNC: 9 MG/DL (ref 8.4–10.2)
CHLORIDE SERPL-SCNC: 107 MMOL/L (ref 96–108)
CHLORIDE SERPL-SCNC: 107 MMOL/L (ref 96–108)
CK SERPL-CCNC: 53 U/L (ref 39–308)
CLARITY UR: CLEAR
CO2 SERPL-SCNC: 26 MMOL/L (ref 21–32)
CO2 SERPL-SCNC: 26 MMOL/L (ref 21–32)
COLOR UR: ABNORMAL
CREAT SERPL-MCNC: 1.02 MG/DL (ref 0.6–1.3)
CREAT SERPL-MCNC: 1.16 MG/DL (ref 0.6–1.3)
EOSINOPHIL # BLD AUTO: 0.38 THOUSAND/ÂΜL (ref 0–0.61)
EOSINOPHIL NFR BLD AUTO: 4 % (ref 0–6)
ERYTHROCYTE [DISTWIDTH] IN BLOOD BY AUTOMATED COUNT: 12.9 % (ref 11.6–15.1)
ERYTHROCYTE [DISTWIDTH] IN BLOOD BY AUTOMATED COUNT: 13 % (ref 11.6–15.1)
GFR SERPL CREATININE-BSD FRML MDRD: 57 ML/MIN/1.73SQ M
GFR SERPL CREATININE-BSD FRML MDRD: 67 ML/MIN/1.73SQ M
GLUCOSE SERPL-MCNC: 107 MG/DL (ref 65–140)
GLUCOSE SERPL-MCNC: 88 MG/DL (ref 65–140)
GLUCOSE UR STRIP-MCNC: NEGATIVE MG/DL
HCT VFR BLD AUTO: 32.3 % (ref 36.5–49.3)
HCT VFR BLD AUTO: 32.9 % (ref 36.5–49.3)
HGB BLD-MCNC: 10.2 G/DL (ref 12–17)
HGB BLD-MCNC: 10.5 G/DL (ref 12–17)
HGB UR QL STRIP.AUTO: NEGATIVE
IMM GRANULOCYTES # BLD AUTO: 0.02 THOUSAND/UL (ref 0–0.2)
IMM GRANULOCYTES NFR BLD AUTO: 0 % (ref 0–2)
INR PPP: 1.08 (ref 0.84–1.19)
KETONES UR STRIP-MCNC: NEGATIVE MG/DL
LACTATE SERPL-SCNC: 1.2 MMOL/L (ref 0.5–2)
LEUKOCYTE ESTERASE UR QL STRIP: ABNORMAL
LIPASE SERPL-CCNC: 10 U/L (ref 11–82)
LYMPHOCYTES # BLD AUTO: 2.64 THOUSANDS/ÂΜL (ref 0.6–4.47)
LYMPHOCYTES NFR BLD AUTO: 25 % (ref 14–44)
MCH RBC QN AUTO: 31.8 PG (ref 26.8–34.3)
MCH RBC QN AUTO: 31.9 PG (ref 26.8–34.3)
MCHC RBC AUTO-ENTMCNC: 31.6 G/DL (ref 31.4–37.4)
MCHC RBC AUTO-ENTMCNC: 31.9 G/DL (ref 31.4–37.4)
MCV RBC AUTO: 100 FL (ref 82–98)
MCV RBC AUTO: 101 FL (ref 82–98)
MONOCYTES # BLD AUTO: 0.93 THOUSAND/ÂΜL (ref 0.17–1.22)
MONOCYTES NFR BLD AUTO: 9 % (ref 4–12)
NEUTROPHILS # BLD AUTO: 6.57 THOUSANDS/ÂΜL (ref 1.85–7.62)
NEUTS SEG NFR BLD AUTO: 62 % (ref 43–75)
NITRITE UR QL STRIP: NEGATIVE
NON-SQ EPI CELLS URNS QL MICRO: ABNORMAL /HPF
NRBC BLD AUTO-RTO: 0 /100 WBCS
P AXIS: 45 DEGREES
PH UR STRIP.AUTO: 5.5 [PH]
PLATELET # BLD AUTO: 178 THOUSANDS/UL (ref 149–390)
PLATELET # BLD AUTO: 184 THOUSANDS/UL (ref 149–390)
PMV BLD AUTO: 10.3 FL (ref 8.9–12.7)
PMV BLD AUTO: 10.9 FL (ref 8.9–12.7)
POTASSIUM SERPL-SCNC: 4.4 MMOL/L (ref 3.5–5.3)
POTASSIUM SERPL-SCNC: 4.6 MMOL/L (ref 3.5–5.3)
PR INTERVAL: 174 MS
PROT SERPL-MCNC: 6.9 G/DL (ref 6.4–8.4)
PROT UR STRIP-MCNC: NEGATIVE MG/DL
PROTHROMBIN TIME: 14.7 SECONDS (ref 11.6–14.5)
QRS AXIS: 20 DEGREES
QRSD INTERVAL: 86 MS
QT INTERVAL: 408 MS
QTC INTERVAL: 452 MS
RBC # BLD AUTO: 3.2 MILLION/UL (ref 3.88–5.62)
RBC # BLD AUTO: 3.3 MILLION/UL (ref 3.88–5.62)
RBC #/AREA URNS AUTO: ABNORMAL /HPF
SODIUM SERPL-SCNC: 138 MMOL/L (ref 135–147)
SODIUM SERPL-SCNC: 138 MMOL/L (ref 135–147)
SP GR UR STRIP.AUTO: 1.04 (ref 1–1.03)
T WAVE AXIS: 73 DEGREES
UROBILINOGEN UR STRIP-ACNC: <2 MG/DL
VENTRICULAR RATE: 74 BPM
WBC # BLD AUTO: 10.37 THOUSAND/UL (ref 4.31–10.16)
WBC # BLD AUTO: 10.58 THOUSAND/UL (ref 4.31–10.16)
WBC #/AREA URNS AUTO: ABNORMAL /HPF

## 2024-06-29 PROCEDURE — 74174 CTA ABD&PLVS W/CONTRAST: CPT

## 2024-06-29 PROCEDURE — 82550 ASSAY OF CK (CPK): CPT | Performed by: EMERGENCY MEDICINE

## 2024-06-29 PROCEDURE — 99285 EMERGENCY DEPT VISIT HI MDM: CPT | Performed by: EMERGENCY MEDICINE

## 2024-06-29 PROCEDURE — 85027 COMPLETE CBC AUTOMATED: CPT

## 2024-06-29 PROCEDURE — 96360 HYDRATION IV INFUSION INIT: CPT

## 2024-06-29 PROCEDURE — 82746 ASSAY OF FOLIC ACID SERUM: CPT

## 2024-06-29 PROCEDURE — 93970 EXTREMITY STUDY: CPT

## 2024-06-29 PROCEDURE — 93005 ELECTROCARDIOGRAM TRACING: CPT

## 2024-06-29 PROCEDURE — 99223 1ST HOSP IP/OBS HIGH 75: CPT | Performed by: NEUROLOGICAL SURGERY

## 2024-06-29 PROCEDURE — 85730 THROMBOPLASTIN TIME PARTIAL: CPT | Performed by: EMERGENCY MEDICINE

## 2024-06-29 PROCEDURE — 99223 1ST HOSP IP/OBS HIGH 75: CPT | Performed by: INTERNAL MEDICINE

## 2024-06-29 PROCEDURE — 93970 EXTREMITY STUDY: CPT | Performed by: INTERNAL MEDICINE

## 2024-06-29 PROCEDURE — 36415 COLL VENOUS BLD VENIPUNCTURE: CPT | Performed by: EMERGENCY MEDICINE

## 2024-06-29 PROCEDURE — 85610 PROTHROMBIN TIME: CPT | Performed by: EMERGENCY MEDICINE

## 2024-06-29 PROCEDURE — 83690 ASSAY OF LIPASE: CPT | Performed by: EMERGENCY MEDICINE

## 2024-06-29 PROCEDURE — 83605 ASSAY OF LACTIC ACID: CPT | Performed by: EMERGENCY MEDICINE

## 2024-06-29 PROCEDURE — 99284 EMERGENCY DEPT VISIT MOD MDM: CPT

## 2024-06-29 PROCEDURE — 71045 X-RAY EXAM CHEST 1 VIEW: CPT

## 2024-06-29 PROCEDURE — 87086 URINE CULTURE/COLONY COUNT: CPT | Performed by: EMERGENCY MEDICINE

## 2024-06-29 PROCEDURE — 71275 CT ANGIOGRAPHY CHEST: CPT

## 2024-06-29 PROCEDURE — 80053 COMPREHEN METABOLIC PANEL: CPT

## 2024-06-29 PROCEDURE — 80048 BASIC METABOLIC PNL TOTAL CA: CPT

## 2024-06-29 PROCEDURE — 81001 URINALYSIS AUTO W/SCOPE: CPT | Performed by: EMERGENCY MEDICINE

## 2024-06-29 PROCEDURE — 83036 HEMOGLOBIN GLYCOSYLATED A1C: CPT

## 2024-06-29 PROCEDURE — 96361 HYDRATE IV INFUSION ADD-ON: CPT

## 2024-06-29 PROCEDURE — 93010 ELECTROCARDIOGRAM REPORT: CPT | Performed by: INTERNAL MEDICINE

## 2024-06-29 PROCEDURE — 82607 VITAMIN B-12: CPT

## 2024-06-29 PROCEDURE — 85025 COMPLETE CBC W/AUTO DIFF WBC: CPT

## 2024-06-29 RX ORDER — ATORVASTATIN CALCIUM 40 MG/1
80 TABLET, FILM COATED ORAL EVERY EVENING
Status: DISCONTINUED | OUTPATIENT
Start: 2024-06-29 | End: 2024-07-03 | Stop reason: HOSPADM

## 2024-06-29 RX ORDER — SODIUM CHLORIDE, SODIUM GLUCONATE, SODIUM ACETATE, POTASSIUM CHLORIDE, MAGNESIUM CHLORIDE, SODIUM PHOSPHATE, DIBASIC, AND POTASSIUM PHOSPHATE .53; .5; .37; .037; .03; .012; .00082 G/100ML; G/100ML; G/100ML; G/100ML; G/100ML; G/100ML; G/100ML
100 INJECTION, SOLUTION INTRAVENOUS CONTINUOUS
Status: DISCONTINUED | OUTPATIENT
Start: 2024-06-29 | End: 2024-06-29

## 2024-06-29 RX ORDER — GABAPENTIN 100 MG/1
200 CAPSULE ORAL
Status: DISCONTINUED | OUTPATIENT
Start: 2024-06-29 | End: 2024-06-29

## 2024-06-29 RX ORDER — SENNOSIDES 8.6 MG
1 TABLET ORAL DAILY
Status: DISCONTINUED | OUTPATIENT
Start: 2024-06-29 | End: 2024-07-03 | Stop reason: HOSPADM

## 2024-06-29 RX ORDER — FAMOTIDINE 20 MG/1
20 TABLET, FILM COATED ORAL 2 TIMES DAILY
Status: DISCONTINUED | OUTPATIENT
Start: 2024-06-29 | End: 2024-07-03 | Stop reason: HOSPADM

## 2024-06-29 RX ORDER — ACETAMINOPHEN 325 MG/1
975 TABLET ORAL EVERY 8 HOURS SCHEDULED
Status: DISCONTINUED | OUTPATIENT
Start: 2024-06-29 | End: 2024-07-03 | Stop reason: HOSPADM

## 2024-06-29 RX ORDER — GABAPENTIN 100 MG/1
100 CAPSULE ORAL
Status: DISCONTINUED | OUTPATIENT
Start: 2024-06-29 | End: 2024-06-29

## 2024-06-29 RX ORDER — POLYETHYLENE GLYCOL 3350 17 G/17G
17 POWDER, FOR SOLUTION ORAL DAILY
Status: DISCONTINUED | OUTPATIENT
Start: 2024-06-29 | End: 2024-07-03 | Stop reason: HOSPADM

## 2024-06-29 RX ORDER — TRAMADOL HYDROCHLORIDE 50 MG/1
50 TABLET ORAL EVERY 6 HOURS PRN
Status: DISCONTINUED | OUTPATIENT
Start: 2024-06-29 | End: 2024-07-03 | Stop reason: HOSPADM

## 2024-06-29 RX ORDER — DOCUSATE SODIUM 100 MG/1
100 CAPSULE, LIQUID FILLED ORAL 2 TIMES DAILY
Status: DISCONTINUED | OUTPATIENT
Start: 2024-06-29 | End: 2024-07-03 | Stop reason: HOSPADM

## 2024-06-29 RX ORDER — OXYCODONE HYDROCHLORIDE 5 MG/1
5 TABLET ORAL EVERY 4 HOURS PRN
Status: DISCONTINUED | OUTPATIENT
Start: 2024-06-29 | End: 2024-07-03 | Stop reason: HOSPADM

## 2024-06-29 RX ORDER — ASCORBIC ACID 500 MG
500 TABLET ORAL DAILY
Status: DISCONTINUED | OUTPATIENT
Start: 2024-06-29 | End: 2024-07-03 | Stop reason: HOSPADM

## 2024-06-29 RX ORDER — GABAPENTIN 100 MG/1
200 CAPSULE ORAL
Status: DISCONTINUED | OUTPATIENT
Start: 2024-06-29 | End: 2024-07-03 | Stop reason: HOSPADM

## 2024-06-29 RX ORDER — SODIUM CHLORIDE 9 MG/ML
125 INJECTION, SOLUTION INTRAVENOUS CONTINUOUS
Status: DISCONTINUED | OUTPATIENT
Start: 2024-06-29 | End: 2024-06-29

## 2024-06-29 RX ORDER — ENOXAPARIN SODIUM 100 MG/ML
40 INJECTION SUBCUTANEOUS DAILY
Status: DISCONTINUED | OUTPATIENT
Start: 2024-06-29 | End: 2024-07-03 | Stop reason: HOSPADM

## 2024-06-29 RX ORDER — OXYCODONE HYDROCHLORIDE 5 MG/1
5 TABLET ORAL ONCE
Status: COMPLETED | OUTPATIENT
Start: 2024-06-29 | End: 2024-06-29

## 2024-06-29 RX ADMIN — Medication 2000 UNITS: at 08:40

## 2024-06-29 RX ADMIN — ENOXAPARIN SODIUM 40 MG: 40 INJECTION SUBCUTANEOUS at 08:40

## 2024-06-29 RX ADMIN — GABAPENTIN 200 MG: 100 CAPSULE ORAL at 20:22

## 2024-06-29 RX ADMIN — FAMOTIDINE 20 MG: 20 TABLET ORAL at 17:01

## 2024-06-29 RX ADMIN — OXYCODONE HYDROCHLORIDE AND ACETAMINOPHEN 500 MG: 500 TABLET ORAL at 08:40

## 2024-06-29 RX ADMIN — FAMOTIDINE 20 MG: 20 TABLET ORAL at 08:40

## 2024-06-29 RX ADMIN — DOCUSATE SODIUM 100 MG: 100 CAPSULE, LIQUID FILLED ORAL at 08:40

## 2024-06-29 RX ADMIN — SENNOSIDES 8.6 MG: 8.6 TABLET, FILM COATED ORAL at 08:40

## 2024-06-29 RX ADMIN — METOPROLOL TARTRATE 75 MG: 50 TABLET, FILM COATED ORAL at 06:26

## 2024-06-29 RX ADMIN — SODIUM CHLORIDE 250 ML: 0.9 INJECTION, SOLUTION INTRAVENOUS at 01:51

## 2024-06-29 RX ADMIN — METOPROLOL TARTRATE 75 MG: 50 TABLET, FILM COATED ORAL at 17:01

## 2024-06-29 RX ADMIN — ACETAMINOPHEN 975 MG: 325 TABLET, FILM COATED ORAL at 13:38

## 2024-06-29 RX ADMIN — IOHEXOL 80 ML: 350 INJECTION, SOLUTION INTRAVENOUS at 02:54

## 2024-06-29 RX ADMIN — ACETAMINOPHEN 975 MG: 325 TABLET, FILM COATED ORAL at 20:22

## 2024-06-29 RX ADMIN — TRAMADOL HYDROCHLORIDE 50 MG: 50 TABLET, COATED ORAL at 12:04

## 2024-06-29 RX ADMIN — ACETAMINOPHEN 975 MG: 325 TABLET, FILM COATED ORAL at 06:26

## 2024-06-29 RX ADMIN — TRAMADOL HYDROCHLORIDE 50 MG: 50 TABLET, COATED ORAL at 20:22

## 2024-06-29 RX ADMIN — OXYCODONE HYDROCHLORIDE 5 MG: 5 TABLET ORAL at 01:28

## 2024-06-29 RX ADMIN — ATORVASTATIN CALCIUM 80 MG: 40 TABLET, FILM COATED ORAL at 17:01

## 2024-06-29 RX ADMIN — DOCUSATE SODIUM 100 MG: 100 CAPSULE, LIQUID FILLED ORAL at 17:01

## 2024-06-29 RX ADMIN — ASPIRIN 81 MG: 81 TABLET, COATED ORAL at 08:40

## 2024-06-29 RX ADMIN — SODIUM CHLORIDE, SODIUM GLUCONATE, SODIUM ACETATE, POTASSIUM CHLORIDE, MAGNESIUM CHLORIDE, SODIUM PHOSPHATE, DIBASIC, AND POTASSIUM PHOSPHATE 100 ML/HR: .53; .5; .37; .037; .03; .012; .00082 INJECTION, SOLUTION INTRAVENOUS at 06:26

## 2024-06-29 NOTE — ASSESSMENT & PLAN NOTE
Per patient chronic, wears compression stocking on L side. Had LE US in 5/2024.   Vas Doppler unremarkable for DVT

## 2024-06-29 NOTE — ASSESSMENT & PLAN NOTE
Chronic since May 2024. Did not resolve with course of antibiotics. Monitor on CBC, follow up outpatient for further workup.

## 2024-06-29 NOTE — ASSESSMENT & PLAN NOTE
PMHx of chronic back pain including T12 compression fracture and fusion of L2-S1 in 2018. Patient reports 1 week of worsening L sided back pain that feels like an achy muscle but is worse with standing/twisting or bending.     Tylenol 975 q8 OTC  Opioid naive pain management including Oxy 2.5 and Oxy 5 mg for pain  Bowel regimen due to constipation 2/2 tramadol outpatient  Continue gabapentin for neuropathic pain in feet  Neurosurgery consult for evaluation/management recommendations of new acute T11 compression fracture  PT/OT  Consider DEXA scan outpatient in setting of multiple pathologic fractures w/o trauma  Continue vitamin C and D supplementation

## 2024-06-29 NOTE — ASSESSMENT & PLAN NOTE
Previous L2-S1 decompression and fusion with anterolisthesis of L5 on S1.  No evidence of hardware loosening or failure on CT imaging.  Change in patient's chronic lower back pain.  Optimize pain medication regimen.

## 2024-06-29 NOTE — ED PROVIDER NOTES
History  Chief Complaint   Patient presents with    Back Pain     Pt reports intermittent left lower back pain that started on 6/23. Pain is worse with movement. States the pain radiates to his left flank. Hx of lower back pain. Denies recent falls. Took gabapentin and tramadol around 2200 tonight. Reports constipation. Last BM 6/26.     Patient is an 83-year-old male who presented to the ED for left lower back pain.  Patient stated that he has extensive history of chronic back pain in this location, although more typically it is a band like distribution starting on the left, this time isolated to the left lumbar region.  He stated that he follows up with pain management for this and has been on gabapentin and tramadol with good response until this last week at which point he noticed that the pain has started to become progressively worsening and has been refractory to these medications. He denies any fevers, chills, or other systemic symptoms.  He also denies any chest pain, shortness of breath, palpitations.   Denies any recent trauma, falls, or other accidents. No other complaints or symptoms. He was noted to have BP 97/53 and given this we will proceed with more thorough investigation        Prior to Admission Medications   Prescriptions Last Dose Informant Patient Reported? Taking?   Ascorbic Acid (Vitamin C) 500 MG PACK 6/28/2024 Self Yes Yes   Sig: Take 1,000 mg by mouth daily   Multiple Vitamin (MULTIVITAMIN) capsule 6/28/2024 Self Yes Yes   Sig: Take 1 capsule by mouth daily   acetaminophen (TYLENOL) 650 mg CR tablet 6/28/2024 Self Yes Yes   Sig: Take 650 mg by mouth every 8 (eight) hours as needed for mild pain   aspirin (ECOTRIN LOW STRENGTH) 81 mg EC tablet 6/28/2024 Self Yes Yes   Sig: Take 81 mg by mouth daily   atorvastatin (LIPITOR) 80 mg tablet 6/28/2024 Self No Yes   Sig: TAKE 1 TABLET BY MOUTH EVERY DAY IN THE MORNING   cholecalciferol (VITAMIN D3) 1,000 units tablet 6/28/2024 Self Yes Yes   Sig:  Take 2,000 Units by mouth daily   clotrimazole-betamethasone (LOTRISONE) 1-0.05 % cream Not Taking Self No No   Sig: Apply topically 2 (two) times a day   Patient not taking: Reported on 6/29/2024   cyanocobalamin (VITAMIN B-12) 1,000 mcg tablet 6/28/2024 Self Yes Yes   Sig: Take 1,000 mcg by mouth daily     docusate sodium (COLACE) 100 mg capsule Not Taking Self Yes No   Sig: Take 100 mg by mouth 2 (two) times a day   Patient not taking: Reported on 6/29/2024   famotidine (PEPCID) 20 mg tablet 6/28/2024  No Yes   Sig: TAKE 1 TABLET BY MOUTH TWICE A DAY   folic acid (FOLVITE) 1 mg tablet 6/28/2024 Self No Yes   Sig: Take 1 tablet (1 mg total) by mouth daily   gabapentin (NEURONTIN) 100 mg capsule   No No   Sig: Take 1 capsule (100 mg total) by mouth daily at bedtime   metoprolol tartrate (LOPRESSOR) 50 mg tablet 6/28/2024  No Yes   Sig: Take 1.5 tablets (75 mg total) by mouth every 12 (twelve) hours   traMADol (Ultram) 50 mg tablet 6/28/2024  No Yes   Sig: Take 1 tablet (50 mg total) by mouth 2 (two) times a day as needed for moderate pain      Facility-Administered Medications: None       Past Medical History:   Diagnosis Date    Abnormal liver function test     RESOLVED: 14SEP2017    Allergic rhinitis     Anemia     LAST ASSESSED: 19OCT2017    Arthritis     BPH (benign prostatic hyperplasia)     CAD (coronary artery disease)     Coronary artery disease     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HCC)     LAST ASSESSED: 05OCT2017    Former tobacco use     GERD (gastroesophageal reflux disease)     Hand paresthesia     RESOLVED: 11SEP2017    Hyperlipidemia     Hypertension     Lumbar stenosis     Peripheral artery disease (HCC)     LAST ASSESSED: 27OCT2017    Stage 3a chronic kidney disease (HCC) 7/11/2021       Past Surgical History:   Procedure Laterality Date    BACK SURGERY      COLONOSCOPY      LUMBAR FUSION      NE ARTHRODESIS POSTERIOR/PSTLAT TQ 1NTRSPC LUMBAR N/A 11/03/2016    Procedure: L2-S1 POSTERIOR LUMBAR FUSION AND  DECOMPRESSION (Impulse), Posterior lateral fixation; dural repair.;  Surgeon: eLslie Gil MD;  Location: [facility];  Service: Orthopedics    AK CABG W/ARTERIAL GRAFT SINGLE ARTERIAL GRAFT N/A 2018    Procedure: CABG X4 with LIMA - LAD, SVG - RCA, OM2, & Diagonal ; Left Leg EVH; MATTHEW;  Surgeon: Eric Bar DO;  Location: [facility];  Service: Cardiac Surgery    AK COLONOSCOPY FLX DX W/COLLJ SPEC WHEN PFRMD N/A 11/15/2017    Procedure: EGD AND COLONOSCOPY;  Surgeon: Mariano Godinez MD;  Location: [facility];  Service: Gastroenterology    SEPTOPLASTY      LAST ASSESSED; [id number]    TONSILECTOMY AND ADNOIDECTOMY      LAST ASSESSED: [id number]    UPPER GASTROINTESTINAL ENDOSCOPY         Family History   Problem Relation Age of Onset    Emphysema Mother     Liver disease Mother     Coronary artery disease Father     Hypertension Father     Liver disease Father     Heart failure Father     Stroke Father         CVA     Heart attack Father     Coronary artery disease Brother     Heart disease Brother         younger brother by pass and other brother 4 stents placed    Other Family         BACK PROBLEM     Stroke Family         CVA    Emphysema Family     Hypertension Family         BENIGN     I have reviewed and agree with the history as documented.    E-Cigarette/Vaping    E-Cigarette Use Never User      E-Cigarette/Vaping Substances    Nicotine No     THC No     CBD No     Flavoring No     Other No     Unknown No      Social History     Tobacco Use    Smoking status: Former     Current packs/day: 0.00     Average packs/day: 1 pack/day for 50.0 years (50.0 ttl pk-yrs)     Types: Cigarettes     Start date:      Quit date:      Years since quittin.5    Smokeless tobacco: Never   Vaping Use    Vaping status: Never Used   Substance Use Topics    Alcohol use: Not Currently     Alcohol/week: 1.0 standard drink of alcohol     Types: 1 Shots of liquor per week     Comment: beer, wine, scotch every  day; SOCIAL AS PER ALL SCRIPTS     Drug use: Not Currently     Types: Marijuana     Comment: medical clarke        Review of Systems   Constitutional:  Negative for chills and fever.   HENT: Negative.     Respiratory:  Negative for cough and shortness of breath.    Cardiovascular:  Negative for chest pain and palpitations.   Gastrointestinal:  Negative for abdominal pain, nausea and vomiting.   Genitourinary: Negative.    Musculoskeletal:         Left lower back pain   Skin: Negative.    Neurological: Negative.    Psychiatric/Behavioral: Negative.     All other systems reviewed and are negative.      Physical Exam  ED Triage Vitals [06/29/24 0029]   Temperature Pulse Respirations Blood Pressure SpO2   98.1 °F (36.7 °C) 73 17 97/53 96 %      Temp Source Heart Rate Source Patient Position - Orthostatic VS BP Location FiO2 (%)   Oral Monitor Sitting Right arm --      Pain Score       No Pain             Orthostatic Vital Signs  Vitals:    06/29/24 0029 06/29/24 0243 06/29/24 0430   BP: 97/53 141/64 140/68   Pulse: 73 71 74   Patient Position - Orthostatic VS: Sitting  Lying       Physical Exam  On examination:  The patient is awake, alert and oriented  HEENT: Normocephalic/atraumatic  External examination of the ears is unremarkable  Pupils are equal round and reactive to light, there is no conjunctival injection or scleral icterus noted  Nares are patent without rhinorrhea.  The oropharynx is moist without injection  The neck is supple  Lungs: Clear to auscultation bilaterally  Heart: Regular without murmurs rubs or gallops  Abdomen: Soft and nontender. There are positive bowel sounds. there is no rebound or guarding  Musculoskeletal: No CTL midline tenderness. Tenderness to left lumbar region paraspinous muscles  Neuro: Cranial nerves II through XII grossly intact. Nonfocal exam  Skin: No rash noted  Psych: Mood and affect normal      ED Medications  Medications   aspirin (ECOTRIN LOW STRENGTH) EC tablet 81 mg (has  no administration in time range)   atorvastatin (LIPITOR) tablet 80 mg (has no administration in time range)   famotidine (PEPCID) tablet 20 mg (has no administration in time range)   gabapentin (NEURONTIN) capsule 100 mg (has no administration in time range)   metoprolol tartrate (LOPRESSOR) tablet 75 mg (has no administration in time range)   enoxaparin (LOVENOX) subcutaneous injection 40 mg (has no administration in time range)   docusate sodium (COLACE) capsule 100 mg (has no administration in time range)   senna (SENOKOT) tablet 8.6 mg (has no administration in time range)   polyethylene glycol (MIRALAX) packet 17 g (has no administration in time range)   glycerin (adult) rectal suppository 1 suppository (has no administration in time range)   acetaminophen (TYLENOL) tablet 975 mg (has no administration in time range)   oxyCODONE (ROXICODONE) split tablet 2.5 mg (has no administration in time range)     Or   oxyCODONE (ROXICODONE) IR tablet 5 mg (has no administration in time range)   ascorbic acid (VITAMIN C) tablet 500 mg (has no administration in time range)   Cholecalciferol (VITAMIN D3) tablet 2,000 Units (has no administration in time range)   multi-electrolyte (PLASMALYTE-A/ISOLYTE-S PH 7.4) IV solution (has no administration in time range)   oxyCODONE (ROXICODONE) IR tablet 5 mg (5 mg Oral Given 6/29/24 0128)   sodium chloride 0.9 % bolus 250 mL (0 mL Intravenous Stopped 6/29/24 0429)   iohexol (OMNIPAQUE) 350 MG/ML injection (MULTI-DOSE) 80 mL (80 mL Intravenous Given 6/29/24 0254)       Diagnostic Studies  Results Reviewed       Procedure Component Value Units Date/Time    Basic metabolic panel [[id number]]     Lab Status: No result Specimen: Blood     CBC (With Platelets) [[id number]]     Lab Status: No result Specimen: Blood     Platelet count [[id number]]     Lab Status: No result Specimen: Blood     Urine culture [[id number]] Collected: 06/29/24 1744    Lab Status: In process Specimen: Urine, Clean  Catch Updated: 06/29/24 0504    Urine Microscopic [[id number]]  (Abnormal) Collected: 06/29/24 0430    Lab Status: Final result Specimen: Urine, Clean Catch Updated: 06/29/24 0500     RBC, UA None Seen /hpf      WBC, UA 4-10 /hpf      Epithelial Cells Occasional /hpf      Bacteria, UA Occasional /hpf     UA w Reflex to Microscopic w Reflex to Culture [[id number]]  (Abnormal) Collected: 06/29/24 0430    Lab Status: Final result Specimen: Urine, Clean Catch Updated: 06/29/24 0457     Color, UA Light Yellow     Clarity, UA Clear     Specific Gravity, UA 1.043     pH, UA 5.5     Leukocytes, UA Small     Nitrite, UA Negative     Protein, UA Negative mg/dl      Glucose, UA Negative mg/dl      Ketones, UA Negative mg/dl      Urobilinogen, UA <2.0 mg/dl      Bilirubin, UA Negative     Occult Blood, UA Negative    Lactic acid, plasma (w/reflex if result > 2.0) [[id number]]  (Normal) Collected: 06/29/24 0153    Lab Status: Final result Specimen: Blood from Arm, Left Updated: 06/29/24 0240     LACTIC ACID 1.2 mmol/L     Narrative:      Result may be elevated if tourniquet was used during collection.    Comprehensive metabolic panel [[id number]]  (Abnormal) Collected: 06/29/24 0153    Lab Status: Final result Specimen: Blood from Arm, Left Updated: 06/29/24 0239     Sodium 138 mmol/L      Potassium 4.6 mmol/L      Chloride 107 mmol/L      CO2 26 mmol/L      ANION GAP 5 mmol/L      BUN 31 mg/dL      Creatinine 1.16 mg/dL      Glucose 107 mg/dL      Calcium 9.0 mg/dL      AST 20 U/L      ALT 14 U/L      Alkaline Phosphatase 61 U/L      Total Protein 6.9 g/dL      Albumin 3.5 g/dL      Total Bilirubin 0.34 mg/dL      eGFR 57 ml/min/1.73sq m     Narrative:      [employer] guidelines for Chronic Kidney Disease (CKD):     Stage 1 with normal or high GFR (GFR > 90 mL/min/1.73 square meters)    Stage 2 Mild CKD (GFR = 60-89 mL/min/1.73 square meters)    Stage 3A Moderate CKD (GFR = 45-59 mL/min/1.73 square meters)     Stage 3B Moderate CKD (GFR = 30-44 mL/min/1.73 square meters)    Stage 4 Severe CKD (GFR = 15-29 mL/min/1.73 square meters)    Stage 5 End Stage CKD (GFR <15 mL/min/1.73 square meters)  Note: GFR calculation is accurate only with a steady state creatinine    Lipase [[id number]]  (Abnormal) Collected: 06/29/24 0153    Lab Status: Final result Specimen: Blood from Arm, Left Updated: 06/29/24 0239     Lipase 10 u/L     CK [[id number]]  (Normal) Collected: 06/29/24 0153    Lab Status: Final result Specimen: Blood from Arm, Left Updated: 06/29/24 0239     Total CK 53 U/L     Protime-INR [[id number]]  (Abnormal) Collected: 06/29/24 0153    Lab Status: Final result Specimen: Blood from Arm, Left Updated: 06/29/24 0237     Protime 14.7 seconds      INR 1.08    APTT [[id number]]  (Abnormal) Collected: 06/29/24 0153    Lab Status: Final result Specimen: Blood from Arm, Left Updated: 06/29/24 0237     PTT 38 seconds     CBC and differential [[id number]]  (Abnormal) Collected: 06/29/24 0153    Lab Status: Final result Specimen: Blood from Arm, Left Updated: 06/29/24 0222     WBC 10.58 Thousand/uL      RBC 3.20 Million/uL      Hemoglobin 10.2 g/dL      Hematocrit 32.3 %       fL      MCH 31.9 pg      MCHC 31.6 g/dL      RDW 13.0 %      MPV 10.9 fL      Platelets 184 Thousands/uL      nRBC 0 /100 WBCs      Segmented % 62 %      Immature Grans % 0 %      Lymphocytes % 25 %      Monocytes % 9 %      Eosinophils Relative 4 %      Basophils Relative 0 %      Absolute Neutrophils 6.57 Thousands/µL      Absolute Immature Grans 0.02 Thousand/uL      Absolute Lymphocytes 2.64 Thousands/µL      Absolute Monocytes 0.93 Thousand/µL      Eosinophils Absolute 0.38 Thousand/µL      Basophils Absolute 0.04 Thousands/µL                    CTA dissection protocol chest/abdomen/pelvis   Final Result by Gary Ramirez MD (06/29 0414)      No evidence of aortic aneurysm or dissection      Acute compression fracture at the T11 vertebral body with  approximately 10 to 20% vertebral body height loss anteriorly..      Worsening findings of interstitial lung disease comparing to 10/16/2021               Workstation performed: [id number]         CT recon only thoracolumbar   Final Result by Gary Ramirez MD (06/29 0426)      New acute compression fracture at the T11 vertebral body anteriorly with approximately 10 to 20% loss of body height..               Workstation performed: [id number]         XR chest 1 view portable   ED Interpretation by Adalberto Dowling MD (06/29 0228)   No acute cardiopulmonary process.  Interpreted by me.       VAS VENOUS DUPLEX - LOWER LIMB BILATERAL    (Results Pending)         Procedures  ECG 12 Lead Documentation Only    Date/Time: 6/29/2024 1:31 AM    Performed by: Adalberto Dowling MD  Authorized by: Adalberto Dowling MD    Indications / Diagnosis:  Hypotension  ECG reviewed by me, the ED Provider: yes    Patient location:  ED  Previous ECG:     Comparison to cardiac monitor: Yes    Interpretation:     Interpretation: normal    Quality:     Tracing quality:  Limited by artifact  Rate:     ECG rate:  74    ECG rate assessment: normal    Rhythm:     Rhythm: sinus rhythm    Ectopy:     Ectopy: none    QRS:     QRS axis:  Normal    QRS intervals:  Normal  Conduction:     Conduction: normal    ST segments:     ST segments:  Normal  T waves:     T waves: normal          ED Course                                       Medical Decision Making  Patient is an 83-year-old male who presented to the ED for left lower back pain.  Patient stated that he has extensive history of chronic back pain in this location, although more typically it is a band like distribution starting on the left, this time isolated to the left lumbar region.  He stated that he follows up with pain management for this and has been on gabapentin and tramadol with good response until this last week at which point he noticed that the pain has started to become progressively  worsening and has been refractory to these medications. He denies any fevers, chills, or other systemic symptoms.  He also denies any chest pain, shortness of breath, palpitations.  Denies any recent trauma, falls, or other accidents.  No other complaints or symptoms. He was noted to have BP 97/53 and given this we will proceed with more thorough investigation    Ddx includes but is not limited to lumbar sprain/strain, other muscle pathology, fracture, causes of hypotension including sepsis.  CBC, CMP, lipase grossly unremarkable.  EKG nonischemic and CXR unremarkable.  CT revealed acute T11 vertebral compression fracture.  UA equivocal for UTI, culture ordered.  Patient's pain may be referred pain from T11 fracture. Patient admitted to medicine for further workup and evaluation.    Amount and/or Complexity of Data Reviewed  Labs: ordered.  Radiology: ordered and independent interpretation performed.    Risk  Prescription drug management.  Decision regarding hospitalization.          Disposition  Final diagnoses:   Compression fracture of T11 vertebra, initial encounter ([facility])   Back pain     Time reflects when diagnosis was documented in both MDM as applicable and the Disposition within this note       Time User Action Codes Description Comment    6/29/2024  4:57 AM Adalberto Dowling [S22.080A] Compression fracture of T11 vertebra, initial encounter ([facility])     6/29/2024  4:57 AM Adalberto Dowling [M54.9] Back pain           ED Disposition       ED Disposition   Admit    Condition   Stable    Date/Time   Sat Jun 29, 2024  4:57 AM    Comment   Case was discussed with DIOGENES and the patient's admission status was agreed to be Admission Status: inpatient status to the service of Dr. Bay             Follow-up Information    None         Patient's Medications   Discharge Prescriptions    No medications on file     No discharge procedures on file.    PDMP Review         Value Time User    PDMP Reviewed  Yes  6/29/2024 12:34 AM Shane Roper MD             ED Provider  Attending physically available and evaluated Suhail Borrego. I managed the patient along with the ED Attending.    Electronically Signed by           Adalberto Dowling MD  06/29/24 6260

## 2024-06-29 NOTE — ED ATTENDING ATTESTATION
6/29/2024  I, Shane Roper MD, saw and evaluated the patient. I have discussed the patient with the resident/non-physician practitioner and agree with the resident's/non-physician practitioner's findings, Plan of Care, and MDM as documented in the resident's/non-physician practitioner's note, except where noted. All available labs and Radiology studies were reviewed.  I was present for key portions of any procedure(s) performed by the resident/non-physician practitioner and I was immediately available to provide assistance.       At this point I agree with the current assessment done in the Emergency Department.  I have conducted an independent evaluation of this patient a history and physical is as follows: Patient is a 83 year old male with 1 week of worsening left flank and lower back pain despite tramadol and gabapentin. No trauma. No fever. No N/V/D. No chest pain or sob.  Had constipation last week. No rash. No urinary sx. No incontinence. No weight loss. No GI bleeding. Was last seen at  [facility] on 5/29/24 for nerve block for lumbar spondylolysis. [employer] website checked on this patient and last Rx filled was on 6/21/24 for tramadol for 30 day supply. NCAT. No scleral icterus. Somewhat moist mucous membranes. Lungs clear. Heart regular without murmur. Abdomen soft and nontender. Good bowel sounds. (+) left CVAT and paravertebral lumbar tenderness. No rash noted. No jaundice. Negative SLR. No focal deficits. DDx including but not limited to: renal colic, pyelonephritis, UTI, GI etiology, appendicitis, diverticulitis, pancreatitis, cholecystitis, biliary colic, AAA, rhabdomyolysis, tumor, zoster, dissection; doubt cardiac etiology or GI bleed. DDx including but not limited to: sciatica, herniated disc, arthritis, spinal stenosis, strain, sprain; doubt fracture, cauda equina syndrome, epidural abscess, transverse myelitis. Will check labs, EKG, CXR and CT and give oxycodone for pain.     ED  Course         Critical Care Time  Procedures

## 2024-06-29 NOTE — Clinical Note
Case was discussed with DIOGENES and the patient's admission status was agreed to be Admission Status: inpatient status to the service of  ___ .

## 2024-06-29 NOTE — ASSESSMENT & PLAN NOTE
Subacute T11 compression fracture in the setting of more chronic compression fractures and previous lumbar decompression and fusion. TLICS 1.  No neurosurgical intervention is anticipated.    I reviewed his history, physical examination and imaging results in detail.  Explained that he has a newer T11 compression fracture likely accounting for some of his acute on chronic back pain.  Currently pain is reasonably well-controlled.  Would recommend TLSO brace with upright plain films and follow-up on an outpatient basis.  No neurosurgical intervention is anticipated.  Will follow-up on an outpatient basis.  Please contact service if there is progressive neurological decline.    Recommend workup for osteopenia/osteoporosis.  Try to minimize fall risk.    All his questions were answered to his satisfaction and he is in agreement with this course of action.

## 2024-06-29 NOTE — ASSESSMENT & PLAN NOTE
Per patient chronic, wears compression stocking on L side. Had LE US in 5/2024. Will repeat to ensure no interval DVT.

## 2024-06-29 NOTE — ASSESSMENT & PLAN NOTE
Lab Results   Component Value Date    EGFR 57 06/29/2024    EGFR 59 05/19/2024    EGFR 56 05/02/2024    CREATININE 1.16 06/29/2024    CREATININE 1.14 05/19/2024    CREATININE 1.18 05/02/2024     Patient with chronic kidney disease IIIa. Avoid hypotension and nephrotoxins. Monitor on AM BMP.   IV hydration given s/p contrast for CTA dissection protocol.

## 2024-06-29 NOTE — PLAN OF CARE

## 2024-06-29 NOTE — CONSULTS
[facility]  Consult  Name: Suhail Borrego 83 y.o. male I MRN: [id number]  Unit/Bed#: ED-39 I Date of Admission: 6/29/2024   Date of Service: 6/29/2024 I Hospital Day: 0    Inpatient consult to Neurosurgery  Consult performed by: Darius Plasencia MD  Consult ordered by: Anu You MD          Assessment & Plan   S/P lumbar fusion  Assessment & Plan  Previous L2-S1 decompression and fusion with anterolisthesis of L5 on S1.  No evidence of hardware loosening or failure on CT imaging.  Change in patient's chronic lower back pain.  Optimize pain medication regimen.    * Lumbar compression fracture (HCC)  Assessment & Plan  Subacute T11 compression fracture in the setting of more chronic compression fractures and previous lumbar decompression and fusion. TLICS 1.  No neurosurgical intervention is anticipated.    I reviewed his history, physical examination and imaging results in detail.  Explained that he has a newer T11 compression fracture likely accounting for some of his acute on chronic back pain.  Currently pain is reasonably well-controlled.  Would recommend TLSO brace with upright plain films and follow-up on an outpatient basis.  No neurosurgical intervention is anticipated.  Will follow-up on an outpatient basis.  Please contact service if there is progressive neurological decline.    Recommend workup for osteopenia/osteoporosis.  Try to minimize fall risk.    All his questions were answered to his satisfaction and he is in agreement with this course of action.       History of Present Illness     HPI: Suhail Borrego is a 83 y.o. male with PMH including peripheral neuropathy, previous L2-S1 decompression and fusion with Dr. Gil and known chronic L1 and T12 compression fractures.  Who presents with complaint of subacute on chronic back pain.  Per the patient, he spontaneously developed thoracolumbar back pain which radiates to the left flank.  No history of fall or other  trauma.  This was different than his more chronic pain across his lower back.  He denies any new pain, weakness or numbness in his legs, though he has chronic numbness secondary to peripheral neuropathy.  Denies any changes in bowel or bladder function.  He presented to the emergency department for further evaluation.      Review of Systems   Musculoskeletal:  Positive for back pain.   Neurological:  Positive for numbness.   All other systems reviewed and are negative.      Historical Information   Past Medical History:   Diagnosis Date    Abnormal liver function test     RESOLVED: 14SEP2017    Allergic rhinitis     Anemia     LAST ASSESSED: 19OCT2017    Arthritis     BPH (benign prostatic hyperplasia)     CAD (coronary artery disease)     Coronary artery disease     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HCC)     LAST ASSESSED: 05OCT2017    Former tobacco use     GERD (gastroesophageal reflux disease)     Hand paresthesia     RESOLVED: 11SEP2017    Hyperlipidemia     Hypertension     Lumbar stenosis     Peripheral artery disease (HCC)     LAST ASSESSED: 27OCT2017    Stage 3a chronic kidney disease (HCC) 7/11/2021     Past Surgical History:   Procedure Laterality Date    BACK SURGERY      COLONOSCOPY      LUMBAR FUSION      AL ARTHRODESIS POSTERIOR/PSTLAT TQ 1NTRSPC LUMBAR N/A 11/03/2016    Procedure: L2-S1 POSTERIOR LUMBAR FUSION AND DECOMPRESSION (Impulse), Posterior lateral fixation; dural repair.;  Surgeon: Leslie Gil MD;  Location: [facility];  Service: Orthopedics    AL CABG W/ARTERIAL GRAFT SINGLE ARTERIAL GRAFT N/A 01/19/2018    Procedure: CABG X4 with LIMA - LAD, SVG - RCA, OM2, & Diagonal ; Left Leg EVH; MATTHEW;  Surgeon: Eric Bar DO;  Location: [facility];  Service: Cardiac Surgery    AL COLONOSCOPY FLX DX W/COLLJ SPEC WHEN PFRMD N/A 11/15/2017    Procedure: EGD AND COLONOSCOPY;  Surgeon: Mariano Godinez MD;  Location: [facility];  Service: Gastroenterology    SEPTOPLASTY      LAST ASSESSED; [id number]     TONSILECTOMY AND ADNOIDECTOMY      LAST ASSESSED: [id number]    UPPER GASTROINTESTINAL ENDOSCOPY       Social History     Substance and Sexual Activity   Alcohol Use Not Currently    Alcohol/week: 1.0 standard drink of alcohol    Types: 1 Shots of liquor per week    Comment: beer, wine, scotch every day; SOCIAL AS PER ALL SCRIPTS      Social History     Substance and Sexual Activity   Drug Use Not Currently    Types: Marijuana    Comment: medical clarke     Social History     Tobacco Use   Smoking Status Former    Current packs/day: 0.00    Average packs/day: 1 pack/day for 50.0 years (50.0 ttl pk-yrs)    Types: Cigarettes    Start date:     Quit date:     Years since quittin.5   Smokeless Tobacco Never     Family History   Problem Relation Age of Onset    Emphysema Mother     Liver disease Mother     Coronary artery disease Father     Hypertension Father     Liver disease Father     Heart failure Father     Stroke Father         CVA     Heart attack Father     Coronary artery disease Brother     Heart disease Brother         younger brother by pass and other brother 4 stents placed    Other Family         BACK PROBLEM     Stroke Family         CVA    Emphysema Family     Hypertension Family         BENIGN       Meds/Allergies   all current active meds have been reviewed, current meds:   Current Facility-Administered Medications   Medication Dose Route Frequency    acetaminophen (TYLENOL) tablet 975 mg  975 mg Oral Q8H KAMRAN    ascorbic acid (VITAMIN C) tablet 500 mg  500 mg Oral Daily    aspirin (ECOTRIN LOW STRENGTH) EC tablet 81 mg  81 mg Oral Daily    atorvastatin (LIPITOR) tablet 80 mg  80 mg Oral QPM    Cholecalciferol (VITAMIN D3) tablet 2,000 Units  2,000 Units Oral Daily    docusate sodium (COLACE) capsule 100 mg  100 mg Oral BID    enoxaparin (LOVENOX) subcutaneous injection 40 mg  40 mg Subcutaneous Daily    famotidine (PEPCID) tablet 20 mg  20 mg Oral BID    gabapentin (NEURONTIN) capsule 100  mg  100 mg Oral HS    glycerin (adult) rectal suppository 1 suppository  1 suppository Rectal Once PRN    metoprolol tartrate (LOPRESSOR) tablet 75 mg  75 mg Oral Q12H    multi-electrolyte (PLASMALYTE-A/ISOLYTE-S PH 7.4) IV solution  100 mL/hr Intravenous Continuous    oxyCODONE (ROXICODONE) split tablet 2.5 mg  2.5 mg Oral Q4H PRN    Or    oxyCODONE (ROXICODONE) IR tablet 5 mg  5 mg Oral Q4H PRN    polyethylene glycol (MIRALAX) packet 17 g  17 g Oral Daily    senna (SENOKOT) tablet 8.6 mg  1 tablet Oral Daily   , and PTA meds:   Prior to Admission Medications   Prescriptions Last Dose Informant Patient Reported? Taking?   Ascorbic Acid (Vitamin C) 500 MG PACK 6/28/2024 Self Yes Yes   Sig: Take 1,000 mg by mouth daily   Multiple Vitamin (MULTIVITAMIN) capsule 6/28/2024 Self Yes Yes   Sig: Take 1 capsule by mouth daily   acetaminophen (TYLENOL) 650 mg CR tablet 6/28/2024 Self Yes Yes   Sig: Take 650 mg by mouth every 8 (eight) hours as needed for mild pain   aspirin (ECOTRIN LOW STRENGTH) 81 mg EC tablet 6/28/2024 Self Yes Yes   Sig: Take 81 mg by mouth daily   atorvastatin (LIPITOR) 80 mg tablet 6/28/2024 Self No Yes   Sig: TAKE 1 TABLET BY MOUTH EVERY DAY IN THE MORNING   cholecalciferol (VITAMIN D3) 1,000 units tablet 6/28/2024 Self Yes Yes   Sig: Take 2,000 Units by mouth daily   clotrimazole-betamethasone (LOTRISONE) 1-0.05 % cream Not Taking Self No No   Sig: Apply topically 2 (two) times a day   Patient not taking: Reported on 6/29/2024   cyanocobalamin (VITAMIN B-12) 1,000 mcg tablet 6/28/2024 Self Yes Yes   Sig: Take 1,000 mcg by mouth daily     docusate sodium (COLACE) 100 mg capsule Not Taking Self Yes No   Sig: Take 100 mg by mouth 2 (two) times a day   Patient not taking: Reported on 6/29/2024   famotidine (PEPCID) 20 mg tablet 6/28/2024  No Yes   Sig: TAKE 1 TABLET BY MOUTH TWICE A DAY   folic acid (FOLVITE) 1 mg tablet 6/28/2024 Self No Yes   Sig: Take 1 tablet (1 mg total) by mouth daily   gabapentin  (NEURONTIN) 100 mg capsule   No No   Sig: Take 1 capsule (100 mg total) by mouth daily at bedtime   metoprolol tartrate (LOPRESSOR) 50 mg tablet 6/28/2024  No Yes   Sig: Take 1.5 tablets (75 mg total) by mouth every 12 (twelve) hours   traMADol (Ultram) 50 mg tablet 6/28/2024  No Yes   Sig: Take 1 tablet (50 mg total) by mouth 2 (two) times a day as needed for moderate pain      Facility-Administered Medications: None     Allergies   Allergen Reactions    Penicillins Other (See Comments)     Hallucinations;  Patient reported that he was seeing visual disturbances         Objective   I/O       None            Vitals:Blood pressure 154/67, pulse 76, temperature 98.1 °F (36.7 °C), temperature source Oral, resp. rate 16, weight 67.5 kg (148 lb 13 oz), SpO2 92%.,Body mass index is 23.31 kg/m².     Physical Exam  Vitals reviewed.   Constitutional:       General: He is not in acute distress.  Eyes:      Extraocular Movements: Extraocular movements intact.   Cardiovascular:      Rate and Rhythm: Normal rate and regular rhythm.   Pulmonary:      Effort: Pulmonary effort is normal. No respiratory distress.   Abdominal:      General: Abdomen is flat. There is no distension.   Musculoskeletal:         General: Deformity present.      Comments: Exaggerated thoracic kyphosis.  Tender to palpation over midline of thoracolumbar spine, most probably at the thoracolumbar junction.   Skin:     General: Skin is warm and dry.   Neurological:      Mental Status: He is alert and oriented to person, place, and time.      Sensory: Sensory deficit present.      Motor: No weakness.      Comments: Grossly full power in lower extremities.  Reports diminished light touch sensation in a stocking distribution.  No clonus.   Psychiatric:         Mood and Affect: Mood normal.         Behavior: Behavior normal.       Neurologic Exam     Mental Status   Oriented to person, place, and time.       Lab Results:   Results from last 7 days   Lab Units  "06/29/24  0630 06/29/24  0153   WBC Thousand/uL 10.37* 10.58*   HEMOGLOBIN g/dL 10.5* 10.2*   HEMATOCRIT % 32.9* 32.3*   PLATELETS Thousands/uL 178 184   SEGS PCT %  --  62   MONO PCT %  --  9   EOS PCT %  --  4     Results from last 7 days   Lab Units 06/29/24  0153   POTASSIUM mmol/L 4.6   CHLORIDE mmol/L 107   CO2 mmol/L 26   BUN mg/dL 31*   CREATININE mg/dL 1.16   CALCIUM mg/dL 9.0   ALK PHOS U/L 61   ALT U/L 14   AST U/L 20             Results from last 7 days   Lab Units 06/29/24  0153   INR  1.08   PTT seconds 38*     No results found for: \"TROPONINT\"  ABG:No results found for: \"PHART\", \"YSM9IUM\", \"PO2ART\", \"UEI1ZHS\", \"Z1ESFEDA\", \"BEART\", \"SOURCE\"    Imaging Studies: I have personally reviewed pertinent reports.   and I have personally reviewed pertinent films in PACS    CT recon only thoracolumbar    Result Date: 6/29/2024  Impression: New acute compression fracture at the T11 vertebral body anteriorly with approximately 10 to 20% loss of body height.. Workstation performed: [id number]     CTA dissection protocol chest/abdomen/pelvis    Result Date: 6/29/2024  Impression: No evidence of aortic aneurysm or dissection Acute compression fracture at the T11 vertebral body with approximately 10 to 20% vertebral body height loss anteriorly.. Worsening findings of interstitial lung disease comparing to 10/16/2021 Workstation performed: [id number]       EKG, Pathology, and Other Studies: I have personally reviewed pertinent reports.   and I have personally reviewed pertinent films in PACS    VTE Prophylaxis: Enoxaparin (Lovenox)    Code Status: Level 1 - Full Code  Advance Directive and Living Will: Yes    Power of :    POLST:      Counseling / Coordination of Care  I spent 30 minutes with the patient.   "

## 2024-06-29 NOTE — ASSESSMENT & PLAN NOTE
PMHx of chronic back pain including T12 compression fracture and fusion of L2-S1 in 2018. Patient reports 1 week of worsening L sided back pain that feels like an achy muscle but is worse with standing/twisting or bending.     Tylenol 975 q8 OTC  Opioid naive pain management including Oxy 2.5 and Oxy 5 mg for pain  Bowel regimen due to constipation 2/2 tramadol outpatient  Continue gabapentin for neuropathic pain in feet  Neurosurgery consult for evaluation/management recommendations of new acute T11 compression fracture  No surgical intervention at this admission  PT/OT  Consider DEXA scan outpatient in setting of multiple pathologic fractures w/o trauma  Continue vitamin C and D supplementation    Plan:  Follow-up with SPEP, UPEP, PTH, vitamin D 25 to rule out metabolic reasons for fracture  Spinal brace and follow-up lumbar x-rays

## 2024-06-30 ENCOUNTER — APPOINTMENT (INPATIENT)
Dept: MRI IMAGING | Facility: HOSPITAL | Age: 84
DRG: 552 | End: 2024-06-30
Payer: COMMERCIAL

## 2024-06-30 ENCOUNTER — APPOINTMENT (INPATIENT)
Dept: RADIOLOGY | Facility: HOSPITAL | Age: 84
DRG: 552 | End: 2024-06-30
Payer: COMMERCIAL

## 2024-06-30 ENCOUNTER — APPOINTMENT (INPATIENT)
Dept: CT IMAGING | Facility: HOSPITAL | Age: 84
DRG: 552 | End: 2024-06-30
Payer: COMMERCIAL

## 2024-06-30 PROBLEM — I63.9 STROKE (HCC): Status: ACTIVE | Noted: 2024-06-30

## 2024-06-30 PROBLEM — I65.22 SYMPTOMATIC STENOSIS OF LEFT CAROTID ARTERY: Status: ACTIVE | Noted: 2024-06-30

## 2024-06-30 LAB
25(OH)D3 SERPL-MCNC: 44.3 NG/ML (ref 30–100)
2HR DELTA HS TROPONIN: 0 NG/L
4HR DELTA HS TROPONIN: -1 NG/L
ANION GAP SERPL CALCULATED.3IONS-SCNC: 10 MMOL/L (ref 4–13)
BACTERIA UR CULT: NORMAL
BASE EXCESS BLDA CALC-SCNC: 0 MMOL/L (ref -2–3)
BASOPHILS # BLD AUTO: 0.04 THOUSANDS/ÂΜL (ref 0–0.1)
BASOPHILS NFR BLD AUTO: 0 % (ref 0–1)
BUN SERPL-MCNC: 30 MG/DL (ref 5–25)
CA-I BLD-SCNC: 1.21 MMOL/L (ref 1.12–1.32)
CALCIUM SERPL-MCNC: 9 MG/DL (ref 8.4–10.2)
CARDIAC TROPONIN I PNL SERPL HS: 4 NG/L
CARDIAC TROPONIN I PNL SERPL HS: 5 NG/L
CARDIAC TROPONIN I PNL SERPL HS: 5 NG/L
CHLORIDE SERPL-SCNC: 103 MMOL/L (ref 96–108)
CHOLEST SERPL-MCNC: 86 MG/DL
CO2 SERPL-SCNC: 25 MMOL/L (ref 21–32)
CREAT SERPL-MCNC: 1.29 MG/DL (ref 0.6–1.3)
EOSINOPHIL # BLD AUTO: 0.26 THOUSAND/ÂΜL (ref 0–0.61)
EOSINOPHIL NFR BLD AUTO: 2 % (ref 0–6)
ERYTHROCYTE [DISTWIDTH] IN BLOOD BY AUTOMATED COUNT: 12.9 % (ref 11.6–15.1)
EST. AVERAGE GLUCOSE BLD GHB EST-MCNC: 126 MG/DL
FIO2 GAS DIL.REBREATH: 21 L
FOLATE SERPL-MCNC: >22.3 NG/ML
GFR SERPL CREATININE-BSD FRML MDRD: 50 ML/MIN/1.73SQ M
GLUCOSE SERPL-MCNC: 103 MG/DL (ref 65–140)
GLUCOSE SERPL-MCNC: 120 MG/DL (ref 65–140)
GLUCOSE SERPL-MCNC: 123 MG/DL (ref 65–140)
HBA1C MFR BLD: 6 %
HCO3 BLDA-SCNC: 27 MMOL/L (ref 24–30)
HCT VFR BLD AUTO: 32.7 % (ref 36.5–49.3)
HCT VFR BLD CALC: 32 % (ref 36.5–49.3)
HDLC SERPL-MCNC: 42 MG/DL
HGB BLD-MCNC: 10.4 G/DL (ref 12–17)
HGB BLDA-MCNC: 10.9 G/DL (ref 12–17)
IMM GRANULOCYTES # BLD AUTO: 0.05 THOUSAND/UL (ref 0–0.2)
IMM GRANULOCYTES NFR BLD AUTO: 0 % (ref 0–2)
LACTATE SERPL-SCNC: 3 MMOL/L (ref 0.5–2)
LDLC SERPL CALC-MCNC: 30 MG/DL (ref 0–100)
LYMPHOCYTES # BLD AUTO: 2.97 THOUSANDS/ÂΜL (ref 0.6–4.47)
LYMPHOCYTES NFR BLD AUTO: 23 % (ref 14–44)
MCH RBC QN AUTO: 31.7 PG (ref 26.8–34.3)
MCHC RBC AUTO-ENTMCNC: 31.8 G/DL (ref 31.4–37.4)
MCV RBC AUTO: 100 FL (ref 82–98)
MONOCYTES # BLD AUTO: 1.07 THOUSAND/ÂΜL (ref 0.17–1.22)
MONOCYTES NFR BLD AUTO: 8 % (ref 4–12)
NEUTROPHILS # BLD AUTO: 8.68 THOUSANDS/ÂΜL (ref 1.85–7.62)
NEUTS SEG NFR BLD AUTO: 67 % (ref 43–75)
NONHDLC SERPL-MCNC: 44 MG/DL
NRBC BLD AUTO-RTO: 0 /100 WBCS
PCO2 BLD: 29 MMOL/L (ref 21–32)
PCO2 BLD: 56.9 MM HG (ref 42–50)
PH BLD: 7.28 [PH] (ref 7.3–7.4)
PLATELET # BLD AUTO: 194 THOUSANDS/UL (ref 149–390)
PMV BLD AUTO: 10.5 FL (ref 8.9–12.7)
PO2 BLD: 35 MM HG (ref 35–45)
POTASSIUM BLD-SCNC: 3.9 MMOL/L (ref 3.5–5.3)
POTASSIUM SERPL-SCNC: 3.8 MMOL/L (ref 3.5–5.3)
PTH-INTACT SERPL-MCNC: 31.9 PG/ML (ref 12–88)
RBC # BLD AUTO: 3.28 MILLION/UL (ref 3.88–5.62)
SAO2 % BLD FROM PO2: 58 % (ref 60–85)
SODIUM BLD-SCNC: 139 MMOL/L (ref 136–145)
SODIUM SERPL-SCNC: 138 MMOL/L (ref 135–147)
SPECIMEN SOURCE: ABNORMAL
TRIGL SERPL-MCNC: 69 MG/DL
VIT B12 SERPL-MCNC: 2200 PG/ML (ref 180–914)
WBC # BLD AUTO: 13.07 THOUSAND/UL (ref 4.31–10.16)

## 2024-06-30 PROCEDURE — 84484 ASSAY OF TROPONIN QUANT: CPT

## 2024-06-30 PROCEDURE — 70496 CT ANGIOGRAPHY HEAD: CPT

## 2024-06-30 PROCEDURE — 99232 SBSQ HOSP IP/OBS MODERATE 35: CPT | Performed by: NEUROLOGICAL SURGERY

## 2024-06-30 PROCEDURE — 82306 VITAMIN D 25 HYDROXY: CPT

## 2024-06-30 PROCEDURE — 72100 X-RAY EXAM L-S SPINE 2/3 VWS: CPT

## 2024-06-30 PROCEDURE — 99291 CRITICAL CARE FIRST HOUR: CPT | Performed by: PSYCHIATRY & NEUROLOGY

## 2024-06-30 PROCEDURE — 70498 CT ANGIOGRAPHY NECK: CPT

## 2024-06-30 PROCEDURE — 85025 COMPLETE CBC W/AUTO DIFF WBC: CPT

## 2024-06-30 PROCEDURE — 80048 BASIC METABOLIC PNL TOTAL CA: CPT

## 2024-06-30 PROCEDURE — 97763 ORTHC/PROSTC MGMT SBSQ ENC: CPT

## 2024-06-30 PROCEDURE — 99232 SBSQ HOSP IP/OBS MODERATE 35: CPT | Performed by: INTERNAL MEDICINE

## 2024-06-30 PROCEDURE — 85014 HEMATOCRIT: CPT

## 2024-06-30 PROCEDURE — 82948 REAGENT STRIP/BLOOD GLUCOSE: CPT

## 2024-06-30 PROCEDURE — 86334 IMMUNOFIX E-PHORESIS SERUM: CPT

## 2024-06-30 PROCEDURE — 82784 ASSAY IGA/IGD/IGG/IGM EACH: CPT | Performed by: STUDENT IN AN ORGANIZED HEALTH CARE EDUCATION/TRAINING PROGRAM

## 2024-06-30 PROCEDURE — 80061 LIPID PANEL: CPT

## 2024-06-30 PROCEDURE — 99232 SBSQ HOSP IP/OBS MODERATE 35: CPT

## 2024-06-30 PROCEDURE — 97116 GAIT TRAINING THERAPY: CPT

## 2024-06-30 PROCEDURE — 83970 ASSAY OF PARATHORMONE: CPT

## 2024-06-30 PROCEDURE — 97760 ORTHOTIC MGMT&TRAING 1ST ENC: CPT

## 2024-06-30 PROCEDURE — 70551 MRI BRAIN STEM W/O DYE: CPT

## 2024-06-30 PROCEDURE — 84165 PROTEIN E-PHORESIS SERUM: CPT

## 2024-06-30 PROCEDURE — 82803 BLOOD GASES ANY COMBINATION: CPT

## 2024-06-30 PROCEDURE — 97163 PT EVAL HIGH COMPLEX 45 MIN: CPT

## 2024-06-30 PROCEDURE — 82330 ASSAY OF CALCIUM: CPT

## 2024-06-30 PROCEDURE — 82947 ASSAY GLUCOSE BLOOD QUANT: CPT

## 2024-06-30 PROCEDURE — 84295 ASSAY OF SERUM SODIUM: CPT

## 2024-06-30 PROCEDURE — 83605 ASSAY OF LACTIC ACID: CPT

## 2024-06-30 PROCEDURE — 94760 N-INVAS EAR/PLS OXIMETRY 1: CPT

## 2024-06-30 PROCEDURE — 84132 ASSAY OF SERUM POTASSIUM: CPT

## 2024-06-30 RX ORDER — CLOPIDOGREL BISULFATE 75 MG/1
75 TABLET ORAL DAILY
Status: DISCONTINUED | OUTPATIENT
Start: 2024-07-01 | End: 2024-07-03 | Stop reason: HOSPADM

## 2024-06-30 RX ORDER — CLOPIDOGREL BISULFATE 75 MG/1
300 TABLET ORAL ONCE
Status: COMPLETED | OUTPATIENT
Start: 2024-06-30 | End: 2024-06-30

## 2024-06-30 RX ORDER — MAGNESIUM SULFATE HEPTAHYDRATE 40 MG/ML
2 INJECTION, SOLUTION INTRAVENOUS ONCE
Status: COMPLETED | OUTPATIENT
Start: 2024-06-30 | End: 2024-06-30

## 2024-06-30 RX ORDER — SODIUM CHLORIDE 9 MG/ML
100 INJECTION, SOLUTION INTRAVENOUS CONTINUOUS
Status: DISPENSED | OUTPATIENT
Start: 2024-06-30 | End: 2024-06-30

## 2024-06-30 RX ORDER — ALBUMIN (HUMAN) 12.5 G/50ML
25 SOLUTION INTRAVENOUS ONCE
Status: COMPLETED | OUTPATIENT
Start: 2024-06-30 | End: 2024-06-30

## 2024-06-30 RX ORDER — MAGNESIUM HYDROXIDE/ALUMINUM HYDROXICE/SIMETHICONE 120; 1200; 1200 MG/30ML; MG/30ML; MG/30ML
30 SUSPENSION ORAL EVERY 4 HOURS PRN
Status: DISCONTINUED | OUTPATIENT
Start: 2024-06-30 | End: 2024-07-03 | Stop reason: HOSPADM

## 2024-06-30 RX ORDER — LABETALOL HYDROCHLORIDE 5 MG/ML
10 INJECTION, SOLUTION INTRAVENOUS EVERY 4 HOURS PRN
Status: DISCONTINUED | OUTPATIENT
Start: 2024-06-30 | End: 2024-07-03 | Stop reason: HOSPADM

## 2024-06-30 RX ADMIN — IOHEXOL 70 ML: 350 INJECTION, SOLUTION INTRAVENOUS at 09:11

## 2024-06-30 RX ADMIN — ASPIRIN 81 MG: 81 TABLET, COATED ORAL at 10:14

## 2024-06-30 RX ADMIN — ACETAMINOPHEN 975 MG: 325 TABLET, FILM COATED ORAL at 13:47

## 2024-06-30 RX ADMIN — CLOPIDOGREL BISULFATE 300 MG: 75 TABLET ORAL at 10:16

## 2024-06-30 RX ADMIN — Medication 2000 UNITS: at 10:13

## 2024-06-30 RX ADMIN — MAGNESIUM SULFATE HEPTAHYDRATE 2 G: 40 INJECTION, SOLUTION INTRAVENOUS at 08:31

## 2024-06-30 RX ADMIN — ALUMINUM HYDROXIDE, MAGNESIUM HYDROXIDE, DIMETHICONE 30 ML: 200; 20; 200 SUSPENSION ORAL at 16:31

## 2024-06-30 RX ADMIN — FAMOTIDINE 20 MG: 20 TABLET ORAL at 18:01

## 2024-06-30 RX ADMIN — ACETAMINOPHEN 975 MG: 325 TABLET, FILM COATED ORAL at 22:11

## 2024-06-30 RX ADMIN — ATORVASTATIN CALCIUM 80 MG: 40 TABLET, FILM COATED ORAL at 18:01

## 2024-06-30 RX ADMIN — ALBUMIN (HUMAN) 25 G: 0.25 INJECTION, SOLUTION INTRAVENOUS at 13:41

## 2024-06-30 RX ADMIN — FAMOTIDINE 20 MG: 20 TABLET ORAL at 10:13

## 2024-06-30 RX ADMIN — ENOXAPARIN SODIUM 40 MG: 40 INJECTION SUBCUTANEOUS at 10:14

## 2024-06-30 RX ADMIN — SODIUM CHLORIDE 100 ML/HR: 0.9 INJECTION, SOLUTION INTRAVENOUS at 15:37

## 2024-06-30 RX ADMIN — GABAPENTIN 200 MG: 100 CAPSULE ORAL at 22:11

## 2024-06-30 RX ADMIN — ACETAMINOPHEN 975 MG: 325 TABLET, FILM COATED ORAL at 06:03

## 2024-06-30 RX ADMIN — OXYCODONE HYDROCHLORIDE AND ACETAMINOPHEN 500 MG: 500 TABLET ORAL at 10:13

## 2024-06-30 RX ADMIN — SODIUM CHLORIDE 500 ML: 0.9 INJECTION, SOLUTION INTRAVENOUS at 14:31

## 2024-06-30 RX ADMIN — SODIUM CHLORIDE 100 ML/HR: 0.9 INJECTION, SOLUTION INTRAVENOUS at 20:15

## 2024-06-30 NOTE — PROGRESS NOTES
[facility] hospital  Progress Note  Name: Suhail Borrego I  MRN: [id number]  Unit/Bed#: S -01 I Date of Admission: 6/29/2024   Date of Service: 6/30/2024 I Hospital Day: 1    Assessment & Plan   * Stroke (HCC)  Assessment & Plan  Past medical history of 70-99% left carotid artery stenosis  CT head shows no acute large vessel distribution infarct, hemorrhage or mass effect. 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  CTA head and neck shows Worsening severe stenosis in the distal left common carotid artery. Severe tandem stenosis proximal left internal carotid artery. Moderate stenosis vertebral artery origins is unchanged. Stable atherosclerotic disease in the right common carotid artery with less than 50% stenosis. No high-grade intracranial stenosis, large vessel occlusion or aneurysm.  As per the family, patient has been having multiple episodes of TIA characterized by aphasia and unilateral limb weakness.  On 6/30, last known well: 7;30 a.m.  Around 8: 15 on 6/30, rapid was called for strokelike symptoms characterized by aphasia, right arm weakness  Family refused TNK, informed denial.  No past medical history of atrial fibrillation  NIH: 4 in the background of expressive aphasia and right upper arm drift but when I saw the patient NIH: 0  GCS: 15  Home med: Aspirin  Loading dose clopidogrel was given on 6/30    Plan:  Target for euglycemia, permissive hypertension, euthymia, euvolemia  Metoprolol was stopped to help permissive hypertension (goal is more than 140 but less than 180/105  IV fluid (50 mill per hour for 10 hours) to increase blood pressure and prevent contrast-induced nephropathy in the background of CKD  Speech pathology/OT/PT/neurology/vascular surgery consultation  Follow-up with MRI brain  Follow-up with telemetry  Follow-up with bubble echo  Frequent neurocheck  Continue aspirin and clopidogrel            Lumbar compression fracture (HCC)  Assessment &  Plan  PMHx of chronic back pain including T12 compression fracture and fusion of L2-S1 in 2018. Patient reports 1 week of worsening L sided back pain that feels like an achy muscle but is worse with standing/twisting or bending.     Tylenol 975 q8 OTC  Opioid naive pain management including Oxy 2.5 and Oxy 5 mg for pain  Bowel regimen due to constipation 2/2 tramadol outpatient  Continue gabapentin for neuropathic pain in feet  Neurosurgery consult for evaluation/management recommendations of new acute T11 compression fracture  No surgical intervention at this admission  PT/OT  Consider DEXA scan outpatient in setting of multiple pathologic fractures w/o trauma  Continue vitamin C and D supplementation    Plan:  Follow-up with SPEP, UPEP, PTH, vitamin D 25 to rule out metabolic reasons for fracture  Spinal brace and follow-up lumbar x-rays    Leukocytosis  Assessment & Plan  Chronic since May 2024. Did not resolve with course of antibiotics. Monitor on CBC, follow up outpatient for further workup.     Localized swelling of left lower extremity  Assessment & Plan  Per patient chronic, wears compression stocking on L side. Had LE US in 5/2024.   Vas Doppler unremarkable for DVT    Stage 3 chronic kidney disease (HCC)  Assessment & Plan  Lab Results   Component Value Date    EGFR 57 06/29/2024    EGFR 59 05/19/2024    EGFR 56 05/02/2024    CREATININE 1.16 06/29/2024    CREATININE 1.14 05/19/2024    CREATININE 1.18 05/02/2024     Patient with chronic kidney disease IIIa. Avoid hypotension and nephrotoxins. Monitor on AM BMP.   IV hydration given s/p contrast for CTA dissection protocol.     Hx of CABG  Assessment & Plan  History of CABG. Continue Aspirin and Statin.    Primary hypertension  Assessment & Plan  BP stable.   Home dose metoprolol was stopped because of strokelike symptoms    Hyperlipidemia  Assessment & Plan  Continue home atorvastatin 80 mg               VTE Pharmacologic Prophylaxis: VTE Score: 4 Moderate  Risk (Score 3-4) - Pharmacological DVT Prophylaxis Ordered: enoxaparin (Lovenox).    Mobility:   Basic Mobility Inpatient Raw Score: 12  JH-HLM Goal: 4: Move to chair/commode  JH-HLM Achieved: 4: Move to chair/commode  JH-HLM Goal achieved. Continue to encourage appropriate mobility.    Patient Centered Rounds: I performed bedside rounds with nursing staff today.  Discussions with Specialists or Other Care Team Provider: Neurology, vascular surgery, critical care    Education and Discussions with Family / Patient: Updated  (wife, son, and daughter) at bedside.    Current Length of Stay: 1 day(s)  Current Patient Status: Inpatient   Discharge Plan: Anticipate discharge in 48-72 hrs to discharge location to be determined pending rehab evaluations.    Code Status: Level 1 - Full Code    Subjective:   There were no significant overnight events but rapid was called because of strokelike symptoms characterized by aphasia and right upper extremity weakness.  Objective:     Vitals:   Temp (24hrs), Av.7 °F (36.5 °C), Min:97 °F (36.1 °C), Max:98.3 °F (36.8 °C)    Temp:  [97 °F (36.1 °C)-98.3 °F (36.8 °C)] 97.8 °F (36.6 °C)  HR:  [67-83] 79  Resp:  [14-18] 18  BP: ()/(41-91) 104/44  SpO2:  [87 %-100 %] 99 %  Body mass index is 23.31 kg/m².     Input and Output Summary (last 24 hours):     Intake/Output Summary (Last 24 hours) at 2024 1247  Last data filed at 2024 0600  Gross per 24 hour   Intake 480 ml   Output 1150 ml   Net -670 ml       Physical Exam:   Physical Exam  Constitutional:       General: He is not in acute distress.     Appearance: Normal appearance. He is not ill-appearing, toxic-appearing or diaphoretic.   Cardiovascular:      Rate and Rhythm: Normal rate and regular rhythm.      Pulses: Normal pulses.      Heart sounds: Normal heart sounds. No murmur heard.     No friction rub. No gallop.   Pulmonary:      Effort: Pulmonary effort is normal. No respiratory distress.      Breath  sounds: Normal breath sounds. No stridor. No wheezing, rhonchi or rales.   Chest:      Chest wall: No tenderness.   Abdominal:      General: There is no distension.      Palpations: There is no mass.      Tenderness: There is no abdominal tenderness. There is no guarding or rebound.      Hernia: No hernia is present.   Musculoskeletal:      Right lower leg: No edema.      Left lower leg: No edema.      Comments: Lower thoracic spinal tenderness   Neurological:      General: No focal deficit present.      Mental Status: He is oriented to person, place, and time. Mental status is at baseline.      Cranial Nerves: No cranial nerve deficit.      Sensory: No sensory deficit.      Motor: No weakness.      Coordination: Coordination normal.      Gait: Gait normal.      Deep Tendon Reflexes: Reflexes normal.   Psychiatric:         Mood and Affect: Mood normal.         Behavior: Behavior normal.         Thought Content: Thought content normal.         Judgment: Judgment normal.          Additional Data:     Labs:  Results from last 7 days   Lab Units 06/30/24  0835 06/30/24  0833   WBC Thousand/uL  --  13.07*   HEMOGLOBIN g/dL  --  10.4*   I STAT HEMOGLOBIN g/dl 10.9*  --    HEMATOCRIT %  --  32.7*   HEMATOCRIT, ISTAT % 32*  --    PLATELETS Thousands/uL  --  194   SEGS PCT %  --  67   LYMPHO PCT %  --  23   MONO PCT %  --  8   EOS PCT %  --  2     Results from last 7 days   Lab Units 06/30/24  0835 06/30/24  0833 06/29/24  0630 06/29/24  0153   SODIUM mmol/L  --  138   < > 138   POTASSIUM mmol/L  --  3.8   < > 4.6   CHLORIDE mmol/L  --  103   < > 107   CO2 mmol/L  --  25   < > 26   CO2, I-STAT mmol/L 29  --   --   --    BUN mg/dL  --  30*   < > 31*   CREATININE mg/dL  --  1.29   < > 1.16   ANION GAP mmol/L  --  10   < > 5   CALCIUM mg/dL  --  9.0   < > 9.0   ALBUMIN g/dL  --   --   --  3.5   TOTAL BILIRUBIN mg/dL  --   --   --  0.34   ALK PHOS U/L  --   --   --  61   ALT U/L  --   --   --  14   AST U/L  --   --   --  20    GLUCOSE RANDOM mg/dL  --  120   < > 107    < > = values in this interval not displayed.     Results from last 7 days   Lab Units 06/29/24  0153   INR  1.08     Results from last 7 days   Lab Units 06/30/24  0820   POC GLUCOSE mg/dl 103     Results from last 7 days   Lab Units 06/29/24  0630   HEMOGLOBIN A1C % 6.0*     Results from last 7 days   Lab Units 06/30/24  0834 06/29/24  0153   LACTIC ACID mmol/L 3.0* 1.2       Lines/Drains:  Invasive Devices       Peripheral Intravenous Line  Duration             Peripheral IV 06/29/24 Left Antecubital 1 day    Peripheral IV 06/30/24 Right;Medial Antecubital <1 day                      Telemetry:  Telemetry Orders (From admission, onward)               24 Hour Telemetry Monitoring  Continuous x 24 Hours (Telem)        Question:  Reason for 24 Hour Telemetry  Answer:  TIA/Suspected CVA/ Confirmed CVA                     Indication for Continued Telemetry Use: Acute CVA             Imaging: CT spine, CT head, CTA head and neck, MRI brain, vas Doppler, CTA dissection chest studies, spirometry    Recent Cultures (last 7 days):   Results from last 7 days   Lab Units 06/29/24  0504   URINE CULTURE  No Growth <1000 cfu/mL       Last 24 Hours Medication List:   Current Facility-Administered Medications   Medication Dose Route Frequency Provider Last Rate    acetaminophen  975 mg Oral Q8H KAMRAN Anu You MD      ascorbic acid  500 mg Oral Daily Anu You MD      aspirin  81 mg Oral Daily Anu You MD      atorvastatin  80 mg Oral QPM Anu You MD      cholecalciferol  2,000 Units Oral Daily Anu You MD      [START ON 7/1/2024] clopidogrel  75 mg Oral Daily Cirilo Montana MD      docusate sodium  100 mg Oral BID Anu You MD      enoxaparin  40 mg Subcutaneous Daily Anu You MD      famotidine  20 mg Oral BID Anu You MD      gabapentin  200 [facility] HS [facility]       glycerin (adult)  1 suppository Rectal Once PRN Anu You MD      labetalol  10 mg Intravenous Q4H PRN Cirilo Montana MD      oxyCODONE  2.5 mg Oral Q4H PRN Anu You MD      Or    oxyCODONE  5 mg Oral Q4H PRN Anu You MD      polyethylene glycol  17 g Oral Daily Anu You MD      senna  1 tablet Oral Daily Anu You MD      sodium chloride  50 mL/hr Intravenous Continuous Cirilo Montana MD      traMADol  50 mg Oral Q6H PRN Delmy Gao MD          Today, Patient Was Seen By: Cirilo Montana MD    **Please Note: This note may have been constructed using a voice recognition system.**

## 2024-06-30 NOTE — SPEECH THERAPY NOTE
Speech Language/Pathology  Speech/Language Pathology  Assessment    Patient Name: Suhail Borrego  Today's Date: 6/30/2024     Problem List  Principal Problem:    Symptomatic stenosis of left carotid artery  Active Problems:    Lumbar compression fracture (HCC)    Hyperlipidemia    Primary hypertension    Hx of CABG    S/P lumbar fusion    Stage 3 chronic kidney disease (HCC)    Localized swelling of left lower extremity    Leukocytosis      History of Present Illness:  Suhail Borrego is a 83 y.o. male with a PMH of HTN, HLD, PAD, CKD3, chronic low back pain including compression fracture of T12 in 2018 which healed on its own and lumbar spinal fusion L2-S1 in 2016 with Dr. Gil who presents with back pain. This admission with a T11 fracture.      Patient admitted 5/2/2024 for back pain. He was evaluated by neurosurgery at that time, who recommended conservative management. This was in the absence of acute fracture on CT from 3/29/2024. 5/6 was seen by pain management who initiated tramadol 50 mg BID PRN. Received bilateral L3-5 medial branch blocks with Dr. Braxton on 5/29/2024.      Today presented to ED with L lower back pain in the L lumbar region. Reports pain feels like muscle ache. Has been taking gabapentin and tramadol that was prescribed outpatient about 6 weeks prior to pain that he now associates with this fracture. Pain now refractory to medications. Reports pain is only when he stands up/walks, bends or twists.      In the ED, was given 250 mL bolus, and oxycodone 5 mg to good effect. Labwork remarkable for WBC of 10.58 (elevated 5/19/24), hgb 10.2 (baseline 10-11), and crt 1.16. CTA dissection protocol did not reveal acute dissection, however acute compression fracture at T11 vertebral body was noted with 10-20% of vertebral body height lost anteriorly.     Patient denies bowel or bladder incontinence, paresthesias in legs or groin. Has history of neuropathy in feet for which he takes gabapentin  for.     Headache, lightheadedness, palpitations, chest pain, N/V/D. Has low blood pressure at times.   Does have constipation, 3-4 days since last bowel movement. Only takes bowel regimen PRN. Also with chronic cough and shortness of breath. Has swelling in his left leg for which he wears a compression stocking. He was evaluated for DVT and none was found.     Consult received for speech/swallow eval on stroke pathway. Pt passed nsg swallow screen; tolerating regular diet w/o s/s dysphagia or aspiration. No speech/language deficits reported.   NIH score is not recorded.    MRI:   IMPRESSION:  No acute infarction.  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 These most likely represent chronic watershed zone infarctions given the concomitant CTA findings of severe ipsilateral cervical ICA atherosclerotic stenosis.  Mild cerebral chronic microangiopathic changes.    Spoke with pt, family, and nursing who report no concerns. No need for formal speech/swallow eval at this time. Reconsult if needed.      Libertad Hayawrd MS, CCC-SLP  Speech Pathologist  06/30/24  3:38 PM

## 2024-06-30 NOTE — ASSESSMENT & PLAN NOTE
Past medical history of 70-99% left carotid artery stenosis  CT head shows no acute large vessel distribution infarct, hemorrhage or mass effect. 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  CTA head and neck shows Worsening severe stenosis in the distal left common carotid artery. Severe tandem stenosis proximal left internal carotid artery. Moderate stenosis vertebral artery origins is unchanged. Stable atherosclerotic disease in the right common carotid artery with less than 50% stenosis. No high-grade intracranial stenosis, large vessel occlusion or aneurysm.  As per the family, patient has been having multiple episodes of TIA characterized by aphasia and unilateral limb weakness.  On 6/30, last known well: 7;30 a.m.  Around 8: 15 on 6/30, rapid was called for strokelike symptoms characterized by aphasia, right arm weakness  Family refused TNK, informed denial.  No past medical history of atrial fibrillation  NIH: 4 in the background of expressive aphasia and right upper arm drift but when I saw the patient NIH: 0  GCS: 15  Home med: Aspirin  Loading dose clopidogrel was given on 6/30    Plan:  Target for euglycemia, permissive hypertension, euthymia, euvolemia  Metoprolol was stopped to help permissive hypertension (goal is more than 140 but less than 180/105  IV fluid (50 mill per hour for 10 hours) to increase blood pressure and prevent contrast-induced nephropathy in the background of CKD  Speech pathology/OT/PT/neurology/vascular surgery consultation  Follow-up with MRI brain  Follow-up with telemetry  Follow-up with bubble echo  Frequent neurocheck  Continue aspirin and clopidogrel

## 2024-06-30 NOTE — PHYSICAL THERAPY NOTE
PHYSICAL THERAPY TREATMENT  DATE: 06/30/24  TIME: 6094-5818    NAME:  Suhail Borrego  AGE:   83 y.o.  Mrn:   [id number]  Length Of Stay: 1    ADMIT DX:  Back pain [M54.9]  Compression fracture of T11 vertebra, initial encounter ([facility]) [S22.080A]    Past Medical History:   Diagnosis Date    Abnormal liver function test     RESOLVED: 14SEP2017    Allergic rhinitis     Anemia     LAST ASSESSED: 19OCT2017    Arthritis     BPH (benign prostatic hyperplasia)     CAD (coronary artery disease)     Coronary artery disease     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HCC)     LAST ASSESSED: 05OCT2017    Former tobacco use     GERD (gastroesophageal reflux disease)     Hand paresthesia     RESOLVED: 11SEP2017    Hyperlipidemia     Hypertension     Lumbar stenosis     Peripheral artery disease (HCC)     LAST ASSESSED: 27OCT2017    Stage 3a chronic kidney disease ([facility]) 7/11/2021     Past Surgical History:   Procedure Laterality Date    BACK SURGERY      COLONOSCOPY      LUMBAR FUSION      NC ARTHRODESIS POSTERIOR/PSTLAT TQ 1NTRSPC LUMBAR N/A 11/03/2016    Procedure: L2-S1 POSTERIOR LUMBAR FUSION AND DECOMPRESSION (Impulse), Posterior lateral fixation; dural repair.;  Surgeon: Leslie Gil MD;  Location: [facility];  Service: Orthopedics    NC CABG W/ARTERIAL GRAFT SINGLE ARTERIAL GRAFT N/A 01/19/2018    Procedure: CABG X4 with LIMA - LAD, SVG - RCA, OM2, & Diagonal ; Left Leg EVH; MATTHEW;  Surgeon: Eric Bar DO;  Location: [facility];  Service: Cardiac Surgery    NC COLONOSCOPY FLX DX W/COLLJ SPEC WHEN PFRMD N/A 11/15/2017    Procedure: EGD AND COLONOSCOPY;  Surgeon: Mariano Godinez MD;  Location: [facility];  Service: Gastroenterology    SEPTOPLASTY      LAST ASSESSED; 13MAY2014    TONSILECTOMY AND ADNOIDECTOMY      LAST ASSESSED: 13MAY2014    UPPER GASTROINTESTINAL ENDOSCOPY         Performed at least 2 patient identifiers during session: Name, Birthday, ID bracelet, and Epic photo     06/30/24 1259   PT Last Visit   PT Visit Date  "06/30/24   Note Type   Note Type Treatment   Type of Brace   Brace Applied [place] [address] Back Pack TLSO   Additional Brace Ordered No   Patient Position When Brace Applied Seated   Bracing Recommendations Recommendation (comment)  (pt requires additional practice donning/doffing brace)   Education   Education Provided Yes  (paper handout provided)   Pain Assessment   Pain Assessment Tool 0-10   Pain Score No Pain   Restrictions/Precautions   Weight Bearing Precautions Per Order No   Braces or Orthoses TLSO  (backpack TLSO)   Other Precautions Cognitive;Chair Alarm;Bed Alarm;Fall Risk;Pain;Spinal precautions;Hard of hearing   General   Chart Reviewed Yes   Additional Pertinent History Pt is an 83 yr old M admitted 6/29/24 w/ c/o L lower back pain x1wk, radiating to L flank. Dx: T11 compression fx, chronic L1 and T12 compression fxs. Stroke alert called during PT IE earlier this AM, family declined TNK.    Imaging:  CT: \"No acute large vessel distribution infarct, hemorrhage or mass. 2 new lacunar infarcts in the left frontal white matter.\"   CTA: \"Worsening severe stenosis in the distal left common carotid artery. Severe tandem stenosis proximal left internal carotid artery.\"   MRI: \"No acute infarction. Chronic deep L cerebral white matter infarctions.\"   Response to Previous Treatment Other (Comment)  (pt with significantly improved mentation, communication, and cooperation at this time as compared to session earlier in AM)   Family/Caregiver Present No   Cognition   Overall Cognitive Status Impaired  (much improved as compared to this AM, however remains questionable)   Arousal/Participation Alert;Cooperative   Attention Attends with cues to redirect   Orientation Level Oriented to person;Oriented to place;Oriented to situation   Memory Decreased recall of precautions;Decreased recall of recent events;Decreased short term memory   Following Commands Follows one step commands without difficulty   Comments Pt is " "able to recall \"some stat events\" this AM, however is unable to accurately describe what was happening. Pt reports being frustrated that we \"did not give him ample time to prepare for the stat tests\" - pt referring to stroke alert called earlier in AM.   Subjective   Subjective Pt with much improved clarity and fluidity of speech this PM as compared to this AM. Pt able to effectively communicate his needs.   Bed Mobility   Additional Comments Bed mobility NT on this date as pt presents and was left seated OOB in recliner chair with alarm engaged.   Transfers   Sit to Stand 5  Supervision   Additional items Assist x 1;Armrests;Increased time required;Verbal cues   Stand to Sit 5  Supervision   Additional items Assist x 1;Increased time required;Verbal cues   Stand pivot 5  Supervision   Additional items Assist x 1;Increased time required;Verbal cues   Additional Comments Pt slightly impulsive to initate transfers, but increased time needed to complete. No physical assistance needed for transfers during this session.   Ambulation/Elevation   Gait pattern Forward Flexion;Decreased foot clearance;Short stride;Decreased hip extension;Decreased heel strike   Gait Assistance 5  Supervision   Additional items Assist x 1;Verbal cues   Assistive Device Rolling walker;Straight cane  (seperate trials)   Distance 60ft with RW; 30ft with SPC   Stair Management Assistance Not tested  (pt reports having stair glide access to all areas of his home, however does has 3 steps the he must negotiate at the top of one of the stair glides (stops on a landing))   Balance   Static Sitting Good   Dynamic Sitting Good   Static Standing Fair +  (w/ RW and SPC)   Dynamic Standing Fair +  (w/ RW and SPC)   Ambulatory Fair  (w/ RW and SPC)   Endurance Deficit   Endurance Deficit Yes   Activity Tolerance   Activity Tolerance Patient tolerated treatment well   Medical Staff Made Aware Spoke with RN and PCA pre/post session   Assessment   Prognosis " "Good   Problem List Decreased strength;Decreased endurance;Impaired balance;Decreased mobility;Decreased cognition;Impaired judgement;Decreased safety awareness;Orthopedic restrictions;Pain   Assessment Pt seen for PT treatment 6/30/2024 with focus on: additional spine brace education/training and mobility training. Pt presents seated OOB in recliner chair, is agreeable to participate in session. Pt displays significantly improved mentation and communication this PM as compared to this AM. Appears that all symptoms during stroke alert have resolved. Pt participates in orthotic training and gait training, with intervention focusing on goal of improved independence with brace management and household distance mobility. Pt requires 100% verbal cues and 75% physical assistance for donning backpack TLSO. Therapist educated pt on spinal precautions, anticipate additional education required. Pt completes all transfers with S, ambulates 60ft with RW and S, and ambulates an additional 30ft with SPC and S. Overall pt displays significant improvement in functional mobility tolerance and independence as compared to earlier session, however continues to perform below their baseline level of functional mobility due to pain, generalized weakness, impaired balance, impaired cognition and safety, limited ability to self apply spine brace. Pt continues to most appropriately benefit from Level III (minimal PT intensity) resources upon d/c from the acute care setting. Continue skilled PT POC as able and appropriate in order to progress towards therapy goals and maximize independence with functional mobility.   Barriers to Discharge Decreased caregiver support   Goals   Patient Goals \"to find out what vascular wants to do.\"   [facility] Expiration Date 07/14/24   Short Term Goal #1 (S)  Patient PT goals established in order to be pain free and return to PLOF. Pt will: complete all bed mobility independently in flat bed in order to promote " increased OOB functional mobility and simulate home environment; complete all transfers with LRAD at DIA level in order to increase safety with functional mobility; ambulate >100ft with LRAD and S in order to increase safety with in facility distance functional mobility; negotiate 3 stairs with HR assist and S in order to facilitate safe access to all areas of his home; improve B LE strength to >/= 4/5 MMT t/o in order to increase safety with functional mobility and decrease risk of falls; demonstrate understanding and independence with LE strengthening HEP; improve ambulatory balance to >/= good grade with LRAD in order to promote safety and increased independence with mobility; improve AM-PAC score to >/= 23/24 in order to increase independence with mobility and decrease burden of care; improve Barthel Index score to >/= 85/100 in order to increase independence and decrease risk of falls.  (GOALS UPDATED PM of 6/30/24 due to significant improvement in mentation and mobility from AM)   PT Treatment Day 1   Plan   Treatment/Interventions Functional transfer training;LE strengthening/ROM;Elevations;Therapeutic exercise;Endurance training;Cognitive reorientation;Patient/family training;Equipment eval/education;Bed mobility;Gait training;Spoke to nursing   Progress Progressing toward goals   PT Frequency 2-3x/wk   Discharge Recommendation   Rehab Resource Intensity Level, PT III (Minimum Resource Intensity)   AM-PAC Basic Mobility Inpatient   Turning in Flat Bed Without Bedrails 3   Lying on Back to Sitting on Edge of Flat Bed Without Bedrails 3   Moving Bed to Chair 3   Standing Up From Chair Using Arms 3   Walk in Room 3   Climb 3-5 Stairs With Railing 3   Basic Mobility Inpatient Raw Score 18   Basic Mobility Standardized Score 41.05   [facility] Highest Level Of Mobility   -HLM Goal 6: Walk 10 steps or more   -HLM Achieved 7: Walk 25 feet or more   Education   Education Provided Mobility training;Assistive  device;Other  (spine bracing, spine precautions)   Patient Explanation/teachback used;Reinforcement needed   End of Consult   Patient Position at End of Consult Bedside chair;Bed/Chair alarm activated;All needs within reach     Based on patient's [facility] Level of Mobility scores today, patient currently has a goal of [facility] Levels: 7: WALK 25 FEET OR MORE, to be completed with RN staffing each shift, in order to improve overall activity tolerance and mobility, combat hospital related deconditioning, and maximize outcomes for d/c from the acute care setting.     The patient's AM-PAC Basic Mobility Inpatient Short Form Raw Score is 18. A Raw score of greater than 16 suggests the patient may benefit from discharge to home. Please also refer to the recommendation of the Physical Therapist for safe discharge planning.        Samreen Blakely PT, DPT   Available via [employer]  NPI # [id number]  [place] License - [id number]  6/30/2024

## 2024-06-30 NOTE — PLAN OF CARE
Problem: PHYSICAL THERAPY ADULT  Goal: Performs mobility at highest level of function for planned discharge setting.  See evaluation for individualized goals.  Description: Treatment/Interventions: Functional transfer training, LE strengthening/ROM, Therapeutic exercise, Endurance training, Cognitive reorientation, Patient/family training, Equipment eval/education, Bed mobility, Gait training, Continued evaluation, Spoke to MD, Spoke to nursing, Spoke to case management, Spoke to advanced practitioner, OT, ST     See flowsheet documentation for full assessment, interventions and recommendations.  6/30/2024 1422 by Samreen Blakely PT  Outcome: Progressing  Note: Prognosis: Good  Problem List: Decreased strength, Decreased endurance, Impaired balance, Decreased mobility, Decreased cognition, Impaired judgement, Decreased safety awareness, Orthopedic restrictions, Pain  Assessment: Pt seen for PT treatment 6/30/2024 with focus on: additional spine brace education/training and mobility training. Pt presents seated OOB in recliner chair, is agreeable to participate in session. Pt displays significantly improved mentation and communication this PM as compared to this AM. Appears that all symptoms during stroke alert have resolved. Pt participates in orthotic training and gait training, with intervention focusing on goal of improved independence with brace management and household distance mobility. Pt requires 100% verbal cues and 75% physical assistance for donning backpack TLSO. Therapist educated pt on spinal precautions, anticipate additional education required. Pt completes all transfers with S, ambulates 60ft with RW and S, and ambulates an additional 30ft with SPC and S. Overall pt displays significant improvement in functional mobility tolerance and independence as compared to earlier session, however continues to perform below their baseline level of functional mobility due to pain, generalized weakness,  impaired balance, impaired cognition and safety, limited ability to self apply spine brace. Pt continues to most appropriately benefit from Level III (minimal PT intensity) resources upon d/c from the acute care setting. Continue skilled PT POC as able and appropriate in order to progress towards therapy goals and maximize independence with functional mobility.    Barriers to Discharge: Decreased caregiver support     Rehab Resource Intensity Level, PT: III (Minimum Resource Intensity)    See flowsheet documentation for full assessment.

## 2024-06-30 NOTE — RESPIRATORY THERAPY NOTE
06/30/24 0837   Respiratory Assessment   Assessment Type Assess only   General Appearance Awake   Respiratory Pattern Normal   Chest Assessment Chest expansion symmetrical   Resp Comments Patient orderd for ISTAT ABG, unable to get ABG, VBG ran on ISTAT. Results shown to AP.   O2 Device RA   Oxygen Therapy/Pulse Ox   O2 Device None (Room air)   SpO2 98 %   SpO2 Activity At Rest   $ Pulse Oximetry Spot Check Charge Completed

## 2024-06-30 NOTE — PLAN OF CARE
Problem: PHYSICAL THERAPY ADULT  Goal: Performs mobility at highest level of function for planned discharge setting.  See evaluation for individualized goals.  Description: Treatment/Interventions: Functional transfer training, LE strengthening/ROM, Therapeutic exercise, Endurance training, Cognitive reorientation, Patient/family training, Equipment eval/education, Bed mobility, Gait training, Continued evaluation, Spoke to MD, Spoke to nursing, Spoke to case management, Spoke to advanced practitioner, OT, ST     See flowsheet documentation for full assessment, interventions and recommendations.  Note: Prognosis: Fair  Problem List: Decreased strength, Decreased endurance, Impaired balance, Decreased mobility, Decreased coordination, Decreased cognition, Impaired judgement, Decreased safety awareness, Impaired vision, Impaired tone, Pain, Orthopedic restrictions  Assessment: Pt seen for PT evaluation for mobility assessment & discharge needs. Activity orders: Up and OOB as tolerated; spinal precautions. Pt admitted 6/29/2024 w/ worsening L sided back pain, dx Lumbar compression fracture (HCC). Comorbidities affecting pt's fnxl performance include: Chronic back pain, lumbar stenosis, L2-S1 fusion arthritis, CAD s/p CABG x4, femoral artery stenosis, HTN, PAD, peripheral neuropathy. During PT IE, therapist attempted TLSO fitting, however STROKE ALERT CALLED d/t dysarthria, expressive aphasia, R sided impaired coordination, R sided inattention, hypotension. Pt required MODA for STS transfers and short distance ambulation with no AD back to bed for stat imaging transfer. Pt displays above outlined functional impairments & limitations, and presents below his baseline level of functional mobility. The AM-PAC & Barthel Index outcome tools were used to assist in determining pt safety w/ mobility/self care & appropriate d/c recommendations, see above for scores. Pt is at risk of falls d/t multiple comorbidities,  impulsivity, impaired balance, impaired cognition, visual impairment, impaired insight/safety awareness, use of ambulatory aid, varying levels of pain , advanced age, acuity of medical illness, ongoing medical treatment of primary dx, and unstable vitals. Pt's clinical presentation is currently unstable/unpredictable as seen in pt's presentation of vital sign response, changing level of pain, varying levels of cognitive performance, increased fall risk, new onset of impairment of functional mobility, decreased endurance, and new onset of weakness. Pt will benefit from continued PT services in order to address impairments, decrease risk of falls, maximize independence w/ fnxl mobility, & ensure safety w/ mobility for transition to next level of care. Based on pt presentation & impairments, pt would most appropriately benefit from Level II (moderate PT intensity) resources upon d/c, pending medical optimization and progress with mobility.  Barriers to Discharge: Decreased caregiver support     Rehab Resource Intensity Level, PT: II (Moderate Resource Intensity) (PENDING MEDICAL OPTIMIZATION AND PROGRESS WITH MOBILITY)    See flowsheet documentation for full assessment.

## 2024-06-30 NOTE — PHYSICAL THERAPY NOTE
PHYSICAL THERAPY EVALUATION & ORTHOTIC FITTING  DATE: 06/30/24  TIME: 0810-0835    NAME:  Suhail Borrego  AGE:   83 y.o.  Mrn:   [id number]  Length Of Stay: 1    ADMIT DX:  Back pain [M54.9]  Compression fracture of T11 vertebra, initial encounter ([facility]) [S22.080A]    Past Medical History:   Diagnosis Date    Abnormal liver function test     RESOLVED: 14SEP2017    Allergic rhinitis     Anemia     LAST ASSESSED: 19OCT2017    Arthritis     BPH (benign prostatic hyperplasia)     CAD (coronary artery disease)     Coronary artery disease     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HCC)     LAST ASSESSED: 05OCT2017    Former tobacco use     GERD (gastroesophageal reflux disease)     Hand paresthesia     RESOLVED: 11SEP2017    Hyperlipidemia     Hypertension     Lumbar stenosis     Peripheral artery disease (HCC)     LAST ASSESSED: 27OCT2017    Stage 3a chronic kidney disease ([facility]) 7/11/2021     Past Surgical History:   Procedure Laterality Date    BACK SURGERY      COLONOSCOPY      LUMBAR FUSION      NM ARTHRODESIS POSTERIOR/PSTLAT TQ 1NTRSPC LUMBAR N/A 11/03/2016    Procedure: L2-S1 POSTERIOR LUMBAR FUSION AND DECOMPRESSION (Impulse), Posterior lateral fixation; dural repair.;  Surgeon: Leslie Gil MD;  Location: [facility];  Service: Orthopedics    NM CABG W/ARTERIAL GRAFT SINGLE ARTERIAL GRAFT N/A 01/19/2018    Procedure: CABG X4 with LIMA - LAD, SVG - RCA, OM2, & Diagonal ; Left Leg EVH; MATTHEW;  Surgeon: Eric Bar DO;  Location: [facility];  Service: Cardiac Surgery    NM COLONOSCOPY FLX DX W/COLLJ SPEC WHEN PFRMD N/A 11/15/2017    Procedure: EGD AND COLONOSCOPY;  Surgeon: Mariano Godinez MD;  [facility];  Service: Gastroenterology    SEPTOPLASTY      LAST ASSESSED; 13MAY2014    TONSILECTOMY AND ADNOIDECTOMY      LAST ASSESSED: 13MAY2014    UPPER GASTROINTESTINAL ENDOSCOPY         Performed at least 2 patient identifiers during session: Name, ID bracelet, and Epic photo     06/30/24 0835   PT Last Visit   PT  Visit Date 06/30/24   Note Type   Note type Orthotic Management/Training;Evaluation   Type of Brace   Brace Applied [employer] [employer] 456 Back Pack TLSO   Additional Brace Ordered No   Patient Position When Brace Applied Seated   Bracing Recommendations Recommendation (comment)  (PT TO FOLLOW UP FOR ADDITIONAL FITTING/EDUCATION AS THIS SESSION LIMITED D/T STROKE ALERT CALLED MID SESSION)   Education   Education Provided Yes  (needs further education d/t above)   Pain Assessment   Pain Assessment Tool 0-10   Pain Score No Pain   Restrictions/Precautions   Weight Bearing Precautions Per Order No   Braces or Orthoses TLSO  (backpack TLSO)   Other Precautions Cognitive;Impulsive;Chair Alarm;Bed Alarm;Aspiration;Multiple lines;Fall Risk;Spinal precautions   Home Living   Type of Home House   Home Layout Two level;1/2 bath on main level;Bed/bath upstairs;Access  (stair glide access into the home via basement garage, and stair glide from 1st floor to 2nd floor bedroom/full bathroom)   Bathroom Shower/Tub Walk-in shower   Bathroom Toilet Standard   Bathroom Equipment Grab bars in shower;Shower chair;Commode   Bathroom Accessibility Accessible   Home Equipment Walker;Cane  (rollator walker)   Additional Comments Sleeps in recliner chair at baseline. Ambulates with Rollator walker for household and community distances.   Prior Function   Level of [place] Independent with ADLs;Independent with functional mobility;Independent with IADLS   Lives With (S)  Alone   Receives Help From Family;Outpatient therapy  (2 local daughters whom are supportive but work FT during daytime; was attending OPPT for back pain)   IADLs Independent with driving;Independent with meal prep;Independent with medication management  (driving short distances, small meal prep. Recently family has been completing grocery shopping due to pain)   Falls in the last 6 months 0  (pt unable to report, 0 documented as of 5/1/24 & per ED notes this visit pt denies  "recent falls)   Vocational Retired  (IT support)   Comments Pt unable to provide accurate PLOF and home setup information at time of evaluation as stroke alert called d/t AMS and incoherent speech. All above information obtained from previous therapy history in EMR.   General   Additional Pertinent History (S)  Pt is an 83 yr old M admitted 6/29/24 w/ c/o L lower back pain x1wk, radiating to L flank. Dx: T11 compression fx, chronic L1 and T12 compression fxs. STROKE ALERT CALLED DURING PT EVALUATION // ORTHOTIC FITTING (@ 0819) DUE TO NEW ONSET EXPRESSIVE APHASIA, DYSARTHRIA, DROOLING TO R SIDE OF MOUTH, IMPAIRED R UE COORDINATION (STRENGTH WFL EXCEPT R WRIST DROP NOTED). During RR evaluation pt with worsening behaviors and emotional lability, agitation, frequently repeating \"you did this to me!\" \"you'll finish this!\". Pt able to respond appropriate to closed ended questions with 1-2 word responses however with open ended questions pt displays incoherent speech.   Family/Caregiver Present No   Cognition   Overall Cognitive Status Impaired   Arousal/Participation Alert   Orientation Level Oriented to person;Disoriented to place;Disoriented to time;Disoriented to situation  (pt reports being at home)   Memory Decreased short term memory;Decreased recall of recent events;Decreased recall of precautions   Following Commands Follows one step commands inconsistently   Subjective   Subjective Pt initially with very garbled and incoherent speech, prompting stroke alert. During RR, pt with improved clarity of speech at times for simple 1-2 word responses, however responses to open ended questions remain incoherent.   RUE Assessment   RUE Assessment WFL  (noted with moderate R wrist drop during functional testing of strength, ? inc in tone vs weakness)   LUE Assessment   LUE Assessment WFL   RLE Assessment   RLE Assessment WFL  (pt unable to follow commands for standardized MMT; observed to at least 3/5 MMT in knee flx/ext " and ankle DF/PF)   LLE Assessment   LLE Assessment WFL  (pt unable to follow commands for standardized MMT; observed to at least 3/5 MMT in knee flx/ext and ankle DF/PF)   Vision-Basic Assessment   Current Vision Wears glasses all the time   Patient Visual Report Other (Comment)  (pt unable to report; questionable R sided inattention noted during evaluation, difficult to determine accuracy d/t AMS)   Coordination   Movements are Fluid and Coordinated 0   Coordination and Movement Description (S)  Impaired R UE coordination, dysmetria - pt with significant difficulty holding coffee cup, phone.   Sensation (S)  X  (probable dec sensation to R face as pt's coffee dribbling from corner of mouth; sensation to extremities appears WFL to spontaneous tactile stim, however unable to obtain formal testing d/t AMS)   Bed Mobility   Supine to Sit   (NT as pt presents seated OOB in chair at beginning of session)   Sit to Supine 3  Moderate assistance   Additional items Assist x 1;HOB elevated;LE management  (return to supine in bed completed for staff transport to stat CT)   Additional Comments Pt with overall good sitting balance in chair and at EOB. No significant lateral leans noted.   Transfers   Sit to Stand 3  Moderate assistance   Additional items Assist x 1;Impulsive;Verbal cues   Stand to Sit 3  Moderate assistance   Additional items Assist x 1;Impulsive;Verbal cues   Stand pivot 3  Moderate assistance   Additional items Assist x 1;Impulsive;Verbal cues   Additional Comments No AD used for transfers; pt highly impulsive and does not accept cues for safety.   Ambulation/Elevation   Gait pattern Wide MEGAN;Decreased foot clearance;Short stride   Gait Assistance 3  Moderate assist   Additional items Assist x 1;Verbal cues;Tactile cues   Assistive Device None  (however pt utilizes RW at baseline, not used on this date d/t urgency of transition back to bed for stat imaging d/t stroke alert)   Distance 5ft from recliner chair  to bed   Stair Management Assistance Not tested  (pt has stair glide access to all areas of his home, does not negotiate stairs at baseline)   Balance   Static Sitting Good   Dynamic Sitting Fair +   Static Standing Poor +   Dynamic Standing Poor   Ambulatory Poor   Endurance Deficit   Endurance Deficit Yes   Activity Tolerance   Activity Tolerance Treatment limited secondary to medical complications (Comment)  (stroke alert called, see above for details)   Medical Staff Made Aware DIANA Delgadillo present, multiple APs/MDs present during most of session d/t stroke alert called   Assessment   Prognosis Fair   Problem List Decreased strength;Decreased endurance;Impaired balance;Decreased mobility;Decreased coordination;Decreased cognition;Impaired judgement;Decreased safety awareness;Impaired vision;Impaired tone;Pain;Orthopedic restrictions   Assessment Pt seen for PT evaluation for mobility assessment & discharge needs. Activity orders: Up and OOB as tolerated; spinal precautions. Pt admitted 6/29/2024 w/ worsening L sided back pain, dx Lumbar compression fracture (HCC). Comorbidities affecting pt's fnxl performance include: Chronic back pain, lumbar stenosis, L2-S1 fusion arthritis, CAD s/p CABG x4, femoral artery stenosis, HTN, PAD, peripheral neuropathy. During PT IE, therapist attempted TLSO fitting, however STROKE ALERT CALLED d/t dysarthria, expressive aphasia, R sided impaired coordination, R sided inattention, hypotension. Pt required MODA for STS transfers and short distance ambulation with no AD back to bed for stat imaging transfer. Pt displays above outlined functional impairments & limitations, and presents below his baseline level of functional mobility. The AM-PAC & Barthel Index outcome tools were used to assist in determining pt safety w/ mobility/self care & appropriate d/c recommendations, see above for scores. Pt is at risk of falls d/t multiple comorbidities, impulsivity, impaired balance, impaired  cognition, visual impairment, impaired insight/safety awareness, use of ambulatory aid, varying levels of pain , advanced age, acuity of medical illness, ongoing medical treatment of primary dx, and unstable vitals. Pt's clinical presentation is currently unstable/unpredictable as seen in pt's presentation of vital sign response, changing level of pain, varying levels of cognitive performance, increased fall risk, new onset of impairment of functional mobility, decreased endurance, and new onset of weakness. Pt will benefit from continued PT services in order to address impairments, decrease risk of falls, maximize independence w/ fnxl mobility, & ensure safety w/ mobility for transition to next level of care. Based on pt presentation & impairments, pt would most appropriately benefit from Level II (moderate PT intensity) resources upon d/c, pending medical optimization and progress with mobility.   Barriers to Discharge Decreased caregiver support   Goals   Patient Goals no rehab goals stated on this date d/t stroke alert called during session; PT to f/u to obtain appropriate pt goals   STG Expiration Date 07/14/24   Short Term Goal #1 Patient PT goals established in order to return to PLOF. Pt will: complete all bed mobility independently in flat bed in order to promote increased OOB functional mobility and simulate home environment; complete all transfers with LRAD at DIA level in order to increase safety with functional mobility; ambulate >100ft with LRAD and BARB in order to increase safety with in facility distance functional mobility; improve B LE strength to >/= 4/5 MMT t/o in order to increase safety with functional mobility and decrease risk of falls; demonstrate understanding and independence with LE strengthening HEP; improve ambulatory balance to >/= fair+ grade with LRAD in order to promote safety and increased independence with mobility; tolerate >3hrs OOB in upright position, in order to improve  muscular endurance and respiratory status; improve AM-PAC score to >/= 17/24 in order to increase independence with mobility and decrease burden of care; improve Barthel Index score to >/= 50/100 in order to increase independence and decrease risk of falls.   PT Treatment Day 0   Plan   Treatment/Interventions Functional transfer training;LE strengthening/ROM;Therapeutic exercise;Endurance training;Cognitive reorientation;Patient/family training;Equipment eval/education;Bed mobility;Gait training;Continued evaluation;Spoke to MD;Spoke to nursing;Spoke to case management;Spoke to advanced practitioner;OT;ST   PT Frequency 3-5x/wk   Discharge Recommendation   Rehab Resource Intensity Level, PT II (Moderate Resource Intensity)  (PENDING MEDICAL OPTIMIZATION AND PROGRESS WITH MOBILITY)   AM-PAC Basic Mobility Inpatient   Turning in Flat Bed Without Bedrails 3   Lying on Back to Sitting on Edge of Flat Bed Without Bedrails 2   Moving Bed to Chair 2   Standing Up From Chair Using Arms 2   Walk in Room 2   Climb 3-5 Stairs With Railing 1   Basic Mobility Inpatient Raw Score 12   Basic Mobility Standardized Score 32.23   [facility] Highest Level Of Mobility   -[facility] Goal 4: Move to chair/commode   -[facility] Achieved 4: Move to chair/commode   Modified Mani Scale   Modified Mani Scale 4   Barthel Index   Feeding 5   Bathing 0   Grooming Score 0   Dressing Score 5   Bladder Score 10   Bowels Score 10   Toilet Use Score 5   Transfers (Bed/Chair) Score 5   Mobility (Level Surface) Score 0   Stairs Score 0   Barthel Index Score 40   End of Consult   Patient Position at End of Consult Supine;All needs within reach  (multiple staff present)     Based on patient's [facility] Highest Level of Mobility scores today, patient currently has a goal of -[facility] Levels: 4: MOVE TO CHAIR/COMMODE, to be completed with RN staffing each shift, in order to improve overall activity tolerance and mobility, combat hospital related  deconditioning, and maximize outcomes for d/c from the acute care setting.     The patient's AM-PAC Basic Mobility Inpatient Short Form Raw Score is 12. A Raw score of less than or equal to 16 suggests the patient may benefit from discharge to post-acute rehabilitation services. Please also refer to the recommendation of the Physical Therapist for safe discharge planning.      Samreen Blakely PT, DPT   Available via [employer]  NPI # [id number]  [place] License - [id number]  6/30/2024

## 2024-06-30 NOTE — UTILIZATION REVIEW
Initial Clinical Review    Admission: Date/Time/Statement:   Admission Orders (From admission, onward)       Ordered        06/29/24 0457  INPATIENT ADMISSION  Once                          Orders Placed This Encounter   Procedures    INPATIENT ADMISSION     Standing Status:   Standing     Number of Occurrences:   1     Order Specific Question:   Level of Care     Answer:   Med Surg [16]     Order Specific Question:   Estimated length of stay     Answer:   More than 2 Midnights     Order Specific Question:   Certification     Answer:   I certify that inpatient services are medically necessary for this patient for a duration of greater than two midnights. See H&P and MD Progress Notes for additional information about the patient's course of treatment.     ED Arrival Information       Expected   -    Arrival   6/29/2024 00:14    Acuity   Urgent              Means of arrival   Wheelchair    Escorted by   Family Member    Service   Hospitalist    Admission type   Emergency              Arrival complaint   Lower Back Pain             Chief Complaint   Patient presents with    Back Pain     Pt reports intermittent left lower back pain that started on 6/23. Pain is worse with movement. States the pain radiates to his left flank. Hx of lower back pain. Denies recent falls. Took gabapentin and tramadol around 2200 tonight. Reports constipation. Last BM 6/26.       Initial Presentation: 83 y.o. male  to ED via private vehicle from home.    Admitted to inpatient with Dx:T11 compression fracture/History of L2 S1 decompression and fusion/Known chronic L1 and T12 compression fractures.  Presented to ED with left lower back pain worsening since 6/23/24.   Has chronic pain and follows with pain management and until last week, tramadol and gabapentin effective.  + constipation   PMHx: HTN, HLD, PAD, CKD3, chronic low back pain including compression fracture of T12 in 2018 which healed on its own and lumbar spinal fusion L2-S1 in  "2016 . On exam:  Tenderness to left lumbar region paraspinous muscles.  Bun 31. Creatinine 1.16 at baseline.  wbc 10.58.  H&H 10.2/32.3.   Imaging shows acute compression fracture of T1 with loss of 10 to 20% of body height.   ED treatment:  given IVF bolus for low BP,  then continued IVF.  Given oxycodone and tylenol.    Plan includes pain control and transition from oxycodone to tramadol.   Bowel regimen.   Consult neurosurgery.  PT/OT.     Anticipated Length of Stay/Certification Statement:  Patient will be admitted on an inpatient basis with an anticipated length of stay of greater than 2 midnights secondary to intractable back pain and acute T12 compression fracture.    6/29/24 per neurosurgery:  Lumbar compression fracture/s/p lumbar fusion.   \"Subacute T11 compression fracture in the setting of more chronic compression fractures and previous lumbar decompression and fusion. TLICS 1.  No neurosurgical intervention is anticipated\"   recommend work up for osteopenia and osteoporosis.  Minimize fall risk.     Date: 6/30/24    Day 2:  stroke alert:  today with onset of aphasia and RUE weakness. Ct head and cta showed:    2 strokes appear new from prior in the L frontal centrum however age-indeterminate; severe stenosis of L ICA proximally with moderate stenosis in the L CCA including at the origin; otherwise diffuse atherosclerosis.  Plan:  neuro checks.  Neurology consulted.   Plavix and asa.   IVF started.   Statin. Telemetry. Permissive hypertension and metoprolol on hold.   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    Recommend Continue ASA 81mg daily. Load Plavix 300mg x 1 then 75mg daily. Vascular Surgery consult. MRI brain and TTE. Telemetry.  TNK offered and family refused.        ED Triage Vitals [06/29/24 0029]   Temperature Pulse Respirations Blood Pressure SpO2 Pain Score   98.1 °F " (36.7 °C) 73 17 97/53 96 % No Pain     Weight (last 2 days)       Date/Time Weight    06/29/24 0029 67.5 (148.81)            Vital Signs (last 3 days)       Date/Time Temp Pulse Resp BP MAP (mmHg) SpO2 O2 Device Patient Position - Orthostatic VS Brown Coma Scale Score Pain    06/30/24 1347 -- -- -- -- -- -- -- -- -- 3    06/30/24 13:13:13 -- -- -- 110/54 73 -- -- -- -- --    06/30/24 13:00:57 -- -- -- 94/42 59 -- -- -- -- --    06/30/24 1259 -- -- -- -- -- -- -- -- -- No Pain    06/30/24 1230 -- 85 -- 116/55 75 -- -- Sitting -- --    06/30/24 08:39:54 -- 79 -- 104/44 64 99 % -- -- -- --    06/30/24 0837 -- -- -- -- -- 98 % None (Room air) -- -- --    06/30/24 08:36:15 -- 78 -- -- -- 97 % -- -- -- --    06/30/24 0835 -- -- -- -- -- -- -- -- -- No Pain    06/30/24 08:33:15 -- 83 -- -- -- 99 % -- -- -- --    06/30/24 08:30:15 -- 82 -- -- -- 94 % -- -- -- --    06/30/24 08:27:15 -- 81 -- -- -- 98 % -- -- -- --    06/30/24 08:24:15 -- 69 -- -- -- 98 % -- -- -- --    06/30/24 08:22:56 -- 76 -- -- -- 87 % -- -- -- --    06/30/24 08:22:28 -- -- -- 112/91 -- -- -- -- -- --    06/30/24 08:21:53 -- -- -- 112/91 -- -- -- -- -- --    06/30/24 08:20:15 -- 80 -- -- -- 99 % -- -- -- --    06/30/24 08:19:44 -- 74 -- 83/42 56 96 % -- -- -- --    06/30/24 08:19:34 -- 72 -- 83/42 -- 95 % -- -- -- --    06/30/24 08:18:21 -- 75 -- 74/44 54 98 % -- -- -- --    06/30/24 07:27:45 -- 67 -- 95/41 59 100 % -- -- -- --    06/30/24 0725 -- -- -- -- -- -- -- -- -- 3    06/30/24 06:06:26 -- -- -- 98/81 87 -- -- -- -- --    06/30/24 0603 -- -- -- -- -- -- -- -- -- No Pain    06/30/24 0002 97.8 °F (36.6 °C) 80 18 90/54 -- 92 % None (Room air) Sitting -- --    06/29/24 20:39:22 98.3 °F (36.8 °C) 75 -- 132/59 83 98 % -- -- -- --    06/29/24 2022 -- -- -- -- -- -- -- -- -- 9    06/29/24 17:49:24 97 °F (36.1 °C) 75 14 129/82 98 98 % None (Room air) Sitting -- --    06/29/24 1700 -- -- -- -- -- -- -- -- 15 No Pain    06/29/24 1512 97.8 °F (36.6 °C) 73  18 161/71 102 95 % None (Room air) Lying -- --    06/29/24 1338 -- -- -- -- -- -- -- -- -- 2    06/29/24 1204 -- -- -- -- -- -- -- -- -- 5    06/29/24 0834 -- -- -- -- -- -- None (Room air) -- 15 No Pain    06/29/24 0700 97.7 °F (36.5 °C) 62 15 142/73 98 96 % -- Lying -- --    06/29/24 0625 -- 76 16 154/67 -- 92 % None (Room air) Lying -- --    06/29/24 0533 -- -- -- -- -- -- -- -- 15 --    06/29/24 0430 -- 74 16 140/68 -- 96 % None (Room air) Lying -- --    06/29/24 0243 -- 71 -- 141/64 92 -- -- -- -- --    06/29/24 0128 -- -- -- -- -- -- -- -- -- No Pain    06/29/24 0029 98.1 °F (36.7 °C) 73 17 97/53 72 96 % None (Room air) Sitting -- No Pain              Pertinent Labs/Diagnostic Test Results:   Radiology:  CT stroke alert brain   Final Interpretation by E. Alec Schoenberger, MD (06/30 0943)   No acute large vessel distribution infarct, hemorrhage or mass effect. 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         Findings were reported to Saurav Callejas  via [employer] compliant secure electronic messaging at 9:30 a.m.         Workstation performed: [id number]         CTA stroke alert (head/neck)   Final Interpretation by E. Alec Schoenberger, MD (06/30 0946)      Worsening severe stenosis in the distal left common carotid artery. Severe tandem stenosis proximal left internal carotid artery.   Moderate stenosis vertebral artery origins is unchanged. Stable atherosclerotic disease in the right common carotid artery with less than 50% stenosis.   No high-grade intracranial stenosis, large vessel occlusion or aneurysm.      Findings were reported to Saurav Callejas  via HIPAA compliant secure electronic messaging at 9:30 a.m.                           Workstation performed: [id number]          VAS VENOUS DUPLEX - LOWER LIMB BILATERAL   Final Interpretation by Yudy Mack MD (06/29 1102)   RIGHT LOWER LIMB:   No gross evidence of acute or chronic deep vein thrombosis.   No evidence of superficial  thrombophlebitis noted.   Doppler evaluation shows a normal response to augmentation maneuvers..   Popliteal and anterior tibial arterial Doppler waveform's are monophasic. The   PTA is heavily calcified.      LEFT LOWER LIMB:   No gross evidence of acute or chronic deep vein thrombosis.   No evidence of superficial thrombophlebitis noted.   Doppler evaluation shows a normal response to augmentation maneuvers.   Popliteal and anterior tibial arterial Doppler waveform's are monophasic. The   PTA is heavily calcified    CTA dissection protocol chest/abdomen/pelvis   Final Interpretation by Gary Ramirez MD (06/29 0414)      No evidence of aortic aneurysm or dissection      Acute compression fracture at the T11 vertebral body with approximately 10 to 20% vertebral body height loss anteriorly..      Worsening findings of interstitial lung disease comparing to 10/16/2021               Workstation performed: [id number]         CT recon only thoracolumbar   Final Interpretation by Gary Ramirez MD (06/29 0426)      New acute compression fracture at the T11 vertebral body anteriorly with approximately 10 to 20% loss of body height..               Workstation performed: [id number]         XR chest 1 view portable   ED Interpretation by Adalberto Dowling MD (06/29 0228)   No acute cardiopulmonary process.  Interpreted by me.      Final Interpretation by Luigi Giles MD (06/30 1101)      No acute cardiopulmonary disease.            Workstation performed: [id number]         XR spine lumbar 2 or 3 views injury    (Results Pending)   MRI brain wo contrast   6/30/24 No acute infarction.     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 These most likely represent chronic watershed zone infarctions given the concomitant CTA findings of severe ipsilateral cervical ICA atherosclerotic stenosis.     Mild cerebral chronic microangiopathic changes     Cardiology:  ECG 12 lead   Final Result by Jessica  ANDREW Garcia MD (06/29 2108)   Normal sinus rhythm   Nonspecific T wave abnormality   Abnormal ECG   When compared with ECG of 08-OCT-2022 10:55,   Nonspecific T wave abnormality now evident in Lateral leads   Confirmed by Jessica Garcia ([id number]) on 6/29/2024 9:08:18 PM          Results from last 7 days   Lab Units 06/30/24  0835 06/30/24 0833 06/29/24 0630 06/29/24  0153   WBC Thousand/uL  --  13.07* 10.37* 10.58*   HEMOGLOBIN g/dL  --  10.4* 10.5* 10.2*   I STAT HEMOGLOBIN g/dl 10.9*  --   --   --    HEMATOCRIT %  --  32.7* 32.9* 32.3*   HEMATOCRIT, ISTAT % 32*  --   --   --    PLATELETS Thousands/uL  --  194 178 184   TOTAL NEUT ABS Thousands/µL  --  8.68*  --  6.57     Results from last 7 days   Lab Units 06/30/24 0835 06/30/24 0833 06/29/24 0630 06/29/24  0153   SODIUM mmol/L  --  138 138 138   POTASSIUM mmol/L  --  3.8 4.4 4.6   CHLORIDE mmol/L  --  103 107 107   CO2 mmol/L  --  25 26 26   CO2, I-STAT mmol/L 29  --   --   --    ANION GAP mmol/L  --  10 5 5   BUN mg/dL  --  30* 26* 31*   CREATININE mg/dL  --  1.29 1.02 1.16   EGFR ml/min/1.73sq m  --  50 67 57   CALCIUM mg/dL  --  9.0 8.5 9.0   CALCIUM, IONIZED, ISTAT mmol/L 1.21  --   --   --      Results from last 7 days   Lab Units 06/29/24  0153   AST U/L 20   ALT U/L 14   ALK PHOS U/L 61   TOTAL PROTEIN g/dL 6.9   ALBUMIN g/dL 3.5   TOTAL BILIRUBIN mg/dL 0.34     Results from last 7 days   Lab Units 06/30/24  0820   POC GLUCOSE mg/dl 103     Results from last 7 days   Lab Units 06/30/24 0833 06/29/24 0630 06/29/24  0153   GLUCOSE RANDOM mg/dL 120 88 107     Results from last 7 days   Lab Units 06/29/24  0630   HEMOGLOBIN A1C % 6.0*   EAG mg/dl 126     Results from last 7 days   Lab Units 06/30/24  0835   PH, DONATO I-STAT  7.284*   PCO2, DONATO ISTAT mm HG 56.9*   PO2, DONATO ISTAT mm HG 35.0   HCO3, DONATO ISTAT mmol/L 27.0   I STAT BASE EXC mmol/L 0   I STAT O2 SAT % 58*     Results from last 7 days   Lab Units 06/29/24  0153   CK TOTAL U/L 53     Results from last  7 days   Lab Units 06/30/24  1338 06/30/24  1119 06/30/24  0833   HS TNI 0HR ng/L  --   --  5   HS TNI 2HR ng/L  --  5  --    HSTNI D2 ng/L  --  0  --    HS TNI 4HR ng/L 4  --   --    HSTNI D4 ng/L -1  --   --      Results from last 7 days   Lab Units 06/29/24  0153   PROTIME seconds 14.7*   INR  1.08   PTT seconds 38*     Results from last 7 days   Lab Units 06/30/24  0834 06/29/24  0153   LACTIC ACID mmol/L 3.0* 1.2     Results from last 7 days   Lab Units 06/29/24  0153   LIPASE u/L 10*     Results from last 7 days   Lab Units 06/29/24  0430   CLARITY UA  Clear   COLOR UA  Light Yellow   SPEC GRAV UA  1.043*   PH UA  5.5   GLUCOSE UA mg/dl Negative   KETONES UA mg/dl Negative   BLOOD UA  Negative   PROTEIN UA mg/dl Negative   NITRITE UA  Negative   BILIRUBIN UA  Negative   UROBILINOGEN UA (BE) mg/dl <2.0   LEUKOCYTES UA  Small*   WBC UA /hpf 4-10*   RBC UA /hpf None Seen   BACTERIA UA /hpf Occasional   EPITHELIAL CELLS WET PREP /hpf Occasional     Results from last 7 days   Lab Units 06/29/24  0504   URINE CULTURE  No Growth <1000 cfu/mL       ED Treatment-Medication Administration from 06/29/2024 0014 to 06/29/2024 0824         Date/Time Order Dose Route Action     06/29/2024 0128 oxyCODONE (ROXICODONE) IR tablet 5 mg 5 mg Oral Given     06/29/2024 0151 sodium chloride 0.9 % bolus 250 mL 250 mL Intravenous New Bag     06/29/2024 0254 iohexol (OMNIPAQUE) 350 MG/ML injection (MULTI-DOSE) 80 mL 80 mL Intravenous Given     06/29/2024 0626 metoprolol tartrate (LOPRESSOR) tablet 75 mg 75 mg Oral Given     06/29/2024 0626 acetaminophen (TYLENOL) tablet 975 mg 975 mg Oral Given     06/29/2024 0626 multi-electrolyte (PLASMALYTE-A/ISOLYTE-S PH 7.4) IV solution 100 mL/hr Intravenous New Bag            Past Medical History:   Diagnosis Date    Abnormal liver function test     RESOLVED: [id number]    Allergic rhinitis     Anemia     LAST ASSESSED: 19OCT2017    Arthritis     BPH (benign prostatic hyperplasia)     CAD (coronary  artery disease)     Coronary artery disease     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[facility])     LAST ASSESSED: 05OCT2017    Former tobacco use     GERD (gastroesophageal reflux disease)     Hand paresthesia     RESOLVED: 11SEP2017    Hyperlipidemia     Hypertension     Lumbar stenosis     Peripheral artery disease ([facility])     LAST ASSESSED: 27OCT2017    Stage 3a chronic kidney disease ([facility]) 7/11/2021     Present on Admission:   Lumbar compression fracture ([facility])   Hyperlipidemia   Primary hypertension   Stage 3 chronic kidney disease ([facility])      Admitting Diagnosis: Back pain [M54.9]  Compression fracture of T11 vertebra, initial encounter ([facility]) [S22.080A]  Age/Sex: 83 y.o. male  Admission Orders:  Scheduled Medications:  acetaminophen, 975 mg, Oral, Q8H KAMRAN  ascorbic acid, 500 mg, Oral, Daily  aspirin, 81 mg, Oral, Daily  atorvastatin, 80 mg, Oral, QPM  cholecalciferol, 2,000 Units, Oral, Daily  [START ON 7/1/2024] clopidogrel, 75 mg, Oral, Daily  docusate sodium, 100 mg, Oral, BID  enoxaparin, 40 mg, Subcutaneous, Daily  famotidine, 20 mg, Oral, BID  gabapentin, 200 mg, Oral, HS  polyethylene glycol, 17 g, Oral, Daily  senna, 1 tablet, Oral, Daily    clopidogrel (PLAVIX) tablet 300 mg  Dose: 300 mg  Freq: Once Route: PO  Start: 06/30/24 1000 End: 06/30/24 1016  metoprolol tartrate (LOPRESSOR) tablet 75 mg  Dose: 75 mg  Freq: Every 12 hours Route: PO  Start: 06/29/24 0545 End: 06/30/24 1046    magnesium sulfate 2 g/50 mL IVPB (premix) 2 g  Dose: 2 g  Freq: Once Route: IV  Last Dose: 2 g (06/30/24 0831)  Start: 06/30/24 0830 End: 06/30/24 1031      Continuous IV Infusions:sodium chloride 0.9 % infusion  Rate: 50 mL/hr Dose: 50 mL/hr  Freq: Continuous Route: IV  Start: 06/30/24 1245 End: 06/30/24 2244    multi-electrolyte (PLASMALYTE-A/ISOLYTE-S PH 7.4) IV solution  Rate: 100 mL/hr Dose: 100 mL/hr  Freq: Continuous Route: IV  Last Dose: Stopped (06/29/24 1200)  Start: 06/29/24 0600 End: 06/29/24 1145  sodium chloride, 100 mL/hr,  Intravenous, Continuous      PRN Meds:  glycerin (adult), 1 suppository, Rectal, Once PRN  labetalol, 10 mg, Intravenous, Q4H PRN  oxyCODONE, 2.5 mg, Oral, Q4H PRN   Or  oxyCODONE, 5 mg, Oral, Q4H PRN  traMADol, 50 mg, Oral, Q6H PRN x 2 6/29    Telemetry  Aspiration precautions.    Neuro checks ever 15 minutes.   TLSO spine brace.    Aqua K as needed.   Fluid restriction     IP CONSULT TO NEUROSURGERY  IP CONSULT TO NEUROLOGY  IP CONSULT TO VASCULAR SURGERY    Network Utilization Review Department  ATTENTION: Please call with any questions or concerns to [phone] and carefully listen to the prompts so that you are directed to the right person. All voicemails are confidential.   For Discharge needs, contact Care Management DC Support Team at [phone] opt. [phone]  Send all requests for admission clinical reviews, approved or denied determinations and any other requests to dedicated fax number below belonging to the campus where the patient is receiving treatment. List of dedicated fax numbers for the Facilities:  FACILITY NAME UR FAX NUMBER   ADMISSION DENIALS (Administrative/Medical Necessity) [phone]   DISCHARGE SUPPORT TEAM (NETWORK) [phone]   PARENT CHILD HEALTH (Maternity/NICU/Pediatrics) [phone]   [facility] [phone]   [facility] [phone]   [facility] [phone]   [facility] [phone]   [facility] [phone]   [facility] [phone]   [facility] [phone]   [facility] [phone]   [facility] [phone]   [facility] [phone]   [facility] [phone]   [facility] [phone]

## 2024-06-30 NOTE — QUICK NOTE
Stroke Alert    Called 8:24am    84 y/o M with HTN, HLD, CAD, and CKD that presents with onset of aphasia and RUE weakness. LKN 7am then while seen by PT at 8:15am pt suddenly developed expressive aphasia and RUE weakness. NIHSS 4 for aphasia and RUE drift. No other clear deficits reported.    HCT and CTA were performed and reviewed - 2 strokes appear new from prior in the L frontal centrum however age-indeterminate; severe stenosis of L ICA proximally with moderate stenosis in the L CCA including at the origin; otherwise diffuse atherosclerosis.    Post-CT deficits improved and pt's family reported he has been having episodes of this. TNK was offered but refused by family.    Suspect symptomatic L ICA disease causing possible episodic TIAs, strokes seen on L ICA, and potentially current TIA. Continue ASA 81mg daily. Load Plavix 300mg x 1 then 75mg daily. Vascular Surgery consult. MRI brain and TTE. Telemetry.

## 2024-06-30 NOTE — PROGRESS NOTES
On license of [facility]  Progress Note  Name: Suhail Borrego I  MRN: [id number]  Unit/Bed#: S -01 I Date of Admission: 6/29/2024   Date of Service: 6/30/2024 I Hospital Day: 1    Assessment & Plan   * Lumbar compression fracture (HCC)  Assessment & Plan  Subacute T11 compression fracture in the setting of more chronic compression fractures and previous lumbar decompression and fusion. TLICS 1.  No neurosurgical intervention is anticipated.  Brace fitting and upright plain films still pending.  Neurologically stable.  All his questions were answered to his satisfaction and he is in agreement with this course of action.      VTE Prophylaxis: Sequential compression device (Venodyne)  and Enoxaparin (Lovenox)    Subjective/Objective   Chief Complaint: None, eating and bedside comfortably.    Vitals: Blood pressure 98/81, pulse 80, temperature 97.8 °F (36.6 °C), temperature source Oral, resp. rate 18, weight 67.5 kg (148 lb 13 oz), SpO2 92%.,Body mass index is 23.31 kg/m².    Hemodynamic Monitoring:  None.    Physical Exam:   Physical Exam  Vitals reviewed.   Constitutional:       General: He is not in acute distress.  Eyes:      Extraocular Movements: Extraocular movements intact.   Cardiovascular:      Rate and Rhythm: Normal rate and regular rhythm.   Pulmonary:      Effort: Pulmonary effort is normal. No respiratory distress.   Abdominal:      General: Abdomen is flat.   Musculoskeletal:         General: Deformity present.   Skin:     General: Skin is warm and dry.   Neurological:      Mental Status: He is alert and oriented to person, place, and time.      Sensory: No sensory deficit.      Motor: No weakness.   Psychiatric:         Behavior: Behavior normal.       Neurologic Exam     Mental Status   Oriented to person, place, and time.       Invasive Devices       Peripheral Intravenous Line  Duration             Peripheral IV 06/29/24 Left Antecubital 1 day                  Lab Results: I have  "personally reviewed pertinent results.    No results found for: \"WBC\", \"HGB\", \"HCT\", \"MCV\", \"PLT\", \"ADJUSTEDWBC\", \"RBC\", \"MCH\", \"MCHC\", \"RDW\", \"MPV\", \"NRBC\", \"SODIUM\", \"CL\", \"CO2\", \"ANIONGAP\", \"BUN\", \"CREATININE\", \"GLUCOSE\", \"CALCIUM\", \"AST\", \"ALT\", \"ALKPHOS\", \"PROT\", \"BILITOT\", \"EGFR\", \"COLORU\", \"CLARITYU\", \"SPECGRAV\", \"PHUR\", \"LEUKOCYTESUR\", \"NITRITE\", \"PROTEINUA\", \"GLUCOSEU\", \"KETONESU\", \"BILIRUBINUR\", \"BLOODU\", \"URINECX\", \"PREGTESTUR\", \"ABO\", \"PT\", \"INR\"    Imaging Studies: I have personally reviewed pertinent reports.   and I have personally reviewed pertinent films in PACS    Other Studies: None.  "

## 2024-06-30 NOTE — ASSESSMENT & PLAN NOTE
7/1/2024: Seen today by new neurology examiner's is this right-hand-dominant 83-year-old male with history of known L carotid stenosis (previously felt to be asymptomatic/follows with [facility] vascular surgery), hypertension, hyperlipidemia, CAD, CKD.  He was initially admitted on 6/29 for back pain and was found to have a new thoracic, (T11) compression fracture in the face of his prior back issues, see below.  Apparently yesterday while he was working with PT he became symptomatic with aphasia and right upper extremity weakness.  A stroke alert was called and he had an immediate stat CT and at the completion of his CT his right upper extremity weakness had also resolved.  Further history from the family report that he has had approximately 4-5 additional episodes of these similar symptoms, aphasia/R UE weakness, over the past 2 weeks.  CTA of his head and neck: Worsening severe stenosis in the distal left common carotid artery. Severe tandem stenosis proximal left internal carotid artery. Moderate stenosis vertebral artery origins is unchanged. Stable therosclerotic disease in the right common carotid artery with less than 50% stenosis. No high-grade intracranial stenosis, large vessel occlusion or aneurysm.  He also had an MRI brain yesterday which does not demonstrate any new acute infarct, but late subacute L hemisphere CVA . 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 These most likely represent chronic watershed zone infarctions given the concomitant CTA findings of severe ipsilateral cervical ICA atherosclerotic stenosis.  Also previously since he follows with vascular as an outpatient A recent VAS carotid doppler (early June 2024) 70-99% L ICA stenosis.  Thus do suspect symptomatic L carotid stenosis (critical) as cause of stroke/TIA episodes.   His lab studies demonstrate that he has a controlled A1c of 6.0 this would be  important for wound healing post operatively and his lipid panel is also well-controlled (he was on 80 of atorvastatin premorbidly)  His exam this morning had no residual lateralizing findings, although I did not gait him to evaluate his gait and his pain of his new fracture, see below..  He did have features on bedside cognitive exam consistent with some mild cognitive impairments which may be chronic, or possibly related to his new hospitalization poor sleep and other distracting factors.  Plan:  -Consult vascular surgery for suspected symptomatic left ICA disease (episodic TIA/CVA); no need to delay surgical intervention from neurology standpoint as stroke burden small and timing of CVA is late subacute/chronic.   -2D echocardiogram pending today  - Continue DAPT with aspirin 81 mg daily/ Plavix 75 mg daily (patient reports he previously had been on Plavix but he reports vascular surgery did DC it sometime ago).  -Now Lipitor 40 mg daily  -Significant hypotension this AM; avoid hypotension given significant L carotid disease  -Provide stroke education  -Therapy evaluations  -Notify neurology/obtain repeat CTA if any recurrence of symptoms

## 2024-06-30 NOTE — CONSULTS
Consultation - Neurology   Suhail Borrego 83 y.o. male MRN: [id number]  Unit/Bed#: S -01 Encounter: [id number]      Assessment & Plan   * Symptomatic stenosis of left carotid artery  Assessment & Plan  83-year-old male with history of known L carotid stenosis (previously felt to be asymptomatic/follows with [facility] surgery), hypertension, hyperlipidemia, CAD, CKD.    Initially admitted on 6/29 for back pain and thoracic compression fracture.    Stroke alert this morning 6/30 for aphasia and right UE weakness (resolved following CT imaging).  Per family and patient, has had approximately 4-5 additional episodes of aphasia/R UE weakness over the past 2 weeks.     MRI brain this morning notes late subacute L hemisphere CVA and 2 other chronic L hemisphere strokes. Thus do suspect symptomatic L carotid stenosis (critical) as cause of stroke/TIA episodes.     Personal neuro exam non-focal this AM.    Neuroimaging:  -CT head: 2 new areas of left frontal hypodensity, suggestive of age-indeterminate ischemia  -CTA head/neck: Severe left proximal ICA stenosis, moderate, carotid artery stenosis.  No LVO.  -MRI brain: see above; late subacute/chronic infarcts in L hemisphere  -Recent VAS carotid doppler (early June 2024) 70-99% L ICA stneosis    Plan:  -Initiate acute ischemic stroke pathway:  -Consult vascular surgery for suspected symptomatic left ICA disease (episodic TIA/CVA); no need to delay surgical intervention from neurology standpoint as stroke burden small and timing of CVA is late subacute/chronic.   -2D echocardiogram  -Loaded with Plavix 300 mg daily once, then continue DAPT with aspirin 81 mg daily/ Plavix 75 mg daily  -Lipitor 40 mg daily  -Hemoglobin A1c and lipid panel pending  -Neurochecks  -Telemetry monitoring  -Significant hypotension this AM; avoid hypotension given significant L carotid disease  -Provide stroke education  -Therapy evaluations  -Notify neurology/obtain repeat CTA if any  recurrence of symptoms      Discussed plan of care with attending neurologist.     Suhail Borrego will need follow up in in 6 weeks with neurovascular attending or advance practitioner. He will not require outpatient neurological testing.    History of Present Illness     Reason for Consult / Principal Problem: Status post stroke alert, aphasia/R UE weakness    HPI: Suhail Borrego is a 83 y.o.  male with history as mentioned above in assessment who neurology is asked to evaluate in regards to the above.     Patient initially admitted yesterday for low back pain, evaluated by neurosurgery with concern for subacute T11 compression fracture in the setting of more chronic compression fracture at prior lumbar surgery.  Was undergoing pain management via primary team as well as therapy evaluations.    This morning stroke alert was activated given acute onset aphasia and right arm weakness.  Please see Dr. Callejas's quick note for stroke alert details.  See neuroimaging above.  TNK was offered but refused by family. Formal stroke pathway initiated.    On discussion with patient this AM: notes he has had several other aphasic/R UE weakness episodes. He has had total of about 4 episodes over the span of the past 2 weeks. They have always occurred in the morning; last usually no more than a few hours before resolving.     He states the episode this morning was shorter in duration, as he notes following the CT scans that his speech and R arm returned to normal.     Does actively follow with  Vascular surgery for history of PAD; he has had monitoring of his L carotid disease, including recent carotid dopplers a few weeks ago in early June which noted 70-99% disease.     Inpatient consult to Neurology  Consult performed by: Braeden Mason PA-C  Consult ordered by: Keron Enriquez MD          Review of Systems   Constitutional: Negative.    HENT: Negative.     Eyes: Negative.    Respiratory: Negative.     Cardiovascular:  Negative.    Gastrointestinal: Negative.    Musculoskeletal:  Positive for back pain and gait problem.   Skin: Negative.    Neurological:  Positive for speech difficulty (expressive aphasia, episodic) and weakness (R UE, episodic). Negative for dizziness, tremors, seizures, syncope, facial asymmetry, light-headedness, numbness and headaches.       Historical Information   Past Medical History:   Diagnosis Date    Abnormal liver function test     RESOLVED: 14SEP2017    Allergic rhinitis     Anemia     LAST ASSESSED: 19OCT2017    Arthritis     BPH (benign prostatic hyperplasia)     CAD (coronary artery disease)     Coronary artery disease     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HCC)     LAST ASSESSED: 05OCT2017    Former tobacco use     GERD (gastroesophageal reflux disease)     Hand paresthesia     RESOLVED: 11SEP2017    Hyperlipidemia     Hypertension     Lumbar stenosis     Peripheral artery disease (HCC)     LAST ASSESSED: 27OCT2017    Stage 3a chronic kidney disease (HCC) 7/11/2021     Past Surgical History:   Procedure Laterality Date    BACK SURGERY      COLONOSCOPY      LUMBAR FUSION      WI ARTHRODESIS POSTERIOR/PSTLAT TQ 1NTRSPC LUMBAR N/A 11/03/2016    Procedure: L2-S1 POSTERIOR LUMBAR FUSION AND DECOMPRESSION (Impulse), Posterior lateral fixation; dural repair.;  Surgeon: Leslie Gil MD;  Location: [facility];  Service: Orthopedics    WI CABG W/ARTERIAL GRAFT SINGLE ARTERIAL GRAFT N/A 01/19/2018    Procedure: CABG X4 with LIMA - LAD, SVG - RCA, OM2, & Diagonal ; Left Leg EVH; MATTHEW;  Surgeon: Eric Bar DO;  Location: [facility];  Service: Cardiac Surgery    WI COLONOSCOPY FLX DX W/COLLJ SPEC WHEN PFRMD N/A 11/15/2017    Procedure: EGD AND COLONOSCOPY;  Surgeon: Mariano Godinez MD;  Location: [facility];  Service: Gastroenterology    SEPTOPLASTY      LAST ASSESSED; 13MAY2014    TONSILECTOMY AND ADNOIDECTOMY      LAST ASSESSED: 13MAY2014    UPPER GASTROINTESTINAL ENDOSCOPY       Social History   Social  History     Substance and Sexual Activity   Alcohol Use Not Currently    Alcohol/week: 1.0 standard drink of alcohol    Types: 1 Shots of liquor per week    Comment: beer, wine, scotch every day; SOCIAL AS PER ALL SCRIPTS      Social History     Substance and Sexual Activity   Drug Use Not Currently    Types: Marijuana    Comment: medical clarke     E-Cigarette/Vaping    E-Cigarette Use Never User      E-Cigarette/Vaping Substances    Nicotine No     THC No     CBD No     Flavoring No     Other No     Unknown No      Social History     Tobacco Use   Smoking Status Former    Current packs/day: 0.00    Average packs/day: 1 pack/day for 50.0 years (50.0 ttl pk-yrs)    Types: Cigarettes    Start date:     Quit date:     Years since quittin.5   Smokeless Tobacco Never     Family History:   Family History   Problem Relation Age of Onset    Emphysema Mother     Liver disease Mother     Coronary artery disease Father     Hypertension Father     Liver disease Father     Heart failure Father     Stroke Father         CVA     Heart attack Father     Coronary artery disease Brother     Heart disease Brother         younger brother by pass and other brother 4 stents placed    Other Family         BACK PROBLEM     Stroke Family         CVA    Emphysema Family     Hypertension Family         BENIGN       Review of previous medical records was completed.    Meds/Allergies   current meds:   Current Facility-Administered Medications   Medication Dose Route Frequency    acetaminophen (TYLENOL) tablet 975 mg  975 mg Oral Q8H KAMRAN    ascorbic acid (VITAMIN C) tablet 500 mg  500 mg Oral Daily    aspirin (ECOTRIN LOW STRENGTH) EC tablet 81 mg  81 mg Oral Daily    atorvastatin (LIPITOR) tablet 80 mg  80 mg Oral QPM    Cholecalciferol (VITAMIN D3) tablet 2,000 Units  2,000 Units Oral Daily    [START ON 2024] clopidogrel (PLAVIX) tablet 75 mg  75 mg Oral Daily    docusate sodium (COLACE) capsule 100 mg  100 mg Oral BID     enoxaparin (LOVENOX) subcutaneous injection 40 mg  40 mg Subcutaneous Daily    famotidine (PEPCID) tablet 20 mg  20 mg Oral BID    gabapentin (NEURONTIN) capsule 200 mg  200 mg Oral HS    glycerin (adult) rectal suppository 1 suppository  1 suppository Rectal Once PRN    labetalol (NORMODYNE) injection 10 mg  10 mg Intravenous Q4H PRN    oxyCODONE (ROXICODONE) split tablet 2.5 mg  2.5 mg Oral Q4H PRN    Or    oxyCODONE (ROXICODONE) IR tablet 5 mg  5 mg Oral Q4H PRN    polyethylene glycol (MIRALAX) packet 17 g  17 g Oral Daily    senna (SENOKOT) tablet 8.6 mg  1 tablet Oral Daily    sodium chloride 0.9 % infusion  100 mL/hr Intravenous Continuous    traMADol (ULTRAM) tablet 50 mg  50 mg Oral Q6H PRN    and PTA meds:   Prior to Admission Medications   Prescriptions Last Dose Informant Patient Reported? Taking?   Ascorbic Acid (Vitamin C) 500 MG PACK 6/28/2024 Self Yes Yes   Sig: Take 1,000 mg by mouth daily   Multiple Vitamin (MULTIVITAMIN) capsule 6/28/2024 Self Yes Yes   Sig: Take 1 capsule by mouth daily   acetaminophen (TYLENOL) 650 mg CR tablet 6/28/2024 Self Yes Yes   Sig: Take 650 mg by mouth every 8 (eight) hours as needed for mild pain   aspirin (ECOTRIN LOW STRENGTH) 81 mg EC tablet 6/28/2024 Self Yes Yes   Sig: Take 81 mg by mouth daily   atorvastatin (LIPITOR) 80 mg tablet 6/28/2024 Self No Yes   Sig: TAKE 1 TABLET BY MOUTH EVERY DAY IN THE MORNING   cholecalciferol (VITAMIN D3) 1,000 units tablet 6/28/2024 Self Yes Yes   Sig: Take 2,000 Units by mouth daily   clotrimazole-betamethasone (LOTRISONE) 1-0.05 % cream Not Taking Self No No   Sig: Apply topically 2 (two) times a day   Patient not taking: Reported on 6/29/2024   cyanocobalamin (VITAMIN B-12) 1,000 mcg tablet 6/28/2024 Self Yes Yes   Sig: Take 1,000 mcg by mouth daily     docusate sodium (COLACE) 100 mg capsule Not Taking Self Yes No   Sig: Take 100 mg by mouth 2 (two) times a day   Patient not taking: Reported on 6/29/2024   famotidine (PEPCID)  20 mg tablet 6/28/2024  No Yes   Sig: TAKE 1 TABLET BY MOUTH TWICE A DAY   folic acid (FOLVITE) 1 mg tablet 6/28/2024 Self No Yes   Sig: Take 1 tablet (1 mg total) by mouth daily   gabapentin (NEURONTIN) 100 mg capsule   No No   Sig: Take 1 capsule (100 mg total) by mouth daily at bedtime   metoprolol tartrate (LOPRESSOR) 50 mg tablet 6/28/2024  No Yes   Sig: Take 1.5 tablets (75 mg total) by mouth every 12 (twelve) hours   traMADol (Ultram) 50 mg tablet 6/28/2024  No Yes   Sig: Take 1 tablet (50 mg total) by mouth 2 (two) times a day as needed for moderate pain      Facility-Administered Medications: None       Allergies   Allergen Reactions    Penicillins Other (See Comments)     Hallucinations;  Patient reported that he was seeing visual disturbances         Objective   Vitals:Blood pressure (!) 104/44, pulse 79, temperature 97.8 °F (36.6 °C), temperature source Oral, resp. rate 18, weight 67.5 kg (148 lb 13 oz), SpO2 99%.,Body mass index is 23.31 kg/m².    Intake/Output Summary (Last 24 hours) at 6/30/2024 0947  Last data filed at 6/30/2024 0600  Gross per 24 hour   Intake 1036.67 ml   Output 1150 ml   Net -113.33 ml       Invasive Devices:   Invasive Devices       Peripheral Intravenous Line  Duration             Peripheral IV 06/29/24 Left Antecubital 1 day    Peripheral IV 06/30/24 Right;Medial Antecubital <1 day                    Physical Exam  Constitutional:       Appearance: Normal appearance.   HENT:      Head: Normocephalic and atraumatic.   Eyes:      Extraocular Movements: Extraocular movements intact and EOM normal.      Conjunctiva/sclera: Conjunctivae normal.      Pupils: Pupils are equal, round, and reactive to light.   Cardiovascular:      Rate and Rhythm: Normal rate.   Pulmonary:      Effort: Pulmonary effort is normal.   Abdominal:      General: There is no distension.   Musculoskeletal:         General: Normal range of motion.      Cervical back: Normal range of motion and neck supple.    Skin:     General: Skin is warm and dry.   Neurological:      General: No focal deficit present.      Mental Status: He is alert and oriented to person, place, and time.      Motor: Motor strength is normal.     Coordination: Finger-Nose-Finger Test and Heel to Shin Test normal.   Psychiatric:         Speech: Speech normal.       Neurologic Exam     Mental Status   Oriented to person, place, and time.   Attention: normal. Concentration: normal.   Speech: speech is normal   Normal comprehension.     Speech entirely clear with conversation, fully oriented, names objects/repeat phrases and answers questions without aphasia. Following cross body and multistep commands accurately.      Cranial Nerves     CN II   Visual fields full to confrontation.     CN III, IV, VI   Pupils are equal, round, and reactive to light.  Extraocular motions are normal.   Nystagmus: none   Ophthalmoparesis: none  Conjugate gaze: present    CN V   Facial sensation intact.     CN VII   Facial expression full, symmetric.     CN VIII   CN VIII normal.     CN IX, X   CN IX normal.   CN X normal.     CN XI   CN XI normal.     CN XII   CN XII normal.     Motor Exam     Strength   Strength 5/5 throughout.   Full strength throughout R UE (and elsewhere)     Sensory Exam   Light touch normal.     Gait, Coordination, and Reflexes     Coordination   Finger to nose coordination: normal  Heel to shin coordination: normal    Tremor   Resting tremor: absent  Intention tremor: absent      Lab Results: CBC:   Results from last 7 days   Lab Units 06/30/24  0835 06/30/24  0833 06/29/24  0630 06/29/24  0153   WBC Thousand/uL  --  13.07* 10.37* 10.58*   RBC Million/uL  --  3.28* 3.30* 3.20*   HEMOGLOBIN g/dL  --  10.4* 10.5* 10.2*   I STAT HEMOGLOBIN g/dl 10.9*  --   --   --    HEMATOCRIT %  --  32.7* 32.9* 32.3*   HEMATOCRIT, ISTAT % 32*  --   --   --    MCV fL  --  100* 100* 101*   PLATELETS Thousands/uL  --  194 178 184   , BMP/CMP:   Results from last 7 days  "  Lab Units 06/30/24  0835 06/30/24  0833 06/29/24  0630 06/29/24  0153   SODIUM mmol/L  --  138 138 138   POTASSIUM mmol/L  --  3.8 4.4 4.6   CHLORIDE mmol/L  --  103 107 107   CO2 mmol/L  --  25 26 26   CO2, I-STAT mmol/L 29  --   --   --    BUN mg/dL  --  30* 26* 31*   CREATININE mg/dL  --  1.29 1.02 1.16   GLUCOSE, ISTAT mg/dl 123  --   --   --    CALCIUM mg/dL  --  9.0 8.5 9.0   AST U/L  --   --   --  20   ALT U/L  --   --   --  14   ALK PHOS U/L  --   --   --  61   EGFR ml/min/1.73sq m  --  50 67 57   , Vitamin B12:   Results from last 7 days   Lab Units 06/29/24  0630   VITAMIN B 12 pg/mL 2,200*   , HgBA1C:   Results from last 7 days   Lab Units 06/29/24  0630   HEMOGLOBIN A1C % 6.0*   , TSH:   , Coagulation:   Results from last 7 days   Lab Units 06/29/24  0153   INR  1.08   , Lipid Profile:   Results from last 7 days   Lab Units 06/30/24  1119   HDL mg/dL 42   LDL CALC mg/dL 30   TRIGLYCERIDES mg/dL 69   , Ammonia:   , Urinalysis:   Results from last 7 days   Lab Units 06/29/24  0430   COLOR UA  Light Yellow   CLARITY UA  Clear   SPEC GRAV UA  1.043*   PH UA  5.5   LEUKOCYTES UA  Small*   NITRITE UA  Negative   GLUCOSE UA mg/dl Negative   KETONES UA mg/dl Negative   BILIRUBIN UA  Negative   BLOOD UA  Negative   , Drug Screen:   , Medication Drug Levels:       Invalid input(s): \"CARBAMAZEPINE\", \"OXCARBAZEPINE\"  Imaging Studies: I have personally reviewed pertinent films in PACS  MRI brain wo contrast   Final Result by Pallav N Shah, MD (06/30 1324)      No acute infarction.      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 These most likely represent chronic watershed zone infarctions given the concomitant CTA findings of severe ipsilateral cervical ICA atherosclerotic stenosis.      Mild cerebral chronic microangiopathic changes.      Workstation performed: [id number]         CT stroke alert brain   Final Result by E. Alec Schoenberger, MD (06/30 0918)   No acute large vessel " distribution infarct, hemorrhage or mass effect. 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         Findings were reported to Saurav Callejas  via HIPAA compliant secure electronic messaging at 9:30 a.m.         Workstation performed: [id number]         CTA stroke alert (head/neck)   Final Result by E. Alec Schoenberger, MD (06/30 0961)      Worsening severe stenosis in the distal left common carotid artery. Severe tandem stenosis proximal left internal carotid artery.   Moderate stenosis vertebral artery origins is unchanged. Stable atherosclerotic disease in the right common carotid artery with less than 50% stenosis.   No high-grade intracranial stenosis, large vessel occlusion or aneurysm.      Findings were reported to Saurav Callejas  via HIPAA compliant secure electronic messaging at 9:30 a.m.                           Workstation performed: [id number]          VAS VENOUS DUPLEX - LOWER LIMB BILATERAL   Final Result by Yudy Mack MD (06/29 1255)      CTA dissection protocol chest/abdomen/pelvis   Final Result by Gary Ramirez MD (06/29 3635)      No evidence of aortic aneurysm or dissection      Acute compression fracture at the T11 vertebral body with approximately 10 to 20% vertebral body height loss anteriorly..      Worsening findings of interstitial lung disease comparing to 10/16/2021               Workstation performed: [id number]         CT recon only thoracolumbar   Final Result by Gary Ramirez MD (06/29 4052)      New acute compression fracture at the T11 vertebral body anteriorly with approximately 10 to 20% loss of body height..               Workstation performed: [id number]         XR chest 1 view portable   ED Interpretation by Adalberto Dowling MD (06/29 0228)   No acute cardiopulmonary process.  Interpreted by me.      Final Result by Luigi Giles MD (06/30 1101)      No acute cardiopulmonary disease.            Workstation performed: [id number]         XR spine lumbar 2  or 3 views injury    (Results Pending)       EKG, Pathology, and Other Studies: I have personally reviewed pertinent reports.      VTE Prophylaxis: Sequential compression device (Venodyne)  and Enoxaparin (Lovenox)    Code Status: Level 1 - Full Code    Please see attending's attestation for total time spent/billing. Discussed plan of care with patient and primary team: suspect symptomatic L ICA stenosis, DAPT, awaiting vascular surgery input, neuro checks, telemetry monitoring, avoid hypotension.    Please note dictation software was used in the formulation of this note. Please keep that in mind in light of any grammatical errors.

## 2024-06-30 NOTE — PLAN OF CARE
Problem: Potential for Falls  Goal: Patient will remain free of falls  Description: INTERVENTIONS:  - Educate patient/family on patient safety including physical limitations  - Instruct patient to call for assistance with activity   - Consult OT/PT to assist with strengthening/mobility   - Keep Call bell within reach  - Keep bed low and locked with side rails adjusted as appropriate  - Keep care items and personal belongings within reach  - Initiate and maintain comfort rounds  - Make Fall Risk Sign visible to staff  - Offer Toileting every 2 Hours, in advance of need  - Initiate/Maintain alarm  - Obtain necessary fall risk management equipment:   - Apply yellow socks and bracelet for high fall risk patients  - Consider moving patient to room near nurses station  Outcome: Progressing     Problem: PAIN - ADULT  Goal: Verbalizes/displays adequate comfort level or baseline comfort level  Description: Interventions:  - Encourage patient to monitor pain and request assistance  - Assess pain using appropriate pain scale  - Administer analgesics based on type and severity of pain and evaluate response  - Implement non-pharmacological measures as appropriate and evaluate response  - Consider cultural and social influences on pain and pain management  - Notify physician/advanced practitioner if interventions unsuccessful or patient reports new pain  Outcome: Progressing     Problem: INFECTION - ADULT  Goal: Absence or prevention of progression during hospitalization  Description: INTERVENTIONS:  - Assess and monitor for signs and symptoms of infection  - Monitor lab/diagnostic results  - Monitor all insertion sites, i.e. indwelling lines, tubes, and drains  - Monitor endotracheal if appropriate and nasal secretions for changes in amount and color  - [place] appropriate cooling/warming therapies per order  - Administer medications as ordered  - Instruct and encourage patient and family to use good hand hygiene  technique  - Identify and instruct in appropriate isolation precautions for identified infection/condition  Outcome: Progressing  Goal: Absence of fever/infection during neutropenic period  Description: INTERVENTIONS:  - Monitor WBC    Outcome: Progressing     Problem: SAFETY ADULT  Goal: Patient will remain free of falls  Description: INTERVENTIONS:  - Educate patient/family on patient safety including physical limitations  - Instruct patient to call for assistance with activity   - Consult OT/PT to assist with strengthening/mobility   - Keep Call bell within reach  - Keep bed low and locked with side rails adjusted as appropriate  - Keep care items and personal belongings within reach  - Initiate and maintain comfort rounds  - Make Fall Risk Sign visible to staff  - Offer Toileting every 2 Hours, in advance of need  - Initiate/Maintain alarm  - Obtain necessary fall risk management equipment:   - Apply yellow socks and bracelet for high fall risk patients  - Consider moving patient to room near nurses station  Outcome: Progressing  Goal: Maintain or return to baseline ADL function  Description: INTERVENTIONS:  -  Assess patient's ability to carry out ADLs; assess patient's baseline for ADL function and identify physical deficits which impact ability to perform ADLs (bathing, care of mouth/teeth, toileting, grooming, dressing, etc.)  - Assess/evaluate cause of self-care deficits   - Assess range of motion  - Assess patient's mobility; develop plan if impaired  - Assess patient's need for assistive devices and provide as appropriate  - Encourage maximum independence but intervene and supervise when necessary  - Involve family in performance of ADLs  - Assess for home care needs following discharge   - Consider OT consult to assist with ADL evaluation and planning for discharge  - Provide patient education as appropriate  Outcome: Progressing  Goal: Maintains/Returns to pre admission functional level  Description:  INTERVENTIONS:  - Perform AM-PAC 6 Click Basic Mobility/ Daily Activity assessment daily.  - Set and communicate daily mobility goal to care team and patient/family/caregiver.   - Collaborate with rehabilitation services on mobility goals if consulted  - Perform Range of Motion 2 times a day.  - Reposition patient every 2 hours.  - Dangle patient 2 times a day  - Stand patient 2 times a day  - Ambulate patient 2 times a day  - Out of bed to chair 2 times a day   - Out of bed for meals 2 times a day  - Out of bed for toileting  - Record patient progress and toleration of activity level   Outcome: Progressing     Problem: DISCHARGE PLANNING  Goal: Discharge to home or other facility with appropriate resources  Description: INTERVENTIONS:  - Identify barriers to discharge w/patient and caregiver  - Arrange for needed discharge resources and transportation as appropriate  - Identify discharge learning needs (meds, wound care, etc.)  - Arrange for interpretive services to assist at discharge as needed  - Refer to Case Management Department for coordinating discharge planning if the patient needs post-hospital services based on physician/advanced practitioner order or complex needs related to functional status, cognitive ability, or social support system  Outcome: Progressing     Problem: Knowledge Deficit  Goal: Patient/family/caregiver demonstrates understanding of disease process, treatment plan, medications, and discharge instructions  Description: Complete learning assessment and assess knowledge base.  Interventions:  - Provide teaching at level of understanding  - Provide teaching via preferred learning methods  Outcome: Progressing     Problem: Prexisting or High Potential for Compromised Skin Integrity  Goal: Skin integrity is maintained or improved  Description: INTERVENTIONS:  - Identify patients at risk for skin breakdown  - Assess and monitor skin integrity  - Assess and monitor nutrition and hydration  status  - Monitor labs   - Assess for incontinence   - Turn and reposition patient  - Assist with mobility/ambulation  - Relieve pressure over bony prominences  - Avoid friction and shearing  - Provide appropriate hygiene as needed including keeping skin clean and dry  - Evaluate need for skin moisturizer/barrier cream  - Collaborate with interdisciplinary team   - Patient/family teaching  - Consider wound care consult   Outcome: Progressing

## 2024-06-30 NOTE — ASSESSMENT & PLAN NOTE
Subacute T11 compression fracture in the setting of more chronic compression fractures and previous lumbar decompression and fusion. TLICS 1.  No neurosurgical intervention is anticipated.  Brace fitting and upright plain films still pending.  Neurologically stable.  All his questions were answered to his satisfaction and he is in agreement with this course of action.

## 2024-06-30 NOTE — RAPID RESPONSE
Rapid Response Note  Suhail Borrego 83 y.o. male MRN: [id number]  Unit/Bed#: S -01 Encounter: [id number]    Rapid Response Notification(s):   Response called date/time:  6/30/2024 8:18 AM  Response team arrival date/time:  6/30/2024 8:19 AM  Response end date/time:  6/30/2024 8:51 AM  Level of care:  Medr  Rapid response location:  [facility] unit  Primary reason for rapid response call:  Acute change in neuro status    Rapid Response Intervention(s):   Airway:  None  Breathing:  None  Circulation:  None  Fluids administered:  Lactated Ringer's  Medications administered:  Magnesium       Assessment:   Stroke like symptoms  Mild respiratory acidosis  Prolonged qtc     Plan:   NIH 3. Med rec reviewed - no recent med admin.   ISTAT showing mild resp acidosis  IP Stroke Alert prompted - EKG sinus mainly artifact due to patient's frustration   Labs and chart review completed- no contraindications to TNK  Discussed with Dr. Sheng Callejas via phone- CTH neg for ICH on our and radiology read. No obvious LVO. Plan to give TNK. At the end of imaging, exam improved, now with only minor anomia and mild aphasia. RUE 5/5 but weaker to L side, improved.  I called family at start of stroke alert and they arrived bedside after imaging completed. I recommended TNK to family and discussed this with patient and his family who were adamant that patient had returned to baseline at their arrival and likely was having his typical morning slow wake up. They discussed with patient and decided to not give TNK. I thoroughly explained risks/benefits as well as alternative options and they stated they did not want to give TNK at this time. Brought patient back to his room with plan to plavix load him and continue daily aspirin/statin and continue stroke workup per protocol  I received a message from Dr. Callejas that patient has new 2 strokes in L frontal lobe, age indeterminate, new from 2022 imaging. Given that patient remained in the TNK  window, I had a repeat discussion with family regarding this, again explaining the risk/benefits as well as imaging read as age indeterminate. The daughters, Shannan and Ashwini, as well as patient  decided against TNK  Plavix 300mg load now per neurology and 75mg daily  Continue aspirin 81mg daily  Continue atorvastatin   F/u MRI and echo  F/u neurology recs   1L isolyte for intermittent hypotension and contrast load   2g mag sulfate given for prolonged qtc     Rapid Response Outcome:   Transfer:  Remain on floor  Primary service notified of transfer: Yes    Code Status: Level 1 (Full Code)      Family notified: Yes, Name of Family member contacted Shannan and Ashwini          Background/Situation:   Suhail Borrego is a 83 y.o. male who presents for lumbar compression fx. Patient was LKW at 7am. His baseline is minor confusion and morning muscle aches. Baseline is a and ox3, often using his phone. He was working with physical therapy and acutely became confused, dvlped expressive aphasia and R arm/leg weakness. RR called.     On my arrival, patient continues to have significant expressive aphasia, R arm weakness. He is obviously frustrated by his inability to speak full sentences. This caused worsening agitation.     Review of Systems   Constitutional:  Negative for chills and fever.   HENT:  Negative for ear pain and sore throat.    Eyes:  Negative for pain and visual disturbance.   Respiratory:  Negative for cough and shortness of breath.    Cardiovascular:  Negative for chest pain and palpitations.   Gastrointestinal:  Negative for abdominal pain and vomiting.   Genitourinary:  Negative for dysuria and hematuria.   Musculoskeletal:  Negative for arthralgias and back pain.   Skin:  Negative for color change and rash.   Neurological:  Negative for seizures and syncope.   All other systems reviewed and are negative.      Objective:   Vitals:    06/30/24 0833 06/30/24 0836 06/30/24 0837 06/30/24 0839   BP:    (!) 104/44    BP Location:       Pulse: 83 78  79   Resp:       Temp:       TempSrc:       SpO2: 99% 97% 98% 99%   Weight:         Physical Exam  Vitals and nursing note reviewed.   Constitutional:       General: He is not in acute distress.     Appearance: He is well-developed.   HENT:      Head: Normocephalic and atraumatic.   Eyes:      Conjunctiva/sclera: Conjunctivae normal.   Cardiovascular:      Rate and Rhythm: Normal rate and regular rhythm.      Heart sounds: No murmur heard.  Pulmonary:      Effort: Pulmonary effort is normal. No respiratory distress.      Breath sounds: Normal breath sounds.   Abdominal:      Palpations: Abdomen is soft.      Tenderness: There is no abdominal tenderness.   Musculoskeletal:         General: No swelling.      Cervical back: Neck supple.   Skin:     General: Skin is warm and dry.      Capillary Refill: Capillary refill takes less than 2 seconds.   Neurological:      Mental Status: He is alert.      Cranial Nerves: Cranial nerve deficit and dysarthria present. No facial asymmetry.      Sensory: No sensory deficit.      Motor: Weakness present. No tremor or seizure activity.      Comments: R arm weakness - 4/5  Expressive aphasia and dysarthria - patient attempting to talk about his weakness. He is trying to express he can't use his arm but is having difficulty explaining this  Anomia to describe objects including his phone  Moderate aphasia     Unable to use his phone   A and O x1     Remainder of neuro exam in tact     Psychiatric:         Mood and Affect: Mood normal.

## 2024-07-01 ENCOUNTER — APPOINTMENT (INPATIENT)
Dept: NON INVASIVE DIAGNOSTICS | Facility: HOSPITAL | Age: 84
DRG: 552 | End: 2024-07-01
Payer: COMMERCIAL

## 2024-07-01 ENCOUNTER — TELEPHONE (OUTPATIENT)
Dept: NEUROSURGERY | Facility: CLINIC | Age: 84
End: 2024-07-01

## 2024-07-01 LAB
AORTIC ROOT: 3.5 CM
APICAL FOUR CHAMBER EJECTION FRACTION: 65 %
ASCENDING AORTA: 3.3 CM
BASOPHILS # BLD AUTO: 0.05 THOUSANDS/ÂΜL (ref 0–0.1)
BASOPHILS NFR BLD AUTO: 1 % (ref 0–1)
BSA FOR ECHO PROCEDURE: 1.78 M2
E WAVE DECELERATION TIME: 157 MS
E/A RATIO: 0.68
EOSINOPHIL # BLD AUTO: 0.31 THOUSAND/ÂΜL (ref 0–0.61)
EOSINOPHIL NFR BLD AUTO: 3 % (ref 0–6)
ERYTHROCYTE [DISTWIDTH] IN BLOOD BY AUTOMATED COUNT: 13.2 % (ref 11.6–15.1)
FRACTIONAL SHORTENING: 36 (ref 28–44)
HCT VFR BLD AUTO: 30.4 % (ref 36.5–49.3)
HGB BLD-MCNC: 9.7 G/DL (ref 12–17)
IGA SERPL-MCNC: 439 MG/DL (ref 66–433)
IGG SERPL-MCNC: 1235 MG/DL (ref 635–1741)
IGM SERPL-MCNC: 66 MG/DL (ref 45–281)
IMM GRANULOCYTES # BLD AUTO: 0.04 THOUSAND/UL (ref 0–0.2)
IMM GRANULOCYTES NFR BLD AUTO: 0 % (ref 0–2)
INTERVENTRICULAR SEPTUM IN DIASTOLE (PARASTERNAL SHORT AXIS VIEW): 1.1 CM
INTERVENTRICULAR SEPTUM: 1.1 CM (ref 0.6–1.1)
LAAS-AP2: 12.9 CM2
LAAS-AP4: 8.3 CM2
LEFT ATRIUM SIZE: 3.1 CM
LEFT ATRIUM VOLUME (MOD BIPLANE): 27 ML
LEFT ATRIUM VOLUME INDEX (MOD BIPLANE): 15.2 ML/M2
LEFT INTERNAL DIMENSION IN SYSTOLE: 3 CM (ref 2.1–4)
LEFT VENTRICULAR INTERNAL DIMENSION IN DIASTOLE: 4.7 CM (ref 3.5–6)
LEFT VENTRICULAR POSTERIOR WALL IN END DIASTOLE: 1 CM
LEFT VENTRICULAR STROKE VOLUME: 67 ML
LVSV (TEICH): 67 ML
LYMPHOCYTES # BLD AUTO: 2.66 THOUSANDS/ÂΜL (ref 0.6–4.47)
LYMPHOCYTES NFR BLD AUTO: 25 % (ref 14–44)
MCH RBC QN AUTO: 31.8 PG (ref 26.8–34.3)
MCHC RBC AUTO-ENTMCNC: 31.9 G/DL (ref 31.4–37.4)
MCV RBC AUTO: 100 FL (ref 82–98)
MONOCYTES # BLD AUTO: 0.93 THOUSAND/ÂΜL (ref 0.17–1.22)
MONOCYTES NFR BLD AUTO: 9 % (ref 4–12)
MV E'TISSUE VEL-LAT: 9 CM/S
MV E'TISSUE VEL-SEP: 7 CM/S
MV PEAK A VEL: 1.01 M/S
MV PEAK E VEL: 69 CM/S
MV STENOSIS PRESSURE HALF TIME: 45 MS
MV VALVE AREA P 1/2 METHOD: 4.89
NEUTROPHILS # BLD AUTO: 6.61 THOUSANDS/ÂΜL (ref 1.85–7.62)
NEUTS SEG NFR BLD AUTO: 62 % (ref 43–75)
NRBC BLD AUTO-RTO: 0 /100 WBCS
PLATELET # BLD AUTO: 165 THOUSANDS/UL (ref 149–390)
PMV BLD AUTO: 10.8 FL (ref 8.9–12.7)
RA PRESSURE ESTIMATED: 3 MMHG
RBC # BLD AUTO: 3.05 MILLION/UL (ref 3.88–5.62)
RIGHT ATRIUM AREA SYSTOLE A4C: 8.5 CM2
RIGHT VENTRICLE ID DIMENSION: 3.2 CM
RV PSP: 35 MMHG
SL CV LEFT ATRIUM LENGTH A2C: 4.6 CM
SL CV LV EF: 65
SL CV PED ECHO LEFT VENTRICLE DIASTOLIC VOLUME (MOD BIPLANE) 2D: 102 ML
SL CV PED ECHO LEFT VENTRICLE SYSTOLIC VOLUME (MOD BIPLANE) 2D: 35 ML
TR MAX PG: 32 MMHG
TR PEAK VELOCITY: 2.8 M/S
TRICUSPID ANNULAR PLANE SYSTOLIC EXCURSION: 1.8 CM
TRICUSPID VALVE PEAK REGURGITATION VELOCITY: 2.81 M/S
WBC # BLD AUTO: 10.6 THOUSAND/UL (ref 4.31–10.16)

## 2024-07-01 PROCEDURE — 97530 THERAPEUTIC ACTIVITIES: CPT

## 2024-07-01 PROCEDURE — 99232 SBSQ HOSP IP/OBS MODERATE 35: CPT | Performed by: INTERNAL MEDICINE

## 2024-07-01 PROCEDURE — 85025 COMPLETE CBC W/AUTO DIFF WBC: CPT

## 2024-07-01 PROCEDURE — 93306 TTE W/DOPPLER COMPLETE: CPT | Performed by: INTERNAL MEDICINE

## 2024-07-01 PROCEDURE — 99223 1ST HOSP IP/OBS HIGH 75: CPT | Performed by: INTERNAL MEDICINE

## 2024-07-01 PROCEDURE — 97116 GAIT TRAINING THERAPY: CPT

## 2024-07-01 PROCEDURE — 99223 1ST HOSP IP/OBS HIGH 75: CPT | Performed by: PHYSICIAN ASSISTANT

## 2024-07-01 PROCEDURE — 99233 SBSQ HOSP IP/OBS HIGH 50: CPT | Performed by: PSYCHIATRY & NEUROLOGY

## 2024-07-01 PROCEDURE — 93306 TTE W/DOPPLER COMPLETE: CPT

## 2024-07-01 RX ORDER — SODIUM CHLORIDE 9 MG/ML
125 INJECTION, SOLUTION INTRAVENOUS CONTINUOUS
Status: DISCONTINUED | OUTPATIENT
Start: 2024-07-01 | End: 2024-07-03

## 2024-07-01 RX ADMIN — SODIUM CHLORIDE 100 ML/HR: 0.9 INJECTION, SOLUTION INTRAVENOUS at 17:25

## 2024-07-01 RX ADMIN — ACETAMINOPHEN 975 MG: 325 TABLET, FILM COATED ORAL at 05:43

## 2024-07-01 RX ADMIN — FAMOTIDINE 20 MG: 20 TABLET ORAL at 08:11

## 2024-07-01 RX ADMIN — ENOXAPARIN SODIUM 40 MG: 40 INJECTION SUBCUTANEOUS at 08:10

## 2024-07-01 RX ADMIN — CLOPIDOGREL BISULFATE 75 MG: 75 TABLET ORAL at 05:43

## 2024-07-01 RX ADMIN — OXYCODONE HYDROCHLORIDE AND ACETAMINOPHEN 500 MG: 500 TABLET ORAL at 08:11

## 2024-07-01 RX ADMIN — Medication 2000 UNITS: at 08:10

## 2024-07-01 RX ADMIN — ACETAMINOPHEN 975 MG: 325 TABLET, FILM COATED ORAL at 14:54

## 2024-07-01 RX ADMIN — METOPROLOL TARTRATE 25 MG: 25 TABLET, FILM COATED ORAL at 22:08

## 2024-07-01 RX ADMIN — GABAPENTIN 200 MG: 100 CAPSULE ORAL at 22:07

## 2024-07-01 RX ADMIN — ATORVASTATIN CALCIUM 80 MG: 40 TABLET, FILM COATED ORAL at 17:24

## 2024-07-01 RX ADMIN — ASPIRIN 81 MG: 81 TABLET, COATED ORAL at 08:12

## 2024-07-01 RX ADMIN — FAMOTIDINE 20 MG: 20 TABLET ORAL at 17:24

## 2024-07-01 RX ADMIN — ACETAMINOPHEN 975 MG: 325 TABLET, FILM COATED ORAL at 22:07

## 2024-07-01 RX ADMIN — SENNOSIDES 8.6 MG: 8.6 TABLET, FILM COATED ORAL at 08:11

## 2024-07-01 NOTE — PROGRESS NOTES
[facility]  Neurology progress Note - Name: Suhail Borrego I  MRN: [id number] Unit/Bed#: S -01 I   Date of Admission: 6/29/2024  Date of Service: 7/1/2024 I Hospital Day: 2    Assessment & Plan   * Symptomatic stenosis of left carotid artery  Assessment & Plan  7/1/2024: Seen today by new neurology examiner's is this right-hand-dominant 83-year-old male with history of known L carotid stenosis (previously felt to be asymptomatic/follows with [facility] vascular surgery), hypertension, hyperlipidemia, CAD, CKD.  He was initially admitted on 6/29 for back pain and was found to have a new thoracic, (T11) compression fracture in the face of his prior back issues, see below.  Apparently yesterday while he was working with PT he became symptomatic with aphasia and right upper extremity weakness.  A stroke alert was called and he had an immediate stat CT and at the completion of his CT his right upper extremity weakness had also resolved.  Further history from the family report that he has had approximately 4-5 additional episodes of these similar symptoms, aphasia/R UE weakness, over the past 2 weeks.  CTA of his head and neck: Worsening severe stenosis in the distal left common carotid artery. Severe tandem stenosis proximal left internal carotid artery. Moderate stenosis vertebral artery origins is unchanged. Stable therosclerotic disease in the right common carotid artery with less than 50% stenosis. No high-grade intracranial stenosis, large vessel occlusion or aneurysm.  He also had an MRI brain yesterday which does not demonstrate any new acute infarct, but late subacute L hemisphere CVA . 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 These most likely represent chronic watershed zone infarctions given the concomitant CTA findings of severe ipsilateral cervical ICA atherosclerotic stenosis.  Also  previously since he follows with vascular as an outpatient A recent VAS carotid doppler (early June 2024) 70-99% L ICA stenosis.  Thus do suspect symptomatic L carotid stenosis (critical) as cause of stroke/TIA episodes.   His lab studies demonstrate that he has a controlled A1c of 6.0 this would be important for wound healing post operatively and his lipid panel is also well-controlled (he was on 80 of atorvastatin premorbidly)  His exam this morning had no residual lateralizing findings, although I did not gait him to evaluate his gait and his pain of his new fracture, see below..  He did have features on bedside cognitive exam consistent with some mild cognitive impairments which may be chronic, or possibly related to his new hospitalization poor sleep and other distracting factors.  Plan:  -Consult vascular surgery for suspected symptomatic left ICA disease (episodic TIA/CVA); no need to delay surgical intervention from neurology standpoint as stroke burden small and timing of CVA is late subacute/chronic.   -2D echocardiogram pending today  - Continue DAPT with aspirin 81 mg daily/ Plavix 75 mg daily (patient reports he previously had been on Plavix but he reports vascular surgery did DC it sometime ago).  -Now Lipitor 40 mg daily  -Significant hypotension this AM; avoid hypotension given significant L carotid disease  -Provide stroke education  -Therapy evaluations  -Notify neurology/obtain repeat CTA if any recurrence of symptoms    New acute T 11 Thoracic compression fracture, closed, initial encounter, w old T12 compression fx  Assessment & Plan  Neurology is asked to see this 83-year-old gentleman who initially presented for new back pain in the presence and face of his old chronic back pain related to previous posterior spinal fusions and prior compression fractures.  As noted elsewhere by other providers he reports 1 week of worsened and changed back pain.  Workup including CT scan demonstrated a new T11  compression fracture with approximately 10 to 20% loss of height as noted by radiology.  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  He is to continue being mobile with the benefit of his brace and PT.  On my exam today the patient had confusion regarding the stroke alert called yesterday and those symptoms and whether or not we are still treating and concerned about his new compression fracture.  I discussed with him at length the workup for both conditions and that both conditions will continue to be addressed.  His pain at this point is by his report still present but he reports better.  He has a better understanding of the use of his brace and follow-up care for his back at this point.             Needs to see outpatient neurology on nonurgently in 1 to 2 months postdischarge.  His meds are yet to be determined.  He will also be following up with both the has a PCP, vascular surgery as he has a critical carotid stenosis.  He will also be following up with the cardiology and pulmonology.    Subjective/Objective     Subjective: This is all so confusing.    ROS: As noted above the patient reports that he feels a somewhat confused by the 2 different problems that he has during this hospital stay.  He was able to report fairly well intact continue a conversation concerning the separate issues and wonders if they are tied together.  He reports less back pain today.  On exam early this morning he felt he was doing well however on second exam this afternoon with attending he reported that he thought he still had a minor degree of word retrieval difficulties.  I found his conversation to be fluent as noted below on both occasions.    Current Facility-Administered  Medications   Medication Dose Route Frequency    acetaminophen  975 mg Oral Q8H KAMRAN    aluminum-magnesium hydroxide-simethicone  30 mL Oral Q4H PRN    ascorbic acid  500 mg Oral Daily    aspirin  81 mg Oral Daily    atorvastatin  80 mg Oral QPM    cholecalciferol  2,000 Units Oral Daily    clopidogrel  75 mg Oral Daily    docusate sodium  100 mg Oral BID    enoxaparin  40 mg Subcutaneous Daily    famotidine  20 mg Oral BID    gabapentin  200 mg Oral HS    glycerin (adult)  1 suppository Rectal Once PRN    labetalol  10 mg Intravenous Q4H PRN    oxyCODONE  2.5 mg Oral Q4H PRN    Or    oxyCODONE  5 mg Oral Q4H PRN    polyethylene glycol  17 g Oral Daily    senna  1 tablet Oral Daily    traMADol  50 mg Oral Q6H PRN       aluminum-magnesium hydroxide-simethicone    glycerin (adult)    labetalol    oxyCODONE **OR** oxyCODONE    traMADol    Vitals: Blood pressure 154/70, pulse 91, temperature 98 °F (36.7 °C), resp. rate 20, weight 67.5 kg (148 lb 13 oz), SpO2 96%.,Body mass index is 23.31 kg/m².        Physical Exam:     Pat seen in: Initially in bed and this afternoon he was out of bed in the chair without his brace.  General appearance: alert,   Neck, Lungs, Heart, & abdomen: WNL  Extremities: atraumatic, no cyanosis or edema    Neurologic:   Mental status: Alert, oriented, thought content appropriate in general concerning both of these issues.  His speech was somewhat tangential he was able to be redirected easily.  He was able to calculate a sum of $3.25 and quarters slowly without a mistake.  He was also able to reverse order the months of the year without cueing.  He followed commands easily.  His speech was clear there was no dysarthria.  There was no word finding or naming of common as well as uncommon items on my exam with him.  CN: exam EOM's I, Gaze conjugate. Non lateralizing sensory & motor exam, (PP not tested on face). Reminder CNVIII-XII normal.   Motor: full power age appropriate x 4 limbs, he has no  residual weakness of the right upper extremity on early exam today.  Sensory: grossly intact  X 4 limbs, PP not tested  Cerebellar: no ataxia or past pointing cerebellar maneuvers  Gait: I did not gait him today.  He was seen out of bed in the chair his brace was not on.  DTR's: Age appropriate,  Plantars: downgoing       Lab Results: I have personally reviewed pertinent reports.  , CBC:   Results from last 7 days   Lab Units 07/01/24  0610 06/30/24  0835 06/30/24  0833 06/29/24 0630   WBC Thousand/uL 10.60*  --  13.07* 10.37*   RBC Million/uL 3.05*  --  3.28* 3.30*   HEMOGLOBIN g/dL 9.7*  --  10.4* 10.5*   I STAT HEMOGLOBIN g/dl  --  10.9*  --   --    HEMATOCRIT % 30.4*  --  32.7* 32.9*   HEMATOCRIT, ISTAT %  --  32*  --   --    MCV fL 100*  --  100* 100*   PLATELETS Thousands/uL 165  --  194 178   , BMP/CMP:   Results from last 7 days   Lab Units 06/30/24  0835 06/30/24  0833 06/29/24 0630 06/29/24  0153   SODIUM mmol/L  --  138 138 138   POTASSIUM mmol/L  --  3.8 4.4 4.6   CHLORIDE mmol/L  --  103 107 107   CO2 mmol/L  --  25 26 26   CO2, I-STAT mmol/L 29  --   --   --    BUN mg/dL  --  30* 26* 31*   CREATININE mg/dL  --  1.29 1.02 1.16   GLUCOSE, ISTAT mg/dl 123  --   --   --    CALCIUM mg/dL  --  9.0 8.5 9.0   AST U/L  --   --   --  20   ALT U/L  --   --   --  14   ALK PHOS U/L  --   --   --  61   EGFR ml/min/1.73sq m  --  50 67 57   , Vitamin B12:   Results from last 7 days   Lab Units 06/29/24 0630   VITAMIN B 12 pg/mL 2,200*   , HgBA1C:   Results from last 7 days   Lab Units 06/29/24 0630   HEMOGLOBIN A1C % 6.0*   , TSH:   , Coagulation:   Results from last 7 days   Lab Units 06/29/24  0153   INR  1.08   , Lipid Profile:   Results from last 7 days   Lab Units 06/30/24  1119   HDL mg/dL 42   LDL CALC mg/dL 30   TRIGLYCERIDES mg/dL 69        Imaging Studies: I have personally reviewed pertinent films in PACS and have reviewed his films personally.  They have also been discussed with the patient and his  family members on this afternoon's rounds.    EEG, Echo, Pathology, and Other Studies:  His echo is being done today.      Counseling / Coordination of Care  Total time spent today approximately 60 minutes minutes. Greater than 50% of total time was spent with the patient and / or family counseling and / or coordination of care. A description of the counseling / coordination of care: All of the above was discussed and reviewed with the family members of redundantly.  Believe all her questions were answered to their satisfaction at this time.  Discussed with both internal medicine as well as cardiology and the nursing staff.

## 2024-07-01 NOTE — PLAN OF CARE
Problem: Potential for Falls  Goal: Patient will remain free of falls  Description: INTERVENTIONS:  - Educate patient/family on patient safety including physical limitations  - Instruct patient to call for assistance with activity   - Consult OT/PT to assist with strengthening/mobility   - Keep Call bell within reach  - Keep bed low and locked with side rails adjusted as appropriate  - Keep care items and personal belongings within reach  - Initiate and maintain comfort rounds  - Make Fall Risk Sign visible to staff  - Offer Toileting every  Hours, in advance of need  - Initiate/Maintain alarm  - Obtain necessary fall risk management equipment:   - Apply yellow socks and bracelet for high fall risk patients  - Consider moving patient to room near nurses station  Outcome: Progressing     Problem: PAIN - ADULT  Goal: Verbalizes/displays adequate comfort level or baseline comfort level  Description: Interventions:  - Encourage patient to monitor pain and request assistance  - Assess pain using appropriate pain scale  - Administer analgesics based on type and severity of pain and evaluate response  - Implement non-pharmacological measures as appropriate and evaluate response  - Consider cultural and social influences on pain and pain management  - Notify physician/advanced practitioner if interventions unsuccessful or patient reports new pain  Outcome: Progressing     Problem: INFECTION - ADULT  Goal: Absence or prevention of progression during hospitalization  Description: INTERVENTIONS:  - Assess and monitor for signs and symptoms of infection  - Monitor lab/diagnostic results  - Monitor all insertion sites, i.e. indwelling lines, tubes, and drains  - Monitor endotracheal if appropriate and nasal secretions for changes in amount and color  - Hall appropriate cooling/warming therapies per order  - Administer medications as ordered  - Instruct and encourage patient and family to use good hand hygiene technique  -  Identify and instruct in appropriate isolation precautions for identified infection/condition  Outcome: Progressing  Goal: Absence of fever/infection during neutropenic period  Description: INTERVENTIONS:  - Monitor WBC    Outcome: Progressing     Problem: SAFETY ADULT  Goal: Patient will remain free of falls  Description: INTERVENTIONS:  - Educate patient/family on patient safety including physical limitations  - Instruct patient to call for assistance with activity   - Consult OT/PT to assist with strengthening/mobility   - Keep Call bell within reach  - Keep bed low and locked with side rails adjusted as appropriate  - Keep care items and personal belongings within reach  - Initiate and maintain comfort rounds  - Make Fall Risk Sign visible to staff  - Offer Toileting every  Hours, in advance of need  - Initiate/Maintain alarm  - Obtain necessary fall risk management equipment:   - Apply yellow socks and bracelet for high fall risk patients  - Consider moving patient to room near nurses station  Outcome: Progressing  Goal: Maintain or return to baseline ADL function  Description: INTERVENTIONS:  -  Assess patient's ability to carry out ADLs; assess patient's baseline for ADL function and identify physical deficits which impact ability to perform ADLs (bathing, care of mouth/teeth, toileting, grooming, dressing, etc.)  - Assess/evaluate cause of self-care deficits   - Assess range of motion  - Assess patient's mobility; develop plan if impaired  - Assess patient's need for assistive devices and provide as appropriate  - Encourage maximum independence but intervene and supervise when necessary  - Involve family in performance of ADLs  - Assess for home care needs following discharge   - Consider OT consult to assist with ADL evaluation and planning for discharge  - Provide patient education as appropriate  Outcome: Progressing  Goal: Maintains/Returns to pre admission functional level  Description:  INTERVENTIONS:  - Perform AM-PAC 6 Click Basic Mobility/ Daily Activity assessment daily.  - Set and communicate daily mobility goal to care team and patient/family/caregiver.   - Collaborate with rehabilitation services on mobility goals if consulted  - Perform Range of Motion  times a day.  - Reposition patient every  hours.  - Dangle patient  times a day  - Stand patient  times a day  - Ambulate patient  times a day  - Out of bed to chair  times a day   - Out of bed for meals  times a day  - Out of bed for toileting  - Record patient progress and toleration of activity level   Outcome: Progressing     Problem: DISCHARGE PLANNING  Goal: Discharge to home or other facility with appropriate resources  Description: INTERVENTIONS:  - Identify barriers to discharge w/patient and caregiver  - Arrange for needed discharge resources and transportation as appropriate  - Identify discharge learning needs (meds, wound care, etc.)  - Arrange for interpretive services to assist at discharge as needed  - Refer to Case Management Department for coordinating discharge planning if the patient needs post-hospital services based on physician/advanced practitioner order or complex needs related to functional status, cognitive ability, or social support system  Outcome: Progressing     Problem: Prexisting or High Potential for Compromised Skin Integrity  Goal: Skin integrity is maintained or improved  Description: INTERVENTIONS:  - Identify patients at risk for skin breakdown  - Assess and monitor skin integrity  - Assess and monitor nutrition and hydration status  - Monitor labs   - Assess for incontinence   - Turn and reposition patient  - Assist with mobility/ambulation  - Relieve pressure over bony prominences  - Avoid friction and shearing  - Provide appropriate hygiene as needed including keeping skin clean and dry  - Evaluate need for skin moisturizer/barrier cream  - Collaborate with interdisciplinary team   - Patient/family  teaching  - Consider wound care consult   Outcome: Progressing     Problem: Neurological Deficit  Goal: Neurological status is stable or improving  Description: Interventions:  - Monitor and assess patient's level of consciousness, motor function, sensory function, and level of assistance needed for ADLs.   - Monitor and report changes from baseline. Collaborate with interdisciplinary team to initiate plan and implement interventions as ordered.   - Provide and maintain a safe environment.  - Consider seizure precautions.  - Consider fall precautions.  - Consider aspiration precautions.  - Consider bleeding precautions.  Outcome: Progressing     Problem: Activity Intolerance/Impaired Mobility  Goal: Mobility/activity is maintained at optimum level for patient  Description: Interventions:  - Assess and monitor patient  barriers to mobility and need for assistive/adaptive devices.  - Assess patient's emotional response to limitations.  - Collaborate with interdisciplinary team and initiate plans and interventions as ordered.  - Encourage independent activity per ability.  - Maintain proper body alignment.  - Perform active/passive rom as tolerated/ordered.  - Plan activities to conserve energy.  - Turn patient as appropriate  Outcome: Progressing     Problem: Communication Impairment  Goal: Ability to express needs and understand communication  Description: Assess patient's communication skills and ability to understand information.  Patient will demonstrate use of effective communication techniques, alternative methods of communication and understanding even if not able to speak.     - Encourage communication and provide alternate methods of communication as needed.  - Collaborate with case management/ for discharge needs.  - Include patient/family/caregiver in decisions related to communication.  Outcome: Progressing     Problem: Potential for Aspiration  Goal: Non-ventilated patient's risk of  aspiration is minimized  Description: Assess and monitor vital signs, respiratory status, and labs (WBC).  Monitor for signs of aspiration (tachypnea, cough, rales, wheezing, cyanosis, fever).    - Assess and monitor patient's ability to swallow.  - Place patient up in chair to eat if possible.  - HOB up at 90 degrees to eat if unable to get patient up into chair.  - Supervise patient during oral intake.   - Instruct patient/ family to take small bites.  - Instruct patient/ family to take small single sips when taking liquids.  - Follow patient-specific strategies generated by speech pathologist.  Outcome: Progressing  Goal: Ventilated patient's risk of aspiration is minimized  Description: Assess and monitor vital signs, respiratory status, airway cuff pressure, and labs (WBC).  Monitor for signs of aspiration (tachypnea, cough, rales, wheezing, cyanosis, fever).    - Elevate head of bed 30 degrees if patient has tube feeding.  - Monitor tube feeding.  Outcome: Progressing     Problem: Nutrition  Goal: Nutrition/Hydration status is improving  Description: Monitor and assess patient's nutrition/hydration status for malnutrition (ex- brittle hair, bruises, dry skin, pale skin and conjunctiva, muscle wasting, smooth red tongue, and disorientation). Collaborate with interdisciplinary team and initiate plan and interventions as ordered.  Monitor patient's weight and dietary intake as ordered or per policy. Utilize nutrition screening tool and intervene per policy. Determine patient's food preferences and provide high-protein, high-caloric foods as appropriate.     - Assist patient with eating.  - Allow adequate time for meals.  - Encourage patient to take dietary supplement as ordered.  - Collaborate with clinical nutritionist.  - Include patient/family/caregiver in decisions related to nutrition.  Outcome: Progressing

## 2024-07-01 NOTE — PROGRESS NOTES
[facility]  Progress Note  Name: Suhail Borrego I  MRN: [id number]  Unit/Bed#: S -01 I Date of Admission: 6/29/2024   Date of Service: 7/1/2024 I Hospital Day: 2    Assessment & Plan   * Symptomatic stenosis of left carotid artery  Assessment & Plan  Past medical history of 70-99% left carotid artery stenosis  CT head shows no acute large vessel distribution infarct, hemorrhage or mass effect. 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  CTA head and neck shows Worsening severe stenosis in the distal left common carotid artery. Severe tandem stenosis proximal left internal carotid artery. Moderate stenosis vertebral artery origins is unchanged. Stable atherosclerotic disease in the right common carotid artery with less than 50% stenosis. No high-grade intracranial stenosis, large vessel occlusion or aneurysm.  As per the family, patient has been having multiple episodes of TIA characterized by aphasia and unilateral limb weakness.  On 6/30, last known well: 7;30 a.m.  Around 8: 15 on 6/30, rapid was called for strokelike symptoms characterized by aphasia, right arm weakness  Family refused TNK, informed denial.  No past medical history of atrial fibrillation  NIH: 4 in the background of expressive aphasia and right upper arm drift but when I saw the patient NIH: 0  GCS: 15  Home med: Aspirin  Loading dose clopidogrel was given on 6/30    Plan:  Target for euglycemia, permissive hypertension, euthymia, euvolemia  Metoprolol was stopped to help permissive hypertension  Speech pathology/OT/PT/neurology/vascular surgery consultation  telemetry  Follow-up with bubble echo  Frequent neurocheck  Continue aspirin and clopidogrel    Lumbar compression fracture (HCC)  Assessment & Plan  PMHx of chronic back pain including T12 compression fracture and fusion of L2-S1 in 2018. Patient reports 1 week of worsening L sided back pain that feels like an achy muscle but is worse with  standing/twisting or bending.     Tylenol 975 q8 OTC  Opioid naive pain management including Oxy 2.5 and Oxy 5 mg for pain  Bowel regimen due to constipation 2/2 tramadol outpatient  Continue gabapentin for neuropathic pain in feet  Neurosurgery consult for evaluation/management recommendations of new acute T11 compression fracture  No surgical intervention at this admission  PT/OT  Consider DEXA scan outpatient in setting of multiple pathologic fractures w/o trauma  Continue vitamin C and D supplementation    Plan:  Follow-up with SPEP, UPEP, PTH, vitamin D 25 to rule out metabolic reasons for fracture  Evaluated by neurosurgery, no surgical interventions.  Continue conservative management with spinal brace, PT OT and pain management    Leukocytosis  Assessment & Plan  Chronic since May 2024. Did not resolve with course of antibiotics. Monitor on CBC, follow up outpatient for further workup.     Localized swelling of left lower extremity  Assessment & Plan  Per patient chronic, wears compression stocking on L side. Had LE US in 5/2024.   Vas Doppler unremarkable for DVT    Stage 3 chronic kidney disease (HCC)  Assessment & Plan  Lab Results   Component Value Date    EGFR 50 06/30/2024    EGFR 67 06/29/2024    EGFR 57 06/29/2024    CREATININE 1.29 06/30/2024    CREATININE 1.02 06/29/2024    CREATININE 1.16 06/29/2024     Patient with chronic kidney disease IIIa. Avoid hypotension and nephrotoxins. Monitor on AM BMP.   IV hydration given s/p contrast for CTA dissection protocol.     Hx of CABG  Assessment & Plan  History of CABG. Continue Aspirin and Statin.    Primary hypertension  Assessment & Plan  BP stable.   Home dose metoprolol was stopped because of strokelike symptoms    Hyperlipidemia  Assessment & Plan  Continue home atorvastatin 80 mg               VTE Pharmacologic Prophylaxis: VTE Score: 4 Moderate Risk (Score 3-4) - Pharmacological DVT Prophylaxis Ordered: enoxaparin (Lovenox).    Mobility:   Basic  Mobility Inpatient Raw Score: 19  JH-HLM Goal: 6: Walk 10 steps or more  JH-HLM Achieved: 7: Walk 25 feet or more  JH-HLM Goal achieved. Continue to encourage appropriate mobility.    Patient Centered Rounds: I performed bedside rounds with nursing staff today.  Discussions with Specialists or Other Care Team Provider: Neurology, vascular surgery, critical care    Education and Discussions with Family / Patient: Updated daughters at bedside.  Current Length of Stay: 2 day(s)  Current Patient Status: Inpatient   Discharge Plan:TBD    Code Status: Level 1 - Full Code    Subjective:   Patient was seen examined at bedside.  Patient mentioned that he is tired.  He is still having the lower back pain when getting up but better compared to previous days.  According to nursing patient was confused overnight.  On my evaluation he was AAOx3.  Denies other pain or discomfort except for back pain.    Objective:     Vitals:   Temp (24hrs), Av.1 °F (36.7 °C), Min:97.6 °F (36.4 °C), Max:98.8 °F (37.1 °C)    Temp:  [97.6 °F (36.4 °C)-98.8 °F (37.1 °C)] 98 °F (36.7 °C)  HR:  [] 91  Resp:  [14-20] 20  BP: ()/(42-81) 154/70  SpO2:  [81 %-96 %] 96 %  Body mass index is 23.31 kg/m².     Input and Output Summary (last 24 hours):     Intake/Output Summary (Last 24 hours) at 2024 0900  Last data filed at 2024 0534  Gross per 24 hour   Intake 480 ml   Output 1290 ml   Net -810 ml       Physical Exam:   Physical Exam  Constitutional:       General: He is not in acute distress.     Appearance: Normal appearance. He is not ill-appearing, toxic-appearing or diaphoretic.   Cardiovascular:      Rate and Rhythm: Normal rate and regular rhythm.      Pulses: Normal pulses.      Heart sounds: Normal heart sounds. No murmur heard.     No friction rub. No gallop.   Pulmonary:      Effort: Pulmonary effort is normal. No respiratory distress.      Breath sounds: Normal breath sounds. No stridor. No wheezing, rhonchi or rales.    Chest:      Chest wall: No tenderness.   Abdominal:      General: There is no distension.      Palpations: There is no mass.      Tenderness: There is no abdominal tenderness. There is no guarding or rebound.      Hernia: No hernia is present.   Musculoskeletal:      Right lower leg: No edema.      Left lower leg: No edema.      Comments: Lower thoracic spinal tenderness   Neurological:      General: No focal deficit present.      Mental Status: He is oriented to person, place, and time. Mental status is at baseline.      Cranial Nerves: No cranial nerve deficit.      Sensory: No sensory deficit.      Motor: No weakness.      Coordination: Coordination normal.      Gait: Gait normal.      Deep Tendon Reflexes: Reflexes normal.   Psychiatric:         Mood and Affect: Mood normal.         Behavior: Behavior normal.         Thought Content: Thought content normal.         Judgment: Judgment normal.          Additional Data:     Labs:  Results from last 7 days   Lab Units 07/01/24  0610   WBC Thousand/uL 10.60*   HEMOGLOBIN g/dL 9.7*   HEMATOCRIT % 30.4*   PLATELETS Thousands/uL 165   SEGS PCT % 62   LYMPHO PCT % 25   MONO PCT % 9   EOS PCT % 3     Results from last 7 days   Lab Units 06/30/24  0835 06/30/24  0833 06/29/24  0630 06/29/24  0153   SODIUM mmol/L  --  138   < > 138   POTASSIUM mmol/L  --  3.8   < > 4.6   CHLORIDE mmol/L  --  103   < > 107   CO2 mmol/L  --  25   < > 26   CO2, I-STAT mmol/L 29  --   --   --    BUN mg/dL  --  30*   < > 31*   CREATININE mg/dL  --  1.29   < > 1.16   ANION GAP mmol/L  --  10   < > 5   CALCIUM mg/dL  --  9.0   < > 9.0   ALBUMIN g/dL  --   --   --  3.5   TOTAL BILIRUBIN mg/dL  --   --   --  0.34   ALK PHOS U/L  --   --   --  61   ALT U/L  --   --   --  14   AST U/L  --   --   --  20   GLUCOSE RANDOM mg/dL  --  120   < > 107    < > = values in this interval not displayed.     Results from last 7 days   Lab Units 06/29/24  0153   INR  1.08     Results from last 7 days   Lab Units  06/30/24  0820   POC GLUCOSE mg/dl 103     Results from last 7 days   Lab Units 06/29/24  0630   HEMOGLOBIN A1C % 6.0*     Results from last 7 days   Lab Units 06/30/24  0834 06/29/24  0153   LACTIC ACID mmol/L 3.0* 1.2       Lines/Drains:  Invasive Devices       Peripheral Intravenous Line  Duration             Peripheral IV 06/30/24 Dorsal (posterior);Right Hand <1 day                      Telemetry:  Telemetry Orders (From admission, onward)               24 Hour Telemetry Monitoring  Continuous x 24 Hours (Telem)        Question:  Reason for 24 Hour Telemetry  Answer:  TIA/Suspected CVA/ Confirmed CVA                     Indication for Continued Telemetry Use: Acute CVA             Imaging: CT spine, CT head, CTA head and neck, MRI brain, vas Doppler, CTA dissection chest studies, spirometry    Recent Cultures (last 7 days):   Results from last 7 days   Lab Units 06/29/24  0504   URINE CULTURE  No Growth <1000 cfu/mL       Last 24 Hours Medication List:   Current Facility-Administered Medications   Medication Dose Route Frequency Provider Last Rate    acetaminophen  975 mg Oral Q8H [facility] Anu You MD      aluminum-magnesium hydroxide-simethicone  30 mL Oral Q4H PRN Cirilo Montana MD      ascorbic acid  500 mg Oral Daily Anu You MD      aspirin  81 mg Oral Daily Anu You MD      atorvastatin  80 mg Oral QPM Anu You MD      cholecalciferol  2,000 Units Oral Daily Anu You MD      clopidogrel  75 mg Oral Daily Cirilo Montana MD      docusate sodium  100 mg Oral BID Anu You MD      enoxaparin  40 mg Subcutaneous Daily Anu You MD      famotidine  20 mg Oral BID Anu You MD      gabapentin  200 mg Oral HS Javid Nagy DO      glycerin (adult)  1 suppository Rectal Once PRN Anu You MD      labetalol  10 mg Intravenous Q4H PRN Cirilo Montana MD      oxyCODONE  2.5 mg Oral Q4H  PRN Anu You MD      Or    oxyCODONE  5 mg Oral Q4H PRN Anu You MD      polyethylene glycol  17 g Oral Daily Anu You MD      senna  1 tablet Oral Daily Anu You MD      traMADol  50 mg Oral Q6H PRN Delmy Goa MD          Today, Patient Was Seen By: Arden Muse MD    **Please Note: This note may have been constructed using a voice recognition system.**

## 2024-07-01 NOTE — CONSULTS
Consultation - Cardiology Team 1  Suhail Borrego 83 y.o. male MRN: [id number]  Unit/Bed#: S -01 Encounter: [id number]    Assessment & Plan     Principal Problem:    Symptomatic stenosis of left carotid artery  Active Problems:    New acute T 11 Thoracic compression fracture, closed, initial encounter, w old T12 compression fx    Hyperlipidemia    Primary hypertension    Hx of CABG    S/P lumbar fusion    Stage 3 chronic kidney disease (HCC)    Localized swelling of left lower extremity    Leukocytosis      Assessment    Symptomatic stenosis of left carotid artery        Cardiology consult for preop risk stratification. Felt to need extensive        Lt CEA and possible retrograde CCA stenting.        Currently on DAPT-aspirin and Plavix (was loaded with Plavix)    Coronary artery disease with history of CABG 2018        Pharmacological Myoview stress test 2/2022 -small sized moderately        intense newly reversible basal to mid inferior perfusion defect         suggestive of a small amount of ischemia ( LVEF 44%).         Patient gets short of breath walking 150 feet.  No chest pain or         pressure.         Current TTE-LVEF 65%.  No significant valvular heart disease.        0-hour troponin 5 , 2- hour troponin 5, 4- hour  troponin 4        ECG-sinus rhythm nonspecific ST abnormality        Beta-blocker held yesterday with an episode of hypotension.    Hypertension-blood pressure is a bit labile with hypotension yesterday morning.  Last 24 hours has been normotensive to slightly hypertensive.    CKD stage III-stable    Pulmonary fibrosis-follows Dr. Nevarez        PFTs 12/2021-severe restrictive lung disease.  Severe reduction in         diffusion capacity    HLD-cholesterol 86/triglyceride 69/HDL 42/calculated LDL 30    Plan  Patient with possible mildly abnormal nuclear stress test 2/2022.  Fortunately current TTE normal LV function with no wall motion abnormality.  Patient is able to walk 150 feet  then becomes short of breath.  Suspect more so from his pulmonary fibrosis and restrictive lung disease.  I will review with cardiology attending and final recommendations to follow.  In the meantime he is on DAPT, high intensity statin.  Beta-blocker held due to hypotension yesterday morning.  If remains normal tensive to hypertensive I would at least restart low-dose beta-blocker.      History of Present Illness       Pt follows Dr. Nash    Physician Requesting Consult: Dagoberto Gould MD  Reason for Consult / Principal Problem: Preop restratification    HPI: Suhail Borrego is a 83 y.o. year old male with CAD and history of CABG 2018 (LIMA to LAD, SVG to RCA, SVG to OM 2, SVG to diagonal) , femoral artery stenosis, hypertension, CKD, pulmonary fibrosis, excess dense of tobacco smoking history who presents with back pain.  He developed sudden onset of aphasia and right upper extremity weakness 6/30.  Stat brain MRI without acute infarct.  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  Most likely represents chronic water shed zone infarctions given the concomitant CT findings of severe ipsilateral cervical ICA atherosclerotic stenosis.  CTA- worsening severe left ICA stenosis with moderate CCA disease.  He was loaded with Plavix.  Home aspirin restarted.  Vascular surgery has been monitoring for chronic left ICA stenosis being managed medically. It is felt that he he would need extensive left CEA and possible retrograde CCA stenting.  Cardiology consulted for preop restratification.  Patient still having issues with expressive aphasia.    Patient gets short of breath with 150 feet walk.  No chest pain or pressure.    Patient is followed by Dr. Nash.  He mentions stress test 2/2022-report indicates possible small amount of basal to mid inferior ischemia.  This could be diaphragmatic attenuation.  Considering advanced age comorbidities and alternative diagnosis  ( done for SOB which felt  likely more so related to pulmonary fibrosis) no invasive interventions.     Inpatient consult to Cardiology  Consult performed by: KEVIN Maria  Consult ordered by: Serene Rios PA-C          Review of Systems   Constitutional: Negative.    HENT: Negative.     Eyes: Negative.    Respiratory:  Positive for shortness of breath.    Cardiovascular: Negative.    Gastrointestinal: Negative.    Endocrine: Negative.    Genitourinary: Negative.    Musculoskeletal:  Positive for gait problem.   Allergic/Immunologic: Negative.    Neurological:  Positive for speech difficulty.   Hematological: Negative.    Psychiatric/Behavioral: Negative.     All other systems reviewed and are negative.      Historical Information   Past Medical History:   Diagnosis Date    Abnormal liver function test     RESOLVED: 14SEP2017    Allergic rhinitis     Anemia     LAST ASSESSED: 19OCT2017    Arthritis     BPH (benign prostatic hyperplasia)     CAD (coronary artery disease)     Coronary artery disease     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HCC)     LAST ASSESSED: 05OCT2017    Former tobacco use     GERD (gastroesophageal reflux disease)     Hand paresthesia     RESOLVED: 11SEP2017    Hyperlipidemia     Hypertension     Lumbar stenosis     Peripheral artery disease (HCC)     LAST ASSESSED: 27OCT2017    Stage 3a chronic kidney disease (HCC) 7/11/2021     Past Surgical History:   Procedure Laterality Date    BACK SURGERY      COLONOSCOPY      LUMBAR FUSION      NM ARTHRODESIS POSTERIOR/PSTLAT TQ 1NTRSPC LUMBAR N/A 11/03/2016    Procedure: L2-S1 POSTERIOR LUMBAR FUSION AND DECOMPRESSION (Impulse), Posterior lateral fixation; dural repair.;  Surgeon: Leslie Gil MD;  Location: [facility];  Service: Orthopedics    NM CABG W/ARTERIAL GRAFT SINGLE ARTERIAL GRAFT N/A 01/19/2018    Procedure: CABG X4 with LIMA - LAD, SVG - RCA, OM2, & Diagonal ; Left Leg EVH; MATTHEW;  Surgeon: Eric Bar DO;  Location: [facility];  Service: Cardiac Surgery     SD COLONOSCOPY FLX DX W/COLLJ SPEC WHEN PFRMD N/A 11/15/2017    Procedure: EGD AND COLONOSCOPY;  Surgeon: Mariano Godinez MD;  Location: [facility];  Service: Gastroenterology    SEPTOPLASTY      LAST ASSESSED; 2014    TONSILECTOMY AND ADNOIDECTOMY      LAST ASSESSED: 2014    UPPER GASTROINTESTINAL ENDOSCOPY       Social History     Substance and Sexual Activity   Alcohol Use Not Currently    Alcohol/week: 1.0 standard drink of alcohol    Types: 1 Shots of liquor per week    Comment: beer, wine, scotch every day; SOCIAL AS PER ALL SCRIPTS      Social History     Substance and Sexual Activity   Drug Use Not Currently    Types: Marijuana    Comment: medical clarke     Social History     Tobacco Use   Smoking Status Former    Current packs/day: 0.00    Average packs/day: 1 pack/day for 50.0 years (50.0 ttl pk-yrs)    Types: Cigarettes    Start date:     Quit date:     Years since quittin.5   Smokeless Tobacco Never     Family History:   Family History   Problem Relation Age of Onset    Emphysema Mother     Liver disease Mother     Coronary artery disease Father     Hypertension Father     Liver disease Father     Heart failure Father     Stroke Father         CVA     Heart attack Father     Coronary artery disease Brother     Heart disease Brother         younger brother by pass and other brother 4 stents placed    Other Family         BACK PROBLEM     Stroke Family         CVA    Emphysema Family     Hypertension Family         BENIGN       Meds/Allergies   current meds:   Current Facility-Administered Medications   Medication Dose Route Frequency    acetaminophen (TYLENOL) tablet 975 mg  975 mg Oral Q8H KAMRAN    aluminum-magnesium hydroxide-simethicone (MAALOX) oral suspension 30 mL  30 mL Oral Q4H PRN    ascorbic acid (VITAMIN C) tablet 500 mg  500 mg Oral Daily    aspirin (ECOTRIN LOW STRENGTH) EC tablet 81 mg  81 mg Oral Daily    atorvastatin (LIPITOR) tablet 80 mg  80 mg Oral QPM     "Cholecalciferol (VITAMIN D3) tablet 2,000 Units  2,000 Units Oral Daily    clopidogrel (PLAVIX) tablet 75 mg  75 mg Oral Daily    docusate sodium (COLACE) capsule 100 mg  100 mg Oral BID    enoxaparin (LOVENOX) subcutaneous injection 40 mg  40 mg Subcutaneous Daily    famotidine (PEPCID) tablet 20 mg  20 mg Oral BID    gabapentin (NEURONTIN) capsule 200 mg  200 mg Oral HS    glycerin (adult) rectal suppository 1 suppository  1 suppository Rectal Once PRN    labetalol (NORMODYNE) injection 10 mg  10 mg Intravenous Q4H PRN    oxyCODONE (ROXICODONE) split tablet 2.5 mg  2.5 mg Oral Q4H PRN    Or    oxyCODONE (ROXICODONE) IR tablet 5 mg  5 mg Oral Q4H PRN    polyethylene glycol (MIRALAX) packet 17 g  17 g Oral Daily    senna (SENOKOT) tablet 8.6 mg  1 tablet Oral Daily    traMADol (ULTRAM) tablet 50 mg  50 mg Oral Q6H PRN    and PTA meds:    Medications Prior to Admission:     acetaminophen (TYLENOL) 650 mg CR tablet    Ascorbic Acid (Vitamin C) 500 MG PACK    aspirin (ECOTRIN LOW STRENGTH) 81 mg EC tablet    atorvastatin (LIPITOR) 80 mg tablet    cholecalciferol (VITAMIN D3) 1,000 units tablet    cyanocobalamin (VITAMIN B-12) 1,000 mcg tablet    famotidine (PEPCID) 20 mg tablet    folic acid (FOLVITE) 1 mg tablet    metoprolol tartrate (LOPRESSOR) 50 mg tablet    Multiple Vitamin (MULTIVITAMIN) capsule    traMADol (Ultram) 50 mg tablet    clotrimazole-betamethasone (LOTRISONE) 1-0.05 % cream    docusate sodium (COLACE) 100 mg capsule    gabapentin (NEURONTIN) 100 mg capsule  Allergies   Allergen Reactions    Penicillins Other (See Comments)     Hallucinations;  Patient reported that he was seeing visual disturbances         Objective   Vitals: Blood pressure 128/71, pulse (!) 129, temperature 98 °F (36.7 °C), resp. rate 20, height 5' 7\" (1.702 m), weight 67.1 kg (148 lb), SpO2 (!) 86%.  Orthostatic Blood Pressures      Flowsheet Row Most Recent Value   Blood Pressure 128/71 filed at 07/01/2024 2129   Patient Position - " "Orthostatic VS Lying filed at 07/01/2024 0130              Intake/Output Summary (Last 24 hours) at 7/1/2024 1431  Last data filed at 7/1/2024 0534  Gross per 24 hour   Intake 480 ml   Output 1290 ml   Net -810 ml       Invasive Devices       Peripheral Intravenous Line  Duration             Peripheral IV 06/30/24 Dorsal (posterior);Right Hand 1 day                    Physical Exam: /71   Pulse (!) 129   Temp 98 °F (36.7 °C)   Resp 20   Ht 5' 7\" (1.702 m)   Wt 67.1 kg (148 lb)   SpO2 (!) 86%   BMI 23.18 kg/m²   General Appearance:    Alert, cooperative, no distress, appears stated age   Head:    Normocephalic, no scleral icterus   Eyes:    PERRL   Nose:   Nares normal, septum midline, mucosa normal, no drainage    Throat:   Lips, mucosa, and tongue normal   Neck:   Supple, symmetrical, trachea midline     no JVD       Lungs:     Clear to auscultation bilaterally, respirations unlabored   Chest Wall:    No tenderness or deformity    Heart:    Regular rate and rhythm, S1 and S2 normal, no murmur, rub   or gallop   Abdomen:     Soft, non-tender, bowel sounds active all four quadrants,     no masses, no organomegaly   Extremities:   Extremities normal, atraumatic, no cyanosis or edema       Skin:   Skin color, texture, turgor normal, no rashes or lesions   Neurologic:   Alert and oriented to person place and time.        Lab Results:   Recent Results (from the past 72 hour(s))   ECG 12 lead    Collection Time: 06/29/24  1:31 AM   Result Value Ref Range    Ventricular Rate 74 BPM    Atrial Rate 74 BPM    IN Interval 174 ms    QRSD Interval 86 ms    QT Interval 408 ms    QTC Interval 452 ms    P Axis 45 degrees    QRS Axis 20 degrees    T Wave Axis 73 degrees   CBC and differential    Collection Time: 06/29/24  1:53 AM   Result Value Ref Range    WBC 10.58 (H) 4.31 - 10.16 Thousand/uL    RBC 3.20 (L) 3.88 - 5.62 Million/uL    Hemoglobin 10.2 (L) 12.0 - 17.0 g/dL    Hematocrit 32.3 (L) 36.5 - 49.3 %     " (H) 82 - 98 fL    MCH 31.9 26.8 - 34.3 pg    MCHC 31.6 31.4 - 37.4 g/dL    RDW 13.0 11.6 - 15.1 %    MPV 10.9 8.9 - 12.7 fL    Platelets 184 149 - 390 Thousands/uL    nRBC 0 /100 WBCs    Segmented % 62 43 - 75 %    Immature Grans % 0 0 - 2 %    Lymphocytes % 25 14 - 44 %    Monocytes % 9 4 - 12 %    Eosinophils Relative 4 0 - 6 %    Basophils Relative 0 0 - 1 %    Absolute Neutrophils 6.57 1.85 - 7.62 Thousands/µL    Absolute Immature Grans 0.02 0.00 - 0.20 Thousand/uL    Absolute Lymphocytes 2.64 0.60 - 4.47 Thousands/µL    Absolute Monocytes 0.93 0.17 - 1.22 Thousand/µL    Eosinophils Absolute 0.38 0.00 - 0.61 Thousand/µL    Basophils Absolute 0.04 0.00 - 0.10 Thousands/µL   Comprehensive metabolic panel    Collection Time: 06/29/24  1:53 AM   Result Value Ref Range    Sodium 138 135 - 147 mmol/L    Potassium 4.6 3.5 - 5.3 mmol/L    Chloride 107 96 - 108 mmol/L    CO2 26 21 - 32 mmol/L    ANION GAP 5 4 - 13 mmol/L    BUN 31 (H) 5 - 25 mg/dL    Creatinine 1.16 0.60 - 1.30 mg/dL    Glucose 107 65 - 140 mg/dL    Calcium 9.0 8.4 - 10.2 mg/dL    AST 20 13 - 39 U/L    ALT 14 7 - 52 U/L    Alkaline Phosphatase 61 34 - 104 U/L    Total Protein 6.9 6.4 - 8.4 g/dL    Albumin 3.5 3.5 - 5.0 g/dL    Total Bilirubin 0.34 0.20 - 1.00 mg/dL    eGFR 57 ml/min/1.73sq m   Protime-INR    Collection Time: 06/29/24  1:53 AM   Result Value Ref Range    Protime 14.7 (H) 11.6 - 14.5 seconds    INR 1.08 0.84 - 1.19   APTT    Collection Time: 06/29/24  1:53 AM   Result Value Ref Range    PTT 38 (H) 23 - 37 seconds   Lipase    Collection Time: 06/29/24  1:53 AM   Result Value Ref Range    Lipase 10 (L) 11 - 82 u/L   Lactic acid, plasma (w/reflex if result > 2.0)    Collection Time: 06/29/24  1:53 AM   Result Value Ref Range    LACTIC ACID 1.2 0.5 - 2.0 mmol/L   CK    Collection Time: 06/29/24  1:53 AM   Result Value Ref Range    Total CK 53 39 - 308 U/L   UA w Reflex to Microscopic w Reflex to Culture    Collection Time: 06/29/24  4:30 AM     Specimen: Urine, Clean Catch   Result Value Ref Range    Color, UA Light Yellow     Clarity, UA Clear     Specific Gravity, UA 1.043 (H) 1.003 - 1.030    pH, UA 5.5 4.5, 5.0, 5.5, 6.0, 6.5, 7.0, 7.5, 8.0    Leukocytes, UA Small (A) Negative    Nitrite, UA Negative Negative    Protein, UA Negative Negative mg/dl    Glucose, UA Negative Negative mg/dl    Ketones, UA Negative Negative mg/dl    Urobilinogen, UA <2.0 <2.0 mg/dl mg/dl    Bilirubin, UA Negative Negative    Occult Blood, UA Negative Negative   Urine Microscopic    Collection Time: 06/29/24  4:30 AM   Result Value Ref Range    RBC, UA None Seen None Seen, 1-2 /hpf    WBC, UA 4-10 (A) None Seen, 1-2 /hpf    Epithelial Cells Occasional None Seen, Occasional /hpf    Bacteria, UA Occasional None Seen, Occasional /hpf   Urine culture    Collection Time: 06/29/24  5:04 AM    Specimen: Urine, Clean Catch   Result Value Ref Range    Urine Culture No Growth <1000 cfu/mL    Basic metabolic panel    Collection Time: 06/29/24  6:30 AM   Result Value Ref Range    Sodium 138 135 - 147 mmol/L    Potassium 4.4 3.5 - 5.3 mmol/L    Chloride 107 96 - 108 mmol/L    CO2 26 21 - 32 mmol/L    ANION GAP 5 4 - 13 mmol/L    BUN 26 (H) 5 - 25 mg/dL    Creatinine 1.02 0.60 - 1.30 mg/dL    Glucose 88 65 - 140 mg/dL    Calcium 8.5 8.4 - 10.2 mg/dL    eGFR 67 ml/min/1.73sq m   CBC (With Platelets)    Collection Time: 06/29/24  6:30 AM   Result Value Ref Range    WBC 10.37 (H) 4.31 - 10.16 Thousand/uL    RBC 3.30 (L) 3.88 - 5.62 Million/uL    Hemoglobin 10.5 (L) 12.0 - 17.0 g/dL    Hematocrit 32.9 (L) 36.5 - 49.3 %     (H) 82 - 98 fL    MCH 31.8 26.8 - 34.3 pg    MCHC 31.9 31.4 - 37.4 g/dL    RDW 12.9 11.6 - 15.1 %    Platelets 178 149 - 390 Thousands/uL    MPV 10.3 8.9 - 12.7 fL   Hemoglobin A1C w/ EAG Estimation    Collection Time: 06/29/24  6:30 AM   Result Value Ref Range    Hemoglobin A1C 6.0 (H) Normal 4.0-5.6%; PreDiabetic 5.7-6.4%; Diabetic >=6.5%; Glycemic control for  "adults with diabetes <7.0% %     mg/dl   Vitamin B12    Collection Time: 06/29/24  6:30 AM   Result Value Ref Range    Vitamin B-12 2,200 (H) 180 - 914 pg/mL   Folate    Collection Time: 06/29/24  6:30 AM   Result Value Ref Range    Folate >22.3 >5.9 ng/mL   Fingerstick Glucose (POCT)    Collection Time: 06/30/24  8:20 AM   Result Value Ref Range    POC Glucose 103 65 - 140 mg/dl   Basic metabolic panel    Collection Time: 06/30/24  8:33 AM   Result Value Ref Range    Sodium 138 135 - 147 mmol/L    Potassium 3.8 3.5 - 5.3 mmol/L    Chloride 103 96 - 108 mmol/L    CO2 25 21 - 32 mmol/L    ANION GAP 10 4 - 13 mmol/L    BUN 30 (H) 5 - 25 mg/dL    Creatinine 1.29 0.60 - 1.30 mg/dL    Glucose 120 65 - 140 mg/dL    Calcium 9.0 8.4 - 10.2 mg/dL    eGFR 50 ml/min/1.73sq m   HS Troponin 0hr (reflex protocol)    Collection Time: 06/30/24  8:33 AM   Result Value Ref Range    hs TnI 0hr 5 \"Refer to ACS Flowchart\"- see link ng/L   CBC and differential    Collection Time: 06/30/24  8:33 AM   Result Value Ref Range    WBC 13.07 (H) 4.31 - 10.16 Thousand/uL    RBC 3.28 (L) 3.88 - 5.62 Million/uL    Hemoglobin 10.4 (L) 12.0 - 17.0 g/dL    Hematocrit 32.7 (L) 36.5 - 49.3 %     (H) 82 - 98 fL    MCH 31.7 26.8 - 34.3 pg    MCHC 31.8 31.4 - 37.4 g/dL    RDW 12.9 11.6 - 15.1 %    MPV 10.5 8.9 - 12.7 fL    Platelets 194 149 - 390 Thousands/uL    nRBC 0 /100 WBCs    Segmented % 67 43 - 75 %    Immature Grans % 0 0 - 2 %    Lymphocytes % 23 14 - 44 %    Monocytes % 8 4 - 12 %    Eosinophils Relative 2 0 - 6 %    Basophils Relative 0 0 - 1 %    Absolute Neutrophils 8.68 (H) 1.85 - 7.62 Thousands/µL    Absolute Immature Grans 0.05 0.00 - 0.20 Thousand/uL    Absolute Lymphocytes 2.97 0.60 - 4.47 Thousands/µL    Absolute Monocytes 1.07 0.17 - 1.22 Thousand/µL    Eosinophils Absolute 0.26 0.00 - 0.61 Thousand/µL    Basophils Absolute 0.04 0.00 - 0.10 Thousands/µL   Lactic acid, plasma (w/reflex if result > 2.0)    Collection Time: " "06/30/24  8:34 AM   Result Value Ref Range    LACTIC ACID 3.0 (H) 0.5 - 2.0 mmol/L   POCT Blood Gas (CG8+)    Collection Time: 06/30/24  8:35 AM   Result Value Ref Range    ph, Pedro ISTAT 7.284 (L) 7.300 - 7.400    pCO2, Pedro i-STAT 56.9 (H) 42.0 - 50.0 mm HG    pO2, Pedro i-STAT 35.0 35.0 - 45.0 mm HG    BE, i-STAT 0 -2 - 3 mmol/L    HCO3, Pedro i-STAT 27.0 24.0 - 30.0 mmol/L    CO2, i-STAT 29 21 - 32 mmol/L    O2 Sat, i-STAT 58 (L) 60 - 85 %    SODIUM, I-STAT 139 136 - 145 mmol/l    Potassium, i-STAT 3.9 3.5 - 5.3 mmol/L    Calcium, Ionized i-STAT 1.21 1.12 - 1.32 mmol/L    Hct, i-STAT 32 (L) 36.5 - 49.3 %    Hgb, i-STAT 10.9 (L) 12.0 - 17.0 g/dl    Glucose, i-STAT 123 65 - 140 mg/dl    POC FIO2 21 L    Specimen Type VENOUS    HS Troponin I 2hr    Collection Time: 06/30/24 11:19 AM   Result Value Ref Range    hs TnI 2hr 5 \"Refer to ACS Flowchart\"- see link ng/L    Delta 2hr hsTnI 0 <20 ng/L   Lipid panel    Collection Time: 06/30/24 11:19 AM   Result Value Ref Range    Cholesterol 86 See Comment mg/dL    Triglycerides 69 See Comment mg/dL    HDL, Direct 42 >=40 mg/dL    LDL Calculated 30 0 - 100 mg/dL    Non-HDL-Chol (CHOL-HDL) 44 mg/dl   PTH, intact    Collection Time: 06/30/24 11:19 AM   Result Value Ref Range    PTH 31.9 12.0 - 88.0 pg/mL   Vitamin D 25 hydroxy    Collection Time: 06/30/24 11:19 AM   Result Value Ref Range    Vit D, 25-Hydroxy 44.3 30.0 - 100.0 ng/mL   HS Troponin I 4hr    Collection Time: 06/30/24  1:38 PM   Result Value Ref Range    hs TnI 4hr 4 \"Refer to ACS Flowchart\"- see link ng/L    Delta 4hr hsTnI -1 <20 ng/L   CBC and differential    Collection Time: 07/01/24  6:10 AM   Result Value Ref Range    WBC 10.60 (H) 4.31 - 10.16 Thousand/uL    RBC 3.05 (L) 3.88 - 5.62 Million/uL    Hemoglobin 9.7 (L) 12.0 - 17.0 g/dL    Hematocrit 30.4 (L) 36.5 - 49.3 %     (H) 82 - 98 fL    MCH 31.8 26.8 - 34.3 pg    MCHC 31.9 31.4 - 37.4 g/dL    RDW 13.2 11.6 - 15.1 %    MPV 10.8 8.9 - 12.7 fL    Platelets " 165 149 - 390 Thousands/uL    nRBC 0 /100 WBCs    Segmented % 62 43 - 75 %    Immature Grans % 0 0 - 2 %    Lymphocytes % 25 14 - 44 %    Monocytes % 9 4 - 12 %    Eosinophils Relative 3 0 - 6 %    Basophils Relative 1 0 - 1 %    Absolute Neutrophils 6.61 1.85 - 7.62 Thousands/µL    Absolute Immature Grans 0.04 0.00 - 0.20 Thousand/uL    Absolute Lymphocytes 2.66 0.60 - 4.47 Thousands/µL    Absolute Monocytes 0.93 0.17 - 1.22 Thousand/µL    Eosinophils Absolute 0.31 0.00 - 0.61 Thousand/µL    Basophils Absolute 0.05 0.00 - 0.10 Thousands/µL   Echo complete w/ contrast if indicated    Collection Time: 07/01/24 12:00 PM   Result Value Ref Range    BSA 1.78 m2    A4C EF 65 %    LVIDd 4.70 cm    LVIDS 3.00 cm    IVSd 1.10 cm    LVPWd 1.00 cm    FS 36 28 - 44    MV E' Tissue Velocity Septal 7 cm/s    MV E' Tissue Velocity Lateral 9 cm/s    LA Volume Index (BP) 15.2 mL/m2    E/A ratio 0.68     E wave deceleration time 157 ms    MV Peak E Tim 69 cm/s    MV Peak A Tim 1.01 m/s    RVID d 3.2 cm    Tricuspid annular plane systolic excursion 1.80 cm    LA size 3.1 cm    LA length (A2C) 4.60 cm    LA volume (BP) 27 mL    RAA A4C 8.5 cm2    MV stenosis pressure 1/2 time 45 ms    MV valve area p 1/2 method 4.89     TR Peak Tim 2.8 m/s    Triscuspid Valve Regurgitation Peak Gradient 32.0 mmHg    Ao root 3.50 cm    Asc Ao 3.3 cm    Tricuspid valve peak regurgitation velocity 2.81 m/s    Left ventricular stroke volume (2D) 67.00 mL    IVS 1.1 cm    LEFT VENTRICLE SYSTOLIC VOLUME (MOD BIPLANE) 2D 35 mL    LV DIASTOLIC VOLUME (MOD BIPLANE) 2D 102 mL    Left Atrium Area-systolic Four Chamber 8.3 cm2    Left Atrium Area-systolic Apical Two Chamber 12.9 cm2    LVSV, 2D 67 mL    LV EF 65     Est. RA pres 3.0 mmHg    Right Ventricular Peak Systolic Pressure 35.00 mmHg     Imaging: I have personally reviewed pertinent reports.    EKG: Sinus rhythm nonspecific ST abnormality    Left Ventricle Left ventricular cavity size is normal. Wall  thickness is normal. The left ventricular ejection fraction is 65%. Systolic function is normal. Wall motion is normal. Diastolic function is mildly abnormal, consistent with grade I (abnormal) relaxation.   Right Ventricle Right ventricular cavity size is normal. Systolic function is normal. Wall thickness is normal.   Left Atrium The atrium is normal in size.   Right Atrium The atrium is normal in size.   Atrial Septum No patent foramen ovale detected, confirmed by provocation with valsalva, using agitated saline contrast.   Aortic Valve The aortic valve is trileaflet. The leaflets are not thickened. The leaflets are not calcified. The leaflets exhibit normal mobility. There is no evidence of regurgitation. The aortic valve has no significant stenosis.   Mitral Valve Mitral valve structure is normal.  There is mild regurgitation. There is no evidence of stenosis.   Tricuspid Valve Tricuspid valve structure is normal. There is mild regurgitation. There is no evidence of stenosis. The estimated right ventricular systolic pressure is 35.00 mmHg.   Pulmonic Valve Pulmonic valve structure is normal. There is no evidence of regurgitation. There is no evidence of stenosis.   Ascending Aorta The aortic root is normal in size.   IVC/SVC The right atrial pressure is estimated at 3.0 mmHg. The inferior vena cava is normal in size.   Pericardium There is no pericardial effusion. The pericardium is normal in appearance.       Stress ECG: No ST deviation is noted. The ECG was negative for ischemia. The stress ECG is negative for ischemia after pharmacologic stress, without reproduction of symptoms.    Perfusion: There is a left ventricular perfusion defect that is small in size with moderate reduction in uptake present in the basal to mid inferior location(s) that is predominantly reversible.    Stress Function: Left ventricular function post-stress is abnormal. Global function is mildly reduced. There was mild inferior  hypokinesis. Post-stress ejection fraction is 44 %.    Stress Combined Conclusion: Left ventricular perfusion is abnormal.     Pharmacologic stress EKG: Non diagnostic for ischemia.  SPECT:  Small-sized, moderate intensity, nearly reversible, basal to mid inferior perfusion defect suggestive of small area of ischemia.    GATED SPECT: Abnormal LV systolic function with a calculated LVEF of  44%.  Similar perfusion defect and was noted on a prior study in 2017.       Code Status: Level 1 - Full Code  Advance Directive and Living Will: Yes    Power of :    POLST:      Counseling / Coordination of Care  Total floor / unit time spent today 45 minutes.  Greater than 50% of total time was spent with the patient and / or family counseling and / or coordination of care.

## 2024-07-01 NOTE — PLAN OF CARE
Problem: Potential for Falls  Goal: Patient will remain free of falls  Description: INTERVENTIONS:  - Educate patient/family on patient safety including physical limitations  - Instruct patient to call for assistance with activity   - Consult OT/PT to assist with strengthening/mobility   - Keep Call bell within reach  - Keep bed low and locked with side rails adjusted as appropriate  - Keep care items and personal belongings within reach  - Initiate and maintain comfort rounds  - Make Fall Risk Sign visible to staff  - Offer Toileting every 2 Hours, in advance of need  - Initiate/Maintain alarm  - Obtain necessary fall risk management equipment:   - Apply yellow socks and bracelet for high fall risk patients  - Consider moving patient to room near nurses station  Outcome: Progressing     Problem: PAIN - ADULT  Goal: Verbalizes/displays adequate comfort level or baseline comfort level  Description: Interventions:  - Encourage patient to monitor pain and request assistance  - Assess pain using appropriate pain scale  - Administer analgesics based on type and severity of pain and evaluate response  - Implement non-pharmacological measures as appropriate and evaluate response  - Consider cultural and social influences on pain and pain management  - Notify physician/advanced practitioner if interventions unsuccessful or patient reports new pain  Outcome: Progressing     Problem: INFECTION - ADULT  Goal: Absence or prevention of progression during hospitalization  Description: INTERVENTIONS:  - Assess and monitor for signs and symptoms of infection  - Monitor lab/diagnostic results  - Monitor all insertion sites, i.e. indwelling lines, tubes, and drains  - Monitor endotracheal if appropriate and nasal secretions for changes in amount and color  - [place] appropriate cooling/warming therapies per order  - Administer medications as ordered  - Instruct and encourage patient and family to use good hand hygiene  technique  - Identify and instruct in appropriate isolation precautions for identified infection/condition  Outcome: Progressing  Goal: Absence of fever/infection during neutropenic period  Description: INTERVENTIONS:  - Monitor WBC    Outcome: Progressing     Problem: SAFETY ADULT  Goal: Patient will remain free of falls  Description: INTERVENTIONS:  - Educate patient/family on patient safety including physical limitations  - Instruct patient to call for assistance with activity   - Consult OT/PT to assist with strengthening/mobility   - Keep Call bell within reach  - Keep bed low and locked with side rails adjusted as appropriate  - Keep care items and personal belongings within reach  - Initiate and maintain comfort rounds  - Make Fall Risk Sign visible to staff  - Offer Toileting every 2 Hours, in advance of need  - Initiate/Maintain alarm  - Obtain necessary fall risk management equipment:   - Apply yellow socks and bracelet for high fall risk patients  - Consider moving patient to room near nurses station  Outcome: Progressing  Goal: Maintain or return to baseline ADL function  Description: INTERVENTIONS:  -  Assess patient's ability to carry out ADLs; assess patient's baseline for ADL function and identify physical deficits which impact ability to perform ADLs (bathing, care of mouth/teeth, toileting, grooming, dressing, etc.)  - Assess/evaluate cause of self-care deficits   - Assess range of motion  - Assess patient's mobility; develop plan if impaired  - Assess patient's need for assistive devices and provide as appropriate  - Encourage maximum independence but intervene and supervise when necessary  - Involve family in performance of ADLs  - Assess for home care needs following discharge   - Consider OT consult to assist with ADL evaluation and planning for discharge  - Provide patient education as appropriate  Outcome: Progressing  Goal: Maintains/Returns to pre admission functional level  Description:  INTERVENTIONS:  - Perform AM-PAC 6 Click Basic Mobility/ Daily Activity assessment daily.  - Set and communicate daily mobility goal to care team and patient/family/caregiver.   - Collaborate with rehabilitation services on mobility goals if consulted  - Perform Range of Motion 2 times a day.  - Reposition patient every 2 hours.  - Dangle patient 2 times a day  - Stand patient 2 times a day  - Ambulate patient 2 times a day  - Out of bed to chair 2 times a day   - Out of bed for meals 2 times a day  - Out of bed for toileting  - Record patient progress and toleration of activity level   Outcome: Progressing     Problem: DISCHARGE PLANNING  Goal: Discharge to home or other facility with appropriate resources  Description: INTERVENTIONS:  - Identify barriers to discharge w/patient and caregiver  - Arrange for needed discharge resources and transportation as appropriate  - Identify discharge learning needs (meds, wound care, etc.)  - Arrange for interpretive services to assist at discharge as needed  - Refer to Case Management Department for coordinating discharge planning if the patient needs post-hospital services based on physician/advanced practitioner order or complex needs related to functional status, cognitive ability, or social support system  Outcome: Progressing     Problem: Knowledge Deficit  Goal: Patient/family/caregiver demonstrates understanding of disease process, treatment plan, medications, and discharge instructions  Description: Complete learning assessment and assess knowledge base.  Interventions:  - Provide teaching at level of understanding  - Provide teaching via preferred learning methods  Outcome: Progressing     Problem: Prexisting or High Potential for Compromised Skin Integrity  Goal: Skin integrity is maintained or improved  Description: INTERVENTIONS:  - Identify patients at risk for skin breakdown  - Assess and monitor skin integrity  - Assess and monitor nutrition and hydration  status  - Monitor labs   - Assess for incontinence   - Turn and reposition patient  - Assist with mobility/ambulation  - Relieve pressure over bony prominences  - Avoid friction and shearing  - Provide appropriate hygiene as needed including keeping skin clean and dry  - Evaluate need for skin moisturizer/barrier cream  - Collaborate with interdisciplinary team   - Patient/family teaching  - Consider wound care consult   Outcome: Progressing     Problem: Neurological Deficit  Goal: Neurological status is stable or improving  Description: Interventions:  - Monitor and assess patient's level of consciousness, motor function, sensory function, and level of assistance needed for ADLs.   - Monitor and report changes from baseline. Collaborate with interdisciplinary team to initiate plan and implement interventions as ordered.   - Provide and maintain a safe environment.  - Consider seizure precautions.  - Consider fall precautions.  - Consider aspiration precautions.  - Consider bleeding precautions.  Outcome: Progressing     Problem: Activity Intolerance/Impaired Mobility  Goal: Mobility/activity is maintained at optimum level for patient  Description: Interventions:  - Assess and monitor patient  barriers to mobility and need for assistive/adaptive devices.  - Assess patient's emotional response to limitations.  - Collaborate with interdisciplinary team and initiate plans and interventions as ordered.  - Encourage independent activity per ability.  - Maintain proper body alignment.  - Perform active/passive rom as tolerated/ordered.  - Plan activities to conserve energy.  - Turn patient as appropriate  Outcome: Progressing     Problem: Communication Impairment  Goal: Ability to express needs and understand communication  Description: Assess patient's communication skills and ability to understand information.  Patient will demonstrate use of effective communication techniques, alternative methods of communication and  understanding even if not able to speak.     - Encourage communication and provide alternate methods of communication as needed.  - Collaborate with case management/ for discharge needs.  - Include patient/family/caregiver in decisions related to communication.  Outcome: Progressing     Problem: Potential for Aspiration  Goal: Non-ventilated patient's risk of aspiration is minimized  Description: Assess and monitor vital signs, respiratory status, and labs (WBC).  Monitor for signs of aspiration (tachypnea, cough, rales, wheezing, cyanosis, fever).    - Assess and monitor patient's ability to swallow.  - Place patient up in chair to eat if possible.  - HOB up at 90 degrees to eat if unable to get patient up into chair.  - Supervise patient during oral intake.   - Instruct patient/ family to take small bites.  - Instruct patient/ family to take small single sips when taking liquids.  - Follow patient-specific strategies generated by speech pathologist.  Outcome: Progressing  Goal: Ventilated patient's risk of aspiration is minimized  Description: Assess and monitor vital signs, respiratory status, airway cuff pressure, and labs (WBC).  Monitor for signs of aspiration (tachypnea, cough, rales, wheezing, cyanosis, fever).    - Elevate head of bed 30 degrees if patient has tube feeding.  - Monitor tube feeding.  Outcome: Progressing     Problem: Nutrition  Goal: Nutrition/Hydration status is improving  Description: Monitor and assess patient's nutrition/hydration status for malnutrition (ex- brittle hair, bruises, dry skin, pale skin and conjunctiva, muscle wasting, smooth red tongue, and disorientation). Collaborate with interdisciplinary team and initiate plan and interventions as ordered.  Monitor patient's weight and dietary intake as ordered or per policy. Utilize nutrition screening tool and intervene per policy. Determine patient's food preferences and provide high-protein, high-caloric foods as  appropriate.     - Assist patient with eating.  - Allow adequate time for meals.  - Encourage patient to take dietary supplement as ordered.  - Collaborate with clinical nutritionist.  - Include patient/family/caregiver in decisions related to nutrition.  Outcome: Progressing

## 2024-07-01 NOTE — TELEPHONE ENCOUNTER
7/5/24: DISCHARGED HOME    SPOKE TO VENKATA HE HAD ALREADY CALLED TO RESCHEDULE OV - RE-CONFIRMED W/ HIM.    7/3/24: INPATIENT    7/2/24: INPATIENT    7/1/24: INPATIENT    2 WK HFU W/ AP  7/26/24 / 9: / MINNIE    IMAGING  XRAY TLSPINE    PER: GH

## 2024-07-01 NOTE — PHYSICAL THERAPY NOTE
PHYSICAL THERAPY NOTE          Patient Name: Suhail Borrego  Today's Date: 7/1/2024 07/01/24 1355   PT Last Visit   PT Visit Date 07/01/24   Note Type   Note Type Treatment   Type of Brace   Brace Applied [place] [address] Back Pack TLSO   Additional Brace Ordered No   Patient Position When Brace Applied Seated   Bracing Recommendations   (education on doffing/donning TLSO brace e)   Education   Education Provided Yes   Pain Assessment   Pain Assessment Tool 0-10   Pain Score No Pain   Patient's Stated Pain Goal No pain   Hospital Pain Intervention(s) Repositioned;Ambulation/increased activity;Rest   Multiple Pain Sites No   Restrictions/Precautions   Weight Bearing Precautions Per Order No   Braces or Orthoses TLSO  (back pack TLSO)   Other Precautions Cognitive;Chair Alarm;Bed Alarm;Fall Risk;Pain;Spinal precautions;Hard of hearing   General   Chart Reviewed Yes   Response to Previous Treatment Other (Comment)  (pt was pleasant, cooperative in todays tx session)   Family/Caregiver Present Yes  (daughters)   Cognition   Overall Cognitive Status Impaired   Arousal/Participation Alert;Responsive;Cooperative   Attention Attends with cues to redirect   Orientation Level Oriented X4   Memory Decreased short term memory;Decreased recall of recent events;Decreased recall of precautions   Following Commands Follows one step commands without difficulty   Comments pt was pleasant and cooperative in todays tx session   Subjective   Subjective pt was agreeable to participate in PT intervention and stated 0/10 pain pre/post tx session   Bed Mobility   Additional Comments pt seated OOb in the recliner pre/post tx session   Transfers   Sit to Stand 6  Modified independent   Additional items Assist x 1;Armrests;Increased time required;Verbal cues   Stand to Sit 5  Supervision   Additional items Assist x 1;Armrests;Increased time  required;Verbal cues   Stand pivot Unable to assess   Additional Comments Slight progress was noted as pt was able to complete multiple functional transfers in [facility] tx session with RW and SPC.   Ambulation/Elevation   Gait pattern Decreased foot clearance;Foward flexed;Short stride;Decreased heel strike;Decreased hip extension   Gait Assistance 5  Supervision   Additional items Assist x 1;Verbal cues   Assistive Device Rolling walker;SPC   Distance 250'x1 RW, 140'x1 SPC, 50'x1 SPC  (pt did report light headednes swith ambulation w/ SPC of 50'x1. seated therapeutic rest breaks required in [facility] tx session)   Stair Management Assistance (S)    (pt and pt family stated pt is okay with steps at home and would prefer to focus on balance and ambulation in [facility] tx session)   Balance   Static Sitting Good   Dynamic Sitting Good   Static Standing Fair +   Dynamic Standing Fair +   Ambulatory Fair -  (w/SPC  and w/ RW)   Endurance Deficit   Endurance Deficit Yes   Activity Tolerance   Activity Tolerance Patient tolerated treatment well   Medical Staff Made Aware Spoke to RN   Assessment   Prognosis Good   Problem List Decreased strength;Decreased endurance;Decreased mobility;Impaired balance;Decreased cognition;Impaired judgement;Decreased safety awareness;Orthopedic restrictions;Pain   Assessment pt began tx session seated OOB in the recliner as pt was agreeable to participate in PT intervention. Pt to focus on transfer training, posture/balanec w/ gait and increasing activity tolerance/endurance. In comparison to previous tx sesison slight progress was noted as pt demonstrated good recall on hand placement while ascending from recliner to RW with mod I but required VC's while descending back into recliner /s. pt was able to increase ambulation distance with RW and SPC. pt ambulated with 'x1 RW with /s no LOB. pt also ambulated 140'x1 SPC and 50'x1 SPC with s/ no LOB. Additioanl ambulation not possible as pt HR  increased to low 140's as pt required several seated therapeutic rest breaks in order to all HR to decrease to normal ranges. pt is refusing stair trials in todays tx session as pt and pt family stated pt is okay with completing steps at home safely and would prefer to focus on balance and ambulation in todays tx session. Post tx pt continues to demonstrate the following deficits: limited activity tolerance, ambulation distance w/ SPC and RW. pt would benefit from continued skilled PT intervention in order to address deficits listed above.   Goals   Patient Goals to walk more   PT Treatment Day 2   Plan   Treatment/Interventions Functional transfer training;LE strengthening/ROM;Elevations;Therapeutic exercise;Cognitive reorientation;Endurance training;Patient/family training;Equipment eval/education;Bed mobility;Gait training;Spoke to nursing   Progress Progressing toward goals   PT Frequency 2-3x/wk   Discharge Recommendation   Rehab Resource Intensity Level, PT III (Minimum Resource Intensity)   AM-PAC Basic Mobility Inpatient   Turning in Flat Bed Without Bedrails 3   Lying on Back to Sitting on Edge of Flat Bed Without Bedrails 3   Moving Bed to Chair 3   Standing Up From Chair Using Arms 4   Walk in Room 3   Climb 3-5 Stairs With Railing 3   Basic Mobility Inpatient Raw Score 19   Basic Mobility Standardized Score 42.48   [facility] Highest Level Of Mobility   -HLM Goal 6: Walk 10 steps or more   -HLM Achieved 8: Walk 250 feet ot more   Education   Education Provided Mobility training;Assistive device;Other  (w/ RW and w/ SPC)   Patient Demonstrates acceptance/verbal understanding   The patient's AM-PAC Basic Mobility Inpatient Short Form Raw Score is 19. A Raw score of greater than 16 suggests the patient may benefit from discharge to home. Please also refer to the recommendation of the Physical Therapist for safe discharge planning.    Artie Tong

## 2024-07-01 NOTE — CASE MANAGEMENT
Case Management Assessment & Discharge Planning Note    Patient name Suhail Borrego  Location S /S -01 MRN [id number]  : 1940 Date 2024       Current Admission Date: 2024  Current Admission Diagnosis:Symptomatic stenosis of left carotid artery   Patient Active Problem List    Diagnosis Date Noted Date Diagnosed    Symptomatic stenosis of left carotid artery 2024     Localized swelling of left lower extremity 2024     Leukocytosis 2024     Lumbar spondylosis 2024     Intractable back pain 2024     Pressure ulcer 2024     Inflammation of sacroiliac joint (HCC) 2023    Internal hemorrhoids 2022     Diverticular disease 2022     Acute left-sided low back pain with left-sided sciatica 10/12/2022     Left lower quadrant abdominal pain 10/08/2022     Diverticulitis 2022     Stage 3 chronic kidney disease (HCC) 2022     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 2022     Trochanteric bursitis 2022     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HCC) 2022     Neuropathy 10/29/2021     Pulmonary fibrosis determined by high resolution computed tomography (HCC) 2021     Near syncope 2021     SOB (shortness of breath) 07/10/2021     Neck pain on left side 07/10/2021     Carotid stenosis, asymptomatic, bilateral 2020     Phimosis 2020     Bruit of left carotid artery 2020     Postlaminectomy syndrome of lumbar region      Lumbar radiculopathy      S/P lumbar fusion 2018     Lumbar stenosis 2018     Peripheral artery disease (HCC) 2018     GERD (gastroesophageal reflux disease) 2018     Vasomotor rhinitis 2018     Leg pain, posterior, left 2018     Macrocytic anemia 2018     Hx of CABG 2018     Former tobacco use      Hyperlipidemia      Primary hypertension      CAD (coronary artery disease)  12/20/2017     Spondylolisthesis of lumbar region 11/07/2016     New acute T 11 Thoracic compression fracture, closed, initial encounter, w old T12 compression fx 11/03/2016       LOS (days): 2  Geometric Mean LOS (GMLOS) (days):   Days to GMLOS:     OBJECTIVE:    Risk of Unplanned Readmission Score: 18.89         Current admission status: Inpatient       Preferred Pharmacy:   [facility]/pharmacy #[id number] - [place] - [address]  [address] [facility] [address]  Phone: [phone] Fax: [phone]    [id number] - [place] - [address]  [address] [facility] [place] [address]  Phone: [phone] Fax: [phone]    Primary Care Provider: Mikaela Duenas DO    Primary Insurance: [facility] RE[facility]  Secondary Insurance:     ASSESSMENT:  Active Health Care Proxies    There are no active Health Care Proxies on file.                 Readmission Root Cause  30 Day Readmission: No    Patient Information  Admitted from:: Home  Mental Status: Alert  During Assessment patient was accompanied by: Daughter  Assessment information provided by:: Daughter, Patient  Primary Caregiver: Self  Support Systems: Self, Children  County of Residence: [place]  What city do you live in?: [place]  Home entry access options. Select all that apply.: Stairs (Stair glide)  Type of Current Residence: 2 story home  Upon entering residence, is there a bedroom on the main floor (no further steps)?: No  A bedroom is located on the following floor levels of residence (select all that apply):: 2nd Floor  Upon entering residence, is there a bathroom on the main floor (no further steps)?: Yes (Stair glide)  Number of steps to 2nd floor from main floor: One Flight (Stair glide)  Living Arrangements: Lives Alone    Activities of Daily Living Prior to Admission  Functional Status: Independent  Completes ADLs independently?: Yes  Ambulates independently?: Yes  Does patient use assisted devices?: Yes  Assisted Devices  (DME) used: Walker, Straight Cane, Stair Chair/[place]  Does patient currently own DME?: Yes  What DME does the patient currently own?: Bedside Commode, Stair Chair/Glide, Straight Cane, Walker, Shower Chair  Does patient have a history of Outpatient Therapy (PT/OT)?: Yes  Does the patient have a history of Short-Term Rehab?: No  Does patient have a history of HHC?: Yes  Does patient currently have HHC?: No         Patient Information Continued  Income Source: Pension/group home  Does patient have prescription coverage?: Yes  Does patient receive dialysis treatments?: No  Does patient have a history of substance abuse?: No  Does patient have a history of Mental Health Diagnosis?: No    PHQ 2/9 Screening   Reviewed PHQ 2/9 Depression Screening Score?: No    Means of Transportation  Means of Transport to Appts:: Drives Self      Social Determinants of Health (SDOH)      Flowsheet Row Most Recent Value   Housing Stability    In the last 12 months, was there a time when you were not able to pay the mortgage or rent on time? N   At any time in the past 12 months, were you homeless or living in a shelter (including now)? N   Transportation Needs    In the past 12 months, has lack of transportation kept you from medical appointments or from getting medications? no   In the past 12 months, has lack of transportation kept you from meetings, work, or from getting things needed for daily living? No   Food Insecurity    Within the past 12 months, you worried that your food would run out before you got the money to buy more. Never true   Within the past 12 months, the food you bought just didn't last and you didn't have money to get more. Never true   Utilities    In the past 12 months has the electric, gas, oil, or water company threatened to shut off services in your home? No            DISCHARGE DETAILS:    Discharge planning discussed with:: Patient, daughters  [place] of Choice: Yes  Comments - Freedom of Choice: CM reviewed  PT rec level 3. Pt unsure if he wants HHC or OP PT, agreeable to blanket referrals for HHC  CM contacted family/caregiver?: Yes (Daughters present at bedside)  Were Treatment Team discharge recommendations reviewed with patient/caregiver?: Yes  Did patient/caregiver verbalize understanding of patient care needs?: Yes  Were patient/caregiver advised of the risks associated with not following Treatment Team discharge recommendations?: Yes    Contacts  Patient Contacts: Ashwini Jang Patty  Relationship to Patient:: Other (Comment) (Self, daughters)  Contact Method: In Person  Reason/Outcome: Continuity of Care, Emergency Contact, Referral, Discharge Planning    Requested Home Health Care         Is the patient interested in HHC at discharge?: Yes  Home Health Discipline requested:: Physical Therapy, Occupational Therapy, Nursing  Home Health Agency Name:: Other (TBD)  Home Health Follow-Up Provider:: PCP  Home Health Services Needed:: Evaluate Functional Status and Safety, Gait/ADL Training, Strengthening/Theraputic Exercises to Improve Function  Homebound Criteria Met:: Uses an Assist Device (i.e. cane, walker, etc)  Supporting Clincal Findings:: Limited Endurance    DME Referral Provided  Referral made for DME?: No    Other Referral/Resources/Interventions Provided:  Interventions: Other (Specify), HHC  Referral Comments: CM spoke with pt and daughters at bedside, introduced self and role with discharge planning. Pt reported he lives by himself in a 2 story home with a stair glide to enter the home. Pt reported there is a half bath downstairs and a stair glide to the 2nd floor. Pt reported he uses a cane or RW with ambulation. Pt reported he has a shower chair and BSC. Pt reported he was fully independent with ADLs and functional mobility. Pt reported he ahs a hx of OPPT and HHC. Pt PCP is Mikaela Duenas DO. Pt reported he was driving himself to appointments. CM reviewed PT rec level 3. Pt/daughter undecided on HHC  vs OPPT at this time. Daughters requesting CM send HHC referrals and CM will f/u on pt decision. CM sent blanket HHC referrals via [employer]. CM will remain available.    Treatment Team Recommendation: Home, Home with Home Health Care  Discharge Destination Plan:: Home, Home with Home Health Care (vs)

## 2024-07-01 NOTE — UTILIZATION REVIEW
Initial Clinical Review - Continued    SEE INITIAL REVIEW AT BOTTOM    Date: 07/01/24                          Current Patient Class: Inpatient  Current Level of Care: Med/Surg    HPI:83 y.o. male initially admitted on 6/29 for symptomatic stenosis of L carotid artery.      Assessment/Plan:   Date: 07/01/24  Day 3: Has surpassed a 2nd midnight with active treatments and services. Reports fatigue. Notes lower back pain is improved but remains. Pt was confused overnight per nursing team. On exam, lower thoracic spinal tenderness. Plan: permissive HTN, hold metoprolol, telemetry, echo, neuro checks, continue ASA/Plavix, multimodal analgesics, IVF, Trend labs, replete electrolytes as needed.    Vascular Surgery: Pt w/ stroke like symptoms, RUE weakness/aphasia in setting of L ICA and CCA stenosis. Recommend carotid intervention s/t symptomatic CAD, continue Plavix/ASA.     Vital Signs:   Vital Signs (last 3 days)       Date/Time Temp Pulse Resp BP MAP (mmHg) SpO2 O2 Device Patient Position - Orthostatic VS [place] Coma Scale Score Pain    07/01/24 1140 -- 91 -- 154/70 -- -- -- -- -- --    07/01/24 07:46:17 98 °F (36.7 °C) 91 -- 154/70 98 96 % -- -- -- --    07/01/24 0530 -- -- -- -- -- -- -- -- 15 --    07/01/24 0130 97.6 °F (36.4 °C) 89 20 157/74 -- 96 % None (Room air) Lying 15 --    06/30/24 2132 98.5 °F (36.9 °C) 107 16 151/81 104 -- -- Sitting -- --    06/30/24 2130 -- -- -- -- -- -- -- -- 15 --    06/30/24 1930 -- -- -- -- -- -- -- -- 15 --    06/30/24 19:28:23 98.8 °F (37.1 °C) 98 16 154/71 99 95 % -- -- -- --    06/30/24 1736 -- -- -- -- -- -- None (Room air) -- -- --    06/30/24 17:32:39 98.1 °F (36.7 °C) 97 20 101/69 80 81 % None (Room air) Sitting -- --    06/30/24 15:27:18 97.6 °F (36.4 °C) 78 14 143/60 88 91 % -- -- -- --    06/30/24 1347 -- -- -- -- -- -- -- -- -- 3    06/30/24 13:13:13 -- -- -- 110/54 73 -- -- -- -- --    06/30/24 13:00:57 -- -- -- 94/42 59 -- -- -- -- --    06/30/24 1259 -- -- -- -- --  -- -- -- -- No Pain    06/30/24 1230 -- 85 -- 116/55 75 -- -- Sitting -- --    06/30/24 08:39:54 -- 79 -- 104/44 64 99 % -- -- -- --    06/30/24 0837 -- -- -- -- -- 98 % None (Room air) -- -- --    06/30/24 08:36:15 -- 78 -- -- -- 97 % -- -- -- --    06/30/24 0835 -- -- -- -- -- -- -- -- -- No Pain    06/30/24 08:33:15 -- 83 -- -- -- 99 % -- -- -- --    06/30/24 08:30:15 -- 82 -- -- -- 94 % -- -- -- --    06/30/24 08:27:15 -- 81 -- -- -- 98 % -- -- -- --    06/30/24 0825 -- -- -- -- -- -- None (Room air) -- -- --    06/30/24 08:24:15 -- 69 -- -- -- 98 % -- -- -- --    06/30/24 08:22:56 -- 76 -- -- -- 87 % -- -- -- --    06/30/24 08:22:28 -- -- -- 112/91 -- -- -- -- -- --    06/30/24 08:21:53 -- -- -- 112/91 -- -- -- -- -- --    06/30/24 08:20:15 -- 80 -- -- -- 99 % -- -- -- --    06/30/24 08:19:44 -- 74 -- 83/42 56 96 % -- -- -- --    06/30/24 08:19:34 -- 72 -- 83/42 -- 95 % -- -- -- --    06/30/24 08:18:21 -- 75 -- 74/44 54 98 % -- -- -- --    06/30/24 07:27:45 -- 67 -- 95/41 59 100 % -- -- -- --    06/30/24 0725 -- -- -- -- -- -- -- -- -- 3    06/30/24 06:06:26 -- -- -- 98/81 87 -- -- -- -- --    06/30/24 0603 -- -- -- -- -- -- -- -- -- No Pain    06/30/24 0002 97.8 °F (36.6 °C) 80 18 90/54 -- 92 % None (Room air) Sitting -- --    06/29/24 20:39:22 98.3 °F (36.8 °C) 75 -- 132/59 83 98 % -- -- -- --    06/29/24 2022 -- -- -- -- -- -- -- -- -- 9    06/29/24 17:49:24 97 °F (36.1 °C) 75 14 129/82 98 98 % None (Room air) Sitting -- --    06/29/24 1700 -- -- -- -- -- -- -- -- 15 No Pain    06/29/24 1512 97.8 °F (36.6 °C) 73 18 161/71 102 95 % None (Room air) Lying -- --    06/29/24 1338 -- -- -- -- -- -- -- -- -- 2    06/29/24 1204 -- -- -- -- -- -- -- -- -- 5    06/29/24 0834 -- -- -- -- -- -- None (Room air) -- 15 No Pain    06/29/24 0700 97.7 °F (36.5 °C) 62 15 142/73 98 96 % -- Lying -- --    06/29/24 0625 -- 76 16 154/67 -- 92 % None (Room air) Lying -- --    06/29/24 0533 -- -- -- -- -- -- -- -- 15 --    06/29/24  0430 -- 74 16 140/68 -- 96 % None (Room air) Lying -- --    06/29/24 0243 -- 71 -- 141/64 92 -- -- -- -- --    06/29/24 0128 -- -- -- -- -- -- -- -- -- No Pain    06/29/24 0029 98.1 °F (36.7 °C) 73 17 97/53 72 96 % None (Room air) Sitting -- No Pain            Pertinent Labs/Diagnostic Results:   Echo complete w/ contrast if indicated   Final Result by Mitchell Thornton DO (07/01 1344)        Left Ventricle: Left ventricular cavity size is normal. Wall thickness    is normal. The left ventricular ejection fraction is 65%. Systolic    function is normal. Wall motion is normal. Diastolic function is mildly    abnormal, consistent with grade I (abnormal) relaxation.     Right Ventricle: Systolic function is normal.     Atrial Septum: No patent foramen ovale detected, confirmed by    provocation with valsalva, using agitated saline contrast.     Mitral Valve: There is mild regurgitation.     Tricuspid Valve: There is mild regurgitation. The estimated right    ventricular systolic pressure is 35.00 mmHg.         ECG 12 lead   Final Result by Jessica Garcia MD (06/29 4957)   Normal sinus rhythm   Nonspecific T wave abnormality   Abnormal ECG   When compared with ECG of 08-OCT-2022 10:55,   Nonspecific T wave abnormality now evident in Lateral leads   Confirmed by Jessica Garcia ([id number]) on 6/29/2024 9:08:18 PM              Results from last 7 days   Lab Units 07/01/24  0610 06/30/24  0835 06/30/24  0833 06/29/24  0630 06/29/24  0153   WBC Thousand/uL 10.60*  --  13.07* 10.37* 10.58*   HEMOGLOBIN g/dL 9.7*  --  10.4* 10.5* 10.2*   I STAT HEMOGLOBIN g/dl  --  10.9*  --   --   --    HEMATOCRIT % 30.4*  --  32.7* 32.9* 32.3*   HEMATOCRIT, ISTAT %  --  32*  --   --   --    PLATELETS Thousands/uL 165  --  194 178 184   TOTAL NEUT ABS Thousands/µL 6.61  --  8.68*  --  6.57         Results from last 7 days   Lab Units 06/30/24  0835 06/30/24  0833 06/29/24  0630 06/29/24  0153   SODIUM mmol/L  --  138 138 138   POTASSIUM  mmol/L  --  3.8 4.4 4.6   CHLORIDE mmol/L  --  103 107 107   CO2 mmol/L  --  25 26 26   CO2, I-STAT mmol/L 29  --   --   --    ANION GAP mmol/L  --  10 5 5   BUN mg/dL  --  30* 26* 31*   CREATININE mg/dL  --  1.29 1.02 1.16   EGFR ml/min/1.73sq m  --  50 67 57   CALCIUM mg/dL  --  9.0 8.5 9.0   CALCIUM, IONIZED, ISTAT mmol/L 1.21  --   --   --      Results from last 7 days   Lab Units 06/29/24  0153   AST U/L 20   ALT U/L 14   ALK PHOS U/L 61   TOTAL PROTEIN g/dL 6.9   ALBUMIN g/dL 3.5   TOTAL BILIRUBIN mg/dL 0.34     Results from last 7 days   Lab Units 06/30/24  0820   POC GLUCOSE mg/dl 103     Results from last 7 days   Lab Units 06/30/24  0833 06/29/24  0630 06/29/24  0153   GLUCOSE RANDOM mg/dL 120 88 107         Results from last 7 days   Lab Units 06/29/24  0630   HEMOGLOBIN A1C % 6.0*   EAG mg/dl 126      Results from last 7 days   Lab Units 06/30/24  0835   PH, DONATO I-STAT  7.284*   PCO2, DONATO ISTAT mm HG 56.9*   PO2, DONATO ISTAT mm HG 35.0   HCO3, DONATO ISTAT mmol/L 27.0   I STAT BASE EXC mmol/L 0   I STAT O2 SAT % 58*     Results from last 7 days   Lab Units 06/29/24  0153   CK TOTAL U/L 53     Results from last 7 days   Lab Units 06/30/24  1338 06/30/24  1119 06/30/24  0833   HS TNI 0HR ng/L  --   --  5   HS TNI 2HR ng/L  --  5  --    HSTNI D2 ng/L  --  0  --    HS TNI 4HR ng/L 4  --   --    HSTNI D4 ng/L -1  --   --          Results from last 7 days   Lab Units 06/29/24  0153   PROTIME seconds 14.7*   INR  1.08   PTT seconds 38*             Results from last 7 days   Lab Units 06/30/24  0834 06/29/24  0153   LACTIC ACID mmol/L 3.0* 1.2          Results from last 7 days   Lab Units 06/29/24  0153   LIPASE u/L 10*          Results from last 7 days   Lab Units 06/29/24  0430   CLARITY UA  Clear   COLOR UA  Light Yellow   SPEC GRAV UA  1.043*   PH UA  5.5   GLUCOSE UA mg/dl Negative   KETONES UA mg/dl Negative   BLOOD UA  Negative   PROTEIN UA mg/dl Negative   NITRITE UA  Negative   BILIRUBIN UA  Negative    UROBILINOGEN UA (BE) mg/dl <2.0   LEUKOCYTES UA  Small*   WBC UA /hpf 4-10*   RBC UA /hpf None Seen   BACTERIA UA /hpf Occasional   EPITHELIAL CELLS WET PREP /hpf Occasional      Results from last 7 days   Lab Units 06/29/24  0504   URINE CULTURE  No Growth <1000 cfu/mL       Medications:   Scheduled Medications:  acetaminophen, 975 mg, Oral, Q8H KAMRAN  ascorbic acid, 500 mg, Oral, Daily  aspirin, 81 mg, Oral, Daily  atorvastatin, 80 mg, Oral, QPM  cholecalciferol, 2,000 Units, Oral, Daily  clopidogrel, 75 mg, Oral, Daily  docusate sodium, 100 mg, Oral, BID  enoxaparin, 40 mg, Subcutaneous, Daily  famotidine, 20 mg, Oral, BID  gabapentin, 200 mg, Oral, HS  polyethylene glycol, 17 g, Oral, Daily  senna, 1 tablet, Oral, Daily    Continuous IV Infusions:   sodium chloride 0.9 % infusion  Rate: 100 mL/hr Dose: 100 mL/hr  Freq: Continuous Route: IV  Last Dose: Stopped (06/30/24 2300)  Start: 06/30/24 1245 End: 06/30/24 2301    PRN Meds:  aluminum-magnesium hydroxide-simethicone, 30 mL, Oral, Q4H PRN; 6/30 x1  glycerin (adult), 1 suppository, Rectal, Once PRN  labetalol, 10 mg, Intravenous, Q4H PRN  oxyCODONE, 2.5 mg, Oral, Q4H PRN  oxyCODONE, 5 mg, Oral, Q4H PRN  traMADol, 50 mg, Oral, Q6H PRN          Network Utilization Review Department  ATTENTION: Please call with any questions or concerns to [phone] and carefully listen to the prompts so that you are directed to the right person. All voicemails are confidential.   For Discharge needs, contact Care Management DC Support Team at [phone] opt. 2  Send all requests for admission clinical reviews, approved or denied determinations and any other requests to dedicated fax number below belonging to the campus where the patient is receiving treatment. List of dedicated fax numbers for the Facilities:  FACILITY NAME UR FAX NUMBER   ADMISSION DENIALS (Administrative/Medical Necessity) [phone]   DISCHARGE SUPPORT TEAM (NETWORK) [phone]   [facility] CHILD [facility]  (Maternity/NICU/Pediatrics) [phone]   [facility] [phone]   [facility] [phone]   [facility] [phone]   [facility] [phone]   [facility] [phone]   [facility] [phone]   [facility] [phone]   [facility] [phone]   [facility] [phone]   [facility] [phone]   [facility] [phone]   [facility] [phone]

## 2024-07-01 NOTE — DISCHARGE INSTR - AVS FIRST PAGE
Neurosurgery discharge instructions following spine fracture:     TLSO brace to be worn when out of bed of head of bed greater than 45 degrees.  May place brace on while sitting on edge of bed. May be removed for showering.   No bending, twisting or heavy lifting. No strenuous activities. No Driving.   Follow-up as scheduled in two weeks with repeat spine x-rays to be completed 2-3 days prior to visit.  Prescription has been entered electronically.      **Please notify MD immediately if you have increased back or leg pain. New numbness, tingling, weakness in your legs and/or bowel/bladder incontinence**     Dear Suhail Borrego,     It was our pleasure to care for you here at [facility].  It is our hope that we were always able to exceed the expected standards for your care during your stay.  You were hospitalized due to back pain and strokelike symptoms.  You were cared for on the third floor by Arden Muse MD under the service of Kelli Street MD with the [facility] who covers for your primary care physician (PCP), Mikaela Duenas DO, while you were hospitalized.  If you have any questions or concerns related to this hospitalization, you may contact us at .  For follow up as well as any medication refills, we recommend that you follow up with your primary care physician.  A registered nurse will reach out to you by phone within a few days after your discharge to answer any additional questions that you may have after going home.  However, at this time we provide for you here, the most important instructions / recommendations at discharge:     Notable Medication Adjustments -   Please start taking metoprolol succinate 25 mg daily.  You were taking 25 mg 2 times per day before.  Please start taking midodrine 2.5 mg 3 times a day.  Please start taking Plavix 75 mg daily.  Follow-up with  neurology for further recommendations.  No other changes were made to medications.  Please take them as ordered.  Testing Required after Discharge -   None  ** Please contact your PCP to request testing orders for any of the testing recommended here **  Important follow up information -   Please follow-up with vascular surgery following outpatient.  A referral has been made for you.  Please schedule an appointment with them.  Please follow-up with your PCP within 1 week of discharge.  Please follow-up with neurology following discharge.  Please follow-up with neurosurgery following discharge.  A referral has been made for you.  Please schedule an appointment with them.  Other Instructions -   Please visit ED if you develop any facial droop, any weakness any extremity, tingling numbness, difficulty speaking.  Please review this entire after visit summary as additional general instructions including medication list, appointments, activity, diet, any pertinent wound care, and other additional recommendations from your care team that may be provided for you.      Sincerely,     Arden Muse MD

## 2024-07-01 NOTE — ASSESSMENT & PLAN NOTE
Lab Results   Component Value Date    EGFR 50 06/30/2024    EGFR 67 06/29/2024    EGFR 57 06/29/2024    CREATININE 1.29 06/30/2024    CREATININE 1.02 06/29/2024    CREATININE 1.16 06/29/2024     Patient with chronic kidney disease IIIa. Avoid hypotension and nephrotoxins. Monitor on AM BMP.   IV hydration given s/p contrast for CTA dissection protocol.

## 2024-07-01 NOTE — ASSESSMENT & PLAN NOTE
Past medical history of 70-99% left carotid artery stenosis  CT head shows no acute large vessel distribution infarct, hemorrhage or mass effect. 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  CTA head and neck shows Worsening severe stenosis in the distal left common carotid artery. Severe tandem stenosis proximal left internal carotid artery. Moderate stenosis vertebral artery origins is unchanged. Stable atherosclerotic disease in the right common carotid artery with less than 50% stenosis. No high-grade intracranial stenosis, large vessel occlusion or aneurysm.  As per the family, patient has been having multiple episodes of TIA characterized by aphasia and unilateral limb weakness.  On 6/30, last known well: 7;30 a.m.  Around 8: 15 on 6/30, rapid was called for strokelike symptoms characterized by aphasia, right arm weakness  Family refused TNK, informed denial.  No past medical history of atrial fibrillation  NIH: 4 in the background of expressive aphasia and right upper arm drift but when I saw the patient NIH: 0  GCS: 15  Home med: Aspirin  Loading dose clopidogrel was given on 6/30    Plan:  Target for euglycemia, permissive hypertension, euthymia, euvolemia  Metoprolol was stopped to help permissive hypertension  Speech pathology/OT/PT/neurology/vascular surgery consultation  telemetry  Follow-up with bubble echo  Frequent neurocheck  Continue aspirin and clopidogrel

## 2024-07-01 NOTE — CONSULTS
Inpatient consult to Vascular Surgery  Consult performed by: Serene Rios PA-C  Consult ordered by: Arden Muse MD           Consultation -Vascular Surgery  Suhail Borrego 83 y.o. male MRN: [id number]  Unit/Bed#: S -01 Encounter: [id number]      ASSESSMENT:  82 yo M with stroke like symptoms, RUE weakness/aphasia in the setting of L ICA and CCA stenosis, likely symptomatic JCARLOS    Plan:  - recommend carotid intervention, likely CEA, given symptomatic JCARLOS  - appreciate neuro recs: recommend intervention in 1-2 weeks  - cont plavix/asa  - cardiac c/s for preop clearance/risk stratification    Rest of care per primary      Reason for Consult / Principal Problem:    HPI: Suhail Borrego is a 83 y.o. year old male PMH CAD s/p CABG (2018), femoral artery stenosis, HTN, PAD, CKD who presents with Symptomatic stenosis of left carotid artery. Pt was admitted for back pain which was being worked up when on 6/30 he had sudden onset of aphasia, RUE weakness. Stat MRI without acute infarct. Was found ot have severe stenosis of L ICA with moderate CCA disease. Neuro was consulted. Pt was loaded with plavix. Home asa was restarted.   Per pt, he has had similar symptoms in the past, mainly in the last couple days that quickly resolve.     Pt is well known to vascular surgery. Recent carotid duplex 6/6/24 with left 60-99% stenosis in ICA however CT with only 60-65% stenosis so therefore was decided to continue surveillance of pt.    Today, pt states he is improved from yesterday. Does endorse some RUE weakness but overall feels back to baseline.      Review of Systems   Constitutional: Negative.    Respiratory: Negative.     Cardiovascular: Negative.    Genitourinary: Negative.    Musculoskeletal:  Positive for back pain.   Neurological:  Positive for speech difficulty and weakness. Negative for facial asymmetry.   Psychiatric/Behavioral:  Positive for confusion.    All other  systems reviewed and are negative.      Historical Information   Past Medical History:   Diagnosis Date    Abnormal liver function test     RESOLVED: 14SEP2017    Allergic rhinitis     Anemia     LAST ASSESSED: 19OCT2017    Arthritis     BPH (benign prostatic hyperplasia)     CAD (coronary artery disease)     Coronary artery disease     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HCC)     LAST ASSESSED: 05OCT2017    Former tobacco use     GERD (gastroesophageal reflux disease)     Hand paresthesia     RESOLVED: 11SEP2017    Hyperlipidemia     Hypertension     Lumbar stenosis     Peripheral artery disease (HCC)     LAST ASSESSED: 27OCT2017    Stage 3a chronic kidney disease (HCC) 7/11/2021     Past Surgical History:   Procedure Laterality Date    BACK SURGERY      COLONOSCOPY      LUMBAR FUSION      MD ARTHRODESIS POSTERIOR/PSTLAT TQ 1NTRSPC LUMBAR N/A 11/03/2016    Procedure: L2-S1 POSTERIOR LUMBAR FUSION AND DECOMPRESSION (Impulse), Posterior lateral fixation; dural repair.;  Surgeon: Leslie Gil MD;  Location: [facility];  Service: Orthopedics    MD CABG W/ARTERIAL GRAFT SINGLE ARTERIAL GRAFT N/A 01/19/2018    Procedure: CABG X4 with LIMA - LAD, SVG - RCA, OM2, & Diagonal ; Left Leg EVH; MATTHEW;  Surgeon: Eric Bar DO;  Location: [facility];  Service: Cardiac Surgery    MD COLONOSCOPY FLX DX W/COLLJ SPEC WHEN PFRMD N/A 11/15/2017    Procedure: EGD AND COLONOSCOPY;  Surgeon: Mariano Godinez MD;  Location: [facility];  Service: Gastroenterology    SEPTOPLASTY      LAST ASSESSED; 13MAY2014    TONSILECTOMY AND ADNOIDECTOMY      LAST ASSESSED: 13MAY2014    UPPER GASTROINTESTINAL ENDOSCOPY       Social History   Social History     Substance and Sexual Activity   Alcohol Use Not Currently    Alcohol/week: 1.0 standard drink of alcohol    Types: 1 Shots of liquor per week    Comment: beer, wine, scotch every day; SOCIAL AS PER ALL SCRIPTS      Social History     Substance and Sexual Activity   Drug Use Not Currently    Types:  Marijuana    Comment: medical clarke     Social History     Tobacco Use   Smoking Status Former    Current packs/day: 0.00    Average packs/day: 1 pack/day for 50.0 years (50.0 ttl pk-yrs)    Types: Cigarettes    Start date:     Quit date:     Years since quittin.5   Smokeless Tobacco Never     Family History   Problem Relation Age of Onset    Emphysema Mother     Liver disease Mother     Coronary artery disease Father     Hypertension Father     Liver disease Father     Heart failure Father     Stroke Father         CVA     Heart attack Father     Coronary artery disease Brother     Heart disease Brother         younger brother by pass and other brother 4 stents placed    Other Family         BACK PROBLEM     Stroke Family         CVA    Emphysema Family     Hypertension Family         BENIGN       Meds/Allergies       Medications Prior to Admission:     acetaminophen (TYLENOL) 650 mg CR tablet    Ascorbic Acid (Vitamin C) 500 MG PACK    aspirin (ECOTRIN LOW STRENGTH) 81 mg EC tablet    atorvastatin (LIPITOR) 80 mg tablet    cholecalciferol (VITAMIN D3) 1,000 units tablet    cyanocobalamin (VITAMIN B-12) 1,000 mcg tablet    famotidine (PEPCID) 20 mg tablet    folic acid (FOLVITE) 1 mg tablet    metoprolol tartrate (LOPRESSOR) 50 mg tablet    Multiple Vitamin (MULTIVITAMIN) capsule    traMADol (Ultram) 50 mg tablet    clotrimazole-betamethasone (LOTRISONE) 1-0.05 % cream    docusate sodium (COLACE) 100 mg capsule    gabapentin (NEURONTIN) 100 mg capsule  Current Facility-Administered Medications   Medication Dose Route Frequency    acetaminophen (TYLENOL) tablet 975 mg  975 mg Oral Q8H KAMRAN    aluminum-magnesium hydroxide-simethicone (MAALOX) oral suspension 30 mL  30 mL Oral Q4H PRN    ascorbic acid (VITAMIN C) tablet 500 mg  500 mg Oral Daily    aspirin (ECOTRIN LOW STRENGTH) EC tablet 81 mg  81 mg Oral Daily    atorvastatin (LIPITOR) tablet 80 mg  80 mg Oral QPM    Cholecalciferol (VITAMIN D3)  tablet 2,000 Units  2,000 Units Oral Daily    clopidogrel (PLAVIX) tablet 75 mg  75 mg Oral Daily    docusate sodium (COLACE) capsule 100 mg  100 mg Oral BID    enoxaparin (LOVENOX) subcutaneous injection 40 mg  40 mg Subcutaneous Daily    famotidine (PEPCID) tablet 20 mg  20 mg Oral BID    gabapentin (NEURONTIN) capsule 200 mg  200 mg Oral HS    glycerin (adult) rectal suppository 1 suppository  1 suppository Rectal Once PRN    labetalol (NORMODYNE) injection 10 mg  10 mg Intravenous Q4H PRN    oxyCODONE (ROXICODONE) split tablet 2.5 mg  2.5 mg Oral Q4H PRN    Or    oxyCODONE (ROXICODONE) IR tablet 5 mg  5 mg Oral Q4H PRN    polyethylene glycol (MIRALAX) packet 17 g  17 g Oral Daily    senna (SENOKOT) tablet 8.6 mg  1 tablet Oral Daily    traMADol (ULTRAM) tablet 50 mg  50 mg Oral Q6H PRN       Allergies   Allergen Reactions    Penicillins Other (See Comments)     Hallucinations;  Patient reported that he was seeing visual disturbances         Objective     Blood pressure 154/70, pulse 91, temperature 98 °F (36.7 °C), resp. rate 20, weight 67.5 kg (148 lb 13 oz), SpO2 96%.    Intake/Output Summary (Last 24 hours) at 7/1/2024 1003  Last data filed at 7/1/2024 0534  Gross per 24 hour   Intake 480 ml   Output 1290 ml   Net -810 ml       PHYSICAL EXAM  Physical Exam  Vitals and nursing note reviewed.   Constitutional:       General: He is not in acute distress.     Appearance: Normal appearance. He is normal weight. He is not ill-appearing, toxic-appearing or diaphoretic.   HENT:      Head: Normocephalic and atraumatic.   Eyes:      Extraocular Movements: Extraocular movements intact.   Cardiovascular:      Rate and Rhythm: Normal rate.   Pulmonary:      Effort: Pulmonary effort is normal. No respiratory distress.   Musculoskeletal:         General: No swelling or tenderness.   Skin:     General: Skin is warm.      Capillary Refill: Capillary refill takes less than 2 seconds.   Neurological:      General: No focal  "deficit present.      Mental Status: He is alert and oriented to person, place, and time.   Psychiatric:         Mood and Affect: Mood normal.         Behavior: Behavior normal.     Strength 5/5 upper and lower extremities b/l      Lab Results: I have personally reviewed pertinent lab results.  , CBC:   Lab Results   Component Value Date    WBC 10.60 (H) 07/01/2024    HGB 9.7 (L) 07/01/2024    HCT 30.4 (L) 07/01/2024     (H) 07/01/2024     07/01/2024    RBC 3.05 (L) 07/01/2024    MCH 31.8 07/01/2024    MCHC 31.9 07/01/2024    RDW 13.2 07/01/2024    MPV 10.8 07/01/2024    NRBC 0 07/01/2024   , CMP: No results found for: \"SODIUM\", \"K\", \"CL\", \"CO2\", \"ANIONGAP\", \"BUN\", \"CREATININE\", \"GLUCOSE\", \"CALCIUM\", \"AST\", \"ALT\", \"ALKPHOS\", \"PROT\", \"BILITOT\", \"EGFR\", Coagulation: No results found for: \"PT\", \"INR\", \"APTT\", Urinalysis: No results found for: \"COLORU\", \"CLARITYU\", \"SPECGRAV\", \"PHUR\", \"LEUKOCYTESUR\", \"NITRITE\", \"PROTEINUA\", \"GLUCOSEU\", \"KETONESU\", \"BILIRUBINUR\", \"BLOODU\"  Imaging: I have personally reviewed pertinent reports.    EKG, Pathology, and Other Studies: I have personally reviewed pertinent reports.          Counseling / Coordination of Care  Total time spent today  30 minutes. Greater than 50% of total time was spent with the patient and / or family counseling and / or coordination of care.         Serene Rios PA-C  7/1/2024 10:03 AM    "

## 2024-07-01 NOTE — ASSESSMENT & PLAN NOTE
Neurology is asked to see this 83-year-old gentleman who initially presented for new back pain in the presence and face of his old chronic back pain related to previous posterior spinal fusions and prior compression fractures.  As noted elsewhere by other providers he reports 1 week of worsened and changed back pain.  Workup including CT scan demonstrated a new T11 compression fracture with approximately 10 to 20% loss of height as noted by radiology.  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  He is to continue being mobile with the benefit of his brace and PT.  On my exam today the patient had confusion regarding the stroke alert called yesterday and those symptoms and whether or not we are still treating and concerned about his new compression fracture.  I discussed with him at length the workup for both conditions and that both conditions will continue to be addressed.  His pain at this point is by his report still present but he reports better.  He has a better understanding of the use of his brace and follow-up care for his back at this point.

## 2024-07-01 NOTE — TREATMENT PLAN
Baseline upright L spine x-rays demonstrates stable SEP T11 compression and chronic T12 compression, stable hardware . Patient feeling better with TL back pain. From Nsx perspective, no procedure is anticipated at this juncture. Continue conservative management with bracing, pain control and PT/OT.  Follow-up in 2 weeks with upright thoracolumbar spine x-rays in brace.  Neurosurgery will sign off.  Call with question or concern.

## 2024-07-01 NOTE — ASSESSMENT & PLAN NOTE
PMHx of chronic back pain including T12 compression fracture and fusion of L2-S1 in 2018. Patient reports 1 week of worsening L sided back pain that feels like an achy muscle but is worse with standing/twisting or bending.     Tylenol 975 q8 OTC  Opioid naive pain management including Oxy 2.5 and Oxy 5 mg for pain  Bowel regimen due to constipation 2/2 tramadol outpatient  Continue gabapentin for neuropathic pain in feet  Neurosurgery consult for evaluation/management recommendations of new acute T11 compression fracture  No surgical intervention at this admission  PT/OT  Consider DEXA scan outpatient in setting of multiple pathologic fractures w/o trauma  Continue vitamin C and D supplementation    Plan:  Follow-up with SPEP, UPEP, PTH, vitamin D 25 to rule out metabolic reasons for fracture  Evaluated by neurosurgery, no surgical interventions.  Continue conservative management with spinal brace, PT OT and pain management

## 2024-07-01 NOTE — PLAN OF CARE
Problem: PHYSICAL THERAPY ADULT  Goal: Performs mobility at highest level of function for planned discharge setting.  See evaluation for individualized goals.  Description: Treatment/Interventions: Functional transfer training, LE strengthening/ROM, Therapeutic exercise, Endurance training, Cognitive reorientation, Patient/family training, Equipment eval/education, Bed mobility, Gait training, Continued evaluation, Spoke to MD, Spoke to nursing, Spoke to case management, Spoke to advanced practitioner, OT, ST     See flowsheet documentation for full assessment, interventions and recommendations.  7/1/2024 1419 by Artie Tong PTA  Outcome: Progressing  Note: Prognosis: Good  Problem List: Decreased strength, Decreased endurance, Decreased mobility, Impaired balance, Decreased cognition, Impaired judgement, Decreased safety awareness, Orthopedic restrictions, Pain  Assessment: pt began tx session seated OOB in the recliner as pt was agreeable to participate in PT intervention. Pt to focus on transfer training, posture/balanec w/ gait and increasing activity tolerance/endurance. In comparison to previous tx sesison slight progress was noted as pt demonstrated good recall on hand placement while ascending from recliner to RW with mod I but required VC's while descending back into recliner /s. pt was able to increase ambulation distance with RW and SPC. pt ambulated with 'x1 RW with /s no LOB. pt also ambulated 140'x1 SPC and 50'x1 SPC with s/ no LOB. Additioanl ambulation not possible as pt HR increased to low 140's as pt required several seated therapeutic rest breaks in order to all HR to decrease to normal ranges. pt is refusing stair trials in todays tx session as pt and pt family stated pt is okay with completing steps at home safely and would prefer to focus on balance and ambulation in todays tx session. Post tx pt continues to demonstrate the following deficits: limited activity tolerance, ambulation  distance w/ SPC and RW. pt would benefit from continued skilled PT intervention in order to address deficits listed above.  Barriers to Discharge: Decreased caregiver support     Rehab Resource Intensity Level, PT: III (Minimum Resource Intensity)    See flowsheet documentation for full assessment.     7/1/2024 1419 by Artie Tong PTA  Outcome: Progressing  Note: Prognosis: Good  Problem List: Decreased strength, Decreased endurance, Decreased mobility, Impaired balance, Decreased cognition, Impaired judgement, Decreased safety awareness, Orthopedic restrictions, Pain  Assessment: pt began tx session seated OOB in the recliner as pt was agreeable to participate in PT intervention. Pt to focus on transfer training, posture/balanec w/ gait and increasing activity tolerance/endurance. In comparison to previous tx sesison slight progress was noted as pt demonstrated good recall on hand placement while ascending from recliner to RW with mod I but required VC's while descending back into recliner /s. pt was able to increase ambulation distance with RW and SPC. pt ambulated with 'x1 RW with /s no LOB. pt also ambulated 140'x1 SPC and 50'x1 SPC with s/ no LOB. Additioanl ambulation not possible as pt HR increased to low 140's as pt required several seated therapeutic rest breaks in order to all HR to decrease to normal ranges. pt is refusing stair trials in [facility] tx session as pt and pt family stated pt is okay with completing steps at home safely and would prefer to focus on balance and ambulation in [facility] tx session. Post tx pt continues to demonstrate the following deficits: limited activity tolerance, ambulation distance w/ SPC and RW. pt would benefit from continued skilled PT intervention in order to address deficits listed above.  Barriers to Discharge: Decreased caregiver support     Rehab Resource Intensity Level, PT: III (Minimum Resource Intensity)    See flowsheet documentation for full assessment.

## 2024-07-02 LAB
ALBUMIN SERPL ELPH-MCNC: 3.18 G/DL (ref 3.2–5.1)
ALBUMIN SERPL ELPH-MCNC: 47.5 % (ref 48–70)
ALPHA1 GLOB SERPL ELPH-MCNC: 0.35 G/DL (ref 0.15–0.47)
ALPHA1 GLOB SERPL ELPH-MCNC: 5.2 % (ref 1.8–7)
ALPHA2 GLOB SERPL ELPH-MCNC: 1.05 G/DL (ref 0.42–1.04)
ALPHA2 GLOB SERPL ELPH-MCNC: 15.7 % (ref 5.9–14.9)
BETA GLOB ABNORMAL SERPL ELPH-MCNC: 0.38 G/DL (ref 0.31–0.57)
BETA1 GLOB SERPL ELPH-MCNC: 5.7 % (ref 4.7–7.7)
BETA2 GLOB SERPL ELPH-MCNC: 7.5 % (ref 3.1–7.9)
BETA2+GAMMA GLOB SERPL ELPH-MCNC: 0.5 G/DL (ref 0.2–0.58)
ERYTHROCYTE [DISTWIDTH] IN BLOOD BY AUTOMATED COUNT: 13.4 % (ref 11.6–15.1)
GAMMA GLOB ABNORMAL SERPL ELPH-MCNC: 1.23 G/DL (ref 0.4–1.66)
GAMMA GLOB SERPL ELPH-MCNC: 18.4 % (ref 6.9–22.3)
GLUCOSE SERPL-MCNC: 117 MG/DL (ref 65–140)
GLUCOSE SERPL-MCNC: 123 MG/DL (ref 65–140)
HCT VFR BLD AUTO: 24.8 % (ref 36.5–49.3)
HGB BLD-MCNC: 7.9 G/DL (ref 12–17)
IGG/ALB SER: 0.9 {RATIO} (ref 1.1–1.8)
INTERPRETATION UR IFE-IMP: NORMAL
MCH RBC QN AUTO: 32.1 PG (ref 26.8–34.3)
MCHC RBC AUTO-ENTMCNC: 31.9 G/DL (ref 31.4–37.4)
MCV RBC AUTO: 101 FL (ref 82–98)
PLATELET # BLD AUTO: 143 THOUSANDS/UL (ref 149–390)
PMV BLD AUTO: 10.7 FL (ref 8.9–12.7)
PROT PATTERN SERPL ELPH-IMP: ABNORMAL
PROT SERPL-MCNC: 6.7 G/DL (ref 6.4–8.2)
RBC # BLD AUTO: 2.46 MILLION/UL (ref 3.88–5.62)
WBC # BLD AUTO: 9.06 THOUSAND/UL (ref 4.31–10.16)

## 2024-07-02 PROCEDURE — 99232 SBSQ HOSP IP/OBS MODERATE 35: CPT | Performed by: INTERNAL MEDICINE

## 2024-07-02 PROCEDURE — 86334 IMMUNOFIX E-PHORESIS SERUM: CPT | Performed by: STUDENT IN AN ORGANIZED HEALTH CARE EDUCATION/TRAINING PROGRAM

## 2024-07-02 PROCEDURE — 84165 PROTEIN E-PHORESIS SERUM: CPT | Performed by: STUDENT IN AN ORGANIZED HEALTH CARE EDUCATION/TRAINING PROGRAM

## 2024-07-02 PROCEDURE — 99232 SBSQ HOSP IP/OBS MODERATE 35: CPT | Performed by: PHYSICIAN ASSISTANT

## 2024-07-02 PROCEDURE — 85027 COMPLETE CBC AUTOMATED: CPT | Performed by: INTERNAL MEDICINE

## 2024-07-02 PROCEDURE — 84166 PROTEIN E-PHORESIS/URINE/CSF: CPT

## 2024-07-02 PROCEDURE — 97167 OT EVAL HIGH COMPLEX 60 MIN: CPT

## 2024-07-02 PROCEDURE — 82948 REAGENT STRIP/BLOOD GLUCOSE: CPT

## 2024-07-02 PROCEDURE — 99223 1ST HOSP IP/OBS HIGH 75: CPT | Performed by: INTERNAL MEDICINE

## 2024-07-02 RX ORDER — MIDODRINE HYDROCHLORIDE 2.5 MG/1
2.5 TABLET ORAL
Status: DISCONTINUED | OUTPATIENT
Start: 2024-07-02 | End: 2024-07-03 | Stop reason: HOSPADM

## 2024-07-02 RX ORDER — ALBUMIN (HUMAN) 12.5 G/50ML
25 SOLUTION INTRAVENOUS ONCE
Status: COMPLETED | OUTPATIENT
Start: 2024-07-02 | End: 2024-07-02

## 2024-07-02 RX ADMIN — SENNOSIDES 8.6 MG: 8.6 TABLET, FILM COATED ORAL at 08:16

## 2024-07-02 RX ADMIN — SODIUM CHLORIDE 125 ML/HR: 0.9 INJECTION, SOLUTION INTRAVENOUS at 21:36

## 2024-07-02 RX ADMIN — DOCUSATE SODIUM 100 MG: 100 CAPSULE, LIQUID FILLED ORAL at 08:16

## 2024-07-02 RX ADMIN — POLYETHYLENE GLYCOL 3350 17 G: 17 POWDER, FOR SOLUTION ORAL at 08:16

## 2024-07-02 RX ADMIN — ACETAMINOPHEN 975 MG: 325 TABLET, FILM COATED ORAL at 13:32

## 2024-07-02 RX ADMIN — ACETAMINOPHEN 975 MG: 325 TABLET, FILM COATED ORAL at 21:36

## 2024-07-02 RX ADMIN — ATORVASTATIN CALCIUM 80 MG: 40 TABLET, FILM COATED ORAL at 17:09

## 2024-07-02 RX ADMIN — GABAPENTIN 200 MG: 100 CAPSULE ORAL at 21:36

## 2024-07-02 RX ADMIN — SODIUM CHLORIDE 100 ML/HR: 0.9 INJECTION, SOLUTION INTRAVENOUS at 08:14

## 2024-07-02 RX ADMIN — CLOPIDOGREL BISULFATE 75 MG: 75 TABLET ORAL at 08:16

## 2024-07-02 RX ADMIN — OXYCODONE HYDROCHLORIDE AND ACETAMINOPHEN 500 MG: 500 TABLET ORAL at 08:16

## 2024-07-02 RX ADMIN — ASPIRIN 81 MG: 81 TABLET, COATED ORAL at 08:16

## 2024-07-02 RX ADMIN — ALBUMIN (HUMAN) 25 G: 0.25 INJECTION, SOLUTION INTRAVENOUS at 09:29

## 2024-07-02 RX ADMIN — MIDODRINE HYDROCHLORIDE 2.5 MG: 2.5 TABLET ORAL at 17:09

## 2024-07-02 RX ADMIN — ACETAMINOPHEN 975 MG: 325 TABLET, FILM COATED ORAL at 05:37

## 2024-07-02 RX ADMIN — FAMOTIDINE 20 MG: 20 TABLET ORAL at 17:09

## 2024-07-02 RX ADMIN — ENOXAPARIN SODIUM 40 MG: 40 INJECTION SUBCUTANEOUS at 08:15

## 2024-07-02 RX ADMIN — FAMOTIDINE 20 MG: 20 TABLET ORAL at 08:16

## 2024-07-02 RX ADMIN — DOCUSATE SODIUM 100 MG: 100 CAPSULE, LIQUID FILLED ORAL at 17:09

## 2024-07-02 RX ADMIN — METOPROLOL TARTRATE 25 MG: 25 TABLET, FILM COATED ORAL at 21:38

## 2024-07-02 RX ADMIN — Medication 2000 UNITS: at 08:16

## 2024-07-02 NOTE — PLAN OF CARE
Problem: OCCUPATIONAL THERAPY ADULT  Goal: Performs self-care activities at highest level of function for planned discharge setting.  See evaluation for individualized goals.  Description: Treatment Interventions: ADL retraining, Functional transfer training, Endurance training, Patient/family training, Equipment evaluation/education, Compensatory technique education, Continued evaluation, Activityengagement, Energy conservation (body mechanics training, brace training)          See flowsheet documentation for full assessment, interventions and recommendations.   Outcome: Progressing  Note: Limitation: Decreased ADL status, Decreased Safe judgement during ADL, Decreased cognition, Decreased endurance, Decreased self-care trans, Decreased high-level ADLs (impaired pain, balance, fxnl mobility, act aron, fxnl reach, standing aron, safety awareness, insight, learning new tasks)  Prognosis: Good  Assessment: Pt is a 83 y.o. male seen for OT evaluation s/p admission to [facility] on 6/29/2024  due to Symptomatic stenosis of left carotid artery. Pt's PMH impacting occupational performance is listed above. Pt's PLOF also listed in above flowsheet. Pt is highly motivated to return to home. During evaluation pt performed as is outlined above in flowsheet.  Standardized assessments used to assist in identifying performance deficits include [facility] 6-Clicks. Pt's raw score on the AM-PAC Daily activity inpatient short form is 20, standardized score is 42.03, which is greater than 39.4. Patients at this level are likely to benefit from DC to home. Performance deficits that affect the pt’s occupational performance can be seen above. Due to pt's current functional limitations and medical complications, pt is functioning below baseline level of independence. Pt would benefit from continued skilled OT treatment in order to maximize safety, independence and overall performance with ADLs (including brace management) IADLs,  functional mobility, & functional transfers in order to achieve highest level of function. Based on functional performance deficits and assessments, this evaluation is identified as a high complexity evaluation.     Rehab Resource Intensity Level, OT: III (Minimum Resource Intensity)

## 2024-07-02 NOTE — PROGRESS NOTES
"Progress Note - Vascular Surgery   Suhail Borrego 83 y.o. male MRN: [id number]  Unit/Bed#: S -01 Encounter: [id number]    Assessment:  82 yo M with stroke like symptoms, RUE weakness/aphasia in the setting of L ICA and CCA stenosis, likely symptomatic JCARLOS.    Plan:  - recommend carotid intervention, likely CEA, given symptomatic JCARLOS  - appreciate neuro recs: recommend intervention in 1-2 weeks  - cont plavix/asa  -Cardiac clearance obtained; no clear contraindication to proceed with surgical intervention on left-sided carotid.  -Pulmonology clearance pending  -Final surgical planning to be determined once clearances obtained.   Will discuss with     Subjective/Objective     Subjective: Feeling well this morning, his right sided strength is back to normal but he feels his speech is slightly delayed still when choosing his words. Family member at bedside feels she does not hear any further slurring.       Objective:     Blood pressure (!) 95/46, pulse 96, temperature 100 °F (37.8 °C), temperature source Oral, resp. rate 15, height 5' 7\" (1.702 m), weight 67.1 kg (148 lb), SpO2 95%.,Body mass index is 23.18 kg/m².      Intake/Output Summary (Last 24 hours) at 7/2/2024 0801  Last data filed at 7/2/2024 0740  Gross per 24 hour   Intake 1430 ml   Output 1200 ml   Net 230 ml       Invasive Devices       Peripheral Intravenous Line  Duration             Peripheral IV 07/01/24 Left;Ventral (anterior) Forearm <1 day                    Physical Exam  Vitals reviewed.   Constitutional:       General: He is not in acute distress.     Appearance: Normal appearance. He is normal weight. He is not toxic-appearing.   Cardiovascular:      Rate and Rhythm: Normal rate.   Pulmonary:      Effort: Pulmonary effort is normal. No respiratory distress.   Skin:     General: Skin is warm and dry.   Neurological:      General: No focal deficit present.      Mental Status: He is alert.      Cranial Nerves: No dysarthria or " facial asymmetry.      Motor: No weakness.   Psychiatric:         Mood and Affect: Mood normal.         Behavior: Behavior normal.            Scheduled Meds:  Current Facility-Administered Medications   Medication Dose Route Frequency Provider Last Rate    acetaminophen  975 mg Oral Q8H [facility] Anu You MD      aluminum-magnesium hydroxide-simethicone  30 mL Oral Q4H PRN Cirilo Montana MD      ascorbic acid  500 mg Oral Daily Anu You MD      aspirin  81 mg Oral Daily Anu You MD      atorvastatin  80 mg Oral QPM Anu You MD      cholecalciferol  2,000 Units Oral Daily Anu You MD      clopidogrel  75 mg Oral Daily Cirilo Montana MD      docusate sodium  100 mg Oral BID Anu You MD      enoxaparin  40 mg Subcutaneous Daily Anu You MD      famotidine  20 mg Oral BID Anu You MD      gabapentin  200 mg Oral HS Javid Nagy DO      glycerin (adult)  1 suppository Rectal Once PRN Anu You MD      labetalol  10 mg Intravenous Q4H PRN Cirilo Montana MD      metoprolol tartrate  25 mg Oral Q12H [facility] Walter Strong MD      oxyCODONE  2.5 mg Oral Q4H PRN Anu You MD      Or    oxyCODONE  5 mg Oral Q4H PRN Anu You MD      polyethylene glycol  17 g Oral Daily Anu You MD      senna  1 tablet Oral Daily Anu You MD      sodium chloride  100 mL/hr Intravenous Continuous Arden Muse  mL/hr (07/01/24 1725)    traMADol  50 mg Oral Q6H PRN Delmy Gao MD       Continuous Infusions:sodium chloride, 100 mL/hr, Last Rate: 100 mL/hr (07/01/24 1725)      PRN Meds:.  aluminum-magnesium hydroxide-simethicone    glycerin (adult)    labetalol    oxyCODONE **OR** oxyCODONE    traMADol    Lab Results and Cultures:   CBC with diff:   Lab Results   Component Value Date    WBC 9.06 07/02/2024     HGB 7.9 (L) 07/02/2024    HCT 24.8 (L) 07/02/2024     (H) 07/02/2024     (L) 07/02/2024    RBC 2.46 (L) 07/02/2024    MCH 32.1 07/02/2024    MCHC 31.9 07/02/2024    RDW 13.4 07/02/2024    MPV 10.7 07/02/2024    NRBC 0 07/01/2024   ,   BMP/CMP:  Lab Results   Component Value Date     06/10/2015    K 3.8 06/30/2024    K 4.4 06/10/2015     06/30/2024     06/10/2015    CO2 29 06/30/2024    CO2 25 06/30/2024    ANIONGAP 8 06/10/2015    BUN 30 (H) 06/30/2024    BUN 17 06/10/2015    CREATININE 1.29 06/30/2024    CREATININE 1.03 06/10/2015    GLUCOSE 123 06/30/2024    GLUCOSE 91 06/10/2015    CALCIUM 9.0 06/30/2024    CALCIUM 8.8 06/10/2015    AST 20 06/29/2024    AST 51 (H) 06/10/2015    ALT 14 06/29/2024    ALT 52 06/10/2015    ALKPHOS 61 06/29/2024    ALKPHOS 100 06/10/2015    PROT 8.2 06/10/2015    BILITOT 0.46 06/10/2015    EGFR 50 06/30/2024   ,   Lipid Panel:   Lab Results   Component Value Date    CHOL 238 06/10/2015   ,   Coags:   Lab Results   Component Value Date    PTT 38 (H) 06/29/2024    INR 1.08 06/29/2024   ,     Blood Culture:   Lab Results   Component Value Date    BLOODCX No Growth After 5 Days. 09/08/2022    BLOODCX No Growth After 5 Days. 09/08/2022   ,   Urinalysis:   Lab Results   Component Value Date    COLORU Light Yellow 06/29/2024    COLORU Yellow 01/23/2017    CLARITYU Clear 06/29/2024    CLARITYU Transparent 01/23/2017    SPECGRAV 1.043 (H) 06/29/2024    SPECGRAV 1.030 01/23/2017    PHUR 5.5 06/29/2024    PHUR 6.0 10/16/2021    PHUR 5.0 01/23/2017    LEUKOCYTESUR Small (A) 06/29/2024    LEUKOCYTESUR - 01/23/2017    NITRITE Negative 06/29/2024    NITRITE - 01/23/2017    PROTEINUA - 01/23/2017    GLUCOSEU Negative 06/29/2024    GLUCOSEU Negative 06/10/2015    KETONESU Negative 06/29/2024    KETONESU - 01/23/2017    BILIRUBINUR Negative 06/29/2024    BILIRUBINUR neg 12/12/2016    BLOODU Negative 06/29/2024    BLOODU - 01/23/2017   ,   Urine Culture:   Lab Results  "  Component Value Date    URINECX No Growth <1000 cfu/mL 06/29/2024   ,   Wound Culure: No results found for: \"WOUNDCULT\"    VTE Pharmacologic Prophylaxis: Enoxaparin (Lovenox)  VTE Mechanical Prophylaxis: sequential compression device      Marta Blum PA-C  7/2/2024 8:01 AM    "

## 2024-07-02 NOTE — CONSULTS
Consultation - Pulmonary Medicine   Suhail Borrego 83 y.o. male MRN: [id number]  Unit/Bed#: S -01 Encounter: [id number]      Assessment/Plan:    1.  Acute hypoxic respiratory failure likely multifaceted as listed below        -Currently on 2 L 97%, does not wear home O2        -Continue saturations greater than 88%        -Pulmonary toileting: Deep breathing cough out of bed as tolerated incentive spirometry         -Ambulatory pulse ox prior to discharge    2.  CVA w/ Symptomatic left carotid artery stenosis        -Extensive left carotid disease and plaque burden        -Vascular surgery following        -Will need carotid intervention: CEA vs JCARLOS    3. ILD likely IPF        -Rheumatological workup unremarkable        -Overall patient has been somewhat asymptomatic over the last several years        -Some minimal chronic shortness of breath with exertion        -Not interested in stated antifibrotic's at this time    4.  Preoperative assessment     From a pulmonary/IPF standpoint patient is overall at his baseline.  During this hospitalization patient was requiring some oxygen therapy this is likely secondary to his significant back pain w/ initiating TLSO.  Previous PFTs in 2021 that showed DLCO of 26% however patient reports that he had a very difficult time performing the maneuver.  Patient reports he is at his chronic occasional dyspnea with exertion.  From a pulmonary standpoint patient is at minimal risk for postoperative pulmonary complications.  I spoke with patient about these complications but are not limited to respiratory failure requiring longer intubation, pneumonia, aspiration pneumonitis, pleural effusion, atelectasis, pneumothorax, bronchospasm, or hemoptysis.  I did report that patient may have some postoperative bronchospasms she may require some steroid therapy.  I recommend patient obtaining albuterol nebulizer treatment prior to procedure.      MIGUEL postoperative respiratory risk  "assessment-patient is at 3.8% risk of mechanical ventilation for greater than 48 hours after surgery or unplanned intubation less than 30 days of surgery    ARISICAT score for postoperative pulmonary complications-patient is at low low risk postoperative pulmonary complications      History of Present Illness   Physician Requesting Consult: Kelli Street MD  Reason for Consult / Principal Problem: Preop assessment  Hx and PE limited by: Nothing  Chief Complaint: \" Having some back pain\"  HPI: Suhail Borrego is a 83 y.o.  male who presented to [facility] with complaints of back pain on 6/29/2024.  Patient has past medical history of GERD, BPH, CAD, and chronic kidney disease.  Patient initially presented to emergency department with ongoing chronic back pain.  Pain management was consulted analgesic management.  Upon hospital admission patient was noted to have 2 new lacunar infarcts along with symptomatic stenosis of the left carotid artery.  Patient has had hypoxic events throughout hospitalization as low as 86%.  Patient currently on 2 L nasal cannula.     Pulmonary was consulted for preoperative assessment.  Today upon examination patient was at his respiratory baseline.  Patient does report some shortness of breath that has been chronic over the last year year and a half.  Patient does report his back pain does cause some increasing shortness of breath at times.  Patient currently denying any fevers, chills, night sweats, pleuritic chest pain, or palpitations.     From a pulmonary standpoint, patient follows with Dr. Nevarez for his IPF.  Patient has an approximate 1 pack/day 50 years smoking history.  Patient reports he quit smoking in 2011.  Patient had PFTs performed in 2021 which showed DLCO of 26% with difficulty performing maneuver.  Patient is currently not maintained on any inhaled or oxygen therapies.  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  Patient " reports history of GERD in which she is maintained on Pepcid.  Patient denies any symptoms of EVER, seasonal allergies, or postnasal drip.  Patient denies any recent acute exposures to dust, mold, spices, or silica.    Inpatient consult to Pulmonology  Consult performed by: KEVIN Mackenzie  Consult ordered by: Arden Muse MD          Review of Systems   Constitutional:  Negative for chills and fever.   HENT:  Negative for ear pain and sore throat.    Eyes:  Negative for pain and visual disturbance.   Respiratory:  Negative for apnea, cough, choking, chest tightness, shortness of breath, wheezing and stridor.    Cardiovascular:  Negative for chest pain and palpitations.   Gastrointestinal:  Negative for abdominal pain and vomiting.   Genitourinary:  Negative for dysuria and hematuria.   Musculoskeletal:  Negative for arthralgias and back pain.   Skin:  Negative for color change and rash.   Neurological:  Negative for seizures and syncope.   Psychiatric/Behavioral:  Negative for agitation and behavioral problems.    All other systems reviewed and are negative.      Historical Information   Past Medical History:   Diagnosis Date    Abnormal liver function test     RESOLVED: 14SEP2017    Allergic rhinitis     Anemia     LAST ASSESSED: 19OCT2017    Arthritis     BPH (benign prostatic hyperplasia)     CAD (coronary artery disease)     Coronary artery disease     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HCC)     LAST ASSESSED: 05OCT2017    Former tobacco use     GERD (gastroesophageal reflux disease)     Hand paresthesia     RESOLVED: 11SEP2017    Hyperlipidemia     Hypertension     Lumbar stenosis     Peripheral artery disease (HCC)     LAST ASSESSED: 27OCT2017    Stage 3a chronic kidney disease (HCC) 7/11/2021     Past Surgical History:   Procedure Laterality Date    BACK SURGERY      COLONOSCOPY      LUMBAR FUSION      MD ARTHRODESIS POSTERIOR/PSTLAT TQ 1NTRSPC LUMBAR N/A 11/03/2016     Procedure: L2-S1 POSTERIOR LUMBAR FUSION AND DECOMPRESSION (Impulse), Posterior lateral fixation; dural repair.;  Surgeon: Leslie Gil MD;  Location: [facility];  Service: Orthopedics    NY CABG W/ARTERIAL GRAFT SINGLE ARTERIAL GRAFT N/A 2018    Procedure: CABG X4 with LIMA - LAD, SVG - RCA, OM2, & Diagonal ; Left Leg EVH; MATTHEW;  Surgeon: Eric Bar DO;  Location: [facility];  Service: Cardiac Surgery    NY COLONOSCOPY FLX DX W/COLLJ SPEC WHEN PFRMD N/A 11/15/2017    Procedure: EGD AND COLONOSCOPY;  Surgeon: Mariano Godinez MD;  Location: [facility];  Service: Gastroenterology    SEPTOPLASTY      LAST ASSESSED; 2014    TONSILECTOMY AND ADNOIDECTOMY      LAST ASSESSED: 2014    UPPER GASTROINTESTINAL ENDOSCOPY       Social History   Social History     Substance and Sexual Activity   Alcohol Use Not Currently    Alcohol/week: 1.0 standard drink of alcohol    Types: 1 Shots of liquor per week    Comment: beer, wine, scotch every day; SOCIAL AS PER ALL SCRIPTS      Social History     Substance and Sexual Activity   Drug Use Not Currently    Types: Marijuana    Comment: medical majiyulianaa     Social History     Tobacco Use   Smoking Status Former    Current packs/day: 0.00    Average packs/day: 1 pack/day for 50.0 years (50.0 ttl pk-yrs)    Types: Cigarettes    Start date:     Quit date:     Years since quittin.5   Smokeless Tobacco Never     E-Cigarette/Vaping    E-Cigarette Use Never User      E-Cigarette/Vaping Substances    Nicotine No     THC No     CBD No     Flavoring No     Other No     Unknown No      Occupational History: NA    Family History:   Family History   Problem Relation Age of Onset    Emphysema Mother     Liver disease Mother     Coronary artery disease Father     Hypertension Father     Liver disease Father     Heart failure Father     Stroke Father         CVA     Heart attack Father     Coronary artery disease Brother     Heart disease Brother         younger  "brother by pass and other brother 4 stents placed    Other Family         BACK PROBLEM     Stroke Family         CVA    Emphysema Family     Hypertension Family         BENIGN       Meds/Allergies   pertinent pulmonary meds have been reviewed    Allergies   Allergen Reactions    Penicillins Other (See Comments)     Hallucinations;  Patient reported that he was seeing visual disturbances         Objective   Vitals: Blood pressure 130/56, pulse 90, temperature 100 °F (37.8 °C), temperature source Oral, resp. rate 15, height 5' 7\" (1.702 m), weight 67.1 kg (148 lb), SpO2 97%.,Body mass index is 23.18 kg/m².    Intake/Output Summary (Last 24 hours) at 7/2/2024 1132  Last data filed at 7/2/2024 0929  Gross per 24 hour   Intake 3230 ml   Output 1200 ml   Net 2030 ml     Invasive Devices       Peripheral Intravenous Line  Duration             Peripheral IV 07/01/24 Left;Ventral (anterior) Forearm <1 day                    Physical Exam  Constitutional:       General: He is not in acute distress.     Appearance: Normal appearance. He is normal weight. He is not ill-appearing.   HENT:      Head: Normocephalic and atraumatic.      Nose: Nose normal. No congestion or rhinorrhea.      Mouth/Throat:      Mouth: Mucous membranes are dry.      Pharynx: Oropharynx is clear.   Cardiovascular:      Rate and Rhythm: Normal rate and regular rhythm.      Pulses: Normal pulses.      Heart sounds: Normal heart sounds. No murmur heard.     No friction rub. No gallop.   Pulmonary:      Effort: No respiratory distress.      Breath sounds: No stridor. Rales present. No wheezing or rhonchi.      Comments: Bilateral lower lobe Rales  Chest:      Chest wall: No tenderness.   Abdominal:      General: Abdomen is flat. Bowel sounds are normal. There is no distension.      Palpations: Abdomen is soft. There is no mass.   Musculoskeletal:         General: No swelling or tenderness. Normal range of motion.      Cervical back: Normal range of motion. " "No rigidity or tenderness.   Skin:     General: Skin is warm and dry.      Coloration: Skin is not jaundiced or pale.   Neurological:      General: No focal deficit present.      Mental Status: He is alert and oriented to person, place, and time. Mental status is at baseline.         Lab Results: I have personally reviewed pertinent lab results., ABG: No results found for: \"PHART\", \"LLX4GEG\", \"PO2ART\", \"JBB5VGE\", \"W5UQZVLO\", \"BEART\", \"SOURCE\", BNP: No results found for: \"BNP\", CBC:   Lab Results   Component Value Date    WBC 9.06 2024    HGB 7.9 (L) 2024    HCT 24.8 (L) 2024     (H) 2024     (L) 2024    RBC 2.46 (L) 2024    MCH 32.1 2024    MCHC 31.9 2024    RDW 13.4 2024    MPV 10.7 2024   , CMP: No results found for: \"SODIUM\", \"K\", \"CL\", \"CO2\", \"ANIONGAP\", \"BUN\", \"CREATININE\", \"GLUCOSE\", \"CALCIUM\", \"AST\", \"ALT\", \"ALKPHOS\", \"PROT\", \"BILITOT\", \"EGFR\", PT/INR: No results found for: \"PT\", \"INR\"        Imaging Studies: I have personally reviewed pertinent films in PACS     Chest x-ray 2024 no acute cardiopulmonary disease    EKG, Pathology, and Other Studies: I have personally reviewed pertinent films in PACS     2024-grade 1 diastolic dysfunction and EF 65% PA pressure 35 mmHg    Pulmonary Results (PFTs, PSG): I have personally reviewed pertinent films in PACS     ate of Testin2022     Date of Interpretation: 4/3/2022     Requesting Physician: Mr. Nevarez     Reason for Testing: Pulmonary Fibrosis     Reference set for interpretation: [id number]     Procedure: The patient was taken to pulmonary function testing laboratory.  The patient demonstrated good effort and cooperation.  The results of this test meet ATS standards for acceptability and repeatability.  Data set appears appropriate for interpretation.     Post bronchodilator testing performed after the administration of 2.5mg albuterol in 3cc normal saline administered via " "nebulizer per bronchodilator protocol.     Results:  FEV1/FVC Ratio: 87 %  Forced Vital Capacity: 1.72 L    49 % predicted  FEV1: 1.50 L61 % predicted     After administration of bronchodilator   FVC: 1.72 L, 49% predicted, +0% change  FEV1: 1.53L, 62% predicted, +2% change     Lung volumes by body plethysmography:   Total Lung Capacity 36 % predicted   Residual volume 26 % predicted     DLCO corrected for patients hemoglobin level: 26 %     Interpretation:     Normal Spirometry      No significant response to the administration to bronchodilator per ATS Standards     Severe restrictive lung disease as indicated by decreased TLC     Severe reduction in diffusion capacity     Flow volume loop consistent with restrictive lung disease       Code Status: Level 1 - Full Code    Portions of the record may have been created with voice recognition software.  Occasional wrong word or \"sound a like\" substitutions may have occurred due to the inherent limitations of voice recognition software.  Read the chart carefully and recognize, using context, where substitutions have occurred.  "

## 2024-07-02 NOTE — CASE MANAGEMENT
Case Management Discharge Planning Note    Patient name Suhail Borrego  Location S /S -01 MRN [id number]  : 1940 Date 2024       Current Admission Date: 2024  Current Admission Diagnosis:Symptomatic stenosis of left carotid artery   Patient Active Problem List    Diagnosis Date Noted Date Diagnosed    Symptomatic stenosis of left carotid artery 2024     Localized swelling of left lower extremity 2024     Leukocytosis 2024     Lumbar spondylosis 2024     Intractable back pain 2024     Pressure ulcer 2024     Inflammation of sacroiliac joint (HCC) 2023    Internal hemorrhoids 2022     Diverticular disease 2022     Acute left-sided low back pain with left-sided sciatica 10/12/2022     Left lower quadrant abdominal pain 10/08/2022     Diverticulitis 2022     Stage 3 chronic kidney disease (HCC) 2022     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 2022     Trochanteric bursitis 2022     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HCC) 2022     Neuropathy 10/29/2021     Pulmonary fibrosis determined by high resolution computed tomography (HCC) 2021     Near syncope 2021     SOB (shortness of breath) 07/10/2021     Neck pain on left side 07/10/2021     Carotid stenosis, asymptomatic, bilateral 2020     Phimosis 2020     Bruit of left carotid artery 2020     Postlaminectomy syndrome of lumbar region      Lumbar radiculopathy      S/P lumbar fusion 2018     Lumbar stenosis 2018     Peripheral artery disease (HCC) 2018     GERD (gastroesophageal reflux disease) 2018     Vasomotor rhinitis 2018     Leg pain, posterior, left 2018     Macrocytic anemia 2018     Hx of CABG 2018     Former tobacco use      Hyperlipidemia      Primary hypertension      CAD (coronary artery disease) 2017      Spondylolisthesis of lumbar region 11/07/2016     New acute T 11 Thoracic compression fracture, closed, initial encounter, w old T12 compression fx 11/03/2016       LOS (days): 3  Geometric Mean LOS (GMLOS) (days): 2.9  Days to GMLOS:-0.3     OBJECTIVE:  Risk of Unplanned Readmission Score: 18.92         Current admission status: Inpatient   Preferred Pharmacy:   [facility]/pharmacy #[id number] - [place], [place] - [address] [address]  Phone: [phone] Fax: [phone]    [facility]/pharmacy #[id number]  [place] - [address] [address]  Phone: [phone] Fax: [phone]    Primary Care Provider: Mikaela Duenas DO    Primary Insurance: UMANG SAMM BAIRD  Secondary Insurance:     DISCHARGE DETAILS:     IMM Given (Date):: 07/02/24  IMM Given to:: Patient  ..IMM reviewed with patient, patient agrees with discharge determination.

## 2024-07-02 NOTE — OCCUPATIONAL THERAPY NOTE
Occupational Therapy Evaluation      Suhail Borrego    7/2/2024    Principal Problem:    Symptomatic stenosis of left carotid artery  Active Problems:    New acute T 11 Thoracic compression fracture, closed, initial encounter, w old T12 compression fx    Hyperlipidemia    Primary hypertension    Hx of CABG    S/P lumbar fusion    Stage 3 chronic kidney disease (HCC)    Localized swelling of left lower extremity    Leukocytosis      Past Medical History:   Diagnosis Date    Abnormal liver function test     RESOLVED: 14SEP2017    Allergic rhinitis     Anemia     LAST ASSESSED: 19OCT2017    Arthritis     BPH (benign prostatic hyperplasia)     CAD (coronary artery disease)     Coronary artery disease     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HCC)     LAST ASSESSED: 05OCT2017    Former tobacco use     GERD (gastroesophageal reflux disease)     Hand paresthesia     RESOLVED: 11SEP2017    Hyperlipidemia     Hypertension     Lumbar stenosis     Peripheral artery disease (HCC)     LAST ASSESSED: 27OCT2017    Stage 3a chronic kidney disease (HCC) 7/11/2021       Past Surgical History:   Procedure Laterality Date    BACK SURGERY      COLONOSCOPY      LUMBAR FUSION      NE ARTHRODESIS POSTERIOR/PSTLAT TQ 1NTRSPC LUMBAR N/A 11/03/2016    Procedure: L2-S1 POSTERIOR LUMBAR FUSION AND DECOMPRESSION (Impulse), Posterior lateral fixation; dural repair.;  Surgeon: Leslie Gil MD;  [facility]: [facility];  Service: Orthopedics    NE CABG W/ARTERIAL GRAFT SINGLE ARTERIAL GRAFT N/A 01/19/2018    Procedure: CABG X4 with LIMA - LAD, SVG - RCA, OM2, & Diagonal ; Left Leg EVH; MATTHEW;  Surgeon: Eric Bar DO;  Location: [facility];  Service: Cardiac Surgery    NE COLONOSCOPY FLX DX W/COLLJ SPEC WHEN PFRMD N/A 11/15/2017    Procedure: EGD AND COLONOSCOPY;  Surgeon: Mariano Godinez MD;  Location: [facility];  Service: Gastroenterology    SEPTOPLASTY      LAST ASSESSED; 13MAY2014    TONSILECTOMY AND ADNOIDECTOMY      LAST ASSESSED: 13MAY2014     "UPPER GASTROINTESTINAL ENDOSCOPY          07/02/24 1113   OT Last Visit   OT Visit Date 07/02/24   Note Type   Note type Evaluation   Additional Comments Pt presents in restroom, agreeable to OT evaluation.   Pain Assessment   Pain Assessment Tool 0-10   Pain Score No Pain   Restrictions/Precautions   Other Precautions Cognitive;Chair Alarm;Bed Alarm;Fall Risk;Pain;Spinal precautions;Hard of hearing   Home Living   Type of Home House   Home Layout Two level;1/2 bath on main level  (stairglide access into the home via basement garage as well as a stair glide from 1st to 2nd floor where bed/full bath located.)   Bathroom Shower/Tub Walk-in shower   Bathroom Toilet Standard   Bathroom Equipment Grab bars in shower;Shower chair;Commode   Bathroom Accessibility Accessible   Home Equipment Walker;Cane;Sock aid;Long-handled shoehorn;Reacher  (rollator walker)   Prior Function   Level of [place] Independent with ADLs;Independent with functional mobility;Independent with IADLS   Lives With (S)  Spouse   Receives Help From Family;Outpatient therapy  (2 local daughters however they work from home during the day.)   IADLs Independent with driving;Independent with meal prep;Independent with medication management  (drives short distances, small meal prep. Recently family assisting with groceries. Family also prepares light meals to thaw/microwave)   Falls in the last 6 months 0  (pt denies recent falls)   Vocational Retired  (IT support)   Lifestyle   Autonomy Pt reports having AE for LB ADLs. At true baseline, (I)/mod (I) for ADLs.   Reciprocal Relationships Supportive family.   Intrinsic Gratification crossword puzzle books but reports IV site was limiting ability to write during this hospital admission.   General   Family/Caregiver Present Yes  (son in law present)   Subjective   Subjective \"I can't really manage this brace by myself. I need help with that.\"   ADL   Where Assessed Standing at sink   Eating Assistance 7  " Independent   Grooming Assistance 5  Supervision/Setup   Grooming Deficit Supervision/safety;Setup;Standing with assistive device;Increased time to complete;Teeth care;Verbal cueing  (vc's for safety. significant forward flexion when attempting to rinse mouth in sink. vc's for safety. Recommended rinsing in cup  rather then extreme forward flexion.)   UB Bathing Assistance 5  Supervision/Setup   UB Bathing Deficit Increased time to complete  (simulated)   LB Bathing Assistance 4  Minimal Assistance   UB Dressing Assistance 3  Moderate Assistance  (to manage brace)   LB Dressing Assistance 4  Minimal Assistance   LB Dressing Deficit Increased time to complete   Functional Assistance 5  Supervision/Setup   Bed Mobility   Additional Comments pt presents OOB upon OT arrival and returned to chair at the end of session.   Transfers   Sit to Stand 5  Supervision   Additional items Verbal cues  (for proper hand placement; observed pulling self to stand with RW)   Stand to Sit 5  Supervision   Additional items Verbal cues  (holding RW to sit; vc's for proper technique)   Functional Mobility   Functional Mobility 5  Supervision   Additional items Rolling walker   Balance   Static Sitting Good   Dynamic Sitting Good   Static Standing Fair +   Dynamic Standing Fair   Activity Tolerance   Activity Tolerance Patient limited by fatigue;Patient tolerated treatment well   Nurse Made Aware RN cleared pt for OT evaluation.   RUE Assessment   RUE Assessment WFL  (grossly assessed; observed pt utilizing (B)UE's to complete self cares, mobility, as well as manage brace. grossly, no deficits noted.)   LUE Assessment   LUE Assessment WFL  (same as above.)   Hand Function   Gross Motor Coordination Functional   Fine Motor Coordination Functional   Vision-Basic Assessment   Current Vision Wears glasses all the time   Vision - Complex Assessment   Acuity Able to read clock/calendar on wall without difficulty;Able to read employee name badge  without difficulty   Cognition   Overall Cognitive Status Impaired   Arousal/Participation Alert;Cooperative   Attention Attends with cues to redirect   Orientation Level Oriented X4   Memory Decreased short term memory;Within functional limits  (grossly WFL but mildly forgetful of safe transfer techniques during session and required multiple cues for proper hand placement.)   Following Commands Follows one step commands without difficulty   Comments Pt verified ID by stating name and .   Assessment   Limitation Decreased ADL status;Decreased Safe judgement during ADL;Decreased cognition;Decreased endurance;Decreased self-care trans;Decreased high-level ADLs  (impaired pain, balance, fxnl mobility, act aron, fxnl reach, standing aron, safety awareness, insight, learning new tasks)   Prognosis Good   Assessment Pt is a 83 y.o. male seen for OT evaluation s/p admission to [facility] on 2024  due to Symptomatic stenosis of left carotid artery. Pt's PMH impacting occupational performance is listed above. Pt's PLOF also listed in above flowsheet. Pt is highly motivated to return to home. During evaluation pt performed as is outlined above in flowsheet.  Standardized assessments used to assist in identifying performance deficits include [facility] 6-Clicks. Pt's raw score on the AM-PAC Daily activity inpatient short form is 20, standardized score is 42.03, which is greater than 39.4. Patients at this level are likely to benefit from DC to home. Performance deficits that affect the pt’s occupational performance can be seen above. Due to pt's current functional limitations and medical complications, pt is functioning below baseline level of independence. Pt would benefit from continued skilled OT treatment in order to maximize safety, independence and overall performance with ADLs (including brace management) IADLs, functional mobility, & functional transfers in order to achieve highest level of function. Based on  functional performance deficits and assessments, this evaluation is identified as a high complexity evaluation.   Goals   Patient Goals To go home   LTG Time Frame 7-10   Long Term Goal #1 see goals listed below   Plan   Treatment Interventions ADL retraining;Functional transfer training;Endurance training;Patient/family training;Equipment evaluation/education;Compensatory technique education;Continued evaluation;Activityengagement;Energy conservation  (body mechanics training, brace training)   Goal Expiration Date 07/12/24   OT Frequency 3-5x/wk   Discharge Recommendation   Rehab Resource Intensity Level, OT III (Minimum Resource Intensity)   AM-PAC Daily Activity Inpatient   Lower Body Dressing 3   Bathing 3   Toileting 4   Upper Body Dressing 3   Grooming 3   Eating 4   Daily Activity Raw Score 20   Daily Activity Standardized Score (Calc for Raw Score >=11) 42.03   AM-PAC Applied Cognition Inpatient   Following a Speech/Presentation 3   Understanding Ordinary Conversation 4   Taking Medications 3   Remembering Where Things Are Placed or Put Away 3   Remembering List of 4-5 Errands 2   Taking Care of Complicated Tasks 2   Applied Cognition Raw Score 17   Applied Cognition Standardized Score 36.52   End of Consult   Education Provided Yes;Family or social support of family present for education by provider   Patient Position at End of Consult Bed/Chair alarm activated;Seated edge of bed;All needs within reach   Nurse Communication Nurse aware of consult     GOALS:    Pt will achieve the following within specified time frame:  *Perform ADL transfers at mod (I) level for inc'd independence with ADLs/purposeful tasks    *Bed mobility- mod (I) for inc'd independence to manage own comfort and initiate EOB & OOB purposeful tasks    *Perform UB ADL at mod (I)/(I), including donning/doffing brace, for inc'd independence with self cares    *Perform LB ADL at (S) level using AE prn for inc'd independence with self  cares    *Increase static stand balance to Good and dyn stand balance to Fair+ for inc'd safety with standing purposeful tasks    *Increase stand tolerance x5-7m for inc'd tolerance with standing purposeful tasks    *Participate in further cognitive testing to assist with safe d/c planning    *Perform sinkside G&H mod (I), with good safety and Fair+ balance for inc'd independence with self cares    *Pt will verbalize 2-3 energy conservation techniques to utilize in order to complete purposeful/ADL tasks safey, at highest level of performance and independence and with self report of dec'd fatigue.    *Pt will verbalize 2-3 fall prevention techniques to utilize in order to complete purposeful/ADL/IADL tasks safely in order to decrease risk of falls      Thank you  Paul Morin, OT

## 2024-07-02 NOTE — PLAN OF CARE
Problem: Potential for Falls  Goal: Patient will remain free of falls  Description: INTERVENTIONS:  - Educate patient/family on patient safety including physical limitations  - Instruct patient to call for assistance with activity   - Consult OT/PT to assist with strengthening/mobility   - Keep Call bell within reach  - Keep bed low and locked with side rails adjusted as appropriate  - Keep care items and personal belongings within reach  - Initiate and maintain comfort rounds  - Make Fall Risk Sign visible to staff  - Offer Toileting every 2 Hours, in advance of need  - Initiate/Maintain alarm  - Obtain necessary fall risk management equipment:   - Apply yellow socks and bracelet for high fall risk patients  - Consider moving patient to room near nurses station  Outcome: Progressing     Problem: PAIN - ADULT  Goal: Verbalizes/displays adequate comfort level or baseline comfort level  Description: Interventions:  - Encourage patient to monitor pain and request assistance  - Assess pain using appropriate pain scale  - Administer analgesics based on type and severity of pain and evaluate response  - Implement non-pharmacological measures as appropriate and evaluate response  - Consider cultural and social influences on pain and pain management  - Notify physician/advanced practitioner if interventions unsuccessful or patient reports new pain  Outcome: Progressing     Problem: INFECTION - ADULT  Goal: Absence or prevention of progression during hospitalization  Description: INTERVENTIONS:  - Assess and monitor for signs and symptoms of infection  - Monitor lab/diagnostic results  - Monitor all insertion sites, i.e. indwelling lines, tubes, and drains  - Monitor endotracheal if appropriate and nasal secretions for changes in amount and color  - Foss appropriate cooling/warming therapies per order  - Administer medications as ordered  - Instruct and encourage patient and family to use good hand hygiene  technique  - Identify and instruct in appropriate isolation precautions for identified infection/condition  Outcome: Progressing  Goal: Absence of fever/infection during neutropenic period  Description: INTERVENTIONS:  - Monitor WBC    Outcome: Progressing     Problem: SAFETY ADULT  Goal: Patient will remain free of falls  Description: INTERVENTIONS:  - Educate patient/family on patient safety including physical limitations  - Instruct patient to call for assistance with activity   - Consult OT/PT to assist with strengthening/mobility   - Keep Call bell within reach  - Keep bed low and locked with side rails adjusted as appropriate  - Keep care items and personal belongings within reach  - Initiate and maintain comfort rounds  - Make Fall Risk Sign visible to staff  - Offer Toileting every 2 Hours, in advance of need  - Initiate/Maintain alarm  - Obtain necessary fall risk management equipment:   - Apply yellow socks and bracelet for high fall risk patients  - Consider moving patient to room near nurses station  Outcome: Progressing  Goal: Maintain or return to baseline ADL function  Description: INTERVENTIONS:  -  Assess patient's ability to carry out ADLs; assess patient's baseline for ADL function and identify physical deficits which impact ability to perform ADLs (bathing, care of mouth/teeth, toileting, grooming, dressing, etc.)  - Assess/evaluate cause of self-care deficits   - Assess range of motion  - Assess patient's mobility; develop plan if impaired  - Assess patient's need for assistive devices and provide as appropriate  - Encourage maximum independence but intervene and supervise when necessary  - Involve family in performance of ADLs  - Assess for home care needs following discharge   - Consider OT consult to assist with ADL evaluation and planning for discharge  - Provide patient education as appropriate  Outcome: Progressing  Goal: Maintains/Returns to pre admission functional level  Description:  INTERVENTIONS:  - Perform AM-PAC 6 Click Basic Mobility/ Daily Activity assessment daily.  - Set and communicate daily mobility goal to care team and patient/family/caregiver.   - Collaborate with rehabilitation services on mobility goals if consulted  - Perform Range of Motion 2 times a day.  - Reposition patient every 2 hours.  - Dangle patient 2 times a day  - Stand patient 2 times a day  - Ambulate patient 2 times a day  - Out of bed to chair 2 times a day   - Out of bed for meals 2 times a day  - Out of bed for toileting  - Record patient progress and toleration of activity level   Outcome: Progressing     Problem: DISCHARGE PLANNING  Goal: Discharge to home or other facility with appropriate resources  Description: INTERVENTIONS:  - Identify barriers to discharge w/patient and caregiver  - Arrange for needed discharge resources and transportation as appropriate  - Identify discharge learning needs (meds, wound care, etc.)  - Arrange for interpretive services to assist at discharge as needed  - Refer to Case Management Department for coordinating discharge planning if the patient needs post-hospital services based on physician/advanced practitioner order or complex needs related to functional status, cognitive ability, or social support system  Outcome: Progressing     Problem: Knowledge Deficit  Goal: Patient/family/caregiver demonstrates understanding of disease process, treatment plan, medications, and discharge instructions  Description: Complete learning assessment and assess knowledge base.  Interventions:  - Provide teaching at level of understanding  - Provide teaching via preferred learning methods  Outcome: Progressing     Problem: Prexisting or High Potential for Compromised Skin Integrity  Goal: Skin integrity is maintained or improved  Description: INTERVENTIONS:  - Identify patients at risk for skin breakdown  - Assess and monitor skin integrity  - Assess and monitor nutrition and hydration  status  - Monitor labs   - Assess for incontinence   - Turn and reposition patient  - Assist with mobility/ambulation  - Relieve pressure over bony prominences  - Avoid friction and shearing  - Provide appropriate hygiene as needed including keeping skin clean and dry  - Evaluate need for skin moisturizer/barrier cream  - Collaborate with interdisciplinary team   - Patient/family teaching  - Consider wound care consult   Outcome: Progressing     Problem: Neurological Deficit  Goal: Neurological status is stable or improving  Description: Interventions:  - Monitor and assess patient's level of consciousness, motor function, sensory function, and level of assistance needed for ADLs.   - Monitor and report changes from baseline. Collaborate with interdisciplinary team to initiate plan and implement interventions as ordered.   - Provide and maintain a safe environment.  - Consider seizure precautions.  - Consider fall precautions.  - Consider aspiration precautions.  - Consider bleeding precautions.  Outcome: Progressing     Problem: Activity Intolerance/Impaired Mobility  Goal: Mobility/activity is maintained at optimum level for patient  Description: Interventions:  - Assess and monitor patient  barriers to mobility and need for assistive/adaptive devices.  - Assess patient's emotional response to limitations.  - Collaborate with interdisciplinary team and initiate plans and interventions as ordered.  - Encourage independent activity per ability.  - Maintain proper body alignment.  - Perform active/passive rom as tolerated/ordered.  - Plan activities to conserve energy.  - Turn patient as appropriate  Outcome: Progressing     Problem: Communication Impairment  Goal: Ability to express needs and understand communication  Description: Assess patient's communication skills and ability to understand information.  Patient will demonstrate use of effective communication techniques, alternative methods of communication and  understanding even if not able to speak.     - Encourage communication and provide alternate methods of communication as needed.  - Collaborate with case management/ for discharge needs.  - Include patient/family/caregiver in decisions related to communication.  Outcome: Progressing     Problem: Potential for Aspiration  Goal: Non-ventilated patient's risk of aspiration is minimized  Description: Assess and monitor vital signs, respiratory status, and labs (WBC).  Monitor for signs of aspiration (tachypnea, cough, rales, wheezing, cyanosis, fever).    - Assess and monitor patient's ability to swallow.  - Place patient up in chair to eat if possible.  - HOB up at 90 degrees to eat if unable to get patient up into chair.  - Supervise patient during oral intake.   - Instruct patient/ family to take small bites.  - Instruct patient/ family to take small single sips when taking liquids.  - Follow patient-specific strategies generated by speech pathologist.  Outcome: Progressing  Goal: Ventilated patient's risk of aspiration is minimized  Description: Assess and monitor vital signs, respiratory status, airway cuff pressure, and labs (WBC).  Monitor for signs of aspiration (tachypnea, cough, rales, wheezing, cyanosis, fever).    - Elevate head of bed 30 degrees if patient has tube feeding.  - Monitor tube feeding.  Outcome: Progressing     Problem: Nutrition  Goal: Nutrition/Hydration status is improving  Description: Monitor and assess patient's nutrition/hydration status for malnutrition (ex- brittle hair, bruises, dry skin, pale skin and conjunctiva, muscle wasting, smooth red tongue, and disorientation). Collaborate with interdisciplinary team and initiate plan and interventions as ordered.  Monitor patient's weight and dietary intake as ordered or per policy. Utilize nutrition screening tool and intervene per policy. Determine patient's food preferences and provide high-protein, high-caloric foods as  appropriate.     - Assist patient with eating.  - Allow adequate time for meals.  - Encourage patient to take dietary supplement as ordered.  - Collaborate with clinical nutritionist.  - Include patient/family/caregiver in decisions related to nutrition.  Outcome: Progressing

## 2024-07-02 NOTE — PROGRESS NOTES
Patient:    MRN:  [id number]    Ewa Request ID:  [id number]    Level of care reserved:    Partner Reserved:    Clinical needs requested:    Geography searched:  [phone]    Start of Service:    Request sent:  12:33pm EDT on 7/1/2024 by Dania Delaney    Partner reserved:  by Dania Delaney    Choice list shared:  2:59pm EDT on 7/1/2024 by Dania Delaney

## 2024-07-02 NOTE — CASE MANAGEMENT
Case Management Discharge Planning Note    Patient name Suhail Borrego  Location S /S -01 MRN [id number]  : 1940 Date 2024       Current Admission Date: 2024  Current Admission Diagnosis:Symptomatic stenosis of left carotid artery   Patient Active Problem List    Diagnosis Date Noted Date Diagnosed    Symptomatic stenosis of left carotid artery 2024     Localized swelling of left lower extremity 2024     Leukocytosis 2024     Lumbar spondylosis 2024     Intractable back pain 2024     Pressure ulcer 2024     Inflammation of sacroiliac joint (HCC) 2023    Internal hemorrhoids 2022     Diverticular disease 2022     Acute left-sided low back pain with left-sided sciatica 10/12/2022     Left lower quadrant abdominal pain 10/08/2022     Diverticulitis 2022     Stage 3 chronic kidney disease (HCC) 2022     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 2022     Trochanteric bursitis 2022     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HCC) 2022     Neuropathy 10/29/2021     Pulmonary fibrosis determined by high resolution computed tomography (HCC) 2021     Near syncope 2021     SOB (shortness of breath) 07/10/2021     Neck pain on left side 07/10/2021     Carotid stenosis, asymptomatic, bilateral 2020     Phimosis 2020     Bruit of left carotid artery 2020     Postlaminectomy syndrome of lumbar region      Lumbar radiculopathy      S/P lumbar fusion 2018     Lumbar stenosis 2018     Peripheral artery disease (HCC) 2018     GERD (gastroesophageal reflux disease) 2018     Vasomotor rhinitis 2018     Leg pain, posterior, left 2018     Macrocytic anemia 2018     Hx of CABG 2018     Former tobacco use      Hyperlipidemia      Primary hypertension      CAD (coronary artery disease) 2017      Spondylolisthesis of lumbar region 11/07/2016     New acute T 11 Thoracic compression fracture, closed, initial encounter, w old T12 compression fx 11/03/2016       LOS (days): 3  Geometric Mean LOS (GMLOS) (days): 2.9  Days to GMLOS:-0.3     OBJECTIVE:  Risk of Unplanned Readmission Score: 18.92         Current admission status: Inpatient   Preferred Pharmacy:   [facility]/pharmacy #[id number] - [place], [place] - [address]  [address] [facility] [address]  Phone: [phone] Fax: [phone]    [id number] [place] - [address]  [address] [facility] [place] [address]  Phone: [phone] Fax: [phone]    Primary Care Provider: Mikaela Duenas DO    Primary Insurance: [employer] REP  Secondary Insurance:     DISCHARGE DETAILS:    Discharge planning discussed with:: Patient, daughter  Freedom of Choice: Yes  Comments - Freedom of Choice: Return home with [facility]  CM contacted family/caregiver?: Yes (Daughter present at bedside)  Were Treatment Team discharge recommendations reviewed with patient/caregiver?: Yes  Did patient/caregiver verbalize understanding of patient care needs?: Yes  Were patient/caregiver advised of the risks associated with not following Treatment Team discharge recommendations?: Yes    Contacts  Patient Contacts: Patient, daughter  Contact Method: In Person  Reason/Outcome: Continuity of Care, Emergency Contact, Referral, Discharge Planning    Requested Home Health Care         Is the patient interested in [facility] at discharge?: Yes  Home Health Discipline requested:: Nursing, Occupational Therapy, Physical Therapy  Home Health Agency Name:: [facility]  Home Health Follow-Up Provider:: PCP  Home Health Services Needed:: Evaluate Functional Status and Safety, Gait/ADL Training, Strengthening/Theraputic Exercises to Improve Function  Homebound Criteria Met:: Uses an Assist Device (i.e. cane, walker, etc)  Supporting Clincal Findings:: Limited Endurance    DME  Referral Provided  Referral made for DME?: No    Other Referral/Resources/Interventions Provided:  Interventions: Other (Specify), [facility]  Referral Comments: CM spoke with pt and daughter at bedside to f/u on d/c planning. CM provided pt with [facility] choice list for review. Pt/daughter reported preference of [facility]. CM reserved agency via [employer] and updated pt AVS. CM reviewed IMM with pt and daughter, who have no plans to appeal d/c once medically stable at this time. CM provided pt with copy for his records and placed original in scan bin on unit to be uploaded to pt chart. CM will remain available.    Treatment Team Recommendation: Home, Home with Home Health Care  Discharge Destination Plan:: Home with Home Health Care     IMM Given (Date):: 07/02/24  IMM Given to:: Patient  ..IMM reviewed with patient, patient agrees with discharge determination.

## 2024-07-03 ENCOUNTER — TELEPHONE (OUTPATIENT)
Dept: VASCULAR SURGERY | Facility: CLINIC | Age: 84
End: 2024-07-03

## 2024-07-03 VITALS
TEMPERATURE: 99.5 F | HEART RATE: 85 BPM | HEIGHT: 67 IN | SYSTOLIC BLOOD PRESSURE: 130 MMHG | DIASTOLIC BLOOD PRESSURE: 62 MMHG | WEIGHT: 148 LBS | BODY MASS INDEX: 23.23 KG/M2 | RESPIRATION RATE: 15 BRPM | OXYGEN SATURATION: 96 %

## 2024-07-03 PROCEDURE — 97116 GAIT TRAINING THERAPY: CPT

## 2024-07-03 PROCEDURE — 97110 THERAPEUTIC EXERCISES: CPT

## 2024-07-03 PROCEDURE — 97530 THERAPEUTIC ACTIVITIES: CPT

## 2024-07-03 PROCEDURE — 99239 HOSP IP/OBS DSCHRG MGMT >30: CPT | Performed by: INTERNAL MEDICINE

## 2024-07-03 PROCEDURE — 94761 N-INVAS EAR/PLS OXIMETRY MLT: CPT

## 2024-07-03 RX ORDER — CLOPIDOGREL BISULFATE 75 MG/1
75 TABLET ORAL DAILY
Qty: 30 TABLET | Refills: 0 | Status: SHIPPED | OUTPATIENT
Start: 2024-07-04 | End: 2024-08-03

## 2024-07-03 RX ORDER — MIDODRINE HYDROCHLORIDE 2.5 MG/1
2.5 TABLET ORAL
Qty: 90 TABLET | Refills: 0 | Status: SHIPPED | OUTPATIENT
Start: 2024-07-03 | End: 2024-08-02

## 2024-07-03 RX ORDER — METOPROLOL TARTRATE 50 MG/1
25 TABLET, FILM COATED ORAL DAILY
Qty: 30 TABLET | Refills: 0 | Status: SHIPPED | OUTPATIENT
Start: 2024-07-03

## 2024-07-03 RX ADMIN — SENNOSIDES 8.6 MG: 8.6 TABLET, FILM COATED ORAL at 09:25

## 2024-07-03 RX ADMIN — METOPROLOL TARTRATE 25 MG: 25 TABLET, FILM COATED ORAL at 09:26

## 2024-07-03 RX ADMIN — MIDODRINE HYDROCHLORIDE 2.5 MG: 2.5 TABLET ORAL at 06:15

## 2024-07-03 RX ADMIN — DOCUSATE SODIUM 100 MG: 100 CAPSULE, LIQUID FILLED ORAL at 09:26

## 2024-07-03 RX ADMIN — MIDODRINE HYDROCHLORIDE 2.5 MG: 2.5 TABLET ORAL at 11:55

## 2024-07-03 RX ADMIN — Medication 2000 UNITS: at 09:26

## 2024-07-03 RX ADMIN — FAMOTIDINE 20 MG: 20 TABLET ORAL at 09:26

## 2024-07-03 RX ADMIN — ASPIRIN 81 MG: 81 TABLET, COATED ORAL at 09:26

## 2024-07-03 RX ADMIN — CLOPIDOGREL BISULFATE 75 MG: 75 TABLET ORAL at 09:26

## 2024-07-03 RX ADMIN — ACETAMINOPHEN 975 MG: 325 TABLET, FILM COATED ORAL at 06:15

## 2024-07-03 RX ADMIN — SODIUM CHLORIDE 125 ML/HR: 0.9 INJECTION, SOLUTION INTRAVENOUS at 06:15

## 2024-07-03 RX ADMIN — OXYCODONE HYDROCHLORIDE AND ACETAMINOPHEN 500 MG: 500 TABLET ORAL at 09:26

## 2024-07-03 NOTE — PLAN OF CARE
Problem: PHYSICAL THERAPY ADULT  Goal: Performs mobility at highest level of function for planned discharge setting.  See evaluation for individualized goals.  Description: Treatment/Interventions: Functional transfer training, LE strengthening/ROM, Therapeutic exercise, Endurance training, Cognitive reorientation, Patient/family training, Equipment eval/education, Bed mobility, Gait training, Continued evaluation, Spoke to MD, Spoke to nursing, Spoke to case management, Spoke to advanced practitioner, OT, ST     See flowsheet documentation for full assessment, interventions and recommendations.  Outcome: Progressing  Note: Prognosis: Good  Problem List: Decreased strength, Decreased endurance, Impaired balance, Decreased mobility, Decreased cognition, Impaired judgement, Decreased safety awareness, Orthopedic restrictions, Pain  Assessment: pt began tx session seated OOB in the recliner and was agreeable to participate in PT intervention. In comparison to previous tx sessions progress was noted as pt demonstrated good recall on hand placement and was able to perform multiple functional transfers with RW and SPC with mod I and no LOB. pt increased ambulation distance with RW and with SPC as pt ambulated 390'x1 RW and 240'x1 with /s, no LOB. Gait degradation was noticed with ambulation w/ SPC due to fatigue and decreased Sp02. pt did participate in TE activities while seated in recliner in order to strengthen LE's in efforts in reducing risk for falls and injuries with all OOB activities. pt educated with verbal/visual demonstration on all safe stair trial strategies prior to initiating steps. pt completed 10 steps with bilateral hand rails, /s and no LOB. pt continues to require assistance for donning/doffing of TLSO as pt required min Ax1. Post tx pt in recliner with call bell, all needs met and chair alarm activated.pt would benefit from continued skilled PT intervention in order to increase activity tolerance  and balance w/ all OOB activities.  Barriers to Discharge: Decreased caregiver support     Rehab Resource Intensity Level, PT: III (Minimum Resource Intensity)    See flowsheet documentation for full assessment.

## 2024-07-03 NOTE — PHYSICAL THERAPY NOTE
PHYSICAL THERAPY NOTE          Patient Name: Suhail Borrego  Today's Date: 7/3/2024           07/03/24 0908   PT Last Visit   PT Visit Date 07/03/24   Note Type   Note Type Treatment   Type of Brace   Brace [employer] [place] [employer] 456 Back Pack TLSO   Additional Brace Ordered No   Patient Position When Brace Applied Seated   Bracing Recommendations   (education verbal/physical on donning/doffing brace)   Education   Education Provided Yes   Pain Assessment   Pain Assessment Tool 0-10   Pain Score No Pain   Patient's Stated Pain Goal No pain   Hospital Pain Intervention(s) Repositioned;Ambulation/increased activity;Rest   Multiple Pain Sites No   Restrictions/Precautions   Weight Bearing Precautions Per Order No   Braces or Orthoses TLSO  (back pack TLSO)   Other Precautions Cognitive;Chair Alarm;Bed Alarm;Fall Risk;Pain;Spinal precautions;Hard of hearing   General   Chart Reviewed Yes   Response to Previous Treatment Patient with no complaints from previous session.   Family/Caregiver Present Yes  (daughter)   Cognition   Overall Cognitive Status Impaired   Arousal/Participation Alert;Responsive;Cooperative   Attention Within functional limits   Orientation Level Oriented X4   Memory Decreased short term memory   Following Commands Follows one step commands without difficulty   Comments pt was pleasant, cooperative and highly motivated to participate in PT intervention   Subjective   Subjective pt was agreeable to participate in PT intervention and stated no pain pre/post tx session   Bed Mobility   Additional Comments pt seated OOB in the recliner pre/post tx session   Transfers   Sit to Stand 6  Modified independent   Additional items Assist x 1;Armrests;Increased time required   Stand to Sit 6  Modified independent   Additional items Assist x 1;Armrests;Increased time required   Stand pivot Unable to assess   Additional Comments pt  was able to preogress with functional transfers as pt completed multiple transfers with RW/SPC, good recall on hand placement, mod I and no LOB   Ambulation/Elevation   Gait pattern Decreased foot clearance;Foward flexed;Short stride;Excessively slow;Decreased hip extension   Gait Assistance 5  Supervision   Additional items Assist x 1;Verbal cues   Assistive Device Rolling walker;SPC   Distance 390'x1 RW, 240'x1 SPC   Stair Management Assistance 5  Supervision   Additional items Assist x 1;Verbal cues   Stair Management Technique Two rails;Step to pattern;Foreward;Backward   Number of Stairs 10   Balance   Static Sitting Good   Dynamic Sitting Good   Static Standing Fair +   Dynamic Standing Fair   Ambulatory Fair -  (w/ RW/SPC)   Endurance Deficit   Endurance Deficit Yes   Endurance Deficit Description pt limited with activity toleranec as pt required several seated therapeutic rest breaks due to fatigue, increased and decreased Sp02   Activity Tolerance   Activity Tolerance Patient limited by fatigue;Other (Comment)  (increased HR low 120's and decreased Sp02 mid 80's)   Nurse Made Aware Spoke to RN   Exercises   Hip Abduction Sitting;15 reps;AROM;Bilateral   Hip Adduction Sitting;15 reps;AROM;Bilateral  (pillow squeezes)   Knee AROM Long Arc Quad Sitting;15 reps;AROM;Bilateral   Ankle Pumps Sitting;20 reps;AROM;Bilateral   Assessment   Prognosis Good   Problem List Decreased strength;Decreased endurance;Impaired balance;Decreased mobility;Decreased cognition;Impaired judgement;Decreased safety awareness;Orthopedic restrictions;Pain   Assessment pt began tx session seated OOB in the recliner and was agreeable to participate in PT intervention. In comparison to previous tx sessions progress was noted as pt demonstrated good recall on hand placement and was able to perform multiple functional transfers with RW and SPC with mod I and no LOB. pt increased ambulation distance with RW and with SPC as pt ambulated  390'x1 RW and 240'x1 with /s, no LOB. Gait degradation was noticed with ambulation w/ SPC due to fatigue and decreased Sp02. pt did participate in TE activities while seated in recliner in order to strengthen LE's in efforts in reducing risk for falls and injuries with all OOB activities. pt educated with verbal/visual demonstration on all safe stair trial strategies prior to initiating steps. pt completed 10 steps with bilateral hand rails, /s and no LOB. pt continues to require assistance for donning/doffing of TLSO as pt required min Ax1. Post tx pt in recliner with call bell, all needs met and chair alarm activated.pt would benefit from continued skilled PT intervention in order to increase activity tolerance and balance w/ all OOB activities.   Goals   Patient Goals to go home soon   STG Expiration Date 07/14/24   PT Treatment Day 3   Plan   Treatment/Interventions Functional transfer training;LE strengthening/ROM;Therapeutic exercise;Elevations;Endurance training;Cognitive reorientation;Patient/family training;Equipment eval/education;Bed mobility;Gait training;Spoke to nursing   Progress Progressing toward goals   PT Frequency 2-3x/wk   Discharge Recommendation   Rehab Resource Intensity Level, PT III (Minimum Resource Intensity)   AM-PAC Basic Mobility Inpatient   Turning in Flat Bed Without Bedrails 3   Lying on Back to Sitting on Edge of Flat Bed Without Bedrails 3   Moving Bed to Chair 3   Standing Up From Chair Using Arms 4   Walk in Room 3   Climb 3-5 Stairs With Railing 3   Basic Mobility Inpatient Raw Score 19   Basic Mobility Standardized Score 42.48   [facility] Highest Level Of Mobility   -HLM Goal 6: Walk 10 steps or more   -HLM Achieved 8: Walk 250 feet ot more   Education   Education Provided Mobility training;Assistive device;Other  (stair trials, donning/doffing of TLSO)   Patient Demonstrates acceptance/verbal understanding   End of Consult   Patient Position at End of Consult Bedside  chair;Bed/Chair alarm activated;All needs within reach   The patient's AM-PAC Basic Mobility Inpatient Short Form Raw Score is 19. A Raw score of greater than 16 suggests the patient may benefit from discharge to home. Please also refer to the recommendation of the Physical Therapist for safe discharge planning.    Artie Tong

## 2024-07-03 NOTE — CASE MANAGEMENT
Case Management Progress Note    Patient name Suhail oBrrego  Location S /S -01 MRN [id number]  : 1940 Date 7/3/2024       LOS (days): 4  Geometric Mean LOS (GMLOS) (days): 2.9  Days to GMLOS:-1.4        OBJECTIVE:        Current admission status: Inpatient  Preferred Pharmacy:   [facility]/pharmacy #[id number] - [place] - [address]  [address] [facility] [place] [address]  Phone: [phone] Fax: [phone]    [id number] [place] - [address]  [address] [facility] [place] [address]  Phone: [phone] Fax: [phone]    Primary Care Provider: Mikaela Duenas DO    Primary Insurance: [employer]  REP  Secondary Insurance:     PROGRESS NOTE:    Weekly Care Management Length of Stay Review     Current LOS: 4 Days    Most Recent Labs:     Lab Results   Component Value Date/Time    WBC 9.06 2024 05:23 AM    HGB 7.9 (L) 2024 05:23 AM    HCT 24.8 (L) 2024 05:23 AM     (L) 2024 05:23 AM       Most Recent Vitals:   Vitals:    24 1112   BP: 130/62   Pulse: 85   Resp:    Temp:    SpO2: 96%        Identified Barriers to Discharge/Discharge Goals/Care Management Interventions: back pain w/brace, monitor BP    [facility]    Expected Discharge Date:  today vs 24hrs

## 2024-07-03 NOTE — PROGRESS NOTES
Pending sale to [facility]  Progress Note  Name: Suhail Borrego I  MRN: [id number]  Unit/Bed#: S -01 I Date of Admission: 6/29/2024   Date of Service: 7/3/2024 I Hospital Day: 4    Assessment & Plan   Leukocytosis  Assessment & Plan  Chronic since May 2024. Did not resolve with course of antibiotics. Monitor on CBC, follow up outpatient for further workup.     Localized swelling of left lower extremity  Assessment & Plan  Per patient chronic, wears compression stocking on L side. Had LE US in 5/2024.   Vas Doppler unremarkable for DVT    Stage 3 chronic kidney disease (HCC)  Assessment & Plan  Lab Results   Component Value Date    EGFR 50 06/30/2024    EGFR 67 06/29/2024    EGFR 57 06/29/2024    CREATININE 1.29 06/30/2024    CREATININE 1.02 06/29/2024    CREATININE 1.16 06/29/2024     Patient with chronic kidney disease IIIa. Avoid hypotension and nephrotoxins. Monitor on AM BMP.   IV hydration given s/p contrast for CTA dissection protocol.     Hx of CABG  Assessment & Plan  History of CABG. Continue Aspirin and Statin.    Primary hypertension  Assessment & Plan  BP stable.   Home dose metoprolol was stopped because of strokelike symptoms    Hyperlipidemia  Assessment & Plan  Continue home atorvastatin 80 mg    New acute T 11 Thoracic compression fracture, closed, initial encounter, w old T12 compression fx  Assessment & Plan  PMHx of chronic back pain including T12 compression fracture and fusion of L2-S1 in 2018. Patient reports 1 week of worsening L sided back pain that feels like an achy muscle but is worse with standing/twisting or bending.     Tylenol 975 q8 OTC  Opioid naive pain management including Oxy 2.5 and Oxy 5 mg for pain  Bowel regimen due to constipation 2/2 tramadol outpatient  Continue gabapentin for neuropathic pain in feet  Neurosurgery consult for evaluation/management recommendations of new acute T11 compression fracture  No surgical intervention at this  admission  PT/OT  Consider DEXA scan outpatient in setting of multiple pathologic fractures w/o trauma  Continue vitamin C and D supplementation    Plan:  Follow-up with SPEP, UPEP, PTH, vitamin D 25 to rule out metabolic reasons for fracture  Evaluated by neurosurgery, no surgical interventions.  Continue conservative management with spinal brace, PT OT and pain management    * Symptomatic stenosis of left carotid artery  Assessment & Plan  Past medical history of 70-99% left carotid artery stenosis  CT head shows no acute large vessel distribution infarct, hemorrhage or mass effect. 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  CTA head and neck shows Worsening severe stenosis in the distal left common carotid artery. Severe tandem stenosis proximal left internal carotid artery. Moderate stenosis vertebral artery origins is unchanged. Stable atherosclerotic disease in the right common carotid artery with less than 50% stenosis. No high-grade intracranial stenosis, large vessel occlusion or aneurysm.  As per the family, patient has been having multiple episodes of TIA characterized by aphasia and unilateral limb weakness.  On 6/30, last known well: 7;30 a.m.  Around 8: 15 on 6/30, rapid was called for strokelike symptoms characterized by aphasia, right arm weakness  Family refused TNK, informed denial.  No past medical history of atrial fibrillation  NIH: 4 in the background of expressive aphasia and right upper arm drift but when I saw the patient NIH: 0  GCS: 15  Home med: Aspirin  Loading dose clopidogrel was given on 6/30    Plan:  Target for euglycemia, permissive hypertension, euthymia, euvolemia  Metoprolol was stopped to help permissive hypertension  Speech pathology/OT/PT/neurology/vascular surgery consultation  telemetry  Follow-up with bubble echo  Frequent neurocheck  Continue aspirin and clopidogrel               VTE Pharmacologic Prophylaxis: VTE Score: 4 Moderate Risk (Score 3-4) -  Pharmacological DVT Prophylaxis Ordered: enoxaparin (Lovenox).    Mobility:   Basic Mobility Inpatient Raw Score: 19  -HLM Goal: 6: Walk 10 steps or more  JH-HLM Achieved: 8: Walk 250 feet ot more     uses walker walks with support of back brace.     Patient Centered Rounds:  bedside and table rounds    Discussions with Specialists or Other Care Team Provider: Blood pressure manegement with cardiology and neurology. T1 acute on chronic back pain management with Neurology. Carotid enterectomy consult with vascular surgery and cardiology.     Education and Discussions with Family / Patient: Updated  (daughter) at bedside.    Total Time Spent on Date of Encounter in care of patient: 60 mins. This time was spent on one or more of the following: performing physical exam; counseling and coordination of care; obtaining or reviewing history; documenting in the medical record; reviewing/ordering tests, medications or procedures; communicating with other healthcare professionals and discussing with patient's family/caregivers.    Current Length of Stay: 4 day(s)  Current Patient Status: Inpatient   Certification Statement:  If blood pressure is controlled patient is safe to discharge. Carotid enderectomy surgery schedulled  Discharge Plan: Anticipate discharge in 24-48 hrs to home.    Code Status: Level 1 - Full Code    Subjective:    Patient was seen sitting in chair with daughter present during encounter. Patient reports same pain level. Reports urge incontinence roughly every 2 hours- small volume of clear urine. Denies dysuria. During morning exercise with respiratory therapist patient's SpO2 droped to 88% on ambulation but returned to 93% oxygenation after sitting and resting without nasal cannula. No increased work of breathing noted , signs of respiratory distress, or chest pain.     Objective:     Vitals:   Temp (24hrs), Av.8 °F (37.1 °C), Min:98.3 °F (36.8 °C), Max:99.5 °F (37.5 °C)    Temp:   [98.3 °F (36.8 °C)-99.5 °F (37.5 °C)] 99.5 °F (37.5 °C)  HR:  [85-98] 85  BP: (114-150)/(43-84) 130/62  SpO2:  [91 %-96 %] 96 %  Body mass index is 23.18 kg/m².     Repeat blood pressure at bedside was 114/75 with a MAP of 75.     Input and Output Summary (last 24 hours):     Intake/Output Summary (Last 24 hours) at 7/3/2024 1733  Last data filed at 7/3/2024 0900  Gross per 24 hour   Intake 240 ml   Output 1115 ml   Net -875 ml       Physical Exam:   Physical Exam  Constitutional:       Appearance: Normal appearance.   HENT:      Head: Normocephalic and atraumatic.   Cardiovascular:      Rate and Rhythm: Normal rate and regular rhythm.      Pulses: Normal pulses.      Heart sounds: Normal heart sounds.   Pulmonary:      Effort: Pulmonary effort is normal.      Breath sounds: Normal breath sounds.   Abdominal:      Palpations: Abdomen is soft.   Musculoskeletal:      Comments: Sitting at bedside with backbrace on gown. Limited mobility.    Skin:     General: Skin is dry.   Neurological:      Mental Status: He is alert and oriented to person, place, and time. Mental status is at baseline.   Psychiatric:         Mood and Affect: Mood normal.         Behavior: Behavior normal.         Thought Content: Thought content normal.         Judgment: Judgment normal.      ROS as per HPI     Additional Data:     Labs:  Results from last 7 days   Lab Units 07/02/24  0523 07/01/24  0610   WBC Thousand/uL 9.06 10.60*   HEMOGLOBIN g/dL 7.9* 9.7*   HEMATOCRIT % 24.8* 30.4*   PLATELETS Thousands/uL 143* 165   SEGS PCT %  --  62   LYMPHO PCT %  --  25   MONO PCT %  --  9   EOS PCT %  --  3     Results from last 7 days   Lab Units 06/30/24  0835 06/30/24  0833 06/29/24  0630 06/29/24  0153   SODIUM mmol/L  --  138   < > 138   POTASSIUM mmol/L  --  3.8   < > 4.6   CHLORIDE mmol/L  --  103   < > 107   CO2 mmol/L  --  25   < > 26   CO2, I-STAT mmol/L 29  --   --   --    BUN mg/dL  --  30*   < > 31*   CREATININE mg/dL  --  1.29   < > 1.16    ANION GAP mmol/L  --  10   < > 5   CALCIUM mg/dL  --  9.0   < > 9.0   ALBUMIN g/dL  --   --   --  3.5   TOTAL BILIRUBIN mg/dL  --   --   --  0.34   ALK PHOS U/L  --   --   --  61   ALT U/L  --   --   --  14   AST U/L  --   --   --  20   GLUCOSE RANDOM mg/dL  --  120   < > 107    < > = values in this interval not displayed.     Results from last 7 days   Lab Units 06/29/24  0153   INR  1.08     Results from last 7 days   Lab Units 07/02/24  1052 07/02/24  0739 06/30/24  0820   POC GLUCOSE mg/dl 117 123 103     Results from last 7 days   Lab Units 06/29/24  0630   HEMOGLOBIN A1C % 6.0*     Results from last 7 days   Lab Units 06/30/24  0834 06/29/24  0153   LACTIC ACID mmol/L 3.0* 1.2       Lines/Drains:  Invasive Devices       None                         Imaging: Personally reviewed the following imaging: CT head, MRI brain, and ultrasound(s)    Recent Cultures (last 7 days):   Results from last 7 days   Lab Units 06/29/24  0504   URINE CULTURE  No Growth <1000 cfu/mL       Last 24 Hours Medication List:   Current Facility-Administered Medications   Medication Dose Route Frequency Provider Last Rate    acetaminophen  975 mg Oral Q8H KAMRAN Anu You MD      aluminum-magnesium hydroxide-simethicone  30 mL Oral Q4H PRN Cirilo Montana MD      ascorbic acid  500 mg Oral Daily Anu You MD      aspirin  81 mg Oral Daily Anu You MD      atorvastatin  80 mg Oral QPM Anu You MD      cholecalciferol  2,000 Units Oral Daily Anu You MD      clopidogrel  75 mg Oral Daily Cirilo Montana MD      docusate sodium  100 mg Oral BID Anu You MD      enoxaparin  40 mg Subcutaneous Daily Anu You MD      famotidine  20 mg Oral BID Anu You MD      gabapentin  200 mg Oral HS Javid Nagy DO      glycerin (adult)  1 suppository Rectal Once PRN Anu You MD      labetalol  10 mg Intravenous Q4H PRN  Cirilo Montana MD      metoprolol tartrate  25 mg Oral Q12H [facility] Muse MD      midodrine  2.5 mg Oral TID AC Ciera Anton MD      oxyCODONE  2.5 mg Oral Q4H PRN Anu You MD      Or    oxyCODONE  5 mg Oral Q4H PRN Anu You MD      polyethylene glycol  17 g Oral Daily Anu You MD      senna  1 tablet Oral Daily Anu You MD      traMADol  50 mg Oral Q6H PRN Delmy Gao MD          Today, Patient Was Seen By: Ciera Anton MD    **Please Note: This note may have been constructed using a voice recognition system.**

## 2024-07-03 NOTE — DISCHARGE SUMMARY
[facility]  Discharge- Suhail Borrego 1940, 83 y.o. male MRN: [id number]  Unit/Bed#: S -Fan Encounter: [id number]  Primary Care Provider: Mikaela Duenas DO   Date and time admitted to hospital: 6/29/2024 12:21 AM    Leukocytosis  Assessment & Plan  Chronic since May 2024. Did not resolve with course of antibiotics. Monitor on CBC, follow up outpatient for further workup.     Localized swelling of left lower extremity  Assessment & Plan  Per patient chronic, wears compression stocking on L side. Had LE US in 5/2024.   Vas Doppler unremarkable for DVT    Stage 3 chronic kidney disease (HCC)  Assessment & Plan  Lab Results   Component Value Date    EGFR 50 06/30/2024    EGFR 67 06/29/2024    EGFR 57 06/29/2024    CREATININE 1.29 06/30/2024    CREATININE 1.02 06/29/2024    CREATININE 1.16 06/29/2024     Patient with chronic kidney disease IIIa. Avoid hypotension and nephrotoxins. Monitor on AM BMP.   IV hydration given s/p contrast for CTA dissection protocol.     Hx of CABG  Assessment & Plan  History of CABG. Continue Aspirin and Statin.    Primary hypertension  Assessment & Plan  BP stable.   Home dose metoprolol was stopped because of strokelike symptoms    Hyperlipidemia  Assessment & Plan  Continue home atorvastatin 80 mg    New acute T 11 Thoracic compression fracture, closed, initial encounter, w old T12 compression fx  Assessment & Plan  PMHx of chronic back pain including T12 compression fracture and fusion of L2-S1 in 2018. Patient reports 1 week of worsening L sided back pain that feels like an achy muscle but is worse with standing/twisting or bending.     Tylenol 975 q8 OTC  Opioid naive pain management including Oxy 2.5 and Oxy 5 mg for pain  Bowel regimen due to constipation 2/2 tramadol outpatient  Continue gabapentin for neuropathic pain in feet  Neurosurgery consult for evaluation/management recommendations of new acute T11 compression fracture  No surgical  intervention at this admission  PT/OT  Consider DEXA scan outpatient in setting of multiple pathologic fractures w/o trauma  Continue vitamin C and D supplementation    Plan:  Follow-up with SPEP, UPEP, PTH, vitamin D 25 to rule out metabolic reasons for fracture  Evaluated by neurosurgery, no surgical interventions.  Continue conservative management with spinal brace, PT OT and pain management    * Symptomatic stenosis of left carotid artery  Assessment & Plan  Past medical history of 70-99% left carotid artery stenosis  CT head shows no acute large vessel distribution infarct, hemorrhage or mass effect. 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  CTA head and neck shows Worsening severe stenosis in the distal left common carotid artery. Severe tandem stenosis proximal left internal carotid artery. Moderate stenosis vertebral artery origins is unchanged. Stable atherosclerotic disease in the right common carotid artery with less than 50% stenosis. No high-grade intracranial stenosis, large vessel occlusion or aneurysm.  As per the family, patient has been having multiple episodes of TIA characterized by aphasia and unilateral limb weakness.  On 6/30, last known well: 7;30 a.m.  Around 8: 15 on 6/30, rapid was called for strokelike symptoms characterized by aphasia, right arm weakness  Family refused TNK, informed denial.  No past medical history of atrial fibrillation  NIH: 4 in the background of expressive aphasia and right upper arm drift but when I saw the patient NIH: 0  GCS: 15  Home med: Aspirin  Loading dose clopidogrel was given on 6/30    Plan:  Target for euglycemia, permissive hypertension, euthymia, euvolemia  Metoprolol was stopped to help permissive hypertension  Speech pathology/OT/PT/neurology/vascular surgery consultation  telemetry  Follow-up with bubble echo  Frequent neurocheck  Continue aspirin and clopidogrel        Medical Problems       Resolved Problems  Date Reviewed:  6/29/2024   None       Discharging Resident: Ciera Anton MD  Discharging Attending: No att. providers found  PCP: Mikaela Duenas DO  Admission Date:   Admission Orders (From admission, onward)       Ordered        06/29/24 0457  INPATIENT ADMISSION  Once                          Discharge Date: 07/03/24    Consultations During Hospital Stay:  Cardiology consult for blood pressure management and preoperative cardiovascular risk assessment for carotid stenosis enterectomy   Neurology consult for stroke alert confirming severe stenosis of proximal L ICA with moderate CCA disease. L carotid stenosis had been known previously and pt had been following with Vascular outpatient.   Neurosurgery consult for acute on chronic T1 compression-previous L2-S1 decompression and fusion with anterolisthesis of L5 and S1.     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     Test Results Pending at Discharge (will require follow up):  No test results pending   Continue to monitor blood pressure and heart rate.    Acute on chronic back pain follow up outpatient.     Outpatient Tests Requested:  Carotid enterectomy scheduled on Monday July 8, 2024     Early postop pulmonary clearance/ambulation    Complications:    Stroke alert activated.   Postprandial hypotension.      Reason for Admission: worsening left flank and lower back pain despite tramadol and gabapentin.     Hospital Course:   Suhail Borrego is a 83 y.o. male patient with PMH including peripheral neuropathy, previous L2-S1 decompression and fusion  who originally presented to the  "hospital on 6/29/2024 due to subacute on chronic back pain refractory to home pain medications. CT thoracolumbar  showed new acute compression fracture at the T11 vertebral level. Stroke alert this morning 6/30 for aphasia and right UE weakness (resolved following CT imaging). MRI brain showed  late subacute L hemisphere CVA and 2 other chronic L hemisphere strokes. Thus do suspect symptomatic L carotid stenosis (critical) as cause of stroke/TIA episodes. Pt was loaded with plavix. Home asa was restarted.   Patient as had previous stroke like symptoms in the past, he has had similar symptoms in the past, mainly in the last couple days that quickly resolved.  Recent carotid duplex 6/6/24 with left 60-99% stenosis in ICA however CT with only 60-65% stenosis so therefore was decided to continue surveillance of pt. As per surgery recommendations, patient was cleared for carotid enterectomy after preoperative cardiovascular risk assessment.       Please see above list of diagnoses and related plan for additional information.     Condition at Discharge: stable    Discharge Day Visit / Exam:   Subjective:    Vitals: Blood Pressure: 130/62 (07/03/24 1112)  Pulse: 85 (07/03/24 1112)  Temperature: 99.5 °F (37.5 °C) (07/03/24 0756)  Temp Source: Oral (07/02/24 0740)  Respirations: 15 (07/02/24 0740)  Height: 5' 7\" (170.2 cm) (07/01/24 1140)  Weight - Scale: 67.1 kg (148 lb) (07/01/24 1140)  SpO2: 96 % (07/03/24 1112)  Exam:   Physical Exam  Constitutional:       Appearance: Normal appearance.   HENT:      Head: Normocephalic and atraumatic.   Cardiovascular:      Rate and Rhythm: Normal rate and regular rhythm.      Pulses: Normal pulses.      Heart sounds: Normal heart sounds.   Pulmonary:      Effort: Pulmonary effort is normal.      Breath sounds: Normal breath sounds.   Abdominal:      General: Abdomen is flat.      Palpations: Abdomen is soft.      Comments: Patient was wearing back brace over gown.    Musculoskeletal:    "   Comments: Patient uses walker to ambulate.    Skin:     General: Skin is dry.      Comments: Dry lower extremities    Neurological:      Mental Status: He is alert and oriented to person, place, and time. Mental status is at baseline.   Psychiatric:         Mood and Affect: Mood normal.         Behavior: Behavior normal.         Thought Content: Thought content normal.         Judgment: Judgment normal.          Discussion with Family: Updated  (daughter) via phone.    Discharge instructions/Information to patient and family:   See after visit summary for information provided to patient and family.      Provisions for Follow-Up Care:  See after visit summary for information related to follow-up care and any pertinent home health orders.      Mobility at time of Discharge:   Basic Mobility Inpatient Raw Score: 19  JH-HLM Goal: 6: Walk 10 steps or more  JH-HLM Achieved: 8: Walk 250 feet ot more  HLM Goal achieved. Continue to encourage appropriate mobility.     Disposition:   Home    Planned Readmission: No    Discharge Medications:  See after visit summary for reconciled discharge medications provided to patient and/or family.      **Please Note: This note may have been constructed using a voice recognition system**       5

## 2024-07-03 NOTE — RESPIRATORY THERAPY NOTE
Home Oxygen Qualifying Test     Patient name: Suhail Borrego        : 1940   Date of Test:  July 3, 2024  Diagnosis:    Home Oxygen Test:    **Medicare Guidelines require item(s) 1-5 on all ambulatory patients or 1 and 2 on non-ambulatory patients.    1. Baseline SPO2 on Room Air at rest 97 %     SPO2 during exertion on Room Air 95  %  During exertion monitor SPO2. If SPO2 increases >=89%, do not add supplemental oxygen    Test performed during exertion activity.      []  Supplemental Home Oxygen is indicated.    [x]  Client does not qualify for home oxygen.    Respiratory Additional Notes- Pt does not need supplemental O2 for activity or rest.    Sammi Dey, RT

## 2024-07-03 NOTE — PROGRESS NOTES
[facility]  Progress Note  Name: Suhail Borrego I  MRN: [id number]  Unit/Bed#: S -01 I Date of Admission: 6/29/2024   Date of Service: 7/2/2024 I Hospital Day: 2        Assessment & Plan  * Symptomatic stenosis of left carotid artery  Assessment & Plan  Past medical history of 70-99% left carotid artery stenosis  CT head shows no acute large vessel distribution infarct, hemorrhage or mass effect. 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  CTA head and neck shows Worsening severe stenosis in the distal left common carotid artery. Severe tandem stenosis proximal left internal carotid artery. Moderate stenosis vertebral artery origins is unchanged. Stable atherosclerotic disease in the right common carotid artery with less than 50% stenosis. No high-grade intracranial stenosis, large vessel occlusion or aneurysm.  As per the family, patient has been having multiple episodes of TIA characterized by aphasia and unilateral limb weakness.  On 6/30, last known well: 7;30 a.m.  Around 8: 15 on 6/30, rapid was called for strokelike symptoms characterized by aphasia, right arm weakness  Family refused TNK, informed denial.  No past medical history of atrial fibrillation  NIH: 4 in the background of expressive aphasia and right upper arm drift but when I saw the patient NIH: 0  GCS: 15  Home med: Aspirin  Loading dose clopidogrel was given on 6/30     Plan:  Target for euglycemia, permissive hypertension, euthymia, euvolemia  Metoprolol was stopped to help permissive hypertension  Speech pathology/OT/PT/neurology/vascular surgery consultation  telemetry  Follow-up with bubble echo  Frequent neurocheck  Continue aspirin and clopidogrel     Lumbar compression fracture (HCC)  Assessment & Plan  PMHx of chronic back pain including T12 compression fracture and fusion of L2-S1 in 2018. Patient reports 1 week of worsening L sided back pain that feels like an achy muscle but is worse  with standing/twisting or bending.      Tylenol 975 q8 OTC  Opioid naive pain management including Oxy 2.5 and Oxy 5 mg for pain  Bowel regimen due to constipation 2/2 tramadol outpatient  Continue gabapentin for neuropathic pain in feet  Neurosurgery consult for evaluation/management recommendations of new acute T11 compression fracture  No surgical intervention at this admission  PT/OT  Consider DEXA scan outpatient in setting of multiple pathologic fractures w/o trauma  Continue vitamin C and D supplementation     Plan:  Follow-up with SPEP, UPEP, PTH, vitamin D 25 to rule out metabolic reasons for fracture  Evaluated by neurosurgery, no surgical interventions.  Continue conservative management with spinal brace, PT OT and pain management     Leukocytosis  Assessment & Plan  Chronic since May 2024. Did not resolve with course of antibiotics. Monitor on CBC, follow up outpatient for further workup.      Localized swelling of left lower extremity  Assessment & Plan  Per patient chronic, wears compression stocking on L side. Had LE US in 5/2024.   Vas Doppler unremarkable for DVT     Stage 3 chronic kidney disease (HCC)  Assessment & Plan        Lab Results   Component Value Date     EGFR 50 06/30/2024     EGFR 67 06/29/2024     EGFR 57 06/29/2024     CREATININE 1.29 06/30/2024     CREATININE 1.02 06/29/2024     CREATININE 1.16 06/29/2024      Patient with chronic kidney disease IIIa. Avoid hypotension and nephrotoxins. Monitor on AM BMP.   IV hydration given s/p contrast for CTA dissection protocol.      Hx of CABG  Assessment & Plan  History of CABG. Continue Aspirin and Statin.     Primary hypertension  Assessment & Plan  BP stable. Blood pressure increased to from 97/47 to 150/84.   Home dose metoprolol was stopped because of strokelike symptoms     Hyperlipidemia  Assessment & Plan  Continue home atorvastatin 80 mg                VTE Pharmacologic Prophylaxis: VTE Score: 4 Moderate Risk (Score 3-4) -  Pharmacological DVT Prophylaxis Ordered: enoxaparin (Lovenox).     Mobility:   Basic Mobility Inpatient Raw Score: 19  JH-HLM Goal: 6: Walk 10 steps or more  JH-HLM Achieved: 7: Walk 25 feet or more  JH-HLM Goal achieved. Continue to encourage appropriate mobility.     Patient Centered Rounds: I performed bedside rounds with nursing staff today.  Discussions with Specialists or Other Care Team Provider: Neurology, vascular surgery, critical care     Education and Discussions with Family / Patient: Updated daughters at bedside.  Current Length of Stay: 2 day(s)  Current Patient Status: Inpatient   Discharge Plan:Possible discharge on 7/3 if blood pressure remains stable     Code Status: Level 1 - Full Code     Subjective:   Patient was seen examined at bedside sitting in his chair drinking miraolax.  Patient's daughter was present during encounter. Patient reported same level of back pain. Has only ambulated from chair to bathroom. On my evaluation he was AAOx3.  Denies other pain or discomfort except for back pain. Answered his questions about his blood pressure and heart rate management.      Objective:      Vitals:   Temp (24hrs), Av.1 °F (36.7 °C), Min:97.6 °F (36.4 °C), Max:98.8 °F (37.1 °C)     Temp:  [97.6 °F (36.4 °C)-98.8 °F (37.1 °C)] 98 °F (36.7 °C)  HR:  [] 91  Resp:  [14-20] 20  BP: ()/(42-81) 154/70  SpO2:  [81 %-96 %] 96 %  Body mass index is 23.31 kg/m².      Input and Output Summary (last 24 hours):      Intake/Output Summary (Last 24 hours) at 2024 0900  Last data filed at 2024 0534      Gross per 24 hour   Intake 480 ml   Output 1290 ml   Net -810 ml         Physical Exam:   Physical Exam  Constitutional:       General: He is not in acute distress.     Appearance: Normal appearance. He is not ill-appearing, toxic-appearing or diaphoretic.   Cardiovascular:      Rate and Rhythm: Normal rate and regular rhythm.      Pulses: Normal pulses.      Heart sounds: Normal heart  sounds. No murmur heard.     No friction rub. No gallop.   Pulmonary:      Effort: Pulmonary effort is normal. No respiratory distress.      Breath sounds: Normal breath sounds. No stridor. No wheezing, rhonchi or rales.   Chest:      Chest wall: No tenderness.   Abdominal:      General: There is no distension.      Palpations: There is no mass.      Tenderness: There is no abdominal tenderness. There is no guarding or rebound.      Hernia: No hernia is present.   Musculoskeletal:      Right lower leg: No edema.      Left lower leg: No edema.      Comments: Lower thoracic spinal tenderness   Neurological:      General: No focal deficit present.      Mental Status: He is oriented to person, place, and time. Mental status is at baseline.      Cranial Nerves: No cranial nerve deficit.      Sensory: No sensory deficit.      Motor: No weakness.      Coordination: Coordination normal.      Gait: Gait normal.      Deep Tendon Reflexes: Reflexes normal.   Psychiatric:         Mood and Affect: Mood normal.         Behavior: Behavior normal.         Thought Content: Thought content normal.         Judgment: Judgment normal.            Additional Data:      Labs:       Results from last 7 days   Lab Units 07/01/24  0610   WBC Thousand/uL 10.60*   HEMOGLOBIN g/dL 9.7*   HEMATOCRIT % 30.4*   PLATELETS Thousands/uL 165   SEGS PCT % 62   LYMPHO PCT % 25   MONO PCT % 9   EOS PCT % 3              Results from last 7 days   Lab Units 06/30/24  0835 06/30/24  0833 06/29/24  0630 06/29/24  0153   SODIUM mmol/L  --  138   < > 138   POTASSIUM mmol/L  --  3.8   < > 4.6   CHLORIDE mmol/L  --  103   < > 107   CO2 mmol/L  --  25   < > 26   CO2, I-STAT mmol/L 29  --   --   --    BUN mg/dL  --  30*   < > 31*   CREATININE mg/dL  --  1.29   < > 1.16   ANION GAP mmol/L  --  10   < > 5   CALCIUM mg/dL  --  9.0   < > 9.0   ALBUMIN g/dL  --   --   --  3.5   TOTAL BILIRUBIN mg/dL  --   --   --  0.34   ALK PHOS U/L  --   --   --  61   ALT U/L  --   --    --  14   AST U/L  --   --   --  20   GLUCOSE RANDOM mg/dL  --  120   < > 107    < > = values in this interval not displayed.           Results from last 7 days   Lab Units 06/29/24  0153   INR   1.08           Results from last 7 days   Lab Units 06/30/24  0820   POC GLUCOSE mg/dl 103           Results from last 7 days   Lab Units 06/29/24  0630   HEMOGLOBIN A1C % 6.0*            Results from last 7 days   Lab Units 06/30/24  0834 06/29/24  0153   LACTIC ACID mmol/L 3.0* 1.2         Lines/Drains:  Invasive Devices         Peripheral Intravenous Line  Duration                Peripheral IV 06/30/24 Dorsal (posterior);Right Hand <1 day                             Telemetry:  Telemetry Orders (From admission, onward)                    24 Hour Telemetry Monitoring  Continuous x 24 Hours (Telem)        Question:  Reason for 24 Hour Telemetry  Answer:  TIA/Suspected CVA/ Confirmed CVA                          Indication for Continued Telemetry Use: Acute CVA                  Imaging: CT spine, CT head, CTA head and neck, MRI brain, vas Doppler, CTA dissection chest studies, spirometry     Recent Cultures (last 7 days):        Results from last 7 days   Lab Units 06/29/24  0504   URINE CULTURE   No Growth <1000 cfu/mL         Last 24 Hours Medication List:   Scheduled Medications            Current Facility-Administered Medications   Medication Dose Route Frequency Provider Last Rate    acetaminophen  975 mg Oral Q8H KAMRAN Anu You MD      aluminum-magnesium hydroxide-simethicone  30 mL Oral Q4H PRN Cirilo Montana MD      ascorbic acid  500 mg Oral Daily Anu You MD      aspirin  81 mg Oral Daily Anu You MD      atorvastatin  80 mg Oral QPM Anu You MD      cholecalciferol  2,000 Units Oral Daily Anu You MD      clopidogrel  75 mg Oral Daily Cirilo Montana MD      docusate sodium  100 mg Oral BID Anu You MD      enoxaparin  40  mg Subcutaneous Daily Anu You MD      famotidine  20 mg Oral BID Anu Yuo MD      gabapentin  200 mg Oral HS Javid Nagy DO      glycerin (adult)  1 suppository Rectal Once PRN Anu You MD      labetalol  10 mg Intravenous Q4H PRN Cirilo Montana MD      oxyCODONE  2.5 mg Oral Q4H PRN Anu oYu MD       Or    oxyCODONE  5 mg Oral Q4H PRN Anu You MD      polyethylene glycol  17 g Oral Daily Anu You MD      senna  1 tablet Oral Daily Anu You MD      traMADol  50 mg Oral Q6H PRN Delmy Gao MD

## 2024-07-03 NOTE — TELEPHONE ENCOUNTER
From: Luis Miguel Peters <[email or web address]  Sent: Wednesday, July 3, 2024 8:08 AM  To: Vascular Surgery Schedulers <[email or web address]  Subject: case to be scheduled         Hello,    I need to do an extensive left CEA with retrograde left common carotid angioplasty and stent on this patient.    It needs to be done before the end of next week.  I’m at [place] mon/Tuesday/wed next week.  Can we add him for me as an outpatient and get him an OV with me this Friday at [place]?  His clearance are already done.              Suhail Olivera [phone]         Luis Miguel DING. DO [place], FACS, RPVI

## 2024-07-03 NOTE — PLAN OF CARE
Problem: Potential for Falls  Goal: Patient will remain free of falls  Description: INTERVENTIONS:  - Educate patient/family on patient safety including physical limitations  - Instruct patient to call for assistance with activity   - Consult OT/PT to assist with strengthening/mobility   - Keep Call bell within reach  - Keep bed low and locked with side rails adjusted as appropriate  - Keep care items and personal belongings within reach  - Initiate and maintain comfort rounds  - Make Fall Risk Sign visible to staff  - Offer Toileting every 2 Hours, in advance of need  - Initiate/Maintain alarm  - Obtain necessary fall risk management equipment:   - Apply yellow socks and bracelet for high fall risk patients  - Consider moving patient to room near nurses station  7/3/2024 1551 by Sav Salamanca RN  Outcome: Completed  7/3/2024 1227 by Sav Salamanca RN  Outcome: Progressing     Problem: PAIN - ADULT  Goal: Verbalizes/displays adequate comfort level or baseline comfort level  Description: Interventions:  - Encourage patient to monitor pain and request assistance  - Assess pain using appropriate pain scale  - Administer analgesics based on type and severity of pain and evaluate response  - Implement non-pharmacological measures as appropriate and evaluate response  - Consider cultural and social influences on pain and pain management  - Notify physician/advanced practitioner if interventions unsuccessful or patient reports new pain  7/3/2024 1551 by Sav Salamanca RN  Outcome: Completed  7/3/2024 1227 by Sav Salamanca RN  Outcome: Progressing     Problem: INFECTION - ADULT  Goal: Absence or prevention of progression during hospitalization  Description: INTERVENTIONS:  - Assess and monitor for signs and symptoms of infection  - Monitor lab/diagnostic results  - Monitor all insertion sites, i.e. indwelling lines, tubes, and drains  - Monitor endotracheal if appropriate and nasal secretions for changes in amount and color  -  [place] appropriate cooling/warming therapies per order  - Administer medications as ordered  - Instruct and encourage patient and family to use good hand hygiene technique  - Identify and instruct in appropriate isolation precautions for identified infection/condition  7/3/2024 1551 by Sav Salamanca RN  Outcome: Completed  7/3/2024 1227 by Sav Salamanca RN  Outcome: Progressing  Goal: Absence of fever/infection during neutropenic period  Description: INTERVENTIONS:  - Monitor WBC    7/3/2024 1551 by Sav Salamanca RN  Outcome: Completed  7/3/2024 1227 by Sav Salamanca RN  Outcome: Progressing     Problem: SAFETY ADULT  Goal: Patient will remain free of falls  Description: INTERVENTIONS:  - Educate patient/family on patient safety including physical limitations  - Instruct patient to call for assistance with activity   - Consult OT/PT to assist with strengthening/mobility   - Keep Call bell within reach  - Keep bed low and locked with side rails adjusted as appropriate  - Keep care items and personal belongings within reach  - Initiate and maintain comfort rounds  - Make Fall Risk Sign visible to staff  - Offer Toileting every 2 Hours, in advance of need  - Initiate/Maintain alarm  - Obtain necessary fall risk management equipment:   - Apply yellow socks and bracelet for high fall risk patients  - Consider moving patient to room near nurses station  7/3/2024 1551 by Sav Salamanca RN  Outcome: Completed  7/3/2024 1227 by Sav Salamanca RN  Outcome: Progressing  Goal: Maintain or return to baseline ADL function  Description: INTERVENTIONS:  -  Assess patient's ability to carry out ADLs; assess patient's baseline for ADL function and identify physical deficits which impact ability to perform ADLs (bathing, care of mouth/teeth, toileting, grooming, dressing, etc.)  - Assess/evaluate cause of self-care deficits   - Assess range of motion  - Assess patient's mobility; develop plan if impaired  - Assess patient's need for  assistive devices and provide as appropriate  - Encourage maximum independence but intervene and supervise when necessary  - Involve family in performance of ADLs  - Assess for home care needs following discharge   - Consider OT consult to assist with ADL evaluation and planning for discharge  - Provide patient education as appropriate  7/3/2024 1551 by Sav Salamanca RN  Outcome: Completed  7/3/2024 1227 by Sav Salamanca RN  Outcome: Progressing  Goal: Maintains/Returns to pre admission functional level  Description: INTERVENTIONS:  - Perform AM-PAC 6 Click Basic Mobility/ Daily Activity assessment daily.  - Set and communicate daily mobility goal to care team and patient/family/caregiver.   - Collaborate with rehabilitation services on mobility goals if consulted  - Perform Range of Motion 2 times a day.  - Reposition patient every 2 hours.  - Dangle patient 2 times a day  - Stand patient 2 times a day  - Ambulate patient 2 times a day  - Out of bed to chair 2 times a day   - Out of bed for meals 2 times a day  - Out of bed for toileting  - Record patient progress and toleration of activity level   7/3/2024 1551 by Sav Salamanca RN  Outcome: Completed  7/3/2024 1227 by Sav Salamanca RN  Outcome: Progressing     Problem: DISCHARGE PLANNING  Goal: Discharge to home or other facility with appropriate resources  Description: INTERVENTIONS:  - Identify barriers to discharge w/patient and caregiver  - Arrange for needed discharge resources and transportation as appropriate  - Identify discharge learning needs (meds, wound care, etc.)  - Arrange for interpretive services to assist at discharge as needed  - Refer to Case Management Department for coordinating discharge planning if the patient needs post-hospital services based on physician/advanced practitioner order or complex needs related to functional status, cognitive ability, or social support system  7/3/2024 1551 by Sav Salamanca RN  Outcome: Completed  7/3/2024 1227 by  Sav Salamanca RN  Outcome: Progressing     Problem: Knowledge Deficit  Goal: Patient/family/caregiver demonstrates understanding of disease process, treatment plan, medications, and discharge instructions  Description: Complete learning assessment and assess knowledge base.  Interventions:  - Provide teaching at level of understanding  - Provide teaching via preferred learning methods  7/3/2024 1551 by Sav Salamanca RN  Outcome: Completed  7/3/2024 1227 by Sav Salamanca RN  Outcome: Progressing     Problem: Prexisting or High Potential for Compromised Skin Integrity  Goal: Skin integrity is maintained or improved  Description: INTERVENTIONS:  - Identify patients at risk for skin breakdown  - Assess and monitor skin integrity  - Assess and monitor nutrition and hydration status  - Monitor labs   - Assess for incontinence   - Turn and reposition patient  - Assist with mobility/ambulation  - Relieve pressure over bony prominences  - Avoid friction and shearing  - Provide appropriate hygiene as needed including keeping skin clean and dry  - Evaluate need for skin moisturizer/barrier cream  - Collaborate with interdisciplinary team   - Patient/family teaching  - Consider wound care consult   7/3/2024 1551 by Sav Salamanca RN  Outcome: Completed  7/3/2024 1227 by Sav Salamanca RN  Outcome: Progressing     Problem: Neurological Deficit  Goal: Neurological status is stable or improving  Description: Interventions:  - Monitor and assess patient's level of consciousness, motor function, sensory function, and level of assistance needed for ADLs.   - Monitor and report changes from baseline. Collaborate with interdisciplinary team to initiate plan and implement interventions as ordered.   - Provide and maintain a safe environment.  - Consider seizure precautions.  - Consider fall precautions.  - Consider aspiration precautions.  - Consider bleeding precautions.  7/3/2024 1551 by Sav Salamanca RN  Outcome: Completed  7/3/2024 1227 by  Sav Salamanca RN  Outcome: Progressing     Problem: Activity Intolerance/Impaired Mobility  Goal: Mobility/activity is maintained at optimum level for patient  Description: Interventions:  - Assess and monitor patient  barriers to mobility and need for assistive/adaptive devices.  - Assess patient's emotional response to limitations.  - Collaborate with interdisciplinary team and initiate plans and interventions as ordered.  - Encourage independent activity per ability.  - Maintain proper body alignment.  - Perform active/passive rom as tolerated/ordered.  - Plan activities to conserve energy.  - Turn patient as appropriate  7/3/2024 1551 by Sav Salamanca RN  Outcome: Completed  7/3/2024 1227 by Sav Salamanca RN  Outcome: Progressing     Problem: Communication Impairment  Goal: Ability to express needs and understand communication  Description: Assess patient's communication skills and ability to understand information.  Patient will demonstrate use of effective communication techniques, alternative methods of communication and understanding even if not able to speak.     - Encourage communication and provide alternate methods of communication as needed.  - Collaborate with case management/ for discharge needs.  - Include patient/family/caregiver in decisions related to communication.  7/3/2024 1551 by Sav Salamanca RN  Outcome: Completed  7/3/2024 1227 by Sav Salamanca RN  Outcome: Progressing     Problem: Potential for Aspiration  Goal: Non-ventilated patient's risk of aspiration is minimized  Description: Assess and monitor vital signs, respiratory status, and labs (WBC).  Monitor for signs of aspiration (tachypnea, cough, rales, wheezing, cyanosis, fever).    - Assess and monitor patient's ability to swallow.  - Place patient up in chair to eat if possible.  - HOB up at 90 degrees to eat if unable to get patient up into chair.  - Supervise patient during oral intake.   - Instruct patient/ family to  take small bites.  - Instruct patient/ family to take small single sips when taking liquids.  - Follow patient-specific strategies generated by speech pathologist.  7/3/2024 1551 by Sav Salamanca RN  Outcome: Completed  7/3/2024 1227 by Sav Salamanca RN  Outcome: Progressing  Goal: Ventilated patient's risk of aspiration is minimized  Description: Assess and monitor vital signs, respiratory status, airway cuff pressure, and labs (WBC).  Monitor for signs of aspiration (tachypnea, cough, rales, wheezing, cyanosis, fever).    - Elevate head of bed 30 degrees if patient has tube feeding.  - Monitor tube feeding.  7/3/2024 1551 by Sav Salamanca RN  Outcome: Completed  7/3/2024 1227 by Sav Salamanca RN  Outcome: Progressing     Problem: Nutrition  Goal: Nutrition/Hydration status is improving  Description: Monitor and assess patient's nutrition/hydration status for malnutrition (ex- brittle hair, bruises, dry skin, pale skin and conjunctiva, muscle wasting, smooth red tongue, and disorientation). Collaborate with interdisciplinary team and initiate plan and interventions as ordered.  Monitor patient's weight and dietary intake as ordered or per policy. Utilize nutrition screening tool and intervene per policy. Determine patient's food preferences and provide high-protein, high-caloric foods as appropriate.     - Assist patient with eating.  - Allow adequate time for meals.  - Encourage patient to take dietary supplement as ordered.  - Collaborate with clinical nutritionist.  - Include patient/family/caregiver in decisions related to nutrition.  7/3/2024 1551 by Sav Salamanca RN  Outcome: Completed  7/3/2024 1227 by Sav Salamanca RN  Outcome: Progressing

## 2024-07-03 NOTE — PLAN OF CARE
Problem: Potential for Falls  Goal: Patient will remain free of falls  Description: INTERVENTIONS:  - Educate patient/family on patient safety including physical limitations  - Instruct patient to call for assistance with activity   - Consult OT/PT to assist with strengthening/mobility   - Keep Call bell within reach  - Keep bed low and locked with side rails adjusted as appropriate  - Keep care items and personal belongings within reach  - Initiate and maintain comfort rounds  - Make Fall Risk Sign visible to staff  - Offer Toileting every 2 Hours, in advance of need  - Initiate/Maintain alarm  - Obtain necessary fall risk management equipment:   - Apply yellow socks and bracelet for high fall risk patients  - Consider moving patient to room near nurses station  Outcome: Progressing     Problem: PAIN - ADULT  Goal: Verbalizes/displays adequate comfort level or baseline comfort level  Description: Interventions:  - Encourage patient to monitor pain and request assistance  - Assess pain using appropriate pain scale  - Administer analgesics based on type and severity of pain and evaluate response  - Implement non-pharmacological measures as appropriate and evaluate response  - Consider cultural and social influences on pain and pain management  - Notify physician/advanced practitioner if interventions unsuccessful or patient reports new pain  Outcome: Progressing     Problem: INFECTION - ADULT  Goal: Absence or prevention of progression during hospitalization  Description: INTERVENTIONS:  - Assess and monitor for signs and symptoms of infection  - Monitor lab/diagnostic results  - Monitor all insertion sites, i.e. indwelling lines, tubes, and drains  - Monitor endotracheal if appropriate and nasal secretions for changes in amount and color  - [place] appropriate cooling/warming therapies per order  - Administer medications as ordered  - Instruct and encourage patient and family to use good hand hygiene  technique  - Identify and instruct in appropriate isolation precautions for identified infection/condition  Outcome: Progressing  Goal: Absence of fever/infection during neutropenic period  Description: INTERVENTIONS:  - Monitor WBC    Outcome: Progressing     Problem: SAFETY ADULT  Goal: Patient will remain free of falls  Description: INTERVENTIONS:  - Educate patient/family on patient safety including physical limitations  - Instruct patient to call for assistance with activity   - Consult OT/PT to assist with strengthening/mobility   - Keep Call bell within reach  - Keep bed low and locked with side rails adjusted as appropriate  - Keep care items and personal belongings within reach  - Initiate and maintain comfort rounds  - Make Fall Risk Sign visible to staff  - Offer Toileting every 2 Hours, in advance of need  - Initiate/Maintain alarm  - Obtain necessary fall risk management equipment:   - Apply yellow socks and bracelet for high fall risk patients  - Consider moving patient to room near nurses station  Outcome: Progressing  Goal: Maintain or return to baseline ADL function  Description: INTERVENTIONS:  -  Assess patient's ability to carry out ADLs; assess patient's baseline for ADL function and identify physical deficits which impact ability to perform ADLs (bathing, care of mouth/teeth, toileting, grooming, dressing, etc.)  - Assess/evaluate cause of self-care deficits   - Assess range of motion  - Assess patient's mobility; develop plan if impaired  - Assess patient's need for assistive devices and provide as appropriate  - Encourage maximum independence but intervene and supervise when necessary  - Involve family in performance of ADLs  - Assess for home care needs following discharge   - Consider OT consult to assist with ADL evaluation and planning for discharge  - Provide patient education as appropriate  Outcome: Progressing  Goal: Maintains/Returns to pre admission functional level  Description:  INTERVENTIONS:  - Perform AM-PAC 6 Click Basic Mobility/ Daily Activity assessment daily.  - Set and communicate daily mobility goal to care team and patient/family/caregiver.   - Collaborate with rehabilitation services on mobility goals if consulted  - Perform Range of Motion 2 times a day.  - Reposition patient every 2 hours.  - Dangle patient 2 times a day  - Stand patient 2 times a day  - Ambulate patient 2 times a day  - Out of bed to chair 2 times a day   - Out of bed for meals 2 times a day  - Out of bed for toileting  - Record patient progress and toleration of activity level   Outcome: Progressing     Problem: DISCHARGE PLANNING  Goal: Discharge to home or other facility with appropriate resources  Description: INTERVENTIONS:  - Identify barriers to discharge w/patient and caregiver  - Arrange for needed discharge resources and transportation as appropriate  - Identify discharge learning needs (meds, wound care, etc.)  - Arrange for interpretive services to assist at discharge as needed  - Refer to Case Management Department for coordinating discharge planning if the patient needs post-hospital services based on physician/advanced practitioner order or complex needs related to functional status, cognitive ability, or social support system  Outcome: Progressing     Problem: Knowledge Deficit  Goal: Patient/family/caregiver demonstrates understanding of disease process, treatment plan, medications, and discharge instructions  Description: Complete learning assessment and assess knowledge base.  Interventions:  - Provide teaching at level of understanding  - Provide teaching via preferred learning methods  Outcome: Progressing     Problem: Prexisting or High Potential for Compromised Skin Integrity  Goal: Skin integrity is maintained or improved  Description: INTERVENTIONS:  - Identify patients at risk for skin breakdown  - Assess and monitor skin integrity  - Assess and monitor nutrition and hydration  status  - Monitor labs   - Assess for incontinence   - Turn and reposition patient  - Assist with mobility/ambulation  - Relieve pressure over bony prominences  - Avoid friction and shearing  - Provide appropriate hygiene as needed including keeping skin clean and dry  - Evaluate need for skin moisturizer/barrier cream  - Collaborate with interdisciplinary team   - Patient/family teaching  - Consider wound care consult   Outcome: Progressing     Problem: Neurological Deficit  Goal: Neurological status is stable or improving  Description: Interventions:  - Monitor and assess patient's level of consciousness, motor function, sensory function, and level of assistance needed for ADLs.   - Monitor and report changes from baseline. Collaborate with interdisciplinary team to initiate plan and implement interventions as ordered.   - Provide and maintain a safe environment.  - Consider seizure precautions.  - Consider fall precautions.  - Consider aspiration precautions.  - Consider bleeding precautions.  Outcome: Progressing     Problem: Activity Intolerance/Impaired Mobility  Goal: Mobility/activity is maintained at optimum level for patient  Description: Interventions:  - Assess and monitor patient  barriers to mobility and need for assistive/adaptive devices.  - Assess patient's emotional response to limitations.  - Collaborate with interdisciplinary team and initiate plans and interventions as ordered.  - Encourage independent activity per ability.  - Maintain proper body alignment.  - Perform active/passive rom as tolerated/ordered.  - Plan activities to conserve energy.  - Turn patient as appropriate  Outcome: Progressing     Problem: Communication Impairment  Goal: Ability to express needs and understand communication  Description: Assess patient's communication skills and ability to understand information.  Patient will demonstrate use of effective communication techniques, alternative methods of communication and  understanding even if not able to speak.     - Encourage communication and provide alternate methods of communication as needed.  - Collaborate with case management/ for discharge needs.  - Include patient/family/caregiver in decisions related to communication.  Outcome: Progressing     Problem: Potential for Aspiration  Goal: Non-ventilated patient's risk of aspiration is minimized  Description: Assess and monitor vital signs, respiratory status, and labs (WBC).  Monitor for signs of aspiration (tachypnea, cough, rales, wheezing, cyanosis, fever).    - Assess and monitor patient's ability to swallow.  - Place patient up in chair to eat if possible.  - HOB up at 90 degrees to eat if unable to get patient up into chair.  - Supervise patient during oral intake.   - Instruct patient/ family to take small bites.  - Instruct patient/ family to take small single sips when taking liquids.  - Follow patient-specific strategies generated by speech pathologist.  Outcome: Progressing  Goal: Ventilated patient's risk of aspiration is minimized  Description: Assess and monitor vital signs, respiratory status, airway cuff pressure, and labs (WBC).  Monitor for signs of aspiration (tachypnea, cough, rales, wheezing, cyanosis, fever).    - Elevate head of bed 30 degrees if patient has tube feeding.  - Monitor tube feeding.  Outcome: Progressing     Problem: Nutrition  Goal: Nutrition/Hydration status is improving  Description: Monitor and assess patient's nutrition/hydration status for malnutrition (ex- brittle hair, bruises, dry skin, pale skin and conjunctiva, muscle wasting, smooth red tongue, and disorientation). Collaborate with interdisciplinary team and initiate plan and interventions as ordered.  Monitor patient's weight and dietary intake as ordered or per policy. Utilize nutrition screening tool and intervene per policy. Determine patient's food preferences and provide high-protein, high-caloric foods as  appropriate.     - Assist patient with eating.  - Allow adequate time for meals.  - Encourage patient to take dietary supplement as ordered.  - Collaborate with clinical nutritionist.  - Include patient/family/caregiver in decisions related to nutrition.  Outcome: Progressing

## 2024-07-03 NOTE — PLAN OF CARE
Problem: Potential for Falls  Goal: Patient will remain free of falls  Description: INTERVENTIONS:  - Educate patient/family on patient safety including physical limitations  - Instruct patient to call for assistance with activity   - Consult OT/PT to assist with strengthening/mobility   - Keep Call bell within reach  - Keep bed low and locked with side rails adjusted as appropriate  - Keep care items and personal belongings within reach  - Initiate and maintain comfort rounds  - Apply yellow socks and bracelet for high fall risk patients  - Consider moving patient to room near nurses station  Outcome: Progressing     Problem: PAIN - ADULT  Goal: Verbalizes/displays adequate comfort level or baseline comfort level  Description: Interventions:  - Encourage patient to monitor pain and request assistance  - Assess pain using appropriate pain scale  - Administer analgesics based on type and severity of pain and evaluate response  - Implement non-pharmacological measures as appropriate and evaluate response  - Consider cultural and social influences on pain and pain management  - Notify physician/advanced practitioner if interventions unsuccessful or patient reports new pain  Outcome: Progressing     Problem: INFECTION - ADULT  Goal: Absence or prevention of progression during hospitalization  Description: INTERVENTIONS:  - Assess and monitor for signs and symptoms of infection  - Monitor lab/diagnostic results  - Monitor all insertion sites, i.e. indwelling lines, tubes, and drains  - Monitor endotracheal if appropriate and nasal secretions for changes in amount and color  - [place] appropriate cooling/warming therapies per order  - Administer medications as ordered  - Instruct and encourage patient and family to use good hand hygiene technique  - Identify and instruct in appropriate isolation precautions for identified infection/condition  Outcome: Progressing  Goal: Absence of fever/infection during neutropenic  period  Description: INTERVENTIONS:  - Monitor WBC    Outcome: Progressing     Problem: SAFETY ADULT  Goal: Patient will remain free of falls  Description: INTERVENTIONS:  - Educate patient/family on patient safety including physical limitations  - Instruct patient to call for assistance with activity   - Consult OT/PT to assist with strengthening/mobility   - Keep Call bell within reach  - Keep bed low and locked with side rails adjusted as appropriate  - Keep care items and personal belongings within reach  - Initiate and maintain comfort rounds  - Apply yellow socks and bracelet for high fall risk patients  - Consider moving patient to room near nurses station  Outcome: Progressing  Goal: Maintain or return to baseline ADL function  Description: INTERVENTIONS:  -  Assess patient's ability to carry out ADLs; assess patient's baseline for ADL function and identify physical deficits which impact ability to perform ADLs (bathing, care of mouth/teeth, toileting, grooming, dressing, etc.)  - Assess/evaluate cause of self-care deficits   - Assess range of motion  - Assess patient's mobility; develop plan if impaired  - Assess patient's need for assistive devices and provide as appropriate  - Encourage maximum independence but intervene and supervise when necessary  - Involve family in performance of ADLs  - Assess for home care needs following discharge   - Consider OT consult to assist with ADL evaluation and planning for discharge  - Provide patient education as appropriate  Outcome: Progressing  Goal: Maintains/Returns to pre admission functional level  Description: INTERVENTIONS:  - Perform AM-PAC 6 Click Basic Mobility/ Daily Activity assessment daily.  - Set and communicate daily mobility goal to care team and patient/family/caregiver.   - Collaborate with rehabilitation services on mobility goals if consulted  - Perform Range of Motion   - Out of bed for toileting  - Record patient progress and toleration of  activity level   Outcome: Progressing     Problem: DISCHARGE PLANNING  Goal: Discharge to home or other facility with appropriate resources  Description: INTERVENTIONS:  - Identify barriers to discharge w/patient and caregiver  - Arrange for needed discharge resources and transportation as appropriate  - Identify discharge learning needs (meds, wound care, etc.)  - Arrange for interpretive services to assist at discharge as needed  - Refer to Case Management Department for coordinating discharge planning if the patient needs post-hospital services based on physician/advanced practitioner order or complex needs related to functional status, cognitive ability, or social support system  Outcome: Progressing     Problem: Knowledge Deficit  Goal: Patient/family/caregiver demonstrates understanding of disease process, treatment plan, medications, and discharge instructions  Description: Complete learning assessment and assess knowledge base.  Interventions:  - Provide teaching at level of understanding  - Provide teaching via preferred learning methods  Outcome: Progressing     Problem: Prexisting or High Potential for Compromised Skin Integrity  Goal: Skin integrity is maintained or improved  Description: INTERVENTIONS:  - Identify patients at risk for skin breakdown  - Assess and monitor skin integrity  - Assess and monitor nutrition and hydration status  - Monitor labs   - Assess for incontinence   - Turn and reposition patient  - Assist with mobility/ambulation  - Relieve pressure over bony prominences  - Avoid friction and shearing  - Provide appropriate hygiene as needed including keeping skin clean and dry  - Evaluate need for skin moisturizer/barrier cream  - Collaborate with interdisciplinary team   - Patient/family teaching  - Consider wound care consult   Outcome: Progressing     Problem: Neurological Deficit  Goal: Neurological status is stable or improving  Description: Interventions:  - Monitor and assess  patient's level of consciousness, motor function, sensory function, and level of assistance needed for ADLs.   - Monitor and report changes from baseline. Collaborate with interdisciplinary team to initiate plan and implement interventions as ordered.   - Provide and maintain a safe environment.  - Consider seizure precautions.  - Consider fall precautions.  - Consider aspiration precautions.  - Consider bleeding precautions.  Outcome: Progressing     Problem: Activity Intolerance/Impaired Mobility  Goal: Mobility/activity is maintained at optimum level for patient  Description: Interventions:  - Assess and monitor patient  barriers to mobility and need for assistive/adaptive devices.  - Assess patient's emotional response to limitations.  - Collaborate with interdisciplinary team and initiate plans and interventions as ordered.  - Encourage independent activity per ability.  - Maintain proper body alignment.  - Perform active/passive rom as tolerated/ordered.  - Plan activities to conserve energy.  - Turn patient as appropriate  Outcome: Progressing     Problem: Communication Impairment  Goal: Ability to express needs and understand communication  Description: Assess patient's communication skills and ability to understand information.  Patient will demonstrate use of effective communication techniques, alternative methods of communication and understanding even if not able to speak.     - Encourage communication and provide alternate methods of communication as needed.  - Collaborate with case management/ for discharge needs.  - Include patient/family/caregiver in decisions related to communication.  Outcome: Progressing     Problem: Potential for Aspiration  Goal: Non-ventilated patient's risk of aspiration is minimized  Description: Assess and monitor vital signs, respiratory status, and labs (WBC).  Monitor for signs of aspiration (tachypnea, cough, rales, wheezing, cyanosis, fever).    - Assess  and monitor patient's ability to swallow.  - Place patient up in chair to eat if possible.  - HOB up at 90 degrees to eat if unable to get patient up into chair.  - Supervise patient during oral intake.   - Instruct patient/ family to take small bites.  - Instruct patient/ family to take small single sips when taking liquids.  - Follow patient-specific strategies generated by speech pathologist.  Outcome: Progressing  Goal: Ventilated patient's risk of aspiration is minimized  Description: Assess and monitor vital signs, respiratory status, airway cuff pressure, and labs (WBC).  Monitor for signs of aspiration (tachypnea, cough, rales, wheezing, cyanosis, fever).    - Elevate head of bed 30 degrees if patient has tube feeding.  - Monitor tube feeding.  Outcome: Progressing

## 2024-07-04 LAB
ALBUMIN UR ELPH-MCNC: 100 %
ALPHA1 GLOB MFR UR ELPH: 0 %
ALPHA2 GLOB MFR UR ELPH: 0 %
B-GLOBULIN MFR UR ELPH: 0 %
GAMMA GLOB MFR UR ELPH: 0 %
PROT PATTERN UR ELPH-IMP: NORMAL
PROT UR-MCNC: 10.1 MG/DL

## 2024-07-04 PROCEDURE — 84166 PROTEIN E-PHORESIS/URINE/CSF: CPT | Performed by: STUDENT IN AN ORGANIZED HEALTH CARE EDUCATION/TRAINING PROGRAM

## 2024-07-05 ENCOUNTER — TRANSITIONAL CARE MANAGEMENT (OUTPATIENT)
Age: 84
End: 2024-07-05

## 2024-07-05 ENCOUNTER — OFFICE VISIT (OUTPATIENT)
Dept: VASCULAR SURGERY | Facility: CLINIC | Age: 84
End: 2024-07-05
Payer: COMMERCIAL

## 2024-07-05 VITALS
SYSTOLIC BLOOD PRESSURE: 136 MMHG | BODY MASS INDEX: 25.11 KG/M2 | HEART RATE: 90 BPM | HEIGHT: 67 IN | DIASTOLIC BLOOD PRESSURE: 74 MMHG | WEIGHT: 160 LBS | RESPIRATION RATE: 18 BRPM | OXYGEN SATURATION: 94 %

## 2024-07-05 DIAGNOSIS — I65.22 SYMPTOMATIC STENOSIS OF LEFT CAROTID ARTERY: Primary | ICD-10-CM

## 2024-07-05 PROCEDURE — 99215 OFFICE O/P EST HI 40 MIN: CPT | Performed by: SURGERY

## 2024-07-05 RX ORDER — CHLORHEXIDINE GLUCONATE ORAL RINSE 1.2 MG/ML
15 SOLUTION DENTAL ONCE
OUTPATIENT
Start: 2024-07-05 | End: 2024-07-05

## 2024-07-05 RX ORDER — CEFAZOLIN SODIUM 2 G/50ML
2000 SOLUTION INTRAVENOUS ONCE
OUTPATIENT
Start: 2024-07-05 | End: 2024-07-05

## 2024-07-05 NOTE — TELEPHONE ENCOUNTER
Patient would like a phone call today to go over things for surgery that is scheduled for 7/8/24. Patient was given surgery instructions along with antiseptic procedure soap, but has some questions prior to surgery.

## 2024-07-05 NOTE — PROGRESS NOTES
Assessment/Plan:      There are no diagnoses linked to this encounter.    Symptomatic stenosis of left carotid artery  83-year-old gentleman presents for hospital follow-up.  He is known to me from prior outpatient referral for asymptomatic carotid stenosis.  He was admitted to [facility] 1 week ago with back pain was found to have a T12 compression fracture.  While he was in the hospital he was noted to have right hand numbness and a stroke workup was pursued and neurology consult was performed.  Ultimately he was diagnosed with a left hemispheric TIA and a symptomatic left internal carotid artery stenosis.  By CTA his stenosis is greater than 80% on my review he also does appear to have a 50 to 60% left proximal common carotid artery stenosis.  He has not had any further neurologic events since being discharged from the hospital he was evaluated by both pulmonology And cardiology while he was inpatient.  He has a history of pulmonary fibrosis and was deemed low risk by pulmonology he also had a history of a positive stress test that was deemed low risk from a periprocedural cardiac complication standpoint by the cardiologist.  He was started on dual antiplatelets as well as a statin and discharged with outpatient follow-up.  His lesion is higher than average and the lesion is longer than average extends quite far into the distal left common carotid.  And is heavily calcified I discussed options with the patient while he was inpatient as well as with both of his daughters ultimately due to the calcific nature of the lesion I think he is a poor candidate for TCAR I offered them a extended left common carotid endarterectomy with possible retrograde left common carotid angioplasty and stent.  After discussing all risks benefits and alternative therapies in detail he consented to proceed he is currently scheduled for July 8 for his procedure.  I recommended they continue the aspirin and Plavix through the  weekend and post procedure.      Subjective:     Patient ID: Suhail Borrego is a 83 y.o. male.    Patient had a CTA head/ neck 6/30/24. Pt was in [facility] 6/29/24 to 7/3/24 for RUE weakness and aphasia. Pt states the RUE weakness and aphasia has improved. Pt states he has blotchy vision in the Lt eye when in bright light. Pt denies all other TIA or CVA symptoms.     HPI    Review of Systems   Constitutional: Negative.    HENT:  Positive for hearing loss (wears hearing aids).    Eyes:  Positive for visual disturbance.   Respiratory:  Positive for shortness of breath.    Cardiovascular: Negative.    Gastrointestinal: Negative.    Endocrine: Negative.    Genitourinary: Negative.    Musculoskeletal:  Positive for back pain and gait problem (uses cane).   Skin: Negative.    Allergic/Immunologic: Negative.    Neurological:  Positive for speech difficulty. Negative for dizziness, weakness, light-headedness and headaches.   Hematological: Negative.    Psychiatric/Behavioral: Negative.         I have reviewed the ROS above and made changes as needed.      Objective:     Physical Exam      General  Exam: alert, awake, oriented, no distress, consistent with stated age    Integumentary  Exam: warm, dry, no gross lesions, no bruises and normal color    Head and Neck  Exam: supple, no bruits, trachea midline, no JVD, no mass or palpable nodes    Chest and Lung  Exam: chest normal without deformity, bilaterally expansive, clear to auscultation    Cardiovascular  Exam: regular rate, regular rhythm, no murmurs, no rubs or gallops    Adbomen  Exam: soft, non-tender, non-distended, no pulsatile abdominal masses, no abdominal bruit    Peripheral Vascular  Exam: no clubbing of the digits of the upper extremity, no cyanosis, no edema, both feet are warm, radial pulses 2+ bilaterally, skin well perfused, without and no varicosities.    No widened popliteal pulse noted bilaterally    Upper Extremity:  Palpation: Radial pulse- Bilateral  2+    Neurologic  Exam:alert, non-focal, oriented x 3, cranial nerves II-XII grossly intact          Operative Scheduling Information:    Hospital:  [facility]    Physician:  Lorraine    Surgery: Left CEA, possible retrograde left common carotid angioplasty and stent.    Urgency:  Standard    Level:  Level 2: Outpatients to be scheduled for surgery with time dependent medical necessity within 2 weeks    Case Length:  4 hours    Post-op Bed:  SD1    OR Table:  IR    Equipment Needs:  Rep: Neuro monitoring    Medication Instructions:  Aspirin:   Continue (do not hold)  Plavix:  Continue (do not hold)    Hydration:  No    Contrast Allergy:  no

## 2024-07-05 NOTE — TELEPHONE ENCOUNTER
Verified patient's insurance   CONFIRMED - Patient's insurance is [employer]  Is patient requesting a call when authorization has been obtained? Patient did not request a call.    Surgery Date: 7-8-24  Primary Surgeon: MORE // Luis Miguel Peters (NPI: [id number])  Assisting Surgeon: Not Applicable (N/A)  Facility: [facility] (Tax: [id number] / NPI: [id number])  Inpatient / Outpatient: Inpatient  Level: 2    Clearance Received: No clearance ordered.  Consent Received: Yes, scanned into Epic on 7-5-24.  Medication Hold / Last Dose:  No Hold on ASA or Plavix   IR Notified: Not Applicable (N/A)  Rep. Notified:  Nuvasive   Equipment Needs: Not Applicable (N/A)  Vas Lab Requested:  Yes  Patient Contacted: 7-5-24    Diagnosis: I65.22  Procedure/ CPT Code(s): (CEA) Carotid Endarterectomy / Patch Angioplasty // CPT: 72686    For varicose vein related procedures:   Last LEVDR: Not Applicable (N/A)  CEAP Classification: Not Applicable (N/A)  VCSS: Not Applicable (N/A)    Post Operative Date/ Time: TBD     *Please review medication hold(s), PATs, and check H&P with patient.*  PATIENT WAS MAILED SURGERY/SHOWERING/DISCHARGE/COVID INSTRUCTIONS AFTER REVIEWING WITH THEM VIA PHONE CALL.      Patient was schedule on office hours

## 2024-07-05 NOTE — UTILIZATION REVIEW
NOTIFICATION OF ADMISSION DISCHARGE   This is a Notification of Discharge from [facility]. Please be advised that this patient has been discharge from our facility. Below you will find the admission and discharge date and time including the patient’s disposition.   UTILIZATION REVIEW CONTACT:  Romelia Altman  Utilization   Network Utilization Review Department  Phone: [phone] [phone] carefully listen to the prompts. All voicemails are confidential.  Email: [email or web address]     ADMISSION INFORMATION  PRESENTATION DATE: 6/29/2024 12:21 AM  OBERVATION ADMISSION DATE: N/A  INPATIENT ADMISSION DATE: 6/29/24  4:57 AM   DISCHARGE DATE: 7/3/2024  3:56 PM   DISPOSITION:Home with Home Health Care    Network Utilization Review Department  ATTENTION: Please call with any questions or concerns to [phone] and carefully listen to the prompts so that you are directed to the right person. All voicemails are confidential.   For Discharge needs, contact Care Management DC Support Team at [phone] opt. [phone]  Send all requests for admission clinical reviews, approved or denied determinations and any other requests to dedicated fax number below belonging to the campus where the patient is receiving treatment. List of dedicated fax numbers for the Facilities:  FACILITY NAME UR FAX NUMBER   ADMISSION DENIALS (Administrative/Medical Necessity) [phone]   DISCHARGE SUPPORT TEAM ([facility]) [phone]   PARENT CHILD HEALTH (Maternity/NICU/Pediatrics) [phone]   [facility] [phone]   [facility] [phone]   Critical access hospital [phone]   [facility] [phone]   [facility] [phone]   [facility] [phone]   [facility] [phone]   [facility]  [phone]   [facility] [phone]   [facility] [phone]   [facility] [phone]   [facility] [phone]

## 2024-07-05 NOTE — ASSESSMENT & PLAN NOTE
83-year-old gentleman presents for hospital follow-up.  He is known to me from prior outpatient referral for asymptomatic carotid stenosis.  He was admitted to [facility] 1 week ago with back pain was found to have a T12 compression fracture.  While he was in the hospital he was noted to have right hand numbness and a stroke workup was pursued and neurology consult was performed.  Ultimately he was diagnosed with a left hemispheric TIA and a symptomatic left internal carotid artery stenosis.  By CTA his stenosis is greater than 80% on my review he also does appear to have a 50 to 60% left proximal common carotid artery stenosis.  He has not had any further neurologic events since being discharged from the hospital he was evaluated by both pulmonology And cardiology while he was inpatient.  He has a history of pulmonary fibrosis and was deemed low risk by pulmonology he also had a history of a positive stress test that was deemed low risk from a periprocedural cardiac complication standpoint by the cardiologist.  He was started on dual antiplatelets as well as a statin and discharged with outpatient follow-up.  His lesion is higher than average and the lesion is longer than average extends quite far into the distal left common carotid.  And is heavily calcified I discussed options with the patient while he was inpatient as well as with both of his daughters ultimately due to the calcific nature of the lesion I think he is a poor candidate for TCAR I offered them a extended left common carotid endarterectomy with possible retrograde left common carotid angioplasty and stent.  After discussing all risks benefits and alternative therapies in detail he consented to proceed he is currently scheduled for July 8 for his procedure.  I recommended they continue the aspirin and Plavix through the weekend and post procedure.

## 2024-07-05 NOTE — H&P (VIEW-ONLY)
Assessment/Plan:      There are no diagnoses linked to this encounter.    Symptomatic stenosis of left carotid artery  83-year-old gentleman presents for hospital follow-up.  He is known to me from prior outpatient referral for asymptomatic carotid stenosis.  He was admitted to [facility] 1 week ago with back pain was found to have a T12 compression fracture.  While he was in the hospital he was noted to have right hand numbness and a stroke workup was pursued and neurology consult was performed.  Ultimately he was diagnosed with a left hemispheric TIA and a symptomatic left internal carotid artery stenosis.  By CTA his stenosis is greater than 80% on my review he also does appear to have a 50 to 60% left proximal common carotid artery stenosis.  He has not had any further neurologic events since being discharged from the hospital he was evaluated by both pulmonology And cardiology while he was inpatient.  He has a history of pulmonary fibrosis and was deemed low risk by pulmonology he also had a history of a positive stress test that was deemed low risk from a periprocedural cardiac complication standpoint by the cardiologist.  He was started on dual antiplatelets as well as a statin and discharged with outpatient follow-up.  His lesion is higher than average and the lesion is longer than average extends quite far into the distal left common carotid.  And is heavily calcified I discussed options with the patient while he was inpatient as well as with both of his daughters ultimately due to the calcific nature of the lesion I think he is a poor candidate for TCAR I offered them a extended left common carotid endarterectomy with possible retrograde left common carotid angioplasty and stent.  After discussing all risks benefits and alternative therapies in detail he consented to proceed he is currently scheduled for July 8 for his procedure.  I recommended they continue the aspirin and Plavix through the  weekend and post procedure.      Subjective:     Patient ID: Suhail Borrego is a 83 y.o. male.    Patient had a CTA head/ neck 6/30/24. Pt was in [facility] 6/29/24 to 7/3/24 for RUE weakness and aphasia. Pt states the RUE weakness and aphasia has improved. Pt states he has blotchy vision in the Lt eye when in bright light. Pt denies all other TIA or CVA symptoms.     HPI    Review of Systems   Constitutional: Negative.    HENT:  Positive for hearing loss (wears hearing aids).    Eyes:  Positive for visual disturbance.   Respiratory:  Positive for shortness of breath.    Cardiovascular: Negative.    Gastrointestinal: Negative.    Endocrine: Negative.    Genitourinary: Negative.    Musculoskeletal:  Positive for back pain and gait problem (uses cane).   Skin: Negative.    Allergic/Immunologic: Negative.    Neurological:  Positive for speech difficulty. Negative for dizziness, weakness, light-headedness and headaches.   Hematological: Negative.    Psychiatric/Behavioral: Negative.         I have reviewed the ROS above and made changes as needed.      Objective:     Physical Exam      General  Exam: alert, awake, oriented, no distress, consistent with stated age    Integumentary  Exam: warm, dry, no gross lesions, no bruises and normal color    Head and Neck  Exam: supple, no bruits, trachea midline, no JVD, no mass or palpable nodes    Chest and Lung  Exam: chest normal without deformity, bilaterally expansive, clear to auscultation    Cardiovascular  Exam: regular rate, regular rhythm, no murmurs, no rubs or gallops    Adbomen  Exam: soft, non-tender, non-distended, no pulsatile abdominal masses, no abdominal bruit    Peripheral Vascular  Exam: no clubbing of the digits of the upper extremity, no cyanosis, no edema, both feet are warm, radial pulses 2+ bilaterally, skin well perfused, without and no varicosities.    No widened popliteal pulse noted bilaterally    Upper Extremity:  Palpation: Radial pulse- Bilateral  2+    Neurologic  Exam:alert, non-focal, oriented x 3, cranial nerves II-XII grossly intact          Operative Scheduling Information:    Hospital:  [facility]    Physician:  Lorraine    Surgery: Left CEA, possible retrograde left common carotid angioplasty and stent.    Urgency:  Standard    Level:  Level 2: Outpatients to be scheduled for surgery with time dependent medical necessity within 2 weeks    Case Length:  4 hours    Post-op Bed:  SD1    OR Table:  IR    Equipment Needs:  Rep: Neuro monitoring    Medication Instructions:  Aspirin:   Continue (do not hold)  Plavix:  Continue (do not hold)    Hydration:  No    Contrast Allergy:  no

## 2024-07-07 ENCOUNTER — ANESTHESIA EVENT (OUTPATIENT)
Dept: PERIOP | Facility: HOSPITAL | Age: 84
DRG: 036 | End: 2024-07-07
Payer: COMMERCIAL

## 2024-07-08 ENCOUNTER — APPOINTMENT (OUTPATIENT)
Dept: NON INVASIVE DIAGNOSTICS | Facility: HOSPITAL | Age: 84
DRG: 036 | End: 2024-07-08
Payer: COMMERCIAL

## 2024-07-08 ENCOUNTER — HOSPITAL ENCOUNTER (INPATIENT)
Facility: HOSPITAL | Age: 84
LOS: 1 days | Discharge: HOME HEALTH | DRG: 036 | End: 2024-07-09
Attending: SURGERY | Admitting: SURGERY
Payer: COMMERCIAL

## 2024-07-08 ENCOUNTER — ANESTHESIA (OUTPATIENT)
Dept: PERIOP | Facility: HOSPITAL | Age: 84
DRG: 036 | End: 2024-07-08
Payer: COMMERCIAL

## 2024-07-08 ENCOUNTER — APPOINTMENT (OUTPATIENT)
Dept: RADIOLOGY | Facility: HOSPITAL | Age: 84
DRG: 036 | End: 2024-07-08
Payer: COMMERCIAL

## 2024-07-08 DIAGNOSIS — I65.22 SYMPTOMATIC STENOSIS OF LEFT CAROTID ARTERY: ICD-10-CM

## 2024-07-08 DIAGNOSIS — G62.9 NEUROPATHY: ICD-10-CM

## 2024-07-08 DIAGNOSIS — I65.22 STENOSIS OF LEFT INTERNAL CAROTID ARTERY: Primary | ICD-10-CM

## 2024-07-08 LAB
ABO GROUP BLD: NORMAL
BLD GP AB SCN SERPL QL: NEGATIVE
RH BLD: POSITIVE
SPECIMEN EXPIRATION DATE: NORMAL

## 2024-07-08 PROCEDURE — 93882 EXTRACRANIAL UNI/LTD STUDY: CPT

## 2024-07-08 PROCEDURE — C1894 INTRO/SHEATH, NON-LASER: HCPCS | Performed by: SURGERY

## 2024-07-08 PROCEDURE — 03CJ0ZZ EXTIRPATION OF MATTER FROM LEFT COMMON CAROTID ARTERY, OPEN APPROACH: ICD-10-PCS | Performed by: SURGERY

## 2024-07-08 PROCEDURE — 86850 RBC ANTIBODY SCREEN: CPT | Performed by: SURGERY

## 2024-07-08 PROCEDURE — C1874 STENT, COATED/COV W/DEL SYS: HCPCS | Performed by: SURGERY

## 2024-07-08 PROCEDURE — B310YZZ FLUOROSCOPY OF THORACIC AORTA USING OTHER CONTRAST: ICD-10-PCS | Performed by: SURGERY

## 2024-07-08 PROCEDURE — C1769 GUIDE WIRE: HCPCS | Performed by: SURGERY

## 2024-07-08 PROCEDURE — 037J0DZ DILATION OF LEFT COMMON CAROTID ARTERY WITH INTRALUMINAL DEVICE, OPEN APPROACH: ICD-10-PCS | Performed by: SURGERY

## 2024-07-08 PROCEDURE — 86901 BLOOD TYPING SEROLOGIC RH(D): CPT | Performed by: SURGERY

## 2024-07-08 PROCEDURE — 86900 BLOOD TYPING SEROLOGIC ABO: CPT | Performed by: SURGERY

## 2024-07-08 PROCEDURE — 37217 STENT PLACEMT RETRO CAROTID: CPT | Performed by: SURGERY

## 2024-07-08 PROCEDURE — 88305 TISSUE EXAM BY PATHOLOGIST: CPT | Performed by: PATHOLOGY

## 2024-07-08 PROCEDURE — 76000 FLUOROSCOPY <1 HR PHYS/QHP: CPT

## 2024-07-08 PROCEDURE — 35301 RECHANNELING OF ARTERY: CPT | Performed by: SURGERY

## 2024-07-08 PROCEDURE — 4A10X4G MONITORING OF CENTRAL NERVOUS ELECTRICAL ACTIVITY, INTRAOPERATIVE, EXTERNAL APPROACH: ICD-10-PCS | Performed by: SURGERY

## 2024-07-08 PROCEDURE — 03UJ0KZ SUPPLEMENT LEFT COMMON CAROTID ARTERY WITH NONAUTOLOGOUS TISSUE SUBSTITUTE, OPEN APPROACH: ICD-10-PCS | Performed by: SURGERY

## 2024-07-08 PROCEDURE — C1887 CATHETER, GUIDING: HCPCS | Performed by: SURGERY

## 2024-07-08 DEVICE — BX2 HEP REDUCED PROFILE BX2 7MMX29MM 6FR 80CM CATH
Type: IMPLANTABLE DEVICE | Site: CAROTID | Status: FUNCTIONAL
Brand: GORE VIABAHN VBX BALLOON EXPANDABLE ENDO

## 2024-07-08 DEVICE — XENOSURE BIOLOGIC PATCH, 0.8CM X 8CM, EIFU
Type: IMPLANTABLE DEVICE | Site: CAROTID | Status: FUNCTIONAL
Brand: XENOSURE BIOLOGIC PATCH

## 2024-07-08 RX ORDER — ONDANSETRON 2 MG/ML
4 INJECTION INTRAMUSCULAR; INTRAVENOUS ONCE AS NEEDED
Status: DISCONTINUED | OUTPATIENT
Start: 2024-07-08 | End: 2024-07-08 | Stop reason: HOSPADM

## 2024-07-08 RX ORDER — LABETALOL HYDROCHLORIDE 5 MG/ML
5 INJECTION, SOLUTION INTRAVENOUS
Status: DISCONTINUED | OUTPATIENT
Start: 2024-07-08 | End: 2024-07-09 | Stop reason: HOSPADM

## 2024-07-08 RX ORDER — HYDROMORPHONE HCL IN WATER/PF 6 MG/30 ML
0.2 PATIENT CONTROLLED ANALGESIA SYRINGE INTRAVENOUS
Status: DISCONTINUED | OUTPATIENT
Start: 2024-07-08 | End: 2024-07-08 | Stop reason: HOSPADM

## 2024-07-08 RX ORDER — LIDOCAINE HYDROCHLORIDE 20 MG/ML
INJECTION, SOLUTION EPIDURAL; INFILTRATION; INTRACAUDAL; PERINEURAL AS NEEDED
Status: DISCONTINUED | OUTPATIENT
Start: 2024-07-08 | End: 2024-07-08

## 2024-07-08 RX ORDER — SODIUM CHLORIDE, SODIUM LACTATE, POTASSIUM CHLORIDE, CALCIUM CHLORIDE 600; 310; 30; 20 MG/100ML; MG/100ML; MG/100ML; MG/100ML
20 INJECTION, SOLUTION INTRAVENOUS CONTINUOUS
Status: DISCONTINUED | OUTPATIENT
Start: 2024-07-08 | End: 2024-07-09

## 2024-07-08 RX ORDER — PROTAMINE SULFATE 10 MG/ML
INJECTION, SOLUTION INTRAVENOUS AS NEEDED
Status: DISCONTINUED | OUTPATIENT
Start: 2024-07-08 | End: 2024-07-08

## 2024-07-08 RX ORDER — IODIXANOL 320 MG/ML
INJECTION, SOLUTION INTRAVASCULAR AS NEEDED
Status: DISCONTINUED | OUTPATIENT
Start: 2024-07-08 | End: 2024-07-08 | Stop reason: HOSPADM

## 2024-07-08 RX ORDER — OXYCODONE HYDROCHLORIDE 5 MG/1
5 TABLET ORAL EVERY 4 HOURS PRN
Status: DISCONTINUED | OUTPATIENT
Start: 2024-07-08 | End: 2024-07-08

## 2024-07-08 RX ORDER — CHLORHEXIDINE GLUCONATE ORAL RINSE 1.2 MG/ML
15 SOLUTION DENTAL ONCE
Status: COMPLETED | OUTPATIENT
Start: 2024-07-08 | End: 2024-07-08

## 2024-07-08 RX ORDER — ATORVASTATIN CALCIUM 80 MG/1
80 TABLET, FILM COATED ORAL EVERY MORNING
Status: DISCONTINUED | OUTPATIENT
Start: 2024-07-09 | End: 2024-07-09 | Stop reason: HOSPADM

## 2024-07-08 RX ORDER — HEPARIN SODIUM 1000 [USP'U]/ML
INJECTION, SOLUTION INTRAVENOUS; SUBCUTANEOUS AS NEEDED
Status: DISCONTINUED | OUTPATIENT
Start: 2024-07-08 | End: 2024-07-08

## 2024-07-08 RX ORDER — SODIUM CHLORIDE, SODIUM LACTATE, POTASSIUM CHLORIDE, CALCIUM CHLORIDE 600; 310; 30; 20 MG/100ML; MG/100ML; MG/100ML; MG/100ML
100 INJECTION, SOLUTION INTRAVENOUS CONTINUOUS
Status: DISCONTINUED | OUTPATIENT
Start: 2024-07-08 | End: 2024-07-09

## 2024-07-08 RX ORDER — FENTANYL CITRATE 50 UG/ML
INJECTION, SOLUTION INTRAMUSCULAR; INTRAVENOUS AS NEEDED
Status: DISCONTINUED | OUTPATIENT
Start: 2024-07-08 | End: 2024-07-08

## 2024-07-08 RX ORDER — PROPOFOL 10 MG/ML
INJECTION, EMULSION INTRAVENOUS AS NEEDED
Status: DISCONTINUED | OUTPATIENT
Start: 2024-07-08 | End: 2024-07-08

## 2024-07-08 RX ORDER — GLYCOPYRROLATE 0.2 MG/ML
INJECTION INTRAMUSCULAR; INTRAVENOUS AS NEEDED
Status: DISCONTINUED | OUTPATIENT
Start: 2024-07-08 | End: 2024-07-08

## 2024-07-08 RX ORDER — ROCURONIUM BROMIDE 10 MG/ML
INJECTION, SOLUTION INTRAVENOUS AS NEEDED
Status: DISCONTINUED | OUTPATIENT
Start: 2024-07-08 | End: 2024-07-08

## 2024-07-08 RX ORDER — FENTANYL CITRATE/PF 50 MCG/ML
25 SYRINGE (ML) INJECTION
Status: DISCONTINUED | OUTPATIENT
Start: 2024-07-08 | End: 2024-07-08 | Stop reason: HOSPADM

## 2024-07-08 RX ORDER — SODIUM CHLORIDE, SODIUM LACTATE, POTASSIUM CHLORIDE, CALCIUM CHLORIDE 600; 310; 30; 20 MG/100ML; MG/100ML; MG/100ML; MG/100ML
INJECTION, SOLUTION INTRAVENOUS CONTINUOUS PRN
Status: DISCONTINUED | OUTPATIENT
Start: 2024-07-08 | End: 2024-07-08

## 2024-07-08 RX ORDER — OXYCODONE HYDROCHLORIDE 5 MG/1
5 TABLET ORAL EVERY 4 HOURS PRN
Status: DISCONTINUED | OUTPATIENT
Start: 2024-07-08 | End: 2024-07-09

## 2024-07-08 RX ORDER — ONDANSETRON 2 MG/ML
4 INJECTION INTRAMUSCULAR; INTRAVENOUS EVERY 6 HOURS PRN
Status: DISCONTINUED | OUTPATIENT
Start: 2024-07-08 | End: 2024-07-09 | Stop reason: HOSPADM

## 2024-07-08 RX ORDER — ONDANSETRON 2 MG/ML
INJECTION INTRAMUSCULAR; INTRAVENOUS AS NEEDED
Status: DISCONTINUED | OUTPATIENT
Start: 2024-07-08 | End: 2024-07-08

## 2024-07-08 RX ORDER — HEPARIN SODIUM 5000 [USP'U]/ML
5000 INJECTION, SOLUTION INTRAVENOUS; SUBCUTANEOUS EVERY 8 HOURS SCHEDULED
Status: DISCONTINUED | OUTPATIENT
Start: 2024-07-08 | End: 2024-07-09 | Stop reason: HOSPADM

## 2024-07-08 RX ORDER — PHENYLEPHRINE HCL IN 0.9% NACL 1 MG/10 ML
SYRINGE (ML) INTRAVENOUS AS NEEDED
Status: DISCONTINUED | OUTPATIENT
Start: 2024-07-08 | End: 2024-07-08

## 2024-07-08 RX ORDER — HYDRALAZINE HYDROCHLORIDE 20 MG/ML
15 INJECTION INTRAMUSCULAR; INTRAVENOUS
Status: DISCONTINUED | OUTPATIENT
Start: 2024-07-08 | End: 2024-07-09 | Stop reason: HOSPADM

## 2024-07-08 RX ORDER — CEFAZOLIN SODIUM 2 G/50ML
2000 SOLUTION INTRAVENOUS ONCE
Status: COMPLETED | OUTPATIENT
Start: 2024-07-08 | End: 2024-07-08

## 2024-07-08 RX ORDER — FAMOTIDINE 20 MG/1
20 TABLET, FILM COATED ORAL 2 TIMES DAILY
Status: DISCONTINUED | OUTPATIENT
Start: 2024-07-08 | End: 2024-07-09 | Stop reason: HOSPADM

## 2024-07-08 RX ORDER — TRAMADOL HYDROCHLORIDE 50 MG/1
50 TABLET ORAL 2 TIMES DAILY PRN
Status: DISCONTINUED | OUTPATIENT
Start: 2024-07-08 | End: 2024-07-08

## 2024-07-08 RX ORDER — FOLIC ACID 1 MG/1
1 TABLET ORAL DAILY
Status: DISCONTINUED | OUTPATIENT
Start: 2024-07-09 | End: 2024-07-09 | Stop reason: HOSPADM

## 2024-07-08 RX ORDER — DEXAMETHASONE SODIUM PHOSPHATE 10 MG/ML
INJECTION, SOLUTION INTRAMUSCULAR; INTRAVENOUS AS NEEDED
Status: DISCONTINUED | OUTPATIENT
Start: 2024-07-08 | End: 2024-07-08

## 2024-07-08 RX ORDER — GABAPENTIN 100 MG/1
100 CAPSULE ORAL
Status: DISCONTINUED | OUTPATIENT
Start: 2024-07-08 | End: 2024-07-09 | Stop reason: HOSPADM

## 2024-07-08 RX ORDER — ACETAMINOPHEN 325 MG/1
975 TABLET ORAL EVERY 8 HOURS SCHEDULED
Status: DISCONTINUED | OUTPATIENT
Start: 2024-07-08 | End: 2024-07-09 | Stop reason: HOSPADM

## 2024-07-08 RX ORDER — CLOPIDOGREL BISULFATE 75 MG/1
75 TABLET ORAL DAILY
Status: DISCONTINUED | OUTPATIENT
Start: 2024-07-09 | End: 2024-07-09 | Stop reason: HOSPADM

## 2024-07-08 RX ORDER — SODIUM CHLORIDE 9 MG/ML
INJECTION, SOLUTION INTRAVENOUS CONTINUOUS PRN
Status: DISCONTINUED | OUTPATIENT
Start: 2024-07-08 | End: 2024-07-08

## 2024-07-08 RX ORDER — SENNOSIDES 8.6 MG
1 TABLET ORAL DAILY
Status: DISCONTINUED | OUTPATIENT
Start: 2024-07-09 | End: 2024-07-09 | Stop reason: HOSPADM

## 2024-07-08 RX ADMIN — Medication 100 MCG: at 12:56

## 2024-07-08 RX ADMIN — LIDOCAINE HYDROCHLORIDE 100 MG: 20 INJECTION, SOLUTION EPIDURAL; INFILTRATION; INTRACAUDAL at 12:50

## 2024-07-08 RX ADMIN — Medication 100 MCG: at 13:28

## 2024-07-08 RX ADMIN — PHENYLEPHRINE HYDROCHLORIDE 40 MCG/MIN: 10 INJECTION INTRAVENOUS at 13:02

## 2024-07-08 RX ADMIN — FENTANYL CITRATE 25 MCG: 50 INJECTION INTRAMUSCULAR; INTRAVENOUS at 17:00

## 2024-07-08 RX ADMIN — PROTAMINE SULFATE 30 MG: 10 INJECTION, SOLUTION INTRAVENOUS at 15:45

## 2024-07-08 RX ADMIN — SUGAMMADEX 200 MG: 100 INJECTION, SOLUTION INTRAVENOUS at 16:18

## 2024-07-08 RX ADMIN — ACETAMINOPHEN 975 MG: 325 TABLET, FILM COATED ORAL at 21:18

## 2024-07-08 RX ADMIN — FAMOTIDINE 20 MG: 20 TABLET, FILM COATED ORAL at 18:07

## 2024-07-08 RX ADMIN — Medication 100 MCG: at 15:51

## 2024-07-08 RX ADMIN — Medication 100 MCG: at 15:43

## 2024-07-08 RX ADMIN — ROCURONIUM BROMIDE 10 MG: 10 INJECTION, SOLUTION INTRAVENOUS at 14:16

## 2024-07-08 RX ADMIN — Medication 50 MCG: at 16:01

## 2024-07-08 RX ADMIN — Medication 50 MCG: at 16:00

## 2024-07-08 RX ADMIN — ROCURONIUM BROMIDE 10 MG: 10 INJECTION, SOLUTION INTRAVENOUS at 15:16

## 2024-07-08 RX ADMIN — DEXAMETHASONE SODIUM PHOSPHATE 10 MG: 10 INJECTION, SOLUTION INTRAMUSCULAR; INTRAVENOUS at 12:50

## 2024-07-08 RX ADMIN — ROCURONIUM BROMIDE 10 MG: 10 INJECTION, SOLUTION INTRAVENOUS at 14:46

## 2024-07-08 RX ADMIN — SODIUM CHLORIDE, SODIUM LACTATE, POTASSIUM CHLORIDE, AND CALCIUM CHLORIDE 100 ML/HR: .6; .31; .03; .02 INJECTION, SOLUTION INTRAVENOUS at 18:07

## 2024-07-08 RX ADMIN — FENTANYL CITRATE 25 MCG: 50 INJECTION INTRAMUSCULAR; INTRAVENOUS at 13:47

## 2024-07-08 RX ADMIN — Medication 100 MCG: at 14:31

## 2024-07-08 RX ADMIN — SODIUM CHLORIDE, SODIUM LACTATE, POTASSIUM CHLORIDE, AND CALCIUM CHLORIDE: .6; .31; .03; .02 INJECTION, SOLUTION INTRAVENOUS at 15:26

## 2024-07-08 RX ADMIN — HEPARIN SODIUM 6000 UNITS: 1000 INJECTION INTRAVENOUS; SUBCUTANEOUS at 14:05

## 2024-07-08 RX ADMIN — SODIUM CHLORIDE: 0.9 INJECTION, SOLUTION INTRAVENOUS at 12:56

## 2024-07-08 RX ADMIN — ONDANSETRON 4 MG: 2 INJECTION INTRAMUSCULAR; INTRAVENOUS at 15:54

## 2024-07-08 RX ADMIN — CEFAZOLIN SODIUM 2000 MG: 2 SOLUTION INTRAVENOUS at 13:10

## 2024-07-08 RX ADMIN — SODIUM CHLORIDE, SODIUM LACTATE, POTASSIUM CHLORIDE, AND CALCIUM CHLORIDE: .6; .31; .03; .02 INJECTION, SOLUTION INTRAVENOUS at 12:32

## 2024-07-08 RX ADMIN — SODIUM CHLORIDE, SODIUM LACTATE, POTASSIUM CHLORIDE, AND CALCIUM CHLORIDE 20 ML/HR: .6; .31; .03; .02 INJECTION, SOLUTION INTRAVENOUS at 11:00

## 2024-07-08 RX ADMIN — ROCURONIUM BROMIDE 50 MG: 10 INJECTION, SOLUTION INTRAVENOUS at 12:50

## 2024-07-08 RX ADMIN — CHLORHEXIDINE GLUCONATE 0.12% ORAL RINSE 15 ML: 1.2 LIQUID ORAL at 10:23

## 2024-07-08 RX ADMIN — FENTANYL CITRATE 75 MCG: 50 INJECTION INTRAMUSCULAR; INTRAVENOUS at 12:50

## 2024-07-08 RX ADMIN — ROCURONIUM BROMIDE 20 MG: 10 INJECTION, SOLUTION INTRAVENOUS at 13:35

## 2024-07-08 RX ADMIN — FENTANYL CITRATE 25 MCG: 50 INJECTION INTRAMUSCULAR; INTRAVENOUS at 12:45

## 2024-07-08 RX ADMIN — PROPOFOL 100 MG: 10 INJECTION, EMULSION INTRAVENOUS at 12:50

## 2024-07-08 RX ADMIN — GABAPENTIN 100 MG: 100 CAPSULE ORAL at 21:18

## 2024-07-08 RX ADMIN — ACETAMINOPHEN 975 MG: 325 TABLET, FILM COATED ORAL at 18:07

## 2024-07-08 RX ADMIN — Medication 100 MCG: at 14:30

## 2024-07-08 RX ADMIN — Medication 2.5 MG: at 20:08

## 2024-07-08 RX ADMIN — HEPARIN SODIUM 5000 UNITS: 5000 INJECTION INTRAVENOUS; SUBCUTANEOUS at 21:17

## 2024-07-08 RX ADMIN — FENTANYL CITRATE 25 MCG: 50 INJECTION INTRAMUSCULAR; INTRAVENOUS at 17:10

## 2024-07-08 NOTE — OP NOTE
OPERATIVE REPORT  PATIENT NAME: Suhail Borrego    :  1940  MRN: [id number]  Pt Location: [facility] OR ROOM 02    SURGERY DATE: 2024    Surgeons and Role:     * Luis Miguel Peters DO - Primary     * Mary Goodson DPM - Assisting     * Rony Almonte DO - Assisting    Preop Diagnosis:  Symptomatic stenosis of left carotid artery [I65.22]    Post-Op Diagnosis Codes:     * Symptomatic stenosis of left carotid artery [I65.22]    Procedure(s):  Left common carotid artery angiogram  Aortic arch angiogram  Left common carotid artery balloon expandable stent placement with 7 x 29 mm covered [place] VBX stent  Left carotid compartment bovine patch angioplasty  Radiographic supervision and interpretation of imaging    Specimen(s):  ID Type Source Tests Collected by Time Destination   1 : Left carotid plaque Tissue Other TISSUE EXAM Luis Miguel Peters DO 2024 1508        Estimated Blood Loss:   Minimal    Drains:  None    Anesthesia Type:   General    Operative Indications:  83-year-old male who was recently admitted for back pain with T12 compression fracture noted to have right hand numbness during admission with concern for stroke.  Workup indicative of left hemispheric TIA and symptomatic left ICA stenosis.  CTA showing 80% ICA stenosis along with approximately 60% left common carotid artery stenosis.  Given these findings recommendation was made for left carotid endarterectomy with concomitant left common carotid artery stenting.  Risks, benefits, alternatives, description of procedure discussed with patient patient was agreeable.    Operative Findings:  Left ICA/carotid bifurcation with 80% stenosis with calcified plaque.  Mild to moderate atherosclerotic disease noted throughout common carotid artery.  Treated with endarterectomy and bovine patch angioplasty, well tapered endpoints both proximally distally.    Left proximal common carotid artery with approximately 60% stenosis just distal to ostial.   Treated with 7 x 29 mm VBX covered balloon expandable stent.  Completion angiogram with patent artery stent without significant residual stenosis visualized in multiple angles.      Aortic arch angiogram with patent arch vessels and visualized portions of the ascending and descending aorta without flow-limiting stenosis.    Patient neurologically intact on completion of procedure without any focal residual deficit.    Flouro time: 3.4 min  Contrast: 18 cc Visi  DAP: 18.96 Gy/cm2    Complications:   None    Procedure and Technique:  After informed consent was obtained, the patient was brought to the operating room and placed in the supine position. Perioperative IV antibiotics were given.  He was given anesthesia and endotracheally intubated. The patient was placed with the head turned laterally and slightly extended, with a shoulder roll.  He was then prepped and draped in the usual sterile fashion exposing the neck.  A timeout was performed.   A marking pen was used to laney the incision overlying the carotid artery. A 15-blade was used to make the incision. Cautery was used to dissect through the soft tissue and platysma.  The sternocleidomastoid muscle was identified, dissected free along its medial border, and retracted laterally.  Next, the internal jugular vein was identified and freed along its medial border.  The facial vein was identified, ligated with 2-0 silk sutures, and transected.  The internal jugular vein was then retracted laterally, exposing the carotid artery.  The dissection was continued at the common carotid artery.  It was freed circumferentially as proximally as possible.  There was medial vessel disease noted throughout however amenable to clamping and encircled with umbilical tape which was used to make a rumel really tourniquet.  Next, the external carotid artery and superior thyroid artery were identified, freed circumferentially, and encircled with vessel loops.  Lastly, the internal  carotid artery was dissected free and encircled distally with a vessel loop.  7000 units of IV heparin were given.  After this was given time to circulate the umbilical tape was pulled taut however Rumel tourniquet not applied proximally to CCA and angled Debakey clamp used to clamp distal CCA just proximal to bifurcation.  A 5-0 prolene suture was then used to create a pursestring on the anterior aspect of the left common carotid artery 3 cm proximal to the bifurcation.  Micropuncture needle was then used to access the common carotid artery within the center of the pursestring suture in a retrograde direction.  A microwire was then advanced towards the aortic arch under fluoroscopic guidance.  A microsheath was then placed over the wire in the common carotid artery.  Retrograde left common carotid arteriogram was performed confirming wire entry into the ascending aorta.  Microwire was then exchanged for an 035 DesignFace ITson wire which was again placed within the ascending aorta.  Microsheath was then exchanged for a 6 Danish 10 cm sheath.  Proximal common carotid and aortic arch angiogram was then performed in multiple oblique angles which confirmed approximately 60% stenosis of the proximal common carotid artery just distal to the ostium.  Decision was made to then treat this lesion.  A 7 x 29 mm VBX stent was then advanced over the wire  and stent placed within the stenotic lesion and inflated to nominal pressure under direct visualization.  Post deployment angiogram was performed multiple angles which showed excellent expansion of the covered stent with no significant residual stenosis and patent distal common as well as aortic arch vessels.  Wire and sheath were then removed and previously placed Prolene suture was tied down to obtain hemostasis.  After appropriate stenting of the common carotid artery decision was made to perform carotid endarterectomy to treat the carotid bulb and ICA lesion.  Patient was  intermittently heparinized to maintain appropriate therapeutic levels.  Previously exposed internal carotid artery was clamped first with a large alligator clamp.  Next the common carotid artery was clamped with the use of an angled DeBakey clamp and finally the external carotid artery was clamped with the use of profunda clamp.  After 1 minute of clamping no changes were noted on neuromonitoring and decision was made to proceed with endarterectomy.  An 11-blade was used to make a longitudinal arteriotomy in the common carotid artery and this was extended distally along the internal carotid artery with Hernandez scissors.  Next, and endarterectomy spatula was used to create the endarterectomy plane.  This was continued towards the common carotid artery.  The plaque was sharply transected with Hernandez scissors.  The plane was then continued distally in the internal carotid artery.  Again the plaque was sharply transected with Hernandez scissors.  Lastly, the endarterectomy was continued to the external carotid artery, which was everted, and the plaque was pulled free.  The endarterectomy bed was flushed with heparinized saline and all loose debris was pulled free.  All endpoints were checked and noted to be adherent without any flaps or debris.  A Xenosure bovine pericardial patch was selected and was sewn in using a 6-0 prolene sutures to the distal patch site and 5-0 prolene to the proximal tacking site and run to the center of the patch.  Prior to completion, the arteries were bled and back-bled, and the endarterectomy bed was flushed copiously with heparinized saline.  The patch was completed and the clamps, starting with the external carotid, followed by the common carotid artery.  After a few seconds, the internal carotid artery was released and cerebral flow was restored.  Next, intraoperative doppler and duplex ultrasound was performed, which showed widely patent common and internal carotid arteries with appropriate  waveforms throughout the carotid system.  The wound was then copiously irrigated with antibiotic saline.  It was then checked for hemostasis.  Surgiflow was used to aid with hemostasis.  The platysma was then closed with 3-0 vicryl running suture and the skin was closed with 4-0 monocryl running subcuticular layer.  The incision was dressed with skin glue.  At the conclusion of the procedure all sponge instrument counts were correct.  The patient was allowed to awaken and was extubated. He was moving all four extremities and following commands.  He was then transferred to the PACU for postoperative care.    Dr. Peters was present for the entire procedure.    Patient Disposition:  PACU     Vascular Quality Initiative - Carotid Endarterectomy     Urgency:  Urgent    Anesthesia: General Type: Conventional    Side: left    Patch Type: Bovine Pericardial     If Prosthetic, Patch : Rina    Shunt: No    Skin Prep: Chlorhexidine    Drain: no  Heparin: yes    Protamine: yes      Dextran: no     Re-explore artery after closure: no    Total Procedure time: 290min    Monitoring:   EEG: yes   Stump Pressure: no   Other: no    Completion Study:   Doppler: yes   Duplex: yes   Arteriogram: no    Concomitant Procedure:   Proximal Endovascular: yes   Distal Endovascular: no   CABG: no   Other Arterial Op: no      Vascular Quality Initiative - Carotid Artery Stent    Functional Status: Fully active; able to carry on all predisease activities without restriction.    High Risk Factors For CEA: Anatomic risk Lession below clavicle    Refused for Surgery: No    Pre-op Imaging is CT/CTA: Yes      Lesion Calcification: 51-99% circumference    Arch Atherosclerosis: Mild    Urgency: Urgent      ASA: III  Anesthesia:  General    Indication: Symptomatic Stenosis    Skin Prep: Chlorhexidene    Arch Type: Type III    Bovine Arch: no     Approach: Carotid open     Medication Loading: Both    Prophylactic Anti-bradyarrhythmic  no    Anticoagulant: Heparin    Antiplatelet IIb/IIIa: yes    Bradyarrhythmia Requiring Tx: No    Fluoro Time: 3.4min    Distinct Lesions Treated: 2   Lesion 1    Lesion Type: Atherosclerosis  Lesion Side: left    Lesion Location: CCA    Lesion Length: 10mm    Lesion Stenosis:60%    Protection Device Used: No    Technical Failure: No       Number of Stents: 1    Post- dilate: No         Lesion 2    Lesion Type: Atherosclerosis  Lesion Side: left    Lesion Location: ICA     ICA Distal Tortuosity: None/Mild    Lesion Length: 10mm    Lesion Stenosis:80%    Protection Device Used: No    Technical Failure: No       Number of Stents: 0     Neurologic Change: No    Intra-Cranial Completion Angiogram: No    Total Procedure Time:   Event Time In   Procedure Start 1330   Procedure Closing 1603   Procedure Finish 1620       SIGNATURE: Rony Almonte DO  DATE: July 8, 2024  TIME: 4:25 PM

## 2024-07-08 NOTE — QUICK NOTE
"Post-Op Check - Vascular Surgery   Suhail Borrego 83 y.o. male MRN: [id number]  Unit/Bed#: [facility] 523-01 Encounter: [id number]    Assessment:   83yoM s/p L CEA, L CCA stent placement for carotid artery stenosis    PLAN:  - Incentive spirometry  - Diet as tolerated  - PRN analgesia  - Goal SBP normotensive -160; Maintain A-Line for close hemodynamic monitoring  - I/Os; urinary retention protocol  - Subcutaneous heparin for VTE Prophylaxis  - Continue frequent neurovascular checks  - chloraseptic spray for throat irritation    Subjective: Patient with expected incisional site pain, throat irritation from intubation, otherwise denies paresthesias, lateralized weakness, vision changes, headache, dizziness. Denies back pain he gets at home. Tolerating PO without dysphagia, nausea. He was seen with family at the bedside, questions and concerns addressed.    Vitals:  /64   Pulse 87   Temp (!) 95.7 °F (35.4 °C) (Rectal)   Resp 12   Ht 5' 7\" (1.702 m)   Wt 72.6 kg (160 lb)   SpO2 100%   BMI 25.06 kg/m²     Physical Exam:  General appearance: alert and oriented, in no acute distress  Neurologic: Grossly neurologically intact. Symmetric smile, without noted facial droop, symmetric gross UE and LE motor function. Symmetric appropriate hand , hip flexion, dorsi/plantarflexion  Neck: supple, symmetrical, trachea midline. Clean and dry mepilex at L neck incision site, soft, without noted hematoma, no noted strikethrough to dressing in place  Lungs:  Normal work of breathing, no accessory muscle use, on 2L NC  Heart:  Regular rate and rhythm  Abdomen:  Soft, non-distended abdomen  Extremities:  Bilateral UE and LE warm and dry without c/c/e/.    I/Os:  I/O last 3 completed shifts:  In: 1000 [I.V.:1000]  Out: 100 [Urine:100]  No intake/output data recorded.    Invasive Lines/Tubes:  Invasive Devices       Peripheral Intravenous Line  Duration             Peripheral IV 07/08/24 Left Hand <1 day              " Arterial Line  Duration             Arterial Line 07/08/24 Right Radial <1 day                  VTE Prophylaxis: Sequential compression device (Venodyne)  and Heparin    Bri Singh PA-C  7/8/2024

## 2024-07-08 NOTE — ANESTHESIA POSTPROCEDURE EVALUATION
Post-Op Assessment Note    CV Status:  Stable  Pain Score: 0    Pain management: adequate       Mental Status:  Alert and awake   Hydration Status:  Euvolemic   PONV Controlled:  Controlled   Airway Patency:  Patent     Post Op Vitals Reviewed: Yes    No anethesia notable event occurred.    Staff: CRNA               BP   126/55   Temp (!) 96.8 °F (36 °C) (07/08/24 1637)    Pulse 62 (07/08/24 1637)   Resp 19 (07/08/24 1637)    SpO2 100 % (07/08/24 1637)

## 2024-07-08 NOTE — ANESTHESIA PREPROCEDURE EVALUATION
Procedure:  left CEA retrograde left common carotid angioplasty/stent (Left: Neck)    Relevant Problems   CARDIO   (+) CAD (coronary artery disease)   (+) Carotid stenosis, asymptomatic, bilateral   (+) 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[facility])   (+) Hyperlipidemia   (+) Peripheral artery disease (HCC)   (+) Primary hypertension   (+) Symptomatic stenosis of left carotid artery      GI/HEPATIC   (+) GERD (gastroesophageal reflux disease)      /RENAL   (+) Stage 3 chronic kidney disease (HCC)      HEMATOLOGY   (+) Macrocytic anemia      MUSCULOSKELETAL   (+) Acute left-sided low back pain with left-sided sciatica   (+) Inflammation of sacroiliac joint (HCC)   (+) Intractable back pain   (+) Lumbar spondylosis   (+) 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      PULMONARY   (+) SOB (shortness of breath)      Behavioral Health   (+) Former tobacco use      Neurology/Sleep   (+) Neuropathy   (+) S/P lumbar fusion      Blood   (+) Leukocytosis      Surgery/Wound/Pain   (+) Hx of CABG      Orthopedic/Musculoskeletal   (+) New acute T 11 Thoracic compression fracture, closed, initial encounter, w old T12 compression fx   (+) Spondylolisthesis of lumbar region      Cardiovascular/Peripheral Vascular   (+) Near syncope      Respiratory/Allergy   (+) Pulmonary fibrosis determined by high resolution computed tomography (HCC)      Other   (+) Bruit of left carotid artery   (+) Localized swelling of left lower extremity      TTE 7/1/24:  •  Left Ventricle: Left ventricular cavity size is normal. Wall thickness is normal. The left ventricular ejection fraction is 65%. Systolic function is normal. Wall motion is normal. Diastolic function is mildly abnormal, consistent with grade I (abnormal) relaxation.  •  Right Ventricle: Systolic function is normal.  •  Atrial Septum: No patent foramen ovale detected, confirmed by provocation with valsalva, using agitated saline contrast.  •  Mitral  Valve: There is mild regurgitation.  •  Tricuspid Valve: There is mild regurgitation. The estimated right ventricular systolic pressure is 35.00 mmHg.    EKG: SR    Component  Ref Range & Units 7/2/24 0523 7/1/24 0610 6/30/24 0833 6/29/24 0630 6/29/24 0153 5/22/24 0922 5/19/24 0948   WBC  4.31 - 10.16 Thousand/uL 9.06 10.60 High  13.07 High  10.37 High  10.58 High  12.52 High  10.67 High    RBC  3.88 - 5.62 Million/uL 2.46 Low  3.05 Low  3.28 Low  3.30 Low  3.20 Low  3.22 Low  3.24 Low    Hemoglobin  12.0 - 17.0 g/dL 7.9 Low  9.7 Low  10.4 Low  10.5 Low  10.2 Low  10.5 Low  10.5 Low    Hematocrit  36.5 - 49.3 % 24.8 Low  30.4 Low  32.7 Low  32.9 Low  32.3 Low  33.5 Low  33.3 Low    MCV  82 - 98 fL 101 High  100 High  100 High  100 High  101 High  104 High  103 High    MCH  26.8 - 34.3 pg 32.1 31.8 31.7 31.8 31.9 32.6 32.4   MCHC  31.4 - 37.4 g/dL 31.9 31.9 31.8 31.9 31.6 31.3 Low  31.5   RDW  11.6 - 15.1 % 13.4 13.2 12.9 12.9 13.0 12.9 12.7   Platelets  149 - 390 Thousands/uL 143 Low  165 194 178 184 211 196     Component  Ref Range & Units 6/30/24 0833 6/29/24 0630 6/29/24 0153 5/19/24 0948 5/2/24 0503 5/1/24 0437 4/30/24 1702   Sodium  135 - 147 mmol/L 138 138 138 139 141 142 142   Potassium  3.5 - 5.3 mmol/L 3.8 4.4 4.6 4.6 4.6 4.2 4.5   Chloride  96 - 108 mmol/L 103 107 107 103 109 High  109 High  106   CO2  21 - 32 mmol/L 25 26 26 30 25 26 28   ANION GAP  4 - 13 mmol/L 10 5 5 6 7 7 8   BUN  5 - 25 mg/dL 30 High  26 High  31 High  25 27 High  28 High  29 High    Creatinine  0.60 - 1.30 mg/dL 1.29 1.02 CM 1.16 CM 1.14 CM 1.18 CM 1.18 CM 1.17 CM      Glucose  65 - 140 mg/dL 120 88  CM 88 CM 91 CM 97  CM      Calcium  8.4 - 10.2 mg/dL 9.0 8.5 9.0 9.1 9.0 8.7 9.2   eGFR  ml/min/1.73sq m 50 67 57 59 56 56 57       Physical Exam    Airway    Mallampati score: IV  TM Distance: <3 FB  Neck ROM: limited     Dental       Cardiovascular      Pulmonary      Other Findings    Anesthesia Plan  ASA Score- 3      Anesthesia Type- general with ASA Monitors.         Additional Monitors: arterial line.    Airway Plan: ETT.           Plan Factors-    Chart reviewed. EKG reviewed. Imaging results reviewed. Existing labs reviewed. Patient summary reviewed.    Patient is not a current smoker.  Patient did not smoke on day of surgery.            Induction- intravenous.    Postoperative Plan- Plan for postoperative opioid use. Planned trial extubation    Perioperative Resuscitation Plan - Level 1 - Full Code.       Informed Consent- Anesthetic plan and risks discussed with patient.  I personally reviewed this patient with the CRNA. Discussed and agreed on the Anesthesia Plan with the CRNA..

## 2024-07-08 NOTE — INTERVAL H&P NOTE
H&P reviewed. After examining the patient I find no changes in the patients condition since the H&P had been written.    Vitals:    07/08/24 0951   BP: 143/70   Pulse: 72   Resp: 20   Temp: (!) 96.9 °F (36.1 °C)   SpO2: 98%

## 2024-07-09 ENCOUNTER — DOCUMENTATION (OUTPATIENT)
Dept: VASCULAR SURGERY | Facility: CLINIC | Age: 84
End: 2024-07-09

## 2024-07-09 VITALS
HEART RATE: 93 BPM | RESPIRATION RATE: 18 BRPM | WEIGHT: 160 LBS | TEMPERATURE: 99.1 F | SYSTOLIC BLOOD PRESSURE: 130 MMHG | BODY MASS INDEX: 25.11 KG/M2 | OXYGEN SATURATION: 97 % | HEIGHT: 67 IN | DIASTOLIC BLOOD PRESSURE: 69 MMHG

## 2024-07-09 LAB
ANION GAP SERPL CALCULATED.3IONS-SCNC: 9 MMOL/L (ref 4–13)
APTT PPP: 33 SECONDS (ref 23–37)
BUN SERPL-MCNC: 24 MG/DL (ref 5–25)
CALCIUM SERPL-MCNC: 8.3 MG/DL (ref 8.4–10.2)
CHLORIDE SERPL-SCNC: 106 MMOL/L (ref 96–108)
CO2 SERPL-SCNC: 25 MMOL/L (ref 21–32)
CREAT SERPL-MCNC: 1.03 MG/DL (ref 0.6–1.3)
ERYTHROCYTE [DISTWIDTH] IN BLOOD BY AUTOMATED COUNT: 13.5 % (ref 11.6–15.1)
GFR SERPL CREATININE-BSD FRML MDRD: 66 ML/MIN/1.73SQ M
GLUCOSE SERPL-MCNC: 104 MG/DL (ref 65–140)
HCT VFR BLD AUTO: 26.1 % (ref 36.5–49.3)
HGB BLD-MCNC: 8.2 G/DL (ref 12–17)
INR PPP: 1.15 (ref 0.84–1.19)
MCH RBC QN AUTO: 31.5 PG (ref 26.8–34.3)
MCHC RBC AUTO-ENTMCNC: 31.4 G/DL (ref 31.4–37.4)
MCV RBC AUTO: 100 FL (ref 82–98)
PLATELET # BLD AUTO: 211 THOUSANDS/UL (ref 149–390)
PMV BLD AUTO: 10.5 FL (ref 8.9–12.7)
POTASSIUM SERPL-SCNC: 4.2 MMOL/L (ref 3.5–5.3)
PROTHROMBIN TIME: 14.6 SECONDS (ref 11.6–14.5)
RBC # BLD AUTO: 2.6 MILLION/UL (ref 3.88–5.62)
SODIUM SERPL-SCNC: 140 MMOL/L (ref 135–147)
WBC # BLD AUTO: 11.5 THOUSAND/UL (ref 4.31–10.16)

## 2024-07-09 PROCEDURE — 85730 THROMBOPLASTIN TIME PARTIAL: CPT | Performed by: STUDENT IN AN ORGANIZED HEALTH CARE EDUCATION/TRAINING PROGRAM

## 2024-07-09 PROCEDURE — 99024 POSTOP FOLLOW-UP VISIT: CPT | Performed by: PHYSICIAN ASSISTANT

## 2024-07-09 PROCEDURE — NC001 PR NO CHARGE: Performed by: SURGERY

## 2024-07-09 PROCEDURE — 80048 BASIC METABOLIC PNL TOTAL CA: CPT | Performed by: STUDENT IN AN ORGANIZED HEALTH CARE EDUCATION/TRAINING PROGRAM

## 2024-07-09 PROCEDURE — 85610 PROTHROMBIN TIME: CPT | Performed by: STUDENT IN AN ORGANIZED HEALTH CARE EDUCATION/TRAINING PROGRAM

## 2024-07-09 PROCEDURE — 85027 COMPLETE CBC AUTOMATED: CPT | Performed by: STUDENT IN AN ORGANIZED HEALTH CARE EDUCATION/TRAINING PROGRAM

## 2024-07-09 RX ORDER — GABAPENTIN 100 MG/1
100 CAPSULE ORAL ONCE
Status: COMPLETED | OUTPATIENT
Start: 2024-07-09 | End: 2024-07-09

## 2024-07-09 RX ORDER — MIDODRINE HYDROCHLORIDE 2.5 MG/1
2.5 TABLET ORAL
Status: DISCONTINUED | OUTPATIENT
Start: 2024-07-09 | End: 2024-07-09 | Stop reason: HOSPADM

## 2024-07-09 RX ORDER — GABAPENTIN 100 MG/1
100 CAPSULE ORAL 2 TIMES DAILY
COMMUNITY
Start: 2024-07-09

## 2024-07-09 RX ORDER — DOCUSATE SODIUM 100 MG/1
100 CAPSULE, LIQUID FILLED ORAL 2 TIMES DAILY
Status: DISCONTINUED | OUTPATIENT
Start: 2024-07-09 | End: 2024-07-09 | Stop reason: HOSPADM

## 2024-07-09 RX ADMIN — ACETAMINOPHEN 975 MG: 325 TABLET, FILM COATED ORAL at 05:32

## 2024-07-09 RX ADMIN — SENNOSIDES 8.6 MG: 8.6 TABLET, FILM COATED ORAL at 09:17

## 2024-07-09 RX ADMIN — FOLIC ACID 1 MG: 1 TABLET ORAL at 09:16

## 2024-07-09 RX ADMIN — DOCUSATE SODIUM 100 MG: 100 CAPSULE, LIQUID FILLED ORAL at 09:47

## 2024-07-09 RX ADMIN — FAMOTIDINE 20 MG: 20 TABLET, FILM COATED ORAL at 09:17

## 2024-07-09 RX ADMIN — METOPROLOL TARTRATE 25 MG: 25 TABLET, FILM COATED ORAL at 09:17

## 2024-07-09 RX ADMIN — GABAPENTIN 100 MG: 100 CAPSULE ORAL at 09:47

## 2024-07-09 RX ADMIN — ASPIRIN 81 MG: 81 TABLET, COATED ORAL at 09:17

## 2024-07-09 RX ADMIN — CLOPIDOGREL BISULFATE 75 MG: 75 TABLET ORAL at 09:17

## 2024-07-09 RX ADMIN — HEPARIN SODIUM 5000 UNITS: 5000 INJECTION INTRAVENOUS; SUBCUTANEOUS at 05:32

## 2024-07-09 RX ADMIN — ATORVASTATIN CALCIUM 80 MG: 80 TABLET, FILM COATED ORAL at 09:16

## 2024-07-09 RX ADMIN — MIDODRINE HYDROCHLORIDE 2.5 MG: 2.5 TABLET ORAL at 11:47

## 2024-07-09 NOTE — WOUND OSTOMY CARE
Progress Note - Wound   Suhail Borrego 83 y.o. male MRN: [id number]  Unit/Bed#: [facility] 523-01 Encounter: [id number]        Assessment:   Patient seen today for wound care prior to discharge, daughter at bedside.     Patient has small stage II POA on right buttocks with partial thickness skin loss with pink tissue, periwound skin is hyperpigmented. Triad applied. Tube of Triad paste left with patient along with green foam wedges to use for repositioning at home.      Plan:   Triad paste TID  Reposition with green foam wedges every 2 hours    Wound 07/08/24 Buttocks (Active)   Wound Image   07/09/24 1100   Wound Description Beefy red 07/08/24 1838   Estelle-wound Assessment Fragile 07/09/24 0800   Dressing Open to air 07/09/24 0800       Wound 07/08/24 Ankle Anterior;Right (Active)   Wound Image   07/09/24 1100               Genny POWELLN, RN, CWOCN

## 2024-07-09 NOTE — UTILIZATION REVIEW
Initial Clinical Review    Elective inpatient  surgical procedure    Age/Sex: 83 y.o. male who was recently admitted for back pain with T12 compression fracture noted to have right hand numbness during admission with concern for stroke. Workup indicative of left hemispheric TIA and symptomatic left ICA stenosis. CTA showing 80% ICA stenosis along with approximately 60% left common carotid artery stenosis. Given these findings recommendation was made for left carotid endarterectomy with concomitant left common carotid artery stenting.       Anesthesia Start Date/Time: 07/08/24 1230         Procedure: left CEA retrograde left common carotid angioplasty/stent (Left: Neck)   Anesthesia type: general   Diagnosis: Symptomatic stenosis of left carotid artery [I65.22]   Pre-op diagnosis: Symptomatic stenosis of left carotid artery [I65.22]     Procedure(s):  Left common carotid artery angiogram  Aortic arch angiogram  Left common carotid artery balloon expandable stent placement with 7 x 29 mm covered [place] VBX stent  Left carotid compartment bovine patch angioplasty  Radiographic supervision and interpretation of imaging    Operative Findings:  Left ICA/carotid bifurcation with 80% stenosis with calcified plaque.  Mild to moderate atherosclerotic disease noted throughout common carotid artery.  Treated with endarterectomy and bovine patch angioplasty, well tapered endpoints both proximally distally.     Left proximal common carotid artery with approximately 60% stenosis just distal to ostial.  Treated with 7 x 29 mm VBX covered balloon expandable stent.  Completion angiogram with patent artery stent without significant residual stenosis visualized in multiple angles.       Aortic arch angiogram with patent arch vessels and visualized portions of the ascending and descending aorta without flow-limiting stenosis.     POD#1 Progress Note:   Neuro checks q1 hr.  Left CEA and retrograde left CCA stent.  Continue aspirin and  plavix x 3 months.    Discharge summary.   On 7/8/2024, the patient was electively admitted to [facility] at which time he was taken to the operating room and underwent Left CCA angiogram, Arch angiogram.  Left CCA balloon expandable stent placement with a 7 x 29 mm covered [place] VBX stent, and Left carotid compartment bovine patch angioplasty        Admission Orders: Date/Time/Statement:   Admission Orders (From admission, onward)       Ordered        07/08/24 1645  Inpatient Admission  Once                          Orders Placed This Encounter   Procedures    Inpatient Admission     Standing Status:   Standing     Number of Occurrences:   1     Order Specific Question:   Level of Care     Answer:   Level 1 Stepdown [13]     Order Specific Question:   Estimated length of stay     Answer:   Inpatient Only Surgery       Vital Signs (last 3 days)       Date/Time Temp Pulse Resp BP MAP (mmHg) Arterial Line BP MAP SpO2 Nasal Cannula O2 Flow Rate (L/min) Lyman Coma Scale Score Pain    07/09/24 0800 -- -- -- -- -- -- -- -- -- -- No Pain    07/09/24 0700 99.1 °F (37.3 °C) 93 18 130/69 93 -- -- 97 % -- -- --    07/09/24 03:03:12 99 °F (37.2 °C) 80 16 105/53 -- -- -- -- -- -- --    07/09/24 0219 99.1 °F (37.3 °C) 77 -- 107/51 -- 82/32 -- -- -- -- --    07/09/24 0200 99.1 °F (37.3 °C) 84 -- 96/50 71 -- -- -- -- -- --    07/09/24 0138 99.3 °F (37.4 °C) 66 -- 107/53 -- -- -- -- -- -- --    07/09/24 0117 99.3 °F (37.4 °C) 59 18 108/54 78 -- -- -- -- -- --    07/09/24 0100 -- -- -- 89/51 -- -- -- -- -- -- --    07/09/24 00:16:51 98.3 °F (36.8 °C) 78 16 107/53 -- -- -- 100 % -- -- --    07/08/24 2235 99.1 °F (37.3 °C) -- -- -- -- -- -- -- -- -- --    07/08/24 2152 98.8 °F (37.1 °C) 76 16 108/53 77 108/38 62 mmHg -- -- -- --    07/08/24 2121 98.4 °F (36.9 °C) 74 16 126/61 88 108/40 64 mmHg -- -- -- --    07/08/24 2039 97.7 °F (36.5 °C) 75 16 118/60 83 92/42 68 mmHg -- -- -- --    07/08/24 2027 97.3 °F (36.3 °C) --  -- -- -- -- -- -- -- -- --    07/08/24 2012 97.2 °F (36.2 °C) 81 16 120/56 80 -- -- 100 % 2 L/min -- --    07/08/24 2008 -- -- -- -- -- -- -- -- -- -- 7 07/08/24 2000 -- -- -- -- -- -- -- -- -- 14 7 07/08/24 1915 95.7 °F (35.4 °C) -- -- -- -- -- -- -- -- -- --    07/08/24 1900 95.4 °F (35.2 °C) 87 -- 147/64 92 126/54 86 mmHg -- -- -- --    07/08/24 1845 -- 73 -- 127/58 84 132/50 78 mmHg -- -- -- --    07/08/24 1838 -- -- -- -- -- -- -- 100 % 2 L/min 13 --    07/08/24 1835 95.2 °F (35.1 °C) 69 -- 145/65 93 140/58 88 mmHg 100 % 2 L/min -- --    07/08/24 1832 95 °F (35 °C) 75 -- 126/63 -- -- -- 100 % -- -- --    07/08/24 1831 95 °F (35 °C) -- -- 128/49 -- -- -- 100 % 2 L/min -- --    07/08/24 1807 95.1 °F (35.1 °C) -- -- 144/62 -- -- -- -- -- -- 10 - Worst Possible Pain    07/08/24 1734 -- 62 12 125/60 86 129/46 76 mmHg 100 % -- -- --    07/08/24 1730 97.2 °F (36.2 °C) 62 12 125/60 88 125/44 73 mmHg 100 % 3 L/min 14 3    07/08/24 1715 -- 67 21 119/54 78 125/55 81 mmHg 100 % 3 L/min -- 5    07/08/24 1710 -- -- -- -- -- -- -- -- -- -- 7    07/08/24 1700 -- 65 22 111/55 79 128/56 79 mmHg 96 % 3 L/min 14 10 - Worst Possible Pain    07/08/24 1645 -- 61 20 126/58 84 126/57 82 mmHg 100 % 4 L/min -- No Pain    07/08/24 1637 96.8 °F (36 °C) 62 19 -- -- 124/56 82 mmHg 100 % 6 L/min 13 No Pain    07/08/24 0951 96.9 °F (36.1 °C) 72 20 143/70 -- -- -- 98 % -- -- No Pain          Weight (last 2 days)       Date/Time Weight    07/08/24 0951 72.6 (160)            Pertinent Labs/Diagnostic Test Results:             Results from last 7 days   Lab Units 07/09/24  0531   WBC Thousand/uL 11.50*   HEMOGLOBIN g/dL 8.2*   HEMATOCRIT % 26.1*   PLATELETS Thousands/uL 211         Results from last 7 days   Lab Units 07/09/24  0531   SODIUM mmol/L 140   POTASSIUM mmol/L 4.2   CHLORIDE mmol/L 106   CO2 mmol/L 25   ANION GAP mmol/L 9   BUN mg/dL 24   CREATININE mg/dL 1.03   EGFR ml/min/1.73sq m 66   CALCIUM mg/dL 8.3*             Results from  last 7 days   Lab Units 07/09/24  0531   GLUCOSE RANDOM mg/dL 104       Results from last 7 days   Lab Units 07/09/24  0531   PROTIME seconds 14.6*   INR  1.15   PTT seconds 33     Diet: cardiac diet   Mobility: ambulatory  DVT Prophylaxis: heparin     Medications/Pain Control: no pain    Scheduled Medications:  acetaminophen, 975 mg, Oral, Q8H KAMRAN  aspirin, 81 mg, Oral, Daily  atorvastatin, 80 mg, Oral, QAM  clopidogrel, 75 mg, Oral, Daily  docusate sodium, 100 mg, Oral, BID  famotidine, 20 mg, Oral, BID  folic acid, 1 mg, Oral, Daily  gabapentin, 100 mg, Oral, HS  heparin (porcine), 5,000 Units, Subcutaneous, Q8H KAMRAN  metoprolol tartrate, 25 mg, Oral, Daily  midodrine, 2.5 mg, Oral, TID AC  senna, 1 tablet, Oral, Daily      Continuous IV Infusions:  niCARdipine, 1-15 mg/hr, Intravenous, Continuous PRN  phenylephine,  mcg/min, Intravenous, Continuous PRN      PRN Meds:  labetalol, 5 mg, Intravenous, Q15 Min PRN   And  hydrALAZINE, 15 mg, Intravenous, Q15 Min PRN   And  niCARdipine, 1-15 mg/hr, Intravenous, Continuous PRN  ondansetron, 4 mg, Intravenous, Q6H PRN  phenol, 1 spray, Mouth/Throat, Q2H PRN  sodium chloride, 500 mL, Intravenous, Once PRN   Followed by  phenylephine,  mcg/min, Intravenous, Continuous PRN        Network Utilization Review Department  ATTENTION: Please call with any questions or concerns to [phone] and carefully listen to the prompts so that you are directed to the right person. All voicemails are confidential.   For Discharge needs, contact Care Management DC Support Team at [phone] opt. [phone]  Send all requests for admission clinical reviews, approved or denied determinations and any other requests to dedicated fax number below belonging to the campus where the patient is receiving treatment. List of dedicated fax numbers for the Facilities:  FACILITY NAME UR FAX NUMBER   ADMISSION DENIALS (Administrative/Medical Necessity) [phone]   DISCHARGE SUPPORT TEAM (NETWORK)  [phone]   PARENT CHILD HEALTH (Maternity/NICU/Pediatrics) [phone]   [facility] [phone]   [facility] [phone]   [facility] [phone]   [facility] [phone]   [facility] [phone]   [facility] [phone]   [facility] [phone]   [facility] [phone]   [facility] [phone]   Critical access hospital [phone]   [facility] [phone]   [facility] [phone]

## 2024-07-09 NOTE — DISCHARGE INSTR - OTHER ORDERS
Incisional care:   Shower daily.   Wash incision daily w/ soap and water. Pat dry thoroughly.  Allow skin glue to slough

## 2024-07-09 NOTE — NURSING NOTE
AVS was reviewed with pt and his daughter and grandson. Denies any questions or concerns. Will call if any questions or needs arise.

## 2024-07-09 NOTE — DISCHARGE INSTR - AVS FIRST PAGE
DISCHARGE INSTRUCTIONS  CAROTID ENDARTERECTOMY    ACTIVITY:  Limit your physical activity to walking for the first week and then increase your activity as tolerated.  Walking up steps and normal activities may be resumed as you feel ready.  Avoid strenuous activity such as vigorous exercise.  Avoid heavy lifting (do not lift more than 15 pounds) for the first four weeks after surgery.  You should not drive a car for at least one week following discharge from the hospital and you are off all narcotic pain medication.  You may ride in a car.  If you have had a stroke or mini-stroke, please consult with your neurologist prior to driving.    DO NOT START OR RESUME ANY PREVIOUSLY PLANNED THERAPY (PHYSICAL, CARDIAC, REHAB, ETC…) UNTIL YOU DISCUSS WITH YOUR SURGEON AND THEY FEEL IT IS SAFE TO ENGAGE IN THERAPY.    DIET:  Resume your normal diet.  Good nutrition is important for healing of your incision.  You can expect to have a sore throat and trouble swallowing after surgery which should improve quickly.  If you feel like you are choking, please call your doctor.    RECURRENT SYMPTOMS: If you develop any new numbness, weakness, vision changes, drooping of the face, or difficulty with speech after discharge, CALL 911 or go to the nearest Emergency department immediately.    INCISION SITE:  You may have surgical glue at your incision site.  There are stitches present under the skin which will absorb on their own.  The glue is used to cover the incision, assist in closure, and prevent contamination. This adhesive will darken and peel away on its own within one to two weeks. Do not pick at it.  If you have a bandage, please remove this on the second day after surgery.    You should shower daily.  Wash incision daily with soap and water, but do not rub or scrub the incision; rinse thoroughly and pat dry.  Do not bathe in a tub or swim for the first 4 weeks following surgery or if you have any open wounds.  It is normal to  have some bruising, swelling or discoloration around the incision.  IF increasing redness, pain, bleeding or a bulge develops, call our office immediately.    If you notice any active bleeding at the site, apply pressure to the site and call our office ([phone]) or [phone].    FOLLOW UP STUDIES: Doppler ultrasound studies are very important to your post-operative care. Your surgeon will arrange them at your first postoperative visit. The first study will be 3 months after surgery.    FOLLOW UP APPOINTMENTS:  Making and keeping follow up appointments and ultrasound tests are important to your recovery.  If you have difficulty making it to or keeping your follow up appointments, call the office.    If you have increased pain, fever >101.5, increased drainage, redness or a bad smell at your surgery site, new coldness/numbness of your arm or leg, please call us immediately and GO directly to the ER.    Appt margarita/ KEVIN Dinero: 7/16/2024 at 11am, [place] office      PLEASE CALL THE OFFICE IF YOU HAVE ANY QUESTIONS  [phone]  [phone] [address], [address], [place] [address]  [address] [place] [address]  [address] [facility], [address], [place] [address]  [address] [facility], [place] [address]  [address] [facility], [address], [place] [address]  [address] [facility], [address], [place] [address]  [address] [place] [address]  [address] [facility], [address]st Floor, [address], [place] [address]  [address], [place] [address]  [address] [facility], [address], [place] [address]

## 2024-07-09 NOTE — PROGRESS NOTES
Vascular Nurse Navigator Post Op Education    Met with patient and daughter Sofía at bedside to introduce myself as Vascular Nurse Navigator and explained my role.  Patient is appropriate and accepting to education. Patient was educated with Review of written materials provided, Teachback, Explanation, Demonstration, and Question & Answer on expectations of post op care and recovery on left CCA/aortic arch angiogram, CCA VBX stenting, and CEA w/ bovine patch angioplasty. Patient is a former smoker, quit 13 years ago, as such Smoking effects on the lungs, tobacco triggers, and Smoking cessation was reviewed. Education provided to patient and his daughter Sofía on infection prevention, activity limitations, when to call the office, importance of follow up, and incisional care.  Discharge instruction handout provided to patient to review.

## 2024-07-09 NOTE — CASE MANAGEMENT
Case Management Assessment    Patient name Suhail Borrego  Location [facility] 523/[facility] 523-01 MRN [id number]  : 1940 Date 2024       Current Admission Date: 2024  Current Admission Diagnosis:Carotid stenosis, asymptomatic, bilateral   Patient Active Problem List    Diagnosis Date Noted Date Diagnosed    Symptomatic stenosis of left carotid artery 2024     Localized swelling of left lower extremity 2024     Leukocytosis 2024     Lumbar spondylosis 2024     Intractable back pain 2024     Pressure ulcer 2024     Inflammation of sacroiliac joint (HCC) 2023    Internal hemorrhoids 2022     Diverticular disease 2022     Acute left-sided low back pain with left-sided sciatica 10/12/2022     Left lower quadrant abdominal pain 10/08/2022     Diverticulitis 2022     Stage 3 chronic kidney disease (HCC) 2022     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 2022     Trochanteric bursitis 2022     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HCC) 2022     Neuropathy 10/29/2021     Pulmonary fibrosis determined by high resolution computed tomography (HCC) 2021     Near syncope 2021     SOB (shortness of breath) 07/10/2021     Neck pain on left side 07/10/2021     Carotid stenosis, asymptomatic, bilateral 2020     Phimosis 2020     Bruit of left carotid artery 2020     Postlaminectomy syndrome of lumbar region      Lumbar radiculopathy      S/P lumbar fusion 2018     Lumbar stenosis 2018     Peripheral artery disease (HCC) 2018     GERD (gastroesophageal reflux disease) 2018     Vasomotor rhinitis 2018     Leg pain, posterior, left 2018     Macrocytic anemia 2018     Hx of CABG 2018     Former tobacco use      Hyperlipidemia      Primary hypertension      CAD (coronary artery disease) 2017     Spondylolisthesis of  lumbar region 11/07/2016     New acute T 11 Thoracic compression fracture, closed, initial encounter, w old T12 compression fx 11/03/2016       LOS (days): 1  Geometric Mean LOS (GMLOS) (days):   Days to GMLOS:     OBJECTIVE:  PATIENT READMITTED TO HOSPITAL  Pt is < 30-day readmit. Pt's last admit was 6/29/24 - 7/3/24 at [facility].  Risk of Unplanned Readmission Score: 20.79   Current admission status: Inpatient    Preferred Pharmacy:   [facility] #[id number] - [place] - [address] [address]  Phone: [phone] Fax: [phone]    Primary Care Provider: Mikaela Duenas,     Primary Insurance: [employer] RE[employer]    ASSESSMENT:  Active Health Care Proxies    There are no active Health Care Proxies on file.     Advance Directives  Does patient have a Health Care POA?: No  Does patient have Advance Directives?: No  Primary Contact: pt's daughter Madhavi Borrego / phone# [phone]    Readmission Root Cause  30 Day Readmission: Yes  Who directed you to return to the hospital?: [facility]  Did you understand whom to contact if you had questions or problems?: Yes  Did you get your prescriptions before you left the hospital?: Yes  Were you able to get your prescriptions filled when you left the hospital?: Yes  Did you take your medications as prescribed?: Yes  Were you able to get to your follow-up appointments?: Yes  During previous admission, was a post-acute recommendation made?: Yes  What post-acute resources were offered?: [facility] ([facility])  Patient was readmitted due to: Left Carotid Artery Stenosis  Action Plan: plan for Left Carotid Revascularization    Patient Information  Admitted from:: Home  Mental Status: Alert  Assessment information provided by:: Patient  Primary Caregiver: Self  Support Systems: Children, Family members  County of Residence: [place]  What city do you live in[place]  Home entry access options. Select all that apply.: Stairs  Number of steps  to enter home.: 3  Type of Current Residence: 2 story home  Upon entering residence, is there a bedroom on the main floor (no further steps)?: No  A bedroom is located on the following floor levels of residence (select all that apply):: 2nd Floor  Upon entering residence, is there a bathroom on the main floor (no further steps)?: Yes  Number of steps to 2nd floor from main floor: One Flight  Living Arrangements: Lives Alone  Is patient a ?: Yes    Activities of Daily Living Prior to Admission  Functional Status: Independent  Completes ADLs independently?: Yes  Ambulates independently?: Yes  Does patient use assisted devices?: Yes  Assisted Devices (DME) used: Straight Cane, Walker, Stair Chair/Glide, Shower Chair  Does patient currently own DME?: Yes  What DME does the patient currently own?: Bedside [employer]  Does the patient have a history of Short-Term Rehab?: Yes (Pt has Hx of rehab placement at [facility] in 2016.)  Does patient have a history of HHC?: Yes (Pt is currently open to [facility].)  Does patient currently have HHC?: Yes (Pt is currently open to [facility].)    Patient Information Continued  Income Source: Pension/long term  Does patient have prescription coverage?: Yes  Does patient receive dialysis treatments?: No  Does patient have a history of substance abuse?: No  Does patient have a history of Mental Health Diagnosis?: No    Means of Transportation  Means of Transport to Appts:: Drives Self      Social Determinants of Health (SDOH)      Flowsheet Row Most Recent Value   Housing Stability    In the last 12 months, was there a time when you were not able to pay the mortgage or rent on time? N   In the past 12 months, how many times have you moved where you were living? 0   At any time in the past 12 months, were you homeless or living in a shelter (including now)? N   Transportation Needs    In the past 12 months, has lack of transportation kept you from medical appointments or from  getting medications? no   In the past 12 months, has lack of transportation kept you from meetings, work, or from getting things needed for daily living? No   Food Insecurity    Within the past 12 months, you worried that your food would run out before you got the money to buy more. Never true   Within the past 12 months, the food you bought just didn't last and you didn't have money to get more. Never true   Utilities    In the past 12 months has the electric, gas, oil, or water company threatened to shut off services in your home? No     Additional Comments: Pt is open to [facility] prior to readmit. CM made AIDIN referral to same for them to follow pt's case while hospitalized. CM reviewed d/c planning process including the following: identifying help at home, patient preference for d/c planning needs, availability of treatment team to discuss questions or concerns patient and/or family may have regarding understanding medications and recognizing signs and symptoms once discharged. CM also encouraged patient to follow up with all recommended appointments after discharge. Patient advised of importance for patient and family to participate in managing patient’s medical well being. Patient/caregiver received discharge checklist. Content reviewed. Patient/caregiver encouraged to participate in discharge plan of care prior to discharge home.

## 2024-07-09 NOTE — DISCHARGE SUMMARY
Discharge Summary   Suhail Borrego 83 y.o. male MRN: [id number]  Unit/Bed#: [facility] 523-01 Encounter: [id number]    Admission Date: 7/8/2024     Discharge Date:07/09/24    Attending:Luis Miguel Peters DO     Consultants: (none)    Admitting Diagnosis: Symptomatic stenosis of left carotid artery [I65.22]    Principle/ Secondary Diagnosis:  Symptomatic left carotid stenosis    Past Medical History:   Diagnosis Date    Abnormal liver function test     RESOLVED: 14SEP2017    Allergic rhinitis     Anemia     LAST ASSESSED: 19OCT2017    Arthritis     BPH (benign prostatic hyperplasia)     CAD (coronary artery disease)     Coronary artery disease     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HCC)     LAST ASSESSED: 05OCT2017    Former tobacco use     GERD (gastroesophageal reflux disease)     Hand paresthesia     RESOLVED: 11SEP2017    Hyperlipidemia     Hypertension     Lumbar stenosis     Peripheral artery disease (HCC)     LAST ASSESSED: 27OCT2017    Stage 3a chronic kidney disease (HCC) 7/11/2021     Past Surgical History:   Procedure Laterality Date    BACK SURGERY      COLONOSCOPY      LUMBAR FUSION      NJ ARTHRODESIS POSTERIOR/PSTLAT TQ 1NTRSPC LUMBAR N/A 11/03/2016    Procedure: L2-S1 POSTERIOR LUMBAR FUSION AND DECOMPRESSION (Impulse), Posterior lateral fixation; dural repair.;  Surgeon: Leslie Gil MD;  Location: [facility];  Service: Orthopedics    NJ CABG W/ARTERIAL GRAFT SINGLE ARTERIAL GRAFT N/A 01/19/2018    Procedure: CABG X4 with LIMA - LAD, SVG - RCA, OM2, & Diagonal ; Left Leg EVH; MATTHEW;  Surgeon: Erci Bar DO;  Location: [facility];  Service: Cardiac Surgery    NJ COLONOSCOPY FLX DX W/COLLJ SPEC WHEN PFRMD N/A 11/15/2017    Procedure: EGD AND COLONOSCOPY;  Surgeon: Mariano Godinez MD;  Location: [facility];  Service: Gastroenterology    NJ TEAEC W/PATCH GRF CAROTID VERTB SUBCLAV NECK INC Left 7/8/2024    Procedure: left CEA retrograde left common carotid angioplasty/stent;  Surgeon: Luis Miguel Peters DO;  Location:  [facility];  Service: Vascular    SEPTOPLASTY      LAST ASSESSED; [id number]    TONSILECTOMY AND ADNOIDECTOMY      LAST ASSESSED: 13MAY2014    UPPER GASTROINTESTINAL ENDOSCOPY         Procedures Performed:   7/8/2024 Left CCA angiogram.  Arch angiogram.  Left CCA balloon expandable stent placement with a 7 x 29 mm covered [place] VBX stent.  Left carotid compartment bovine patch angioplasty- Feyko    Laboratory data at discharge:   Results from last 7 days   Lab Units 07/09/24  0531   WBC Thousand/uL 11.50*   HEMOGLOBIN g/dL 8.2*   HEMATOCRIT % 26.1*   PLATELETS Thousands/uL 211     Results from last 7 days   Lab Units 07/09/24  0531   POTASSIUM mmol/L 4.2   CHLORIDE mmol/L 106   CO2 mmol/L 25   BUN mg/dL 24   CREATININE mg/dL 1.03   CALCIUM mg/dL 8.3*     Results from last 7 days   Lab Units 07/09/24  0531   INR  1.15   PTT seconds 33           Discharge instructions/Information to patient and family:   See after visit summary for information provided to patient and family.   Carotid instructions    Discharge Medications:  See after visit summary for reconciled discharge medications provided to patient and family.    Continue dual antiplatelet therapy with aspirin and Plavix for 3 months minimum  Continued statin-Lipitor    Hospital Course:   Suhail Borrego is an 84y/o male with history of hypertension, hyperlipidemia, GERD, CAD/CABG, CKD 3, pulmonary fibrosis, and remote tobacco abuse who was recently admitted for back pain with T12 compression fracture noted to have right hand numbness during the admission with concern for stroke.  Workup found left hemispheric TIA and symptomatic left ICA stenosis.  Specifically, CTA showed 80% ICA stenosis along with approximately 60% left common carotid artery stenosis.  Given these findings, recommendation was for carotid intervention.  On 7/8/2024, the patient was electively admitted to [facility] at which time he was taken to the operating room and underwent  Left CCA angiogram, Arch angiogram.  Left CCA balloon expandable stent placement with a 7 x 29 mm covered [place] VBX stent, and Left carotid compartment bovine patch angioplasty by Dr. Peters.    Postoperatively, patient was maintained in the PACU and then transferred to medical surgical/telemetry/stepdown floor where he continued to convalesce for the remainder of his hospitalization.    His hospitalization was uncomplicated.    By POD #1, remained neurovascularly intact.  His wound was stable but with bruising.  He was tolerating a diet although with complaint of sore throat.  Swallowing function was intact.  His pain was controlled.  He was ambulatory.  He was able to void spontaneously post Ramsey catheter removal.  He was deemed appropriate and stable for discharge and was discharged in the care of his daughter on 7/9/2024.    Prior to discharge, the patient was given instructions for outpatient care and follow-up.  The patient has been instructed to call w/ any questions, changes, or concerns for the medical condition.    For further details of the hospitalization, please refer to the medical record.    Condition at Discharge: stable     Provisions for Follow-Up Care:  See after visit summary for information related to follow-up care and any pertinent home health orders.      Disposition: Home w/ [facility] (RN/ PT/ OT)    Planned Readmission: No    Discharge Statement   I spent 30 minutes discharging the patient. This time was spent on the day of discharge. I had direct contact with the patient on the day of discharge. Additional documentation is required if more than 30 minutes were spent on discharge.     Maria Antonia William PA-C  7/9/2024      This text is generated with voice recognition software. There may be translation, syntax,  or grammatical errors. If you have any questions, please contact the dictating provider.

## 2024-07-09 NOTE — PROGRESS NOTES
"Progress Note - Vascular Surgery   Suhail Borrego 83 y.o. male MRN: [id number]  Unit/Bed#: [facility] 523-01 Encounter: [id number]    Assessment:  Suhail Borrego is a 83 y.o. male w/ symptomatic left 80% ICA stenosis and proximal CCA stenosis presenting for revascularization.     7/8 - left CCA/aortic arch angiogram, CCA VBX stenting, and CEA w/ bovine patch angioplasty.     Plan:  SBP goal 100-160  No significant pressure issues overnight  D/C maribell   Continue frequent neurovascular checks   No significant deficits noted, mentation appropriate  Cont aspirin and plavix, plan for 3 mo post-op   Continue statin therapy   PRN pain and anti-emetics  Encourage ambulation  DVT ppx: heparin  Incentive spirometry 10 times/hour while awake  Continue home medications as prescribed   Dispo - plan for discharge to home pending clinical progress      Subjective/Objective    S/E: No acute events overnight.  Mentioned mild pain w/ swallowing however able to tolerate PO intake.  Has not been OOB.  Pain well controlled. Denies f/c, HA, CP, SOB, paresthesias, or motor/sensory dysfunction.     Objective:     Blood pressure 105/53, pulse 80, temperature 99 °F (37.2 °C), temperature source Rectal, resp. rate 16, height 5' 7\" (1.702 m), weight 72.6 kg (160 lb), SpO2 100%.,Body mass index is 25.06 kg/m².      Intake/Output Summary (Last 24 hours) at 7/9/2024 0745  Last data filed at 7/9/2024 0252  Gross per 24 hour   Intake 1120 ml   Output 460 ml   Net 660 ml       Invasive Devices       Peripheral Intravenous Line  Duration             Peripheral IV 07/08/24 Left Hand <1 day              Arterial Line  Duration             Arterial Line 07/08/24 Right Radial <1 day                    Physical Exam:   GEN: NAD  HEENT: NCAT, left neck incision C/D/I with meplex in place, mild ecchymosis, soft   CV: RRR  Lung: Normal effort  Ab: Soft, NT/ND  Extrem: No CCE, M/S intact throughout   Neuro: A+Ox3, CN 2-12 grossly intact     Lab, Imaging and other " studies:I have personally reviewed pertinent lab results.     VTE Pharmacologic Prophylaxis: Heparin  VTE Mechanical Prophylaxis: sequential compression device    Recent Results (from the past 36 hour(s))   Type and screen    Collection Time: 07/08/24 10:44 AM   Result Value Ref Range    ABO Grouping O     Rh Factor Positive     Antibody Screen Negative     Specimen Expiration Date 20240711    Basic Metabolic Panel    Collection Time: 07/09/24  5:31 AM   Result Value Ref Range    Sodium 140 135 - 147 mmol/L    Potassium 4.2 3.5 - 5.3 mmol/L    Chloride 106 96 - 108 mmol/L    CO2 25 21 - 32 mmol/L    ANION GAP 9 4 - 13 mmol/L    BUN 24 5 - 25 mg/dL    Creatinine 1.03 0.60 - 1.30 mg/dL    Glucose 104 65 - 140 mg/dL    Calcium 8.3 (L) 8.4 - 10.2 mg/dL    eGFR 66 ml/min/1.73sq m   CBC and Platelet    Collection Time: 07/09/24  5:31 AM   Result Value Ref Range    WBC 11.50 (H) 4.31 - 10.16 Thousand/uL    RBC 2.60 (L) 3.88 - 5.62 Million/uL    Hemoglobin 8.2 (L) 12.0 - 17.0 g/dL    Hematocrit 26.1 (L) 36.5 - 49.3 %     (H) 82 - 98 fL    MCH 31.5 26.8 - 34.3 pg    MCHC 31.4 31.4 - 37.4 g/dL    RDW 13.5 11.6 - 15.1 %    Platelets 211 149 - 390 Thousands/uL    MPV 10.5 8.9 - 12.7 fL   APTT    Collection Time: 07/09/24  5:31 AM   Result Value Ref Range    PTT 33 23 - 37 seconds   Protime-INR    Collection Time: 07/09/24  5:31 AM   Result Value Ref Range    Protime 14.6 (H) 11.6 - 14.5 seconds    INR 1.15 0.84 - 1.19

## 2024-07-09 NOTE — RESTORATIVE TECHNICIAN NOTE
Restorative Technician Note      Patient Name: Suhail Borrego     Restorative Tech Visit Date: 07/09/24  Note Type: Mobility  Patient Position Upon Consult: Supine  Activity Performed: Ambulated  Assistive Device: Roller walker  Patient Position at End of Consult: Bedside chair; All needs within reach; Bed/Chair alarm activated

## 2024-07-09 NOTE — CASE MANAGEMENT
Case Management Discharge Note    Patient name Suhail Borrego  Location [facility] 523/[facility] 523-01 MRN [id number]  : 1940 Date 2024       Current Admission Date: 2024  Current Admission Diagnosis:Carotid stenosis, asymptomatic, bilateral      LOS (days): 1  Geometric Mean LOS (GMLOS) (days):   Days to GMLOS:     OBJECTIVE:  Risk of Unplanned Readmission Score: 20.63   Current admission status: Inpatient   Primary Insurance: [employer] REP    DISCHARGE DETAILS:  Discharge planning discussed with:: Patient  Freedom of Choice: Yes  CM contacted family/caregiver?: Yes  Were Treatment Team discharge recommendations reviewed with patient/caregiver?: Yes  Did patient/caregiver verbalize understanding of patient care needs?: Yes  Were patient/caregiver advised of the risks associated with not following Treatment Team discharge recommendations?: Yes    Contacts  Patient Contacts: Madhavi Borrego (pt's daughter)  Relationship to Patient:: Family  Contact Method: Phone  Phone Number: [phone]  Reason/Outcome: Emergency Contact    Requested Home Health Care         Is the patient interested in HHC at discharge?: Yes  Home Health Discipline requested:: Nursing, Physical Therapy, Occupational Therapy  Home Health Agency Name:: [facility]  HHA External Referral Reason (only applicable if external HHA name selected): Patient has established relationship with provider  Home Health Follow-Up Provider:: PCP  Home Health Services Needed:: Post-Op Care and Assessment, Evaluate Functional Status and Safety, Gait/ADL Training, Strengthening/Theraputic Exercises to Improve Function  Homebound Criteria Met:: Requires the Assistance of Another Person for Safe Ambulation or to Leave the Home  Supporting Clincal Findings:: Limited Endurance    Treatment Team Recommendation: Home with Home Health Care  Discharge Destination Plan:: Home with Home Health Care  Transport at Discharge : Family    Additional Comments: Pt is cleared  for d/c by Vascular Surgery SEYMOUR William. RN Darci Dominguez was notified of pt's d/c order. Pt is accepted for resumption of services by [facility] for his aftercare plan. The pt and his daughter Madhavi Borrego were both informed of d/c. Daughter will transport pt home later this day; pickup time is TBD. No IMM required due to pt's LOS < 3 days. No further CM needs.

## 2024-07-09 NOTE — PROGRESS NOTES
Patient:    MRN:  [id number]    Ewa Request ID:  [id number]    Level of care reserved:  Home Health Agency    Partner Reserved:  [facility], [facility] [place] [address] [phone]    Clinical needs requested:    Geography searched:  [id number]    Start of Service:    Request sent:  10:09am EDT on 7/9/2024 by Brandon Andrade    Partner reserved:  10:19am EDT on 7/9/2024 by Brandon Andrade    Choice list shared:  10:19am EDT on 7/9/2024 by Brandon Andrade

## 2024-07-10 ENCOUNTER — TRANSITIONAL CARE MANAGEMENT (OUTPATIENT)
Age: 84
End: 2024-07-10

## 2024-07-10 NOTE — UTILIZATION REVIEW
NOTIFICATION OF ADMISSION DISCHARGE   This is a Notification of Discharge from [facility]. Please be advised that this patient has been discharge from our facility. Below you will find the admission and discharge date and time including the patient’s disposition.   UTILIZATION REVIEW CONTACT:  Bria Bhandari  Utilization   Network Utilization Review Department  Phone: [phone] x carefully listen to the prompts. All voicemails are confidential.  Email: [email or web address]     ADMISSION INFORMATION  PRESENTATION DATE: 7/8/2024  9:21 AM  OBERVATION ADMISSION DATE: N/A  INPATIENT ADMISSION DATE: 7/8/24  4:45 PM   DISCHARGE DATE: 7/9/2024  3:04 PM   DISPOSITION:Home with Home Health Care    Network Utilization Review Department  ATTENTION: Please call with any questions or concerns to [phone] and carefully listen to the prompts so that you are directed to the right person. All voicemails are confidential.   For Discharge needs, contact Care Management DC Support Team at [phone] opt. 2  Send all requests for admission clinical reviews, approved or denied determinations and any other requests to dedicated fax number below belonging to the campus where the patient is receiving treatment. List of dedicated fax numbers for the Facilities:  FACILITY NAME UR FAX NUMBER   ADMISSION DENIALS (Administrative/Medical Necessity) [phone]   DISCHARGE SUPPORT TEAM ([facility]) [phone]   PARENT CHILD HEALTH (Maternity/NICU/Pediatrics) [phone]   [facility] [phone]   [facility] [phone]   [facility] [phone]   [facility] [phone]   [facility] [phone]   [facility] [phone]   [facility] [phone]   [facility]  [phone]   [facility] [phone]   [facility] [phone]   [facility] [phone]   [facility] [phone]

## 2024-07-11 ENCOUNTER — TELEPHONE (OUTPATIENT)
Dept: VASCULAR SURGERY | Facility: CLINIC | Age: 84
End: 2024-07-11

## 2024-07-11 PROCEDURE — 88305 TISSUE EXAM BY PATHOLOGIST: CPT | Performed by: PATHOLOGY

## 2024-07-11 NOTE — TELEPHONE ENCOUNTER
Vascular Nurse Navigator Post Op Phone Call    Post-Discharge phone call attempted to assess patient recovery after vascular surgery I left a message on answering machine. Will attempt to contact at later date.        Pt's chart was reviewed prior to call and leaving message.    Procedure:   Left common carotid artery angiogram  Aortic arch angiogram  Left common carotid artery balloon expandable stent placement with 7 x 29 mm covered [place] VBX stent  Left carotid compartment bovine patch angioplasty  Radiographic supervision and interpretation of imaging    Date of Procedure: 7/8/24    Surgeon:    * Luis Miguel Peters,  - Primary     * Mary Goodson DPM - Assisting     * Rony Almonte,  - Assisting    Discharge Date: 7/9/24    Discharge Disposition:  Home        Anticoagulation pt was discharged on post op?: Aspirin and Clopidogrel (Plavix)    Statin pt was discharged on post op?: Lipitor (atorvastatin)    Reminded pt of the following in message:    NEXT OFFICE VISIT SCHEDULED:  7/16/24 at 11 am with KEVIN Dinero at [facility]    To contact [facility] with any concerns.

## 2024-07-15 ENCOUNTER — HOSPITAL ENCOUNTER (OUTPATIENT)
Dept: RADIOLOGY | Facility: HOSPITAL | Age: 84
Discharge: HOME | End: 2024-07-15
Payer: COMMERCIAL

## 2024-07-15 ENCOUNTER — OFFICE VISIT (OUTPATIENT)
Dept: PAIN MEDICINE | Facility: CLINIC | Age: 84
End: 2024-07-15
Payer: COMMERCIAL

## 2024-07-15 VITALS
HEART RATE: 79 BPM | RESPIRATION RATE: 18 BRPM | SYSTOLIC BLOOD PRESSURE: 102 MMHG | HEIGHT: 67 IN | DIASTOLIC BLOOD PRESSURE: 59 MMHG | BODY MASS INDEX: 25.12 KG/M2 | WEIGHT: 160.05 LBS

## 2024-07-15 DIAGNOSIS — M96.1 LUMBAR POSTLAMINECTOMY SYNDROME: ICD-10-CM

## 2024-07-15 DIAGNOSIS — M47.816 LUMBAR SPONDYLOSIS: Primary | ICD-10-CM

## 2024-07-15 DIAGNOSIS — S22.080A COMPRESSION FRACTURE OF T11 VERTEBRA, INITIAL ENCOUNTER (HCC): ICD-10-CM

## 2024-07-15 DIAGNOSIS — G89.4 CHRONIC PAIN SYNDROME: ICD-10-CM

## 2024-07-15 PROCEDURE — 72080 X-RAY EXAM THORACOLMB 2/> VW: CPT

## 2024-07-15 PROCEDURE — 99214 OFFICE O/P EST MOD 30 MIN: CPT

## 2024-07-15 RX ORDER — TRAMADOL HYDROCHLORIDE 50 MG/1
50 TABLET ORAL EVERY 8 HOURS PRN
Qty: 90 TABLET | Refills: 2 | Status: SHIPPED | OUTPATIENT
Start: 2024-07-15

## 2024-07-15 NOTE — PROGRESS NOTES
Assessment:  1. Lumbar spondylosis    2. Chronic pain syndrome    3. Lumbar postlaminectomy syndrome        Plan:  The patient is an 83-year-old male with a history of chronic pain secondary to low back pain, lumbar spondylosis and lumbar postlaminectomy syndrome who presents to the office with ongoing bilateral low back pain that is unchanged since his last office visit.    Overall his pain continues to be managed with taking tramadol, therefore I will continue him on this medication as prescribed, however I will increase his frequency to every 8 hours for better efficacy.  A prescription for tramadol 50 mg with 2 refills was electronically sent to the patient's pharmacy with a do not fill date of today, 7/15/2024.    Patient will follow-up with neurosurgery, appointment is scheduled for 7/26/2024.  He is also scheduled to obtain x-ray imaging of his thoracic spine prior to seeing neurosurgery, the x-ray prescription was reprinted and provided to the patient.    An opioid contract was reviewed with the patient.  The patient was made aware they are only to receive opioid medication from our office, and must take the medication as prescribed.  If the medication is lost or stolen, it will not be replaced.  We also do not condone the use of illegal substances or alcohol with opioid medication.  Random urine drug screens and pill counts will also be performed at office visits. Lastly, the patient was informed that office visits are needed for refills.  Patient was agreeable and signed the contract.    There are risks associated with opioid medications, including dependence, addiction and tolerance. The patient understands and agrees to use these medications only as prescribed. Potential side effects of the medications include, but are not limited to, constipation, drowsiness, addiction, impaired judgment and risk of fatal overdose if not taken as prescribed. The patient was warned against driving while taking sedation  medications.  Sharing medications is a felony. At this point in time, the patient is showing no signs of addiction, abuse, diversion or suicidal ideation.    [place] Prescription Drug Monitoring Program report was reviewed and was appropriate     My impressions and treatment recommendations were discussed in detail with the patient who verbalized understanding and had no further questions.  Discharge instructions were provided. I personally saw and examined the patient and I agree with the above discussed plan of care.    No orders of the defined types were placed in this encounter.    New Medications Ordered This Visit   Medications    traMADol (Ultram) 50 mg tablet     Sig: Take 1 tablet (50 mg total) by mouth every 8 (eight) hours as needed for moderate pain     Dispense:  90 tablet     Refill:  2       History of Present Illness:  Suhail Borrego is a 83 y.o. male with a history of chronic pain secondary to low back pain, lumbar spondylosis and lumbar postlaminectomy syndrome.  He was last seen on 5/6/2024 where he was started on tramadol 50 mg twice a day to help manage his pain.  He also underwent bilateral L3-5 medial branch blocks on 5/29/2024 which did not provide him relief of his pain.  He presents to the office with ongoing bilateral low back pain.  Spinal cord stimulation was also discussed at this office visit, however patient does not want to proceed at this time.  He recently underwent a left CEA on 7/8/2024.  He is also wearing a back brace related to compression deformities of T11, T12 and L1.  He will be following up with neurosurgery on 7/26/2024.    He states his pain is the same since the last office visit and occasional but worse in the morning.  He rates quality of his pain as sharp and is currently rating his pain a 0-10/10 on a numeric scale.    Current pain medications include tramadol 50 mg 1 tablet twice a day which he states is helpful in managing his pain.    I have personally  reviewed and/or updated the patient's past medical history, past surgical history, family history, social history, current medications, allergies, and vital signs today.     Review of Systems   Respiratory:  Negative for shortness of breath.    Cardiovascular:  Negative for chest pain.   Gastrointestinal:  Negative for constipation, diarrhea, nausea and vomiting.   Musculoskeletal:  Positive for back pain, gait problem and joint swelling. Negative for arthralgias and myalgias.        Decreased Range Of Motion   Skin:  Negative for rash.   Neurological:  Positive for weakness. Negative for dizziness and seizures.   All other systems reviewed and are negative.      Patient Active Problem List   Diagnosis    New acute T 11 Thoracic compression fracture, closed, initial encounter, w old T12 compression fx    Spondylolisthesis of lumbar region    CAD (coronary artery disease)    Former tobacco use    Hyperlipidemia    Primary hypertension    Hx of CABG    Macrocytic anemia    Leg pain, posterior, left    Peripheral artery disease (HCC)    GERD (gastroesophageal reflux disease)    Vasomotor rhinitis    Lumbar stenosis    S/P lumbar fusion    Postlaminectomy syndrome of lumbar region    Lumbar radiculopathy    Bruit of left carotid artery    Phimosis    Carotid stenosis, asymptomatic, bilateral    SOB (shortness of breath)    Neck pain on left side    Near syncope    Pulmonary fibrosis determined by high resolution computed tomography (HCC)    Neuropathy    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HCC)    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    Trochanteric bursitis    Diverticulitis    Stage 3 chronic kidney disease (HCC)    Left lower quadrant abdominal pain    Acute left-sided low back pain with left-sided sciatica    Internal hemorrhoids    Diverticular disease    Inflammation of sacroiliac joint (HCC)    Intractable back pain    Pressure ulcer    Lumbar spondylosis    Localized  swelling of left lower extremity    Leukocytosis    Symptomatic stenosis of left carotid artery       Past Medical History:   Diagnosis Date    Abnormal liver function test     RESOLVED: 14SEP2017    Allergic rhinitis     Anemia     LAST ASSESSED: 19OCT2017    Arthritis     BPH (benign prostatic hyperplasia)     CAD (coronary artery disease)     Coronary artery disease     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HCC)     LAST ASSESSED: 05OCT2017    Former tobacco use     GERD (gastroesophageal reflux disease)     Hand paresthesia     RESOLVED: 11SEP2017    Hyperlipidemia     Hypertension     Lumbar stenosis     Peripheral artery disease (HCC)     LAST ASSESSED: 27OCT2017    Stage 3a chronic kidney disease (HCC) 7/11/2021       Past Surgical History:   Procedure Laterality Date    BACK SURGERY      COLONOSCOPY      LUMBAR FUSION      NV ARTHRODESIS POSTERIOR/PSTLAT TQ 1NTRSPC LUMBAR N/A 11/03/2016    Procedure: L2-S1 POSTERIOR LUMBAR FUSION AND DECOMPRESSION (Impulse), Posterior lateral fixation; dural repair.;  Surgeon: Leslie Gil MD;  Location: [facility];  Service: Orthopedics    NV CABG W/ARTERIAL GRAFT SINGLE ARTERIAL GRAFT N/A 01/19/2018    Procedure: CABG X4 with LIMA - LAD, SVG - RCA, OM2, & Diagonal ; Left Leg EVH; MATTHEW;  Surgeon: Eric Bar DO;  Location: [facility];  Service: Cardiac Surgery    NV COLONOSCOPY FLX DX W/COLLJ SPEC WHEN PFRMD N/A 11/15/2017    Procedure: EGD AND COLONOSCOPY;  Surgeon: Mariano Godinez MD;  Location: [facility];  Service: Gastroenterology    NV TEAEC W/PATCH GRF CAROTID VERTB SUBCLAV NECK INC Left 7/8/2024    Procedure: left CEA retrograde left common carotid angioplasty/stent;  Surgeon: Luis Miguel Peters DO;  Location: [facility];  Service: Vascular    SEPTOPLASTY      LAST ASSESSED; 13MAY2014    TONSILECTOMY AND ADNOIDECTOMY      LAST ASSESSED: 13MAY2014    UPPER GASTROINTESTINAL ENDOSCOPY         Family History   Problem Relation Age of Onset    Emphysema Mother     Liver disease  Mother     Coronary artery disease Father     Hypertension Father     Liver disease Father     Heart failure Father     Stroke Father         CVA     Heart attack Father     Coronary artery disease Brother     Heart disease Brother         younger brother by pass and other brother 4 stents placed    Other Family         BACK PROBLEM     Stroke Family         CVA    Emphysema Family     Hypertension Family         BENIGN       Social History     Occupational History    Occupation: Insurance     Comment: Retired    Tobacco Use    Smoking status: Former     Current packs/day: 0.00     Average packs/day: 1 pack/day for 50.0 years (50.0 ttl pk-yrs)     Types: Cigarettes     Start date:      Quit date:      Years since quittin.5    Smokeless tobacco: Never   Vaping Use    Vaping status: Never Used   Substance and Sexual Activity    Alcohol use: Not Currently     Alcohol/week: 1.0 standard drink of alcohol     Types: 1 Shots of liquor per week     Comment: beer, wine, scotch every day; SOCIAL AS PER ALL SCRIPTS     Drug use: Not Currently     Types: Marijuana     Comment: medical clarke    Sexual activity: Not Currently       Current Outpatient Medications on File Prior to Visit   Medication Sig    acetaminophen (TYLENOL) 650 mg CR tablet Take 650 mg by mouth every 8 (eight) hours as needed for mild pain    Ascorbic Acid (Vitamin C) 500 MG PACK Take 1,000 mg by mouth daily    aspirin (ECOTRIN LOW STRENGTH) 81 mg EC tablet Take 81 mg by mouth daily    atorvastatin (LIPITOR) 80 mg tablet TAKE 1 TABLET BY MOUTH EVERY DAY IN THE MORNING    cholecalciferol (VITAMIN D3) 1,000 units tablet Take 2,000 Units by mouth daily    clopidogrel (PLAVIX) 75 mg tablet Take 1 tablet (75 mg total) by mouth daily    clotrimazole-betamethasone (LOTRISONE) 1-0.05 % cream Apply topically 2 (two) times a day    cyanocobalamin (VITAMIN B-12) 1,000 mcg tablet Take 1,000 mcg by mouth daily      docusate sodium (COLACE) 100 mg capsule  "Take 100 mg by mouth 2 (two) times a day    famotidine (PEPCID) 20 mg tablet TAKE 1 TABLET BY MOUTH TWICE A DAY    folic acid (FOLVITE) 1 mg tablet Take 1 tablet (1 mg total) by mouth daily    gabapentin (NEURONTIN) 100 mg capsule Take 1 capsule (100 mg total) by mouth 2 (two) times a day Morning and bedtime    metoprolol tartrate (LOPRESSOR) 50 mg tablet Take 0.5 tablets (25 mg total) by mouth in the morning    midodrine (PROAMATINE) 2.5 mg tablet Take 1 tablet (2.5 mg total) by mouth 3 (three) times a day before meals    Multiple Vitamin (MULTIVITAMIN) capsule Take 1 capsule by mouth daily    [DISCONTINUED] traMADol (Ultram) 50 mg tablet Take 1 tablet (50 mg total) by mouth 2 (two) times a day as needed for moderate pain     No current facility-administered medications on file prior to visit.       Allergies   Allergen Reactions    Penicillins Other (See Comments)     Hallucinations;  Patient reported that he was seeing visual disturbances         Physical Exam:    /59   Pulse 79   Resp 18   Ht 5' 7\" (1.702 m)   Wt 72.6 kg (160 lb 0.9 oz)   BMI 25.07 kg/m²     Constitutional:normal, well developed, well nourished, alert, in no distress and non-toxic and no overt pain behavior.  Eyes:anicteric  HEENT:grossly intact  Neck:supple, symmetric, trachea midline and no masses   Pulmonary:even and unlabored  Cardiovascular:No edema or pitting edema present  Skin:Normal without rashes or lesions and well hydrated  Psychiatric:Mood and affect appropriate  Neurologic:Cranial Nerves II-XII grossly intact  Musculoskeletal:antalgic and ambulates with cane    Imaging    "

## 2024-07-15 NOTE — TELEPHONE ENCOUNTER
Attempted to contact patient and left message for him to return call.  Also reminded him of post op appointment on 7/16/24 at 11:30 am with KEVIN Dinero at [facility]

## 2024-07-16 ENCOUNTER — OFFICE VISIT (OUTPATIENT)
Dept: VASCULAR SURGERY | Facility: CLINIC | Age: 84
End: 2024-07-16

## 2024-07-16 VITALS
OXYGEN SATURATION: 96 % | HEIGHT: 67 IN | RESPIRATION RATE: 18 BRPM | WEIGHT: 160 LBS | DIASTOLIC BLOOD PRESSURE: 72 MMHG | BODY MASS INDEX: 25.11 KG/M2 | SYSTOLIC BLOOD PRESSURE: 132 MMHG | HEART RATE: 92 BPM

## 2024-07-16 DIAGNOSIS — I65.22 SYMPTOMATIC STENOSIS OF LEFT CAROTID ARTERY: Primary | ICD-10-CM

## 2024-07-16 PROCEDURE — 99024 POSTOP FOLLOW-UP VISIT: CPT | Performed by: NURSE PRACTITIONER

## 2024-07-16 NOTE — PROGRESS NOTES
Assessment/Plan:     83-year-old male with HTN, HLD, GERD, CAD h/o CABG, CKD 3, pulmonary fibrosis, T12 compression fracture, spinal stenosis, and  left hemispheric TIA with high-grade LICA stenosis and L CCA stenosis s/p L CEA and retrograde L CCA stenting for symptomatic disease presents for postop follow-up.    Problem List Items Addressed This Visit       Symptomatic stenosis of left carotid artery - Primary     - 8 days postop L CEA and retrograde CCA stenting 7/8/2024  -Patient doing well postoperatively, presents without complaints other than ongoing back pain (chronic, T12 compression fracture,, spinal stenosis)  -Denies pain or numbness  -Left neck incision CDI with surgical glue.  No dehiscence, drainage, bleeding, hematoma or signs infection.  Mild resolving ecchymosis, mild edema soft.  -Reports residual word finding difficulties post TIA.  Denies weakness.  -Neuroexam intact    Plan:  -Carotid duplex in 3 months with return office visit with Dr. Peters  -Carotid duplex in 9 months (VQI visit, AP okay)  -Continue aspirin indefinitely and Plavix (x 3 months)  -Continue statin  -Incisional care reviewed.  Wash daily soap and water, pat dry.  Monitor for dehiscence, erythema, drainage, or signs of infection.  -Home PT/OT  -Patient has scheduled LEAD, will combine with carotid duplex imaging to review at next office visit.  -Educated on TIA and strokelike symptoms and when to seek medical attention         Relevant Orders    VAS carotid complete study    VAS carotid complete study          Diagnoses and all orders for this visit:    Symptomatic stenosis of left carotid artery  -     VAS carotid complete study; Future  -     VAS carotid complete study; Future          Subjective:     Patient ID: Suhail Borrego is a 83 y.o. male.    Patient is s/p L CEA and retrograde CCA stenting on 7/8/24 by Dr. Peters. Pt has bruising and swelling on the Lt side of the neck. Pt c/o a sore throat, but denies issues eating or  drinking. Pt denies TIA or CVA symptoms.     HPI  See assessment and plan    83-year-old male s/p left hemispheric TIA and L CEA/L CCA stent for symptomatic stenosis presents for postop follow-up.  Patient is 8 days postop presents accompanied by his daughter.  He presents without complaints.  Denies TIA or strokelike symptoms.  He does endorse some residual word finding difficulties post TIA.  No new symptoms or worsening since surgery.  Neuroexam intact.  Left neck incision care CDI with surgical glue.  Incision care reviewed  Postop surveillance reviewed  Patient has continued chronic back pain wearing brace and walking with cane at office visit today.  He also mentions some constipation secondary to pain meds and diet.    Medications: Maintained on DAPT with aspirin Plavix.  Reviewed aspirin indefinitely and continue Plavix x 3 months.  Continue statin.    Home PT/OT    Reviewed TIA and strokelike symptoms and when to seek medical attention    Patient has known PAD with bilateral SFA occlusions/stenosis.  Will combine next imaging with carotid duplex.        Review of Systems   Constitutional: Negative.    HENT:  Positive for sore throat.    Eyes: Negative.    Respiratory:  Positive for shortness of breath.    Cardiovascular: Negative.    Gastrointestinal:  Positive for constipation.   Endocrine: Negative.    Genitourinary: Negative.    Musculoskeletal:  Positive for arthralgias, back pain and gait problem.   Skin: Negative.    Allergic/Immunologic: Negative.    Neurological:  Positive for weakness. Negative for dizziness, facial asymmetry, speech difficulty, light-headedness and numbness.   Hematological:  Bruises/bleeds easily.   Psychiatric/Behavioral: Negative.         I have reviewed and made appropriate changes to the review of systems input by the medical assistant.    Objective:     Physical Exam  Vitals reviewed.   Constitutional:       General: He is not in acute distress.  Neck:      Comments: Left  "neck incision CDI with surgical glue.  No dehiscence, drainage, bleeding, hematoma or signs of infection.  Mild resolving ecchymosis, mild edema soft.  Cardiovascular:      Rate and Rhythm: Normal rate.      Pulses: Normal pulses.           Radial pulses are 2+ on the right side and 2+ on the left side.   Pulmonary:      Effort: Pulmonary effort is normal. No respiratory distress.   Musculoskeletal:         General: Normal range of motion.      Cervical back: Normal range of motion.      Comments: Back brace  Walks with cane   Skin:     General: Skin is warm.   Neurological:      General: No focal deficit present.      Mental Status: He is alert and oriented to person, place, and time.      Cranial Nerves: No cranial nerve deficit.      Sensory: No sensory deficit.      Motor: No weakness.      Comments: Smile symmetrical, tongue midline  Shoulder shrug normal  BUE 5/5   Psychiatric:         Behavior: Behavior normal.           I have spent a total time of 28 minutes in caring for this patient on the day of the visit/encounter including Instructions for management, Patient and family education, Importance of tx compliance, Impressions, Documenting in the medical record, Reviewing / ordering tests, medicine, procedures  , and Obtaining or reviewing history  .      Vitals:    07/16/24 1127   BP: 132/72   BP Location: Left arm   Patient Position: Sitting   Pulse: 92   Resp: 18   SpO2: 96%   Weight: 72.6 kg (160 lb)   Height: 5' 7\" (1.702 m)       Patient Active Problem List   Diagnosis    New acute T 11 Thoracic compression fracture, closed, initial encounter, w old T12 compression fx    Spondylolisthesis of lumbar region    CAD (coronary artery disease)    Former tobacco use    Hyperlipidemia    Primary hypertension    Hx of CABG    Macrocytic anemia    Leg pain, posterior, left    Peripheral artery disease (HCC)    GERD (gastroesophageal reflux disease)    Vasomotor rhinitis    Lumbar stenosis    S/P lumbar fusion    " Postlaminectomy syndrome of lumbar region    Lumbar radiculopathy    Bruit of left carotid artery    Phimosis    Carotid stenosis, asymptomatic, bilateral    SOB (shortness of breath)    Neck pain on left side    Near syncope    Pulmonary fibrosis determined by high resolution computed tomography (HCC)    Neuropathy    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HCC)    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    Trochanteric bursitis    Diverticulitis    Stage 3 chronic kidney disease (HCC)    Left lower quadrant abdominal pain    Acute left-sided low back pain with left-sided sciatica    Internal hemorrhoids    Diverticular disease    Inflammation of sacroiliac joint (HCC)    Intractable back pain    Pressure ulcer    Lumbar spondylosis    Localized swelling of left lower extremity    Leukocytosis    Symptomatic stenosis of left carotid artery       Past Surgical History:   Procedure Laterality Date    BACK SURGERY      COLONOSCOPY      LUMBAR FUSION      VT ARTHRODESIS POSTERIOR/PSTLAT TQ 1NTRSPC LUMBAR N/A 11/03/2016    Procedure: L2-S1 POSTERIOR LUMBAR FUSION AND DECOMPRESSION (Impulse), Posterior lateral fixation; dural repair.;  Surgeon: Leslie Gil MD;  Location: [facility];  Service: Orthopedics    VT CABG W/ARTERIAL GRAFT SINGLE ARTERIAL GRAFT N/A 01/19/2018    Procedure: CABG X4 with LIMA - LAD, SVG - RCA, OM2, & Diagonal ; Left Leg EVH; MATTHEW;  Surgeon: Eric Bar DO;  Location: [facility];  Service: Cardiac Surgery    VT COLONOSCOPY FLX DX W/COLLJ SPEC WHEN PFRMD N/A 11/15/2017    Procedure: EGD AND COLONOSCOPY;  Surgeon: Mariano Godinez MD;  Location: [facility];  Service: Gastroenterology    VT TEAEC W/PATCH GRF CAROTID VERTB SUBCLAV NECK INC Left 7/8/2024    Procedure: left CEA retrograde left common carotid angioplasty/stent;  Surgeon: Luis Miguel Peters DO;  Location: [facility];  Service: Vascular    SEPTOPLASTY      LAST ASSESSED; [id number]    TONSILECTOMY  AND ADNOIDECTOMY      LAST ASSESSED: 2014    UPPER GASTROINTESTINAL ENDOSCOPY         Family History   Problem Relation Age of Onset    Emphysema Mother     Liver disease Mother     Coronary artery disease Father     Hypertension Father     Liver disease Father     Heart failure Father     Stroke Father         CVA     Heart attack Father     Coronary artery disease Brother     Heart disease Brother         younger brother by pass and other brother 4 stents placed    Other Family         BACK PROBLEM     Stroke Family         CVA    Emphysema Family     Hypertension Family         BENIGN       Social History     Socioeconomic History    Marital status:      Spouse name: Not on file    Number of children: Not on file    Years of education: some college     Highest education level: Not on file   Occupational History    Occupation: Insurance     Comment: Retired    Tobacco Use    Smoking status: Former     Current packs/day: 0.00     Average packs/day: 1 pack/day for 50.0 years (50.0 ttl pk-yrs)     Types: Cigarettes     Start date:      Quit date:      Years since quittin.5    Smokeless tobacco: Never   Vaping Use    Vaping status: Never Used   Substance and Sexual Activity    Alcohol use: Not Currently     Alcohol/week: 1.0 standard drink of alcohol     Types: 1 Shots of liquor per week     Comment: beer, wine, scotch every day; SOCIAL AS PER ALL SCRIPTS     Drug use: Not Currently     Types: Marijuana     Comment: medical majiuana    Sexual activity: Not Currently   Other Topics Concern    Not on file   Social History Narrative    Daily coffee consumption      Social Determinants of Health     Financial Resource Strain: Low Risk  (2024)    Overall Financial Resource Strain (CARDIA)     Difficulty of Paying Living Expenses: Not hard at all   Food Insecurity: No Food Insecurity (2024)    Hunger Vital Sign     Worried About Running Out of Food in the Last Year: Never true     Ran Out  of Food in the Last Year: Never true   Transportation Needs: No Transportation Needs (7/9/2024)    PRAPARE - Transportation     Lack of Transportation (Medical): No     Lack of Transportation (Non-Medical): No   Physical Activity: Not on file   Stress: Not on file   Social Connections: Unknown (6/18/2024)    Received from [employer]    Social Gaming for Good     How often do you feel lonely or isolated from those around you? (Adult - for ages 18 years and over): Not on file   Intimate Partner Violence: Not on file   Housing Stability: Low Risk  (7/9/2024)    Housing Stability Vital Sign     Unable to Pay for Housing in the Last Year: No     Number of Times Moved in the Last Year: 0     Homeless in the Last Year: No       Allergies   Allergen Reactions    Penicillins Other (See Comments)     Hallucinations;  Patient reported that he was seeing visual disturbances           Current Outpatient Medications:     acetaminophen (TYLENOL) 650 mg CR tablet, Take 650 mg by mouth every 8 (eight) hours as needed for mild pain, Disp: , Rfl:     Ascorbic Acid (Vitamin C) 500 MG PACK, Take 1,000 mg by mouth daily, Disp: , Rfl:     aspirin (ECOTRIN LOW STRENGTH) 81 mg EC tablet, Take 81 mg by mouth daily, Disp: , Rfl:     atorvastatin (LIPITOR) 80 mg tablet, TAKE 1 TABLET BY MOUTH EVERY DAY IN THE MORNING, Disp: 90 tablet, Rfl: 3    cholecalciferol (VITAMIN D3) 1,000 units tablet, Take 2,000 Units by mouth daily, Disp: , Rfl:     clopidogrel (PLAVIX) 75 mg tablet, Take 1 tablet (75 mg total) by mouth daily, Disp: 30 tablet, Rfl: 0    clotrimazole-betamethasone (LOTRISONE) 1-0.05 % cream, Apply topically 2 (two) times a day, Disp: 30 g, Rfl: 3    cyanocobalamin (VITAMIN B-12) 1,000 mcg tablet, Take 1,000 mcg by mouth daily  , Disp: , Rfl:     docusate sodium (COLACE) 100 mg capsule, Take 100 mg by mouth 2 (two) times a day, Disp: , Rfl:     famotidine (PEPCID) 20 mg tablet, TAKE 1 TABLET BY MOUTH TWICE A DAY, Disp: 180 tablet, Rfl: 0     folic acid (FOLVITE) 1 mg tablet, Take 1 tablet (1 mg total) by mouth daily, Disp: 90 tablet, Rfl: 0    gabapentin (NEURONTIN) 100 mg capsule, Take 1 capsule (100 mg total) by mouth 2 (two) times a day Morning and bedtime, Disp: , Rfl:     metoprolol tartrate (LOPRESSOR) 50 mg tablet, Take 0.5 tablets (25 mg total) by mouth in the morning, Disp: 30 tablet, Rfl: 0    midodrine (PROAMATINE) 2.5 mg tablet, Take 1 tablet (2.5 mg total) by mouth 3 (three) times a day before meals, Disp: 90 tablet, Rfl: 0    Multiple Vitamin (MULTIVITAMIN) capsule, Take 1 capsule by mouth daily, Disp: , Rfl:     traMADol (Ultram) 50 mg tablet, Take 1 tablet (50 mg total) by mouth every 8 (eight) hours as needed for moderate pain, Disp: 90 tablet, Rfl: 2

## 2024-07-16 NOTE — PATIENT INSTRUCTIONS
-your neck incision is healing well.  Continue to clean incision daily with soap and water.  No lotions, ointments or creams to incision x4 weeks.  No soaking or submerging incision x4 weeks.  -We will initiate postoperative surveillance protocol.  We will obtain carotid ultrasound at 3 months and 9 months postoperatively.  -return to office in 3 months with surgeon Dr Peters with carotid duplex for routine postoperative follow-up.  -return to office in 9 months with carotid ultrasound for routine postoperative surveillance.  -continue aspirin and plavix   -continue statin therapy.  -please contact the office with new symptoms or changes in incision.  -call 911 immediately for signs/symptoms of TIA/CVA.

## 2024-07-16 NOTE — ASSESSMENT & PLAN NOTE
- 8 days postop L CEA and retrograde CCA stenting 7/8/2024  -Patient doing well postoperatively, presents without complaints other than ongoing back pain (chronic, T12 compression fracture,, spinal stenosis)  -Denies pain or numbness  -Left neck incision CDI with surgical glue.  No dehiscence, drainage, bleeding, hematoma or signs infection.  Mild resolving ecchymosis, mild edema soft.  -Reports residual word finding difficulties post TIA.  Denies weakness.  -Neuroexam intact    Plan:  -Carotid duplex in 3 months with return office visit with Dr. Peters  -Carotid duplex in 9 months (VQI visit, AP okay)  -Continue aspirin indefinitely and Plavix (x 3 months)  -Continue statin  -Incisional care reviewed.  Wash daily soap and water, pat dry.  Monitor for dehiscence, erythema, drainage, or signs of infection.  -Home PT/OT  -Patient has scheduled LEAD, will combine with carotid duplex imaging to review at next office visit.  -Educated on TIA and strokelike symptoms and when to seek medical attention

## 2024-07-22 DIAGNOSIS — I65.22 SYMPTOMATIC STENOSIS OF LEFT CAROTID ARTERY: ICD-10-CM

## 2024-07-23 RX ORDER — CLOPIDOGREL BISULFATE 75 MG/1
75 TABLET ORAL DAILY
Qty: 30 TABLET | Refills: 0 | Status: SHIPPED | OUTPATIENT
Start: 2024-07-23

## 2024-07-25 ENCOUNTER — OFFICE VISIT (OUTPATIENT)
Age: 84
End: 2024-07-25
Payer: COMMERCIAL

## 2024-07-25 VITALS — SYSTOLIC BLOOD PRESSURE: 118 MMHG | DIASTOLIC BLOOD PRESSURE: 62 MMHG | TEMPERATURE: 98 F

## 2024-07-25 DIAGNOSIS — N18.31 STAGE 3A CHRONIC KIDNEY DISEASE (HCC): ICD-10-CM

## 2024-07-25 DIAGNOSIS — F11.20 CONTINUOUS OPIOID DEPENDENCE (HCC): ICD-10-CM

## 2024-07-25 DIAGNOSIS — S91.001A WOUND OF RIGHT ANKLE, INITIAL ENCOUNTER: ICD-10-CM

## 2024-07-25 DIAGNOSIS — E78.2 MIXED HYPERLIPIDEMIA: ICD-10-CM

## 2024-07-25 DIAGNOSIS — I10 PRIMARY HYPERTENSION: Primary | ICD-10-CM

## 2024-07-25 PROCEDURE — 1111F DSCHRG MED/CURRENT MED MERGE: CPT | Performed by: FAMILY MEDICINE

## 2024-07-25 PROCEDURE — G2211 COMPLEX E/M VISIT ADD ON: HCPCS | Performed by: FAMILY MEDICINE

## 2024-07-25 PROCEDURE — 99214 OFFICE O/P EST MOD 30 MIN: CPT | Performed by: FAMILY MEDICINE

## 2024-07-26 ENCOUNTER — OFFICE VISIT (OUTPATIENT)
Dept: NEUROSURGERY | Facility: CLINIC | Age: 84
End: 2024-07-26
Payer: COMMERCIAL

## 2024-07-26 VITALS
TEMPERATURE: 97.3 F | SYSTOLIC BLOOD PRESSURE: 128 MMHG | BODY MASS INDEX: 25.11 KG/M2 | WEIGHT: 160 LBS | OXYGEN SATURATION: 98 % | DIASTOLIC BLOOD PRESSURE: 64 MMHG | HEIGHT: 67 IN | RESPIRATION RATE: 16 BRPM | HEART RATE: 102 BPM

## 2024-07-26 DIAGNOSIS — S22.089A CLOSED T11 FRACTURE (HCC): Primary | ICD-10-CM

## 2024-07-26 PROCEDURE — 99213 OFFICE O/P EST LOW 20 MIN: CPT | Performed by: PHYSICIAN ASSISTANT

## 2024-07-26 NOTE — PROGRESS NOTES
"Ambulatory Visit  Name: Suhail Borrego      : 1940      MRN: [id number]  Encounter Provider: Coy Mantilla PA-C  Encounter Date: 2024   Encounter department: [facility] NEUROSURGICAL [facility]    Assessment & Plan     Patient is 83 yrs old pleasant gentleman here today with his daughter and son in law for 7 weeks follow up of acute on chronic exacerbation of TL back pain.Pain became worse spontaneously, denies fall or injury to his back. He is s/p L2-S1 posterior Lumbar decompression and fusion many yrs ago. He reports his back pain is more pronounced in the Lower Lumbar and mild TL back pain, no radicular symptoms, weakness, numbness or paresthesia. Wearing brace.He had follow up TL spine xrays and images shows stable chronic SEP compression deformity of T11, T12 and L1, hardware intact from L2-S1.    Exam-A&Ox3, Dilan, strength 5/5 bilaterally. Sensation to LT bilaterally. DTR 2+, no clonus bilaterally. Mild tenderness at TL region, and slightly pronounced pain at Lower lumbar and LS region.     Hx, Exam and images reviewed with the patient. Mx plan discussed. Advised to continue wearing brace as long as comfortable and helps in decreasing back pain, Take Tylenol. Discussed SCS options for his chronic back pain, but patient seems not interested with SCS for right now. F/U in 4 weeks with XR in brace, Also ordered DEXA scan to eval his bone density. Fall precautions, avoid lifting heavy objects, excessive bending or twisting. All questions and concerns were answered to Patient satisfaction. Patient verbalized understandings and agreed with the plan.    Plan:  Thoracolumbar spine x-rays in 4 weeks  DEXA scan  Continue conservative Mx and pain meds  F/U in 4 weeks  Call with questions or concerns    History of Present Illness         C/C: \"Suhail Borrego is a 83 y.o. male here for 7 weeks F/U of chronic exacerbation of chronic back pain and new T11 compression\"    HPI:  See detailed discussion " "above.    ROS personally reviewed and updated as follows  Review of Systems   Constitutional: Negative.    HENT: Negative.     Eyes: Negative.    Respiratory: Negative.     Cardiovascular: Negative.    Gastrointestinal:  Positive for constipation.   Endocrine: Negative.    Genitourinary: Negative.    Musculoskeletal:  Positive for back pain and gait problem (ambualting with cane).        Hx of fusionlumber L2 down   Allergic/Immunologic: Negative.    Neurological:  Positive for weakness (bi/ leg weakness).   Hematological:  Bruises/bleeds easily (asa 81, plavix 75).   Psychiatric/Behavioral: Negative.         Objective     /64 (BP Location: Left arm, Patient Position: Sitting)   Pulse 102   Temp (!) 97.3 °F (36.3 °C) (Temporal)   Resp 16   Ht 5' 7\" (1.702 m)   Wt 72.6 kg (160 lb)   SpO2 98%   BMI 25.06 kg/m²     Physical Exam  Constitutional:       Appearance: Normal appearance.   Pulmonary:      Effort: Pulmonary effort is normal.   Musculoskeletal:         General: Tenderness present.      Cervical back: Normal range of motion.   Neurological:      General: No focal deficit present.      Mental Status: He is alert and oriented to person, place, and time.      GCS: GCS eye subscore is 4. GCS verbal subscore is 5. GCS motor subscore is 6.      Cranial Nerves: Cranial nerves 2-12 are intact.      Sensory: Sensation is intact.      Motor: Motor function is intact.      Deep Tendon Reflexes: Reflexes are normal and symmetric.       Administrative Statements   I have spent a total time of 45 minutes in caring for this patient on the day of the visit/encounter including Diagnostic results, Prognosis, Risks and benefits of tx options, Instructions for management, Patient and family education, Importance of tx compliance, Risk factor reductions, Impressions, Counseling / Coordination of care, Documenting in the medical record, Reviewing / ordering tests, medicine, procedures  , and Obtaining or reviewing " history  .

## 2024-07-29 PROBLEM — S91.001A WOUND OF RIGHT ANKLE: Status: ACTIVE | Noted: 2024-07-29

## 2024-07-29 NOTE — ASSESSMENT & PLAN NOTE
Lab Results   Component Value Date    EGFR 66 07/09/2024    EGFR 50 06/30/2024    EGFR 67 06/29/2024    CREATININE 1.03 07/09/2024    CREATININE 1.29 06/30/2024    CREATININE 1.02 06/29/2024   Renal function stable  Continue hypertension, hyperlipidemia management  Avoid nephrotoxic agents

## 2024-07-29 NOTE — ASSESSMENT & PLAN NOTE
BP goal less than 140/90 (ideally below 1/30/1980 CKD)  Continue metoprolol tartrate 25 mg in the morning and low-sodium diet

## 2024-07-29 NOTE — PROGRESS NOTES
Ambulatory Visit  Name: Suhail Borrego      : 1940      MRN: [id number]  Encounter Provider: Mikaela Duenas DO  Encounter Date: 2024   Encounter department: [facility]    Assessment & Plan   1. Primary hypertension  Assessment & Plan:  BP goal less than 140/90 (ideally below 1980 CKD)  Continue metoprolol tartrate 25 mg in the morning and low-sodium diet  2. Stage 3a chronic kidney disease (HCC)  Assessment & Plan:  Lab Results   Component Value Date    EGFR 66 2024    EGFR 50 2024    EGFR 67 2024    CREATININE 1.03 2024    CREATININE 1.29 2024    CREATININE 1.02 2024   Renal function stable  Continue hypertension, hyperlipidemia management  Avoid nephrotoxic agents  3. Mixed hyperlipidemia  Assessment & Plan:  LDL goal less than 70  Continue atorvastatin 80 mg daily  4. Continuous opioid dependence (HCC)  Assessment & Plan:  Continue tramadol 50 mg every 8 hours as needed under the supervision of pain management  5. Wound of right ankle, initial encounter  Assessment & Plan:  Etiology unclear  Empirically treat with mupirocin ointment  Orders:  -     mupirocin (BACTROBAN) 2 % ointment; Apply topically 3 (three) times a day for 10 days         Return in about 6 months (around 2025) for AWV.    The patient indicates understanding of these issues and agrees with the plan.              History of Present Illness       Chief Complaint   Patient presents with   • Follow-up     Feeling better, also has a bruise on foot, been there for weeks, it stings as well, but says it looks like it has been healing       HPI  Patient presents for hypertension, hyperlipidemia, CKD follow-up  He recently underwent a left carotid endarterectomy earlier this month (2024) and will be maintained on Plavix for 3 months  His blood pressure is well-controlled  Denies chest pain, shortness of breath, changes in vision  Good medication compliance and  tolerance reported  Patient reports a bruise on the inner aspect of his right ankle  States it has been there for several weeks  Is healing very slowly but does bother him on occasion, wants to be sure there is no infection      Review of Systems   Constitutional:  Negative for activity change, fatigue and fever.   HENT:  Negative for congestion, ear pain, sinus pain and sore throat.    Eyes:  Negative for pain, itching and visual disturbance.   Respiratory:  Negative for cough and shortness of breath.    Cardiovascular:  Negative for chest pain, palpitations and leg swelling.   Gastrointestinal:  Negative for abdominal pain, constipation, diarrhea, nausea and vomiting.   Endocrine: Negative for cold intolerance and heat intolerance.   Genitourinary:  Negative for dysuria.   Musculoskeletal:  Negative for myalgias.   Skin:  Positive for wound. Negative for color change and rash.   Neurological:  Negative for dizziness, syncope and headaches.   Hematological:  Negative for adenopathy.   Psychiatric/Behavioral:  Negative for dysphoric mood. The patient is not nervous/anxious.      Medical History Reviewed by provider this encounter:  Tobacco  Allergies  Meds  Problems  Med Hx  Surg Hx  Fam Hx       Objective     /62   Temp 98 °F (36.7 °C)     Physical Exam  Vitals and nursing note reviewed.   Constitutional:       General: He is not in acute distress.     Appearance: Normal appearance. He is well-developed. He is not ill-appearing.      Comments: There is no height or weight on file to calculate BMI.    HENT:      Head: Normocephalic and atraumatic.   Eyes:      General: No scleral icterus.     Conjunctiva/sclera: Conjunctivae normal.      Pupils: Pupils are equal, round, and reactive to light.   Cardiovascular:      Rate and Rhythm: Normal rate and regular rhythm.      Heart sounds: Normal heart sounds. No murmur heard.  Pulmonary:      Effort: Pulmonary effort is normal. No respiratory distress.       Breath sounds: Normal breath sounds.   Abdominal:      General: Bowel sounds are normal. There is no distension.      Palpations: Abdomen is soft.      Tenderness: There is no abdominal tenderness.   Musculoskeletal:         General: Normal range of motion.      Cervical back: Normal range of motion and neck supple.      Right lower leg: Edema (trace) present.      Left lower leg: Edema (trace) present.   Lymphadenopathy:      Cervical: No cervical adenopathy.   Skin:     General: Skin is warm and dry.      Coloration: Skin is not jaundiced.      Findings: Wound (eschar with surrounding erythema noted over right medial malleolus) present. No rash.   Neurological:      General: No focal deficit present.      Mental Status: He is alert and oriented to person, place, and time.   Psychiatric:         Mood and Affect: Mood normal.         Behavior: Behavior normal.

## 2024-08-04 DIAGNOSIS — I65.22 SYMPTOMATIC STENOSIS OF LEFT CAROTID ARTERY: ICD-10-CM

## 2024-08-05 ENCOUNTER — OFFICE VISIT (OUTPATIENT)
Dept: PULMONOLOGY | Facility: CLINIC | Age: 84
End: 2024-08-05
Payer: COMMERCIAL

## 2024-08-05 VITALS
HEART RATE: 96 BPM | DIASTOLIC BLOOD PRESSURE: 80 MMHG | BODY MASS INDEX: 25.69 KG/M2 | SYSTOLIC BLOOD PRESSURE: 112 MMHG | WEIGHT: 164 LBS | OXYGEN SATURATION: 98 %

## 2024-08-05 DIAGNOSIS — J84.10 PULMONARY FIBROSIS DETERMINED BY HIGH RESOLUTION COMPUTED TOMOGRAPHY (HCC): Primary | ICD-10-CM

## 2024-08-05 PROBLEM — R06.02 SOB (SHORTNESS OF BREATH): Status: RESOLVED | Noted: 2021-07-10 | Resolved: 2024-08-05

## 2024-08-05 PROCEDURE — 99214 OFFICE O/P EST MOD 30 MIN: CPT | Performed by: INTERNAL MEDICINE

## 2024-08-05 PROCEDURE — G2211 COMPLEX E/M VISIT ADD ON: HCPCS | Performed by: INTERNAL MEDICINE

## 2024-08-05 NOTE — ASSESSMENT & PLAN NOTE
Mr. Borrego has noted progression of pulmonary fibrosis via imaging obtained in the hospital.  However, this does not correlate with any change in symptoms, and he continues to elect for observation.    Repeat office visit in 6 months  Consider repeat HRCT +/- PFTs at that time

## 2024-08-05 NOTE — PROGRESS NOTES
Pulmonary Follow Up Note   Suhail Borrego 83 y.o. male MRN: [id number]  8/5/2024    Assessment:    Pulmonary fibrosis determined by high resolution computed tomography (HCC)  Mr. Borrego has noted progression of pulmonary fibrosis via imaging obtained in the hospital.  However, this does not correlate with any change in symptoms, and he continues to elect for observation.    Repeat office visit in 6 months  Consider repeat HRCT +/- PFTs at that time    Plan:    Diagnoses and all orders for this visit:    Pulmonary fibrosis determined by high resolution computed tomography (HCC)    Return in about 6 months (around 2/5/2025).    History of Present Illness   HPI:  Suhail Borrego is a 83 y.o. male who presents for hospital follow up.  He had a couple of issues - symptomatic carotid stenosis, acute vertebral fractures on top of chronic lower back pain.  During his stroke workup for the carotid stenosis, a CT chest was obtained demonstrating increased burden of probable IPF.      He remains asymptomatic.  He only feels dyspneic when his back pain is uncontrolled.  He does not really endorse any dyspnea with activity or chronic cough.  He is compliant with incentive spirometry which he continues to use after his hospitalization.    We again discussed the natural history of IPF, the benefit of anti-fibrotics in stabilizing and reducing worsening of disease, as well as their side effects.  Mr. Borrego also unfortunately continues to have difficulty affording testing which has inhibited our usual pattern of following up.  He elects to do clinical observation for 6 months given his lack of symptoms, after discussion of the risks and benefits of doing so.    Review of Systems   Respiratory:  Negative for cough and shortness of breath.    Cardiovascular:  Negative for leg swelling.     Historical Information   Past Medical History:   Diagnosis Date   • Abnormal liver function test     RESOLVED: 14SEP2017   • Allergic rhinitis     • Anemia     LAST ASSESSED: 19OCT2017   • Arthritis    • BPH (benign prostatic hyperplasia)    • CAD (coronary artery disease)    • Coronary artery disease    • 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HCC)     LAST ASSESSED: 05OCT2017   • Former tobacco use    • GERD (gastroesophageal reflux disease)    • Hand paresthesia     RESOLVED: 11SEP2017   • Hyperlipidemia    • Hypertension    • Lumbar stenosis    • Peripheral artery disease (HCC)     LAST ASSESSED: 27OCT2017   • Stage 3a chronic kidney disease (HCC) 7/11/2021     Past Surgical History:   Procedure Laterality Date   • BACK SURGERY     • COLONOSCOPY     • LUMBAR FUSION     • TN ARTHRODESIS POSTERIOR/PSTLAT TQ 1NTRSPC LUMBAR N/A 11/03/2016    Procedure: L2-S1 POSTERIOR LUMBAR FUSION AND DECOMPRESSION (Impulse), Posterior lateral fixation; dural repair.;  Surgeon: Leslie Gil MD;  Location: [facility];  Service: Orthopedics   • TN CABG W/ARTERIAL GRAFT SINGLE ARTERIAL GRAFT N/A 01/19/2018    Procedure: CABG X4 with LIMA - LAD, SVG - RCA, OM2, & Diagonal ; Left Leg EVH; MATTHEW;  Surgeon: Eric Bar DO;  Location: [facility];  Service: Cardiac Surgery   • TN COLONOSCOPY FLX DX W/COLLJ SPEC WHEN PFRMD N/A 11/15/2017    Procedure: EGD AND COLONOSCOPY;  Surgeon: Mariano Godinez MD;  Location: [facility];  Service: Gastroenterology   • TN TEAEC W/PATCH GRF CAROTID VERTB SUBCLAV NECK INC Left 7/8/2024    Procedure: left CEA retrograde left common carotid angioplasty/stent;  Surgeon: Luis Miguel Peters DO;  Location: [facility];  Service: Vascular   • SEPTOPLASTY      LAST ASSESSED; 13MAY2014   • TONSILECTOMY AND ADNOIDECTOMY      LAST ASSESSED: 13MAY2014   • UPPER GASTROINTESTINAL ENDOSCOPY       Family History   Problem Relation Age of Onset   • Emphysema Mother    • Liver disease Mother    • Coronary artery disease Father    • Hypertension Father    • Liver disease Father    • Heart failure Father    • Stroke Father         CVA    • Heart attack Father    • Coronary artery  disease Brother    • Heart disease Brother         younger brother by pass and other brother 4 stents placed   • Other Family         BACK PROBLEM    • Stroke Family         CVA   • Emphysema Family    • Hypertension Family         BENIGN       Meds/Allergies     Current Outpatient Medications:   •  acetaminophen (TYLENOL) 650 mg CR tablet, Take 650 mg by mouth every 8 (eight) hours as needed for mild pain, Disp: , Rfl:   •  Ascorbic Acid (Vitamin C) 500 MG PACK, Take 1,000 mg by mouth daily, Disp: , Rfl:   •  aspirin (ECOTRIN LOW STRENGTH) 81 mg EC tablet, Take 81 mg by mouth daily, Disp: , Rfl:   •  atorvastatin (LIPITOR) 80 mg tablet, TAKE 1 TABLET BY MOUTH EVERY DAY IN THE MORNING, Disp: 90 tablet, Rfl: 3  •  cholecalciferol (VITAMIN D3) 1,000 units tablet, Take 2,000 Units by mouth daily, Disp: , Rfl:   •  clopidogrel (PLAVIX) 75 mg tablet, TAKE 1 TABLET BY MOUTH EVERY DAY, Disp: 30 tablet, Rfl: 0  •  clotrimazole-betamethasone (LOTRISONE) 1-0.05 % cream, Apply topically 2 (two) times a day, Disp: 30 g, Rfl: 3  •  cyanocobalamin (VITAMIN B-12) 1,000 mcg tablet, Take 1,000 mcg by mouth daily  , Disp: , Rfl:   •  docusate sodium (COLACE) 100 mg capsule, Take 100 mg by mouth 2 (two) times a day, Disp: , Rfl:   •  famotidine (PEPCID) 20 mg tablet, TAKE 1 TABLET BY MOUTH TWICE A DAY, Disp: 180 tablet, Rfl: 0  •  folic acid (FOLVITE) 1 mg tablet, Take 1 tablet (1 mg total) by mouth daily, Disp: 90 tablet, Rfl: 0  •  gabapentin (NEURONTIN) 100 mg capsule, Take 1 capsule (100 mg total) by mouth 2 (two) times a day Morning and bedtime, Disp: , Rfl:   •  metoprolol tartrate (LOPRESSOR) 50 mg tablet, Take 0.5 tablets (25 mg total) by mouth in the morning, Disp: 30 tablet, Rfl: 0  •  Multiple Vitamin (MULTIVITAMIN) capsule, Take 1 capsule by mouth daily, Disp: , Rfl:   •  traMADol (Ultram) 50 mg tablet, Take 1 tablet (50 mg total) by mouth every 8 (eight) hours as needed for moderate pain, Disp: 90 tablet, Rfl: 2  •   midodrine (PROAMATINE) 2.5 mg tablet, Take 1 tablet (2.5 mg total) by mouth 3 (three) times a day before meals, Disp: 90 tablet, Rfl: 0  •  mupirocin (BACTROBAN) 2 % ointment, Apply topically 3 (three) times a day for 10 days, Disp: 50 g, Rfl: 0  Allergies   Allergen Reactions   • Penicillins Other (See Comments)     Hallucinations;  Patient reported that he was seeing visual disturbances         Vitals: Blood pressure 112/80, pulse 96, weight 74.4 kg (164 lb), SpO2 98%. Body mass index is 25.69 kg/m². Oxygen Therapy  SpO2: 98 %    Physical Exam  Physical Exam  Vitals reviewed.   Constitutional:       General: He is not in acute distress.     Appearance: Normal appearance. He is well-developed. He is not ill-appearing.   HENT:      Head: Normocephalic and atraumatic.   Eyes:      General: No scleral icterus.     Conjunctiva/sclera: Conjunctivae normal.   Neck:      Thyroid: No thyromegaly.      Vascular: No JVD.   Cardiovascular:      Rate and Rhythm: Normal rate and regular rhythm.      Heart sounds: Normal heart sounds. No murmur heard.     No friction rub. No gallop.   Pulmonary:      Effort: Pulmonary effort is normal. No respiratory distress.      Breath sounds: Rales (Velcro crackles most prominent at bilateral bases) present. No wheezing.   Musculoskeletal:      Cervical back: Neck supple.      Right lower leg: No edema.      Left lower leg: No edema.   Skin:     General: Skin is warm and dry.      Findings: No rash.   Neurological:      General: No focal deficit present.      Mental Status: He is alert and oriented to person, place, and time. Mental status is at baseline.   Psychiatric:         Mood and Affect: Mood normal.         Behavior: Behavior normal.     Labs: I have personally reviewed pertinent lab results.  Lab Results   Component Value Date    WBC 11.50 (H) 07/09/2024    HGB 8.2 (L) 07/09/2024    HCT 26.1 (L) 07/09/2024     (H) 07/09/2024     07/09/2024     Lab Results   Component  "Value Date    GLUCOSE 123 06/30/2024    CALCIUM 8.3 (L) 07/09/2024     06/10/2015    K 4.2 07/09/2024    CO2 25 07/09/2024     07/09/2024    BUN 24 07/09/2024    CREATININE 1.03 07/09/2024     No results found for: \"IGE\"  Lab Results   Component Value Date    ALT 14 06/29/2024    AST 20 06/29/2024    ALKPHOS 61 06/29/2024    BILITOT 0.46 06/10/2015       Imaging and other studies: I have personally reviewed relevant films in PACS.    EKG, Pathology, and Other Studies: I have personally reviewed relevant reports in Epic.    Marty Nevarez M.D.  [facility]  "

## 2024-08-06 RX ORDER — MIDODRINE HYDROCHLORIDE 2.5 MG/1
TABLET ORAL
Qty: 90 TABLET | Refills: 0 | Status: SHIPPED | OUTPATIENT
Start: 2024-08-06

## 2024-08-08 ENCOUNTER — NURSE TRIAGE (OUTPATIENT)
Age: 84
End: 2024-08-08

## 2024-08-08 ENCOUNTER — TELEPHONE (OUTPATIENT)
Age: 84
End: 2024-08-08

## 2024-08-08 ENCOUNTER — HOSPITAL ENCOUNTER (EMERGENCY)
Facility: HOSPITAL | Age: 84
Discharge: HOME | End: 2024-08-08
Attending: EMERGENCY MEDICINE
Payer: COMMERCIAL

## 2024-08-08 VITALS
DIASTOLIC BLOOD PRESSURE: 59 MMHG | RESPIRATION RATE: 18 BRPM | OXYGEN SATURATION: 99 % | HEART RATE: 101 BPM | SYSTOLIC BLOOD PRESSURE: 119 MMHG | TEMPERATURE: 97.7 F

## 2024-08-08 DIAGNOSIS — R33.9 URINARY RETENTION: ICD-10-CM

## 2024-08-08 DIAGNOSIS — N47.1 PHIMOSIS: Primary | ICD-10-CM

## 2024-08-08 LAB
ALBUMIN SERPL BCG-MCNC: 3.8 G/DL (ref 3.5–5)
ALP SERPL-CCNC: 71 U/L (ref 34–104)
ALT SERPL W P-5'-P-CCNC: 14 U/L (ref 7–52)
ANION GAP SERPL CALCULATED.3IONS-SCNC: 8 MMOL/L (ref 4–13)
AST SERPL W P-5'-P-CCNC: 22 U/L (ref 13–39)
BASOPHILS # BLD AUTO: 0.04 THOUSANDS/ÂΜL (ref 0–0.1)
BASOPHILS NFR BLD AUTO: 0 % (ref 0–1)
BILIRUB SERPL-MCNC: 0.29 MG/DL (ref 0.2–1)
BUN SERPL-MCNC: 34 MG/DL (ref 5–25)
CALCIUM SERPL-MCNC: 9.2 MG/DL (ref 8.4–10.2)
CARDIAC TROPONIN I PNL SERPL HS: 4 NG/L
CHLORIDE SERPL-SCNC: 103 MMOL/L (ref 96–108)
CO2 SERPL-SCNC: 26 MMOL/L (ref 21–32)
CREAT SERPL-MCNC: 1.28 MG/DL (ref 0.6–1.3)
EOSINOPHIL # BLD AUTO: 0.24 THOUSAND/ÂΜL (ref 0–0.61)
EOSINOPHIL NFR BLD AUTO: 2 % (ref 0–6)
ERYTHROCYTE [DISTWIDTH] IN BLOOD BY AUTOMATED COUNT: 14.2 % (ref 11.6–15.1)
GFR SERPL CREATININE-BSD FRML MDRD: 51 ML/MIN/1.73SQ M
GLUCOSE SERPL-MCNC: 117 MG/DL (ref 65–140)
HCT VFR BLD AUTO: 33.1 % (ref 36.5–49.3)
HGB BLD-MCNC: 10.4 G/DL (ref 12–17)
IMM GRANULOCYTES # BLD AUTO: 0.03 THOUSAND/UL (ref 0–0.2)
IMM GRANULOCYTES NFR BLD AUTO: 0 % (ref 0–2)
LYMPHOCYTES # BLD AUTO: 1.9 THOUSANDS/ÂΜL (ref 0.6–4.47)
LYMPHOCYTES NFR BLD AUTO: 18 % (ref 14–44)
MCH RBC QN AUTO: 31.6 PG (ref 26.8–34.3)
MCHC RBC AUTO-ENTMCNC: 31.4 G/DL (ref 31.4–37.4)
MCV RBC AUTO: 101 FL (ref 82–98)
MONOCYTES # BLD AUTO: 0.75 THOUSAND/ÂΜL (ref 0.17–1.22)
MONOCYTES NFR BLD AUTO: 7 % (ref 4–12)
NEUTROPHILS # BLD AUTO: 7.54 THOUSANDS/ÂΜL (ref 1.85–7.62)
NEUTS SEG NFR BLD AUTO: 73 % (ref 43–75)
NRBC BLD AUTO-RTO: 0 /100 WBCS
PLATELET # BLD AUTO: 193 THOUSANDS/UL (ref 149–390)
PMV BLD AUTO: 10.1 FL (ref 8.9–12.7)
POTASSIUM SERPL-SCNC: 4.3 MMOL/L (ref 3.5–5.3)
PROT SERPL-MCNC: 7.4 G/DL (ref 6.4–8.4)
RBC # BLD AUTO: 3.29 MILLION/UL (ref 3.88–5.62)
SODIUM SERPL-SCNC: 137 MMOL/L (ref 135–147)
WBC # BLD AUTO: 10.5 THOUSAND/UL (ref 4.31–10.16)

## 2024-08-08 PROCEDURE — 84484 ASSAY OF TROPONIN QUANT: CPT | Performed by: EMERGENCY MEDICINE

## 2024-08-08 PROCEDURE — 99204 OFFICE O/P NEW MOD 45 MIN: CPT | Performed by: UROLOGY

## 2024-08-08 PROCEDURE — 96374 THER/PROPH/DIAG INJ IV PUSH: CPT

## 2024-08-08 PROCEDURE — 99283 EMERGENCY DEPT VISIT LOW MDM: CPT

## 2024-08-08 PROCEDURE — 36415 COLL VENOUS BLD VENIPUNCTURE: CPT

## 2024-08-08 PROCEDURE — 99285 EMERGENCY DEPT VISIT HI MDM: CPT | Performed by: PHYSICIAN ASSISTANT

## 2024-08-08 PROCEDURE — 80053 COMPREHEN METABOLIC PANEL: CPT | Performed by: EMERGENCY MEDICINE

## 2024-08-08 PROCEDURE — 85025 COMPLETE CBC W/AUTO DIFF WBC: CPT | Performed by: EMERGENCY MEDICINE

## 2024-08-08 RX ORDER — LIDOCAINE HYDROCHLORIDE 20 MG/ML
1 JELLY TOPICAL ONCE
Status: COMPLETED | OUTPATIENT
Start: 2024-08-08 | End: 2024-08-08

## 2024-08-08 RX ORDER — CIPROFLOXACIN 2 MG/ML
400 INJECTION, SOLUTION INTRAVENOUS ONCE
Status: CANCELLED | OUTPATIENT
Start: 2024-08-08 | End: 2024-08-08

## 2024-08-08 RX ORDER — HYDROMORPHONE HCL IN WATER/PF 6 MG/30 ML
0.2 PATIENT CONTROLLED ANALGESIA SYRINGE INTRAVENOUS ONCE
Status: COMPLETED | OUTPATIENT
Start: 2024-08-08 | End: 2024-08-08

## 2024-08-08 RX ADMIN — LIDOCAINE HYDROCHLORIDE 1 APPLICATION: 20 JELLY TOPICAL at 14:14

## 2024-08-08 RX ADMIN — HYDROMORPHONE HYDROCHLORIDE 0.2 MG: 0.2 INJECTION, SOLUTION INTRAMUSCULAR; INTRAVENOUS; SUBCUTANEOUS at 14:14

## 2024-08-08 NOTE — ED PROVIDER NOTES
History  Chief Complaint   Patient presents with    Difficulty Urinating     On going difficulty urinating for about a year. Last night +hematuria then unable to urinate. No fever, chills.        History provided by:  Patient  Difficulty Urinating  Presenting symptoms: no dysuria    Presenting symptoms comment:  Patient is not able to retract his foreskin causing difficulty with urination  Context: not after injury, not after intercourse, not after urination, not during intercourse, not during urination and not spontaneously    Relieved by:  Nothing  Worsened by:  Nothing  Ineffective treatments: Topical cream-Lotrisone.  Associated symptoms: abdominal pain and urinary retention    Associated symptoms: no diarrhea, no fever, no flank pain, no genital itching, no genital lesions, no genital rash, no groin pain, no hematuria, no nausea, no penile redness, no penile swelling, no priapism, no scrotal swelling, no urinary frequency, no urinary hesitation, no urinary incontinence and no vomiting    Abdominal pain:     Pain location: Lower.    Quality: bloating and fullness      Quality: not aching      Severity:  Mild    Onset quality:  Gradual    Duration:  3 hours    Timing:  Constant    Progression:  Waxing and waning    Chronicity:  New  Risk factors: no bladder surgery, no change in medication and no recent infection        Prior to Admission Medications   Prescriptions Last Dose Informant Patient Reported? Taking?   Ascorbic Acid (Vitamin C) 500 MG PACK  Self Yes No   Sig: Take 1,000 mg by mouth daily   Multiple Vitamin (MULTIVITAMIN) capsule  Self Yes No   Sig: Take 1 capsule by mouth daily   acetaminophen (TYLENOL) 650 mg CR tablet  Self Yes No   Sig: Take 650 mg by mouth every 8 (eight) hours as needed for mild pain   aspirin (ECOTRIN LOW STRENGTH) 81 mg EC tablet  Self Yes No   Sig: Take 81 mg by mouth daily   atorvastatin (LIPITOR) 80 mg tablet  Self No No   Sig: TAKE 1 TABLET BY MOUTH EVERY DAY IN THE MORNING    cholecalciferol (VITAMIN D3) 1,000 units tablet  Self Yes No   Sig: Take 2,000 Units by mouth daily   clopidogrel (PLAVIX) 75 mg tablet  Self No No   Sig: TAKE 1 TABLET BY MOUTH EVERY DAY   clotrimazole-betamethasone (LOTRISONE) 1-0.05 % cream  Self No No   Sig: Apply topically 2 (two) times a day   cyanocobalamin (VITAMIN B-12) 1,000 mcg tablet  Self Yes No   Sig: Take 1,000 mcg by mouth daily     docusate sodium (COLACE) 100 mg capsule  Self Yes No   Sig: Take 100 mg by mouth 2 (two) times a day   famotidine (PEPCID) 20 mg tablet  Self No No   Sig: TAKE 1 TABLET BY MOUTH TWICE A DAY   folic acid (FOLVITE) 1 mg tablet  Self No No   Sig: Take 1 tablet (1 mg total) by mouth daily   gabapentin (NEURONTIN) 100 mg capsule  Self Yes No   Sig: Take 1 capsule (100 mg total) by mouth 2 (two) times a day Morning and bedtime   metoprolol tartrate (LOPRESSOR) 50 mg tablet  Self No No   Sig: Take 0.5 tablets (25 mg total) by mouth in the morning   midodrine (PROAMATINE) 2.5 mg tablet   No No   Sig: TAKE 1 TABLET (2.5 MG TOTAL) BY MOUTH 3 TIMES A DAY BEFORE MEALS   mupirocin (BACTROBAN) 2 % ointment   No No   Sig: Apply topically 3 (three) times a day for 10 days   traMADol (Ultram) 50 mg tablet  Self No No   Sig: Take 1 tablet (50 mg total) by mouth every 8 (eight) hours as needed for moderate pain      Facility-Administered Medications: None       Past Medical History:   Diagnosis Date    Abnormal liver function test     RESOLVED: 14SEP2017    Allergic rhinitis     Anemia     LAST ASSESSED: 19OCT2017    Arthritis     BPH (benign prostatic hyperplasia)     CAD (coronary artery disease)     Coronary artery disease     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HCC)     LAST ASSESSED: 05OCT2017    Former tobacco use     GERD (gastroesophageal reflux disease)     Hand paresthesia     RESOLVED: 11SEP2017    Hyperlipidemia     Hypertension     Lumbar stenosis     Peripheral artery disease (HCC)     LAST ASSESSED: 27OCT2017    Stage 3a chronic  kidney disease (HCC) 7/11/2021       Past Surgical History:   Procedure Laterality Date    BACK SURGERY      COLONOSCOPY      LUMBAR FUSION      OK ARTHRODESIS POSTERIOR/PSTLAT TQ 1NTRSPC LUMBAR N/A 11/03/2016    Procedure: L2-S1 POSTERIOR LUMBAR FUSION AND DECOMPRESSION (Impulse), Posterior lateral fixation; dural repair.;  Surgeon: Leslie Gil MD;  Location: [facility];  Service: Orthopedics    OK CABG W/ARTERIAL GRAFT SINGLE ARTERIAL GRAFT N/A 01/19/2018    Procedure: CABG X4 with LIMA - LAD, SVG - RCA, OM2, & Diagonal ; Left Leg EVH; MATTHEW;  Surgeon: Eric Bar DO;  Location: [facility];  Service: Cardiac Surgery    OK COLONOSCOPY FLX DX W/COLLJ SPEC WHEN PFRMD N/A 11/15/2017    Procedure: EGD AND COLONOSCOPY;  Surgeon: Mariano Godinez MD;  Location: [facility];  Service: Gastroenterology    OK TEAEC W/PATCH GRF CAROTID VERTB SUBCLAV NECK INC Left 7/8/2024    Procedure: left CEA retrograde left common carotid angioplasty/stent;  Surgeon: Luis Miguel Peters DO;  Location: [facility];  Service: Vascular    SEPTOPLASTY      LAST ASSESSED; [id number]    TONSILECTOMY AND ADNOIDECTOMY      LAST ASSESSED: [id number]    UPPER GASTROINTESTINAL ENDOSCOPY         Family History   Problem Relation Age of Onset    Emphysema Mother     Liver disease Mother     Coronary artery disease Father     Hypertension Father     Liver disease Father     Heart failure Father     Stroke Father         CVA     Heart attack Father     Coronary artery disease Brother     Heart disease Brother         younger brother by pass and other brother 4 stents placed    Other Family         BACK PROBLEM     Stroke Family         CVA    Emphysema Family     Hypertension Family         BENIGN     I have reviewed and agree with the history as documented.    E-Cigarette/Vaping    E-Cigarette Use Never User      E-Cigarette/Vaping Substances    Nicotine No     THC No     CBD No     Flavoring No     Other No     Unknown No      Social History      Tobacco Use    Smoking status: Former     Current packs/day: 0.00     Average packs/day: 1 pack/day for 50.0 years (50.0 ttl pk-yrs)     Types: Cigarettes     Start date:      Quit date:      Years since quittin.6    Smokeless tobacco: Never   Vaping Use    Vaping status: Never Used   Substance Use Topics    Alcohol use: Not Currently     Alcohol/week: 1.0 standard drink of alcohol     Types: 1 Shots of liquor per week     Comment: beer, wine, scotch every day; SOCIAL AS PER ALL SCRIPTS     Drug use: Not Currently     Types: Marijuana     Comment: medical majiuana       Review of Systems   Constitutional:  Positive for activity change. Negative for appetite change, chills, fatigue and fever.   Gastrointestinal:  Positive for abdominal pain. Negative for diarrhea, nausea and vomiting.   Genitourinary:  Positive for difficulty urinating. Negative for bladder incontinence, dysuria, flank pain, frequency, hematuria, hesitancy, penile swelling and scrotal swelling.   Musculoskeletal:  Negative for back pain.   Skin:  Negative for color change, pallor and rash.   Psychiatric/Behavioral:  Negative for confusion.    All other systems reviewed and are negative.      Physical Exam  Physical Exam  Vitals and nursing note reviewed.   Constitutional:       General: He is not in acute distress.     Appearance: Normal appearance. He is normal weight. He is not ill-appearing or toxic-appearing.   HENT:      Head: Normocephalic.      Right Ear: External ear normal.      Left Ear: External ear normal.   Cardiovascular:      Rate and Rhythm: Normal rate and regular rhythm.      Heart sounds: Normal heart sounds.   Pulmonary:      Effort: Pulmonary effort is normal.      Breath sounds: Normal breath sounds.   Abdominal:      General: There is distension.      Palpations: Abdomen is soft.      Tenderness: There is abdominal tenderness. There is guarding.      Comments: Bladder palpated midway to the umbilicus.  Postvoid  residual was 340   Musculoskeletal:      Cervical back: Neck supple.      Right lower leg: No edema.      Left lower leg: No edema.   Skin:     General: Skin is warm.      Capillary Refill: Capillary refill takes less than 2 seconds.   Neurological:      Mental Status: He is alert and oriented to person, place, and time.   Psychiatric:         Mood and Affect: Mood normal.         Behavior: Behavior normal.         Thought Content: Thought content normal.         Judgment: Judgment normal.         Vital Signs  ED Triage Vitals [08/08/24 1220]   Temperature Pulse Respirations Blood Pressure SpO2   97.7 °F (36.5 °C) 101 18 119/59 99 %      Temp Source Heart Rate Source Patient Position - Orthostatic VS BP Location FiO2 (%)   Oral Monitor Sitting Right arm --      Pain Score       --           Vitals:    08/08/24 1220   BP: 119/59   Pulse: 101   Patient Position - Orthostatic VS: Sitting         Visual Acuity      ED Medications  Medications   HYDROmorphone HCl (DILAUDID) injection 0.2 mg (0.2 mg Intravenous Given 8/8/24 1414)   lidocaine (URO-JET) 2 % urethral/mucosal gel 1 Application (1 Application Urethral Given 8/8/24 1414)       Diagnostic Studies  Results Reviewed       Procedure Component Value Units Date/Time    HS Troponin 0hr (reflex protocol) [[id number]]  (Normal) Collected: 08/08/24 1223    Lab Status: Final result Specimen: Blood from Arm, Left Updated: 08/08/24 1303     hs TnI 0hr 4 ng/L     Comprehensive metabolic panel [[id number]]  (Abnormal) Collected: 08/08/24 1223    Lab Status: Final result Specimen: Blood from Arm, Left Updated: 08/08/24 1258     Sodium 137 mmol/L      Potassium 4.3 mmol/L      Chloride 103 mmol/L      CO2 26 mmol/L      ANION GAP 8 mmol/L      BUN 34 mg/dL      Creatinine 1.28 mg/dL      Glucose 117 mg/dL      Calcium 9.2 mg/dL      AST 22 U/L      ALT 14 U/L      Alkaline Phosphatase 71 U/L      Total Protein 7.4 g/dL      Albumin 3.8 g/dL      Total Bilirubin 0.29 mg/dL       eGFR 51 ml/min/1.73sq m     Narrative:      National Kidney Disease Foundation guidelines for Chronic Kidney Disease (CKD):     Stage 1 with normal or high GFR (GFR > 90 mL/min/1.73 square meters)    Stage 2 Mild CKD (GFR = 60-89 mL/min/1.73 square meters)    Stage 3A Moderate CKD (GFR = 45-59 mL/min/1.73 square meters)    Stage 3B Moderate CKD (GFR = 30-44 mL/min/1.73 square meters)    Stage 4 Severe CKD (GFR = 15-29 mL/min/1.73 square meters)    Stage 5 End Stage CKD (GFR <15 mL/min/1.73 square meters)  Note: GFR calculation is accurate only with a steady state creatinine    CBC and differential [[id number]]  (Abnormal) Collected: 08/08/24 1223    Lab Status: Final result Specimen: Blood from Arm, Left Updated: 08/08/24 1234     WBC 10.50 Thousand/uL      RBC 3.29 Million/uL      Hemoglobin 10.4 g/dL      Hematocrit 33.1 %       fL      MCH 31.6 pg      MCHC 31.4 g/dL      RDW 14.2 %      MPV 10.1 fL      Platelets 193 Thousands/uL      nRBC 0 /100 WBCs      Segmented % 73 %      Immature Grans % 0 %      Lymphocytes % 18 %      Monocytes % 7 %      Eosinophils Relative 2 %      Basophils Relative 0 %      Absolute Neutrophils 7.54 Thousands/µL      Absolute Immature Grans 0.03 Thousand/uL      Absolute Lymphocytes 1.90 Thousands/µL      Absolute Monocytes 0.75 Thousand/µL      Eosinophils Absolute 0.24 Thousand/µL      Basophils Absolute 0.04 Thousands/µL                    No orders to display              Procedures  Procedures         ED Course  ED Course as of 08/08/24 2031 Th Aug 08, 2024   5825 Patient was seen by Brit AMOR from urology.  She passed a 14 Turk[place].  Patient can be discharged home.  He is presently on Plavix after having a recent carotid enterectomy.  He will follow him in the office and plan to do a circumcision.                                               Medical Decision Making  Patient presents to the emergency room with difficulty urinating for about a year.  He states  that since yesterday he has been unable to urinate.  He states he urinated a small amount this morning prior to arrival.  He has a history of a phimosis that he applies topical Lotrisone to but he states this has not been helping.  Who presents for evaluation.  He was seen and evaluated.  I was unable to retract the foreskin and identified the opening of the penis.  Bladder scan showed 340 mL of urine in the bladder.  Patient was abdomen was distended midway to the umbilicus.  I placed a consult for urology to come and evaluate the patient.  Laboratory studies were taken and were reviewed.  They not demonstrate any acute abnormality.  Urology came and evaluated the patient.  They placed a 14 [place] Ramsey.    Amount and/or Complexity of Data Reviewed  Labs: ordered.     Details: Dependently interpreted by me                 Disposition  Final diagnoses:   Phimosis     Time reflects when diagnosis was documented in both MDM as applicable and the Disposition within this note       Time User Action Codes Description Comment    8/8/2024 12:57 PM Gutzweiler, Julie Add [N47.1] Phimosis     8/8/2024  1:39 PM Brit Gatica Add [R33.9] Urinary retention           ED Disposition       ED Disposition   Discharge    Condition   Stable    Date/Time   Thu Aug 8, 2024  2:36 PM    Comment   Suhail Borrego discharge to home/self care.                   Follow-up Information       Follow up With Specialties Details Why Contact Info Additional Information    [facility] Schedule an appointment as soon as possible for a visit  For follow-up for your Ramsey placement and to be evaluated for possible circumcision [address]  [facility] [id number]  [phone] [facility] [place], [address], [place], [place], [address]   [phone]            Discharge Medication List as of 8/8/2024  2:38 PM        CONTINUE these medications which have NOT  CHANGED    Details   acetaminophen (TYLENOL) 650 mg CR tablet Take 650 mg by mouth every 8 (eight) hours as needed for mild pain, Historical Med      Ascorbic Acid (Vitamin C) 500 MG PACK Take 1,000 mg by mouth daily, Historical Med      aspirin (ECOTRIN LOW STRENGTH) 81 mg EC tablet Take 81 mg by mouth daily, Historical Med      atorvastatin (LIPITOR) 80 mg tablet TAKE 1 TABLET BY MOUTH EVERY DAY IN THE MORNING, Starting Thu 2/8/2024, Normal      cholecalciferol (VITAMIN D3) 1,000 units tablet Take 2,000 Units by mouth daily, Historical Med      clopidogrel (PLAVIX) 75 mg tablet TAKE 1 TABLET BY MOUTH EVERY DAY, Starting Tue 7/23/2024, Normal      clotrimazole-betamethasone (LOTRISONE) 1-0.05 % cream Apply topically 2 (two) times a day, Starting Fri 7/21/2023, Normal      cyanocobalamin (VITAMIN B-12) 1,000 mcg tablet Take 1,000 mcg by mouth daily  , Historical Med      docusate sodium (COLACE) 100 mg capsule Take 100 mg by mouth 2 (two) times a day, Historical Med      famotidine (PEPCID) 20 mg tablet TAKE 1 TABLET BY MOUTH TWICE A DAY, Starting Sun 6/16/2024, Normal      folic acid (FOLVITE) 1 mg tablet Take 1 tablet (1 mg total) by mouth daily, Starting Thu 5/2/2024, Normal      gabapentin (NEURONTIN) 100 mg capsule Take 1 capsule (100 mg total) by mouth 2 (two) times a day Morning and bedtime, Starting Tue 7/9/2024, Historical Med      metoprolol tartrate (LOPRESSOR) 50 mg tablet Take 0.5 tablets (25 mg total) by mouth in the morning, Starting Wed 7/3/2024, Normal      midodrine (PROAMATINE) 2.5 mg tablet TAKE 1 TABLET (2.5 MG TOTAL) BY MOUTH 3 TIMES A DAY BEFORE MEALS, Normal      Multiple Vitamin (MULTIVITAMIN) capsule Take 1 capsule by mouth daily, Historical Med      mupirocin (BACTROBAN) 2 % ointment Apply topically 3 (three) times a day for 10 days, Starting Thu 7/25/2024, Until Sun 8/4/2024, Normal      traMADol (Ultram) 50 mg tablet Take 1 tablet (50 mg total) by mouth every 8 (eight) hours as needed  for moderate pain, Starting Mon 7/15/2024, Normal             No discharge procedures on file.    PDMP Review         Value Time User    PDMP Reviewed  Yes 7/15/2024  9:37 AM KEVIN Castellon            ED Provider  Electronically Signed by             Julie Lynn Gutzweiler, PA-C  08/08/24 2033

## 2024-08-08 NOTE — TELEPHONE ENCOUNTER
Can have office PVR and possible catheter placement. Looks like patient is in ER though. Will need to be seen for phimosis and follow up in next few weeks pending what happens in the ER

## 2024-08-08 NOTE — CONSULTS
[facility]  Consult  Name: Suhail Borrego 83 y.o. male I MRN: [id number]  Unit/Bed#: ED-24 I Date of Admission: 8/8/2024   Date of Service: 8/8/2024 I Hospital Day: 0    Inpatient consult to Urology  Consult performed by: Brit Gatica PA-C  Consult ordered by: Julie Lynn Gutzweiler, PA-C          Assessment & Plan   Urinary retention  Assessment & Plan  Reports early this AM some urethral bleeding and inability to urinate  Pinpoint phimosis  Bladder scan 340 mL  Mild leukocytosis, likely reactive  BMP stable  VSS,afebrile  Abdominal pain due to retention  On plavix and aspirin  Initially unable to open up phimosis with cotton swab and forceps. Able to place 6 Fr. Then reattempted with premedicating with pain meds, urojet. Used stilet of urojet to dilate and hemostat and foreskin was able to widen to accommodate a 14 fr silicone catheter.   Maintain pillai catheter  He will reach out to vascular surgery in regards to holding plavix in future for dorsal slit/circumcision  Outpatient urology follow up    Phimosis  Assessment & Plan  -Severe phimosis  -utilized lotrisone outpatient  -previously declined dorsal slit/circumcision- now interested         Subjective:   HPI: Suhail is a 82 yo presenting to the ED with difficulty urinating. Acute onset this AM associated with urethral bleeding. He reports constipation. In the ED patient with noted phimosis which he has followed with urology in the past, declined dorsal slit or circumcision in the office previously. His labs were stable in the ED. Initially unable to open phimosis with cotton swab or forceps. I was able to place a small 6 fr to drain urine. After giving pain medication and utilizing urojet stylet, foreskin was able to stretch and I was able to use a hemostat to open up and place a 14 fr catheter. Urine was clear yelllow and patient had relief of abdominal pressure.     Review of Systems   Constitutional:  Negative for chills and  fever.   Respiratory:  Negative for cough and shortness of breath.    Cardiovascular:  Negative for chest pain and palpitations.   Gastrointestinal:  Negative for abdominal pain and vomiting.   Genitourinary:  Positive for difficulty urinating. Negative for dysuria and hematuria.   Musculoskeletal:  Negative for arthralgias and back pain.   Skin:  Negative for color change and rash.   All other systems reviewed and are negative.      Objective:    Vitals: Blood pressure 119/59, pulse 101, temperature 97.7 °F (36.5 °C), temperature source Oral, resp. rate 18, SpO2 99%.,There is no height or weight on file to calculate BMI.    Physical Exam  Constitutional:       General: He is not in acute distress.     Appearance: Normal appearance. He is normal weight. He is not ill-appearing or toxic-appearing.   HENT:      Head: Normocephalic and atraumatic.      Right Ear: External ear normal.      Left Ear: External ear normal.      Nose: Nose normal.      Mouth/Throat:      Mouth: Mucous membranes are moist.   Eyes:      General: No scleral icterus.     Conjunctiva/sclera: Conjunctivae normal.   Cardiovascular:      Rate and Rhythm: Normal rate.      Pulses: Normal pulses.   Pulmonary:      Effort: Pulmonary effort is normal.   Abdominal:      General: Abdomen is flat. There is no distension.      Palpations: Abdomen is soft.      Tenderness: There is no abdominal tenderness. There is no guarding.   Musculoskeletal:         General: Normal range of motion.      Cervical back: Normal range of motion.   Skin:     General: Skin is warm.      Findings: No rash.   Neurological:      General: No focal deficit present.      Mental Status: He is alert. Mental status is at baseline.           Labs:  Recent Labs     08/08/24  1223   WBC 10.50*       Recent Labs     08/08/24  1223   HGB 10.4*     Recent Labs     08/08/24  1223   HCT 33.1*     Recent Labs     08/08/24  1223   CREATININE 1.28         History:    Past Medical History:    Diagnosis Date    Abnormal liver function test     RESOLVED: 2017    Allergic rhinitis     Anemia     LAST ASSESSED: 2017    Arthritis     BPH (benign prostatic hyperplasia)     CAD (coronary artery disease)     Coronary artery disease     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HCC)     LAST ASSESSED: 2017    Former tobacco use     GERD (gastroesophageal reflux disease)     Hand paresthesia     RESOLVED: 2017    Hyperlipidemia     Hypertension     Lumbar stenosis     Peripheral artery disease (HCC)     LAST ASSESSED: 2017    Stage 3a chronic kidney disease (HCC) 2021     Social History     Socioeconomic History    Marital status:      Spouse name: Not on file    Number of children: Not on file    Years of education: some college     Highest education level: Not on file   Occupational History    Occupation: Insurance     Comment: Retired    Tobacco Use    Smoking status: Former     Current packs/day: 0.00     Average packs/day: 1 pack/day for 50.0 years (50.0 ttl pk-yrs)     Types: Cigarettes     Start date:      Quit date:      Years since quittin.6    Smokeless tobacco: Never   Vaping Use    Vaping status: Never Used   Substance and Sexual Activity    Alcohol use: Not Currently     Alcohol/week: 1.0 standard drink of alcohol     Types: 1 Shots of liquor per week     Comment: beer, wine, scotch every day; SOCIAL AS PER ALL SCRIPTS     Drug use: Not Currently     Types: Marijuana     Comment: medical majiuana    Sexual activity: Not Currently   Other Topics Concern    Not on file   Social History Narrative    Daily coffee consumption      Social Determinants of Health     Financial Resource Strain: Low Risk  (2024)    Overall Financial Resource Strain (CARDIA)     Difficulty of Paying Living Expenses: Not hard at all   Food Insecurity: No Food Insecurity (2024)    Hunger Vital Sign     Worried About Running Out of Food in the Last Year: Never true     Ran Out of Food in  the Last Year: Never true   Transportation Needs: No Transportation Needs (7/9/2024)    PRAPARE - Transportation     Lack of Transportation (Medical): No     Lack of Transportation (Non-Medical): No   Physical Activity: Not on file   Stress: Not on file   Social Connections: Unknown (6/18/2024)    Received from [employer]    Social Connections     How often do you feel lonely or isolated from those around you? (Adult - for ages 18 years and over): Not on file   Intimate Partner Violence: Not on file   Housing Stability: Low Risk  (7/9/2024)    Housing Stability Vital Sign     Unable to Pay for Housing in the Last Year: No     Number of Times Moved in the Last Year: 0     Homeless in the Last Year: No     Past Surgical History:   Procedure Laterality Date    BACK SURGERY      COLONOSCOPY      LUMBAR FUSION      WY ARTHRODESIS POSTERIOR/PSTLAT TQ 1NTRSPC LUMBAR N/A 11/03/2016    Procedure: L2-S1 POSTERIOR LUMBAR FUSION AND DECOMPRESSION (Impulse), Posterior lateral fixation; dural repair.;  Surgeon: Leslie Gil MD;  Location: [facility];  Service: Orthopedics    WY CABG W/ARTERIAL GRAFT SINGLE ARTERIAL GRAFT N/A 01/19/2018    Procedure: CABG X4 with LIMA - LAD, SVG - RCA, OM2, & Diagonal ; Left Leg EVH; MATTHEW;  Surgeon: Eric Bar DO;  Location: [facility];  Service: Cardiac Surgery    WY COLONOSCOPY FLX DX W/COLLJ SPEC WHEN PFRMD N/A 11/15/2017    Procedure: EGD AND COLONOSCOPY;  Surgeon: Mariano Godinez MD;  Location: [facility];  Service: Gastroenterology    WY TEAEC W/PATCH GRF CAROTID VERTB SUBCLAV NECK INC Left 7/8/2024    Procedure: left CEA retrograde left common carotid angioplasty/stent;  Surgeon: Luis Miguel Peters DO;  Location: [facility];  Service: Vascular    SEPTOPLASTY      LAST ASSESSED; 13MAY2014    TONSILECTOMY AND ADNOIDECTOMY      LAST ASSESSED: 13MAY2014    UPPER GASTROINTESTINAL ENDOSCOPY       Family History   Problem Relation Age of Onset    Emphysema Mother     Liver disease Mother      Coronary artery disease Father     Hypertension Father     Liver disease Father     Heart failure Father     Stroke Father         CVA     Heart attack Father     Coronary artery disease Brother     Heart disease Brother         younger brother by pass and other brother 4 stents placed    Other Family         BACK PROBLEM     Stroke Family         CVA    Emphysema Family     Hypertension Family         BENIGN       Brit Gatica PA-C  Date: 8/8/2024 Time: 2:50 PM

## 2024-08-08 NOTE — TELEPHONE ENCOUNTER
I saw Suhail in the ED, difficult pillai placement due to severe phimosis. He has a 14 fr in place. He is interested in dorsal slit or circumcision. He is going to reach out to his vascular surgeon in regard to holding his plavix for the future.

## 2024-08-08 NOTE — ASSESSMENT & PLAN NOTE
-Severe phimosis  -utilized lotrisone outpatient  -previously declined dorsal slit/circumcision- now interested

## 2024-08-08 NOTE — TELEPHONE ENCOUNTER
Offered patient appt tomorrow morning with Jayden to follow up with to assess wether he is better suited for the dorsal slit or circ. Patient unable to make appt tomorrow. Will monitor for openings

## 2024-08-08 NOTE — TELEPHONE ENCOUNTER
Reviewed. Patient would need to hold his plavix for 5 days prior to any urologic procedure. However, it appears that patient recently had a carotid stent placed on 7/8/24 and therefore he should not stop his plavix for at least 3 months from his procedure.

## 2024-08-08 NOTE — ASSESSMENT & PLAN NOTE
Reports early this AM some urethral bleeding and inability to urinate  Pinpoint phimosis  Bladder scan 340 mL  Mild leukocytosis, likely reactive  BMP stable  VSS,afebrile  Abdominal pain due to retention  On plavix and aspirin  Initially unable to open up phimosis with cotton swab and forceps. Able to place 6 Fr. Then reattempted with premedicating with pain meds, urojet. Used stilet of urojet to dilate and hemostat and foreskin was able to widen to accommodate a 14 fr silicone catheter.   Maintain pillai catheter  He will reach out to vascular surgery in regards to holding plavix in future for dorsal slit/circumcision  Outpatient urology follow up

## 2024-08-08 NOTE — TELEPHONE ENCOUNTER
Pt called stating that he is a pt of Dr. Peters and that he is going for urinary surgery, he states he does not have the surgery scheduled yet but he needs to know how long he needs to hold plavix for before and after surgery.     Please advise

## 2024-08-08 NOTE — TELEPHONE ENCOUNTER
Patient called in states he is uncircumcised, and the foreskin is stuck over the head penis for a few months now. Reports he was having difficulty urinating, and has not been able to urinate in 2 days. Reviewed ED precautions with patient. He will go to ED now.   Reason for Disposition   The patient is unable to urinate and offices are closing/closed/after hours    Answer Questions - Initial Assessment   When was the last time you voided: 2 days     Do you have any abdominal pain: no    Do you have a history of BPH with obstruction or urethral stricture: yes    Are you straining to void:yes    Are you having suprapubic or pelvic pressure: yes    Protocols used: SL AMB URO DIFFICULTY / UNABLE TO VOID

## 2024-08-09 DIAGNOSIS — R33.9 URINE RETENTION: Primary | ICD-10-CM

## 2024-08-09 NOTE — TELEPHONE ENCOUNTER
Patient's daughter called back and was informed of recommendation from Samara. She expressed understanding and will reach out to urology to let them know.

## 2024-08-09 NOTE — TELEPHONE ENCOUNTER
Pt's daughter Ashwini called to schedule an appointment for the pt as soon as possible. She stated he does have a catheter in place and does not believe he will be able to wait a few weeks for an appointment, she is asking for the pt to be scheduled urgently.     Please advise.     Call back: [phone]   Ashwini call back: [phone]

## 2024-08-09 NOTE — TELEPHONE ENCOUNTER
Pt called to reschedule his appt but he can not come today.   Pt has cath and issues with the cath.     Please review

## 2024-08-12 ENCOUNTER — OFFICE VISIT (OUTPATIENT)
Dept: UROLOGY | Facility: CLINIC | Age: 84
End: 2024-08-12
Payer: COMMERCIAL

## 2024-08-12 VITALS
HEART RATE: 92 BPM | HEIGHT: 67 IN | WEIGHT: 147 LBS | DIASTOLIC BLOOD PRESSURE: 62 MMHG | BODY MASS INDEX: 23.07 KG/M2 | SYSTOLIC BLOOD PRESSURE: 118 MMHG

## 2024-08-12 DIAGNOSIS — R33.9 URINE RETENTION: Primary | ICD-10-CM

## 2024-08-12 PROCEDURE — 99213 OFFICE O/P EST LOW 20 MIN: CPT | Performed by: PHYSICIAN ASSISTANT

## 2024-08-12 NOTE — TELEPHONE ENCOUNTER
Daughter called asking if pt can be seen asap she states now there's blood in catheter,added to today schedule 1:30 Jayden Ziegler PA-C

## 2024-08-13 NOTE — PROGRESS NOTES
UROLOGY PROGRESS NOTE   Patient Identifiers: Suhail Borrego (MRN [id number])  Date of Service: 8/13/2024    Subjective:   83-year-old man history of severe phimosis and intermittent balanitis.  He went to emergency room on August 8 with difficulty urinating.  His bladder scan measured 340 mL.  A 14 French Ramsey catheter was placed with some difficulty due to his phimosis.  He originally reports hematuria prior to going the emergency room.  Although this is not documented in the emergency room report.  No imaging was done of the upper tracts.  CTA chest abdomen pelvis done in June was unremarkable but his bladder did look distended on that study.    Reason for visit: Urinary retention and phimosis    Objective:     VITALS:    Vitals:    08/12/24 1314   BP: 118/62   Pulse: 92           LABS:  Lab Results   Component Value Date    HGB 10.4 (L) 08/08/2024    HCT 33.1 (L) 08/08/2024    WBC 10.50 (H) 08/08/2024     08/08/2024   ]    Lab Results   Component Value Date     06/10/2015    K 4.3 08/08/2024     08/08/2024    CO2 26 08/08/2024    BUN 34 (H) 08/08/2024    CREATININE 1.28 08/08/2024    CALCIUM 9.2 08/08/2024    GLUCOSE 123 06/30/2024   ]        INPATIENT MEDS:    Current Outpatient Medications:     acetaminophen (TYLENOL) 650 mg CR tablet, Take 650 mg by mouth every 8 (eight) hours as needed for mild pain, Disp: , Rfl:     Ascorbic Acid (Vitamin C) 500 MG PACK, Take 1,000 mg by mouth daily, Disp: , Rfl:     aspirin (ECOTRIN LOW STRENGTH) 81 mg EC tablet, Take 81 mg by mouth daily, Disp: , Rfl:     atorvastatin (LIPITOR) 80 mg tablet, TAKE 1 TABLET BY MOUTH EVERY DAY IN THE MORNING, Disp: 90 tablet, Rfl: 3    cholecalciferol (VITAMIN D3) 1,000 units tablet, Take 2,000 Units by mouth daily, Disp: , Rfl:     clopidogrel (PLAVIX) 75 mg tablet, TAKE 1 TABLET BY MOUTH EVERY DAY, Disp: 30 tablet, Rfl: 0    clotrimazole-betamethasone (LOTRISONE) 1-0.05 % cream, Apply topically 2 (two) times a day,  "Disp: 30 g, Rfl: 3    cyanocobalamin (VITAMIN B-12) 1,000 mcg tablet, Take 1,000 mcg by mouth daily  , Disp: , Rfl:     docusate sodium (COLACE) 100 mg capsule, Take 100 mg by mouth 2 (two) times a day, Disp: , Rfl:     famotidine (PEPCID) 20 mg tablet, TAKE 1 TABLET BY MOUTH TWICE A DAY, Disp: 180 tablet, Rfl: 0    folic acid (FOLVITE) 1 mg tablet, Take 1 tablet (1 mg total) by mouth daily, Disp: 90 tablet, Rfl: 0    gabapentin (NEURONTIN) 100 mg capsule, Take 1 capsule (100 mg total) by mouth 2 (two) times a day Morning and bedtime, Disp: , Rfl:     metoprolol tartrate (LOPRESSOR) 50 mg tablet, Take 0.5 tablets (25 mg total) by mouth in the morning, Disp: 30 tablet, Rfl: 0    midodrine (PROAMATINE) 2.5 mg tablet, TAKE 1 TABLET (2.5 MG TOTAL) BY MOUTH 3 TIMES A DAY BEFORE MEALS, Disp: 90 tablet, Rfl: 0    Multiple Vitamin (MULTIVITAMIN) capsule, Take 1 capsule by mouth daily, Disp: , Rfl:     traMADol (Ultram) 50 mg tablet, Take 1 tablet (50 mg total) by mouth every 8 (eight) hours as needed for moderate pain, Disp: 90 tablet, Rfl: 2    mupirocin (BACTROBAN) 2 % ointment, Apply topically 3 (three) times a day for 10 days, Disp: 50 g, Rfl: 0      Physical Exam:   /62 (BP Location: Left arm, Patient Position: Sitting, Cuff Size: Adult)   Pulse 92   Ht 5' 7\" (1.702 m)   Wt 66.7 kg (147 lb)   BMI 23.02 kg/m²   GEN: no acute distress    RESP: breathing comfortably with no accessory muscle use    ABD: soft, non-tender, non-distended   INCISION:    EXT: no significant peripheral edema   GARY: in place draining clear yellow urine  no clots     RADIOLOGY:   None    Assessment:   #1.  Urinary retention  #2.  Phimosis    Plan:   -I offered him a trial of voiding which he declined  -He recently had a vascular intervention and will be on Plavix for 3 months  -Return to the office in 4 weeks for cystoscopy and catheter change or trial of voiding  -We will plan for a dorsal slit once he is off his " anticoagulation

## 2024-08-19 ENCOUNTER — NURSE TRIAGE (OUTPATIENT)
Age: 84
End: 2024-08-19

## 2024-08-19 NOTE — TELEPHONE ENCOUNTER
"Reason for Disposition   Caller has medicine question only, adult not sick, and triager answers question     Review of chart lists Metoprolol dose as \"half of a 50mg tablet daily AM\"    Answer Assessment - Initial Assessment Questions  1. NAME of MEDICATION: \"What medicine are you calling about?\"      Metoprolol  2. QUESTION: \"What is your question?\" (e.g., medication refill, side effect)      \"Can you check what dose I should be taking?\"  3. PRESCRIBING HCP: \"Who prescribed it?\" Reason: if prescribed by specialist, call should be referred to that group.      Dr Nash     Double checking dose of Metoprolol, half of a 50mg tablet (dose is 25mg)    Protocols used: Medication Question Call-ADULT-[place]    "

## 2024-08-22 ENCOUNTER — HOSPITAL ENCOUNTER (EMERGENCY)
Facility: HOSPITAL | Age: 84
Discharge: HOME | End: 2024-08-22
Attending: EMERGENCY MEDICINE
Payer: COMMERCIAL

## 2024-08-22 VITALS
SYSTOLIC BLOOD PRESSURE: 120 MMHG | TEMPERATURE: 97.5 F | DIASTOLIC BLOOD PRESSURE: 58 MMHG | RESPIRATION RATE: 16 BRPM | OXYGEN SATURATION: 97 % | HEART RATE: 83 BPM

## 2024-08-22 DIAGNOSIS — T83.9XXA PROBLEM WITH FOLEY CATHETER, INITIAL ENCOUNTER (HCC): Primary | ICD-10-CM

## 2024-08-22 LAB
ANION GAP SERPL CALCULATED.3IONS-SCNC: 7 MMOL/L (ref 4–13)
BUN SERPL-MCNC: 26 MG/DL (ref 5–25)
CALCIUM SERPL-MCNC: 9 MG/DL (ref 8.4–10.2)
CHLORIDE SERPL-SCNC: 105 MMOL/L (ref 96–108)
CO2 SERPL-SCNC: 26 MMOL/L (ref 21–32)
CREAT SERPL-MCNC: 1.23 MG/DL (ref 0.6–1.3)
GLUCOSE SERPL-MCNC: 95 MG/DL (ref 65–140)
POTASSIUM SERPL-SCNC: 4.5 MMOL/L (ref 3.5–5.3)
SODIUM SERPL-SCNC: 138 MMOL/L (ref 135–147)

## 2024-08-22 PROCEDURE — 99283 EMERGENCY DEPT VISIT LOW MDM: CPT | Performed by: EMERGENCY MEDICINE

## 2024-08-22 PROCEDURE — 36415 COLL VENOUS BLD VENIPUNCTURE: CPT | Performed by: EMERGENCY MEDICINE

## 2024-08-22 PROCEDURE — 99283 EMERGENCY DEPT VISIT LOW MDM: CPT

## 2024-08-22 PROCEDURE — 80048 BASIC METABOLIC PNL TOTAL CA: CPT | Performed by: EMERGENCY MEDICINE

## 2024-08-22 NOTE — ED NOTES
New leg bag placed for pillai catheter. Education provided on pillai care and maintenance. Pt and daughter verbalized understanding.      Any Alford RN  08/22/24 7711

## 2024-08-22 NOTE — ED PROVIDER NOTES
"History  Chief Complaint   Patient presents with    Urinary Catheter Problem     Pt has catheter for urinary retention. Pt reports \"I can't get the catheter to reconnect\". Pt reports not draining as much.      84 YR MALE WITH RECENT ADMIT FOR URINARY RETENTION- PHIMOSIS-- HAD 14 FR GRAY PLACED TO SEE UROLOGY IN9/24--  RETURNS TO ER STATING HAVING PROBLEMS WITH GRAY CONNECTION TO  LEG BAG--  WITH DECREASED URINE DRAINAGE- NO FEVERS- NO N/V- NO BLADDER PAIN/DISTENTION - PT STATES CATHETER DID NOT LEAK OUT ON BED LAST NIGHT       History provided by:  Relative and patient  Urinary Catheter Problem  Severity:  Mild  Onset quality:  Gradual  Duration:  6 hours  Timing:  Intermittent      Prior to Admission Medications   Prescriptions Last Dose Informant Patient Reported? Taking?   Ascorbic Acid (Vitamin C) 500 MG PACK  Self Yes No   Sig: Take 1,000 mg by mouth daily   Multiple Vitamin (MULTIVITAMIN) capsule  Self Yes No   Sig: Take 1 capsule by mouth daily   acetaminophen (TYLENOL) 650 mg CR tablet  Self Yes No   Sig: Take 650 mg by mouth every 8 (eight) hours as needed for mild pain   aspirin (ECOTRIN LOW STRENGTH) 81 mg EC tablet  Self Yes No   Sig: Take 81 mg by mouth daily   atorvastatin (LIPITOR) 80 mg tablet  Self No No   Sig: TAKE 1 TABLET BY MOUTH EVERY DAY IN THE MORNING   cholecalciferol (VITAMIN D3) 1,000 units tablet  Self Yes No   Sig: Take 2,000 Units by mouth daily   clopidogrel (PLAVIX) 75 mg tablet  Self No No   Sig: TAKE 1 TABLET BY MOUTH EVERY DAY   clotrimazole-betamethasone (LOTRISONE) 1-0.05 % cream  Self No No   Sig: Apply topically 2 (two) times a day   cyanocobalamin (VITAMIN B-12) 1,000 mcg tablet  Self Yes No   Sig: Take 1,000 mcg by mouth daily     docusate sodium (COLACE) 100 mg capsule  Self Yes No   Sig: Take 100 mg by mouth 2 (two) times a day   famotidine (PEPCID) 20 mg tablet  Self No No   Sig: TAKE 1 TABLET BY MOUTH TWICE A DAY   folic acid (FOLVITE) 1 mg tablet  Self No No   Sig: " Take 1 tablet (1 mg total) by mouth daily   gabapentin (NEURONTIN) 100 mg capsule  Self Yes No   Sig: Take 1 capsule (100 mg total) by mouth 2 (two) times a day Morning and bedtime   metoprolol tartrate (LOPRESSOR) 50 mg tablet  Self No No   Sig: Take 0.5 tablets (25 mg total) by mouth in the morning   midodrine (PROAMATINE) 2.5 mg tablet  Self No No   Sig: TAKE 1 TABLET (2.5 MG TOTAL) BY MOUTH 3 TIMES A DAY BEFORE MEALS   mupirocin (BACTROBAN) 2 % ointment   No No   Sig: Apply topically 3 (three) times a day for 10 days   traMADol (Ultram) 50 mg tablet  Self No No   Sig: Take 1 tablet (50 mg total) by mouth every 8 (eight) hours as needed for moderate pain      Facility-Administered Medications: None       Past Medical History:   Diagnosis Date    Abnormal liver function test     RESOLVED: 14SEP2017    Allergic rhinitis     Anemia     LAST ASSESSED: 19OCT2017    Arthritis     BPH (benign prostatic hyperplasia)     CAD (coronary artery disease)     Coronary artery disease     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HCC)     LAST ASSESSED: 05OCT2017    Former tobacco use     GERD (gastroesophageal reflux disease)     Hand paresthesia     RESOLVED: 11SEP2017    Hyperlipidemia     Hypertension     Lumbar stenosis     Peripheral artery disease (HCC)     LAST ASSESSED: 27OCT2017    Stage 3a chronic kidney disease (HCC) 7/11/2021       Past Surgical History:   Procedure Laterality Date    BACK SURGERY      COLONOSCOPY      LUMBAR FUSION      OR ARTHRODESIS POSTERIOR/PSTLAT TQ 1NTRSPC LUMBAR N/A 11/03/2016    Procedure: L2-S1 POSTERIOR LUMBAR FUSION AND DECOMPRESSION (Impulse), Posterior lateral fixation; dural repair.;  Surgeon: Leslie Gil MD;  [facility];  Service: Orthopedics    OR CABG W/ARTERIAL GRAFT SINGLE ARTERIAL GRAFT N/A 01/19/2018    Procedure: CABG X4 with LIMA - LAD, SVG - RCA, OM2, & Diagonal ; Left Leg EVH; MATTHEW;  Surgeon: Eric Bar DO;  [facility];  Service: Cardiac Surgery    OR  COLONOSCOPY FLX DX W/COLLJ SPEC WHEN PFRMD N/A 11/15/2017    Procedure: EGD AND COLONOSCOPY;  Surgeon: Mariano Godinez MD;  [facility];  Service: Gastroenterology    ID TEAEC W/PATCH GRF CAROTID VERTB SUBCLAV NECK INC Left 2024    Procedure: left CEA retrograde left common carotid angioplasty/stent;  Surgeon: Luis Miguel Peters DO;  [facility];  Service: Vascular    SEPTOPLASTY      LAST ASSESSED; [id number]    TONSILECTOMY AND ADNOIDECTOMY      LAST ASSESSED: [id number]    UPPER GASTROINTESTINAL ENDOSCOPY         Family History   Problem Relation Age of Onset    Emphysema Mother     Liver disease Mother     Coronary artery disease Father     Hypertension Father     Liver disease Father     Heart failure Father     Stroke Father         CVA     Heart attack Father     Coronary artery disease Brother     Heart disease Brother         younger brother by pass and other brother 4 stents placed    Other Family         BACK PROBLEM     Stroke Family         CVA    Emphysema Family     Hypertension Family         BENIGN     I have reviewed and agree with the history as documented.    E-Cigarette/Vaping    E-Cigarette Use Never User      E-Cigarette/Vaping Substances    Nicotine No     THC No     CBD No     Flavoring No     Other No     Unknown No      Social History     Tobacco Use    Smoking status: Former     Current packs/day: 0.00     Average packs/day: 1 pack/day for 50.0 years (50.0 ttl pk-yrs)     Types: Cigarettes     Start date:      Quit date:      Years since quittin.6    Smokeless tobacco: Never   Vaping Use    Vaping status: Never Used   Substance Use Topics    Alcohol use: Not Currently     Alcohol/week: 1.0 standard drink of alcohol     Types: 1 Shots of liquor per week     Comment: beer, wine, scotch every day; SOCIAL AS PER ALL SCRIPTS     Drug use: Not Currently     Types: Marijuana     Comment: medical majiuana       Review of Systems   Constitutional: Negative.    HENT:  Negative.     Eyes: Negative.    Respiratory: Negative.     Cardiovascular: Negative.    Gastrointestinal: Negative.    Endocrine: Negative.    Genitourinary: Negative.         DECREASED URINE IN GRAY BAG SINCE LAST NIGHT    Musculoskeletal: Negative.    Skin: Negative.    Allergic/Immunologic: Negative.    Neurological: Negative.    Hematological: Negative.    Psychiatric/Behavioral: Negative.         Physical Exam  Physical Exam  Vitals and nursing note reviewed.   Constitutional:       General: He is not in acute distress.     Appearance: Normal appearance. He is not ill-appearing, toxic-appearing or diaphoretic.      Comments: AVSS--  PULSE OX 97 % ON RA- INTERPRETATION IS NORMAL- NO INTERVENTION    HENT:      Head: Normocephalic and atraumatic.      Nose: Nose normal.      Mouth/Throat:      Mouth: Mucous membranes are moist.   Eyes:      General: No scleral icterus.        Right eye: No discharge.         Left eye: No discharge.      Extraocular Movements: Extraocular movements intact.      Conjunctiva/sclera: Conjunctivae normal.      Pupils: Pupils are equal, round, and reactive to light.      Comments: MM PINK   Neck:      Vascular: No carotid bruit.      Comments: NO PMT C/T/L/S SPINE   Cardiovascular:      Rate and Rhythm: Normal rate and regular rhythm.      Pulses: Normal pulses.      Heart sounds: Normal heart sounds. No murmur heard.     No friction rub. No gallop.   Pulmonary:      Effort: Pulmonary effort is normal. No respiratory distress.      Breath sounds: No stridor. No wheezing, rhonchi or rales.      Comments: MILD DECREASED BS AT BASES BILATERALLY - SYM BS- BILATERAL FAINT INSPIR WHEEZE  Chest:      Chest wall: No tenderness.   Abdominal:      General: Bowel sounds are normal. There is no distension.      Palpations: Abdomen is soft. There is no mass.      Tenderness: There is abdominal tenderness. There is no right CVA tenderness, left CVA tenderness, guarding or rebound.      Hernia: No  hernia is present.      Comments: MILD SUPRAPUBIC TENDERNESS- NO DISTENTION- SOFT ABD- NO PERITONEAL SIGNS- NO CVA TENDERNESS-- NO PULSATILE ABD MASS/BRUIT/ TENDERNESS    Musculoskeletal:         General: No swelling, tenderness, deformity or signs of injury. Normal range of motion.      Cervical back: Normal range of motion and neck supple. No rigidity or tenderness.      Right lower leg: Edema present.      Left lower leg: Edema present.   Lymphadenopathy:      Cervical: No cervical adenopathy.   Skin:     General: Skin is warm.      Capillary Refill: Capillary refill takes less than 2 seconds.      Coloration: Skin is not jaundiced or pale.      Findings: No bruising, erythema, lesion or rash.   Neurological:      General: No focal deficit present.      Mental Status: He is alert and oriented to person, place, and time. Mental status is at baseline.      Cranial Nerves: No cranial nerve deficit.      Sensory: No sensory deficit.      Motor: No weakness.      Comments: NON FOCAL NEURO EXAM   Psychiatric:         Mood and Affect: Mood normal.         Behavior: Behavior normal.         Thought Content: Thought content normal.         Judgment: Judgment normal.         Vital Signs  ED Triage Vitals [08/22/24 0736]   Temperature Pulse Respirations Blood Pressure SpO2   97.5 °F (36.4 °C) 83 16 120/58 97 %      Temp Source Heart Rate Source Patient Position - Orthostatic VS BP Location FiO2 (%)   Oral Monitor Lying Left arm --      Pain Score       No Pain           Vitals:    08/22/24 0736   BP: 120/58   Pulse: 83   Patient Position - Orthostatic VS: Lying         Visual Acuity      ED Medications  Medications - No data to display    Diagnostic Studies  Results Reviewed       Procedure Component Value Units Date/Time    Basic metabolic panel [[id number]]     Lab Status: No result Specimen: Blood                    No orders to display              Procedures  Procedures         ED Course  ED Course as of 08/22/24 1642    Thu Aug 22, 2024   0753 ER MD PROCEDURE NOTE- PT WITH  14 FR PILLAI CATHETER PLACED IN ER OVER 1 WEEK AGO-- PRESENTS WITH DECREASED DRAINAGE IN BAG SINCE LAST NIGHT- AT PRESENT  60 ML SYRINGE NO RESISTANCE TO INSTILLING 180 ML OF STERILE WATER AND ABLE TO REMOVE 180 ML OF URINE WITH NO RESISTANCE- NO URINE SEDIMENT/ CLOTS-    0815 ER MD NOTE- BLADDER SCAN 98 ML -    0819 ER MD NOTE- RECENT LABS- UROLOGY NOTE- ALL REVIEWED BY ER MD   0832 ER MD NOT-E CURRENT ALB REVIEWED AND COMPARED BY ER MD   0833 ER MD NOTE- LEG BAG REVIEWED BY ER MD- POSITIVE URINE DRAINAGE    0918 ER MD NOTE-  LEG BAG REVIEWED BY ER MD-- BAG DRAINING NORMALLY FILLING UP WITH CLEAR YELLOW URINE                                                Medical Decision Making  Pt  pillai catheter working appropriately   fluid able to be flushed and pulled back  with no resistance- no fever/asystemic comps- pt will need bladder scan /  bmp and er obs to make sure pillai is draining    Amount and/or Complexity of Data Reviewed  Independent Historian:      Details: Daughter   External Data Reviewed: labs and notes.     Details: All relevant  testing notes reviewed by er md  Labs: ordered. Decision-making details documented in ED Course.     Details: All reviewed and compared by er md  Discussion of management or test interpretation with external provider(s): Moderate amount of er md thought complexity and workup     Risk  Decision regarding hospitalization.                 Disposition  Final diagnoses:   None     ED Disposition       None          Follow-up Information    None         Patient's Medications   Discharge Prescriptions    No medications on file       No discharge procedures on file.    PDMP Review         Value Time User    PDMP Reviewed  Yes 7/15/2024  9:37 AM KEVIN Castellon            ED Provider  Electronically Signed by             Adablerto Sandoval MD  08/22/24 5755

## 2024-08-22 NOTE — DISCHARGE INSTRUCTIONS
DIAGNOSIS; PROBLEMS WITH GRAY CATHETER BAG     - AS OF NOW THE CATHETER IS WORKING APPROPRIATELY -  DRAINING URINE NORMALLY- ABLE TO BE FLUSHED  WITH NO RESISTANCE     - PLEASE RETURN TO  THE ER FOR ANY FEVERS- TEMP > 100.4- ANY FLANK PAIN/ VOMITING - THESE ARE SIGNS OF INFECTION - OR ANY SIGNS OF URINARY RETENTION - DECREASING URINE IN BAG WITH INCREASING LOWER ABDOMEN/ BLADDER AREA PAIN/DISTENTION

## 2024-08-23 ENCOUNTER — TELEPHONE (OUTPATIENT)
Age: 84
End: 2024-08-23

## 2024-08-23 NOTE — TELEPHONE ENCOUNTER
Patient's daughter called because of concerns she's having with her father's cognitive skills declining and independence being lessened. She is planning on applying for intermittent FMLA so that she can be more available to help her father with the new issues going on. She is going to drop off paperwork for this  in the next couple of days for the provider to fill out. If doctor Henrique would have any other questions she said to please give her a call   thank you

## 2024-08-24 ENCOUNTER — HOSPITAL ENCOUNTER (EMERGENCY)
Facility: HOSPITAL | Age: 84
Discharge: HOME | End: 2024-08-24
Attending: EMERGENCY MEDICINE
Payer: COMMERCIAL

## 2024-08-24 VITALS
HEART RATE: 101 BPM | DIASTOLIC BLOOD PRESSURE: 67 MMHG | SYSTOLIC BLOOD PRESSURE: 118 MMHG | OXYGEN SATURATION: 96 % | RESPIRATION RATE: 18 BRPM

## 2024-08-24 DIAGNOSIS — T83.9XXA PROBLEM WITH FOLEY CATHETER, INITIAL ENCOUNTER (HCC): Primary | ICD-10-CM

## 2024-08-24 PROCEDURE — 99284 EMERGENCY DEPT VISIT MOD MDM: CPT | Performed by: EMERGENCY MEDICINE

## 2024-08-24 PROCEDURE — 99283 EMERGENCY DEPT VISIT LOW MDM: CPT

## 2024-08-24 NOTE — ED PROVIDER NOTES
History  Chief Complaint   Patient presents with    Urinary Catheter Problem     Pt reports he needs new urinary catheter bag. Also reports catheter leaking over night, believes to haven been leaking from not being fully closed       Urinary Catheter Problem  Associated symptoms: no abdominal pain, no chest pain, no cough, no ear pain, no fever, no rash, no shortness of breath, no sore throat and no vomiting        84-year-old male, Braulio, presenting to the emergency department after accidentally cutting his bag.  No other complaints.    Prior to Admission Medications   Prescriptions Last Dose Informant Patient Reported? Taking?   Ascorbic Acid (Vitamin C) 500 MG PACK  Self Yes No   Sig: Take 1,000 mg by mouth daily   Multiple Vitamin (MULTIVITAMIN) capsule  Self Yes No   Sig: Take 1 capsule by mouth daily   acetaminophen (TYLENOL) 650 mg CR tablet  Self Yes No   Sig: Take 650 mg by mouth every 8 (eight) hours as needed for mild pain   aspirin (ECOTRIN LOW STRENGTH) 81 mg EC tablet  Self Yes No   Sig: Take 81 mg by mouth daily   atorvastatin (LIPITOR) 80 mg tablet  Self No No   Sig: TAKE 1 TABLET BY MOUTH EVERY DAY IN THE MORNING   cholecalciferol (VITAMIN D3) 1,000 units tablet  Self Yes No   Sig: Take 2,000 Units by mouth daily   clopidogrel (PLAVIX) 75 mg tablet  Self No No   Sig: TAKE 1 TABLET BY MOUTH EVERY DAY   clotrimazole-betamethasone (LOTRISONE) 1-0.05 % cream  Self No No   Sig: Apply topically 2 (two) times a day   cyanocobalamin (VITAMIN B-12) 1,000 mcg tablet  Self Yes No   Sig: Take 1,000 mcg by mouth daily     docusate sodium (COLACE) 100 mg capsule  Self Yes No   Sig: Take 100 mg by mouth 2 (two) times a day   famotidine (PEPCID) 20 mg tablet  Self No No   Sig: TAKE 1 TABLET BY MOUTH TWICE A DAY   folic acid (FOLVITE) 1 mg tablet  Self No No   Sig: Take 1 tablet (1 mg total) by mouth daily   gabapentin (NEURONTIN) 100 mg capsule  Self Yes No   Sig: Take 1 capsule (100 mg total) by mouth 2 (two)  times a day Morning and bedtime   metoprolol tartrate (LOPRESSOR) 50 mg tablet  Self No No   Sig: Take 0.5 tablets (25 mg total) by mouth in the morning   midodrine (PROAMATINE) 2.5 mg tablet  Self No No   Sig: TAKE 1 TABLET (2.5 MG TOTAL) BY MOUTH 3 TIMES A DAY BEFORE MEALS   mupirocin (BACTROBAN) 2 % ointment   No No   Sig: Apply topically 3 (three) times a day for 10 days   traMADol (Ultram) 50 mg tablet  Self No No   Sig: Take 1 tablet (50 mg total) by mouth every 8 (eight) hours as needed for moderate pain      Facility-Administered Medications: None       Past Medical History:   Diagnosis Date    Abnormal liver function test     RESOLVED: 14SEP2017    Allergic rhinitis     Anemia     LAST ASSESSED: 19OCT2017    Arthritis     BPH (benign prostatic hyperplasia)     CAD (coronary artery disease)     Coronary artery disease     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HCC)     LAST ASSESSED: 05OCT2017    Former tobacco use     GERD (gastroesophageal reflux disease)     Hand paresthesia     RESOLVED: 11SEP2017    Hyperlipidemia     Hypertension     Lumbar stenosis     Peripheral artery disease (HCC)     LAST ASSESSED: 27OCT2017    Stage 3a chronic kidney disease (HCC) 7/11/2021       Past Surgical History:   Procedure Laterality Date    BACK SURGERY      COLONOSCOPY      LUMBAR FUSION      [place] ARTHRODESIS POSTERIOR/PSTLAT TQ 1NTRSPC LUMBAR N/A 11/03/2016    Procedure: L2-S1 POSTERIOR LUMBAR FUSION AND DECOMPRESSION (Impulse), Posterior lateral fixation; dural repair.;  Surgeon: Leslie Gil MD;  [facility];  Service: Orthopedics    [place] CABG W/ARTERIAL GRAFT SINGLE ARTERIAL GRAFT N/A 01/19/2018    Procedure: CABG X4 with LIMA - LAD, SVG - RCA, OM2, & Diagonal ; Left Leg EVH; MATTHEW;  Surgeon: Eric Bar DO;  [facility];  Service: Cardiac Surgery    [place] COLONOSCOPY FLX DX W/COLLJ SPEC WHEN PFRMD N/A 11/15/2017    Procedure: EGD AND COLONOSCOPY;  Surgeon: Mariano Godinez MD;  [facility];  Service:  Gastroenterology    MD TEAEC W/PATCH GRF CAROTID VERTB SUBCLAV NECK INC Left 2024    Procedure: left CEA retrograde left common carotid angioplasty/stent;  Surgeon: Luis Miguel Peters DO;  [facility];  Service: Vascular    SEPTOPLASTY      LAST ASSESSED; 2014    TONSILECTOMY AND ADNOIDECTOMY      LAST ASSESSED: 2014    UPPER GASTROINTESTINAL ENDOSCOPY         Family History   Problem Relation Age of Onset    Emphysema Mother     Liver disease Mother     Coronary artery disease Father     Hypertension Father     Liver disease Father     Heart failure Father     Stroke Father         CVA     Heart attack Father     Coronary artery disease Brother     Heart disease Brother         younger brother by pass and other brother 4 stents placed    Other Family         BACK PROBLEM     Stroke Family         CVA    Emphysema Family     Hypertension Family         BENIGN     I have reviewed and agree with the history as documented.    E-Cigarette/Vaping    E-Cigarette Use Never User      E-Cigarette/Vaping Substances    Nicotine No     THC No     CBD No     Flavoring No     Other No     Unknown No      Social History     Tobacco Use    Smoking status: Former     Current packs/day: 0.00     Average packs/day: 1 pack/day for 50.0 years (50.0 ttl pk-yrs)     Types: Cigarettes     Start date:      Quit date:      Years since quittin.6    Smokeless tobacco: Never   Vaping Use    Vaping status: Never Used   Substance Use Topics    Alcohol use: Not Currently     Alcohol/week: 1.0 standard drink of alcohol     Types: 1 Shots of liquor per week     Comment: beer, wine, scotch every day; SOCIAL AS PER ALL SCRIPTS     Drug use: Not Currently     Types: Marijuana     Comment: medical majiuana       Review of Systems   Constitutional:  Negative for chills and fever.   HENT:  Negative for ear pain and sore throat.    Eyes:  Negative for pain and visual disturbance.   Respiratory:  Negative for cough and  shortness of breath.    Cardiovascular:  Negative for chest pain and palpitations.   Gastrointestinal:  Negative for abdominal pain and vomiting.   Genitourinary:  Negative for dysuria and hematuria.   Musculoskeletal:  Negative for arthralgias and back pain.   Skin:  Negative for color change and rash.   Neurological:  Negative for seizures and syncope.   All other systems reviewed and are negative.      Physical Exam  Physical Exam  Constitutional:       General: He is not in acute distress.  HENT:      Head: Normocephalic and atraumatic.   Pulmonary:      Effort: Pulmonary effort is normal.   Abdominal:      General: Abdomen is flat. There is no distension.      Tenderness: There is no abdominal tenderness.   Musculoskeletal:      Cervical back: Normal range of motion.   Skin:     General: Skin is warm and dry.   Neurological:      General: No focal deficit present.      Mental Status: He is alert.         Vital Signs  ED Triage Vitals [08/24/24 0807]   Temp Pulse Respirations Blood Pressure SpO2   -- 101 18 118/67 96 %      Temp src Heart Rate Source Patient Position - Orthostatic VS BP Location FiO2 (%)   -- Monitor Lying Right arm --      Pain Score       No Pain           Vitals:    08/24/24 0807   BP: 118/67   Pulse: 101   Patient Position - Orthostatic VS: Lying         Visual Acuity      ED Medications  Medications - No data to display    Diagnostic Studies  Results Reviewed       None                   No orders to display              Procedures  Procedures         ED Course                                               Medical Decision Making  84-year-old male presenting to the emergency department after accidentally cutting into his Ramsey bag.  No other complaints.  Abdomen soft nontender.  Bag replaced and patient given x-rays for the same.  Bladder scanned and with minimal the bladder so Ramsey is functioning appropriately. Reviewed all findings both relevant and incidental with the patient at  bedside. Pt verbalized understanding of findings, neccesary follow up, return to ED precautions. Pt agreed to review today's findings with their primary care provider. Pt non-toxic appearing upon discharge.       Amount and/or Complexity of Data Reviewed  Independent Historian: guardian  External Data Reviewed: notes.                 Disposition  Final diagnoses:   Problem with Ramsey catheter, initial encounter (HCC)     Time reflects when diagnosis was documented in both MDM as applicable and the Disposition within this note       Time User Action Codes Description Comment    8/24/2024  8:34 AM Zhou Looney Add [T83.9XXA] Problem with Ramsey catheter, initial encounter (HCC)           ED Disposition       ED Disposition   Discharge    Condition   Stable    Date/Time   Sat Aug 24, 2024  8:34 AM    Comment   Suhail Borrego discharge to home/self care.                   Follow-up Information       Follow up With Specialties Details Why Contact Francisco Duenas, DO Family Medicine Call  As needed [address] [facility]  [address]  [place] [address] [phone]              Discharge Medication List as of 8/24/2024  8:34 AM        CONTINUE these medications which have NOT CHANGED    Details   acetaminophen (TYLENOL) 650 mg CR tablet Take 650 mg by mouth every 8 (eight) hours as needed for mild pain, Historical Med      Ascorbic Acid (Vitamin C) 500 MG PACK Take 1,000 mg by mouth daily, Historical Med      aspirin (ECOTRIN LOW STRENGTH) 81 mg EC tablet Take 81 mg by mouth daily, Historical Med      atorvastatin (LIPITOR) 80 mg tablet TAKE 1 TABLET BY MOUTH EVERY DAY IN THE MORNING, Starting Thu 2/8/2024, Normal      cholecalciferol (VITAMIN D3) 1,000 units tablet Take 2,000 Units by mouth daily, Historical Med      clopidogrel (PLAVIX) 75 mg tablet TAKE 1 TABLET BY MOUTH EVERY DAY, Starting Tue 7/23/2024, Normal      clotrimazole-betamethasone (LOTRISONE) 1-0.05 % cream Apply topically 2 (two) times a day, Starting  Fri 7/21/2023, Normal      cyanocobalamin (VITAMIN B-12) 1,000 mcg tablet Take 1,000 mcg by mouth daily  , Historical Med      docusate sodium (COLACE) 100 mg capsule Take 100 mg by mouth 2 (two) times a day, Historical Med      famotidine (PEPCID) 20 mg tablet TAKE 1 TABLET BY MOUTH TWICE A DAY, Starting Sun 6/16/2024, Normal      folic acid (FOLVITE) 1 mg tablet Take 1 tablet (1 mg total) by mouth daily, Starting Thu 5/2/2024, Normal      gabapentin (NEURONTIN) 100 mg capsule Take 1 capsule (100 mg total) by mouth 2 (two) times a day Morning and bedtime, Starting Tue 7/9/2024, Historical Med      metoprolol tartrate (LOPRESSOR) 50 mg tablet Take 0.5 tablets (25 mg total) by mouth in the morning, Starting Wed 7/3/2024, Normal      midodrine (PROAMATINE) 2.5 mg tablet TAKE 1 TABLET (2.5 MG TOTAL) BY MOUTH 3 TIMES A DAY BEFORE MEALS, Normal      Multiple Vitamin (MULTIVITAMIN) capsule Take 1 capsule by mouth daily, Historical Med      mupirocin (BACTROBAN) 2 % ointment Apply topically 3 (three) times a day for 10 days, Starting Thu 7/25/2024, Until Sun 8/4/2024, Normal      traMADol (Ultram) 50 mg tablet Take 1 tablet (50 mg total) by mouth every 8 (eight) hours as needed for moderate pain, Starting Mon 7/15/2024, Normal             No discharge procedures on file.    PDMP Review         Value Time User    PDMP Reviewed  Yes 7/15/2024  9:37 AM KEVIN Castellon            ED Provider  Electronically Signed by             Zhou Looney DO  08/24/24 2522

## 2024-08-25 DIAGNOSIS — I65.22 SYMPTOMATIC STENOSIS OF LEFT CAROTID ARTERY: ICD-10-CM

## 2024-08-26 ENCOUNTER — HOSPITAL ENCOUNTER (OUTPATIENT)
Dept: RADIOLOGY | Facility: HOSPITAL | Age: 84
Discharge: HOME | End: 2024-08-26
Payer: COMMERCIAL

## 2024-08-26 DIAGNOSIS — S22.089A CLOSED T11 FRACTURE (HCC): ICD-10-CM

## 2024-08-26 PROCEDURE — 72080 X-RAY EXAM THORACOLMB 2/> VW: CPT

## 2024-08-27 NOTE — TELEPHONE ENCOUNTER
Pt's daughter Madhavi called to follow up on [facility] paperwork status. Paperwork was dropped off at the office on Friday, 8/23/24. Unable to see in EMR. Madhavi requests a call back to inform on status. Please advise. Thank you

## 2024-08-28 DIAGNOSIS — K21.9 GASTROESOPHAGEAL REFLUX DISEASE, UNSPECIFIED WHETHER ESOPHAGITIS PRESENT: ICD-10-CM

## 2024-08-28 RX ORDER — MIDODRINE HYDROCHLORIDE 2.5 MG/1
TABLET ORAL
Qty: 90 TABLET | Refills: 0 | Status: SHIPPED | OUTPATIENT
Start: 2024-08-28

## 2024-08-28 NOTE — TELEPHONE ENCOUNTER
Still working on paperwork -- may take 7-10 days.  We will call her when finished.    Mikaela Duenas, DO

## 2024-08-28 NOTE — TELEPHONE ENCOUNTER
Called donald to notify her below msg stated she works for [employer]'s deadline for paperwork is sep 6 and also wants us to fax those paperwork to number listed on papers

## 2024-08-29 RX ORDER — FAMOTIDINE 20 MG/1
20 TABLET, FILM COATED ORAL 2 TIMES DAILY
Qty: 180 TABLET | Refills: 1 | Status: SHIPPED | OUTPATIENT
Start: 2024-08-29

## 2024-08-30 ENCOUNTER — TELEPHONE (OUTPATIENT)
Age: 84
End: 2024-08-30

## 2024-08-30 DIAGNOSIS — I10 HYPERTENSION, UNSPECIFIED TYPE: ICD-10-CM

## 2024-08-30 RX ORDER — METOPROLOL TARTRATE 50 MG
25 TABLET ORAL DAILY
Qty: 30 TABLET | Refills: 0 | Status: SHIPPED | OUTPATIENT
Start: 2024-08-30

## 2024-08-30 NOTE — TELEPHONE ENCOUNTER
Patient called requesting refill for midodrine (PROAMATINE) 2.5 mg tablet. Patient made aware medication was refilled on 08/28/2024  for 90 with 0 refills to [facility]/pharmacy #[id number] - [place]AM[place]IP[place] - [address]  pharmacy. Patient instructed to contact the pharmacy to obtain refills of medication. Patient verbalized understanding.

## 2024-09-05 DIAGNOSIS — I65.22 SYMPTOMATIC STENOSIS OF LEFT CAROTID ARTERY: ICD-10-CM

## 2024-09-05 RX ORDER — CLOPIDOGREL BISULFATE 75 MG/1
75 TABLET ORAL DAILY
Qty: 30 TABLET | Refills: 1 | Status: SHIPPED | OUTPATIENT
Start: 2024-09-05

## 2024-09-05 NOTE — TELEPHONE ENCOUNTER
Patient been out of this medication for about a week    Reason for call:   [x] Refill   [] Prior Auth  [] Other:     Office:   [x] PCP/Provider - Arden Muse MD   [] Specialty/Provider -     Medication: clopidogrel (PLAVIX) 75 mg tablet     Dose/Frequency: TAKE 1 TABLET BY MOUTH EVERY DAY,     Quantity: 30    Pharmacy: [facility]/pharmacy #[id number] - [place], [place] [address]     Does the patient have enough for 3 days?   [] Yes   [x] No - Send as HP to POD

## 2024-09-09 ENCOUNTER — OFFICE VISIT (OUTPATIENT)
Dept: NEUROSURGERY | Facility: CLINIC | Age: 84
End: 2024-09-09
Payer: COMMERCIAL

## 2024-09-09 VITALS
BODY MASS INDEX: 23.02 KG/M2 | TEMPERATURE: 97.6 F | DIASTOLIC BLOOD PRESSURE: 76 MMHG | HEART RATE: 80 BPM | OXYGEN SATURATION: 97 % | SYSTOLIC BLOOD PRESSURE: 134 MMHG | HEIGHT: 67 IN

## 2024-09-09 DIAGNOSIS — S22.000A THORACIC COMPRESSION FRACTURE, CLOSED, INITIAL ENCOUNTER (HCC): Primary | ICD-10-CM

## 2024-09-09 PROCEDURE — 99213 OFFICE O/P EST LOW 20 MIN: CPT | Performed by: PHYSICIAN ASSISTANT

## 2024-09-09 NOTE — ASSESSMENT & PLAN NOTE
Patient presents for follow up of T11 compression fracture   Initially presented in June 2024 with acute on chronic back pain, found to have T11 SEP fx  H/o L2-S1 fusion, T12 and L1 compression fractures  Currently not wearing TLSO as it caused worsening pain. No BLE complaints    Imaging:  XR thoracolumbar spine, 8/26/24: Stable endplate compression deformity at T11, T12 and L1     Plan:  XR reviewed with patient and his daughter today, showing stable compression fractures  Ok to discontinue brace   No further imaging indicated for this problem  Discussed chronic back pain in more detail today   Could offer PT for continued lumbar strengthening, but will hold off as patient recently had NATE and may not be cleared for PT yet. Still with indwelling pillai catheter as well  Discussed that back pain could be from possible adjacent level disease at L1-L2; however as patient would never be agreeable to additional surgery MRI lumbar spine is not indicated.   DEXA scan scheduled for October 2024; recommend treatment if evidence of osteoporosis or osteopenia  Continue to follow with pain management; pain is tolerable on current tramadol dose  Follow up in our office as needed or with worsening symptoms

## 2024-09-09 NOTE — PROGRESS NOTES
Neurosurgery Office Note  Suhail Borrego 84 y.o. adult MRN: [id number]      Assessment & Plan     New acute T 11 Thoracic compression fracture, closed, initial encounter, w old T12 compression fx  Patient presents for follow up of T11 compression fracture   Initially presented in June 2024 with acute on chronic back pain, found to have T11 SEP fx  H/o L2-S1 fusion, T12 and L1 compression fractures  Currently not wearing TLSO as it caused worsening pain. No BLE complaints    Imaging:  XR thoracolumbar spine, 8/26/24: Stable endplate compression deformity at T11, T12 and L1     Plan:  XR reviewed with patient and his daughter today, showing stable compression fractures  Ok to discontinue brace   No further imaging indicated for this problem  Discussed chronic back pain in more detail today   Could offer PT for continued lumbar strengthening, but will hold off as patient recently had NATE and may not be cleared for PT yet. Still with indwelling pillai catheter as well  Discussed that back pain could be from possible adjacent level disease at L1-L2; however as patient would never be agreeable to additional surgery MRI lumbar spine is not indicated.   DEXA scan scheduled for October 2024; recommend treatment if evidence of osteoporosis or osteopenia  Continue to follow with pain management; pain is tolerable on current tramadol dose  Follow up in our office as needed or with worsening symptoms       Diagnoses and all orders for this visit:    New acute T 11 Thoracic compression fracture, closed, initial encounter, w old T12 compression fx          I have spent a total time of 25 minutes on 09/09/24 in caring for this patient including Diagnostic results, Prognosis, Risks and benefits of tx options, Instructions for management, Patient and family education, Importance of tx compliance, Risk factor reductions, Impressions, Counseling / Coordination of care, Documenting in the medical record, Reviewing / ordering tests,  medicine, procedures  , Obtaining or reviewing history  , and Communicating with other healthcare professionals .      CHIEF COMPLAINT    Chief Complaint   Patient presents with    Follow-up     3 Mo F/u T11 Fx- T/S Xray on 8/26/24         HISTORY    This is a 85yo male with a PMH significant for L2-S1 lumbar fusion by Dr Gil, multiple chronic compression fractures who presents for follow up of subacute T11 compression fracture. This was discovered in June 2024 during evaluation for worsening chronic back pain. He denies any prior injury of fall. He was managed conservatively in a brace; however he did not wear the brace as it caused more pain. He currently ambulates short distances with a cane but gets severe back pain with any prolonged standing or activity. Denies BLE pain, numbness, tingling, weakness. He follows with pain management and is on tramadol, which helps his pain. He is not agreeable to any surgery at this time. Currently with indwelling pillai catheter and follows with urology.         See Discussion    REVIEW OF SYSTEMS    Review of Systems   Constitutional: Negative.    HENT: Negative.     Eyes: Negative.    Respiratory: Negative.     Cardiovascular: Negative.    Gastrointestinal: Negative.    Endocrine: Negative.    Genitourinary: Negative.    Musculoskeletal:  Positive for back pain.        3 Mo F/u T11 Fx- T/S Xray on 8/26/24  Dexa is sched. on 10/3/24  B/L back pain  radiates to b/l legs intermittently  Pain is only w/ standing/walking  Denies N/T on BLE   Has LSO brace but does not wear it   Pain 10/10 when it occurs   Skin: Negative.    Allergic/Immunologic: Negative.    Neurological: Negative.    Hematological: Negative.    Psychiatric/Behavioral: Negative.     All other systems reviewed and are negative.      ROS obtained by MA. Reviewed. See HPI.     Meds/Allergies     Current Outpatient Medications   Medication Sig Dispense Refill    Ascorbic Acid (Vitamin C) 500 MG PACK Take 1,000 mg  by mouth daily      aspirin (ECOTRIN LOW STRENGTH) 81 mg EC tablet Take 81 mg by mouth daily      atorvastatin (LIPITOR) 80 mg tablet TAKE 1 TABLET BY MOUTH EVERY DAY IN THE MORNING 90 tablet 3    cholecalciferol (VITAMIN D3) 1,000 units tablet Take 2,000 Units by mouth daily      clopidogrel (PLAVIX) 75 mg tablet Take 1 tablet (75 mg total) by mouth daily 30 tablet 1    clotrimazole-betamethasone (LOTRISONE) 1-0.05 % cream Apply topically 2 (two) times a day 30 g 3    cyanocobalamin (VITAMIN B-12) 1,000 mcg tablet Take 1,000 mcg by mouth daily        docusate sodium (COLACE) 100 mg capsule Take 100 mg by mouth 2 (two) times a day      famotidine (PEPCID) 20 mg tablet TAKE 1 TABLET BY MOUTH TWICE A  tablet 1    gabapentin (NEURONTIN) 100 mg capsule Take 1 capsule (100 mg total) by mouth 2 (two) times a day Morning and bedtime      metoprolol tartrate (LOPRESSOR) 50 mg tablet TAKE 0.5 TABLETS (25 MG TOTAL) BY MOUTH IN THE MORNING 30 tablet 0    midodrine (PROAMATINE) 2.5 mg tablet TAKE 1 TABLET (2.5 MG TOTAL) BY MOUTH 3 TIMES A DAY BEFORE MEALS 90 tablet 0    Multiple Vitamin (MULTIVITAMIN) capsule Take 1 capsule by mouth daily      traMADol (Ultram) 50 mg tablet Take 1 tablet (50 mg total) by mouth every 8 (eight) hours as needed for moderate pain 90 tablet 2    acetaminophen (TYLENOL) 650 mg CR tablet Take 650 mg by mouth every 8 (eight) hours as needed for mild pain      folic acid (FOLVITE) 1 mg tablet Take 1 tablet (1 mg total) by mouth daily (Patient not taking: Reported on 9/9/2024) 90 tablet 0    mupirocin (BACTROBAN) 2 % ointment Apply topically 3 (three) times a day for 10 days 50 g 0     No current facility-administered medications for this visit.       Allergies   Allergen Reactions    Penicillins Other (See Comments)     Hallucinations;  Patient reported that he was seeing visual disturbances         PAST HISTORY    Past Medical History:   Diagnosis Date    Abnormal liver function test      RESOLVED: [id number]    Allergic rhinitis     Anemia     LAST ASSESSED: 2017    Arthritis     BPH (benign prostatic hyperplasia)     CAD (coronary artery disease)     Coronary artery disease     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HCC)     LAST ASSESSED: 2017    Former tobacco use     GERD (gastroesophageal reflux disease)     Hand paresthesia     RESOLVED: 2017    Hyperlipidemia     Hypertension     Lumbar stenosis     Peripheral artery disease (HCC)     LAST ASSESSED: 2017    Stage 3a chronic kidney disease (HCC) 2021       Past Surgical History:   Procedure Laterality Date    BACK SURGERY      COLONOSCOPY      LUMBAR FUSION      OH ARTHRODESIS POSTERIOR/PSTLAT TQ 1NTRSPC LUMBAR N/A 2016    Procedure: L2-S1 POSTERIOR LUMBAR FUSION AND DECOMPRESSION (Impulse), Posterior lateral fixation; dural repair.;  Surgeon: Leslie Gil MD;  Location: [facility];  Service: Orthopedics    OH CABG W/ARTERIAL GRAFT SINGLE ARTERIAL GRAFT N/A 2018    Procedure: CABG X4 with LIMA - LAD, SVG - RCA, OM2, & Diagonal ; Left Leg EVH; MATTHEW;  Surgeon: Eric Bar DO;  Location: [facility];  Service: Cardiac Surgery    OH COLONOSCOPY FLX DX W/COLLJ SPEC WHEN PFRMD N/A 11/15/2017    Procedure: EGD AND COLONOSCOPY;  Surgeon: Mariano Godinez MD;  [facility]: [facility];  Service: Gastroenterology    OH TEAEC W/PATCH GRF CAROTID VERTB SUBCLAV NECK INC Left 2024    Procedure: left CEA retrograde left common carotid angioplasty/stent;  Surgeon: Luis Miguel Peters DO;  Location: [facility];  Service: Vascular    SEPTOPLASTY      LAST ASSESSED; 2014    TONSILECTOMY AND ADNOIDECTOMY      LAST ASSESSED: 2014    UPPER GASTROINTESTINAL ENDOSCOPY         Social History     Tobacco Use    Smoking status: Former     Current packs/day: 0.00     Average packs/day: 1 pack/day for 50.0 years (50.0 ttl pk-yrs)     Types: Cigarettes     Start date:      Quit date:      Years since quittin.6    Smokeless  "tobacco: Never   Vaping Use    Vaping status: Never Used   Substance Use Topics    Alcohol use: Not Currently     Alcohol/week: 1.0 standard drink of alcohol     Types: 1 Shots of liquor per week     Comment: beer, wine, scotch every day; SOCIAL AS PER ALL SCRIPTS     Drug use: Not Currently     Types: Marijuana     Comment: medical majiyulianaa       Family History   Problem Relation Age of Onset    Emphysema Mother     Liver disease Mother     Coronary artery disease Father     Hypertension Father     Liver disease Father     Heart failure Father     Stroke Father         CVA     Heart attack Father     Coronary artery disease Brother     Heart disease Brother         younger brother by pass and other brother 4 stents placed    Other Family         BACK PROBLEM     Stroke Family         CVA    Emphysema Family     Hypertension Family         BENIGN         Above history personally reviewed.       EXAM    Vitals:Blood pressure 134/76, pulse 80, temperature 97.6 °F (36.4 °C), temperature source Temporal, height 5' 7\" (1.702 m), SpO2 97%.,Body mass index is 23.02 kg/m².     Physical Exam  Vitals and nursing note reviewed.   Constitutional:       Appearance: Normal appearance. He is well-developed and normal weight.   HENT:      Head: Normocephalic and atraumatic.   Eyes:      Extraocular Movements: Extraocular movements intact.      Pupils: Pupils are equal, round, and reactive to light.   Cardiovascular:      Rate and Rhythm: Normal rate.   Pulmonary:      Effort: Pulmonary effort is normal. No respiratory distress.   Abdominal:      Palpations: Abdomen is soft.   Musculoskeletal:         General: Normal range of motion.      Cervical back: Normal range of motion.   Skin:     General: Skin is warm and dry.   Neurological:      Mental Status: He is alert and oriented to person, place, and time.      Cranial Nerves: Cranial nerves 2-12 are intact.      Motor: Motor strength is normal.  Psychiatric:         Mood and " Affect: Mood normal.         Speech: Speech normal.         Behavior: Behavior normal.         Thought Content: Thought content normal.         Judgment: Judgment normal.         Neurologic Exam     Mental Status   Oriented to person, place, and time.   Follows 2 step commands.   Attention: normal. Concentration: normal.   Speech: speech is normal   Level of consciousness: alert  Knowledge: good.   Able to repeat. Normal comprehension.     Cranial Nerves   Cranial nerves II through XII intact.     CN III, IV, VI   Pupils are equal, round, and reactive to light.    Motor Exam   Muscle bulk: normal  Overall muscle tone: normal    Strength   Strength 5/5 throughout.     Sensory Exam   Light touch normal.     Gait, Coordination, and Reflexes     Gait  Gait: (uses cane)    Tremor   Resting tremor: absent        MEDICAL DECISION MAKING    Imaging Studies:     X-ray thoracolumbar spine 2 views    Result Date: 8/26/2024  Narrative: THORACOLUMBAR SPINE INDICATION:   Unspecified fracture of t11-T12 vertebra, initial encounter for closed fracture. COMPARISON: 7/15/2024 VIEWS:  XR SPINE THORACOLUMBAR 2 VW Images: 2 FINDINGS: Stable endplate compression deformity at T11, T12 and L1. S-shaped scoliotic curvature lower thoracic and lumbar spine. Stable lumbar postsurgical changes and thoracic spondylosis. There is no displacement of the paraspinal line. The pedicles appear intact.     Impression: Stable multifocal compression deformities. Electronically signed: 08/26/2024 12:38 PM Enrique De La Cruz, MPH,MD      I have personally reviewed pertinent reports.   and I have personally reviewed pertinent films in PACS

## 2024-09-11 ENCOUNTER — RA CDI HCC (OUTPATIENT)
Dept: OTHER | Facility: HOSPITAL | Age: 84
End: 2024-09-11

## 2024-09-12 NOTE — PROGRESS NOTES
HCC coding opportunities          Chart Reviewed number of suggestions sent to Provider: 2  I73.9  Recommend adding I12.9 - combination code - hypertensive renal disease     Patients Insurance     Medicare Insurance: [facility] [employer] Advantage

## 2024-09-16 ENCOUNTER — PROCEDURE VISIT (OUTPATIENT)
Dept: UROLOGY | Facility: CLINIC | Age: 84
End: 2024-09-16
Payer: COMMERCIAL

## 2024-09-16 VITALS
SYSTOLIC BLOOD PRESSURE: 114 MMHG | DIASTOLIC BLOOD PRESSURE: 62 MMHG | WEIGHT: 159 LBS | BODY MASS INDEX: 24.96 KG/M2 | HEIGHT: 67 IN

## 2024-09-16 DIAGNOSIS — N47.1 PHIMOSIS: ICD-10-CM

## 2024-09-16 DIAGNOSIS — R33.9 URINARY RETENTION: ICD-10-CM

## 2024-09-16 DIAGNOSIS — N39.0 URINARY TRACT INFECTION WITHOUT HEMATURIA, SITE UNSPECIFIED: Primary | ICD-10-CM

## 2024-09-16 PROCEDURE — 52000 CYSTOURETHROSCOPY: CPT | Performed by: PHYSICIAN ASSISTANT

## 2024-09-16 PROCEDURE — 99213 OFFICE O/P EST LOW 20 MIN: CPT | Performed by: PHYSICIAN ASSISTANT

## 2024-09-16 RX ORDER — CIPROFLOXACIN 250 MG/1
250 TABLET, FILM COATED ORAL EVERY 12 HOURS SCHEDULED
Qty: 6 TABLET | Refills: 0 | Status: SHIPPED | OUTPATIENT
Start: 2024-09-16 | End: 2024-09-19

## 2024-09-17 RX ORDER — LEVOFLOXACIN 5 MG/ML
500 INJECTION, SOLUTION INTRAVENOUS ONCE
OUTPATIENT
Start: 2024-09-17 | End: 2024-09-17

## 2024-09-17 RX ORDER — SODIUM CHLORIDE, SODIUM LACTATE, POTASSIUM CHLORIDE, CALCIUM CHLORIDE 600; 310; 30; 20 MG/100ML; MG/100ML; MG/100ML; MG/100ML
125 INJECTION, SOLUTION INTRAVENOUS CONTINUOUS
OUTPATIENT
Start: 2024-09-17

## 2024-09-17 NOTE — PROGRESS NOTES
Cystoscopy     Date/Time  9/16/2024 10:00 AM     Performed by  Aly Ziegler PA-C   Authorized by  Aly Ziegler PA-C     Universal Protocol:  Consent: Written consent obtained.  Consent given by: patient  Patient understanding: patient states understanding of the procedure being performed  Patient identity confirmed: verbally with patient      Procedure Details:  Procedure type: cystoscopy    Patient tolerance: Patient tolerated the procedure well with no immediate complications    Additional Procedure Details: 84-year-old male with history of severe phimosis.  He had a Ramsey catheter placed back in July for urinary retention.  About 350 cc of urine was drained.  When I saw him in August he requested to keep the catheter in.  Now he is back for follow-up.  Ramsey catheter was removed.  Penis was prepped and draped in usual fashion.  2% lidocaine used for local anesthetic.  There was phimosis present.  The flexible cystoscope was easily.  The pendulous urethra was normal.  Passed per the meatus the prostate was nonobstructing.  Upon entering the bladder the urine was turbid.  Inspection the mucosa revealed no significant abnormalities.  On retroflexion visibility was difficult.  At the end of the procedure the patient voided 250 cc.  The Ramsey catheter was left out.  Prophylactic antibiotics were sent to his pharmacy.    He did return in the afternoon for follow-up and his PVR was 9 mL.  He signed a consent for circumcision which I recommended under anesthesia since he did not tolerate the office cystoscopy particularly well.  I offered reassurance to the patient and his daughter.  Case requested and the surgery scheduler notified.

## 2024-09-18 ENCOUNTER — TELEPHONE (OUTPATIENT)
Age: 84
End: 2024-09-18

## 2024-09-18 NOTE — TELEPHONE ENCOUNTER
Patient called today in attempt to speak with SS about scheduling procedure.    Pt is aware a message is being sent to SS and will await a returned call.    Call back [phone]

## 2024-09-24 ENCOUNTER — PREP FOR PROCEDURE (OUTPATIENT)
Dept: UROLOGY | Facility: CLINIC | Age: 84
End: 2024-09-24

## 2024-09-24 DIAGNOSIS — Z01.812 PRE-OPERATIVE LABORATORY EXAMINATION: ICD-10-CM

## 2024-09-24 DIAGNOSIS — R39.89 SUSPECTED UTI: ICD-10-CM

## 2024-09-24 DIAGNOSIS — N50.9 DISORDER OF MALE GENITAL ORGANS, UNSPECIFIED: ICD-10-CM

## 2024-09-24 DIAGNOSIS — N47.1 PHIMOSIS: Primary | ICD-10-CM

## 2024-09-24 NOTE — TELEPHONE ENCOUNTER
Spoke with patient and confirmed surgery date of:10/17/2024  Type of surgery:CIRCUMCISION  Operating physician: DR Alonso office  Location of surgery: [facility]    Verbally went over prep with patient on: 9/24/2024  NPO  Bowel prep? Yes  Hospital calls afternoon prior with arrival time -Calls Friday afternoon for Monday surgeries  Patient needs ride to and from surgery (outpatient/inpatient)   Pre-op testing to be done 2 weeks prior to surgery/PRE OP URINE CULTURE, LABS ORDERED FOR 10/3/2024  (list labs needed)  Blood thinners: PT WILL DISCONTINUE PLAVIX 10/9/2024 PER PT'S DAUGHTER CURLY  List blood thinner/how long needs to held or no hold needed  Clearances needed: (Medical/Cardiac/None)    Mailed/emailed to patient on:EMAILED TO PT'S DAUGHTER CURLY 9/24/24  Copy of packet scanned into Media on:9/24/2024  Labs in packet  Soap / Bowel prep in packet  Pre-op & Post-op in packet  Dates of H&P and post-op if needed    Consent: in Media or on admit  If needed:SCANNED TO CHART 9/24/2024    Medical/Cardiac Clearance:  Appt with:  Appt date and time:  Date clearance form faxed:  Best fax number:    Medication Suspension of: Medication Name / how many days  Ordering provider:  Faxed Medication Suspension form on:  Best fax number:    Kunal/Leoti:  Faxed form to pharmacy on:    RBC blood bank done on:    Rep info:  Emailed rep on date:

## 2024-09-25 DIAGNOSIS — I65.22 SYMPTOMATIC STENOSIS OF LEFT CAROTID ARTERY: ICD-10-CM

## 2024-09-25 RX ORDER — MIDODRINE HYDROCHLORIDE 2.5 MG/1
TABLET ORAL
Qty: 270 TABLET | Refills: 1 | Status: SHIPPED | OUTPATIENT
Start: 2024-09-25

## 2024-09-25 NOTE — TELEPHONE ENCOUNTER
Refill must be reviewed and completed by the office or provider. The refill is unable to be approved or denied by the medication management team.    Request for 90D supply    This refill cannot be delegated

## 2024-10-04 ENCOUNTER — LAB (OUTPATIENT)
Dept: LAB | Facility: CLINIC | Age: 84
End: 2024-10-04
Payer: COMMERCIAL

## 2024-10-04 DIAGNOSIS — Z01.812 PRE-OPERATIVE LABORATORY EXAMINATION: ICD-10-CM

## 2024-10-04 DIAGNOSIS — N47.1 PHIMOSIS: ICD-10-CM

## 2024-10-04 DIAGNOSIS — N50.9 DISORDER OF MALE GENITAL ORGANS, UNSPECIFIED: ICD-10-CM

## 2024-10-04 DIAGNOSIS — R39.89 SUSPECTED UTI: ICD-10-CM

## 2024-10-04 LAB
ANION GAP SERPL CALCULATED.3IONS-SCNC: 6 MMOL/L (ref 4–13)
BASOPHILS # BLD AUTO: 0.04 THOUSANDS/ΜL (ref 0–0.1)
BASOPHILS NFR BLD AUTO: 0 % (ref 0–1)
BUN SERPL-MCNC: 25 MG/DL (ref 5–25)
CALCIUM SERPL-MCNC: 9.5 MG/DL (ref 8.4–10.2)
CHLORIDE SERPL-SCNC: 103 MMOL/L (ref 96–108)
CO2 SERPL-SCNC: 29 MMOL/L (ref 21–32)
CREAT SERPL-MCNC: 1.17 MG/DL (ref 0.6–1.3)
EOSINOPHIL # BLD AUTO: 0.41 THOUSAND/ΜL (ref 0–0.61)
EOSINOPHIL NFR BLD AUTO: 3 % (ref 0–6)
ERYTHROCYTE [DISTWIDTH] IN BLOOD BY AUTOMATED COUNT: 14 % (ref 11.6–15.1)
GLUCOSE P FAST SERPL-MCNC: 99 MG/DL (ref 65–99)
HCT VFR BLD AUTO: 34.6 % (ref 36.5–46.1)
HGB BLD-MCNC: 11 G/DL (ref 12–15.4)
IMM GRANULOCYTES # BLD AUTO: 0.03 THOUSAND/UL (ref 0–0.2)
IMM GRANULOCYTES NFR BLD AUTO: 0 % (ref 0–2)
LYMPHOCYTES # BLD AUTO: 2.85 THOUSANDS/ΜL (ref 0.6–4.47)
LYMPHOCYTES NFR BLD AUTO: 22 % (ref 14–44)
MCH RBC QN AUTO: 31 PG (ref 26.8–34.3)
MCHC RBC AUTO-ENTMCNC: 31.8 G/DL (ref 31.4–37.4)
MCV RBC AUTO: 98 FL (ref 82–98)
MONOCYTES # BLD AUTO: 0.9 THOUSAND/ΜL (ref 0.17–1.22)
MONOCYTES NFR BLD AUTO: 7 % (ref 4–12)
NEUTROPHILS # BLD AUTO: 8.9 THOUSANDS/ΜL (ref 1.85–7.62)
NEUTS SEG NFR BLD AUTO: 68 % (ref 43–75)
NRBC BLD AUTO-RTO: 0 /100 WBCS
PLATELET # BLD AUTO: 219 THOUSANDS/UL (ref 149–390)
PMV BLD AUTO: 10.6 FL (ref 8.9–12.7)
POTASSIUM SERPL-SCNC: 4.7 MMOL/L (ref 3.5–5.3)
RBC # BLD AUTO: 3.55 MILLION/UL (ref 3.88–5.12)
SODIUM SERPL-SCNC: 138 MMOL/L (ref 135–147)
WBC # BLD AUTO: 13.13 THOUSAND/UL (ref 4.31–10.16)

## 2024-10-04 PROCEDURE — 85025 COMPLETE CBC W/AUTO DIFF WBC: CPT

## 2024-10-04 PROCEDURE — 36415 COLL VENOUS BLD VENIPUNCTURE: CPT

## 2024-10-04 PROCEDURE — 87086 URINE CULTURE/COLONY COUNT: CPT

## 2024-10-04 PROCEDURE — 80048 BASIC METABOLIC PNL TOTAL CA: CPT

## 2024-10-04 NOTE — RESULT ENCOUNTER NOTE
Can we please contact this patient and let him know that his white blood cell count is elevated and figure out if he is having any symptoms that would account for that? He is scheduled for elective circumcision with me on the 17th from [facility] but I have not yet met the patient. His white count has been elevated on multiple recent labs and I don't know if we have an explanation for that.    Radha, I'm hoping that we can get some sort of assurance from PCP or otherwise that this is normal for him before surgery, otherwise it needs to be worked up.

## 2024-10-05 LAB — BACTERIA UR CULT: NORMAL

## 2024-10-08 ENCOUNTER — CONSULT (OUTPATIENT)
Age: 84
End: 2024-10-08
Payer: COMMERCIAL

## 2024-10-08 VITALS
SYSTOLIC BLOOD PRESSURE: 108 MMHG | DIASTOLIC BLOOD PRESSURE: 55 MMHG | BODY MASS INDEX: 25.28 KG/M2 | HEIGHT: 67 IN | RESPIRATION RATE: 18 BRPM

## 2024-10-08 DIAGNOSIS — D72.829 LEUKOCYTOSIS, UNSPECIFIED TYPE: ICD-10-CM

## 2024-10-08 DIAGNOSIS — Z01.818 PRE-OP EXAMINATION: Primary | ICD-10-CM

## 2024-10-08 PROCEDURE — 99214 OFFICE O/P EST MOD 30 MIN: CPT

## 2024-10-08 NOTE — H&P (VIEW-ONLY)
"PRE-OPERATIVE EXAMINATION  Suhail Borrego  1940    Suhail Borrego is an 84 y.o. male with CAD, HTN, PAD, GERD, pulmonary fibrosis, stage 3 CKD, urinary retention, and recent leukocytosis on blood work who is planning to undergo circumcision under general anesthesia by Dr. Hoyt on 10/17/2024. The procedure is indicated for the following condition: phimosis. Patient reports he has not had complications with anesthesia in the past.    ROS:   Chest pain: no   Shortness of breath: yes  Shortness of breath with exertion: yes  Orthopnea: no  Dizziness: no  Unexplained weight change: no    PMH:  CAD: yes  HTN: yes  CKD: yes  DM: no on insulin: no  History of CVA: yes    He reports that he quit smoking about 13 years ago. His smoking use included cigarettes. He started smoking about 63 years ago. He has a 50 pack-year smoking history. He has never used smokeless tobacco. He reports that he does not currently use alcohol after a past usage of about 1.0 standard drink of alcohol per week. He reports that he does not currently use drugs after having used the following drugs: Marijuana.    /55   Resp 18   Ht 5' 6.5\" (1.689 m)   BMI 25.28 kg/m²     Physical Exam  Vitals reviewed.   Constitutional:       General: He is not in acute distress.     Appearance: Normal appearance. He is not ill-appearing or toxic-appearing.      Comments: Body mass index is 25.28 kg/m².   HENT:      Nose: Nose normal.   Eyes:      General:         Right eye: No discharge.         Left eye: No discharge.      Conjunctiva/sclera: Conjunctivae normal.   Cardiovascular:      Rate and Rhythm: Normal rate and regular rhythm.      Heart sounds: Normal heart sounds.   Pulmonary:      Effort: Pulmonary effort is normal. No respiratory distress.      Breath sounds: Wheezing (bilateral end-expiratory wheezes in lower lung fields) present.   Musculoskeletal:      Right lower leg: Edema (trace) present.      Left lower leg: Edema (trace) " present.   Skin:     General: Skin is warm and dry.   Neurological:      Mental Status: He is alert and oriented to person, place, and time. Mental status is at baseline.   Psychiatric:         Mood and Affect: Mood normal.         Behavior: Behavior normal.         Revised Cardiac Risk Index (RCRI) for Pre-Operative Risk   (estimates risk of cardiac complications after noncardiac surgery)    High-risk surgery: No 0 / Yes +1  Intraperitoneal, intrathoracic, suprainguinal vascular  History of ischemic heart disease: No 0 / Yes +1  Hx of MI, (+) exercise test, current chest pain considered due to myocardial ischemia, use of nitrate therapy or ECG with pathological Q waves)  History of CHF: No 0 / Yes +1  Pulmonary edema, B/L rales or S3 gallop; ROBLES, orthopnea, PND, CXR showing pulmonary vascular redistribution)  History of cerebrovascular disease: No 0 / Yes +1  Prior TIA or stroke  Pre-operative treatment with insulin: No 0 / Yes +1  Pre-operative creatinine >2 mg/dL: No 0 / Yes +1    RCRI Scorin points: Class I Risk, 3.9% 30-day risk of death, MI, or cardiac arrest  1 point: Class II Risk, 6.0% 30-day risk of death, MI, or cardiac arrest  2 points: Class III Risk, 10.1% 30-day risk of death, MI, or cardiac arrest  3 points: Class IV Risk, 15% 30-day risk of death, MI, or cardiac arrest  4 points: Class IV Risk, 15% 30-day risk of death, MI, or cardiac arrest  5 points: Class IV Risk, 15% 30-day risk of death, MI, or cardiac arrest  6 points: Class IV Risk, 15% 30-day risk of death, MI, or cardiac arrest    Lab Results   Component Value Date    CREATININE 1.17 10/04/2024     Lab Results   Component Value Date    WBC 13.13 (H) 10/04/2024       Suhail was seen today for medical clearance.    Diagnoses and all orders for this visit:    Pre-op examination    Leukocytosis, unspecified type        Recommendations:  Suhail Borrego is undergoing an elective Low Risk surgery, circumcision. He is RCRI class III risk (2  points for ROBLES/shortness of breath at rest and prior CVA/TIA) with 10.1% 30-day risk of death, MI, or cardiac arrest. He may proceed with surgery as planned without further workup.     Suhail was noted to have leukocytosis on pre-operative CBC to 13.13k.  He reports some congestion/rhinorrhea with a mild cough, and states he believes he has a cold at this time.  Of note, he has had mild leukocytosis demonstrated on labs for the past several months; this recent increase in his white blood cell count is believed at this time due to be secondary to physiologic stress reaction.  He recently had a urine culture which was resulted as mixed contaminants and denies any symptoms of UTI.  He was previously treated with antibiotics without resolution of mild leukocytosis.    I reviewed the patient's prior ECG from 6/29/2024, which demonstrates normal sinus rhythm with nonspecific T wave abnormality in the lateral leads, but is grossly unchanged from his prior ECGs on file.     Discussed with Dr. Duenas, who is in agreement with the plan as outlined above.     More Mcdermott, DO

## 2024-10-09 ENCOUNTER — TELEPHONE (OUTPATIENT)
Age: 84
End: 2024-10-09

## 2024-10-09 NOTE — TELEPHONE ENCOUNTER
Duyen from [facility] called to speak to office staff regarding pt pre op clearance. Warm transfer to office

## 2024-10-09 NOTE — TELEPHONE ENCOUNTER
Spoke with Duyen (the surgery coordinator) pts daughter Madhavi was very irate. She stated that we didn't know why the pt was there, told her pt would need another EKG (per Dr Cartagena and office notes it would be up to the surgeon), pt wants a call back from someone in the office so she said explain all of her complaints from yesterday. Ann will be calling the pts daughter after talking with Dr Duenas to get all of the correct information.

## 2024-10-11 NOTE — PRE-PROCEDURE INSTRUCTIONS
Pre-Surgery Instructions:   Medication Instructions    acetaminophen (TYLENOL) 650 mg CR tablet Uses PRN- OK to take day of surgery    Ascorbic Acid (Vitamin C) 500 MG PACK Stop taking 7 days prior to surgery.    atorvastatin (LIPITOR) 80 mg tablet Take day of surgery.    cholecalciferol (VITAMIN D3) 1,000 units tablet Stop taking 7 days prior to surgery.    cyanocobalamin (VITAMIN B-12) 1,000 mcg tablet Stop taking 7 days prior to surgery.    famotidine (PEPCID) 20 mg tablet Take day of surgery.    folic acid (FOLVITE) 1 mg tablet Stop taking 7 days prior to surgery.    gabapentin (NEURONTIN) 100 mg capsule Take day of surgery.    metoprolol tartrate (LOPRESSOR) 50 mg tablet Take day of surgery.    midodrine (PROAMATINE) 2.5 mg tablet Take day of surgery.    Multiple Vitamin (MULTIVITAMIN) capsule Stop taking 7 days prior to surgery.    traMADol (Ultram) 50 mg tablet Uses PRN- OK to take day of surgery     Medication instructions for day surgery reviewed. Please use only a sip of water to take your instructed medications. Avoid all over the counter vitamins, supplements and NSAIDS for one week prior to surgery per anesthesia guidelines. Tylenol is ok to take as needed.     You will receive a call one business day prior to surgery with an arrival time and hospital directions. If your surgery is scheduled on a Monday, the hospital will be calling you on the Friday prior to your surgery. If you have not heard from anyone by 8pm, please call the hospital supervisor through the hospital  at [phone]. ([place] [phone] or [place] [phone]).    Do not eat or drink anything after midnight the night before your surgery, including candy, mints, lifesavers, or chewing gum. Do not drink alcohol 24hrs before your surgery. Try not to smoke at least 24hrs before your surgery.       Follow the pre surgery showering instructions as listed in the “My Surgical Experience Booklet” or otherwise provided by your  surgeon's office. Do not use a blade to shave the surgical area 1 week before surgery. It is okay to use a clean electric clippers up to 24 hours before surgery. Do not apply any lotions, creams, including makeup, cologne, deodorant, or perfumes after showering on the day of your surgery. Do not use dry shampoo, hair spray, hair gel, or any type of hair products.     No contact lenses, eye make-up, or artificial eyelashes. Remove nail polish, including gel polish, and any artificial, gel, or acrylic nails if possible. Remove all jewelry including rings and body piercing jewelry.     Wear causal clothing that is easy to take on and off. Consider your type of surgery.    Keep any valuables, jewelry, piercings at home. Please bring any specially ordered equipment (sling, braces) if indicated.    Arrange for a responsible person to drive you to and from the hospital on the day of your surgery. Please confirm the visitor policy for the day of your procedure when you receive your phone call with an arrival time.     Call the surgeon's office with any new illnesses, exposures, or additional questions prior to surgery.    Please reference your “My Surgical Experience Booklet” for additional information to prepare for your upcoming surgery.

## 2024-10-16 ENCOUNTER — ANESTHESIA EVENT (OUTPATIENT)
Dept: PERIOP | Facility: HOSPITAL | Age: 84
End: 2024-10-16
Payer: COMMERCIAL

## 2024-10-17 ENCOUNTER — TELEPHONE (OUTPATIENT)
Dept: UROLOGY | Facility: AMBULATORY SURGERY CENTER | Age: 84
End: 2024-10-17

## 2024-10-17 ENCOUNTER — HOSPITAL ENCOUNTER (OUTPATIENT)
Facility: HOSPITAL | Age: 84
Setting detail: OUTPATIENT SURGERY
Discharge: HOME | End: 2024-10-17
Payer: COMMERCIAL

## 2024-10-17 ENCOUNTER — ANESTHESIA (OUTPATIENT)
Dept: PERIOP | Facility: HOSPITAL | Age: 84
End: 2024-10-17
Payer: COMMERCIAL

## 2024-10-17 VITALS
OXYGEN SATURATION: 96 % | SYSTOLIC BLOOD PRESSURE: 163 MMHG | RESPIRATION RATE: 20 BRPM | DIASTOLIC BLOOD PRESSURE: 68 MMHG | HEART RATE: 78 BPM | TEMPERATURE: 97.6 F

## 2024-10-17 DIAGNOSIS — S91.001A WOUND OF RIGHT ANKLE, INITIAL ENCOUNTER: ICD-10-CM

## 2024-10-17 DIAGNOSIS — N47.1 PHIMOSIS: Primary | ICD-10-CM

## 2024-10-17 PROCEDURE — 88304 TISSUE EXAM BY PATHOLOGIST: CPT | Performed by: PATHOLOGY

## 2024-10-17 PROCEDURE — 99024 POSTOP FOLLOW-UP VISIT: CPT

## 2024-10-17 PROCEDURE — 54161 CIRCUM 28 DAYS OR OLDER: CPT

## 2024-10-17 PROCEDURE — 14040 TIS TRNFR F/C/C/M/N/A/G/H/F: CPT

## 2024-10-17 RX ORDER — CEFAZOLIN SODIUM 2 G/50ML
2000 SOLUTION INTRAVENOUS ONCE
Status: DISCONTINUED | OUTPATIENT
Start: 2024-10-17 | End: 2024-10-17 | Stop reason: HOSPADM

## 2024-10-17 RX ORDER — DEXAMETHASONE SODIUM PHOSPHATE 10 MG/ML
INJECTION, SOLUTION INTRAMUSCULAR; INTRAVENOUS AS NEEDED
Status: DISCONTINUED | OUTPATIENT
Start: 2024-10-17 | End: 2024-10-17

## 2024-10-17 RX ORDER — PROPOFOL 10 MG/ML
INJECTION, EMULSION INTRAVENOUS AS NEEDED
Status: DISCONTINUED | OUTPATIENT
Start: 2024-10-17 | End: 2024-10-17

## 2024-10-17 RX ORDER — HYDROMORPHONE HCL IN WATER/PF 6 MG/30 ML
0.2 PATIENT CONTROLLED ANALGESIA SYRINGE INTRAVENOUS
Status: DISCONTINUED | OUTPATIENT
Start: 2024-10-17 | End: 2024-10-17 | Stop reason: HOSPADM

## 2024-10-17 RX ORDER — MAGNESIUM HYDROXIDE 1200 MG/15ML
LIQUID ORAL AS NEEDED
Status: DISCONTINUED | OUTPATIENT
Start: 2024-10-17 | End: 2024-10-17 | Stop reason: HOSPADM

## 2024-10-17 RX ORDER — LIDOCAINE HYDROCHLORIDE 10 MG/ML
INJECTION, SOLUTION EPIDURAL; INFILTRATION; INTRACAUDAL; PERINEURAL AS NEEDED
Status: DISCONTINUED | OUTPATIENT
Start: 2024-10-17 | End: 2024-10-17

## 2024-10-17 RX ORDER — FENTANYL CITRATE 50 UG/ML
INJECTION, SOLUTION INTRAMUSCULAR; INTRAVENOUS AS NEEDED
Status: DISCONTINUED | OUTPATIENT
Start: 2024-10-17 | End: 2024-10-17

## 2024-10-17 RX ORDER — BACITRACIN, NEOMYCIN, POLYMYXIN B 400; 3.5; 5 [USP'U]/G; MG/G; [USP'U]/G
OINTMENT TOPICAL AS NEEDED
Status: DISCONTINUED | OUTPATIENT
Start: 2024-10-17 | End: 2024-10-17 | Stop reason: HOSPADM

## 2024-10-17 RX ORDER — BUPIVACAINE HYDROCHLORIDE 2.5 MG/ML
INJECTION, SOLUTION EPIDURAL; INFILTRATION; INTRACAUDAL AS NEEDED
Status: DISCONTINUED | OUTPATIENT
Start: 2024-10-17 | End: 2024-10-17 | Stop reason: HOSPADM

## 2024-10-17 RX ORDER — SODIUM CHLORIDE, SODIUM LACTATE, POTASSIUM CHLORIDE, CALCIUM CHLORIDE 600; 310; 30; 20 MG/100ML; MG/100ML; MG/100ML; MG/100ML
125 INJECTION, SOLUTION INTRAVENOUS CONTINUOUS
Status: DISCONTINUED | OUTPATIENT
Start: 2024-10-17 | End: 2024-10-17 | Stop reason: HOSPADM

## 2024-10-17 RX ORDER — ONDANSETRON 2 MG/ML
INJECTION INTRAMUSCULAR; INTRAVENOUS AS NEEDED
Status: DISCONTINUED | OUTPATIENT
Start: 2024-10-17 | End: 2024-10-17

## 2024-10-17 RX ORDER — PHENYLEPHRINE HCL IN 0.9% NACL 1 MG/10 ML
SYRINGE (ML) INTRAVENOUS AS NEEDED
Status: DISCONTINUED | OUTPATIENT
Start: 2024-10-17 | End: 2024-10-17

## 2024-10-17 RX ORDER — ACETAMINOPHEN 500 MG
1000 TABLET ORAL EVERY 6 HOURS
Qty: 40 TABLET | Refills: 0 | Status: SHIPPED | OUTPATIENT
Start: 2024-10-17 | End: 2024-10-22

## 2024-10-17 RX ORDER — HYDROMORPHONE HCL/PF 1 MG/ML
SYRINGE (ML) INJECTION AS NEEDED
Status: DISCONTINUED | OUTPATIENT
Start: 2024-10-17 | End: 2024-10-17

## 2024-10-17 RX ORDER — LEVOFLOXACIN 5 MG/ML
INJECTION, SOLUTION INTRAVENOUS CONTINUOUS PRN
Status: DISCONTINUED | OUTPATIENT
Start: 2024-10-17 | End: 2024-10-17

## 2024-10-17 RX ORDER — LEVOFLOXACIN 5 MG/ML
500 INJECTION, SOLUTION INTRAVENOUS ONCE
Status: DISCONTINUED | OUTPATIENT
Start: 2024-10-17 | End: 2024-10-17

## 2024-10-17 RX ADMIN — HYDROMORPHONE HYDROCHLORIDE 0.5 MG: 1 INJECTION, SOLUTION INTRAMUSCULAR; INTRAVENOUS; SUBCUTANEOUS at 08:19

## 2024-10-17 RX ADMIN — LIDOCAINE HYDROCHLORIDE 50 MG: 10 INJECTION, SOLUTION EPIDURAL; INFILTRATION; INTRACAUDAL; PERINEURAL at 07:37

## 2024-10-17 RX ADMIN — FENTANYL CITRATE 25 MCG: 50 INJECTION INTRAMUSCULAR; INTRAVENOUS at 07:58

## 2024-10-17 RX ADMIN — Medication 150 MCG: at 07:45

## 2024-10-17 RX ADMIN — Medication 200 MCG: at 07:53

## 2024-10-17 RX ADMIN — PROPOFOL 100 MG: 10 INJECTION, EMULSION INTRAVENOUS at 07:37

## 2024-10-17 RX ADMIN — SODIUM CHLORIDE, SODIUM LACTATE, POTASSIUM CHLORIDE, AND CALCIUM CHLORIDE: .6; .31; .03; .02 INJECTION, SOLUTION INTRAVENOUS at 08:25

## 2024-10-17 RX ADMIN — SODIUM CHLORIDE, SODIUM LACTATE, POTASSIUM CHLORIDE, AND CALCIUM CHLORIDE: .6; .31; .03; .02 INJECTION, SOLUTION INTRAVENOUS at 07:32

## 2024-10-17 RX ADMIN — FENTANYL CITRATE 25 MCG: 50 INJECTION INTRAMUSCULAR; INTRAVENOUS at 07:49

## 2024-10-17 RX ADMIN — Medication 150 MCG: at 07:49

## 2024-10-17 RX ADMIN — DEXAMETHASONE SODIUM PHOSPHATE 10 MG: 10 INJECTION, SOLUTION INTRAMUSCULAR; INTRAVENOUS at 07:57

## 2024-10-17 RX ADMIN — Medication 200 MCG: at 08:05

## 2024-10-17 RX ADMIN — PROPOFOL 50 MG: 10 INJECTION, EMULSION INTRAVENOUS at 07:42

## 2024-10-17 RX ADMIN — Medication 100 MCG: at 08:02

## 2024-10-17 RX ADMIN — PROPOFOL 50 MG: 10 INJECTION, EMULSION INTRAVENOUS at 07:39

## 2024-10-17 RX ADMIN — FENTANYL CITRATE 25 MCG: 50 INJECTION INTRAMUSCULAR; INTRAVENOUS at 07:54

## 2024-10-17 RX ADMIN — FENTANYL CITRATE 25 MCG: 50 INJECTION INTRAMUSCULAR; INTRAVENOUS at 07:45

## 2024-10-17 RX ADMIN — Medication 100 MCG: at 07:37

## 2024-10-17 RX ADMIN — LEVOFLOXACIN: 500 INJECTION, SOLUTION INTRAVENOUS at 07:45

## 2024-10-17 RX ADMIN — PHENYLEPHRINE HYDROCHLORIDE 50 MCG/MIN: 10 INJECTION INTRAVENOUS at 08:13

## 2024-10-17 RX ADMIN — ONDANSETRON 4 MG: 2 INJECTION INTRAMUSCULAR; INTRAVENOUS at 08:24

## 2024-10-17 NOTE — PROGRESS NOTES
[facility] - PROCEDURE RESPONSE NOTE    Based on the query that was sent to you, please document below any procedures that were performed.    2 square cm of adjacent tissue transfer of preputial skin to cover defect from prior scar tissue excision.

## 2024-10-17 NOTE — ANESTHESIA PREPROCEDURE EVALUATION
Procedure:  CIRCUMCISION ADULT (Pelvis)    Relevant Problems   CARDIO   (+) CAD (coronary artery disease)   (+) Hyperlipidemia   (+) Primary hypertension      GI/HEPATIC   (+) GERD (gastroesophageal reflux disease)      /RENAL   (+) Stage 3 chronic kidney disease (HCC)      HEMATOLOGY   (+) Macrocytic anemia      MUSCULOSKELETAL   (+) Acute left-sided low back pain with left-sided sciatica   (+) Intractable back pain   (+) Lumbar spondylosis   (+) 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      NEURO/PSYCH   (+) Continuous opioid dependence (HCC)        Physical Exam    Airway    Mallampati score: III  TM Distance: >3 FB  Neck ROM: full     Dental   No notable dental hx     Cardiovascular  Cardiovascular exam normal    Pulmonary  Pulmonary exam normal     Other Findings        Anesthesia Plan  ASA Score- 3     Anesthesia Type- general with ASA Monitors.         Additional Monitors:     Airway Plan: LMA.           Plan Factors-Exercise tolerance (METS): >4 METS.    Chart reviewed. EKG reviewed.  Existing labs reviewed. Patient summary reviewed.    Patient is not a current smoker.              Induction- intravenous.    Postoperative Plan- Plan for postoperative opioid use.         Informed Consent- Anesthetic plan and risks discussed with patient.  I personally reviewed this patient with the CRNA. Discussed and agreed on the Anesthesia Plan with the CRNA..

## 2024-10-17 NOTE — DISCHARGE INSTR - AVS FIRST PAGE
Pain Control     Pain control is important to the healing process. You should expect to have some pain in the days after surgery even when on pain medication. The goal of the medication is not to make you completely pain free, but instead to make sure that your pain does not prevent you from performing daily activities and recovering from your procedure. I understand anxiety that you may have taking stronger pain medications. Please follow these instructions in order to minimize the use of addictive pain medications while making sure that your pain is treated appropriately so that you can focus on healing from your surgery.     Tylenol (non-addictive)   If you have a prescription for Tylenol, please take this medication at a dose of 1000mg (two extra strength Tylenol pills) every 6-8 hours around the clock. DO NOT take more than 4000mg of Tylenol in a 24 hour period. Tylenol is a powerful pain medication at these doses and it is important to take is as scheduled to keep your pain under control. Tylenol should typically be the last medication that you stop when your pain is improved.

## 2024-10-17 NOTE — OP NOTE
OPERATIVE REPORT  PATIENT NAME: Suhail Borrego    :  1940  MRN: [id number]  Pt Location: [facility] OR ROOM 03    SURGERY DATE: 10/17/2024    Surgeons and Role:     * Brody Hoyt MD - Primary    Preop Diagnosis:  Phimosis [N47.1]    Post-Op Diagnosis Codes:     * Phimosis [N47.1]    Procedure(s):  CIRCUMCISION WITH ADJACENT TISSUE TRANSFER    Specimen(s):  ID Type Source Tests Collected by Time Destination   1 : foreskin Tissue Foreskin TISSUE EXAM Brody Hoyt MD 10/17/2024 0754        Estimated Blood Loss:   Minimal    Drains:  Urethral Catheter 14 Fr. (Active)   Number of days: 77       Anesthesia Type:   General    Operative Indications:  Phimosis [N47.1]      Operative Findings:  Phimosis with significant dorsal scar and reduced mucosal collar   Adjacent foreskin tissue transfer performed for even skin coverage   Excellent hemostasis      Complications:   None    Procedure and Technique:  The patient was identified and marked in the pre-op holding area to the satisfaction of nursing and anesthesia. The risks and benefits of the procedure were explained to the patient, as well as any alternative therapies. The patient agreed to proceed with the procedures listed above and written consent was obtained. Pre-op antibiotics were administered.     The patient was transported to the operating room where a preprocedural timeout was performed with all parties in agreement to the patient's identity as well as the scheduled procedure. The patient was placed on the operating room table in the supine position. After successful induction of anesthesia he was prepped and draped in the standard fashion.     The foreskin could not be retracted due to tight phimosis. A 12 o'clock incision was made through the foreskin and the phimotic band down to the mid shaft. The foreskin could then be retracted. Gauze was used to lyse penile adhesions to the preputial mucosa. A mismatch was noted in mucosal availability dorsally and  ventrally. a marking pen was used to make a circumferential laney  around the penile head approximately 1-2 cm from the coronal sulcus following the curvature of the glans. The skin was then replaced over the glans and the penis was placed on stretch to emulate an erection. The marking pen was used again to outline the coronal sulcus. The ventral foreskin was incised down to the laney. A 3-0 Chromic suture was used to reapproximate the ventral foreskin and the dorsal foreskin to the mucosal collar with tissue rearrangement performed in order to create and even distribution of skin coverage given the mismatch. A combination of blunt dissection and electrocautery were used to deglove the penile shaft skin between the circumferential incisions.Cautery was used to remove the resulting wings of foreskin tissue to complete the circumcision at the lateral aspects. 3-0 chromic suture was used to reapproximate the skin to the mucosa at 3 and 9 o'clock. Once removed, the excess foreskin was sent to pathology.     Hemostasis was achieved with point electrocautery. The penile shaft skin defect was re-approximated with interrupted 3-0 and chromic stitches. Once closed, the incision was dressed with a loose gauze wrap and vaseline gauze over the incision. The patient was awoken from anesthesia and transported to PACU in stable condition.    IBrody MD, performed the entire procedure as the primary attending surgeon.         Patient Disposition:  PACU              SIGNATURE: Brody Hoyt MD  DATE: October 17, 2024  TIME: 8:50 AM

## 2024-10-17 NOTE — ANESTHESIA POSTPROCEDURE EVALUATION
Post-Op Assessment Note    CV Status:  Stable  Pain Score: 0    Pain management: adequate       Mental Status:  Sleepy   Hydration Status:  Stable   PONV Controlled:  None   Airway Patency:  Patent     Post Op Vitals Reviewed: Yes    No anethesia notable event occurred.    Staff: Anesthesiologist, CRNA           Last Filed PACU Vitals:  Vitals Value Taken Time   Temp 97.5    Pulse 71 10/17/24 0855   /74    Resp 12    SpO2 100 % 10/17/24 0855   Vitals shown include unfiled device data.    Modified Benji:  No data recorded

## 2024-10-17 NOTE — INTERVAL H&P NOTE
H&P reviewed. After examining the patient I find no changes in the patients condition since the H&P had been written.    There were no vitals filed for this visit.    Proceed with circumcision as planned.    Brody Hoyt MD   [facility]   [facility]

## 2024-10-17 NOTE — TELEPHONE ENCOUNTER
----- Message from Brody Hoyt MD sent at 10/17/2024 12:15 PM EDT -----  This patient is s/p circumcision with me today.     Can we have him follow up with Jayden or another AP in 2-3 weeks for a wound check please patient tentatively scheduled 11/6 @ 9:00 at [facility]

## 2024-10-18 NOTE — TELEPHONE ENCOUNTER
Pt returning call to clinical and confirmed f/u appt on 11/6 at 9am. Provided address for office and no further questions or concerns at this time.

## 2024-10-19 DIAGNOSIS — I10 HYPERTENSION, UNSPECIFIED TYPE: ICD-10-CM

## 2024-10-21 RX ORDER — METOPROLOL TARTRATE 50 MG
25 TABLET ORAL DAILY
Qty: 15 TABLET | Refills: 0 | Status: SHIPPED | OUTPATIENT
Start: 2024-10-21

## 2024-10-21 NOTE — TELEPHONE ENCOUNTER
Patient called requesting refill for traMADol (Ultram). Patient made aware medication was refilled on 7/15/24 for 90 tablets with 2 refills to [facility] pharmacy. Patient instructed to contact the pharmacy to obtain refills of medication. Patient verbalized understanding.

## 2024-10-21 NOTE — TELEPHONE ENCOUNTER
Patient needs an appointment. Please contact the patient to schedule an appointment. Last office visit: 10/30/24 patient was to return in 6 months. Courtesy refill given

## 2024-10-22 PROCEDURE — 88304 TISSUE EXAM BY PATHOLOGIST: CPT | Performed by: PATHOLOGY

## 2024-11-06 ENCOUNTER — OFFICE VISIT (OUTPATIENT)
Dept: UROLOGY | Facility: CLINIC | Age: 84
End: 2024-11-06

## 2024-11-06 VITALS
DIASTOLIC BLOOD PRESSURE: 60 MMHG | HEART RATE: 64 BPM | BODY MASS INDEX: 25.11 KG/M2 | HEIGHT: 67 IN | WEIGHT: 160 LBS | OXYGEN SATURATION: 96 % | SYSTOLIC BLOOD PRESSURE: 120 MMHG

## 2024-11-06 DIAGNOSIS — N47.1 PHIMOSIS OF PENIS: Primary | ICD-10-CM

## 2024-11-06 PROCEDURE — 99024 POSTOP FOLLOW-UP VISIT: CPT

## 2024-11-06 NOTE — PROGRESS NOTES
11/6/2024      No chief complaint on file.        Assessment and Plan    84 y.o. male managed by Dr. Hoyt    Phimosis  -s/p circumcision with adjacent tissue transfer with Dr. Hoyt 10/17/24  -operative findings showed phimosis with significant dorsal scar and reduced mucosal collar, adjacent foreskin tissue transfer performed for even skin coverage. Excellent hemostasis.  -patients wound is healing well. No swelling or purulent wound drainage. There is slight erythema around penis.  -Patient will return in 4 weeks for repeat wound check    History of Present Illness  Suhail Borrego is a 84 y.o. male here for wound check. He has hx of severe phimosis and balanitis.         Review of Systems   Constitutional:  Negative for activity change, fatigue and fever.   HENT:  Negative for congestion, rhinorrhea and sore throat.    Eyes:  Negative for photophobia, redness and visual disturbance.   Respiratory:  Negative for cough, shortness of breath and wheezing.    Cardiovascular:  Negative for chest pain, palpitations and leg swelling.   Gastrointestinal:  Negative for abdominal pain, diarrhea, nausea and vomiting.   Genitourinary:  Negative for dysuria, flank pain, frequency, hematuria, penile pain, penile swelling, scrotal swelling, testicular pain and urgency.   Neurological:  Negative for weakness, light-headedness and headaches.           AUA SYMPTOM SCORE      Flowsheet Row Most Recent Value   AUA SYMPTOM SCORE    How often have you had a sensation of not emptying your bladder completely after you finished urinating? 0 (P)     How often have you had to urinate again less than two hours after you finished urinating? 2 (P)     How often have you found you stopped and started again several times when you urinate? 2 (P)     How often have you found it difficult to postpone urination? 2 (P)     How often have you had a weak urinary stream? 2 (P)     How often have you had to push or strain to begin urination? 2 (P)  "    How many times did you most typically get up to urinate from the time you went to bed at night until the time you got up in the morning? 2 (P)     Quality of Life: If you were to spend the rest of your life with your urinary condition just the way it is now, how would you feel about that? 1 (P)     AUA SYMPTOM SCORE 12 (P)               Vitals  Vitals:    11/06/24 0847   BP: 120/60   BP Location: Left arm   Patient Position: Sitting   Cuff Size: Standard   Pulse: 64   SpO2: 96%   Weight: 72.6 kg (160 lb)   Height: 5' 6.5\" (1.689 m)       Physical Exam  Constitutional:       Appearance: Normal appearance. He is not toxic-appearing.   HENT:      Head: Normocephalic.      Mouth/Throat:      Pharynx: Oropharynx is clear.   Eyes:      Extraocular Movements: Extraocular movements intact.      Pupils: Pupils are equal, round, and reactive to light.   Pulmonary:      Effort: Pulmonary effort is normal. No respiratory distress.   Genitourinary:     Comments: No swelling or purulent wound drainage. There is slight erythema around penis.  Musculoskeletal:         General: Normal range of motion.      Cervical back: Normal range of motion.   Neurological:      Mental Status: He is alert and oriented to person, place, and time. Mental status is at baseline.      Gait: Gait normal.   Psychiatric:         Mood and Affect: Mood normal.         Behavior: Behavior normal.         Thought Content: Thought content normal.         Judgment: Judgment normal.           Past History  Past Medical History:   Diagnosis Date    Abnormal liver function test     RESOLVED: 14SEP2017    Allergic rhinitis     Anemia     LAST ASSESSED: 19OCT2017    Arthritis     BPH (benign prostatic hyperplasia)     CAD (coronary artery disease)     Coronary artery disease     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HCC)     LAST ASSESSED: 05OCT2017    Former tobacco use     GERD (gastroesophageal reflux disease)     Hand paresthesia     RESOLVED: 11SEP2017    Hyperlipidemia "     Hypertension     Lumbar stenosis     Peripheral artery disease (HCC)     LAST ASSESSED: 2017    Stage 3a chronic kidney disease (HCC) 2021     Social History     Socioeconomic History    Marital status:      Spouse name: None    Number of children: None    Years of education: some college     Highest education level: None   Occupational History    Occupation: Insurance     Comment: Retired    Tobacco Use    Smoking status: Former     Current packs/day: 0.00     Average packs/day: 1 pack/day for 50.0 years (50.0 ttl pk-yrs)     Types: Cigarettes     Start date:      Quit date:      Years since quittin.8    Smokeless tobacco: Never   Vaping Use    Vaping status: Never Used   Substance and Sexual Activity    Alcohol use: Not Currently     Alcohol/week: 1.0 standard drink of alcohol     Types: 1 Shots of liquor per week     Comment: beer, wine, scotch every day; SOCIAL AS PER ALL SCRIPTS     Drug use: Not Currently     Types: Marijuana     Comment: medical clarke    Sexual activity: Not Currently   Other Topics Concern    None   Social History Narrative    Daily coffee consumption      Social Determinants of Health     Financial Resource Strain: Low Risk  (2024)    Overall Financial Resource Strain (CARDIA)     Difficulty of Paying Living Expenses: Not hard at all   Food Insecurity: No Food Insecurity (2024)    Nursing - Inadequate Food Risk Classification     Worried About Running Out of Food in the Last Year: Never true     Ran Out of Food in the Last Year: Never true     Ran Out of Food in the Last Year: Not on file   Transportation Needs: No Transportation Needs (2024)    PRAPARE - Transportation     Lack of Transportation (Medical): No     Lack of Transportation (Non-Medical): No   Physical Activity: Not on file   Stress: Not on file   Social Connections: Unknown (2024)    Received from [facility]    Social CDC Software     How often do you feel lonely or isolated  from those around you? (Adult - for ages 18 years and over): Not on file   Intimate Partner Violence: Not on file   Housing Stability: Low Risk  (2024)    Housing Stability Vital Sign     Unable to Pay for Housing in the Last Year: No     Number of Times Moved in the Last Year: 0     Homeless in the Last Year: No     Social History     Tobacco Use   Smoking Status Former    Current packs/day: 0.00    Average packs/day: 1 pack/day for 50.0 years (50.0 ttl pk-yrs)    Types: Cigarettes    Start date:     Quit date:     Years since quittin.8   Smokeless Tobacco Never     Family History   Problem Relation Age of Onset    Emphysema Mother     Liver disease Mother     Coronary artery disease Father     Hypertension Father     Liver disease Father     Heart failure Father     Stroke Father         CVA     Heart attack Father     Coronary artery disease Brother     Heart disease Brother         younger brother by pass and other brother 4 stents placed    Other Family         BACK PROBLEM     Stroke Family         CVA    Emphysema Family     Hypertension Family         BENIGN       The following portions of the patient's history were reviewed and updated as appropriate: allergies, current medications, past medical history, past social history, past surgical history and problem list.    Results  No results found for this or any previous visit (from the past 1 hour(s)).]  Lab Results   Component Value Date    PSA <0.1 2020    PSA 0.3 06/10/2015    PSA 0.3 2014     Lab Results   Component Value Date    GLUCOSE 123 2024    CALCIUM 9.5 10/04/2024     06/10/2015    K 4.7 10/04/2024    CO2 29 10/04/2024     10/04/2024    BUN 25 10/04/2024    CREATININE 1.17 10/04/2024     Lab Results   Component Value Date    WBC 13.13 (H) 10/04/2024    HGB 11.0 (L) 10/04/2024    HCT 34.6 (L) 10/04/2024    MCV 98 10/04/2024     10/04/2024       KEVIN Barnett

## 2024-11-08 ENCOUNTER — HOSPITAL ENCOUNTER (OUTPATIENT)
Dept: NON INVASIVE DIAGNOSTICS | Facility: CLINIC | Age: 84
Discharge: HOME | End: 2024-11-08
Payer: COMMERCIAL

## 2024-11-08 DIAGNOSIS — I65.22 SYMPTOMATIC STENOSIS OF LEFT CAROTID ARTERY: ICD-10-CM

## 2024-11-08 DIAGNOSIS — I73.9 PERIPHERAL ARTERY DISEASE (HCC): ICD-10-CM

## 2024-11-08 PROCEDURE — 93923 UPR/LXTR ART STDY 3+ LVLS: CPT

## 2024-11-08 PROCEDURE — 93925 LOWER EXTREMITY STUDY: CPT | Performed by: SURGERY

## 2024-11-08 PROCEDURE — 93880 EXTRACRANIAL BILAT STUDY: CPT

## 2024-11-08 PROCEDURE — 93925 LOWER EXTREMITY STUDY: CPT

## 2024-11-08 PROCEDURE — 93922 UPR/L XTREMITY ART 2 LEVELS: CPT | Performed by: SURGERY

## 2024-11-08 PROCEDURE — 93880 EXTRACRANIAL BILAT STUDY: CPT | Performed by: SURGERY

## 2024-11-14 ENCOUNTER — NURSE TRIAGE (OUTPATIENT)
Age: 84
End: 2024-11-14

## 2024-11-14 NOTE — TELEPHONE ENCOUNTER
Last seen with Cecilia ALLEN 11/06/24; s/p circumcision.  circumcision sites are healing well; no signs/symptoms of infection;    Patients reports small area at top of penis near opening that occasionally bleeds; it sticks to his brief when dry;  otherwise he feels well;     He tried neosporin cream last 2 day without improvement;  patient will apply vaseline to site; and call back with any concerns.  He denies hematuria, redness, pain, or any urinary concerns. Patient is willing to come into office if you want to see him sooner.     Next office visit f/u darell for 12/4/24      Reason for Disposition   Post-Op circumcision, home care    Answer Assessment - Initial Assessment Questions  1. When did your symptoms start?  Small amount of blood continues at top of penis to right of opening;  using vaseline or neosporin cream;  no improvement since last week;  small amount of blood on shorts at times;  Feels well overall.    2. Do you have any incisions or drains in place? If yes, is it draining?   Surgical sites from circumcision healing well;    3. Do you have a fever? If yes, how did you take it and what was your temperature? Did you take anything to relieve your temperature?   no  4. Have you become unable to urinate? Does your stream spray (especially for males)?    Voiding well  5. Do you have pain? If yes, where, and what is the severity of your pain?   no  6. Do you have a catheter in place? If yes, what kind? Is it draining?   no  7. Do you have any blood clots?   no    Protocols used: Urology-Post-Op Problem-ADULT-[place]

## 2024-11-14 NOTE — TELEPHONE ENCOUNTER
Recommend applying Vaseline/aquaphor to site of irritation. Follow up as scheduled for physical exam

## 2024-11-18 ENCOUNTER — TELEPHONE (OUTPATIENT)
Age: 84
End: 2024-11-18

## 2024-11-18 NOTE — TELEPHONE ENCOUNTER
Caller: Marisol (daughter)    Doctor: Lorraine    Reason for call: If possible, requesting to convert 11/20 RR appointment to virtual; LS 7/2024.    Spoke with Nicole. Duyen approved virtual appointment.    Call back#: [phone]

## 2024-11-20 ENCOUNTER — TELEMEDICINE (OUTPATIENT)
Dept: VASCULAR SURGERY | Facility: CLINIC | Age: 84
End: 2024-11-20
Payer: COMMERCIAL

## 2024-11-20 DIAGNOSIS — I10 PRIMARY HYPERTENSION: Primary | ICD-10-CM

## 2024-11-20 DIAGNOSIS — I73.9 PERIPHERAL ARTERY DISEASE (HCC): ICD-10-CM

## 2024-11-20 DIAGNOSIS — I65.23 CAROTID STENOSIS, ASYMPTOMATIC, BILATERAL: ICD-10-CM

## 2024-11-20 PROCEDURE — 99213 OFFICE O/P EST LOW 20 MIN: CPT | Performed by: SURGERY

## 2024-11-20 NOTE — ASSESSMENT & PLAN NOTE
Most recent duplex was reviewed with him he remains clinically asymptomatic I recommended continued medical management and surveillance he had stopped taking his aspirin which I recommended he resume.  Continue statin and we will order a lower extremity arterial duplex in 1 year.

## 2024-11-20 NOTE — PROGRESS NOTES
Virtual Brief Visit  Name: Suhail Borrego      : 1940      MRN: [id number]  Encounter Provider: Luis Miguel Peters DO  Encounter Date: 2024   Encounter department: [facility]    This Visit is being completed by telephone. The Patient is located at Home and in the following state in which I hold an active license [place]    The patient was identified by name and date of birth. Suhail Borrego was informed that this is a telemedicine visit and that the visit is being conducted through the [employer] Teams platform. He agrees to proceed..  My office door was closed. No one else was in the room.  He acknowledged consent and understanding of privacy and security of the video platform. The patient has agreed to participate and understands they can discontinue the visit at any time.    Patient is aware this is a billable service.     :  Assessment & Plan  Primary hypertension         Peripheral artery disease (HCC)    Orders:    VAS ARTERIAL DUPLEX- LOWER LIMB BILATERAL; Future    Carotid stenosis, asymptomatic, bilateral         Primary hypertension         Peripheral artery disease (HCC)  Most recent duplex was reviewed with him he remains clinically asymptomatic I recommended continued medical management and surveillance he had stopped taking his aspirin which I recommended he resume.  Continue statin and we will order a lower extremity arterial duplex in 1 year.       Carotid stenosis, asymptomatic, bilateral  4-month status post left carotid endarterectomy and retrograde left common carotid angioplasty and stenting this was done for symptomatic left sided lesion.  He did very well postoperatively he had had a stroke preop but denies any new neurologic changes.  His initial postop visit was done with our vascular JESS and his incision was healing well at this time he requested a virtual visit due to his frailty.  He reports his incision is well-healed I reviewed the results of his most recent  duplex with him which demonstrates mild residual disease in the left common carotid as well as a right mild carotid stenosis.  I recommend that he continue his aspirin and statin and we will plan for continued carotid duplex per our standard protocol In 6 months             History of Present Illness   HPI    Visit Time  Total Visit Duration: 20

## 2024-11-20 NOTE — ASSESSMENT & PLAN NOTE
4-month status post left carotid endarterectomy and retrograde left common carotid angioplasty and stenting this was done for symptomatic left sided lesion.  He did very well postoperatively he had had a stroke preop but denies any new neurologic changes.  His initial postop visit was done with our vascular JESS and his incision was healing well at this time he requested a virtual visit due to his frailty.  He reports his incision is well-healed I reviewed the results of his most recent duplex with him which demonstrates mild residual disease in the left common carotid as well as a right mild carotid stenosis.  I recommend that he continue his aspirin and statin and we will plan for continued carotid duplex per our standard protocol In 6 months

## 2024-11-20 NOTE — ASSESSMENT & PLAN NOTE
Orders:    VAS ARTERIAL DUPLEX- LOWER LIMB BILATERAL; Future     12/26/23 @ 1910 - I Spoke with Patient regarding potential Kidney that has become available.      [x]  Patient states they are feeling well today   [x]  Patient denies any potential exposure to COVID19  [x]  Patient denies fever, chills, shortness of breath, any flu-like or new symptoms.   [x]  Patient denies being started on any new antibiotics/antifungals/new medications   [x]  Patient is near home and has transportation to get to the hospital  [x]  No insurance changes  [x]  No new concerns/conditions that would prevent a transplant    Patient feeling well. No contraindications to transplant immediately identified on the phone at this time. Patient notified that she is back up for this kidney. She verbalizes her understanding. All questions were answered. I let her know I will round back with her by 0730 tomorrow morning at the latest if some form of an update. And sooner if anything known before then.     Patient was asked to be NPO as of  midnight tonight 12/27/2023 and told they may take medications with a sip of water.    Dr. Jason Carter MD will be contacting you to go over a short consent process.     12/26/23 @ 1955 (Date/Time/MD consenting). Writer was on the phone with Dr. Carter when he consented Smita for this DCD donor. She agreed to proceed and verbalized her understanding. All questions were answered by Dr. Carter or myself after the consent process.    12/27/23 @ 0700 - I contacted Smita and let her know that the kidneys were accepted by the recipients just in front of her on the list and there will not be a kidney transplant for her today. She was taken off of standby and told she can eat and drink again. We discussed future calls and answering all incoming numbers and keeping her ringer on at night. She verbalized her understanding and all questions were answered at this time. If she has any future questions she can contact her kidney coordinator Parul.

## 2024-12-01 ENCOUNTER — TELEPHONE (OUTPATIENT)
Dept: OTHER | Facility: OTHER | Age: 84
End: 2024-12-01

## 2024-12-02 NOTE — TELEPHONE ENCOUNTER
"Pt stated, \" I need a refill on my medication.\"       traMADol (Ultram) 50 mg tablet [[id number]]    Order Details  Dose: 50 mg Route: Oral Frequency: Every 8 hours PRN for moderate pain   Dispense Quantity: 90 tablet Refills: 2          Sig: Take 1 tablet (50 mg total) by mouth every 8 (eight) hours as needed for moderate pain     "
AS this was filled last on 7/15 with two refills the last refill was filled on 10/19 for 90 tabs.    Would this be something you would fill and we can make an appt to have him seen? He currently does not have an office visit.  
KOURTNEY to schedule follow up for refills  
Please schedule OVS for medication refill. Thanks  
Yes, he will need an OV for refills  
none

## 2024-12-03 ENCOUNTER — TELEPHONE (OUTPATIENT)
Age: 84
End: 2024-12-03

## 2024-12-03 NOTE — TELEPHONE ENCOUNTER
Jenn, Does the pt need ovs first before you will send another script? Looks like the tramadol was LP 7/15 w/ 2 RF. Last filled on 10/19.  Pls advise.

## 2024-12-03 NOTE — TELEPHONE ENCOUNTER
Caller: Pt    Doctor: Jenn Norman    Reason for call: PT indicates returning call to nurse regarding tramadol refill.     Please assist.    Call back#: [phone]

## 2024-12-04 ENCOUNTER — OFFICE VISIT (OUTPATIENT)
Dept: UROLOGY | Facility: CLINIC | Age: 84
End: 2024-12-04

## 2024-12-04 VITALS
HEART RATE: 73 BPM | WEIGHT: 140 LBS | BODY MASS INDEX: 21.22 KG/M2 | OXYGEN SATURATION: 91 % | SYSTOLIC BLOOD PRESSURE: 90 MMHG | HEIGHT: 68 IN | DIASTOLIC BLOOD PRESSURE: 60 MMHG

## 2024-12-04 DIAGNOSIS — G89.4 CHRONIC PAIN SYNDROME: ICD-10-CM

## 2024-12-04 DIAGNOSIS — N47.1 PHIMOSIS OF PENIS: Primary | ICD-10-CM

## 2024-12-04 PROCEDURE — 99024 POSTOP FOLLOW-UP VISIT: CPT | Performed by: PHYSICIAN ASSISTANT

## 2024-12-04 RX ORDER — TRAMADOL HYDROCHLORIDE 50 MG/1
50 TABLET ORAL EVERY 8 HOURS PRN
Qty: 90 TABLET | Refills: 0 | Status: SHIPPED | OUTPATIENT
Start: 2024-12-04

## 2024-12-04 NOTE — TELEPHONE ENCOUNTER
S/w the patient and reviewed.  He was wondering if he could do a zoom OVS?   I stated I would have to inquire c/ AS.  Please send to clerical to schedule whatever you would like. Thanks

## 2024-12-04 NOTE — TELEPHONE ENCOUNTER
Yes he will need an office visit for future refills, I did send another prescription over to his pharmacy that he can  today, 12/4/2024.  This should be able to get him to his next appointment

## 2024-12-04 NOTE — PROGRESS NOTES
UROLOGY PROGRESS NOTE   Patient Identifiers: Suhail Borrego (MRN [id number])  Date of Service: 12/4/2024    Subjective:   84-year-old man history of phimosis.  He had a circumcision October 17 and is here for follow-up wound check.  No problems with his wound.    Reason for visit: Phimosis/circumcision follow-up    Objective:     VITALS:    There were no vitals filed for this visit.  AUA SYMPTOM SCORE      Flowsheet Row Most Recent Value   AUA SYMPTOM SCORE    How often have you had a sensation of not emptying your bladder completely after you finished urinating? 1 (P)     How often have you had to urinate again less than two hours after you finished urinating? 1 (P)     How often have you found you stopped and started again several times when you urinate? 1 (P)     How often have you found it difficult to postpone urination? 0 (P)     How often have you had a weak urinary stream? 1 (P)     How often have you had to push or strain to begin urination? 1 (P)     How many times did you most typically get up to urinate from the time you went to bed at night until the time you got up in the morning? 2 (P)     Quality of Life: If you were to spend the rest of your life with your urinary condition just the way it is now, how would you feel about that? 2 (P)     AUA SYMPTOM SCORE 7 (P)                LABS:  Lab Results   Component Value Date    HGB 11.0 (L) 10/04/2024    HCT 34.6 (L) 10/04/2024    WBC 13.13 (H) 10/04/2024     10/04/2024   ]    Lab Results   Component Value Date     06/10/2015    K 4.7 10/04/2024     10/04/2024    CO2 29 10/04/2024    BUN 25 10/04/2024    CREATININE 1.17 10/04/2024    CALCIUM 9.5 10/04/2024    GLUCOSE 123 06/30/2024   ]        INPATIENT MEDS:    Current Outpatient Medications:     acetaminophen (TYLENOL) 650 mg CR tablet, Take 650 mg by mouth every 8 (eight) hours as needed for mild pain, Disp: , Rfl:     Ascorbic Acid (Vitamin C) 500 MG PACK, Take 1,000 mg by mouth  daily, Disp: , Rfl:     atorvastatin (LIPITOR) 80 mg tablet, TAKE 1 TABLET BY MOUTH EVERY DAY IN THE MORNING, Disp: 90 tablet, Rfl: 3    cholecalciferol (VITAMIN D3) 1,000 units tablet, Take 2,000 Units by mouth daily, Disp: , Rfl:     cyanocobalamin (VITAMIN B-12) 1,000 mcg tablet, Take 1,000 mcg by mouth daily  , Disp: , Rfl:     docusate sodium (COLACE) 100 mg capsule, Take 100 mg by mouth 2 (two) times a day, Disp: , Rfl:     famotidine (PEPCID) 20 mg tablet, TAKE 1 TABLET BY MOUTH TWICE A DAY, Disp: 180 tablet, Rfl: 1    folic acid (FOLVITE) 1 mg tablet, Take 1 tablet (1 mg total) by mouth daily (Patient not taking: Reported on 11/6/2024), Disp: 90 tablet, Rfl: 0    gabapentin (NEURONTIN) 100 mg capsule, Take 1 capsule (100 mg total) by mouth 2 (two) times a day Morning and bedtime, Disp: , Rfl:     metoprolol tartrate (LOPRESSOR) 50 mg tablet, TAKE 0.5 TABLETS (25 MG TOTAL) BY MOUTH IN THE MORNING, Disp: 15 tablet, Rfl: 0    midodrine (PROAMATINE) 2.5 mg tablet, TAKE 1 TABLET (2.5 MG TOTAL) BY MOUTH 3 TIMES A DAY BEFORE MEALS, Disp: 270 tablet, Rfl: 1    Multiple Vitamin (MULTIVITAMIN) capsule, Take 1 capsule by mouth daily, Disp: , Rfl:     traMADol (Ultram) 50 mg tablet, Take 1 tablet (50 mg total) by mouth every 8 (eight) hours as needed for moderate pain, Disp: 90 tablet, Rfl: 2      Physical Exam:   There were no vitals taken for this visit.  GEN: no acute distress    RESP: breathing comfortably with no accessory muscle use    ABD: soft, non-tender, non-distended   INCISION:    EXT: no significant peripheral edema   (Male): Circumcision is healing well    RADIOLOGY:   none     Assessment:   #1.  Phimosis/circumcision    Plan:   -Healing well without any difficulties  -He will follow-up in 6 months  -  -

## 2024-12-22 DIAGNOSIS — G89.4 CHRONIC PAIN SYNDROME: ICD-10-CM

## 2024-12-22 NOTE — TELEPHONE ENCOUNTER
Medication Refill Request     Name traMADol (Ultram) 50 mg tablet    Dose/Frequency     Take 1 tablet (50 mg total) by mouth every 8 (eight) hours as needed for moderate pain     Quantity 90  Verified pharmacy   [x]  Verified ordering Provider   [x]  Does patient have enough for the next 3 days? Yes [x] No []

## 2024-12-23 RX ORDER — TRAMADOL HYDROCHLORIDE 50 MG/1
50 TABLET ORAL EVERY 8 HOURS PRN
Qty: 90 TABLET | Refills: 0 | Status: SHIPPED | OUTPATIENT
Start: 2025-01-02

## 2024-12-23 NOTE — TELEPHONE ENCOUNTER
Pt will need another Tramadol Rx sent. Last Rx was dispensed on 12/4 and his next ovs is not until 1/10/25.

## 2024-12-23 NOTE — TELEPHONE ENCOUNTER
12/04/2024 12/04/2024 traMADol HCL (Tablet) 90.0 30 50 MG 30.0 DEACON JIMENEZ [facility]

## 2024-12-27 DIAGNOSIS — I10 HYPERTENSION, UNSPECIFIED TYPE: ICD-10-CM

## 2025-01-02 RX ORDER — METOPROLOL TARTRATE 50 MG
25 TABLET ORAL DAILY
Qty: 45 TABLET | Refills: 1 | Status: SHIPPED | OUTPATIENT
Start: 2025-01-02

## 2025-01-10 ENCOUNTER — OFFICE VISIT (OUTPATIENT)
Dept: PAIN MEDICINE | Facility: CLINIC | Age: 85
End: 2025-01-10
Payer: COMMERCIAL

## 2025-01-10 DIAGNOSIS — Z79.891 LONG-TERM CURRENT USE OF OPIATE ANALGESIC: ICD-10-CM

## 2025-01-10 DIAGNOSIS — F11.20 UNCOMPLICATED OPIOID DEPENDENCE (HCC): ICD-10-CM

## 2025-01-10 DIAGNOSIS — M96.1 POSTLAMINECTOMY SYNDROME OF LUMBAR REGION: ICD-10-CM

## 2025-01-10 DIAGNOSIS — G89.4 CHRONIC PAIN SYNDROME: ICD-10-CM

## 2025-01-10 DIAGNOSIS — M47.816 LUMBAR SPONDYLOSIS: Primary | ICD-10-CM

## 2025-01-10 PROCEDURE — 99214 OFFICE O/P EST MOD 30 MIN: CPT

## 2025-01-10 RX ORDER — TRAMADOL HYDROCHLORIDE 50 MG/1
50 TABLET ORAL 2 TIMES DAILY PRN
Qty: 60 TABLET | Refills: 2 | Status: SHIPPED | OUTPATIENT
Start: 2025-02-03

## 2025-01-10 NOTE — PATIENT INSTRUCTIONS
"Patient Education     Taking opioids safely   The Basics   Written by the doctors and editors at [facility]   What are opioids? -- Opioids are a group of prescription medicines that relieve pain. They work by attaching to \"opioid receptors\" in the body and blocking pain signals.  Opioids are sometimes used when other types of pain medicine do not help enough. Opioids can be helpful for treating short-term, or \"acute,\" pain, like after surgery or an injury. They are also sometimes used to treat long-term, or \"chronic,\" pain, like for people with cancer. But they come with risks.  If your doctor prescribes an opioid medicine, it's important to understand the risks and know how to stay safe.  What are the risks of taking opioids? -- You should know that:   Opioids have side effects. Some are just bothersome, and some can be dangerous. For example, taking too much of an opioid is called an \"overdose.\" An overdose can cause serious problems and even death.   In some cases, taking opioids can lead to misuse. For example, people might take the medicine when they don't need it for pain. Or they might take more than they are supposed to. Sharing or selling opioids are other examples of misuse.   There is a risk of addiction. This is also called \"opioid use disorder.\"  If you take too much, or take opioids with alcohol or certain other drugs, it can cause serious harm. It can even cause death from overdose.  How do I stay safe? -- There are things you can do to stay safe if you need to take an opioid medicine (figure 1). These things help protect yourself and others.  Know your medicines:    Opioids come in different forms. \"Immediate-release\" medicines work quickly and last for a short time. \"Extended-release\" medicines work more slowly and last longer. Make sure that you know what type of opioid you have. Read the label and the information that comes with your prescription.   Follow your treatment plan carefully. Take only " "the dose your doctor prescribes, and only as often as they tell you to.   Never take opioids that were not prescribed to you.   Some opioids come combined with other medicines like acetaminophen or an \"NSAID\" (like ibuprofen). Do not take any extra NSAIDs or acetaminophen without talking to your doctor first.   Make sure that all of your doctors know every medicine you take, even those that are non-prescription. Some medicines can affect the way opioids work. Bring a complete list of all of your pain medicines and other medicines with you whenever you go to a doctor, nurse, dentist, or pharmacist.   Ask your doctor or pharmacist if it is safe to take your other medicines with your opioid medicine.  Use and store your medicine safely:    Do not drink alcohol while you are taking opioids.   Do not take opioids with medicines that make you sleepy, unless your doctor tells you to. Examples include:   \"Benzodiazepines\" like diazepam (sample brand name: Valium) or alprazolam (sample brand name: Xanax)   Gabapentin (sample brand name: Neurontin) or pregabalin (brand name: Lyrica)   Muscle relaxants like baclofen or cyclobenzaprine   Sleeping pills like zolpidem (sample brand name: Ambien)   Talk to your doctor about whether it is safe to drive. Opioids can make you feel tired or have trouble thinking clearly. If you are starting a new prescription or taking a higher dose, you might need to avoid things like driving, using dangerous machinery, or other activities that could be risky.   Store your opioids in a safe place, such as a locked cabinet. This prevents children, teens, or anyone else from getting to them.   Never share your opioids with other people.  Be aware of side effects:    Opioid medicines can cause side effects. There are often ways to prevent or treat these.   Call your doctor or nurse if you have side effects that bother you, such as:   Constipation - Your doctor or nurse might suggest that you take a " "laxative to prevent or treat constipation. If your bowel movements are hard and dry, a stool softener might help. Drink plenty of water, and try to get regular physical activity.   Mild nausea or stomach discomfort - Taking the medicine with or after food can help with this. Nausea usually gets better with time.   Severe nausea, vomiting, or itchiness - If you have any of these problems, your doctor might be able to switch you to a different medicine.   Dry mouth   Feeling dizzy or sleepy, or having trouble thinking clearly   Vision problems   Being clumsy or falling down   Know the signs of an opioid overdose. Get help right away if you think that you or someone else took too much of an opioid medicine. Signs of an overdose are listed below.  Stay safe when stopping your opioid medicine:    When opioids are needed to treat acute pain, doctors usually try to prescribe them for only a short time. This usually means a few days or a week. They also prescribe the lowest dose possible to relieve pain.   Follow your doctor's instructions about how to stop taking your opioid once your pain has improved. This usually involves \"tapering,\" or reducing the dose gradually. If you stop an opioid suddenly, this can cause unpleasant symptoms like stomach ache, diarrhea, or shakes. This is called \"withdrawal.\" Tapering the dose can help prevent withdrawal.   When your pain gets better, get rid of any leftover medicines. Your doctor, nurse, or pharmacist can suggest ways to get rid of them. This might involve flushing them down the toilet or mixing them with something like dirt or cat litter, then putting the mixture in a sealed container in the trash. Some police stations and pharmacies also take leftover medicines.  What is naloxone? -- Naloxone is a medicine that reverses the effects of opioids. It can prevent death from an opioid overdose. Naloxone comes as an injection (shot), or as a spray that goes in the nose. Naloxone nasal " spray (brand name: Narcan) is available without a prescription.  If you or someone you know uses opioids, it's a good idea to keep naloxone with you. Make sure that you and your family and friends know how and when to use it.  When should I call for help? -- If you are taking an opioid, it's important to know when to get help. Signs of an opioid overdose include:   Extreme sleepiness   Slow breathing, or no breathing at all   Very small pupils (the black circles in the center of the eyes)   Very slow heartbeat  If you took too much of your opioid medicine or think that someone is having an opioid overdose:   If you have naloxone, give it immediately. Naloxone can save a person's life. But it needs to be given as soon as possible.   Call for an ambulance right away (in the [place] and [place], call 9-1-1).  Call your doctor or nurse if:   You are having side effects that bother you.   You have questions about how to take your medicine.   You are having trouble managing your pain.  All topics are updated as new evidence becomes available and our peer review process is complete.  This topic retrieved from [employer] on: May 15, 2024.  Topic 748357 Version 1.0  Release: 32.4.3 - C32.134  © 2024 UpToDate, Inc. and/or its affiliates. All rights reserved.  figure 1: Tips for medication safety     Tips to use your medicine safely include:  (A) Read labels so you know how to take your medicines.  (B) Have a routine for taking your medicines.  (C) Make sure you know what foods, drinks, and other medicines are safe for you.  (D) Store medicines in a safe place.  (E) Work with your health care team and ask questions if you have them.  Graphic 575328 Version 2.0  Consumer Information Use and Disclaimer   Disclaimer: This generalized information is a limited summary of diagnosis, treatment, and/or medication information. It is not meant to be comprehensive and should be used as a tool to help the user understand and/or assess potential  diagnostic and treatment options. It does NOT include all information about conditions, treatments, medications, side effects, or risks that may apply to a specific patient. It is not intended to be medical advice or a substitute for the medical advice, diagnosis, or treatment of a health care provider based on the health care provider's examination and assessment of a patient's specific and unique circumstances. Patients must speak with a health care provider for complete information about their health, medical questions, and treatment options, including any risks or benefits regarding use of medications. This information does not endorse any treatments or medications as safe, effective, or approved for treating a specific patient. UpToDate, Inc. and its affiliates disclaim any warranty or liability relating to this information or the use thereof.The use of this information is governed by the Terms of Use, available at https://www.woltersKonjektuwer.com/en/know/clinical-effectiveness-terms. 2024© UpToDate, Inc. and its affiliates and/or licensors. All rights reserved.  Copyright   © 2024 UpToDate, Inc. and/or its affiliates. All rights reserved.

## 2025-01-10 NOTE — PROGRESS NOTES
Pain Medicine Follow-Up Note    Assessment:  1. Lumbar spondylosis    2. Chronic pain syndrome    3. Postlaminectomy syndrome of lumbar region    4. Long-term current use of opiate analgesic    5. Uncomplicated opioid dependence (HCC)        Plan:  The patient is an 84-year-old male with a history of chronic pain secondary to low back pain, lumbar spondylosis and lumbar postlaminectomy syndrome who presents to the office with ongoing bilateral low back pain that is unchanged since his last office visit.     Overall his pain continues to be managed with taking tramadol, therefore I will continue him on his medication as prescribed.  A prescription for tramadol 50 mg with 2 refills was electronically sent to the patient's pharmacy with a do not fill date of 2/3/2025.    Orders Placed This Encounter   Procedures    MM OF_Tramadol Definitive Test    MM OF_THC Definitive Test    MM OF_Temazepam Definitive    MM OF_Phencyclidine Definitive Test    MM OF_Oxymorphone Definitive Test    MM OF_Oxazepam Definitive    MM OF_Oxycodone Definitive Test - Oral Fluid    MM OF_Morphine Definitive    MM OF_Methylphenidate Definitive Test    MM OF_Methadone Definitive Test    MM OF_Meperidine Definitive Test    MM OF_MDMA Definitive Test    MM OF_Lorazepam Definitive    MM OF_Hydromorphone Definitive    MM OF_Hydrocodone Definitive    MM OF_Heroin Definitive Test    MM OF_Fentanyl Definitive Test    MM OF_Diazepam Definitive    MM OF_Codeine Definitive    MM OF_Cocaine Definitive Test    MM OF_Clonazepam Definitive    MM OF_Buprenorphine Definitive Test    MM OF_Amphetamine Definitive Test    MM OF_Alprazolam Definitive       New Medications Ordered This Visit   Medications    traMADol (Ultram) 50 mg tablet     Sig: Take 1 tablet (50 mg total) by mouth 2 (two) times a day as needed for moderate pain Do not start before February 3, 2025.     Dispense:  60 tablet     Refill:  2       My impressions and treatment recommendations were  discussed in detail with the patient who verbalized understanding and had no further questions.      [place] Prescription Drug Monitoring Program report was reviewed and was appropriate     There are risks associated with opioid medications, including dependence, addiction and tolerance. The patient understands and agrees to use these medications only as prescribed. Potential side effects of the medications include, but are not limited to, constipation, drowsiness, addiction, impaired judgment and risk of fatal overdose if not taken as prescribed. The patient was warned against driving while taking sedation medications.  Sharing medications is a felony. At this point in time, the patient is showing no signs of addiction, abuse, diversion or suicidal ideation.    An oral drug screen swab was collected at today's office visit. The swab will be sent for confirmatory testing. The drug screen is medically necessary because the patient is either dependent on opioid medication or is being considered for opioid medication therapy and the results could impact ongoing or future treatment. The drug screen is to evaluate for the presences or absence of prescribed, non-prescribed, and/or illicit drugs/substances.     Follow-up is planned in 12 weeks time or sooner as warranted.  Discharge instructions were provided. I personally saw and examined the patient and I agree with the above discussed plan of care.    History of Present Illness:    Suhail Borrego is a 84 y.o. male with a history of chronic pain secondary to low back pain, lumbar spondylosis and lumbar postlaminectomy syndrome.  He was last seen on 7/15/2024 where he was started on tramadol 50 mg.  He states this medication has been helpful in managing his pain.  He presents to the office with ongoing bilateral low back pain.    He states his pain is the same since the last office visit and intermittent.  He states his pain is worse with activity and standing and  does ease when he sits down.  He rates quality of his pain as sharp and is currently rating his pain a 5/10 on a numeric scale.    Current pain medications include tramadol 50 mg 1 tablet twice a day which he states is helpful in managing his pain.     Pain Contract Signed:  1/10/25  Last Urine Drug Screen:  1/10/25  Last Dose:1/10/25    Other than as stated above, the patient denies any interval changes in medications, medical condition, mental condition, symptoms, or allergies since the last office visit.         Review of Systems:    Review of Systems   Respiratory:  Negative for shortness of breath.    Cardiovascular:  Negative for chest pain.   Gastrointestinal:  Negative for constipation, diarrhea, nausea and vomiting.   Musculoskeletal:  Positive for back pain, gait problem and joint swelling. Negative for arthralgias and myalgias.        Decreased range of motion   Skin:  Negative for rash.   Neurological:  Negative for dizziness, seizures and weakness.   All other systems reviewed and are negative.        Past Medical History:   Diagnosis Date    Abnormal liver function test     RESOLVED: 14SEP2017    Allergic rhinitis     Anemia     LAST ASSESSED: 19OCT2017    Arthritis     BPH (benign prostatic hyperplasia)     CAD (coronary artery disease)     Coronary artery disease     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HCC)     LAST ASSESSED: 05OCT2017    Former tobacco use     GERD (gastroesophageal reflux disease)     Hand paresthesia     RESOLVED: 11SEP2017    Hyperlipidemia     Hypertension     Lumbar stenosis     Peripheral artery disease (HCC)     LAST ASSESSED: 27OCT2017    Stage 3a chronic kidney disease (HCC) 7/11/2021       Past Surgical History:   Procedure Laterality Date    BACK SURGERY      COLONOSCOPY      LUMBAR FUSION      ND ARTHRODESIS POSTERIOR/PSTLAT TQ 1NTRSPC LUMBAR N/A 11/03/2016    Procedure: L2-S1 POSTERIOR LUMBAR FUSION AND DECOMPRESSION (Impulse), Posterior lateral fixation; dural repair.;  Surgeon:  Leslie Gil MD;  [facility];  Service: Orthopedics    RI CABG W/ARTERIAL GRAFT SINGLE ARTERIAL GRAFT N/A 2018    Procedure: CABG X4 with LIMA - LAD, SVG - RCA, OM2, & Diagonal ; Left Leg EVH; MATTHEW;  Surgeon: Eric Bar DO;  [facility];  Service: Cardiac Surgery    RI CIRCUMCISION AGE >28 DAYS N/A 10/17/2024    Procedure: CIRCUMCISION WITH ADJACENT TISSUE TRANSFER;  Surgeon: Brody Hoyt MD;  Location: [facility];  Service: Urology    RI COLONOSCOPY FLX DX W/COLLJ SPEC WHEN PFRMD N/A 11/15/2017    Procedure: EGD AND COLONOSCOPY;  Surgeon: Mariano Godinez MD;  [facility];  Service: Gastroenterology    RI TEAEC W/PATCH GRF CAROTID VERTB SUBCLAV NECK INC Left 2024    Procedure: left CEA retrograde left common carotid angioplasty/stent;  Surgeon: Luis Miguel Peters DO;  [facility];  Service: Vascular    SEPTOPLASTY      LAST ASSESSED; 2014    TONSILECTOMY AND ADNOIDECTOMY      LAST ASSESSED: 2014    UPPER GASTROINTESTINAL ENDOSCOPY         Family History   Problem Relation Age of Onset    Emphysema Mother     Liver disease Mother     Coronary artery disease Father     Hypertension Father     Liver disease Father     Heart failure Father     Stroke Father         CVA     Heart attack Father     Coronary artery disease Brother     Heart disease Brother         younger brother by pass and other brother 4 stents placed    Other Family         BACK PROBLEM     Stroke Family         CVA    Emphysema Family     Hypertension Family         BENIGN       Social History     Occupational History    Occupation: Insurance     Comment: Retired    Tobacco Use    Smoking status: Former     Current packs/day: 0.00     Average packs/day: 1 pack/day for 50.0 years (50.0 ttl pk-yrs)     Types: Cigarettes     Start date:      Quit date:      Years since quittin.0    Smokeless tobacco: Never   Vaping Use    Vaping status: Never Used   Substance and Sexual  Activity    Alcohol use: Not Currently     Alcohol/week: 1.0 standard drink of alcohol     Types: 1 Shots of liquor per week     Comment: beer, wine, scotch every day; SOCIAL AS PER ALL SCRIPTS     Drug use: Not Currently     Types: Marijuana     Comment: medical majiyulianaa    Sexual activity: Not Currently         Current Outpatient Medications:     acetaminophen (TYLENOL) 650 mg CR tablet, Take 650 mg by mouth every 8 (eight) hours as needed for mild pain, Disp: , Rfl:     Ascorbic Acid (Vitamin C) 500 MG PACK, Take 1,000 mg by mouth daily, Disp: , Rfl:     atorvastatin (LIPITOR) 80 mg tablet, TAKE 1 TABLET BY MOUTH EVERY DAY IN THE MORNING, Disp: 90 tablet, Rfl: 3    cholecalciferol (VITAMIN D3) 1,000 units tablet, Take 2,000 Units by mouth daily, Disp: , Rfl:     cyanocobalamin (VITAMIN B-12) 1,000 mcg tablet, Take 1,000 mcg by mouth daily  , Disp: , Rfl:     docusate sodium (COLACE) 100 mg capsule, Take 100 mg by mouth 2 (two) times a day, Disp: , Rfl:     famotidine (PEPCID) 20 mg tablet, TAKE 1 TABLET BY MOUTH TWICE A DAY, Disp: 180 tablet, Rfl: 1    gabapentin (NEURONTIN) 100 mg capsule, Take 1 capsule (100 mg total) by mouth 2 (two) times a day Morning and bedtime, Disp: , Rfl:     metoprolol tartrate (LOPRESSOR) 50 mg tablet, TAKE 0.5 TABLETS (25 MG TOTAL) BY MOUTH IN THE MORNING, Disp: 45 tablet, Rfl: 1    midodrine (PROAMATINE) 2.5 mg tablet, TAKE 1 TABLET (2.5 MG TOTAL) BY MOUTH 3 TIMES A DAY BEFORE MEALS, Disp: 270 tablet, Rfl: 1    Multiple Vitamin (MULTIVITAMIN) capsule, Take 1 capsule by mouth daily, Disp: , Rfl:     [START ON 2/3/2025] traMADol (Ultram) 50 mg tablet, Take 1 tablet (50 mg total) by mouth 2 (two) times a day as needed for moderate pain Do not start before February 3, 2025., Disp: 60 tablet, Rfl: 2    folic acid (FOLVITE) 1 mg tablet, Take 1 tablet (1 mg total) by mouth daily (Patient not taking: Reported on 11/6/2024), Disp: 90 tablet, Rfl: 0    Allergies   Allergen Reactions     Penicillins Other (See Comments)     Hallucinations;  Patient reported that he was seeing visual disturbances         Physical Exam:    There were no vitals taken for this visit.    Constitutional:normal, well developed, well nourished, alert, in no distress and non-toxic and no overt pain behavior.  Eyes:anicteric  HEENT:grossly intact  Neck:supple, symmetric, trachea midline and no masses   Pulmonary:even and unlabored  Cardiovascular:No edema or pitting edema present  Skin:Normal without rashes or lesions and well hydrated  Psychiatric:Mood and affect appropriate  Neurologic:Cranial Nerves II-XII grossly intact  Musculoskeletal:in wheelchair      Imaging  No orders to display         Orders Placed This Encounter   Procedures    MM OF_Tramadol Definitive Test    MM OF_THC Definitive Test    MM OF_Temazepam Definitive    MM OF_Phencyclidine Definitive Test    MM OF_Oxymorphone Definitive Test    MM OF_Oxazepam Definitive    MM OF_Oxycodone Definitive Test - Oral Fluid    MM OF_Morphine Definitive    MM OF_Methylphenidate Definitive Test    MM OF_Methadone Definitive Test    MM OF_Meperidine Definitive Test    MM OF_MDMA Definitive Test    MM OF_Lorazepam Definitive    MM OF_Hydromorphone Definitive    MM OF_Hydrocodone Definitive    MM OF_Heroin Definitive Test    MM OF_Fentanyl Definitive Test    MM OF_Diazepam Definitive    MM OF_Codeine Definitive    MM OF_Cocaine Definitive Test    MM OF_Clonazepam Definitive    MM OF_Buprenorphine Definitive Test    MM OF_Amphetamine Definitive Test    MM OF_Alprazolam Definitive

## 2025-01-14 ENCOUNTER — PROCEDURE VISIT (OUTPATIENT)
Age: 85
End: 2025-01-14
Payer: COMMERCIAL

## 2025-01-14 VITALS
HEIGHT: 68 IN | WEIGHT: 145.8 LBS | BODY MASS INDEX: 22.1 KG/M2 | TEMPERATURE: 97.6 F | HEART RATE: 72 BPM | DIASTOLIC BLOOD PRESSURE: 70 MMHG | SYSTOLIC BLOOD PRESSURE: 122 MMHG

## 2025-01-14 DIAGNOSIS — H61.23 BILATERAL IMPACTED CERUMEN: Primary | ICD-10-CM

## 2025-01-14 LAB
7AMINOCLONAZEPAM SAL QL CFM: NEGATIVE NG/ML
AMPHET SAL QL CFM: NEGATIVE NG/ML
BUPRENORPHINE SAL QL SCN: NEGATIVE NG/ML
CARBOXYTHC SAL QL CFM: NEGATIVE NG/ML
CCP IGG SERPL-ACNC: NEGATIVE NG/ML
COCAINE SAL QL CFM: NEGATIVE NG/ML
CODEINE SAL QL CFM: NEGATIVE NG/ML
EDDP SAL QL CFM: NEGATIVE NG/ML
HYDROCODONE SAL QL CFM: NEGATIVE NG/ML
HYDROCODONE SAL QL CFM: NEGATIVE NG/ML
HYDROMORPHONE SAL QL CFM: NEGATIVE NG/ML
LEUKEMIA MARKERS BLD-IMP: NEGATIVE NG/ML
M PROTEIN 3 UR ELPH-MCNC: NEGATIVE NG/ML
M TB TUBERC IGNF/MITOGEN IGNF CONTROL: NEGATIVE NG/ML
METHADONE SAL QL CFM: NEGATIVE NG/ML
MORPHINE SAL QL CFM: NEGATIVE NG/ML
MORPHINE SAL QL CFM: NEGATIVE NG/ML
OXYMORPHONE SAL QL CFM: NEGATIVE NG/ML
OXYMORPHONE SAL QL CFM: NEGATIVE NG/ML
PCP SAL QL CFM: NEGATIVE NG/ML
SL AMB 6-MAM (HEROIN METABOLITE) QUANTIFICATION: NEGATIVE NG/ML
SL AMB ALPRAZOLAM QUANTIFICATION: NEGATIVE NG/ML
SL AMB CLONAZEPAM QUANTIFICATION: NEGATIVE NG/ML
SL AMB DIAZEPAM QUANTIFICATION: NEGATIVE NG/ML
SL AMB FENTANYL QUANTIFICATION: NEGATIVE NG/ML
SL AMB MDMA QUANTIFICATION: NEGATIVE NG/ML
SL AMB N-DESMETHYL-TRAMADOL QUANTIFICATION SALIVA: NORMAL NG/ML
SL AMB NORBUPRENORPHINE QUANTIFICATION: NEGATIVE NG/ML
SL AMB NORDIAZEPAM QUANTIFICATION: NEGATIVE NG/ML
SL AMB NORFENTANYL QUANTIFICATION: NEGATIVE NG/ML
SL AMB NORHYDROCODONE QUANTIFICATION: NEGATIVE NG/ML
SL AMB NORHYDROCODONE QUANTIFICATION: NEGATIVE NG/ML
SL AMB NORMEPERIDINE QUANTIFICATION: NEGATIVE NG/ML
SL AMB NOROXYCODONE QUANTIFICATION: NEGATIVE NG/ML
SL AMB OXAZEPAM QUANTIFICATION: NEGATIVE NG/ML
SL AMB RITALINIC ACID QUANTIFICATION: NEGATIVE NG/ML
SL AMB TEMAZEPAM QUANTIFICATION: NEGATIVE NG/ML
SL AMB TEMAZEPAM QUANTIFICATION: NEGATIVE NG/ML
SL AMB TRAMADOL QUANTIFICATION: NORMAL NG/ML
SQUAMOUS #/AREA URNS HPF: NEGATIVE NG/ML

## 2025-01-14 PROCEDURE — 69210 REMOVE IMPACTED EAR WAX UNI: CPT

## 2025-01-14 NOTE — PROGRESS NOTES
Ear cerumen removal    Date/Time: 1/14/2025 9:40 AM    Performed by: Cinthya Hoover MD  Authorized by: Cinthya Hoover MD  Universal Protocol:  Procedure performed by: (Dr. [facility])  Consent: Verbal consent obtained.  Consent given by: patient    Patient location:  Clinic  Procedure details:     Local anesthetic:  None    Location:  Both ears    Procedure type: irrigation with instrumentation      Instrumentation: curette and forceps      Approach:  Natural orifice  Post-procedure details:     Complication:  None    Patient tolerance of procedure:  Tolerated well, no immediate complications  Comments:      Patient's daughter present at bedside. Patient tolerated procedure well, clear TMs visualized post-irrigation. Patient notes relief and denies any pain.

## 2025-01-18 NOTE — QUICK NOTE
Family was informed at bedside.  All the questions and concerns were addressed.   Previously Negative (within the last year)

## 2025-01-20 ENCOUNTER — OFFICE VISIT (OUTPATIENT)
Dept: PULMONOLOGY | Facility: CLINIC | Age: 85
End: 2025-01-20
Payer: COMMERCIAL

## 2025-01-20 VITALS
WEIGHT: 145 LBS | DIASTOLIC BLOOD PRESSURE: 80 MMHG | HEART RATE: 71 BPM | TEMPERATURE: 97.9 F | SYSTOLIC BLOOD PRESSURE: 142 MMHG | OXYGEN SATURATION: 94 % | BODY MASS INDEX: 22.18 KG/M2

## 2025-01-20 DIAGNOSIS — J84.10 PULMONARY FIBROSIS DETERMINED BY HIGH RESOLUTION COMPUTED TOMOGRAPHY (HCC): Primary | ICD-10-CM

## 2025-01-20 PROCEDURE — 99214 OFFICE O/P EST MOD 30 MIN: CPT | Performed by: INTERNAL MEDICINE

## 2025-01-20 PROCEDURE — G2211 COMPLEX E/M VISIT ADD ON: HCPCS | Performed by: INTERNAL MEDICINE

## 2025-01-20 NOTE — PROGRESS NOTES
Pulmonary Follow Up Note   Suhail Borrego 84 y.o. male MRN: [id number]  1/22/2025    Assessment:    Pulmonary fibrosis determined by high resolution computed tomography (HCC)  Had an in-depth conversation with Mr. Borrego and his daughter again today about the nature of his pulmonary fibrosis and the role of antifibrotic's.  He is clear that he would prefer observation, not wanting to add to his list of medications at the moment.  We will continue to perform periodic surveillance, and will perform his next CT scan in 6 months, prior to his follow-up appointment.    Plan:    Diagnoses and all orders for this visit:    Pulmonary fibrosis determined by high resolution computed tomography (HCC)  -     CT chest without contrast; Future    Return in about 5 months (around 6/20/2025).    History of Present Illness   HPI:  Suhail Borrego is a 84 y.o. male who presents for routine follow-up.  He reports feeling about the same overall with respect of his breathing.  He was hospitalized with a mini stroke in August 2024, but has rebounded well since then.  Again, much of his primary medical concern at the moment is related to severe spinal stenosis and the pain that it causes.  This probably limits his evaluation of how short of breath he is with activity, as he is not able to walk far due to pain.    His daughter joined him at the visit today.  We discussed the usual natural history of pulmonary fibrosis and the role of antifibrotics as being able to reduce progression of disease but not undue damage which has been done.  He is aware and still prefers to monitor off of any therapy, which I would agree with in his case.    Review of Systems   Respiratory:  Positive for shortness of breath.      Historical Information   Past Medical History:   Diagnosis Date   • Abnormal liver function test     RESOLVED: 14SEP2017   • Allergic rhinitis    • Anemia     LAST ASSESSED: 19OCT2017   • Arthritis    • BPH (benign prostatic  hyperplasia)    • CAD (coronary artery disease)    • Coronary artery disease    • 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HCC)     LAST ASSESSED: 05OCT2017   • Former tobacco use    • GERD (gastroesophageal reflux disease)    • Hand paresthesia     RESOLVED: 11SEP2017   • Hyperlipidemia    • Hypertension    • Lumbar stenosis    • Peripheral artery disease (HCC)     LAST ASSESSED: 27OCT2017   • Stage 3a chronic kidney disease (HCC) 7/11/2021     Past Surgical History:   Procedure Laterality Date   • BACK SURGERY     • COLONOSCOPY     • LUMBAR FUSION     • CT ARTHRODESIS POSTERIOR/PSTLAT TQ 1NTRSPC LUMBAR N/A 11/03/2016    Procedure: L2-S1 POSTERIOR LUMBAR FUSION AND DECOMPRESSION (Impulse), Posterior lateral fixation; dural repair.;  Surgeon: Leslie Gil MD;  [facility];  Service: Orthopedics   • CT CABG W/ARTERIAL GRAFT SINGLE ARTERIAL GRAFT N/A 01/19/2018    Procedure: CABG X4 with LIMA - LAD, SVG - RCA, OM2, & Diagonal ; Left Leg EVH; MATTHEW;  Surgeon: Eric Bar DO;  [facility];  Service: Cardiac Surgery   • CT CIRCUMCISION AGE >28 DAYS N/A 10/17/2024    Procedure: CIRCUMCISION WITH ADJACENT TISSUE TRANSFER;  Surgeon: Brody Hoyt MD;  [facility];  Service: Urology   • CT COLONOSCOPY FLX DX W/COLLJ SPEC WHEN PFRMD N/A 11/15/2017    Procedure: EGD AND COLONOSCOPY;  Surgeon: Mariano Godinez MD;  [facility];  Service: Gastroenterology   • CT TEAEC W/PATCH GRF CAROTID VERTB SUBCLAV NECK INC Left 7/8/2024    Procedure: left CEA retrograde left common carotid angioplasty/stent;  Surgeon: Luis Miguel Peters DO;  [facility];  Service: Vascular   • SEPTOPLASTY      LAST ASSESSED; 13MAY2014   • TONSILECTOMY AND ADNOIDECTOMY      LAST ASSESSED: 13MAY2014   • UPPER GASTROINTESTINAL ENDOSCOPY       Family History   Problem Relation Age of Onset   • Emphysema Mother    • Liver disease Mother    • Coronary artery disease Father    • Hypertension Father    • Liver disease Father    •  Heart failure Father    • Stroke Father         CVA    • Heart attack Father    • Coronary artery disease Brother    • Heart disease Brother         younger brother by pass and other brother 4 stents placed   • Other Family         BACK PROBLEM    • Stroke Family         CVA   • Emphysema Family    • Hypertension Family         BENIGN       Meds/Allergies     Current Outpatient Medications:   •  acetaminophen (TYLENOL) 650 mg CR tablet, Take 650 mg by mouth every 8 (eight) hours as needed for mild pain, Disp: , Rfl:   •  Ascorbic Acid (Vitamin C) 500 MG PACK, Take 1,000 mg by mouth daily, Disp: , Rfl:   •  atorvastatin (LIPITOR) 80 mg tablet, TAKE 1 TABLET BY MOUTH EVERY DAY IN THE MORNING, Disp: 90 tablet, Rfl: 3  •  cholecalciferol (VITAMIN D3) 1,000 units tablet, Take 2,000 Units by mouth daily, Disp: , Rfl:   •  cyanocobalamin (VITAMIN B-12) 1,000 mcg tablet, Take 1,000 mcg by mouth daily  , Disp: , Rfl:   •  docusate sodium (COLACE) 100 mg capsule, Take 100 mg by mouth 2 (two) times a day, Disp: , Rfl:   •  famotidine (PEPCID) 20 mg tablet, TAKE 1 TABLET BY MOUTH TWICE A DAY, Disp: 180 tablet, Rfl: 1  •  gabapentin (NEURONTIN) 100 mg capsule, Take 1 capsule (100 mg total) by mouth 2 (two) times a day Morning and bedtime, Disp: , Rfl:   •  metoprolol tartrate (LOPRESSOR) 50 mg tablet, TAKE 0.5 TABLETS (25 MG TOTAL) BY MOUTH IN THE MORNING, Disp: 45 tablet, Rfl: 1  •  midodrine (PROAMATINE) 2.5 mg tablet, TAKE 1 TABLET (2.5 MG TOTAL) BY MOUTH 3 TIMES A DAY BEFORE MEALS, Disp: 270 tablet, Rfl: 1  •  Multiple Vitamin (MULTIVITAMIN) capsule, Take 1 capsule by mouth daily, Disp: , Rfl:   •  [START ON 2/3/2025] traMADol (Ultram) 50 mg tablet, Take 1 tablet (50 mg total) by mouth 2 (two) times a day as needed for moderate pain Do not start before February 3, 2025., Disp: 60 tablet, Rfl: 2  Allergies   Allergen Reactions   • Penicillins Other (See Comments)     Hallucinations;  Patient reported that he was seeing visual  "disturbances         Vitals: Blood pressure 142/80, pulse 71, temperature 97.9 °F (36.6 °C), temperature source Temporal, weight 65.8 kg (145 lb), SpO2 94%. Body mass index is 22.18 kg/m². Oxygen Therapy  SpO2: 94 %    Physical Exam  Physical Exam  Vitals reviewed.   Constitutional:       General: He is not in acute distress.     Appearance: Normal appearance. He is well-developed. He is not ill-appearing.   HENT:      Head: Normocephalic and atraumatic.   Eyes:      General: No scleral icterus.     Conjunctiva/sclera: Conjunctivae normal.   Neck:      Thyroid: No thyromegaly.      Vascular: No JVD.   Cardiovascular:      Rate and Rhythm: Normal rate and regular rhythm.      Heart sounds: Normal heart sounds. No murmur heard.     No friction rub. No gallop.   Pulmonary:      Effort: Pulmonary effort is normal. No respiratory distress.      Breath sounds: Rales (Minimal dry Velcro crackles at bilateral bases, right greater than left) present. No wheezing.   Musculoskeletal:      Cervical back: Neck supple.      Right lower leg: No edema.      Left lower leg: No edema.   Skin:     General: Skin is warm and dry.      Findings: No rash.   Neurological:      General: No focal deficit present.      Mental Status: He is alert and oriented to person, place, and time. Mental status is at baseline.   Psychiatric:         Mood and Affect: Mood normal.         Behavior: Behavior normal.     Labs: I have personally reviewed pertinent lab results.  Lab Results   Component Value Date    WBC 13.13 (H) 10/04/2024    HGB 11.0 (L) 10/04/2024    HCT 34.6 (L) 10/04/2024    MCV 98 10/04/2024     10/04/2024     Lab Results   Component Value Date    GLUCOSE 123 06/30/2024    CALCIUM 9.5 10/04/2024     06/10/2015    K 4.7 10/04/2024    CO2 29 10/04/2024     10/04/2024    BUN 25 10/04/2024    CREATININE 1.17 10/04/2024     No results found for: \"IGE\"  Lab Results   Component Value Date    ALT 14 08/08/2024    AST 22 " 08/08/2024    ALKPHOS 71 08/08/2024    BILITOT 0.46 06/10/2015       Imaging and other studies: I have personally reviewed relevant films in PACS.    EKG, Pathology, and Other Studies: I have personally reviewed relevant reports in Epic.    Marty Nevarez M.D.  [facility]

## 2025-01-22 NOTE — ASSESSMENT & PLAN NOTE
Had an in-depth conversation with Mr. Borrego and his daughter again today about the nature of his pulmonary fibrosis and the role of antifibrotic's.  He is clear that he would prefer observation, not wanting to add to his list of medications at the moment.  We will continue to perform periodic surveillance, and will perform his next CT scan in 6 months, prior to his follow-up appointment.

## 2025-01-27 ENCOUNTER — OFFICE VISIT (OUTPATIENT)
Age: 85
End: 2025-01-27
Payer: COMMERCIAL

## 2025-01-27 VITALS
SYSTOLIC BLOOD PRESSURE: 132 MMHG | DIASTOLIC BLOOD PRESSURE: 60 MMHG | TEMPERATURE: 97.6 F | HEART RATE: 79 BPM | WEIGHT: 144.4 LBS | OXYGEN SATURATION: 96 % | BODY MASS INDEX: 22.09 KG/M2

## 2025-01-27 DIAGNOSIS — M46.1 INFLAMMATION OF SACROILIAC JOINT (HCC): ICD-10-CM

## 2025-01-27 DIAGNOSIS — I74.3 EMBOLISM AND THROMBOSIS OF ARTERIES OF THE LOWER EXTREMITIES (HCC): ICD-10-CM

## 2025-01-27 DIAGNOSIS — N18.31 STAGE 3A CHRONIC KIDNEY DISEASE (HCC): ICD-10-CM

## 2025-01-27 DIAGNOSIS — Z00.00 MEDICARE ANNUAL WELLNESS VISIT, SUBSEQUENT: ICD-10-CM

## 2025-01-27 DIAGNOSIS — S90.511D ABRASION OF RIGHT ANKLE, SUBSEQUENT ENCOUNTER: Primary | ICD-10-CM

## 2025-01-27 DIAGNOSIS — R63.4 RECENT UNINTENTIONAL WEIGHT LOSS OVER SEVERAL MONTHS: ICD-10-CM

## 2025-01-27 PROCEDURE — 99214 OFFICE O/P EST MOD 30 MIN: CPT | Performed by: FAMILY MEDICINE

## 2025-01-27 PROCEDURE — G0439 PPPS, SUBSEQ VISIT: HCPCS | Performed by: FAMILY MEDICINE

## 2025-01-27 PROCEDURE — G2211 COMPLEX E/M VISIT ADD ON: HCPCS | Performed by: FAMILY MEDICINE

## 2025-01-27 RX ORDER — MUPIROCIN 20 MG/G
OINTMENT TOPICAL 3 TIMES DAILY
Qty: 30 G | Refills: 0 | Status: SHIPPED | OUTPATIENT
Start: 2025-01-27

## 2025-01-27 NOTE — PROGRESS NOTES
Name: Suhail Borrego      : 1940      MRN: [id number]  Encounter Provider: Mikaela Duenas DO  Encounter Date: 2025   Encounter department: [facility]    Assessment & Plan  Abrasion of right ankle, subsequent encounter  Reports having had this for roughly 1 year  No known inciting incident or trauma  Given that this wound is slow to heal, I recommend a consult with wound care for second opinion on management  Recommend 3 times daily Bactroban ointment  Patient has been advised to moisturize his legs and feet consistently as this will aid healing  He has also been advised to maintain good oral hydration for the same reason    Orders:    Ambulatory Referral to Wound Care; Future    mupirocin (BACTROBAN) 2 % ointment; Apply topically 3 (three) times a day    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HCC)  Superficial femoral artery stenosis, medically managed since  per record review       Inflammation of sacroiliac joint (HCC)  Chronic, stable  Patient follows closely with pain management and recently initiated tramadol 50 mg twice daily as needed for pain management       Stage 3a chronic kidney disease (HCC)  Lab Results   Component Value Date    EGFR 51 2024    EGFR 66 2024    EGFR 50 2024    CREATININE 1.17 10/04/2024    CREATININE 1.23 2024    CREATININE 1.28 2024   Chronic, renal function stable  Continue medical management with Lopressor 25 mg daily for blood pressure management, atorvastatin 80 mg daily for hyperlipidemia management         Recent unintentional weight loss over several months  Likely multifactorial  Poor appetite with decreased p.o. intake reported following carotid endarterectomy in summer 2024  Weight loss seems to have plateaued at this time  Counseled patient and daughter on optimizing his nutrition, prioritizing protein in the form of Boost or Ensure shakes  Will follow total protein and  albumin for signs of malnutrition         Medicare annual wellness visit, subsequent           Depression Screening and Follow-up Plan: Patient was screened for depression during today's encounter. They screened negative with a PHQ-2 score of 0.      Preventive health issues were discussed with patient, and age appropriate screening tests were ordered as noted in patient's After Visit Summary. Personalized health advice and appropriate referrals for health education or preventive services given if needed, as noted in patient's After Visit Summary.    Return in about 6 months (around 7/27/2025) for Recheck weight, chronic dx.              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 and subsequent carotid endarterectomy  He is not certain of the number but feels he has lost somewhere in the neighborhood of 20 pounds in the last 6 to 8 months  Daughter adds that while he was recovering from carotid endarterectomy he experienced decreased appetite/nutrition  His appetite is improving in recent months and he reports indulging, frequently, in sweets    Patient also is concerned about a nonhealing wound on his right medial malleolus  He says this has been present for over a year and was previously treated with topical Bactroban  Area of concern has n neither improved nor worsened over the last 6 to 12 months  He is unsure what caused this and is interested in seeking the opinion wound care regarding the optimization of healing      Patient Care Team:  Mikaela Duenas DO as PCP - General  MD Eric Fabian DO Farooq Qureshi, MD Lauren Elaine Strohm, DO Sinan Kutty, MD Beth Valderrama, PA-C Gbolabo Sokunbi, MD Sinan Kutty, MD as Endoscopist  Marty Nevarez MD as Consulting Physician (Pulmonology)    Review of Systems   Constitutional:   Negative for appetite change, fatigue, fever and unexpected weight change.   HENT:  Negative for congestion, dental problem, ear pain, postnasal drip, sore throat and tinnitus.    Eyes:  Negative for pain, discharge and visual disturbance.   Respiratory:  Negative for cough, shortness of breath and wheezing.    Cardiovascular:  Negative for chest pain, palpitations and leg swelling.   Gastrointestinal:  Negative for abdominal pain, constipation, diarrhea, nausea and vomiting.   Endocrine: Negative for cold intolerance and heat intolerance.   Genitourinary:  Negative for difficulty urinating, dysuria, flank pain and urgency.   Musculoskeletal:  Positive for gait problem (needs assistive device (cane or walker)). Negative for arthralgias, back pain, joint swelling and myalgias.   Skin:  Positive for wound. Negative for rash.   Allergic/Immunologic: Negative for immunocompromised state.   Neurological:  Negative for dizziness, syncope, speech difficulty, weakness and numbness.   Hematological:  Negative for adenopathy. Does not bruise/bleed easily.   Psychiatric/Behavioral:  Negative for confusion, dysphoric mood and sleep disturbance. The patient is not nervous/anxious.      Medical History Reviewed by provider this encounter:  Tobacco  Allergies  Meds  Problems  Med Hx  Surg Hx  Fam Hx       Annual Wellness Visit Questionnaire   Suhail is here for his Initial Wellness visit. Last Medicare Wellness visit information reviewed, patient interviewed and updates made to the record today.      Health Risk Assessment:   Patient rates overall health as good. Patient feels that their physical health rating is same. Patient is satisfied with their life. Eyesight was rated as same. Hearing was rated as same. Patient feels that their emotional and mental health rating is same. Patients states they are never, rarely angry. Patient states they are sometimes unusually tired/fatigued. Pain experienced in the last 7 days has  been some. Patient's pain rating has been 5/10. Patient states that he has experienced weight loss or gain in last 6 months.     Depression Screening:   PHQ-2 Score: 0      Fall Risk Screening:   In the past year, patient has experienced: no history of falling in past year      Home Safety:  Patient does not have trouble with stairs inside or outside of their home. Patient has working smoke alarms and has working carbon monoxide detector. Home safety hazards include: none.     Nutrition:   Current diet is Regular.     Medications:   Patient is currently taking over-the-counter supplements. OTC medications include: see medication list. Patient is able to manage medications.     Activities of Daily Living (ADLs)/Instrumental Activities of Daily Living (IADLs):   Walk and transfer into and out of bed and chair?: Yes  Dress and groom yourself?: Yes    Bathe or shower yourself?: Yes    Feed yourself? Yes  Do your laundry/housekeeping?: Yes  Manage your money, pay your bills and track your expenses?: Yes  Make your own meals?: Yes    Do your own shopping?: No    Previous Hospitalizations:   Any hospitalizations or ED visits within the last 12 months?: Yes    How many hospitalizations have you had in the last year?: 1-2    Advance Care Planning:   Living will: Yes    Durable POA for healthcare: Yes    Advanced directive: Yes      PREVENTIVE SCREENINGS      Cardiovascular Screening:    General: Screening Not Indicated and History Lipid Disorder      Diabetes Screening:     General: Screening Current      Colorectal Cancer Screening:     General: Screening Not Indicated      Prostate Cancer Screening:    General: Screening Not Indicated      Osteoporosis Screening:    General: Screening Not Indicated      Abdominal Aortic Aneurysm (AAA) Screening:    Risk factors include: tobacco use        Lung Cancer Screening:     General: Screening Not Indicated      Hepatitis C Screening:    General: Screening Current    Screening,  Brief Intervention, and Referral to Treatment (SBIRT)    Screening  Typical number of drinks in a day: 0  Typical number of drinks in a week: 0  Interpretation: Low risk drinking behavior.    AUDIT-C Screenin) How often did you have a drink containing alcohol in the past year? 4 or more times a week  2) How many drinks did you have on a typical day when you were drinking in the past year? 1 to 2  3) How often did you have 6 or more drinks on one occasion in the past year? never    AUDIT-C Score: 4  Interpretation: Score 4-12 (male): POSITIVE screen for alcohol misuse    AUDIT Screenin) How often during the last year have you found that you were not able to stop drinking once you had started? 0 - never  5) How often during the last year have you failed to do what was normally expected from you because of drinking? 0 - never  6) How often during the last year have you needed a first drink in the morning to get yourself going after a heavy drinking session? 0 - never  7) How often during the last year have you had a feeling of guilt or remorse after drinking? 0 - never  8) How often during the last year have you been unable to remember what happened the night before because you had been drinking? 0 - never  9) Have you or someone else been injured as a result of your drinking? 0 - no  10) Has a relative or friend or a doctor or another health worker been concerned about your drinking or suggested you cut down? 0 - no    AUDIT Score: 4  Interpretation: Low risk alcohol consumption    Single Item Drug Screening:  How often have you used an illegal drug (including marijuana) or a prescription medication for non-medical reasons in the past year? never    Single Item Drug Screen Score: 0  Interpretation: Negative screen for possible drug use disorder    Brief Intervention  Alcohol & drug use screenings were reviewed. No concerns regarding substance use disorder identified.     Review of Current Opioid  Use    Opioid Risk Tool (ORT) Interpretation: Complete Opioid Risk Tool (ORT)    Other Counseling Topics:   Car/seat belt/driving safety, skin self-exam, sunscreen and calcium and vitamin D intake and regular weightbearing exercise.     Social Drivers of Health     Financial Resource Strain: Low Risk  (1/22/2024)    Overall Financial Resource Strain (CARDIA)     Difficulty of Paying Living Expenses: Not hard at all   Food Insecurity: No Food Insecurity (1/22/2025)    Hunger Vital Sign     Worried About Running Out of Food in the Last Year: Never true     Ran Out of Food in the Last Year: Never true   Transportation Needs: No Transportation Needs (1/22/2025)    PRAPARE - Transportation     Lack of Transportation (Medical): No     Lack of Transportation (Non-Medical): No   Housing Stability: Low Risk  (1/22/2025)    Housing Stability Vital Sign     Unable to Pay for Housing in the Last Year: No     Number of Times Moved in the Last Year: 0     Homeless in the Last Year: No   Utilities: Not At Risk (1/22/2025)    [facility] Utilities     Threatened with loss of utilities: No     No results found.    Objective   /60   Pulse 79   Temp 97.6 °F (36.4 °C)   Wt 65.5 kg (144 lb 6.4 oz) Comment: wheelchair  SpO2 96%   BMI 22.09 kg/m²     Physical Exam  Vitals and nursing note reviewed.   Constitutional:       General: He is not in acute distress.     Appearance: Normal appearance. He is well-developed.      Comments: Body mass index is 22.09 kg/m².    HENT:      Head: Normocephalic and atraumatic.      Right Ear: Hearing, tympanic membrane, ear canal and external ear normal.      Left Ear: Hearing, tympanic membrane, ear canal and external ear normal.      Nose: Nose normal.      Mouth/Throat:      Mouth: Mucous membranes are moist.      Pharynx: Uvula midline. No oropharyngeal exudate.   Eyes:      General: No scleral icterus.     Conjunctiva/sclera: Conjunctivae normal.      Pupils: Pupils are equal, round, and reactive  to light.   Neck:      Thyroid: No thyromegaly.   Cardiovascular:      Rate and Rhythm: Normal rate and regular rhythm.      Heart sounds: Normal heart sounds. No murmur heard.  Pulmonary:      Effort: Pulmonary effort is normal. No respiratory distress.      Breath sounds: Normal breath sounds. No wheezing or rales.   Abdominal:      General: Bowel sounds are normal.      Palpations: Abdomen is soft. There is no mass.      Tenderness: There is no abdominal tenderness.   Musculoskeletal:         General: No tenderness. Normal range of motion.      Cervical back: Normal range of motion and neck supple.      Right lower leg: No edema.      Left lower leg: No edema.   Lymphadenopathy:      Cervical: No cervical adenopathy.   Skin:     General: Skin is warm and dry.      Coloration: Skin is not jaundiced.      Findings: Wound (superficial abrasion about 2cm in diameter overlays the right medial malleolus with central eschar; slight erythema surrounding accompanied by TTP) present. No erythema or rash.      Comments: Bilateral LE skin is very dry, flaking with patches of excoriation noted bilaterally   Neurological:      General: No focal deficit present.      Mental Status: He is alert and oriented to person, place, and time.      Cranial Nerves: No cranial nerve deficit.      Deep Tendon Reflexes: Reflexes are normal and symmetric.   Psychiatric:         Mood and Affect: Mood normal.         Behavior: Behavior normal.

## 2025-01-27 NOTE — PATIENT INSTRUCTIONS
Medicare Preventive Visit Patient Instructions  Thank you for completing your Welcome to Medicare Visit or Medicare Annual Wellness Visit today. Your next wellness visit will be due in one year (1/28/2026).  The screening/preventive services that you may require over the next 5-10 years are detailed below. Some tests may not apply to you based off risk factors and/or age. Screening tests ordered at today's visit but not completed yet may show as past due. Also, please note that scanned in results may not display below.  Preventive Screenings:  Service Recommendations Previous Testing/Comments   Colorectal Cancer Screening  Colonoscopy    Fecal Occult Blood Test (FOBT)/Fecal Immunochemical Test (FIT)  Fecal DNA/Cologuard Test  Flexible Sigmoidoscopy Age: 45-75 years old   Colonoscopy: every 10 years (May be performed more frequently if at higher risk)  OR  FOBT/FIT: every 1 year  OR  Cologuard: every 3 years  OR  Sigmoidoscopy: every 5 years  Screening may be recommended earlier than age 45 if at higher risk for colorectal cancer. Also, an individualized decision between you and your healthcare provider will decide whether screening between the ages of 76-85 would be appropriate. Colonoscopy: 10/10/2022  FOBT/FIT: 08/24/2022  Cologuard: Not on file  Sigmoidoscopy: Not on file          Prostate Cancer Screening Individualized decision between patient and health care provider in men between ages of 55-69   Medicare will cover every 12 months beginning on the day after your 50th birthday PSA: <0.1 ng/mL     Screening Not Indicated     Hepatitis C Screening Once for adults born between 1945 and 1965  More frequently in patients at high risk for Hepatitis C Hep C Antibody: 07/02/2021    Screening Current   Diabetes Screening 1-2 times per year if you're at risk for diabetes or have pre-diabetes Fasting glucose: 99 mg/dL (10/4/2024)  A1C: 6.0 % (6/29/2024)  Screening Current   Cholesterol Screening Once every 5 years if  you don't have a lipid disorder. May order more often based on risk factors. Lipid panel: 06/30/2024  Screening Not Indicated  History Lipid Disorder      Other Preventive Screenings Covered by Medicare:  Abdominal Aortic Aneurysm (AAA) Screening: covered once if your at risk. You're considered to be at risk if you have a family history of AAA or a male between the age of 65-75 who smoking at least 100 cigarettes in your lifetime.  Lung Cancer Screening: covers low dose CT scan once per year if you meet all of the following conditions: (1) Age 55-77; (2) No signs or symptoms of lung cancer; (3) Current smoker or have quit smoking within the last 15 years; (4) You have a tobacco smoking history of at least 20 pack years (packs per day x number of years you smoked); (5) You get a written order from a healthcare provider.  Glaucoma Screening: covered annually if you're considered high risk: (1) You have diabetes OR (2) Family history of glaucoma OR (3)  aged 50 and older OR (4)  American aged 65 and older  Osteoporosis Screening: covered every 2 years if you meet one of the following conditions: (1) Have a vertebral abnormality; (2) On glucocorticoid therapy for more than 3 months; (3) Have primary hyperparathyroidism; (4) On osteoporosis medications and need to assess response to drug therapy.  HIV Screening: covered annually if you're between the age of 15-65. Also covered annually if you are younger than 15 and older than 65 with risk factors for HIV infection. For pregnant patients, it is covered up to 3 times per pregnancy.    Immunizations:  Immunization Recommendations   Influenza Vaccine Annual influenza vaccination during flu season is recommended for all persons aged >= 6 months who do not have contraindications   Pneumococcal Vaccine   * Pneumococcal conjugate vaccine = PCV13 (Prevnar 13), PCV15 (Vaxneuvance), PCV20 (Prevnar 20)  * Pneumococcal polysaccharide vaccine = PPSV23  (Pneumovax) Adults 19-65 yo with certain risk factors or if 65+ yo  If never received any pneumonia vaccine: recommend Prevnar 20 (PCV20)  Give PCV20 if previously received 1 dose of PCV13 or PPSV23   Hepatitis B Vaccine 3 dose series if at intermediate or high risk (ex: diabetes, end stage renal disease, liver disease)   Respiratory syncytial virus (RSV) Vaccine - COVERED BY MEDICARE PART D  * RSVPreF3 (Arexvy) CDC recommends that adults 60 years of age and older may receive a single dose of RSV vaccine using shared clinical decision-making (SCDM)   Tetanus (Td) Vaccine - COST NOT COVERED BY MEDICARE PART B Following completion of primary series, a booster dose should be given every 10 years to maintain immunity against tetanus. Td may also be given as tetanus wound prophylaxis.   Tdap Vaccine - COST NOT COVERED BY MEDICARE PART B Recommended at least once for all adults. For pregnant patients, recommended with each pregnancy.   Shingles Vaccine (Shingrix) - COST NOT COVERED BY MEDICARE PART B  2 shot series recommended in those 19 years and older who have or will have weakened immune systems or those 50 years and older     Health Maintenance Due:      Topic Date Due   • Hepatitis C Screening  Completed     Immunizations Due:      Topic Date Due   • Hepatitis A Vaccine (1 of 2 - Risk 2-dose series) Never done   • COVID-19 Vaccine (4 - 2024-25 season) 09/01/2024     Advance Directives   What are advance directives?  Advance directives are legal documents that state your wishes and plans for medical care. These plans are made ahead of time in case you lose your ability to make decisions for yourself. Advance directives can apply to any medical decision, such as the treatments you want, and if you want to donate organs.   What are the types of advance directives?  There are many types of advance directives, and each state has rules about how to use them. You may choose a combination of any of the following:  Living  will:  This is a written record of the treatment you want. You can also choose which treatments you do not want, which to limit, and which to stop at a certain time. This includes surgery, medicine, IV fluid, and tube feedings.   Durable power of  for healthcare (DPAHC):  This is a written record that states who you want to make healthcare choices for you when you are unable to make them for yourself. This person, called a proxy, is usually a family member or a friend. You may choose more than 1 proxy.  Do not resuscitate (DNR) order:  A DNR order is used in case your heart stops beating or you stop breathing. It is a request not to have certain forms of treatment, such as CPR. A DNR order may be included in other types of advance directives.  Medical directive:  This covers the care that you want if you are in a coma, near death, or unable to make decisions for yourself. You can list the treatments you want for each condition. Treatment may include pain medicine, surgery, blood transfusions, dialysis, IV or tube feedings, and a ventilator (breathing machine).  Values history:  This document has questions about your views, beliefs, and how you feel and think about life. This information can help others choose the care that you would choose.  Why are advance directives important?  An advance directive helps you control your care. Although spoken wishes may be used, it is better to have your wishes written down. Spoken wishes can be misunderstood, or not followed. Treatments may be given even if you do not want them. An advance directive may make it easier for your family to make difficult choices about your care.   Alcohol Use and Your Health    Drinking too much can harm your health.  Excessive alcohol use leads to about 88,000 death in the [place] each year, and shortens the life of those who diet by almost 30 years.  Further, excessive drinking cost the economy $249 billion in 2010.  Most excessive  "drinkers are not alcohol dependent.    Excessive alcohol use has immediate effects that increase the risk of many harmful health conditions.  These are most often the result of binge drinking.  Over time, excessive alcohol use can lead to the development of chronic diseases and other series health problems.    What is considered a \"drink\"?        Excessive alcohol use includes:  Binge Drinking: For women, 4 or more drinks consumed on one occasion. For men, 5 or more drinks consumed on one occasion.  Heavy Drinking: For women, 8 or more drinks per week. For men, 15 or more drinks per week  Any alcohol used by pregnant women  Any alcohol used by those under the age of 21 years    If you choose to drink, do so in moderation:  Do not drink at all if you are under the age of 21, or if you are or may be pregnant, or have health problems that could be made worse by drinking.  For women, up to 1 drink per day  For men, up to 2 drinks a day    No one should begin drinking or drink more frequently based on potential health benefits    Short-Term Health Risks:  Injuries: motor vehicle crashes, falls, drownings, burns  Violence: homicide, suicide, sexual assault, intimate partner violence  Alcohol poisoning  Reproductive health: risky sexual behaviors, unintended prengnacy, sexually transmitted diseases, miscarriage, stillbirth, fetal alcohol syndrome    Long-Term Health Risks:  Chronic diseases: high blood pressure, heart disease, stroke, liver disease, digestive problems  Cancers: breast, mouth and throat, liver, colon  Learning and memory problems: dementia, poor school performance  Mental health: depression, anxiety, insomnia  Social problems: lost productivity, family problems, unemployment  Alcohol dependence    For support and more information:  Substance Abuse and Mental Health Services Administration  [address]  [place] [address]  Web Address: http://www.samhsa.gov    Alcoholics Anonymous        Web Address: " http://www.aa.org    https://www.cdc.gov/alcohol/fact-sheets/alcohol-use.htm  Narcotic (Opioid) Safety    Use narcotics safely:  Take prescribed narcotics exactly as directed  Do not give narcotics to others or take narcotics that belong to someone else  Do not mix narcotics without medicines or alcohol  Do not drive or operate heavy machinery after you take the narcotic  Monitor for side effects and notify your healthcare provider if you experienced side effects such as nausea, sleepiness, itching, or trouble thinking clearly.    Manage constipation:    Constipation is the most common side effect of narcotic medicine. Constipation is when you have hard, dry bowel movements, or you go longer than usual between bowel movements. Tell your healthcare provider about all changes in your bowel movements while you are taking narcotics. He or she may recommend laxative medicine to help you have a bowel movement. He or she may also change the kind of narcotic you are taking, or change when you take it. The following are more ways you can prevent or relieve constipation:    Drink liquids as directed.  You may need to drink extra liquids to help soften and move your bowels. Ask how much liquid to drink each day and which liquids are best for you.  Eat high-fiber foods.  This may help decrease constipation by adding bulk to your bowel movements. High-fiber foods include fruits, vegetables, whole-grain breads and cereals, and beans. Your healthcare provider or dietitian can help you create a high-fiber meal plan. Your provider may also recommend a fiber supplement if you cannot get enough fiber from food.  Exercise regularly.  Regular physical activity can help stimulate your intestines. Walking is a good exercise to prevent or relieve constipation. Ask which exercises are best for you.  Schedule a time each day to have a bowel movement.  This may help train your body to have regular bowel movements. Bend forward while you are  on the toilet to help move the bowel movement out. Sit on the toilet for at least 10 minutes, even if you do not have a bowel movement.    Store narcotics safely:   Store narcotics where others cannot easily get them.  Keep them in a locked cabinet or secure area. Do not  keep them in a purse or other bag you carry with you. A person may be looking for something else and find the narcotics.  Make sure narcotics are stored out of the reach of children.  A child can easily overdose on narcotics. Narcotics may look like candy to a small child.    The best way to dispose of narcotics:      The laws vary by country and area. In the [place], the best way is to return the narcotics through a take-back program. This program is offered by the [place] Drug Enforcement Agency (JOSEP). The following are options for using the program:  Take the narcotics to a JOSEP collection site.  The site is often a law enforcement center. Call your local law enforcement center for scheduled take-back days in your area. You will be given information on where to go if the collection site is in a different location.  Take the narcotics to an approved pharmacy or hospital.  A pharmacy or hospital may be set up as a collection site. You will need to ask if it is a JOSEP collection site if you were not directed there. A pharmacy or doctor's office may not be able to take back narcotics unless it is a JOSEP site.  Use a mail-back system.  This means you are given containers to put the narcotics into. You will then mail them in the containers.  Use a take-back drop box.  This is a place to leave the narcotics at any time. People and animals will not be able to get into the box. Your local law enforcement agency can tell you where to find a drop box in your area.    Other ways to manage pain:   Ask your healthcare provider about non-narcotic medicines to control pain.  Nonprescription medicines include NSAIDs (such as ibuprofen) and acetaminophen.  Prescription medicines include muscle relaxers, antidepressants, and steroids.  Pain may be managed without any medicines.  Some ways to relieve pain include massage, aromatherapy, or meditation. Physical or occupational therapy may also help.    For more information:   Drug Enforcement Administration  [address] [place] [address]  Phone: 7- 900 - 331-6321  Web Address: https://www.deadiversion.[facility].gov/drug_disposal/    US Food and Drug Administration  [address] [place] [address]  Phone: 2- 662 - 869-1491  Web Address: http://www.fda.gov     © Copyright [employer] 2018 Information is for End User's use only and may not be sold, redistributed or otherwise used for commercial purposes. All illustrations and images included in CareNotes® are the copyrighted property of A.D.A.M., Inc. or [employer]

## 2025-01-27 NOTE — ASSESSMENT & PLAN NOTE
Lab Results   Component Value Date    EGFR 51 08/08/2024    EGFR 66 07/09/2024    EGFR 50 06/30/2024    CREATININE 1.17 10/04/2024    CREATININE 1.23 08/22/2024    CREATININE 1.28 08/08/2024   Chronic, renal function stable  Continue medical management with Lopressor 25 mg daily for blood pressure management, atorvastatin 80 mg daily for hyperlipidemia management

## 2025-01-27 NOTE — ASSESSMENT & PLAN NOTE
Chronic, stable  Patient follows closely with pain management and recently initiated tramadol 50 mg twice daily as needed for pain management

## 2025-01-27 NOTE — ASSESSMENT & PLAN NOTE
Likely multifactorial  Poor appetite with decreased p.o. intake reported following carotid endarterectomy in summer 2024  Weight loss seems to have plateaued at this time  Counseled patient and daughter on optimizing his nutrition, prioritizing protein in the form of Boost or Ensure shakes  Will follow total protein and albumin for signs of malnutrition

## 2025-01-30 ENCOUNTER — TELEPHONE (OUTPATIENT)
Age: 85
End: 2025-01-30

## 2025-01-30 NOTE — TELEPHONE ENCOUNTER
Hello,     Can you please advise which Speciality/Team and if patient can be seen by an Resident, AP and or Attending  only?       Thank you for your time,     Cecilia         [facility] Nazia HARTMAN - Symptomatic stenosis of left carotid artery     D/C - HOME - 07/03/2024    Needs to see outpatient neurology on nonurgently in 1 to 2 months postdischarge. His meds are yet to be determined. He will also be following up with both the has a PCP, vascular surgery as he has a critical carotid stenosis. He will also be following up with the cardiology and pulmonology.

## 2025-01-31 NOTE — TELEPHONE ENCOUNTER
Called patient and spoke with him regarding HFU. Patient declined to schedule.      Not scheduling HFU at this time      Thank you,     [facility]

## 2025-02-02 DIAGNOSIS — E78.5 HYPERLIPIDEMIA, UNSPECIFIED HYPERLIPIDEMIA TYPE: ICD-10-CM

## 2025-02-06 RX ORDER — ATORVASTATIN CALCIUM 80 MG/1
80 TABLET, FILM COATED ORAL EVERY MORNING
Qty: 90 TABLET | Refills: 2 | Status: SHIPPED | OUTPATIENT
Start: 2025-02-06

## 2025-02-26 DIAGNOSIS — G62.9 NEUROPATHY: ICD-10-CM

## 2025-02-27 ENCOUNTER — TELEPHONE (OUTPATIENT)
Age: 85
End: 2025-02-27

## 2025-02-27 DIAGNOSIS — G89.4 CHRONIC PAIN SYNDROME: ICD-10-CM

## 2025-02-27 RX ORDER — TRAMADOL HYDROCHLORIDE 50 MG/1
50 TABLET ORAL 2 TIMES DAILY PRN
Qty: 60 TABLET | Refills: 1 | Status: SHIPPED | OUTPATIENT
Start: 2025-03-20

## 2025-02-27 NOTE — TELEPHONE ENCOUNTER
Pt seen for OV 1/10, tramadol script sent dated 2/3 with 2 refills. Per previous notation, requesting script be resent to [facility].   [facility] added to pt chart. Please advise regarding new script.

## 2025-02-27 NOTE — TELEPHONE ENCOUNTER
It looks like it was filled on 2/20/2025, did he pick the prescription up?  I sent a refill to [facility] that he can  on 3/20/2025

## 2025-02-27 NOTE — TELEPHONE ENCOUNTER
Caller: Blayne kaur    Doctor: Dr. BURCH    Reason for call: returning RN missed call    Call back#:   [phone]

## 2025-02-27 NOTE — TELEPHONE ENCOUNTER
S/w pharmacy staff member Sincere, states tramadol was dispensed 2/20.    Confirmed with dtr Madhavi, states her sister likely picked up script on 2/20. Made aware 3/20 script sent to [facility].

## 2025-02-27 NOTE — TELEPHONE ENCOUNTER
Caller: Suhail    Doctor: Dr. Braxton    Reason for call: Pharmacy called back with correct info!!   Tramadol was prepared on 2/16   Patient picked up 2/20 and then Physician cancelled it today 2/27/2025     Call back#: [phone]

## 2025-02-27 NOTE — TELEPHONE ENCOUNTER
Per communication consent, s/w pt's daughter Madhavi. States they did not fill tramadol script on 2/20.   LMOM for pharmacy to confirm. Await pharmacy cb.

## 2025-02-27 NOTE — TELEPHONE ENCOUNTER
Caller: Pts daughter Marisol    Doctor: Dr. BURCH    Reason for call: Pts daughter states [facility] was not able to refill this script for patient. States it needs to be resubmitted.     traMADol (Ultram) 50 mg tablet   Dose: 50 mg Route: Oral Frequency: 2 times daily PRN for moderate pain  Dispense Quantity: 60 tablet Refills: 2        Please call her back to discuss further and remove [facility] from patients pharmacy.     Call back#: [phone] (cell )

## 2025-02-28 RX ORDER — GABAPENTIN 100 MG/1
100 CAPSULE ORAL 2 TIMES DAILY
Qty: 200 CAPSULE | Refills: 0 | Status: SHIPPED | OUTPATIENT
Start: 2025-02-28

## 2025-03-10 ENCOUNTER — NURSE TRIAGE (OUTPATIENT)
Age: 85
End: 2025-03-10

## 2025-03-10 NOTE — TELEPHONE ENCOUNTER
"FOLLOW UP: Pt's daughter called to report new sore to top of pt's L big toe.  Pt states this started about a week ago.  There is an area of purple discoloration to the top of the left big toe about the size of a dime and is surround by redness.  Pt denies any warmth to the area, pain, or drainage.  The spot has no open areas at this time.   Pt denies any swelling, numbness or tingling.  Pt recently seen by foot doctor who recommended following up with vascular.  Pt is currently putting mupirocin ointment on the toe per foot doctor's recommendation.      REASON FOR CONVERSATION: Toe Pain    SYMPTOMS: new purple discoloration of L big toe the size of a dime    OTHER: pt scheduled for OV tomorrow 3/11/25     DISPOSITION: Next Available Appointment with Provider            Answer Assessment - Initial Assessment Questions  1. ONSET: \"When did the pain start?\"       About a week ago   2. LOCATION: \"Where is the pain located?\"   (e.g., around nail, entire toe, at foot joint)       Top of L big toe   3. PAIN: \"How bad is the pain?\"    (Scale 1-10; or mild, moderate, severe)      No pain   4. APPEARANCE: \"What does the toe look like?\" (e.g., redness, swelling, bruising, pallor)      Purple spot about the size of a dime with surrounding redness   5. CAUSE: \"What do you think is causing the toe pain?\"      Unsure   6. OTHER SYMPTOMS: \"Do you have any other symptoms?\" (e.g., leg pain, rash, fever, numbness)      Denies    Protocols used: Toe Pain-Adult-[place]    "

## 2025-03-11 ENCOUNTER — OFFICE VISIT (OUTPATIENT)
Dept: VASCULAR SURGERY | Facility: CLINIC | Age: 85
End: 2025-03-11
Payer: COMMERCIAL

## 2025-03-11 ENCOUNTER — HOSPITAL ENCOUNTER (OUTPATIENT)
Dept: NON INVASIVE DIAGNOSTICS | Facility: CLINIC | Age: 85
Discharge: HOME | End: 2025-03-11
Payer: COMMERCIAL

## 2025-03-11 VITALS
BODY MASS INDEX: 21.96 KG/M2 | SYSTOLIC BLOOD PRESSURE: 124 MMHG | DIASTOLIC BLOOD PRESSURE: 82 MMHG | HEART RATE: 81 BPM | RESPIRATION RATE: 16 BRPM | HEIGHT: 68 IN | OXYGEN SATURATION: 98 %

## 2025-03-11 DIAGNOSIS — I73.9 PERIPHERAL ARTERY DISEASE (HCC): Primary | ICD-10-CM

## 2025-03-11 DIAGNOSIS — I73.9 PERIPHERAL ARTERY DISEASE (HCC): ICD-10-CM

## 2025-03-11 DIAGNOSIS — S91.102A OPEN WOUND OF LEFT GREAT TOE, INITIAL ENCOUNTER: ICD-10-CM

## 2025-03-11 PROCEDURE — 93923 UPR/LXTR ART STDY 3+ LVLS: CPT

## 2025-03-11 PROCEDURE — 93922 UPR/L XTREMITY ART 2 LEVELS: CPT | Performed by: SURGERY

## 2025-03-11 PROCEDURE — 93925 LOWER EXTREMITY STUDY: CPT

## 2025-03-11 PROCEDURE — 93925 LOWER EXTREMITY STUDY: CPT | Performed by: SURGERY

## 2025-03-11 PROCEDURE — 99213 OFFICE O/P EST LOW 20 MIN: CPT | Performed by: NURSE PRACTITIONER

## 2025-03-11 RX ORDER — CLINDAMYCIN HYDROCHLORIDE 300 MG/1
300 CAPSULE ORAL 4 TIMES DAILY
Qty: 28 CAPSULE | Refills: 0 | Status: SHIPPED | OUTPATIENT
Start: 2025-03-11 | End: 2025-03-18

## 2025-03-11 NOTE — ASSESSMENT & PLAN NOTE
83 yo male with HTN, HLD, GERD, CAD h/o CABG, CKD 3, pulmonary fibrosis, T12 compression fracture, spinal stenosis, left hemispheric TIA with high-grade LICA stenosis and L CCA stenosis s/p L CEA and retrograde L CCA stenting for symptomatic 7/8/24 ([facility]) and PAD presents with L great toe pressure (?) wound x 1 week.    - Presents accompanied by daughter.   - Reports L great toe wound x 1 week. Patient thinks 2/2 to pressure from new slippers. Denies trauma or injury.   - Denies pain  - L great toe wound approx 1.2cm x 1.2cm. Nondraining, no malodor. Forefoot erythema, ? cellulitic  - Denies fever, chills or feeling unwell  - Non-palpable pulses, M/S intact   - Seen by podiatry 3/10/25 ([facility]) plan: mupirocin QD, gauze dressing and vascular follow up.    LEAD 11/8/24  RIGHT: 50-75% stenosis in CFA and >75% in prox pop. High grade vs occ in dSFA with trickle flow. TP occlusive disease. LIBERTY NC/-/-     LEFT:50-75% stenosis in prox profunda and m SFA, TP occlusive disease LIBERTY NC/54/55    Kidney fx stable sCr 1.17 (10/4/24)    Plan:  - Updated LEAD- STAT  - Return with vascular surgeon +/- angiogram  - +/- agram w/ anesthesia-difficulty laying flat  - Updated BMP to evaluate kidney fxn  IF angiogram  - Clindamycin 300mg PO QID x 7 days (PCN allergic)  - Continue ASA and statin  - Continue local wound care  - Call or return to office immediately with increased pain, drainage, malodor, fever or chills    Orders:    VAS ARTERIAL DUPLEX- LOWER LIMB BILATERAL; Future    clindamycin (CLEOCIN) 300 MG capsule; Take 1 capsule (300 mg total) by mouth 4 (four) times a day for 7 days    Basic metabolic panel; Future

## 2025-03-11 NOTE — PATIENT INSTRUCTIONS
Lower extremity arterial duplex ASAP, follow-up with vascular surgery after imaging, will call sooner with more significant findings and follow-up plan if indicated    Continue local wound care as per podiatry  Continue to monitor for drainage, malodor, fever or chills    Oral antibiotic clindamycin 300 mg 4 times per day x 7 days    BMP to evaluate kidney function IF angiogram is necessary

## 2025-03-11 NOTE — PROGRESS NOTES
Name: Suhail Borrego      : 1940      MRN: [id number]  Encounter Provider: KEVIN Bhatia  Encounter Date: 3/11/2025   Encounter department: [facility] [place]  :  Assessment & Plan  Peripheral artery disease (HCC)  83 yo male with HTN, HLD, GERD, CAD h/o CABG, CKD 3, pulmonary fibrosis, T12 compression fracture, spinal stenosis, left hemispheric TIA with high-grade LICA stenosis and L CCA stenosis s/p L CEA and retrograde L CCA stenting for symptomatic 24 ([place]) and PAD presents with L great toe pressure (?) wound x 1 week.    - Presents accompanied by daughter.   - Reports L great toe wound x 1 week. Patient thinks 2/2 to pressure from new slippers. Denies trauma or injury.   - Denies pain  - L great toe wound approx 1.2cm x 1.2cm. Nondraining, no malodor. Forefoot erythema, ? cellulitic  - Denies fever, chills or feeling unwell  - Non-palpable pulses, M/S intact   - Seen by podiatry 3/10/25 ([facility]) plan: mupirocin QD, gauze dressing and vascular follow up.    LEAD 24  RIGHT: 50-75% stenosis in CFA and >75% in prox pop. High grade vs occ in dSFA with trickle flow. TP occlusive disease. LIBERTY NC/-/-     LEFT:50-75% stenosis in prox profunda and m SFA, TP occlusive disease LIBERTY NC/54/55    Kidney fx stable sCr 1.17 (10/4/24)    Plan:  - Updated LEAD- STAT  - Return with vascular surgeon +/- angiogram  - +/- agram w/ anesthesia-difficulty laying flat  - Updated BMP to evaluate kidney fxn  IF angiogram  - Clindamycin 300mg PO QID x 7 days (PCN allergic)  - Continue ASA and statin  - Continue local wound care  - Call or return to office immediately with increased pain, drainage, malodor, fever or chills    Orders:    VAS ARTERIAL DUPLEX- LOWER LIMB BILATERAL; Future    clindamycin (CLEOCIN) 300 MG capsule; Take 1 capsule (300 mg total) by mouth 4 (four) times a day for 7 days    Basic metabolic panel; Future    Open wound of left great toe, initial encounter    Orders:    VAS ARTERIAL  DUPLEX- LOWER LIMB BILATERAL; Future    clindamycin (CLEOCIN) 300 MG capsule; Take 1 capsule (300 mg total) by mouth 4 (four) times a day for 7 days        History of Present Illness   Cc: Patient had a Lt great toe wound for the past week. Pt has slight redness, but no pain.  HPI  Suhail Borrego is a 84 y.o. male who presents with L great toe wound x 1 approx 1 week.     See above assessment and plan    History obtained from: patient    Review of Systems   Musculoskeletal:  Positive for gait problem (ambulates with a cane).   Skin:  Positive for color change and wound.     Medical History Reviewed by provider this encounter:     .  Past Medical History   Past Medical History:   Diagnosis Date    Abnormal liver function test     RESOLVED: 14SEP2017    Allergic rhinitis     Anemia     LAST ASSESSED: 19OCT2017    Arthritis     BPH (benign prostatic hyperplasia)     CAD (coronary artery disease)     Coronary artery disease     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HCC)     LAST ASSESSED: 05OCT2017    Former tobacco use     GERD (gastroesophageal reflux disease)     Hand paresthesia     RESOLVED: 11SEP2017    Hyperlipidemia     Hypertension     Lumbar stenosis     Peripheral artery disease (HCC)     LAST ASSESSED: 27OCT2017    Stage 3a chronic kidney disease (HCC) 7/11/2021     Past Surgical History:   Procedure Laterality Date    BACK SURGERY      COLONOSCOPY      LUMBAR FUSION      PA ARTHRODESIS POSTERIOR/PSTLAT TQ 1NTRSPC LUMBAR N/A 11/03/2016    Procedure: L2-S1 POSTERIOR LUMBAR FUSION AND DECOMPRESSION (Impulse), Posterior lateral fixation; dural repair.;  Surgeon: Leslie Gil MD;  Location[facility];  Service: Orthopedics    PA CABG W/ARTERIAL GRAFT SINGLE ARTERIAL GRAFT N/A 01/19/2018    Procedure: CABG X4 with LIMA - LAD, SVG - RCA, OM2, & Diagonal ; Left Leg EVH; MATTHEW;  Surgeon: Eric Bar DO;  Location: [facility];  Service: Cardiac Surgery    PA CIRCUMCISION AGE >28 DAYS N/A 10/17/2024    Procedure:  CIRCUMCISION WITH ADJACENT TISSUE TRANSFER;  Surgeon: Brody Hoyt MD;  Location: [facility];  Service: Urology    OH COLONOSCOPY FLX DX W/COLLJ SPEC WHEN PFRMD N/A 11/15/2017    Procedure: EGD AND COLONOSCOPY;  Surgeon: Mariano Godinez MD;  Location: [facility];  Service: Gastroenterology    OH TEAEC W/PATCH GRF CAROTID VERTB SUBCLAV NECK INC Left 7/8/2024    Procedure: left CEA retrograde left common carotid angioplasty/stent;  Surgeon: Luis Miguel Peters DO;  Location: [facility];  Service: Vascular    SEPTOPLASTY      LAST ASSESSED; [id number]    TONSILECTOMY AND ADNOIDECTOMY      LAST ASSESSED: [id number]    UPPER GASTROINTESTINAL ENDOSCOPY       Family History   Problem Relation Age of Onset    Emphysema Mother     Liver disease Mother     Coronary artery disease Father     Hypertension Father     Liver disease Father     Heart failure Father     Stroke Father         CVA     Heart attack Father     Coronary artery disease Brother     Heart disease Brother         younger brother by pass and other brother 4 stents placed    Other Family         BACK PROBLEM     Stroke Family         CVA    Emphysema Family     Hypertension Family         BENIGN      reports that he quit smoking about 14 years ago. His smoking use included cigarettes. He started smoking about 64 years ago. He has a 50 pack-year smoking history. He has never used smokeless tobacco. He reports that he does not currently use alcohol after a past usage of about 1.0 standard drink of alcohol per week. He reports that he does not currently use drugs after having used the following drugs: Marijuana.  Current Outpatient Medications   Medication Instructions    acetaminophen (TYLENOL) 650 mg, Every 8 hours PRN    Aspirin 81 MG CAPS Take by mouth    atorvastatin (LIPITOR) 80 mg, Oral, Every morning    cholecalciferol (VITAMIN D3) 2,000 Units, Daily    clindamycin (CLEOCIN) 300 mg, Oral, 4 times daily    docusate sodium (COLACE) 100 mg, 2 times daily     famotidine (PEPCID) 20 mg, Oral, 2 times daily    gabapentin (NEURONTIN) 100 mg, Oral, 2 times daily, Morning and bedtime    metoprolol tartrate (LOPRESSOR) 25 mg, Oral, Daily    midodrine (PROAMATINE) 2.5 mg tablet TAKE 1 TABLET (2.5 MG TOTAL) BY MOUTH 3 TIMES A DAY BEFORE MEALS    Multiple Vitamin (MULTIVITAMIN) capsule 1 capsule, Daily    mupirocin (BACTROBAN) 2 % ointment Topical, 3 times daily    [START ON 3/20/2025] traMADol (ULTRAM) 50 mg, Oral, 2 times daily PRN    vitamin B-12 (VITAMIN B-12) 1,000 mcg, Daily    Vitamin C 1,000 mg, Daily     Allergies   Allergen Reactions    Penicillins Other (See Comments)     Hallucinations;  Patient reported that he was seeing visual disturbances        Current Outpatient Medications on File Prior to Visit   Medication Sig Dispense Refill    acetaminophen (TYLENOL) 650 mg CR tablet Take 650 mg by mouth every 8 (eight) hours as needed for mild pain      Ascorbic Acid (Vitamin C) 500 MG PACK Take 1,000 mg by mouth daily      Aspirin 81 MG CAPS Take by mouth      atorvastatin (LIPITOR) 80 mg tablet TAKE 1 TABLET BY MOUTH EVERY DAY IN THE MORNING 90 tablet 2    cholecalciferol (VITAMIN D3) 1,000 units tablet Take 2,000 Units by mouth daily      cyanocobalamin (VITAMIN B-12) 1,000 mcg tablet Take 1,000 mcg by mouth daily        docusate sodium (COLACE) 100 mg capsule Take 100 mg by mouth 2 (two) times a day      famotidine (PEPCID) 20 mg tablet TAKE 1 TABLET BY MOUTH TWICE A  tablet 1    gabapentin (NEURONTIN) 100 mg capsule Take 1 capsule (100 mg total) by mouth 2 (two) times a day Morning and bedtime 200 capsule 0    metoprolol tartrate (LOPRESSOR) 50 mg tablet TAKE 0.5 TABLETS (25 MG TOTAL) BY MOUTH IN THE MORNING 45 tablet 1    midodrine (PROAMATINE) 2.5 mg tablet TAKE 1 TABLET (2.5 MG TOTAL) BY MOUTH 3 TIMES A DAY BEFORE MEALS 270 tablet 1    Multiple Vitamin (MULTIVITAMIN) capsule Take 1 capsule by mouth daily      mupirocin (BACTROBAN) 2 % ointment Apply  "topically 3 (three) times a day 30 g 0    [START ON 3/20/2025] traMADol (Ultram) 50 mg tablet Take 1 tablet (50 mg total) by mouth 2 (two) times a day as needed for moderate pain Do not start before 2025. 60 tablet 1     No current facility-administered medications on file prior to visit.      Social History     Tobacco Use    Smoking status: Former     Current packs/day: 0.00     Average packs/day: 1 pack/day for 50.0 years (50.0 ttl pk-yrs)     Types: Cigarettes     Start date:      Quit date:      Years since quittin.2    Smokeless tobacco: Never   Vaping Use    Vaping status: Never Used   Substance and Sexual Activity    Alcohol use: Not Currently     Alcohol/week: 1.0 standard drink of alcohol     Types: 1 Shots of liquor per week     Comment: beer, wine, scotch every day; SOCIAL AS PER ALL SCRIPTS     Drug use: Not Currently     Types: Marijuana     Comment: medical majiuana    Sexual activity: Not Currently        Objective   /82 (BP Location: Right arm, Patient Position: Sitting)   Pulse 81   Resp 16   Ht 5' 8\" (1.727 m)   SpO2 98%   BMI 21.96 kg/m²      Physical Exam  Vitals and nursing note reviewed.   Constitutional:       General: He is not in acute distress.     Appearance: Normal appearance. He is not ill-appearing.   HENT:      Head: Normocephalic and atraumatic.      Ears:      Comments: [place]  Cardiovascular:      Rate and Rhythm: Normal rate.      Pulses:           Dorsalis pedis pulses are 0 on the right side and 0 on the left side.        Posterior tibial pulses are 0 on the right side and 0 on the left side.   Pulmonary:      Effort: Pulmonary effort is normal. No respiratory distress.      Comments: ROBLES  Musculoskeletal:         General: Normal range of motion.      Comments: Quad cane   Skin:     General: Skin is warm.      Capillary Refill: Capillary refill takes less than 2 seconds.      Findings: Erythema present.      Comments: L great toe wound approx 1.2cm " x 1.2 cm . Non-draining, no malodor    Forefoot erythema     Neurological:      Mental Status: He is alert and oriented to person, place, and time.      Sensory: No sensory deficit.      Motor: No weakness.   Psychiatric:         Mood and Affect: Mood normal.         Behavior: Behavior normal.         Left  Administrative Statements   I have spent a total time of 30 minutes in caring for this patient on the day of the visit/encounter including Prognosis, Instructions for management, Patient and family education, Impressions, Documenting in the medical record, Reviewing/placing orders in the medical record (including tests, medications, and/or procedures), and Obtaining or reviewing history  .

## 2025-03-13 ENCOUNTER — RESULTS FOLLOW-UP (OUTPATIENT)
Dept: VASCULAR SURGERY | Facility: CLINIC | Age: 85
End: 2025-03-13

## 2025-03-13 ENCOUNTER — TELEPHONE (OUTPATIENT)
Age: 85
End: 2025-03-13

## 2025-03-13 NOTE — TELEPHONE ENCOUNTER
Called and spoke with patient, appointment scheduled for 3/26/25 with Dr. Peters to review results. Patient aware.

## 2025-03-13 NOTE — TELEPHONE ENCOUNTER
Patient was just given office visit to review with Dr. Peters.   I am not sure if this call came before or after, literally within the last 10 minutes we were finding patient an office visit spot.

## 2025-03-13 NOTE — TELEPHONE ENCOUNTER
Received call from pt asking for the results of the JAYASHREE done on 3/11. Per consensus, task was sent to KEVIN Abreu to review. Please review and advise. Thanks!

## 2025-03-23 ENCOUNTER — TELEPHONE (OUTPATIENT)
Dept: OTHER | Facility: OTHER | Age: 85
End: 2025-03-23

## 2025-03-23 NOTE — TELEPHONE ENCOUNTER
"Patient states, \"I contacted my pharmacy regarding Tramadol prescription and they told me that they need doctor approval before refilling medications. I would like the office to contact the pharmacy for my prescription.\"    "

## 2025-03-24 ENCOUNTER — TELEPHONE (OUTPATIENT)
Dept: PAIN MEDICINE | Facility: CLINIC | Age: 85
End: 2025-03-24

## 2025-03-24 DIAGNOSIS — G89.4 CHRONIC PAIN SYNDROME: ICD-10-CM

## 2025-03-24 NOTE — TELEPHONE ENCOUNTER
Reason for call:   [] Refill   [] Prior Auth  [x] Other: Pt's daughter called to confirm that she ordered tramadol on mychart for her father but also wanted to inform that it needs a prior auth. Explained that I would put in request for prior auth and yes confirmed that rx received for medication through mychart. Daughter aware.

## 2025-03-24 NOTE — TELEPHONE ENCOUNTER
RN s/w pt and made him aware that his Ins Co approved his Tramadol. Pt said he also got a call from [employer] telling him the same thing.

## 2025-03-24 NOTE — TELEPHONE ENCOUNTER
Alanis got prior auth for Tramadol, but didn't get fax # to fax back the information     I provided [phone] [facility] #

## 2025-03-24 NOTE — TELEPHONE ENCOUNTER
PA needed for Tramadol. Please expedite as pt only has enough pills for 1 1/2 days.    Pls contact pt and [facility] once PA obtained.

## 2025-03-24 NOTE — TELEPHONE ENCOUNTER
PA for TRAMADOL 50MG  APPROVED     Date(s) approved 3/24/25  Case #    Patient advised by          []MyChart Message  []Phone call   []LMOM  []L/M to call office as no active Communication consent on file  [x]Unable to leave detailed message as VM not approved on Communication consent       Pharmacy advised by    [x]Fax  []Phone call  []Secure Chat    Specialty Pharmacy    []     Approval letter scanned into [employer] No

## 2025-03-24 NOTE — TELEPHONE ENCOUNTER
PA for TRAMADOL SUBMITTED to [employer]     via    []CMM-KEY:    []Surescripts-Case ID #    []Availity-Auth ID #  NDC #    []Faxed to plan   [x]Other website  [facility]) ID:  [id number]  [place]   []Phone call Case ID #      [x]PA sent as URGENT    All office notes, labs and other pertaining documents and studies sent. Clinical questions answered. Awaiting determination from insurance company.     Turnaround time for your insurance to make a decision on your Prior Authorization can take 7-21 business days.

## 2025-03-24 NOTE — TELEPHONE ENCOUNTER
Reason for call:   [x] Prior Auth  [] Other:     Caller:  [x] Patient  [x] Pharmacy  Pharmacy    [facility]) - [place] - [address]  [address] [address]  Phone: [phone]  Fax: [phone]             Medication  traMADol (Ultram) 50 mg tablet [[id number]]  traMADol (Ultram) 50 mg tablet [[id number]]    Order Details  Dose: 50 mg Route: Oral Frequency: 2 times daily PRN for moderate pain   Dispense Quantity: 60 tablet Refills: 1          Sig: Take 1 tablet (50 mg total) by mouth 2 (two) times a day as needed for moderate pain Do not start before March 20, 2025.         Start Date: 03/20/25 End Date: --   Written Date: 02/27/25 Expiration Date: 08/26/25       Associated Diagnoses: Chronic pain syndrome [G89.4]   Original Order: traMADol (Ultram) 50 mg tablet [[id number]]                   Ordering Provider:   [] PCP/Provider -   [x] Speciality/Provider -   Providers    Authorizing Provider:  KEVIN Castellon  [address], [place] [address]  Phone: [phone]   Fax: [phone]               Has the patient tried other medications and failed? If failed, which medications did they fail?    [] No   [x] Yes -     Is the patient's insurance updated in EPIC?   [x] Yes   [] No     Is a copy of the patient's insurance scanned in EPIC?   [x] Yes   [] No

## 2025-03-24 NOTE — TELEPHONE ENCOUNTER
Caller: janet    Doctor: kari    Reason for call: auth approval # [id number]  For tramadol. From 3-24-25- ongoing.    Call back#: [phone]

## 2025-03-25 RX ORDER — TRAMADOL HYDROCHLORIDE 50 MG/1
50 TABLET ORAL 2 TIMES DAILY PRN
Qty: 60 TABLET | Refills: 0 | OUTPATIENT
Start: 2025-03-25

## 2025-03-25 NOTE — PROGRESS NOTES
Name: Suhail Borrego      : 1940      MRN: [id number]  Encounter Provider: Luis Miguel Peters DO  Encounter Date: 3/26/2025   Encounter department: [facility] [place]  :  Assessment & Plan  Primary hypertension         Peripheral artery disease (HCC)  84-year-old gentleman known to me I treated him for symptomatic left carotid stenosis last summer with a left carotid endarterectomy and retrograde common carotid stenting he did well after this has made a good recovery today he is here for peripheral arterial disease.  He developed a partial-thickness ulcer on his left great toe about a month ago he saw our vascular JESS recently in the office who ordered an updated lower extremity arterial duplex this demonstrates a distal left SFA occlusion as well as a left posterior tibial occlusion LIBERTY of 0.6 and toe pressure of 38.  Since his last visit he and his daughter who accompanied him today tell me that the wound has clearly made significant improvement today the wound is dry it appears to be partial-thickness with no sign of infection.  I think because of the fact that the wound is healing without intervention and his toe pressure would indicate that he could likely completely heal this wound I suggested holding off on proceeding with angiogram and intervention.  I will schedule him for a virtual visit in a month to assess the progress of wound healing and if the wound is stagnating or getting worse we will schedule him for a left leg angiogram.  He is also of note already scheduled for surveillance for his carotid intervention.         Carotid stenosis, asymptomatic, bilateral             History of Present Illness     Patient presents today to review JAYASHREE done 3/11/25 and discuss possible angiogram. L great toe wound.     HPI  Suhail Borrego is a 84 y.o. male  History obtained from: patient    Review of Systems   Constitutional: Negative.    HENT: Negative.     Eyes: Negative.    Respiratory: Negative.   "   Cardiovascular: Negative.    Gastrointestinal: Negative.    Endocrine: Negative.    Genitourinary: Negative.    Musculoskeletal:  Positive for gait problem.   Skin:  Positive for wound.   Allergic/Immunologic: Negative.    Neurological:  Positive for numbness.   Hematological: Negative.    Psychiatric/Behavioral: Negative.       I have reviewed the ROS above and made changes as needed.         Objective   /82 (BP Location: Left arm, Patient Position: Sitting)   Ht 5' 8\" (1.727 m)   Wt 66.2 kg (146 lb)   BMI 22.20 kg/m²      Physical Exam    General  Exam: alert, awake, oriented, no distress, consistent with stated age    Integumentary  Exam: warm, dry, no bruises and normal color    Head and Neck  Exam: supple, no bruits, trachea midline, no JVD, no mass or palpable nodes    Adbomen  Exam: soft, non-tender, non-distended, no pulsatile abdominal masses, no abdominal bruit    Peripheral Vascular  Exam: no clubbing of the digits of the upper extremity, no cyanosis, no edema, both feet are warm, radial pulses 2+ bilaterally, skin well perfused, without and no varicosities.        Upper Extremity:  Palpation: Radial pulse- Bilateral 2+    Lower Extremity:  Palpation: Femoral pulse- Bilateral 2+         Non-palpable distal pulses   Left great toe partial thickness ulcer    Neurologic  Exam:alert, non-focal, oriented x 3, cranial nerves II-XII grossly intact        Administrative Statements   I have spent a total time of 25 minutes in caring for this patient on the day of the visit/encounter including Counseling / Coordination of care, Documenting in the medical record, and Reviewing/placing orders in the medical record (including tests, medications, and/or procedures).  "

## 2025-03-26 ENCOUNTER — OFFICE VISIT (OUTPATIENT)
Dept: VASCULAR SURGERY | Facility: CLINIC | Age: 85
End: 2025-03-26
Payer: COMMERCIAL

## 2025-03-26 VITALS
DIASTOLIC BLOOD PRESSURE: 82 MMHG | HEIGHT: 68 IN | WEIGHT: 146 LBS | SYSTOLIC BLOOD PRESSURE: 122 MMHG | BODY MASS INDEX: 22.13 KG/M2

## 2025-03-26 DIAGNOSIS — I73.9 PERIPHERAL ARTERY DISEASE (HCC): ICD-10-CM

## 2025-03-26 DIAGNOSIS — I65.23 CAROTID STENOSIS, ASYMPTOMATIC, BILATERAL: ICD-10-CM

## 2025-03-26 DIAGNOSIS — I10 PRIMARY HYPERTENSION: Primary | ICD-10-CM

## 2025-03-26 PROBLEM — I74.3 EMBOLISM AND THROMBOSIS OF ARTERIES OF THE LOWER EXTREMITIES (HCC): Status: RESOLVED | Noted: 2022-03-04 | Resolved: 2025-03-26

## 2025-03-26 PROCEDURE — 99214 OFFICE O/P EST MOD 30 MIN: CPT | Performed by: SURGERY

## 2025-03-26 NOTE — ASSESSMENT & PLAN NOTE
84-year-old gentleman known to me I treated him for symptomatic left carotid stenosis last summer with a left carotid endarterectomy and retrograde common carotid stenting he did well after this has made a good recovery today he is here for peripheral arterial disease.  He developed a partial-thickness ulcer on his left great toe about a month ago he saw our vascular JESS recently in the office who ordered an updated lower extremity arterial duplex this demonstrates a distal left SFA occlusion as well as a left posterior tibial occlusion LIBERTY of 0.6 and toe pressure of 38.  Since his last visit he and his daughter who accompanied him today tell me that the wound has clearly made significant improvement today the wound is dry it appears to be partial-thickness with no sign of infection.  I think because of the fact that the wound is healing without intervention and his toe pressure would indicate that he could likely completely heal this wound I suggested holding off on proceeding with angiogram and intervention.  I will schedule him for a virtual visit in a month to assess the progress of wound healing and if the wound is stagnating or getting worse we will schedule him for a left leg angiogram.  He is also of note already scheduled for surveillance for his carotid intervention.

## 2025-03-30 NOTE — TELEPHONE ENCOUNTER
Pt's daughter called to speak with SS    Please review     Ashwini (Daughter)  [phone]         Urine collected and sent to lab.  Nasal swab collected and patient tolerated well.  Patient's father updated on approximate wait times for results.  Patient's father with no other concerns or questions at this time.

## 2025-03-31 DIAGNOSIS — I65.22 SYMPTOMATIC STENOSIS OF LEFT CAROTID ARTERY: ICD-10-CM

## 2025-04-01 RX ORDER — MIDODRINE HYDROCHLORIDE 2.5 MG/1
TABLET ORAL
Qty: 270 TABLET | Refills: 1 | Status: SHIPPED | OUTPATIENT
Start: 2025-04-01

## 2025-04-14 DIAGNOSIS — S90.511D ABRASION OF RIGHT ANKLE, SUBSEQUENT ENCOUNTER: ICD-10-CM

## 2025-04-15 RX ORDER — MUPIROCIN 20 MG/G
OINTMENT TOPICAL 3 TIMES DAILY
Qty: 30 G | Refills: 0 | Status: SHIPPED | OUTPATIENT
Start: 2025-04-15

## 2025-04-20 DIAGNOSIS — I65.22 SYMPTOMATIC STENOSIS OF LEFT CAROTID ARTERY: ICD-10-CM

## 2025-04-23 RX ORDER — MIDODRINE HYDROCHLORIDE 2.5 MG/1
2.5 TABLET ORAL
Qty: 270 TABLET | Refills: 0 | Status: SHIPPED | OUTPATIENT
Start: 2025-04-23

## 2025-04-28 ENCOUNTER — PATIENT MESSAGE (OUTPATIENT)
Dept: VASCULAR SURGERY | Facility: CLINIC | Age: 85
End: 2025-04-28

## 2025-04-28 ENCOUNTER — NURSE TRIAGE (OUTPATIENT)
Age: 85
End: 2025-04-28

## 2025-04-28 NOTE — TELEPHONE ENCOUNTER
Regarding: L great toe wound.  ----- Message from Ingris LOTT sent at 4/28/2025 10:14 AM EDT -----  Patient is requesting a call back regarding  L great toe wound. Was last seen 03/26/25 Dr Salazar in the [place] location, at the time of visit wound had started to scab, patient is calling today because the wound now looks bigger and  has some concerns. Patient is schedule for a virtual visit on 05/23/25 but is also stating that  it can be an in person visit to look at his wound.

## 2025-04-28 NOTE — TELEPHONE ENCOUNTER
Stable dry eschar left, reviewed photos, underlying PAD w/ decreased LIBERTY. Recommend betadine paint daily to the eschar. Would see if can move up virtual visit w/ Dr Peters

## 2025-04-28 NOTE — TELEPHONE ENCOUNTER
Attempted to call patient, no answer. LM for patient to call office back. Ok per Dr. Peters to double book for tomorrow @ [facility]. Patient can either be seen in person or virtual visit.

## 2025-04-28 NOTE — TELEPHONE ENCOUNTER
FOLLOW UP: Pt would like to be seen sooner in person. Offered sooner appointment at [place] however pt could not travel there so he was going to stick with the virtual appointment he has. He would like to know if provider would like to see him sooner once pictures are reviewed.      REASON FOR CONVERSATION: foot wound    SYMPTOMS: Pt called in stating that his left great toe wound has grown in size. He denies any drainage. He states there is a black [place]. At time he has shooting pain. Denies numbness and tingling.    OTHER: Pt is going to have Daughter send pictures via [facility]    DISPOSITION: Discuss with Provider and Call Back Patient

## 2025-04-29 ENCOUNTER — TELEPHONE (OUTPATIENT)
Age: 85
End: 2025-04-29

## 2025-04-29 ENCOUNTER — TELEPHONE (OUTPATIENT)
Dept: VASCULAR SURGERY | Facility: CLINIC | Age: 85
End: 2025-04-29

## 2025-04-29 ENCOUNTER — TELEMEDICINE (OUTPATIENT)
Dept: VASCULAR SURGERY | Facility: CLINIC | Age: 85
End: 2025-04-29
Payer: COMMERCIAL

## 2025-04-29 DIAGNOSIS — I73.9 PERIPHERAL ARTERY DISEASE (HCC): Primary | ICD-10-CM

## 2025-04-29 PROCEDURE — 99213 OFFICE O/P EST LOW 20 MIN: CPT | Performed by: SURGERY

## 2025-04-29 NOTE — PROGRESS NOTES
Virtual Brief Visit  Name: Suhail Borrego      : 1940      MRN: [id number]  Encounter Provider: Luis Miguel Peters DO  Encounter Date: 2025   Enc[facility] [place]  :  Assessment & Plan  Peripheral artery disease (HCC)    Orders:    IR lower extremity angiogram; Future    Peripheral artery disease (HCC)  84-year-old male well-known to me from prior carotid intervention.  He had a small wound on his left great toe I saw him last month in the office.  At that time he did have a left proximal popliteal occlusion as well as left posterior tibial occlusion however his toe pressure was 38 the wound was very small he elected to observe this with close follow-up at that time.  He contacted the office and some images were sent and that shows that the left toe wound has worsened.  At this time I had a virtual visit with him and recommended a left leg angiogram with intervention.  After discussing all risks benefits and alternative therapies he was agreeable to proceed.  He does not need to return to the office to be scheduled we will schedule his procedure with myself in the next couple weeks and he can sign consent the day of the procedure.  I believe his case can be done with conscious sedation will potentially need pedal access but will attempt this from a right groin access first.  Will also ask the [employer] rep to be present for atherectomy.  Other orders    Nursing communication Apply gown prior to procedure; Standing    Have Patient Void On Call to Procedure Room; Standing    Insert and Maintain IV; Standing          History of Present Illness   HPI    Administrative Statements   Encounter provider Luis Miguel Peters DO    The Patient is located at Home and in the following state in which I hold an active license [place].    The patient was identified by name and date of birth. Suhail Borrego was informed that this is a telemedicine visit and that the visit is being conducted through  the [employer] Teams platform. He agrees to proceed..  My office door was closed. No one else was in the room.  He acknowledged consent and understanding of privacy and security of the video platform. The patient has agreed to participate and understands they can discontinue the visit at any time.    I have spent a total time of 15 minutes in caring for this patient on the day of the visit/encounter including Documenting in the medical record, Reviewing/placing orders in the medical record (including tests, medications, and/or procedures), and Obtaining or reviewing history  , not including the time spent for establishing the audio/video connection.        Operative Scheduling Information:    Hospital:  Any IR/endo suite    Physician:  Lorraine    Surgery: Left leg angiogram with intervention, right groin access, possible left pedal access    Urgency:  Standard    Level:  Level 3: Outpatients to be scheduled for elective surgery than can be delayed up to 4 weeks without reasonable expectation of detriment to patient    Case Length:  Normal    Post-op Bed:  Outpatient    OR Table:  IR    Equipment Needs:  Rep: Medjosiah Raymond    Medication Instructions:  Aspirin:   Continue (do not hold)  Plavix:  Continue (do not hold)    Hydration:  No    Contrast Allergy:  no

## 2025-04-29 NOTE — TELEPHONE ENCOUNTER
REMINDER: Under Reason For Call, comments MUST be formatted as:   (Surgeon's Initials) / (Procedure)      Special Instructions/FYI/Dialysis Days: Level 3    Clearances: None    Consent: Consent will be signed day of procedure.    For Surgical Clearances     Levels   1-3   ROUTE this encounter to [facility] Coord[facility] Pool     Level   4   ROUTE this encounter to [facility] Coordinator Pool       HYDRATION CLEARANCES   ONLY ROUTE TO  [facility] Coordinator Pool       Yes, I have ROUTED this encounter to [facility] Coord[facility].

## 2025-04-29 NOTE — TELEPHONE ENCOUNTER
Called and s/w Madhavi and explained that the virtual visit will be a phone call  
Patients daughter called regarding patients appt for today , patient thought it was originally sched for 04/29/25 morning, stated the nurse he spoke to told him and verify date and time , he is now sched for 04/29/25 at 02:30pm with Dr Salazar- virtual- stated that he was waiting for a phone call this morning and that is when he realize appt had been changed, according to patient he did not receive a phone call, daughter is asking for a call back from the office.    Madhavi -[phone]  
Orientation to room/Side rails x 2 or 4 up, assess large gaps, such that a patient could get extremity or other body part entrapped, use additional safety procedures/Call light is within reach, educate patient/family on its functionality/Patient and family education available to parents and patient/Document fall prevention teaching and include in plan of care

## 2025-04-29 NOTE — ASSESSMENT & PLAN NOTE
84-year-old male well-known to me from prior carotid intervention.  He had a small wound on his left great toe I saw him last month in the office.  At that time he did have a left proximal popliteal occlusion as well as left posterior tibial occlusion however his toe pressure was 38 the wound was very small he elected to observe this with close follow-up at that time.  He contacted the office and some images were sent and that shows that the left toe wound has worsened.  At this time I had a virtual visit with him and recommended a left leg angiogram with intervention.  After discussing all risks benefits and alternative therapies he was agreeable to proceed.  He does not need to return to the office to be scheduled we will schedule his procedure with myself in the next couple weeks and he can sign consent the day of the procedure.  I believe his case can be done with conscious sedation will potentially need pedal access but will attempt this from a right groin access first.  Will also ask the [employer] rep to be present for atherectomy.

## 2025-04-29 NOTE — TELEPHONE ENCOUNTER
Operative Scheduling Information:     Hospital:  Any IR/endo suite     Physician:  Lorraine     Surgery: Left leg angiogram with intervention, right groin access, possible left pedal access     Urgency:  Standard     Level:  Level 3: Outpatients to be scheduled for elective surgery than can be delayed up to 4 weeks without reasonable expectation of detriment to patient     Case Length:  Normal     Post-op Bed:  Outpatient     OR Table:  IR     Equipment Needs:  Rep: Medjosiah Raymond     Medication Instructions:  Aspirin:   Continue (do not hold)  Plavix:  Continue (do not hold)     Hydration:  No     Contrast Allergy:  no

## 2025-04-30 ENCOUNTER — TELEPHONE (OUTPATIENT)
Age: 85
End: 2025-04-30

## 2025-04-30 ENCOUNTER — PREP FOR PROCEDURE (OUTPATIENT)
Dept: VASCULAR SURGERY | Facility: CLINIC | Age: 85
End: 2025-04-30

## 2025-04-30 DIAGNOSIS — N18.31 STAGE 3A CHRONIC KIDNEY DISEASE (HCC): ICD-10-CM

## 2025-04-30 DIAGNOSIS — S91.001A WOUND OF RIGHT ANKLE, INITIAL ENCOUNTER: ICD-10-CM

## 2025-04-30 DIAGNOSIS — I73.9 PERIPHERAL ARTERY DISEASE (HCC): Primary | ICD-10-CM

## 2025-04-30 NOTE — TELEPHONE ENCOUNTER
Good Morning,     Patient is scheduled with Dr. Peters on 5/27/25 at [facility] IR. Please add to your schedule.    Consent to be signed on admit.   No hold on ASA  Order placed 4/29/25    Thank you

## 2025-04-30 NOTE — TELEPHONE ENCOUNTER
Verified patient's insurance   CONFIRMED - Patient's insurance is [facility]  Is patient requesting a call when authorization has been obtained? Patient did not request a call.    Surgery Date: 5/27/25  Primary Surgeon: MORE // Luis Miguel Peters (NPI: [id number])  Assisting Surgeon: Not Applicable (N/A)  Facility: [place] (Tax: [id number] / NPI: [id number])  Inpatient / Outpatient: Outpatient  Level: 3    Clearance Received: No clearance ordered.  Consent Received: Consent will be signed day of procedure.  Medication Hold / Last Dose:  No hold on ASA  IR Notified:  Notified 4/30/25  Rep. Notified:  Medjosiah - Wei Raymond notified 4/30/25  Equipment Needs: Not Applicable (N/A)  Vas Lab Requested: Not Applicable (N/A)  Patient Contacted: 4/30/25    Lyft Ride: NO  Pickup Date/Time: Not Applicable (N/A)    Diagnosis: I73.9  Procedure/ CPT Code(s): Angiogram // CPT: 64485, 95558, and 45010  // Procedure to take place in IR [Referral Based]    For varicose vein related procedures:   Last LEVDR: Not Applicable (N/A)  CEAP Classification: Not Applicable (N/A)  VCSS: Not Applicable (N/A)  PICTURES: Not Applicable (N/A)    Post Operative Date/ Time: 6/11/25 , [place] with Luis Miguel Peters (NPI: [id number]) - Patient aware     *Please review medication hold(s), PATs, and check H&P with patient.*  PATIENT WAS MAILED SURGERY/SHOWERING/DISCHARGE/COVID INSTRUCTIONS AFTER REVIEWING WITH THEM VIA PHONE CALL.

## 2025-04-30 NOTE — TELEPHONE ENCOUNTER
Patient calling with questions about pre-op lab orders and upcoming VAS carotid. Questions answered.

## 2025-05-02 ENCOUNTER — TELEMEDICINE (OUTPATIENT)
Dept: PAIN MEDICINE | Facility: CLINIC | Age: 85
End: 2025-05-02
Payer: COMMERCIAL

## 2025-05-02 ENCOUNTER — TELEPHONE (OUTPATIENT)
Age: 85
End: 2025-05-02

## 2025-05-02 DIAGNOSIS — M96.1 LUMBAR POSTLAMINECTOMY SYNDROME: ICD-10-CM

## 2025-05-02 DIAGNOSIS — G89.4 CHRONIC PAIN SYNDROME: ICD-10-CM

## 2025-05-02 DIAGNOSIS — M47.816 LUMBAR SPONDYLOSIS: Primary | ICD-10-CM

## 2025-05-02 PROCEDURE — 99214 OFFICE O/P EST MOD 30 MIN: CPT

## 2025-05-02 RX ORDER — TRAMADOL HYDROCHLORIDE 50 MG/1
50 TABLET ORAL EVERY 8 HOURS PRN
Qty: 90 TABLET | Refills: 2 | Status: SHIPPED | OUTPATIENT
Start: 2025-05-19

## 2025-05-02 NOTE — PROGRESS NOTES
Virtual Regular VisitName: Suhail Borrego      : 1940      MRN: [id number]  Encounter Provider: KEVIN Castellon  Encounter Date: 2025   Encounter department: [facility]  :  Assessment & Plan  Lumbar spondylosis         Chronic pain syndrome    Orders:    traMADol (Ultram) 50 mg tablet; Take 1 tablet (50 mg total) by mouth every 8 (eight) hours as needed for moderate pain Do not start before May 19, 2025.    Lumbar postlaminectomy syndrome         The patient is an 84-year-old male with a history of chronic pain secondary to low back pain, lumbar spondylosis and lumbar postlaminectomy syndrome who presents to the office with ongoing bilateral low back pain that is unchanged since his last office visit.      Overall his pain continues to be managed with taking tramadol, therefore I will continue him on his medication as prescribed, however I will change the way he takes this medication.  He can take this medication up to 3 times a day as needed to help better manage his pain.  A prescription for tramadol 50 mg with 2 refills was electronically sent to the patient's pharmacy with a do not fill date of 2025.      Chronic pain syndrome           History of Present Illness  The patient is an 84 year old male with a history of chronic pain secondary to low back pain, lumbar spondylosis and lumbar postlaminectomy syndrome.  He was last seen on 1/10/2025 where he was continued on tramadol 50 mg.  He presents to the office with ongoing bilateral low back pain.      He states his pain is the same since the last office visit and constant.  He is currently rating his pain a 5/10 on a numeric scale.    Current pain medications include tramadol 50 mg 1 tablet twice a day as needed for pain which is helpful in managing his pain, however he states he feels his pain would be better managed if he would be able to take this medication 3 times a day.  He denies any side effects with  this medication.    Administrative Statements   Encounter provider KEVIN Castellon    The Patient is located at Home and in the following state in which I hold an active license [place].    The patient was identified by name and date of birth. Suhail Borrego was informed that this is a telemedicine visit and that the visit is being conducted through the Epic Embedded platform. He agrees to proceed..  My office door was closed. No one else was in the room.  He acknowledged consent and understanding of privacy and security of the video platform. The patient has agreed to participate and understands they can discontinue the visit at any time.    I have spent a total time of 10 minutes in caring for this patient on the day of the visit/encounter including Risks and benefits of tx options, Instructions for management, Patient and family education, Importance of tx compliance, Documenting in the medical record, and Reviewing/placing orders in the medical record (including tests, medications, and/or procedures), not including the time spent for establishing the audio/video connection.

## 2025-05-02 NOTE — PROGRESS NOTES
Name: Suhail Borrego    : 1940      MRN: [id number]  Encounter Provider: KEVIN Castellon  Encounter Date: 2025  Encounter department: [facility]    Assessment:  {There are no diagnoses linked to this encounter. (Refresh or delete this SmartLink)}    Plan:  While the patient was in the office today, I did have a thorough conversation regarding their chronic pain syndrome, medication management, and treatment plan options.    ***    {Oral Swab Statement:53602}    {Opioid Statement:17647}    {UDS Statement:04838}    {PDMP Statement:34002}    {Pain Management Procedure Statement:27452}    My impressions and treatment recommendations were discussed in detail with the patient who verbalized understanding and had no further questions.  Discharge instructions were provided. I personally saw and examined the patient and I agree with the above discussed plan of care.    Follow-up is planned {Follow up interval spine and pain:14480} or sooner as warranted.  Discharge instructions were provided.     No orders of the defined types were placed in this encounter.    No orders of the defined types were placed in this encounter.      History of Present Illness:  Suhail Borrego is a 84 y.o. male who presents to [facility] for interval re-evaluation in regards to pain in the Low back. The patients last office visit was 1/10/25. Patient presents today with pain in *** that {Radiates:84926}. Pain is described as {SP pain quality:76227}. Symptoms are worse ***. Alleviating factors identifiable by the patient are ***. On the numeric pain scale of 1-10, the pain typically increases to max of {Numbers; 1-10:95022} out of 10, which is currently impacting their quality of life and interferes with their activities of daily living.    Conservative therapy: {Spine Pain Treatment:33624}  Previous treatments: {SP previous treatments:74745}    Current pain meds include: {SP  medications:53252}  Opioid contract date: ***  Last drug screen date: ***  Last dose taken on: ***                 I have personally reviewed and/or updated the patient's past medical history, past surgical history, family history, social history, current medications, allergies, and vital signs today.     Review of Systems:    Review of Systems   Respiratory:  Negative for shortness of breath.    Cardiovascular:  Negative for chest pain.   Gastrointestinal:  Negative for constipation, diarrhea, nausea and vomiting.   Musculoskeletal:  Positive for back pain and gait problem. Negative for arthralgias, joint swelling and myalgias.   Skin:  Negative for rash.   Neurological:  Negative for dizziness, seizures and weakness.   All other systems reviewed and are negative.      Patient Active Problem List   Diagnosis    New acute T 11 Thoracic compression fracture, closed, initial encounter, w old T12 compression fx    Spondylolisthesis of lumbar region    CAD (coronary artery disease)    Former tobacco use    Hyperlipidemia    Primary hypertension    Hx of CABG    Macrocytic anemia    Leg pain, posterior, left    Peripheral artery disease (HCC)    GERD (gastroesophageal reflux disease)    Vasomotor rhinitis    Lumbar stenosis    S/P lumbar fusion    Postlaminectomy syndrome of lumbar region    Lumbar radiculopathy    Bruit of left carotid artery    Phimosis    Carotid stenosis, asymptomatic, bilateral    Neck pain on left side    Near syncope    Pulmonary fibrosis determined by high resolution computed tomography (HCC)    Neuropathy    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    Trochanteric bursitis    Diverticulitis    Stage 3 chronic kidney disease (HCC)    Left lower quadrant abdominal pain    Acute left-sided low back pain with left-sided sciatica    Internal hemorrhoids    Diverticular disease    Inflammation of sacroiliac joint (HCC)    Intractable back pain    Pressure ulcer    Lumbar spondylosis     Localized swelling of left lower extremity    Leukocytosis    Symptomatic stenosis of left carotid artery    Continuous opioid dependence (HCC)    Wound of right ankle    Urinary retention    Recent unintentional weight loss over several months       Past Medical History:   Diagnosis Date    Abnormal liver function test     RESOLVED: 14SEP2017    Allergic rhinitis     Anemia     LAST ASSESSED: 19OCT2017    Arthritis     BPH (benign prostatic hyperplasia)     CAD (coronary artery disease)     Coronary artery disease     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HCC)     LAST ASSESSED: 05OCT2017    Former tobacco use     GERD (gastroesophageal reflux disease)     Hand paresthesia     RESOLVED: 11SEP2017    Hyperlipidemia     Hypertension     Lumbar stenosis     Peripheral artery disease (HCC)     LAST ASSESSED: 27OCT2017    Stage 3a chronic kidney disease (HCC) 7/11/2021       Past Surgical History:   Procedure Laterality Date    BACK SURGERY      COLONOSCOPY      LUMBAR FUSION      ND ARTHRODESIS POSTERIOR/PSTLAT TQ 1NTRSPC LUMBAR N/A 11/03/2016    Procedure: L2-S1 POSTERIOR LUMBAR FUSION AND DECOMPRESSION (Impulse), Posterior lateral fixation; dural repair.;  Surgeon: Leslie Gil MD;  Location: [facility];  Service: Orthopedics    ND CABG W/ARTERIAL GRAFT SINGLE ARTERIAL GRAFT N/A 01/19/2018    Procedure: CABG X4 with LIMA - LAD, SVG - RCA, OM2, & Diagonal ; Left Leg EVH; MATTHEW;  Surgeon: Eric Bar DO;  Location: [facility];  Service: Cardiac Surgery    ND CIRCUMCISION AGE >28 DAYS N/A 10/17/2024    Procedure: CIRCUMCISION WITH ADJACENT TISSUE TRANSFER;  Surgeon: Brody Hoyt MD;  Location:  MAIN OR;  Service: Urology    ND COLONOSCOPY FLX DX W/COLLJ SPEC WHEN PFRMD N/A 11/15/2017    Procedure: EGD AND COLONOSCOPY;  Surgeon: Mariano Godinez MD;  [facility]: [facility];  Service: Gastroenterology    ND TEAEC W/PATCH GRF CAROTID VERTB SUBCLAV NECK INC Left 7/8/2024    Procedure: left CEA retrograde left common carotid  angioplasty/stent;  Surgeon: Luis Miguel Peters DO;  Location: [facility];  Service: Vascular    SEPTOPLASTY      LAST ASSESSED; 2014    TONSILECTOMY AND ADNOIDECTOMY      LAST ASSESSED: 2014    UPPER GASTROINTESTINAL ENDOSCOPY         Family History   Problem Relation Age of Onset    Emphysema Mother     Liver disease Mother     Coronary artery disease Father     Hypertension Father     Liver disease Father     Heart failure Father     Stroke Father         CVA     Heart attack Father     Coronary artery disease Brother     Heart disease Brother         younger brother by pass and other brother 4 stents placed    Other Family         BACK PROBLEM     Stroke Family         CVA    Emphysema Family     Hypertension Family         BENIGN       Social History     Occupational History    Occupation: Insurance     Comment: Retired    Tobacco Use    Smoking status: Former     Current packs/day: 0.00     Average packs/day: 1 pack/day for 50.0 years (50.0 ttl pk-yrs)     Types: Cigarettes     Start date:      Quit date:      Years since quittin.3    Smokeless tobacco: Never   Vaping Use    Vaping status: Never Used   Substance and Sexual Activity    Alcohol use: Not Currently     Alcohol/week: 1.0 standard drink of alcohol     Types: 1 Shots of liquor per week     Comment: beer, wine, scotch every day; SOCIAL AS PER ALL SCRIPTS     Drug use: Not Currently     Types: Marijuana     Comment: medical majiuana    Sexual activity: Not Currently       Current Outpatient Medications on File Prior to Visit   Medication Sig    acetaminophen (TYLENOL) 650 mg CR tablet Take 650 mg by mouth every 8 (eight) hours as needed for mild pain    Ascorbic Acid (Vitamin C) 500 MG PACK Take 1,000 mg by mouth daily    Aspirin 81 MG CAPS Take by mouth    atorvastatin (LIPITOR) 80 mg tablet TAKE 1 TABLET BY MOUTH EVERY DAY IN THE MORNING    cholecalciferol (VITAMIN D3) 1,000 units tablet Take 2,000 Units by mouth daily     "cyanocobalamin (VITAMIN B-12) 1,000 mcg tablet Take 1,000 mcg by mouth daily      docusate sodium (COLACE) 100 mg capsule Take 100 mg by mouth 2 (two) times a day    famotidine (PEPCID) 20 mg tablet TAKE 1 TABLET BY MOUTH TWICE A DAY    gabapentin (NEURONTIN) 100 mg capsule Take 1 capsule (100 mg total) by mouth 2 (two) times a day Morning and bedtime    metoprolol tartrate (LOPRESSOR) 50 mg tablet TAKE 0.5 TABLETS (25 MG TOTAL) BY MOUTH IN THE MORNING    midodrine (PROAMATINE) 2.5 mg tablet Take 1 tablet (2.5 mg total) by mouth 3 (three) times a day before meals    Multiple Vitamin (MULTIVITAMIN) capsule Take 1 capsule by mouth daily    mupirocin (BACTROBAN) 2 % ointment Apply topically 3 (three) times a day    traMADol (Ultram) 50 mg tablet Take 1 tablet (50 mg total) by mouth 2 (two) times a day as needed for moderate pain Do not start before 2025.     No current facility-administered medications on file prior to visit.       Allergies   Allergen Reactions    Penicillins Other (See Comments)     Hallucinations;  Patient reported that he was seeing visual disturbances         Physical Exam:    There were no vitals taken for this visit.    Constitutional:{General Appearance:66470::\"normal, well developed, well nourished, alert, in no distress and non-toxic and no overt pain behavior.\"}  Eyes:{Sclera:35940::\"anicteric\"}  HEENT:{Hearin::\"grossly intact\"}  Neck:{Neck:66105::\"supple, symmetric, trachea midline and no masses \"}  Pulmonary:{Respiratory effort:27779::\"even and unlabored\"}  Cardiovascular:{Examination of Extremities:76117::\"No edema or pitting edema present\"}  Skin:{Skin and Subcutaneous tissues:64690::\"Normal without rashes or lesions and well hydrated\"}  Psychiatric:{Mood and Affect:05434::\"Mood and affect appropriate\"}  Neurologic:{Cranial Nerves:23201::\"Cranial Nerves II-XII grossly intact\"}  Musculoskeletal: {SP Gait and Station:94225}    ***    Imaging- I have reviewed the results of " the latest {Imaging List:07182}    ***        This document was created using speech voice recognition software. Grammatical errors, random word insertions, pronoun errors, and incomplete sentences are an occasional consequence of this system due to software limitations, ambient noise, and hardware issues. Any formal questions or concerns about content, text, or information contained within the body of this dictation should be directly addressed to the provider for clarification.

## 2025-05-02 NOTE — TELEPHONE ENCOUNTER
Patient states he receieved and email about his appointment on 5/6 with Amy Chang. I informed him that appointment is not longer scheduled and to ignore that email. He is being seen by Lorraine in a few weeks.

## 2025-05-12 ENCOUNTER — LAB REQUISITION (OUTPATIENT)
Dept: LAB | Facility: HOSPITAL | Age: 85
End: 2025-05-12
Payer: COMMERCIAL

## 2025-05-12 ENCOUNTER — HOSPITAL ENCOUNTER (OUTPATIENT)
Dept: NON INVASIVE DIAGNOSTICS | Facility: CLINIC | Age: 85
Discharge: HOME | End: 2025-05-12
Attending: NURSE PRACTITIONER
Payer: COMMERCIAL

## 2025-05-12 ENCOUNTER — APPOINTMENT (OUTPATIENT)
Dept: LAB | Facility: CLINIC | Age: 85
End: 2025-05-12
Attending: SURGERY
Payer: COMMERCIAL

## 2025-05-12 DIAGNOSIS — I65.22 SYMPTOMATIC STENOSIS OF LEFT CAROTID ARTERY: ICD-10-CM

## 2025-05-12 DIAGNOSIS — R33.9 URINARY RETENTION: ICD-10-CM

## 2025-05-12 DIAGNOSIS — N18.31 STAGE 3A CHRONIC KIDNEY DISEASE (HCC): ICD-10-CM

## 2025-05-12 DIAGNOSIS — I73.9 PERIPHERAL ARTERY DISEASE (HCC): ICD-10-CM

## 2025-05-12 DIAGNOSIS — N47.1 PHIMOSIS: ICD-10-CM

## 2025-05-12 DIAGNOSIS — S91.001A WOUND OF RIGHT ANKLE, INITIAL ENCOUNTER: ICD-10-CM

## 2025-05-12 DIAGNOSIS — I73.9 PERIPHERAL VASCULAR DISEASE, UNSPECIFIED (HCC): ICD-10-CM

## 2025-05-12 LAB
ABO GROUP BLD: NORMAL
ANION GAP SERPL CALCULATED.3IONS-SCNC: 6 MMOL/L (ref 4–13)
BACTERIA UR QL AUTO: ABNORMAL /HPF
BILIRUB UR QL STRIP: NEGATIVE
BLD GP AB SCN SERPL QL: NEGATIVE
BUN SERPL-MCNC: 39 MG/DL (ref 5–25)
CALCIUM SERPL-MCNC: 9.4 MG/DL (ref 8.4–10.2)
CHLORIDE SERPL-SCNC: 104 MMOL/L (ref 96–108)
CLARITY UR: CLEAR
CO2 SERPL-SCNC: 30 MMOL/L (ref 21–32)
COLOR UR: YELLOW
CREAT SERPL-MCNC: 1.36 MG/DL (ref 0.6–1.3)
ERYTHROCYTE [DISTWIDTH] IN BLOOD BY AUTOMATED COUNT: 13.7 % (ref 11.6–15.1)
GFR SERPL CREATININE-BSD FRML MDRD: 47 ML/MIN/1.73SQ M
GLUCOSE P FAST SERPL-MCNC: 87 MG/DL (ref 65–99)
GLUCOSE UR STRIP-MCNC: NEGATIVE MG/DL
HCT VFR BLD AUTO: 36.5 % (ref 36.5–49.3)
HGB BLD-MCNC: 11.3 G/DL (ref 12–17)
HGB UR QL STRIP.AUTO: NEGATIVE
KETONES UR STRIP-MCNC: NEGATIVE MG/DL
LEUKOCYTE ESTERASE UR QL STRIP: ABNORMAL
MCH RBC QN AUTO: 30.7 PG (ref 26.8–34.3)
MCHC RBC AUTO-ENTMCNC: 31 G/DL (ref 31.4–37.4)
MCV RBC AUTO: 99 FL (ref 82–98)
NITRITE UR QL STRIP: NEGATIVE
NON-SQ EPI CELLS URNS QL MICRO: ABNORMAL /HPF
PH UR STRIP.AUTO: 5.5 [PH]
PLATELET # BLD AUTO: 187 THOUSANDS/UL (ref 149–390)
PMV BLD AUTO: 10.8 FL (ref 8.9–12.7)
POTASSIUM SERPL-SCNC: 4.3 MMOL/L (ref 3.5–5.3)
PROT UR STRIP-MCNC: NEGATIVE MG/DL
RBC # BLD AUTO: 3.68 MILLION/UL (ref 3.88–5.62)
RBC #/AREA URNS AUTO: ABNORMAL /HPF
RH BLD: POSITIVE
SODIUM SERPL-SCNC: 140 MMOL/L (ref 135–147)
SP GR UR STRIP.AUTO: 1.02 (ref 1–1.03)
SPECIMEN EXPIRATION DATE: NORMAL
UROBILINOGEN UR STRIP-ACNC: <2 MG/DL
WBC # BLD AUTO: 11.71 THOUSAND/UL (ref 4.31–10.16)
WBC #/AREA URNS AUTO: ABNORMAL /HPF

## 2025-05-12 PROCEDURE — 93880 EXTRACRANIAL BILAT STUDY: CPT

## 2025-05-12 PROCEDURE — 87086 URINE CULTURE/COLONY COUNT: CPT

## 2025-05-12 PROCEDURE — 81001 URINALYSIS AUTO W/SCOPE: CPT

## 2025-05-12 PROCEDURE — 85027 COMPLETE CBC AUTOMATED: CPT

## 2025-05-12 PROCEDURE — 86900 BLOOD TYPING SEROLOGIC ABO: CPT | Performed by: SURGERY

## 2025-05-12 PROCEDURE — 93010 ELECTROCARDIOGRAM REPORT: CPT | Performed by: INTERNAL MEDICINE

## 2025-05-12 PROCEDURE — 80048 BASIC METABOLIC PNL TOTAL CA: CPT

## 2025-05-12 PROCEDURE — 86901 BLOOD TYPING SEROLOGIC RH(D): CPT | Performed by: SURGERY

## 2025-05-12 PROCEDURE — 86850 RBC ANTIBODY SCREEN: CPT | Performed by: SURGERY

## 2025-05-12 PROCEDURE — 36415 COLL VENOUS BLD VENIPUNCTURE: CPT

## 2025-05-13 LAB
ATRIAL RATE: 61 BPM
BACTERIA UR CULT: NORMAL
P AXIS: 62 DEGREES
PR INTERVAL: 188 MS
QRS AXIS: 3 DEGREES
QRSD INTERVAL: 86 MS
QT INTERVAL: 454 MS
QTC INTERVAL: 458 MS
T WAVE AXIS: 47 DEGREES
VENTRICULAR RATE: 61 BPM

## 2025-05-13 PROCEDURE — 93880 EXTRACRANIAL BILAT STUDY: CPT | Performed by: SURGERY

## 2025-05-19 ENCOUNTER — TELEPHONE (OUTPATIENT)
Dept: RADIOLOGY | Facility: HOSPITAL | Age: 85
End: 2025-05-19

## 2025-05-19 RX ORDER — SODIUM CHLORIDE 9 MG/ML
75 INJECTION, SOLUTION INTRAVENOUS CONTINUOUS
Status: CANCELLED | OUTPATIENT
Start: 2025-05-19

## 2025-05-19 NOTE — PRE-PROCEDURE INSTRUCTIONS
Pre-procedure Instructions for Interventional Radiology  [address] [address]  INTERVENTIONAL RADIOLOGY [phone]    You are scheduled for a/an arteriogram.    On Tuesday 5-27-25.    Your tentative arrival time is 7am.  Short stay will notify you the day before your procedure with the exact arrival time and the location to arrive.    To prepare for your procedure:  Please arrange for someone to drive you home after the procedure and stay with you until the next morning if you are instructed to do so.  This is typically for patients receiving some type of sedative or anesthetic for the procedure.  DO NOT EAT OR DRINK ANYTHING after midnight on the evening before your procedure including candy & gum.  ONLY SIPS OF WATER with your medications are allowed on the morning of your procedure.  TAKE ALL OF YOUR REGULAR MEDICATIONS THE MORNING OF YOUR PROCEDURE with sips of water!  We may call you to stop some of your blood sugar, blood pressure and blood thinning medications depending on the procedure.  Please take all of these medications unless we instruct you to stop them.  If you have an allergy to x-ray dye, please contact Interventional Radiology for an x-ray dye preparation which usually consists of an oral steroid and Benadryl.  If you wear a Glucose Monitor, you may be asked to remove it for your procedure if we are using x-ray.  These devices need to be removed when we are imaging with x-ray near the device since the radiation can cause the unit to malfunction.  If possible and not too inconvenient, you may want to schedule your exam closer to day 14 of your 14 day device so your device is not wasted.    The day of your procedure:  Bring a list of the medications you take at home.  Bring medications you take for breathing problems (such as inhalers), medications for chest pain, or both.  Bring a case for your glasses or contacts.  Bring your insurance card and a form  of photo ID.  Please leave all valuables such as credit cards and jewelry at home.  Report to the admitting office to the left of the registration desk in the main lobby at the [facility], Entrance B.  You will then be directed to the Short Stay Center.  While your procedure is being performed, your family may wait in the Radiology Waiting Room on the 1st floor in Radiology.  if they need to leave, they may provide a number to be called following the procedure.   Be prepared to stay overnight just in case. Sometimes procedures will indicate the need for further observation or treatment.   If you are scheduled for a follow-up visit with the Interventional Radiologist after your procedure, you will be called with a date and time.    Special Instructions (Medications to stop taking before your procedure etc.):

## 2025-05-25 DIAGNOSIS — G89.4 CHRONIC PAIN SYNDROME: ICD-10-CM

## 2025-05-27 ENCOUNTER — HOSPITAL ENCOUNTER (OUTPATIENT)
Dept: RADIOLOGY | Facility: HOSPITAL | Age: 85
Discharge: HOME | End: 2025-05-27
Attending: SURGERY
Payer: COMMERCIAL

## 2025-05-27 VITALS
HEART RATE: 66 BPM | SYSTOLIC BLOOD PRESSURE: 101 MMHG | RESPIRATION RATE: 17 BRPM | OXYGEN SATURATION: 93 % | WEIGHT: 145 LBS | DIASTOLIC BLOOD PRESSURE: 59 MMHG | BODY MASS INDEX: 21.98 KG/M2 | TEMPERATURE: 97.9 F | HEIGHT: 68 IN

## 2025-05-27 DIAGNOSIS — I73.9 PERIPHERAL ARTERY DISEASE (HCC): ICD-10-CM

## 2025-05-27 LAB
INR PPP: 0.99 (ref 0.85–1.19)
PROTHROMBIN TIME: 13.4 SECONDS (ref 12.3–15)

## 2025-05-27 PROCEDURE — C1769 GUIDE WIRE: HCPCS

## 2025-05-27 PROCEDURE — C1887 CATHETER, GUIDING: HCPCS

## 2025-05-27 PROCEDURE — C1760 CLOSURE DEV, VASC: HCPCS

## 2025-05-27 PROCEDURE — 75625 CONTRAST EXAM ABDOMINL AORTA: CPT | Performed by: SURGERY

## 2025-05-27 PROCEDURE — 99152 MOD SED SAME PHYS/QHP 5/>YRS: CPT | Performed by: SURGERY

## 2025-05-27 PROCEDURE — 76937 US GUIDE VASCULAR ACCESS: CPT | Performed by: SURGERY

## 2025-05-27 PROCEDURE — 76937 US GUIDE VASCULAR ACCESS: CPT

## 2025-05-27 PROCEDURE — 85610 PROTHROMBIN TIME: CPT | Performed by: SURGERY

## 2025-05-27 PROCEDURE — C1894 INTRO/SHEATH, NON-LASER: HCPCS

## 2025-05-27 PROCEDURE — 75710 ARTERY X-RAYS ARM/LEG: CPT | Performed by: SURGERY

## 2025-05-27 PROCEDURE — 99153 MOD SED SAME PHYS/QHP EA: CPT

## 2025-05-27 PROCEDURE — 36246 INS CATH ABD/L-EXT ART 2ND: CPT | Performed by: SURGERY

## 2025-05-27 PROCEDURE — 99152 MOD SED SAME PHYS/QHP 5/>YRS: CPT

## 2025-05-27 PROCEDURE — 75630 X-RAY AORTA LEG ARTERIES: CPT

## 2025-05-27 RX ORDER — MIDAZOLAM HYDROCHLORIDE 2 MG/2ML
INJECTION, SOLUTION INTRAMUSCULAR; INTRAVENOUS AS NEEDED
Status: COMPLETED | OUTPATIENT
Start: 2025-05-27 | End: 2025-05-27

## 2025-05-27 RX ORDER — FENTANYL CITRATE 50 UG/ML
INJECTION, SOLUTION INTRAMUSCULAR; INTRAVENOUS AS NEEDED
Status: COMPLETED | OUTPATIENT
Start: 2025-05-27 | End: 2025-05-27

## 2025-05-27 RX ORDER — LIDOCAINE WITH 8.4% SOD BICARB 0.9%(10ML)
SYRINGE (ML) INJECTION AS NEEDED
Status: COMPLETED | OUTPATIENT
Start: 2025-05-27 | End: 2025-05-27

## 2025-05-27 RX ORDER — IODIXANOL 320 MG/ML
100 INJECTION, SOLUTION INTRAVASCULAR
Status: COMPLETED | OUTPATIENT
Start: 2025-05-27 | End: 2025-05-27

## 2025-05-27 RX ORDER — TRAMADOL HYDROCHLORIDE 50 MG/1
50 TABLET ORAL EVERY 8 HOURS PRN
Qty: 90 TABLET | Refills: 0 | OUTPATIENT
Start: 2025-05-27

## 2025-05-27 RX ORDER — SODIUM CHLORIDE 9 MG/ML
75 INJECTION, SOLUTION INTRAVENOUS CONTINUOUS
Status: DISCONTINUED | OUTPATIENT
Start: 2025-05-27 | End: 2025-05-28 | Stop reason: HOSPADM

## 2025-05-27 RX ORDER — HEPARIN SODIUM 1000 [USP'U]/ML
INJECTION, SOLUTION INTRAVENOUS; SUBCUTANEOUS AS NEEDED
Status: COMPLETED | OUTPATIENT
Start: 2025-05-27 | End: 2025-05-27

## 2025-05-27 RX ADMIN — HEPARIN SODIUM 6000 UNITS: 1000 INJECTION, SOLUTION INTRAVENOUS; SUBCUTANEOUS at 09:32

## 2025-05-27 RX ADMIN — FENTANYL CITRATE 25 MCG: 50 INJECTION INTRAMUSCULAR; INTRAVENOUS at 09:33

## 2025-05-27 RX ADMIN — IODIXANOL 52 ML: 320 INJECTION, SOLUTION INTRAVASCULAR at 10:35

## 2025-05-27 RX ADMIN — MIDAZOLAM 0.5 MG: 1 INJECTION INTRAMUSCULAR; INTRAVENOUS at 09:05

## 2025-05-27 RX ADMIN — Medication 10 ML: at 09:12

## 2025-05-27 RX ADMIN — SODIUM CHLORIDE 75 ML/HR: 0.9 INJECTION, SOLUTION INTRAVENOUS at 07:00

## 2025-05-27 RX ADMIN — FENTANYL CITRATE 25 MCG: 50 INJECTION INTRAMUSCULAR; INTRAVENOUS at 09:05

## 2025-05-27 RX ADMIN — MIDAZOLAM 0.5 MG: 1 INJECTION INTRAMUSCULAR; INTRAVENOUS at 08:53

## 2025-05-27 RX ADMIN — PROTAMINE SULFATE 40 MG: 10 INJECTION, SOLUTION INTRAVENOUS at 09:58

## 2025-05-27 RX ADMIN — FENTANYL CITRATE 25 MCG: 50 INJECTION INTRAMUSCULAR; INTRAVENOUS at 09:43

## 2025-05-27 RX ADMIN — FENTANYL CITRATE 25 MCG: 50 INJECTION INTRAMUSCULAR; INTRAVENOUS at 08:53

## 2025-05-27 RX ADMIN — MIDAZOLAM 0.5 MG: 1 INJECTION INTRAMUSCULAR; INTRAVENOUS at 09:50

## 2025-05-27 RX ADMIN — FENTANYL CITRATE 25 MCG: 50 INJECTION INTRAMUSCULAR; INTRAVENOUS at 09:27

## 2025-05-27 NOTE — DISCHARGE INSTRUCTIONS
ARTERIOGRAM    WHAT YOU SHOULD KNOW:   An angiogram is a procedure to look at arteries in your body. Arteries are the blood vessels that carry blood from your heart to your body.     AFTER YOU LEAVE:     Self-care:   Limit activity: Rest for the remainder of the day of your procedure.Have some one with you until the next morning. Keep your arm or leg straight as much as possible.Rest as much as possible, sitting lying or reclining. Walk only to go to the bathroom, to bed or to eat. If the angiogram catheter was put in your leg, use the stairs as little as possible. No driving for 24-48 hours. No heavy lifting, >10 lbs. Or strenuous activity for 48 hours.      Keep your wound clean and dry. Remove band aid/ dressing tomorrow. You may shower 24 hours after your procedure. Shower and wash groin area or wrist area gently with soap and water: beginning tomorrow. Rinse and pat Dry. Apply new water seal band aid. Repeat this process for 5 days.  If there is any drainage from the puncture site, you should put on a clean bandage. No Powders, creams, lotions or antibiotic ointments for 5 days.  No tub baths, hot tubs or swimming for 5 days.    Watch for bleeding and bruising: It is normal to have a bruise and soreness where the angiogram catheter went in.  Medication: If your angiogram was performed to treat blockages in your leg arteries, it is strongly recommended that you take both an antiplatelet medication (like aspirin or Plavix) to prevent clotting AND a statin drug (like Lipitor or Crestor), even if you have normal cholesterol. If these drugs are not ordered for you please contact either your Vascular Surgery office or the Interventional Radiology Dept during normal daytime working hours. See Interventional Radiology telephone numbers below.  You Should Have Follow up with the vascular surgeon   call [phone] with questions  Diet:   You may resume your regular diet, Sips of flat soda will help with mild  nausea.  Drink more liquids than usual for the next 24 hours      IMMEDIATELY Contact Interventional Radiology at [phone]  if any of the following occur:  If your bruise gets larger or if you notice any active bleeding. APPLY DIRECT PRESSURE TO THE BLEEDING SITE.   If you notice increased swelling or have increased pain at the puncture site   If you have any numbness or pain in the extremity of the puncture site   If that extremity seems cold or pale.    You have fever greater than 101  Persistent nausea or vomitting    Follow up with your primary healthcare provider  as directed: Write down your questions so you remember to ask them during your visits.

## 2025-05-27 NOTE — SEDATION DOCUMENTATION
Pt in IR for LLE angiogram procedure performed by Dr. Peters. Pt tolerated procedure well. Right groin accessed and closed with starclose. Intervention: diagnostic. IR bedrest one hour start time 1015am. Report given to [facility] RN.

## 2025-05-27 NOTE — OP NOTE
OPERATIVE REPORT  PATIENT NAME: Suhail Borrego    :  1940  MRN: [id number]  Pt Location: [facility] interventional radiology suite    SURGERY DATE: 2025    Surgeon:  Luis Miguel Peters DO    Preop Diagnosis:  Left lower extremity critical limb ischemia left great toe ulcer    Postop diagnosis:  Same    Procedures:  Ultrasound-guided percutaneous access of the right common femoral artery  Retrograde right common femoral sheath angiogram  Aortogram  Iliac angiogram  Catheter placement in the left popliteal artery  Left lower extremity runoff angiogram  Supervision and interpretation of all radiologic imaging  Conscious sedation administered by myself 60 minutes total time  StarClose closure device right common femoral access site      Anesthesia Type:   Conscious sedation plus local anesthetic    Operative Indications:  84-year-old male well-known to me from prior left carotid intervention last year.  He had a small ulcer on his left great toe that slowly deteriorated.  He had duplex evidence of left popliteal occlusion with an LIBERTY of 0.6.  We initially observe this and after a few weeks the ulcer worsened and the patient elected to proceed with an angiogram.  Risks benefits and alternative therapies were discussed with him in detail and he consented to proceed.      Operative Findings:  Patent infrarenal aorta and renal arteries bilaterally.  Patent iliac vessels bilaterally the right common femoral was heavily diseased the profunda was patent proximally in the proximal SFA was severely diseased.  On the left side there is mild disease in the left common femoral there was mild disease in the proximal and mid left superficial femoral artery.  There was severe long segment heavily calcified disease in the distal left SFA there was a P1 popliteal artery occlusion with reconstitution of the P2 and P3 segments.  There was single-vessel peroneal runoff with malleolar collaterals that reconstitute the dorsalis pedis  and plantar vessels.         Complications:   None    Procedure and Technique:  Informed consent was attained the appropriate party the patient was correct identified in the holding area is brought to the IR suite placed in supine position appropriate lines and monitors were placed.  After satisfactory consultation was administered a timeout was performed the groins and left foot were prepped and draped in the normal sterile fashion.  I began by identifying the right common femoral artery with an ultrasound I then anesthetized this area with 1% lidocaine.  I then accessed this using a micropuncture needle then over an 014 wire this was exchanged for a short micropuncture sheath.  Through this the right common femoral retrograde sheath angiogram was performed with the above-stated findings.  I then advanced a Bentson wire into the infrarenal aorta and exchanged the existing sheath for a 5 Beninese short sheath.  I then used an RBI catheter to advance this to the level of the renal arteries and from this position aortogram was performed with the above-stated findings.  At this point I then withdrew the catheter to level aortic bifurcation and performed an iliac angiogram with the above-stated findings.  Then selected the left common iliac artery using a stiff Glidewire and RBI catheter advanced both to the level of the left common femoral artery.  From this position left lower extremity angiography was performed with the above-stated findings.  I elected to intervene on the popliteal occlusion.  I advanced a stiff angled Glidewire into the distal left superficial femoral artery remove the existing catheter and exchanged the existing sheath for a 6 Beninese by 45 [place] destination sheath.  Patient was given 6000 units of IV heparin.  From this position using a stiff angled Glidewire and a loop technique using an 035 seeker catheter I attempted to cross the popliteal occluded segment.  I was unable to reenter the true  lumen I did exchanged my catheter for a Fatima cross catheter and also used a stiff straight Glidewire to attempt to cross the occlusion.  Ultimately I was not able to get the wire back into the true lumen there appeared to be a small fenestration distally and there was reconstitution of the P3 and P2 popliteal artery.  At this point the patient was having significant discomfort from his back laying flat on the table and I elected to end the procedure at this time as if I was able to cross it would require significant additional time to complete the procedure.  The wire and catheter removed sheath was withdrawn open over the arctic bifurcation over a Bentson wire and the access site was closed using a StarClose device.  40 mg of protamine was administered for partial reversal.  Patient tolerated the procedure well         I was present for the entire procedure.    Patient Disposition:  PACU          SIGNATURE: Luis Miguel TOÑA Peters,   DATE: May 27, 2025  TIME: 10:10 AM      Follow-up:    I spoke with the patient's daughter Karie about options moving forward we could attempt reintervention on the popliteal occlusion this would require general anesthesia possible pedal access and likely a reentry device.  If we were going to reattempt this I would want a wait 3 to 4 weeks to allow the dissection to heal.  Alternatively he has a toe pressure of 38 and a very small dry ulcer on the tip of his left great toe given his advanced age and frailty I did recommend to his daughter that they see their podiatrist in the near future and proceed with local wound care and debridement as necessary we may be able to ultimately avoid a complex endovascular procedure at this stage he will see me back in the office in the next couple of weeks and I will discuss this with him as well.        Vascular Quality Initiative - Peripheral Vascular Intervention     Urgency: Elective    Functional Status:  Fully active; able to carry on all predisease  activities without restriction.   Ambulation: Amb = independently ambulatory    Leg Symptoms    Right: asymptomatic    Left: Ulcer/necrosis (gangrene): de geena tissue loss due to peripheral arterial disease, not due to non-healing prior amputation       Tissue Loss Severity: Grade 1, Shallow = small shallow ulcer(s) on distal leg or foot, any exposed bone is only limited to distal phalanx (ie, minor tissue loss: limb salvage possible with simple digital amputation [1 or 2 digits], or skin coverage).     Infection: Grade 0, None = No symptoms or signs of infection.    COVID Information  COVID Symptoms Pre-Procedure: Asymptomatic    Treatment Delayed by Pandemic: None    Access   Number of Sites: 1     Access Site 1:     Side 1: Right    Site 1: Femoral Retrograde    Access Guidance 1:U/S    Largest Sheath Size 1: 6 Fr.    Closure Device 1: StarClose      Number of Closure Devices: 1     Closure Device Outcome: Closure device successful         Procedure  Fluoro Time: 17.4 minutes  Contrast Volume: Visipaque 52 ml  DAP: 2812 Gy.cm2  CO2: no  Anticoagulant: Heparin  Protamine: Yes  If Creatinine is > 1.2 or missing, NICHELLE Prophylaxis none     Treatment Details  Indication: Occlusive Disease,  None     Post Procedure  Patient currently taking: Statin, Yes      Antiplatelet Medication, Yes    Procedure Complications: No

## 2025-05-27 NOTE — DISCHARGE INSTR - AVS FIRST PAGE
DISCHARGE INSTRUCTIONS  ARTERIOGRAM/ANGIOPLASTY/STENT    ACTIVITY: On the evening following the procedure, you should be mostly resting.  Someone should remain with you during the evening and overnight following the procedure.     On the day after your procedure, limit your activity to walking.  Avoid heavy lifting (no more than 15 lbs) for the first three days. Walking up steps and normal activities may be resumed as you feel ready.   You should not drive a car for at least two days following discharge from the hospital. You may ride in a car.   If you have any questions regarding a particular activity, please discuss with your doctor or nurse before you are discharged.    DIET:  Resume your normal diet.  Drink more water than usual for the next 24 hours.    PROCEDURE SITE: You may have a procedure site in your groin, arm, or foot.  You may have surgical glue at your procedure site.  The glue is used to cover the procedure site, assist in closure, and prevent contamination. This adhesive will darken and peel away on its own within one to two weeks. Do not pick at it.    You should shower daily.  Wash incision daily with soap and water, but do not rub or scrub the incision; rinse thoroughly and pat dry.  Do not bathe in a tub or swim for the first 2 week following your procedure or if you have any open wounds.  It is normal to have some bruising, swelling or discoloration around the procedure site.  IF increasing redness, pain, or a bulge develops, call our office immediately.    If present, you may remove the band-aid or “steri-strips” over your procedure site after two days.   If you notice any active bleeding at the site, apply pressure to the site and call our office ([phone]) or [phone].    FOLLOW UP STUDIES:  Your doctor will discuss whether further treatments or follow-up studies are necessary at your first post procedure visit.    FOLLOW UP APPOINTMENTS:  Making and keeping follow up appointments and  ultrasound tests are important to your recovery.  If you have difficulty making it to or keeping your follow up appointments, call the office.    If you have increased pain, fever >101.5, increased drainage, redness or a bad smell at your surgery site, new coldness/numbness of your arm or leg, please call us immediately and GO directly to the ER.    PLEASE CALL THE OFFICE IF YOU HAVE ANY QUESTIONS  [phone]  [phone] [address], [address], [place] [address]  [address] [place] [address]  [address] [place] [address]  [address] [facility], 1st Floor, [place] [address]  [address] [facility], [place] [address]  [address] [facility], [address], [place] [address]  [address] [facility] [address], [place] [address]  [address] [facility], 1st Floor, [address], [place] [address]  [address] [facility] [address], [place] [address]  [address] [facility], [address], [place] [address]

## 2025-06-02 ENCOUNTER — HOSPITAL ENCOUNTER (OUTPATIENT)
Dept: CT IMAGING | Facility: HOSPITAL | Age: 85
Discharge: HOME | End: 2025-06-02
Attending: INTERNAL MEDICINE
Payer: COMMERCIAL

## 2025-06-02 DIAGNOSIS — J84.10 PULMONARY FIBROSIS DETERMINED BY HIGH RESOLUTION COMPUTED TOMOGRAPHY (HCC): ICD-10-CM

## 2025-06-02 PROCEDURE — 71250 CT THORAX DX C-: CPT

## 2025-06-06 DIAGNOSIS — E78.5 HYPERLIPIDEMIA, UNSPECIFIED HYPERLIPIDEMIA TYPE: ICD-10-CM

## 2025-06-06 DIAGNOSIS — R52 PAIN: Primary | ICD-10-CM

## 2025-06-06 RX ORDER — SENNOSIDES 8.6 MG
650 CAPSULE ORAL EVERY 8 HOURS PRN
Qty: 30 TABLET | Refills: 5 | Status: SHIPPED | OUTPATIENT
Start: 2025-06-06

## 2025-06-06 RX ORDER — ATORVASTATIN CALCIUM 80 MG/1
80 TABLET, FILM COATED ORAL EVERY MORNING
Qty: 90 TABLET | Refills: 0 | Status: SHIPPED | OUTPATIENT
Start: 2025-06-06

## 2025-06-10 NOTE — PROGRESS NOTES
Name: Suhail Borrego      : 1940      MRN: [id number]  Encounter Provider: Luis Miguel Peters DO  Encounter Date: 2025   Encounter department: [facility] [place]  :  Assessment & Plan  Carotid stenosis, asymptomatic, bilateral  History of left carotid endarterectomy and retrograde left common carotid stent by myself 1 year ago.  Most recent duplex was reviewed with him.  Continue medical management repeat duplex in 1 year.    Orders:    VAS carotid complete study; Future    Peripheral artery disease (HCC)  84-year-old gentleman presents for follow-up after angiogram.  He has a dry ulcer on the tip of his left great toe.  He had marginal toe pressure and had a trial of watchful waiting and ultimately this did not improve and he elected to have an angiogram this was done by myself approximately 3 weeks ago.  This was done with conscious sedation which he did not tolerate well.  He had a distal SFA occlusion and proximal popliteal occlusion with 3 Reconstitution of the distal popliteal and single-vessel peroneal runoff.  There was also reconstitution via malleoli are collaterals of the dorsalis pedis and plantar vessel.  I was not able to reenter the true lumen and given his intolerance to conscious sedation I did not have the ability to use a reentry device or pedal access.  So the procedure was technically unsuccessful.  At this time he has a stable dry wound on the tip of his left great toe.  I discussed options with him including reintervention which would require general anesthesia reentry device and possible pedal access versus watchful waiting and continued wound care.  I think it is reasonable to proceed with a palliative approach for this with keeping the wound clean and dry it is not necessary I do not believe clinically to debride this and his ability to heal a debridement is questionable.  At this time he does not want to schedule any further procedures we will follow-up in 3 months in  "the office with a repeat lower extremity arterial duplex and if the wound is deteriorating prior to his next appointment he knows to contact my office.    Orders:    VAS ARTERIAL DUPLEX- LOWER LIMB BILATERAL; Future        History of Present Illness     Patient underwent LLE angiogram with intervention done 5/27/25. L great toe wound.     HPI  Suhail Borrego is a 84 y.o. male   History obtained from: patient    Review of Systems   Constitutional: Negative.    HENT: Negative.     Eyes: Negative.    Respiratory: Negative.     Cardiovascular: Negative.    Gastrointestinal: Negative.    Endocrine: Negative.    Genitourinary: Negative.    Musculoskeletal:  Positive for back pain and gait problem.   Skin:  Positive for wound.   Allergic/Immunologic: Negative.    Neurological:  Positive for numbness.   Hematological: Negative.    Psychiatric/Behavioral: Negative.       I have reviewed the ROS above and made changes as needed.         Objective   /58 (BP Location: Left arm, Patient Position: Sitting)   Pulse 76   Ht 5' 7.5\" (1.715 m)   Wt 68 kg (150 lb)   BMI 23.15 kg/m²      Physical Exam    General  Exam: alert, awake, oriented, no distress, consistent with stated age    Adbomen  Exam: soft, non-tender, non-distended, no pulsatile abdominal masses, no abdominal bruit    Upper Extremity:  Palpation: Radial pulse- Bilateral 2+    Lower Extremity:  Palpation: Femoral pulse- Bilateral 2+         Non-palpable distal pulses   Left great toe dry ulcer    Neurologic  Exam:alert, non-focal, oriented x 3        Administrative Statements   I have spent a total time of 25 minutes in caring for this patient on the day of the visit/encounter including Counseling / Coordination of care, Documenting in the medical record, and Reviewing/placing orders in the medical record (including tests, medications, and/or procedures).  "

## 2025-06-11 ENCOUNTER — OFFICE VISIT (OUTPATIENT)
Dept: VASCULAR SURGERY | Facility: CLINIC | Age: 85
End: 2025-06-11
Payer: COMMERCIAL

## 2025-06-11 VITALS
HEIGHT: 68 IN | DIASTOLIC BLOOD PRESSURE: 58 MMHG | HEART RATE: 76 BPM | BODY MASS INDEX: 22.73 KG/M2 | WEIGHT: 150 LBS | SYSTOLIC BLOOD PRESSURE: 102 MMHG

## 2025-06-11 DIAGNOSIS — I65.23 CAROTID STENOSIS, ASYMPTOMATIC, BILATERAL: Primary | ICD-10-CM

## 2025-06-11 DIAGNOSIS — I73.9 PERIPHERAL ARTERY DISEASE (HCC): ICD-10-CM

## 2025-06-11 PROCEDURE — 99214 OFFICE O/P EST MOD 30 MIN: CPT | Performed by: SURGERY

## 2025-06-11 NOTE — ASSESSMENT & PLAN NOTE
84-year-old gentleman presents for follow-up after angiogram.  He has a dry ulcer on the tip of his left great toe.  He had marginal toe pressure and had a trial of watchful waiting and ultimately this did not improve and he elected to have an angiogram this was done by myself approximately 3 weeks ago.  This was done with conscious sedation which he did not tolerate well.  He had a distal SFA occlusion and proximal popliteal occlusion with 3 Reconstitution of the distal popliteal and single-vessel peroneal runoff.  There was also reconstitution via malleoli are collaterals of the dorsalis pedis and plantar vessel.  I was not able to reenter the true lumen and given his intolerance to conscious sedation I did not have the ability to use a reentry device or pedal access.  So the procedure was technically unsuccessful.  At this time he has a stable dry wound on the tip of his left great toe.  I discussed options with him including reintervention which would require general anesthesia reentry device and possible pedal access versus watchful waiting and continued wound care.  I think it is reasonable to proceed with a palliative approach for this with keeping the wound clean and dry it is not necessary I do not believe clinically to debride this and his ability to heal a debridement is questionable.  At this time he does not want to schedule any further procedures we will follow-up in 3 months in the office with a repeat lower extremity arterial duplex and if the wound is deteriorating prior to his next appointment he knows to contact my office.    Orders:    VAS ARTERIAL DUPLEX- LOWER LIMB BILATERAL; Future

## 2025-06-11 NOTE — ASSESSMENT & PLAN NOTE
History of left carotid endarterectomy and retrograde left common carotid stent by myself 1 year ago.  Most recent duplex was reviewed with him.  Continue medical management repeat duplex in 1 year.    Orders:    VAS carotid complete study; Future

## 2025-06-13 ENCOUNTER — OFFICE VISIT (OUTPATIENT)
Dept: PULMONOLOGY | Facility: CLINIC | Age: 85
End: 2025-06-13
Payer: COMMERCIAL

## 2025-06-13 VITALS
TEMPERATURE: 98 F | WEIGHT: 150 LBS | SYSTOLIC BLOOD PRESSURE: 100 MMHG | DIASTOLIC BLOOD PRESSURE: 60 MMHG | BODY MASS INDEX: 23.15 KG/M2

## 2025-06-13 DIAGNOSIS — R49.9 VOICE IMPAIRMENT: ICD-10-CM

## 2025-06-13 DIAGNOSIS — J84.10 PULMONARY FIBROSIS DETERMINED BY HIGH RESOLUTION COMPUTED TOMOGRAPHY (HCC): ICD-10-CM

## 2025-06-13 DIAGNOSIS — R91.1 LUNG NODULE: Primary | ICD-10-CM

## 2025-06-13 PROCEDURE — 99214 OFFICE O/P EST MOD 30 MIN: CPT | Performed by: INTERNAL MEDICINE

## 2025-06-13 PROCEDURE — 94618 PULMONARY STRESS TESTING: CPT | Performed by: INTERNAL MEDICINE

## 2025-06-13 NOTE — H&P (VIEW-ONLY)
Pulmonary Follow Up Note   Suhail Borrego 84 y.o. male MRN: [id number]  6/13/2025    Assessment:    Pulmonary fibrosis determined by high resolution computed tomography (HCC)  Suhail definitely feels worse over time with his breathing.  He is not a point where he needs oxygen as well, though he strongly prefers to avoid this.  His worsening is not clearly demonstrated by any worsening in radiologic findings, and given his concomitant vascular disease, this may be multifactorial.    Hold off on initiating oxygen, patient will contact us if he changes his mind  Will continue to hold discussions regarding antifibrotic therapy after acute issues are resolved  Patient counseled to avoid pushing himself to the point of lightheadedness to risk falling; he reports that this is more provoked by back pain and peripheral nerve issues than shortness of breath    Voice impairment  He does not feel like this is an issue of insufficient airflow to promote a strong voice.  CT imaging revealed no abnormalities in his upper trachea, but he feels like he is having collapse of his thyroid cartilage, which I could not appreciate.  He did want to see an otolaryngologist, and I made a referral.    Lung nodule  He will undergo PET/CT scanning as his next step.  It is possible that this is inflammatory, and it is not entirely certain that he will go through with biopsy and treatment should this prove to be malignant, but he requested take a step at a time.    Plan:    Diagnoses and all orders for this visit:    Lung nodule  -     NM PET CT skull base to mid thigh; Future    Pulmonary fibrosis determined by high resolution computed tomography (HCC)  -     POCT Oxygen Titration    Voice impairment  -     Ambulatory Referral to Otolaryngology; Future    Return in about 3 months (around 9/13/2025).    History of Present Illness   HPI:  Suhail Borrego is a 84 y.o. male who presents for follow-up after recent imaging.  He reports unfortunately  that he has been feeling a little worse overall since his last appointment.  He is gasping more often for shortness of breath during short walks of 25 to 30 feet.  However, he still maintains that he is forced to stop not due to shortness of breath but due to back pain and leg pain.    He has recently been seen by vascular surgery and has not been able to tolerate revascularization of his lower extremities due to severe back issues and inability to tolerate conscious sedation, but safety issues in going for general anesthesia.    Unfortunately, CT imaging showed a new 1.5 cm nodule in the right lower lobe subpleural region posteriorly.  This could represent scarring, but should be considered malignant until proven otherwise.  We discussed the likely next step is being PET scan, biopsy, followed by SBRT if malignant, and he is unsure whether he would want to go through all of those steps.  He would like to take it 1 step at a time.    Lastly, he complains of progressive weakness to his voice.  He does not feel like he lacks the airflow to be able to generate a strong voice, and he has no shortness of breath at rest, but it seems like no matter how much effort he gives he just cannot manifest a voice anymore.  He also complains of a sensation like his thyroid cartilage is weakening and collapsing, which was not noted in any way on his CT imaging.  He was hoping to speak with an otolaryngologist; order placed.    Review of Systems   HENT:  Positive for voice change.    Respiratory:  Positive for shortness of breath.      Historical Information   Past Medical History[1]  Past Surgical History[2]  Family History[3]    Meds/Allergies   Current Medications[4]  Allergies[5]    Vitals: Blood pressure 100/60, temperature 98 °F (36.7 °C), weight 68 kg (150 lb). Body mass index is 23.15 kg/m².      Physical Exam  Physical Exam  Vitals reviewed.   Constitutional:       General: He is not in acute distress.     Appearance: Normal  "appearance. He is well-developed. He is ill-appearing.   HENT:      Head: Normocephalic and atraumatic.     Eyes:      General: No scleral icterus.     Conjunctiva/sclera: Conjunctivae normal.     Neck:      Thyroid: No thyromegaly.      Vascular: No JVD.     Cardiovascular:      Rate and Rhythm: Normal rate and regular rhythm.      Heart sounds: Normal heart sounds. No murmur heard.     No friction rub. No gallop.   Pulmonary:      Effort: Pulmonary effort is normal. No respiratory distress.      Breath sounds: Rales (severe velcro crackles throughout all lung fields but worst at bilateral bases) present. No wheezing.     Musculoskeletal:      Cervical back: Neck supple.      Right lower leg: No edema.      Left lower leg: No edema.     Skin:     General: Skin is warm and dry.      Findings: No rash.     Neurological:      General: No focal deficit present.      Mental Status: He is alert and oriented to person, place, and time. Mental status is at baseline.     Psychiatric:         Mood and Affect: Mood normal.         Behavior: Behavior normal.     Labs: I have personally reviewed pertinent lab results.  Lab Results   Component Value Date    WBC 11.71 (H) 05/12/2025    HGB 11.3 (L) 05/12/2025    HCT 36.5 05/12/2025    MCV 99 (H) 05/12/2025     05/12/2025     Lab Results   Component Value Date    GLUCOSE 123 06/30/2024    CALCIUM 9.4 05/12/2025     06/10/2015    K 4.3 05/12/2025    CO2 30 05/12/2025     05/12/2025    BUN 39 (H) 05/12/2025    CREATININE 1.36 (H) 05/12/2025     No results found for: \"IGE\"  Lab Results   Component Value Date    ALT 14 08/08/2024    AST 22 08/08/2024    ALKPHOS 71 08/08/2024    BILITOT 0.46 06/10/2015       Imaging and other studies: I have personally reviewed relevant films in PACS.    EKG, Pathology, and Other Studies: I have personally reviewed relevant reports in Epic.    Marty Nevarez M.D.  [facility]         [1]  Past " Medical History:  Diagnosis Date   • Abnormal liver function test     RESOLVED: 14SEP2017   • Allergic rhinitis    • Anemia     LAST ASSESSED: 19OCT2017   • Arthritis    • BPH (benign prostatic hyperplasia)    • CAD (coronary artery disease)    • Coronary artery disease    • 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HCC)     LAST ASSESSED: 05OCT2017   • Former tobacco use    • GERD (gastroesophageal reflux disease)    • Hand paresthesia     RESOLVED: 11SEP2017   • Hyperlipidemia    • Hypertension    • Lumbar stenosis    • Peripheral artery disease (HCC)     LAST ASSESSED: 27OCT2017   • Stage 3a chronic kidney disease (HCC) 7/11/2021   [2]  Past Surgical History:  Procedure Laterality Date   • BACK SURGERY     • COLONOSCOPY     • IR LOWER EXTREMITY ANGIOGRAM  5/27/2025   • LUMBAR FUSION     • ME ARTHRODESIS POSTERIOR/PSTLAT TQ 1NTRSPC LUMBAR N/A 11/03/2016    Procedure: L2-S1 POSTERIOR LUMBAR FUSION AND DECOMPRESSION (Impulse), Posterior lateral fixation; dural repair.;  Surgeon: Leslie Gil MD;  [facility];  Service: Orthopedics   • ME CABG W/ARTERIAL GRAFT SINGLE ARTERIAL GRAFT N/A 01/19/2018    Procedure: CABG X4 with LIMA - LAD, SVG - RCA, OM2, & Diagonal ; Left Leg EVH; MATTHEW;  Surgeon: Eric Bar DO;  [facility];  Service: Cardiac Surgery   • ME CIRCUMCISION AGE >28 DAYS N/A 10/17/2024    Procedure: CIRCUMCISION WITH ADJACENT TISSUE TRANSFER;  Surgeon: Brody Hoyt MD;  Location:  MAIN OR;  Service: Urology   • ME COLONOSCOPY FLX DX W/COLLJ SPEC WHEN PFRMD N/A 11/15/2017    Procedure: EGD AND COLONOSCOPY;  Surgeon: Mariano Godinez MD;  [facility];  Service: Gastroenterology   • ME TEAEC W/PATCH GRF CAROTID VERTB SUBCLAV NECK INC Left 7/8/2024    Procedure: left CEA retrograde left common carotid angioplasty/stent;  Surgeon: Luis Miguel Peters DO;  [facility];  Service: Vascular   • SEPTOPLASTY      LAST ASSESSED; 13MAY2014   • TONSILECTOMY AND ADNOIDECTOMY      LAST ASSESSED:  [id number]   • UPPER GASTROINTESTINAL ENDOSCOPY     [3]  Family History  Problem Relation Name Age of Onset   • Emphysema Mother     • Liver disease Mother     • Coronary artery disease Father     • Hypertension Father     • Liver disease Father     • Heart failure Father     • Stroke Father          CVA    • Heart attack Father     • Coronary artery disease Brother     • Heart disease Brother          younger brother by pass and other brother 4 stents placed   • Other Family          BACK PROBLEM    • Stroke Family          CVA   • Emphysema Family     • Hypertension Family          BENIGN   [4]    Current Outpatient Medications:   •  acetaminophen (TYLENOL) 650 mg CR tablet, Take 1 tablet (650 mg total) by mouth every 8 (eight) hours as needed for mild pain, Disp: 30 tablet, Rfl: 5  •  Ascorbic Acid (Vitamin C) 500 MG PACK, Take 1,000 mg by mouth in the morning., Disp: , Rfl:   •  Aspirin 81 MG CAPS, Take by mouth, Disp: , Rfl:   •  atorvastatin (LIPITOR) 80 mg tablet, Take 1 tablet (80 mg total) by mouth every morning, Disp: 90 tablet, Rfl: 0  •  cholecalciferol (VITAMIN D3) 1,000 units tablet, Take 2,000 Units by mouth in the morning., Disp: , Rfl:   •  cyanocobalamin (VITAMIN B-12) 1,000 mcg tablet, Take 1,000 mcg by mouth in the morning., Disp: , Rfl:   •  docusate sodium (COLACE) 100 mg capsule, Take 100 mg by mouth in the morning and 100 mg in the evening., Disp: , Rfl:   •  gabapentin (NEURONTIN) 100 mg capsule, Take 1 capsule (100 mg total) by mouth 2 (two) times a day Morning and bedtime, Disp: 200 capsule, Rfl: 0  •  metoprolol tartrate (LOPRESSOR) 50 mg tablet, TAKE 0.5 TABLETS (25 MG TOTAL) BY MOUTH IN THE MORNING, Disp: 45 tablet, Rfl: 1  •  midodrine (PROAMATINE) 2.5 mg tablet, Take 1 tablet (2.5 mg total) by mouth 3 (three) times a day before meals, Disp: 270 tablet, Rfl: 0  •  Multiple Vitamin (MULTIVITAMIN) capsule, Take 1 capsule by mouth in the morning., Disp: , Rfl:   •  traMADol (Ultram) 50 mg  tablet, Take 1 tablet (50 mg total) by mouth every 8 (eight) hours as needed for moderate pain Do not start before May 19, 2025., Disp: 90 tablet, Rfl: 2  •  mupirocin (BACTROBAN) 2 % ointment, Apply topically 3 (three) times a day (Patient not taking: Reported on 6/11/2025), Disp: 30 g, Rfl: 0[5]  Allergies  Allergen Reactions   • Penicillins Other (See Comments)     Hallucinations;  Patient reported that he was seeing visual disturbances

## 2025-06-13 NOTE — ASSESSMENT & PLAN NOTE
He does not feel like this is an issue of insufficient airflow to promote a strong voice.  CT imaging revealed no abnormalities in his upper trachea, but he feels like he is having collapse of his thyroid cartilage, which I could not appreciate.  He did want to see an otolaryngologist, and I made a referral.

## 2025-06-13 NOTE — ASSESSMENT & PLAN NOTE
He will undergo PET/CT scanning as his next step.  It is possible that this is inflammatory, and it is not entirely certain that he will go through with biopsy and treatment should this prove to be malignant, but he requested take a step at a time.

## 2025-06-13 NOTE — ASSESSMENT & PLAN NOTE
Suhail definitely feels worse over time with his breathing.  He is not a point where he needs oxygen as well, though he strongly prefers to avoid this.  His worsening is not clearly demonstrated by any worsening in radiologic findings, and given his concomitant vascular disease, this may be multifactorial.    Hold off on initiating oxygen, patient will contact us if he changes his mind  Will continue to hold discussions regarding antifibrotic therapy after acute issues are resolved  Patient counseled to avoid pushing himself to the point of lightheadedness to risk falling; he reports that this is more provoked by back pain and peripheral nerve issues than shortness of breath

## 2025-06-13 NOTE — PROGRESS NOTES
Pulmonary Follow Up Note   Suhail Borrego 84 y.o. male MRN: [id number]  6/13/2025    Assessment:    Pulmonary fibrosis determined by high resolution computed tomography (HCC)  Suhail definitely feels worse over time with his breathing.  He is not a point where he needs oxygen as well, though he strongly prefers to avoid this.  His worsening is not clearly demonstrated by any worsening in radiologic findings, and given his concomitant vascular disease, this may be multifactorial.    Hold off on initiating oxygen, patient will contact us if he changes his mind  Will continue to hold discussions regarding antifibrotic therapy after acute issues are resolved  Patient counseled to avoid pushing himself to the point of lightheadedness to risk falling; he reports that this is more provoked by back pain and peripheral nerve issues than shortness of breath    Voice impairment  He does not feel like this is an issue of insufficient airflow to promote a strong voice.  CT imaging revealed no abnormalities in his upper trachea, but he feels like he is having collapse of his thyroid cartilage, which I could not appreciate.  He did want to see an otolaryngologist, and I made a referral.    Lung nodule  He will undergo PET/CT scanning as his next step.  It is possible that this is inflammatory, and it is not entirely certain that he will go through with biopsy and treatment should this prove to be malignant, but he requested take a step at a time.    Plan:    Diagnoses and all orders for this visit:    Lung nodule  -     NM PET CT skull base to mid thigh; Future    Pulmonary fibrosis determined by high resolution computed tomography (HCC)  -     POCT Oxygen Titration    Voice impairment  -     Ambulatory Referral to Otolaryngology; Future    Return in about 3 months (around 9/13/2025).    History of Present Illness   HPI:  Suhail Borrego is a 84 y.o. male who presents for follow-up after recent imaging.  He reports unfortunately  that he has been feeling a little worse overall since his last appointment.  He is gasping more often for shortness of breath during short walks of 25 to 30 feet.  However, he still maintains that he is forced to stop not due to shortness of breath but due to back pain and leg pain.    He has recently been seen by vascular surgery and has not been able to tolerate revascularization of his lower extremities due to severe back issues and inability to tolerate conscious sedation, but safety issues in going for general anesthesia.    Unfortunately, CT imaging showed a new 1.5 cm nodule in the right lower lobe subpleural region posteriorly.  This could represent scarring, but should be considered malignant until proven otherwise.  We discussed the likely next step is being PET scan, biopsy, followed by SBRT if malignant, and he is unsure whether he would want to go through all of those steps.  He would like to take it 1 step at a time.    Lastly, he complains of progressive weakness to his voice.  He does not feel like he lacks the airflow to be able to generate a strong voice, and he has no shortness of breath at rest, but it seems like no matter how much effort he gives he just cannot manifest a voice anymore.  He also complains of a sensation like his thyroid cartilage is weakening and collapsing, which was not noted in any way on his CT imaging.  He was hoping to speak with an otolaryngologist; order placed.    Review of Systems   HENT:  Positive for voice change.    Respiratory:  Positive for shortness of breath.      Historical Information   Past Medical History[1]  Past Surgical History[2]  Family History[3]    Meds/Allergies   Current Medications[4]  Allergies[5]    Vitals: Blood pressure 100/60, temperature 98 °F (36.7 °C), weight 68 kg (150 lb). Body mass index is 23.15 kg/m².      Physical Exam  Physical Exam  Vitals reviewed.   Constitutional:       General: He is not in acute distress.     Appearance: Normal  "appearance. He is well-developed. He is ill-appearing.   HENT:      Head: Normocephalic and atraumatic.     Eyes:      General: No scleral icterus.     Conjunctiva/sclera: Conjunctivae normal.     Neck:      Thyroid: No thyromegaly.      Vascular: No JVD.     Cardiovascular:      Rate and Rhythm: Normal rate and regular rhythm.      Heart sounds: Normal heart sounds. No murmur heard.     No friction rub. No gallop.   Pulmonary:      Effort: Pulmonary effort is normal. No respiratory distress.      Breath sounds: Rales (severe velcro crackles throughout all lung fields but worst at bilateral bases) present. No wheezing.     Musculoskeletal:      Cervical back: Neck supple.      Right lower leg: No edema.      Left lower leg: No edema.     Skin:     General: Skin is warm and dry.      Findings: No rash.     Neurological:      General: No focal deficit present.      Mental Status: He is alert and oriented to person, place, and time. Mental status is at baseline.     Psychiatric:         Mood and Affect: Mood normal.         Behavior: Behavior normal.     Labs: I have personally reviewed pertinent lab results.  Lab Results   Component Value Date    WBC 11.71 (H) 05/12/2025    HGB 11.3 (L) 05/12/2025    HCT 36.5 05/12/2025    MCV 99 (H) 05/12/2025     05/12/2025     Lab Results   Component Value Date    GLUCOSE 123 06/30/2024    CALCIUM 9.4 05/12/2025     06/10/2015    K 4.3 05/12/2025    CO2 30 05/12/2025     05/12/2025    BUN 39 (H) 05/12/2025    CREATININE 1.36 (H) 05/12/2025     No results found for: \"IGE\"  Lab Results   Component Value Date    ALT 14 08/08/2024    AST 22 08/08/2024    ALKPHOS 71 08/08/2024    BILITOT 0.46 06/10/2015       Imaging and other studies: I have personally reviewed relevant films in PACS.    EKG, Pathology, and Other Studies: I have personally reviewed relevant reports in Epic.    Marty Nevarez M.D.  [facility]         [1]  Past " Medical History:  Diagnosis Date   • Abnormal liver function test     RESOLVED: 14SEP2017   • Allergic rhinitis    • Anemia     LAST ASSESSED: 19OCT2017   • Arthritis    • BPH (benign prostatic hyperplasia)    • CAD (coronary artery disease)    • Coronary artery disease    • 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HCC)     LAST ASSESSED: 05OCT2017   • Former tobacco use    • GERD (gastroesophageal reflux disease)    • Hand paresthesia     RESOLVED: 11SEP2017   • Hyperlipidemia    • Hypertension    • Lumbar stenosis    • Peripheral artery disease (HCC)     LAST ASSESSED: 27OCT2017   • Stage 3a chronic kidney disease (HCC) 7/11/2021   [2]  Past Surgical History:  Procedure Laterality Date   • BACK SURGERY     • COLONOSCOPY     • IR LOWER EXTREMITY ANGIOGRAM  5/27/2025   • LUMBAR FUSION     • CO ARTHRODESIS POSTERIOR/PSTLAT TQ 1NTRSPC LUMBAR N/A 11/03/2016    Procedure: L2-S1 POSTERIOR LUMBAR FUSION AND DECOMPRESSION (Impulse), Posterior lateral fixation; dural repair.;  Surgeon: Leslie Gil MD;  [facility];  Service: Orthopedics   • CO CABG W/ARTERIAL GRAFT SINGLE ARTERIAL GRAFT N/A 01/19/2018    Procedure: CABG X4 with LIMA - LAD, SVG - RCA, OM2, & Diagonal ; Left Leg EVH; MATTHEW;  Surgeon: Eric Bar DO;  [facility];  Service: Cardiac Surgery   • CO CIRCUMCISION AGE >28 DAYS N/A 10/17/2024    Procedure: CIRCUMCISION WITH ADJACENT TISSUE TRANSFER;  Surgeon: Brody Hoyt MD;  Location:  MAIN OR;  Service: Urology   • CO COLONOSCOPY FLX DX W/COLLJ SPEC WHEN PFRMD N/A 11/15/2017    Procedure: EGD AND COLONOSCOPY;  Surgeon: Mariano Godinez MD;  [facility];  Service: Gastroenterology   • CO TEAEC W/PATCH GRF CAROTID VERTB SUBCLAV NECK INC Left 7/8/2024    Procedure: left CEA retrograde left common carotid angioplasty/stent;  Surgeon: Luis Miguel Peters DO;  [facility];  Service: Vascular   • SEPTOPLASTY      LAST ASSESSED; 13MAY2014   • TONSILECTOMY AND ADNOIDECTOMY      LAST ASSESSED:  [id number]   • UPPER GASTROINTESTINAL ENDOSCOPY     [3]  Family History  Problem Relation Name Age of Onset   • Emphysema Mother     • Liver disease Mother     • Coronary artery disease Father     • Hypertension Father     • Liver disease Father     • Heart failure Father     • Stroke Father          CVA    • Heart attack Father     • Coronary artery disease Brother     • Heart disease Brother          younger brother by pass and other brother 4 stents placed   • Other Family          BACK PROBLEM    • Stroke Family          CVA   • Emphysema Family     • Hypertension Family          BENIGN   [4]    Current Outpatient Medications:   •  acetaminophen (TYLENOL) 650 mg CR tablet, Take 1 tablet (650 mg total) by mouth every 8 (eight) hours as needed for mild pain, Disp: 30 tablet, Rfl: 5  •  Ascorbic Acid (Vitamin C) 500 MG PACK, Take 1,000 mg by mouth in the morning., Disp: , Rfl:   •  Aspirin 81 MG CAPS, Take by mouth, Disp: , Rfl:   •  atorvastatin (LIPITOR) 80 mg tablet, Take 1 tablet (80 mg total) by mouth every morning, Disp: 90 tablet, Rfl: 0  •  cholecalciferol (VITAMIN D3) 1,000 units tablet, Take 2,000 Units by mouth in the morning., Disp: , Rfl:   •  cyanocobalamin (VITAMIN B-12) 1,000 mcg tablet, Take 1,000 mcg by mouth in the morning., Disp: , Rfl:   •  docusate sodium (COLACE) 100 mg capsule, Take 100 mg by mouth in the morning and 100 mg in the evening., Disp: , Rfl:   •  gabapentin (NEURONTIN) 100 mg capsule, Take 1 capsule (100 mg total) by mouth 2 (two) times a day Morning and bedtime, Disp: 200 capsule, Rfl: 0  •  metoprolol tartrate (LOPRESSOR) 50 mg tablet, TAKE 0.5 TABLETS (25 MG TOTAL) BY MOUTH IN THE MORNING, Disp: 45 tablet, Rfl: 1  •  midodrine (PROAMATINE) 2.5 mg tablet, Take 1 tablet (2.5 mg total) by mouth 3 (three) times a day before meals, Disp: 270 tablet, Rfl: 0  •  Multiple Vitamin (MULTIVITAMIN) capsule, Take 1 capsule by mouth in the morning., Disp: , Rfl:   •  traMADol (Ultram) 50 mg  tablet, Take 1 tablet (50 mg total) by mouth every 8 (eight) hours as needed for moderate pain Do not start before May 19, 2025., Disp: 90 tablet, Rfl: 2  •  mupirocin (BACTROBAN) 2 % ointment, Apply topically 3 (three) times a day (Patient not taking: Reported on 6/11/2025), Disp: 30 g, Rfl: 0[5]  Allergies  Allergen Reactions   • Penicillins Other (See Comments)     Hallucinations;  Patient reported that he was seeing visual disturbances

## 2025-06-16 ENCOUNTER — OFFICE VISIT (OUTPATIENT)
Dept: UROLOGY | Facility: CLINIC | Age: 85
End: 2025-06-16
Payer: COMMERCIAL

## 2025-06-16 VITALS — OXYGEN SATURATION: 97 % | SYSTOLIC BLOOD PRESSURE: 102 MMHG | HEART RATE: 80 BPM | DIASTOLIC BLOOD PRESSURE: 60 MMHG

## 2025-06-16 DIAGNOSIS — N47.1 PHIMOSIS OF PENIS: Primary | ICD-10-CM

## 2025-06-16 PROCEDURE — 99213 OFFICE O/P EST LOW 20 MIN: CPT | Performed by: PHYSICIAN ASSISTANT

## 2025-06-16 NOTE — PROGRESS NOTES
Name: Suhail Borrego      : 1940      MRN: [id number]  Encounter Provider: Aly Ziegler PA-C  Encounter Date: 2025   Encounter department: [facility]  :  Assessment & Plan  Phimosis of penis  May follow-up with urology on an as-needed basis  Call with any future questions or concerns           History of Present Illness   Suhail Borrego is a 84 y.o. male who presents history of phimosis.  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  Accompanied by his daughter.  No significant voiding complaints.  He reports no problems with his circumcision site.  Feels comfortable without any voiding complaints.    Review of Systems       Objective   /60 (BP Location: Left arm, Patient Position: Sitting, Cuff Size: Standard)   Pulse 80   SpO2 97%     Physical Exam GEN: no acute distress    RESP: breathing comfortably with no accessory muscle use    ABD: soft, non-tender, non-distended   INCISION:    EXT: no significant peripheral edema   RADIOLOGY:   none        Results   Lab Results   Component Value Date    PSA <0.1 2020    PSA 0.3 06/10/2015    PSA 0.3 2014     Lab Results   Component Value Date    GLUCOSE 123 2024    CALCIUM 9.4 2025     06/10/2015    K 4.3 2025    CO2 30 2025     2025    BUN 39 (H) 2025    CREATININE 1.36 (H) 2025     Lab Results   Component Value Date    WBC 11.71 (H) 2025    HGB 11.3 (L) 2025    HCT 36.5 2025    MCV 99 (H) 2025     2025       Office Urine Dip  No results found for this or any previous visit (from the past hour).

## 2025-06-20 ENCOUNTER — TELEPHONE (OUTPATIENT)
Age: 85
End: 2025-06-20

## 2025-06-25 NOTE — TELEPHONE ENCOUNTER
Madhavi called, asked if the form can be uploaded to [employer] so she can print it out. She also asked for a call back [phone] when it is uploaded

## 2025-06-26 ENCOUNTER — HOSPITAL ENCOUNTER (OUTPATIENT)
Dept: RADIOLOGY | Age: 85
Discharge: HOME | End: 2025-06-26
Attending: INTERNAL MEDICINE
Payer: COMMERCIAL

## 2025-06-26 ENCOUNTER — RESULTS FOLLOW-UP (OUTPATIENT)
Dept: PULMONOLOGY | Facility: CLINIC | Age: 85
End: 2025-06-26

## 2025-06-26 DIAGNOSIS — R91.1 LUNG NODULE: ICD-10-CM

## 2025-06-26 LAB — GLUCOSE SERPL-MCNC: 67 MG/DL (ref 65–140)

## 2025-06-26 PROCEDURE — 78815 PET IMAGE W/CT SKULL-THIGH: CPT

## 2025-06-26 PROCEDURE — 82948 REAGENT STRIP/BLOOD GLUCOSE: CPT

## 2025-06-26 PROCEDURE — A9552 F18 FDG: HCPCS

## 2025-06-27 NOTE — TELEPHONE ENCOUNTER
Dr. Nevarez    Pt's daughter, Madhavi, requesting call back to discuss results that were reviewed with her father yesterday.     Madhavi [phone]

## 2025-06-30 ENCOUNTER — TELEPHONE (OUTPATIENT)
Age: 85
End: 2025-06-30

## 2025-06-30 NOTE — TELEPHONE ENCOUNTER
Patient called to inform  that he wants to move forward with the biopsy. I advised patient I will notify  and have him reach out to patient to further assist. Please advise

## 2025-06-30 NOTE — TELEPHONE ENCOUNTER
Patient's daughter Madhavi called again in regards to her father's Pet scan results. She stated she is upset that she has not heard back from Dr. Nevarez yet and would appreciate a return call as soon as possible or a call letting her know when Dr. Nevarez can speak with her. She is hoping we can reach out to her sometime today. Her number is [phone].      Please advise.

## 2025-07-01 ENCOUNTER — TELEPHONE (OUTPATIENT)
Dept: PULMONOLOGY | Facility: CLINIC | Age: 85
End: 2025-07-01

## 2025-07-01 ENCOUNTER — PREP FOR PROCEDURE (OUTPATIENT)
Dept: PULMONOLOGY | Facility: CLINIC | Age: 85
End: 2025-07-01

## 2025-07-01 DIAGNOSIS — R91.1 LUNG NODULE: Primary | ICD-10-CM

## 2025-07-01 NOTE — TELEPHONE ENCOUNTER
Dr. CUNNINGHAM will be performing a EBUS on Suhail Borrego on 07/08/2025     LOCATION: [facility]    ANTICOAGULATION INSTRUCTIONS: NONEN    OTHER MEDICATION INSTRUCTIONS: NONE    LABS: (CBC/PT/PTT in last 90 days): 05/12/2025   (If no, labs ordered / to be done at least one day prior to procedure)    LAST OFFICE NOTE/H&P within 30 days of procedure: 06/13/2025    INSTRUCTIONS GIVEN: Spoke with patients daughter Madhavi advised of procedure date and location. Patient aware he is to be NPO after midnight the night prior and will need a . Patient advised he will receive a call the day before with time of arrival.    Thank You   Carolee Bundy

## 2025-07-08 ENCOUNTER — ANESTHESIA EVENT (OUTPATIENT)
Dept: GASTROENTEROLOGY | Facility: HOSPITAL | Age: 85
End: 2025-07-08
Payer: COMMERCIAL

## 2025-07-08 ENCOUNTER — HOSPITAL ENCOUNTER (OUTPATIENT)
Dept: GASTROENTEROLOGY | Facility: HOSPITAL | Age: 85
Setting detail: OUTPATIENT SURGERY
Discharge: HOME | End: 2025-07-08
Attending: INTERNAL MEDICINE
Payer: COMMERCIAL

## 2025-07-08 ENCOUNTER — ANESTHESIA (OUTPATIENT)
Dept: GASTROENTEROLOGY | Facility: HOSPITAL | Age: 85
End: 2025-07-08
Payer: COMMERCIAL

## 2025-07-08 VITALS
HEART RATE: 74 BPM | BODY MASS INDEX: 22.38 KG/M2 | DIASTOLIC BLOOD PRESSURE: 51 MMHG | SYSTOLIC BLOOD PRESSURE: 89 MMHG | OXYGEN SATURATION: 95 % | RESPIRATION RATE: 16 BRPM | WEIGHT: 145 LBS | TEMPERATURE: 97 F

## 2025-07-08 DIAGNOSIS — R91.1 LUNG NODULE: ICD-10-CM

## 2025-07-08 PROCEDURE — 88305 TISSUE EXAM BY PATHOLOGIST: CPT | Performed by: PATHOLOGY

## 2025-07-08 PROCEDURE — 88173 CYTOPATH EVAL FNA REPORT: CPT | Performed by: PATHOLOGY

## 2025-07-08 PROCEDURE — 88185 FLOWCYTOMETRY/TC ADD-ON: CPT | Performed by: INTERNAL MEDICINE

## 2025-07-08 PROCEDURE — 88172 CYTP DX EVAL FNA 1ST EA SITE: CPT | Performed by: PATHOLOGY

## 2025-07-08 PROCEDURE — 88341 IMHCHEM/IMCYTCHM EA ADD ANTB: CPT | Performed by: PATHOLOGY

## 2025-07-08 PROCEDURE — 88342 IMHCHEM/IMCYTCHM 1ST ANTB: CPT | Performed by: PATHOLOGY

## 2025-07-08 PROCEDURE — 88184 FLOWCYTOMETRY/ TC 1 MARKER: CPT | Performed by: INTERNAL MEDICINE

## 2025-07-08 PROCEDURE — 88360 TUMOR IMMUNOHISTOCHEM/MANUAL: CPT | Performed by: PATHOLOGY

## 2025-07-08 RX ORDER — PROPOFOL 10 MG/ML
INJECTION, EMULSION INTRAVENOUS AS NEEDED
Status: DISCONTINUED | OUTPATIENT
Start: 2025-07-08 | End: 2025-07-08

## 2025-07-08 RX ORDER — SODIUM CHLORIDE, SODIUM LACTATE, POTASSIUM CHLORIDE, CALCIUM CHLORIDE 600; 310; 30; 20 MG/100ML; MG/100ML; MG/100ML; MG/100ML
125 INJECTION, SOLUTION INTRAVENOUS CONTINUOUS
Status: DISCONTINUED | OUTPATIENT
Start: 2025-07-08 | End: 2025-07-12 | Stop reason: HOSPADM

## 2025-07-08 RX ORDER — ALBUTEROL SULFATE 0.83 MG/ML
2.5 SOLUTION RESPIRATORY (INHALATION) ONCE AS NEEDED
Status: CANCELLED | OUTPATIENT
Start: 2025-07-08

## 2025-07-08 RX ORDER — FENTANYL CITRATE 50 UG/ML
INJECTION, SOLUTION INTRAMUSCULAR; INTRAVENOUS AS NEEDED
Status: DISCONTINUED | OUTPATIENT
Start: 2025-07-08 | End: 2025-07-08

## 2025-07-08 RX ORDER — GLYCOPYRROLATE 0.2 MG/ML
INJECTION INTRAMUSCULAR; INTRAVENOUS AS NEEDED
Status: DISCONTINUED | OUTPATIENT
Start: 2025-07-08 | End: 2025-07-08

## 2025-07-08 RX ORDER — FAMOTIDINE 10 MG
20 TABLET ORAL 2 TIMES DAILY
COMMUNITY

## 2025-07-08 RX ORDER — FENTANYL CITRATE/PF 50 MCG/ML
25 SYRINGE (ML) INJECTION
Refills: 0 | Status: CANCELLED | OUTPATIENT
Start: 2025-07-08

## 2025-07-08 RX ORDER — PROMETHAZINE HYDROCHLORIDE 25 MG/ML
12.5 INJECTION, SOLUTION INTRAMUSCULAR; INTRAVENOUS ONCE AS NEEDED
Status: CANCELLED | OUTPATIENT
Start: 2025-07-08

## 2025-07-08 RX ORDER — PROPOFOL 10 MG/ML
INJECTION, EMULSION INTRAVENOUS CONTINUOUS PRN
Status: DISCONTINUED | OUTPATIENT
Start: 2025-07-08 | End: 2025-07-08

## 2025-07-08 RX ORDER — LIDOCAINE HYDROCHLORIDE 10 MG/ML
INJECTION, SOLUTION EPIDURAL; INFILTRATION; INTRACAUDAL; PERINEURAL AS NEEDED
Status: DISCONTINUED | OUTPATIENT
Start: 2025-07-08 | End: 2025-07-08

## 2025-07-08 RX ORDER — HYDROMORPHONE HCL/PF 1 MG/ML
0.5 SYRINGE (ML) INJECTION
Refills: 0 | Status: CANCELLED | OUTPATIENT
Start: 2025-07-08

## 2025-07-08 RX ORDER — ONDANSETRON 2 MG/ML
4 INJECTION INTRAMUSCULAR; INTRAVENOUS ONCE AS NEEDED
Status: CANCELLED | OUTPATIENT
Start: 2025-07-08

## 2025-07-08 RX ORDER — ONDANSETRON 2 MG/ML
INJECTION INTRAMUSCULAR; INTRAVENOUS AS NEEDED
Status: DISCONTINUED | OUTPATIENT
Start: 2025-07-08 | End: 2025-07-08

## 2025-07-08 RX ORDER — LABETALOL HYDROCHLORIDE 5 MG/ML
5 INJECTION, SOLUTION INTRAVENOUS
Status: CANCELLED | OUTPATIENT
Start: 2025-07-08

## 2025-07-08 RX ORDER — SODIUM CHLORIDE, SODIUM LACTATE, POTASSIUM CHLORIDE, CALCIUM CHLORIDE 600; 310; 30; 20 MG/100ML; MG/100ML; MG/100ML; MG/100ML
INJECTION, SOLUTION INTRAVENOUS CONTINUOUS PRN
Status: DISCONTINUED | OUTPATIENT
Start: 2025-07-08 | End: 2025-07-08

## 2025-07-08 RX ORDER — SODIUM CHLORIDE, SODIUM LACTATE, POTASSIUM CHLORIDE, CALCIUM CHLORIDE 600; 310; 30; 20 MG/100ML; MG/100ML; MG/100ML; MG/100ML
125 INJECTION, SOLUTION INTRAVENOUS CONTINUOUS
Status: CANCELLED | OUTPATIENT
Start: 2025-07-08

## 2025-07-08 RX ORDER — PHENYLEPHRINE HCL IN 0.9% NACL 1 MG/10 ML
SYRINGE (ML) INTRAVENOUS AS NEEDED
Status: DISCONTINUED | OUTPATIENT
Start: 2025-07-08 | End: 2025-07-08

## 2025-07-08 RX ORDER — EPHEDRINE SULFATE 50 MG/ML
INJECTION INTRAVENOUS AS NEEDED
Status: DISCONTINUED | OUTPATIENT
Start: 2025-07-08 | End: 2025-07-08

## 2025-07-08 RX ORDER — DEXAMETHASONE SODIUM PHOSPHATE 10 MG/ML
INJECTION, SOLUTION INTRAMUSCULAR; INTRAVENOUS AS NEEDED
Status: DISCONTINUED | OUTPATIENT
Start: 2025-07-08 | End: 2025-07-08

## 2025-07-08 RX ADMIN — PROPOFOL 50 MG: 10 INJECTION, EMULSION INTRAVENOUS at 09:28

## 2025-07-08 RX ADMIN — PROPOFOL 100 MG: 10 INJECTION, EMULSION INTRAVENOUS at 08:38

## 2025-07-08 RX ADMIN — GLYCOPYRROLATE 0.1 MCG: 0.2 INJECTION INTRAMUSCULAR; INTRAVENOUS at 08:36

## 2025-07-08 RX ADMIN — EPHEDRINE SULFATE 10 MG: 50 INJECTION INTRAVENOUS at 08:40

## 2025-07-08 RX ADMIN — Medication 100 MCG: at 09:38

## 2025-07-08 RX ADMIN — SODIUM CHLORIDE, SODIUM LACTATE, POTASSIUM CHLORIDE, AND CALCIUM CHLORIDE: .6; .31; .03; .02 INJECTION, SOLUTION INTRAVENOUS at 08:31

## 2025-07-08 RX ADMIN — Medication 100 MCG: at 09:21

## 2025-07-08 RX ADMIN — FENTANYL CITRATE 50 MCG: 50 INJECTION INTRAMUSCULAR; INTRAVENOUS at 08:41

## 2025-07-08 RX ADMIN — DEXAMETHASONE SODIUM PHOSPHATE 10 MG: 10 INJECTION INTRAMUSCULAR; INTRAVENOUS at 08:42

## 2025-07-08 RX ADMIN — PROPOFOL 50 MG: 10 INJECTION, EMULSION INTRAVENOUS at 09:10

## 2025-07-08 RX ADMIN — PROPOFOL 50 MG: 10 INJECTION, EMULSION INTRAVENOUS at 09:14

## 2025-07-08 RX ADMIN — PROPOFOL 20 MG: 10 INJECTION, EMULSION INTRAVENOUS at 09:08

## 2025-07-08 RX ADMIN — PROPOFOL 50 MG: 10 INJECTION, EMULSION INTRAVENOUS at 09:38

## 2025-07-08 RX ADMIN — PROPOFOL 20 MG: 10 INJECTION, EMULSION INTRAVENOUS at 09:06

## 2025-07-08 RX ADMIN — EPHEDRINE SULFATE 5 MG: 50 INJECTION INTRAVENOUS at 08:59

## 2025-07-08 RX ADMIN — ONDANSETRON 4 MG: 2 INJECTION INTRAMUSCULAR; INTRAVENOUS at 09:27

## 2025-07-08 RX ADMIN — Medication 100 MCG: at 09:33

## 2025-07-08 RX ADMIN — PROPOFOL 100 MCG/KG/MIN: 10 INJECTION, EMULSION INTRAVENOUS at 08:37

## 2025-07-08 RX ADMIN — LIDOCAINE HYDROCHLORIDE 50 MG: 10 INJECTION, SOLUTION EPIDURAL; INFILTRATION; INTRACAUDAL at 08:38

## 2025-07-08 RX ADMIN — Medication 100 MCG: at 09:41

## 2025-07-08 RX ADMIN — FENTANYL CITRATE 25 MCG: 50 INJECTION INTRAMUSCULAR; INTRAVENOUS at 09:16

## 2025-07-08 RX ADMIN — PROPOFOL 50 MG: 10 INJECTION, EMULSION INTRAVENOUS at 09:32

## 2025-07-08 RX ADMIN — PROPOFOL 20 MG: 10 INJECTION, EMULSION INTRAVENOUS at 09:03

## 2025-07-08 RX ADMIN — EPHEDRINE SULFATE 10 MG: 50 INJECTION INTRAVENOUS at 09:18

## 2025-07-08 RX ADMIN — Medication 100 MCG: at 09:30

## 2025-07-08 RX ADMIN — PROPOFOL 50 MG: 10 INJECTION, EMULSION INTRAVENOUS at 08:42

## 2025-07-08 RX ADMIN — FENTANYL CITRATE 25 MCG: 50 INJECTION INTRAMUSCULAR; INTRAVENOUS at 09:09

## 2025-07-08 RX ADMIN — GLYCOPYRROLATE 0.1 MCG: 0.2 INJECTION INTRAMUSCULAR; INTRAVENOUS at 09:21

## 2025-07-08 RX ADMIN — EPHEDRINE SULFATE 5 MG: 50 INJECTION INTRAVENOUS at 08:55

## 2025-07-08 NOTE — ANESTHESIA PREPROCEDURE EVALUATION
Procedure:  ENDOBRONCHIAL ULTRASOUND (EBUS)    Anemia 11.1 to 9.3, eval for possible GI bleed.      Relevant Problems   CARDIO   (+) CAD (coronary artery disease)   (+) Carotid stenosis, asymptomatic, bilateral   (+) Hyperlipidemia   (+) Internal hemorrhoids   (+) Peripheral artery disease (HCC)   (+) Primary hypertension   (+) Symptomatic stenosis of left carotid artery      GI/HEPATIC   (+) GERD (gastroesophageal reflux disease)      /RENAL   (+) Stage 3 chronic kidney disease (HCC)      HEMATOLOGY   (+) Macrocytic anemia      MUSCULOSKELETAL   (+) Acute left-sided low back pain with left-sided sciatica   (+) Intractable back pain      NEURO/PSYCH   (+) Continuous opioid dependence (HCC)        Physical Exam    Airway     Mallampati score: III  TM Distance: >3 FB  Neck ROM: full      Cardiovascular  Rate: normal    Dental   No notable dental hx     Pulmonary  Pulmonary exam normal     Neurological      Other Findings        Anesthesia Plan  ASA Score- 3     Anesthesia Type- general with ASA Monitors.         Additional Monitors:     Airway Plan: LMA and LMA.    Comment: Patient seen and examined, history reviewed.  Patient to be done under general anesthesia with LMA and routine monitors.  Risks discussed with the patient, consent obtained..       Plan Factors-Exercise tolerance (METS): >4 METS.    Chart reviewed. EKG reviewed.  Existing labs reviewed. Patient summary reviewed.                  Induction- intravenous.    Postoperative Plan- .   Monitoring Plan - Monitoring plan - standard ASA monitoring          Informed Consent- Anesthetic plan and risks discussed with patient.  I personally reviewed this patient with the CRNA. Discussed and agreed on the Anesthesia Plan with the CRNA..

## 2025-07-08 NOTE — ANESTHESIA POSTPROCEDURE EVALUATION
Post-Op Assessment Note    CV Status:  Stable  Pain Score: 0         Mental Status:  Sleepy   Hydration Status:  Stable   PONV Controlled:  None   Airway Patency:  Patent     Post Op Vitals Reviewed: Yes    No anethesia notable event occurred.    Staff: CRNA           Last Filed PACU Vitals:  Vitals Value Taken Time   Temp 97 °F (36.1 °C) 07/08/25 09:54   Pulse 77 07/08/25 09:54   /64 07/08/25 09:54   Resp 18 07/08/25 09:54   SpO2 100 % 07/08/25 09:54

## 2025-07-08 NOTE — RESPIRATORY THERAPY NOTE
Respiratory- EBUS Note    Suhail Borrego 84 y.o. male MRN: [id number]  Unit/Bed#:   Encounter: [id number]            PROCEDURE: EBUS     DATE:July 8, 2025    LOCATION: GI suite      Assisted standard bronchoscopy, with Dr. Girard with Dr Phoenix . After sedation was achieved, by anesthesia, LMA inserted.  A standard bronchoscope was inserted thru LMA and advanced to the level of the Vocal Cords , 6 mL of 1% lido applied to  Vocal Cords  and Brie . Airway inspection preformed and scope changed to EBUS and Needle samples prepaired on slides and sent to lab from 4L,11L L7, 4R, 11R       Transbronchial Needle Aspiration performed: 4L,11L L7, 4R, 11R       SPECIMENS: All specimens samples ordered and sent to lab.  COMPLICATIONS:  No known complications at this time.

## 2025-07-08 NOTE — INTERVAL H&P NOTE
H&P reviewed. After examining the patient I find no changes in the patients condition since the H&P had been written.    Vitals:    07/08/25 0708   BP: 138/65   Pulse: 68   Resp: 16   Temp: 97.5 °F (36.4 °C)   SpO2: 96%       Exam  GEN older in bed, conversant, nontoxic  HEENT MMM, no thrush, no oral lesions  Neck No accessory muscle use, JVP not elevated  CV reg, single s1/2, no m/r  Pulm clear, no wheeze or rale  ABD +BS soft NTND, no rebound  EXT warm, brisk cap refill, no edema    CT/PET images personally reviewed - FDG avid RLL nodule with right hilar and SC FDG avid adenopathy    Assessment  Abnormal CT chest - mediastinal adenopathy and lung nodule c/f malignancy   Pulmonary fibrosis     PLAN  Bronchoscopy with EBUS guided tissue sampling  Informed consent obtained, all questions answered    Bhargav Garduno, DO

## 2025-07-11 DIAGNOSIS — C77.1 METASTASIS TO MEDIASTINAL LYMPH NODE (HCC): Primary | ICD-10-CM

## 2025-07-11 PROCEDURE — 88172 CYTP DX EVAL FNA 1ST EA SITE: CPT | Performed by: PATHOLOGY

## 2025-07-11 PROCEDURE — 88341 IMHCHEM/IMCYTCHM EA ADD ANTB: CPT | Performed by: PATHOLOGY

## 2025-07-11 PROCEDURE — 88173 CYTOPATH EVAL FNA REPORT: CPT | Performed by: PATHOLOGY

## 2025-07-11 PROCEDURE — 88342 IMHCHEM/IMCYTCHM 1ST ANTB: CPT | Performed by: PATHOLOGY

## 2025-07-11 PROCEDURE — 88360 TUMOR IMMUNOHISTOCHEM/MANUAL: CPT | Performed by: PATHOLOGY

## 2025-07-11 PROCEDURE — 88305 TISSUE EXAM BY PATHOLOGIST: CPT | Performed by: PATHOLOGY

## 2025-07-14 ENCOUNTER — DOCUMENTATION (OUTPATIENT)
Dept: HEMATOLOGY ONCOLOGY | Facility: CLINIC | Age: 85
End: 2025-07-14

## 2025-07-14 LAB — SCAN RESULT: NORMAL

## 2025-07-14 NOTE — PROGRESS NOTES
In-basket message received from  to add patient to the thoracic [facility] on 7/21/2025. Chart reviewed and prep completed.

## 2025-07-15 ENCOUNTER — DOCUMENTATION (OUTPATIENT)
Dept: PULMONOLOGY | Facility: CLINIC | Age: 85
End: 2025-07-15

## 2025-07-15 ENCOUNTER — TELEPHONE (OUTPATIENT)
Age: 85
End: 2025-07-15

## 2025-07-15 DIAGNOSIS — C34.91 SMALL CELL CARCINOMA OF RIGHT LUNG, UNSPECIFIED PART OF LUNG (HCC): Primary | ICD-10-CM

## 2025-07-15 DIAGNOSIS — J84.10 PULMONARY FIBROSIS DETERMINED BY HIGH RESOLUTION COMPUTED TOMOGRAPHY (HCC): ICD-10-CM

## 2025-07-20 DIAGNOSIS — K21.9 GASTROESOPHAGEAL REFLUX DISEASE, UNSPECIFIED WHETHER ESOPHAGITIS PRESENT: Primary | ICD-10-CM

## 2025-07-21 ENCOUNTER — RA CDI HCC (OUTPATIENT)
Dept: OTHER | Facility: HOSPITAL | Age: 85
End: 2025-07-21

## 2025-07-21 NOTE — PROGRESS NOTES
GR  HCC coding opportunities       Chart reviewed, no opportunity found: CHART REVIEWED, NO OPPORTUNITY FOUND        Patients Insurance     Medicare Insurance: [employer]

## 2025-07-22 DIAGNOSIS — K21.9 GASTROESOPHAGEAL REFLUX DISEASE, UNSPECIFIED WHETHER ESOPHAGITIS PRESENT: ICD-10-CM

## 2025-07-22 RX ORDER — FAMOTIDINE 20 MG/1
20 TABLET, FILM COATED ORAL 2 TIMES DAILY
Qty: 40 TABLET | Refills: 5 | Status: SHIPPED | OUTPATIENT
Start: 2025-07-22

## 2025-07-24 RX ORDER — FAMOTIDINE 20 MG/1
20 TABLET, FILM COATED ORAL 2 TIMES DAILY
Qty: 40 TABLET | Refills: 0 | OUTPATIENT
Start: 2025-07-24

## 2025-07-25 ENCOUNTER — OFFICE VISIT (OUTPATIENT)
Dept: PAIN MEDICINE | Facility: CLINIC | Age: 85
End: 2025-07-25
Payer: COMMERCIAL

## 2025-07-25 DIAGNOSIS — M96.1 POSTLAMINECTOMY SYNDROME OF LUMBAR REGION: ICD-10-CM

## 2025-07-25 DIAGNOSIS — Z79.891 LONG-TERM CURRENT USE OF OPIATE ANALGESIC: ICD-10-CM

## 2025-07-25 DIAGNOSIS — F11.20 UNCOMPLICATED OPIOID DEPENDENCE (HCC): ICD-10-CM

## 2025-07-25 DIAGNOSIS — M47.816 LUMBAR SPONDYLOSIS: Primary | ICD-10-CM

## 2025-07-25 DIAGNOSIS — G89.4 CHRONIC PAIN SYNDROME: ICD-10-CM

## 2025-07-25 LAB
CARIS ANDROGEN RECEPTOR: NEGATIVE
CARIS GENOMIC LOH - EXOME: NORMAL
CARIS HER2/NEU: NEGATIVE
CARIS HLA-A: NORMAL
CARIS HLA-B: NORMAL
CARIS HLA-C: NORMAL
CARIS MSI - EXOME: NORMAL
CARIS PD-L1 (SP142): NEGATIVE
CARIS TMB - EXOME: NORMAL

## 2025-07-25 PROCEDURE — 99214 OFFICE O/P EST MOD 30 MIN: CPT

## 2025-07-25 RX ORDER — TRAMADOL HYDROCHLORIDE 50 MG/1
50 TABLET ORAL EVERY 8 HOURS PRN
Qty: 90 TABLET | Refills: 2 | Status: SHIPPED | OUTPATIENT
Start: 2025-07-30

## 2025-07-25 NOTE — PATIENT INSTRUCTIONS
"Patient Education     Taking opioids safely   The Basics   Written by the doctors and editors at [facility]   What are opioids? -- Opioids are a group of prescription medicines that relieve pain. They work by attaching to \"opioid receptors\" in the body and blocking pain signals.  Opioids are sometimes used when other types of pain medicine do not help enough. Opioids can be helpful for treating short-term, or \"acute,\" pain, like after surgery or an injury. They are also sometimes used to treat long-term, or \"chronic,\" pain, like for people with cancer. But they come with risks.  If your doctor prescribes an opioid medicine, it's important to understand the risks and know how to stay safe.  What are the risks of taking opioids? -- You should know that:   Opioids have side effects. Some are just bothersome, and some can be dangerous. For example, taking too much of an opioid is called an \"overdose.\" An overdose can cause serious problems and even death.   In some cases, taking opioids can lead to misuse. For example, people might take the medicine when they don't need it for pain. Or they might take more than they are supposed to. Sharing or selling opioids are other examples of misuse.   There is a risk of addiction. This is also called \"opioid use disorder.\"  If you take too much, or take opioids with alcohol or certain other drugs, it can cause serious harm. It can even cause death from overdose.  How do I stay safe? -- There are things you can do to stay safe if you need to take an opioid medicine (figure 1). These things help protect yourself and others.  Know your medicines:    Opioids come in different forms. \"Immediate-release\" medicines work quickly and last for a short time. \"Extended-release\" medicines work more slowly and last longer. Make sure that you know what type of opioid you have. Read the label and the information that comes with your prescription.   Follow your treatment plan carefully. Take only " "the dose your doctor prescribes, and only as often as they tell you to.   Never take opioids that were not prescribed to you.   Some opioids come combined with other medicines like acetaminophen or an \"NSAID\" (like ibuprofen). Do not take any extra NSAIDs or acetaminophen without talking to your doctor first.   Make sure that all of your doctors know every medicine you take, even those that are non-prescription. Some medicines can affect the way opioids work. Bring a complete list of all of your pain medicines and other medicines with you whenever you go to a doctor, nurse, dentist, or pharmacist.   Ask your doctor or pharmacist if it is safe to take your other medicines with your opioid medicine.  Use and store your medicine safely:    Do not drink alcohol while you are taking opioids.   Do not take opioids with medicines that make you sleepy, unless your doctor tells you to. Examples include:   \"Benzodiazepines\" like diazepam (sample brand name: Valium) or alprazolam (sample brand name: Xanax)   Gabapentin (sample brand name: Neurontin) or pregabalin (brand name: Lyrica)   Muscle relaxants like baclofen or cyclobenzaprine   Sleeping pills like zolpidem (sample brand name: Ambien)   Talk to your doctor about whether it is safe to drive. Opioids can make you feel tired or have trouble thinking clearly. If you are starting a new prescription or taking a higher dose, you might need to avoid things like driving, using dangerous machinery, or other activities that could be risky.   Store your opioids in a safe place, such as a locked cabinet. This prevents children, teens, or anyone else from getting to them.   Never share your opioids with other people.  Be aware of side effects:    Opioid medicines can cause side effects. There are often ways to prevent or treat these.   Call your doctor or nurse if you have side effects that bother you, such as:   Constipation - Your doctor or nurse might suggest that you take a " "laxative to prevent or treat constipation. If your bowel movements are hard and dry, a stool softener might help. Drink plenty of water, and try to get regular physical activity.   Mild nausea or stomach discomfort - Taking the medicine with or after food can help with this. Nausea usually gets better with time.   Severe nausea, vomiting, or itchiness - If you have any of these problems, your doctor might be able to switch you to a different medicine.   Dry mouth   Feeling dizzy or sleepy, or having trouble thinking clearly   Vision problems   Being clumsy or falling down   Know the signs of an opioid overdose. Get help right away if you think that you or someone else took too much of an opioid medicine. Signs of an overdose are listed below.  Stay safe when stopping your opioid medicine:    When opioids are needed to treat acute pain, doctors usually try to prescribe them for only a short time. This usually means a few days or a week. They also prescribe the lowest dose possible to relieve pain.   Follow your doctor's instructions about how to stop taking your opioid once your pain has improved. This usually involves \"tapering,\" or reducing the dose gradually. If you stop an opioid suddenly, this can cause unpleasant symptoms like stomach ache, diarrhea, or shakes. This is called \"withdrawal.\" Tapering the dose can help prevent withdrawal.   When your pain gets better, get rid of any leftover medicines. Your doctor, nurse, or pharmacist can suggest ways to get rid of them. This might involve flushing them down the toilet or mixing them with something like dirt or cat litter, then putting the mixture in a sealed container in the trash. Some police stations and pharmacies also take leftover medicines.  What is naloxone? -- Naloxone is a medicine that reverses the effects of opioids. It can prevent death from an opioid overdose. Naloxone comes as an injection (shot), or as a spray that goes in the nose. Naloxone nasal " spray (brand name: Narcan) is available without a prescription.  If you or someone you know uses opioids, it's a good idea to keep naloxone with you. Make sure that you and your family and friends know how and when to use it.  When should I call for help? -- If you are taking an opioid, it's important to know when to get help. Signs of an opioid overdose include:   Extreme sleepiness   Slow breathing, or no breathing at all   Very small pupils (the black circles in the center of the eyes)   Very slow heartbeat  If you took too much of your opioid medicine or think that someone is having an opioid overdose:   If you have naloxone, give it immediately. Naloxone can save a person's life. But it needs to be given as soon as possible.   Call for an ambulance right away (in the [place] and [place], call 9-1-1).  Call your doctor or nurse if:   You are having side effects that bother you.   You have questions about how to take your medicine.   You are having trouble managing your pain.  All topics are updated as new evidence becomes available and our peer review process is complete.  This topic retrieved from [employer] on: May 15, 2024.  Topic 395300 Version 1.0  Release: 32.4.3 - C32.134  © 2024 UpToDate, Inc. and/or its affiliates. All rights reserved.  figure 1: Tips for medication safety     Tips to use your medicine safely include:  (A) Read labels so you know how to take your medicines.  (B) Have a routine for taking your medicines.  (C) Make sure you know what foods, drinks, and other medicines are safe for you.  (D) Store medicines in a safe place.  (E) Work with your health care team and ask questions if you have them.  Graphic 093421 Version 2.0  Consumer Information Use and Disclaimer   Disclaimer: This generalized information is a limited summary of diagnosis, treatment, and/or medication information. It is not meant to be comprehensive and should be used as a tool to help the user understand and/or assess potential  diagnostic and treatment options. It does NOT include all information about conditions, treatments, medications, side effects, or risks that may apply to a specific patient. It is not intended to be medical advice or a substitute for the medical advice, diagnosis, or treatment of a health care provider based on the health care provider's examination and assessment of a patient's specific and unique circumstances. Patients must speak with a health care provider for complete information about their health, medical questions, and treatment options, including any risks or benefits regarding use of medications. This information does not endorse any treatments or medications as safe, effective, or approved for treating a specific patient. UpToDate, Inc. and its affiliates disclaim any warranty or liability relating to this information or the use thereof.The use of this information is governed by the Terms of Use, available at https://www.woltersHotelzillauwer.com/en/know/clinical-effectiveness-terms. 2024© UpToDate, Inc. and its affiliates and/or licensors. All rights reserved.  Copyright   © 2024 UpToDate, Inc. and/or its affiliates. All rights reserved.

## 2025-07-25 NOTE — ASSESSMENT & PLAN NOTE
Orders:    MM OF_Tramadol Definitive Test    MM OF_THC Definitive Test    MM OF_Temazepam Definitive    MM OF_Phencyclidine Definitive Test    MM OF_Oxymorphone Definitive Test    MM OF_Oxazepam Definitive    MM OF_Oxycodone Definitive Test - Oral Fluid    MM OF_Morphine Definitive    MM OF_Methylphenidate Definitive Test    MM OF_Methadone Definitive Test    MM OF_Meperidine Definitive Test    MM OF_MDMA Definitive Test    MM OF_Lorazepam Definitive    MM OF_Hydromorphone Definitive    MM OF_Hydrocodone Definitive    MM OF_Heroin Definitive Test    MM OF_Fentanyl Definitive Test    MM OF_Diazepam Definitive    MM OF_Codeine Definitive    MM OF_Cocaine Definitive Test    MM OF_Clonazepam Definitive    MM OF_Buprenorphine Definitive Test    MM OF_Amphetamine Definitive Test    MM OF_Alprazolam Definitive    traMADol (Ultram) 50 mg tablet; Take 1 tablet (50 mg total) by mouth every 8 (eight) hours as needed for moderate pain Do not start before July 30, 2025.

## 2025-07-25 NOTE — PROGRESS NOTES
Name: Suhail Borrego      : 1940      MRN: [id number]  Encounter Provider: KEVIN Castellon  Encounter Date: 2025   Encounter department: [facility]  :  Assessment & Plan  Lumbar spondylosis  Chronic pain syndrome  Postlaminectomy syndrome of lumbar region  Long-term current use of opiate analgesic  Uncomplicated opioid dependence (HCC)    Orders:    MM OF_Tramadol Definitive Test    MM OF_THC Definitive Test    MM OF_Temazepam Definitive    MM OF_Phencyclidine Definitive Test    MM OF_Oxymorphone Definitive Test    MM OF_Oxazepam Definitive    MM OF_Oxycodone Definitive Test - Oral Fluid    MM OF_Morphine Definitive    MM OF_Methylphenidate Definitive Test    MM OF_Methadone Definitive Test    MM OF_Meperidine Definitive Test    MM OF_MDMA Definitive Test    MM OF_Lorazepam Definitive    MM OF_Hydromorphone Definitive    MM OF_Hydrocodone Definitive    MM OF_Heroin Definitive Test    MM OF_Fentanyl Definitive Test    MM OF_Diazepam Definitive    MM OF_Codeine Definitive    MM OF_Cocaine Definitive Test    MM OF_Clonazepam Definitive    MM OF_Buprenorphine Definitive Test    MM OF_Amphetamine Definitive Test    MM OF_Alprazolam Definitive    traMADol (Ultram) 50 mg tablet; Take 1 tablet (50 mg total) by mouth every 8 (eight) hours as needed for moderate pain Do not start before 2025.    His pain continues to be managed with taking tramadol, therefore I will continue him on this medication as prescribed.  A prescription for tramadol 50 mg with 2 refills was electronically sent to the patient's pharmacy with a do not fill date of 2025.  We will follow-up in 12 weeks for medication reevaluation and refills, or sooner if necessary.  Okay for next office visit to be telemedicine.    An oral drug screen swab was collected at today's office visit. The swab will be sent for confirmatory testing. The drug screen is medically necessary because the patient is either  dependent on opioid medication or is being considered for opioid medication therapy and the results could impact ongoing or future treatment. The drug screen is to evaluate for the presences or absence of prescribed, non-prescribed, and/or illicit drugs/substances.   There are risks associated with opioid medications, including dependence, addiction and tolerance. The patient understands and agrees to use these medications only as prescribed. Potential side effects of the medications include, but are not limited to, constipation, drowsiness, addiction, impaired judgment and risk of fatal overdose if not taken as prescribed. The patient was warned against driving while taking sedation medications.  Sharing medications is a felony. At this point in time, the patient is showing no signs of addiction, abuse, diversion or suicidal ideation.  [place] Prescription Drug Monitoring Program report was reviewed and was appropriate      My impressions and treatment recommendations were discussed in detail with the patient who verbalized understanding and had no further questions.  Discharge instructions were provided. I personally saw and examined the patient and I agree with the above discussed plan of care.    History of Present Illness     Suhail Borrego is a 84 y.o. male with a history of chronic pain secondary to low back pain, lumbar spondylosis and lumbar postlaminectomy syndrome.  He was last seen via telemedicine on 5/2/2025 where his tramadol 50 mg was increased to 3 times a day.  He states the increase in frequency has been better managing his pain.  He presents to the office with ongoing bilateral low back pain that is worse with activity such as walking and standing for prolonged period of time and eases when he sits down.    Current pain medications include tramadol 50 mg 1 tablet three times a day as needed for pain which is helpful in managing his pain.  He also utilizes Tylenol as needed.  He states he  does occasionally suffer from constipation, however is managed with over-the-counter MiraLAX.    Opioid contract date: 1/10/25  Last UDS Date: 7/25/25  Last Dose:7/25/25      Review of Systems   Respiratory:  Negative for shortness of breath.    Cardiovascular:  Negative for chest pain.   Gastrointestinal:  Negative for constipation, diarrhea, nausea and vomiting.   Musculoskeletal:  Positive for back pain and gait problem. Negative for arthralgias, joint swelling and myalgias.   Skin:  Negative for rash.   Neurological:  Negative for dizziness, seizures and weakness.   All other systems reviewed and are negative.      Medical History Reviewed by provider this encounter:  Tobacco  Allergies  Meds  Problems  Med Hx  Surg Hx  Fam Hx     .       Objective   There were no vitals taken for this visit.     Pain Score: 0-No pain (while walking pain goes to a 9)  Physical Exam  Constitutional: normal, well developed, well nourished, alert, in no distress and non-toxic and no overt pain behavior.  Eyes: anicteric  HEENT: grossly intact  Neck: supple, symmetric, trachea midline and no masses   Pulmonary: even and unlabored  Cardiovascular: No edema or pitting edema present  Skin: Normal without rashes or lesions and well hydrated  Psychiatric: Mood and affect appropriate  Neurologic: Cranial Nerves II-XII grossly intact  Musculoskeletal: in wheelchair

## 2025-07-27 DIAGNOSIS — I65.22 SYMPTOMATIC STENOSIS OF LEFT CAROTID ARTERY: ICD-10-CM

## 2025-07-29 RX ORDER — MIDODRINE HYDROCHLORIDE 2.5 MG/1
2.5 TABLET ORAL
Qty: 270 TABLET | Refills: 0 | Status: SHIPPED | OUTPATIENT
Start: 2025-07-29

## 2025-07-31 ENCOUNTER — DOCUMENTATION (OUTPATIENT)
Dept: GENETICS | Facility: CLINIC | Age: 85
End: 2025-07-31

## 2025-08-04 ENCOUNTER — SOCIAL WORK (OUTPATIENT)
Dept: PALLIATIVE MEDICINE | Facility: CLINIC | Age: 85
End: 2025-08-04
Payer: COMMERCIAL

## 2025-08-04 ENCOUNTER — CONSULT (OUTPATIENT)
Dept: PALLIATIVE MEDICINE | Facility: CLINIC | Age: 85
End: 2025-08-04
Payer: COMMERCIAL

## 2025-08-04 ENCOUNTER — OFFICE VISIT (OUTPATIENT)
Age: 85
End: 2025-08-04

## 2025-08-04 VITALS
HEIGHT: 68 IN | SYSTOLIC BLOOD PRESSURE: 104 MMHG | TEMPERATURE: 97.6 F | BODY MASS INDEX: 21.82 KG/M2 | WEIGHT: 144 LBS | DIASTOLIC BLOOD PRESSURE: 60 MMHG

## 2025-08-04 VITALS
HEART RATE: 64 BPM | TEMPERATURE: 97 F | WEIGHT: 150 LBS | HEIGHT: 68 IN | OXYGEN SATURATION: 97 % | BODY MASS INDEX: 22.73 KG/M2 | DIASTOLIC BLOOD PRESSURE: 58 MMHG | SYSTOLIC BLOOD PRESSURE: 110 MMHG

## 2025-08-04 DIAGNOSIS — R06.02 SHORTNESS OF BREATH: ICD-10-CM

## 2025-08-04 DIAGNOSIS — C34.91 SMALL CELL CARCINOMA OF RIGHT LUNG, UNSPECIFIED PART OF LUNG (HCC): ICD-10-CM

## 2025-08-04 DIAGNOSIS — J84.10 PULMONARY FIBROSIS DETERMINED BY HIGH RESOLUTION COMPUTED TOMOGRAPHY (HCC): ICD-10-CM

## 2025-08-04 DIAGNOSIS — Z51.5 PALLIATIVE CARE ENCOUNTER: Primary | ICD-10-CM

## 2025-08-04 DIAGNOSIS — I10 PRIMARY HYPERTENSION: ICD-10-CM

## 2025-08-04 DIAGNOSIS — R91.1 LUNG NODULE: ICD-10-CM

## 2025-08-04 DIAGNOSIS — I25.810 CORONARY ARTERY DISEASE INVOLVING CORONARY BYPASS GRAFT OF NATIVE HEART WITHOUT ANGINA PECTORIS: ICD-10-CM

## 2025-08-04 DIAGNOSIS — Z71.89 GOALS OF CARE, COUNSELING/DISCUSSION: ICD-10-CM

## 2025-08-04 DIAGNOSIS — R55 NEAR SYNCOPE: Primary | ICD-10-CM

## 2025-08-04 DIAGNOSIS — Z71.89 COUNSELING AND COORDINATION OF CARE: Primary | ICD-10-CM

## 2025-08-04 PROCEDURE — NC001 PR NO CHARGE

## 2025-08-04 PROCEDURE — 99497 ADVNCD CARE PLAN 30 MIN: CPT | Performed by: STUDENT IN AN ORGANIZED HEALTH CARE EDUCATION/TRAINING PROGRAM

## 2025-08-04 PROCEDURE — 99204 OFFICE O/P NEW MOD 45 MIN: CPT | Performed by: STUDENT IN AN ORGANIZED HEALTH CARE EDUCATION/TRAINING PROGRAM

## 2025-08-06 ENCOUNTER — TELEPHONE (OUTPATIENT)
Dept: PULMONOLOGY | Facility: CLINIC | Age: 85
End: 2025-08-06

## 2025-08-06 PROBLEM — Z71.89 GOALS OF CARE, COUNSELING/DISCUSSION: Status: ACTIVE | Noted: 2025-08-06

## 2025-08-06 PROBLEM — Z51.5 PALLIATIVE CARE ENCOUNTER: Status: ACTIVE | Noted: 2025-08-06

## 2025-08-07 ENCOUNTER — TELEPHONE (OUTPATIENT)
Age: 85
End: 2025-08-07

## 2025-08-08 ENCOUNTER — OFFICE VISIT (OUTPATIENT)
Dept: CARDIOLOGY CLINIC | Facility: CLINIC | Age: 85
End: 2025-08-08
Payer: COMMERCIAL

## 2025-08-08 VITALS
DIASTOLIC BLOOD PRESSURE: 60 MMHG | HEIGHT: 68 IN | HEART RATE: 69 BPM | SYSTOLIC BLOOD PRESSURE: 120 MMHG | OXYGEN SATURATION: 95 % | WEIGHT: 149.4 LBS | BODY MASS INDEX: 22.64 KG/M2

## 2025-08-08 DIAGNOSIS — I10 HYPERTENSION, UNSPECIFIED TYPE: ICD-10-CM

## 2025-08-08 DIAGNOSIS — E78.5 DYSLIPIDEMIA: ICD-10-CM

## 2025-08-08 DIAGNOSIS — I25.10 CORONARY ARTERY DISEASE INVOLVING NATIVE CORONARY ARTERY OF NATIVE HEART WITHOUT ANGINA PECTORIS: ICD-10-CM

## 2025-08-08 DIAGNOSIS — I65.23 CAROTID STENOSIS, ASYMPTOMATIC, BILATERAL: ICD-10-CM

## 2025-08-08 DIAGNOSIS — I10 PRIMARY HYPERTENSION: ICD-10-CM

## 2025-08-08 PROCEDURE — 99214 OFFICE O/P EST MOD 30 MIN: CPT

## 2025-08-08 RX ORDER — NITROGLYCERIN 0.4 MG/1
0.4 TABLET SUBLINGUAL
Qty: 30 TABLET | Refills: 1 | Status: CANCELLED | OUTPATIENT
Start: 2025-08-08

## 2025-08-08 RX ORDER — METOPROLOL TARTRATE 25 MG/1
12.5 TABLET, FILM COATED ORAL DAILY
Qty: 45 TABLET | Refills: 3 | Status: SHIPPED | OUTPATIENT
Start: 2025-08-08

## 2025-08-15 ENCOUNTER — OFFICE VISIT (OUTPATIENT)
Dept: PULMONOLOGY | Facility: CLINIC | Age: 85
End: 2025-08-15
Payer: COMMERCIAL

## 2025-08-15 PROBLEM — J96.11 CHRONIC HYPOXEMIC RESPIRATORY FAILURE (HCC): Status: ACTIVE | Noted: 2025-08-15

## 2025-08-17 ENCOUNTER — PATIENT MESSAGE (OUTPATIENT)
Age: 85
End: 2025-08-17

## 2025-08-17 DIAGNOSIS — K21.9 GASTROESOPHAGEAL REFLUX DISEASE, UNSPECIFIED WHETHER ESOPHAGITIS PRESENT: ICD-10-CM

## 2025-08-19 RX ORDER — FAMOTIDINE 20 MG/1
20 TABLET, FILM COATED ORAL 2 TIMES DAILY
Qty: 180 TABLET | Refills: 1 | Status: SHIPPED | OUTPATIENT
Start: 2025-08-19

## 2025-08-20 ENCOUNTER — TELEPHONE (OUTPATIENT)
Age: 85
End: 2025-08-20

## 2025-08-22 LAB

## (undated) DEVICE — PETRI DISH STERILE

## (undated) DEVICE — ELECTRODE BLADE MOD E-Z CLEAN 2.5IN 6.4CM -0012M

## (undated) DEVICE — SILVER-COATED ANTIBACTERIAL BARRIER DRESSING: Brand: ACTICOAT SURGIC 10X12CM 5PK US

## (undated) DEVICE — EVERGRIP INSERT SET 61MM: Brand: FOGARTY EVERGRIP

## (undated) DEVICE — MONITORING SPINAL IMPULSE CASE FEE

## (undated) DEVICE — DRAPE SURGIKIT SADDLE BAG

## (undated) DEVICE — PAD GROUNDING DUAL ADULT

## (undated) DEVICE — CATH STRAIGHT RED RUBBER 20 FR

## (undated) DEVICE — THERMOFLECT BLANKET, L, 25EA                               TS THERMOFLECT BLANKET, 48" X 84", SILVER, 5/BG, 5 BG/CS NW: Brand: THERMOFLECT

## (undated) DEVICE — SUT SILK 3-0 SH CR/8 18 IN C013D

## (undated) DEVICE — 3M™ IOBAN™ 2 ANTIMICROBIAL INCISE DRAPE 6640EZ: Brand: IOBAN™ 2

## (undated) DEVICE — ANTIBACTERIAL UNDYED BRAIDED (POLYGLACTIN 910), SYNTHETIC ABSORBABLE SUTURE: Brand: COATED VICRYL

## (undated) DEVICE — PROVE COVER: Brand: UNBRANDED

## (undated) DEVICE — BLANKET HYPOTHERMIA ADULT GAYMAR

## (undated) DEVICE — HEMOCLIP CARTRIDGE LRG

## (undated) DEVICE — NEEDLE 25G X 1 1/2

## (undated) DEVICE — TOWEL SET X-RAY

## (undated) DEVICE — 32 FR STRAIGHT – SOFT PVC CATHETER: Brand: PVC THORACIC CATHETERS

## (undated) DEVICE — OCCLUSIVE GAUZE STRIP,3% BISMUTH TRIBROMOPHENATE IN PETROLATUM BLEND: Brand: XEROFORM

## (undated) DEVICE — GLOVE INDICATOR PI UNDERGLOVE SZ 7 BLUE

## (undated) DEVICE — INTENDED FOR TISSUE SEPARATION, AND OTHER PROCEDURES THAT REQUIRE A SHARP SURGICAL BLADE TO PUNCTURE OR CUT.: Brand: BARD-PARKER ® CARBON RIB-BACK BLADES

## (undated) DEVICE — SYRINGE 50ML LL

## (undated) DEVICE — UMBILICAL TAPE: Brand: DEROYAL

## (undated) DEVICE — PLEDGET CARDIO PTFE 9.5 X 4.8 SOFT LF (6EA/PK)

## (undated) DEVICE — RED RUBBER URETHRAL CATHETER: Brand: DOVER

## (undated) DEVICE — PRESTO™ INFLATION DEVICE: Brand: PRESTO

## (undated) DEVICE — BETHLEHEM UNIVERSAL MINOR GEN: Brand: CARDINAL HEALTH

## (undated) DEVICE — SYRINGE 10ML LL

## (undated) DEVICE — NEEDLE 27 G X 1 1/4

## (undated) DEVICE — VASOVIEW HEMOPRO 2: Brand: VASOVIEW HEMOPRO 2

## (undated) DEVICE — SUT PROLENE 7-0 BV175-8/BV175-8 24 IN EPM8747

## (undated) DEVICE — GLOVE SRG BIOGEL 7

## (undated) DEVICE — SUT MONOCRYL 4-0 PS-2 18 IN Y496G

## (undated) DEVICE — INTENDED FOR TISSUE SEPARATION, AND OTHER PROCEDURES THAT REQUIRE A SHARP SURGICAL BLADE TO PUNCTURE OR CUT.: Brand: BARD-PARKER SAFETY BLADES SIZE 15, STERILE

## (undated) DEVICE — SURGIFOAM 8.5 X 12.5

## (undated) DEVICE — PREMIUM DRY TRAY LF: Brand: MEDLINE INDUSTRIES, INC.

## (undated) DEVICE — SUPPLY FEE STD

## (undated) DEVICE — SUT CHROMIC 3-0 SH 27 IN G122H

## (undated) DEVICE — PACK CABG PBDS

## (undated) DEVICE — [HIGH FLOW INSUFFLATOR,  DO NOT USE IF PACKAGE IS DAMAGED,  KEEP DRY,  KEEP AWAY FROM SUNLIGHT,  PROTECT FROM HEAT AND RADIOACTIVE SOURCES.]: Brand: PNEUMOSURE

## (undated) DEVICE — BETHL CAROTID ENDARTERECTOMY: Brand: CARDINAL HEALTH

## (undated) DEVICE — GAUZE SPONGES,16 PLY: Brand: CURITY

## (undated) DEVICE — CHLORHEXIDINE 4PCT 4 OZ

## (undated) DEVICE — TRAY FOLEY 16FR SURESTEP TEMP SENS URIMETER STAT LOK

## (undated) DEVICE — HEMOCLIP CARTRIDGE SM

## (undated) DEVICE — LIGACLIP MCA MULTIPLE CLIP APPLIERS, 20 SMALL CLIPS: Brand: LIGACLIP

## (undated) DEVICE — AORTIC PUNCH 5.2 MM DISP

## (undated) DEVICE — RESOLUTION CLIP 2.8MM X 235CM STR LF DISP

## (undated) DEVICE — DRESSING MEPILEX AG BORDER POST-OP 4 X 6 IN

## (undated) DEVICE — HEMOCLIP CARTRIDGE MED

## (undated) DEVICE — SUT PROLENE 7-0 BV-1/BV-1 24 IN 8304H

## (undated) DEVICE — STERNAL WIRE

## (undated) DEVICE — SUT PROLENE 6-0 C-1/C-1 30 IN 8307H

## (undated) DEVICE — GLOVE SRG BIOGEL ECLIPSE 7

## (undated) DEVICE — VESSEL CANNULA

## (undated) DEVICE — 3000CC GUARDIAN II: Brand: GUARDIAN

## (undated) DEVICE — ADHESIVE SKIN CLOSURE SYS EXOFIN FUSION 22CM

## (undated) DEVICE — CAROTID ARTERY SHUNT KIT,RADIOPAQUE LINE, STRAIGHT: Brand: ARGYLE

## (undated) DEVICE — MICROPUNCTURE KIT 5FR

## (undated) DEVICE — 32 FR RIGHT ANGLE – SOFT PVC CATHETER: Brand: PVC THORACIC CATHETERS

## (undated) DEVICE — RECIP.STERNUM SAW BLADE 34/7.5/0.7MM: Brand: AESCULAP

## (undated) DEVICE — SUT ETHIBOND 2-0 SH-1/SH-1 30 IN X763H

## (undated) DEVICE — GLOVE SRG BIOGEL ECLIPSE 7.5

## (undated) DEVICE — SYRINGE 3ML LL

## (undated) DEVICE — EXOFIN PRECISION PEN HIGH VISCOSITY TOPICAL SKIN ADHESIVE: Brand: EXOFIN PRECISION PEN, 1G

## (undated) DEVICE — BLADE BEAVER MINI SZ 69

## (undated) DEVICE — CATH SOFT-VU 5FR 40CM BERENSTEIN

## (undated) DEVICE — BONE WAX WHITE: Brand: BONE WAX WHITE

## (undated) DEVICE — SUT VICRYL 2-0 SH 27 IN UNDYED J417H

## (undated) DEVICE — PACK UNIVERSAL DRAPES SUB-Q ICD

## (undated) DEVICE — SUT SILK 2 60 IN SA8H

## (undated) DEVICE — DECANTER: Brand: UNBRANDED

## (undated) DEVICE — SUT PROLENE 5-0 BV-1 24 IN 9702H

## (undated) DEVICE — SUT SILK 3-0 18 IN A184H

## (undated) DEVICE — PINNACLE R/O II INTRODUCER SHEATH WITH RADIOPAQUE MARKER: Brand: PINNACLE

## (undated) DEVICE — Device

## (undated) DEVICE — SUT PDS PLUS 1 CTB 36 IN PDPB359T

## (undated) DEVICE — ADHESIVE SKN CLSR HISTOACRYL FLEX 0.5ML LF

## (undated) DEVICE — ACE WRAP 6 IN UNSTERILE

## (undated) DEVICE — NEPTUNE E-SEP SMOKE EVACUATION PENCIL, COATED, 70MM BLADE, PUSH BUTTON SWITCH: Brand: NEPTUNE E-SEP

## (undated) DEVICE — SILVER-COATED ANTIBACTERIAL BARRIER DRESSING: Brand: ACTICOAT SURGIC 10X25CM 5PK US

## (undated) DEVICE — STERILE LUBRICATING JELLY, TUBE: Brand: HR LUBRICATING JELLY

## (undated) DEVICE — SUCTION CATH 18 FR

## (undated) DEVICE — STRETCH BANDAGE: Brand: CURITY

## (undated) DEVICE — ALCON OPHTHALMIC KNIFE 15 °: Brand: ALCON

## (undated) DEVICE — SUT SILK 0 CT-1 30 IN 424H

## (undated) DEVICE — OASIS DRAIN, DUAL, IN-LINE, ATS COMPATIBLE: Brand: OASIS

## (undated) DEVICE — SUT PROLENE 4-0 BB 36 IN 8581H

## (undated) DEVICE — HEMOSTATIC MATRIX SURGIFLO 8ML W/THROMBIN

## (undated) DEVICE — SUT SILK 2-0 18 IN A185H